# Patient Record
Sex: MALE | Race: WHITE | Employment: OTHER | URBAN - METROPOLITAN AREA
[De-identification: names, ages, dates, MRNs, and addresses within clinical notes are randomized per-mention and may not be internally consistent; named-entity substitution may affect disease eponyms.]

---
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
## 2019-01-02 ENCOUNTER — OFFICE VISIT (OUTPATIENT)
Dept: PEDIATRIC HEMATOLOGY/ONCOLOGY | Facility: CLINIC | Age: 19
End: 2019-01-02
Attending: NURSE PRACTITIONER
Payer: COMMERCIAL

## 2019-01-02 VITALS
HEART RATE: 88 BPM | WEIGHT: 198.19 LBS | SYSTOLIC BLOOD PRESSURE: 131 MMHG | DIASTOLIC BLOOD PRESSURE: 80 MMHG | OXYGEN SATURATION: 99 % | RESPIRATION RATE: 21 BRPM | BODY MASS INDEX: 28.03 KG/M2 | TEMPERATURE: 96.9 F

## 2019-01-02 DIAGNOSIS — E83.39 HYPOPHOSPHATEMIA: ICD-10-CM

## 2019-01-02 DIAGNOSIS — D49.6 NEOPLASM OF POSTERIOR CRANIAL FOSSA (H): Primary | ICD-10-CM

## 2019-01-02 DIAGNOSIS — K21.9 GASTROESOPHAGEAL REFLUX DISEASE, ESOPHAGITIS PRESENCE NOT SPECIFIED: ICD-10-CM

## 2019-01-02 DIAGNOSIS — C71.9 EPENDYMOMA (H): ICD-10-CM

## 2019-01-02 LAB
BASOPHILS # BLD AUTO: 0 10E9/L (ref 0–0.2)
BASOPHILS NFR BLD AUTO: 0.9 %
DIFFERENTIAL METHOD BLD: ABNORMAL
EOSINOPHIL # BLD AUTO: 0.3 10E9/L (ref 0–0.7)
EOSINOPHIL NFR BLD AUTO: 8.1 %
ERYTHROCYTE [DISTWIDTH] IN BLOOD BY AUTOMATED COUNT: 12.7 % (ref 10–15)
HCT VFR BLD AUTO: 39.5 % (ref 40–53)
HGB BLD-MCNC: 13 G/DL (ref 13.3–17.7)
LYMPHOCYTES # BLD AUTO: 0.8 10E9/L (ref 0.8–5.3)
LYMPHOCYTES NFR BLD AUTO: 22.5 %
MCH RBC QN AUTO: 30.7 PG (ref 26.5–33)
MCHC RBC AUTO-ENTMCNC: 32.9 G/DL (ref 31.5–36.5)
MCV RBC AUTO: 93 FL (ref 78–100)
MONOCYTES # BLD AUTO: 0.5 10E9/L (ref 0–1.3)
MONOCYTES NFR BLD AUTO: 14.4 %
NEUTROPHILS # BLD AUTO: 1.9 10E9/L (ref 1.6–8.3)
NEUTROPHILS NFR BLD AUTO: 54.1 %
PLATELET # BLD AUTO: 120 10E9/L (ref 150–450)
PLATELET # BLD EST: ABNORMAL 10*3/UL
RBC # BLD AUTO: 4.24 10E12/L (ref 4.4–5.9)
RBC MORPH BLD: NORMAL
WBC # BLD AUTO: 3.5 10E9/L (ref 4–11)

## 2019-01-02 PROCEDURE — 85025 COMPLETE CBC W/AUTO DIFF WBC: CPT | Performed by: PEDIATRICS

## 2019-01-02 PROCEDURE — 36415 COLL VENOUS BLD VENIPUNCTURE: CPT | Performed by: PEDIATRICS

## 2019-01-02 PROCEDURE — G0463 HOSPITAL OUTPT CLINIC VISIT: HCPCS | Mod: ZF

## 2019-01-02 RX ORDER — DEXAMETHASONE 0.5 MG/1
0.75 TABLET ORAL
Qty: 90 TABLET | Refills: 3 | Status: SHIPPED | OUTPATIENT
Start: 2019-01-02 | End: 2019-12-16

## 2019-01-02 ASSESSMENT — ENCOUNTER SYMPTOMS
FACIAL ASYMMETRY: 1
RESPIRATORY NEGATIVE: 1
SPEECH DIFFICULTY: 1
MYALGIAS: 0
CARDIOVASCULAR NEGATIVE: 1
NAUSEA: 0
COUGH: 0
FEVER: 0
DIZZINESS: 0
MUSCULOSKELETAL NEGATIVE: 1
POLYDIPSIA: 0
ARTHRALGIAS: 0
CHILLS: 0
EYE PAIN: 0
SHORTNESS OF BREATH: 0
VOMITING: 0
CONSTIPATION: 1

## 2019-01-02 NOTE — PROGRESS NOTES
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 He is on COG study [id number] with Entinostat and is presently Cycle 19 Day 15.      HPI:  Geo is doing okay.  No fevers. No known ill exposures. He remains on Prilosec and needs a refill. He is presently taking: miralax BID -TID, pericolace in the morning, and linzess 290 mcg daily.  12/26/18 at 2:30 he had a large [place] scale 4 stool, and at 10:30 a small [place] Scale 5 bowel movement.  On 12/27 at 8:45 he had a medium [place] Scale 5 bowel movement, at 2:45 a small type 5 bowel movement, at 8:00 a small/med 4-5 on the [place] scale, and at 10 he had a small [place] 5 stool.  On 12:28: 8:40 Med scale 5, 2:40 small type 5 8:50pm,  Large 5 on [place] scale, and at 11:30, a small-med Scale 4-5 bowel movement.  His abdominal pain is under control.  He has not been stooling as much as usual. Seems every few days he has a day where is output is down.  His speech may be slightly improved.  He is consistently taking his daily steroids - he needs a refill.   Geo denies dizziness, vision or hearing changes, chills, cough, shortness of breath, nausea, vomiting, and polyuria.  He has no muscular aches or complaints of bone pain.  Small headache yesterday.      Fam/Soc: Lives between his  parents (one week at each home). They have been awarded guardianship of Geo. The families work well together - both parents have remarried. His dad has a clotting disorder, requiring him to take Warfarin daily.       Allergies   Allergen Reactions     Blood Transfusion Related (Informational Only) Swelling     Periorbital swelling post platelet transfusion     No Known Drug Allergies        Current meds:  Current Outpatient Medications   Medication Sig Dispense Refill     dexamethasone (DECADRON) 0.5 MG tablet Take 0.75 mg by mouth daily (with breakfast).  90 tablet 3     omeprazole (PRILOSEC) 20 MG DR capsule Take 2 capsules (40 mg) by mouth 2 times daily 180 capsule 3     potassium phosphate, monobasic, (K-PHOS) 500 MG tablet Take 1 tablet (500 mg) by mouth 3 times daily 90 tablet 3     bisacodyl (BISACODYL LAXATIVE) 5 MG EC tablet Take 2 tablets (10 mg) by mouth At Bedtime 60 tablet 3     calcium carbonate-vitamin D 600-400 MG-UNIT CHEW Take 2 tablets in the morning and 1 tablet in the evening. 90 tablet 3     Cholecalciferol 400 UNITS CHEW Take 1 tablet (400 Units) by mouth every morning 60 tablet 2     fexofenadine (ALLEGRA) 180 MG tablet Take 180 mg by mouth daily       linaclotide (LINZESS) 290 MCG capsule Take 1 capsule (290 mcg) by mouth daily       melatonin 3 MG tablet Take 3 mg by mouth At Bedtime       mupirocin (BACTROBAN) 2 % ointment Use 2 times a day to the buttock with flare 22 g 3     pentoxifylline (TRENTAL) 400 MG CR tablet Take 1 tablet (400 mg) by mouth 3 times daily (with meals) 270 tablet 2     polyethylene glycol (MIRALAX/GLYCOLAX) Packet Take 34 g by mouth 2 times daily 100 packet 3     study - entinostat (IDS# 5050) 1 mg tablet Take 1 tablet (1 mg) by mouth every 7 days for 4 doses Take one 1mg tablet with one 5mg tablet for total dose of 6mg weekly. Take on an empty stomach, at least 1 hour before or 2 hours after a meal.  Swallow tablet whole. 4 tablet 0     study - entinostat (IDS# 5050) 5 mg tablet Take 1 tablet (5 mg) by mouth every 7 days for 4 doses Take one 5mg tablet with one 1mg tablet for total dose of 6mg weekly. Take on an empty stomach, at least 1 hour before or 2 hours after a meal.  Swallow tablet whole. 4 tablet 0     sulfamethoxazole-trimethoprim (BACTRIM/SEPTRA) 400-80 MG per tablet Take 1 tablet by mouth 2 times daily On Saturdays and Sundays 24 tablet 11     vitamin E (GNP VITAMIN E) 400 UNIT capsule Take 1 capsule (400 Units) by mouth daily 30 capsule 11   Above meds reviewed. Has missed some steroid doses over the  last two months. No missed chemo doses.   Has received flu shot for [id number]    Taking 1mg melatonin.  Miralax three times daily with 96 ounces of water.   Kphos twice daily.  No missed doses of Entinostat.    Past Medical History:   Diagnosis Date     Cranial nerve dysfunction      Dyspepsia      Ependymoma (H)      Gastro-oesophageal reflux disease      Hearing loss      Intracranial hemorrhage (H)      Migraine      Pilonidal cyst     7-2015     Reduced vision      Refractory obstruction of nasal airway     2nd to nasal valve prolapse     Sleep apnea      Strabismus     gaze palsy        Past Surgical History:   Procedure Laterality Date     GRAFT CARTILAGE FROM POSTERIOR AURICLE Left 10/6/2016    Procedure: GRAFT CARTILAGE FROM POSTERIOR AURICLE;  Surgeon: Tyler Richards MD;  [facility]     INCISION AND DRAINAGE PERINEAL, COMBINED Bilateral 7/18/2015    Procedure: COMBINED INCISION AND DRAINAGE PERINEAL;  Surgeon: Dequan Timmons MD;  [facility]     OPTICAL TRACKING SYSTEM CRANIOTOMY, EXCISE TUMOR, COMBINED N/A 4/13/2015    Procedure: COMBINED OPTICAL TRACKING SYSTEM CRANIOTOMY, EXCISE TUMOR;  Surgeon: Francis Velazquez MD;  [facility]     OPTICAL TRACKING SYSTEM CRANIOTOMY, EXCISE TUMOR, COMBINED N/A 4/16/2015    Procedure: COMBINED OPTICAL TRACKING SYSTEM CRANIOTOMY, EXCISE TUMOR;  Surgeon: Francis Velazquez MD;  [facility]     OPTICAL TRACKING SYSTEM CRANIOTOMY, EXCISE TUMOR, COMBINED Bilateral 5/28/2015    Procedure: COMBINED OPTICAL TRACKING SYSTEM CRANIOTOMY, EXCISE TUMOR;  Surgeon: Francis Velazquez MD;  [facility]     OPTICAL TRACKING SYSTEM CRANIOTOMY, EXCISE TUMOR, COMBINED Bilateral 1/14/2016    Procedure: COMBINED OPTICAL TRACKING SYSTEM CRANIOTOMY, EXCISE TUMOR;  Surgeon: Francis Velazquez MD;  [facility]     OPTICAL TRACKING SYSTEM VENTRICULOSTOMY  4/16/2015    Procedure: OPTICAL TRACKING SYSTEM VENTRICULOSTOMY;  Surgeon:  Francis Velazquez MD;  [facility]     REMOVE PORT VASCULAR ACCESS N/A 10/6/2016    Procedure: REMOVE PORT VASCULAR ACCESS;  Surgeon: Bruno Perea MD;  [facility]     RHINOPLASTY N/A 10/6/2016    Procedure: RHINOPLASTY;  Surgeon: Tyler Richards MD;  [facility]     VASCULAR SURGERY  5-2015    single lumen power port       Family History   Problem Relation Age of Onset     Circulatory Father         PE/DVT     Hypothyroidism Father 30     Diabetes Maternal Grandmother      Diabetes Paternal Grandmother      Diabetes Paternal Grandfather      C.A.D. Paternal Grandfather      Hypertension Maternal Grandfather      Thyroid Disease Paternal Aunt         unknown whether hypo or hyper       Review of Systems   Constitutional: Negative for chills and fever.        In a wheelchair   HENT: Positive for hearing loss (Pre-existing hearing loss from prior therapy). Negative for congestion, ear pain, mouth sores, nosebleeds and tinnitus.    Eyes: Positive for visual disturbance (Wears a patch over one eye to minimize diplopia). Negative for pain.        Right eye patched   Respiratory: Negative.  Negative for cough and shortness of breath.    Cardiovascular: Negative.    Gastrointestinal: Positive for constipation (Chronic, see HPI). Negative for nausea and vomiting.   Endocrine: Negative for polydipsia and polyuria.        Follows with Dr. Martin   Musculoskeletal: Negative.  Negative for arthralgias and myalgias.   Skin: Negative.    Neurological: Positive for facial asymmetry and speech difficulty. Negative for dizziness.   Psychiatric/Behavioral: Sleep disturbance: Not using his BiPAP.   All other systems reviewed and are negative.    Vitals:    weight is 89.9 kg (198 lb 3.1 oz). His axillary temperature is 96.9  F (36.1  C). His blood pressure is 131/80 and his pulse is 88. His respiration is 21 and oxygen saturation is 99%.        Wt Readings from Last 4 Encounters:   01/02/19 89.9 kg (198  lb 3.1 oz) (92 %)*   12/26/18 87.2 kg (192 lb 3.9 oz) (90 %)*   12/19/18 88.4 kg (194 lb 14.2 oz) (91 %)*   12/12/18 87.1 kg (192 lb 0.3 oz) (90 %)*     * Growth percentiles are based on [facility] (Boys, 2-20 Years) data.       Physical Exam   Constitutional: He is oriented to person, place, and time and well-developed, well-nourished, and in no distress.   Stands with assist   HENT:   Head: Normocephalic and atraumatic.   Mouth/Throat: Oropharynx is clear and moist.   Saliva accumulated in mouth with occasional drooling  Left TM retracted, no erythema   Eyes: Conjunctivae are normal. Pupils are equal, round, and reactive to light.   Can track to right, but not to left.   Nystagmus noted     Neck: Neck supple.   Cardiovascular: Normal rate, regular rhythm and normal heart sounds.   Pulmonary/Chest: Effort normal and breath sounds normal. He has no wheezes.   Abdominal: Soft. Bowel sounds are normal. He exhibits no distension and no mass. There is no tenderness.   Musculoskeletal:   Positive pulses bilaterally.   Lymphadenopathy:     He has no cervical adenopathy.   Neurological: He is alert and oriented to person, place, and time. A cranial nerve deficit is present. Coordination (Ataxic- dysmetria especially in upper extremities when accomplishing tasks.) abnormal. GCS score is 15.   Skin: Skin is warm and dry.   Striae scattered He has a few petechia mid abdomen on one stretch sofya.    Psychiatric: Mood and affect normal.       Labs:  Results for orders placed or performed in visit on 01/02/19   CBC with platelets differential   Result Value Ref Range    WBC 3.5 (L) 4.0 - 11.0 10e9/L    RBC Count 4.24 (L) 4.4 - 5.9 10e12/L    Hemoglobin 13.0 (L) 13.3 - 17.7 g/dL    Hematocrit 39.5 (L) 40.0 - 53.0 %    MCV 93 78 - 100 fl    MCH 30.7 26.5 - 33.0 pg    MCHC 32.9 31.5 - 36.5 g/dL    RDW 12.7 10.0 - 15.0 %    Platelet Count 120 (L) 150 - 450 10e9/L    Diff Method Manual Differential     % Neutrophils 54.1 %    % Lymphocytes  22.5 %    % Monocytes 14.4 %    % Eosinophils 8.1 %    % Basophils 0.9 %    Absolute Neutrophil 1.9 1.6 - 8.3 10e9/L    Absolute Lymphocytes 0.8 0.8 - 5.3 10e9/L    Absolute Monocytes 0.5 0.0 - 1.3 10e9/L    Absolute Eosinophils 0.3 0.0 - 0.7 10e9/L    Absolute Basophils 0.0 0.0 - 0.2 10e9/L    RBC Morphology Normal     Platelet Estimate Confirming automated cell count      *Note: Due to a large number of results and/or encounters for the requested time period, some results have not been displayed. A complete set of results can be found in Results Review.       Impression:  1. Ependymoma.  2. Entinostat, continues to tolerate fairly well.  3. Labs appropriate at this time - platelets 120,000 today  4. Chronic constipation well managed with current regime  5. He remains obsessed about constipation and feels it is related to his inability to walk.      Plan:  1.  He will continue Entinostat treatment - He will take 6mg weekly.   2.  Reviewed with Geo that we are doing a great number of things to manage his constipation and that I would not with hold any treatments.  I will work with nutrition to note the number of grams of fiber intake.  They will also look at his entire intake to better understand if dietary adjustments can help.  3.  RTC in next week for follow up and labs.  Imaging is scheduled Jan 16th.    4. Follow-up Neuropsychology testing is scheduled tomorrow    5. Consult PM & R.(Benny Coelho) to review his case and give recommendations for ongoing plan to increase quality of life. Referral completed.

## 2019-01-02 NOTE — NURSING NOTE
Chief Complaint   Patient presents with     RECHECK     Patient is here today for Ependymoma follow up     /80 (BP Location: Right arm, Patient Position: Fowlers, Cuff Size: Adult Regular)   Pulse 88   Temp 96.9  F (36.1  C) (Axillary)   Resp 21   Wt 89.9 kg (198 lb 3.1 oz)   SpO2 99%   BMI 28.03 kg/m      Malinda Russo LPN  January 2, 2019

## 2019-01-02 NOTE — LETTER
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 He is on COG study [id number] with Entinostat and is presently Cycle 19 Day 15.      HPI:  Geo is doing okay.  No fevers. No known ill exposures. He remains on Prilosec and needs a refill. He is presently taking: miralax BID -TID, pericolace in the morning, and linzess 290 mcg daily.  12/26/18 at 2:30 he had a large [place] scale 4 stool, and at 10:30 a small [place] Scale 5 bowel movement.  On 12/27 at 8:45 he had a medium [place] Scale 5 bowel movement, at 2:45 a small type 5 bowel movement, at 8:00 a small/med 4-5 on the [place] scale, and at 10 he had a small [place] 5 stool.  On 12:28: 8:40 Med scale 5, 2:40 small type 5 8:50pm,  Large 5 on [place] scale, and at 11:30, a small-med Scale 4-5 bowel movement.  His abdominal pain is under control.  He has not been stooling as much as usual. Seems every few days he has a day where is output is down.  His speech may be slightly improved.  He is consistently taking his daily steroids - he needs a refill.   Geo denies dizziness, vision or hearing changes, chills, cough, shortness of breath, nausea, vomiting, and polyuria.  He has no muscular aches or complaints of bone pain.  Small headache yesterday.      Fam/Soc: Lives between his  parents (one week at each home). They have been awarded guardianship of Geo. The families work well together - both parents have remarried. His dad has a clotting disorder, requiring him to take Warfarin daily.       Allergies   Allergen Reactions     Blood Transfusion Related (Informational Only) Swelling     Periorbital swelling post platelet transfusion     No Known Drug Allergies        Current meds:  Current Outpatient Medications   Medication Sig Dispense Refill      dexamethasone (DECADRON) 0.5 MG tablet Take 0.75 mg by mouth daily (with breakfast). 90 tablet 3     omeprazole (PRILOSEC) 20 MG DR capsule Take 2 capsules (40 mg) by mouth 2 times daily 180 capsule 3     potassium phosphate, monobasic, (K-PHOS) 500 MG tablet Take 1 tablet (500 mg) by mouth 3 times daily 90 tablet 3     bisacodyl (BISACODYL LAXATIVE) 5 MG EC tablet Take 2 tablets (10 mg) by mouth At Bedtime 60 tablet 3     calcium carbonate-vitamin D 600-400 MG-UNIT CHEW Take 2 tablets in the morning and 1 tablet in the evening. 90 tablet 3     Cholecalciferol 400 UNITS CHEW Take 1 tablet (400 Units) by mouth every morning 60 tablet 2     fexofenadine (ALLEGRA) 180 MG tablet Take 180 mg by mouth daily       linaclotide (LINZESS) 290 MCG capsule Take 1 capsule (290 mcg) by mouth daily       melatonin 3 MG tablet Take 3 mg by mouth At Bedtime       mupirocin (BACTROBAN) 2 % ointment Use 2 times a day to the buttock with flare 22 g 3     pentoxifylline (TRENTAL) 400 MG CR tablet Take 1 tablet (400 mg) by mouth 3 times daily (with meals) 270 tablet 2     polyethylene glycol (MIRALAX/GLYCOLAX) Packet Take 34 g by mouth 2 times daily 100 packet 3     study - entinostat (IDS# 5050) 1 mg tablet Take 1 tablet (1 mg) by mouth every 7 days for 4 doses Take one 1mg tablet with one 5mg tablet for total dose of 6mg weekly. Take on an empty stomach, at least 1 hour before or 2 hours after a meal.  Swallow tablet whole. 4 tablet 0     study - entinostat (IDS# 5050) 5 mg tablet Take 1 tablet (5 mg) by mouth every 7 days for 4 doses Take one 5mg tablet with one 1mg tablet for total dose of 6mg weekly. Take on an empty stomach, at least 1 hour before or 2 hours after a meal.  Swallow tablet whole. 4 tablet 0     sulfamethoxazole-trimethoprim (BACTRIM/SEPTRA) 400-80 MG per tablet Take 1 tablet by mouth 2 times daily On Saturdays and Sundays 24 tablet 11     vitamin E (GNP VITAMIN E) 400 UNIT capsule Take 1 capsule (400 Units) by mouth  daily 30 capsule 11   Above meds reviewed. Has missed some steroid doses over the last two months. No missed chemo doses.   Has received flu shot for 3729-7161    Taking 1mg melatonin.  Miralax three times daily with 96 ounces of water.   Kphos twice daily.  No missed doses of Entinostat.    Past Medical History:   Diagnosis Date     Cranial nerve dysfunction      Dyspepsia      Ependymoma (H)      Gastro-oesophageal reflux disease      Hearing loss      Intracranial hemorrhage (H)      Migraine      Pilonidal cyst     7-2015     Reduced vision      Refractory obstruction of nasal airway     2nd to nasal valve prolapse     Sleep apnea      Strabismus     gaze palsy        Past Surgical History:   Procedure Laterality Date     GRAFT CARTILAGE FROM POSTERIOR AURICLE Left 10/6/2016    Procedure: GRAFT CARTILAGE FROM POSTERIOR AURICLE;  Surgeon: Tyler Richards MD;  [facility]     INCISION AND DRAINAGE PERINEAL, COMBINED Bilateral 7/18/2015    Procedure: COMBINED INCISION AND DRAINAGE PERINEAL;  Surgeon: Dequan Timmons MD;  [facility]     OPTICAL TRACKING SYSTEM CRANIOTOMY, EXCISE TUMOR, COMBINED N/A 4/13/2015    Procedure: COMBINED OPTICAL TRACKING SYSTEM CRANIOTOMY, EXCISE TUMOR;  Surgeon: Francis Velazquez MD;  [facility]     OPTICAL TRACKING SYSTEM CRANIOTOMY, EXCISE TUMOR, COMBINED N/A 4/16/2015    Procedure: COMBINED OPTICAL TRACKING SYSTEM CRANIOTOMY, EXCISE TUMOR;  Surgeon: Francis Velazquez MD;  [facility]     OPTICAL TRACKING SYSTEM CRANIOTOMY, EXCISE TUMOR, COMBINED Bilateral 5/28/2015    Procedure: COMBINED OPTICAL TRACKING SYSTEM CRANIOTOMY, EXCISE TUMOR;  Surgeon: Francis Velazquez MD;  [facility]     OPTICAL TRACKING SYSTEM CRANIOTOMY, EXCISE TUMOR, COMBINED Bilateral 1/14/2016    Procedure: COMBINED OPTICAL TRACKING SYSTEM CRANIOTOMY, EXCISE TUMOR;  Surgeon: Francis Velazquez MD;  [facility]     OPTICAL TRACKING SYSTEM  VENTRICULOSTOMY  4/16/2015    Procedure: OPTICAL TRACKING SYSTEM VENTRICULOSTOMY;  Surgeon: Francis Velazquez MD;  [facility]     REMOVE PORT VASCULAR ACCESS N/A 10/6/2016    Procedure: REMOVE PORT VASCULAR ACCESS;  Surgeon: Bruno Perea MD;  [facility]     RHINOPLASTY N/A 10/6/2016    Procedure: RHINOPLASTY;  Surgeon: Tyler Richards MD;  [facility]     VASCULAR SURGERY  5-2015    single lumen power port       Family History   Problem Relation Age of Onset     Circulatory Father         PE/DVT     Hypothyroidism Father 30     Diabetes Maternal Grandmother      Diabetes Paternal Grandmother      Diabetes Paternal Grandfather      C.A.D. Paternal Grandfather      Hypertension Maternal Grandfather      Thyroid Disease Paternal Aunt         unknown whether hypo or hyper       Review of Systems   Constitutional: Negative for chills and fever.        In a wheelchair   HENT: Positive for hearing loss (Pre-existing hearing loss from prior therapy). Negative for congestion, ear pain, mouth sores, nosebleeds and tinnitus.    Eyes: Positive for visual disturbance (Wears a patch over one eye to minimize diplopia). Negative for pain.        Right eye patched   Respiratory: Negative.  Negative for cough and shortness of breath.    Cardiovascular: Negative.    Gastrointestinal: Positive for constipation (Chronic, see HPI). Negative for nausea and vomiting.   Endocrine: Negative for polydipsia and polyuria.        Follows with Dr. Martin   Musculoskeletal: Negative.  Negative for arthralgias and myalgias.   Skin: Negative.    Neurological: Positive for facial asymmetry and speech difficulty. Negative for dizziness.   Psychiatric/Behavioral: Sleep disturbance: Not using his BiPAP.   All other systems reviewed and are negative.    Vitals:    weight is 89.9 kg (198 lb 3.1 oz). His axillary temperature is 96.9  F (36.1  C). His blood pressure is 131/80 and his pulse is 88. His respiration is 21 and  oxygen saturation is 99%.        Wt Readings from Last 4 Encounters:   01/02/19 89.9 kg (198 lb 3.1 oz) (92 %)*   12/26/18 87.2 kg (192 lb 3.9 oz) (90 %)*   12/19/18 88.4 kg (194 lb 14.2 oz) (91 %)*   12/12/18 87.1 kg (192 lb 0.3 oz) (90 %)*     * Growth percentiles are based on [facility] (Boys, 2-20 Years) data.       Physical Exam   Constitutional: He is oriented to person, place, and time and well-developed, well-nourished, and in no distress.   Stands with assist   HENT:   Head: Normocephalic and atraumatic.   Mouth/Throat: Oropharynx is clear and moist.   Saliva accumulated in mouth with occasional drooling  Left TM retracted, no erythema   Eyes: Conjunctivae are normal. Pupils are equal, round, and reactive to light.   Can track to right, but not to left.   Nystagmus noted     Neck: Neck supple.   Cardiovascular: Normal rate, regular rhythm and normal heart sounds.   Pulmonary/Chest: Effort normal and breath sounds normal. He has no wheezes.   Abdominal: Soft. Bowel sounds are normal. He exhibits no distension and no mass. There is no tenderness.   Musculoskeletal:   Positive pulses bilaterally.   Lymphadenopathy:     He has no cervical adenopathy.   Neurological: He is alert and oriented to person, place, and time. A cranial nerve deficit is present. Coordination (Ataxic- dysmetria especially in upper extremities when accomplishing tasks.) abnormal. GCS score is 15.   Skin: Skin is warm and dry.   Striae scattered He has a few petechia mid abdomen on one stretch sofya.    Psychiatric: Mood and affect normal.       Labs:  Results for orders placed or performed in visit on 01/02/19   CBC with platelets differential   Result Value Ref Range    WBC 3.5 (L) 4.0 - 11.0 10e9/L    RBC Count 4.24 (L) 4.4 - 5.9 10e12/L    Hemoglobin 13.0 (L) 13.3 - 17.7 g/dL    Hematocrit 39.5 (L) 40.0 - 53.0 %    MCV 93 78 - 100 fl    MCH 30.7 26.5 - 33.0 pg    MCHC 32.9 31.5 - 36.5 g/dL    RDW 12.7 10.0 - 15.0 %    Platelet Count 120 (L)  150 - 450 10e9/L    Diff Method Manual Differential     % Neutrophils 54.1 %    % Lymphocytes 22.5 %    % Monocytes 14.4 %    % Eosinophils 8.1 %    % Basophils 0.9 %    Absolute Neutrophil 1.9 1.6 - 8.3 10e9/L    Absolute Lymphocytes 0.8 0.8 - 5.3 10e9/L    Absolute Monocytes 0.5 0.0 - 1.3 10e9/L    Absolute Eosinophils 0.3 0.0 - 0.7 10e9/L    Absolute Basophils 0.0 0.0 - 0.2 10e9/L    RBC Morphology Normal     Platelet Estimate Confirming automated cell count      *Note: Due to a large number of results and/or encounters for the requested time period, some results have not been displayed. A complete set of results can be found in Results Review.       Impression:  1. Ependymoma.  2. Entinostat, continues to tolerate fairly well.  3. Labs appropriate at this time - platelets 120,000 today  4. Chronic constipation well managed with current regime  5. He remains obsessed about constipation and feels it is related to his inability to walk.      Plan:  1.  He will continue Entinostat treatment - He will take 6mg weekly.   2.  Reviewed with Geo that we are doing a great number of things to manage his constipation and that I would not with hold any treatments.  I will work with nutrition to note the number of grams of fiber intake.  They will also look at his entire intake to better understand if dietary adjustments can help.  3.  RTC in next week for follow up and labs.  Imaging is scheduled Jan 16th.    4. Follow-up Neuropsychology testing is scheduled tomorrow    5. Consult PM & R.(Benny Coelho) to review his case and give recommendations for ongoing plan to increase quality of life. Referral completed.           Kristi Schuler, ALAN CNP

## 2019-01-03 ENCOUNTER — OFFICE VISIT (OUTPATIENT)
Dept: NEUROPSYCHOLOGY | Facility: CLINIC | Age: 19
End: 2019-01-03
Attending: PSYCHOLOGIST
Payer: COMMERCIAL

## 2019-01-03 DIAGNOSIS — R47.1 DYSARTHRIA: ICD-10-CM

## 2019-01-03 DIAGNOSIS — Z92.21 STATUS POST CHEMOTHERAPY: ICD-10-CM

## 2019-01-03 DIAGNOSIS — C71.9 EPENDYMOMA (H): Primary | ICD-10-CM

## 2019-01-03 PROCEDURE — 96139 PSYCL/NRPSYC TST TECH EA: CPT | Mod: ZF | Performed by: PSYCHOLOGIST

## 2019-01-03 PROCEDURE — 96138 PSYCL/NRPSYC TECH 1ST: CPT | Mod: ZF | Performed by: PSYCHOLOGIST

## 2019-01-03 NOTE — PROGRESS NOTES
SUMMARY OF NEUROPSYCHOLOGICAL EVALUATION  PEDIATRIC NEUROPSYCHOLOGY CLINIC  DIVISION OF CLINICAL BEHAVIORAL NEUROSCIENCE     Name: Geo Hicks   YOB: 2000   MRN:  [id number]   Date of Visit:   01/03/2019     RE-EVALUATION REPORT    Reason for Re-Evaluation   Geo Hicks is a 18-year, 2-month  male referred for a neuropsychological re-evaluation by ALAN Tate CNP, with the [facility]. 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 Since then, he has undergone multiple tumor resections, chemotherapy, and radiation treatment. Geo also experienced an intra-tumoral hemorrhage (brain bleed) in May 2015 that resulted in neurological changes including facial numbness, significant loss of mobility and dysarthria. In December 2016, a follow-up MRI revealed continued tumor enhancement. The most recent MRI from 10/16/18 revealed an unchanged fourth ventricle ependymoma in comparison to previous exams, a slight decrease in adjacent edema, as well as a stable size of the ventricular system.  He is currently on COG study [id number] with [employer]in[employer] and is presently Cycle 19. Geo was previously evaluated in this clinic in February 2016 and February 2017. The current evaluation was requested in order to provide updated information regarding Geo s neurocognitive/neurobehavioral functioning in the context of his brain tumor and subsequent treatments.    Previous Evaluations:    Results from Geo s evaluation in February 2016 revealed strong neurocognitive functioning in the domains of memory, working memory, and verbal comprehension. His adaptive functioning skills in daily life were indicated to be impaired, which was predominantly driven by Geo s declines in motor skills and visual perception due to his medical condition. Emotionally, Geo endorsed moderate emotional difficulties.     Results from Geo s  evaluation in February 2017 indicated a relative stability in his foundational thinking abilities in comparison to his previous evaluation, as well as intact non-verbal skills. He had significant impairment on a task of visual-motor processing speed due to his sensorimotor limitation. Geo s adaptive functioning skills remained unchanged in comparison to his previous evaluation. He continued to endorse mild to moderate emotional difficulties.      Relevant History: Updated background information was gathered via an interview with Geo and his parents. Geo nunez developmental, medical, educational, social, and family histories were thoroughly documented in previous evaluations and will be briefly reviewed here. For more detailed background information, the reader is referred to Geo s medical file and previous evaluation reports dated 02/29/2016 and 02/08/2017.    Medical History  To review, Geo was in good health until April 2015 when he presented at the Emergency Department with an abnormal headache and decreased coordination. A CT scan of Geo s head showed a 4cm mass in the posterior fossa, originating in the cerebellar vermis or fourth ventricle with mass effect on the brain stem. No metastases were found in the spine. Geo underwent a sub-occipital craniotomy for partial resection of the tumor, with a plan to follow with chemotherapy. In May 2015, Geo presented with acute neurological changes including facial numbness, dizziness, severe headaches, and dysarthria. A CT scan showed an intra-tumoral hemorrhage (brain bleed) with increased local mass effect and cerebellar tonsillar herniation. Geo underwent a second sub-occipital craniotomy for tumor debulking and evacuation of the intra-tumoral hematoma. Diagnostic tests completed during this surgery identified Geo s tumor as a grade II ependymoma, a different form of cancer than was originally suspected. Geo was subsequently [place]  [facility] [id number]. This treatment protocol included chemotherapy with vincristine, carboplatin, cyclophosphamide, etoposide, and cisplatin, as well as radiation.      In January 2016, Geo underwent a third sub-occipital craniotomy as MRI studies revealed that his tumor had increased in size extending into the david, midbrain, and bilateral cerebellar hemispheres. Following this surgery, Geo began treatment with Avastin every two weeks until April of 2016. A routine follow-up MRI on December 30, 2016 revealed continued tumor enhancement as well as a new nodule in his L4 vertebrae. As a result, he was started on an experimental phase I trial, ADVL 1513 with Entinostat on January 10, 2017. Geo is currently on COG study [id number] with [employer]in[employer] and is presently Cycle 19.     As a result of Geo s tumor, inter-tumoral hemorrhage, and subsequent treatment he has a number of functional limitations. He continues to experience significant gross- and fine-motor coordination difficulties. He requires a wheelchair for mobility and is unable to complete fine-motor tasks independently (e.g., writing, brushing teeth). Additionally, he continues to have significant vision challenges including diplopia for which he wears an eye-patch on alternating eyes and ocular-motor dysfunction. He has pronounced articulation challenges due to motor speech impairment (dysarthria) that impact the intelligibility of his speech.    In April 2017, Geo completed a sleep study and was moderate obstructive sleep apnea was found. In August 2017, Geo was reported to having bilateral mild sensorineural hearing loss. He had an EEG conducted in October 2017 with results revealing a mildly abnormal EEG due to the presence of intermittent left temporal and bilateral frontal theta and delta slowing. In June 2018, Geo was given an eye evaluation with results consistent for esotropia at both distance and near. Furthermore, he was unable to fuse  with any combination of prism for attending.     Geo has also had a history of chronic constipation and abdominal pain complaints. Despite numerous abdominal x-rays and clean-outs, he is convinced that he is always full of stool.   Geo is currently participating in psychotherapy at the [facility].     Family and Educational History:   Geo continues to share time between his mother s and father s homes. Both parents are remarried. Siblings include two full brothers, a step-brother, and a step-sister. Geo graduated from high school in Spring 2018. He initially considered attending [email or web address] in Fall 2019, but after visiting the college, he lost interest due to the amount of time it would take him to complete courses and receive a degree.      Social and Emotional Functioning:   Geo is described as intelligent but has a lot of time to think and obsess over certain ideas. He is generally happy and friendly to others, as well as being very strong-willed. Geo s parents reported continued concerns about his emotional functioning, specifically with his adamant belief of always being constipated. He is convinced that his doctors and parents are conspiring together to not  solve  this issue. He has verbally attacked his caregivers and medical personnel when they confront him with evidence that he is not constipated.  He has gone so far as to claim they have altered x-rays and other medical tests.  He believes that if his constipation problem was solved he would be able to walk.  His parents reported that no amount of evidence, medical tests, or information will change this idea and that he becomes extremely angry when confronted.  They believe that some of these beliefs stem from before his most recent stroke, he was told that he would be able to walk independently.  Furthermore, Geo tends to isolate himself in his room and generally plays video games.     Behavioral Observations    Geo was accompanied to the appointment by his mother. He presented as a casually dressed, well-groomed male who appeared his stated chronological age. Upon being greeted, Geo was seated in his wheelchair and looked up to make a greeting in return. While he initially appeared somewhat unhappy about taking the tests, Geo was quick to change to a more sociable demeanor after engaging in brief conversation. He was noted for wearing an eye patch over his right eye, which he wears to correct diplopia. Geo willingly allowed his mother to push him down the krishna to the testing room. Once in the room, the plan for the day was explained to Geo and his mother. When it came time for testing to begin, Geo began the testing without incident.    To help establish and maintain rapport, the examiner engaged in casual conversation with Geo throughout the evaluation. He shared he ate breakfast and had slept well the night before. Geo was presented with a variety of visual and verbal tasks, as well as rating scales to complete. He demonstrated adequate comprehension of what was asked of him for each task and offered compliance throughout the evaluation. He was offered a break midway through the evaluation but declined and preferred to work straight through.    During the evaluation, Geo s hands were notable for being tremulous, more so when engaged in an activity but also when at rest. In terms of facial control, Geo was not able to keep his mouth closed and experienced difficulties with oral secretions. He was mindful of this issue and continuously wiped his mouth with a cloth that he had on the table next to him. The rate and prosody of Geo s speech was unremarkable. Similar to previous evaluations, he demonstrated minor difficulty with modulating the tone of his voice, as well as significant articulation difficulties that impacted the intelligibility of his speech. When Geo exhibited short  responses, intelligibility was clearer. However, the longer of a response he provided, the level of intelligibility decreased, and repetition was asked by the examiner. Geo was patient whenever asked to repeat what he said. Mood was neither elevated nor depressed, and affect was appropriate in range and intensity. Geo offered both reciprocal and spontaneous conversation. He demonstrated good frustration tolerance and perseverance when presented with difficult tasks. Geo exhibited good attentional skills and did not require any repetition or simplification of instructions. Given his high level of cooperation and effort during the evaluation, the results presented below are believed to provide an accurate representation of Geo s current neuropsychological functioning while in an optimal (e.g., quiet, structured, one-on-one) environment.     Interview:  Geo described himself as generally being  easy-going  on a typical day. He did identify his  situation right now  as causing him some anxiety and worry, but he did not elaborate what he meant by this. Geo feels most happy when he is able to spend time with friends. He identified his best friend as Rima, whom he has known since 1st grade. Geo enjoys joking around and hanging out with friends from high school. Other activities of interest to Geo include playing videogames and exercising at night. He shared that he used to enjoy playing the piano and percussion while in school, but he no longer plays those. Geo was asked about future goals, to which he replied,  I haven t really thought of any.  Geo denied any thoughts of harm to himself and/or others.     Neuropsychological Evaluation Methods and Instruments  Review of Records  Clinical Interview  Wechsler Adult Intelligence Scale, 4th Edition  [place] Verbal Learning Test, 2nd Edition  Adaptive Behavior Assessment System, 3rd Edition  Behavior Rating Inventory of Executive Functioning,  Adult Version  Behavior Assessment System for Children, 3rd Edition  Hobson Depression Inventory, 2nd Edition  Hobson Anxiety Inventory    A full summary of test scores is provided in tables at the end of this report.    Results and Impressions  We were pleased to discover that the neurocognitive areas measured during this evaluation have remained intact and consistent with previous evaluations. Geo s ability to access and apply acquired word knowledge (verbal comprehension) and his working memory (ability to mentally hold and manipulate information) were in the average range. His ability to understand visual information to solve novel abstract visual problems (perceptual reasoning) was in the low average range. There was a slight decrease in performance on one task associated with perceptual reasoning due to time limits. Had no time limits been enforced, Geo s performance would have likely been higher as he was observed to respond with correct answers that were past the time limit allowed. Furthermore, on a timed visual discrimination and scanning task, Geo performed in the borderline range, which was a slight improvement from his previous evaluation in 2017 where he performed in the impaired range.     Geo s adaptive functioning was evaluated through a rating scale completed by his parents.  On this measure he has shown improvement in his overall functioning particularly in the area of social functioning which is in the low average range (last time he scored in the below average range).  He also shows below average skills conceptually and his practical skills, although below average, have improved over time.    Geo was asked to complete a memory and learning task which had been previously administered in past evaluations.  On this measure Geo is asked to learn a list of 15 words which are repeated 5 times.  Twenty minutes later he is asked to recall as many words as possible.  In addition, at that  time he is also presented with a list of words with the target words embedded and asked to identify which words were in the original list.  Geo scored in the low average range on this measure consistent with his past scores.  He showed improvement in his ability to recall words after a short delay when provided with cues as well as with a long delay and scored in the average range.  His ability to recall words without the cues was consistent with his previous performance and in the low average range.  Cueing helps Geo recall information.    Geo s attentional skills were assessed through observation as well as parent report, interviews, and behavior rating scales.  Geo shows no significant difficulties with attention.  Geo s executive functioning skills were assessed with a rating scale completed by his mother. Geo also completed a similar self-report rating scale. These skills include planning, concept formation, mental flexibility, and the ability to use feedback to modify behavior. These capacities are important in complex problem-solving, self-monitoring, impulse control, and the development of abstract thinking skills. Geo s mother reported a clinically significant concern in the area of shifting (ability to move freely from one situation, activity, or aspect of a problem to another as the circumstances demand). Geo did not report any concerns with his executive functioning skills.     Of most concern are Geo s emerging emotional difficulties.  To that end he completed two rating scales as well as an interview concerning his feelings.  On the Hobson Depression Inventory, Geo showed improvement in his self-reported feelings of sadness and scored in the average range indicating he is not reporting significant feelings of sadness.  On the Hobson Anxiety Inventory, Geo reported improvement in anxiety and now scored in the range indicating minimal anxiety.  In addition, parent ratings  also did not indicate significant problems.  In contrast parent interviews indicate the likely presence of a delusional disorder.  Geo shows intransigent beliefs that cannot be shaken despite medical evidence to the contrary.  He becomes verbally angry and very upset when these beliefs are shaken.  He also refuses to consider alternative ideas about his thoughts and will shut out people and feel they are conspiring against him.  We will assign a preliminary diagnosis of a delusional disorder due to these unshakeable beliefs.  We are also concerned because he seems to be sealing himself off from interactions with isolating himself in his room and playing video games.  He tried to consider taking courses at a community college but quickly became disillusioned due to the time that would be required for him to graduate.       Diagnoses  C71.9  Ependymoma  Z92.21  History of chemotherapy  R47.1                Dysarthria  R/O   Delusional disorder    Recommendations  We strongly recommend that Geo be evaluated by a psychiatrist to determine the presence of a delusional disorder.  We have referred him to Dr. Reza for a psychiatric consult.  Dr. Reza has referred him to adult psychiatry and they should be in touch with the family soon.    We strongly believe that Geo will continue to benefit from working with his therapist, particularly in helping him deal with his developing psychiatric disorder as well as with helping him begin to participate in higher education.  It is important for Geo to be stimulated with more than playing video games.     Additionally, Geo is encouraged to share how he is feeling with his caregivers and providers. Often times, individuals with chronic illnesses do not want to burden others with their worries or concerns. While this is a very thoughtful and kind gesture, it can be counterproductive. If distressing feelings are not properly expressed and dealt with, they can become  more severe and disruptive over time. Geo will benefit from having some to talk to with whom he can be open.     Geo s diagnosis of brain tumor, multiple surgeries, chemotherapy, and radiation treatment place him at greater risk for  neurocognitive late effects,  meaning declines in his cognitive functioning as a result of his treatments. He will need regular neuropsychological evaluations in order to monitor his cognitive functioning going forward. We would like to see Geo return for a follow up evaluation in one year.     Geo will benefit from increased social activities. He and his family are encouraged to actively schedule social events where he can get out of the house and engage with others, especially with his same-age peers.       The results of this evaluation should be shared with the Office of Students with Disabilities at the [employer] [employer] or [employer] that Geo chooses to attend. Below are some recommendations that the school may find helpful:  1. Goe s educators should be aware that both his verbal and nonverbal intellectual skills are intact. As such he will be able to learn through both modalities, while taking into account his motor and visual limitations.   2. We recommend that he begin his college experience with one course and move to courses once he finds the work is manageable.  3. Geo tends to become fatigued over time. He will benefit from short stints of work, followed by breaks so that he can recuperate cognitively. Additionally, he will continue to benefit from a reduced workload and his work should be judged based on quality and mastery of concepts, rather than quantity.  4. Continued use of assistive technology for communication and mobility purposes will be important in order to promote Geo s learning.   5. Geo will require extra time on tasks requiring rapid visual processing or fine motor skills. He should be given extended time on tests and  assignments, as well as accommodations to enable him to present his answers orally.  6. Audiobooks may be a good source of reading material for Geo. For individuals with visual impairments, a large library of audiobooks are available at: [email or web address].org/  7. Our evaluation indicates that Geo s visual reasoning abilities are intact though he continues to have visual perceptual difficulties including possible neglect of visual stimuli presented in his left visual field (items oriented to the far left of center). We recommend the following accommodations:   a. Pay careful attention to the manner in which visual material is presented.  In general, reduce the amount of visual material presented on any one sheet of paper by folding, covering all but the relevant work, etc.  b. Whenever possible, present material in multiple domains - i.e., visual and auditory simultaneously.       Geo will benefit from increased social activities. He and his family are encouraged to actively schedule social events where he can get out of the house and engage with others, especially with his same-age peers.        It has been a pleasure working with Geo and his family. If you have any questions or concerns regarding this evaluation, please call the Pediatric Neuropsychology Department at [phone].      Nina Lim, Tami.S.  Psychometrist  Pediatric Neuropsychology   [facility]    Veronica Padilla, Ph.D., L.P., [facility]  Professor of Pediatrics  , Division of Clinical Behavioral Neuroscience  [facility]             PEDIATRIC NEUROPSYCHOLOGY CLINIC TEST SCORES    Note: The test data listed below use one or more of the following formats:      Standard Scores have an average of 100 and a standard deviation of 15 (the average range is 85 to 115).    Scaled Scores have an average of 10 and a standard deviation of 3 (the average range is 7 to 13).    T-Scores have an  average of 50 and a standard deviation of 10 (the average range is 40 to 60).    Z-Scores have an average of 0 and a standard deviation of 1 (the average range is -1 to +1).      COGNITIVE Functioning  Wechsler Adult Intelligence Scale, Fourth Edition   Standard scores from 85 - 115 represent the average range of functioning.  Scaled scores from 7 - 13 represent the average range of functioning.    Index 2016   Standard Score 2017   Standard Score Current   Standard Score   Verbal Comprehension 110 116 108   Perceptual Reasoning -- 100* 88*   Working Memory 102 97 102     Subtest 2016 Scaled Score 2017 Scaled Score Current Scaled Score   Similarities 9 12 9   Vocabulary 13 14 12   Information 14 13 14   Matrix Reasoning -- 11 10   Visual Puzzles -- 9 6   Digit Span 9 8 9   Arithmetic 12 11 12   Cancellation -- 1 4     *Due to the potential for Geo s current motor challenges to affect results, one subtest from this index was not administered (Block Design). The standard score was derived by prorating scores from two subtests that did not rely on motor functions (Matrix Reasoning and Visual Puzzles).      ATTENTION AND EXECUTIVE FUNCTIONING  Behavior Rating Inventory of Executive Function, Adult Version  T-scores 65 and higher are considered to be in the  clinically significant  range.    Index/Scale Self T-Score Parent T-Score   Inhibit 43 54   Shift 47 69   Emotional Control 38 54   Self-Monitor 46 47   Behavioral Regulation Index 41 55   Initiate 40 57   Working Memory 49 45   Plan/Organize 46 47   Task-Monitor 40 45   Organization of Materials 47 39   Metacognition Index 44 46   Global Executive Composite 42 50     Behavior Rating Inventory of Executive Function, Second Edition (2016 Evaluation)  T-scores 65 and higher are considered to be in the  clinically significant  range.    Index/Scale Parent T-Score Teacher T-Score   Inhibit 43 44   Self-Monitor 44 43   Behavior Regulation Index 43 43   Shift 50 55    Emotional Control 41 46   Emotion Regulation Index 45 50   Initiate 51 48   Working Memory 48 65   Plan/Organize 45 63   Task Monitor 59 60   Organization of Materials 46 49   Cognitive Regulation Index 49 59   Global Executive Composite 47 54     Terri-Steward Executive Function System Proverbs Test (2017 Evaluation)  Scaled Scores from 7 - 13 represent the average range of functioning. Percentile scores 16-84 represent the average range.    Measure Scaled Score   Total Achievement Score: Free Inquiry 14   Total Achievement Score: Multiple Choice 100%ile       MEMORY FUNCTIONING  [place] Verbal Learning Test, Second Edition   T-scores from 40 - 60 represent the average range of functioning.  Z-scores from -1.0 to 1.0 represent the average range of functioning. Higher scores are better unless indicated (*)    Measure 2017 Raw Score Current Raw Score 2017 T-Score Current T-Score   List A Total Trials 1-5 42 44 39 41          Measure   2017 Z-Score Current Z-Score   List A Trial 1 Free Recall 5 5 -1.0 -1.0   List A Trial 5 Free Recall 9 10 -2.0 -1.5   List B Free Recall 4 5 -1.5 -1.0   List A Short-Delay Free Recall 6 6 -2.0 -2.0   List A Short-Delay Cued Recall 8 11 -2.0 -0.5   List A Long-Delay Free Recall 9 10 -1.0 -1.0   List A Long-Delay Cued Recall 9 12 -1.5 -0.5   Correct Recognition Hits 15 14 -0.5 -1.0   False Positives* 0 3 -0.5 1.0   Discriminability 3.7 2.5 0.5 -1.5   *A lower score is better    [place] Verbal Learning Test, Second Edition Short From (2016 Evaluation)  T-scores from 40 - 60 represent the average range of functioning.  Z-scores from -1.0 to 1.0 represent the average range of functioning. Higher scores are better unless indicated (*)    Measure Raw Score T-Score   List A Total Trials 1-5 29 48        Measure  Z-Score   Total Learning [place] Trials 1-5 1.3 3.0   List A Short-Delay Free Recall 6 -2.0   List A Long-Delay Free Recall 8 0.5   List A Long-Delay Cued Recall 8 0.0   Correct  Recognition Hits 9 -0.5   False Positives 0 0.0   Discriminability 3.5 0.0   Repetitions* 4 1.5   Intrusions* 1 0.5   *A lower score is better      ADAPTIVE FUNCTIONING  Adaptive Behavior Assessment System, Third Edition  Scaled Scores from 7- 13 represent the average range of functioning.  Composite Scores from 85 - 115 represent the average range of functioning.    Skill Area 2016 Scaled Score 2017 Scaled Score Current Scaled Score   Communication 10 9 9   Community Use 1 3 6   Functional Academics 5 5 7   Home Living 2 2 3   Health and Safety 2 1 4   Leisure 1 4 5   Self-Care 4 2 5   Self-Direction 6 4 3   Social 7 5 9     Composite 2016 Standard Score 2017 Standard Score Current Standard Score   Conceptual 83 78 80   Social 73 77 87   Practical 55 54 67   General Adaptive Composite 66 65 75       EMOTIONAL AND BEHAVIORAL FUNCTIONING  Behavior Assessment System for Children, Third Edition  For the Clinical Scales on the BASC-3, scores ranging from 60-69 are considered to be in the  at-risk  range and scores of 70 or higher are considered  clinically significant.   For the Adaptive Scales, scores between 30 and 39 are considered to be in the  at-risk  range and scores of 29 or lower are considered  clinically significant.      Clinical Scales 2016 Teacher T-Score* Current Parent T-Score   Hyperactivity 42 45   Aggression 43 42   Conduct Problems  43 43   Anxiety 55 47   Depression 42 46   Somatization 60 49   Atypicality 44 56   Withdrawal 55 53   Attention Problems 45 47   Learning Problems 71 ?        Adaptive Scales     Adaptability 62 53   Social Skills 63 47   Leadership 46 33   Activities of Daily Living ?? 46   Study Skills 48 ?   Functional Communication 47 47        Composite Indices     Externalizing Problems 42 43   Internalizing Problems 53 47   Behavioral Symptoms Index 59 48   Adaptive Skills 44 45   School Problems 54 ?   ? Not assessed on the Parent Form  ?? Not assessed on the Teacher Form  *  BASC-2 used    Hobson Depression Inventory, Second Edition  Total scores between 0-13 = Minimal Depression, 14-19 = Mild Depression, 20-28 = Moderate Depression, and 29-63 = Severely Depression    2016 Total Score 2017 Total Score Current Total Score   26 15 9     Hobson Anxiety Inventory  Total scores between 0-7 = Minimal Anxiety, 8-15 = Mild Anxiety, 16-25 = Moderate Anxiety, and 26-63 = Severe Anxiety    2016 Total Score 2017 Total Score Current Total Score   16 23 10       Time Spent: 1 unit professional time, including face-to-face interview and feedback (09074); 6 units record review, data integration, and report writing (72513); 1 unit psychometrist scoring under supervision of a neuropsychologist (11018); 5 units psychometrist testing and scoring under supervision of a neuropsychologist ([id number]).

## 2019-01-03 NOTE — LETTER
1/3/2019      RE: Geo Hicks  [id number] [facility] [address]         SUMMARY OF NEUROPSYCHOLOGICAL EVALUATION  PEDIATRIC NEUROPSYCHOLOGY CLINIC  DIVISION OF CLINICAL BEHAVIORAL NEUROSCIENCE     Name: Geo Hicks   YOB: 2000   MRN:  [id number]   Date of Visit:   01/03/2019     RE-EVALUATION REPORT    Reason for Re-Evaluation   Geo Hicks is a 18-year, 2-month  male referred for a neuropsychological re-evaluation by ALAN Tate, JOSE E, with the [facility]ematology[facility]. 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 Since then, he has undergone multiple tumor resections, chemotherapy, and radiation treatment. Geo also experienced an intra-tumoral hemorrhage (brain bleed) in May 2015 that resulted in neurological changes including facial numbness, significant loss of mobility and dysarthria. In December 2016, a follow-up MRI revealed continued tumor enhancement. The most recent MRI from 10/16/18 revealed an unchanged fourth ventricle ependymoma in comparison to previous exams, a slight decrease in adjacent edema, as well as a stable size of the ventricular system.  He is currently on COG study [id number] with Entinostat and is presently Cycle 19. Geo was previously evaluated in this clinic in February 2016 and February 2017. The current evaluation was requested in order to provide updated information regarding Geo s neurocognitive/neurobehavioral functioning in the context of his brain tumor and subsequent treatments.    Previous Evaluations:    Results from Geo s evaluation in February 2016 revealed strong neurocognitive functioning in the domains of memory, working memory, and verbal comprehension. His adaptive functioning skills in daily life were indicated to be impaired, which was predominantly driven by Geo s declines in motor skills and visual perception due to his medical condition.  Emotionally, Geo endorsed moderate emotional difficulties.     Results from Geo s evaluation in February 2017 indicated a relative stability in his foundational thinking abilities in comparison to his previous evaluation, as well as intact non-verbal skills. He had significant impairment on a task of visual-motor processing speed due to his sensorimotor limitation. Geo s adaptive functioning skills remained unchanged in comparison to his previous evaluation. He continued to endorse mild to moderate emotional difficulties.      Relevant History: Updated background information was gathered via an interview with Geo and his parents. Geo nunez developmental, medical, educational, social, and family histories were thoroughly documented in previous evaluations and will be briefly reviewed here. For more detailed background information, the reader is referred to Geo nunez medical file and previous evaluation reports dated 02/29/2016 and 02/08/2017.    Medical History  To review, Geo was in good health until April 2015 when he presented at the Emergency Department with an abnormal headache and decreased coordination. A CT scan of Geo nunez head showed a 4cm mass in the posterior fossa, originating in the cerebellar vermis or fourth ventricle with mass effect on the brain stem. No metastases were found in the spine. Geo underwent a sub-occipital craniotomy for partial resection of the tumor, with a plan to follow with chemotherapy. In May 2015, Geo presented with acute neurological changes including facial numbness, dizziness, severe headaches, and dysarthria. A CT scan showed an intra-tumoral hemorrhage (brain bleed) with increased local mass effect and cerebellar tonsillar herniation. Geo underwent a second sub-occipital craniotomy for tumor debulking and evacuation of the intra-tumoral hematoma. Diagnostic tests completed during this surgery identified Geo s tumor as a grade II ependymoma, a  different form of cancer than was originally suspected. Geo was subsequently started on [id number]. This treatment protocol included chemotherapy with vincristine, carboplatin, cyclophosphamide, etoposide, and cisplatin, as well as radiation.      In January 2016, Geo underwent a third sub-occipital craniotomy as MRI studies revealed that his tumor had increased in size extending into the david, midbrain, and bilateral cerebellar hemispheres. Following this surgery, Geo began treatment with Avastin every two weeks until April of 2016. A routine follow-up MRI on December 30, 2016 revealed continued tumor enhancement as well as a new nodule in his L4 vertebrae. As a result, he was started on an experimental phase I trial, ADVL 1513 with Entinostat on January 10, 2017. Geo is currently on COG study [id number] with Entinostat and is presently Cycle 19.     As a result of Geo s tumor, inter-tumoral hemorrhage, and subsequent treatment he has a number of functional limitations. He continues to experience significant gross- and fine-motor coordination difficulties. He requires a wheelchair for mobility and is unable to complete fine-motor tasks independently (e.g., writing, brushing teeth). Additionally, he continues to have significant vision challenges including diplopia for which he wears an eye-patch on alternating eyes and ocular-motor dysfunction. He has pronounced articulation challenges due to motor speech impairment (dysarthria) that impact the intelligibility of his speech.    In April 2017, Geo completed a sleep study and was moderate obstructive sleep apnea was found. In August 2017, Geo was reported to having bilateral mild sensorineural hearing loss. He had an EEG conducted in October 2017 with results revealing a mildly abnormal EEG due to the presence of intermittent left temporal and bilateral frontal theta and delta slowing. In June 2018, Geo was given an eye evaluation with results  consistent for esotropia at both distance and near. Furthermore, he was unable to fuse with any combination of prism for attending.     Geo has also had a history of chronic constipation and abdominal pain complaints. Despite numerous abdominal x-rays and clean-outs, he is convinced that he is always full of stool.   Geo is currently participating in psychotherapy at the [facility].     Family and Educational History:   Geo continues to share time between his mother s and father s homes. Both parents are remarried. Siblings include two full brothers, a step-brother, and a step-sister. Geo graduated from high school in Spring 2018. He initially considered attending [employer] in Fall 2019, but after visiting the college, he lost interest due to the amount of time it would take him to complete courses and receive a degree.      Social and Emotional Functioning:   Geo is described as intelligent but has a lot of time to think and obsess over certain ideas. He is generally happy and friendly to others, as well as being very strong-willed. Geo s parents reported continued concerns about his emotional functioning, specifically with his adamant belief of always being constipated. He is convinced that his doctors and parents are conspiring together to not  solve  this issue. He has verbally attacked his caregivers and medical personnel when they confront him with evidence that he is not constipated.  He has gone so far as to claim they have altered x-rays and other medical tests.  He believes that if his constipation problem was solved he would be able to walk.  His parents reported that no amount of evidence, medical tests, or information will change this idea and that he becomes extremely angry when confronted.  They believe that some of these beliefs stem from before his most recent stroke, he was told that he would be able to walk independently.  Furthermore, Geo tends to  isolate himself in his room and generally plays video games.     Behavioral Observations   Geo was accompanied to the appointment by his mother. He presented as a casually dressed, well-groomed male who appeared his stated chronological age. Upon being greeted, Geo was seated in his wheelchair and looked up to make a greeting in return. While he initially appeared somewhat unhappy about taking the tests, Geo was quick to change to a more sociable demeanor after engaging in brief conversation. He was noted for wearing an eye patch over his right eye, which he wears to correct diplopia. Geo willingly allowed his mother to push him down the krishna to the testing room. Once in the room, the plan for the day was explained to Geo and his mother. When it came time for testing to begin, Geo began the testing without incident.    To help establish and maintain rapport, the examiner engaged in casual conversation with Geo throughout the evaluation. He shared he ate breakfast and had slept well the night before. Geo was presented with a variety of visual and verbal tasks, as well as rating scales to complete. He demonstrated adequate comprehension of what was asked of him for each task and offered compliance throughout the evaluation. He was offered a break midway through the evaluation but declined and preferred to work straight through.    During the evaluation, Geo s hands were notable for being tremulous, more so when engaged in an activity but also when at rest. In terms of facial control, Geo was not able to keep his mouth closed and experienced difficulties with oral secretions. He was mindful of this issue and continuously wiped his mouth with a cloth that he had on the table next to him. The rate and prosody of Geo s speech was unremarkable. Similar to previous evaluations, he demonstrated minor difficulty with modulating the tone of his voice, as well as significant articulation  difficulties that impacted the intelligibility of his speech. When Geo exhibited short responses, intelligibility was clearer. However, the longer of a response he provided, the level of intelligibility decreased, and repetition was asked by the examiner. Geo was patient whenever asked to repeat what he said. Mood was neither elevated nor depressed, and affect was appropriate in range and intensity. Geo offered both reciprocal and spontaneous conversation. He demonstrated good frustration tolerance and perseverance when presented with difficult tasks. Geo exhibited good attentional skills and did not require any repetition or simplification of instructions. Given his high level of cooperation and effort during the evaluation, the results presented below are believed to provide an accurate representation of Geo s current neuropsychological functioning while in an optimal (e.g., quiet, structured, one-on-one) environment.     Interview:  Geo described himself as generally being  easy-going  on a typical day. He did identify his  situation right now  as causing him some anxiety and worry, but he did not elaborate what he meant by this. Geo feels most happy when he is able to spend time with friends. He identified his best friend as Rima, whom he has known since 1st grade. Geo enjoys joking around and hanging out with friends from high school. Other activities of interest to Geo include playing videogames and exercising at night. He shared that he used to enjoy playing the piano and percussion while in school, but he no longer plays those. Geo was asked about future goals, to which he replied,  I haven t really thought of any.  Geo denied any thoughts of harm to himself and/or others.     Neuropsychological Evaluation Methods and Instruments  Review of Records  Clinical Interview  Wechsler Adult Intelligence Scale, 4th Edition  [place] Verbal Learning Test, 2nd Edition  Adaptive  Behavior Assessment System, 3rd Edition  Behavior Rating Inventory of Executive Functioning, Adult Version  Behavior Assessment System for Children, 3rd Edition  Hobson Depression Inventory, 2nd Edition  Hobson Anxiety Inventory    A full summary of test scores is provided in tables at the end of this report.    Results and Impressions  We were pleased to discover that the neurocognitive areas measured during this evaluation have remained intact and consistent with previous evaluations. Geo s ability to access and apply acquired word knowledge (verbal comprehension) and his working memory (ability to mentally hold and manipulate information) were in the average range. His ability to understand visual information to solve novel abstract visual problems (perceptual reasoning) was in the low average range. There was a slight decrease in performance on one task associated with perceptual reasoning due to time limits. Had no time limits been enforced, Geo s performance would have likely been higher as he was observed to respond with correct answers that were past the time limit allowed. Furthermore, on a timed visual discrimination and scanning task, Geo performed in the borderline range, which was a slight improvement from his previous evaluation in 2017 where he performed in the impaired range.     Geo s adaptive functioning was evaluated through a rating scale completed by his parents.  On this measure he has shown improvement in his overall functioning particularly in the area of social functioning which is in the low average range (last time he scored in the below average range).  He also shows below average skills conceptually and his practical skills, although below average, have improved over time.    Geo was asked to complete a memory and learning task which had been previously administered in past evaluations.  On this measure Geo is asked to learn a list of 15 words which are repeated 5 times.   Twenty minutes later he is asked to recall as many words as possible.  In addition, at that time he is also presented with a list of words with the target words embedded and asked to identify which words were in the original list.  Geo scored in the low average range on this measure consistent with his past scores.  He showed improvement in his ability to recall words after a short delay when provided with cues as well as with a long delay and scored in the average range.  His ability to recall words without the cues was consistent with his previous performance and in the low average range.  Cueing helps Geo recall information.    Geo s attentional skills were assessed through observation as well as parent report, interviews, and behavior rating scales.  Geo shows no significant difficulties with attention.  Geo s executive functioning skills were assessed with a rating scale completed by his mother. Geo also completed a similar self-report rating scale. These skills include planning, concept formation, mental flexibility, and the ability to use feedback to modify behavior. These capacities are important in complex problem-solving, self-monitoring, impulse control, and the development of abstract thinking skills. Geo s mother reported a clinically significant concern in the area of shifting (ability to move freely from one situation, activity, or aspect of a problem to another as the circumstances demand). Geo did not report any concerns with his executive functioning skills.     Of most concern are Geo s emerging emotional difficulties.  To that end he completed two rating scales as well as an interview concerning his feelings.  On the Hobson Depression Inventory, Geo showed improvement in his self-reported feelings of sadness and scored in the average range indicating he is not reporting significant feelings of sadness.  On the Hobson Anxiety Inventory, Geo reported improvement in  anxiety and now scored in the range indicating minimal anxiety.  In addition, parent ratings also did not indicate significant problems.  In contrast parent interviews indicate the likely presence of a delusional disorder.  Geo shows intransigent beliefs that cannot be shaken despite medical evidence to the contrary.  He becomes verbally angry and very upset when these beliefs are shaken.  He also refuses to consider alternative ideas about his thoughts and will shut out people and feel they are conspiring against him.  We will assign a preliminary diagnosis of a delusional disorder due to these unshakeable beliefs.  We are also concerned because he seems to be sealing himself off from interactions with isolating himself in his room and playing video games.  He tried to consider taking courses at a community college but quickly became disillusioned due to the time that would be required for him to graduate.       Diagnoses  C71.9  Ependymoma  Z92.21  History of chemotherapy  R47.1                Dysarthria  R/O   Delusional disorder    Recommendations  We strongly recommend that Geo be evaluated by a psychiatrist to determine the presence of a delusional disorder.  We have referred him to Dr. Reza for a psychiatric consult.  Dr. Reza has referred him to adult psychiatry and they should be in touch with the family soon.    We strongly believe that Geo will continue to benefit from working with his therapist, particularly in helping him deal with his developing psychiatric disorder as well as with helping him begin to participate in higher education.  It is important for Geo to be stimulated with more than playing video games.     Additionally, Geo is encouraged to share how he is feeling with his caregivers and providers. Often times, individuals with chronic illnesses do not want to burden others with their worries or concerns. While this is a very thoughtful and kind gesture, it can be  counterproductive. If distressing feelings are not properly expressed and dealt with, they can become more severe and disruptive over time. Geo will benefit from having some to talk to with whom he can be open.     Geo s diagnosis of brain tumor, multiple surgeries, chemotherapy, and radiation treatment place him at greater risk for  neurocognitive late effects,  meaning declines in his cognitive functioning as a result of his treatments. He will need regular neuropsychological evaluations in order to monitor his cognitive functioning going forward. We would like to see Geo return for a follow up evaluation in one year.     Geo will benefit from increased social activities. He and his family are encouraged to actively schedule social events where he can get out of the house and engage with others, especially with his same-age peers.       The results of this evaluation should be shared with the Office of Students with Disabilities at the [employer] that Geo chooses to attend. Below are some recommendations that the school may find helpful:  1. Geo s educators should be aware that both his verbal and nonverbal intellectual skills are intact. As such he will be able to learn through both modalities, while taking into account his motor and visual limitations.   2. We recommend that he begin his college experience with one course and move to courses once he finds the work is manageable.  3. Geo tends to become fatigued over time. He will benefit from short stints of work, followed by breaks so that he can recuperate cognitively. Additionally, he will continue to benefit from a reduced workload and his work should be judged based on quality and mastery of concepts, rather than quantity.  4. Continued use of assistive technology for communication and mobility purposes will be important in order to promote Geo s learning.   5. Geo will require extra time on tasks  requiring rapid visual processing or fine motor skills. He should be given extended time on tests and assignments, as well as accommodations to enable him to present his answers orally.  6. Audiobooks may be a good source of reading material for Geo. For individuals with visual impairments, a large library of audiobooks are available at: [email or web address]  7. Our evaluation indicates that Geo s visual reasoning abilities are intact though he continues to have visual perceptual difficulties including possible neglect of visual stimuli presented in his left visual field (items oriented to the far left of center). We recommend the following accommodations:   a. Pay careful attention to the manner in which visual material is presented.  In general, reduce the amount of visual material presented on any one sheet of paper by folding, covering all but the relevant work, etc.  b. Whenever possible, present material in multiple domains - i.e., visual and auditory simultaneously.       Geo will benefit from increased social activities. He and his family are encouraged to actively schedule social events where he can get out of the house and engage with others, especially with his same-age peers.        It has been a pleasure working with Geo and his family. If you have any questions or concerns regarding this evaluation, please call the Pediatric Neuropsychology Department at [phone].      Nina Lim, Psy.S.  Psychometrist  Pediatric Neuropsychology   [facility]    Veronica Padilla, Ph.D., L.P., ABPdN  Professor of Pediatrics  , Division of Clinical Behavioral Neuroscience  [facility]             PEDIATRIC NEUROPSYCHOLOGY CLINIC TEST SCORES    Note: The test data listed below use one or more of the following formats:      Standard Scores have an average of 100 and a standard deviation of 15 (the average range is 85 to 115).    Scaled Scores  have an average of 10 and a standard deviation of 3 (the average range is 7 to 13).    T-Scores have an average of 50 and a standard deviation of 10 (the average range is 40 to 60).    Z-Scores have an average of 0 and a standard deviation of 1 (the average range is -1 to +1).      COGNITIVE Functioning  Wechsler Adult Intelligence Scale, Fourth Edition   Standard scores from 85 - 115 represent the average range of functioning.  Scaled scores from 7 - 13 represent the average range of functioning.    Index 2016   Standard Score 2017   Standard Score Current   Standard Score   Verbal Comprehension 110 116 108   Perceptual Reasoning -- 100* 88*   Working Memory 102 97 102     Subtest 2016 Scaled Score 2017 Scaled Score Current Scaled Score   Similarities 9 12 9   Vocabulary 13 14 12   Information 14 13 14   Matrix Reasoning -- 11 10   Visual Puzzles -- 9 6   Digit Span 9 8 9   Arithmetic 12 11 12   Cancellation -- 1 4     *Due to the potential for Geo nunez current motor challenges to affect results, one subtest from this index was not administered (Block Design). The standard score was derived by prorating scores from two subtests that did not rely on motor functions (Matrix Reasoning and Visual Puzzles).      ATTENTION AND EXECUTIVE FUNCTIONING  Behavior Rating Inventory of Executive Function, Adult Version  T-scores 65 and higher are considered to be in the  clinically significant  range.    Index/Scale Self T-Score Parent T-Score   Inhibit 43 54   Shift 47 69   Emotional Control 38 54   Self-Monitor 46 47   Behavioral Regulation Index 41 55   Initiate 40 57   Working Memory 49 45   Plan/Organize 46 47   Task-Monitor 40 45   Organization of Materials 47 39   Metacognition Index 44 46   Global Executive Composite 42 50     Behavior Rating Inventory of Executive Function, Second Edition (2016 Evaluation)  T-scores 65 and higher are considered to be in the  clinically significant  range.    Index/Scale Parent T-Score  Teacher T-Score   Inhibit 43 44   Self-Monitor 44 43   Behavior Regulation Index 43 43   Shift 50 55   Emotional Control 41 46   Emotion Regulation Index 45 50   Initiate 51 48   Working Memory 48 65   Plan/Organize 45 63   Task Monitor 59 60   Organization of Materials 46 49   Cognitive Regulation Index 49 59   Global Executive Composite 47 54     Terri-Steward Executive Function System Proverbs Test (2017 Evaluation)  Scaled Scores from 7 - 13 represent the average range of functioning. Percentile scores 16-84 represent the average range.    Measure Scaled Score   Total Achievement Score: Free Inquiry 14   Total Achievement Score: Multiple Choice 100%ile       MEMORY FUNCTIONING  California Verbal Learning Test, Second Edition   T-scores from 40 - 60 represent the average range of functioning.  Z-scores from -1.0 to 1.0 represent the average range of functioning. Higher scores are better unless indicated (*)    Measure 2017 Raw Score Current Raw Score 2017 T-Score Current T-Score   List A Total Trials 1-5 42 44 39 41          Measure   2017 Z-Score Current Z-Score   List A Trial 1 Free Recall 5 5 -1.0 -1.0   List A Trial 5 Free Recall 9 10 -2.0 -1.5   List B Free Recall 4 5 -1.5 -1.0   List A Short-Delay Free Recall 6 6 -2.0 -2.0   List A Short-Delay Cued Recall 8 11 -2.0 -0.5   List A Long-Delay Free Recall 9 10 -1.0 -1.0   List A Long-Delay Cued Recall 9 12 -1.5 -0.5   Correct Recognition Hits 15 14 -0.5 -1.0   False Positives* 0 3 -0.5 1.0   Discriminability 3.7 2.5 0.5 -1.5   *A lower score is better    [place] Verbal Learning Test, Second Edition Short From (2016 Evaluation)  T-scores from 40 - 60 represent the average range of functioning.  Z-scores from -1.0 to 1.0 represent the average range of functioning. Higher scores are better unless indicated (*)    Measure Raw Score T-Score   List A Total Trials 1-5 29 48        Measure  Z-Score   Total Learning [place] Trials 1-5 1.3 3.0   List A Short-Delay Free  Recall 6 -2.0   List A Long-Delay Free Recall 8 0.5   List A Long-Delay Cued Recall 8 0.0   Correct Recognition Hits 9 -0.5   False Positives 0 0.0   Discriminability 3.5 0.0   Repetitions* 4 1.5   Intrusions* 1 0.5   *A lower score is better      ADAPTIVE FUNCTIONING  Adaptive Behavior Assessment System, Third Edition  Scaled Scores from 7- 13 represent the average range of functioning.  Composite Scores from 85 - 115 represent the average range of functioning.    Skill Area 2016 Scaled Score 2017 Scaled Score Current Scaled Score   Communication 10 9 9   Community Use 1 3 6   Functional Academics 5 5 7   Home Living 2 2 3   Health and Safety 2 1 4   Leisure 1 4 5   Self-Care 4 2 5   Self-Direction 6 4 3   Social 7 5 9     Composite 2016 Standard Score 2017 Standard Score Current Standard Score   Conceptual 83 78 80   Social 73 77 87   Practical 55 54 67   General Adaptive Composite 66 65 75       EMOTIONAL AND BEHAVIORAL FUNCTIONING  Behavior Assessment System for Children, Third Edition  For the Clinical Scales on the BASC-3, scores ranging from 60-69 are considered to be in the  at-risk  range and scores of 70 or higher are considered  clinically significant.   For the Adaptive Scales, scores between 30 and 39 are considered to be in the  at-risk  range and scores of 29 or lower are considered  clinically significant.      Clinical Scales 2016 Teacher T-Score* Current Parent T-Score   Hyperactivity 42 45   Aggression 43 42   Conduct Problems  43 43   Anxiety 55 47   Depression 42 46   Somatization 60 49   Atypicality 44 56   Withdrawal 55 53   Attention Problems 45 47   Learning Problems 71 ?        Adaptive Scales     Adaptability 62 53   Social Skills 63 47   Leadership 46 33   Activities of Daily Living ?? 46   Study Skills 48 ?   Functional Communication 47 47        Composite Indices     Externalizing Problems 42 43   Internalizing Problems 53 47   Behavioral Symptoms Index 59 48   Adaptive Skills 44 45    School Problems 54 ?   ? Not assessed on the Parent Form  ?? Not assessed on the Teacher Form  * BASC-2 used    Hobson Depression Inventory, Second Edition  Total scores between 0-13 = Minimal Depression, 14-19 = Mild Depression, 20-28 = Moderate Depression, and 29-63 = Severely Depression    2016 Total Score 2017 Total Score Current Total Score   26 15 9     Hobson Anxiety Inventory  Total scores between 0-7 = Minimal Anxiety, 8-15 = Mild Anxiety, 16-25 = Moderate Anxiety, and 26-63 = Severe Anxiety    2016 Total Score 2017 Total Score Current Total Score   16 23 10     Time Spent: 1 unit professional time, including face-to-face interview and feedback (43181); 6 units record review, data integration, and report writing (95486); 1 unit psychometrist scoring under supervision of a neuropsychologist (14314); 5 units psychometrist testing and scoring under supervision of a neuropsychologist (17353).    Veronica Padilla, PhD LP    CC:  Parent(s) of Geo Hicks  [id number] [facility] [id number]

## 2019-01-04 DIAGNOSIS — R46.89 BEHAVIORAL CHANGE: ICD-10-CM

## 2019-01-04 DIAGNOSIS — G47.00 PERSISTENT INSOMNIA: Primary | ICD-10-CM

## 2019-01-04 RX ORDER — OLANZAPINE 2.5 MG/1
10 TABLET, FILM COATED ORAL AT BEDTIME
Qty: 120 TABLET | Refills: 0 | Status: SHIPPED | OUTPATIENT
Start: 2019-01-04 | End: 2019-02-05

## 2019-01-07 ENCOUNTER — HOSPITAL ENCOUNTER (OUTPATIENT)
Dept: OCCUPATIONAL THERAPY | Facility: CLINIC | Age: 19
Setting detail: THERAPIES SERIES
End: 2019-01-07
Attending: FAMILY MEDICINE
Payer: COMMERCIAL

## 2019-01-07 ENCOUNTER — TELEPHONE (OUTPATIENT)
Dept: PHYSICAL MEDICINE AND REHAB | Facility: CLINIC | Age: 19
End: 2019-01-07
Payer: COMMERCIAL

## 2019-01-07 PROCEDURE — 97535 SELF CARE MNGMENT TRAINING: CPT | Mod: GO | Performed by: OCCUPATIONAL THERAPIST

## 2019-01-07 NOTE — TELEPHONE ENCOUNTER
[facility] Call Center    Phone Message    May a detailed message be left on voicemail: yes    Reason for Call: Other: Radha with the [facility] called as  was referring patient to Dr. Benny Coelho for PMR DX Behavioral change please contact mom for schduling but if there are any questions about the dx or referral call Radha @ [phone]     Action Taken: Message routed to:  [facility] ([facility]): PMR

## 2019-01-09 ENCOUNTER — APPOINTMENT (OUTPATIENT)
Dept: GENERAL RADIOLOGY | Facility: CLINIC | Age: 19
DRG: 392 | End: 2019-01-09
Attending: PEDIATRICS
Payer: COMMERCIAL

## 2019-01-09 ENCOUNTER — HOSPITAL ENCOUNTER (INPATIENT)
Facility: CLINIC | Age: 19
LOS: 2 days | Discharge: HOME | DRG: 392 | End: 2019-01-11
Attending: PEDIATRICS | Admitting: PEDIATRICS
Payer: COMMERCIAL

## 2019-01-09 ENCOUNTER — OFFICE VISIT (OUTPATIENT)
Dept: PEDIATRIC HEMATOLOGY/ONCOLOGY | Facility: CLINIC | Age: 19
DRG: 392 | End: 2019-01-09
Attending: NURSE PRACTITIONER
Payer: COMMERCIAL

## 2019-01-09 VITALS
DIASTOLIC BLOOD PRESSURE: 80 MMHG | OXYGEN SATURATION: 100 % | TEMPERATURE: 98.9 F | WEIGHT: 197.97 LBS | HEART RATE: 66 BPM | BODY MASS INDEX: 28 KG/M2 | SYSTOLIC BLOOD PRESSURE: 126 MMHG | RESPIRATION RATE: 26 BRPM

## 2019-01-09 DIAGNOSIS — K59.01 SLOW TRANSIT CONSTIPATION: ICD-10-CM

## 2019-01-09 DIAGNOSIS — C71.9 EPENDYMOMA (H): ICD-10-CM

## 2019-01-09 DIAGNOSIS — D49.6 NEOPLASM OF POSTERIOR CRANIAL FOSSA (H): Primary | ICD-10-CM

## 2019-01-09 DIAGNOSIS — R10.84 ABDOMINAL PAIN, GENERALIZED: ICD-10-CM

## 2019-01-09 PROBLEM — K59.00 CONSTIPATION: Status: ACTIVE | Noted: 2018-08-22

## 2019-01-09 LAB
ALBUMIN SERPL-MCNC: 3.1 G/DL (ref 3.4–5)
ALP SERPL-CCNC: 139 U/L (ref 65–260)
ALT SERPL W P-5'-P-CCNC: 20 U/L (ref 0–50)
AMYLASE SERPL-CCNC: 115 U/L (ref 30–110)
ANION GAP SERPL CALCULATED.3IONS-SCNC: 6 MMOL/L (ref 3–14)
AST SERPL W P-5'-P-CCNC: 22 U/L (ref 0–35)
BASOPHILS # BLD AUTO: 0.1 10E9/L (ref 0–0.2)
BASOPHILS NFR BLD AUTO: 1 %
BILIRUB SERPL-MCNC: 0.2 MG/DL (ref 0.2–1.3)
BUN SERPL-MCNC: 16 MG/DL (ref 7–30)
CALCIUM SERPL-MCNC: 8.5 MG/DL (ref 8.5–10.1)
CHLORIDE SERPL-SCNC: 109 MMOL/L (ref 98–110)
CO2 SERPL-SCNC: 28 MMOL/L (ref 20–32)
CREAT SERPL-MCNC: 0.96 MG/DL (ref 0.5–1)
DIFFERENTIAL METHOD BLD: ABNORMAL
EOSINOPHIL # BLD AUTO: 0.7 10E9/L (ref 0–0.7)
EOSINOPHIL NFR BLD AUTO: 14.5 %
ERYTHROCYTE [DISTWIDTH] IN BLOOD BY AUTOMATED COUNT: 12.6 % (ref 10–15)
GFR SERPL CREATININE-BSD FRML MDRD: >90 ML/MIN/{1.73_M2}
GLUCOSE SERPL-MCNC: 57 MG/DL (ref 70–99)
HCT VFR BLD AUTO: 41.6 % (ref 40–53)
HGB BLD-MCNC: 13.3 G/DL (ref 13.3–17.7)
IMM GRANULOCYTES # BLD: 0 10E9/L (ref 0–0.4)
IMM GRANULOCYTES NFR BLD: 0.8 %
LIPASE SERPL-CCNC: 95 U/L (ref 73–393)
LYMPHOCYTES # BLD AUTO: 1.1 10E9/L (ref 0.8–5.3)
LYMPHOCYTES NFR BLD AUTO: 22.9 %
MCH RBC QN AUTO: 30.4 PG (ref 26.5–33)
MCHC RBC AUTO-ENTMCNC: 32 G/DL (ref 31.5–36.5)
MCV RBC AUTO: 95 FL (ref 78–100)
MONOCYTES # BLD AUTO: 0.9 10E9/L (ref 0–1.3)
MONOCYTES NFR BLD AUTO: 19.4 %
NEUTROPHILS # BLD AUTO: 2 10E9/L (ref 1.6–8.3)
NEUTROPHILS NFR BLD AUTO: 41.4 %
NRBC # BLD AUTO: 0 10*3/UL
NRBC BLD AUTO-RTO: 0 /100
PLATELET # BLD AUTO: 134 10E9/L (ref 150–450)
POTASSIUM SERPL-SCNC: 3.9 MMOL/L (ref 3.4–5.3)
PROT SERPL-MCNC: 6.8 G/DL (ref 6.8–8.8)
RBC # BLD AUTO: 4.37 10E12/L (ref 4.4–5.9)
SODIUM SERPL-SCNC: 143 MMOL/L (ref 133–144)
WBC # BLD AUTO: 4.8 10E9/L (ref 4–11)

## 2019-01-09 PROCEDURE — 25000132 ZZH RX MED GY IP 250 OP 250 PS 637: Performed by: STUDENT IN AN ORGANIZED HEALTH CARE EDUCATION/TRAINING PROGRAM

## 2019-01-09 PROCEDURE — 36415 COLL VENOUS BLD VENIPUNCTURE: CPT | Performed by: PEDIATRICS

## 2019-01-09 PROCEDURE — 25000125 ZZHC RX 250

## 2019-01-09 PROCEDURE — 74018 RADEX ABDOMEN 1 VIEW: CPT

## 2019-01-09 PROCEDURE — 83690 ASSAY OF LIPASE: CPT | Performed by: PEDIATRICS

## 2019-01-09 PROCEDURE — 82784 ASSAY IGA/IGD/IGG/IGM EACH: CPT | Performed by: PEDIATRICS

## 2019-01-09 PROCEDURE — 20600000 ZZH R&B BMT

## 2019-01-09 PROCEDURE — G0463 HOSPITAL OUTPT CLINIC VISIT: HCPCS | Mod: ZF

## 2019-01-09 PROCEDURE — 82150 ASSAY OF AMYLASE: CPT | Performed by: PEDIATRICS

## 2019-01-09 PROCEDURE — 85025 COMPLETE CBC W/AUTO DIFF WBC: CPT | Performed by: PEDIATRICS

## 2019-01-09 PROCEDURE — 80053 COMPREHEN METABOLIC PANEL: CPT | Performed by: PEDIATRICS

## 2019-01-09 PROCEDURE — 25000128 H RX IP 250 OP 636: Performed by: STUDENT IN AN ORGANIZED HEALTH CARE EDUCATION/TRAINING PROGRAM

## 2019-01-09 PROCEDURE — 83516 IMMUNOASSAY NONANTIBODY: CPT | Performed by: PEDIATRICS

## 2019-01-09 PROCEDURE — 40000141 ZZH STATISTIC PERIPHERAL IV START W/O US GUIDANCE

## 2019-01-09 RX ORDER — VITAMIN E 268 MG
400 CAPSULE ORAL DAILY
Status: DISCONTINUED | OUTPATIENT
Start: 2019-01-10 | End: 2019-01-09

## 2019-01-09 RX ORDER — LIDOCAINE 40 MG/G
CREAM TOPICAL
Status: DISCONTINUED | OUTPATIENT
Start: 2019-01-09 | End: 2019-01-11 | Stop reason: HOSPADM

## 2019-01-09 RX ORDER — FEXOFENADINE HCL 60 MG/1
180 TABLET, FILM COATED ORAL DAILY
Status: DISCONTINUED | OUTPATIENT
Start: 2019-01-09 | End: 2019-01-11 | Stop reason: HOSPADM

## 2019-01-09 RX ORDER — SULFAMETHOXAZOLE AND TRIMETHOPRIM 400; 80 MG/1; MG/1
1 TABLET ORAL 2 TIMES DAILY
Status: DISCONTINUED | OUTPATIENT
Start: 2019-01-12 | End: 2019-01-09

## 2019-01-09 RX ORDER — ACETAMINOPHEN 325 MG/1
650 TABLET ORAL EVERY 4 HOURS PRN
Status: DISCONTINUED | OUTPATIENT
Start: 2019-01-09 | End: 2019-01-11 | Stop reason: HOSPADM

## 2019-01-09 RX ORDER — DEXAMETHASONE 0.75 MG/1
0.75 TABLET ORAL
Status: DISCONTINUED | OUTPATIENT
Start: 2019-01-10 | End: 2019-01-11 | Stop reason: HOSPADM

## 2019-01-09 RX ORDER — PENTOXIFYLLINE 400 MG/1
400 TABLET, EXTENDED RELEASE ORAL
Status: DISCONTINUED | OUTPATIENT
Start: 2019-01-09 | End: 2019-01-11 | Stop reason: HOSPADM

## 2019-01-09 RX ORDER — SODIUM CHLORIDE AND POTASSIUM CHLORIDE 150; 900 MG/100ML; MG/100ML
INJECTION, SOLUTION INTRAVENOUS CONTINUOUS
Status: DISCONTINUED | OUTPATIENT
Start: 2019-01-09 | End: 2019-01-11 | Stop reason: HOSPADM

## 2019-01-09 RX ORDER — OLANZAPINE 10 MG/1
10 TABLET ORAL AT BEDTIME
Status: DISCONTINUED | OUTPATIENT
Start: 2019-01-09 | End: 2019-01-11 | Stop reason: HOSPADM

## 2019-01-09 RX ORDER — ONDANSETRON 4 MG/1
4 TABLET, ORALLY DISINTEGRATING ORAL EVERY 6 HOURS PRN
Status: DISCONTINUED | OUTPATIENT
Start: 2019-01-09 | End: 2019-01-11 | Stop reason: HOSPADM

## 2019-01-09 RX ADMIN — OMEPRAZOLE 40 MG: 20 CAPSULE, DELAYED RELEASE ORAL at 20:38

## 2019-01-09 RX ADMIN — POLYETHYLENE GLYCOL 3350, SODIUM SULFATE ANHYDROUS, SODIUM BICARBONATE, SODIUM CHLORIDE, POTASSIUM CHLORIDE 4000 ML: 236; 22.74; 6.74; 5.86; 2.97 POWDER, FOR SOLUTION ORAL at 16:39

## 2019-01-09 RX ADMIN — FEXOFENADINE HCL 180 MG: 60 TABLET, FILM COATED ORAL at 20:38

## 2019-01-09 RX ADMIN — PENTOXIFYLLINE 400 MG: 400 TABLET, FILM COATED, EXTENDED RELEASE ORAL at 20:38

## 2019-01-09 RX ADMIN — POLYETHYLENE GLYCOL 3350, SODIUM SULFATE ANHYDROUS, SODIUM BICARBONATE, SODIUM CHLORIDE, POTASSIUM CHLORIDE 4000 ML: 236; 22.74; 6.74; 5.86; 2.97 POWDER, FOR SOLUTION ORAL at 23:48

## 2019-01-09 RX ADMIN — POTASSIUM CHLORIDE AND SODIUM CHLORIDE: 900; 150 INJECTION, SOLUTION INTRAVENOUS at 16:39

## 2019-01-09 RX ADMIN — LIDOCAINE HYDROCHLORIDE 0.2 ML: 10 INJECTION, SOLUTION EPIDURAL; INFILTRATION; INTRACAUDAL; PERINEURAL at 15:59

## 2019-01-09 ASSESSMENT — ACTIVITIES OF DAILY LIVING (ADL)
RETIRED_COMMUNICATION: 2-->DIFFICULTY SPEAKING (NOT RELATED TO LANGUAGE BARRIER)
COGNITION: 0 - NO COGNITION ISSUES REPORTED
SWALLOWING: 0-->SWALLOWS FOODS/LIQUIDS WITHOUT DIFFICULTY
BATHING: 1-->ASSISTIVE EQUIPMENT
RETIRED_EATING: 0-->INDEPENDENT
TRANSFERRING: 3-->ASSISTIVE EQUIPMENT AND PERSON
DRESS: 0-->INDEPENDENT
TOILETING: 2-->ASSISTIVE PERSON
AMBULATION: 3-->ASSISTIVE EQUIPMENT AND PERSON
FALL_HISTORY_WITHIN_LAST_SIX_MONTHS: NO

## 2019-01-09 ASSESSMENT — ENCOUNTER SYMPTOMS
ARTHRALGIAS: 0
CARDIOVASCULAR NEGATIVE: 1
EYE PAIN: 0
CHILLS: 0
FACIAL ASYMMETRY: 1
SPEECH DIFFICULTY: 1
RESPIRATORY NEGATIVE: 1
DIZZINESS: 0
CONSTIPATION: 1
MUSCULOSKELETAL NEGATIVE: 1
POLYDIPSIA: 0
FEVER: 0
SHORTNESS OF BREATH: 0
COUGH: 0
VOMITING: 0
MYALGIAS: 0
NAUSEA: 0

## 2019-01-09 ASSESSMENT — COLUMBIA-SUICIDE SEVERITY RATING SCALE - C-SSRS
1. IN THE PAST MONTH, HAVE YOU WISHED YOU WERE DEAD OR WISHED YOU COULD GO TO SLEEP AND NOT WAKE UP?: NO
2. HAVE YOU ACTUALLY HAD ANY THOUGHTS OF KILLING YOURSELF IN THE PAST MONTH?: NO

## 2019-01-09 ASSESSMENT — MIFFLIN-ST. JEOR: SCORE: 1949.25

## 2019-01-09 NOTE — LETTER
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 He is on COG study [id number] with Entinostat and is presently Cycle 19 Day 22.      HPI:  Geo is doing okay.  No fevers. No known ill exposures. He remains on Prilosec and needs a refill. He is presently taking: miralax BID -TID, pericolace in the morning, and linzess 290 mcg daily.  No bowel movement today and none yesterday. Jan 7th - small amount in the AM. His abdominal pain is \"all over\".  He has foul smelling gas.  He is consistently taking his daily steroids.  Geo denies dizziness, vision or hearing changes, chills, cough, shortness of breath, nausea, vomiting, and polyuria.  He has no muscular aches or complaints of bone pain. No headaches.  Slept 3 hours 53 minutes last night (according to his fit bit).  Taking Olanzapine since last week.  Dad said he was sleeping this morning when he woke him up for the appointment today. He said the last good night sleep he had was on Saturday.       Fam/Soc: Lives between his  parents (one week at each home). They have been awarded guardianship of Geo. The families work well together - both parents have remarried. His dad has a clotting disorder, requiring him to take Warfarin daily.       Allergies   Allergen Reactions     Blood Transfusion Related (Informational Only) Swelling     Periorbital swelling post platelet transfusion     No Known Drug Allergies        Current meds:  Current Outpatient Medications   Medication Sig Dispense Refill     bisacodyl (BISACODYL LAXATIVE) 5 MG EC tablet Take 2 tablets (10 mg) by mouth At Bedtime 60 tablet 3     calcium carbonate-vitamin D 600-400 MG-UNIT CHEW Take 2 tablets in the morning and 1 tablet in the evening. 90 tablet 3     Cholecalciferol 400 UNITS CHEW Take 1 " tablet (400 Units) by mouth every morning 60 tablet 2     dexamethasone (DECADRON) 0.5 MG tablet Take 0.75 mg by mouth daily (with breakfast). 90 tablet 3     fexofenadine (ALLEGRA) 180 MG tablet Take 180 mg by mouth daily       linaclotide (LINZESS) 290 MCG capsule Take 1 capsule (290 mcg) by mouth daily       melatonin 3 MG tablet Take 3 mg by mouth At Bedtime       mupirocin (BACTROBAN) 2 % ointment Use 2 times a day to the buttock with flare 22 g 3     OLANZapine (ZYPREXA) 2.5 MG tablet Take 4 tablets (10 mg) by mouth At Bedtime 120 tablet 0     omeprazole (PRILOSEC) 20 MG DR capsule Take 2 capsules (40 mg) by mouth 2 times daily 180 capsule 3     pentoxifylline (TRENTAL) 400 MG CR tablet Take 1 tablet (400 mg) by mouth 3 times daily (with meals) 270 tablet 2     polyethylene glycol (MIRALAX/GLYCOLAX) Packet Take 34 g by mouth 2 times daily 100 packet 3     potassium phosphate, monobasic, (K-PHOS) 500 MG tablet Take 1 tablet (500 mg) by mouth 3 times daily 90 tablet 3     study - entinostat (IDS# 5050) 1 mg tablet Take 1 tablet (1 mg) by mouth every 7 days for 4 doses Take one 1mg tablet with one 5mg tablet for total dose of 6mg weekly. Take on an empty stomach, at least 1 hour before or 2 hours after a meal.  Swallow tablet whole. 4 tablet 0     study - entinostat (IDS# 5050) 5 mg tablet Take 1 tablet (5 mg) by mouth every 7 days for 4 doses Take one 5mg tablet with one 1mg tablet for total dose of 6mg weekly. Take on an empty stomach, at least 1 hour before or 2 hours after a meal.  Swallow tablet whole. 4 tablet 0     sulfamethoxazole-trimethoprim (BACTRIM/SEPTRA) 400-80 MG per tablet Take 1 tablet by mouth 2 times daily On Saturdays and Sundays 24 tablet 11     vitamin E (GNP VITAMIN E) 400 UNIT capsule Take 1 capsule (400 Units) by mouth daily 30 capsule 11   Above meds reviewed. Has missed some steroid doses over the last two months. No missed chemo doses.   Has received flu shot for [id number]    Taking  1mg melatonin.  Miralax three times daily with 96 ounces of water.   Kphos twice daily.  No missed doses of Entinostat.    Past Medical History:   Diagnosis Date     Cranial nerve dysfunction      Dyspepsia      Ependymoma (H)      Gastro-oesophageal reflux disease      Hearing loss      Intracranial hemorrhage (H)      Migraine      Pilonidal cyst     7-2015     Reduced vision      Refractory obstruction of nasal airway     2nd to nasal valve prolapse     Sleep apnea      Strabismus     gaze palsy        Past Surgical History:   Procedure Laterality Date     GRAFT CARTILAGE FROM POSTERIOR AURICLE Left 10/6/2016    Procedure: GRAFT CARTILAGE FROM POSTERIOR AURICLE;  Surgeon: Tyler Richards MD;  [facility]: [facility]     INCISION AND DRAINAGE PERINEAL, COMBINED Bilateral 7/18/2015    Procedure: COMBINED INCISION AND DRAINAGE PERINEAL;  Surgeon: Dequan Timmons MD;  Location: [facility]     OPTICAL TRACKING SYSTEM CRANIOTOMY, EXCISE TUMOR, COMBINED N/A 4/13/2015    Procedure: COMBINED OPTICAL TRACKING SYSTEM CRANIOTOMY, EXCISE TUMOR;  Surgeon: Francis Velazquez MD;  [facility]     OPTICAL TRACKING SYSTEM CRANIOTOMY, EXCISE TUMOR, COMBINED N/A 4/16/2015    Procedure: COMBINED OPTICAL TRACKING SYSTEM CRANIOTOMY, EXCISE TUMOR;  Surgeon: Francis Velazquez MD;  [facility]     OPTICAL TRACKING SYSTEM CRANIOTOMY, EXCISE TUMOR, COMBINED Bilateral 5/28/2015    Procedure: COMBINED OPTICAL TRACKING SYSTEM CRANIOTOMY, EXCISE TUMOR;  Surgeon: Francis Velazquez MD;  [facility]     OPTICAL TRACKING SYSTEM CRANIOTOMY, EXCISE TUMOR, COMBINED Bilateral 1/14/2016    Procedure: COMBINED OPTICAL TRACKING SYSTEM CRANIOTOMY, EXCISE TUMOR;  Surgeon: Francis Velazquez MD;  [facility]     OPTICAL TRACKING SYSTEM VENTRICULOSTOMY  4/16/2015    Procedure: OPTICAL TRACKING SYSTEM VENTRICULOSTOMY;  Surgeon: Francis Velazquez MD;  [facility]     REMOVE PORT VASCULAR ACCESS N/A 10/6/2016     Procedure: REMOVE PORT VASCULAR ACCESS;  Surgeon: Bruno Perea MD;  [facility]     RHINOPLASTY N/A 10/6/2016    Procedure: RHINOPLASTY;  Surgeon: Tyler Richards MD;  [facility]     VASCULAR SURGERY  5-2015    single lumen power port       Family History   Problem Relation Age of Onset     Circulatory Father         PE/DVT     Hypothyroidism Father 30     Diabetes Maternal Grandmother      Diabetes Paternal Grandmother      Diabetes Paternal Grandfather      C.A.D. Paternal Grandfather      Hypertension Maternal Grandfather      Thyroid Disease Paternal Aunt         unknown whether hypo or hyper       Review of Systems   Constitutional: Negative for chills and fever.        In a wheelchair   HENT: Positive for hearing loss (Pre-existing hearing loss from prior therapy). Negative for congestion, ear pain, mouth sores, nosebleeds and tinnitus.    Eyes: Positive for visual disturbance (Wears a patch over one eye to minimize diplopia). Negative for pain.        Right eye patched   Respiratory: Negative.  Negative for cough and shortness of breath.    Cardiovascular: Negative.    Gastrointestinal: Positive for constipation (Chronic, see HPI). Negative for nausea and vomiting.   Endocrine: Negative for polydipsia and polyuria.        Follows with Dr. Martin   Musculoskeletal: Negative.  Negative for arthralgias and myalgias.   Skin: Negative.    Neurological: Positive for facial asymmetry and speech difficulty. Negative for dizziness.   Psychiatric/Behavioral: Sleep disturbance: Not using his BiPAP.   All other systems reviewed and are negative.    Vitals:    weight is 89.8 kg (197 lb 15.6 oz). His axillary temperature is 98.9  F (37.2  C). His blood pressure is 126/80 and his pulse is 66. His respiration is 26 and oxygen saturation is 100%.        Wt Readings from Last 4 Encounters:   01/09/19 89.8 kg (197 lb 15.6 oz) (92 %)*   01/02/19 89.9 kg (198 lb 3.1 oz) (92 %)*   12/26/18 87.2 kg (192 lb  3.9 oz) (90 %)*   12/19/18 88.4 kg (194 lb 14.2 oz) (91 %)*     * Growth percentiles are based on CDC (Boys, 2-20 Years) data.       Physical Exam   Constitutional: He is oriented to person, place, and time and well-developed, well-nourished, and in no distress.   Stands with assist   HENT:   Head: Normocephalic and atraumatic.   Mouth/Throat: Oropharynx is clear and moist.   Occassionally saliva accumulates in mouth with occasional drooling.  Left TM retracted, no erythema   Eyes: Conjunctivae are normal. Pupils are equal, round, and reactive to light.   Can track to right, but not to left. Nystagmus noted  No conjunctival erythema   Neck: Neck supple.   Cardiovascular: Normal rate, regular rhythm and normal heart sounds.   Pulmonary/Chest: Effort normal and breath sounds normal. He has no wheezes.   Abdominal: Bowel sounds are normal.   Full, firm   Musculoskeletal:   Positive pulses bilaterally.   Lymphadenopathy:     He has no cervical adenopathy.   Neurological: He is alert and oriented to person, place, and time. A cranial nerve deficit is present. Coordination (Ataxic- dysmetria especially in upper extremities when accomplishing tasks.) abnormal. GCS score is 15.   Skin: Skin is warm and dry.   Striae scattered He has a few petechia mid abdomen on one stretch sofya.    Psychiatric: Mood and affect normal.       Labs:  Results for orders placed or performed in visit on 01/09/19   CBC with platelets differential   Result Value Ref Range    WBC 4.8 4.0 - 11.0 10e9/L    RBC Count 4.37 (L) 4.4 - 5.9 10e12/L    Hemoglobin 13.3 13.3 - 17.7 g/dL    Hematocrit 41.6 40.0 - 53.0 %    MCV 95 78 - 100 fl    MCH 30.4 26.5 - 33.0 pg    MCHC 32.0 31.5 - 36.5 g/dL    RDW 12.6 10.0 - 15.0 %    Platelet Count 134 (L) 150 - 450 10e9/L    Diff Method Automated Method     % Neutrophils 41.4 %    % Lymphocytes 22.9 %    % Monocytes 19.4 %    % Eosinophils 14.5 %    % Basophils 1.0 %    % Immature Granulocytes 0.8 %    Nucleated RBCs  0 0 /100    Absolute Neutrophil 2.0 1.6 - 8.3 10e9/L    Absolute Lymphocytes 1.1 0.8 - 5.3 10e9/L    Absolute Monocytes 0.9 0.0 - 1.3 10e9/L    Absolute Eosinophils 0.7 0.0 - 0.7 10e9/L    Absolute Basophils 0.1 0.0 - 0.2 10e9/L    Abs Immature Granulocytes 0.0 0 - 0.4 10e9/L    Absolute Nucleated RBC 0.0      *Note: Due to a large number of results and/or encounters for the requested time period, some results have not been displayed. A complete set of results can be found in Results Review.       Impression:  1. Ependymoma.  2. Entinostat, continues to tolerate fairly well.  3. Labs appropriate at this time - platelets 134,000 today  4. Chronic constipation with abdominal pain today  5. Poor sleep with some behavior concerns.    Plan:  1. He will continue Entinostat treatment - He will take 6mg weekly.   2. We will admit Geo for full bowel clean out today.  3. RTC in next week for follow up and labs.  He is due for the next course next week. Imaging is scheduled Jan 16th.    4. Continue Olanzapine at night. He should continue psych follow-up.  We will monitor sleep.  5. We will check on PM & R.(Benny Coelho) to review his case and give recommendations for ongoing plan to increase quality of life. Referral completed.             Kristi Schuler, ALAN CNP

## 2019-01-09 NOTE — PLAN OF CARE
D:  Admitted to unit at 0000, Accompanied by:    I: Teaching included: Orientation to room included use of the call light, bed controls, TV and DVD controls, phone, and bathrooms.  Orientation to unit included an explanation of the laminar flow/door alarm, unit routines, nursing routines, assessment needs, primary nursing, and careful handwashing procedure.  Orientation to the unit also included unit specifics of meal ordering, family lounge, kitchen.    A: Admitted without complication and pt stated understanding of education and asked appropriate questions.  Admission labs drawn.  P:  Continue to provide education.

## 2019-01-09 NOTE — NURSING NOTE
Chief Complaint   Patient presents with     RECHECK     Patient is here today for Ependymoma follow up     /80 (BP Location: Right arm, Patient Position: Fowlers, Cuff Size: Adult Large)   Pulse 66   Temp 98.9  F (37.2  C) (Axillary)   Resp 26   Wt 89.8 kg (197 lb 15.6 oz)   SpO2 100%   BMI 28.00 kg/m      Malinda Russo LPN  January 9, 2019

## 2019-01-09 NOTE — H&P
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 He presents today for management of worsening constipation and will be admitted for IVF and a bowel cleanout.    GI  Acute on chronic constipation  - NG tube placement  - AXR  - Bowel clean out with golytely via NGT at 500 ml/hr over 8 hrs. Continue until clear stool  - Outpatient followed by Dr. Wei, last seen 11/16    - Hold OP constipation regimen: Linzess, colace, dulcolax, miralax titrated for 2-3 soft [place] stool scale 4-6 per day.  - Omeprazole 40 mg BID    FEN  - Clear liquid diet  - NS + 20 KCl MIVF  - Holding PTA supplements for now: Ca, vitamin D, K-phos  - BMP, Mg, Phos in AM     Heme/Onc:  Ependymoma: on study  - Entinostat 6 mg weekly, last taken 1/9  - Pentoxifylline 500 mg TID  - Return to clinicw/ imaging 1/16    Endo  Hx of hypernatremia  Adrenal insufficiency and chronic steroid therapy, asymptomatic avascular necrosis  Hypocalcemia 2/2 hypoalbuminemia  - Followed by Dr. Martin, last seen 11/7  - PTA Decadron 0.75 mg QAM    ALLERGY:  Hx of allergies  - Fexofenadine 180 mg QDay    ID  PCP ppx  - Bactrim QSat,Sun BID (would need be ordered if pt still admitted)    NEURO  Speech delay  - PTA olanzapine 10 mg QHS  - Scheduled for neuropsych evaluation schueduled 1/10  - Holding Melatonin 1 mg at bedtime  - OP set to see PM&R (Benny Coelho)    RESP  Hx of CANDELARIO but has not been using Bipap  - Continue to monitor    Access: PIV, NG    Disposition: Pending completion of bowel clean out as evidenced by clear stools.  Anticipate 1-2 days.    Patient was discussed with hematology/oncology fellow Dr. Felipe Espinal.    Ember Chapin MD  Pediatric Resident, PGY-1    I saw and evaluated the patient and agree with the resident's assessment and plan.  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 He was admitted 10/17-19 for bowel clean out with golytely via NG tube.    He was seen in clinic by TABATHA Martinez CNP this morning, where he was noted to have decreased stool output. He normally stools 3-4 times per day. His last BM was 2 days ago on Monday, 1/7. Stools suddenly stopped, rather than slowing down over time. Pt has had associated abdominal pain that is crampy, described as both sharp and dull. The patient states this feels similar in most ways to his previous admissions for constipation. He is unable to vocalize any ways in which his abdominal pain or constipation is different.    No fevers, chills, change in vision or hearing, sore throat, nasal congestion, difficulty breathing, chest pain, nausea, vomiting, or rash.    Past Medical History    I have reviewed this patient's medical history and updated it with pertinent information if needed.   Past Medical History:   Diagnosis Date     Cranial nerve dysfunction      Dyspepsia      Ependymoma (H)      Gastro-oesophageal reflux disease      Hearing loss      Intracranial hemorrhage (H)      Migraine      Pilonidal cyst     7-2015      Reduced vision      Refractory obstruction of nasal airway     2nd to nasal valve prolapse     Sleep apnea      Strabismus     gaze palsy        Past Surgical History   I have reviewed this patient's surgical history and updated it with pertinent information if needed.  Past Surgical History:   Procedure Laterality Date     GRAFT CARTILAGE FROM POSTERIOR AURICLE Left 10/6/2016    Procedure: GRAFT CARTILAGE FROM POSTERIOR AURICLE;  Surgeon: Tyler Richards MD;  [facility]     INCISION AND DRAINAGE PERINEAL, COMBINED Bilateral 7/18/2015    Procedure: COMBINED INCISION AND DRAINAGE PERINEAL;  Surgeon: Dequan Timmons MD;  [facility]     OPTICAL TRACKING SYSTEM CRANIOTOMY, EXCISE TUMOR, COMBINED N/A 4/13/2015    Procedure: COMBINED OPTICAL TRACKING SYSTEM CRANIOTOMY, EXCISE TUMOR;  Surgeon: Francis Velazquez MD;  [facility]     OPTICAL TRACKING SYSTEM CRANIOTOMY, EXCISE TUMOR, COMBINED N/A 4/16/2015    Procedure: COMBINED OPTICAL TRACKING SYSTEM CRANIOTOMY, EXCISE TUMOR;  Surgeon: Francis Velazquez MD;  [facility]     OPTICAL TRACKING SYSTEM CRANIOTOMY, EXCISE TUMOR, COMBINED Bilateral 5/28/2015    Procedure: COMBINED OPTICAL TRACKING SYSTEM CRANIOTOMY, EXCISE TUMOR;  Surgeon: Francis Velazquez MD;  [facility]     OPTICAL TRACKING SYSTEM CRANIOTOMY, EXCISE TUMOR, COMBINED Bilateral 1/14/2016    Procedure: COMBINED OPTICAL TRACKING SYSTEM CRANIOTOMY, EXCISE TUMOR;  Surgeon: Francis Velazquez MD;  [facility]     OPTICAL TRACKING SYSTEM VENTRICULOSTOMY  4/16/2015    Procedure: OPTICAL TRACKING SYSTEM VENTRICULOSTOMY;  Surgeon: Francis Velazquez MD;  [facility]     REMOVE PORT VASCULAR ACCESS N/A 10/6/2016    Procedure: REMOVE PORT VASCULAR ACCESS;  Surgeon: Bruno Perea MD;  [facility]     RHINOPLASTY N/A 10/6/2016    Procedure: RHINOPLASTY;  Surgeon: Tyler Richards MD;  [facility]     VASCULAR SURGERY  5-2015     single lumen power port       Immunization History   Immunization Status: Underimmunized per [facility]    Prior to Admission Medications   Prior to Admission Medications   Prescriptions Last Dose Informant Patient Reported? Taking?   Cholecalciferol 400 UNITS CHEW 1/9/2019 at Unknown time Father Yes Yes   Sig: Take 1 tablet (400 Units) by mouth every morning   OLANZapine (ZYPREXA) 2.5 MG tablet 1/8/2019 at 2000  No Yes   Sig: Take 4 tablets (10 mg) by mouth At Bedtime   bisacodyl (BISACODYL LAXATIVE) 5 MG EC tablet   No No   Sig: Take 2 tablets (10 mg) by mouth At Bedtime   calcium carbonate-vitamin D 600-400 MG-UNIT CHEW 1/9/2019 at Unknown time Father No Yes   Sig: Take 2 tablets in the morning and 1 tablet in the evening.   dexamethasone (DECADRON) 0.5 MG tablet 1/9/2019 at Unknown time  No Yes   Sig: Take 0.75 mg by mouth daily (with breakfast).   fexofenadine (ALLEGRA) 180 MG tablet 1/8/2019 at 2000 Father Yes Yes   Sig: Take 180 mg by mouth daily   linaclotide (LINZESS) 290 MCG capsule 1/9/2019 at Unknown time  Yes Yes   Sig: Take 1 capsule (290 mcg) by mouth daily   melatonin 3 MG tablet Past Month at Unknown time Father Yes Yes   Sig: Take 3 mg by mouth At Bedtime   mupirocin (BACTROBAN) 2 % ointment Past Month at Unknown time  No Yes   Sig: Use 2 times a day to the buttock with flare   omeprazole (PRILOSEC) 20 MG DR capsule 1/9/2019 at Unknown time  No Yes   Sig: Take 2 capsules (40 mg) by mouth 2 times daily   pentoxifylline (TRENTAL) 400 MG CR tablet 1/9/2019 at 2000  No Yes   Sig: Take 1 tablet (400 mg) by mouth 3 times daily (with meals)   polyethylene glycol (MIRALAX/GLYCOLAX) Packet 1/9/2019 at 0800  No Yes   Sig: Take 34 g by mouth 2 times daily   potassium phosphate, monobasic, (K-PHOS) 500 MG tablet 1/9/2019 at Unknown time  No Yes   Sig: Take 1 tablet (500 mg) by mouth 3 times daily   study - entinostat (IDS# 5050) 1 mg tablet 1/9/2019 at 0800  No Yes   Sig: Take 1 tablet (1 mg) by mouth every 7 days  for 4 doses Take one 1mg tablet with one 5mg tablet for total dose of 6mg weekly. Take on an empty stomach, at least 1 hour before or 2 hours after a meal.  Swallow tablet whole.   study - entinostat (IDS# 5050) 5 mg tablet 1/9/2019 at Unknown time  No Yes   Sig: Take 1 tablet (5 mg) by mouth every 7 days for 4 doses Take one 5mg tablet with one 1mg tablet for total dose of 6mg weekly. Take on an empty stomach, at least 1 hour before or 2 hours after a meal.  Swallow tablet whole.   sulfamethoxazole-trimethoprim (BACTRIM/SEPTRA) 400-80 MG per tablet 1/6/2019  No No   Sig: Take 1 tablet by mouth 2 times daily On Saturdays and Sundays   vitamin E (GNP VITAMIN E) 400 UNIT capsule 1/9/2019 at Unknown time  No Yes   Sig: Take 1 capsule (400 Units) by mouth daily      Facility-Administered Medications: None     Allergies   Allergies   Allergen Reactions     Blood Transfusion Related (Informational Only) Swelling     Periorbital swelling post platelet transfusion     No Known Drug Allergies        Social History   Per clinic note:  Lives between his  parents (one week at each home). They have been awarded guardianship of Geo. The families work well together - both parents have remarried. His dad has a clotting disorder, requiring him to take Warfarin daily.    Family History   I have reviewed this patient's family history and updated it with pertinent information if needed.   Family History   Problem Relation Age of Onset     Circulatory Father         PE/DVT     Hypothyroidism Father 30     Diabetes Maternal Grandmother      Diabetes Paternal Grandmother      Diabetes Paternal Grandfather      C.A.D. Paternal Grandfather      Hypertension Maternal Grandfather      Thyroid Disease Paternal Aunt         unknown whether hypo or hyper       Review of Systems   The 10 point Review of Systems is negative other than noted in the HPI.    Physical Exam   Temp: 98.4  F (36.9  C) Temp src: Axillary BP: 128/76   Heart  Rate: 110 Resp: 24 SpO2: 97 % O2 Device: None (Room air)    Vital Signs with Ranges  Temp:  [98.4  F (36.9  C)-98.9  F (37.2  C)] 98.4  F (36.9  C)  Pulse:  [66] 66  Heart Rate:  [110] 110  Resp:  [24-26] 24  BP: (126-128)/(76-80) 128/76  Cuff Mean (mmHg):  [94] 94  SpO2:  [97 %-100 %] 97 %  205 lbs 11.03 oz     Constitutional: Awake, alert, oriented, NAD, talkative  Head: Normocephalic, scar left of midline on scalp c/d/i  Eyes: PERRL, 2 mm pupils, wears patch over left eye, unable to ABduct left eye, ptosis of left eye  Nose: No nasal congestion. NG in place.  Ears: Right TM gray with sharp land marks. Left TM retracted.  Mouth: NG in posterior oropharynx. No erythema or exudate. No oral lesions. Intermittent drooling.  Neck: No cervical lymphadenopathy  Resp: No increased WOB. No wheezes or crackles.  CV: RRR. No murmurs, gallops, or rubs. Strong and equal radial pulses.  Abdominal: Obese with stretch marks. Normal bowel sounds. Soft. No distension or mass. No tenderness to palpation.  Extremities: Stretch marks on forearms and AC fossa.  Neuro: Alert and oriented. Ataxia with finger-nose-finger bilaterally. No pronator drift. Speech is difficult to understand, but logical and organized. Good memory recall of names.  Skin: Skin is warm and dry.   Psychiatric: Mood and affect normal.     Data   Results for orders placed or performed during the hospital encounter of 01/09/19 (from the past 24 hour(s))   XR Abdomen Port 1 View    Narrative    Exam: XR ABDOMEN PORT 1 VW  1/9/2019 3:11 PM      History: 19 yo w/ hx of constipation. Please evaluate NG placement and  stool burden.    Comparison: 11/26/2018    Findings: Enteric tube is over the stomach. Entire abdomen is not  included in the field-of-view. Nonobstructive colonic distention with  moderate amount of well-formed stool in the descending colon and  rectal vault. No pneumatosis or portal venous gas. No gross  organomegaly or abnormal calcification. No acute  osseous abnormality.      Impression    Impression:   1. Enteric tube is over the stomach.  2. Nonobstructive colonic distention with moderate stool burden.    JE MCCOY MD     *Note: Due to a large number of results and/or encounters for the requested time period, some results have not been displayed. A complete set of results can be found in Results Review.

## 2019-01-09 NOTE — PROGRESS NOTES
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 He is on COG study [id number] with Entinostat and is presently Cycle 19 Day 22.      HPI:  Geo is doing okay.  No fevers. No known ill exposures. He remains on Prilosec and needs a refill. He is presently taking: miralax BID -TID, pericolace in the morning, and linzess 290 mcg daily.  No bowel movement today and none yesterday. Jan 7th - small amount in the AM. His abdominal pain is \"all over\".  He has foul smelling gas.  He is consistently taking his daily steroids.  Geo denies dizziness, vision or hearing changes, chills, cough, shortness of breath, nausea, vomiting, and polyuria.  He has no muscular aches or complaints of bone pain. No headaches.  Slept 3 hours 53 minutes last night (according to his fit bit).  Taking Olanzapine since last week.  Dad said he was sleeping this morning when he woke him up for the appointment today. He said the last good night sleep he had was on Saturday.       Fam/Soc: Lives between his  parents (one week at each home). They have been awarded guardianship of Geo. The families work well together - both parents have remarried. His dad has a clotting disorder, requiring him to take Warfarin daily.       Allergies   Allergen Reactions     Blood Transfusion Related (Informational Only) Swelling     Periorbital swelling post platelet transfusion     No Known Drug Allergies        Current meds:  Current Outpatient Medications   Medication Sig Dispense Refill     bisacodyl (BISACODYL LAXATIVE) 5 MG EC tablet Take 2 tablets (10 mg) by mouth At Bedtime 60 tablet 3     calcium carbonate-vitamin D 600-400 MG-UNIT CHEW Take 2 tablets in the morning and 1 tablet in the evening. 90 tablet 3     Cholecalciferol 400 UNITS CHEW Take 1 tablet (400 Units) by mouth every morning 60 tablet 2     dexamethasone " (DECADRON) 0.5 MG tablet Take 0.75 mg by mouth daily (with breakfast). 90 tablet 3     fexofenadine (ALLEGRA) 180 MG tablet Take 180 mg by mouth daily       linaclotide (LINZESS) 290 MCG capsule Take 1 capsule (290 mcg) by mouth daily       melatonin 3 MG tablet Take 3 mg by mouth At Bedtime       mupirocin (BACTROBAN) 2 % ointment Use 2 times a day to the buttock with flare 22 g 3     OLANZapine (ZYPREXA) 2.5 MG tablet Take 4 tablets (10 mg) by mouth At Bedtime 120 tablet 0     omeprazole (PRILOSEC) 20 MG DR capsule Take 2 capsules (40 mg) by mouth 2 times daily 180 capsule 3     pentoxifylline (TRENTAL) 400 MG CR tablet Take 1 tablet (400 mg) by mouth 3 times daily (with meals) 270 tablet 2     polyethylene glycol (MIRALAX/GLYCOLAX) Packet Take 34 g by mouth 2 times daily 100 packet 3     potassium phosphate, monobasic, (K-PHOS) 500 MG tablet Take 1 tablet (500 mg) by mouth 3 times daily 90 tablet 3     study - entinostat (IDS# 5050) 1 mg tablet Take 1 tablet (1 mg) by mouth every 7 days for 4 doses Take one 1mg tablet with one 5mg tablet for total dose of 6mg weekly. Take on an empty stomach, at least 1 hour before or 2 hours after a meal.  Swallow tablet whole. 4 tablet 0     study - entinostat (IDS# 5050) 5 mg tablet Take 1 tablet (5 mg) by mouth every 7 days for 4 doses Take one 5mg tablet with one 1mg tablet for total dose of 6mg weekly. Take on an empty stomach, at least 1 hour before or 2 hours after a meal.  Swallow tablet whole. 4 tablet 0     sulfamethoxazole-trimethoprim (BACTRIM/SEPTRA) 400-80 MG per tablet Take 1 tablet by mouth 2 times daily On Saturdays and Sundays 24 tablet 11     vitamin E (GNP VITAMIN E) 400 UNIT capsule Take 1 capsule (400 Units) by mouth daily 30 capsule 11   Above meds reviewed. Has missed some steroid doses over the last two months. No missed chemo doses.   Has received flu shot for 1699-0525    Taking 1mg melatonin.  Miralax three times daily with 96 ounces of water.   Kphos  twice daily.  No missed doses of Entinostat.    Past Medical History:   Diagnosis Date     Cranial nerve dysfunction      Dyspepsia      Ependymoma (H)      Gastro-oesophageal reflux disease      Hearing loss      Intracranial hemorrhage (H)      Migraine      Pilonidal cyst     7-2015     Reduced vision      Refractory obstruction of nasal airway     2nd to nasal valve prolapse     Sleep apnea      Strabismus     gaze palsy        Past Surgical History:   Procedure Laterality Date     GRAFT CARTILAGE FROM POSTERIOR AURICLE Left 10/6/2016    Procedure: GRAFT CARTILAGE FROM POSTERIOR AURICLE;  Surgeon: Tyler Richards MD;  [facility]     INCISION AND DRAINAGE PERINEAL, COMBINED Bilateral 7/18/2015    Procedure: COMBINED INCISION AND DRAINAGE PERINEAL;  Surgeon: Dequan Timmons MD;  [facility]     OPTICAL TRACKING SYSTEM CRANIOTOMY, EXCISE TUMOR, COMBINED N/A 4/13/2015    Procedure: COMBINED OPTICAL TRACKING SYSTEM CRANIOTOMY, EXCISE TUMOR;  Surgeon: Francis Velazquez MD;  [facility]     OPTICAL TRACKING SYSTEM CRANIOTOMY, EXCISE TUMOR, COMBINED N/A 4/16/2015    Procedure: COMBINED OPTICAL TRACKING SYSTEM CRANIOTOMY, EXCISE TUMOR;  Surgeon: Francis Velazquez MD;  [facility]     OPTICAL TRACKING SYSTEM CRANIOTOMY, EXCISE TUMOR, COMBINED Bilateral 5/28/2015    Procedure: COMBINED OPTICAL TRACKING SYSTEM CRANIOTOMY, EXCISE TUMOR;  Surgeon: Francis Velazquez MD;  [facility]     OPTICAL TRACKING SYSTEM CRANIOTOMY, EXCISE TUMOR, COMBINED Bilateral 1/14/2016    Procedure: COMBINED OPTICAL TRACKING SYSTEM CRANIOTOMY, EXCISE TUMOR;  Surgeon: Francis Velazquez MD;  [facility]     OPTICAL TRACKING SYSTEM VENTRICULOSTOMY  4/16/2015    Procedure: OPTICAL TRACKING SYSTEM VENTRICULOSTOMY;  Surgeon: Francis Velazquez MD;  [facility]     REMOVE PORT VASCULAR ACCESS N/A 10/6/2016    Procedure: REMOVE PORT VASCULAR ACCESS;  Surgeon: Bruno Perea MD;   [facility]     RHINOPLASTY N/A 10/6/2016    Procedure: RHINOPLASTY;  Surgeon: Tyler Richards MD;  [facility]     VASCULAR SURGERY  5-2015    single lumen power port       Family History   Problem Relation Age of Onset     Circulatory Father         PE/DVT     Hypothyroidism Father 30     Diabetes Maternal Grandmother      Diabetes Paternal Grandmother      Diabetes Paternal Grandfather      C.A.D. Paternal Grandfather      Hypertension Maternal Grandfather      Thyroid Disease Paternal Aunt         unknown whether hypo or hyper       Review of Systems   Constitutional: Negative for chills and fever.        In a wheelchair   HENT: Positive for hearing loss (Pre-existing hearing loss from prior therapy). Negative for congestion, ear pain, mouth sores, nosebleeds and tinnitus.    Eyes: Positive for visual disturbance (Wears a patch over one eye to minimize diplopia). Negative for pain.        Right eye patched   Respiratory: Negative.  Negative for cough and shortness of breath.    Cardiovascular: Negative.    Gastrointestinal: Positive for constipation (Chronic, see HPI). Negative for nausea and vomiting.   Endocrine: Negative for polydipsia and polyuria.        Follows with Dr. Martni   Musculoskeletal: Negative.  Negative for arthralgias and myalgias.   Skin: Negative.    Neurological: Positive for facial asymmetry and speech difficulty. Negative for dizziness.   Psychiatric/Behavioral: Sleep disturbance: Not using his BiPAP.   All other systems reviewed and are negative.    Vitals:    weight is 89.8 kg (197 lb 15.6 oz). His axillary temperature is 98.9  F (37.2  C). His blood pressure is 126/80 and his pulse is 66. His respiration is 26 and oxygen saturation is 100%.        Wt Readings from Last 4 Encounters:   01/09/19 89.8 kg (197 lb 15.6 oz) (92 %)*   01/02/19 89.9 kg (198 lb 3.1 oz) (92 %)*   12/26/18 87.2 kg (192 lb 3.9 oz) (90 %)*   12/19/18 88.4 kg (194 lb 14.2 oz) (91 %)*     * Growth  percentiles are based on CDC (Boys, 2-20 Years) data.       Physical Exam   Constitutional: He is oriented to person, place, and time and well-developed, well-nourished, and in no distress.   Stands with assist   HENT:   Head: Normocephalic and atraumatic.   Mouth/Throat: Oropharynx is clear and moist.   Occassionally saliva accumulates in mouth with occasional drooling.  Left TM retracted, no erythema   Eyes: Conjunctivae are normal. Pupils are equal, round, and reactive to light.   Can track to right, but not to left. Nystagmus noted  No conjunctival erythema   Neck: Neck supple.   Cardiovascular: Normal rate, regular rhythm and normal heart sounds.   Pulmonary/Chest: Effort normal and breath sounds normal. He has no wheezes.   Abdominal: Bowel sounds are normal.   Full, firm   Musculoskeletal:   Positive pulses bilaterally.   Lymphadenopathy:     He has no cervical adenopathy.   Neurological: He is alert and oriented to person, place, and time. A cranial nerve deficit is present. Coordination (Ataxic- dysmetria especially in upper extremities when accomplishing tasks.) abnormal. GCS score is 15.   Skin: Skin is warm and dry.   Striae scattered He has a few petechia mid abdomen on one stretch sofya.    Psychiatric: Mood and affect normal.       Labs:  Results for orders placed or performed in visit on 01/09/19   CBC with platelets differential   Result Value Ref Range    WBC 4.8 4.0 - 11.0 10e9/L    RBC Count 4.37 (L) 4.4 - 5.9 10e12/L    Hemoglobin 13.3 13.3 - 17.7 g/dL    Hematocrit 41.6 40.0 - 53.0 %    MCV 95 78 - 100 fl    MCH 30.4 26.5 - 33.0 pg    MCHC 32.0 31.5 - 36.5 g/dL    RDW 12.6 10.0 - 15.0 %    Platelet Count 134 (L) 150 - 450 10e9/L    Diff Method Automated Method     % Neutrophils 41.4 %    % Lymphocytes 22.9 %    % Monocytes 19.4 %    % Eosinophils 14.5 %    % Basophils 1.0 %    % Immature Granulocytes 0.8 %    Nucleated RBCs 0 0 /100    Absolute Neutrophil 2.0 1.6 - 8.3 10e9/L    Absolute  Lymphocytes 1.1 0.8 - 5.3 10e9/L    Absolute Monocytes 0.9 0.0 - 1.3 10e9/L    Absolute Eosinophils 0.7 0.0 - 0.7 10e9/L    Absolute Basophils 0.1 0.0 - 0.2 10e9/L    Abs Immature Granulocytes 0.0 0 - 0.4 10e9/L    Absolute Nucleated RBC 0.0      *Note: Due to a large number of results and/or encounters for the requested time period, some results have not been displayed. A complete set of results can be found in Results Review.       Impression:  1. Ependymoma.  2. Entinostat, continues to tolerate fairly well.  3. Labs appropriate at this time - platelets 134,000 today  4. Chronic constipation with abdominal pain today  5. Poor sleep with some behavior concerns.    Plan:  1. He will continue Entinostat treatment - He will take 6mg weekly.   2. We will admit Geo for full bowel clean out today.  3. RTC in next week for follow up and labs.  He is due for the next course next week. Imaging is scheduled Jan 16th.    4. Continue Olanzapine at night. He should continue psych follow-up.  We will monitor sleep.  5. We will check on PM & R.(Benny Coelho) to review his case and give recommendations for ongoing plan to increase quality of life. Referral completed.

## 2019-01-10 ENCOUNTER — APPOINTMENT (OUTPATIENT)
Dept: GENERAL RADIOLOGY | Facility: CLINIC | Age: 19
DRG: 392 | End: 2019-01-10
Attending: PEDIATRICS
Payer: COMMERCIAL

## 2019-01-10 VITALS
OXYGEN SATURATION: 97 % | TEMPERATURE: 97 F | DIASTOLIC BLOOD PRESSURE: 80 MMHG | WEIGHT: 205.69 LBS | SYSTOLIC BLOOD PRESSURE: 120 MMHG | BODY MASS INDEX: 29.45 KG/M2 | HEART RATE: 85 BPM | RESPIRATION RATE: 26 BRPM | HEIGHT: 70 IN

## 2019-01-10 LAB
ANION GAP SERPL CALCULATED.3IONS-SCNC: 7 MMOL/L (ref 3–14)
BUN SERPL-MCNC: 12 MG/DL (ref 7–30)
CALCIUM SERPL-MCNC: 8 MG/DL (ref 8.5–10.1)
CHLORIDE SERPL-SCNC: 108 MMOL/L (ref 98–110)
CO2 SERPL-SCNC: 29 MMOL/L (ref 20–32)
CREAT SERPL-MCNC: 0.89 MG/DL (ref 0.5–1)
GFR SERPL CREATININE-BSD FRML MDRD: >90 ML/MIN/{1.73_M2}
GLUCOSE SERPL-MCNC: 79 MG/DL (ref 70–99)
IGA SERPL-MCNC: 56 MG/DL (ref 70–380)
MAGNESIUM SERPL-MCNC: 1.7 MG/DL (ref 1.6–2.3)
PHOSPHATE SERPL-MCNC: 3.1 MG/DL (ref 2.5–4.5)
POTASSIUM SERPL-SCNC: 4 MMOL/L (ref 3.4–5.3)
SODIUM SERPL-SCNC: 144 MMOL/L (ref 133–144)
TTG IGA SER-ACNC: 1 U/ML

## 2019-01-10 PROCEDURE — 25000128 H RX IP 250 OP 636: Performed by: STUDENT IN AN ORGANIZED HEALTH CARE EDUCATION/TRAINING PROGRAM

## 2019-01-10 PROCEDURE — 20600000 ZZH R&B BMT

## 2019-01-10 PROCEDURE — 36415 COLL VENOUS BLD VENIPUNCTURE: CPT | Performed by: STUDENT IN AN ORGANIZED HEALTH CARE EDUCATION/TRAINING PROGRAM

## 2019-01-10 PROCEDURE — 25000132 ZZH RX MED GY IP 250 OP 250 PS 637: Performed by: STUDENT IN AN ORGANIZED HEALTH CARE EDUCATION/TRAINING PROGRAM

## 2019-01-10 PROCEDURE — 25000131 ZZH RX MED GY IP 250 OP 636 PS 637: Performed by: STUDENT IN AN ORGANIZED HEALTH CARE EDUCATION/TRAINING PROGRAM

## 2019-01-10 PROCEDURE — 74018 RADEX ABDOMEN 1 VIEW: CPT

## 2019-01-10 PROCEDURE — 83735 ASSAY OF MAGNESIUM: CPT | Performed by: STUDENT IN AN ORGANIZED HEALTH CARE EDUCATION/TRAINING PROGRAM

## 2019-01-10 PROCEDURE — G0378 HOSPITAL OBSERVATION PER HR: HCPCS

## 2019-01-10 PROCEDURE — 80048 BASIC METABOLIC PNL TOTAL CA: CPT | Performed by: STUDENT IN AN ORGANIZED HEALTH CARE EDUCATION/TRAINING PROGRAM

## 2019-01-10 PROCEDURE — 84100 ASSAY OF PHOSPHORUS: CPT | Performed by: STUDENT IN AN ORGANIZED HEALTH CARE EDUCATION/TRAINING PROGRAM

## 2019-01-10 RX ADMIN — POTASSIUM CHLORIDE AND SODIUM CHLORIDE: 900; 150 INJECTION, SOLUTION INTRAVENOUS at 01:28

## 2019-01-10 RX ADMIN — PENTOXIFYLLINE 400 MG: 400 TABLET, FILM COATED, EXTENDED RELEASE ORAL at 20:38

## 2019-01-10 RX ADMIN — FEXOFENADINE HCL 180 MG: 60 TABLET, FILM COATED ORAL at 20:37

## 2019-01-10 RX ADMIN — DEXAMETHASONE 0.75 MG: 0.75 TABLET ORAL at 09:00

## 2019-01-10 RX ADMIN — PENTOXIFYLLINE 400 MG: 400 TABLET, FILM COATED, EXTENDED RELEASE ORAL at 09:00

## 2019-01-10 RX ADMIN — POLYETHYLENE GLYCOL 3350, SODIUM SULFATE ANHYDROUS, SODIUM BICARBONATE, SODIUM CHLORIDE, POTASSIUM CHLORIDE 4000 ML: 236; 22.74; 6.74; 5.86; 2.97 POWDER, FOR SOLUTION ORAL at 08:06

## 2019-01-10 RX ADMIN — PENTOXIFYLLINE 400 MG: 400 TABLET, FILM COATED, EXTENDED RELEASE ORAL at 13:48

## 2019-01-10 RX ADMIN — POTASSIUM CHLORIDE AND SODIUM CHLORIDE: 900; 150 INJECTION, SOLUTION INTRAVENOUS at 11:33

## 2019-01-10 RX ADMIN — POTASSIUM CHLORIDE AND SODIUM CHLORIDE: 900; 150 INJECTION, SOLUTION INTRAVENOUS at 21:28

## 2019-01-10 RX ADMIN — OMEPRAZOLE 40 MG: 20 CAPSULE, DELAYED RELEASE ORAL at 09:00

## 2019-01-10 RX ADMIN — OMEPRAZOLE 40 MG: 20 CAPSULE, DELAYED RELEASE ORAL at 20:37

## 2019-01-10 NOTE — DISCHARGE SUMMARY
[facility], [place]  Discharge Summary  Pediatric Hematology/Oncology     Date of Admission:  1/9/2019  Date of Discharge:  1/10/2019  Discharging Provider: Ember Chapin    Discharge Diagnoses   1. Acute on chronic constipation  2. 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 He was recently admitted 10/17-19 for bowel clean out with golytely via NG tube.     He was seen in clinic by TABATHA Martinez CNP on day of admission, where he was noted to have decreased stool output. He normally stools 3-4 times per day. His last BM was 2 days prior to admission on Monday, 1/7. Stools suddenly stopped, rather than slowing down over time. Geo had associated abdominal pain that was crampy, described as both sharp and dull. He was subsequently admitted from clinic for bowel clean out.     Hospital Course   Geo Hicks was admitted on 1/9/2019. A Golytely bowel clean out was initiated with Golytely via NG run at 500 ml/hr until stools were clear. He tolerated this treatment well. AXR confirmed his clean out. He was discharged home with follow up in clinic on 1/16.     Pending Results   None    Primary Care Physician   Jeffrey Espinoza    Physical Exam   Vital Signs with Ranges  Temp:  [96.8  F (36  C)-98  F (36.7  C)] 96.8  F (36  C)  Pulse:  [84-90] 85  Heart Rate:  [84-87] 87  Resp:  [23-30] 30  BP: (111-142)/(67-88) 111/67  SpO2:  [95 %-98 %] 97 %  I/O last 3 completed shifts:  In: 7504 [P.O.:1750; I.V.:2254; NG/GT:3500]  Out: 78914 [Urine:2690; Other:17508; Stool:800]    Constitutional: Awake, alert, oriented, NAD, talkative  Nose: No nasal congestion. NG in place.  Mouth: MMM. NG  in posterior oropharynx.  Resp: No increased WOB. No wheezes or crackles.  CV: RRR. No murmurs, gallops, or rubs.  Abdominal: Obese with stretch marks. Normal bowel sounds. Abdomen soft (improved from admission). No distension or mass. No tenderness to palpation.  Extremities: Stretch marks on forearms and AC fossa.  Skin: Skin is warm and dry.   Psychiatric: Mood and affect normal.       Discharge Disposition   Discharged to home  Condition at discharge: Stable    Consultations This Hospital Stay   None    Discharge Orders      Reason for your hospital stay    Geo was admitted for constipation     Follow Up and recommended labs and tests    Wednesday, January 16  -  MRI @ 7:30 AM  -  [facility] @ 11 AM     Activity    Your activity upon discharge: activity as tolerated     Diet    Follow this diet upon discharge: regular diet     Discharge Medications   Current Discharge Medication List      CONTINUE these medications which have NOT CHANGED    Details   calcium carbonate-vitamin D 600-400 MG-UNIT CHEW Take 2 tablets in the morning and 1 tablet in the evening.  Qty: 90 tablet, Refills: 3    Associated Diagnoses: Ependymoma (H)      Cholecalciferol 400 UNITS CHEW Take 1 tablet (400 Units) by mouth every morning  Qty: 60 tablet, Refills: 2    Associated Diagnoses: Ependymoma (H)      dexamethasone (DECADRON) 0.5 MG tablet Take 0.75 mg by mouth daily (with breakfast).  Qty: 90 tablet, Refills: 3    Associated Diagnoses: Ependymoma (H)      fexofenadine (ALLEGRA) 180 MG tablet Take 180 mg by mouth daily      linaclotide (LINZESS) 290 MCG capsule Take 1 capsule (290 mcg) by mouth daily      melatonin 3 MG tablet Take 3 mg by mouth At Bedtime      mupirocin (BACTROBAN) 2 % ointment Use 2 times a day to the buttock with flare  Qty: 22 g, Refills: 3    Associated Diagnoses: Bacterial folliculitis; Ependymoma (H)      OLANZapine (ZYPREXA) 2.5 MG tablet Take 4 tablets (10 mg) by mouth At Bedtime  Qty: 120 tablet,  Refills: 0    Comments: Please dispense 2.5mg tablets so medication can be adjusted if needed.  Associated Diagnoses: Persistent insomnia; Behavioral change      omeprazole (PRILOSEC) 20 MG DR capsule Take 2 capsules (40 mg) by mouth 2 times daily  Qty: 180 capsule, Refills: 3    Associated Diagnoses: Gastroesophageal reflux disease, esophagitis presence not specified      pentoxifylline (TRENTAL) 400 MG CR tablet Take 1 tablet (400 mg) by mouth 3 times daily (with meals)  Qty: 270 tablet, Refills: 2    Associated Diagnoses: Ependymoma (H); 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      polyethylene glycol (MIRALAX/GLYCOLAX) Packet Take 34 g by mouth 2 times daily  Qty: 100 packet, Refills: 3    Associated Diagnoses: Slow transit constipation      potassium phosphate, monobasic, (K-PHOS) 500 MG tablet Take 1 tablet (500 mg) by mouth 3 times daily  Qty: 90 tablet, Refills: 3    Associated Diagnoses: Hypophosphatemia; Ependymoma (H)      study - entinostat (IDS# 5050) 1 mg tablet Take 1 tablet (1 mg) by mouth every 7 days for 4 doses Take one 1mg tablet with one 5mg tablet for total dose of 6mg weekly. Take on an empty stomach, at least 1 hour before or 2 hours after a meal.  Swallow tablet whole.  Qty: 4 tablet, Refills: 0    Comments: Deliver to [facility] for dispensing.  Associated Diagnoses: Neoplasm of posterior cranial fossa (H); Ependymoma (H)      study - entinostat (IDS# 5050) 5 mg tablet Take 1 tablet (5 mg) by mouth every 7 days for 4 doses Take one 5mg tablet with one 1mg tablet for total dose of 6mg weekly. Take on an empty stomach, at least 1 hour before or 2 hours after a meal.  Swallow tablet whole.  Qty: 4 tablet, Refills: 0    Comments: Deliver to [facility] for dispensing  Associated Diagnoses: Neoplasm of posterior cranial fossa (H); Ependymoma (H)      vitamin E (GNP VITAMIN E) 400 UNIT capsule Take 1 capsule (400 Units) by mouth daily  Qty: 30 capsule, Refills: 11    Associated  Diagnoses: Ependymoma (H)      bisacodyl (BISACODYL LAXATIVE) 5 MG EC tablet Take 2 tablets (10 mg) by mouth At Bedtime  Qty: 60 tablet, Refills: 3    Associated Diagnoses: Slow transit constipation; Ependymoma (H)      sulfamethoxazole-trimethoprim (BACTRIM/SEPTRA) 400-80 MG per tablet Take 1 tablet by mouth 2 times daily On Saturdays and Sundays  Qty: 24 tablet, Refills: 11    Associated Diagnoses: Ependymoma (H)           Allergies   Allergies   Allergen Reactions     Blood Transfusion Related (Informational Only) Swelling     Periorbital swelling post platelet transfusion     No Known Drug Allergies      Data   Results for orders placed or performed during the hospital encounter of 01/09/19 (from the past 48 hour(s))   XR Abdomen Port 1 View    Narrative    Exam: XR ABDOMEN PORT 1 VW  1/9/2019 3:11 PM      History: 17 yo w/ hx of constipation. Please evaluate NG placement and  stool burden.    Comparison: 11/26/2018    Findings: Enteric tube is over the stomach. Entire abdomen is not  included in the field-of-view. Nonobstructive colonic distention with  moderate amount of well-formed stool in the descending colon and  rectal vault. No pneumatosis or portal venous gas. No gross  organomegaly or abnormal calcification. No acute osseous abnormality.      Impression    Impression:   1. Enteric tube is over the stomach.  2. Nonobstructive colonic distention with moderate stool burden.    JE MCCOY MD   Basic metabolic panel   Result Value Ref Range    Sodium 144 133 - 144 mmol/L    Potassium 4.0 3.4 - 5.3 mmol/L    Chloride 108 98 - 110 mmol/L    Carbon Dioxide 29 20 - 32 mmol/L    Anion Gap 7 3 - 14 mmol/L    Glucose 79 70 - 99 mg/dL    Urea Nitrogen 12 7 - 30 mg/dL    Creatinine 0.89 0.50 - 1.00 mg/dL    GFR Estimate >90 >60 mL/min/[1.73_m2]    GFR Estimate If Black >90 >60 mL/min/[1.73_m2]    Calcium 8.0 (L) 8.5 - 10.1 mg/dL   Magnesium   Result Value Ref Range    Magnesium 1.7 1.6 - 2.3 mg/dL   Phosphorus    Result Value Ref Range    Phosphorus 3.1 2.5 - 4.5 mg/dL     *Note: Due to a large number of results and/or encounters for the requested time period, some results have not been displayed. A complete set of results can be found in Results Review.     Physician Attestation     I saw and examined the patient today.  I personally reviewed vital signs, medications, labs, imaging, and discharge plan with the resident, and agree with the final assessment and plan for discharge as noted in the resident s discharge summary. I personally spent < 30 minutes today on discharge activities for this patient.     Mandeep White MD  Pediatric Hematology/Oncology  [facility]  Date of Service (when I saw the patient): 1/01/2019

## 2019-01-10 NOTE — PLAN OF CARE
Pt afebrile, VSS.  Geo has had Go Lytely running at 500 ml/hr through the night and it continues at the same rate.  Has had several large watery stools.  None have been clear.  Geo reports he is feeling better after latest stool at 0600.  Through this he has remained continent and in good spirits.  Geo is alone in room.

## 2019-01-10 NOTE — PROGRESS NOTES
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 He presents today for management of worsening constipation and will be admitted for IVF and a bowel cleanout.     GI  Acute on chronic constipation  - NG tube placement  - Bowel clean out with golytely via NGT at 500 ml/hr until stool is clear (in progress)  - Repeat AXR to demonstrate relief of stool burden  - Omeprazole 40 mg BID  - Outpatient followed by Dr. Wei, last seen 11/16  - Hold OP constipation regimen: Linzess, colace, dulcolax, miralax titrated for 2-3 soft [place] stool scale 4-6 per day.     FEN  - Clear liquid diet  - NS + 20 KCl MIVF  - Holding PTA supplements for now: Ca, vitamin D, K-phos  - BMP in AM if still admitted.     Heme/Onc:  Ependymoma: on study  - Entinostat 6 mg weekly, last taken 1/9  - Pentoxifylline 500 mg TID  - Return to clinicw/ imaging 1/16     Endo  Hx of hypernatremia  Adrenal insufficiency and chronic steroid therapy, asymptomatic avascular necrosis  Hypocalcemia 2/2 hypoalbuminemia  - Followed by Dr. Martin, last seen 11/7  - PTA Decadron 0.75 mg QAM     ALLERGY:  Hx of allergies  - Fexofenadine 180 mg QDay     ID  PCP ppx  - Bactrim QSat,Sun BID (would need be ordered if pt still admitted)     NEURO  Speech delay  - PTA olanzapine 10 mg QHS  - Scheduled for neuropsych evaluation schueduled 1/10  - Holding Melatonin 1 mg at bedtime  - OP set to see PM&R (Benny Coelho)     RESP  Hx of CANDELARIO but has not been using Bipap  -  Continue to monitor     Access: PIV, NG     Disposition: Pending completion of bowel clean out as evidenced by clear stools. Anticipate later this afternoon or tomorrow.     Patient was seen discussed with hematology/oncology fellow Dr. Felipe Espinal and attending Dr. Mandeep White.     Ember Chapin MD  Pediatric Resident, PGY-1    Physician Attestation     I, Mandeep White MD, saw this patient with the resident and agree with the resident s findings and plan of care as documented in the resident s note.       I personally reviewed vital signs, medications, labs and imaging (as applicable).     Mandeep White MD  Pediatric Hematology/Oncology  [facility]  Date of Service (when I saw the patient): 1/10/2019       Interval History   Nursing notes and VS reviewed. Geo had many loose stools overnight, but none clear. After not clearing stool burden following first 8 hrs of GoLyteley, it was continued at 500 ml/hr throughout the day. Geo states he is feeling better this morning.    Per nursing, stool continues to be loose and brown with chunks of food (carrots).    Physical Exam   Temp: 97.9  F (36.6  C) Temp src: Axillary BP: 131/83 Pulse: 90 Heart Rate: 110 Resp: 23 SpO2: 96 % O2 Device: None (Room air)    Vitals:    01/09/19 1400   Weight: 93.3 kg (205 lb 11 oz)     Vital Signs with Ranges  Temp:  [97.6  F (36.4  C)-98.9  F (37.2  C)] 97.9  F (36.6  C)  Pulse:  [66-90] 90  Heart Rate:  [110] 110  Resp:  [23-26] 23  BP: (126-131)/(76-88) 131/83  Cuff Mean (mmHg):  [94] 94  SpO2:  [96 %-100 %] 96 %     I/O last 3 completed shifts:  In: 2404 [P.O.:1750; I.V.:654]  Out: 3400 [Urine:2600; Stool:800]   UOP: 4.05 ml/kg/hr    Constitutional: Awake, alert, oriented, NAD, talkative  Nose: No nasal congestion.  Mouth: MMM. NG in posterior oropharynx.  Resp: No increased WOB. No wheezes or crackles.  CV: RRR. No murmurs, gallops, or rubs.  Abdominal: Obese  with stretch marks. Normal bowel sounds. Abdomen soft (improved from yesterday). No distension or mass. No tenderness to palpation.  Extremities: Stretch marks on forearms and AC fossa.  Skin: Skin is warm and dry.   Psychiatric: Mood and affect normal.     Medications     0.9% sodium chloride + KCl 20 mEq/L 100 mL/hr at 01/10/19 0128     polyethylene glycol         dexamethasone  0.75 mg Oral Daily with breakfast     fexofenadine  180 mg Oral Daily     OLANZapine  10 mg Oral At Bedtime     omeprazole  40 mg Oral BID     pentoxifylline ER  400 mg Oral TID w/meals     sodium chloride (PF)  3 mL Intracatheter Q8H       Data   Results for orders placed or performed during the hospital encounter of 01/09/19 (from the past 24 hour(s))   XR Abdomen Port 1 View    Narrative    Exam: XR ABDOMEN PORT 1 VW  1/9/2019 3:11 PM      History: 19 yo w/ hx of constipation. Please evaluate NG placement and  stool burden.    Comparison: 11/26/2018    Findings: Enteric tube is over the stomach. Entire abdomen is not  included in the field-of-view. Nonobstructive colonic distention with  moderate amount of well-formed stool in the descending colon and  rectal vault. No pneumatosis or portal venous gas. No gross  organomegaly or abnormal calcification. No acute osseous abnormality.      Impression    Impression:   1. Enteric tube is over the stomach.  2. Nonobstructive colonic distention with moderate stool burden.    JE MCCOY MD   Basic metabolic panel   Result Value Ref Range    Sodium 144 133 - 144 mmol/L    Potassium 4.0 3.4 - 5.3 mmol/L    Chloride 108 98 - 110 mmol/L    Carbon Dioxide 29 20 - 32 mmol/L    Anion Gap 7 3 - 14 mmol/L    Glucose 79 70 - 99 mg/dL    Urea Nitrogen 12 7 - 30 mg/dL    Creatinine 0.89 0.50 - 1.00 mg/dL    GFR Estimate >90 >60 mL/min/[1.73_m2]    GFR Estimate If Black >90 >60 mL/min/[1.73_m2]    Calcium 8.0 (L) 8.5 - 10.1 mg/dL   Magnesium   Result Value Ref Range    Magnesium 1.7 1.6 - 2.3 mg/dL    Phosphorus   Result Value Ref Range    Phosphorus 3.1 2.5 - 4.5 mg/dL     *Note: Due to a large number of results and/or encounters for the requested time period, some results have not been displayed. A complete set of results can be found in Results Review.

## 2019-01-10 NOTE — DISCHARGE INSTRUCTIONS
For temperature >100.4, increased nausea, vomiting, pain or any other concerns, please call [phone] & ask to talk to the Pediatric Oncology Fellow On Call.    Wednesday, January 16  -  MRI @ 7:30 AM  -  [facility] @ 11 AM

## 2019-01-10 NOTE — UTILIZATION REVIEW
"[facility]    Admission Status; Secondary Review Determination     Admission Date: 1/9/2019  2:18 PM      Under the authority of the Utilization Management Committee, the utilization review process indicated a secondary review on the above patient.  The review outcome is based on review of the medical records, discussions with staff, and applying clinical experience noted on the date of the review.          (x) Observation Status Appropriate - This patient does not meet hospital inpatient criteria and is placed in observation status. If this patient's primary payer is Medicare and was admitted as an inpatient, Condition Code 44 should be used and patient status changed to \"observation\".     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  He was admitted yesterday for a \"bowel clean out\".  A Golytely bowel clean out was initiated with two rounds of 500ml/hr Golytely via NG which he tolerated well. The following day AXR confirmed his clean out. He will discharge today after a one night stay. At the time of this review, the patient does not meet medical necessity for inpatient hospitalization.     The severity of illness, intensity of service provided, expected LOS and risk for adverse outcome make the care appropriate for further observation; however, doesn't meet criteria for hospital inpatient admission. Dr Lowry was paged with this recommendation for observation status.        The information on this document is developed by the utilization review team in order for the business office to ensure compliance.  This only denotes the appropriateness of proper admission status and does not reflect the quality of care rendered.         The definitions of Inpatient " Status and Observation Status used in making the determination above are those provided in the CMS Coverage Manual, Chapter 1 and Chapter 6, section 70.4.      Sincerely,     Luther Thomas DO MPH   Physician Advisor  Utilization Review  [facility]

## 2019-01-11 PROCEDURE — G0378 HOSPITAL OBSERVATION PER HR: HCPCS

## 2019-01-11 NOTE — PLAN OF CARE
Pt seen by MD and discharged orders placed. No signs of pain and reporting decreased abdominal tension. Discharge teaching and follow up reviewed with mom and patient. Pt discharged to home care.

## 2019-01-14 ENCOUNTER — HOSPITAL ENCOUNTER (OUTPATIENT)
Dept: OCCUPATIONAL THERAPY | Facility: CLINIC | Age: 19
Setting detail: THERAPIES SERIES
End: 2019-01-14
Attending: FAMILY MEDICINE
Payer: COMMERCIAL

## 2019-01-14 PROCEDURE — 97535 SELF CARE MNGMENT TRAINING: CPT | Mod: GO | Performed by: OCCUPATIONAL THERAPIST

## 2019-01-15 RX ORDER — DIPHENHYDRAMINE HYDROCHLORIDE 50 MG/ML
50 INJECTION INTRAMUSCULAR; INTRAVENOUS
Status: CANCELLED
Start: 2019-01-16

## 2019-01-15 RX ORDER — EPINEPHRINE 1 MG/ML
0.3 INJECTION, SOLUTION, CONCENTRATE INTRAVENOUS EVERY 5 MIN PRN
Status: CANCELLED | OUTPATIENT
Start: 2019-01-16

## 2019-01-15 RX ORDER — SODIUM CHLORIDE 9 MG/ML
1000 INJECTION, SOLUTION INTRAVENOUS CONTINUOUS PRN
Status: CANCELLED
Start: 2019-01-16

## 2019-01-15 RX ORDER — ALBUTEROL SULFATE 90 UG/1
1-2 AEROSOL, METERED RESPIRATORY (INHALATION)
Status: CANCELLED
Start: 2019-01-16

## 2019-01-15 RX ORDER — ALBUTEROL SULFATE 0.83 MG/ML
2.5 SOLUTION RESPIRATORY (INHALATION)
Status: CANCELLED | OUTPATIENT
Start: 2019-01-16

## 2019-01-15 RX ORDER — MEPERIDINE HYDROCHLORIDE 25 MG/ML
25 INJECTION INTRAMUSCULAR; INTRAVENOUS; SUBCUTANEOUS EVERY 30 MIN PRN
Status: CANCELLED | OUTPATIENT
Start: 2019-01-16

## 2019-01-15 RX ORDER — METHYLPREDNISOLONE SODIUM SUCCINATE 125 MG/2ML
125 INJECTION, POWDER, LYOPHILIZED, FOR SOLUTION INTRAMUSCULAR; INTRAVENOUS
Status: CANCELLED
Start: 2019-01-16

## 2019-01-16 ENCOUNTER — HOSPITAL ENCOUNTER (OUTPATIENT)
Dept: MRI IMAGING | Facility: CLINIC | Age: 19
End: 2019-01-16
Attending: NURSE PRACTITIONER
Payer: COMMERCIAL

## 2019-01-16 ENCOUNTER — OFFICE VISIT (OUTPATIENT)
Dept: PEDIATRIC HEMATOLOGY/ONCOLOGY | Facility: CLINIC | Age: 19
End: 2019-01-16
Attending: NURSE PRACTITIONER
Payer: COMMERCIAL

## 2019-01-16 ENCOUNTER — HOSPITAL ENCOUNTER (OUTPATIENT)
Dept: MRI IMAGING | Facility: CLINIC | Age: 19
Discharge: HOME | End: 2019-01-16
Attending: NURSE PRACTITIONER | Admitting: NURSE PRACTITIONER
Payer: COMMERCIAL

## 2019-01-16 VITALS
SYSTOLIC BLOOD PRESSURE: 136 MMHG | HEART RATE: 88 BPM | DIASTOLIC BLOOD PRESSURE: 82 MMHG | BODY MASS INDEX: 28.92 KG/M2 | WEIGHT: 199.74 LBS | OXYGEN SATURATION: 99 % | RESPIRATION RATE: 21 BRPM | TEMPERATURE: 97.1 F

## 2019-01-16 DIAGNOSIS — C71.9 EPENDYMOMA (H): ICD-10-CM

## 2019-01-16 DIAGNOSIS — D49.6 NEOPLASM OF POSTERIOR CRANIAL FOSSA (H): ICD-10-CM

## 2019-01-16 DIAGNOSIS — C71.9 EPENDYMOMA (H): Primary | ICD-10-CM

## 2019-01-16 DIAGNOSIS — D49.6 NEOPLASM OF POSTERIOR CRANIAL FOSSA (H): Primary | ICD-10-CM

## 2019-01-16 LAB
ALBUMIN SERPL-MCNC: 2.9 G/DL (ref 3.4–5)
ALP SERPL-CCNC: 151 U/L (ref 65–260)
ALT SERPL W P-5'-P-CCNC: 21 U/L (ref 0–50)
ANION GAP SERPL CALCULATED.3IONS-SCNC: 4 MMOL/L (ref 3–14)
AST SERPL W P-5'-P-CCNC: 33 U/L (ref 0–35)
BASOPHILS # BLD AUTO: 0 10E9/L (ref 0–0.2)
BASOPHILS NFR BLD AUTO: 0.9 %
BILIRUB SERPL-MCNC: 0.2 MG/DL (ref 0.2–1.3)
BUN SERPL-MCNC: 11 MG/DL (ref 7–30)
CALCIUM SERPL-MCNC: 8.3 MG/DL (ref 8.5–10.1)
CHLORIDE SERPL-SCNC: 106 MMOL/L (ref 98–110)
CO2 SERPL-SCNC: 30 MMOL/L (ref 20–32)
CREAT SERPL-MCNC: 0.98 MG/DL (ref 0.5–1)
DIFFERENTIAL METHOD BLD: ABNORMAL
EOSINOPHIL # BLD AUTO: 0.5 10E9/L (ref 0–0.7)
EOSINOPHIL NFR BLD AUTO: 13.9 %
ERYTHROCYTE [DISTWIDTH] IN BLOOD BY AUTOMATED COUNT: 12.4 % (ref 10–15)
GFR SERPL CREATININE-BSD FRML MDRD: >90 ML/MIN/{1.73_M2}
GLUCOSE SERPL-MCNC: 84 MG/DL (ref 70–99)
HCT VFR BLD AUTO: 37.7 % (ref 40–53)
HGB BLD-MCNC: 12.6 G/DL (ref 13.3–17.7)
IMM GRANULOCYTES # BLD: 0 10E9/L (ref 0–0.4)
IMM GRANULOCYTES NFR BLD: 0.6 %
LYMPHOCYTES # BLD AUTO: 0.9 10E9/L (ref 0.8–5.3)
LYMPHOCYTES NFR BLD AUTO: 25.1 %
MAGNESIUM SERPL-MCNC: 2.1 MG/DL (ref 1.6–2.3)
MCH RBC QN AUTO: 30.9 PG (ref 26.5–33)
MCHC RBC AUTO-ENTMCNC: 33.4 G/DL (ref 31.5–36.5)
MCV RBC AUTO: 92 FL (ref 78–100)
MONOCYTES # BLD AUTO: 0.7 10E9/L (ref 0–1.3)
MONOCYTES NFR BLD AUTO: 20.6 %
NEUTROPHILS # BLD AUTO: 1.3 10E9/L (ref 1.6–8.3)
NEUTROPHILS NFR BLD AUTO: 38.9 %
NRBC # BLD AUTO: 0 10*3/UL
NRBC BLD AUTO-RTO: 0 /100
PHOSPHATE SERPL-MCNC: 4 MG/DL (ref 2.5–4.5)
PLATELET # BLD AUTO: 145 10E9/L (ref 150–450)
POTASSIUM SERPL-SCNC: 4 MMOL/L (ref 3.4–5.3)
PROT SERPL-MCNC: 6.3 G/DL (ref 6.8–8.8)
RBC # BLD AUTO: 4.08 10E12/L (ref 4.4–5.9)
SODIUM SERPL-SCNC: 140 MMOL/L (ref 133–144)
WBC # BLD AUTO: 3.4 10E9/L (ref 4–11)

## 2019-01-16 PROCEDURE — 72158 MRI LUMBAR SPINE W/O & W/DYE: CPT

## 2019-01-16 PROCEDURE — G0463 HOSPITAL OUTPT CLINIC VISIT: HCPCS | Mod: ZF

## 2019-01-16 PROCEDURE — 70553 MRI BRAIN STEM W/O & W/DYE: CPT

## 2019-01-16 PROCEDURE — 36415 COLL VENOUS BLD VENIPUNCTURE: CPT | Performed by: NURSE PRACTITIONER

## 2019-01-16 PROCEDURE — 85025 COMPLETE CBC W/AUTO DIFF WBC: CPT | Performed by: NURSE PRACTITIONER

## 2019-01-16 PROCEDURE — 72156 MRI NECK SPINE W/O & W/DYE: CPT

## 2019-01-16 PROCEDURE — 25500064 ZZH RX 255 OP 636: Performed by: NURSE PRACTITIONER

## 2019-01-16 PROCEDURE — 80053 COMPREHEN METABOLIC PANEL: CPT | Performed by: NURSE PRACTITIONER

## 2019-01-16 PROCEDURE — A9585 GADOBUTROL INJECTION: HCPCS | Performed by: NURSE PRACTITIONER

## 2019-01-16 PROCEDURE — 83735 ASSAY OF MAGNESIUM: CPT | Performed by: NURSE PRACTITIONER

## 2019-01-16 PROCEDURE — 72157 MRI CHEST SPINE W/O & W/DYE: CPT

## 2019-01-16 PROCEDURE — 84100 ASSAY OF PHOSPHORUS: CPT | Performed by: NURSE PRACTITIONER

## 2019-01-16 RX ORDER — GADOBUTROL 604.72 MG/ML
10 INJECTION INTRAVENOUS ONCE
Status: COMPLETED | OUTPATIENT
Start: 2019-01-16 | End: 2019-01-16

## 2019-01-16 RX ADMIN — GADOBUTROL 9.3 ML: 604.72 INJECTION INTRAVENOUS at 07:31

## 2019-01-16 ASSESSMENT — ENCOUNTER SYMPTOMS
CONSTIPATION: 1
SLEEP DISTURBANCE: 1
RESPIRATORY NEGATIVE: 1
CARDIOVASCULAR NEGATIVE: 1
CONSTITUTIONAL NEGATIVE: 1
SPEECH DIFFICULTY: 1
FACIAL ASYMMETRY: 1

## 2019-01-16 ASSESSMENT — PAIN SCALES - GENERAL: PAINLEVEL: NO PAIN (0)

## 2019-01-16 NOTE — PATIENT INSTRUCTIONS
Labs every Tuesday at [facility], appointment are needed.  Call [phone] for appointment.    Orders are in the system for :  CBC with platelets and diff  CMP  Magnesium  Phosphorus    Next appointments would be , 3/13 and 4/10 if he makes counts.    Thank you for choosing [facility].    It was a pleasure to see you today.      CNS Tumor Team  Leoncio Schuler NP, APRN  Imani Means RN BSN - Care Coordinator       [facility], 9th Floor - [address] [address]  Scheduling/Appointments: [phone]  Fax: [phone]     Numbers to call:   Monday - Friday, 8:00 am - 5:00 pm:    Office : [phone]    Scheduling/Appointments: [phone]  Nights and weekends:   Call [phone] and ask the  to page the 'Pediatric Heme/Onc fellow on call' if you have an urgent concern that can't wait until the clinic opens.     Scheduling:    [facility], 9th floor [phone]  Infusion Center/Lab: [phone]   Radiology/ [phone]      Services:   [phone]     Imani Means RN, BSN  CNS Tumor Care Coordinator  Office: [phone]  Pager: [phone]  E-mail: [email or web address]     We encourage you to sign up for Belly for easy communication with us.  Sign up at the clinic  or go to [email or web address].      Please try the Passport to [facility] ([facility]) phone application for Virtual Tours, Procedure Preparation, Resources, Preparation for Hospital Stay and the Coloring Board.

## 2019-01-16 NOTE — LETTER
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 He reports his constipation has not improved since his clean out. He has only had 2 BM in the past week. Other than his constipation, he is doing well. He is enrolled on COG [id number] - A Phase 1 Study of Entinostat, an Oral Histone Deacetylase Inhibitor, in Pediatric Patients With Recurrent or Refractory Solid Tumors, Including CNS Tumors and Lymphoma. He has missed no doses of medication.       Fam/Soc: Lives between his parents (one week at each home). They have been awarded guardianship of Geo. The families work well together - both parents have remarried. His dad has a clotting disorder, requiring him to take Warfarin daily.     History was obtained from Geo and his mother.       Allergies   Allergen Reactions     Blood Transfusion Related (Informational Only) Swelling     Periorbital swelling post platelet transfusion     No Known Drug Allergies        Current Outpatient Medications   Medication     bisacodyl (BISACODYL LAXATIVE) 5 MG EC tablet     calcium carbonate-vitamin D 600-400 MG-UNIT CHEW     Cholecalciferol 400 UNITS CHEW     dexamethasone (DECADRON) 0.5 MG tablet     fexofenadine (ALLEGRA) 180 MG tablet     linaclotide (LINZESS) 290 MCG capsule     melatonin 3 MG tablet     mupirocin (BACTROBAN) 2 % ointment     OLANZapine (ZYPREXA) 2.5 MG tablet     omeprazole (PRILOSEC) 20 MG DR capsule     pentoxifylline (TRENTAL) 400 MG CR tablet     polyethylene glycol (MIRALAX/GLYCOLAX) Packet     potassium phosphate, monobasic, (K-PHOS) 500 MG tablet     sulfamethoxazole-trimethoprim (BACTRIM/SEPTRA) 400-80 MG per tablet     vitamin E (GNP VITAMIN E) 400 UNIT capsule     No current facility-administered medications for this visit.        Past  Medical History:   Diagnosis Date     Cranial nerve dysfunction      Dyspepsia      Ependymoma (H)      Gastro-oesophageal reflux disease      Hearing loss      Intracranial hemorrhage (H)      Migraine      Pilonidal cyst     7-2015     Reduced vision      Refractory obstruction of nasal airway     2nd to nasal valve prolapse     Sleep apnea      Strabismus     gaze palsy        Past Surgical History:   Procedure Laterality Date     GRAFT CARTILAGE FROM POSTERIOR AURICLE Left 10/6/2016    Procedure: GRAFT CARTILAGE FROM POSTERIOR AURICLE;  Surgeon: Tyler Richards MD;  [facility]: [facility]     INCISION AND DRAINAGE PERINEAL, COMBINED Bilateral 7/18/2015    Procedure: COMBINED INCISION AND DRAINAGE PERINEAL;  Surgeon: Dequan Timmons MD;  [facility]     OPTICAL TRACKING SYSTEM CRANIOTOMY, EXCISE TUMOR, COMBINED N/A 4/13/2015    Procedure: COMBINED OPTICAL TRACKING SYSTEM CRANIOTOMY, EXCISE TUMOR;  Surgeon: Francis Velazquez MD;  [facility]     OPTICAL TRACKING SYSTEM CRANIOTOMY, EXCISE TUMOR, COMBINED N/A 4/16/2015    Procedure: COMBINED OPTICAL TRACKING SYSTEM CRANIOTOMY, EXCISE TUMOR;  Surgeon: Francis Velazquez MD;  [facility]     OPTICAL TRACKING SYSTEM CRANIOTOMY, EXCISE TUMOR, COMBINED Bilateral 5/28/2015    Procedure: COMBINED OPTICAL TRACKING SYSTEM CRANIOTOMY, EXCISE TUMOR;  Surgeon: Francis Velazquez MD;  [facility]     OPTICAL TRACKING SYSTEM CRANIOTOMY, EXCISE TUMOR, COMBINED Bilateral 1/14/2016    Procedure: COMBINED OPTICAL TRACKING SYSTEM CRANIOTOMY, EXCISE TUMOR;  Surgeon: Francis Velazquez MD;  [facility]     OPTICAL TRACKING SYSTEM VENTRICULOSTOMY  4/16/2015    Procedure: OPTICAL TRACKING SYSTEM VENTRICULOSTOMY;  Surgeon: Francis Velazquez MD;  [facility]     REMOVE PORT VASCULAR ACCESS N/A 10/6/2016    Procedure: REMOVE PORT VASCULAR ACCESS;  Surgeon: Bruno Perea MD;  [facility]     RHINOPLASTY N/A 10/6/2016     Procedure: RHINOPLASTY;  Surgeon: Tlyer Richards MD;  [facility]     VASCULAR SURGERY  5-2015    single lumen power port       Family History   Problem Relation Age of Onset     Circulatory Father         PE/DVT     Hypothyroidism Father 30     Diabetes Maternal Grandmother      Diabetes Paternal Grandmother      Diabetes Paternal Grandfather      C.A.D. Paternal Grandfather      Hypertension Maternal Grandfather      Thyroid Disease Paternal Aunt         unknown whether hypo or hyper       Review of Systems   Constitutional: Negative.         In a wheelchair   HENT: Positive for hearing loss (Pre-existing from prior therapy).    Eyes: Positive for visual disturbance (Wears a patch over right eye to minimize diplopia).   Respiratory: Negative.    Cardiovascular: Negative.    Gastrointestinal: Positive for constipation (Chronic).   Endocrine:        Follow with Dr. Martin   Neurological: Positive for facial asymmetry and speech difficulty.   Psychiatric/Behavioral: Positive for sleep disturbance (Not using his Bi-Pap).   All other systems reviewed and are negative.      /82 (BP Location: Right arm, Patient Position: Fowlers, Cuff Size: Adult Regular)   Pulse 88   Temp 97.1  F (36.2  C) (Axillary)   Resp 21   Wt 90.6 kg (199 lb 11.8 oz)   SpO2 99%   BMI 28.92 kg/m        Physical Exam   Constitutional: He is oriented to person, place, and time. He appears well-developed and well-nourished. No distress.   HENT:   Occassionally saliva accumulates in mouth with occasional drooling.   Eyes: Conjunctivae are normal. Pupils are equal, round, and reactive to light.   Can track to right, but not to left. Nystagmus noted    Cardiovascular: Normal rate, regular rhythm and normal heart sounds.   Pulmonary/Chest: Effort normal and breath sounds normal. He has no wheezes.   Neurological: He is alert and oriented to person, place, and time. He has normal strength. A cranial nerve deficit is present.  Coordination (Ataxic- dysmetria especially in upper extremities when accomplishing tasks.) abnormal. GCS eye subscore is 4. GCS verbal subscore is 5. GCS motor subscore is 6.   Negative Pronator drift   Skin: Skin is warm and dry.   Scattered striae   Psychiatric: He has a normal mood and affect.       Results for orders placed or performed in visit on 01/16/19   CBC with platelets differential   Result Value Ref Range    WBC 3.4 (L) 4.0 - 11.0 10e9/L    RBC Count 4.08 (L) 4.4 - 5.9 10e12/L    Hemoglobin 12.6 (L) 13.3 - 17.7 g/dL    Hematocrit 37.7 (L) 40.0 - 53.0 %    MCV 92 78 - 100 fl    MCH 30.9 26.5 - 33.0 pg    MCHC 33.4 31.5 - 36.5 g/dL    RDW 12.4 10.0 - 15.0 %    Platelet Count 145 (L) 150 - 450 10e9/L    Diff Method Automated Method     % Neutrophils 38.9 %    % Lymphocytes 25.1 %    % Monocytes 20.6 %    % Eosinophils 13.9 %    % Basophils 0.9 %    % Immature Granulocytes 0.6 %    Nucleated RBCs 0 0 /100    Absolute Neutrophil 1.3 (L) 1.6 - 8.3 10e9/L    Absolute Lymphocytes 0.9 0.8 - 5.3 10e9/L    Absolute Monocytes 0.7 0.0 - 1.3 10e9/L    Absolute Eosinophils 0.5 0.0 - 0.7 10e9/L    Absolute Basophils 0.0 0.0 - 0.2 10e9/L    Abs Immature Granulocytes 0.0 0 - 0.4 10e9/L    Absolute Nucleated RBC 0.0    Comprehensive metabolic panel   Result Value Ref Range    Sodium 140 133 - 144 mmol/L    Potassium 4.0 3.4 - 5.3 mmol/L    Chloride 106 98 - 110 mmol/L    Carbon Dioxide 30 20 - 32 mmol/L    Anion Gap 4 3 - 14 mmol/L    Glucose 84 70 - 99 mg/dL    Urea Nitrogen 11 7 - 30 mg/dL    Creatinine 0.98 0.50 - 1.00 mg/dL    GFR Estimate >90 >60 mL/min/[1.73_m2]    GFR Estimate If Black >90 >60 mL/min/[1.73_m2]    Calcium 8.3 (L) 8.5 - 10.1 mg/dL    Bilirubin Total 0.2 0.2 - 1.3 mg/dL    Albumin 2.9 (L) 3.4 - 5.0 g/dL    Protein Total 6.3 (L) 6.8 - 8.8 g/dL    Alkaline Phosphatase 151 65 - 260 U/L    ALT 21 0 - 50 U/L    AST 33 0 - 35 U/L   Magnesium   Result Value Ref Range    Magnesium 2.1 1.6 - 2.3 mg/dL    Phosphorus   Result Value Ref Range    Phosphorus 4.0 2.5 - 4.5 mg/dL     *Note: Due to a large number of results and/or encounters for the requested time period, some results have not been displayed. A complete set of results can be found in Results Review.      MR BRAIN W/O & W CONTRAST 1/16/2019 9:00 AM     Provided History: Patient with Ependymoma on Entinostat Evaluate for  metastatic disease; Ependymoma (H). Additional history per EHR:  Ependymoma diagnosed at age 14, status post multiple tumor resections,  chemoradiotherapy.  ICD-10: Ependymoma (H)     Comparison: MRI brain 10/16/2018, 6/12/2018 and 2/21/2018.     Technique: Multiplanar T1-weighted, axial FLAIR, and susceptibility  images were obtained without intravenous contrast. Following  intravenous gadolinium-based contrast administration, axial  T2-weighted, diffusion, and T1-weighted images (in multiple planes)  were obtained.     Contrast: 9.3ml gadavist      Findings:   Slight interval decrease in the dimensions of the enhancing component  of the posterior fossa mass. It currently measures 2.8 x 2.1 x 2.0  (craniocaudal by AP by transverse), previously 3.1 x 2.3 x 2.1 cm. It  is centered in the fourth ventricle. It demonstrates T1 isointense and  T2 heterogeneous but mostly hypointense signal changes. Posterior  laterally on the right there is a 2.0 x 1.9 cm fluid signal component  extending to the right cerebellar  hemisphere. This component does not  demonstrate enhancement and has been stable since the last study.  Within the enhancing nodular midline component there is extensive  susceptibility artifact also along the rim of this right cerebellar  cystic component suggestive of intralesional hemosiderin deposition.  On FLAIR weighted sequence there is moderate amount of hyperintense  signal in bilateral cerebellar hemispheres extending to the middle  cerebellar peduncles and to the posterior david representing edema.  There is prominence of the  cerebellar folia particularly along the  vermis suggestive of volume loss likely from prior treatment.  No other abnormally enhancing intracranial lesion noted.   The ventricles are proportionate to the cerebral sulci. There is no  evidence of acute infarction. No extra axial collection. No  obstructive hydrocephalus.                                                                      Impression:   Redemonstration of the ependymoma in the posterior fossa centered  within the fourth ventricle. Since 10/18/2018 slight interval decrease  in the dimensions of the mass, currently it measures 2.8 x 2.1 x 2.0  cm. The remainder findings are unchanged.  I have personally reviewed the examination and initial interpretation  and I agree with the findings.     CAROLINA ADAIR MD    MR CERVICAL SPINE W/O & W CONTRAST,      MR THORACIC SPINE W/O & W CONTRAST,      MR LUMBAR SPINE W/O & W CONTRAST 1/16/2019 8:52 AM     History: Patient with Ependymoma on Entinostat Evaluate for metastatic  disease; Ependymoma (H)  ICD-10: Ependymoma (H) (accession [id number]), Ependymoma (H); Neoplasm  of posterior cranial fossa (H) (accession [id number]), Neoplasm of  posterior cranial fossa (H); Ependymoma (H) (accession [id number])     Comparison: MRI complete spine 10/16/2018     Technique:     Cervical spine: Sagittal T1-weighted, sagittal T2-weighted, sagittal  STIR, sagittal diffusion-weighted, axial T1-weighted, and axial  T2-weighted images of the cervical spine were obtained without the  administration of intravenous contrast. After the administration of  intravenous contrast, fat saturated axial, coronal, and sagittal  T1-weighted images of the cervical spine were obtained.     Thoracic spine: Sagittal T1-weighted, sagittal T2-weighted, sagittal  STIR, sagittal diffusion weighted, axial T1-weighted, and axial  T2-weighted images of the thoracic spine were obtained without the  administration of intravenous contrast. After the administration  of  intravenous contrast, fat saturated axial, coronal, and sagittal  T1-weighted images of the thoracic spine were obtained.     Lumbar spine: Sagittal T1-weighted, sagittal T2-weighted, sagittal  STIR, axial T1-weighted, and axial T2-weighted images of the lumbar  spine were obtained without the administration of intravenous  contrast. After the administration of intravenous contrast, axial and  sagittal fat-saturated T1-weighted images of the lumbar spine were  obtained.     Findings:     There are 7 cervical, 12 thoracic and 5 lumbar-type vertebra.     Cervical:  The cervical vertebrae appear normally aligned.  Bone  marrow signal intensity appears unremarkable.  There is no abnormal  signal within the cervical spinal cord.  Visualized sections through the posterior fossa reveal fourth  ventricular residual/recurrent ependymoma as discussed in detail on  MRI brain from the same day.     There is no neural foraminal narrowing or spinal canal stenosis at any  level.  No abnormal enhancement of the paraspinous tissues, vertebral column,  or within the spinal canal.     Thoracic:  The external marker is at the level of T3-T4. The thoracic  vertebrae are in normal alignment.  Bone marrow signal intensity  appears unremarkable. Stable mild superior endplate compression  deformity of T4. Schmorl's node-like indentations along inferior T6,  inferior T7, superior and inferior T8, inferior T9 and inferior T10  vertebral endplates. No neural foraminal narrowing or spinal canal  stenosis at the level thoracic spine.  There is no abnormal signal within the thoracic spinal cord. No spinal  canal or neural foraminal stenosis. No abnormal enhancement of the  paraspinous tissues, vertebral column, or within the spinal canal.     Lumbar: There are 5 lumbar-type vertebrae assumed for the purposes of  this dictation. The tip of the conus medullaris is at T12-L1.  The  lumbar vertebral column appears normally aligned.  Bone marrow  signal  intensity appears unremarkable. There is no disc height narrowing .   Schmorl's node-like indentations along inferior L2, superior L3,  superior and inferior L4 end plates.  No abnormal enhancement of the paraspinous tissues, vertebral column  or within the spinal canal.  No neural foraminal narrowing or spinal canal stenosis at any level.                                                                      Impression: Contrast enhanced cervical, thoracic and lumbar spine MRI  demonstrates;     No evidence of leptomeningeal metastatic disease.     I have personally reviewed the examination and initial interpretation  and I agree with the findings.     CAROLINA ADAIR MD    Impression:  1. Ependymoma  2. Treated with Entinostat, continues to tolerate well.   3. MRI Brain/spine 1/16/19: tumor is stable to slightly decreased compared to 6/12/18.  4. Labs today are adequate for continuing Entinostat.   5. Chronic constipation.    Plan:  1. RTC on 1/23/19 as scheduled for follow up, or sooner PRN.   2. Continue with Entinostat therapy, new medications given today.   3. Continue with planned GI follow-up in February for constipation.     Time spent with patient 40 minutes. Over 50% of the visit was spent counseling the patient and patient's mother on MRI brain/spine results and treatment plan.    This document serves as a record of the services and decisions personally performed and made by Leoncio Rousseau MD. It was created on his behalf by Mark Montague a trained medical scribe. The creation of this document is based on the provider's statements to the medical scribe.    The documentation recorded by the scribe accurately reflects the services I personally performed and the decisions made by me.      Leoncio Rousseau      Patient Care Team:  Jeffrey Espinoza MD as PCP - General (Family Practice)  Dequan Timmons MD as MD (Surgery)  Leoncio Rousseau MD as MD (Pediatric  Hematology/Oncology)  Kristi Schuler APRN CNP as Nurse Practitioner (Nurse Practitioner - Pediatrics)  Higinio Walters MD (Ophthalmology)  Karina Hodgson MSW as   Eren Reeder MD as MD (Dermatology)  Schwab, Briana, RN as Nurse Coordinator  Perico Holley MD as MD (Pediatric Neurology)  Sarah Vines MD as MD (Pediatric Urology)  Sarah Vines MD as MD (Pediatric Urology)  KEISHA BALDWIN    Copy to patient  RAFAELA GUAN  [address] [facility] [address]    Labs every Tuesday at [facility], appointment are needed.  Call [phone] for appointment.    Orders are in the system for :  CBC with platelets and diff  CMP  Magnesium  Phosphorus    Next appointments would be , 3/13 and 4/10 if he makes counts.    Thank you for choosing [facility].    It was a pleasure to see you today.      CNS Tumor Team  Leoncio Schuler NP APRN  Imani Means RN BSN - Care Coordinator       [facility], 9th Floor - [address] [address]  Scheduling/Appointments: [phone]  Fax: [phone]     Numbers to call:   Monday - Friday, 8:00 am - 5:00 pm:    Office : [phone]    Scheduling/Appointments: [phone]  Nights and weekends:   Call [phone] and ask the  to page the 'Pediatric Heme/Onc fellow on call' if you have an urgent concern that can't wait until the clinic opens.     Scheduling:    [facility], 9th floor [phone]  Infusion Center/Lab: [phone]   Radiology/ [phone]      Services:   [phone]     Imani Means RN, BSN  CNS Tumor Care Coordinator  Office: [phone]  Pager: [phone]  E-mail: [email or web address]     We encourage you to sign up for [employer] for easy communication with us.  Sign up at the clinic  or go to [email or web address].      Please try  the [facility] to [facility] phone application for Virtual Tours, Procedure Preparation, Resources, Preparation for Hospital Stay and the Coloring Board.     Leoncio Rousseau MD

## 2019-01-16 NOTE — PROGRESS NOTES
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 He reports his constipation has not improved since his clean out. He has only had 2 BM in the past week. Other than his constipation, he is doing well. He is enrolled on COG [id number] - A Phase 1 Study of Entinostat, an Oral Histone Deacetylase Inhibitor, in Pediatric Patients With Recurrent or Refractory Solid Tumors, Including CNS Tumors and Lymphoma. He has missed no doses of medication.       Fam/Soc: Lives between his parents (one week at each home). They have been awarded guardianship of Geo. The families work well together - both parents have remarried. His dad has a clotting disorder, requiring him to take Warfarin daily.     History was obtained from Geo and his mother.       Allergies   Allergen Reactions     Blood Transfusion Related (Informational Only) Swelling     Periorbital swelling post platelet transfusion     No Known Drug Allergies        Current Outpatient Medications   Medication     bisacodyl (BISACODYL LAXATIVE) 5 MG EC tablet     calcium carbonate-vitamin D 600-400 MG-UNIT CHEW     Cholecalciferol 400 UNITS CHEW     dexamethasone (DECADRON) 0.5 MG tablet     fexofenadine (ALLEGRA) 180 MG tablet     linaclotide (LINZESS) 290 MCG capsule     melatonin 3 MG tablet     mupirocin (BACTROBAN) 2 % ointment     OLANZapine (ZYPREXA) 2.5 MG tablet     omeprazole (PRILOSEC) 20 MG DR capsule     pentoxifylline (TRENTAL) 400 MG CR tablet     polyethylene glycol (MIRALAX/GLYCOLAX) Packet     potassium phosphate, monobasic, (K-PHOS) 500 MG tablet     sulfamethoxazole-trimethoprim (BACTRIM/SEPTRA) 400-80 MG per tablet     vitamin E (GNP VITAMIN E) 400 UNIT capsule     No current facility-administered medications for this visit.        Past Medical History:   Diagnosis Date     Cranial nerve dysfunction      Dyspepsia       Ependymoma (H)      Gastro-oesophageal reflux disease      Hearing loss      Intracranial hemorrhage (H)      Migraine      Pilonidal cyst     7-2015     Reduced vision      Refractory obstruction of nasal airway     2nd to nasal valve prolapse     Sleep apnea      Strabismus     gaze palsy        Past Surgical History:   Procedure Laterality Date     GRAFT CARTILAGE FROM POSTERIOR AURICLE Left 10/6/2016    Procedure: GRAFT CARTILAGE FROM POSTERIOR AURICLE;  Surgeon: Tyler Richards MD;  [facility]     INCISION AND DRAINAGE PERINEAL, COMBINED Bilateral 7/18/2015    Procedure: COMBINED INCISION AND DRAINAGE PERINEAL;  Surgeon: Dequan Timmons MD;  [facility]     OPTICAL TRACKING SYSTEM CRANIOTOMY, EXCISE TUMOR, COMBINED N/A 4/13/2015    Procedure: COMBINED OPTICAL TRACKING SYSTEM CRANIOTOMY, EXCISE TUMOR;  Surgeon: Francis Velazquez MD;  [facility]     OPTICAL TRACKING SYSTEM CRANIOTOMY, EXCISE TUMOR, COMBINED N/A 4/16/2015    Procedure: COMBINED OPTICAL TRACKING SYSTEM CRANIOTOMY, EXCISE TUMOR;  Surgeon: Francis Velazquez MD;  [facility]     OPTICAL TRACKING SYSTEM CRANIOTOMY, EXCISE TUMOR, COMBINED Bilateral 5/28/2015    Procedure: COMBINED OPTICAL TRACKING SYSTEM CRANIOTOMY, EXCISE TUMOR;  Surgeon: Francis Velazquez MD;  [facility]     OPTICAL TRACKING SYSTEM CRANIOTOMY, EXCISE TUMOR, COMBINED Bilateral 1/14/2016    Procedure: COMBINED OPTICAL TRACKING SYSTEM CRANIOTOMY, EXCISE TUMOR;  Surgeon: Francis Velazquez MD;  [facility]     OPTICAL TRACKING SYSTEM VENTRICULOSTOMY  4/16/2015    Procedure: OPTICAL TRACKING SYSTEM VENTRICULOSTOMY;  Surgeon: Francis Velazquez MD;  [facility]     REMOVE PORT VASCULAR ACCESS N/A 10/6/2016    Procedure: REMOVE PORT VASCULAR ACCESS;  Surgeon: Bruno Perea MD;  [facility]     RHINOPLASTY N/A 10/6/2016    Procedure: RHINOPLASTY;  Surgeon: Tyler Richards MD;  [facility]      VASCULAR SURGERY  5-2015    single lumen power port       Family History   Problem Relation Age of Onset     Circulatory Father         PE/DVT     Hypothyroidism Father 30     Diabetes Maternal Grandmother      Diabetes Paternal Grandmother      Diabetes Paternal Grandfather      C.A.D. Paternal Grandfather      Hypertension Maternal Grandfather      Thyroid Disease Paternal Aunt         unknown whether hypo or hyper       Review of Systems   Constitutional: Negative.         In a wheelchair   HENT: Positive for hearing loss (Pre-existing from prior therapy).    Eyes: Positive for visual disturbance (Wears a patch over right eye to minimize diplopia).   Respiratory: Negative.    Cardiovascular: Negative.    Gastrointestinal: Positive for constipation (Chronic).   Endocrine:        Follow with Dr. Martin   Neurological: Positive for facial asymmetry and speech difficulty.   Psychiatric/Behavioral: Positive for sleep disturbance (Not using his Bi-Pap).   All other systems reviewed and are negative.      /82 (BP Location: Right arm, Patient Position: Fowlers, Cuff Size: Adult Regular)   Pulse 88   Temp 97.1  F (36.2  C) (Axillary)   Resp 21   Wt 90.6 kg (199 lb 11.8 oz)   SpO2 99%   BMI 28.92 kg/m       Physical Exam   Constitutional: He is oriented to person, place, and time. He appears well-developed and well-nourished. No distress.   HENT:   Occassionally saliva accumulates in mouth with occasional drooling.   Eyes: Conjunctivae are normal. Pupils are equal, round, and reactive to light.   Can track to right, but not to left. Nystagmus noted    Cardiovascular: Normal rate, regular rhythm and normal heart sounds.   Pulmonary/Chest: Effort normal and breath sounds normal. He has no wheezes.   Neurological: He is alert and oriented to person, place, and time. He has normal strength. A cranial nerve deficit is present. Coordination (Ataxic- dysmetria especially in upper extremities when accomplishing tasks.)  abnormal. GCS eye subscore is 4. GCS verbal subscore is 5. GCS motor subscore is 6.   Negative Pronator drift   Skin: Skin is warm and dry.   Scattered striae   Psychiatric: He has a normal mood and affect.       Results for orders placed or performed in visit on 01/16/19   CBC with platelets differential   Result Value Ref Range    WBC 3.4 (L) 4.0 - 11.0 10e9/L    RBC Count 4.08 (L) 4.4 - 5.9 10e12/L    Hemoglobin 12.6 (L) 13.3 - 17.7 g/dL    Hematocrit 37.7 (L) 40.0 - 53.0 %    MCV 92 78 - 100 fl    MCH 30.9 26.5 - 33.0 pg    MCHC 33.4 31.5 - 36.5 g/dL    RDW 12.4 10.0 - 15.0 %    Platelet Count 145 (L) 150 - 450 10e9/L    Diff Method Automated Method     % Neutrophils 38.9 %    % Lymphocytes 25.1 %    % Monocytes 20.6 %    % Eosinophils 13.9 %    % Basophils 0.9 %    % Immature Granulocytes 0.6 %    Nucleated RBCs 0 0 /100    Absolute Neutrophil 1.3 (L) 1.6 - 8.3 10e9/L    Absolute Lymphocytes 0.9 0.8 - 5.3 10e9/L    Absolute Monocytes 0.7 0.0 - 1.3 10e9/L    Absolute Eosinophils 0.5 0.0 - 0.7 10e9/L    Absolute Basophils 0.0 0.0 - 0.2 10e9/L    Abs Immature Granulocytes 0.0 0 - 0.4 10e9/L    Absolute Nucleated RBC 0.0    Comprehensive metabolic panel   Result Value Ref Range    Sodium 140 133 - 144 mmol/L    Potassium 4.0 3.4 - 5.3 mmol/L    Chloride 106 98 - 110 mmol/L    Carbon Dioxide 30 20 - 32 mmol/L    Anion Gap 4 3 - 14 mmol/L    Glucose 84 70 - 99 mg/dL    Urea Nitrogen 11 7 - 30 mg/dL    Creatinine 0.98 0.50 - 1.00 mg/dL    GFR Estimate >90 >60 mL/min/[1.73_m2]    GFR Estimate If Black >90 >60 mL/min/[1.73_m2]    Calcium 8.3 (L) 8.5 - 10.1 mg/dL    Bilirubin Total 0.2 0.2 - 1.3 mg/dL    Albumin 2.9 (L) 3.4 - 5.0 g/dL    Protein Total 6.3 (L) 6.8 - 8.8 g/dL    Alkaline Phosphatase 151 65 - 260 U/L    ALT 21 0 - 50 U/L    AST 33 0 - 35 U/L   Magnesium   Result Value Ref Range    Magnesium 2.1 1.6 - 2.3 mg/dL   Phosphorus   Result Value Ref Range    Phosphorus 4.0 2.5 - 4.5 mg/dL     *Note: Due to a large  number of results and/or encounters for the requested time period, some results have not been displayed. A complete set of results can be found in Results Review.      MR BRAIN W/O & W CONTRAST 1/16/2019 9:00 AM     Provided History: Patient with Ependymoma on Entinostat Evaluate for  metastatic disease; Ependymoma (H). Additional history per EHR:  Ependymoma diagnosed at age 14, status post multiple tumor resections,  chemoradiotherapy.  ICD-10: Ependymoma (H)     Comparison: MRI brain 10/16/2018, 6/12/2018 and 2/21/2018.     Technique: Multiplanar T1-weighted, axial FLAIR, and susceptibility  images were obtained without intravenous contrast. Following  intravenous gadolinium-based contrast administration, axial  T2-weighted, diffusion, and T1-weighted images (in multiple planes)  were obtained.     Contrast: 9.3ml gadavist      Findings:   Slight interval decrease in the dimensions of the enhancing component  of the posterior fossa mass. It currently measures 2.8 x 2.1 x 2.0  (craniocaudal by AP by transverse), previously 3.1 x 2.3 x 2.1 cm. It  is centered in the fourth ventricle. It demonstrates T1 isointense and  T2 heterogeneous but mostly hypointense signal changes. Posterior  laterally on the right there is a 2.0 x 1.9 cm fluid signal component  extending to the right cerebellar  hemisphere. This component does not  demonstrate enhancement and has been stable since the last study.  Within the enhancing nodular midline component there is extensive  susceptibility artifact also along the rim of this right cerebellar  cystic component suggestive of intralesional hemosiderin deposition.  On FLAIR weighted sequence there is moderate amount of hyperintense  signal in bilateral cerebellar hemispheres extending to the middle  cerebellar peduncles and to the posterior david representing edema.  There is prominence of the cerebellar folia particularly along the  vermis suggestive of volume loss likely from prior  treatment.  No other abnormally enhancing intracranial lesion noted.   The ventricles are proportionate to the cerebral sulci. There is no  evidence of acute infarction. No extra axial collection. No  obstructive hydrocephalus.                                                                      Impression:   Redemonstration of the ependymoma in the posterior fossa centered  within the fourth ventricle. Since 10/18/2018 slight interval decrease  in the dimensions of the mass, currently it measures 2.8 x 2.1 x 2.0  cm. The remainder findings are unchanged.  I have personally reviewed the examination and initial interpretation  and I agree with the findings.     CAROLINA ADAIR MD    MR CERVICAL SPINE W/O & W CONTRAST,      MR THORACIC SPINE W/O & W CONTRAST,      MR LUMBAR SPINE W/O & W CONTRAST 1/16/2019 8:52 AM     History: Patient with Ependymoma on Entinostat Evaluate for metastatic  disease; Ependymoma (H)  ICD-10: Ependymoma (H) (accession [id number]), Ependymoma (H); Neoplasm  of posterior cranial fossa (H) (accession [id number]), Neoplasm of  posterior cranial fossa (H); Ependymoma (H) (accession [id number])     Comparison: MRI complete spine 10/16/2018     Technique:     Cervical spine: Sagittal T1-weighted, sagittal T2-weighted, sagittal  STIR, sagittal diffusion-weighted, axial T1-weighted, and axial  T2-weighted images of the cervical spine were obtained without the  administration of intravenous contrast. After the administration of  intravenous contrast, fat saturated axial, coronal, and sagittal  T1-weighted images of the cervical spine were obtained.     Thoracic spine: Sagittal T1-weighted, sagittal T2-weighted, sagittal  STIR, sagittal diffusion weighted, axial T1-weighted, and axial  T2-weighted images of the thoracic spine were obtained without the  administration of intravenous contrast. After the administration of  intravenous contrast, fat saturated axial, coronal, and sagittal  T1-weighted images of  the thoracic spine were obtained.     Lumbar spine: Sagittal T1-weighted, sagittal T2-weighted, sagittal  STIR, axial T1-weighted, and axial T2-weighted images of the lumbar  spine were obtained without the administration of intravenous  contrast. After the administration of intravenous contrast, axial and  sagittal fat-saturated T1-weighted images of the lumbar spine were  obtained.     Findings:     There are 7 cervical, 12 thoracic and 5 lumbar-type vertebra.     Cervical:  The cervical vertebrae appear normally aligned.  Bone  marrow signal intensity appears unremarkable.  There is no abnormal  signal within the cervical spinal cord.  Visualized sections through the posterior fossa reveal fourth  ventricular residual/recurrent ependymoma as discussed in detail on  MRI brain from the same day.     There is no neural foraminal narrowing or spinal canal stenosis at any  level.  No abnormal enhancement of the paraspinous tissues, vertebral column,  or within the spinal canal.     Thoracic:  The external marker is at the level of T3-T4. The thoracic  vertebrae are in normal alignment.  Bone marrow signal intensity  appears unremarkable. Stable mild superior endplate compression  deformity of T4. Schmorl's node-like indentations along inferior T6,  inferior T7, superior and inferior T8, inferior T9 and inferior T10  vertebral endplates. No neural foraminal narrowing or spinal canal  stenosis at the level thoracic spine.  There is no abnormal signal within the thoracic spinal cord. No spinal  canal or neural foraminal stenosis. No abnormal enhancement of the  paraspinous tissues, vertebral column, or within the spinal canal.     Lumbar: There are 5 lumbar-type vertebrae assumed for the purposes of  this dictation. The tip of the conus medullaris is at T12-L1.  The  lumbar vertebral column appears normally aligned.  Bone marrow signal  intensity appears unremarkable. There is no disc height narrowing .   Schmorl's  node-like indentations along inferior L2, superior L3,  superior and inferior L4 end plates.  No abnormal enhancement of the paraspinous tissues, vertebral column  or within the spinal canal.  No neural foraminal narrowing or spinal canal stenosis at any level.                                                                      Impression: Contrast enhanced cervical, thoracic and lumbar spine MRI  demonstrates;     No evidence of leptomeningeal metastatic disease.     I have personally reviewed the examination and initial interpretation  and I agree with the findings.     CAROLINA ADAIR MD    Impression:  1. Ependymoma  2. Treated with Entinostat, continues to tolerate well.   3. MRI Brain/spine 1/16/19: tumor is stable to slightly decreased compared to 6/12/18.  4. Labs today are adequate for continuing Entinostat.   5. Chronic constipation.    Plan:  1. RTC on 1/23/19 as scheduled for follow up, or sooner PRN.   2. Continue with Entinostat therapy, new medications given today.   3. Continue with planned GI follow-up in February for constipation.     Time spent with patient 40 minutes. Over 50% of the visit was spent counseling the patient and patient's mother on MRI brain/spine results and treatment plan.    This document serves as a record of the services and decisions personally performed and made by Leoncio Rousseau MD. It was created on his behalf by Mark Montague a trained medical scribe. The creation of this document is based on the provider's statements to the medical scribe.    The documentation recorded by the scribe accurately reflects the services I personally performed and the decisions made by me.      Leoncio Rousseau      Patient Care Team:  Jeffrey Espinoza MD as PCP - General (Family Practice)  Dequan Timmons MD as MD (Surgery)  Leoncio Rousseau MD as MD (Pediatric Hematology/Oncology)  Kristi Schuler, APRN CNP as Nurse Practitioner (Nurse Practitioner -  Pediatrics)  Higinio Walters MD (Ophthalmology)  Karina Hodgson MSW as   Eren Reeder MD as MD (Dermatology)  Schwab, Briana, RN as Nurse Coordinator  Perico Holley MD as MD (Pediatric Neurology)  Sarah Vines MD as MD (Pediatric Urology)  Sarah Vines MD as MD (Pediatric Urology)  KEISHA BALDWIN    Copy to patient  RAFAELA GUAN  [address] [facility] [id number]    Labs every Tuesday at [facility], appointment are needed.  Call [phone] for appointment.    Orders are in the system for :  CBC with platelets and diff  CMP  Magnesium  Phosphorus    Next appointments would be , 3/13 and 4/10 if he makes counts.    Thank you for choosing [facility].    It was a pleasure to see you today.      CNS Tumor Team  Leoncio Schuler NP, APRN  Imani Means RN BSN - Care Coordinator       [facility], 9th Floor - [address] [address]  Scheduling/Appointments: [phone]  Fax: [phone]     Numbers to call:   Monday - Friday, 8:00 am - 5:00 pm:    Office : [phone]    Scheduling/Appointments: [phone]  Nights and weekends:   Call [phone] and ask the  to page the 'Pediatric Heme/Onc fellow on call' if you have an urgent concern that can't wait until the clinic opens.     Scheduling:    [facility], 9th floor [phone]  Infusion Center/Lab: [phone]   Radiology/ [phone]      Services:   [phone]     Imani Means RN, BSN  CNS Tumor Care Coordinator  Office: [phone]  Pager: [phone]  E-mail: [email or web address]     We encourage you to sign up for [employer] for easy communication with us.  Sign up at the clinic  or go to [email or web address].      Please try the Passport to [facility] ([facility]) phone application for  Virtual Tours, Procedure Preparation, Resources, Preparation for Hospital Stay and the Coloring Board.

## 2019-01-16 NOTE — NURSING NOTE
Chief Complaint   Patient presents with     RECHECK     Patient is here today for Ependymoma follow up     /82 (BP Location: Right arm, Patient Position: Fowlers, Cuff Size: Adult Regular)   Pulse 88   Temp 97.1  F (36.2  C) (Axillary)   Resp 21   Wt 90.6 kg (199 lb 11.8 oz)   SpO2 99%   BMI 28.92 kg/m      Malinda Russo LPN  January 16, 2019

## 2019-01-16 NOTE — LETTER
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 He reports his constipation has not improved since his clean out. He has only had 2 BM in the past week. Other than his constipation, he is doing well. He is enrolled on COG [id number] - A Phase 1 Study of Entinostat, an Oral Histone Deacetylase Inhibitor, in Pediatric Patients With Recurrent or Refractory Solid Tumors, Including CNS Tumors and Lymphoma. He has missed no doses of medication.       Fam/Soc: Lives between his parents (one week at each home). They have been awarded guardianship of Geo. The families work well together - both parents have remarried. His dad has a clotting disorder, requiring him to take Warfarin daily.     History was obtained from Geo and his mother.       Allergies   Allergen Reactions     Blood Transfusion Related (Informational Only) Swelling     Periorbital swelling post platelet transfusion     No Known Drug Allergies        Current Outpatient Medications   Medication     bisacodyl (BISACODYL LAXATIVE) 5 MG EC tablet     calcium carbonate-vitamin D 600-400 MG-UNIT CHEW     Cholecalciferol 400 UNITS CHEW     dexamethasone (DECADRON) 0.5 MG tablet     fexofenadine (ALLEGRA) 180 MG tablet     linaclotide (LINZESS) 290 MCG capsule     melatonin 3 MG tablet     mupirocin (BACTROBAN) 2 % ointment     OLANZapine (ZYPREXA) 2.5 MG tablet     omeprazole (PRILOSEC) 20 MG DR capsule     pentoxifylline (TRENTAL) 400 MG CR tablet     polyethylene glycol (MIRALAX/GLYCOLAX) Packet     potassium phosphate, monobasic, (K-PHOS) 500 MG tablet     sulfamethoxazole-trimethoprim (BACTRIM/SEPTRA) 400-80 MG per tablet     vitamin E (GNP VITAMIN E) 400 UNIT capsule     No current facility-administered medications for this visit.         Past Medical History:   Diagnosis Date     Cranial nerve dysfunction      Dyspepsia      Ependymoma (H)      Gastro-oesophageal reflux disease      Hearing loss      Intracranial hemorrhage (H)      Migraine      Pilonidal cyst     7-2015     Reduced vision      Refractory obstruction of nasal airway     2nd to nasal valve prolapse     Sleep apnea      Strabismus     gaze palsy        Past Surgical History:   Procedure Laterality Date     GRAFT CARTILAGE FROM POSTERIOR AURICLE Left 10/6/2016    Procedure: GRAFT CARTILAGE FROM POSTERIOR AURICLE;  Surgeon: Tyler Richards MD;  [facility]     INCISION AND DRAINAGE PERINEAL, COMBINED Bilateral 7/18/2015    Procedure: COMBINED INCISION AND DRAINAGE PERINEAL;  Surgeon: Dequan Timmons MD;  [facility]     OPTICAL TRACKING SYSTEM CRANIOTOMY, EXCISE TUMOR, COMBINED N/A 4/13/2015    Procedure: COMBINED OPTICAL TRACKING SYSTEM CRANIOTOMY, EXCISE TUMOR;  Surgeon: Francis Velazquez MD;  [facility]     OPTICAL TRACKING SYSTEM CRANIOTOMY, EXCISE TUMOR, COMBINED N/A 4/16/2015    Procedure: COMBINED OPTICAL TRACKING SYSTEM CRANIOTOMY, EXCISE TUMOR;  Surgeon: Francis Velazquez MD;  [facility]     OPTICAL TRACKING SYSTEM CRANIOTOMY, EXCISE TUMOR, COMBINED Bilateral 5/28/2015    Procedure: COMBINED OPTICAL TRACKING SYSTEM CRANIOTOMY, EXCISE TUMOR;  Surgeon: Francis Velazquez MD;  [facility]     OPTICAL TRACKING SYSTEM CRANIOTOMY, EXCISE TUMOR, COMBINED Bilateral 1/14/2016    Procedure: COMBINED OPTICAL TRACKING SYSTEM CRANIOTOMY, EXCISE TUMOR;  Surgeon: Francis Velazquez MD;  [facility]     OPTICAL TRACKING SYSTEM VENTRICULOSTOMY  4/16/2015    Procedure: OPTICAL TRACKING SYSTEM VENTRICULOSTOMY;  Surgeon: Francis Velazquez MD;  [facility]     REMOVE PORT VASCULAR ACCESS N/A 10/6/2016    Procedure: REMOVE PORT VASCULAR ACCESS;  Surgeon: Bruno Perea MD;  [facility]     RHINOPLASTY N/A  10/6/2016    Procedure: RHINOPLASTY;  Surgeon: Tyelr Richards MD;  [facility]     VASCULAR SURGERY  5-2015    single lumen power port       Family History   Problem Relation Age of Onset     Circulatory Father         PE/DVT     Hypothyroidism Father 30     Diabetes Maternal Grandmother      Diabetes Paternal Grandmother      Diabetes Paternal Grandfather      C.A.D. Paternal Grandfather      Hypertension Maternal Grandfather      Thyroid Disease Paternal Aunt         unknown whether hypo or hyper       Review of Systems   Constitutional: Negative.         In a wheelchair   HENT: Positive for hearing loss (Pre-existing from prior therapy).    Eyes: Positive for visual disturbance (Wears a patch over right eye to minimize diplopia).   Respiratory: Negative.    Cardiovascular: Negative.    Gastrointestinal: Positive for constipation (Chronic).   Endocrine:        Follow with Dr. Martin   Neurological: Positive for facial asymmetry and speech difficulty.   Psychiatric/Behavioral: Positive for sleep disturbance (Not using his Bi-Pap).   All other systems reviewed and are negative.      /82 (BP Location: Right arm, Patient Position: Fowlers, Cuff Size: Adult Regular)   Pulse 88   Temp 97.1  F (36.2  C) (Axillary)   Resp 21   Wt 90.6 kg (199 lb 11.8 oz)   SpO2 99%   BMI 28.92 kg/m        Physical Exam   Constitutional: He is oriented to person, place, and time. He appears well-developed and well-nourished. No distress.   HENT:   Occassionally saliva accumulates in mouth with occasional drooling.   Eyes: Conjunctivae are normal. Pupils are equal, round, and reactive to light.   Can track to right, but not to left. Nystagmus noted    Cardiovascular: Normal rate, regular rhythm and normal heart sounds.   Pulmonary/Chest: Effort normal and breath sounds normal. He has no wheezes.   Neurological: He is alert and oriented to person, place, and time. He has normal strength. A cranial nerve deficit is  present. Coordination (Ataxic- dysmetria especially in upper extremities when accomplishing tasks.) abnormal. GCS eye subscore is 4. GCS verbal subscore is 5. GCS motor subscore is 6.   Negative Pronator drift   Skin: Skin is warm and dry.   Scattered striae   Psychiatric: He has a normal mood and affect.       Results for orders placed or performed in visit on 01/16/19   CBC with platelets differential   Result Value Ref Range    WBC 3.4 (L) 4.0 - 11.0 10e9/L    RBC Count 4.08 (L) 4.4 - 5.9 10e12/L    Hemoglobin 12.6 (L) 13.3 - 17.7 g/dL    Hematocrit 37.7 (L) 40.0 - 53.0 %    MCV 92 78 - 100 fl    MCH 30.9 26.5 - 33.0 pg    MCHC 33.4 31.5 - 36.5 g/dL    RDW 12.4 10.0 - 15.0 %    Platelet Count 145 (L) 150 - 450 10e9/L    Diff Method Automated Method     % Neutrophils 38.9 %    % Lymphocytes 25.1 %    % Monocytes 20.6 %    % Eosinophils 13.9 %    % Basophils 0.9 %    % Immature Granulocytes 0.6 %    Nucleated RBCs 0 0 /100    Absolute Neutrophil 1.3 (L) 1.6 - 8.3 10e9/L    Absolute Lymphocytes 0.9 0.8 - 5.3 10e9/L    Absolute Monocytes 0.7 0.0 - 1.3 10e9/L    Absolute Eosinophils 0.5 0.0 - 0.7 10e9/L    Absolute Basophils 0.0 0.0 - 0.2 10e9/L    Abs Immature Granulocytes 0.0 0 - 0.4 10e9/L    Absolute Nucleated RBC 0.0    Comprehensive metabolic panel   Result Value Ref Range    Sodium 140 133 - 144 mmol/L    Potassium 4.0 3.4 - 5.3 mmol/L    Chloride 106 98 - 110 mmol/L    Carbon Dioxide 30 20 - 32 mmol/L    Anion Gap 4 3 - 14 mmol/L    Glucose 84 70 - 99 mg/dL    Urea Nitrogen 11 7 - 30 mg/dL    Creatinine 0.98 0.50 - 1.00 mg/dL    GFR Estimate >90 >60 mL/min/[1.73_m2]    GFR Estimate If Black >90 >60 mL/min/[1.73_m2]    Calcium 8.3 (L) 8.5 - 10.1 mg/dL    Bilirubin Total 0.2 0.2 - 1.3 mg/dL    Albumin 2.9 (L) 3.4 - 5.0 g/dL    Protein Total 6.3 (L) 6.8 - 8.8 g/dL    Alkaline Phosphatase 151 65 - 260 U/L    ALT 21 0 - 50 U/L    AST 33 0 - 35 U/L   Magnesium   Result Value Ref Range    Magnesium 2.1 1.6 - 2.3  mg/dL   Phosphorus   Result Value Ref Range    Phosphorus 4.0 2.5 - 4.5 mg/dL     *Note: Due to a large number of results and/or encounters for the requested time period, some results have not been displayed. A complete set of results can be found in Results Review.      MR BRAIN W/O & W CONTRAST 1/16/2019 9:00 AM     Provided History: Patient with Ependymoma on Entinostat Evaluate for  metastatic disease; Ependymoma (H). Additional history per EHR:  Ependymoma diagnosed at age 14, status post multiple tumor resections,  chemoradiotherapy.  ICD-10: Ependymoma (H)     Comparison: MRI brain 10/16/2018, 6/12/2018 and 2/21/2018.     Technique: Multiplanar T1-weighted, axial FLAIR, and susceptibility  images were obtained without intravenous contrast. Following  intravenous gadolinium-based contrast administration, axial  T2-weighted, diffusion, and T1-weighted images (in multiple planes)  were obtained.     Contrast: 9.3ml gadavist      Findings:   Slight interval decrease in the dimensions of the enhancing component  of the posterior fossa mass. It currently measures 2.8 x 2.1 x 2.0  (craniocaudal by AP by transverse), previously 3.1 x 2.3 x 2.1 cm. It  is centered in the fourth ventricle. It demonstrates T1 isointense and  T2 heterogeneous but mostly hypointense signal changes. Posterior  laterally on the right there is a 2.0 x 1.9 cm fluid signal component  extending to the right cerebellar  hemisphere. This component does not  demonstrate enhancement and has been stable since the last study.  Within the enhancing nodular midline component there is extensive  susceptibility artifact also along the rim of this right cerebellar  cystic component suggestive of intralesional hemosiderin deposition.  On FLAIR weighted sequence there is moderate amount of hyperintense  signal in bilateral cerebellar hemispheres extending to the middle  cerebellar peduncles and to the posterior david representing edema.  There is prominence  of the cerebellar folia particularly along the  vermis suggestive of volume loss likely from prior treatment.  No other abnormally enhancing intracranial lesion noted.   The ventricles are proportionate to the cerebral sulci. There is no  evidence of acute infarction. No extra axial collection. No  obstructive hydrocephalus.                                                                      Impression:   Redemonstration of the ependymoma in the posterior fossa centered  within the fourth ventricle. Since 10/18/2018 slight interval decrease  in the dimensions of the mass, currently it measures 2.8 x 2.1 x 2.0  cm. The remainder findings are unchanged.  I have personally reviewed the examination and initial interpretation  and I agree with the findings.     CAROLINA ADAIR MD    MR CERVICAL SPINE W/O & W CONTRAST,      MR THORACIC SPINE W/O & W CONTRAST,      MR LUMBAR SPINE W/O & W CONTRAST 1/16/2019 8:52 AM     History: Patient with Ependymoma on Entinostat Evaluate for metastatic  disease; Ependymoma (H)  ICD-10: Ependymoma (H) (accession [id number]), Ependymoma (H); Neoplasm  of posterior cranial fossa (H) (accession [id number]), Neoplasm of  posterior cranial fossa (H); Ependymoma (H) (accession [id number])     Comparison: MRI complete spine 10/16/2018     Technique:     Cervical spine: Sagittal T1-weighted, sagittal T2-weighted, sagittal  STIR, sagittal diffusion-weighted, axial T1-weighted, and axial  T2-weighted images of the cervical spine were obtained without the  administration of intravenous contrast. After the administration of  intravenous contrast, fat saturated axial, coronal, and sagittal  T1-weighted images of the cervical spine were obtained.     Thoracic spine: Sagittal T1-weighted, sagittal T2-weighted, sagittal  STIR, sagittal diffusion weighted, axial T1-weighted, and axial  T2-weighted images of the thoracic spine were obtained without the  administration of intravenous contrast. After the  administration of  intravenous contrast, fat saturated axial, coronal, and sagittal  T1-weighted images of the thoracic spine were obtained.     Lumbar spine: Sagittal T1-weighted, sagittal T2-weighted, sagittal  STIR, axial T1-weighted, and axial T2-weighted images of the lumbar  spine were obtained without the administration of intravenous  contrast. After the administration of intravenous contrast, axial and  sagittal fat-saturated T1-weighted images of the lumbar spine were  obtained.     Findings:     There are 7 cervical, 12 thoracic and 5 lumbar-type vertebra.     Cervical:  The cervical vertebrae appear normally aligned.  Bone  marrow signal intensity appears unremarkable.  There is no abnormal  signal within the cervical spinal cord.  Visualized sections through the posterior fossa reveal fourth  ventricular residual/recurrent ependymoma as discussed in detail on  MRI brain from the same day.     There is no neural foraminal narrowing or spinal canal stenosis at any  level.  No abnormal enhancement of the paraspinous tissues, vertebral column,  or within the spinal canal.     Thoracic:  The external marker is at the level of T3-T4. The thoracic  vertebrae are in normal alignment.  Bone marrow signal intensity  appears unremarkable. Stable mild superior endplate compression  deformity of T4. Schmorl's node-like indentations along inferior T6,  inferior T7, superior and inferior T8, inferior T9 and inferior T10  vertebral endplates. No neural foraminal narrowing or spinal canal  stenosis at the level thoracic spine.  There is no abnormal signal within the thoracic spinal cord. No spinal  canal or neural foraminal stenosis. No abnormal enhancement of the  paraspinous tissues, vertebral column, or within the spinal canal.     Lumbar: There are 5 lumbar-type vertebrae assumed for the purposes of  this dictation. The tip of the conus medullaris is at T12-L1.  The  lumbar vertebral column appears normally aligned.   Bone marrow signal  intensity appears unremarkable. There is no disc height narrowing .   Schmorl's node-like indentations along inferior L2, superior L3,  superior and inferior L4 end plates.  No abnormal enhancement of the paraspinous tissues, vertebral column  or within the spinal canal.  No neural foraminal narrowing or spinal canal stenosis at any level.                                                                      Impression: Contrast enhanced cervical, thoracic and lumbar spine MRI  demonstrates;     No evidence of leptomeningeal metastatic disease.     I have personally reviewed the examination and initial interpretation  and I agree with the findings.     CAROLINA ADAIR MD    Impression:  1. Ependymoma  2. Treated with Entinostat, continues to tolerate well.   3. MRI Brain/spine 1/16/19: tumor is stable to slightly decreased compared to 6/12/18.  4. Labs today are adequate for continuing Entinostat.   5. Chronic constipation.    Plan:  1. RTC on 1/23/19 as scheduled for follow up, or sooner PRN.   2. Continue with Entinostat therapy, new medications given today.   3. Continue with planned GI follow-up in February for constipation.     Time spent with patient 40 minutes. Over 50% of the visit was spent counseling the patient and patient's mother on MRI brain/spine results and treatment plan.    This document serves as a record of the services and decisions personally performed and made by Leoncio Rousseau MD. It was created on his behalf by Mark Montague a trained medical scribe. The creation of this document is based on the provider's statements to the medical scribe.    The documentation recorded by the scribe accurately reflects the services I personally performed and the decisions made by me.      Leoncio Rousseau      Patient Care Team:  Jeffrey Espinoza MD as PCP - General (Family Practice)  Dequan Timmons MD as MD (Surgery)  Kristi Schuler, APRN CNP as Nurse  Practitioner (Nurse Practitioner - Pediatrics)  Higinio Walters MD (Ophthalmology)  Karina Hodgson, MSW as   Eren Reeder MD as MD (Dermatology)  Schwab, Briana, RN as Nurse Coordinator  Perico Holley MD as MD (Pediatric Neurology)  Sarah Vines MD as MD (Pediatric Urology)    Copy to patient  RAFAELA GUAN  [address] [facility] [address]    Labs every Tuesday at [facility], appointment are needed.  Call [phone] for appointment.    Orders are in the system for :  CBC with platelets and diff  CMP  Magnesium  Phosphorus    Next appointments would be , 3/13 and 4/10 if he makes counts.    Thank you for choosing [facility].    It was a pleasure to see you today.      CNS Tumor Team  Leoncio Schuler NP, APRN  Imani Means RN BSN - Care Coordinator       [facility], 9th Floor - [address] [address]  Scheduling/Appointments: [phone]  Fax: [phone]     Numbers to call:   Monday - Friday, 8:00 am - 5:00 pm:    Office : [phone]    Scheduling/Appointments: [phone]  Nights and weekends:   Call [phone] and ask the  to page the 'Pediatric Heme/Onc fellow on call' if you have an urgent concern that can't wait until the clinic opens.     Scheduling:    [facility], 9th floor [phone]  Infusion Center/Lab: [phone]   Radiology/ [phone]      Services:   [phone]     Imani Means RN, BSN  CNS Tumor Care Coordinator  Office: [phone]  Pager: [phone]  E-mail: [email or web address]     We encourage you to sign up for [employer] for easy communication with us.  Sign up at the clinic  or go to [email or web address].      Please try the Passport to [facility] ([facility]) phone application for Virtual Tours, Procedure  Preparation, Resources, Preparation for Hospital Stay and the Coloring Board. Leoncio Rousseau MD

## 2019-01-16 NOTE — LETTER
P PEDS CNS TUMOR/NEUROFIBROMATOSIS        [facility]  9th Floor  [address] [place] [address]  Phone: [phone]     OUTPATIENT TEST REQUEST  Pediatric CNS Tumor/Neurofibromatosis Clinic  [facility]    Patient Name: Geo Hicks  YOB: 2000  MR#: [id number]  Issue Date & Time: January 16, 2019 10:47 AM  Expiration Date: 1/16/20  DX: Ependymoma       Please complete the following tests for the continued care of our patients.  If you have any questions, please call at [phone] or [phone].  Standing weekly labs every Tuesday       []     CBC with Platelets and Differential   []     CMP    []     Magnesium   []      Phosphorus      Please fax Lab/Radiology Report to: Imani Means RN, BSN  Fax: [phone]  Phone: [phone]         Leoncio Rousseau MD   Medical Director, Pediatric Neuro-oncology and Neurofibromatosis programs   Co-medical director, [facility] Pediatric Hematology Oncology

## 2019-01-21 ENCOUNTER — HOSPITAL ENCOUNTER (OUTPATIENT)
Dept: OCCUPATIONAL THERAPY | Facility: CLINIC | Age: 19
Setting detail: THERAPIES SERIES
End: 2019-01-21
Attending: FAMILY MEDICINE
Payer: COMMERCIAL

## 2019-01-21 ENCOUNTER — HOSPITAL ENCOUNTER (OUTPATIENT)
Dept: PHYSICAL THERAPY | Facility: CLINIC | Age: 19
Setting detail: THERAPIES SERIES
End: 2019-01-21
Attending: FAMILY MEDICINE
Payer: COMMERCIAL

## 2019-01-21 PROCEDURE — 97116 GAIT TRAINING THERAPY: CPT | Mod: GP | Performed by: PHYSICAL THERAPIST

## 2019-01-21 PROCEDURE — 97535 SELF CARE MNGMENT TRAINING: CPT | Mod: GO | Performed by: OCCUPATIONAL THERAPIST

## 2019-01-21 PROCEDURE — 97530 THERAPEUTIC ACTIVITIES: CPT | Mod: GP | Performed by: PHYSICAL THERAPIST

## 2019-01-21 PROCEDURE — 97112 NEUROMUSCULAR REEDUCATION: CPT | Mod: GP | Performed by: PHYSICAL THERAPIST

## 2019-01-22 ENCOUNTER — TELEPHONE (OUTPATIENT)
Dept: PSYCHIATRY | Facility: CLINIC | Age: 19
End: 2019-01-22

## 2019-01-22 DIAGNOSIS — C71.9 EPENDYMOMA (H): ICD-10-CM

## 2019-01-22 LAB
BASOPHILS # BLD AUTO: 0 10E9/L (ref 0–0.2)
BASOPHILS NFR BLD AUTO: 0.2 %
DIFFERENTIAL METHOD BLD: ABNORMAL
EOSINOPHIL # BLD AUTO: 0.4 10E9/L (ref 0–0.7)
EOSINOPHIL NFR BLD AUTO: 8.9 %
ERYTHROCYTE [DISTWIDTH] IN BLOOD BY AUTOMATED COUNT: 12.5 % (ref 10–15)
HCT VFR BLD AUTO: 39.5 % (ref 40–53)
HGB BLD-MCNC: 13 G/DL (ref 13.3–17.7)
LYMPHOCYTES # BLD AUTO: 0.8 10E9/L (ref 0.8–5.3)
LYMPHOCYTES NFR BLD AUTO: 15.5 %
MCH RBC QN AUTO: 30.2 PG (ref 26.5–33)
MCHC RBC AUTO-ENTMCNC: 32.9 G/DL (ref 31.5–36.5)
MCV RBC AUTO: 92 FL (ref 78–100)
MONOCYTES # BLD AUTO: 0.5 10E9/L (ref 0–1.3)
MONOCYTES NFR BLD AUTO: 9.9 %
NEUTROPHILS # BLD AUTO: 3.2 10E9/L (ref 1.6–8.3)
NEUTROPHILS NFR BLD AUTO: 65.5 %
PLATELET # BLD AUTO: 129 10E9/L (ref 150–450)
RBC # BLD AUTO: 4.3 10E12/L (ref 4.4–5.9)
WBC # BLD AUTO: 4.8 10E9/L (ref 4–11)

## 2019-01-22 PROCEDURE — 84100 ASSAY OF PHOSPHORUS: CPT | Performed by: PEDIATRICS

## 2019-01-22 PROCEDURE — 36415 COLL VENOUS BLD VENIPUNCTURE: CPT | Performed by: PEDIATRICS

## 2019-01-22 PROCEDURE — 83735 ASSAY OF MAGNESIUM: CPT | Performed by: PEDIATRICS

## 2019-01-22 PROCEDURE — 80053 COMPREHEN METABOLIC PANEL: CPT | Performed by: PEDIATRICS

## 2019-01-22 PROCEDURE — 85025 COMPLETE CBC W/AUTO DIFF WBC: CPT | Performed by: PEDIATRICS

## 2019-01-22 NOTE — TELEPHONE ENCOUNTER
"PSYCHIATRY CLINIC PHONE INTAKE     SERVICES REQUESTED / INTERESTED IN          Other:  Unknown    Presenting Problem and Brief History                              What would you like to be seen for? (brief description):  Patient has been experiencing paranoid thoughts that are worrying the family. He is undergoing cancer treatment at the [facility] and has been referred by the neuropsychologist. Patient's father reports he is not grasping reality and has irrational beliefs (dad replacing x-ray images with images of other people, believing oncologist and nurse practitioner are preventing him from getting better). Symptoms seemed to start last year but have been progressing. Patient also has physical disabilities as a result of cancer treatment (facial paralysis, double vision, inability to walk without a walker) and it can be difficult to understand his speech in the beginning. Patient's father believes these thoughts may be related to having so much time to think. He also has thoughts about things that are not in his control (people hunting on family land is ruining deer population, wanting energy for cars to come from the road). He becomes very adamant and convinced that this is how things work. Family and providers tend to agree with certain thoughts to appease him because the thoughts can't be changed. When patient first met with referring neuropsychologist the patient was asked, \"If we could solve your problem, what would that look like?\" and the patient responded that he would be able to walk across the room on his own.   Have you received a mental health diagnosis? No   Which one (s):   Is there any history of developmental delay?  No   Are you currently seeing a mental health provider?  Yes            Who / month last seen:  Therapist   Do you have mental health records elsewhere?  Yes  Will you sign a release so we can obtain them?  Yes       Substance Use History     Do you have any history " of alcohol / illicit drug use?  No  Describe:    Have you ever received treatment for this?  No    Describe:       Social History     Does the patient have a guardian?  Yes    Name / number: Parents, Eric Hicks ([phone]) and Christianne Sevilla ([phone])  Have you had an ACT team in last 12 months?  No  Describe:    Do you have any current or past legal issues?  No  Describe:    OK to leave a detailed voicemail?  Yes    Medical/ Surgical History                                   Patient Active Problem List   Diagnosis     GERD (gastroesophageal reflux disease)     Closed fracture at the growth plate of right distal fibula      Elevated serum creatinine     Dyspepsia     Intracranial hemorrhage (H)     Hemorrhagic stroke (H)     Ependymoma (H)     Admission for antineoplastic chemotherapy     Post-operative state     S/P craniotomy     S/P biopsy     Noncomitant strabismus     Abducens neuropathy of both eyes     CANDELARIO (obstructive sleep apnea)     Health Care Home     Hypernatremia     Body temperature low     Current chronic use of systemic steroids     Elevated TSH     Status post chemotherapy     Neoplasm of posterior cranial fossa (H)     Constipation     Chronic constipation     Serum albumin decreased     Closed compression fracture of thoracic vertebra with routine healing, subsequent encounter          Medications             Current Outpatient Medications   Medication Sig Dispense Refill     bisacodyl (BISACODYL LAXATIVE) 5 MG EC tablet Take 2 tablets (10 mg) by mouth At Bedtime 60 tablet 3     calcium carbonate-vitamin D 600-400 MG-UNIT CHEW Take 2 tablets in the morning and 1 tablet in the evening. 90 tablet 3     Cholecalciferol 400 UNITS CHEW Take 1 tablet (400 Units) by mouth every morning 60 tablet 2     dexamethasone (DECADRON) 0.5 MG tablet Take 0.75 mg by mouth daily (with breakfast). 90 tablet 3     fexofenadine (ALLEGRA) 180 MG tablet Take 180 mg by mouth daily       linaclotide (LINZESS) 290 MCG  capsule Take 1 capsule (290 mcg) by mouth daily       melatonin 3 MG tablet Take 3 mg by mouth At Bedtime       mupirocin (BACTROBAN) 2 % ointment Use 2 times a day to the buttock with flare 22 g 3     OLANZapine (ZYPREXA) 2.5 MG tablet Take 4 tablets (10 mg) by mouth At Bedtime 120 tablet 0     omeprazole (PRILOSEC) 20 MG DR capsule Take 2 capsules (40 mg) by mouth 2 times daily 180 capsule 3     pentoxifylline (TRENTAL) 400 MG CR tablet Take 1 tablet (400 mg) by mouth 3 times daily (with meals) 270 tablet 2     polyethylene glycol (MIRALAX/GLYCOLAX) Packet Take 34 g by mouth 2 times daily 100 packet 3     potassium phosphate, monobasic, (K-PHOS) 500 MG tablet Take 1 tablet (500 mg) by mouth 3 times daily 90 tablet 3     study - entinostat (IDS# 5050) 1 mg tablet Take 1 tablet (1 mg) by mouth every 7 days for 4 doses Take one 1mg tablet with one 5mg tablet for total dose of 6mg weekly. Take on an empty stomach, at least 1 hour before or 2 hours after a meal.  Swallow tablet whole. 4 tablet 0     study - entinostat (IDS# 5050) 5 mg tablet Take 1 tablet (5 mg) by mouth every 7 days for 4 doses Take one 5mg tablet with one 1mg tablet for total dose of 6mg weekly. Take on an empty stomach, at least 1 hour before or 2 hours after a meal.  Swallow tablet whole. 4 tablet 0     sulfamethoxazole-trimethoprim (BACTRIM/SEPTRA) 400-80 MG per tablet Take 1 tablet by mouth 2 times daily On Saturdays and Sundays 24 tablet 11     vitamin E (GNP VITAMIN E) 400 UNIT capsule Take 1 capsule (400 Units) by mouth daily 30 capsule 11         DISPOSITION      Completed phone screen with patient's father. Scheduled urgent AGE per Dr. Emery with Dr. Fay.    Chel Kuo,

## 2019-01-24 LAB
ALBUMIN SERPL-MCNC: 3.5 G/DL (ref 3.4–5)
ALP SERPL-CCNC: 145 U/L (ref 65–260)
ALT SERPL W P-5'-P-CCNC: 19 U/L (ref 0–50)
ANION GAP SERPL CALCULATED.3IONS-SCNC: 5 MMOL/L (ref 3–14)
AST SERPL W P-5'-P-CCNC: 29 U/L (ref 0–35)
BILIRUB SERPL-MCNC: 0.2 MG/DL (ref 0.2–1.3)
BUN SERPL-MCNC: 14 MG/DL (ref 7–30)
CALCIUM SERPL-MCNC: 8.9 MG/DL (ref 8.5–10.1)
CHLORIDE SERPL-SCNC: 106 MMOL/L (ref 98–110)
CO2 SERPL-SCNC: 29 MMOL/L (ref 20–32)
CREAT SERPL-MCNC: 0.97 MG/DL (ref 0.5–1)
GFR SERPL CREATININE-BSD FRML MDRD: >90 ML/MIN/{1.73_M2}
GLUCOSE SERPL-MCNC: 105 MG/DL (ref 70–99)
MAGNESIUM SERPL-MCNC: 2.2 MG/DL (ref 1.6–2.3)
PHOSPHATE SERPL-MCNC: 3.3 MG/DL (ref 2.5–4.5)
POTASSIUM SERPL-SCNC: 5 MMOL/L (ref 3.4–5.3)
PROT SERPL-MCNC: 6.8 G/DL (ref 6.8–8.8)
SODIUM SERPL-SCNC: 140 MMOL/L (ref 133–144)

## 2019-01-28 ENCOUNTER — HOSPITAL ENCOUNTER (OUTPATIENT)
Dept: PHYSICAL THERAPY | Facility: CLINIC | Age: 19
Setting detail: THERAPIES SERIES
End: 2019-01-28
Attending: FAMILY MEDICINE
Payer: COMMERCIAL

## 2019-01-28 PROCEDURE — 97530 THERAPEUTIC ACTIVITIES: CPT | Mod: GP | Performed by: PHYSICAL THERAPIST

## 2019-01-29 DIAGNOSIS — C71.9 EPENDYMOMA (H): ICD-10-CM

## 2019-01-29 LAB
BASOPHILS # BLD AUTO: 0 10E9/L (ref 0–0.2)
BASOPHILS NFR BLD AUTO: 0.4 %
DIFFERENTIAL METHOD BLD: ABNORMAL
EOSINOPHIL # BLD AUTO: 0.3 10E9/L (ref 0–0.7)
EOSINOPHIL NFR BLD AUTO: 5.6 %
ERYTHROCYTE [DISTWIDTH] IN BLOOD BY AUTOMATED COUNT: 12.6 % (ref 10–15)
HCT VFR BLD AUTO: 39.3 % (ref 40–53)
HGB BLD-MCNC: 12.5 G/DL (ref 13.3–17.7)
LYMPHOCYTES # BLD AUTO: 0.8 10E9/L (ref 0.8–5.3)
LYMPHOCYTES NFR BLD AUTO: 16.9 %
MCH RBC QN AUTO: 29.6 PG (ref 26.5–33)
MCHC RBC AUTO-ENTMCNC: 31.8 G/DL (ref 31.5–36.5)
MCV RBC AUTO: 93 FL (ref 78–100)
MONOCYTES # BLD AUTO: 0.8 10E9/L (ref 0–1.3)
MONOCYTES NFR BLD AUTO: 17.1 %
NEUTROPHILS # BLD AUTO: 2.7 10E9/L (ref 1.6–8.3)
NEUTROPHILS NFR BLD AUTO: 60 %
PLATELET # BLD AUTO: 138 10E9/L (ref 150–450)
RBC # BLD AUTO: 4.23 10E12/L (ref 4.4–5.9)
WBC # BLD AUTO: 4.5 10E9/L (ref 4–11)

## 2019-01-29 PROCEDURE — 84100 ASSAY OF PHOSPHORUS: CPT | Performed by: PEDIATRICS

## 2019-01-29 PROCEDURE — 80053 COMPREHEN METABOLIC PANEL: CPT | Performed by: PEDIATRICS

## 2019-01-29 PROCEDURE — 36415 COLL VENOUS BLD VENIPUNCTURE: CPT | Performed by: PEDIATRICS

## 2019-01-29 PROCEDURE — 83735 ASSAY OF MAGNESIUM: CPT | Performed by: PEDIATRICS

## 2019-01-29 PROCEDURE — 85025 COMPLETE CBC W/AUTO DIFF WBC: CPT | Performed by: PEDIATRICS

## 2019-01-30 LAB
ALBUMIN SERPL-MCNC: 3.3 G/DL (ref 3.4–5)
ALP SERPL-CCNC: 121 U/L (ref 65–260)
ALT SERPL W P-5'-P-CCNC: 18 U/L (ref 0–50)
ANION GAP SERPL CALCULATED.3IONS-SCNC: 1 MMOL/L (ref 3–14)
AST SERPL W P-5'-P-CCNC: 23 U/L (ref 0–35)
BILIRUB SERPL-MCNC: 0.3 MG/DL (ref 0.2–1.3)
BUN SERPL-MCNC: 12 MG/DL (ref 7–30)
CALCIUM SERPL-MCNC: 8.6 MG/DL (ref 8.5–10.1)
CHLORIDE SERPL-SCNC: 105 MMOL/L (ref 98–110)
CO2 SERPL-SCNC: 33 MMOL/L (ref 20–32)
CREAT SERPL-MCNC: 1.04 MG/DL (ref 0.5–1)
GFR SERPL CREATININE-BSD FRML MDRD: >90 ML/MIN/{1.73_M2}
GLUCOSE SERPL-MCNC: 92 MG/DL (ref 70–99)
MAGNESIUM SERPL-MCNC: 2 MG/DL (ref 1.6–2.3)
PHOSPHATE SERPL-MCNC: 4.4 MG/DL (ref 2.5–4.5)
POTASSIUM SERPL-SCNC: 4.2 MMOL/L (ref 3.4–5.3)
PROT SERPL-MCNC: 6.5 G/DL (ref 6.8–8.8)
SODIUM SERPL-SCNC: 139 MMOL/L (ref 133–144)

## 2019-01-31 NOTE — PROGRESS NOTES
"Outpatient Physical Therapy Progress Note     Patient: Geo Hicks  : 2000    Beginning/End Dates of Reporting Period:  18 to 2019    Referring Provider: 17: RACHEL Rousseau MD  Primary: Dr. Benny Coelho    Therapy Diagnosis: Ataxia, Gait instability, balance impairments, muscle weakness, gait and mobility impairment     Client Self Report: Geo reports that he is doing well.  He denies pain.  When asked if anything new he discussed his GI  issues/constipation concerns and that he \"feels the constipation is making it more difficult for him to walk and ... progress with walking... due to extra weight due to the constipation and that \"it is pushing on the muscle\".  His walking is reported to \"going good\" not generally increasing distance or how many walks he takes with help each time he is up.      Geo has attended 13 sessions between 2018 and 2018.  He has attended only Geo's attendance has decreased secondary to holidays, family vacation and reported illness or not feeling well as well as missing a few weeks due to scheduling availability between client's and clinic's schedule. He attended 7 session from October to 2019 but refused to participate on in session on 2019.  Goals retested on 2019.    Goals:  Goal Identifier step ups   Goal Description Geo will step up on bottom step or 6 inch bench  with single railing and Contact assist, when using Liko lift and harness to advance with safety and independence on curbs   Target Date 18   Date Met     Progress: not recently assessed.      Goal Identifier balance   Goal Description Geo will move sit to stand and maintain standing using anup walker x 45\", 3/4x CGA for 3 consecutive sessions to advance safety when standing for ADLs.   Target Date 18   Date Met     Progress: goal nearly met, significant improvement in ability to sustain standing balance and move to standing using anup walker.  CGA and " vcues to move to stand at anup walker 2/4x,standing: SBA 1 minute 3/4 times using hemiwalker L.  Geo has more inconsistent attendance last 2 months and not able to assess for 3 sessions in a row.  Last 3 sessions: 12/10 and 12/31/19 performed 2/6 attempts and 1/21/19 as noted above.  His schedule is consistent again starting mid February and will retest goal for 3 sessions looking for consistency of performance as measure of progress and increased safety.  Continue goal to 4/30/19.     Goal Identifier gait/amb   Goal Description Geo will ambulate on level surfaces using wheeled walker with close SBA, 30 foot intervals 2/4 attempts x 3 sessions in a row to assess consistency of skill level to advance to safe ambulation.    Target Date emerging   Date Met  Continue goal   Progress: Status varies dependent on level of fatigue.  Grossly improving stability noted when retested goal on 1/21/19. SBA to Contact assist 1 and close supervision/SBA aide 2/4 times using bariatric (wider) weighted walker. Still keeps head down to look at feet. Continue goal 4/30/2019     Goal Identifier HEP   Goal Description Geo will be independent with a HEP for improved ability to participate in leisure/cardiovascular activities (such as swimming or riding a stationary bike).     Target Date (ongoing through episode of care)   Date Met  (ongoing, works on strengthening at home)   Progress: Geo is encouraged to increase distances or frequency of walking with assist at home using his walker but he reports that he does not always try to do more walks but he is consistent with his strengthening program. Continue goal.     Goal Identifier LTG   Goal Description Geo will be able to maintain standing balance with 1UE support while engaged in UE activity with other hand for 3 minute intervals with close SBA to increase safety in bathroom during ADLs   Target Date 11/16/18   Date Met  12/31/18:    Progress:12/31/18: met x 1 rep; close  "SBA to CGA of 1 and SBA 1.     Goal Identifier Stairs   Goal Description Geo  ascend 4 six inch stairs, 2/4 attempts with min assist  and descend stairs marking time, 2/4 attempts with min assist at gait belt while using Liko lift and harness for safety demonstrating increased safety and motor control to manage stairs at home and in community  by 2/13/19   Target Date 02/13/19   Date Met     Progress:not assessed; not assessed at every visit stairs work often depends on level of fatigue noted and stability at trunk during session. Extend goal to 3/15/19 with goal of increased consistency of attendance to determine status on stairs.      Goal Identifier LTG   Goal Description Geo will ambulate using walker 50 foot intervals in home including movement to sitting when distance completed to increase safety to get to bathroom and bedroom and increase and independence with mobility in home.      Target Date 02/13/19   Date Met     Progress:50 feet x 1 contact to min A therapist, larger bariatric walker;and Stand by assist of rehab tech.  Modify goal to 50 feet x 3 with contact assist of therapist and close supervision of rehab tech using bariatric walker by 8/2019.     Geo demonstrates improvement in his stability in standing when using anup walker and when ambulating short, 25-30 foot distances, using a bariatric walker that is wider offering more stability due to wider base of support.  Therapy has begun to assess if the increased stability noted during gait training using the bariatric walker is consistent from session to session to determine if recommendation for purchase of bariatric walker indicated. Geo met his goal for standing at counter with 1 UE support showing ability on more than one occasion with close supervision.   He continues to work hard during sessions except on 1/28/19 when following  Patient education about safety and independence during ambulation using walker he seemed to \"shut down\" " "then refused to continue session.  On 1/28/19 he was quite persistent in telling therapist that he would be \"independent in walking if it weren't for his G-I issues\". This is the only session that Geo has refused to participate during therapy and seemed related to general frustration regarding level of independence with ambulation and therapist not in agreement as to his insight in to reasons behind his need for assistance with ambulation.  Discussed with Geo options including taking a therapeutic break from OP PT for 1 month or so; working with a combination of therapists or continue as planned.  Geo and his dad plan to discuss further over next week or so.     Geo wants to increase his independence with ambulation using his walker at home, and to safely transfer and use the restroom independently.  In addition he wants to   increase ability and safety on stairs and general independence and safety with mobility/transfers,balance and ambulation.      He remains appropriate for skilled physical therapy to address his impaired gait, progress with independence in transfers and gait. His ataxia, decreased postural control in upright positions and standing, decreased graded mid and end range muscle control in mid stance positions, decreased gross/fine motor control affect his safety and level of independence at home.  Geo does not want to use his wheelchair in his house nor have help doing his exercises after assisted with transfer on and off the floor. He continues to have difficulty judging doorways when self propelling his w/c and needs verbal reminders to scan both sides to avoid bumping into door frames.  Will continue to address transfer safety to and from w/c and when using walker; gait training and balance and neuromuscular re-education to increase safety and control to increase independence with transfers and mobility, balance provide ongoing education to client and family re: progress, his status " and safety with mobility.     Changes to goals: see above goal chart for specific details on progress toward goals.     Plan: Continue therapy per current plan of care at 1x/week for therapeutic exercise, therapeutic activity, neuromuscular re-education and gait training. Assess a variety of walker styles to determine if any offer greater control.  Aquatic therapy is recommended though declined by parents secondary to insurance concerns.  Given the nature of his motor control issues and his ongoing chemo treatment. Work with Geo on safety using w/c if patient permits.  Will modify frequency as clinically indicated based on client response and progress toward goals.        Geo will be discharged from therapy when his/her long term goals are met, displays a plateau in progress, or demonstrates resistance or low motivation for therapy after redirections have been made. The patient may be discharged from therapy when parents wish to discontinue therapy and/or fails to adhere to [facility]'s attendance policy.     Discharge:  No, showing progress with standing balance and control and short distance ambulation with decreasing levels of assistance  Discharge:  No    It is a pleasure working with Geo Hicks's family. Thank you for referring Geo Hicks to physical therapy at [facility].    Please don't hesitate to contact me with any questions at: [email or web address]    Imani Landers PT  [phone]

## 2019-02-01 NOTE — ADDENDUM NOTE
Encounter addended by: Imani Landers, PT on: 1/31/2019 6:32 PM   Actions taken: Pend clinical note, Sign clinical note, Flowsheet data copied forward, Flowsheet accepted

## 2019-02-04 ENCOUNTER — OFFICE VISIT (OUTPATIENT)
Dept: GASTROENTEROLOGY | Facility: CLINIC | Age: 19
End: 2019-02-04
Attending: PEDIATRICS
Payer: COMMERCIAL

## 2019-02-04 ENCOUNTER — HOSPITAL ENCOUNTER (OUTPATIENT)
Dept: GENERAL RADIOLOGY | Facility: CLINIC | Age: 19
Discharge: HOME | End: 2019-02-04
Attending: PEDIATRICS | Admitting: PEDIATRICS
Payer: COMMERCIAL

## 2019-02-04 VITALS
BODY MASS INDEX: 28.86 KG/M2 | HEART RATE: 90 BPM | SYSTOLIC BLOOD PRESSURE: 120 MMHG | DIASTOLIC BLOOD PRESSURE: 70 MMHG | WEIGHT: 199.3 LBS

## 2019-02-04 DIAGNOSIS — K59.01 SLOW TRANSIT CONSTIPATION: Primary | ICD-10-CM

## 2019-02-04 DIAGNOSIS — K59.01 SLOW TRANSIT CONSTIPATION: ICD-10-CM

## 2019-02-04 PROCEDURE — G0463 HOSPITAL OUTPT CLINIC VISIT: HCPCS | Mod: ZF

## 2019-02-04 PROCEDURE — 74018 RADEX ABDOMEN 1 VIEW: CPT

## 2019-02-04 ASSESSMENT — PAIN SCALES - GENERAL: PAINLEVEL: NO PAIN (0)

## 2019-02-04 NOTE — PATIENT INSTRUCTIONS
If you have any questions during regular office hours, please contact the nurse line at [phone] (Radha or Ember).     If acute concerns arise after hours, you can call [phone] and ask to speak to the pediatric gastroenterologist on call.     If you need to schedule a sedated procedure with radiology, call [phone]    If you have scheduling needs, please call the Call Center at [phone].     Outside lab and imaging results should be faxed to [phone].  If you go to a lab outside of [facility] we will not automatically get those results you will need to ask them to send them to us.      We will be in contact about getting an appointment with pelvic floor PT, also bring up the stooling issues with the PM&R physician on Wed

## 2019-02-04 NOTE — NURSING NOTE
"[facility] [[id number]]  Chief Complaint   Patient presents with     RECHECK     GI Follow up     Initial /70 (BP Location: Right arm, Patient Position: Sitting, Cuff Size: Adult Large)   Pulse 90   Wt 199 lb 4.7 oz (90.4 kg)   BMI 28.86 kg/m   Estimated body mass index is 28.86 kg/m  as calculated from the following:    Height as of 1/9/19: 5' 9.69\" (177 cm).    Weight as of this encounter: 199 lb 4.7 oz (90.4 kg).  Medication Reconciliation: complete  "

## 2019-02-04 NOTE — LETTER
2/4/2019      RE: Geo Hicks  [id number] [facility] [id number]            Pediatric Gastroenterology,   Hepatology, and Nutrition               Outpatient follow up consultation    Consultation requested by Jeffrey Espinoza     Diagnoses:  Patient Active Problem List   Diagnosis     GERD (gastroesophageal reflux disease)     Closed fracture at the growth plate of right distal fibula      Elevated serum creatinine     Dyspepsia     Intracranial hemorrhage (H)     Hemorrhagic stroke (H)     Ependymoma (H)     Admission for antineoplastic chemotherapy     Post-operative state     S/P craniotomy     S/P biopsy     Noncomitant strabismus     Abducens neuropathy of both eyes     CANDELARIO (obstructive sleep apnea)     Health Care Home     Hypernatremia     Body temperature low     Current chronic use of systemic steroids     Elevated TSH     Status post chemotherapy     Neoplasm of posterior cranial fossa (H)     Constipation     Chronic constipation     Serum albumin decreased     Closed compression fracture of thoracic vertebra with routine healing, subsequent encounter         HPI: 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    Stools: based on stool log 3-5 times a day M-L in size bristol stool scale 3-5.  Geo reports that he never feels like all of the stool gets out.  He states that his stools are not painful, and there is no blood.  He does not get fatigued with stooling.  He does not have stooling accidents.  He states that there is always a tightness in his upper abdomen.  This did not go away even after his cleanouts (he has had a total of 3).  The morst recent cleanout was about 2 weeks ago and I personally reviewed the x-rays which showed no stool after the Golytely.    Medications  Linzess 290 mcg  Colace 1 in the morning  Miralax 1 cap 3 times a day  1 Pear a day    Water: 2-3 L a day    Abdominal Pain: all the time, pain  "is \"kindof like a dull throbbing pain,\" diffuse in location, no radiation.  No regurgitation or sour taste in his mouth.    No nausea or vomiting, he has been on weekly enemas in the past, he does not want to try daily enemas.      When I ask him if I could do one thing to make him feel better he states that he would like to not feel like he needs to stool all of the time.      Review of Systems: A complete 10 point review of systems was negative except as note in this note and below.    Allergies: Blood transfusion related (informational only) and No known drug allergies  Prescription Medications as of 2/4/2019       Rx Number Disp Refills Start End Last Dispensed Date Next Fill Date Owning Pharmacy    bisacodyl (BISACODYL LAXATIVE) 5 MG EC tablet  60 tablet 3 8/23/2018    [place], [place] - [address]    Sig: Take 2 tablets (10 mg) by mouth At Bedtime    Class: E-Prescribe    Route: Oral    calcium carbonate-vitamin D 600-400 MG-UNIT CHEW  90 tablet 3 2/17/2017    [facility]/pharmacy #[address] [place] [address] [id number] [place] [address].    Sig: Take 2 tablets in the morning and 1 tablet in the evening.    Class: E-Prescribe    Cholecalciferol 400 UNITS CHEW  60 tablet 2 6/8/2016    [facility]/pharmacy #[address] [place] [address].    Sig: Take 1 tablet (400 Units) by mouth every morning    Class: Historical    Route: Oral    dexamethasone (DECADRON) 0.5 MG tablet  90 tablet 3 1/2/2019    [facility]/pharmacy [address] [place] [address].    Sig: Take 0.75 mg by mouth daily (with breakfast).    Class: E-Prescribe    Route: Oral    fexofenadine (ALLEGRA) 180 MG tablet            Sig: Take 180 mg by mouth daily    Class: Historical    Route: Oral    linaclotide (LINZESS) 290 MCG capsule    11/7/2018    [facility] - [place], [place] - [address]    Sig: Take 1 capsule (290 mcg) by mouth daily    Class: Historical    Route: Oral    melatonin 3 MG tablet            Sig: Take 3 mg by " mouth At Bedtime    Class: Historical    Route: Oral    methylphenidate (RITALIN) 10 MG tablet (Ended)  30 tablet 0 1/31/2018 3/2/2018   [facility] - [address]    Sig: Take 1 tablet (10 mg) by mouth daily    Class: Local Print    Earliest Fill Date: 1/31/2018    Route: Oral    mupirocin (BACTROBAN) 2 % ointment  22 g 3 6/6/2018    [facility] [address] [place] [address] BLVD.    Sig: Use 2 times a day to the buttock with flare    Class: E-Prescribe    OLANZapine (ZYPREXA) 2.5 MG tablet  120 tablet 0 1/4/2019 1/4/2020   [facility] [place] [address] BLVD.    Sig: Take 4 tablets (10 mg) by mouth At Bedtime    Class: E-Prescribe    Notes to Pharmacy: Please dispense 2.5mg tablets so medication can be adjusted if needed.    Route: Oral    omeprazole (PRILOSEC) 20 MG DR capsule  180 capsule 3 1/2/2019    [facility] [place] [address] BLVD.    Sig: Take 2 capsules (40 mg) by mouth 2 times daily    Class: E-Prescribe    Route: Oral    pentoxifylline (TRENTAL) 400 MG CR tablet  270 tablet 2 3/14/2018    [facility] [place] [address] BLVD.    Sig: Take 1 tablet (400 mg) by mouth 3 times daily (with meals)    Class: E-Prescribe    Route: Oral    polyethylene glycol (MIRALAX/GLYCOLAX) Packet  100 packet 3 10/18/2018    [facility] [place] [address] BLVD.    Sig: Take 34 g by mouth 2 times daily    Class: No Print Out    Route: Oral    potassium phosphate, monobasic, (K-PHOS) 500 MG tablet  90 tablet 3 1/2/2019    [facility] [place] [address] [address]VD.    Sig: Take 1 tablet (500 mg) by mouth 3 times daily    Class: E-Prescribe    Route: Oral    study - entinostat (IDS# 5050) 1 mg tablet  4 tablet 0 1/16/2019 2/7/2019   [facility] - [address]    Sig: Take 1 tablet (1 mg) by mouth every 7 days for 4 doses Take one 1mg tablet with one 5mg tablet for total dose of  6mg weekly. Take on an empty stomach, at least 1 hour before or 2 hours after a meal.  Swallow tablet whole.    Class: Local Print    Notes to Pharmacy: Deliver to [facility] for dispensing.    Route: Oral    study - entinostat (IDS# 5050) 1 mg tablet (Ended)  4 tablet 0 12/19/2018 1/10/2019   [facility] - [address]    Sig: Take 1 tablet (1 mg) by mouth every 7 days for 4 doses Take one 1mg tablet with one 5mg tablet for total dose of 6mg weekly. Take on an empty stomach, at least 1 hour before or 2 hours after a meal.  Swallow tablet whole.    Class: Local Print    Notes to Pharmacy: Deliver to [facility] for dispensing.    Route: Oral    study - entinostat (IDS# 5050) 1 mg tablet (Ended)  4 tablet 0 11/21/2018 12/13/2018   [facility] [address]    Sig: Take 1 tablet (1 mg) by mouth every 7 days for 4 doses Take one 1mg tablet with one 5mg tablet for total dose of 6mg weekly. Take on an empty stomach, at least 1 hour before or 2 hours after a meal.  Swallow tablet whole.    Class: Local Print    Notes to Pharmacy: Deliver to [facility] for dispensing.    Route: Oral    study - entinostat (IDS# 5050) 1 mg tablet (Ended)  4 tablet 0 10/17/2018 11/8/2018   [facility] - [address]    Sig: Take 1 tablet (1 mg) by mouth every 7 days for 4 doses Take one 1mg tablet with one 5mg tablet for total dose of 6mg weekly. Take on an empty stomach, at least 1 hour before or 2 hours after a meal.  Swallow tablet whole.    Class: Local Print    Notes to Pharmacy: Deliver to [facility] for dispensing.    Route: Oral    study - entinostat (IDS# 5050) 1 mg tablet (Ended)  4 tablet 0 9/12/2018 10/4/2018   [facility] - [address]    Sig: Take 1 tablet (1 mg) by mouth every 7 days for 4 doses Take one 1mg tablet with one 5mg tablet for total dose of 6mg weekly. Take on an  empty stomach, at least 1 hour before or 2 hours after a meal.  Swallow tablet whole.    Class: Local Print    Notes to Pharmacy: Deliver to [facility] for dispensing.    Route: Oral    study - entinostat (IDS# 5050) 1 mg tablet (Ended)  4 tablet 0 8/8/2018 8/30/2018   [facility] - [address]    Sig: Take 1 tablet (1 mg) by mouth every 7 days for 4 doses Take one 1mg tablet with one 5mg tablet for total dose of 6mg weekly. Take on an empty stomach, at least 1 hour before or 2 hours after a meal.  Swallow tablet whole.    Class: Local Print    Notes to Pharmacy: Deliver to [facility] for dispensing.    Route: Oral    study - entinostat (IDS# 5050) 1 mg tablet (Ended)  4 tablet 0 7/5/2018 7/27/2018   [facility] - [address]    Sig: Take 1 tablet (1 mg) by mouth every 7 days for 4 doses Take one 1mg tablet with one 5mg tablet for total dose of 6mg weekly. Take on an empty stomach, at least 1 hour before or 2 hours after a meal.  Swallow tablet whole.    Class: Local Print    Notes to Pharmacy: Deliver to [facility] for dispensing.    Route: Oral    study - entinostat (IDS# 5050) 1 mg tablet (Ended)  4 tablet 0 5/23/2018 6/14/2018   [facility] - [address]    Sig: Take 1 tablet (1 mg) by mouth every 7 days for 4 doses Take one 1mg tablet with one 5mg tablet for total dose of 6mg weekly. Take on an empty stomach, at least 1 hour before or 2 hours after a meal.  Swallow tablet whole.    Class: Local Print    Notes to Pharmacy: Deliver to [facility] for dispensing    Route: Oral    study - entinostat (IDS# 5050) 1 mg tablet (Ended)  4 tablet 0 4/11/2018 5/3/2018   [facility] - [address]    Sig: Take 1 tablet (1 mg) by mouth every 7 days for 4 doses Take one 1mg tablet with one 5mg tablet for total dose of 6mg weekly. Take on an empty stomach, at least 1 hour  before or 2 hours after a meal.  Swallow tablet whole.    Class: Local Print    Notes to Pharmacy: Deliver to [facility] for dispensing    Route: Oral    study - entinostat (IDS# 5050) 1 mg tablet (Ended)  4 tablet 0 3/7/2018 3/29/2018   [facility] - [address]    Sig: Take 1 tablet (1 mg) by mouth every 7 days for 4 doses Take one 1mg tablet with one 5mg tablet for total dose of 6mg weekly. Take on an empty stomach, at least 1 hour before or 2 hours after a meal.  Swallow tablet whole.    Class: Local Print    Notes to Pharmacy: Deliver to [facility] for dispensing    Route: Oral    study - entinostat (IDS# 5050) 1 mg tablet (Ended)  4 tablet 0 1/24/2018 2/15/2018   [facility] - [address]    Sig: Take 1 tablet (1 mg) by mouth every 7 days for 4 doses Take one 1mg tablet with one 5mg tablet for total dose of 6mg weekly. Take on an empty stomach, at least 1 hour before or 2 hours after a meal.  Swallow tablet whole.    Class: Local Print    Notes to Pharmacy: Deliver to [facility] for dispensing    Route: Oral    study - entinostat (IDS# 5050) 5 mg tablet  4 tablet 0 1/16/2019 2/7/2019   [facility] - [address]    Sig: Take 1 tablet (5 mg) by mouth every 7 days for 4 doses Take one 5mg tablet with one 1mg tablet for total dose of 6mg weekly. Take on an empty stomach, at least 1 hour before or 2 hours after a meal.  Swallow tablet whole.    Class: Local Print    Notes to Pharmacy: Deliver to [facility] for dispensing    Route: Oral    study - entinostat (IDS# 5050) 5 mg tablet (Ended)  4 tablet 0 12/19/2018 1/10/2019   [facility] - [address]    Sig: Take 1 tablet (5 mg) by mouth every 7 days for 4 doses Take one 5mg tablet with one 1mg tablet for total dose of 6mg weekly. Take on an empty stomach, at least 1 hour before or 2 hours after a meal.  Swallow  tablet whole.    Class: Local Print    Notes to Pharmacy: Deliver to [facility] for dispensing    Route: Oral    study - entinostat (IDS# 5050) 5 mg tablet (Ended)  4 tablet 0 11/21/2018 12/13/2018   [facility] - [address]    Sig: Take 1 tablet (5 mg) by mouth every 7 days for 4 doses Take one 5mg tablet with one 1mg tablet for total dose of 6mg weekly. Take on an empty stomach, at least 1 hour before or 2 hours after a meal.  Swallow tablet whole.    Class: Local Print    Notes to Pharmacy: Deliver to [facility] for dispensing    Route: Oral    study - entinostat (IDS# 5050) 5 mg tablet (Ended)  4 tablet 0 10/17/2018 11/8/2018   [facility] - [address]    Sig: Take 1 tablet (5 mg) by mouth every 7 days for 4 doses Take one 5mg tablet with one 1mg tablet for total dose of 6mg weekly. Take on an empty stomach, at least 1 hour before or 2 hours after a meal.  Swallow tablet whole.    Class: Local Print    Notes to Pharmacy: Deliver to [facility] for dispensing    Route: Oral    study - entinostat (IDS# 5050) 5 mg tablet (Ended)  4 tablet 0 9/12/2018 10/4/2018   [facility] - [address]    Sig: Take 1 tablet (5 mg) by mouth every 7 days for 4 doses Take one 5mg tablet with one 1mg tablet for total dose of 6mg weekly. Take on an empty stomach, at least 1 hour before or 2 hours after a meal.  Swallow tablet whole.    Class: Local Print    Notes to Pharmacy: Deliver to [facility] for dispensing    Route: Oral    study - entinostat (IDS# 5050) 5 mg tablet (Ended)  4 tablet 0 8/8/2018 8/30/2018   [facility] - [address]    Sig: Take 1 tablet (5 mg) by mouth every 7 days for 4 doses Take one 5mg tablet with one 1mg tablet for total dose of 6mg weekly. Take on an empty stomach, at least 1 hour before or 2 hours after a meal.  Swallow tablet whole.    Class: Local  Print    Notes to Pharmacy: Deliver to [facility] for dispensing    Route: Oral    study - entinostat (IDS# 5050) 5 mg tablet (Ended)  4 tablet 0 7/5/2018 7/27/2018   [facility] - [address]    Sig: Take 1 tablet (5 mg) by mouth every 7 days for 4 doses Take one 5mg tablet with one 1mg tablet for total dose of 6mg weekly. Take on an empty stomach, at least 1 hour before or 2 hours after a meal.  Swallow tablet whole.    Class: Local Print    Notes to Pharmacy: Deliver to [facility] for dispensing    Route: Oral    study - entinostat (IDS# 5050) 5 mg tablet (Ended)  4 tablet 0 5/23/2018 6/14/2018   [facility] - [address]    Sig: Take 1 tablet (5 mg) by mouth every 7 days for 4 doses Take one 5mg tablet with one 1mg tablet for total dose of 6mg weekly. Take on an empty stomach, at least 1 hour before or 2 hours after a meal.  Swallow tablet whole.    Class: Local Print    Notes to Pharmacy: Deliver to [facility] for dispensing    Route: Oral    study - entinostat (IDS# 5050) 5 mg tablet (Ended)  4 tablet 0 4/11/2018 5/3/2018   [facility] - [address]    Sig: Take 1 tablet (5 mg) by mouth every 7 days for 4 doses Take one 5mg tablet with one 1mg tablet for total dose of 6mg weekly. Take on an empty stomach, at least 1 hour before or 2 hours after a meal.  Swallow tablet whole.    Class: Local Print    Notes to Pharmacy: Deliver to [facility] for dispensing    Route: Oral    study - entinostat (IDS# 5050) 5 mg tablet (Ended)  4 tablet 0 3/7/2018 3/29/2018   [facility] - [address]    Sig: Take 1 tablet (5 mg) by mouth every 7 days for 4 doses Take one 5mg tablet with one 1mg tablet for total dose of 6mg weekly. Take on an empty stomach, at least 1 hour before or 2 hours after a meal.  Swallow tablet whole.    Class: Local Print    Notes to Pharmacy: Deliver  to [facility] for dispensing    Route: Oral    study - entinostat (IDS# 5050) 5 mg tablet (Ended)  4 tablet 0 1/24/2018 2/15/2018   [facility] - [place], [place] - [address] [place] [address]    Sig: Take 1 tablet (5 mg) by mouth every 7 days for 4 doses Take one 5mg tablet with one 1mg tablet for total dose of 6mg weekly. Take on an empty stomach, at least 1 hour before or 2 hours after a meal.  Swallow tablet whole.    Class: Local Print    Notes to Pharmacy: Deliver to [facility] for dispensing    Route: Oral    sulfamethoxazole-trimethoprim (BACTRIM/SEPTRA) 400-80 MG per tablet  24 tablet 11 9/12/2018    [id number] [place], [place] - [address] [facility].    Sig: Take 1 tablet by mouth 2 times daily On Saturdays and Sundays    Class: E-Prescribe    Route: Oral    vitamin E (GNP VITAMIN E) 400 UNIT capsule  30 capsule 11 11/29/2017    [id number] [place], [place] - [address] [facility].    Sig: Take 1 capsule (400 Units) by mouth daily    Class: E-Prescribe    Route: Oral          Past Medical History: I have reviewed this patient's past medical history and updated as appropriate.   Past Medical History:   Diagnosis Date     Cranial nerve dysfunction      Dyspepsia      Ependymoma (H)      Gastro-oesophageal reflux disease      Hearing loss      Intracranial hemorrhage (H)      Migraine      Pilonidal cyst     7-2015     Reduced vision      Refractory obstruction of nasal airway     2nd to nasal valve prolapse     Sleep apnea      Strabismus     gaze palsy         Past Surgical History: I have reviewed this patient's past medical history and updated as appropriate.   Past Surgical History:   Procedure Laterality Date     GRAFT CARTILAGE FROM POSTERIOR AURICLE Left 10/6/2016    Procedure: GRAFT CARTILAGE FROM POSTERIOR AURICLE;  Surgeon: Tyler Richards MD;  [facility]     INCISION AND DRAINAGE PERINEAL, COMBINED Bilateral 7/18/2015    Procedure: COMBINED INCISION AND DRAINAGE PERINEAL;   Surgeon: Dequan Timmons MD;  [facility]     OPTICAL TRACKING SYSTEM CRANIOTOMY, EXCISE TUMOR, COMBINED N/A 4/13/2015    Procedure: COMBINED OPTICAL TRACKING SYSTEM CRANIOTOMY, EXCISE TUMOR;  Surgeon: Francis Velazquez MD;  [facility]     OPTICAL TRACKING SYSTEM CRANIOTOMY, EXCISE TUMOR, COMBINED N/A 4/16/2015    Procedure: COMBINED OPTICAL TRACKING SYSTEM CRANIOTOMY, EXCISE TUMOR;  Surgeon: Francis Velazquez MD;  [facility]     OPTICAL TRACKING SYSTEM CRANIOTOMY, EXCISE TUMOR, COMBINED Bilateral 5/28/2015    Procedure: COMBINED OPTICAL TRACKING SYSTEM CRANIOTOMY, EXCISE TUMOR;  Surgeon: Francis Velazquez MD;  [facility]     OPTICAL TRACKING SYSTEM CRANIOTOMY, EXCISE TUMOR, COMBINED Bilateral 1/14/2016    Procedure: COMBINED OPTICAL TRACKING SYSTEM CRANIOTOMY, EXCISE TUMOR;  Surgeon: Francis Velazquez MD;  [facility]     OPTICAL TRACKING SYSTEM VENTRICULOSTOMY  4/16/2015    Procedure: OPTICAL TRACKING SYSTEM VENTRICULOSTOMY;  Surgeon: Francis Velazquez MD;  [facility]     REMOVE PORT VASCULAR ACCESS N/A 10/6/2016    Procedure: REMOVE PORT VASCULAR ACCESS;  Surgeon: Bruno Perea MD;  [facility]     RHINOPLASTY N/A 10/6/2016    Procedure: RHINOPLASTY;  Surgeon: Tyler Richards MD;  [facility]     VASCULAR SURGERY  5-2015    single lumen power port       Family History: I have reviewed this patient's past family history today and updated as appropriate.  Family History   Problem Relation Age of Onset     Circulatory Father         PE/DVT     Hypothyroidism Father 30     Diabetes Maternal Grandmother      Diabetes Paternal Grandmother      Diabetes Paternal Grandfather      C.A.D. Paternal Grandfather      Hypertension Maternal Grandfather      Thyroid Disease Paternal Aunt         unknown whether hypo or hyper        Social History: Splits time between mom and dad's house      Physical exam:  Vital Signs: /70 (BP Location:  Right arm, Patient Position: Sitting, Cuff Size: Adult Large)   Pulse 90   Wt 90.4 kg (199 lb 4.7 oz)   BMI 28.86 kg/m   . (No height on file for this encounter. No weight on file for this encounter. There is no height or weight on file to calculate BMI. No height and weight on file for this encounter.)   Constitutional: Healthy, alert and no distress  Head: Normocephalic. No masses, lesions, tenderness or abnormalities  Neck: Neck supple.  EYE: Patch over eye  ENT: Ears: Normal position, Nose: No discharge and Mouth: Normal, moist mucous membranes  Gastrointestinal: Abdomen:, Soft, Nontender, Nondistended, Normal bowel sounds, No hepatomegaly, No splenomegaly, Rectal: Deferred  Musculoskeletal: Extremities warm, well perfused.   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  Currently Geo has soft stools but he is having trouble with complete evacuation of the stool.  At this point he will possibly benefit from pelvic floor physical therapy and possibly PM&R (he is scheduled to see them on Wed) to help strengthen his core which may help with positioning with stooling and becoming more efficient with the stooling process.  Other possible considerations for the future would be a cecostomy tube for possible antegrade flushes or peristeen for daily enemas.  I do feel that there is still a good chance that visceral hypersensitivity may be contributing to his symptoms as well.      -Continue with current constipation management (Linzess, Colace, MiraLAX) titrating MiraLAX to 2-3 soft [place] stool scale 4-6 type stools a day.     -Continue time toileting and proper positioning with stooling  -X-ray today to evaluate amount of stool  -We will be in contact with family regarding pelvic floor PT  -Family encouraged to call us at [phone] with any questions or concerns      No orders of the defined types were placed in this encounter.      I spent a total of 40 minutes face-to-face with his Geo and his dad during today's office visit. Over 50% of this time was spent counseling the patient/parent and/or coordinating care regarding Geo's symptoms , differential diagnosis, diagnostic work up, treatment , potential side effects and complications and follow up plan.     Follow up: Data Unavailable or earlier should patient become symptomatic.      Aniya Wei MD  Pediatric Gastroenterology  [facility]  Patient Care Team:  Jeffrey Espinoza MD as PCP - General (Family Practice)  Dequan Timmons MD as MD (Surgery)  Leoncio Rousseau MD as MD (Pediatric Hematology/Oncology)  Kristi Schuler, APRN CNP as Nurse Practitioner (Nurse Practitioner - Pediatrics)  Higinio Walters MD (Ophthalmology)  Karina Hodgson MSW as   Eren Reeder MD as MD (Dermatology)  Schwab, Briana, RN as Nurse Coordinator  Perico Holley MD as MD (Pediatric Neurology)  Sarah Vines MD as MD (Pediatric Urology)  Imani Means, RN as Registered Nurse

## 2019-02-04 NOTE — PROGRESS NOTES
"     Pediatric Gastroenterology,   Hepatology, and Nutrition               Outpatient follow up consultation    Consultation requested by Jeffrey Espinoza     Diagnoses:  Patient Active Problem List   Diagnosis     GERD (gastroesophageal reflux disease)     Closed fracture at the growth plate of right distal fibula      Elevated serum creatinine     Dyspepsia     Intracranial hemorrhage (H)     Hemorrhagic stroke (H)     Ependymoma (H)     Admission for antineoplastic chemotherapy     Post-operative state     S/P craniotomy     S/P biopsy     Noncomitant strabismus     Abducens neuropathy of both eyes     CANDELARIO (obstructive sleep apnea)     Health Care Home     Hypernatremia     Body temperature low     Current chronic use of systemic steroids     Elevated TSH     Status post chemotherapy     Neoplasm of posterior cranial fossa (H)     Constipation     Chronic constipation     Serum albumin decreased     Closed compression fracture of thoracic vertebra with routine healing, subsequent encounter         HPI: 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    Stools: based on stool log 3-5 times a day M-L in size bristol stool scale 3-5.  Geo reports that he never feels like all of the stool gets out.  He states that his stools are not painful, and there is no blood.  He does not get fatigued with stooling.  He does not have stooling accidents.  He states that there is always a tightness in his upper abdomen.  This did not go away even after his cleanouts (he has had a total of 3).  [facility] recent cleanout was about 2 weeks ago and I personally reviewed the x-rays which showed no stool after the [facility].    Medications  Linzess 290 mcg  Colace 1 in the morning  Miralax 1 cap 3 times a day  1 Pear a day    Water: 2-3 L a day    Abdominal Pain: all the time, pain is \"kindof like a dull throbbing pain,\" diffuse in location, no radiation.  No " regurgitation or sour taste in his mouth.    No nausea or vomiting, he has been on weekly enemas in the past, he does not want to try daily enemas.      When I ask him if I could do one thing to make him feel better he states that he would like to not feel like he needs to stool all of the time.      Review of Systems: A complete 10 point review of systems was negative except as note in this note and below.    Allergies: Blood transfusion related (informational only) and No known drug allergies  Prescription Medications as of 2/4/2019       Rx Number Disp Refills Start End Last Dispensed Date Next Fill Date Owning Pharmacy    bisacodyl (BISACODYL LAXATIVE) 5 MG EC tablet  60 tablet 3 8/23/2018    [place], [place] - [address]    Sig: Take 2 tablets (10 mg) by mouth At Bedtime    Class: E-Prescribe    Route: Oral    calcium carbonate-vitamin D 600-400 MG-UNIT CHEW  90 tablet 3 2/17/2017    [facility]/pharmacy #[address] [place] [address] [facility]    Sig: Take 2 tablets in the morning and 1 tablet in the evening.    Class: E-Prescribe    Cholecalciferol 400 UNITS CHEW  60 tablet 2 6/8/2016    [facility]/pharmacy #[address] [place] [address] - [address]    Sig: Take 1 tablet (400 Units) by mouth every morning    Class: Historical    Route: Oral    dexamethasone (DECADRON) 0.5 MG tablet  90 tablet 3 1/2/2019    [facility]/pharmacy #[address] [place] [id number] [address]    Sig: Take 0.75 mg by mouth daily (with breakfast).    Class: E-Prescribe    Route: Oral    fexofenadine (ALLEGRA) 180 MG tablet            Sig: Take 180 mg by mouth daily    Class: Historical    Route: Oral    linaclotide (LINZESS) 290 MCG capsule    11/7/2018    [facility] - [place], [place] - [address] [facility]    Sig: Take 1 capsule (290 mcg) by mouth daily    Class: Historical    Route: Oral    melatonin 3 MG tablet            Sig: Take 3 mg by mouth At Bedtime    Class: Historical    Route: Oral    methylphenidate  (RITALIN) 10 MG tablet (Ended)  30 tablet 0 1/31/2018 3/2/2018   [facility] - [address]    Sig: Take 1 tablet (10 mg) by mouth daily    Class: Local Print    Earliest Fill Date: 1/31/2018    Route: Oral    mupirocin (BACTROBAN) 2 % ointment  22 g 3 6/6/2018    [facility]/pharmacy #[address] [place] [address] BLVD.    Sig: Use 2 times a day to the buttock with flare    Class: E-Prescribe    OLANZapine (ZYPREXA) 2.5 MG tablet  120 tablet 0 1/4/2019 1/4/2020   [facility]/pharmacy #[address] [place] [address] BLVD.    Sig: Take 4 tablets (10 mg) by mouth At Bedtime    Class: E-Prescribe    Notes to Pharmacy: Please dispense 2.5mg tablets so medication can be adjusted if needed.    Route: Oral    omeprazole (PRILOSEC) 20 MG DR capsule  180 capsule 3 1/2/2019    [facility]/pharmacy #[address] [place] [address] BLVD.    Sig: Take 2 capsules (40 mg) by mouth 2 times daily    Class: E-Prescribe    Route: Oral    pentoxifylline (TRENTAL) 400 MG CR tablet  270 tablet 2 3/14/2018    [facility]/pharmacy #[address] [place], [place] [address]VD.    Sig: Take 1 tablet (400 mg) by mouth 3 times daily (with meals)    Class: E-Prescribe    Route: Oral    polyethylene glycol (MIRALAX/GLYCOLAX) Packet  100 packet 3 10/18/2018    [facility]/pharmacy #[address] [place], [place] [address]VD.    Sig: Take 34 g by mouth 2 times daily    Class: No Print Out    Route: Oral    potassium phosphate, monobasic, (K-PHOS) 500 MG tablet  90 tablet 3 1/2/2019    [facility]/pharmacy #[address] [place], [place] [address]VD.    Sig: Take 1 tablet (500 mg) by mouth 3 times daily    Class: E-Prescribe    Route: Oral    study - entinostat (IDS# 5050) 1 mg tablet  4 tablet 0 1/16/2019 2/7/2019   [facility] - [address]    Sig: Take 1 tablet (1 mg) by mouth every 7 days for 4 doses Take one 1mg tablet with one 5mg tablet for total dose of 6mg weekly. Take on an empty stomach, at least 1 hour before or 2 hours  after a meal.  Swallow tablet whole.    Class: Local Print    Notes to Pharmacy: Deliver to [facility] for dispensing.    Route: Oral    study - entinostat (IDS# 5050) 1 mg tablet (Ended)  4 tablet 0 12/19/2018 1/10/2019   [facility] - [address]    Sig: Take 1 tablet (1 mg) by mouth every 7 days for 4 doses Take one 1mg tablet with one 5mg tablet for total dose of 6mg weekly. Take on an empty stomach, at least 1 hour before or 2 hours after a meal.  Swallow tablet whole.    Class: Local Print    Notes to Pharmacy: Deliver to [facility] for dispensing.    Route: Oral    study - entinostat (IDS# 5050) 1 mg tablet (Ended)  4 tablet 0 11/21/2018 12/13/2018   [facility] - [address]    Sig: Take 1 tablet (1 mg) by mouth every 7 days for 4 doses Take one 1mg tablet with one 5mg tablet for total dose of 6mg weekly. Take on an empty stomach, at least 1 hour before or 2 hours after a meal.  Swallow tablet whole.    Class: Local Print    Notes to Pharmacy: Deliver to [facility] for dispensing.    Route: Oral    study - entinostat (IDS# 5050) 1 mg tablet (Ended)  4 tablet 0 10/17/2018 11/8/2018   [facility] - [address]    Sig: Take 1 tablet (1 mg) by mouth every 7 days for 4 doses Take one 1mg tablet with one 5mg tablet for total dose of 6mg weekly. Take on an empty stomach, at least 1 hour before or 2 hours after a meal.  Swallow tablet whole.    Class: Local Print    Notes to Pharmacy: Deliver to [facility] for dispensing.    Route: Oral    study - entinostat (IDS# 5050) 1 mg tablet (Ended)  4 tablet 0 9/12/2018 10/4/2018   [facility] - [address]    Sig: Take 1 tablet (1 mg) by mouth every 7 days for 4 doses Take one 1mg tablet with one 5mg tablet for total dose of 6mg weekly. Take on an empty stomach, at least 1 hour before or 2 hours after a meal.  Swallow  tablet whole.    Class: Local Print    Notes to Pharmacy: Deliver to [facility] for dispensing.    Route: Oral    study - entinostat (IDS# 5050) 1 mg tablet (Ended)  4 tablet 0 8/8/2018 8/30/2018   [facility] - [address]    Sig: Take 1 tablet (1 mg) by mouth every 7 days for 4 doses Take one 1mg tablet with one 5mg tablet for total dose of 6mg weekly. Take on an empty stomach, at least 1 hour before or 2 hours after a meal.  Swallow tablet whole.    Class: Local Print    Notes to Pharmacy: Deliver to [facility] for dispensing.    Route: Oral    study - entinostat (IDS# 5050) 1 mg tablet (Ended)  4 tablet 0 7/5/2018 7/27/2018   [facility] - [address]    Sig: Take 1 tablet (1 mg) by mouth every 7 days for 4 doses Take one 1mg tablet with one 5mg tablet for total dose of 6mg weekly. Take on an empty stomach, at least 1 hour before or 2 hours after a meal.  Swallow tablet whole.    Class: Local Print    Notes to Pharmacy: Deliver to [facility] for dispensing.    Route: Oral    study - entinostat (IDS# 5050) 1 mg tablet (Ended)  4 tablet 0 5/23/2018 6/14/2018   [facility] - [address]    Sig: Take 1 tablet (1 mg) by mouth every 7 days for 4 doses Take one 1mg tablet with one 5mg tablet for total dose of 6mg weekly. Take on an empty stomach, at least 1 hour before or 2 hours after a meal.  Swallow tablet whole.    Class: Local Print    Notes to Pharmacy: Deliver to [facility] for dispensing    Route: Oral    study - entinostat (IDS# 5050) 1 mg tablet (Ended)  4 tablet 0 4/11/2018 5/3/2018   [facility] - [address]    Sig: Take 1 tablet (1 mg) by mouth every 7 days for 4 doses Take one 1mg tablet with one 5mg tablet for total dose of 6mg weekly. Take on an empty stomach, at least 1 hour before or 2 hours after a meal.  Swallow tablet whole.    Class: Local  Print    Notes to Pharmacy: Deliver to [facility] for dispensing    Route: Oral    study - entinostat (IDS# 5050) 1 mg tablet (Ended)  4 tablet 0 3/7/2018 3/29/2018   [facility] - [address]    Sig: Take 1 tablet (1 mg) by mouth every 7 days for 4 doses Take one 1mg tablet with one 5mg tablet for total dose of 6mg weekly. Take on an empty stomach, at least 1 hour before or 2 hours after a meal.  Swallow tablet whole.    Class: Local Print    Notes to Pharmacy: Deliver to [facility] for dispensing    Route: Oral    study - entinostat (IDS# 5050) 1 mg tablet (Ended)  4 tablet 0 1/24/2018 2/15/2018   [facility] - [address]    Sig: Take 1 tablet (1 mg) by mouth every 7 days for 4 doses Take one 1mg tablet with one 5mg tablet for total dose of 6mg weekly. Take on an empty stomach, at least 1 hour before or 2 hours after a meal.  Swallow tablet whole.    Class: Local Print    Notes to Pharmacy: Deliver to [facility] for dispensing    Route: Oral    study - entinostat (IDS# 5050) 5 mg tablet  4 tablet 0 1/16/2019 2/7/2019   [facility] - [address]    Sig: Take 1 tablet (5 mg) by mouth every 7 days for 4 doses Take one 5mg tablet with one 1mg tablet for total dose of 6mg weekly. Take on an empty stomach, at least 1 hour before or 2 hours after a meal.  Swallow tablet whole.    Class: Local Print    Notes to Pharmacy: Deliver to [facility] for dispensing    Route: Oral    study - entinostat (IDS# 5050) 5 mg tablet (Ended)  4 tablet 0 12/19/2018 1/10/2019   [facility] - [address]    Sig: Take 1 tablet (5 mg) by mouth every 7 days for 4 doses Take one 5mg tablet with one 1mg tablet for total dose of 6mg weekly. Take on an empty stomach, at least 1 hour before or 2 hours after a meal.  Swallow tablet whole.    Class: Local Print    Notes to Pharmacy: Deliver to  [facility] for dispensing    Route: Oral    study - entinostat (IDS# 5050) 5 mg tablet (Ended)  4 tablet 0 11/21/2018 12/13/2018   [facility] - [address]    Sig: Take 1 tablet (5 mg) by mouth every 7 days for 4 doses Take one 5mg tablet with one 1mg tablet for total dose of 6mg weekly. Take on an empty stomach, at least 1 hour before or 2 hours after a meal.  Swallow tablet whole.    Class: Local Print    Notes to Pharmacy: Deliver to [facility] for dispensing    Route: Oral    study - entinostat (IDS# 5050) 5 mg tablet (Ended)  4 tablet 0 10/17/2018 11/8/2018   [facility] - [address]    Sig: Take 1 tablet (5 mg) by mouth every 7 days for 4 doses Take one 5mg tablet with one 1mg tablet for total dose of 6mg weekly. Take on an empty stomach, at least 1 hour before or 2 hours after a meal.  Swallow tablet whole.    Class: Local Print    Notes to Pharmacy: Deliver to [facility] for dispensing    Route: Oral    study - entinostat (IDS# 5050) 5 mg tablet (Ended)  4 tablet 0 9/12/2018 10/4/2018   [facility] - [address]    Sig: Take 1 tablet (5 mg) by mouth every 7 days for 4 doses Take one 5mg tablet with one 1mg tablet for total dose of 6mg weekly. Take on an empty stomach, at least 1 hour before or 2 hours after a meal.  Swallow tablet whole.    Class: Local Print    Notes to Pharmacy: Deliver to [facility] for dispensing    Route: Oral    study - entinostat (IDS# 5050) 5 mg tablet (Ended)  4 tablet 0 8/8/2018 8/30/2018   [facility] - [address]    Sig: Take 1 tablet (5 mg) by mouth every 7 days for 4 doses Take one 5mg tablet with one 1mg tablet for total dose of 6mg weekly. Take on an empty stomach, at least 1 hour before or 2 hours after a meal.  Swallow tablet whole.    Class: Local Print    Notes to Pharmacy: Deliver to [facility] for dispensing     Route: Oral    study - entinostat (IDS# 5050) 5 mg tablet (Ended)  4 tablet 0 7/5/2018 7/27/2018   [facility] - [address]    Sig: Take 1 tablet (5 mg) by mouth every 7 days for 4 doses Take one 5mg tablet with one 1mg tablet for total dose of 6mg weekly. Take on an empty stomach, at least 1 hour before or 2 hours after a meal.  Swallow tablet whole.    Class: Local Print    Notes to Pharmacy: Deliver to [facility] for dispensing    Route: Oral    study - entinostat (IDS# 5050) 5 mg tablet (Ended)  4 tablet 0 5/23/2018 6/14/2018   [facility] - [address]    Sig: Take 1 tablet (5 mg) by mouth every 7 days for 4 doses Take one 5mg tablet with one 1mg tablet for total dose of 6mg weekly. Take on an empty stomach, at least 1 hour before or 2 hours after a meal.  Swallow tablet whole.    Class: Local Print    Notes to Pharmacy: Deliver to [facility] for dispensing    Route: Oral    study - entinostat (IDS# 5050) 5 mg tablet (Ended)  4 tablet 0 4/11/2018 5/3/2018   [facility] - [address]    Sig: Take 1 tablet (5 mg) by mouth every 7 days for 4 doses Take one 5mg tablet with one 1mg tablet for total dose of 6mg weekly. Take on an empty stomach, at least 1 hour before or 2 hours after a meal.  Swallow tablet whole.    Class: Local Print    Notes to Pharmacy: Deliver to [facility] for dispensing    Route: Oral    study - entinostat (IDS# 5050) 5 mg tablet (Ended)  4 tablet 0 3/7/2018 3/29/2018   [facility] - [address]    Sig: Take 1 tablet (5 mg) by mouth every 7 days for 4 doses Take one 5mg tablet with one 1mg tablet for total dose of 6mg weekly. Take on an empty stomach, at least 1 hour before or 2 hours after a meal.  Swallow tablet whole.    Class: Local Print    Notes to Pharmacy: Deliver to [facility] for dispensing    Route: Oral    study - entinostat  (IDS# 5050) 5 mg tablet (Ended)  4 tablet 0 1/24/2018 2/15/2018   [facility] - [place], [place] - [address] [facility]    Sig: Take 1 tablet (5 mg) by mouth every 7 days for 4 doses Take one 5mg tablet with one 1mg tablet for total dose of 6mg weekly. Take on an empty stomach, at least 1 hour before or 2 hours after a meal.  Swallow tablet whole.    Class: Local Print    Notes to Pharmacy: Deliver to [facility] for dispensing    Route: Oral    sulfamethoxazole-trimethoprim (BACTRIM/SEPTRA) 400-80 MG per tablet  24 tablet 11 9/12/2018    [id number] [place], [place] - [address]    Sig: Take 1 tablet by mouth 2 times daily On Saturdays and Sundays    Class: E-Prescribe    Route: Oral    vitamin E (GNP VITAMIN E) 400 UNIT capsule  30 capsule 11 11/29/2017    [id number] - [place], [place] - [address]    Sig: Take 1 capsule (400 Units) by mouth daily    Class: E-Prescribe    Route: Oral          Past Medical History: I have reviewed this patient's past medical history and updated as appropriate.   Past Medical History:   Diagnosis Date     Cranial nerve dysfunction      Dyspepsia      Ependymoma (H)      Gastro-oesophageal reflux disease      Hearing loss      Intracranial hemorrhage (H)      Migraine      Pilonidal cyst     7-2015     Reduced vision      Refractory obstruction of nasal airway     2nd to nasal valve prolapse     Sleep apnea      Strabismus     gaze palsy         Past Surgical History: I have reviewed this patient's past medical history and updated as appropriate.   Past Surgical History:   Procedure Laterality Date     GRAFT CARTILAGE FROM POSTERIOR AURICLE Left 10/6/2016    Procedure: GRAFT CARTILAGE FROM POSTERIOR AURICLE;  Surgeon: Tyler Richards MD;  [facility]     INCISION AND DRAINAGE PERINEAL, COMBINED Bilateral 7/18/2015    Procedure: COMBINED INCISION AND DRAINAGE PERINEAL;  Surgeon: Dequan Timmons MD;  [facility] OR     OPTICAL TRACKING  SYSTEM CRANIOTOMY, EXCISE TUMOR, COMBINED N/A 4/13/2015    Procedure: COMBINED OPTICAL TRACKING SYSTEM CRANIOTOMY, EXCISE TUMOR;  Surgeon: Francis Velazquez MD;  [facility]     OPTICAL TRACKING SYSTEM CRANIOTOMY, EXCISE TUMOR, COMBINED N/A 4/16/2015    Procedure: COMBINED OPTICAL TRACKING SYSTEM CRANIOTOMY, EXCISE TUMOR;  Surgeon: Francis Velazquez MD;  [facility]     OPTICAL TRACKING SYSTEM CRANIOTOMY, EXCISE TUMOR, COMBINED Bilateral 5/28/2015    Procedure: COMBINED OPTICAL TRACKING SYSTEM CRANIOTOMY, EXCISE TUMOR;  Surgeon: Francis Velazquez MD;  [facility]     OPTICAL TRACKING SYSTEM CRANIOTOMY, EXCISE TUMOR, COMBINED Bilateral 1/14/2016    Procedure: COMBINED OPTICAL TRACKING SYSTEM CRANIOTOMY, EXCISE TUMOR;  Surgeon: Francis Velazquez MD;  [facility]     OPTICAL TRACKING SYSTEM VENTRICULOSTOMY  4/16/2015    Procedure: OPTICAL TRACKING SYSTEM VENTRICULOSTOMY;  Surgeon: Francis Velazquez MD;  [facility]     REMOVE PORT VASCULAR ACCESS N/A 10/6/2016    Procedure: REMOVE PORT VASCULAR ACCESS;  Surgeon: Bruno Perea MD;  [facility]     RHINOPLASTY N/A 10/6/2016    Procedure: RHINOPLASTY;  Surgeon: Tyler Richards MD;  [facility]     VASCULAR SURGERY  5-2015    single lumen power port       Family History: I have reviewed this patient's past family history today and updated as appropriate.  Family History   Problem Relation Age of Onset     Circulatory Father         PE/DVT     Hypothyroidism Father 30     Diabetes Maternal Grandmother      Diabetes Paternal Grandmother      Diabetes Paternal Grandfather      C.A.D. Paternal Grandfather      Hypertension Maternal Grandfather      Thyroid Disease Paternal Aunt         unknown whether hypo or hyper        Social History: Splits time between mom and dad's house      Physical exam:  Vital Signs: /70 (BP Location: Right arm, Patient Position: Sitting, Cuff Size: Adult Large)   Pulse  90   Wt 90.4 kg (199 lb 4.7 oz)   BMI 28.86 kg/m  . (No height on file for this encounter. No weight on file for this encounter. There is no height or weight on file to calculate BMI. No height and weight on file for this encounter.)   Constitutional: Healthy, alert and no distress  Head: Normocephalic. No masses, lesions, tenderness or abnormalities  Neck: Neck supple.  EYE: Patch over eye  ENT: Ears: Normal position, Nose: No discharge and Mouth: Normal, moist mucous membranes  Gastrointestinal: Abdomen:, Soft, Nontender, Nondistended, Normal bowel sounds, No hepatomegaly, No splenomegaly, Rectal: Deferred  Musculoskeletal: Extremities warm, well perfused.   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  Currently Geo has soft stools but he is having trouble with complete evacuation of the stool.  At this point he will possibly benefit from pelvic floor physical therapy and possibly PM&R (he is scheduled to see them on Wed) to help strengthen his core which may help with positioning with stooling and becoming more efficient with the stooling process.  Other possible considerations for the future would be a cecostomy tube for possible antegrade flushes or peristeen for daily enemas.  I do feel that there is still a good chance that visceral hypersensitivity may be contributing to his symptoms as well.      -Continue with current constipation management (Linzess, Colace, MiraLAX) titrating MiraLAX to 2-3 soft Snyder stool scale 4-6 type stools a day.    -Continue time toileting and proper positioning with stooling  -X-ray today to  evaluate amount of stool  -We will be in contact with family regarding pelvic floor PT  -Family encouraged to call us at [phone] with any questions or concerns      No orders of the defined types were placed in this encounter.      I spent a total of 40 minutes face-to-face with his Geo and his dad during today's office visit. Over 50% of this time was spent counseling the patient/parent and/or coordinating care regarding Geo's symptoms , differential diagnosis, diagnostic work up, treatment , potential side effects and complications and follow up plan.     Follow up: Data Unavailable or earlier should patient become symptomatic.      Aniya Wei MD  Pediatric Gastroenterology  [facility]  Patient Care Team:  Jeffrey Espinoza MD as PCP - General (Family Practice)  Dequan Timmons MD as MD (Surgery)  Leoncio Rousseau MD as MD (Pediatric Hematology/Oncology)  Kristi Schuler, APRN CNP as Nurse Practitioner (Nurse Practitioner - Pediatrics)  Higinio Walters MD (Ophthalmology)  Karina Hodgson MSW as   Eren Reeder MD as MD (Dermatology)  Schwab, Briana, RN as Nurse Coordinator  Perico Holley MD as MD (Pediatric Neurology)  Sarah Vines MD as MD (Pediatric Urology)  Sarah Vines MD as MD (Pediatric Urology)  Imani Means, RN as Registered Nurse  Imani Means RN as Registered Nurse

## 2019-02-05 ENCOUNTER — TELEPHONE (OUTPATIENT)
Dept: ONCOLOGY | Facility: CLINIC | Age: 19
End: 2019-02-05

## 2019-02-05 DIAGNOSIS — G47.00 PERSISTENT INSOMNIA: ICD-10-CM

## 2019-02-05 DIAGNOSIS — R46.89 BEHAVIORAL CHANGE: ICD-10-CM

## 2019-02-05 DIAGNOSIS — C71.9 EPENDYMOMA (H): ICD-10-CM

## 2019-02-05 LAB
BASOPHILS # BLD AUTO: 0 10E9/L (ref 0–0.2)
BASOPHILS NFR BLD AUTO: 0.5 %
DIFFERENTIAL METHOD BLD: ABNORMAL
EOSINOPHIL # BLD AUTO: 0.5 10E9/L (ref 0–0.7)
EOSINOPHIL NFR BLD AUTO: 13.2 %
ERYTHROCYTE [DISTWIDTH] IN BLOOD BY AUTOMATED COUNT: 12.7 % (ref 10–15)
HCT VFR BLD AUTO: 39.7 % (ref 40–53)
HGB BLD-MCNC: 12.9 G/DL (ref 13.3–17.7)
LYMPHOCYTES # BLD AUTO: 0.9 10E9/L (ref 0.8–5.3)
LYMPHOCYTES NFR BLD AUTO: 23.9 %
MCH RBC QN AUTO: 29.7 PG (ref 26.5–33)
MCHC RBC AUTO-ENTMCNC: 32.5 G/DL (ref 31.5–36.5)
MCV RBC AUTO: 92 FL (ref 78–100)
MONOCYTES # BLD AUTO: 0.5 10E9/L (ref 0–1.3)
MONOCYTES NFR BLD AUTO: 13.4 %
NEUTROPHILS # BLD AUTO: 1.9 10E9/L (ref 1.6–8.3)
NEUTROPHILS NFR BLD AUTO: 49 %
PLATELET # BLD AUTO: 116 10E9/L (ref 150–450)
RBC # BLD AUTO: 4.34 10E12/L (ref 4.4–5.9)
WBC # BLD AUTO: 3.8 10E9/L (ref 4–11)

## 2019-02-05 PROCEDURE — 85025 COMPLETE CBC W/AUTO DIFF WBC: CPT | Performed by: PEDIATRICS

## 2019-02-05 PROCEDURE — 80053 COMPREHEN METABOLIC PANEL: CPT | Performed by: PEDIATRICS

## 2019-02-05 PROCEDURE — 36415 COLL VENOUS BLD VENIPUNCTURE: CPT | Performed by: PEDIATRICS

## 2019-02-05 PROCEDURE — 84100 ASSAY OF PHOSPHORUS: CPT | Performed by: PEDIATRICS

## 2019-02-05 PROCEDURE — 83735 ASSAY OF MAGNESIUM: CPT | Performed by: PEDIATRICS

## 2019-02-05 RX ORDER — OLANZAPINE 2.5 MG/1
10 TABLET, FILM COATED ORAL AT BEDTIME
Qty: 120 TABLET | Refills: 0 | Status: ON HOLD | OUTPATIENT
Start: 2019-02-05 | End: 2019-02-15

## 2019-02-06 ENCOUNTER — OFFICE VISIT (OUTPATIENT)
Dept: PHYSICAL MEDICINE AND REHAB | Facility: CLINIC | Age: 19
End: 2019-02-06
Payer: COMMERCIAL

## 2019-02-06 VITALS
DIASTOLIC BLOOD PRESSURE: 82 MMHG | HEART RATE: 87 BPM | SYSTOLIC BLOOD PRESSURE: 119 MMHG | WEIGHT: 199.29 LBS | OXYGEN SATURATION: 97 % | BODY MASS INDEX: 28.85 KG/M2

## 2019-02-06 DIAGNOSIS — Z74.09 IMPAIRED MOBILITY AND ACTIVITIES OF DAILY LIVING: ICD-10-CM

## 2019-02-06 DIAGNOSIS — R26.2 IMPAIRED AMBULATION: ICD-10-CM

## 2019-02-06 DIAGNOSIS — Z78.9 IMPAIRED MOBILITY AND ACTIVITIES OF DAILY LIVING: ICD-10-CM

## 2019-02-06 DIAGNOSIS — C71.9 EPENDYMOMA (H): ICD-10-CM

## 2019-02-06 DIAGNOSIS — I61.9 HEMORRHAGIC STROKE (H): ICD-10-CM

## 2019-02-06 DIAGNOSIS — K59.09 CHRONIC CONSTIPATION: Primary | ICD-10-CM

## 2019-02-06 DIAGNOSIS — D49.6 NEOPLASM OF POSTERIOR CRANIAL FOSSA (H): ICD-10-CM

## 2019-02-06 LAB
ALBUMIN SERPL-MCNC: 3.4 G/DL (ref 3.4–5)
ALP SERPL-CCNC: 125 U/L (ref 65–260)
ALT SERPL W P-5'-P-CCNC: 15 U/L (ref 0–50)
ANION GAP SERPL CALCULATED.3IONS-SCNC: <1 MMOL/L (ref 3–14)
AST SERPL W P-5'-P-CCNC: 21 U/L (ref 0–35)
BILIRUB SERPL-MCNC: 0.2 MG/DL (ref 0.2–1.3)
BUN SERPL-MCNC: 17 MG/DL (ref 7–30)
CALCIUM SERPL-MCNC: 8.6 MG/DL (ref 8.5–10.1)
CHLORIDE SERPL-SCNC: 107 MMOL/L (ref 98–110)
CO2 SERPL-SCNC: 33 MMOL/L (ref 20–32)
CREAT SERPL-MCNC: 1.11 MG/DL (ref 0.5–1)
GFR SERPL CREATININE-BSD FRML MDRD: >90 ML/MIN/{1.73_M2}
GLUCOSE SERPL-MCNC: 89 MG/DL (ref 70–99)
MAGNESIUM SERPL-MCNC: 2.1 MG/DL (ref 1.6–2.3)
PHOSPHATE SERPL-MCNC: 4 MG/DL (ref 2.5–4.5)
POTASSIUM SERPL-SCNC: 4.7 MMOL/L (ref 3.4–5.3)
PROT SERPL-MCNC: 6.7 G/DL (ref 6.8–8.8)
SODIUM SERPL-SCNC: 140 MMOL/L (ref 133–144)

## 2019-02-06 ASSESSMENT — PAIN SCALES - GENERAL: PAINLEVEL: NO PAIN (0)

## 2019-02-06 NOTE — NURSING NOTE
Chief Complaint   Patient presents with     Consult     P NEW - CONSTIPATION - REFERRAL DR. LAWRENCE Pak, EMT

## 2019-02-06 NOTE — PROGRESS NOTES
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  He is followed by gastroenterology for this issue, and received a cleanout with Golytely approximately 2 weeks ago.  Despite this he continues to feel that his stools are \"higher up\", and has difficulty moving down to evacuate.  He is on Linzess, stool softeners, Miralax TID, and oral Dulcolax.  He does not have incontinent episodes and describes his stool as a 3-5 on the [place] scale.  He has no issues with urination.  Functionally he is able to ambulate with a walker with assistance within the home and attends physical therapy on a weekly basis.  He uses a wheelchair for longer distances.  He also participates in occupational therapy and is working towards independent living.  Per father, he is mostly cognitively in tact and he is able to answer appropriately and converse, albeit slowly.    PMH:  Past Medical History:   Diagnosis Date     Cranial nerve dysfunction      Dyspepsia      Ependymoma (H)      Gastro-oesophageal reflux disease      Hearing loss      Intracranial hemorrhage (H)      Migraine      Pilonidal cyst     7-2015     Reduced vision      Refractory obstruction of nasal airway     2nd to nasal valve prolapse     Sleep apnea      Strabismus     gaze palsy        PSH:  Past Surgical History:   Procedure Laterality Date     GRAFT CARTILAGE FROM POSTERIOR AURICLE Left 10/6/2016    Procedure: GRAFT CARTILAGE FROM POSTERIOR AURICLE;  Surgeon: Tyler Richards MD;  [facility]     INCISION AND DRAINAGE PERINEAL, COMBINED Bilateral 7/18/2015    " Procedure: COMBINED INCISION AND DRAINAGE PERINEAL;  Surgeon: Dequan Timmons MD;  [facility]     OPTICAL TRACKING SYSTEM CRANIOTOMY, EXCISE TUMOR, COMBINED N/A 4/13/2015    Procedure: COMBINED OPTICAL TRACKING SYSTEM CRANIOTOMY, EXCISE TUMOR;  Surgeon: Francis Velazquez MD;  [facility]     OPTICAL TRACKING SYSTEM CRANIOTOMY, EXCISE TUMOR, COMBINED N/A 4/16/2015    Procedure: COMBINED OPTICAL TRACKING SYSTEM CRANIOTOMY, EXCISE TUMOR;  Surgeon: Francis Velazquez MD;  [facility]     OPTICAL TRACKING SYSTEM CRANIOTOMY, EXCISE TUMOR, COMBINED Bilateral 5/28/2015    Procedure: COMBINED OPTICAL TRACKING SYSTEM CRANIOTOMY, EXCISE TUMOR;  Surgeon: Francis Velazquez MD;  [facility]     OPTICAL TRACKING SYSTEM CRANIOTOMY, EXCISE TUMOR, COMBINED Bilateral 1/14/2016    Procedure: COMBINED OPTICAL TRACKING SYSTEM CRANIOTOMY, EXCISE TUMOR;  Surgeon: Francis Velazquez MD;  [facility]     OPTICAL TRACKING SYSTEM VENTRICULOSTOMY  4/16/2015    Procedure: OPTICAL TRACKING SYSTEM VENTRICULOSTOMY;  Surgeon: Francis Velazquez MD;  [facility]     REMOVE PORT VASCULAR ACCESS N/A 10/6/2016    Procedure: REMOVE PORT VASCULAR ACCESS;  Surgeon: Bruno Perea MD;  [facility]     RHINOPLASTY N/A 10/6/2016    Procedure: RHINOPLASTY;  Surgeon: Tyler Richards MD;  [facility]     VASCULAR SURGERY  5-2015    single lumen power port       Allergies:  Allergies   Allergen Reactions     Blood Transfusion Related (Informational Only) Swelling     Periorbital swelling post platelet transfusion     No Known Drug Allergies        Medications:  Current Outpatient Medications   Medication     calcium carbonate-vitamin D 600-400 MG-UNIT CHEW     Cholecalciferol 400 UNITS CHEW     dexamethasone (DECADRON) 0.5 MG tablet     fexofenadine (ALLEGRA) 180 MG tablet     linaclotide (LINZESS) 290 MCG capsule     melatonin 3 MG tablet     mupirocin (BACTROBAN) 2 % ointment      OLANZapine (ZYPREXA) 2.5 MG tablet     omeprazole (PRILOSEC) 20 MG DR capsule     pentoxifylline (TRENTAL) 400 MG CR tablet     polyethylene glycol (MIRALAX/GLYCOLAX) Packet     potassium phosphate, monobasic, (K-PHOS) 500 MG tablet     study - entinostat (IDS# 5050) 1 mg tablet     study - entinostat (IDS# 5050) 5 mg tablet     sulfamethoxazole-trimethoprim (BACTRIM/SEPTRA) 400-80 MG per tablet     vitamin E (GNP VITAMIN E) 400 UNIT capsule     No current facility-administered medications for this visit.        Social History:  Social History     Socioeconomic History     Marital status: Single     Spouse name: Not on file     Number of children: Not on file     Years of education: Not on file     Highest education level: Not on file   Social Needs     Financial resource strain: Not on file     Food insecurity - worry: Not on file     Food insecurity - inability: Not on file     Transportation needs - medical: Not on file     Transportation needs - non-medical: Not on file   Occupational History     Not on file   Tobacco Use     Smoking status: Never Smoker     Smokeless tobacco: Never Used   Substance and Sexual Activity     Alcohol use: No     Drug use: No     Sexual activity: No   Other Topics Concern     Not on file   Social History Narrative     Not on file     Review of Systems - History obtained from father and chart review  Psychological ROS: positive for - behavioral disorder  Cardiovascular ROS: no chest pain or dyspnea on exertion  Gastrointestinal ROS: positive for - constipation  Genito-Urinary ROS: no dysuria, trouble voiding, or hematuria  Musculoskeletal ROS: positive for - muscular weakness  Neurological ROS: positive for - behavioral changes, bowel and bladder control changes, impaired coordination/balance and speech problems  Dermatological ROS: negative for rash  Functional Hx:  As per HPI    Physical Exam:  /82 (BP Location: Left arm, Patient Position: Sitting, Cuff Size: Adult Regular)    Pulse 87   Wt 90.4 kg (199 lb 4.7 oz)   SpO2 97%   BMI 28.85 kg/m      Gen: NAD  Skin: No rashes noted  Pulm: Non-labored breathing  GI: Soft, NT/ND  Ext: No LE Edema, DPs 2+, no calf tenderness  Neuro: Slow responses but able to follows commands and answers appropriately  Spasticity MAS: 0 in all extremities  MMT 5/5 to b/l UE and LEs  +Dysmetria with F-N testing  Seated in wheelchair with Comfort curve cushion  Labs:  Lab Results   Component Value Date    WBC 3.8 (L) 02/05/2019    RBC 4.34 (L) 02/05/2019    MCV 92 02/05/2019    MCH 29.7 02/05/2019    MCHC 32.5 02/05/2019    RDW 12.7 02/05/2019       Lab Results   Component Value Date    BUN 17 02/05/2019     02/05/2019    POTASSIUM 4.7 02/05/2019    CO2 33 (H) 02/05/2019    ANIONGAP <1 (L) 02/05/2019       Lab Results   Component Value Date    ALBUMIN 3.4 02/05/2019    ALKPHOS 125 02/05/2019    BILITOTAL 0.2 02/05/2019    AST 21 02/05/2019    ALT 15 02/05/2019      Imaging:  Redemonstration of the ependymoma in the posterior fossa centered  within the fourth ventricle. Since 10/18/2018 slight interval decrease  in the dimensions of the mass, currently it measures 2.8 x 2.1 x 2.0  cm. The remainder findings are unchanged.  I have personally reviewed the examination and initial interpretation  and I agree with the findings.    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 Hicks's chief complaint is constipation for which he is already following GI.  I have not made any changes to his medications, but suggested he could try Senna 17.2 mg daily to try and activate Jana's plexus to try to stimulate peristalsis.  -He has good strength in his extremities, unfortunately he has significant cerebellar symptoms including decreased coordination and balance.  These are extremely  difficult to manage and he is already in physical and occupational therapies.  He should continue to do so, however at this point I would not expect ambulation to be his primary form of mobility.    -Did not appreciate any spasticity on exam to warrant any treatment  -Wheelchair and cushion seem to be adequate at this time, but he may follow up with the PM&R department if any equipment needs arise.  Otherwise, he may follow up with me on an as needed basis only.     Jose Rafael Stack MD  Department of Rehabilitation Medicine  Pager: [phone]    Time Spent on this Encounter   I, Jose Rafael Stack, spent a total of 30 minutes in the clinic today managing the care of Geo Hicks.  Over 50% of my time was spent counseling the patient and /or coordinating care.

## 2019-02-06 NOTE — LETTER
"2/6/2019       RE: Geo Hicks  [address] [place] [id number]     Dear Colleague,    Thank you for referring your patient, Geo Hicks, to the [facility] PHYSICAL MEDICINE AND REHABILITATION at [facility]. Please see a copy of my visit note below.    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  He is followed by gastroenterology for this issue, and received a cleanout with Golytely approximately 2 weeks ago.  Despite this he continues to feel that his stools are \"higher up\", and has difficulty moving down to evacuate.  He is on Linzess, stool softeners, Miralax TID, and oral Dulcolax.  He does not have incontinent episodes and describes his stool as a 3-5 on the [place] scale.  He has no issues with urination.  Functionally he is able to ambulate with a walker with assistance within the home and attends physical therapy on a weekly basis.  He uses a wheelchair for longer distances.  He also participates in occupational therapy and is working towards independent living.  Per father, he is mostly cognitively in tact and he is able to answer appropriately and converse, albeit slowly.    PMH:  Past Medical History:   Diagnosis Date     Cranial nerve dysfunction      Dyspepsia      Ependymoma (H)      Gastro-oesophageal reflux disease      Hearing loss      Intracranial hemorrhage (H)      Migraine      Pilonidal cyst     7-2015     Reduced vision      Refractory obstruction of nasal airway     2nd to nasal valve prolapse     Sleep apnea      Strabismus     gaze " palsy        PSH:  Past Surgical History:   Procedure Laterality Date     GRAFT CARTILAGE FROM POSTERIOR AURICLE Left 10/6/2016    Procedure: GRAFT CARTILAGE FROM POSTERIOR AURICLE;  Surgeon: Tyler Richards MD;  [facility]     INCISION AND DRAINAGE PERINEAL, COMBINED Bilateral 7/18/2015    Procedure: COMBINED INCISION AND DRAINAGE PERINEAL;  Surgeon: Dequan Timmons MD;  [facility]     OPTICAL TRACKING SYSTEM CRANIOTOMY, EXCISE TUMOR, COMBINED N/A 4/13/2015    Procedure: COMBINED OPTICAL TRACKING SYSTEM CRANIOTOMY, EXCISE TUMOR;  Surgeon: Francis Velazquez MD;  [facility]     OPTICAL TRACKING SYSTEM CRANIOTOMY, EXCISE TUMOR, COMBINED N/A 4/16/2015    Procedure: COMBINED OPTICAL TRACKING SYSTEM CRANIOTOMY, EXCISE TUMOR;  Surgeon: Francis Velazquez MD;  [facility]     OPTICAL TRACKING SYSTEM CRANIOTOMY, EXCISE TUMOR, COMBINED Bilateral 5/28/2015    Procedure: COMBINED OPTICAL TRACKING SYSTEM CRANIOTOMY, EXCISE TUMOR;  Surgeon: Francis Velazquez MD;  [facility]     OPTICAL TRACKING SYSTEM CRANIOTOMY, EXCISE TUMOR, COMBINED Bilateral 1/14/2016    Procedure: COMBINED OPTICAL TRACKING SYSTEM CRANIOTOMY, EXCISE TUMOR;  Surgeon: Francis Velazquez MD;  [facility]     OPTICAL TRACKING SYSTEM VENTRICULOSTOMY  4/16/2015    Procedure: OPTICAL TRACKING SYSTEM VENTRICULOSTOMY;  Surgeon: Francis Velazquez MD;  [facility]     REMOVE PORT VASCULAR ACCESS N/A 10/6/2016    Procedure: REMOVE PORT VASCULAR ACCESS;  Surgeon: Bruno Perea MD;  [facility]     RHINOPLASTY N/A 10/6/2016    Procedure: RHINOPLASTY;  Surgeon: Tyler Richards MD;  [facility]     VASCULAR SURGERY  5-2015    single lumen power port       Allergies:  Allergies   Allergen Reactions     Blood Transfusion Related (Informational Only) Swelling     Periorbital swelling post platelet transfusion     No Known Drug Allergies        Medications:  Current Outpatient  Medications   Medication     calcium carbonate-vitamin D 600-400 MG-UNIT CHEW     Cholecalciferol 400 UNITS CHEW     dexamethasone (DECADRON) 0.5 MG tablet     fexofenadine (ALLEGRA) 180 MG tablet     linaclotide (LINZESS) 290 MCG capsule     melatonin 3 MG tablet     mupirocin (BACTROBAN) 2 % ointment     OLANZapine (ZYPREXA) 2.5 MG tablet     omeprazole (PRILOSEC) 20 MG DR capsule     pentoxifylline (TRENTAL) 400 MG CR tablet     polyethylene glycol (MIRALAX/GLYCOLAX) Packet     potassium phosphate, monobasic, (K-PHOS) 500 MG tablet     study - entinostat (IDS# 5050) 1 mg tablet     study - entinostat (IDS# 5050) 5 mg tablet     sulfamethoxazole-trimethoprim (BACTRIM/SEPTRA) 400-80 MG per tablet     vitamin E (GNP VITAMIN E) 400 UNIT capsule     No current facility-administered medications for this visit.        Social History:  Social History     Socioeconomic History     Marital status: Single     Spouse name: Not on file     Number of children: Not on file     Years of education: Not on file     Highest education level: Not on file   Social Needs     Financial resource strain: Not on file     Food insecurity - worry: Not on file     Food insecurity - inability: Not on file     Transportation needs - medical: Not on file     Transportation needs - non-medical: Not on file   Occupational History     Not on file   Tobacco Use     Smoking status: Never Smoker     Smokeless tobacco: Never Used   Substance and Sexual Activity     Alcohol use: No     Drug use: No     Sexual activity: No   Other Topics Concern     Not on file   Social History Narrative     Not on file     Review of Systems - History obtained from father and chart review  Psychological ROS: positive for - behavioral disorder  Cardiovascular ROS: no chest pain or dyspnea on exertion  Gastrointestinal ROS: positive for - constipation  Genito-Urinary ROS: no dysuria, trouble voiding, or hematuria  Musculoskeletal ROS: positive for - muscular  weakness  Neurological ROS: positive for - behavioral changes, bowel and bladder control changes, impaired coordination/balance and speech problems  Dermatological ROS: negative for rash  Functional Hx:  As per HPI    Physical Exam:  /82 (BP Location: Left arm, Patient Position: Sitting, Cuff Size: Adult Regular)   Pulse 87   Wt 90.4 kg (199 lb 4.7 oz)   SpO2 97%   BMI 28.85 kg/m       Gen: NAD  Skin: No rashes noted  Pulm: Non-labored breathing  GI: Soft, NT/ND  Ext: No LE Edema, DPs 2+, no calf tenderness  Neuro: Slow responses but able to follows commands and answers appropriately  Spasticity MAS: 0 in all extremities  MMT 5/5 to b/l UE and LEs  +Dysmetria with F-N testing  Seated in wheelchair with Comfort curve cushion  Labs:  Lab Results   Component Value Date    WBC 3.8 (L) 02/05/2019    RBC 4.34 (L) 02/05/2019    MCV 92 02/05/2019    MCH 29.7 02/05/2019    MCHC 32.5 02/05/2019    RDW 12.7 02/05/2019       Lab Results   Component Value Date    BUN 17 02/05/2019     02/05/2019    POTASSIUM 4.7 02/05/2019    CO2 33 (H) 02/05/2019    ANIONGAP <1 (L) 02/05/2019       Lab Results   Component Value Date    ALBUMIN 3.4 02/05/2019    ALKPHOS 125 02/05/2019    BILITOTAL 0.2 02/05/2019    AST 21 02/05/2019    ALT 15 02/05/2019      Imaging:  Redemonstration of the ependymoma in the posterior fossa centered  within the fourth ventricle. Since 10/18/2018 slight interval decrease  in the dimensions of the mass, currently it measures 2.8 x 2.1 x 2.0  cm. The remainder findings are unchanged.  I have personally reviewed the examination and initial interpretation  and I agree with the findings.    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 Hicks's chief complaint is constipation for which he is already following GI.  I  have not made any changes to his medications, but suggested he could try Senna 17.2 mg daily to try and activate Jana's plexus to try to stimulate peristalsis.  -He has good strength in his extremities, unfortunately he has significant cerebellar symptoms including decreased coordination and balance.  These are extremely difficult to manage and he is already in physical and occupational therapies.  He should continue to do so, however at this point I would not expect ambulation to be his primary form of mobility.    -Did not appreciate any spasticity on exam to warrant any treatment  -Wheelchair and cushion seem to be adequate at this time, but he may follow up with the PM&R department if any equipment needs arise.  Otherwise, he may follow up with me on an as needed basis only.     Jose Rafael Stack MD  Department of Rehabilitation Medicine  Pager: [phone]    Time Spent on this Encounter   I, Jose Rafael Stack, spent a total of 30 minutes in the clinic today managing the care of Geo Hicks. Over 50% of my time was spent counseling the patient and /or coordinating care.

## 2019-02-06 NOTE — TELEPHONE ENCOUNTER
Re: Refill for olanzapine    Mother asked for a refill of olanzapine.     eRx sent to the local pharmacy.    Cathie Perales MD  Fellow, Peds Hem/Onc

## 2019-02-07 ENCOUNTER — OFFICE VISIT (OUTPATIENT)
Dept: PSYCHIATRY | Facility: CLINIC | Age: 19
End: 2019-02-07
Attending: PSYCHIATRY & NEUROLOGY
Payer: COMMERCIAL

## 2019-02-07 VITALS — HEART RATE: 93 BPM | SYSTOLIC BLOOD PRESSURE: 125 MMHG | DIASTOLIC BLOOD PRESSURE: 80 MMHG

## 2019-02-07 DIAGNOSIS — Z79.899 ENCOUNTER FOR LONG-TERM (CURRENT) USE OF MEDICATIONS: Primary | ICD-10-CM

## 2019-02-07 ASSESSMENT — PAIN SCALES - GENERAL: PAINLEVEL: NO PAIN (0)

## 2019-02-07 NOTE — PROGRESS NOTES
"[facility] PSYCHIATRY CLINIC MEDICAL DIAGNOSTIC ASSESSMENT           CARE TEAM:  PCP- Jeffrey Espinoza    Specialty Providers- Oncology, Neuropsychology, Physical Therapy, Occupational Therapy    Therapist- Manju Pink at [facility] Team- no.    Geo Hicks is a 18 year old male who prefers the name Geo & pronouns he, him, his, himself.    Referred by:  Veronica Padilla, PhD LP  Referred for evaluation of:  depression and psychosis (delusions)  History Provided by:  patient and family (dad) who were good historians     CHIEF COMPLAINT                                                                                                    \"I could never do that.\"  (When asked about suicidal plan/intent.)    HISTORY OF PRESENT ILLNESS                                                         4, 4      Pertinent Background:  This patient first experienced mental health issues in the past few months and has received treatment for paranoia/delusions.  Notably, this patient is undergoing cancer treatment at the [facility] at [facility] and has been referred for psychiatric assessment by his neuropsychologist who most recently met with him on 1/3/2019.  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 Since then, he has undergone multiple tumor resections, chemotherapy, and radiation treatment. Geo also experienced an intra-tumoral hemorrhage in May 2015 that resulted in neurological changes including facial numbness, significant loss of mobility and dysarthria. In December 2016, a follow-up MRI revealed continued tumor enhancement, however the most recent MRI from 10/16/18 revealed an unchanged fourth ventricle ependymoma in comparison to previous exams, a slight decrease in adjacent edema, as well as a stable size of the ventricular system.  He is currently on COG study [id number] with Entinostat.    Psych critical item history includes suicidal ideation and " psychosis [sxs include delusions].    .   MOST RECENT HISTORY began last year when the patient began asserting his assessment that his oncology team, in addition to his father, were working together to gaslight him as regards the condition of his health.  Patient has experienced severe constipation concomitant with his oncologic regimen and neurologic deficits, including loss of mobility.  Has had several hospital admissions for bowel cleanout and has remarked that the x-rays of his post intervention cleared bowels have been a fraud perpetrated to give him the impression that he has received care when in fact he believes he continues to have untreated constipation.  Has stated that this constipation is the reason he continues to require a wheelchair and that his care team/father could cure him but choose not too.  Also has described two other notable instances of paranoia, first stating that a wooden puzzle in his room had been taken apart while the family was out of town (father states that that puzzle had never been put together) and from a holiday meal, Geo has claimed he saw his mother cooking a full turkey when in fact she only cooked breasts.  In the past the patient has become agitated/irritable when these beliefs have been challenged.    Geo presents today with his father and is in his wheelchair, however they explain that he uses a walker at home and largely uses the wheelchair for outings.  In terms of ADL's, they put out his clothes for him but he dresses himself, and they put him into a shower chair and he completes the shower on his own.  Outside the context of discussing the aforementioned items of contention, wherein Geo's perspective differs from reality, he is noted to be a easy-going person.  Speaks with prominent dysarthria however is cognitively intact, and they relate that he has been an honor student.  Finished high school last year and had the opportunity to maintain his IEP by  "electing not to receive his diploma, which would have enabled him to continue qualifying for services.  Geo decided however that he wanted to go ahead and accept his diploma and finish up.  There has been some consideration of community college, however Geo has stated he doesn't feel ready.  In the past has also expressed frustration at how long it would take him to get his degree.  Today relates that he is particularly interested in math and science and might consider pursuing a degree in electrical engineering at some point.    Began seeing a therapist Manju Pink at the [facility] three months ago.  The family has been concerned regarding his increasing tendency to isolate himself in his room, and while he still plays video games has been less likely to pursue other activities, such as watching movies, listening to music or listening to books on tape, which he previously enjoyed.  Saw neuropsychologist Veronica Padilla at the beginning of January who commented on his preserved cognitive functioning (see the \"Neurologic History\" Section below for their assessment regarding same) however also remarked on increasing psychiatric concerns.  The following is from her 1/3/2019 note:    \"Of most concern are Geo s emerging emotional difficulties.  To that end he completed two rating scales as well as an interview concerning his feelings.  On the Hobson Depression Inventory, Geo showed improvement in his self-reported feelings of sadness and scored in the average range indicating he is not reporting significant feelings of sadness.  On the Hobson Anxiety Inventory, Geo reported improvement in anxiety and now scored in the range indicating minimal anxiety.  In addition, parent ratings also did not indicate significant problems.  In contrast parent interviews indicate the likely presence of a delusional disorder.  Geo shows intransigent beliefs that cannot be shaken despite medical evidence to " "the contrary.  He becomes verbally angry and very upset when these beliefs are shaken.  He also refuses to consider alternative ideas about his thoughts and will shut out people and feel they are conspiring against him.  We will assign a preliminary diagnosis of a delusional disorder due to these unshakeable beliefs.\"    Today we have talked about depressed mood with Geo and he allows feeling low, trapped, and that he sometimes thinks about dying.  When asked if he has ever had suicidal thoughts, he allows that they have crossed his mind but repeatedly asserts \"but I could never do that,\" and \"that's just something I could never do.\"  Denies that he has ever had suicidal intent or plan.  Also denies that he is having those thoughts today, but affirms that if his SI should return/worsen, or if he should develop suicidal plan/intent, that he would tell his family or providers.  We discussed the possibility of starting a medication for depression, in particular identifying a trial of an SSRI such as sertraline.  Agreed to pursue some further medical screening/pharmacologic consultation prior to embarking on this.  Specifically agreed to conduct an EKG and to see if there would be potential concerns regarding an interaction between his anti-neoplastic study drug, Entinostat.  Also touched based on his use of olanzapine, prescribed one month ago for his delusions.  They relate that there has been no significant change in terms of his paranoia/delusional thoughts since starting this medication.  Agreed to continue this medication for the time being.    RECENT SYMPTOMS:   DEPRESSION:  reports-suicidal ideation without plan, without intent, depressed mood, feeling trapped and overwhelmed;  DENIES- self-destructive thoughts, suicidal plan/intent, anhedonia, insomnia and poor concentration /memory  ELIZABETH/HYPOMANIA:  reports-none;  DENIES- increased energy, decreased sleep need, increased activity and " grandiosity  PSYCHOSIS:  reports-delusions- paranoid [details in Interim History];  DENIES- auditory hallucinations and visual hallucinations  DYSREGULATION:  reports-can become irritable/agitated if beliefs are challenged;  DENIES- mood dysregulation, impulsive and aggressive  PANIC ATTACK:  none   ANXIETY:  worry  TRAUMA RELATED:  none  COMPULSIVE:  none  SLEEP:  7 hrs, no problems reported  EATING DISORDER: no    RECENT SUBSTANCE USE:     ALCOHOL- no      TOBACCO- no     CAFFEINE- minimal  OPIOIDS- no         NARCAN KIT- N/A        CANNABIS- no            OTHER ILLICIT DRUGS- no      CURRENT SOCIAL HISTORY:  FINANCIAL SUPPORT- family   CHILDREN- no      LIVING SITUATION- mother and father (who are  and have both remarried) alternating weeks between respective homes  SOCIAL/ SPIRITUAL SUPPORT- mother, father, older brother, friend Rima  FEELS SAFE AT HOME- yes     MEDICAL ROS (2,10):  A comprehensive review of systems was performed and is negative other than noted in the HPI.    SUBSTANCE USE HISTORY                                                                 Past Use- none reported  Treatment- #- no   Most Recent- N/A  Medical Consequences- N/A  HIV/Hepatitis- no  Legal Consequences- N/A     PSYCHIATRIC HISTORY     SIB- no  Suicidal Ideation Hx- yes, see interim history  Suicide Attempt- #- no, most recent- N/A      Violence/Aggression Hx- no  Psychosis Hx- yes, paranoia and delusions/fixed beliefs  Psych Hosp- #- no, most recent- N/A   ECT- no    Eating Disorder: no  Outpatient Programs [DBT, Day Treatment, Eating Disorder etc]: no    SOCIAL and FAMILY HISTORY                        patient reported                                 1ea, 1ea     Financial Support- documented above    Living Situation/Family/Relationships- documented above;  Children- documented above    Trauma History (self-report)- medical/life threatening illness    Legal- none    Cultural/ Social/ Spiritual Support- mother and  "father, brothers, friend Rima, enjoys video games    Early History/Education- Grown up in [place], [place].  Parents are , and he shares time between his mother s and father s homes. Both parents are remarried.  Dad is a , and mom does  for a testing company.  Siblings include two full brothers (older brother Benitez who is a senior in college and younger brother Gamaliel), a step-brother, and a step-sister. Geo graduated from high school in Spring 2018.  Initially considered attending [employer] in Fall 2019, but reportedly lost interest due to the amount of time it would take him to complete courses and receive a degree.  Does today endorse continued interest in pursuing an advanced course of study at some point, specifically stating he is interested in Electrical Engineering.      Family Mental Health History- Denied    PAST PSYCH MED TRIALS     Methylphenidate (from fall of 2017 through summer 2018 to help with daytime sleepiness during Senior Year of High School)    Olanzapine (started early January 2019 due to paranoia/delusions)    MEDICAL / SURGICAL HISTORY                                   Neurologic Hx- See pertinent background, re: history of ependymoma and related chemo, radiation and tumor resection(s).  Notably has experienced neurological damage due to the above which has resulted in facial numbness, significant loss of mobility and dysarthria.  Has had neuropsychiatric evaluation(s) 2/2016, 2/2017 and 1/2019.  The following is from the \"Results and Impressions\" section of his 1/3/2019 eval with Veronica Padilla:      \"We were pleased to discover that the neurocognitive areas measured during this evaluation have remained intact and consistent with previous evaluations. Geo s ability to access and apply acquired word knowledge (verbal comprehension) and his working memory (ability to mentally hold and manipulate information) were in the " "average range. His ability to understand visual information to solve novel abstract visual problems (perceptual reasoning) was in the low average range. There was a slight decrease in performance on one task associated with perceptual reasoning due to time limits. Had no time limits been enforced, Geo s performance would have likely been higher as he was observed to respond with correct answers that were past the time limit allowed. Furthermore, on a timed visual discrimination and scanning task, Geo performed in the borderline range, which was a slight improvement from his previous evaluation in 2017 where he performed in the impaired range.\"    Patient Active Problem List   Diagnosis     GERD (gastroesophageal reflux disease)     Closed fracture at the growth plate of right distal fibula      Elevated serum creatinine     Dyspepsia     Intracranial hemorrhage (H)     Hemorrhagic stroke (H)     Ependymoma (H)     Admission for antineoplastic chemotherapy     Post-operative state     S/P craniotomy     S/P biopsy     Noncomitant strabismus     Abducens neuropathy of both eyes     CANDELARIO (obstructive sleep apnea)     Health Care Home     Hypernatremia     Body temperature low     Current chronic use of systemic steroids     Elevated TSH     Status post chemotherapy     Neoplasm of posterior cranial fossa (H)     Constipation     Chronic constipation     Serum albumin decreased     Closed compression fracture of thoracic vertebra with routine healing, subsequent encounter       Past Surgical History:   Procedure Laterality Date     GRAFT CARTILAGE FROM POSTERIOR AURICLE Left 10/6/2016    Procedure: GRAFT CARTILAGE FROM POSTERIOR AURICLE;  Surgeon: Tyler Ricahrds MD;  Location: [facility]     INCISION AND DRAINAGE PERINEAL, COMBINED Bilateral 7/18/2015    Procedure: COMBINED INCISION AND DRAINAGE PERINEAL;  Surgeon: Dequan Timmons MD;  Location: [facility] OR     OPTICAL TRACKING SYSTEM CRANIOTOMY, EXCISE TUMOR, " COMBINED N/A 4/13/2015    Procedure: COMBINED OPTICAL TRACKING SYSTEM CRANIOTOMY, EXCISE TUMOR;  Surgeon: Francis Velazquez MD;  [facility]     OPTICAL TRACKING SYSTEM CRANIOTOMY, EXCISE TUMOR, COMBINED N/A 4/16/2015    Procedure: COMBINED OPTICAL TRACKING SYSTEM CRANIOTOMY, EXCISE TUMOR;  Surgeon: Francis Velazquez MD;  [facility]     OPTICAL TRACKING SYSTEM CRANIOTOMY, EXCISE TUMOR, COMBINED Bilateral 5/28/2015    Procedure: COMBINED OPTICAL TRACKING SYSTEM CRANIOTOMY, EXCISE TUMOR;  Surgeon: Francis Velazquez MD;  [facility]     OPTICAL TRACKING SYSTEM CRANIOTOMY, EXCISE TUMOR, COMBINED Bilateral 1/14/2016    Procedure: COMBINED OPTICAL TRACKING SYSTEM CRANIOTOMY, EXCISE TUMOR;  Surgeon: Francis Velazquez MD;  [facility]     OPTICAL TRACKING SYSTEM VENTRICULOSTOMY  4/16/2015    Procedure: OPTICAL TRACKING SYSTEM VENTRICULOSTOMY;  Surgeon: Francis Velazquez MD;  [facility]     REMOVE PORT VASCULAR ACCESS N/A 10/6/2016    Procedure: REMOVE PORT VASCULAR ACCESS;  Surgeon: Bruno Perea MD;  [facility]     RHINOPLASTY N/A 10/6/2016    Procedure: RHINOPLASTY;  Surgeon: Tyler Richards MD;  [facility]     VASCULAR SURGERY  5-2015    single lumen power port        ALLERGY                                Blood transfusion related (informational only) and No known drug allergies     MEDICATIONS                               Current Outpatient Medications   Medication Sig Dispense Refill     calcium carbonate-vitamin D 600-400 MG-UNIT CHEW Take 2 tablets in the morning and 1 tablet in the evening. 90 tablet 3     Cholecalciferol 400 UNITS CHEW Take 1 tablet (400 Units) by mouth every morning 60 tablet 2     dexamethasone (DECADRON) 0.5 MG tablet Take 0.75 mg by mouth daily (with breakfast). 90 tablet 3     fexofenadine (ALLEGRA) 180 MG tablet Take 180 mg by mouth daily       linaclotide (LINZESS) 290 MCG capsule Take 1 capsule (290 mcg) by  mouth daily       melatonin 3 MG tablet Take 3 mg by mouth At Bedtime       mupirocin (BACTROBAN) 2 % ointment Use 2 times a day to the buttock with flare 22 g 3     OLANZapine (ZYPREXA) 2.5 MG tablet Take 4 tablets (10 mg) by mouth At Bedtime 120 tablet 0     omeprazole (PRILOSEC) 20 MG DR capsule Take 2 capsules (40 mg) by mouth 2 times daily 180 capsule 3     pentoxifylline (TRENTAL) 400 MG CR tablet Take 1 tablet (400 mg) by mouth 3 times daily (with meals) 270 tablet 2     polyethylene glycol (MIRALAX/GLYCOLAX) Packet Take 34 g by mouth 2 times daily 100 packet 3     potassium phosphate, monobasic, (K-PHOS) 500 MG tablet Take 1 tablet (500 mg) by mouth 3 times daily 90 tablet 3     study - entinostat (IDS# 5050) 1 mg tablet Take 1 tablet (1 mg) by mouth every 7 days for 4 doses Take one 1mg tablet with one 5mg tablet for total dose of 6mg weekly. Take on an empty stomach, at least 1 hour before or 2 hours after a meal.  Swallow tablet whole. 4 tablet 0     study - entinostat (IDS# 5050) 5 mg tablet Take 1 tablet (5 mg) by mouth every 7 days for 4 doses Take one 5mg tablet with one 1mg tablet for total dose of 6mg weekly. Take on an empty stomach, at least 1 hour before or 2 hours after a meal.  Swallow tablet whole. 4 tablet 0     sulfamethoxazole-trimethoprim (BACTRIM/SEPTRA) 400-80 MG per tablet Take 1 tablet by mouth 2 times daily On Saturdays and Sundays 24 tablet 11     vitamin E (GNP VITAMIN E) 400 UNIT capsule Take 1 capsule (400 Units) by mouth daily 30 capsule 11     VITALS                                                                                                                            3, 3   /80   Pulse 93      MENTAL STATUS EXAM                                                                                    9, 14 cog gs     Alertness: alert  and oriented  Appearance: casually groomed, seated in wheelchair, wearing R-sided eye-patch  Behavior/Demeanor: cooperative and pleasant, with  fair eye contact   Speech: prominent dysarthria  Language: good  Psychomotor: mild evident palsy of upper extremities and facial paralysis  Mood: some mild/moderate low mood  Affect: restricted; was congruent to mood; was congruent to content  Thought Process/Associations: unremarkable  Thought Content:  Reports suicidal ideation without plan; without intent [details in Interim History] and delusions [details in Interim History];  Denies violent ideation  Perception:  Reports none;  Denies auditory hallucinations and visual hallucinations  Insight: partial  Judgment: good  Cognition: (6) oriented: time, person, and place  attention span: intact  concentration: intact  recent memory: intact  remote memory: intact  fund of knowledge: appropriate  Gait and Station: remarkable for:  ataxia - can ambulate but was seen in wheelchair     LABS and DATA     AIMS:  complete next visit    PHQ9 TODAY = Patient did not complete  No flowsheet data found.    ANTIPSYCHOTIC LABS  [glu, A1C, lipids (rohit LDL), liver enzymes, WBC, ANEU, Hgb, plts]  q12 mo  Recent Labs   Lab Test 02/05/19  1412 01/29/19  1359 01/22/19  1411 01/16/19  0915   GLC 89 92 105* 84     No lab results found.  Recent Labs   Lab Test 02/05/19  1412 01/29/19  1359 01/22/19  1411 01/16/19  0915   AST 21 23 29 33   ALT 15 18 19 21   ALKPHOS 125 121 145 151     Recent Labs   Lab Test 02/05/19  1412 01/29/19  1359 01/22/19  1411 01/16/19  0915   WBC 3.8* 4.5 4.8 3.4*   ANEU 1.9 2.7 3.2 1.3*   HGB 12.9* 12.5* 13.0* 12.6*   * 138* 129* 145*       PSYCHIATRIC DIAGNOSES                                                                                               Delusional Disorder vs Psychotic Disorder Due to Another Medical Condition vs Psychotic features secondary to Mood Disorder    Major Depressive Disorder, single episode, moderate vs severe, with psychotic features     ASSESSMENT                                                                                                           m2, h3     TODAY:  Geo Hicks presents to the [facility] Psychiatry Outpatient Psychiatry clinic for diagnostic assessment of paranoia, delusional thoughts and depressed mood.  Notably this patient was diagnosed with an ependymoma 4 years ago with subsequent chemotherapy, radiation and multiple tumor resections.  Has sustained neurologic deficits resulting from an intracranial hemorrhage (occurring after an early resection) and then again later due to a further intracranial surgery.  Has mobility/balance difficulty, partial facial palsy, abducens neuropathy of both eyes and prominent dysarthria.  Has had repeat neuropsychological testing that's demonstrated overall preservation of cognitive functioning.  Graduated from high school last year and at present is living with family and has yet to embark on further education.  Works with occupational therapy and physical therapy every week.  Father and Geo acknowledge recent functional gains in this regard.  Has presented today to our clinic due to concerns regarding paranoid ideation and delusional beliefs, specifically that his oncology care team and his father are withholding more definitive care from him that would permit him to no longer require a wheelchair.  This appears to emphasize concerns regarding his severe constipation, that has required several hospitalizations for bowel cleanout.  Geo has remarked that he does not believe these treatments have actually been administered, and that x-rays demonstrating clear bowels are fraudulent.  While this is the most prominent delusional construct there have been two other instances in which he has vehemently maintained beliefs contrary to reality and intractable to reasoning/consultation.  Olanzapine was started a month ago without notable benefit in regard to his delusions.  Likely too early to assess the efficacy of this intervention, as it may be extremely difficult to alter  previously formulated delusions and instead antipsychotic treatment may be more beneficial in terms of preventing further paranoid elaboration.  At this time we would recommend maintaining the patient on this medication, pending further assessment and discussion of risks/benefits, especially long-term.  Might anticipate cross-titration to an agent less likely to promote weight gain.  Also have discussed the patient's mood, and he allows feelings of depression, feeling trapped and thoughts of death/SI without intent or plan.  He insists that this is not something that he could ever do and today avows his willingness to speak with his parents or a care team member if his SI should worsen or if he should develop intent.  We have discussed the initiation of an antidepressant for mood support, and have agreed that an SSRI, such as sertraline, would be a reasonable first option. Prior to initiating another medication however would like to get a baseline EKG and to receive comment from oncology team/pharmacy regarding any concerns associated with adding sertraline to his oncologic regimen, which features a study drug named Entinostat.  Finally, we are glad that he is already engaged in seeing a psychotherapist and have strongly encouraged the perpetuation of this relationship.    SUICIDE RISK ASSESSMENT:  Geo Hicks reports recent thoughts of death/SI without plan or intent.  This patient does have notable risk factors for self-harm including feels trapped, relationship conflict, new/ worsening medical issue, male and paranoia/delusions. However, risk is mitigated by no h/o suicide attempt, no plan or intent, no h/o risky impulsive behavior, describes a safety plan, h/o seeking help when needed, none to minimal alcohol use , commitment to family, good social support   and stable housing .  Based on all available evidence he does not appear to be at imminent risk for self-harm therefore does not meet criteria for a 72-hr  hold/  involuntary hospitalization.  However, based on degree of symptoms therapy, close psych FU and initiation of new medications was recommended which the pt did agree to.    [place] PRESCRIPTION MONITORING PROGRAM [] was not checked today:  not using controlled substances.    PSYCHOTROPIC DRUG INTERACTIONS:     Pentoxifylline may enhance the antiplatelet effect of Agents with Antiplatelet Properties.  [Pentoxifylline, Sertraline]    CNS Depressants may enhance the adverse/toxic effect of Selective Serotonin Reuptake Inhibitors. Specifically, the risk of psychomotor impairment may be enhanced.  [Olanzapine, Sertraline]    MANAGEMENT:  Monitoring for adverse effects, routine vitals, routine labs, periodic EKGs and patient/family to be made aware of risks     PLAN                                                                                                                        m2, h3     1) PSYCHOTROPIC MEDICATIONS:  - Continue olanzapine 10 mg at bedtime  - Will plan to start sertraline 50 mg daily pending results of EKG and consultation with Oncology/Pharmacy regarding possible medication interactions, especially with the patient's antineoplastic agent Entinostat    2) THERAPY:  continue with Manju    3) NEXT DUE:    Labs- fasting lipids and A1C  EKG- yes, to check QTc prior to adding further medications  Rating Scales- should perform AIMS at a later visit    4) REFERRALS:  Medical- for EKG    5) RTC: 3-4 weeks    6) CRISIS NUMBERS:   Provided routinely in [facility] [phone] (clinic)    [phone] (after hours)  CRISIS TEXT LINE: Text [id number] from anywhere in [place], anytime, any crisis 24/7;  OR SEE www.crisistextline.org    TREATMENT RISK STATEMENT:  The risks, benefits, alternatives and potential adverse effects have been discussed and are understood by the pt. The pt understands the risks of using street drugs or alcohol. There are no medical contraindications, the pt agrees to treatment with  the ability to do so. The pt knows to call the clinic for any problems or to access emergency care if needed.  Medical and substance use concerns are documented above.  Psychotropic drug interaction check was done, including changes made today.    PROVIDER: Justice Fay, DO    Patient staffed in clinic with Dr. Tripathi who will sign the note.  Supervisor is Dr. Tripathi.    Supervisor Attestation:  I met with Geo Hicks and his father, along with the resident, and participated in key portions of the service, including the mental status examination and developing the plan of care. I reviewed key portions of the history with the resident. I agree with the findings and plan as documented in this note.  Jorge Luis Tripathi MD

## 2019-02-07 NOTE — PATIENT INSTRUCTIONS
Continue olanzapine 10 mg at bedtime  Stop in to clinic and have EKG performed to check QTc prior to starting a new medication

## 2019-02-11 ENCOUNTER — HOSPITAL ENCOUNTER (OUTPATIENT)
Dept: OCCUPATIONAL THERAPY | Facility: CLINIC | Age: 19
Setting detail: THERAPIES SERIES
End: 2019-02-11
Attending: FAMILY MEDICINE
Payer: COMMERCIAL

## 2019-02-11 PROCEDURE — 97535 SELF CARE MNGMENT TRAINING: CPT | Mod: GO | Performed by: OCCUPATIONAL THERAPIST

## 2019-02-12 ENCOUNTER — ALLIED HEALTH/NURSE VISIT (OUTPATIENT)
Dept: NURSING | Facility: CLINIC | Age: 19
End: 2019-02-12
Payer: COMMERCIAL

## 2019-02-12 DIAGNOSIS — C71.9 EPENDYMOMA (H): ICD-10-CM

## 2019-02-12 DIAGNOSIS — Z79.899 ENCOUNTER FOR LONG-TERM (CURRENT) USE OF MEDICATIONS: ICD-10-CM

## 2019-02-12 LAB
BASOPHILS # BLD AUTO: 0 10E9/L (ref 0–0.2)
BASOPHILS NFR BLD AUTO: 0.5 %
DIFFERENTIAL METHOD BLD: ABNORMAL
EOSINOPHIL # BLD AUTO: 0.5 10E9/L (ref 0–0.7)
EOSINOPHIL NFR BLD AUTO: 12.9 %
ERYTHROCYTE [DISTWIDTH] IN BLOOD BY AUTOMATED COUNT: 12.9 % (ref 10–15)
HCT VFR BLD AUTO: 39.5 % (ref 40–53)
HGB BLD-MCNC: 13.3 G/DL (ref 13.3–17.7)
LYMPHOCYTES # BLD AUTO: 0.9 10E9/L (ref 0.8–5.3)
LYMPHOCYTES NFR BLD AUTO: 24.7 %
MCH RBC QN AUTO: 30.3 PG (ref 26.5–33)
MCHC RBC AUTO-ENTMCNC: 33.7 G/DL (ref 31.5–36.5)
MCV RBC AUTO: 90 FL (ref 78–100)
MONOCYTES # BLD AUTO: 0.7 10E9/L (ref 0–1.3)
MONOCYTES NFR BLD AUTO: 18.1 %
NEUTROPHILS # BLD AUTO: 1.6 10E9/L (ref 1.6–8.3)
NEUTROPHILS NFR BLD AUTO: 43.8 %
PLATELET # BLD AUTO: 123 10E9/L (ref 150–450)
RBC # BLD AUTO: 4.39 10E12/L (ref 4.4–5.9)
WBC # BLD AUTO: 3.7 10E9/L (ref 4–11)

## 2019-02-12 PROCEDURE — 85025 COMPLETE CBC W/AUTO DIFF WBC: CPT | Performed by: PEDIATRICS

## 2019-02-12 PROCEDURE — 84100 ASSAY OF PHOSPHORUS: CPT | Performed by: PEDIATRICS

## 2019-02-12 PROCEDURE — 36415 COLL VENOUS BLD VENIPUNCTURE: CPT | Performed by: PEDIATRICS

## 2019-02-12 PROCEDURE — 83735 ASSAY OF MAGNESIUM: CPT | Performed by: PEDIATRICS

## 2019-02-12 PROCEDURE — 80053 COMPREHEN METABOLIC PANEL: CPT | Performed by: PEDIATRICS

## 2019-02-12 PROCEDURE — 93000 ELECTROCARDIOGRAM COMPLETE: CPT

## 2019-02-12 RX ORDER — ALBUTEROL SULFATE 0.83 MG/ML
2.5 SOLUTION RESPIRATORY (INHALATION)
Status: CANCELLED | OUTPATIENT
Start: 2019-02-13

## 2019-02-12 RX ORDER — SODIUM CHLORIDE 9 MG/ML
1000 INJECTION, SOLUTION INTRAVENOUS CONTINUOUS PRN
Status: CANCELLED
Start: 2019-02-13

## 2019-02-12 RX ORDER — EPINEPHRINE 1 MG/ML
0.3 INJECTION, SOLUTION, CONCENTRATE INTRAVENOUS EVERY 5 MIN PRN
Status: CANCELLED | OUTPATIENT
Start: 2019-02-13

## 2019-02-12 RX ORDER — METHYLPREDNISOLONE SODIUM SUCCINATE 125 MG/2ML
125 INJECTION, POWDER, LYOPHILIZED, FOR SOLUTION INTRAMUSCULAR; INTRAVENOUS
Status: CANCELLED
Start: 2019-02-13

## 2019-02-12 RX ORDER — DIPHENHYDRAMINE HYDROCHLORIDE 50 MG/ML
50 INJECTION INTRAMUSCULAR; INTRAVENOUS
Status: CANCELLED
Start: 2019-02-13

## 2019-02-12 RX ORDER — ALBUTEROL SULFATE 90 UG/1
1-2 AEROSOL, METERED RESPIRATORY (INHALATION)
Status: CANCELLED
Start: 2019-02-13

## 2019-02-12 RX ORDER — MEPERIDINE HYDROCHLORIDE 25 MG/ML
25 INJECTION INTRAMUSCULAR; INTRAVENOUS; SUBCUTANEOUS EVERY 30 MIN PRN
Status: CANCELLED | OUTPATIENT
Start: 2019-02-13

## 2019-02-13 ENCOUNTER — HOSPITAL ENCOUNTER (INPATIENT)
Facility: CLINIC | Age: 19
LOS: 1 days | Discharge: HOME | DRG: 885 | End: 2019-02-15
Attending: EMERGENCY MEDICINE | Admitting: PSYCHIATRY & NEUROLOGY
Payer: COMMERCIAL

## 2019-02-13 ENCOUNTER — OFFICE VISIT (OUTPATIENT)
Dept: PEDIATRIC HEMATOLOGY/ONCOLOGY | Facility: CLINIC | Age: 19
End: 2019-02-13
Attending: NURSE PRACTITIONER
Payer: COMMERCIAL

## 2019-02-13 ENCOUNTER — DOCUMENTATION ONLY (OUTPATIENT)
Dept: PSYCHIATRY | Facility: CLINIC | Age: 19
End: 2019-02-13

## 2019-02-13 VITALS
HEART RATE: 93 BPM | HEIGHT: 71 IN | RESPIRATION RATE: 18 BRPM | WEIGHT: 201.5 LBS | BODY MASS INDEX: 28.21 KG/M2 | TEMPERATURE: 98.9 F | SYSTOLIC BLOOD PRESSURE: 122 MMHG | DIASTOLIC BLOOD PRESSURE: 82 MMHG | OXYGEN SATURATION: 100 %

## 2019-02-13 DIAGNOSIS — K59.00 CONSTIPATION, UNSPECIFIED CONSTIPATION TYPE: ICD-10-CM

## 2019-02-13 DIAGNOSIS — D49.6 NEOPLASM OF POSTERIOR CRANIAL FOSSA (H): Primary | ICD-10-CM

## 2019-02-13 DIAGNOSIS — C71.9 EPENDYMOMA (H): ICD-10-CM

## 2019-02-13 DIAGNOSIS — F32.A DEPRESSION, UNSPECIFIED DEPRESSION TYPE: ICD-10-CM

## 2019-02-13 DIAGNOSIS — F22 PARANOIA (H): ICD-10-CM

## 2019-02-13 DIAGNOSIS — F33.1 MODERATE EPISODE OF RECURRENT MAJOR DEPRESSIVE DISORDER (H): ICD-10-CM

## 2019-02-13 LAB
ALBUMIN SERPL-MCNC: 3 G/DL (ref 3.4–5)
ALBUMIN SERPL-MCNC: 3.4 G/DL (ref 3.4–5)
ALP SERPL-CCNC: 135 U/L (ref 65–260)
ALP SERPL-CCNC: 143 U/L (ref 65–260)
ALT SERPL W P-5'-P-CCNC: 17 U/L (ref 0–50)
ALT SERPL W P-5'-P-CCNC: 19 U/L (ref 0–50)
AMPHETAMINES UR QL SCN: NEGATIVE
ANION GAP SERPL CALCULATED.3IONS-SCNC: 4 MMOL/L (ref 3–14)
ANION GAP SERPL CALCULATED.3IONS-SCNC: <1 MMOL/L (ref 3–14)
AST SERPL W P-5'-P-CCNC: 26 U/L (ref 0–35)
AST SERPL W P-5'-P-CCNC: 32 U/L (ref 0–35)
BARBITURATES UR QL: NEGATIVE
BENZODIAZ UR QL: NEGATIVE
BILIRUB SERPL-MCNC: 0.2 MG/DL (ref 0.2–1.3)
BILIRUB SERPL-MCNC: 0.2 MG/DL (ref 0.2–1.3)
BUN SERPL-MCNC: 15 MG/DL (ref 7–30)
BUN SERPL-MCNC: 19 MG/DL (ref 7–30)
CALCIUM SERPL-MCNC: 8.5 MG/DL (ref 8.5–10.1)
CALCIUM SERPL-MCNC: 8.7 MG/DL (ref 8.5–10.1)
CANNABINOIDS UR QL SCN: NEGATIVE
CHLORIDE SERPL-SCNC: 104 MMOL/L (ref 98–110)
CHLORIDE SERPL-SCNC: 109 MMOL/L (ref 98–110)
CO2 SERPL-SCNC: 31 MMOL/L (ref 20–32)
CO2 SERPL-SCNC: 34 MMOL/L (ref 20–32)
COCAINE UR QL: NEGATIVE
CREAT SERPL-MCNC: 1.07 MG/DL (ref 0.5–1)
CREAT SERPL-MCNC: 1.11 MG/DL (ref 0.5–1)
ETHANOL UR QL SCN: NEGATIVE
GFR SERPL CREATININE-BSD FRML MDRD: >90 ML/MIN/{1.73_M2}
GFR SERPL CREATININE-BSD FRML MDRD: >90 ML/MIN/{1.73_M2}
GLUCOSE SERPL-MCNC: 59 MG/DL (ref 70–99)
GLUCOSE SERPL-MCNC: 80 MG/DL (ref 70–99)
MAGNESIUM SERPL-MCNC: 2.1 MG/DL (ref 1.6–2.3)
MAGNESIUM SERPL-MCNC: 2.2 MG/DL (ref 1.6–2.3)
OPIATES UR QL SCN: NEGATIVE
PHOSPHATE SERPL-MCNC: 4.7 MG/DL (ref 2.5–4.5)
PHOSPHATE SERPL-MCNC: 4.8 MG/DL (ref 2.5–4.5)
POTASSIUM SERPL-SCNC: 4.2 MMOL/L (ref 3.4–5.3)
POTASSIUM SERPL-SCNC: 4.4 MMOL/L (ref 3.4–5.3)
PROT SERPL-MCNC: 6.3 G/DL (ref 6.8–8.8)
PROT SERPL-MCNC: 6.6 G/DL (ref 6.8–8.8)
SODIUM SERPL-SCNC: 137 MMOL/L (ref 133–144)
SODIUM SERPL-SCNC: 144 MMOL/L (ref 133–144)

## 2019-02-13 PROCEDURE — 80320 DRUG SCREEN QUANTALCOHOLS: CPT | Performed by: EMERGENCY MEDICINE

## 2019-02-13 PROCEDURE — 84100 ASSAY OF PHOSPHORUS: CPT | Performed by: PEDIATRICS

## 2019-02-13 PROCEDURE — 36415 COLL VENOUS BLD VENIPUNCTURE: CPT | Performed by: PEDIATRICS

## 2019-02-13 PROCEDURE — 83735 ASSAY OF MAGNESIUM: CPT | Performed by: PEDIATRICS

## 2019-02-13 PROCEDURE — 80307 DRUG TEST PRSMV CHEM ANLYZR: CPT | Performed by: EMERGENCY MEDICINE

## 2019-02-13 PROCEDURE — 99285 EMERGENCY DEPT VISIT HI MDM: CPT | Mod: Z6 | Performed by: EMERGENCY MEDICINE

## 2019-02-13 PROCEDURE — 80053 COMPREHEN METABOLIC PANEL: CPT | Performed by: PEDIATRICS

## 2019-02-13 PROCEDURE — 99285 EMERGENCY DEPT VISIT HI MDM: CPT | Performed by: EMERGENCY MEDICINE

## 2019-02-13 RX ORDER — PENTOXIFYLLINE 400 MG/1
400 TABLET, EXTENDED RELEASE ORAL
Status: DISCONTINUED | OUTPATIENT
Start: 2019-02-14 | End: 2019-02-15 | Stop reason: HOSPADM

## 2019-02-13 RX ORDER — CHOLECALCIFEROL (VITAMIN D3) 50 MCG
1 TABLET ORAL DAILY
Status: ON HOLD | COMMUNITY
End: 2019-06-05

## 2019-02-13 RX ORDER — OLANZAPINE 10 MG/1
10 TABLET ORAL AT BEDTIME
Status: DISCONTINUED | OUTPATIENT
Start: 2019-02-13 | End: 2019-02-15

## 2019-02-13 RX ORDER — DEXAMETHASONE 0.75 MG/1
0.75 TABLET ORAL
Status: DISCONTINUED | OUTPATIENT
Start: 2019-02-14 | End: 2019-02-15 | Stop reason: HOSPADM

## 2019-02-13 RX ORDER — AMOXICILLIN 250 MG
2 CAPSULE ORAL AT BEDTIME
COMMUNITY
End: 2019-09-20

## 2019-02-13 RX ORDER — AMOXICILLIN 250 MG
2 CAPSULE ORAL AT BEDTIME
Status: DISCONTINUED | OUTPATIENT
Start: 2019-02-13 | End: 2019-02-15 | Stop reason: HOSPADM

## 2019-02-13 RX ORDER — POLYETHYLENE GLYCOL 3350 17 G/17G
17 POWDER, FOR SOLUTION ORAL 3 TIMES DAILY
Status: ON HOLD | COMMUNITY
End: 2019-06-04 | Stop reason: ALTCHOICE

## 2019-02-13 RX ORDER — SENNOSIDES A AND B 8.6 MG/1
2 TABLET, FILM COATED ORAL AT BEDTIME
COMMUNITY
End: 2019-02-13

## 2019-02-13 RX ORDER — FEXOFENADINE HCL 180 MG/1
180 TABLET ORAL EVERY EVENING
Status: DISCONTINUED | OUTPATIENT
Start: 2019-02-13 | End: 2019-02-15 | Stop reason: HOSPADM

## 2019-02-13 RX ORDER — POLYETHYLENE GLYCOL 3350 17 G/17G
17 POWDER, FOR SOLUTION ORAL 3 TIMES DAILY
Status: DISCONTINUED | OUTPATIENT
Start: 2019-02-13 | End: 2019-02-15 | Stop reason: HOSPADM

## 2019-02-13 ASSESSMENT — PAIN SCALES - GENERAL: PAINLEVEL: NO PAIN (0)

## 2019-02-13 ASSESSMENT — ENCOUNTER SYMPTOMS
CHILLS: 0
DYSURIA: 0
SHORTNESS OF BREATH: 0
NECK STIFFNESS: 0
FEVER: 0
VOMITING: 0
RESPIRATORY NEGATIVE: 1
NECK PAIN: 0
SPEECH DIFFICULTY: 1
CHEST TIGHTNESS: 0
RHINORRHEA: 0
FACIAL SWELLING: 0
CONSTITUTIONAL NEGATIVE: 1
ABDOMINAL DISTENTION: 0
CONSTIPATION: 1
HEADACHES: 0
SLEEP DISTURBANCE: 1
DIARRHEA: 0
NAUSEA: 0
FACIAL ASYMMETRY: 1
CONSTIPATION: 1
CARDIOVASCULAR NEGATIVE: 1
ABDOMINAL PAIN: 0

## 2019-02-13 ASSESSMENT — MIFFLIN-ST. JEOR: SCORE: 1945.25

## 2019-02-13 NOTE — PROGRESS NOTES
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 He is enrolled on [employer] [id number] - A Phase 1 Study of Entinostat, an Oral Histone Deacetylase Inhibitor, in Pediatric Patients With Recurrent or Refractory Solid Tumors, Including CNS Tumors and Lymphoma.      HPI:  Geo notes constipation is still a problem.  He is having three to five stools daily (mostly small to medium size although on the 25th he had an extra large stool).  They are bristol scale 4-5.  They saw GI follow-up in February for constipation and they suggested pelvic floor PT may help.  Geo is fixated again on the fact that I have a cure for his constipation that I am not sharing. Geo took one dose of his study drug late.  Dad did not realize until the next day that Geo had not taken the drug so he gave it to him when he remembered and sent a note letting me know. At the end of March - the 26th if he stays on cycle - he will be in [place] and they don't want to do labs while they are gone.  He has a left cheek nodule they want me to look at.  He has had it for about 1 week and it is getting better now.     Fam/Soc: Lives between his parents (one week at each home). They have been awarded guardianship of Geo. The families work well together - both parents have remarried. His dad has a clotting disorder, requiring him to take Warfarin daily.     History was obtained from Geo and his mother.       Allergies   Allergen Reactions     Blood Transfusion Related (Informational Only) Swelling     Periorbital swelling post platelet transfusion     No Known Drug Allergies        Current Outpatient Medications   Medication     calcium carbonate-vitamin D 600-400 MG-UNIT CHEW     Cholecalciferol 400 UNITS CHEW     dexamethasone (DECADRON) 0.5 MG tablet     fexofenadine (ALLEGRA) 180 MG tablet     linaclotide (LINZESS) 290 MCG capsule      melatonin 3 MG tablet     mupirocin (BACTROBAN) 2 % ointment     OLANZapine (ZYPREXA) 2.5 MG tablet     omeprazole (PRILOSEC) 20 MG DR capsule     pentoxifylline (TRENTAL) 400 MG CR tablet     polyethylene glycol (MIRALAX/GLYCOLAX) Packet     potassium phosphate, monobasic, (K-PHOS) 500 MG tablet     study - entinostat (IDS# 5050) 1 mg tablet     study - entinostat (IDS# 5050) 5 mg tablet     sulfamethoxazole-trimethoprim (BACTRIM/SEPTRA) 400-80 MG per tablet     vitamin E (GNP VITAMIN E) 400 UNIT capsule     No current facility-administered medications for this visit.        Past Medical History:   Diagnosis Date     Cranial nerve dysfunction      Dyspepsia      Ependymoma (H)      Gastro-oesophageal reflux disease      Hearing loss      Intracranial hemorrhage (H)      Migraine      Pilonidal cyst     7-2015     Reduced vision      Refractory obstruction of nasal airway     2nd to nasal valve prolapse     Sleep apnea      Strabismus     gaze palsy        Past Surgical History:   Procedure Laterality Date     GRAFT CARTILAGE FROM POSTERIOR AURICLE Left 10/6/2016    Procedure: GRAFT CARTILAGE FROM POSTERIOR AURICLE;  Surgeon: Tyler Richards MD;  Location: [facility]     INCISION AND DRAINAGE PERINEAL, COMBINED Bilateral 7/18/2015    Procedure: COMBINED INCISION AND DRAINAGE PERINEAL;  Surgeon: Dequan Timmons MD;  [facility]: [facility]     OPTICAL TRACKING SYSTEM CRANIOTOMY, EXCISE TUMOR, COMBINED N/A 4/13/2015    Procedure: COMBINED OPTICAL TRACKING SYSTEM CRANIOTOMY, EXCISE TUMOR;  Surgeon: Francis Velazquez MD;  [facility]     OPTICAL TRACKING SYSTEM CRANIOTOMY, EXCISE TUMOR, COMBINED N/A 4/16/2015    Procedure: COMBINED OPTICAL TRACKING SYSTEM CRANIOTOMY, EXCISE TUMOR;  Surgeon: Francis Velazquez MD;  [facility]     OPTICAL TRACKING SYSTEM CRANIOTOMY, EXCISE TUMOR, COMBINED Bilateral 5/28/2015    Procedure: COMBINED OPTICAL TRACKING SYSTEM CRANIOTOMY, EXCISE TUMOR;  Surgeon:  "Francis Velazquez MD;  [facility]     OPTICAL TRACKING SYSTEM CRANIOTOMY, EXCISE TUMOR, COMBINED Bilateral 1/14/2016    Procedure: COMBINED OPTICAL TRACKING SYSTEM CRANIOTOMY, EXCISE TUMOR;  Surgeon: Francis Velazquez MD;  [facility]     OPTICAL TRACKING SYSTEM VENTRICULOSTOMY  4/16/2015    Procedure: OPTICAL TRACKING SYSTEM VENTRICULOSTOMY;  Surgeon: Francis Velazquez MD;  [facility]     REMOVE PORT VASCULAR ACCESS N/A 10/6/2016    Procedure: REMOVE PORT VASCULAR ACCESS;  Surgeon: Bruno Perea MD;  [facility]     RHINOPLASTY N/A 10/6/2016    Procedure: RHINOPLASTY;  Surgeon: Tyler Richards MD;  [facility]     VASCULAR SURGERY  5-2015    single lumen power port       Family History   Problem Relation Age of Onset     Circulatory Father         PE/DVT     Hypothyroidism Father 30     Diabetes Maternal Grandmother      Diabetes Paternal Grandmother      Diabetes Paternal Grandfather      C.A.D. Paternal Grandfather      Hypertension Maternal Grandfather      Thyroid Disease Paternal Aunt         unknown whether hypo or hyper       Review of Systems   Constitutional: Negative.         In a wheelchair   HENT: Positive for hearing loss (Pre-existing from prior therapy).    Eyes: Positive for visual disturbance (Wears a patch over right eye to minimize diplopia).   Respiratory: Negative.    Cardiovascular: Negative.    Gastrointestinal: Positive for constipation (Chronic).   Endocrine:        Follow with Dr. Martin   Neurological: Positive for facial asymmetry and speech difficulty.   Psychiatric/Behavioral: Positive for sleep disturbance (Not using his Bi-Pap).   All other systems reviewed and are negative.      /82 (BP Location: Right arm, Patient Position: Fowlers, Cuff Size: Adult Large)   Pulse 93   Temp 98.9  F (37.2  C) (Axillary)   Resp 18   Ht 1.794 m (5' 10.63\")   Wt 91.4 kg (201 lb 8 oz)   SpO2 100%   BMI 28.40 kg/m       Physical Exam "   Constitutional: He is oriented to person, place, and time. He appears well-developed and well-nourished. No distress.   HENT:   Occassionally saliva accumulates in mouth with occasional drooling.   Eyes: Conjunctivae are normal. Pupils are equal, round, and reactive to light.   Can track to right, but not to left. Nystagmus noted    Cardiovascular: Normal rate, regular rhythm and normal heart sounds.   Pulmonary/Chest: Effort normal and breath sounds normal. He has no wheezes.   Neurological: He is alert and oriented to person, place, and time. He has normal strength. A cranial nerve deficit is present. Coordination (Ataxic- dysmetria especially in upper extremities when accomplishing tasks.) abnormal. GCS eye subscore is 4. GCS verbal subscore is 5. GCS motor subscore is 6.   Negative Pronator drift   Skin: Skin is warm and dry.   Scattered striae  1cm left cheek bruised around area fading.    Psychiatric: He has a normal mood and affect.     Lab:  Results for orders placed or performed in visit on 02/13/19   Comprehensive metabolic panel   Result Value Ref Range    Sodium 144 133 - 144 mmol/L    Potassium 4.2 3.4 - 5.3 mmol/L    Chloride 109 98 - 110 mmol/L    Carbon Dioxide 31 20 - 32 mmol/L    Anion Gap 4 3 - 14 mmol/L    Glucose 59 (L) 70 - 99 mg/dL    Urea Nitrogen 19 7 - 30 mg/dL    Creatinine 1.11 (H) 0.50 - 1.00 mg/dL    GFR Estimate >90 >60 mL/min/[1.73_m2]    GFR Estimate If Black >90 >60 mL/min/[1.73_m2]    Calcium 8.5 8.5 - 10.1 mg/dL    Bilirubin Total 0.2 0.2 - 1.3 mg/dL    Albumin 3.0 (L) 3.4 - 5.0 g/dL    Protein Total 6.3 (L) 6.8 - 8.8 g/dL    Alkaline Phosphatase 143 65 - 260 U/L    ALT 19 0 - 50 U/L    AST 32 0 - 35 U/L   Magnesium   Result Value Ref Range    Magnesium 2.1 1.6 - 2.3 mg/dL   Phosphorus   Result Value Ref Range    Phosphorus 4.8 (H) 2.5 - 4.5 mg/dL     *Note: Due to a large number of results and/or encounters for the requested time period, some results have not been displayed. A  complete set of results can be found in Results Review.         Impression:  1. Ependymoma  2. Treated with Entinostat, continues to tolerate well.   3. MRI Brain/spine 1/16/19: tumor is stable to slightly decreased compared to 6/12/18.  4. Labs today are adequate for continuing Entinostat.   5. Chronic constipation.  6. Folliculitis of superficial hair follicle.    Plan:  1. RTC in 4 weeks  as scheduled for follow up, or sooner PRN.   2. Continue with Entinostat therapy, new medications given today.  He will take 6mg every 7 days for 4 weeks.  3. Discuss with Imani in PT about how to go about adding pelvic floor PT.    4. He should use warm packs to his cheek.  He should also consider Shaving back lieberman until it has completely healed.  No antibiotics needed as it is healing well.   5. Continue weekly labs.

## 2019-02-13 NOTE — PROGRESS NOTES
I received notice from Dr. Rousseau that Geo was at [facility] due to safety concerns verbalized to his parents, and that Geo called 911.    I went to the ER, left my pager number with ER HUC, as Geo hadn't yet been assigned to MD or RN.      I greeted Geo and briefly encouraged him to feel comfortable speaking with ER staff - and to help them understand his concerns.    I met with his parents briefly, and reviewed with them their experiences today:  His mother accompanied Geo to Oncology follow-up appointment today. He was upset that he may require a dose increase of his study medication (Entinostat) based on weight gain. He told her that he considered stopping the medication and wouldn't care if he  [or wouldn't care if he didn't live? I'm not sure of the wording that she related to me]. He remained convinced that constipation was one of his main problems, and that it was the cause of his weight gain. The remainder of that session evidently went fairly well.  On the drive home, he asked her to take him to [facility] or [facility] to have evaluation and treatment of his constipation.  His mother told him that they needed to speak with his father about this and his Oncology treatment team first (to understand and consider whether receiving treatment outside of [facility] may make him ineligible for ongoing participation in the study).  He was frustrated and shortly after called 911 - she heard him say that he was having a conflict with his family and in particular his mother.      At some point today (after his call to 911?) he verbalized thoughts of dying or wanting to kill himself [I am not clear on what he actually said and when].  Thereafter, his mother drove to [facility] ER and he refused to exit her automobile. Father was called to come from work and he did. ER staff were consulted and they recommended that he either voluntarily come into ER or family call 911 for  assistance. ER security staff spoke with Geo - Geo agreed to enter ER voluntarily.    I spoke with Geo's father and mother about our 2/7/19 recommendation to add sertraline (treatment of his depression), though potential interactions with his Entinostat and potential cardiac function impact needed to be fully considered first.  EKG was ordered/completed, though I'm not sure of the results.  Sertraline has not been started thus far.    I advised Geo's parents that provided Geo is admitted to inpatient psychiatric service, his inpatient treatment team could partner with his Oncology team to determine whether to start Sertraline.

## 2019-02-13 NOTE — ED PROVIDER NOTES
History     Chief Complaint   Patient presents with     Constipation     obessive with bowel habits,  is on multiple bowel medications, is having daily BM's     Mental Health Problem     sent from [facility] due to deniesl of no bowel problems.      Suicidal     thoughts in the last month, no plan , no thoughts currently     HPI  Geo Hicks is a 18 year old male who presents with a history of brain tumor status post surgery, constipation, depression presenting with concerns for about harming himself.  I did discuss with his psychiatrist Dr. Jorge Luis Tripathi of psychiatry.  Patient was recently seen on the seventh.  There was some concern for increasing depression and thoughts that he may be better if she were not here.  He has no active thoughts about hurting himself without a plan.  Olanzapine started 1 month ago and they are discussing starting sertraline.  Patient apparently called 911 as well today as he had conflict with his family, he was then brought here.  Patient denies any other concerns.  He has been constipated but has been having daily bowel movements.  Denies abdominal pain, fevers or chills.  Denies vomiting.    I have reviewed the Medications, Allergies, Past Medical and Surgical History, and Social History in the Epic system.    Review of Systems   Constitutional: Negative for chills and fever.   HENT: Negative for facial swelling and rhinorrhea.    Respiratory: Negative for chest tightness and shortness of breath.    Gastrointestinal: Positive for constipation. Negative for abdominal distention, abdominal pain, diarrhea, nausea and vomiting.   Genitourinary: Negative for dysuria.   Musculoskeletal: Negative for neck pain and neck stiffness.   Skin: Negative for rash.   Neurological: Negative for headaches.   Psychiatric/Behavioral: Positive for suicidal ideas.       Physical Exam   BP: 116/62  Pulse: 92  Temp: 96.7  F (35.9  C)  Resp: 16  SpO2: 99 %      Physical Exam  Physical Exam    Constitutional: oriented to person, place, and time. appears well-developed and well-nourished.   HENT:   Head: Normocephalic and atraumatic.   Neck: Normal range of motion.   Pulmonary/Chest: Effort normal. No respiratory distress.   Cardiac: No murmurs, rubs, gallops. RRR.  Abdominal: Abdomen soft, nontender, nondistended. No rebound tenderness.  MSK: Long bones without deformity or evidence of trauma  Neurological: alert and oriented to person, place, and time.   Skin: Skin is warm and dry.   Psychiatric:  normal mood and affect.  behavior is normal. Thought content normal.     ED Course        Procedures    Labs Ordered and Resulted from Time of ED Arrival Up to the Time of Departure from the ED - No data to display         Assessments & Plan (with Medical Decision Making)   MDM  Patient presented with concerns for Suicidal ideation.  He the patient says he has had thoughts over the past month but no plan at this point.  I did discuss with his psychiatrist who saw him on the seventh, they do feel that he may need admission however the patient is currently denying active suicidal ideation.  Patient will be seen by her .  No need for hold at this point as this patient is voluntary to stay intact talk with our mental health .    I have reviewed the nursing notes.    I have reviewed the findings, diagnosis, plan and need for follow up with the patient.       Medication List      ASK your doctor about these medications    * study - entinostat 1 mg tablet  Commonly known as:  IDS# 5050  1 mg, Oral, EVERY 7 DAYS, Take one 1mg tablet with one 5mg tablet for total dose of 6mg weekly. Take on an empty stomach, at least 1 hour before or 2 hours after a meal.  Swallow tablet whole.  Ask about: Should I take this medication?     * study - entinostat 1 mg tablet  Commonly known as:  IDS# 5050  1 mg, Oral, EVERY 7 DAYS, Take one 1mg tablet with one 5mg tablet for total dose of 6mg weekly. Take on an empty  stomach, at least 1 hour before or 2 hours after a meal.  Swallow tablet whole.  Ask about: Should I take this medication?     * study - entinostat 1 mg tablet  Commonly known as:  IDS# 5050  1 mg, Oral, EVERY 7 DAYS, Take one 1mg tablet with one 5mg tablet for total dose of 6mg weekly. Take on an empty stomach, at least 1 hour before or 2 hours after a meal.  Swallow tablet whole.  Ask about: Should I take this medication?     * study - entinostat 1 mg tablet  Commonly known as:  IDS# 5050  1 mg, Oral, EVERY 7 DAYS, Take one 1mg tablet with one 5mg tablet for total dose of 6mg weekly. Take on an empty stomach, at least 1 hour before or 2 hours after a meal.  Swallow tablet whole.  Ask about: Should I take this medication?     * study - entinostat 1 mg tablet  Commonly known as:  IDS# 5050  1 mg, Oral, EVERY 7 DAYS, Take one 1mg tablet with one 5mg tablet for total dose of 6mg weekly. Take on an empty stomach, at least 1 hour before or 2 hours after a meal.  Swallow tablet whole.  Ask about: Should I take this medication?     * study - entinostat 1 mg tablet  Commonly known as:  IDS# 5050  1 mg, Oral, EVERY 7 DAYS, Take one 1mg tablet with one 5mg tablet for total dose of 6mg weekly. Take on an empty stomach, at least 1 hour before or 2 hours after a meal.  Swallow tablet whole.  Ask about: Should I take this medication?     * study - entinostat 1 mg tablet  Commonly known as:  IDS# 5050  1 mg, Oral, EVERY 7 DAYS, Take one 1mg tablet with one 5mg tablet for total dose of 6mg weekly. Take on an empty stomach, at least 1 hour before or 2 hours after a meal.  Swallow tablet whole.  Ask about: Should I take this medication?     * study - entinostat 1 mg tablet  Commonly known as:  IDS# 5050  1 mg, Oral, EVERY 7 DAYS, Take one 1mg tablet with one 5mg tablet for total dose of 6mg weekly. Take on an empty stomach, at least 1 hour before or 2 hours after a meal.  Swallow tablet whole.  Ask about: Should I take this  medication?     * study - entinostat 1 mg tablet  Commonly known as:  IDS# 5050  1 mg, Oral, EVERY 7 DAYS, Take one 1mg tablet with one 5mg tablet for total dose of 6mg weekly. Take on an empty stomach, at least 1 hour before or 2 hours after a meal.  Swallow tablet whole.  Ask about: Should I take this medication?     * study - entinostat 1 mg tablet  Commonly known as:  IDS# 5050  1 mg, Oral, EVERY 7 DAYS, Take one 1mg tablet with one 5mg tablet for total dose of 6mg weekly. Take on an empty stomach, at least 1 hour before or 2 hours after a meal.  Swallow tablet whole.  Ask about: Should I take this medication?     * study - entinostat 1 mg tablet  Commonly known as:  IDS# 5050  1 mg, Oral, EVERY 7 DAYS, Take one 1mg tablet with one 5mg tablet for total dose of 6mg weekly. Take on an empty stomach, at least 1 hour before or 2 hours after a meal.  Swallow tablet whole.  Ask about: Should I take this medication?     * study - entinostat 5 mg tablet  Commonly known as:  IDS# 5050  5 mg, Oral, EVERY 7 DAYS, Take one 5mg tablet with one 1mg tablet for total dose of 6mg weekly. Take on an empty stomach, at least 1 hour before or 2 hours after a meal.  Swallow tablet whole.  Ask about: Should I take this medication?     * study - entinostat 5 mg tablet  Commonly known as:  IDS# 5050  5 mg, Oral, EVERY 7 DAYS, Take one 5mg tablet with one 1mg tablet for total dose of 6mg weekly. Take on an empty stomach, at least 1 hour before or 2 hours after a meal.  Swallow tablet whole.  Ask about: Should I take this medication?     * study - entinostat 5 mg tablet  Commonly known as:  IDS# 5050  5 mg, Oral, EVERY 7 DAYS, Take one 5mg tablet with one 1mg tablet for total dose of 6mg weekly. Take on an empty stomach, at least 1 hour before or 2 hours after a meal.  Swallow tablet whole.  Ask about: Should I take this medication?     * study - entinostat 5 mg tablet  Commonly known as:  IDS# 5050  5 mg, Oral, EVERY 7 DAYS, Take one 5mg  tablet with one 1mg tablet for total dose of 6mg weekly. Take on an empty stomach, at least 1 hour before or 2 hours after a meal.  Swallow tablet whole.  Ask about: Should I take this medication?     * study - entinostat 5 mg tablet  Commonly known as:  IDS# 5050  5 mg, Oral, EVERY 7 DAYS, Take one 5mg tablet with one 1mg tablet for total dose of 6mg weekly. Take on an empty stomach, at least 1 hour before or 2 hours after a meal.  Swallow tablet whole.  Ask about: Should I take this medication?     * study - entinostat 5 mg tablet  Commonly known as:  IDS# 5050  5 mg, Oral, EVERY 7 DAYS, Take one 5mg tablet with one 1mg tablet for total dose of 6mg weekly. Take on an empty stomach, at least 1 hour before or 2 hours after a meal.  Swallow tablet whole.  Ask about: Should I take this medication?     * study - entinostat 5 mg tablet  Commonly known as:  IDS# 5050  5 mg, Oral, EVERY 7 DAYS, Take one 5mg tablet with one 1mg tablet for total dose of 6mg weekly. Take on an empty stomach, at least 1 hour before or 2 hours after a meal.  Swallow tablet whole.  Ask about: Should I take this medication?     * study - entinostat 5 mg tablet  Commonly known as:  IDS# 5050  5 mg, Oral, EVERY 7 DAYS, Take one 5mg tablet with one 1mg tablet for total dose of 6mg weekly. Take on an empty stomach, at least 1 hour before or 2 hours after a meal.  Swallow tablet whole.  Ask about: Should I take this medication?     * study - entinostat 5 mg tablet  Commonly known as:  IDS# 5050  5 mg, Oral, EVERY 7 DAYS, Take one 5mg tablet with one 1mg tablet for total dose of 6mg weekly. Take on an empty stomach, at least 1 hour before or 2 hours after a meal.  Swallow tablet whole.  Ask about: Should I take this medication?     * study - entinostat 5 mg tablet  Commonly known as:  IDS# 5050  5 mg, Oral, EVERY 7 DAYS, Take one 5mg tablet with one 1mg tablet for total dose of 6mg weekly. Take on an empty stomach, at least 1 hour before or 2 hours  after a meal.  Swallow tablet whole.  Ask about: Should I take this medication?     * study - entinostat 5 mg tablet  Commonly known as:  IDS# 5050  5 mg, Oral, EVERY 7 DAYS, Take one 5mg tablet with one 1mg tablet for total dose of 6mg weekly. Take on an empty stomach, at least 1 hour before or 2 hours after a meal.  Swallow tablet whole.  Ask about: Should I take this medication?         * This list has 22 medication(s) that are the same as other medications prescribed for you. Read the directions carefully, and ask your doctor or other care provider to review them with you.                Final diagnoses:   None       2/13/2019   [facility], [place], EMERGENCY DEPARTMENT     Ishmael Mane MD  02/13/19 [id number]

## 2019-02-13 NOTE — LETTER
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 He is enrolled on [employer]G [id number] - A Phase 1 Study of Entinostat, an Oral Histone Deacetylase Inhibitor, in Pediatric Patients With Recurrent or Refractory Solid Tumors, Including CNS Tumors and Lymphoma.      HPI:  Geo notes constipation is still a problem.  He is having three to five stools daily (mostly small to medium size although on the 25th he had an extra large stool).  They are bristol scale 4-5.  They saw GI follow-up in February for constipation and they suggested pelvic floor PT may help.  Geo is fixated again on the fact that I have a cure for his constipation that I am not sharing. Geo took one dose of his study drug late.  Dad did not realize until the next day that Geo had not taken the drug so he gave it to him when he remembered and sent a note letting me know. At the end of March - the 26th if he stays on cycle - he will be in [place] and they don't want to do labs while they are gone.  He has a left cheek nodule they want me to look at.  He has had it for about 1 week and it is getting better now.     Fam/Soc: Lives between his parents (one week at each home). They have been awarded guardianship of Geo. The families work well together - both parents have remarried. His dad has a clotting disorder, requiring him to take Warfarin daily.     History was obtained from Geo and his mother.       Allergies   Allergen Reactions     Blood Transfusion Related (Informational Only) Swelling     Periorbital swelling post platelet transfusion     No Known Drug Allergies        Current Outpatient Medications   Medication     calcium carbonate-vitamin D 600-400 MG-UNIT CHEW     Cholecalciferol 400 UNITS CHEW     dexamethasone (DECADRON) 0.5 MG tablet      fexofenadine (ALLEGRA) 180 MG tablet     linaclotide (LINZESS) 290 MCG capsule     melatonin 3 MG tablet     mupirocin (BACTROBAN) 2 % ointment     OLANZapine (ZYPREXA) 2.5 MG tablet     omeprazole (PRILOSEC) 20 MG DR capsule     pentoxifylline (TRENTAL) 400 MG CR tablet     polyethylene glycol (MIRALAX/GLYCOLAX) Packet     potassium phosphate, monobasic, (K-PHOS) 500 MG tablet     study - entinostat (IDS# 5050) 1 mg tablet     study - entinostat (IDS# 5050) 5 mg tablet     sulfamethoxazole-trimethoprim (BACTRIM/SEPTRA) 400-80 MG per tablet     vitamin E (GNP VITAMIN E) 400 UNIT capsule     No current facility-administered medications for this visit.        Past Medical History:   Diagnosis Date     Cranial nerve dysfunction      Dyspepsia      Ependymoma (H)      Gastro-oesophageal reflux disease      Hearing loss      Intracranial hemorrhage (H)      Migraine      Pilonidal cyst     7-2015     Reduced vision      Refractory obstruction of nasal airway     2nd to nasal valve prolapse     Sleep apnea      Strabismus     gaze palsy        Past Surgical History:   Procedure Laterality Date     GRAFT CARTILAGE FROM POSTERIOR AURICLE Left 10/6/2016    Procedure: GRAFT CARTILAGE FROM POSTERIOR AURICLE;  Surgeon: Tyler Richards MD;  Location: [facility]     INCISION AND DRAINAGE PERINEAL, COMBINED Bilateral 7/18/2015    Procedure: COMBINED INCISION AND DRAINAGE PERINEAL;  Surgeon: Dequan Timmons MD;  [facility]: [facility]     OPTICAL TRACKING SYSTEM CRANIOTOMY, EXCISE TUMOR, COMBINED N/A 4/13/2015    Procedure: COMBINED OPTICAL TRACKING SYSTEM CRANIOTOMY, EXCISE TUMOR;  Surgeon: Francis Velazquez MD;  [facility]     OPTICAL TRACKING SYSTEM CRANIOTOMY, EXCISE TUMOR, COMBINED N/A 4/16/2015    Procedure: COMBINED OPTICAL TRACKING SYSTEM CRANIOTOMY, EXCISE TUMOR;  Surgeon: Francis Velazquez MD;  [facility]     OPTICAL TRACKING SYSTEM CRANIOTOMY, EXCISE TUMOR, COMBINED Bilateral 5/28/2015     "Procedure: COMBINED OPTICAL TRACKING SYSTEM CRANIOTOMY, EXCISE TUMOR;  Surgeon: Francis Velazquez MD;  [facility]     OPTICAL TRACKING SYSTEM CRANIOTOMY, EXCISE TUMOR, COMBINED Bilateral 1/14/2016    Procedure: COMBINED OPTICAL TRACKING SYSTEM CRANIOTOMY, EXCISE TUMOR;  Surgeon: Francis Velazquez MD;  [facility]     OPTICAL TRACKING SYSTEM VENTRICULOSTOMY  4/16/2015    Procedure: OPTICAL TRACKING SYSTEM VENTRICULOSTOMY;  Surgeon: Francis Velazquez MD;  [facility]     REMOVE PORT VASCULAR ACCESS N/A 10/6/2016    Procedure: REMOVE PORT VASCULAR ACCESS;  Surgeon: Bruno Perea MD;  [facility]     RHINOPLASTY N/A 10/6/2016    Procedure: RHINOPLASTY;  Surgeon: Tyler Richards MD;  [facility]     VASCULAR SURGERY  5-2015    single lumen power port       Family History   Problem Relation Age of Onset     Circulatory Father         PE/DVT     Hypothyroidism Father 30     Diabetes Maternal Grandmother      Diabetes Paternal Grandmother      Diabetes Paternal Grandfather      C.A.D. Paternal Grandfather      Hypertension Maternal Grandfather      Thyroid Disease Paternal Aunt         unknown whether hypo or hyper       Review of Systems   Constitutional: Negative.         In a wheelchair   HENT: Positive for hearing loss (Pre-existing from prior therapy).    Eyes: Positive for visual disturbance (Wears a patch over right eye to minimize diplopia).   Respiratory: Negative.    Cardiovascular: Negative.    Gastrointestinal: Positive for constipation (Chronic).   Endocrine:        Follow with Dr. Martin   Neurological: Positive for facial asymmetry and speech difficulty.   Psychiatric/Behavioral: Positive for sleep disturbance (Not using his Bi-Pap).   All other systems reviewed and are negative.      /82 (BP Location: Right arm, Patient Position: Fowlers, Cuff Size: Adult Large)   Pulse 93   Temp 98.9  F (37.2  C) (Axillary)   Resp 18   Ht 1.794 m (5' 10.63\")   " Wt 91.4 kg (201 lb 8 oz)   SpO2 100%   BMI 28.40 kg/m        Physical Exam   Constitutional: He is oriented to person, place, and time. He appears well-developed and well-nourished. No distress.   HENT:   Occassionally saliva accumulates in mouth with occasional drooling.   Eyes: Conjunctivae are normal. Pupils are equal, round, and reactive to light.   Can track to right, but not to left. Nystagmus noted    Cardiovascular: Normal rate, regular rhythm and normal heart sounds.   Pulmonary/Chest: Effort normal and breath sounds normal. He has no wheezes.   Neurological: He is alert and oriented to person, place, and time. He has normal strength. A cranial nerve deficit is present. Coordination (Ataxic- dysmetria especially in upper extremities when accomplishing tasks.) abnormal. GCS eye subscore is 4. GCS verbal subscore is 5. GCS motor subscore is 6.   Negative Pronator drift   Skin: Skin is warm and dry.   Scattered striae  1cm left cheek bruised around area fading.    Psychiatric: He has a normal mood and affect.     Lab:  Results for orders placed or performed in visit on 02/13/19   Comprehensive metabolic panel   Result Value Ref Range    Sodium 144 133 - 144 mmol/L    Potassium 4.2 3.4 - 5.3 mmol/L    Chloride 109 98 - 110 mmol/L    Carbon Dioxide 31 20 - 32 mmol/L    Anion Gap 4 3 - 14 mmol/L    Glucose 59 (L) 70 - 99 mg/dL    Urea Nitrogen 19 7 - 30 mg/dL    Creatinine 1.11 (H) 0.50 - 1.00 mg/dL    GFR Estimate >90 >60 mL/min/[1.73_m2]    GFR Estimate If Black >90 >60 mL/min/[1.73_m2]    Calcium 8.5 8.5 - 10.1 mg/dL    Bilirubin Total 0.2 0.2 - 1.3 mg/dL    Albumin 3.0 (L) 3.4 - 5.0 g/dL    Protein Total 6.3 (L) 6.8 - 8.8 g/dL    Alkaline Phosphatase 143 65 - 260 U/L    ALT 19 0 - 50 U/L    AST 32 0 - 35 U/L   Magnesium   Result Value Ref Range    Magnesium 2.1 1.6 - 2.3 mg/dL   Phosphorus   Result Value Ref Range    Phosphorus 4.8 (H) 2.5 - 4.5 mg/dL     *Note: Due to a large number of results and/or  encounters for the requested time period, some results have not been displayed. A complete set of results can be found in Results Review.         Impression:  1. Ependymoma  2. Treated with Entinostat, continues to tolerate well.   3. MRI Brain/spine 1/16/19: tumor is stable to slightly decreased compared to 6/12/18.  4. Labs today are adequate for continuing Entinostat.   5. Chronic constipation.  6. Folliculitis of superficial hair follicle.    Plan:  1. RTC in 4 weeks  as scheduled for follow up, or sooner PRN.   2. Continue with Entinostat therapy, new medications given today.  He will take 6mg every 7 days for 4 weeks.  3. Discuss with Imani in PT about how to go about adding pelvic floor PT.    4. He should use warm packs to his cheek.  He should also consider Shaving back lieberman until it has completely healed.  No antibiotics needed as it is healing well.   5. Continue weekly labs.      ALAN Justin CNP

## 2019-02-13 NOTE — NURSING NOTE
"Chief Complaint   Patient presents with     RECHECK     Patient is here today for Ependymoma follow up     /82 (BP Location: Right arm, Patient Position: Fowlers, Cuff Size: Adult Large)   Pulse 93   Temp 98.9  F (37.2  C) (Axillary)   Resp 18   Ht 1.794 m (5' 10.63\")   Wt 90.8 kg (200 lb 2.8 oz)   SpO2 100%   BMI 28.21 kg/m      Malinda Russo LPN  February 13, 2019  "

## 2019-02-14 PROBLEM — F32.A DEPRESSION: Status: ACTIVE | Noted: 2019-02-14

## 2019-02-14 PROCEDURE — 25000131 ZZH RX MED GY IP 250 OP 636 PS 637: Performed by: EMERGENCY MEDICINE

## 2019-02-14 PROCEDURE — 12400001 ZZH R&B MH UMMC

## 2019-02-14 PROCEDURE — 25000132 ZZH RX MED GY IP 250 OP 250 PS 637: Performed by: EMERGENCY MEDICINE

## 2019-02-14 RX ORDER — ACETAMINOPHEN 325 MG/1
650 TABLET ORAL EVERY 4 HOURS PRN
Status: DISCONTINUED | OUTPATIENT
Start: 2019-02-14 | End: 2019-02-15 | Stop reason: HOSPADM

## 2019-02-14 RX ORDER — ALUMINA, MAGNESIA, AND SIMETHICONE 2400; 2400; 240 MG/30ML; MG/30ML; MG/30ML
30 SUSPENSION ORAL EVERY 4 HOURS PRN
Status: DISCONTINUED | OUTPATIENT
Start: 2019-02-14 | End: 2019-02-15 | Stop reason: HOSPADM

## 2019-02-14 RX ORDER — BISACODYL 10 MG
10 SUPPOSITORY, RECTAL RECTAL DAILY PRN
Status: DISCONTINUED | OUTPATIENT
Start: 2019-02-14 | End: 2019-02-15 | Stop reason: HOSPADM

## 2019-02-14 RX ORDER — VITAMIN E 268 MG
400 CAPSULE ORAL DAILY
Status: DISCONTINUED | OUTPATIENT
Start: 2019-02-15 | End: 2019-02-15 | Stop reason: HOSPADM

## 2019-02-14 RX ORDER — HYDROXYZINE HYDROCHLORIDE 25 MG/1
25 TABLET, FILM COATED ORAL EVERY 4 HOURS PRN
Status: DISCONTINUED | OUTPATIENT
Start: 2019-02-14 | End: 2019-02-15 | Stop reason: HOSPADM

## 2019-02-14 RX ORDER — MUPIROCIN 20 MG/G
OINTMENT TOPICAL DAILY
Status: DISCONTINUED | OUTPATIENT
Start: 2019-02-15 | End: 2019-02-15 | Stop reason: HOSPADM

## 2019-02-14 RX ORDER — TRAZODONE HYDROCHLORIDE 50 MG/1
50 TABLET, FILM COATED ORAL
Status: DISCONTINUED | OUTPATIENT
Start: 2019-02-14 | End: 2019-02-15 | Stop reason: HOSPADM

## 2019-02-14 RX ORDER — SULFAMETHOXAZOLE AND TRIMETHOPRIM 400; 80 MG/1; MG/1
1 TABLET ORAL 2 TIMES DAILY
Status: DISCONTINUED | OUTPATIENT
Start: 2019-02-14 | End: 2019-02-15 | Stop reason: HOSPADM

## 2019-02-14 RX ADMIN — FEXOFENADINE HYDROCHLORIDE 180 MG: 180 TABLET, FILM COATED ORAL at 00:18

## 2019-02-14 RX ADMIN — PENTOXIFYLLINE 400 MG: 400 TABLET, FILM COATED, EXTENDED RELEASE ORAL at 19:41

## 2019-02-14 RX ADMIN — POLYETHYLENE GLYCOL 3350 17 G: 17 POWDER, FOR SOLUTION ORAL at 00:18

## 2019-02-14 RX ADMIN — POTASSIUM PHOSPHATE, MONOBASIC 500 MG: 500 TABLET, SOLUBLE ORAL at 09:48

## 2019-02-14 RX ADMIN — POLYETHYLENE GLYCOL 3350 17 G: 17 POWDER, FOR SOLUTION ORAL at 09:50

## 2019-02-14 RX ADMIN — DEXAMETHASONE 0.75 MG: 0.75 TABLET ORAL at 07:34

## 2019-02-14 RX ADMIN — SENNOSIDES AND DOCUSATE SODIUM 2 TABLET: 8.6; 5 TABLET ORAL at 00:18

## 2019-02-14 RX ADMIN — PENTOXIFYLLINE 400 MG: 400 TABLET, FILM COATED, EXTENDED RELEASE ORAL at 13:02

## 2019-02-14 RX ADMIN — LINACLOTIDE 290 MCG: 145 CAPSULE, GELATIN COATED ORAL at 07:34

## 2019-02-14 RX ADMIN — POTASSIUM PHOSPHATE, MONOBASIC 500 MG: 500 TABLET, SOLUBLE ORAL at 00:18

## 2019-02-14 RX ADMIN — PENTOXIFYLLINE 400 MG: 400 TABLET, FILM COATED, EXTENDED RELEASE ORAL at 07:34

## 2019-02-14 RX ADMIN — OMEPRAZOLE 40 MG: 20 CAPSULE, DELAYED RELEASE ORAL at 07:34

## 2019-02-14 RX ADMIN — POLYETHYLENE GLYCOL 3350 17 G: 17 POWDER, FOR SOLUTION ORAL at 19:42

## 2019-02-14 RX ADMIN — POLYETHYLENE GLYCOL 3350 17 G: 17 POWDER, FOR SOLUTION ORAL at 13:40

## 2019-02-14 NOTE — PHARMACY-ADMISSION MEDICATION HISTORY
Admission Medication History status for the 2/13/2019 admission is complete.  See EPIC admission navigator for Prior to Admission medications.    Medication history sources:  Patient's parents, [facility], parent's chart that they used to remember which meds to give and when      Medication history source reliability: Good - Patient report and medication chart mostly matches [facility] records and [facility] fill history, the several variations noted below    Medication adherence:  Good - parents report excellent adherence, occasionally forget the mid-day dose of Trental     Changes made to PTA medication list (reason)  Added: Senokot   Deleted: Cholecalciferol 400 mg chewable (entered in 2016, parents report not giving)  Changed:   Omeprazole 20 mg DR capsule   PREVIOUSLY: 40 mg BID   CURRENTLY: 40 mg every morning  Miralax   PREVIOUSLY: 34 g BID   CURRENTLY: 17 g TID   K-Phos 500 mg tablet   PREVIOUSLY: 1 tablet TID   CURRENTLY 1 tab BID    Additional medication history information (including reliability of information, actions taken by pharmacist):   - Patient's parents were excellent historians, the knew their son's medications well and deny all other Rx/OTC medications  - Mother mentioned their [facility] doctor recommended he start Vitamin D3 2000 unit(s) daily earlier today  - Patient enrolled in a study through the [facility] involving Entinostat, parents state he has been in this study for the past ~2 years  -Time spent in this activity: 30 min  - Medication history completed by: Gena Joseph    Prior to Admission medications    Medication Sig Last Dose Taking? Auth Provider   calcium carbonate-vitamin D 600-400 MG-UNIT CHEW Take 2 tablets in the morning and 1 tablet in the evening. 2/13/2019 at AM Yes Kristi Schuler APRN CNP   dexamethasone (DECADRON) 0.5 MG tablet Take 0.75 mg by mouth daily (with breakfast). 2/13/2019 at AM Yes Kristi Schuler APRN CNP   fexofenadine (ALLEGRA)  180 MG tablet Take 180 mg by mouth every evening  2/12/2019 at PM Yes Unknown, Entered By History   linaclotide (LINZESS) 290 MCG capsule Take 290 mcg by mouth every morning (before breakfast)  2/13/2019 at AM Yes Melvina Sparks APRN CNP   mupirocin (BACTROBAN) 2 % ointment Use 2 times a day to the buttock with flare Past Week at Unknown time Yes Kristi Schuler APRN CNP   OLANZapine (ZYPREXA) 2.5 MG tablet Take 4 tablets (10 mg) by mouth At Bedtime 2/12/2019 at Unknown time Yes Zoraida Wlider MD   omeprazole (PRILOSEC) 20 MG DR capsule Take 40 mg by mouth every morning 2/13/2019 at AM Yes Unknown, Entered By History   pentoxifylline (TRENTAL) 400 MG CR tablet Take 1 tablet (400 mg) by mouth 3 times daily (with meals) 2/13/2019 at AM Yes Kristi Schuler APRN CNP   polyethylene glycol (MIRALAX/GLYCOLAX) packet Take 17 g by mouth 3 times daily 2/13/2019 at 1200 Yes Unknown, Entered By History   potassium phosphate, monobasic, (K-PHOS) 500 MG tablet Take 500 mg by mouth 2 times daily 2/13/2019 at AM Yes Unknown, Entered By History   senna-docusate (SENOKOT-S/PERICOLACE) 8.6-50 MG tablet Take 2 tablets by mouth At Bedtime 2/12/2019 at PM Yes Unknown, Entered By History   study - entinostat (IDS# 5050) 5 mg tablet Take 1 tablet (5 mg) by mouth every 7 days for 4 doses Take one 5mg tablet with one 1mg tablet for total dose of 6mg weekly. Take on an empty stomach, at least 1 hour before or 2 hours after a meal.  Swallow tablet whole. 2/13/2019 at Unknown time Yes Leoncio Rousseau MD   sulfamethoxazole-trimethoprim (BACTRIM/SEPTRA) 400-80 MG per tablet Take 1 tablet by mouth 2 times daily On Saturdays and Sundays 2/10/2019 at PM Yes Kristi Schuler APRN CNP   vitamin D3 (CHOLECALCIFEROL) 2000 units tablet Take 1 tablet by mouth daily  Yes Unknown, Entered By History   vitamin E (GNP VITAMIN E) 400 UNIT capsule Take 1 capsule (400 Units) by mouth daily 2/13/2019 at AM Yes  Kristi Schuler, ALAN CNP   study - entinostat (IDS# 5050) 1 mg tablet Take 1 tablet (1 mg) by mouth every 7 days for 4 doses Take one 1mg tablet with one 5mg tablet for total dose of 6mg weekly. Take on an empty stomach, at least 1 hour before or 2 hours after a meal.  Swallow tablet whole.   Leoncio Rousseau MD

## 2019-02-14 NOTE — ED PROVIDER NOTES
The pt was signed out at shift change pending inpt bed availability. He rested comfortably throughout the night without difficulty. The pt will be signed out again at shift change, still awaiting and inpt bed.       Melvina Paz MD  02/14/19 0793

## 2019-02-14 NOTE — ED NOTES
Sign out Provider: Brandi      Sign out Plan: Patient with a very complex past medical history was seen and evaluated yesterday deemed in need of psychiatric admission.  He is awaiting an available inpatient mental health bed.      Reassessment: No events on the shift.  He has remained stable.  I am told that we are waiting for an appropriate room on 1 of the mental health units.      Disposition: Continue with plan for mental health admission.  He will be signed out to the evening physician at shift change.       Bruno Hutchinson MD  02/14/19 1535

## 2019-02-14 NOTE — ED NOTES
The patient was brought to the ED by his parents' who are his legal guardian.  They say he has been expressing paranoid thoughts towards his oncology care providers and his father, feeling that they are withholding medical treatment from him that would help him.  Dad says he also states that his constipation is causing him to not be able to walk.  He has been isolating as well and seems depressed.  They are concerned about how he is doing.  Today he left his oncology appointment and wanted mom to drive him to to another facility ([facility]).  She asked him to go home and they can go another time, just not this afternoon. He ended calling 911 in the car ride.  Parents are worried about this impulsivity.  The [facility] psychiatry team called here today stating they felt the patient would benefit from admission and recommended this.  Family is in agreement and will sign him in.  The patient would like to go home.  He denies current si.  He denies hi.  Denies cd issues.     Please see Dr. Mane complete note for ROS and exam.     The patient is difficult to understand at times due to speech being affected by hx of brain tumor. He is receiving a study drug for his brain tumor.  Parents state he has been on the study drug for 2 years.  He had a scan in January that shows his tumor has shrunk/is stable.  The patient is pleasant/cooperative.  He appears anxious.      Plan: admit to inpatient mental health for depression and paranoid thinking.      Kristan Gonzalez MD  02/13/19 2144       Kristan Gonzalez MD  02/13/19 214

## 2019-02-15 ENCOUNTER — ALLIED HEALTH/NURSE VISIT (OUTPATIENT)
Dept: NEUROLOGY | Facility: CLINIC | Age: 19
End: 2019-02-15
Attending: PSYCHIATRY & NEUROLOGY
Payer: COMMERCIAL

## 2019-02-15 VITALS
OXYGEN SATURATION: 95 % | DIASTOLIC BLOOD PRESSURE: 82 MMHG | RESPIRATION RATE: 18 BRPM | HEART RATE: 105 BPM | TEMPERATURE: 97 F | SYSTOLIC BLOOD PRESSURE: 114 MMHG

## 2019-02-15 DIAGNOSIS — F29 PSYCHOSIS (H): Primary | ICD-10-CM

## 2019-02-15 DIAGNOSIS — F29 PSYCHOSIS, UNSPECIFIED PSYCHOSIS TYPE (H): ICD-10-CM

## 2019-02-15 PROCEDURE — 95816 EEG AWAKE AND DROWSY: CPT | Mod: ZF

## 2019-02-15 PROCEDURE — 99222 1ST HOSP IP/OBS MODERATE 55: CPT | Performed by: PHYSICIAN ASSISTANT

## 2019-02-15 PROCEDURE — 25000132 ZZH RX MED GY IP 250 OP 250 PS 637: Performed by: CLINICAL NURSE SPECIALIST

## 2019-02-15 PROCEDURE — 99356 ZZC PROLONGED SERV,INPATIENT,1ST HR: CPT | Performed by: PSYCHIATRY & NEUROLOGY

## 2019-02-15 PROCEDURE — 99207 ZZC CDG-CODE CATEGORY CHANGED: CPT | Performed by: PSYCHIATRY & NEUROLOGY

## 2019-02-15 PROCEDURE — G0177 OPPS/PHP; TRAIN & EDUC SERV: HCPCS

## 2019-02-15 PROCEDURE — 25000132 ZZH RX MED GY IP 250 OP 250 PS 637: Performed by: EMERGENCY MEDICINE

## 2019-02-15 PROCEDURE — 99236 HOSP IP/OBS SAME DATE HI 85: CPT | Performed by: PSYCHIATRY & NEUROLOGY

## 2019-02-15 PROCEDURE — 25000131 ZZH RX MED GY IP 250 OP 636 PS 637: Performed by: EMERGENCY MEDICINE

## 2019-02-15 PROCEDURE — 99207 ZZC CONSULT E&M CHANGED TO INITIAL LEVEL: CPT | Performed by: PHYSICIAN ASSISTANT

## 2019-02-15 PROCEDURE — 25000125 ZZHC RX 250: Performed by: CLINICAL NURSE SPECIALIST

## 2019-02-15 RX ORDER — OLANZAPINE 15 MG/1
15 TABLET ORAL AT BEDTIME
Status: DISCONTINUED | OUTPATIENT
Start: 2019-02-15 | End: 2019-02-15 | Stop reason: HOSPADM

## 2019-02-15 RX ORDER — OLANZAPINE 15 MG/1
15 TABLET ORAL AT BEDTIME
Qty: 30 TABLET | Refills: 0 | Status: SHIPPED | OUTPATIENT
Start: 2019-02-15 | End: 2019-03-08

## 2019-02-15 RX ADMIN — POLYETHYLENE GLYCOL 3350 17 G: 17 POWDER, FOR SOLUTION ORAL at 14:42

## 2019-02-15 RX ADMIN — OYSTER SHELL CALCIUM WITH VITAMIN D 1 TABLET: 500; 200 TABLET, FILM COATED ORAL at 08:28

## 2019-02-15 RX ADMIN — POTASSIUM PHOSPHATE, MONOBASIC 500 MG: 500 TABLET, SOLUBLE ORAL at 08:27

## 2019-02-15 RX ADMIN — VITAMIN E CAP 400 UNIT 400 UNITS: 400 CAP at 08:28

## 2019-02-15 RX ADMIN — POLYETHYLENE GLYCOL 3350 17 G: 17 POWDER, FOR SOLUTION ORAL at 08:31

## 2019-02-15 RX ADMIN — SULFAMETHOXAZOLE AND TRIMETHOPRIM 1 TABLET: 400; 80 TABLET ORAL at 00:02

## 2019-02-15 RX ADMIN — SENNOSIDES AND DOCUSATE SODIUM 2 TABLET: 8.6; 5 TABLET ORAL at 00:03

## 2019-02-15 RX ADMIN — LINACLOTIDE 290 MCG: 145 CAPSULE, GELATIN COATED ORAL at 08:27

## 2019-02-15 RX ADMIN — PENTOXIFYLLINE 400 MG: 400 TABLET, FILM COATED, EXTENDED RELEASE ORAL at 08:27

## 2019-02-15 RX ADMIN — PENTOXIFYLLINE 400 MG: 400 TABLET, FILM COATED, EXTENDED RELEASE ORAL at 14:42

## 2019-02-15 RX ADMIN — OMEPRAZOLE 40 MG: 20 CAPSULE, DELAYED RELEASE ORAL at 08:31

## 2019-02-15 RX ADMIN — SULFAMETHOXAZOLE AND TRIMETHOPRIM 1 TABLET: 400; 80 TABLET ORAL at 08:27

## 2019-02-15 RX ADMIN — VITAMIN D, TAB 1000IU (100/BT) 2000 UNITS: 25 TAB at 08:31

## 2019-02-15 RX ADMIN — OLANZAPINE 10 MG: 10 TABLET, FILM COATED ORAL at 00:04

## 2019-02-15 RX ADMIN — DEXAMETHASONE 0.75 MG: 0.75 TABLET ORAL at 08:28

## 2019-02-15 ASSESSMENT — ACTIVITIES OF DAILY LIVING (ADL)
DRESS: 3-->ASSISTIVE EQUIPMENT AND PERSON
LAUNDRY: UNABLE TO COMPLETE
RETIRED_COMMUNICATION: 2-->DIFFICULTY SPEAKING (NOT RELATED TO LANGUAGE BARRIER)
ORAL_HYGIENE: WITH ASSISTANCE
AMBULATION: 4-->COMPLETELY DEPENDENT
HYGIENE/GROOMING: INDEPENDENT
ORAL_HYGIENE: WITH ASSISTANCE
SWALLOWING: 0-->SWALLOWS FOODS/LIQUIDS WITHOUT DIFFICULTY
COGNITION: 0 - NO COGNITION ISSUES REPORTED
TRANSFERRING: 3-->ASSISTIVE EQUIPMENT AND PERSON
DRESS: WITH ASSISTANCE
LAUNDRY: UNABLE TO COMPLETE
RETIRED_EATING: 2-->ASSISTIVE PERSON
BATHING: 4-->COMPLETELY DEPENDENT
HYGIENE/GROOMING: HANDWASHING;WITH ASSISTANCE
TOILETING: 4-->COMPLETELY DEPENDENT
DRESS: SCRUBS (BEHAVIORAL HEALTH);WITH ASSISTANCE
FALL_HISTORY_WITHIN_LAST_SIX_MONTHS: NO

## 2019-02-15 NOTE — PROGRESS NOTES
PATIENT BELONGINGS:    Items in Patient Locker:  -Back pack: red hoody with strings, blue hoody with strings, shoes with laces, deodorant, black sweat pants with strings, grey sweat pants with strings, 2 pairs of black socks, black T-shirt, grey T-shirt, 2 pairs of boxer shorts    ---items documented as received from Alison Joyner on the evening shift.  ---No Cash, No Credit/Debit Cards, and No Medications present at time of admission.    A               Admission:  I am responsible for any personal items that are not sent to the safe or pharmacy.  [facility] is not responsible for loss, theft or damage of any property in my possession.    Signature:  _________________________________ Date: _______  Time: _____                                              Staff Signature:  ____________________________ Date: ________  Time: _____      2nd Staff person, if patient is unable/unwilling to sign:    Signature: ________________________________ Date: ________  Time: _____     Discharge:  [facility] has returned all of my personal belongings:    Signature: _________________________________ Date: ________  Time: _____                                          Staff Signature:  ____________________________ Date: ________  Time: _____

## 2019-02-15 NOTE — PROGRESS NOTES
"BEHAVIORAL TEAM DISCUSSION    Participants: Dr. Christopher; Summer BRANDT; Mayte ARCHER  Progress: new admission, \"18 year old male who presents with a history of brain tumor status post surgery, constipation, depression presenting with concerns for about harming himself.\"      Continued Stay Criteria/Rationale: Pt needs further assessment  Medical/Physical: see chart  Precautions:   Behavioral Orders   Procedures     1:1 Precautions     Code 1 - Restrict to Unit     Code 2     EEG     Routine Programming     As clinically indicated     Single Room     Status 15     Every 15 minutes.     Status Individual Observation     Order Specific Question:   CONTINUOUS 24 hours / day     Answer:   5 feet     Order Specific Question:   Indications for SIO     Answer:   Medical equipment / ligature risk     Plan: assess, monitor and stabilize  Rationale for change in precautions or plan: new admission    "

## 2019-02-15 NOTE — CONSULTS
[facility] [place]  Consult Note - Hospitalist Service     Date of Admission:  2/13/2019  Consult Requested by: Debra Naegele, APRN CNS  Reason for Consult: brain tumor, constipation    Assessment & Plan   Geo Hicks is a 18 year old male with a history of brain tumor status post surgery, radiation, chemotherapy; chronic constipation; and depression admitted on to station 32N for increasing delusions, paranoia, and suicidal thoughts. An Internal Medicine consultation was requested by Debra Naegele, APRN CNS for assessment of medical issues including brain tumor and chronic constipation.    #Worsening delusions, paranoia:  Apparently has worsened since last year within the context of chronic tx for below ependymoma.  Currently afebrile with no significant hematologic or metabolic changes on admission labs.  Most recent MRI showed tumor stable to slightly decreased compared to 6/12/18.  Underwent EEG this am with results pending.  - Definitive tx per Psychiatry  - F/u results of EEG  - Defer to primary team regarding need to formally consult Neurology for further workup    #Ependymoma.  Undergoing treatment at [facility], and was seen yesterday by heme/onc.  Diagnosed at age 14.  Status post multiple surgical resections, chemotherapy, and radiation.  With multiple neurologic deficits. Currently enrolled in chemotherapy study with Entinostat.  Most recent MRI showed tumor stable to slightly decreased compared to 6/12/18.  Patient denies any recent changes in neurological deficits related to his tumor and previous treatments.    -Continue Entinostat per oncology every 7 days; next dose due on Wed., 2/20. If still admitted primary team to contact pt's oncologist and/or [facility] clinic to see if this can be done as a transfer [facility] then back to psych.     #Chronic constipation.    -Follow-up with outpatient gastroenterology as scheduled in May 2019  -Continue regimen recommended  by outpatient gastroenterology, PM&R:   -Linzess 290 mcg qday  -Polyethylene glycol - 17g tid  -Senokot-S 2 tabs po at bedtime    #Elevated creatinine.  Appears his BL Cr for a few years ~1.1. Most recent Cr on 1/13/19, 1.11.  - Follow up after discharge with PCP or Oncology for ongoing monitoring    Ramesh Riley PA-C  Internal Medicine Hospitalist   [facility]  [phone]   ________________________________________    Chief Complaint   Constipation, brain tumor    History is obtained from the patient    History of Present Illness   Geo Hicks is a 18 year old male with a history of brain tumor status post surgery, radiation, chemotherapy; chronic constipation; and depression admitted on to station 32[facility] for increasing delusions, paranoia, and suicidal thoughts on 2/13/2019.    Ependymoma: According to chart review, patient was diagnosed at age 14.  Since diagnosis, he has undergone multiple surgical resections, radiation, and chemotherapy.  He had an intra-tumoral hemorrhage in May 2015 that resulted in neurological changes including facial numbness, mobility impairment, and dysarthria.   Patient is followed by oncology through the [facility], and has been part of a study with a chemotherapy drug Entinostat for the past 2 years.  Most recent MRI on 1/16/19 showed tumor stable to slightly decreased when compared to 6/12/18 MRI.  Patient denies any recent changes with neurological deficits.  Endorses ongoing diplopia, improved with wearing R eye patch. Hearing loss unchanged.  Ongoing dysarthria and lack of coordination.  Patient uses wheelchair.  Denies headache or dizziness.      Constipation: According to chart review, patient has experienced severe constipation concomitant with his oncologic regimen and neurological deficits.  He has had several hospital admissions for bowel cleanout, most recently 1/9-1/10/19.  Per most recent gastroenterology visit on 2/4/19, patient is diagnosed with  slow transit constipation and is currently on the following for management: linzess 290 mcg qday, colace, and miralax (34g po bid; titrated to 2-3 soft [place] 4-6 stools/day), as well as timed toileting.  Also recommended pelvic floor PT and PM&R to address this concern.  PM&R visit on 2/6/19 recommended adding senna to current regimen.  According to records, patient is fixated on his constipation, and has expressed thoughts that his treatment team and family are conspiring to withhold treatment, and that his constipation is delaying his cure to brain tumors and keeping him from walking.  At this time, patient reports that his last bowel movement was yesterday, and was normal for him.  He reports he typically has bowel movements more than once daily, but chronically experiences a sensation of incomplete voiding, even with bowel cleanouts completed during hospitalizations.  Patient denies any recent changes with bowel movements or symptoms.  Denies bloody/tarry stools, diarrhea, nausea, vomiting, abdominal pain, or appetite changes.  Reports bowel movements are not painful and do not require excessive effort.  Reports that he tries to drink 3L of water daily.  Denies any history of abdominal surgeries.      Review of Systems   The 10 point Review of Systems is negative other than noted in the HPI.    Past Medical History    I have reviewed this patient's medical history and updated it with pertinent information if needed.   Past Medical History:   Diagnosis Date     Cranial nerve dysfunction      Dyspepsia      Ependymoma (H)      Gastro-oesophageal reflux disease      Hearing loss      Intracranial hemorrhage (H)      Migraine      Pilonidal cyst     7-2015     Reduced vision      Refractory obstruction of nasal airway     2nd to nasal valve prolapse     Sleep apnea      Strabismus     gaze palsy        Past Surgical History   I have reviewed this patient's surgical history and updated it with pertinent information  if needed.  Past Surgical History:   Procedure Laterality Date     GRAFT CARTILAGE FROM POSTERIOR AURICLE Left 10/6/2016    Procedure: GRAFT CARTILAGE FROM POSTERIOR AURICLE;  Surgeon: Tyler Richards MD;  Location: [facility] OR     INCISION AND DRAINAGE PERINEAL, COMBINED Bilateral 7/18/2015    Procedure: COMBINED INCISION AND DRAINAGE PERINEAL;  Surgeon: Dequan Timmons MD;  Location: [facility] OR     OPTICAL TRACKING SYSTEM CRANIOTOMY, EXCISE TUMOR, COMBINED N/A 4/13/2015    Procedure: COMBINED OPTICAL TRACKING SYSTEM CRANIOTOMY, EXCISE TUMOR;  Surgeon: Francis Velazquez MD;  [facility]     OPTICAL TRACKING SYSTEM CRANIOTOMY, EXCISE TUMOR, COMBINED N/A 4/16/2015    Procedure: COMBINED OPTICAL TRACKING SYSTEM CRANIOTOMY, EXCISE TUMOR;  Surgeon: Francis Velazquez MD;  [facility]     OPTICAL TRACKING SYSTEM CRANIOTOMY, EXCISE TUMOR, COMBINED Bilateral 5/28/2015    Procedure: COMBINED OPTICAL TRACKING SYSTEM CRANIOTOMY, EXCISE TUMOR;  Surgeon: Francis Velazquez MD;  [facility]     OPTICAL TRACKING SYSTEM CRANIOTOMY, EXCISE TUMOR, COMBINED Bilateral 1/14/2016    Procedure: COMBINED OPTICAL TRACKING SYSTEM CRANIOTOMY, EXCISE TUMOR;  Surgeon: Francis Velazquez MD;  [facility]     OPTICAL TRACKING SYSTEM VENTRICULOSTOMY  4/16/2015    Procedure: OPTICAL TRACKING SYSTEM VENTRICULOSTOMY;  Surgeon: Francis Velazquez MD;  [facility]     REMOVE PORT VASCULAR ACCESS N/A 10/6/2016    Procedure: REMOVE PORT VASCULAR ACCESS;  Surgeon: Bruno Perea MD;  [facility]     RHINOPLASTY N/A 10/6/2016    Procedure: RHINOPLASTY;  Surgeon: Tyler Richards MD;  [facility]     VASCULAR SURGERY  5-2015    single lumen power port       Social History    According to chart, patient lives between his parents (one week at each home). They have been awarded guardianship of Geo. The families work well together - both parents have remarried.    I have reviewed this  patient's social history and updated it with pertinent information if needed.  Social History     Tobacco Use     Smoking status: Never Smoker     Smokeless tobacco: Never Used   Substance Use Topics     Alcohol use: No     Drug use: No       Family History   I have reviewed this patient's family history and updated it with pertinent information if needed.   Family History   Problem Relation Age of Onset     Circulatory Father         PE/DVT     Hypothyroidism Father 30     Diabetes Maternal Grandmother      Diabetes Paternal Grandmother      Diabetes Paternal Grandfather      C.A.D. Paternal Grandfather      Hypertension Maternal Grandfather      Thyroid Disease Paternal Aunt         unknown whether hypo or hyper       Medications   Current Facility-Administered Medications   Medication     acetaminophen (TYLENOL) tablet 650 mg     alum & mag hydroxide-simethicone (MYLANTA ES/MAALOX  ES) suspension 30 mL     bisacodyl (DULCOLAX) Suppository 10 mg     calcium carbonate-vitamin D (OSCAL w/D) per tablet 1 tablet     dexamethasone (DECADRON) tablet 0.75 mg     fexofenadine (ALLEGRA) tablet 180 mg     hydrOXYzine (ATARAX) tablet 25 mg     linaclotide (LINZESS) capsule 290 mcg     magnesium hydroxide (MILK OF MAGNESIA) suspension 30 mL     mupirocin (BACTROBAN) 2 % ointment     OLANZapine (zyPREXA) tablet 10 mg     omeprazole (priLOSEC) CR capsule 40 mg     pentoxifylline ER (TRENtal) CR tablet 400 mg     polyethylene glycol (MIRALAX/GLYCOLAX) Packet 17 g     potassium phosphate (monobasic) (K-PHOS) tablet 500 mg     senna-docusate (SENOKOT-S/PERICOLACE) 8.6-50 MG per tablet 2 tablet     sulfamethoxazole-trimethoprim (BACTRIM/SEPTRA) 400-80 MG per tablet 1 tablet     traZODone (DESYREL) tablet 50 mg     vitamin D3 (CHOLECALCIFEROL) 1000 units (25 mcg) tablet 2,000 Units     vitamin E (TOCOPHEROL) 400 units (180 mg) capsule 400 Units       Allergies   Allergies   Allergen Reactions     Blood Transfusion Related  (Informational Only) Swelling     Periorbital swelling post platelet transfusion     No Known Drug Allergies        Physical Exam   Vital Signs: Temp: 99.2  F (37.3  C) Temp src: Tympanic BP: 109/76 Pulse: 98   Resp: 18 SpO2: 95 % O2 Device: None (Room air)    Weight: 0 lbs 0 oz    Constitutional: awake, alert, cooperative, no apparent distress, and appears stated age  HEENT: wears eye patch on R eye; sclera anicteric, conjunctiva not injected; moist mucous membranes; sialorrhea  Respiratory: No increased work of breathing, clear to auscultation bilaterally, no crackles or wheezing  Cardiovascular: RRR, no murmurs/rubs/gallops  GI: No scars, normal bowel sounds, soft, non-distended, non-tender, no masses palpated, no hepatosplenomegally  Skin: normal skin color, texture, turgor  Musculoskeletal: No edema  Neurologic: Awake sitting in WC; Dysarthria noted. No acute focal deficits noted.     Data   CMP  Recent Labs   Lab 02/13/19  1118 02/12/19  1401    137   POTASSIUM 4.2 4.4   CHLORIDE 109 104   CO2 31 34*   ANIONGAP 4 <1*   GLC 59* 80   BUN 19 15   CR 1.11* 1.07*   GFRESTIMATED >90 >90   GFRESTBLACK >90 >90   KATIE 8.5 8.7   MAG 2.1 2.2   PHOS 4.8* 4.7*   PROTTOTAL 6.3* 6.6*   ALBUMIN 3.0* 3.4   BILITOTAL 0.2 0.2   ALKPHOS 143 135   AST 32 26   ALT 19 17     CBC  Recent Labs   Lab 02/12/19  1401   WBC 3.7*   RBC 4.39*   HGB 13.3   HCT 39.5*   MCV 90   MCH 30.3   MCHC 33.7   RDW 12.9   *       Results for orders placed or performed during the hospital encounter of 02/13/19   Drug abuse screen 6 urine (chem dep)   Result Value Ref Range    Amphetamine Qual Urine Negative NEG^Negative    Barbiturates Qual Urine Negative NEG^Negative    Benzodiazepine Qual Urine Negative NEG^Negative    Cannabinoids Qual Urine Negative NEG^Negative    Cocaine Qual Urine Negative NEG^Negative    Ethanol Qual Urine Negative NEG^Negative    Opiates Qualitative Urine Negative NEG^Negative     *Note: Due to a large number of  results and/or encounters for the requested time period, some results have not been displayed. A complete set of results can be found in Results Review.

## 2019-02-15 NOTE — PROGRESS NOTES
Initial Psychosocial Assessment    I have reviewed the chart, met with the patient, and developed Care Plan.  Information for assessment was obtained from patient and chart notes.    Presenting Problem:  Patient was admitted on a voluntary basis, signed in by his parents who are guardians.  He has been experiencing worsening depression, paranoia and delusions that his oncology treatment team are withholding care from him.  He has a brain tumor, currently in treatment.  He is wheelchair bound and has garbled speech as a result of the tumor.      History of Mental Health and Chemical Dependency:  None     Family Description (Constellation, Family Psychiatric History):  Parents are .  Patient has 2 bio siblings, 4 step sisters.  He is single, no children.    Significant Life Events (Illness, Abuse, Trauma, Death):  Brain tumor, surgery, chemotherapy.      Living Situation:  With mom or dad    Educational Background:  Patient completed high school.    Occupational History:  Not employed    Financial Status:  Parents support.    Legal Issues:  Parents are legal guardians.  Christianne [phone]; Eric [phone]    Ethnic/Cultural Issues:  None     Spiritual Orientation:  Not assessed     Service History:  None     Social Functioning (organization, interests):  Not assessed.    Current Treatment Providers are:  Psychiatrist Jorge Luis Tripathi [facility] [phone]  [facility] for medical care.    Social Service Assessment/Plan:  Patient will be seen by the attending psychiatrist.  Medications will be evaluated.  Care will be coordinated with guardians. Expect a short stay.  Patient is hoping for discharge soon.  CTC available to assist as needed to ensure appropriate aftercare is in place.

## 2019-02-15 NOTE — H&P
"[facility], [place]   Psychiatric History & Physical  Admission date: 2/13/2019  Geo Hicks  [id number]  [birth year]    Time: 156 minutes on encounter, >50% of which was spent in counseling and/or coordination of care consisting of: communication and education with the patient/family, lab/image/study evaluation, support staff communication, and other sources pertinent to excellent patient care.            Chief Complaint:   \"I do not want to be here\"        HPI:   Geo Hicks with a past medical history of gaze palsy, obstructive sleep apnea, nasal obstruction, vision loss, migraines, hearing loss, GERD, ependymoma, cranial nerve dysfunction was admitted 2/13/2019 for evaluation of potential delusions and suicidal comments.    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  He additionally has neurological deficits and loss of mobility all coinciding the same timeframe of other problems.  He is said to believe that no one is interested in helping him with his constipation and his constipation is leading to a loss of his ability to walk.  He was started on olanzapine 10 mg about 1 month ago by outpatient psychiatry service.  Family has not seen any improvement in his symptoms related to potential delusions.  EKG was negative for current cardiac problems or concerning prolongation.  He has been having daily bowel movements but continues to complain about constipation.  There was some conflict with family and he recently called 911 because he wanted to go to another facility to evaluate the constipation.  He has not been violent or aggressive though has mentioned some suicidal comments at times.  Current potential offending medications could be the dexamethasone or pentoxifylline.  These have potential side effects of psychotic symptoms.  Neurology has previously evaluated " him and found him to be at increased risk of possible seizure.  They did have one EEG that showed abnormalities another one that was completely normal.    Mother's phone number is [phone]  Had a conversation with his mother who is also his guardian says that he has been acting paranoid and delusional about his current treatment and constipation for about 1 year.  He does well using a wheelchair and walks around the house with the use of a walker.  He has been reluctant to exercise more often and usually blames the constipation as being the main issue he cannot walk or exercise further.  He has been not receptive to the idea that his constipation will improve with more exercise and she does know that he has been having regular bowel movements.  She does believe there is some truth to his uncomfortable abdominal feelings.  She has not noticed any overt depression symptoms and he primarily spends time playing video games.  He has been somewhat irritable and that he will will be short tempered with her when she is invading his space or not allowing him to be as independent as possible.  He has been misperceiving things which has been previously talked about including some kind of possible game that was never out and what kind of food item his mother was cooking.  He called the police when she would not take him to another hospital to cure his constipation and relieve his mobility issues.  She feels as though he is getting worse and they have not noticed any improvement with the 10 mg of olanzapine.  She was informed of potential possibilities that will be further described below in terms of medications, future options, future evaluation, likelihood of treating delusional disorder if present, the potential treatment for anxiety or obsessional thoughts with antidepressant medications and the potential interactions with his chemotherapy agent.  Further details about recommendations can be seen below.  She is willing  to bring him back home once he has been evaluated and could potentially discharge today after she speaks with her ex-.    Upon meeting with him he says that he does not want to be here.  States that he has had problems with constipation and that he believes this can be remedied.  He believes the doctors are giving him someone else's records and not being honest with him.  He says these believes has gone on for about 6 months.  He denies any thoughts of harming himself or others and said he would never do anything like that.  Denies any sadness or sleep problems or anxiety conditions generalized anxiety social anxiety.  Denies any hallucinations or any believe that he has special paz.  Denies any anhedonia sadness guilty feelings energy problems attention or concentration issues or any hopelessness or helplessness and is planning on going to college.  Does not have any grandiose ideas about himself or previous manic episodes.  No eating disorder symptoms or obsessive-compulsive disorder symptoms.  No gambling addiction pornography addiction sexual addiction or shopping addiction.  Does have some skin picking.    He apparently does find it strange that his family and his physicians would want him to not walk or have continued constipation though he believes it to be 100% factual.  He understands that this sounds strange.    Physically he only complains about the constipation.        Past Psychiatric History:     Is limited past psychiatric history no previous suicide attempts no inpatient hospitalizations brain injury related to brain cancer, no seizures no electroconvulsive therapy.  Goes to the [facility] resident clinic.  Previous medications that I was able to find include diazepam, olanzapine, lorazepam.  No previous commitment skilled nursing time or violence.  Parents are in agreement with his past history.          Substance Use and History:     He has limited use of substances he drank alcohol when  he was in [place] and otherwise does not use substances.  No chemical dependency treatment history.          Past Medical History:   PAST MEDICAL HISTORY:   Past Medical History:   Diagnosis Date     Cranial nerve dysfunction      Dyspepsia      Ependymoma (H)      Gastro-oesophageal reflux disease      Hearing loss      Intracranial hemorrhage (H)      Migraine      Pilonidal cyst     7-2015     Reduced vision      Refractory obstruction of nasal airway     2nd to nasal valve prolapse     Sleep apnea      Strabismus     gaze palsy        PAST SURGICAL HISTORY:   Past Surgical History:   Procedure Laterality Date     GRAFT CARTILAGE FROM POSTERIOR AURICLE Left 10/6/2016    Procedure: GRAFT CARTILAGE FROM POSTERIOR AURICLE;  Surgeon: Tyler Richards MD;  Location: [facility]     INCISION AND DRAINAGE PERINEAL, COMBINED Bilateral 7/18/2015    Procedure: COMBINED INCISION AND DRAINAGE PERINEAL;  Surgeon: Dequan Timmons MD;  [facility]: [facility]     OPTICAL TRACKING SYSTEM CRANIOTOMY, EXCISE TUMOR, COMBINED N/A 4/13/2015    Procedure: COMBINED OPTICAL TRACKING SYSTEM CRANIOTOMY, EXCISE TUMOR;  Surgeon: Francis Velazquez MD;  [facility]     OPTICAL TRACKING SYSTEM CRANIOTOMY, EXCISE TUMOR, COMBINED N/A 4/16/2015    Procedure: COMBINED OPTICAL TRACKING SYSTEM CRANIOTOMY, EXCISE TUMOR;  Surgeon: Francis Velazquez MD;  [facility]     OPTICAL TRACKING SYSTEM CRANIOTOMY, EXCISE TUMOR, COMBINED Bilateral 5/28/2015    Procedure: COMBINED OPTICAL TRACKING SYSTEM CRANIOTOMY, EXCISE TUMOR;  Surgeon: Francis Velazquez MD;  [facility]     OPTICAL TRACKING SYSTEM CRANIOTOMY, EXCISE TUMOR, COMBINED Bilateral 1/14/2016    Procedure: COMBINED OPTICAL TRACKING SYSTEM CRANIOTOMY, EXCISE TUMOR;  Surgeon: Francis Velazquez MD;  [facility]     OPTICAL TRACKING SYSTEM VENTRICULOSTOMY  4/16/2015    Procedure: OPTICAL TRACKING SYSTEM VENTRICULOSTOMY;  Surgeon: Francis Velazquez MD;   [facility]     REMOVE PORT VASCULAR ACCESS N/A 10/6/2016    Procedure: REMOVE PORT VASCULAR ACCESS;  Surgeon: Bruno Perea MD;  [facility]     RHINOPLASTY N/A 10/6/2016    Procedure: RHINOPLASTY;  Surgeon: Tyler Richards MD;  [facility]     VASCULAR SURGERY  5-2015    single lumen power port             Family History:   FAMILY HISTORY:   Family History   Problem Relation Age of Onset     Circulatory Father         PE/DVT     Hypothyroidism Father 30     Diabetes Maternal Grandmother      Diabetes Paternal Grandmother      Diabetes Paternal Grandfather      C.A.D. Paternal Grandfather      Hypertension Maternal Grandfather      Thyroid Disease Paternal Aunt         unknown whether hypo or hyper     Mental Illness No family hx of            Social History:   SOCIAL HISTORY:   Social History     Socioeconomic History     Marital status: Single     Spouse name: None     Number of children: None     Years of education: None     Highest education level: None   Social Needs     Financial resource strain: None     Food insecurity - worry: None     Food insecurity - inability: None     Transportation needs - medical: None     Transportation needs - non-medical: None   Occupational History     None   Tobacco Use     Smoking status: Never Smoker     Smokeless tobacco: Never Used   Substance and Sexual Activity     Alcohol use: No     Drug use: No     Sexual activity: No   Other Topics Concern     None   Social History Narrative    Grown up in [place], [place].  Parents are , and he shares time between his mother's and father's homes. Both parents are remarried.  Dad is a , and mom does  for a testing company.  Siblings include two full brothers (older brother Benitez who is a senior in college and younger brother Gamaliel), a step-brother, and a step-sister. Geo graduated from high school in Spring 2018.  Initially considered attending [employer] in Fall 2019, but reportedly lost interest due to the amount of time it would take him to complete courses and receive a degree.  Does today endorse continued interest in pursuing an advanced course of study at some point, specifically stating he is interested in Electrical Engineering.  No access to guns or weapons enjoys playing video games.  Guns are currently locked in the house.            Physical ROS:   The patient endorsed the above issues. The remainder of 10-point review of systems was negative except as noted in HPI.         PTA Medications:     Medications Prior to Admission   Medication Sig Dispense Refill Last Dose     calcium carbonate-vitamin D 600-400 MG-UNIT CHEW Take 2 tablets in the morning and 1 tablet in the evening. 90 tablet 3 2/13/2019 at AM     dexamethasone (DECADRON) 0.5 MG tablet Take 0.75 mg by mouth daily (with breakfast). 90 tablet 3 2/13/2019 at AM     fexofenadine (ALLEGRA) 180 MG tablet Take 180 mg by mouth every evening    2/13/2019 at PM     linaclotide (LINZESS) 290 MCG capsule Take 290 mcg by mouth every morning (before breakfast)    2/13/2019 at AM     mupirocin (BACTROBAN) 2 % ointment Use 2 times a day to the buttock with flare 22 g 3 Past Week at Unknown time     omeprazole (PRILOSEC) 20 MG DR capsule Take 40 mg by mouth every morning   2/13/2019 at AM     pentoxifylline (TRENTAL) 400 MG CR tablet Take 1 tablet (400 mg) by mouth 3 times daily (with meals) 270 tablet 2 2/14/2019 at AM     polyethylene glycol (MIRALAX/GLYCOLAX) packet Take 17 g by mouth 3 times daily   2/13/2019 at 1200     potassium phosphate, monobasic, (K-PHOS) 500 MG tablet Take 500 mg by mouth 2 times daily   2/13/2019 at AM     senna-docusate (SENOKOT-S/PERICOLACE) 8.6-50 MG tablet Take 2 tablets by mouth At Bedtime   2/13/2019 at PM     study - entinostat (IDS# 5050) 5 mg tablet Take 1 tablet (5 mg) by mouth every 7 days for 4 doses Take one 5mg tablet with one 1mg tablet for total dose of 6mg  weekly. Take on an empty stomach, at least 1 hour before or 2 hours after a meal.  Swallow tablet whole. 4 tablet 0 2019 at Unknown time     sulfamethoxazole-trimethoprim (BACTRIM/SEPTRA) 400-80 MG per tablet Take 1 tablet by mouth 2 times daily On  and Sundays 24 tablet 11 Past Week at PM     vitamin D3 (CHOLECALCIFEROL) 2000 units tablet Take 1 tablet by mouth daily   Past Week at Unknown time     vitamin E (GNP VITAMIN E) 400 UNIT capsule Take 1 capsule (400 Units) by mouth daily 30 capsule 11 2019 at AM     study - entinostat (IDS# 5050) 1 mg tablet Take 1 tablet (1 mg) by mouth every 7 days for 4 doses Take one 1mg tablet with one 5mg tablet for total dose of 6mg weekly. Take on an empty stomach, at least 1 hour before or 2 hours after a meal.  Swallow tablet whole. 4 tablet 0      [] study - entinostat (IDS# 5050) 1 mg tablet Take 1 tablet (1 mg) by mouth every 7 days for 4 doses Take one 1mg tablet with one 5mg tablet for total dose of 6mg weekly. Take on an empty stomach, at least 1 hour before or 2 hours after a meal.  Swallow tablet whole. 4 tablet 0 Taking     [] study - entinostat (IDS# 5050) 1 mg tablet Take 1 tablet (1 mg) by mouth every 7 days for 4 doses Take one 1mg tablet with one 5mg tablet for total dose of 6mg weekly. Take on an empty stomach, at least 1 hour before or 2 hours after a meal.  Swallow tablet whole. 4 tablet 0 2019 at 0800     [] study - entinostat (IDS# 5050) 1 mg tablet Take 1 tablet (1 mg) by mouth every 7 days for 4 doses Take one 1mg tablet with one 5mg tablet for total dose of 6mg weekly. Take on an empty stomach, at least 1 hour before or 2 hours after a meal.  Swallow tablet whole. 4 tablet 0 Taking     [] study - entinostat (IDS# 5050) 1 mg tablet Take 1 tablet (1 mg) by mouth every 7 days for 4 doses Take one 1mg tablet with one 5mg tablet for total dose of 6mg weekly. Take on an empty stomach, at least 1 hour before or 2  hours after a meal.  Swallow tablet whole. 4 tablet 0 Taking     [] study - entinostat (IDS# 5050) 1 mg tablet Take 1 tablet (1 mg) by mouth every 7 days for 4 doses Take one 1mg tablet with one 5mg tablet for total dose of 6mg weekly. Take on an empty stomach, at least 1 hour before or 2 hours after a meal.  Swallow tablet whole. 4 tablet 0 Taking     [] study - entinostat (IDS# 5050) 1 mg tablet Take 1 tablet (1 mg) by mouth every 7 days for 4 doses Take one 1mg tablet with one 5mg tablet for total dose of 6mg weekly. Take on an empty stomach, at least 1 hour before or 2 hours after a meal.  Swallow tablet whole. 4 tablet 0 2018 at Unknown time     [] study - entinostat (IDS# 5050) 1 mg tablet Take 1 tablet (1 mg) by mouth every 7 days for 4 doses Take one 1mg tablet with one 5mg tablet for total dose of 6mg weekly. Take on an empty stomach, at least 1 hour before or 2 hours after a meal.  Swallow tablet whole. 4 tablet 0 Taking     [] study - entinostat (IDS# 5050) 1 mg tablet Take 1 tablet (1 mg) by mouth every 7 days for 4 doses Take one 1mg tablet with one 5mg tablet for total dose of 6mg weekly. Take on an empty stomach, at least 1 hour before or 2 hours after a meal.  Swallow tablet whole. 4 tablet 0 Taking     [] study - entinostat (IDS# 5050) 1 mg tablet Take 1 tablet (1 mg) by mouth every 7 days for 4 doses Take one 1mg tablet with one 5mg tablet for total dose of 6mg weekly. Take on an empty stomach, at least 1 hour before or 2 hours after a meal.  Swallow tablet whole. 4 tablet 0 Taking     [] study - entinostat (IDS# 5050) 1 mg tablet Take 1 tablet (1 mg) by mouth every 7 days for 4 doses Take one 1mg tablet with one 5mg tablet for total dose of 6mg weekly. Take on an empty stomach, at least 1 hour before or 2 hours after a meal.  Swallow tablet whole. 4 tablet 0 Taking     [] study - entinostat (IDS# 5050) 1 mg tablet Take 1 tablet (1 mg) by mouth  every 7 days for 4 doses Take one 1mg tablet with one 5mg tablet for total dose of 6mg weekly. Take on an empty stomach, at least 1 hour before or 2 hours after a meal.  Swallow tablet whole. 4 tablet 0 Taking     [] study - entinostat (IDS# 5050) 5 mg tablet Take 1 tablet (5 mg) by mouth every 7 days for 4 doses Take one 5mg tablet with one 1mg tablet for total dose of 6mg weekly. Take on an empty stomach, at least 1 hour before or 2 hours after a meal.  Swallow tablet whole. 4 tablet 0 Taking     [] study - entinostat (IDS# 5050) 5 mg tablet Take 1 tablet (5 mg) by mouth every 7 days for 4 doses Take one 5mg tablet with one 1mg tablet for total dose of 6mg weekly. Take on an empty stomach, at least 1 hour before or 2 hours after a meal.  Swallow tablet whole. 4 tablet 0 2019 at Unknown time     [] study - entinostat (IDS# 5050) 5 mg tablet Take 1 tablet (5 mg) by mouth every 7 days for 4 doses Take one 5mg tablet with one 1mg tablet for total dose of 6mg weekly. Take on an empty stomach, at least 1 hour before or 2 hours after a meal.  Swallow tablet whole. 4 tablet 0 Taking     [] study - entinostat (IDS# 5050) 5 mg tablet Take 1 tablet (5 mg) by mouth every 7 days for 4 doses Take one 5mg tablet with one 1mg tablet for total dose of 6mg weekly. Take on an empty stomach, at least 1 hour before or 2 hours after a meal.  Swallow tablet whole. 4 tablet 0 Taking     [] study - entinostat (IDS# 5050) 5 mg tablet Take 1 tablet (5 mg) by mouth every 7 days for 4 doses Take one 5mg tablet with one 1mg tablet for total dose of 6mg weekly. Take on an empty stomach, at least 1 hour before or 2 hours after a meal.  Swallow tablet whole. 4 tablet 0 Taking     [] study - entinostat (IDS# 5050) 5 mg tablet Take 1 tablet (5 mg) by mouth every 7 days for 4 doses Take one 5mg tablet with one 1mg tablet for total dose of 6mg weekly. Take on an empty stomach, at least 1 hour before or 2  hours after a meal.  Swallow tablet whole. 4 tablet 0 2018 at Unknown time     [] study - entinostat (IDS# 5050) 5 mg tablet Take 1 tablet (5 mg) by mouth every 7 days for 4 doses Take one 5mg tablet with one 1mg tablet for total dose of 6mg weekly. Take on an empty stomach, at least 1 hour before or 2 hours after a meal.  Swallow tablet whole. 4 tablet 0 Taking     [] study - entinostat (IDS# 5050) 5 mg tablet Take 1 tablet (5 mg) by mouth every 7 days for 4 doses Take one 5mg tablet with one 1mg tablet for total dose of 6mg weekly. Take on an empty stomach, at least 1 hour before or 2 hours after a meal.  Swallow tablet whole. 4 tablet 0 Taking     [] study - entinostat (IDS# 5050) 5 mg tablet Take 1 tablet (5 mg) by mouth every 7 days for 4 doses Take one 5mg tablet with one 1mg tablet for total dose of 6mg weekly. Take on an empty stomach, at least 1 hour before or 2 hours after a meal.  Swallow tablet whole. 4 tablet 0 Taking     [] study - entinostat (IDS# 5050) 5 mg tablet Take 1 tablet (5 mg) by mouth every 7 days for 4 doses Take one 5mg tablet with one 1mg tablet for total dose of 6mg weekly. Take on an empty stomach, at least 1 hour before or 2 hours after a meal.  Swallow tablet whole. 4 tablet 0 Taking     [] study - entinostat (IDS# 5050) 5 mg tablet Take 1 tablet (5 mg) by mouth every 7 days for 4 doses Take one 5mg tablet with one 1mg tablet for total dose of 6mg weekly. Take on an empty stomach, at least 1 hour before or 2 hours after a meal.  Swallow tablet whole. 4 tablet 0 Taking     [DISCONTINUED] OLANZapine (ZYPREXA) 2.5 MG tablet Take 4 tablets (10 mg) by mouth At Bedtime 120 tablet 0 Past Week at Unknown time          Allergies:     Allergies   Allergen Reactions     Blood Transfusion Related (Informational Only) Swelling     Periorbital swelling post platelet transfusion     No Known Drug Allergies           Labs:     Recent Results (from the past 48  hour(s))   Drug abuse screen 6 urine (chem dep)    Collection Time: 02/13/19  8:21 PM   Result Value Ref Range    Amphetamine Qual Urine Negative NEG^Negative    Barbiturates Qual Urine Negative NEG^Negative    Benzodiazepine Qual Urine Negative NEG^Negative    Cannabinoids Qual Urine Negative NEG^Negative    Cocaine Qual Urine Negative NEG^Negative    Ethanol Qual Urine Negative NEG^Negative    Opiates Qualitative Urine Negative NEG^Negative          Physical and Psychiatric Examination:     /76   Pulse 98   Temp 99.2  F (37.3  C) (Tympanic)   Resp 18   SpO2 95%   Weight is 0 lbs 0 oz  There is no height or weight on file to calculate BMI.                                           Last 4 weights:  Wt Readings from Last 4 Encounters:   02/13/19 91.4 kg (201 lb 8 oz) (93 %)*   02/06/19 90.4 kg (199 lb 4.7 oz) (93 %)*   02/04/19 90.4 kg (199 lb 4.7 oz) (93 %)*   01/16/19 90.6 kg (199 lb 11.8 oz) (93 %)*     * Growth percentiles are based on CDC (Boys, 2-20 Years) data.       Cetabolic risk assessment. 02/15/19      Reviewed patient profile for cardiometabolic risk factors    Date taken /Value  REFERENCE RANGE   Abdominal Obesity  (Waist Circumference)   See nursing flowsheet Women ?35 in (88 cm)   Men ?40 in (102 cm)      Triglycerides  No results found for: TRIG    ?150 mg/dL (1.7 mmol/L) or current treatment for elevated triglycerides   HDL cholesterol  No results found for: HDL]   Women <50 mg/dL (1.3 mmol/L) in women or current treatment for low HDL cholesterol  Men <40 mg/dL (1 mmol/L) in men or current treatment for low HDL cholesterol     Fasting plasma glucose (FPG) Lab Results   Component Value Date    GLC 59 02/13/2019      FPG ?100 mg/dL (5.6 mmol/L) or treatment for elevated blood glucose   Blood pressure  BP Readings from Last 3 Encounters:   02/14/19 109/76 (10 %/ 69 %)*   02/13/19 122/82 (50 %/ 89 %)*   02/07/19 125/80 (61 %/ 84 %)*     *BP percentiles are based on the August 2017 AAP Clinical  Practice Guideline for boys        Blood pressure ?130/85 mmHg or treatment for elevated blood pressure   Family History  See family history           Physical Exam:  I have reviewed the physical exam as documented by Alhaji on 2/13 and don't agree with findings and assessment.  Documentation is missing current deficits neurologically.    Mental Status Exam:  Geo is a 18-year-old male with an eye patch and has trouble closing his mouth.  His speech is notable for some mumbling based on his previous brain trauma and nerve damage.  His use of language is adequate.  His behavior is sitting in a wheelchair talking with me normally with some picking of his fingers.  His affect is neutral.  His mood he describes as good.  His thought content consists of the above thoughts of harming himself or others and continued delusions.  His thought process is slightly concrete without looseness of association.  He does not appear to have abnormal perceptions.  He is alert and aware of his current location and circumstances.  His attention and concentration is normal.  His cognition and fund of knowledge is normal.  Long-term/short-term/remote memory appears intact.  His insight and judgment are both limited.         Admission Diagnoses:   Rule out delusional disorder versus delusional disorder secondary to a medical condition versus medication induced side effect versus obsessional thoughts  Possible history of major depressive disorder  Skin picking disorder         Assessment & Plan:     Assessment:  Geo has been through a great deal of medical intervention for his ependymoma.  He is currently on dexamethasone which is a steroid and can potentially lead to confusion disorientation and psychosis.  He has been on this for many years chronically though he could potentially be an offending agent.  The pentoxifylline also has a potential side effect of hallucinations though no mention of delusions has been found with this  medication.  Could be an additional offending agent.  He did have an EEG that was abnormal previously and I will be repeating that study to see if it has consistency though I expect it to be normal.  I do not believe he has any current delirium appears to be relatively functional outside of potential anxiety, irritability, and delusions as above.  I am not particularly impressed by a potential level of depression though could have some anxiety related to his medical condition and possible obsessional thoughts.  He additionally may have delusional disorder though it is difficult to ascertain if it is related to his medical condition.  Discussed future possibilities with his mother that include maximizing olanzapine though in general may not be beneficial as we have trouble treating delusional disorders.  This would be the easiest step in addition to obtaining the EEG.  Future potential options could be adding the sertraline to potentially help if this is an obsessional or anxiety related condition in addition to comanagement of possible depression symptoms if he has them.  I do not see any current contraindication with his chemotherapy agents and starting sertraline though I would not want to put him at risk of being kicked out of his experimental treatment.  Other potential avenues of treatment might include N-acetylcysteine for skin picking as well as potentially helping obsessional thoughts.  If not having regular bowel movements could also consider the use of lactulose was an additional thought that I communicated with her.  At this point time he likely does not meet criteria for inpatient hospitalization though him and his parents are aware of potential issues and safety plan was derived.  They elected to take him home and follow-up in the outpatient setting after increasing olanzapine to 15 mg at night.  The outpatient team will need to check in with the study team to see if he can start SSRIs.  The only  interaction I see is potential QT prolongation however I am not overly concerned with QT prolongation as it is not immediately equal an arrhythmia.  It is just a risk factor.    Plan:  Increase olanzapine to 15 mg  Discharging from the hospital with family  Discussed safety plan and future plans  Consider starting SSRI may be beneficial for anxiety and obsessional thoughts as well as irritability and possible depression symptoms  N-acetylcysteine might also be beneficial for skin picking and obsessional thoughts  Please check on EEG as it has not resulted yet  QTC prolongation  QT prolongation has become somewhat concerning in the literature. Multiple medications have association with elongation however QT prolongation does not equal torsades. Multiple medications added together that prolong the QT interval could potentially lead to torsades. Medications that are associated with torsades regardless of the QT interval include ziprasidone, IV haloperidol, and some cardiac medications. Some other medications that are not associated with torsades that prolong the QT notoriously include citalopram and tricyclic medications.                Ramana Sun  [facility]      The following document has been created with voice recognition software and may contain unintentional word substitutions.        Non clinically relevant CMS requirements:  Clinical Global Impressions  First:  Considering your total clinical experience with this particular patient population, how severe are the patient's symptoms at this time?: 5 (02/15/19 1518)  Compared to the patient's condition at the START of treatment, this patient's condition is:: 4 (02/15/19 1518)  Most recent:  Considering your total clinical experience with this particular patient population, how severe are the patient's symptoms at this time?: 5 (02/15/19 1518)  Compared to the patient's condition at the START of treatment, this patient's condition  is:: 4 (02/15/19 1518)

## 2019-02-15 NOTE — DISCHARGE INSTRUCTIONS
Behavioral Discharge Planning and Instructions      Summary:  You were admitted on 2/13/2019  due to Depression and Suicidal Ideation.  You were treated by Dr. Ramana Singh MD and discharged on 2/***/2019 [facility]    Parents are his legal guardians.    Principal Diagnosis:   Rule Out Delusional Disorder per notes.  No finalized dx at the time of discharge other than medical diagnoses.       Health Care Follow-up Appointments:     Please return to see your providers.  Your parents can assist with scheduling appointments given appointment times effect their scheduling as well.  Jorge Luis Tripathi MD  [facility]  [address] [facility] [address]  [phone]    [facility] for medical care.  Attend all scheduled appointments with your outpatient providers. Call at least 24 hours in advance if you need to reschedule an appointment to ensure continued access to your outpatient providers.   Major Treatments, Procedures and Findings:  You were provided with: a psychiatric assessment, assessed for medical stability, medication evaluation and/or management and group therapy    Symptoms to Report: feeling more aggressive, increased confusion, losing more sleep, mood getting worse or thoughts of suicide    Early warning signs can include: increased depression or anxiety sleep disturbances increased thoughts or behaviors of suicide or self-harm  increased unusual thinking, such as paranoia or hearing voices    Safety and Wellness:  Take all medicines as directed.  Make no changes unless your doctor suggests them. Follow treatment recommendations.  Refrain from alcohol and non-prescribed drugs.  If there is a concern for safety, call 911.    Resources:   Crisis Intervention: 374.356.7463 or 793-467-1815 (TTY: [phone]).  Call anytime for help.  National [place] on Mental Illness (www.[place]vin.org): 341.536.2320 or 116-998-6507.  [facility] Crisis Response  "956.553.1761  Text 4 Life: txt \"LIFE\" to 83804 for immediate support and crisis intervention  Crisis text line: Text \"[place]\" to 096268. Free, confidential, 24/7.      The treatment team has appreciated the opportunity to work with you.     If you have any questions or concerns our unit number is [phone].  You may be receiving a follow-up phone call within the next three days from a representative from behavioral health.    You have identified the best phone number to reach you as [phone] for nabeel Faust or [phone] for nabeel Reyes.        "

## 2019-02-15 NOTE — PROGRESS NOTES
Writer talked with pt's mother with patient. She is worried that staff will not be able to understand the patient's speech. She stated she would not mind if staff need to call her whenever to help translate for him. She stated that he has had extensive neurological testing and he is does not have any neurological deficiency. She states he knows all of his mediations and knows what it is he needs and when. It is hard for the patient to express himself verbally. He has slowed garbled speech and poor enunciation, but knows what he is needing.    Pt was not under the impression that he would be admitted onto a locked facility. He states that he feels stuck and asked how long he would have to be here. He was encouraged to ask for things he likes to do. He likes to play video games and possibly the wii could be borrowed from another unit. Pt would like to spend time in the recliner in the lounge as well, but would just need assistance with transferring.

## 2019-02-15 NOTE — PROGRESS NOTES
PT 18YRS old with hx of a brain tumor. Pt having increased paranoia and delusions that  Constipation is delaying his cure to brain tumors and stopping him from walking. Pt total jose and in a w/c and needs a transfer belt to transfer and needs two people to help. Cognitively pt harshal alert and intelligent as shared by his Mother. Parents are his legal guardians and signed as so. Pt pleasant and cooperative with staff. Pt on a 1-1 due more for safety concerns. Pt has contacts and needs them  Taken out at night and put in in the morning. Pt needs complet help with his ADL's. Mother upset with not being told in the ER that pt would be going to a locked unit.

## 2019-02-15 NOTE — PROGRESS NOTES
Patient was assisted as needed with ADL's.  Had medium- large, soft, unformed bowel movement at around 1400.  Patient has been drinking adequate amounts of water today (approx. 3 liters/day).  Patient has continuous, uncontrolled drooling, which makes eating solid food more difficult.  Denies any hallucinations or paranoia.  Denies any SI or thoughts of self-harm.  Patient remains a fall risk, but can offer minimal assistance and bear some weight in transferring.  Otherwise, patient is very polite, pleasant and cooperative with cares.

## 2019-02-16 NOTE — PROCEDURES
Procedure Date: 02/15/2019      EEG #       DATE OF RECORDING/SERVICE DATE:  02/15/2019       SOURCE FILE DURATION:  22 minutes.       PATIENT INFORMATION:  A 18-year-old male who presents with neurological deficits.  EEG is being done to evaluate for seizures.      MEDICATIONS:     1.  Decadron.     2.  Zyprexa.     TECHNICAL SUMMARY: This EEG procedure was performed with 23 scalp electrodes in 10-20 system placements, and additional scalp, precordial and other surface electrodes used for electrical referencing and artifact detection.      BACKGROUND:  The patient has a parietooccipital rhythm of 9-10 Hz, which is symmetric and reactive, well modulated.  Frontal derivations of symmetric beta activity.  The patient does become drowsy in the record as noted by slowing of the parietooccipital rhythm and bursts of diffuse generalized delta-theta slowing and reduction in EMG artifact.  No well-formed sleep architecture was seen in the latter half of the file      ACTIVATION PROCEDURES:  Photic stimulation and hyperventilation were completed with no significant abnormality.  The patient did state the photic stimulation made him feel dazed.  Hyperventilation and counting was omitted.  The patient was not able to complete it.      EPILEPTIFORM DISCHARGES:  None.      ICTAL:  None.      IMPRESSION: Awake and drowsy routine EEG (no video) was normal.  The patient stated he felt dazed during photic stimulation, however, no electrographic seizures or concerning changes on the EEG were seen during that time.  Clinical correlation is advised.  No electrographic seizures, epileptiform discharges were seen.         RIAN LUCAS MD             D: 02/15/2019   T: 2019   MT: KARISSA      Name:     RAFAELA GUAN   MRN:      [id number]        Account:        [id number]   :      2000           Procedure Date: 02/15/2019      Document: [id number]

## 2019-02-16 NOTE — PROGRESS NOTES
Pt.discharged home into the care of his mother. Writer provided discharge teaching that included medication management. Pt.denied SI/SIB/HI. He discharged with all his belongings.

## 2019-02-18 ENCOUNTER — HOSPITAL ENCOUNTER (OUTPATIENT)
Dept: PHYSICAL THERAPY | Facility: CLINIC | Age: 19
Setting detail: THERAPIES SERIES
End: 2019-02-18
Attending: FAMILY MEDICINE
Payer: COMMERCIAL

## 2019-02-18 ENCOUNTER — TELEPHONE (OUTPATIENT)
Dept: PSYCHIATRY | Facility: CLINIC | Age: 19
End: 2019-02-18

## 2019-02-18 ENCOUNTER — HOSPITAL ENCOUNTER (OUTPATIENT)
Dept: OCCUPATIONAL THERAPY | Facility: CLINIC | Age: 19
Setting detail: THERAPIES SERIES
End: 2019-02-18
Attending: FAMILY MEDICINE
Payer: COMMERCIAL

## 2019-02-18 PROCEDURE — 97116 GAIT TRAINING THERAPY: CPT | Mod: GP | Performed by: PHYSICAL THERAPIST

## 2019-02-18 PROCEDURE — 97535 SELF CARE MNGMENT TRAINING: CPT | Mod: GO | Performed by: OCCUPATIONAL THERAPIST

## 2019-02-18 NOTE — TELEPHONE ENCOUNTER
Writer placed a call to patient at [phone]. No answer at number provided. LVM, requesting a call back to discuss care post discharge. Clinic number provided.

## 2019-02-19 DIAGNOSIS — C71.9 EPENDYMOMA (H): ICD-10-CM

## 2019-02-19 LAB
BASOPHILS # BLD AUTO: 0 10E9/L (ref 0–0.2)
BASOPHILS NFR BLD AUTO: 0.5 %
DIFFERENTIAL METHOD BLD: ABNORMAL
EOSINOPHIL # BLD AUTO: 0.3 10E9/L (ref 0–0.7)
EOSINOPHIL NFR BLD AUTO: 11.7 %
ERYTHROCYTE [DISTWIDTH] IN BLOOD BY AUTOMATED COUNT: 12.6 % (ref 10–15)
HCT VFR BLD AUTO: 36.9 % (ref 40–53)
HGB BLD-MCNC: 12.4 G/DL (ref 13.3–17.7)
LYMPHOCYTES # BLD AUTO: 0.7 10E9/L (ref 0.8–5.3)
LYMPHOCYTES NFR BLD AUTO: 32.4 %
MCH RBC QN AUTO: 30.2 PG (ref 26.5–33)
MCHC RBC AUTO-ENTMCNC: 33.6 G/DL (ref 31.5–36.5)
MCV RBC AUTO: 90 FL (ref 78–100)
MONOCYTES # BLD AUTO: 0.4 10E9/L (ref 0–1.3)
MONOCYTES NFR BLD AUTO: 17.1 %
NEUTROPHILS # BLD AUTO: 0.9 10E9/L (ref 1.6–8.3)
NEUTROPHILS NFR BLD AUTO: 38.3 %
PLATELET # BLD AUTO: 107 10E9/L (ref 150–450)
RBC # BLD AUTO: 4.11 10E12/L (ref 4.4–5.9)
WBC # BLD AUTO: 2.2 10E9/L (ref 4–11)

## 2019-02-19 PROCEDURE — 84100 ASSAY OF PHOSPHORUS: CPT | Performed by: PEDIATRICS

## 2019-02-19 PROCEDURE — 85025 COMPLETE CBC W/AUTO DIFF WBC: CPT | Performed by: PEDIATRICS

## 2019-02-19 PROCEDURE — 80053 COMPREHEN METABOLIC PANEL: CPT | Performed by: PEDIATRICS

## 2019-02-19 PROCEDURE — 83735 ASSAY OF MAGNESIUM: CPT | Performed by: PEDIATRICS

## 2019-02-19 PROCEDURE — 36415 COLL VENOUS BLD VENIPUNCTURE: CPT | Performed by: PEDIATRICS

## 2019-02-20 ENCOUNTER — TELEPHONE (OUTPATIENT)
Dept: GASTROENTEROLOGY | Facility: CLINIC | Age: 19
End: 2019-02-20

## 2019-02-20 LAB
ALBUMIN SERPL-MCNC: 3.4 G/DL (ref 3.4–5)
ALP SERPL-CCNC: 117 U/L (ref 65–260)
ALT SERPL W P-5'-P-CCNC: 15 U/L (ref 0–50)
ANION GAP SERPL CALCULATED.3IONS-SCNC: <1 MMOL/L (ref 3–14)
AST SERPL W P-5'-P-CCNC: 26 U/L (ref 0–35)
BILIRUB SERPL-MCNC: 0.3 MG/DL (ref 0.2–1.3)
BUN SERPL-MCNC: 14 MG/DL (ref 7–30)
CALCIUM SERPL-MCNC: 8.7 MG/DL (ref 8.5–10.1)
CHLORIDE SERPL-SCNC: 106 MMOL/L (ref 98–110)
CO2 SERPL-SCNC: 32 MMOL/L (ref 20–32)
CREAT SERPL-MCNC: 1.14 MG/DL (ref 0.5–1)
GFR SERPL CREATININE-BSD FRML MDRD: >90 ML/MIN/{1.73_M2}
GLUCOSE SERPL-MCNC: 99 MG/DL (ref 70–99)
MAGNESIUM SERPL-MCNC: 2.2 MG/DL (ref 1.6–2.3)
PHOSPHATE SERPL-MCNC: 3.6 MG/DL (ref 2.5–4.5)
POTASSIUM SERPL-SCNC: 4.1 MMOL/L (ref 3.4–5.3)
PROT SERPL-MCNC: 6.3 G/DL (ref 6.8–8.8)
SODIUM SERPL-SCNC: 138 MMOL/L (ref 133–144)

## 2019-02-20 NOTE — TELEPHONE ENCOUNTER
----- Message from Margie Meza RN sent at 2/13/2019  3:16 PM CST -----  Called on 2/13 at which time Geo was being admitted.  Letter sent 2/22/19  ----- Message -----  From: Aniya Wei MD  Sent: 2/5/2019   8:49 AM  To: GIANNI Topete-    Can you let mom know that there is a small amount of stool on the left side of the colon only, I think that trying pelvic floor PT is a good idea to see if it will more effectively help him empty his colon.  Daily enemas would also be a consideration ass all the stool is on left side but I know that is not his preference especially since weekly enemas did not help.    Thanks  Katelyn

## 2019-02-20 NOTE — TELEPHONE ENCOUNTER
----- Message from Margie Meza RN sent at 2/13/2019  3:16 PM CST -----      ----- Message -----  From: Aniya Wei MD  Sent: 2/5/2019   8:49 AM  To: GIANNI Topete-    Can you let mom know that there is a small amount of stool on the left side of the colon only, I think that trying pelvic floor PT is a good idea to see if it will more effectively help him empty his colon.  Daily enemas would also be a consideration ass all the stool is on left side but I know that is not his preference especially since weekly enemas did not help.    Thanks  Katelyn

## 2019-02-25 ENCOUNTER — OFFICE VISIT (OUTPATIENT)
Dept: ENDOCRINOLOGY | Facility: CLINIC | Age: 19
End: 2019-02-25
Attending: PEDIATRICS
Payer: COMMERCIAL

## 2019-02-25 VITALS
SYSTOLIC BLOOD PRESSURE: 118 MMHG | HEART RATE: 100 BPM | HEIGHT: 70 IN | DIASTOLIC BLOOD PRESSURE: 76 MMHG | BODY MASS INDEX: 27.11 KG/M2 | WEIGHT: 189.38 LBS

## 2019-02-25 DIAGNOSIS — D49.6 NEOPLASM OF POSTERIOR CRANIAL FOSSA (H): ICD-10-CM

## 2019-02-25 DIAGNOSIS — S22.000D CLOSED COMPRESSION FRACTURE OF THORACIC VERTEBRA WITH ROUTINE HEALING, SUBSEQUENT ENCOUNTER: Primary | ICD-10-CM

## 2019-02-25 DIAGNOSIS — Z92.21 STATUS POST CHEMOTHERAPY: ICD-10-CM

## 2019-02-25 DIAGNOSIS — C71.9 EPENDYMOMA (H): ICD-10-CM

## 2019-02-25 DIAGNOSIS — Z79.52 CURRENT CHRONIC USE OF SYSTEMIC STEROIDS: ICD-10-CM

## 2019-02-25 PROCEDURE — G0463 HOSPITAL OUTPT CLINIC VISIT: HCPCS | Mod: ZF

## 2019-02-25 ASSESSMENT — MIFFLIN-ST. JEOR: SCORE: 1887.74

## 2019-02-25 ASSESSMENT — PAIN SCALES - GENERAL: PAINLEVEL: NO PAIN (0)

## 2019-02-25 NOTE — LETTER
2/25/2019      RE: Geo Hicks  [address] [facility] [id number]       Pediatric Endocrinology Follow-up Consultation    Patient: Geo Hicks MRN# [id number]   YOB: 2000 Age: 18 year 3 month old   Date of Visit: Feb 25, 2019    Dear Dr. Jeffrey Espinoza:     I had the pleasure of seeing your patient, Geo Hicks in the Pediatric Endocrinology Clinic, [facility], on Feb 25, 2019 for a follow-up consultation of hypernatremia, adrenal insufficiency, hypocalcemia, thoracic compression fracture and chronic steroid therapy in the setting of ependymoma s/p chemotherapy and radiation therapy.        Problem list:     Patient Active Problem List    Diagnosis Date Noted     Depression 02/14/2019     Priority: Medium     Serum albumin decreased 11/07/2018     Priority: Medium     Closed compression fracture of thoracic vertebra with routine healing, subsequent encounter 11/07/2018     Priority: Medium     Chronic constipation 10/17/2018     Priority: Medium     Constipation 08/22/2018     Priority: Medium     Neoplasm of posterior cranial fossa (H) 10/04/2017     Priority: Medium     Elevated TSH 09/30/2017     Priority: Medium     Status post chemotherapy 09/30/2017     Priority: Medium     Body temperature low 09/20/2017     Priority: Medium     Current chronic use of systemic steroids 09/20/2017     Priority: Medium     Hypernatremia 03/15/2017     Priority: Medium     Health Care Home 12/26/2016     Priority: Medium     CANDELARIO (obstructive sleep apnea) 10/06/2016     Priority: Medium     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     Priority: Medium     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     Priority: Medium     S/P craniotomy 01/15/2016     Priority: Medium     S/P biopsy 01/15/2016     Priority: Medium     Post-operative state 01/14/2016     Priority: Medium     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     Priority: Medium     Admission for antineoplastic  chemotherapy 06/26/2015     Priority: Medium     Hemorrhagic stroke (H) 06/04/2015     Priority: Medium     Intracranial hemorrhage (H) 05/26/2015     Priority: Medium     Dyspepsia 04/15/2014     Priority: Medium     Elevated serum creatinine 03/19/2014     Priority: Medium     Closed fracture at the growth plate of right distal fibula  02/27/2014     Priority: Medium     GERD (gastroesophageal reflux disease) 04/03/2009     Priority: Medium            HPI:   Geo Hicks is a 18 year 3 month old  male with 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 Geo presented in early April 2015 with 2 weeks of headaches, decreased coordination, and gait instability. Imaging showed a posterior fossa mass for which he underwent suboccipital craniotomy for partial resection of the tumor. The pathology was consistent with an ependymoma.       Geo has been taking steroids since being diagnosed in April 2015. They have been tapering down the dose over time. We had previously begun to taper the decadron and convert to hydrocortisone, but he had another surgery and went back on decadron.       Geo is participating in a Phase I drug trial: [id number] with Entinostat. He receives the study drug weekly.      In November 2018, I evaluated Geo for hypocalcemia. Upon review of his records, it was found that he has hypoalbuminemia which started shortly after he began Entinostat. Geo's corrected calcium was normal so no intervention was recommended.    INTERIM HISTORY: Since last visit on 11/7/18, Geo has been healthy with no new complaints. Geo continues to have stomach pain which is described as constant. Dad reports that there not have been any significant changes. He follows with a Gastroenterologist for this. Geo's biggest GI concern in constipation. This has been a focal point for Geo and has come close to an obsession.    Geo continues to take 0.75 mg Decadron 7 days  per week. If he misses a dose of Decadron, he gets a headache.    Geo currently takes 2000 units of vitamin D starting 2-3 weeks ago along with Viactiv daily. In the past, there was concern that the form of calcium was not absorbing appropriately into the body.     At the past visit, the Neurooncology team discussed the risks and benefits of bisphosphonate therapy because of Geo's history of vertebral compression fracture and need for ongoing steroid therapy. The team felt that bisphosphonate therapy was in Geo's best interest. Gabby was concerned about pursuing bisphosphonate therapy at today's visit because of the risk of jaw necrosis. In individuals with a history of cancer therapy, they are at increased risk of this very rare side effect of bisphosphonates. The recommendation is that any known jaw surgery be performed prior to starting such treatment. Gabby is concerned that such a severe side effect would not be worth the potential benefits. Geo was unsure if he wanted to start bisphosphonate therapy because he had not though about it much.    Geo drinks about 96 oz of water and 15-20 ounces of milk per day.    Geo does floor exercises daily for an hour at a time.     Geo requires shaving twice per week.    History was obtained from patient and patient's father.          Social History:     Geo continues to live at home with his family. Geo is not currently pursuing college at [employer] as previously discussed. They have talked about doing online courses but would like to work out some issues beforehand. He gets along well with his brothers.    Social history was reviewed and is unchanged. Refer to the initial note.         Family History:     Family history was reviewed and is unchanged. Refer to the initial note.         Allergies:     Allergies   Allergen Reactions     Blood Transfusion Related (Informational Only) Swelling     Periorbital swelling post platelet transfusion     No  Known Drug Allergies              Medications:     Current Outpatient Medications   Medication Sig Dispense Refill     calcium carbonate-vitamin D 600-400 MG-UNIT CHEW Take 2 tablets in the morning and 1 tablet in the evening. 90 tablet 3     dexamethasone (DECADRON) 0.5 MG tablet Take 0.75 mg by mouth daily (with breakfast). 90 tablet 3     fexofenadine (ALLEGRA) 180 MG tablet Take 180 mg by mouth every evening        linaclotide (LINZESS) 290 MCG capsule Take 290 mcg by mouth every morning (before breakfast)        mupirocin (BACTROBAN) 2 % ointment Use 2 times a day to the buttock with flare 22 g 3     OLANZapine (ZYPREXA) 15 MG tablet Take 1 tablet (15 mg) by mouth At Bedtime 30 tablet 0     omeprazole (PRILOSEC) 20 MG DR capsule Take 40 mg by mouth every morning       pentoxifylline (TRENTAL) 400 MG CR tablet Take 1 tablet (400 mg) by mouth 3 times daily (with meals) 270 tablet 2     polyethylene glycol (MIRALAX/GLYCOLAX) packet Take 17 g by mouth 3 times daily       potassium phosphate, monobasic, (K-PHOS) 500 MG tablet Take 500 mg by mouth 2 times daily       senna-docusate (SENOKOT-S/PERICOLACE) 8.6-50 MG tablet Take 2 tablets by mouth At Bedtime       study - entinostat (IDS# 5050) 1 mg tablet Take 1 tablet (1 mg) by mouth every 7 days for 4 doses Take one 1mg tablet with one 5mg tablet for total dose of 6mg weekly. Take on an empty stomach, at least 1 hour before or 2 hours after a meal.  Swallow tablet whole. 4 tablet 0     study - entinostat (IDS# 5050) 5 mg tablet Take 1 tablet (5 mg) by mouth every 7 days for 4 doses Take one 5mg tablet with one 1mg tablet for total dose of 6mg weekly. Take on an empty stomach, at least 1 hour before or 2 hours after a meal.  Swallow tablet whole. 4 tablet 0     sulfamethoxazole-trimethoprim (BACTRIM/SEPTRA) 400-80 MG per tablet Take 1 tablet by mouth 2 times daily On Saturdays and Sundays 24 tablet 11     vitamin D3 (CHOLECALCIFEROL) 2000 units tablet Take 1 tablet by  "mouth daily       vitamin E (GNP VITAMIN E) 400 UNIT capsule Take 1 capsule (400 Units) by mouth daily 30 capsule 11             Review of Systems:   Gen: Negative  Eye: Wears contacts, prescription recently changed. Yearly eye exam as needed. He continues to wear his eye patch for double vision.  ENT: Negative, no hearing concerns.  Pulmonary:  Geo will occasionally need to catch his breath and will start gasping. They are unsure why this happens.  Cardio: Negative, no dizziness or fainting.   Gastrointestinal: See HPI.  Hematologic: Bruises easily, unchanged.  Genitourinary: Negative, no bladder concerns.  Musculoskeletal: Negative, no muscle or joint pain.  Psychiatric: Negative  Neurologic: Mild headache in posterior earlier today. Typically headaches occur rarely. Geo no longer takes melatonin. Now takes olanzapine at bedtime which helps him sleep. Geo reports that he sleeps well and noticed an improvement in his sleep logs via his FitBit after starting the medication.  Skin: Negative, no skin changes.  Endocrine: see HPI. Geo requires shaving twice per week.            Physical Exam:   Blood pressure 118/76, pulse 100, height 1.79 m (5' 10.47\"), weight 85.9 kg (189 lb 6 oz).  Blood pressure percentiles are not available for patients who are 18 years or older.  Height: 179 cm  63 %ile based on CDC (Boys, 2-20 Years) Stature-for-age data based on Stature recorded on 2/25/2019.  Weight: 85.9 kg (actual weight), 88 %ile based on CDC (Boys, 2-20 Years) weight-for-age data based on Weight recorded on 2/25/2019.  BMI: Body mass index is 26.81 kg/m . 86 %ile based on CDC (Boys, 2-20 Years) BMI-for-age based on body measurements available as of 2/25/2019.      GENERAL:  He is alert and in no apparent distress. Geo is sitting in a wheelchair.  HEENT:  Head is  normocephalic and atraumatic. He is wearing an eye patch.  Pupils equal, round and reactive to light and accommodation.  Extraocular movements are " intact.  Positive red reflex.   Nares are clear.  Oropharynx shows normal dentition uvula and palate.  Tympanic membranes visualized and clear.   NECK:  Supple. Thyroid palpable, smooth with no nodules, it is not enlarged.   LUNGS:  Clear to auscultation bilaterally.   CARDIOVASCULAR:  Regular rate and rhythm without murmur, gallop or rub.   BREASTS:  Deferred.  ABDOMEN:  Nondistended.  Positive bowel sounds, soft and nontender.  No hepatosplenomegaly or masses palpable.   GENITOURINARY EXAM:  Deferred.  MUSCULOSKELETAL:  Upper body muscle tone is normal.  No tenderness to palpation or percussion of the sacral, lumbar or thoracic spine.  NEUROLOGIC:  Dysarthric speech. Abnormal eye movements. Deep tendon reflexes absent bilaterally.   SKIN: Extensive pale striae.         Laboratory results:     MR CERVICAL SPINE W/O & W CONTRAST, MR LUMBAR SPINE W/O &  W CONTRAST, MR THORACIC SPINE W/O & W CONTRAST 6/12/2018 3:07 PM     History: ; Ependymoma (H)  ICD-10: Ependymoma (H)     Comparison: Complete spine MRI 2/21/2018.     Technique:  Sagittal T1-weighted, sagittal T2-weighted, sagittal STIR, sagittal  diffusion-weighted, axial T1-weighted, and axial T2-weighted images of  the entire spine were obtained without the administration of  intravenous contrast. After the administration of intravenous  contrast, fat saturated axial, coronal, and sagittal T1-weighted  images of the entire spine were obtained.     Findings:   Partially visualized locally recurrent enhancing cystic/solid fourth  ventricular ependymoma with surrounding T2 hyperintense signal in the  cerebellum and brainstem, better demonstrated on brain MRI performed  same day.     No abnormal foci of intrathecal enhancement to suggest leptomeningeal  metastatic disease. Normal cervicothoracic spinal cord and cauda  equina nerve roots.     Diffuse postradiation marrow signal changes. New T5 superior endplate  compression fracture with approximately 20-30% loss of  anterior  vertebral body height and associated marrow edema. No retropulsed  fracture fragments. Slight exaggeration of the normal thoracic  kyphosis.      Normal vertebral alignment. No spinal canal or neural foraminal  stenosis. Few scattered midthoracic Schmorl's node deformities.  Paraspinous soft tissues are unremarkable.         Impression:   1. New acute/subacute T5 superior endplate compression fracture since  2/21/2018. No retropulsed fracture fragments.  2. No evidence of leptomeningeal metastatic disease.     [Access Center: Compression fracture]     This report will be copied to the [employer] to ensure a  provider acknowledges the finding. Access Center is available Monday  through Friday 8am-3:30 pm.      SHIRA PATTON MD        XR THORACIC SPINE 3 VW  7/5/2018 4:56 PM       HISTORY: ; Closed compression fracture of thoracic vertebra, initial  encounter (H)     COMPARISON: MRI thoracic spine 6/12/2018     FINDINGS:   Three views of the thoracic spine. There is mild vertebral body height  loss associated with the T5 vertebral body compression fracture.  Alignment is normal. No additional fracture visualized.          IMPRESSION:   Mild vertebral body height loss associated with the T5 vertebral body  compression fracture.      MARIO ALBERTO AYALA MD     Component      Latest Ref Rng & Units 7/11/2018   25 OH Vit D2      ug/L <5   25 OH Vit D3      ug/L 48   25 OH Vit D total      20 - 75 ug/L <53   Lutropin      1.5 - 9.3 IU/L 3.9   FSH      0.7 - 10.8 IU/L 6.3   Testosterone Total      300 - 1200 ng/dL 746   Osteocalcin      11 - 50 ng/mL 44   Parathyroid Hormone Intact      18 - 80 pg/mL 60   C-Telopept B-X-Linked      87 - 1200 pg/mL 521   Bone Spec Alk Phosphatase      10.0 - 28.8 ug/L 18.7   N-Telopeptide X-Link      5.4 - 24.2 nM BCE 30.5 (H)   Calcium      9.1 - 10.3 mg/dL 9.0 (L)   T4 Free      0.76 - 1.46 ng/dL 0.98   Magnesium      1.6 - 2.3 mg/dL 2.1   Phosphorus      2.8 - 4.6 mg/dL  3.9   TSH      0.40 - 4.00 mU/L 2.02      Results for orders placed or performed in visit on 11/07/18   CBC with platelets differential   Result Value Ref Range     WBC 7.1 4.0 - 11.0 10e9/L     RBC Count 4.12 (L) 4.4 - 5.9 10e12/L     Hemoglobin 13.0 (L) 13.3 - 17.7 g/dL     Hematocrit 39.1 (L) 40.0 - 53.0 %     MCV 95 78 - 100 fl     MCH 31.6 26.5 - 33.0 pg     MCHC 33.2 31.5 - 36.5 g/dL     RDW 12.2 10.0 - 15.0 %     Platelet Count 201 150 - 450 10e9/L     Diff Method Automated Method       % Neutrophils 66.3 %     % Lymphocytes 10.0 %     % Monocytes 15.4 %     % Eosinophils 7.3 %     % Basophils 0.4 %     % Immature Granulocytes 0.6 %     Nucleated RBCs 0 0 /100     Absolute Neutrophil 4.7 1.6 - 8.3 10e9/L     Absolute Lymphocytes 0.7 (L) 0.8 - 5.3 10e9/L     Absolute Monocytes 1.1 0.0 - 1.3 10e9/L     Absolute Eosinophils 0.5 0.0 - 0.7 10e9/L     Absolute Basophils 0.0 0.0 - 0.2 10e9/L     Abs Immature Granulocytes 0.0 0 - 0.4 10e9/L     Absolute Nucleated RBC 0.0     Renal panel   Result Value Ref Range     Sodium 144 133 - 144 mmol/L     Potassium 4.3 3.4 - 5.3 mmol/L     Chloride 108 98 - 110 mmol/L     Carbon Dioxide 34 (H) 20 - 32 mmol/L     Anion Gap 2 (L) 3 - 14 mmol/L     Glucose 79 70 - 99 mg/dL     Urea Nitrogen 16 7 - 30 mg/dL     Creatinine 1.00 0.50 - 1.00 mg/dL     GFR Estimate >90 >60 mL/min/1.7m2     GFR Estimate If Black >90 >60 mL/min/1.7m2     Calcium 8.4 (L) 8.5 - 10.1 mg/dL     Phosphorus 4.8 (H) 2.5 - 4.5 mg/dL     Albumin 3.3 (L) 3.4 - 5.0 g/dL   Vitamin D2 + D3, 25 Hydroxy   Result Value Ref Range     25 OH Vit D2 <5 ug/L     25 OH Vit D3 39 ug/L     25 OH Vit D total <44 20 - 75 ug/L   1,25 Dihydroxyvitamin D   Result Value Ref Range     1,25 Dihydroxyvitamin D 64.2 19.9 - 79.3 pg/mL   Parathyroid Hormone Intact   Result Value Ref Range     Parathyroid Hormone Intact 97 (H) 18 - 80 pg/mL   Calcium random urine with Creat Ratio   Result Value Ref Range     Calcium Urine mg/dL 25.2 mg/dL      Calcium Urine g/g Cr 0.08 g/g Cr   Calcium ionized   Result Value Ref Range     Calcium Ionized 4.8 4.4 - 5.2 mg/dL   Magnesium   Result Value Ref Range     Magnesium 2.0 1.6 - 2.3 mg/dL   Albumin Random Urine Quantitative   Result Value Ref Range     Creatinine Urine 310 mg/dL     Albumin Urine mg/L 10 mg/L     Albumin Urine mg/g Cr 3.39 0 - 17 mg/g Cr      Results for orders placed or performed in visit on 02/19/19   Phosphorus   Result Value Ref Range    Phosphorus 3.6 2.5 - 4.5 mg/dL   Magnesium   Result Value Ref Range    Magnesium 2.2 1.6 - 2.3 mg/dL   Comprehensive metabolic panel   Result Value Ref Range    Sodium 138 133 - 144 mmol/L    Potassium 4.1 3.4 - 5.3 mmol/L    Chloride 106 98 - 110 mmol/L    Carbon Dioxide 32 20 - 32 mmol/L    Anion Gap <1 (L) 3 - 14 mmol/L    Glucose 99 70 - 99 mg/dL    Urea Nitrogen 14 7 - 30 mg/dL    Creatinine 1.14 (H) 0.50 - 1.00 mg/dL    GFR Estimate >90 >60 mL/min/[1.73_m2]    GFR Estimate If Black >90 >60 mL/min/[1.73_m2]    Calcium 8.7 8.5 - 10.1 mg/dL    Bilirubin Total 0.3 0.2 - 1.3 mg/dL    Albumin 3.4 3.4 - 5.0 g/dL    Protein Total 6.3 (L) 6.8 - 8.8 g/dL    Alkaline Phosphatase 117 65 - 260 U/L    ALT 15 0 - 50 U/L    AST 26 0 - 35 U/L   CBC with platelets differential   Result Value Ref Range    WBC 2.2 (L) 4.0 - 11.0 10e9/L    RBC Count 4.11 (L) 4.4 - 5.9 10e12/L    Hemoglobin 12.4 (L) 13.3 - 17.7 g/dL    Hematocrit 36.9 (L) 40.0 - 53.0 %    MCV 90 78 - 100 fl    MCH 30.2 26.5 - 33.0 pg    MCHC 33.6 31.5 - 36.5 g/dL    RDW 12.6 10.0 - 15.0 %    Platelet Count 107 (L) 150 - 450 10e9/L    Diff Method Automated Method     % Neutrophils 38.3 %    % Lymphocytes 32.4 %    % Monocytes 17.1 %    % Eosinophils 11.7 %    % Basophils 0.5 %    Absolute Neutrophil 0.9 (L) 1.6 - 8.3 10e9/L    Absolute Lymphocytes 0.7 (L) 0.8 - 5.3 10e9/L    Absolute Monocytes 0.4 0.0 - 1.3 10e9/L    Absolute Eosinophils 0.3 0.0 - 0.7 10e9/L    Absolute Basophils 0.0 0.0 - 0.2 10e9/L      *Note: Due to a large number of results and/or encounters for the requested time period, some results have not been displayed. A complete set of results can be found in Results Review.      Component      Latest Ref Rng & Units 2/26/2019   25 OH Vit D2      ug/L <5   25 OH Vit D3      ug/L 40   25 OH Vit D total      20 - 75 ug/L <45            Assessment and Plan:   1. Thoracic compression fracture (T5)  2. Hypernatremia.   3. Ependymoma.    4. S/p chemotherapy.   5. S/p radiation therapy.   6. Chronic steroid therapy.  7. Asymptomatic avascular necrosis    Geo has a history of thoracic compression fracture in the setting of chronic steroid therapy. Due to his risk for ongoing fractures and low bone mineral density, I previously recommended bisphosphonate therapy but had not had the opportunity to discuss this with Geo and his family.    We discussed bisphosphonate infusion therapy for Geo at today's visit to increase bone strength. We had previously brought this up at previous visits because of Geo's compression fracture.  Geo's blood tests in July showed that his bone turnover was slightly high, but this was compared to reference ranges for older adults, so it is uncertain if his bone turnover was normal for his age. Bisphosphonates work by keeping calcium in the bones which allows the bones to remodel properly and not be reabsorbed into the body.  I believe the steroids that Geo takes are contributing to his low bone mineral density. He has to continue his steroids for treatment of his brain tumor, so bisphosphonate therapy may be useful in maintaining his bone strength while he is on steroids. If he has to continue steroid therapy for the long term, bisphosphonate therapy will likely be beneficial in preventing fractures and increasing bone strength. We discussed the risks and benefits of bisphosphonate therapy. We discussed irritation of the eyes that can occur with treatment and may  require the treatment be stopped if the inflammation involves the inner eye. Additionally, bisphosphonate therapy can damage the kidneys.  There is a rare side effect of bisphosphonate therapy that can cause jaw necrosis, which has only been seen in adults. The risk is increased in individuals who have had chemotherapy and radiation therapy. There is increased risk for jaw necrosis if dental surgery is done, so it's recommended that any dental surgeries are done before starting bisphosphonate therapy. We also discussed alternative treatment plans such as Prolia which is a newer medication that works similarly to bisphosphonate therapy. One possible advantage of Prolia is that jaw necrosis has not yet been identified as a side effect of the medication. Since Prolia has a similar mechanism, it is possible that this rare side effect will also be seen with this medication.    Geo's most recent DXA scan was in June 2018. Typically DXA scans are done once per year, so I recommend Geo gets a repeat DXA scan in June 2019. Since Geo is not having additional fractures, I think it is reasonable to hold off on bisphosphonate therapy at this time. We need to make sure that he is getting adequate calcium and vitamin D supplementation. Geo is currently on vitamin D supplementation. We will check vitamin D levels at Geo's next lab draw tomorrow to guide dose adjustment. I will investigate useful online materials describing risks and benefits of bisphosphonate and denosumab therapy.    MD Instructions:  I recommend continuing the current dose of calcium and vitamin D. Get DXA in about July. Contact me if Geo has a fracture or chooses to start with bisphosphonate therapy.    The following labs were ordered today to be obtained at Geo's next lab draw tomorrow. A DXA scan was ordered today to be performed in June 2019.  Orders Placed This Encounter   Procedures     Renal panel     Vitamin D2 + D3, 25 Hydroxy      RTC for follow up evaluation in 9-12 months. If Geo would like to proceed with treatment or if he has another fracture, then I would like to see him sooner.    RESULTS INTERPRETATION: The 25-hydroxy vitamin D, a marker of vitamin D stores and a screen for vitamin D deficiency, is normal. The calcium, magnesium and phosphorus are normal.     Based upon these test results, I recommend continuing the current dose of vitamin D.       This document serves as a record of the services and decisions personally performed and made by Dequan Martin MD, PhD. It was created on his behalf by Chidi Benitez, a trained medical scribe. The creation of this document is based on the provider's statements to the medical scribe.    Thank you for allowing me to participate in the care of your patient.  Please do not hesitate to call with questions or concerns.    Sincerely,    I personally performed the entire clinical encounter documented in this note.    Dequan Martin MD, PhD  Professor  Pediatric Endocrinology  [facility]  Phone: [phone]  Fax:   [phone]     Total face-to-face time 36 minutes, >50% of time spent counseling and coordination of care regarding assessment and plan described above.     CC  Patient Care Team:  Dequan Timmons MD as MD (Surgery)  Leoncio Rousseau MD as MD (Pediatric Hematology/Oncology)  Kristi Schuler APRN CNP as Nurse Practitioner (Nurse Practitioner - Pediatrics)  Higinio Walters MD (Ophthalmology)  Karina Hodgson MSW as   Eren Reeder MD as MD (Dermatology)  Schwab, Briana, RN as Nurse Coordinator  Perico Holley MD as MD (Pediatric Neurology)  Sarah Vines MD as MD (Pediatric Urology)  Imani Means, RN as Registered Nurse  Jorge Luis Tripathi MD as MD (Psychiatry)     Parents of Geo Hicks  [address] [facility] [id number]

## 2019-02-25 NOTE — ADDENDUM NOTE
Encounter addended by: Imani Landers, PT on: 2/25/2019 8:38 AM   Actions taken: Pend clinical note, Sign clinical note

## 2019-02-25 NOTE — PROGRESS NOTES
"                                                                                    [facility]      OUTPATIENT PEDIATRIC PHYSICAL THERAPY EVALUATION  PLAN OF TREATMENT FOR OUTPATIENT REHABILITATION  (COMPLETE FOR INITIAL CLAIMS ONLY)  Patient's Last Name, First Name, M.I.  YOB: 2000  Geo Hicks     Provider's Name   [facility]   Medical Record No.  [id number]     Start of Care Date:   9/10/2018   Onset Date:   4/12/15   Type:     _X__PT   ____OT  ____SLP Medical Diagnosis:   s/p Posterior fossa tumor, partially resected     PT Diagnosis:   Ataxia, Gait instability, balance impairments, muscle weakness, gait and mobility impairment      Visits from SOC:  1                              __________________________________________________________________________________  Plan of Treatment/Functional Goals:  1x/week for therapeutic exercise, therapeutic activity, neuromuscular re-education and gait training. Given the nature of his motor control issues and his ongoing chemo treatment; Work with Geo on safety using w/c if patient permits.  Will modify frequency a clinically indicated based on client response and progress toward goals.    The patient will be discharged from therapy when his/her long term goals are met, displays a plateau in progress, or demonstrates resistance or low motivation for therapy after redirections have been made. The patient may be discharged from therapy when parents wish to discontinue therapy and/or fails to adhere to [facility]'s attendance policy.           1. Goal Identifier: step ups  Goal Description: Geo will step up on bottom step or 6 inch bench  with single railing and Contact assist, when using Liko lift and harness to advance with safety and independence on curbs  Target Date: 11/16/18    2. Goal Identifier: balance  Goal Description: Geo will move sit to stand and maintain standing using anup walker x 45\", 3/4x " CGA for 3 consecutive sessions to advance safety when standing for ADLs.  Target Date: 11/16/18    3. Goal Identifier: gait/amb  Goal Description: Geo will ambulate on level surfaces using wheeled walker with close SBA, 30 foot intervals 2/4 attempts x 3 sessions in a row to assess consistency of skill level to advance to safe ambulation. Status: 5/14/18  Varies from session to session dependent on level of fatigue.  Target Date: 11/16/18    4. Goal Identifier: HEP  Goal Description: Geo will be independent with a HEP for improved ability to participate in leisure/cardiovascular activities (such as swimming or riding a stationary bike).    Target Date: (ongoing through episode of care)    5. Goal Identifier: LTG  Goal Description: Geo will be able to maintain standing balance with 1UE support while engaged in UE activity with other hand for 3 minute intervals with close SBA to increase safety in bathroom during ADLs  Target Date: 11/16/18    6.  Goal Identifier: Stairs  Goal Description: Geo  ascend 4 six inch stairs, 2/4 attempts with min assist  and descend stairs marking time, 2/4 attempts with min assist at gait belt while using Liko lift and harness for safety demonstrating increased safety and motor control to manage stairs at home and in community  by 2/13/19  Target Date: 02/13/19    7. Goal Identifier: LTG  Goal Description: Geo will ambulate using walker 50 foot intervals in home including movement to sitting when distance completed to increase safety to get to bathroom and bedroom and increase and independence with mobility in home.     Target Date: 02/13/19     8.            Therapy Frequency:    1x/week  Predicted Duration of Therapy Intervention:   12/31/2019    Imani Landers, PT                                    I CERTIFY THE NEED FOR THESE SERVICES FURNISHED UNDER        THIS PLAN OF TREATMENT AND WHILE UNDER MY CARE     (Physician co-signature of this document indicates review and  certification of the therapy plan).              Leoncio Rousseau MD    Certification Date From:    9/10/2018  Certification Date To:   1/31/2019  Note Initial certification period extended as learned on 2/21/2019 that patient had Medical Assistance Coverage  Referring Provider:   Leoncio Rousseau MD  Initial Assessment  See Epic for progress note dated filed 8/16/2018   See Epic for initial assessment progress note dated filed 8/16/2018

## 2019-02-25 NOTE — PATIENT INSTRUCTIONS
Thank you for choosing [facility].    It was a pleasure to see you today.      Dequan Martin MD PhD,  Nina Rios MD,  Mar Miller MD,   Cyn Pichardo, [facility],  Domenica Fair, RN CNP, Osiel Gorman MD  Katy: Otis Merrill MD, Arabella Fontanez DO, Osiel Mayorga MD    Test results will be available via [employer] and   usually mailed to your home address in a letter.  Abnormal results will be communicated to you via [employer] / telephone call / letter.  Please allow 2 weeks for processing/interpretation of most lab work.  For urgent issues that cannot wait until the next business day, call [phone] and ask for the Pediatric Endocrinologist on call.    Care Coordinators (non urgent) Mon- Fri:  Sarah Clark MS, RN  [phone]       OMAYRA DoddN, RN, PHN  [phone]    Growth Hormone Coordinator: Mon - Fri  [facility]   [phone]     Please leave a message on one line only. Calls will be returned as soon as possible once your physician has reviewed the results or questions.   Main Office: [phone]  Fax: [phone]  Medication renewal requests must be faxed to the main office by your pharmacy.  Allow 3-4 days for completion.     Scheduling:    Pediatric Call Center for [facility], [phone]  [facility], 9th floor [phone]  Infusion Center: [phone] (for stimulation tests)  Radiology/ [phone]     Services:   [phone]     We strongly encourage you to sign up for [employer] for easy and confidential communication.  Sign up at the clinic  or go to [email or web address].     Please try the Pass[facility] to [facility] ([facility]) phone application for Virtual Tours, Procedure Preparation, Resources, Preparation for Hospital Stay and the Coloring Board.     MD Instructions:  I recommend continuing the current dose of calcium and vitamin D. Get DXA in about July. Contact me if Geo  has a fracture or chooses to start with bisphosphonate therapy.

## 2019-02-25 NOTE — NURSING NOTE
"Chief Complaint   Patient presents with     Follow Up     Hypernatremia/ Hypocalcemia and adrenal insufficiency     /76 (BP Location: Left arm, Patient Position: Sitting, Cuff Size: Adult Regular)   Pulse 100   Ht 5' 10.47\" (179 cm)   Wt 189 lb 6 oz (85.9 kg)   BMI 26.81 kg/m      Unable to obtain 3 heights     Racheal Carpio CMA    "

## 2019-02-25 NOTE — PROGRESS NOTES
Pediatric Endocrinology Follow-up Consultation    Patient: Geo Hicks MRN# [id number]   YOB: 2000 Age: 18 year 3 month old   Date of Visit: Feb 25, 2019    Dear Dr. Jeffrey Espinoza:     I had the pleasure of seeing your patient, Geo Hicks in the Pediatric Endocrinology Clinic, [facility], on Feb 25, 2019 for a follow-up consultation of hypernatremia, adrenal insufficiency, hypocalcemia, thoracic compression fracture and chronic steroid therapy in the setting of ependymoma s/p chemotherapy and radiation therapy.        Problem list:     Patient Active Problem List    Diagnosis Date Noted     Depression 02/14/2019     Priority: Medium     Serum albumin decreased 11/07/2018     Priority: Medium     Closed compression fracture of thoracic vertebra with routine healing, subsequent encounter 11/07/2018     Priority: Medium     Chronic constipation 10/17/2018     Priority: Medium     Constipation 08/22/2018     Priority: Medium     Neoplasm of posterior cranial fossa (H) 10/04/2017     Priority: Medium     Elevated TSH 09/30/2017     Priority: Medium     Status post chemotherapy 09/30/2017     Priority: Medium     Body temperature low 09/20/2017     Priority: Medium     Current chronic use of systemic steroids 09/20/2017     Priority: Medium     Hypernatremia 03/15/2017     Priority: Medium     Health Care Home 12/26/2016     Priority: Medium     CANDELARIO (obstructive sleep apnea) 10/06/2016     Priority: Medium     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     Priority: Medium     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     Priority: Medium     S/P craniotomy 01/15/2016     Priority: Medium     S/P biopsy 01/15/2016     Priority: Medium     Post-operative state 01/14/2016     Priority: Medium     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     Priority: Medium     Admission for antineoplastic chemotherapy 06/26/2015     Priority: Medium     Hemorrhagic stroke (H) 06/04/2015     Priority:  Medium     Intracranial hemorrhage (H) 05/26/2015     Priority: Medium     Dyspepsia 04/15/2014     Priority: Medium     Elevated serum creatinine 03/19/2014     Priority: Medium     Closed fracture at the growth plate of right distal fibula  02/27/2014     Priority: Medium     GERD (gastroesophageal reflux disease) 04/03/2009     Priority: Medium            HPI:   Geo Hicks is a 18 year 3 month old  male with 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 Geo presented in early April 2015 with 2 weeks of headaches, decreased coordination, and gait instability. Imaging showed a posterior fossa mass for which he underwent suboccipital craniotomy for partial resection of the tumor. The pathology was consistent with an ependymoma.       Geo has been taking steroids since being diagnosed in April 2015. They have been tapering down the dose over time. We had previously begun to taper the decadron and convert to hydrocortisone, but he had another surgery and went back on decadron.       Geo is participating in a Phase I drug trial: [id number] with Entinostat. He receives the study drug weekly.      In November 2018, I evaluated Geo for hypocalcemia. Upon review of his records, it was found that he has hypoalbuminemia which started shortly after he began Entinostat. Geo's corrected calcium was normal so no intervention was recommended.    INTERIM HISTORY: Since last visit on 11/7/18, Geo has been healthy with no new complaints. Geo continues to have stomach pain which is described as constant. Dad reports that there not have been any significant changes. He follows with a Gastroenterologist for this. Geo's biggest GI concern in constipation. This has been a focal point for Geo and has come close to an obsession.    Geo continues to take 0.75 mg Decadron 7 days per week. If he misses a dose of Decadron, he gets a headache.    Geo currently takes 2000  units of vitamin D starting 2-3 weeks ago along with Viactiv daily. In the past, there was concern that the form of calcium was not absorbing appropriately into the body.     At the past visit, the Neurooncology team discussed the risks and benefits of bisphosphonate therapy because of Geo's history of vertebral compression fracture and need for ongoing steroid therapy. The team felt that bisphosphonate therapy was in Geo's best interest. Gabby was concerned about pursuing bisphosphonate therapy at today's visit because of the risk of jaw necrosis. In individuals with a history of cancer therapy, they are at increased risk of this very rare side effect of bisphosphonates. The recommendation is that any known jaw surgery be performed prior to starting such treatment. Gabby is concerned that such a severe side effect would not be worth the potential benefits. Geo was unsure if he wanted to start bisphosphonate therapy because he had not though about it much.    Geo drinks about 96 oz of water and 15-20 ounces of milk per day.    Geo does floor exercises daily for an hour at a time.     Geo requires shaving twice per week.    History was obtained from patient and patient's father.          Social History:     Geo continues to live at home with his family. Geo is not currently pursuing college at [employer] as previously discussed. They have talked about doing online courses but would like to work out some issues beforehand. He gets along well with his brothers.    Social history was reviewed and is unchanged. Refer to the initial note.         Family History:     Family history was reviewed and is unchanged. Refer to the initial note.         Allergies:     Allergies   Allergen Reactions     Blood Transfusion Related (Informational Only) Swelling     Periorbital swelling post platelet transfusion     No Known Drug Allergies              Medications:     Current Outpatient Medications   Medication  Sig Dispense Refill     calcium carbonate-vitamin D 600-400 MG-UNIT CHEW Take 2 tablets in the morning and 1 tablet in the evening. 90 tablet 3     dexamethasone (DECADRON) 0.5 MG tablet Take 0.75 mg by mouth daily (with breakfast). 90 tablet 3     fexofenadine (ALLEGRA) 180 MG tablet Take 180 mg by mouth every evening        linaclotide (LINZESS) 290 MCG capsule Take 290 mcg by mouth every morning (before breakfast)        mupirocin (BACTROBAN) 2 % ointment Use 2 times a day to the buttock with flare 22 g 3     OLANZapine (ZYPREXA) 15 MG tablet Take 1 tablet (15 mg) by mouth At Bedtime 30 tablet 0     omeprazole (PRILOSEC) 20 MG DR capsule Take 40 mg by mouth every morning       pentoxifylline (TRENTAL) 400 MG CR tablet Take 1 tablet (400 mg) by mouth 3 times daily (with meals) 270 tablet 2     polyethylene glycol (MIRALAX/GLYCOLAX) packet Take 17 g by mouth 3 times daily       potassium phosphate, monobasic, (K-PHOS) 500 MG tablet Take 500 mg by mouth 2 times daily       senna-docusate (SENOKOT-S/PERICOLACE) 8.6-50 MG tablet Take 2 tablets by mouth At Bedtime       study - entinostat (IDS# 5050) 1 mg tablet Take 1 tablet (1 mg) by mouth every 7 days for 4 doses Take one 1mg tablet with one 5mg tablet for total dose of 6mg weekly. Take on an empty stomach, at least 1 hour before or 2 hours after a meal.  Swallow tablet whole. 4 tablet 0     study - entinostat (IDS# 5050) 5 mg tablet Take 1 tablet (5 mg) by mouth every 7 days for 4 doses Take one 5mg tablet with one 1mg tablet for total dose of 6mg weekly. Take on an empty stomach, at least 1 hour before or 2 hours after a meal.  Swallow tablet whole. 4 tablet 0     sulfamethoxazole-trimethoprim (BACTRIM/SEPTRA) 400-80 MG per tablet Take 1 tablet by mouth 2 times daily On Saturdays and Sundays 24 tablet 11     vitamin D3 (CHOLECALCIFEROL) 2000 units tablet Take 1 tablet by mouth daily       vitamin E (GNP VITAMIN E) 400 UNIT capsule Take 1 capsule (400 Units) by  "mouth daily 30 capsule 11             Review of Systems:   Gen: Negative  Eye: Wears contacts, prescription recently changed. Yearly eye exam as needed. He continues to wear his eye patch for double vision.  ENT: Negative, no hearing concerns.  Pulmonary:  Geo will occasionally need to catch his breath and will start gasping. They are unsure why this happens.  Cardio: Negative, no dizziness or fainting.   Gastrointestinal: See HPI.  Hematologic: Bruises easily, unchanged.  Genitourinary: Negative, no bladder concerns.  Musculoskeletal: Negative, no muscle or joint pain.  Psychiatric: Negative  Neurologic: Mild headache in posterior earlier today. Typically headaches occur rarely. Geo no longer takes melatonin. Now takes olanzapine at bedtime which helps him sleep. Geo reports that he sleeps well and noticed an improvement in his sleep logs via his FitBit after starting the medication.  Skin: Negative, no skin changes.  Endocrine: see HPI. Geo requires shaving twice per week.            Physical Exam:   Blood pressure 118/76, pulse 100, height 1.79 m (5' 10.47\"), weight 85.9 kg (189 lb 6 oz).  Blood pressure percentiles are not available for patients who are 18 years or older.  Height: 179 cm  63 %ile based on CDC (Boys, 2-20 Years) Stature-for-age data based on Stature recorded on 2/25/2019.  Weight: 85.9 kg (actual weight), 88 %ile based on CDC (Boys, 2-20 Years) weight-for-age data based on Weight recorded on 2/25/2019.  BMI: Body mass index is 26.81 kg/m . 86 %ile based on CDC (Boys, 2-20 Years) BMI-for-age based on body measurements available as of 2/25/2019.      GENERAL:  He is alert and in no apparent distress. Geo is sitting in a wheelchair.  HEENT:  Head is  normocephalic and atraumatic. He is wearing an eye patch.  Pupils equal, round and reactive to light and accommodation.  Extraocular movements are intact.  Positive red reflex.   Nares are clear.  Oropharynx shows normal dentition uvula " and palate.  Tympanic membranes visualized and clear.   NECK:  Supple. Thyroid palpable, smooth with no nodules, it is not enlarged.   LUNGS:  Clear to auscultation bilaterally.   CARDIOVASCULAR:  Regular rate and rhythm without murmur, gallop or rub.   BREASTS:  Deferred.  ABDOMEN:  Nondistended.  Positive bowel sounds, soft and nontender.  No hepatosplenomegaly or masses palpable.   GENITOURINARY EXAM:  Deferred.  MUSCULOSKELETAL:  Upper body muscle tone is normal.  No tenderness to palpation or percussion of the sacral, lumbar or thoracic spine.  NEUROLOGIC:  Dysarthric speech. Abnormal eye movements. Deep tendon reflexes absent bilaterally.   SKIN: Extensive pale striae.         Laboratory results:     MR CERVICAL SPINE W/O & W CONTRAST, MR LUMBAR SPINE W/O &  W CONTRAST, MR THORACIC SPINE W/O & W CONTRAST 6/12/2018 3:07 PM     History: ; Ependymoma (H)  ICD-10: Ependymoma (H)     Comparison: Complete spine MRI 2/21/2018.     Technique:  Sagittal T1-weighted, sagittal T2-weighted, sagittal STIR, sagittal  diffusion-weighted, axial T1-weighted, and axial T2-weighted images of  the entire spine were obtained without the administration of  intravenous contrast. After the administration of intravenous  contrast, fat saturated axial, coronal, and sagittal T1-weighted  images of the entire spine were obtained.     Findings:   Partially visualized locally recurrent enhancing cystic/solid fourth  ventricular ependymoma with surrounding T2 hyperintense signal in the  cerebellum and brainstem, better demonstrated on brain MRI performed  same day.     No abnormal foci of intrathecal enhancement to suggest leptomeningeal  metastatic disease. Normal cervicothoracic spinal cord and cauda  equina nerve roots.     Diffuse postradiation marrow signal changes. New T5 superior endplate  compression fracture with approximately 20-30% loss of anterior  vertebral body height and associated marrow edema. No retropulsed  fracture  fragments. Slight exaggeration of the normal thoracic  kyphosis.      Normal vertebral alignment. No spinal canal or neural foraminal  stenosis. Few scattered midthoracic Schmorl's node deformities.  Paraspinous soft tissues are unremarkable.         Impression:   1. New acute/subacute T5 superior endplate compression fracture since  2/21/2018. No retropulsed fracture fragments.  2. No evidence of leptomeningeal metastatic disease.     [Access Center: Compression fracture]     This report will be copied to the [facility] to ensure a  provider acknowledges the finding. Access Center is available Monday  through Friday 8am-3:30 pm.      SHIRA PATTON MD        XR THORACIC SPINE 3 VW  7/5/2018 4:56 PM       HISTORY: ; Closed compression fracture of thoracic vertebra, initial  encounter (H)     COMPARISON: MRI thoracic spine 6/12/2018     FINDINGS:   Three views of the thoracic spine. There is mild vertebral body height  loss associated with the T5 vertebral body compression fracture.  Alignment is normal. No additional fracture visualized.          IMPRESSION:   Mild vertebral body height loss associated with the T5 vertebral body  compression fracture.      MARIO ALBERTO AYALA MD     Component      Latest Ref Rng & Units 7/11/2018   25 OH Vit D2      ug/L <5   25 OH Vit D3      ug/L 48   25 OH Vit D total      20 - 75 ug/L <53   Lutropin      1.5 - 9.3 IU/L 3.9   FSH      0.7 - 10.8 IU/L 6.3   Testosterone Total      300 - 1200 ng/dL 746   Osteocalcin      11 - 50 ng/mL 44   Parathyroid Hormone Intact      18 - 80 pg/mL 60   C-Telopept B-X-Linked      87 - 1200 pg/mL 521   Bone Spec Alk Phosphatase      10.0 - 28.8 ug/L 18.7   N-Telopeptide X-Link      5.4 - 24.2 nM BCE 30.5 (H)   Calcium      9.1 - 10.3 mg/dL 9.0 (L)   T4 Free      0.76 - 1.46 ng/dL 0.98   Magnesium      1.6 - 2.3 mg/dL 2.1   Phosphorus      2.8 - 4.6 mg/dL 3.9   TSH      0.40 - 4.00 mU/L 2.02      Results for orders placed or performed in  visit on 11/07/18   CBC with platelets differential   Result Value Ref Range     WBC 7.1 4.0 - 11.0 10e9/L     RBC Count 4.12 (L) 4.4 - 5.9 10e12/L     Hemoglobin 13.0 (L) 13.3 - 17.7 g/dL     Hematocrit 39.1 (L) 40.0 - 53.0 %     MCV 95 78 - 100 fl     MCH 31.6 26.5 - 33.0 pg     MCHC 33.2 31.5 - 36.5 g/dL     RDW 12.2 10.0 - 15.0 %     Platelet Count 201 150 - 450 10e9/L     Diff Method Automated Method       % Neutrophils 66.3 %     % Lymphocytes 10.0 %     % Monocytes 15.4 %     % Eosinophils 7.3 %     % Basophils 0.4 %     % Immature Granulocytes 0.6 %     Nucleated RBCs 0 0 /100     Absolute Neutrophil 4.7 1.6 - 8.3 10e9/L     Absolute Lymphocytes 0.7 (L) 0.8 - 5.3 10e9/L     Absolute Monocytes 1.1 0.0 - 1.3 10e9/L     Absolute Eosinophils 0.5 0.0 - 0.7 10e9/L     Absolute Basophils 0.0 0.0 - 0.2 10e9/L     Abs Immature Granulocytes 0.0 0 - 0.4 10e9/L     Absolute Nucleated RBC 0.0     Renal panel   Result Value Ref Range     Sodium 144 133 - 144 mmol/L     Potassium 4.3 3.4 - 5.3 mmol/L     Chloride 108 98 - 110 mmol/L     Carbon Dioxide 34 (H) 20 - 32 mmol/L     Anion Gap 2 (L) 3 - 14 mmol/L     Glucose 79 70 - 99 mg/dL     Urea Nitrogen 16 7 - 30 mg/dL     Creatinine 1.00 0.50 - 1.00 mg/dL     GFR Estimate >90 >60 mL/min/1.7m2     GFR Estimate If Black >90 >60 mL/min/1.7m2     Calcium 8.4 (L) 8.5 - 10.1 mg/dL     Phosphorus 4.8 (H) 2.5 - 4.5 mg/dL     Albumin 3.3 (L) 3.4 - 5.0 g/dL   Vitamin D2 + D3, 25 Hydroxy   Result Value Ref Range     25 OH Vit D2 <5 ug/L     25 OH Vit D3 39 ug/L     25 OH Vit D total <44 20 - 75 ug/L   1,25 Dihydroxyvitamin D   Result Value Ref Range     1,25 Dihydroxyvitamin D 64.2 19.9 - 79.3 pg/mL   Parathyroid Hormone Intact   Result Value Ref Range     Parathyroid Hormone Intact 97 (H) 18 - 80 pg/mL   Calcium random urine with Creat Ratio   Result Value Ref Range     Calcium Urine mg/dL 25.2 mg/dL     Calcium Urine g/g Cr 0.08 g/g Cr   Calcium ionized   Result Value Ref Range      Calcium Ionized 4.8 4.4 - 5.2 mg/dL   Magnesium   Result Value Ref Range     Magnesium 2.0 1.6 - 2.3 mg/dL   Albumin Random Urine Quantitative   Result Value Ref Range     Creatinine Urine 310 mg/dL     Albumin Urine mg/L 10 mg/L     Albumin Urine mg/g Cr 3.39 0 - 17 mg/g Cr      Results for orders placed or performed in visit on 02/19/19   Phosphorus   Result Value Ref Range    Phosphorus 3.6 2.5 - 4.5 mg/dL   Magnesium   Result Value Ref Range    Magnesium 2.2 1.6 - 2.3 mg/dL   Comprehensive metabolic panel   Result Value Ref Range    Sodium 138 133 - 144 mmol/L    Potassium 4.1 3.4 - 5.3 mmol/L    Chloride 106 98 - 110 mmol/L    Carbon Dioxide 32 20 - 32 mmol/L    Anion Gap <1 (L) 3 - 14 mmol/L    Glucose 99 70 - 99 mg/dL    Urea Nitrogen 14 7 - 30 mg/dL    Creatinine 1.14 (H) 0.50 - 1.00 mg/dL    GFR Estimate >90 >60 mL/min/[1.73_m2]    GFR Estimate If Black >90 >60 mL/min/[1.73_m2]    Calcium 8.7 8.5 - 10.1 mg/dL    Bilirubin Total 0.3 0.2 - 1.3 mg/dL    Albumin 3.4 3.4 - 5.0 g/dL    Protein Total 6.3 (L) 6.8 - 8.8 g/dL    Alkaline Phosphatase 117 65 - 260 U/L    ALT 15 0 - 50 U/L    AST 26 0 - 35 U/L   CBC with platelets differential   Result Value Ref Range    WBC 2.2 (L) 4.0 - 11.0 10e9/L    RBC Count 4.11 (L) 4.4 - 5.9 10e12/L    Hemoglobin 12.4 (L) 13.3 - 17.7 g/dL    Hematocrit 36.9 (L) 40.0 - 53.0 %    MCV 90 78 - 100 fl    MCH 30.2 26.5 - 33.0 pg    MCHC 33.6 31.5 - 36.5 g/dL    RDW 12.6 10.0 - 15.0 %    Platelet Count 107 (L) 150 - 450 10e9/L    Diff Method Automated Method     % Neutrophils 38.3 %    % Lymphocytes 32.4 %    % Monocytes 17.1 %    % Eosinophils 11.7 %    % Basophils 0.5 %    Absolute Neutrophil 0.9 (L) 1.6 - 8.3 10e9/L    Absolute Lymphocytes 0.7 (L) 0.8 - 5.3 10e9/L    Absolute Monocytes 0.4 0.0 - 1.3 10e9/L    Absolute Eosinophils 0.3 0.0 - 0.7 10e9/L    Absolute Basophils 0.0 0.0 - 0.2 10e9/L     *Note: Due to a large number of results and/or encounters for the requested time period,  some results have not been displayed. A complete set of results can be found in Results Review.      Component      Latest Ref Rng & Units 2/26/2019   25 OH Vit D2      ug/L <5   25 OH Vit D3      ug/L 40   25 OH Vit D total      20 - 75 ug/L <45            Assessment and Plan:   1. Thoracic compression fracture (T5)  2. Hypernatremia.   3. Ependymoma.    4. S/p chemotherapy.   5. S/p radiation therapy.   6. Chronic steroid therapy.  7. Asymptomatic avascular necrosis    Geo has a history of thoracic compression fracture in the setting of chronic steroid therapy. Due to his risk for ongoing fractures and low bone mineral density, I previously recommended bisphosphonate therapy but had not had the opportunity to discuss this with Geo and his family.    We discussed bisphosphonate infusion therapy for Geo at today's visit to increase bone strength. We had previously brought this up at previous visits because of Geo's compression fracture.  Geo's blood tests in July showed that his bone turnover was slightly high, but this was compared to reference ranges for older adults, so it is uncertain if his bone turnover was normal for his age. Bisphosphonates work by keeping calcium in the bones which allows the bones to remodel properly and not be reabsorbed into the body.  I believe the steroids that Geo takes are contributing to his low bone mineral density. He has to continue his steroids for treatment of his brain tumor, so bisphosphonate therapy may be useful in maintaining his bone strength while he is on steroids. If he has to continue steroid therapy for the long term, bisphosphonate therapy will likely be beneficial in preventing fractures and increasing bone strength. We discussed the risks and benefits of bisphosphonate therapy. We discussed irritation of the eyes that can occur with treatment and may require the treatment be stopped if the inflammation involves the inner eye. Additionally,  bisphosphonate therapy can damage the kidneys.  There is a rare side effect of bisphosphonate therapy that can cause jaw necrosis, which has only been seen in adults. The risk is increased in individuals who have had chemotherapy and radiation therapy. There is increased risk for jaw necrosis if dental surgery is done, so it's recommended that any dental surgeries are done before starting bisphosphonate therapy. We also discussed alternative treatment plans such as Prolia which is a newer medication that works similarly to bisphosphonate therapy. One possible advantage of Prolia is that jaw necrosis has not yet been identified as a side effect of the medication. Since Prolia has a similar mechanism, it is possible that this rare side effect will also be seen with this medication.    Geo's most recent DXA scan was in June 2018. Typically DXA scans are done once per year, so I recommend Geo gets a repeat DXA scan in June 2019. Since Geo is not having additional fractures, I think it is reasonable to hold off on bisphosphonate therapy at this time. We need to make sure that he is getting adequate calcium and vitamin D supplementation. Geo is currently on vitamin D supplementation. We will check vitamin D levels at Geo's next lab draw tomorrow to guide dose adjustment. I will investigate useful online materials describing risks and benefits of bisphosphonate and denosumab therapy.    MD Instructions:  I recommend continuing the current dose of calcium and vitamin D. Get DXA in about July. Contact me if Geo has a fracture or chooses to start with bisphosphonate therapy.    The following labs were ordered today to be obtained at Geo's next lab draw tomorrow. A DXA scan was ordered today to be performed in June 2019.  Orders Placed This Encounter   Procedures     Renal panel     Vitamin D2 + D3, 25 Hydroxy     RTC for follow up evaluation in 9-12 months. If Geo would like to proceed with treatment  or if he has another fracture, then I would like to see him sooner.    RESULTS INTERPRETATION: The 25-hydroxy vitamin D, a marker of vitamin D stores and a screen for vitamin D deficiency, is normal. The calcium, magnesium and phosphorus are normal.     Based upon these test results, I recommend continuing the current dose of vitamin D.       This document serves as a record of the services and decisions personally performed and made by Dequan Martin MD, PhD. It was created on his behalf by Chidi Benitez, a trained medical scribe. The creation of this document is based on the provider's statements to the medical scribe.    Thank you for allowing me to participate in the care of your patient.  Please do not hesitate to call with questions or concerns.    Sincerely,    I personally performed the entire clinical encounter documented in this note.    Dequan Martin MD, PhD  Professor  Pediatric Endocrinology  [facility]  Phone: [phone]  Fax:   [phone]     Total face-to-face time 36 minutes, >50% of time spent counseling and coordination of care regarding assessment and plan described above.     CC  Patient Care Team:  Dequan Timmons MD as MD (Surgery)  Leoncio Rousseau MD as MD (Pediatric Hematology/Oncology)  Kristi Schuler APRN CNP as Nurse Practitioner (Nurse Practitioner - Pediatrics)  Higinio Walters MD (Ophthalmology)  Karina Hodgson MSW as   Eren Reeder MD as MD (Dermatology)  Schwab, Briana, RN as Nurse Coordinator  Perico Holley MD as MD (Pediatric Neurology)  Sarah Vines MD as MD (Pediatric Urology)  Sarah Vines MD as MD (Pediatric Urology)  Imani Means, RN as Registered Nurse  Imani Means, RN as Registered Nurse  Jorge Luis Tripathi MD as MD (Psychiatry)     Parents of Geo Hicks  [address] [facility] [id number]

## 2019-02-25 NOTE — PROGRESS NOTES
[facility]      OUTPATIENT PHYSICAL THERAPY  PLAN OF TREATMENT FOR OUTPATIENT REHABILITATION    Patient's Last Name, First Name, M.I.                YOB: 2000  Geo Hicks                        Provider's Name  [facility] Medical Record No.  [id number]                               Onset Date: 4/12/2015   Start of Care Date: 9/10/2018   Type:     _X_PT   ___OT   ___SLP Medical Diagnosis: s/p Posterior fossa tumor, partially resected                       PT Diagnosis:  Gait instability, balance impairments, ataxic,  muscle weakness, gait and mobility impairment      _________________________________________________________________________________  Plan of Treatment: 1x/week for therapeutic exercise, therapeutic activity, neuromuscular re-education and gait training. Given the nature of his motor control issues and his ongoing chemo treatment; Work with Geo on safety using w/c if patient permits. Assess a variety of walker styles to determine if any offer greater control.  Aquatic therapy is recommended though declined by parents secondary to insurance concerns. Will further discuss trial of aquatic therapy with parent and patient given updated insurance information and consider adding aquatic therapy to treatment plan as Geo would benefit from the principles of bouyancy and the added resistance of the water to further address his postural control during upright standing and ambulation activities in a safe environment to decrease fall risk. Will modify frequency as clinically indicated based on client response and progress toward goals.          Frequency/Duration: 1x per week/90 days      Geo has attended 13 sessions between 8/20/2018 and 1/31/2018.  He has attended only Geo's attendance has decreased secondary to holidays, family vacation and reported illness  "or not feeling well as well as missing a few weeks due to scheduling availability between client's and clinic's schedule. He attended 7 session from October to 1/28/2019 but refused to participate on in session on 1/28/2019.  Goals retested on 1/21/2019.     Goals:  Goal Identifier step ups   Goal Description Geo will step up on bottom step or 6 inch bench  with single railing and Contact assist, when using Liko lift and harness to advance with safety and independence on curbs   Target Date 11/16/18   Date Met      Progress: not recently assessed.       Goal Identifier balance   Goal Description Geo will move sit to stand and maintain standing using anup walker x 45\", 3/4x CGA for 3 consecutive sessions to advance safety when standing for ADLs.   Target Date 11/16/18   Date Met   emerging   Progress: goal nearly met, significant improvement in ability to sustain standing balance and move to standing using anup walker.  CGA and vcues to move to stand at anup walker 2/4x,standing: SBA 1 minute 3/4 times using hemiwalker L.  Geo has more inconsistent attendance last 2 months and not able to assess for 3 sessions in a row.  Last 3 sessions: 12/10 and 12/31/19 performed 2/6 attempts and 1/21/19 as noted above.  His schedule is consistent again starting mid February and will retest goal for 3 sessions looking for consistency of performance as measure of progress and increased safety.  Continue goal to 4/30/19.      Goal Identifier gait/amb   Goal Description Geo will ambulate on level surfaces using wheeled walker with close SBA, 30 foot intervals 2/4 attempts x 3 sessions in a row to assess consistency of skill level to advance to safe ambulation.    Target Date emerging   Date Met  Continue goal   Progress: Status varies dependent on level of fatigue.  Grossly improving stability noted when retested goal on 1/21/19. SBA to Contact assist 1 and close supervision/SBA aide 2/4 times using bariatric (wider) " weighted walker. Still keeps head down to look at feet. Continue goal 4/30/2019      Goal Identifier HEP   Goal Description Geo will be independent with a HEP for improved ability to participate in leisure/cardiovascular activities (such as swimming or riding a stationary bike).     Target Date (ongoing through episode of care)   Date Met  (ongoing, works on strengthening at home)   Progress: Geo is encouraged to increase distances or frequency of walking with assist at home using his walker but he reports that he does not always try to do more walks but he is consistent with his strengthening program. Continue goal.      Goal Identifier LTG   Goal Description Geo will be able to maintain standing balance with 1UE support while engaged in UE activity with other hand for 3 minute intervals with close SBA to increase safety in bathroom during ADLs   Target Date 11/16/18   Date Met  12/31/18:    Progress:12/31/18: met x 1 rep; close SBA to CGA of 1 and SBA 1.      Goal Identifier Stairs   Goal Description Geo  ascend 4 six inch stairs, 2/4 attempts with min assist  and descend stairs marking time, 2/4 attempts with min assist at gait belt while using Liko lift and harness for safety demonstrating increased safety and motor control to manage stairs at home and in community  by 2/13/19   Target Date 02/13/19   Date Met   not met   Progress:not assessed; not assessed at every visit stairs work often depends on level of fatigue noted and stability at trunk during session. Extend goal to 3/15/19 with goal of increased consistency of attendance to determine status on stairs.       Goal Identifier LTG   Goal Description Geo will ambulate using walker 50 foot intervals in home including movement to sitting when distance completed to increase safety to get to bathroom and bedroom and increase and independence with mobility in home.      Target Date 02/13/19   Date Met   gradual progression/emerging   Progress:50  "feet x 1 contact to min A therapist, larger bariatric walker;and Stand by assist of rehab tech.  Modify goal to 50 feet x 3 with contact assist of therapist and close supervision of rehab tech using bariatric walker by 8/2019.      Geo demonstrates improvement in his stability in standing when using anup walker and when ambulating short, 25-30 foot distances, using a bariatric walker that is wider offering more stability due to wider base of support.  Therapy has begun to assess if the increased stability noted during gait training using the bariatric walker is consistent from session to session to determine if recommendation for purchase of bariatric walker indicated. Geo met his goal for standing at counter with 1 UE support showing ability on more than one occasion with close supervision.   He continues to work hard during sessions except on 1/28/19 when following  Patient education about safety and independence during ambulation using walker he seemed to \"shut down\" then refused to continue session.  On 1/28/19 he was quite persistent in telling therapist that he would be \"independent in walking if it weren't for his G-I issues\". This is the only session that Geo has refused to participate during therapy and seemed related to general frustration regarding level of independence with ambulation and therapist not in agreement as to his insight in to reasons behind his need for assistance with ambulation.  Discussed with Geo options including taking a therapeutic break from OP PT for 1 month or so; working with a combination of therapists or continue as planned.  Geo and his dad plan to discuss further over next week or so.      Geo wants to increase his independence with ambulation using his walker at home, and to safely transfer and use the restroom independently.  In addition he wants to   increase ability and safety on stairs and general independence and safety with mobility/transfers,balance " and ambulation.       He remains appropriate for skilled physical therapy to address his impaired gait, progress with independence in transfers and gait. His ataxia, decreased postural control in upright positions and standing, decreased graded mid and end range muscle control in mid stance positions, decreased gross/fine motor control affect his safety and level of independence at home.  Geo does not want to use his wheelchair in his house nor have help doing his exercises after assisted with transfer on and off the floor. He continues to have difficulty judging doorways when self propelling his w/c and needs verbal reminders to scan both sides to avoid bumping into door frames.  Will continue to address transfer safety to and from w/c and when using walker; gait training and balance and neuromuscular re-education to increase safety and control to increase independence with transfers and mobility, balance provide ongoing education to client and family re: progress, his status and safety with mobility.     Changes to goals: see above goal chart for specific details on progress toward goals.        Discharge:  No, showing progress with standing balance and control and short distance ambulation with decreasing levels of assistance    Geo will be discharged from therapy when his/her long term goals are met, displays a plateau in progress, or demonstrates resistance or low motivation for therapy after redirections have been made. The patient may be discharged from therapy when parents wish to discontinue therapy and/or fails to adhere to [facility] attendance policy.        It is a pleasure working with Geo Hicks's family. Thank you for referring Geo Hicks to physical therapy at [facility].    Please don't hesitate to contact me with any questions at: [email or web address]         Certification date from 2/1/2019 to 5/1/2019    Imani Landers PT          I CERTIFY THE NEED FOR  THESE SERVICES FURNISHED UNDER        THIS PLAN OF TREATMENT AND WHILE UNDER MY CARE     (Physician co-signature of this document indicates review and certification of the therapy plan).              Leoncio Rousseau  Referring Provider: Dr. Leoncio Rousseau

## 2019-02-25 NOTE — ADDENDUM NOTE
Encounter addended by: Imani Landers, PT on: 2/25/2019 3:53 PM   Actions taken: Document created, Document edited

## 2019-02-26 DIAGNOSIS — Z79.899 ENCOUNTER FOR LONG-TERM (CURRENT) USE OF MEDICATIONS: ICD-10-CM

## 2019-02-26 DIAGNOSIS — S22.000D CLOSED COMPRESSION FRACTURE OF THORACIC VERTEBRA WITH ROUTINE HEALING, SUBSEQUENT ENCOUNTER: ICD-10-CM

## 2019-02-26 DIAGNOSIS — C71.9 EPENDYMOMA (H): ICD-10-CM

## 2019-02-26 LAB
BASOPHILS # BLD AUTO: 0 10E9/L (ref 0–0.2)
BASOPHILS NFR BLD AUTO: 0.5 %
DIFFERENTIAL METHOD BLD: ABNORMAL
EOSINOPHIL # BLD AUTO: 0.5 10E9/L (ref 0–0.7)
EOSINOPHIL NFR BLD AUTO: 12.8 %
ERYTHROCYTE [DISTWIDTH] IN BLOOD BY AUTOMATED COUNT: 13.2 % (ref 10–15)
HBA1C MFR BLD: 5.1 % (ref 0–5.6)
HCT VFR BLD AUTO: 40.4 % (ref 40–53)
HGB BLD-MCNC: 13.4 G/DL (ref 13.3–17.7)
LYMPHOCYTES # BLD AUTO: 0.9 10E9/L (ref 0.8–5.3)
LYMPHOCYTES NFR BLD AUTO: 22.1 %
MCH RBC QN AUTO: 30 PG (ref 26.5–33)
MCHC RBC AUTO-ENTMCNC: 33.2 G/DL (ref 31.5–36.5)
MCV RBC AUTO: 90 FL (ref 78–100)
MONOCYTES # BLD AUTO: 0.6 10E9/L (ref 0–1.3)
MONOCYTES NFR BLD AUTO: 15.7 %
NEUTROPHILS # BLD AUTO: 2 10E9/L (ref 1.6–8.3)
NEUTROPHILS NFR BLD AUTO: 48.9 %
PLATELET # BLD AUTO: 100 10E9/L (ref 150–450)
RBC # BLD AUTO: 4.47 10E12/L (ref 4.4–5.9)
WBC # BLD AUTO: 4.1 10E9/L (ref 4–11)

## 2019-02-26 PROCEDURE — 80053 COMPREHEN METABOLIC PANEL: CPT | Performed by: PEDIATRICS

## 2019-02-26 PROCEDURE — 83036 HEMOGLOBIN GLYCOSYLATED A1C: CPT | Performed by: PEDIATRICS

## 2019-02-26 PROCEDURE — 85025 COMPLETE CBC W/AUTO DIFF WBC: CPT | Performed by: PEDIATRICS

## 2019-02-26 PROCEDURE — 80061 LIPID PANEL: CPT | Performed by: PEDIATRICS

## 2019-02-26 PROCEDURE — 84100 ASSAY OF PHOSPHORUS: CPT | Performed by: PEDIATRICS

## 2019-02-26 PROCEDURE — 83735 ASSAY OF MAGNESIUM: CPT | Performed by: PEDIATRICS

## 2019-02-26 PROCEDURE — 36415 COLL VENOUS BLD VENIPUNCTURE: CPT | Performed by: PEDIATRICS

## 2019-02-26 PROCEDURE — 82306 VITAMIN D 25 HYDROXY: CPT | Performed by: PEDIATRICS

## 2019-02-27 LAB
ALBUMIN SERPL-MCNC: 3.4 G/DL (ref 3.4–5)
ALP SERPL-CCNC: 119 U/L (ref 65–260)
ALT SERPL W P-5'-P-CCNC: 15 U/L (ref 0–50)
ANION GAP SERPL CALCULATED.3IONS-SCNC: 10 MMOL/L (ref 3–14)
AST SERPL W P-5'-P-CCNC: 23 U/L (ref 0–35)
BILIRUB SERPL-MCNC: 0.3 MG/DL (ref 0.2–1.3)
BUN SERPL-MCNC: 14 MG/DL (ref 7–30)
CALCIUM SERPL-MCNC: 9 MG/DL (ref 8.5–10.1)
CHLORIDE SERPL-SCNC: 106 MMOL/L (ref 98–110)
CHOLEST SERPL-MCNC: 158 MG/DL
CO2 SERPL-SCNC: 26 MMOL/L (ref 20–32)
CREAT SERPL-MCNC: 1.23 MG/DL (ref 0.5–1)
GFR SERPL CREATININE-BSD FRML MDRD: 84 ML/MIN/{1.73_M2}
GLUCOSE SERPL-MCNC: 124 MG/DL (ref 70–99)
HDLC SERPL-MCNC: 27 MG/DL
LDLC SERPL CALC-MCNC: 84 MG/DL
MAGNESIUM SERPL-MCNC: 2 MG/DL (ref 1.6–2.3)
NONHDLC SERPL-MCNC: 131 MG/DL
PHOSPHATE SERPL-MCNC: 3.5 MG/DL (ref 2.5–4.5)
POTASSIUM SERPL-SCNC: 3.8 MMOL/L (ref 3.4–5.3)
PROT SERPL-MCNC: 6.3 G/DL (ref 6.8–8.8)
SODIUM SERPL-SCNC: 142 MMOL/L (ref 133–144)
TRIGL SERPL-MCNC: 236 MG/DL

## 2019-02-28 ENCOUNTER — HOSPITAL ENCOUNTER (OUTPATIENT)
Dept: PHYSICAL THERAPY | Facility: CLINIC | Age: 19
Setting detail: THERAPIES SERIES
End: 2019-02-28
Attending: FAMILY MEDICINE
Payer: COMMERCIAL

## 2019-02-28 LAB
DEPRECATED CALCIDIOL+CALCIFEROL SERPL-MC: <45 UG/L (ref 20–75)
VITAMIN D2 SERPL-MCNC: <5 UG/L
VITAMIN D3 SERPL-MCNC: 40 UG/L

## 2019-02-28 PROCEDURE — 97112 NEUROMUSCULAR REEDUCATION: CPT | Mod: GP | Performed by: PHYSICAL THERAPIST

## 2019-02-28 PROCEDURE — 97110 THERAPEUTIC EXERCISES: CPT | Mod: GP | Performed by: PHYSICAL THERAPIST

## 2019-02-28 PROCEDURE — 97530 THERAPEUTIC ACTIVITIES: CPT | Mod: GP | Performed by: PHYSICAL THERAPIST

## 2019-03-04 NOTE — ADDENDUM NOTE
Encounter addended by: Elyse Costello, OTR on: 3/4/2019 2:46 PM   Actions taken: Flowsheet accepted, Pend clinical note

## 2019-03-04 NOTE — ADDENDUM NOTE
Encounter addended by: Elyse Costello, OTR on: 3/4/2019 4:51 PM   Actions taken: Pend clinical note, Flowsheet accepted, Sign clinical note

## 2019-03-04 NOTE — PROGRESS NOTES
[facility]      OUTPATIENT OCCUPATIONAL THERAPY  PLAN OF TREATMENT FOR OUTPATIENT REHABILITATION    Patient's Last Name, First Name, M.I.                YOB: 2000  Geo Hicks                        Provider's Name  [facility] Medical Record No.  [id number]                               Onset Date: 5/1/15   Start of Care Date: 6/16/15   Type:     ___PT   _X_OT   ___SLP Medical Diagnosis: ependymoma, suboccipital craniotomy                           OT Diagnosis: Decreased ADL/IADL      _________________________________________________________________________________  Plan of Treatment:    Frequency/Duration: 1x/week for 52 weeks     Goals:    Goal [place] [facility]   Goal Description Geo will demonstrate improved fine motor control (as measured by box and blocks test, improved from moving 20 blocks/min to 30 blocks /min) and nine hole peg test  in 1 minute as needed for self feeding, writing, managing food packaging and clothing fasteners.   Target Date 4/18/2019   Date Met      Progress:     Goal Identifier GOAL UPDATE: Meal Preparation   Goal Description Pt demonstrate improved task prepration/sequencing and safe use of tremor managemnt/external stabilization/ motor planning techniques while using functional cooking tools and various hot cooking surfaces (toaster, griddle, sandwich press, oven, stove, toaster oven)  safely,  to cook 2-3 step novel recipes from a seated w/c level.     Target Date 4/18/2019   Date Met      Progress:     Goal Identifier Written communication   Goal Description Geo will demonstrate improved legibility and smaller writing size to support signing his full name on birthday cards and checks, inside of a  1/2 x 4 inch area,  and writing 5-7 word sentences, 4 of 5 given opportunities.    Target Date 4/18/2019   Date Met      Progress:     Goal  Identifier GMC/Reaction Time   Goal Description Patient will demonstrate improved UE motor and visual reaction time for improved visual skills, improved ability to prevent daily mishaps and safer independent home based mobility by demonstrating sustained reaction time of 2.0 seconds (from 3.20 sec Feb 2017) on Mode A of [employer].   Target Date 4/18/2019   Date Met      Progress:     Goal Identifier Trunk Control    Goal Description Geo will complete 10 minutes of dynamic upper body activities while maintaining an 90% upright head/seated posture as needed to support feeding, writing and eye contact during meal time interactions.   Target Date 4/18/2019   Date Met      Progress:     Progress Toward Goals:   Progress this reporting period: See attached progress note      Certification date from 1/21/19  to 4/18/2019.    Elyse Costello, JOSÉ LUISR          I CERTIFY THE NEED FOR THESE SERVICES FURNISHED UNDER        THIS PLAN OF TREATMENT AND WHILE UNDER MY CARE     (Physician co-signature of this document indicates review and certification of the therapy plan).                Referring Provider: Jeffrey Espinoza MD

## 2019-03-04 NOTE — ADDENDUM NOTE
Encounter addended by: Elyse Costello OTR on: 3/4/2019 4:53 PM   Actions taken: Document created, Document edited, Flowsheet accepted

## 2019-03-04 NOTE — PROGRESS NOTES
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 Geo is actively receiving occupational (ongoing, since 2015), physical, speech and has had vision therapies to maximize his abilities.  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 He also has global ataxic movements that affect his walking, balance (transfers, walks with min A at gait belt), and upper body  motor planning for upper body gross motor accuracy/speed/timing/coordination, limiting his ability to write and use skilled tools of daily living.      Geo is able to dress and feed himself after set up at the seated level.  He uses the wheelchair when at in the community, but not when at home. No longer attending high school or academic programming after  graduation this spring, but has discussed considering taking some Distance Learning classes via [employer].  At home, ambulates between rooms, completes bathing and toileting with min-mod A of 1.  OT has offered that he may have more ability to become more able to do clothing retrieval, light house keeping, more (I) toileting and light meal prep in the kitchen if he was at the wheel chair level at home, but he prefers the practice of walking between all daily tasks instead. He is however using the w/c while in the OT kitchen setting and applying safety with low/deep reaches, locking brakes to stay safe.  He is  better with feeding himself, reducing spillage, spearing, cutting and spreading while using non adapted silverware with better motor control. We continue to work on in hand manipluation skills to address short entry writing, opening food packages, manage zipper/snap/button closures and use self care tools (toothpaste, toothbrush, electric shaver use). He is very motivated to work with OTR in the cooking/kitchen setting; practicing forearm stabilization, safe dynamic reaching, task sequencing and fine motor control while using tools/ making recipes in the kitchen.    Client Self Report: Pt uses his walker in the house 100% of the time with MOd A for balance during transitions so far, but his mother likes himi working on more task indepence, is open to having him do some more (I) tasks in the home (moving between kitchen and living room, getting snacks.) from the w/c level.     Objective Measurements:     Objective Measure: Dyanvision, timed reaction for visual/GMC        From 12/10/19-- Improved motor planning speed noted-- With L gets 26 hits/min (stabilizing with R hand on chair, close SBA for balance) and improved to  27 hits /min for R UE (L hand stabilizing on chair, close SBA,  moderate LOB to R, L sides, self corrected. Improved from 22 hits o 5/14/18).  With B hands alternating, pt improved to 25 hits from 21 hits. (first improvements with this in the past 9 months)     Objective Measure: /Pinch  From 12/10/19--  is 90# B, key pinch 18# B.  3 pt is 15# R (uses less mature grasp /elongated pad to pad rather than tip to tip  isolation) and 18# L.        Objective Measure: FMC/Written Communication   Details: Writes 10 characters in 1: 17 with right hand mix of upper and lower case letters, harder to control.  When encouraged to print and LEGIBILITY increases to 90 percent.  Patient does equally well with left and right hand.  Does best if he rests his head on the non-writing arm, moves the eye patch to  that same side.  Writes his name in 5/8 UC caps in 1:20 (faster and more legible 1: 33, legibility was 50%)      Goals:     Goal Identifier [facility]   Goal Description Geo will demonstrate improved fine motor control (as measured by box and blocks test, improved from moving 20 blocks/min to 30 blocks /min) and nine hole peg test  in 1 minute as needed for self feeding, writing, managing food packaging and clothing fasteners.   Target Date 02/01/20   Date Met      Progress: not tested recently     Goal Identifier GOAL UPDATED: Meal Preparation   Goal Description Pt demonstrate improved task prepration/sequencing and safe use of tremor managemnt/external stabilization/ motor planning techniques while using functional cooking tools and various hot cooking surfaces (toaster, griddle, sandwich press, oven, stove, toaster oven)  safely,  to cook 2-3 step novel recipes from a seated w/c level.     Target Date 02/01/20   Date Met      Progress: Menu planning--Recent recipes made include breakfast sandwich, pizza quesadillas, grilled cheese, pancakes, waffles and breakfast burrito. Pt able to reflect on which items felt safer (waffles and sandwich press) and less safe (oven and stove top). Challenging motor patterns: shakig a bottle of batter, metering pouring via prorioceptive inputm lifting arm against gravity/open chain to plug items in outlet (does better if forearm recents  Getting more efficient with being able to crack egg with better control.  Wants to explore use of the microwave versus toaster oven to increase safety.  His microwaves at home are both overhead, however.  Toaster oven may be safer option, patient's idea.  Next with the planned: Breakfast burritos with eggs/stove top, cheese tortilla and hot sauce.  Patient able to problem solve loud the sequencing for gathering items tools needed, general steps of making dish. Upcoming recipes/tool use tasks to include: Strawberry salsa with chips/oven, mug cupcake/  microwave, protein ball/ no bake cookies measuring gooey ingredients/mixing, making bread/needing/baking, toaster oven nachos, ham and cheese. Also could use toaster oven for chicken nuggets, corndogs, toast, engish muffin pizzas, bagel bites.         Goal Identifier Written communication   Goal Description Geo will demonstrate improved legibility and smaller writing size to support signing his full name on birthday cards and checks, inside of a  1/2 x 4 inch area,  and writing 5-7 word sentences, 4 of 5 given opportunities.    Target Date 02/01/20   Date Met      Progress: per above     Goal Identifier GMC/Reaction Time   Goal Description Patient will demonstrate improved UE motor and visual reaction time for improved visual skills, improved ability to prevent daily mishaps and safer independent home based mobility by demonstrating sustained reaction time of 2.0 seconds (from 3.20 sec Feb 2017) on Mode A of [employer].   Target Date 02/01/20   Date Met      Progress: Improved per above     Goal Identifier Trunk Control    Goal Description Geo will complete 10 minutes of dynamic upper body activities while maintaining an 90% upright head/seated posture as needed to support feeding, writing and eye contact during meal time interactions.   Target Date 02/01/20   Date Met      Progress:  Geo's seated trunk posture varies per daily fatigue level, from 3 minutes at a time with moderate verbal cues to continue, to 14 minutes at a time while baking.     Progress Toward Goals:   Progress this reporting period: Pt has attended 5 visits of skilled OT to address above goals since his last progress note on 12/10/18 (163 total visits since start of OP OT).  Pt continues to demonstrate improved fine/gross motor coordination and has been really enjoying applying his FMC/GMC skills to cooking tasks in the OP OT kitchen setting.      Plan:  Continue therapy per current plan of care. Goals updated per  above.    Discharge:  No.  Ongoing functional improvements continue to be made, cont OT 1x/week.       Thank you for this thoughtful referral! If you have any questions, please call me [phone].  Nice to share in this patient's care with you.         By e-signing this report, I, Jeffrey Espinoza MD agree that skilled occupational therapy is medically necessary and the plan of therapy outlined above is appropriate.

## 2019-03-05 DIAGNOSIS — C71.9 EPENDYMOMA (H): ICD-10-CM

## 2019-03-05 LAB
BASOPHILS # BLD AUTO: 0 10E9/L (ref 0–0.2)
BASOPHILS NFR BLD AUTO: 0.3 %
DIFFERENTIAL METHOD BLD: ABNORMAL
EOSINOPHIL # BLD AUTO: 0.4 10E9/L (ref 0–0.7)
EOSINOPHIL NFR BLD AUTO: 9.2 %
ERYTHROCYTE [DISTWIDTH] IN BLOOD BY AUTOMATED COUNT: 12.9 % (ref 10–15)
HCT VFR BLD AUTO: 39.2 % (ref 40–53)
HGB BLD-MCNC: 13.1 G/DL (ref 13.3–17.7)
LYMPHOCYTES # BLD AUTO: 0.6 10E9/L (ref 0.8–5.3)
LYMPHOCYTES NFR BLD AUTO: 16.8 %
MCH RBC QN AUTO: 30 PG (ref 26.5–33)
MCHC RBC AUTO-ENTMCNC: 33.4 G/DL (ref 31.5–36.5)
MCV RBC AUTO: 90 FL (ref 78–100)
MONOCYTES # BLD AUTO: 0.4 10E9/L (ref 0–1.3)
MONOCYTES NFR BLD AUTO: 10.2 %
NEUTROPHILS # BLD AUTO: 2.4 10E9/L (ref 1.6–8.3)
NEUTROPHILS NFR BLD AUTO: 63.5 %
PLATELET # BLD AUTO: 122 10E9/L (ref 150–450)
RBC # BLD AUTO: 4.37 10E12/L (ref 4.4–5.9)
WBC # BLD AUTO: 3.8 10E9/L (ref 4–11)

## 2019-03-05 PROCEDURE — 36415 COLL VENOUS BLD VENIPUNCTURE: CPT | Performed by: PEDIATRICS

## 2019-03-05 PROCEDURE — 83735 ASSAY OF MAGNESIUM: CPT | Performed by: PEDIATRICS

## 2019-03-05 PROCEDURE — 85025 COMPLETE CBC W/AUTO DIFF WBC: CPT | Performed by: PEDIATRICS

## 2019-03-05 PROCEDURE — 80053 COMPREHEN METABOLIC PANEL: CPT | Performed by: PEDIATRICS

## 2019-03-05 PROCEDURE — 84100 ASSAY OF PHOSPHORUS: CPT | Performed by: PEDIATRICS

## 2019-03-06 LAB
ALBUMIN SERPL-MCNC: 3.5 G/DL (ref 3.4–5)
ALP SERPL-CCNC: 116 U/L (ref 65–260)
ALT SERPL W P-5'-P-CCNC: 15 U/L (ref 0–50)
ANION GAP SERPL CALCULATED.3IONS-SCNC: 4 MMOL/L (ref 3–14)
AST SERPL W P-5'-P-CCNC: 21 U/L (ref 0–35)
BILIRUB SERPL-MCNC: 0.3 MG/DL (ref 0.2–1.3)
BUN SERPL-MCNC: 12 MG/DL (ref 7–30)
CALCIUM SERPL-MCNC: 8.5 MG/DL (ref 8.5–10.1)
CHLORIDE SERPL-SCNC: 109 MMOL/L (ref 98–110)
CO2 SERPL-SCNC: 29 MMOL/L (ref 20–32)
CREAT SERPL-MCNC: 1.14 MG/DL (ref 0.5–1)
GFR SERPL CREATININE-BSD FRML MDRD: >90 ML/MIN/{1.73_M2}
GLUCOSE SERPL-MCNC: 95 MG/DL (ref 70–99)
MAGNESIUM SERPL-MCNC: 2.2 MG/DL (ref 1.6–2.3)
PHOSPHATE SERPL-MCNC: 3.1 MG/DL (ref 2.5–4.5)
POTASSIUM SERPL-SCNC: 4.2 MMOL/L (ref 3.4–5.3)
PROT SERPL-MCNC: 6.3 G/DL (ref 6.8–8.8)
SODIUM SERPL-SCNC: 142 MMOL/L (ref 133–144)

## 2019-03-08 ENCOUNTER — OFFICE VISIT (OUTPATIENT)
Dept: PSYCHIATRY | Facility: CLINIC | Age: 19
End: 2019-03-08
Attending: PSYCHIATRY & NEUROLOGY
Payer: COMMERCIAL

## 2019-03-08 VITALS — SYSTOLIC BLOOD PRESSURE: 119 MMHG | HEART RATE: 87 BPM | DIASTOLIC BLOOD PRESSURE: 82 MMHG

## 2019-03-08 DIAGNOSIS — F32.A DEPRESSION, UNSPECIFIED DEPRESSION TYPE: Primary | ICD-10-CM

## 2019-03-08 DIAGNOSIS — F22 PARANOIA (H): ICD-10-CM

## 2019-03-08 PROCEDURE — G0463 HOSPITAL OUTPT CLINIC VISIT: HCPCS | Mod: ZF

## 2019-03-08 RX ORDER — OLANZAPINE 15 MG/1
15 TABLET ORAL AT BEDTIME
Qty: 30 TABLET | Refills: 1 | Status: SHIPPED | OUTPATIENT
Start: 2019-03-08 | End: 2019-04-05

## 2019-03-08 ASSESSMENT — PAIN SCALES - GENERAL: PAINLEVEL: NO PAIN (0)

## 2019-03-08 NOTE — NURSING NOTE
Chief Complaint   Patient presents with     Recheck Medication     Encounter for long-term (current) use of medications

## 2019-03-08 NOTE — PROGRESS NOTES
"  [facility] PSYCHIATRY CLINIC PROGRESS NOTE     CARE TEAM:  PCP- Jeffrey Espinoza    Specialty Providers- Oncology, Neuropsychology, Physical Therapy, Occupational Therapy    Therapist- Manju Pink at [facility] in [facility] Team- no.    Geo Hicks is a 18 year old male who prefers the name Geo & pronouns he, him, his, himself.    Date of initial diagnostic assessment is 2/7/2019.    Pertinent Background:  This patient first experienced mental health issues in the past few months and has received treatment for paranoia/delusions.  Notably, this patient [facility] and has been referred for psychiatric assessment by his neuropsychologist who most recently met with him on 1/3/2019.  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 Since then, he has undergone multiple tumor resections, chemotherapy, and radiation treatment. Geo also experienced an intra-tumoral hemorrhage in May 2015 that resulted in neurological changes including facial numbness, significant loss of mobility and dysarthria. In December 2016, a follow-up MRI revealed continued tumor enhancement, however the most recent MRI from 10/16/18 revealed an unchanged fourth ventricle ependymoma in comparison to previous exams, a slight decrease in adjacent edema, as well as a stable size of the ventricular system.  He is currently on COG study [id number] with [employer].  Of note, patient had a brief admission to inpatient psychiatry on [facility] from 2/14-2/15/2019 \"for evaluation of delusions and suicidal comments.\"    Psych critical item history includes suicidal ideation, psychosis [sxs include delusions] and psychiatric hospitalization x1.    INTERIM HISTORY                                                                                                                 4, 4   The patient reports good treatment adherence.  History was provided by the patient " and his dad who were good historians.  The last visit ended with no change to the med regimen.  Since that appointment however patient was admitted to [facility] for a brief inpatient psych admission (3/14-3/15, most of which was spent in the [facility] ER) due to suicidal comments that concerned family.  Was ultimately deemed safe for outpatient follow-up and was discharged with an increase in olanzapine to 15 mg qHS.  Since the last visit:   Geo presents with his father.  As during our first appointment, dad occasionally steps in to help convey some of Geo's remarks as he is better able to understand his speech, given Geo's notable dysarthria.  Geo states that he is doing better than he had prior to his brief hospitalization.  Allows occasional low mood but denies SI/SIB thoughts, violent/aggressive ideation, markedly depressed mood, panic/anxious acuity, elevated mood hallmarks or other signs of psychiatric acuity/dangerousness.  Since the increase in his olanzapine he has been a bit more calm, and has been sleeping a little bit longer.  Consistently getting 8 hours a night.  Still is upset about some of the delusional concerns he has previously endorsed, such as that family/doctors are conspiring against him to maintain his ill health by way of not treating his constipation, however has been bringing this up less and when it has come up has been less angry/argumentative about it.  Dad reports they are going to be going to [place] for a second opinion on some of Geo's GI concerns.  Talked about our previous recommendation for adding sertraline and today have agreed to do so.  His oncology providers have shared their blessing to pursue this addition and Geo endorses enthusiasm to embrace new options for mood support.  Also agreed to maintain the higher dose of olanzapine for thought support.    RECENT SYMPTOMS:   DEPRESSION:  reports-depressed mood, feeling trapped and overwhelmed;  DENIES- suicidal  ideation, self-destructive thoughts, anhedonia, insomnia and poor concentration /memory  ELIZABETH/HYPOMANIA:  reports-none;  DENIES- increased energy, decreased sleep need, increased activity and grandiosity  PSYCHOSIS:  reports-delusions- paranoid [details in Interim History];  DENIES- auditory hallucinations and visual hallucinations  DYSREGULATION:  reports-can become irritable/agitated if beliefs are challenged;  DENIES- suicidal ideation, violent ideation, SIB, mood dysregulation, impulsive and aggressive  PANIC ATTACK:  none   ANXIETY:  worry  TRAUMA RELATED:  none  COMPULSIVE:  none  SLEEP:  7-8 hours, no problems reported  EATING DISORDER: no    RECENT SUBSTANCE USE:     ALCOHOL- no  TOBACCO- no  CAFFEINE- minimal  OPIOIDS- no         NARCAN KIT- N/A  CANNABIS- no  OTHER ILLICIT DRUGS- no      CURRENT SOCIAL HISTORY:  FINANCIAL SUPPORT- family   CHILDREN- no      LIVING SITUATION- mother and father (who are  and have both remarried) alternating weeks between respective homes  SOCIAL/ SPIRITUAL SUPPORT- mother, father, older brother, friend Rima  FEELS SAFE AT HOME- yes    MEDICAL ROS (2,10):  A comprehensive review of systems was performed and is negative other than noted in the HPI.    PSYCH and CD Critical Summary Points since July 2018 February 2019:  Clinic intake on 2/7.  Later was admitted to [facility] for a brief inpatient psych admission from 3/14-3/15, most of which was spent in the ER, due to suicidal comments that concerned family.  Was ultimately deemed safe for outpatient follow-up and was discharged with an increase in olanzapine to 15 mg.    PAST PSYCH MED TRIALS   see EMR Problem List: Hx of psychiatric care    MEDICAL / SURGICAL HISTORY                                   Neurologic Hx- See pertinent background, re: history of ependymoma and related chemo, radiation and tumor resection(s).  Notably has experienced neurological damage due to the above which has resulted  "in facial numbness, significant loss of mobility and dysarthria.  Has had neuropsychiatric evaluation(s) 2/2016, 2/2017 and 1/2019.  The following is from the \"Results and Impressions\" section of his 1/3/2019 eval with Veronica Padilla:       \"We were pleased to discover that the neurocognitive areas measured during this evaluation have remained intact and consistent with previous evaluations. Geo s ability to access and apply acquired word knowledge (verbal comprehension) and his working memory (ability to mentally hold and manipulate information) were in the average range. His ability to understand visual information to solve novel abstract visual problems (perceptual reasoning) was in the low average range. There was a slight decrease in performance on one task associated with perceptual reasoning due to time limits. Had no time limits been enforced, Geo s performance would have likely been higher as he was observed to respond with correct answers that were past the time limit allowed. Furthermore, on a timed visual discrimination and scanning task, Geo performed in the borderline range, which was a slight improvement from his previous evaluation in 2017 where he performed in the impaired range.\"    Patient Active Problem List   Diagnosis     GERD (gastroesophageal reflux disease)     Closed fracture at the growth plate of right distal fibula      Elevated serum creatinine     Dyspepsia     Intracranial hemorrhage (H)     Hemorrhagic stroke (H)     Ependymoma (H)     Admission for antineoplastic chemotherapy     Post-operative state     S/P craniotomy     S/P biopsy     Noncomitant strabismus     Abducens neuropathy of both eyes     CANDELARIO (obstructive sleep apnea)     Health Care Home     Hypernatremia     Body temperature low     Current chronic use of systemic steroids     Elevated TSH     Status post chemotherapy     Neoplasm of posterior cranial fossa (H)     Constipation     Chronic " constipation     Serum albumin decreased     Closed compression fracture of thoracic vertebra with routine healing, subsequent encounter     Depression     Past Surgical History:   Procedure Laterality Date     GRAFT CARTILAGE FROM POSTERIOR AURICLE Left 10/6/2016    Procedure: GRAFT CARTILAGE FROM POSTERIOR AURICLE;  Surgeon: Tyler Richards MD;  Location: [facility]     INCISION AND DRAINAGE PERINEAL, COMBINED Bilateral 7/18/2015    Procedure: COMBINED INCISION AND DRAINAGE PERINEAL;  Surgeon: Dequan Tmimons MD;  Location: [facility]     OPTICAL TRACKING SYSTEM CRANIOTOMY, EXCISE TUMOR, COMBINED N/A 4/13/2015    Procedure: COMBINED OPTICAL TRACKING SYSTEM CRANIOTOMY, EXCISE TUMOR;  Surgeon: Francis Velazquez MD;  [facility]     OPTICAL TRACKING SYSTEM CRANIOTOMY, EXCISE TUMOR, COMBINED N/A 4/16/2015    Procedure: COMBINED OPTICAL TRACKING SYSTEM CRANIOTOMY, EXCISE TUMOR;  Surgeon: Francis Velazquez MD;  [facility]     OPTICAL TRACKING SYSTEM CRANIOTOMY, EXCISE TUMOR, COMBINED Bilateral 5/28/2015    Procedure: COMBINED OPTICAL TRACKING SYSTEM CRANIOTOMY, EXCISE TUMOR;  Surgeon: Francis Velazquez MD;  [facility]     OPTICAL TRACKING SYSTEM CRANIOTOMY, EXCISE TUMOR, COMBINED Bilateral 1/14/2016    Procedure: COMBINED OPTICAL TRACKING SYSTEM CRANIOTOMY, EXCISE TUMOR;  Surgeon: Francis Velazquez MD;  [facility]     OPTICAL TRACKING SYSTEM VENTRICULOSTOMY  4/16/2015    Procedure: OPTICAL TRACKING SYSTEM VENTRICULOSTOMY;  Surgeon: Francis Velazquez MD;  [facility]     REMOVE PORT VASCULAR ACCESS N/A 10/6/2016    Procedure: REMOVE PORT VASCULAR ACCESS;  Surgeon: Bruno Perea MD;  [facility]     RHINOPLASTY N/A 10/6/2016    Procedure: RHINOPLASTY;  Surgeon: Tyler Richards MD;  [facility]     VASCULAR SURGERY  5-2015    single lumen power port       ALLERGY                                Blood transfusion related (informational only) and  No known drug allergies     MEDICATIONS                               Current Outpatient Medications   Medication Sig Dispense Refill     calcium carbonate-vitamin D 600-400 MG-UNIT CHEW Take 2 tablets in the morning and 1 tablet in the evening. 90 tablet 3     dexamethasone (DECADRON) 0.5 MG tablet Take 0.75 mg by mouth daily (with breakfast). 90 tablet 3     fexofenadine (ALLEGRA) 180 MG tablet Take 180 mg by mouth every evening        linaclotide (LINZESS) 290 MCG capsule Take 290 mcg by mouth every morning (before breakfast)        mupirocin (BACTROBAN) 2 % ointment Use 2 times a day to the buttock with flare 22 g 3     OLANZapine (ZYPREXA) 15 MG tablet Take 1 tablet (15 mg) by mouth At Bedtime 30 tablet 0     omeprazole (PRILOSEC) 20 MG DR capsule Take 40 mg by mouth every morning       pentoxifylline (TRENTAL) 400 MG CR tablet Take 1 tablet (400 mg) by mouth 3 times daily (with meals) 270 tablet 2     polyethylene glycol (MIRALAX/GLYCOLAX) packet Take 17 g by mouth 3 times daily       potassium phosphate, monobasic, (K-PHOS) 500 MG tablet Take 500 mg by mouth 2 times daily       senna-docusate (SENOKOT-S/PERICOLACE) 8.6-50 MG tablet Take 2 tablets by mouth At Bedtime       sulfamethoxazole-trimethoprim (BACTRIM/SEPTRA) 400-80 MG per tablet Take 1 tablet by mouth 2 times daily On Saturdays and Sundays 24 tablet 11     vitamin D3 (CHOLECALCIFEROL) 2000 units tablet Take 1 tablet by mouth daily       vitamin E (GNP VITAMIN E) 400 UNIT capsule Take 1 capsule (400 Units) by mouth daily 30 capsule 11     VITALS                                                                                                                          3, 3   /82   Pulse 87      MENTAL STATUS EXAM                                                                                           9, 14 cog gs     Alertness: alert  and oriented  Appearance: casually groomed, seated in wheelchair, wearing R-sided eye-patch  Behavior/Demeanor:  "cooperative and pleasant, with fair eye contact   Speech: prominent dysarthria  Language: good  Psychomotor: mild evident palsy of upper extremities and facial paralysis  Mood: mild/moderate low mood  Affect: restricted; was congruent to mood; was congruent to content  Thought Process/Associations: describes frequent rumination [details in Interim History]  Thought Content:  Reports delusions [details in Interim History];  Denies suicidal ideation and violent ideation  Perception:  Reports none;  Denies auditory hallucinations and visual hallucinations  Insight: partial  Judgment: good  Cognition: (6) oriented: time, person, and place  attention span: intact  concentration: intact  recent memory: intact  remote memory: intact  fund of knowledge: appropriate  Gait and Station: remarkable for:  ataxia - can ambulate but was seen in wheelchair     LABS and DATA     AIMS:  complete next visit    PHQ9 TODAY = Patient did not complete  No flowsheet data found.    ANTIPSYCHOTIC LABS  [glu, A1C, lipids (rohit LDL), liver enzymes, WBC, ANEU, Hgb, plts]  q12 mo  Recent Labs   Lab Test 19  1406 19  1100   GLC 95 124*   A1C  --  5.1     Recent Labs   Lab Test 19  1100   CHOL 158   TRIG 236*   LDL 84   HDL 27*     Recent Labs   Lab Test 19  1406 19  1100   AST 21 23   ALT 15 15   ALKPHOS 116 119     Recent Labs   Lab Test 19  1406 19  1100   WBC 3.8* 4.1   ANEU 2.4 2.0   HGB 13.1* 13.4   * 100*     EK2019: 85 BPM, QT/QTcH was 346/389 msec.  Also included comment: \"Abnormal precordial QRS contours - nondiagnostic for this age.\"    EE/15/2019: \"IMPRESSION: Awake and drowsy routine EEG (no video) was normal.  The patient stated he felt dazed during photic stimulation, however, no electrographic seizures or concerning changes on the EEG were seen during that time.  Clinical correlation is advised.  No electrographic seizures, epileptiform discharges were " "seen.\"    DIAGNOSIS     Delusional Disorder vs Psychotic Disorder Due to Another Medical Condition vs Psychotic features secondary to Mood Disorder     Major Depressive Disorder, single episode, moderate vs severe, with psychotic features    ASSESSMENT                                                                                                                   m2, h3     TODAY:  Geo Hicks presents to the [facility] Psych[facility] Outpatient Psychiatry clinic for continued medication management of paranoia, delusional thoughts and depressed mood.  Notably this patient was diagnosed with an ependymoma 4 years ago with subsequent chemotherapy, radiation and multiple tumor resections.  Has sustained neurologic deficits resulting from an intracranial hemorrhage (occurring after an early resection) and then again later due to a further intracranial surgery.  Has mobility/balance difficulty, partial facial palsy, abducens neuropathy of both eyes and prominent dysarthria.  Has had repeat neuropsychological testing that's demonstrated overall preservation of cognitive functioning.  Since our last visit, Geo had a very brief psychiatric admission due to family concerns related to some suicidal remarks.  At the time he clarified a lack of intent to harm himself and was discharged with an increase in his dose of olanzapine to 15 mg.  Geo and his father relate that this has been somewhat beneficial with a lessening of perseveration on his delusional concerns and a reduction in agitation/anger when these topics are broached.  Still maintains that family/providers are conspiring to withhold more definitive medical care, but remains calm as that was discussed today, whereas when it came up during our first appointment he became visibly agitated.  In terms of mood, he allows occasional low moods and feelings of frustration/overwhelm given his circumstances.  Denies present SI, SIB thoughts, or markedly depressed mood, and " denies panic/anxious acuity, elevated mood hallmarks or violent/aggressive ideation.  We discussed starting sertraline for mood support, and having been given the blessing of his oncology team for this addition, have agreed to do so today.  We will plan to start at sertraline 50 mg, and have counseled the patient and his father on associated risks and possible side effects to look out for.  Geo endorses enthusiasm for pursuing this new avenue for mood/emotional support, and we will plan to see him back in about a month to monitor his response, with consideration for further dose increase.  We will plan to maintain olanzapine 15 mg for thought support, and have encouraged the patient to continue meeting regularly with his psychotherapist Manju.    SUICIDE RISK ASSESSMENT:  Geo Hicks denies present suicidal ideation.  This patient does have notable risk factors for self-harm including feels trapped, relationship conflicts, new/ worsening medical issue, male and paranoia/delusions. However, risk is mitigated by no h/o suicide attempt, no plan or intent, no h/o risky impulsive behavior, describes a safety plan, h/o seeking help when needed, none to minimal alcohol use , commitment to family, good social support and stable housing.  Based on all available evidence he does not appear to be at imminent risk for self-harm therefore does not meet criteria for a 72-hr hold/  involuntary hospitalization.  However, based on degree of symptoms therapy, close psych FU and initiation of new medications was recommended which the pt did agree to.    [place] PRESCRIPTION MONITORING PROGRAM [] was not checked today:  not using controlled substances.    PSYCHOTROPIC DRUG INTERACTIONS:      Pentoxifylline may enhance the antiplatelet effect of Agents with Antiplatelet Properties.  [Pentoxifylline, Sertraline]     CNS Depressants may enhance the adverse/toxic effect of Selective Serotonin Reuptake Inhibitors. Specifically, the risk  of psychomotor impairment may be enhanced.  [Olanzapine, Sertraline]    MANAGEMENT:  Monitoring for adverse effects, routine vitals, routine labs, periodic EKGs and patient/family aware of risks     PLAN                                                                                                                                m2, h3     1) PSYCHOTROPIC MEDICATIONS:  -Begin sertraline 50 mg daily  -Continue olanzapine 15 mg at bedtime    2) THERAPY:  continue with Manju    3) NEXT DUE:    Labs- AP labs due Feb 2020  EKG- PRN  Rating Scales- AIMS due    4) REFERRALS:  none today     5) RTC: 4-6 weeks    6) CRISIS NUMBERS:   Provided routinely in Located within [facility].  [facility] [phone] (clinic)    [phone] (after hours)  CRISIS TEXT LINE: Text [id number] from anywhere in [place], anytime, any crisis 24/7;  OR SEE www.crisistextline.org    TREATMENT RISK STATEMENT:  The risks, benefits, alternatives and potential adverse effects have been discussed and are understood by the pt. The pt understands the risks of using street drugs or alcohol. There are no medical contraindications, the pt agrees to treatment with the ability to do so. The pt knows to call the clinic for any problems or to access emergency care if needed.  Medical and substance use concerns are documented above.  Psychotropic drug interaction check was done, including changes made today.     PSYCHIATRY CLINIC INDIVIDUAL PSYCHOTHERAPY NOTE                                                [16]   Start time- N/A        End time- N/A  Date last reviewed - 03/08/19       Date next due - 6/6/19 (or 12 months if Medicare)    Subjective: This supportive psychotherapy session addressed issues related to goals of therapy, patient's history, current stressors, life stressors, relationship, family of origin, school and health.  Patient's reaction: Contemplation in the context of mental status appropriate for ambulatory setting.  Progress: fair  Plan: RTC 4-6  weeks  Psychotherapy services during this visit included  myself and the patient.   TREATMENT  PLAN          SYMPTOMS;PROBLEMS   MEASURABLE GOALS;    FUNCTIONAL IMPROVEMENT INTERVENTIONS;    GAINS MADE DISCHARGE CRITERIA   Depression: depressed mood, anhedonia and feeling hopelesss   reduce depressive symptoms, find enjoyment at least once a day and report feeling more positive about self  Supportive and cognitive psychotherapeutic interventions marked symptom improvement   Psychosis: delusions   reduce frequency/ intensity of false beliefs and take medications as prescribed on a daily basis Supportive and cognitive psychotherapeutic interventions marked symptom improvement     PROVIDER:  Justice Fay,     Patient staffed in clinic with Dr. Emery who will sign the note.  Supervisor is Dr. Tripathi.  I saw the patient with the resident, and participated in key portions of the service, including the mental status examination and developing the plan of care. I reviewed key portions of the history with the resident. I agree with the findings and plan as documented in this note.    Marilia Emery

## 2019-03-11 ENCOUNTER — HOSPITAL ENCOUNTER (OUTPATIENT)
Dept: PHYSICAL THERAPY | Facility: CLINIC | Age: 19
Setting detail: THERAPIES SERIES
End: 2019-03-11
Attending: FAMILY MEDICINE
Payer: COMMERCIAL

## 2019-03-11 ENCOUNTER — HOSPITAL ENCOUNTER (OUTPATIENT)
Dept: OCCUPATIONAL THERAPY | Facility: CLINIC | Age: 19
Setting detail: THERAPIES SERIES
End: 2019-03-11
Attending: FAMILY MEDICINE
Payer: COMMERCIAL

## 2019-03-11 PROCEDURE — 97116 GAIT TRAINING THERAPY: CPT | Mod: GP | Performed by: PHYSICAL THERAPIST

## 2019-03-11 PROCEDURE — 97112 NEUROMUSCULAR REEDUCATION: CPT | Mod: GP | Performed by: PHYSICAL THERAPIST

## 2019-03-11 PROCEDURE — 97535 SELF CARE MNGMENT TRAINING: CPT | Mod: GO | Performed by: OCCUPATIONAL THERAPIST

## 2019-03-11 NOTE — ADDENDUM NOTE
Encounter addended by: Elyse Costello, OTR on: 3/11/2019 5:03 PM   Actions taken: Flowsheet accepted, Sign clinical note

## 2019-03-12 NOTE — ADDENDUM NOTE
Encounter addended by: Elyse Costello OTR on: 3/12/2019 12:32 PM   Actions taken: Sign clinical note

## 2019-03-13 ENCOUNTER — OFFICE VISIT (OUTPATIENT)
Dept: PEDIATRIC HEMATOLOGY/ONCOLOGY | Facility: CLINIC | Age: 19
End: 2019-03-13
Attending: NURSE PRACTITIONER
Payer: COMMERCIAL

## 2019-03-13 VITALS
HEIGHT: 70 IN | SYSTOLIC BLOOD PRESSURE: 128 MMHG | BODY MASS INDEX: 29.19 KG/M2 | RESPIRATION RATE: 24 BRPM | DIASTOLIC BLOOD PRESSURE: 82 MMHG | WEIGHT: 203.93 LBS | OXYGEN SATURATION: 98 % | HEART RATE: 102 BPM | TEMPERATURE: 96.4 F

## 2019-03-13 DIAGNOSIS — D49.6 NEOPLASM OF POSTERIOR CRANIAL FOSSA (H): ICD-10-CM

## 2019-03-13 DIAGNOSIS — C71.9 EPENDYMOMA (H): Primary | ICD-10-CM

## 2019-03-13 LAB
ALBUMIN SERPL-MCNC: 3 G/DL (ref 3.4–5)
ALP SERPL-CCNC: 145 U/L (ref 65–260)
ALT SERPL W P-5'-P-CCNC: 22 U/L (ref 0–50)
ANION GAP SERPL CALCULATED.3IONS-SCNC: 3 MMOL/L (ref 3–14)
AST SERPL W P-5'-P-CCNC: ABNORMAL U/L (ref 0–35)
BASOPHILS # BLD AUTO: 0 10E9/L (ref 0–0.2)
BASOPHILS NFR BLD AUTO: 0.4 %
BILIRUB SERPL-MCNC: 0.3 MG/DL (ref 0.2–1.3)
BUN SERPL-MCNC: 13 MG/DL (ref 7–30)
CALCIUM SERPL-MCNC: 8.7 MG/DL (ref 8.5–10.1)
CHLORIDE SERPL-SCNC: 108 MMOL/L (ref 98–110)
CO2 SERPL-SCNC: 29 MMOL/L (ref 20–32)
CREAT SERPL-MCNC: 0.92 MG/DL (ref 0.5–1)
DIFFERENTIAL METHOD BLD: ABNORMAL
EOSINOPHIL # BLD AUTO: 0.7 10E9/L (ref 0–0.7)
EOSINOPHIL NFR BLD AUTO: 14.6 %
ERYTHROCYTE [DISTWIDTH] IN BLOOD BY AUTOMATED COUNT: 12.8 % (ref 10–15)
GFR SERPL CREATININE-BSD FRML MDRD: >90 ML/MIN/{1.73_M2}
GLUCOSE SERPL-MCNC: 131 MG/DL (ref 70–99)
HCT VFR BLD AUTO: 38.1 % (ref 40–53)
HGB BLD-MCNC: 12.7 G/DL (ref 13.3–17.7)
IMM GRANULOCYTES # BLD: 0 10E9/L (ref 0–0.4)
IMM GRANULOCYTES NFR BLD: 0.2 %
LYMPHOCYTES # BLD AUTO: 0.9 10E9/L (ref 0.8–5.3)
LYMPHOCYTES NFR BLD AUTO: 19.8 %
MAGNESIUM SERPL-MCNC: 2.1 MG/DL (ref 1.6–2.3)
MCH RBC QN AUTO: 30.3 PG (ref 26.5–33)
MCHC RBC AUTO-ENTMCNC: 33.3 G/DL (ref 31.5–36.5)
MCV RBC AUTO: 91 FL (ref 78–100)
MONOCYTES # BLD AUTO: 0.6 10E9/L (ref 0–1.3)
MONOCYTES NFR BLD AUTO: 13.7 %
NEUTROPHILS # BLD AUTO: 2.4 10E9/L (ref 1.6–8.3)
NEUTROPHILS NFR BLD AUTO: 51.3 %
NRBC # BLD AUTO: 0 10*3/UL
NRBC BLD AUTO-RTO: 0 /100
PHOSPHATE SERPL-MCNC: 3.2 MG/DL (ref 2.5–4.5)
PLATELET # BLD AUTO: 111 10E9/L (ref 150–450)
POTASSIUM SERPL-SCNC: 3.9 MMOL/L (ref 3.4–5.3)
PROT SERPL-MCNC: 6.5 G/DL (ref 6.8–8.8)
RBC # BLD AUTO: 4.19 10E12/L (ref 4.4–5.9)
SODIUM SERPL-SCNC: 140 MMOL/L (ref 133–144)
WBC # BLD AUTO: 4.6 10E9/L (ref 4–11)

## 2019-03-13 PROCEDURE — 36415 COLL VENOUS BLD VENIPUNCTURE: CPT | Performed by: PEDIATRICS

## 2019-03-13 PROCEDURE — 80053 COMPREHEN METABOLIC PANEL: CPT | Performed by: PEDIATRICS

## 2019-03-13 PROCEDURE — 83735 ASSAY OF MAGNESIUM: CPT | Performed by: PEDIATRICS

## 2019-03-13 PROCEDURE — 85025 COMPLETE CBC W/AUTO DIFF WBC: CPT | Performed by: PEDIATRICS

## 2019-03-13 PROCEDURE — G0463 HOSPITAL OUTPT CLINIC VISIT: HCPCS | Mod: ZF

## 2019-03-13 PROCEDURE — 84100 ASSAY OF PHOSPHORUS: CPT | Performed by: PEDIATRICS

## 2019-03-13 RX ORDER — METHYLPREDNISOLONE SODIUM SUCCINATE 125 MG/2ML
125 INJECTION, POWDER, LYOPHILIZED, FOR SOLUTION INTRAMUSCULAR; INTRAVENOUS
Status: CANCELLED
Start: 2019-03-13

## 2019-03-13 RX ORDER — DIPHENHYDRAMINE HYDROCHLORIDE 50 MG/ML
50 INJECTION INTRAMUSCULAR; INTRAVENOUS
Status: CANCELLED
Start: 2019-03-13

## 2019-03-13 RX ORDER — ALBUTEROL SULFATE 90 UG/1
1-2 AEROSOL, METERED RESPIRATORY (INHALATION)
Status: CANCELLED
Start: 2019-03-13

## 2019-03-13 RX ORDER — SODIUM CHLORIDE 9 MG/ML
1000 INJECTION, SOLUTION INTRAVENOUS CONTINUOUS PRN
Status: CANCELLED
Start: 2019-03-13

## 2019-03-13 RX ORDER — EPINEPHRINE 1 MG/ML
0.3 INJECTION, SOLUTION, CONCENTRATE INTRAVENOUS EVERY 5 MIN PRN
Status: CANCELLED | OUTPATIENT
Start: 2019-03-13

## 2019-03-13 RX ORDER — ALBUTEROL SULFATE 0.83 MG/ML
2.5 SOLUTION RESPIRATORY (INHALATION)
Status: CANCELLED | OUTPATIENT
Start: 2019-03-13

## 2019-03-13 RX ORDER — MEPERIDINE HYDROCHLORIDE 25 MG/ML
25 INJECTION INTRAMUSCULAR; INTRAVENOUS; SUBCUTANEOUS EVERY 30 MIN PRN
Status: CANCELLED | OUTPATIENT
Start: 2019-03-13

## 2019-03-13 ASSESSMENT — ENCOUNTER SYMPTOMS
CARDIOVASCULAR NEGATIVE: 1
WEAKNESS: 0
FACIAL ASYMMETRY: 1
FATIGUE: 0
TROUBLE SWALLOWING: 0
SPEECH DIFFICULTY: 1
CONSTIPATION: 1
RESPIRATORY NEGATIVE: 1
SHORTNESS OF BREATH: 0
HEADACHES: 0
NUMBNESS: 0
SLEEP DISTURBANCE: 1

## 2019-03-13 ASSESSMENT — MIFFLIN-ST. JEOR: SCORE: 1953.74

## 2019-03-13 NOTE — NURSING NOTE
"Chief Complaint   Patient presents with     RECHECK     Patient is here today for Ependymoma follow up     /82 (BP Location: Right arm, Patient Position: Fowlers, Cuff Size: Adult Small)   Pulse 102   Temp 96.4  F (35.8  C) (Axillary)   Resp 24   Ht 1.79 m (5' 10.47\")   Wt 92.5 kg (203 lb 14.8 oz)   SpO2 98%   BMI 28.87 kg/m      Malinda Russo LPN  March 13, 2019  "

## 2019-03-13 NOTE — LETTER
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 Geo has been well since his last visit, and reports no missed doses or notable side effects. He continues to complain of constipation.     Fam/Soc: Lives between his parents (one week at each home). They have been awarded guardianship of Geo. The families work well together - both parents have remarried. His dad has a clotting disorder, requiring him to take Warfarin daily.    History was obtained from Geo and his mother.     Allergies   Allergen Reactions     Blood Transfusion Related (Informational Only) Swelling     Periorbital swelling post platelet transfusion     No Known Drug Allergies      Current Outpatient Medications   Medication     calcium carbonate-vitamin D 600-400 MG-UNIT CHEW     dexamethasone (DECADRON) 0.5 MG tablet     fexofenadine (ALLEGRA) 180 MG tablet     linaclotide (LINZESS) 290 MCG capsule     mupirocin (BACTROBAN) 2 % ointment     OLANZapine (ZYPREXA) 15 MG tablet     omeprazole (PRILOSEC) 20 MG DR capsule     polyethylene glycol (MIRALAX/GLYCOLAX) packet     potassium phosphate, monobasic, (K-PHOS) 500 MG tablet     senna-docusate (SENOKOT-S/PERICOLACE) 8.6-50 MG tablet     sertraline (ZOLOFT) 50 MG tablet     sulfamethoxazole-trimethoprim (BACTRIM/SEPTRA) 400-80 MG per tablet     vitamin D3 (CHOLECALCIFEROL) 2000 units tablet     vitamin E (GNP VITAMIN E) 400 UNIT capsule     study - entinostat (IDS# 5050) 1 mg tablet     study - entinostat (IDS# 5050) 5 mg tablet     No current facility-administered medications for this visit.      Past Medical  History:   Diagnosis Date     Cranial nerve dysfunction      Dyspepsia      Ependymoma (H)      Gastro-oesophageal reflux disease      Hearing loss      Intracranial hemorrhage (H)      Migraine      Pilonidal cyst     7-2015     Reduced vision      Refractory obstruction of nasal airway     2nd to nasal valve prolapse     Sleep apnea      Strabismus     gaze palsy      Past Surgical History:   Procedure Laterality Date     GRAFT CARTILAGE FROM POSTERIOR AURICLE Left 10/6/2016    Procedure: GRAFT CARTILAGE FROM POSTERIOR AURICLE;  Surgeon: Tyler Richards MD;  [facility]     INCISION AND DRAINAGE PERINEAL, COMBINED Bilateral 7/18/2015    Procedure: COMBINED INCISION AND DRAINAGE PERINEAL;  Surgeon: Dequan Timmons MD;  [facility]     OPTICAL TRACKING SYSTEM CRANIOTOMY, EXCISE TUMOR, COMBINED N/A 4/13/2015    Procedure: COMBINED OPTICAL TRACKING SYSTEM CRANIOTOMY, EXCISE TUMOR;  Surgeon: Francis Velazquez MD;  [facility]     OPTICAL TRACKING SYSTEM CRANIOTOMY, EXCISE TUMOR, COMBINED N/A 4/16/2015    Procedure: COMBINED OPTICAL TRACKING SYSTEM CRANIOTOMY, EXCISE TUMOR;  Surgeon: Francis Velazquez MD;  [facility]     OPTICAL TRACKING SYSTEM CRANIOTOMY, EXCISE TUMOR, COMBINED Bilateral 5/28/2015    Procedure: COMBINED OPTICAL TRACKING SYSTEM CRANIOTOMY, EXCISE TUMOR;  Surgeon: Francis Velazquez MD;  [facility]     OPTICAL TRACKING SYSTEM CRANIOTOMY, EXCISE TUMOR, COMBINED Bilateral 1/14/2016    Procedure: COMBINED OPTICAL TRACKING SYSTEM CRANIOTOMY, EXCISE TUMOR;  Surgeon: Francis Velazquez MD;  [facility]     OPTICAL TRACKING SYSTEM VENTRICULOSTOMY  4/16/2015    Procedure: OPTICAL TRACKING SYSTEM VENTRICULOSTOMY;  Surgeon: Francis Velazquez MD;  [facility]     REMOVE PORT VASCULAR ACCESS N/A 10/6/2016    Procedure: REMOVE PORT VASCULAR ACCESS;  Surgeon: Bruno Perea MD;  [facility]     RHINOPLASTY N/A 10/6/2016    Procedure:  "RHINOPLASTY;  Surgeon: Tyler Richards MD;  [facility]     VASCULAR SURGERY  5-2015    single lumen power port       Family History   Problem Relation Age of Onset     Circulatory Father         PE/DVT     Hypothyroidism Father 30     Diabetes Maternal Grandmother      Diabetes Paternal Grandmother      Diabetes Paternal Grandfather      C.A.D. Paternal Grandfather      Hypertension Maternal Grandfather      Thyroid Disease Paternal Aunt         unknown whether hypo or hyper     Mental Illness No family hx of      Review of Systems   Constitutional: Negative for fatigue.        In a wheelchair   HENT: Positive for hearing loss (Pre-existing from prior therapy). Negative for trouble swallowing.    Eyes: Positive for visual disturbance (Wears a patch over right eye to minimize diplopia).   Respiratory: Negative.  Negative for shortness of breath.    Cardiovascular: Negative.    Gastrointestinal: Positive for constipation (Chronic).   Endocrine:        Follows with Dr. Martin   Skin: Negative.    Neurological: Positive for facial asymmetry and speech difficulty. Negative for weakness, numbness and headaches.   Psychiatric/Behavioral: Positive for sleep disturbance (CANDELARIO, not using Bi-Pap).   All other systems reviewed and are negative.    /82 (BP Location: Right arm, Patient Position: Fowlers, Cuff Size: Adult Small)   Pulse 102   Temp 96.4  F (35.8  C) (Axillary)   Resp 24   Ht 1.79 m (5' 10.47\")   Wt 92.5 kg (203 lb 14.8 oz)   SpO2 98%   BMI 28.87 kg/m        Physical Exam   Constitutional: He is oriented to person, place, and time. He appears well-developed and well-nourished. No distress.   HENT:   Head: Normocephalic and atraumatic.   Occassionally saliva accumulates in mouth with occasional drooling.   Eyes: Conjunctivae are normal.   Can track to right, but not to left. Nystagmus noted     Cardiovascular: Normal rate, regular rhythm and normal heart sounds.   Pulmonary/Chest: Effort " normal and breath sounds normal. He has no wheezes.   Neurological: He is alert and oriented to person, place, and time. A cranial nerve deficit is present. Coordination (Ataxic- dysmetria especially in upper extremities when accomplishing tasks.) abnormal.   Skin: Skin is warm and dry.   Scattered striae   Psychiatric: He has a normal mood and affect.      Results for orders placed or performed in visit on 03/13/19   Phosphorus   Result Value Ref Range    Phosphorus 3.2 2.5 - 4.5 mg/dL   Magnesium   Result Value Ref Range    Magnesium 2.1 1.6 - 2.3 mg/dL   Comprehensive metabolic panel   Result Value Ref Range    Sodium 140 133 - 144 mmol/L    Potassium 3.9 3.4 - 5.3 mmol/L    Chloride 108 98 - 110 mmol/L    Carbon Dioxide 29 20 - 32 mmol/L    Anion Gap 3 3 - 14 mmol/L    Glucose 131 (H) 70 - 99 mg/dL    Urea Nitrogen 13 7 - 30 mg/dL    Creatinine 0.92 0.50 - 1.00 mg/dL    GFR Estimate >90 >60 mL/min/[1.73_m2]    GFR Estimate If Black >90 >60 mL/min/[1.73_m2]    Calcium 8.7 8.5 - 10.1 mg/dL    Bilirubin Total 0.3 0.2 - 1.3 mg/dL    Albumin 3.0 (L) 3.4 - 5.0 g/dL    Protein Total 6.5 (L) 6.8 - 8.8 g/dL    Alkaline Phosphatase 145 65 - 260 U/L    ALT 22 0 - 50 U/L    AST Unsatisfactory specimen - hemolyzed 0 - 35 U/L   CBC with platelets differential   Result Value Ref Range    WBC 4.6 4.0 - 11.0 10e9/L    RBC Count 4.19 (L) 4.4 - 5.9 10e12/L    Hemoglobin 12.7 (L) 13.3 - 17.7 g/dL    Hematocrit 38.1 (L) 40.0 - 53.0 %    MCV 91 78 - 100 fl    MCH 30.3 26.5 - 33.0 pg    MCHC 33.3 31.5 - 36.5 g/dL    RDW 12.8 10.0 - 15.0 %    Platelet Count 111 (L) 150 - 450 10e9/L    Diff Method Automated Method     % Neutrophils 51.3 %    % Lymphocytes 19.8 %    % Monocytes 13.7 %    % Eosinophils 14.6 %    % Basophils 0.4 %    % Immature Granulocytes 0.2 %    Nucleated RBCs 0 0 /100    Absolute Neutrophil 2.4 1.6 - 8.3 10e9/L    Absolute Lymphocytes 0.9 0.8 - 5.3 10e9/L    Absolute Monocytes 0.6 0.0 - 1.3 10e9/L    Absolute  Eosinophils 0.7 0.0 - 0.7 10e9/L    Absolute Basophils 0.0 0.0 - 0.2 10e9/L    Abs Immature Granulocytes 0.0 0 - 0.4 10e9/L    Absolute Nucleated RBC 0.0      *Note: Due to a large number of results and/or encounters for the requested time period, some results have not been displayed. A complete set of results can be found in Results Review.     Impression:  1. Ependymoma  2. Treated with Entinostat, continues to tolerate well.  3. Labs today are adequate for continuing Entinostat   4. Chronic constipation    Plan:  1. RTC in 1 month for follow up and repeat MRI, or sooner PRN.   2. Continue with Entinostat therap, new medications given today  3. Continue GI health regimen.   4. Continue weekly labs.       This document serves as a record of the services and decisions personally performed and made by Leoncio Rousseau MD. It was created on his behalf by Mark Montague a trained medical scribe. The creation of this document is based on the provider's statements to the medical scribe.    The documentation recorded by the scribe accurately reflects the services I personally performed and the decisions made by me.      Leoncio Rousseau    CC  Patient Care Team:  Jeffrey Espinoza MD as PCP - General (Family Practice)  Dequan Timmons MD as MD (Surgery)  Kristi Schuler, ALAN CNP as Nurse Practitioner (Nurse Practitioner - Pediatrics)  Higinio Walters MD (Ophthalmology)  Karina Hodgson MSW as   Eren Reeder MD as MD (Dermatology)  Schwab, Briana, RN as Nurse Coordinator  Perico Holley MD as MD (Pediatric Neurology)  Sarah Vines MD as MD (Pediatric Urology)  Imani Means, RN as Registered Nurse  Jorge Luis Tripathi MD as MD (Psychiatry)    Copy to patient  RAFAELA GUAN  [address] [facility] [id number]

## 2019-03-13 NOTE — LETTER
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 Geo has been well since his last visit, and reports no missed doses or notable side effects. He continues to complain of constipation.     Fam/Soc: Lives between his parents (one week at each home). They have been awarded guardianship of Geo. The families work well together - both parents have remarried. His dad has a clotting disorder, requiring him to take Warfarin daily.    History was obtained from Geo and his mother.     Allergies   Allergen Reactions     Blood Transfusion Related (Informational Only) Swelling     Periorbital swelling post platelet transfusion     No Known Drug Allergies      Current Outpatient Medications   Medication     calcium carbonate-vitamin D 600-400 MG-UNIT CHEW     dexamethasone (DECADRON) 0.5 MG tablet     fexofenadine (ALLEGRA) 180 MG tablet     linaclotide (LINZESS) 290 MCG capsule     mupirocin (BACTROBAN) 2 % ointment     OLANZapine (ZYPREXA) 15 MG tablet     omeprazole (PRILOSEC) 20 MG DR capsule     polyethylene glycol (MIRALAX/GLYCOLAX) packet     potassium phosphate, monobasic, (K-PHOS) 500 MG tablet     senna-docusate (SENOKOT-S/PERICOLACE) 8.6-50 MG tablet     sertraline (ZOLOFT) 50 MG tablet     sulfamethoxazole-trimethoprim (BACTRIM/SEPTRA) 400-80 MG per tablet     vitamin D3 (CHOLECALCIFEROL) 2000 units tablet     vitamin E (GNP VITAMIN E) 400 UNIT capsule     study - entinostat (IDS# 5050) 1 mg tablet     study - entinostat (IDS# 5050) 5 mg tablet     No current facility-administered medications for this visit.      Past Medical  History:   Diagnosis Date     Cranial nerve dysfunction      Dyspepsia      Ependymoma (H)      Gastro-oesophageal reflux disease      Hearing loss      Intracranial hemorrhage (H)      Migraine      Pilonidal cyst     7-2015     Reduced vision      Refractory obstruction of nasal airway     2nd to nasal valve prolapse     Sleep apnea      Strabismus     gaze palsy      Past Surgical History:   Procedure Laterality Date     GRAFT CARTILAGE FROM POSTERIOR AURICLE Left 10/6/2016    Procedure: GRAFT CARTILAGE FROM POSTERIOR AURICLE;  Surgeon: Tyler Richards MD;  [facility]     INCISION AND DRAINAGE PERINEAL, COMBINED Bilateral 7/18/2015    Procedure: COMBINED INCISION AND DRAINAGE PERINEAL;  Surgeon: Dequan Timmons MD;  [facility]     OPTICAL TRACKING SYSTEM CRANIOTOMY, EXCISE TUMOR, COMBINED N/A 4/13/2015    Procedure: COMBINED OPTICAL TRACKING SYSTEM CRANIOTOMY, EXCISE TUMOR;  Surgeon: Francis Velazquez MD;  [facility]     OPTICAL TRACKING SYSTEM CRANIOTOMY, EXCISE TUMOR, COMBINED N/A 4/16/2015    Procedure: COMBINED OPTICAL TRACKING SYSTEM CRANIOTOMY, EXCISE TUMOR;  Surgeon: Francis Velazquez MD;  [facility]     OPTICAL TRACKING SYSTEM CRANIOTOMY, EXCISE TUMOR, COMBINED Bilateral 5/28/2015    Procedure: COMBINED OPTICAL TRACKING SYSTEM CRANIOTOMY, EXCISE TUMOR;  Surgeon: Francis Velazquez MD;  [facility]     OPTICAL TRACKING SYSTEM CRANIOTOMY, EXCISE TUMOR, COMBINED Bilateral 1/14/2016    Procedure: COMBINED OPTICAL TRACKING SYSTEM CRANIOTOMY, EXCISE TUMOR;  Surgeon: Francis Velazquez MD;  [facility]     OPTICAL TRACKING SYSTEM VENTRICULOSTOMY  4/16/2015    Procedure: OPTICAL TRACKING SYSTEM VENTRICULOSTOMY;  Surgeon: Francis Velazquez MD;  [facility]     REMOVE PORT VASCULAR ACCESS N/A 10/6/2016    Procedure: REMOVE PORT VASCULAR ACCESS;  Surgeon: Bruno Perea MD;  [facility]     RHINOPLASTY N/A 10/6/2016    Procedure:  "RHINOPLASTY;  Surgeon: Tyler Richards MD;  [facility]     VASCULAR SURGERY  5-2015    single lumen power port       Family History   Problem Relation Age of Onset     Circulatory Father         PE/DVT     Hypothyroidism Father 30     Diabetes Maternal Grandmother      Diabetes Paternal Grandmother      Diabetes Paternal Grandfather      C.A.D. Paternal Grandfather      Hypertension Maternal Grandfather      Thyroid Disease Paternal Aunt         unknown whether hypo or hyper     Mental Illness No family hx of      Review of Systems   Constitutional: Negative for fatigue.        In a wheelchair   HENT: Positive for hearing loss (Pre-existing from prior therapy). Negative for trouble swallowing.    Eyes: Positive for visual disturbance (Wears a patch over right eye to minimize diplopia).   Respiratory: Negative.  Negative for shortness of breath.    Cardiovascular: Negative.    Gastrointestinal: Positive for constipation (Chronic).   Endocrine:        Follows with Dr. Martin   Skin: Negative.    Neurological: Positive for facial asymmetry and speech difficulty. Negative for weakness, numbness and headaches.   Psychiatric/Behavioral: Positive for sleep disturbance (CANDELARIO, not using Bi-Pap).   All other systems reviewed and are negative.    /82 (BP Location: Right arm, Patient Position: Fowlers, Cuff Size: Adult Small)   Pulse 102   Temp 96.4  F (35.8  C) (Axillary)   Resp 24   Ht 1.79 m (5' 10.47\")   Wt 92.5 kg (203 lb 14.8 oz)   SpO2 98%   BMI 28.87 kg/m        Physical Exam   Constitutional: He is oriented to person, place, and time. He appears well-developed and well-nourished. No distress.   HENT:   Head: Normocephalic and atraumatic.   Occassionally saliva accumulates in mouth with occasional drooling.   Eyes: Conjunctivae are normal.   Can track to right, but not to left. Nystagmus noted     Cardiovascular: Normal rate, regular rhythm and normal heart sounds.   Pulmonary/Chest: Effort " normal and breath sounds normal. He has no wheezes.   Neurological: He is alert and oriented to person, place, and time. A cranial nerve deficit is present. Coordination (Ataxic- dysmetria especially in upper extremities when accomplishing tasks.) abnormal.   Skin: Skin is warm and dry.   Scattered striae   Psychiatric: He has a normal mood and affect.      Results for orders placed or performed in visit on 03/13/19   Phosphorus   Result Value Ref Range    Phosphorus 3.2 2.5 - 4.5 mg/dL   Magnesium   Result Value Ref Range    Magnesium 2.1 1.6 - 2.3 mg/dL   Comprehensive metabolic panel   Result Value Ref Range    Sodium 140 133 - 144 mmol/L    Potassium 3.9 3.4 - 5.3 mmol/L    Chloride 108 98 - 110 mmol/L    Carbon Dioxide 29 20 - 32 mmol/L    Anion Gap 3 3 - 14 mmol/L    Glucose 131 (H) 70 - 99 mg/dL    Urea Nitrogen 13 7 - 30 mg/dL    Creatinine 0.92 0.50 - 1.00 mg/dL    GFR Estimate >90 >60 mL/min/[1.73_m2]    GFR Estimate If Black >90 >60 mL/min/[1.73_m2]    Calcium 8.7 8.5 - 10.1 mg/dL    Bilirubin Total 0.3 0.2 - 1.3 mg/dL    Albumin 3.0 (L) 3.4 - 5.0 g/dL    Protein Total 6.5 (L) 6.8 - 8.8 g/dL    Alkaline Phosphatase 145 65 - 260 U/L    ALT 22 0 - 50 U/L    AST Unsatisfactory specimen - hemolyzed 0 - 35 U/L   CBC with platelets differential   Result Value Ref Range    WBC 4.6 4.0 - 11.0 10e9/L    RBC Count 4.19 (L) 4.4 - 5.9 10e12/L    Hemoglobin 12.7 (L) 13.3 - 17.7 g/dL    Hematocrit 38.1 (L) 40.0 - 53.0 %    MCV 91 78 - 100 fl    MCH 30.3 26.5 - 33.0 pg    MCHC 33.3 31.5 - 36.5 g/dL    RDW 12.8 10.0 - 15.0 %    Platelet Count 111 (L) 150 - 450 10e9/L    Diff Method Automated Method     % Neutrophils 51.3 %    % Lymphocytes 19.8 %    % Monocytes 13.7 %    % Eosinophils 14.6 %    % Basophils 0.4 %    % Immature Granulocytes 0.2 %    Nucleated RBCs 0 0 /100    Absolute Neutrophil 2.4 1.6 - 8.3 10e9/L    Absolute Lymphocytes 0.9 0.8 - 5.3 10e9/L    Absolute Monocytes 0.6 0.0 - 1.3 10e9/L    Absolute  Eosinophils 0.7 0.0 - 0.7 10e9/L    Absolute Basophils 0.0 0.0 - 0.2 10e9/L    Abs Immature Granulocytes 0.0 0 - 0.4 10e9/L    Absolute Nucleated RBC 0.0      *Note: Due to a large number of results and/or encounters for the requested time period, some results have not been displayed. A complete set of results can be found in Results Review.     Impression:  1. Ependymoma  2. Treated with Entinostat, continues to tolerate well.  3. Labs today are adequate for continuing Entinostat   4. Chronic constipation    Plan:  1. RTC in 1 month for follow up and repeat MRI, or sooner PRN.   2. Continue with Entinostat therap, new medications given today  3. Continue GI health regimen.   4. Continue weekly labs.       This document serves as a record of the services and decisions personally performed and made by Leoncio Rousseau MD. It was created on his behalf by Mark Montague a trained medical scribe. The creation of this document is based on the provider's statements to the medical scribe.    The documentation recorded by the scribe accurately reflects the services I personally performed and the decisions made by me.      Leoncio Rousseau    CC  Patient Care Team:  Keisha Baldwin MD as PCP - General (Family Practice)  Dequan Timmons MD as MD (Surgery)  Leoncio Rousseau MD as MD (Pediatric Hematology/Oncology)  Kristi Schuler, APRN CNP as Nurse Practitioner (Nurse Practitioner - Pediatrics)  Higinio Walters MD (Ophthalmology)  Karina Hodgson MSW as   Eren Reeder MD as MD (Dermatology)  Schwab, Briana, RN as Nurse Coordinator  Perico Holley MD as MD (Pediatric Neurology)  Sarah Vines MD as MD (Pediatric Urology)  Sarah Vines MD as MD (Pediatric Urology)  Imani Means, RN as Registered Nurse  Imani Means, RN as Registered Nurse  Jorge Luis Tripathi MD as MD (Psychiatry)  KEISHA BALDWIN    Copy to patient  RAFAELA GUAN  [id number]  [facility] [id number]    Leoncio Rousseau MD

## 2019-03-13 NOTE — PROGRESS NOTES
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 Geo has been well since his last visit, and reports no missed doses or notable side effects. He continues to complain of constipation.     Fam/Soc: Lives between his parents (one week at each home). They have been awarded guardianship of Geo. The families work well together - both parents have remarried. His dad has a clotting disorder, requiring him to take Warfarin daily.    History was obtained from Geo and his mother.     Allergies   Allergen Reactions     Blood Transfusion Related (Informational Only) Swelling     Periorbital swelling post platelet transfusion     No Known Drug Allergies      Current Outpatient Medications   Medication     calcium carbonate-vitamin D 600-400 MG-UNIT CHEW     dexamethasone (DECADRON) 0.5 MG tablet     fexofenadine (ALLEGRA) 180 MG tablet     linaclotide (LINZESS) 290 MCG capsule     mupirocin (BACTROBAN) 2 % ointment     OLANZapine (ZYPREXA) 15 MG tablet     omeprazole (PRILOSEC) 20 MG DR capsule     polyethylene glycol (MIRALAX/GLYCOLAX) packet     potassium phosphate, monobasic, (K-PHOS) 500 MG tablet     senna-docusate (SENOKOT-S/PERICOLACE) 8.6-50 MG tablet     sertraline (ZOLOFT) 50 MG tablet     sulfamethoxazole-trimethoprim (BACTRIM/SEPTRA) 400-80 MG per tablet     vitamin D3 (CHOLECALCIFEROL) 2000 units tablet     vitamin E (GNP VITAMIN E) 400 UNIT capsule     study - entinostat (IDS# 5050) 1 mg tablet     study - entinostat (IDS# 5050) 5 mg tablet     No current facility-administered medications for this visit.      Past Medical History:   Diagnosis Date     Cranial nerve dysfunction      Dyspepsia      Ependymoma  (H)      Gastro-oesophageal reflux disease      Hearing loss      Intracranial hemorrhage (H)      Migraine      Pilonidal cyst     7-2015     Reduced vision      Refractory obstruction of nasal airway     2nd to nasal valve prolapse     Sleep apnea      Strabismus     gaze palsy      Past Surgical History:   Procedure Laterality Date     GRAFT CARTILAGE FROM POSTERIOR AURICLE Left 10/6/2016    Procedure: GRAFT CARTILAGE FROM POSTERIOR AURICLE;  Surgeon: Tyler Richards MD;  Location: [facility]     INCISION AND DRAINAGE PERINEAL, COMBINED Bilateral 7/18/2015    Procedure: COMBINED INCISION AND DRAINAGE PERINEAL;  Surgeon: Dequan Timmons MD;  Location: [facility]     OPTICAL TRACKING SYSTEM CRANIOTOMY, EXCISE TUMOR, COMBINED N/A 4/13/2015    Procedure: COMBINED OPTICAL TRACKING SYSTEM CRANIOTOMY, EXCISE TUMOR;  Surgeon: Francis Velazquez MD;  [facility]: [facility]     OPTICAL TRACKING SYSTEM CRANIOTOMY, EXCISE TUMOR, COMBINED N/A 4/16/2015    Procedure: COMBINED OPTICAL TRACKING SYSTEM CRANIOTOMY, EXCISE TUMOR;  Surgeon: Francis Velazquez MD;  [facility]     OPTICAL TRACKING SYSTEM CRANIOTOMY, EXCISE TUMOR, COMBINED Bilateral 5/28/2015    Procedure: COMBINED OPTICAL TRACKING SYSTEM CRANIOTOMY, EXCISE TUMOR;  Surgeon: Francis Velazquez MD;  [facility]     OPTICAL TRACKING SYSTEM CRANIOTOMY, EXCISE TUMOR, COMBINED Bilateral 1/14/2016    Procedure: COMBINED OPTICAL TRACKING SYSTEM CRANIOTOMY, EXCISE TUMOR;  Surgeon: Francis Velazquez MD;  [facility]     OPTICAL TRACKING SYSTEM VENTRICULOSTOMY  4/16/2015    Procedure: OPTICAL TRACKING SYSTEM VENTRICULOSTOMY;  Surgeon: Francis Velazquez MD;  [facility]     REMOVE PORT VASCULAR ACCESS N/A 10/6/2016    Procedure: REMOVE PORT VASCULAR ACCESS;  Surgeon: Bruno Perea MD;  [facility]     RHINOPLASTY N/A 10/6/2016    Procedure: RHINOPLASTY;  Surgeon: Tyler Richards MD;  [facility]: [facility]     VASCULAR SURGERY   "5-2015    single [facility]       Family History   Problem Relation Age of Onset     Circulatory Father         PE/DVT     Hypothyroidism Father 30     Diabetes Maternal Grandmother      Diabetes Paternal Grandmother      Diabetes Paternal Grandfather      C.A.D. Paternal Grandfather      Hypertension Maternal Grandfather      Thyroid Disease Paternal Aunt         unknown whether hypo or hyper     Mental Illness No family hx of      Review of Systems   Constitutional: Negative for fatigue.        In a wheelchair   HENT: Positive for hearing loss (Pre-existing from prior therapy). Negative for trouble swallowing.    Eyes: Positive for visual disturbance (Wears a patch over right eye to minimize diplopia).   Respiratory: Negative.  Negative for shortness of breath.    Cardiovascular: Negative.    Gastrointestinal: Positive for constipation (Chronic).   Endocrine:        Follows with Dr. Martin   Skin: Negative.    Neurological: Positive for facial asymmetry and speech difficulty. Negative for weakness, numbness and headaches.   Psychiatric/Behavioral: Positive for sleep disturbance (CANDELARIO, not using Bi-Pap).   All other systems reviewed and are negative.    /82 (BP Location: Right arm, Patient Position: Fowlers, Cuff Size: Adult Small)   Pulse 102   Temp 96.4  F (35.8  C) (Axillary)   Resp 24   Ht 1.79 m (5' 10.47\")   Wt 92.5 kg (203 lb 14.8 oz)   SpO2 98%   BMI 28.87 kg/m       Physical Exam   Constitutional: He is oriented to person, place, and time. He appears well-developed and well-nourished. No distress.   HENT:   Head: Normocephalic and atraumatic.   Occassionally saliva accumulates in mouth with occasional drooling.   Eyes: Conjunctivae are normal.   Can track to right, but not to left. Nystagmus noted     Cardiovascular: Normal rate, regular rhythm and normal heart sounds.   Pulmonary/Chest: Effort normal and breath sounds normal. He has no wheezes.   Neurological: He is alert and oriented to " person, place, and time. A cranial nerve deficit is present. Coordination (Ataxic- dysmetria especially in upper extremities when accomplishing tasks.) abnormal.   Skin: Skin is warm and dry.   Scattered striae   Psychiatric: He has a normal mood and affect.      Results for orders placed or performed in visit on 03/13/19   Phosphorus   Result Value Ref Range    Phosphorus 3.2 2.5 - 4.5 mg/dL   Magnesium   Result Value Ref Range    Magnesium 2.1 1.6 - 2.3 mg/dL   Comprehensive metabolic panel   Result Value Ref Range    Sodium 140 133 - 144 mmol/L    Potassium 3.9 3.4 - 5.3 mmol/L    Chloride 108 98 - 110 mmol/L    Carbon Dioxide 29 20 - 32 mmol/L    Anion Gap 3 3 - 14 mmol/L    Glucose 131 (H) 70 - 99 mg/dL    Urea Nitrogen 13 7 - 30 mg/dL    Creatinine 0.92 0.50 - 1.00 mg/dL    GFR Estimate >90 >60 mL/min/[1.73_m2]    GFR Estimate If Black >90 >60 mL/min/[1.73_m2]    Calcium 8.7 8.5 - 10.1 mg/dL    Bilirubin Total 0.3 0.2 - 1.3 mg/dL    Albumin 3.0 (L) 3.4 - 5.0 g/dL    Protein Total 6.5 (L) 6.8 - 8.8 g/dL    Alkaline Phosphatase 145 65 - 260 U/L    ALT 22 0 - 50 U/L    AST Unsatisfactory specimen - hemolyzed 0 - 35 U/L   CBC with platelets differential   Result Value Ref Range    WBC 4.6 4.0 - 11.0 10e9/L    RBC Count 4.19 (L) 4.4 - 5.9 10e12/L    Hemoglobin 12.7 (L) 13.3 - 17.7 g/dL    Hematocrit 38.1 (L) 40.0 - 53.0 %    MCV 91 78 - 100 fl    MCH 30.3 26.5 - 33.0 pg    MCHC 33.3 31.5 - 36.5 g/dL    RDW 12.8 10.0 - 15.0 %    Platelet Count 111 (L) 150 - 450 10e9/L    Diff Method Automated Method     % Neutrophils 51.3 %    % Lymphocytes 19.8 %    % Monocytes 13.7 %    % Eosinophils 14.6 %    % Basophils 0.4 %    % Immature Granulocytes 0.2 %    Nucleated RBCs 0 0 /100    Absolute Neutrophil 2.4 1.6 - 8.3 10e9/L    Absolute Lymphocytes 0.9 0.8 - 5.3 10e9/L    Absolute Monocytes 0.6 0.0 - 1.3 10e9/L    Absolute Eosinophils 0.7 0.0 - 0.7 10e9/L    Absolute Basophils 0.0 0.0 - 0.2 10e9/L    Abs Immature Granulocytes  0.0 0 - 0.4 10e9/L    Absolute Nucleated RBC 0.0      *Note: Due to a large number of results and/or encounters for the requested time period, some results have not been displayed. A complete set of results can be found in Results Review.     Impression:  1. Ependymoma  2. Treated with Entinostat, continues to tolerate well.  3. Labs today are adequate for continuing Entinostat   4. Chronic constipation    Plan:  1. RTC in 1 month for follow up and repeat MRI, or sooner PRN.   2. Continue with Entinostat therap, new medications given today  3. Continue GI health regimen.   4. Continue weekly labs.       This document serves as a record of the services and decisions personally performed and made by Leoncio Rousseau MD. It was created on his behalf by Mark Montague a trained medical scribe. The creation of this document is based on the provider's statements to the medical scribe.    The documentation recorded by the scribe accurately reflects the services I personally performed and the decisions made by me.      Leoncio Rousseau    CC  Patient Care Team:  Keisha Baldwin MD as PCP - General (Family Practice)  Dequan Timmons MD as MD (Surgery)  Leoncio Rousseau MD as MD (Pediatric Hematology/Oncology)  Kristi Schuler APRN CNP as Nurse Practitioner (Nurse Practitioner - Pediatrics)  Higinio Walters MD (Ophthalmology)  Karina Hodgson MSW as   Eren Reeder MD as MD (Dermatology)  Schwab, Briana, RN as Nurse Coordinator  Perico Holley MD as MD (Pediatric Neurology)  Sarah Vines MD as MD (Pediatric Urology)  Sarah Vines MD as MD (Pediatric Urology)  Imani Means, RN as Registered Nurse  Imani Means, RN as Registered Nurse  Jorge Luis Tripathi MD as MD (Psychiatry)  KEISHA BALDWIN    Copy to patient  RAFAELA GUAN  [id number] [facility] [id number]

## 2019-03-14 DIAGNOSIS — C71.9 EPENDYMOMA (H): Primary | ICD-10-CM

## 2019-03-14 DIAGNOSIS — K59.04 CHRONIC IDIOPATHIC CONSTIPATION: ICD-10-CM

## 2019-03-18 ENCOUNTER — HOSPITAL ENCOUNTER (OUTPATIENT)
Dept: PHYSICAL THERAPY | Facility: CLINIC | Age: 19
Setting detail: THERAPIES SERIES
End: 2019-03-18
Attending: FAMILY MEDICINE
Payer: COMMERCIAL

## 2019-03-18 PROCEDURE — 97116 GAIT TRAINING THERAPY: CPT | Mod: GP | Performed by: PHYSICAL THERAPIST

## 2019-03-18 PROCEDURE — 97110 THERAPEUTIC EXERCISES: CPT | Mod: GP | Performed by: PHYSICAL THERAPIST

## 2019-03-18 PROCEDURE — 97112 NEUROMUSCULAR REEDUCATION: CPT | Mod: GP | Performed by: PHYSICAL THERAPIST

## 2019-03-19 DIAGNOSIS — C71.9 EPENDYMOMA (H): ICD-10-CM

## 2019-03-19 LAB
BASOPHILS # BLD AUTO: 0 10E9/L (ref 0–0.2)
BASOPHILS NFR BLD AUTO: 0.5 %
DIFFERENTIAL METHOD BLD: ABNORMAL
EOSINOPHIL # BLD AUTO: 0.4 10E9/L (ref 0–0.7)
EOSINOPHIL NFR BLD AUTO: 11.7 %
ERYTHROCYTE [DISTWIDTH] IN BLOOD BY AUTOMATED COUNT: 13.2 % (ref 10–15)
HCT VFR BLD AUTO: 41.5 % (ref 40–53)
HGB BLD-MCNC: 13.8 G/DL (ref 13.3–17.7)
LYMPHOCYTES # BLD AUTO: 0.9 10E9/L (ref 0.8–5.3)
LYMPHOCYTES NFR BLD AUTO: 23.4 %
MCH RBC QN AUTO: 30 PG (ref 26.5–33)
MCHC RBC AUTO-ENTMCNC: 33.3 G/DL (ref 31.5–36.5)
MCV RBC AUTO: 90 FL (ref 78–100)
MONOCYTES # BLD AUTO: 0.5 10E9/L (ref 0–1.3)
MONOCYTES NFR BLD AUTO: 14.2 %
NEUTROPHILS # BLD AUTO: 1.8 10E9/L (ref 1.6–8.3)
NEUTROPHILS NFR BLD AUTO: 50.2 %
PLATELET # BLD AUTO: 123 10E9/L (ref 150–450)
RBC # BLD AUTO: 4.6 10E12/L (ref 4.4–5.9)
WBC # BLD AUTO: 3.7 10E9/L (ref 4–11)

## 2019-03-19 PROCEDURE — 85025 COMPLETE CBC W/AUTO DIFF WBC: CPT | Performed by: PEDIATRICS

## 2019-03-19 PROCEDURE — 80053 COMPREHEN METABOLIC PANEL: CPT | Performed by: PEDIATRICS

## 2019-03-19 PROCEDURE — 84100 ASSAY OF PHOSPHORUS: CPT | Performed by: PEDIATRICS

## 2019-03-19 PROCEDURE — 83735 ASSAY OF MAGNESIUM: CPT | Performed by: PEDIATRICS

## 2019-03-19 PROCEDURE — 36415 COLL VENOUS BLD VENIPUNCTURE: CPT | Performed by: PEDIATRICS

## 2019-03-20 ENCOUNTER — TELEPHONE (OUTPATIENT)
Dept: PEDIATRIC HEMATOLOGY/ONCOLOGY | Facility: CLINIC | Age: 19
End: 2019-03-20

## 2019-03-20 LAB
ALBUMIN SERPL-MCNC: 3.5 G/DL (ref 3.4–5)
ALP SERPL-CCNC: 135 U/L (ref 65–260)
ALT SERPL W P-5'-P-CCNC: 17 U/L (ref 0–50)
ANION GAP SERPL CALCULATED.3IONS-SCNC: 5 MMOL/L (ref 3–14)
AST SERPL W P-5'-P-CCNC: 25 U/L (ref 0–35)
BILIRUB SERPL-MCNC: 0.3 MG/DL (ref 0.2–1.3)
BUN SERPL-MCNC: 12 MG/DL (ref 7–30)
CALCIUM SERPL-MCNC: 9 MG/DL (ref 8.5–10.1)
CHLORIDE SERPL-SCNC: 106 MMOL/L (ref 98–110)
CO2 SERPL-SCNC: 29 MMOL/L (ref 20–32)
CREAT SERPL-MCNC: 1.26 MG/DL (ref 0.5–1)
GFR SERPL CREATININE-BSD FRML MDRD: 82 ML/MIN/{1.73_M2}
GLUCOSE SERPL-MCNC: 100 MG/DL (ref 70–99)
MAGNESIUM SERPL-MCNC: 2.4 MG/DL (ref 1.6–2.3)
PHOSPHATE SERPL-MCNC: 3 MG/DL (ref 2.5–4.5)
POTASSIUM SERPL-SCNC: 4.3 MMOL/L (ref 3.4–5.3)
PROT SERPL-MCNC: 6.7 G/DL (ref 6.8–8.8)
SODIUM SERPL-SCNC: 140 MMOL/L (ref 133–144)

## 2019-03-20 NOTE — TELEPHONE ENCOUNTER
"Call from Geo and his father wondering about bowel clean out.  Geo notes that he thought I said we can't do that because it would cause \"other issues\" and he is thinking he doesn't care about the \"other issues\" but wonders what those are.  Reviewed with Geo and his dad that what I said it we couldn't continually under go bowel clean outs but if he is severely constipated we can do a clean out when it is needed.  I would recommend the next time we do we treat it just like a colonoscopy clean out and do the diet changes along with clear liquids so that Geo can see a true clean out because if he is eating he will make additional stool.  Geo's dad notes he had a large stool this morning.  We had discussion about continuing added fruits and vegetables and monitoring constipation for now since he expelled a large amount.  Geo agrees with this plan.  They will call back with questions.   "

## 2019-03-24 ENCOUNTER — CARE COORDINATION (OUTPATIENT)
Dept: FAMILY MEDICINE | Facility: CLINIC | Age: 19
End: 2019-03-24

## 2019-03-24 DIAGNOSIS — H49.23: ICD-10-CM

## 2019-03-24 DIAGNOSIS — D49.6 NEOPLASM OF POSTERIOR CRANIAL FOSSA (H): Primary | ICD-10-CM

## 2019-03-24 DIAGNOSIS — H53.2 DIPLOPIA: ICD-10-CM

## 2019-03-28 ENCOUNTER — TELEPHONE (OUTPATIENT)
Dept: FAMILY MEDICINE | Facility: CLINIC | Age: 19
End: 2019-03-28

## 2019-03-30 NOTE — NURSING NOTE
Chief Complaint   Patient presents with     RECHECK     Patient here today for follow up with ependymoma     /67 (BP Location: Left arm, Patient Position: Fowlers, Cuff Size: Adult Regular)  Pulse 92  Temp 96.8  F (36  C) (Axillary)  Resp 20  Wt 74.2 kg (163 lb 9.3 oz)  SpO2 100%  BMI 23.29 kg/m2  Ember Aguilar M.A  August 16, 2017    
Home

## 2019-04-02 DIAGNOSIS — C71.9 EPENDYMOMA (H): ICD-10-CM

## 2019-04-02 LAB
ALBUMIN SERPL-MCNC: 3.4 G/DL (ref 3.4–5)
ALP SERPL-CCNC: 131 U/L (ref 65–260)
ALT SERPL W P-5'-P-CCNC: 14 U/L (ref 0–50)
ANION GAP SERPL CALCULATED.3IONS-SCNC: 3 MMOL/L (ref 3–14)
AST SERPL W P-5'-P-CCNC: 23 U/L (ref 0–35)
BASOPHILS # BLD AUTO: 0 10E9/L (ref 0–0.2)
BASOPHILS NFR BLD AUTO: 0.3 %
BILIRUB SERPL-MCNC: 0.4 MG/DL (ref 0.2–1.3)
BUN SERPL-MCNC: 15 MG/DL (ref 7–30)
CALCIUM SERPL-MCNC: 8.8 MG/DL (ref 8.5–10.1)
CHLORIDE SERPL-SCNC: 107 MMOL/L (ref 98–110)
CO2 SERPL-SCNC: 31 MMOL/L (ref 20–32)
CREAT SERPL-MCNC: 1.11 MG/DL (ref 0.5–1)
DIFFERENTIAL METHOD BLD: ABNORMAL
EOSINOPHIL # BLD AUTO: 0.3 10E9/L (ref 0–0.7)
EOSINOPHIL NFR BLD AUTO: 9.1 %
ERYTHROCYTE [DISTWIDTH] IN BLOOD BY AUTOMATED COUNT: 13.1 % (ref 10–15)
GFR SERPL CREATININE-BSD FRML MDRD: >90 ML/MIN/{1.73_M2}
GLUCOSE SERPL-MCNC: 91 MG/DL (ref 70–99)
HCT VFR BLD AUTO: 39.4 % (ref 40–53)
HGB BLD-MCNC: 13.1 G/DL (ref 13.3–17.7)
LYMPHOCYTES # BLD AUTO: 0.7 10E9/L (ref 0.8–5.3)
LYMPHOCYTES NFR BLD AUTO: 23.5 %
MAGNESIUM SERPL-MCNC: 2.2 MG/DL (ref 1.6–2.3)
MCH RBC QN AUTO: 29.9 PG (ref 26.5–33)
MCHC RBC AUTO-ENTMCNC: 33.2 G/DL (ref 31.5–36.5)
MCV RBC AUTO: 90 FL (ref 78–100)
MONOCYTES # BLD AUTO: 0.6 10E9/L (ref 0–1.3)
MONOCYTES NFR BLD AUTO: 18.2 %
NEUTROPHILS # BLD AUTO: 1.5 10E9/L (ref 1.6–8.3)
NEUTROPHILS NFR BLD AUTO: 48.9 %
PHOSPHATE SERPL-MCNC: 3.1 MG/DL (ref 2.5–4.5)
PLATELET # BLD AUTO: 106 10E9/L (ref 150–450)
POTASSIUM SERPL-SCNC: 3.8 MMOL/L (ref 3.4–5.3)
PROT SERPL-MCNC: 6.6 G/DL (ref 6.8–8.8)
RBC # BLD AUTO: 4.38 10E12/L (ref 4.4–5.9)
SODIUM SERPL-SCNC: 141 MMOL/L (ref 133–144)
WBC # BLD AUTO: 3.1 10E9/L (ref 4–11)

## 2019-04-02 PROCEDURE — 85025 COMPLETE CBC W/AUTO DIFF WBC: CPT | Performed by: PEDIATRICS

## 2019-04-02 PROCEDURE — 83735 ASSAY OF MAGNESIUM: CPT | Performed by: PEDIATRICS

## 2019-04-02 PROCEDURE — 84100 ASSAY OF PHOSPHORUS: CPT | Performed by: PEDIATRICS

## 2019-04-02 PROCEDURE — 36415 COLL VENOUS BLD VENIPUNCTURE: CPT | Performed by: PEDIATRICS

## 2019-04-02 PROCEDURE — 80053 COMPREHEN METABOLIC PANEL: CPT | Performed by: PEDIATRICS

## 2019-04-03 ENCOUNTER — DOCUMENTATION ONLY (OUTPATIENT)
Dept: PEDIATRIC HEMATOLOGY/ONCOLOGY | Facility: CLINIC | Age: 19
End: 2019-04-03

## 2019-04-03 DIAGNOSIS — K59.01 SLOW TRANSIT CONSTIPATION: Primary | ICD-10-CM

## 2019-04-03 DIAGNOSIS — C71.9 EPENDYMOMA (H): ICD-10-CM

## 2019-04-03 RX ORDER — POLYETHYLENE GLYCOL 3350 17 G/17G
14 POWDER, FOR SOLUTION ORAL ONCE
Qty: 238 G | Refills: 3 | Status: ON HOLD | OUTPATIENT
Start: 2019-04-03 | End: 2019-06-05

## 2019-04-04 NOTE — PROGRESS NOTES
"Geo's father called as Geo is concerned about his constipation after returning from his [place] vacation.  Geo states he will \"do anything to feel relief\"  Unfortunately there are not inpatient beds available on the 5th floor so Geo and his dad a willing to attempt outpatient clean-out.  I have recommended that he start with 10 ounces of Magnesium citrate now.  I also called Miralax to the pharmacy and he should add 14 capfuls of Miralax to 64 ounces of Gatorade.  He should shake well, keep it refrigerated and take a glass full every 20 minutes as tolerated.  I have encouraged that Geo only take in liquids, broths, jello etc as it will maximize his clean out.  He should take 2 Ex-lax chews this evening.  Tomorrow he should complete the clean out with 10 ounces of magnesium citrate. I have offered for them to come for x-ray at the completion of the clean out.  It is not clear if Geo wants to do that.  We will follow-up tomorrow.   "

## 2019-04-05 ENCOUNTER — OFFICE VISIT (OUTPATIENT)
Dept: PSYCHIATRY | Facility: CLINIC | Age: 19
End: 2019-04-05
Attending: PSYCHIATRY & NEUROLOGY
Payer: COMMERCIAL

## 2019-04-05 VITALS — DIASTOLIC BLOOD PRESSURE: 77 MMHG | SYSTOLIC BLOOD PRESSURE: 115 MMHG | HEART RATE: 84 BPM

## 2019-04-05 DIAGNOSIS — F22 PARANOIA (H): ICD-10-CM

## 2019-04-05 DIAGNOSIS — F32.A DEPRESSION, UNSPECIFIED DEPRESSION TYPE: ICD-10-CM

## 2019-04-05 PROCEDURE — G0463 HOSPITAL OUTPT CLINIC VISIT: HCPCS | Mod: ZF

## 2019-04-05 RX ORDER — OLANZAPINE 15 MG/1
15 TABLET ORAL AT BEDTIME
Qty: 30 TABLET | Refills: 1 | Status: ON HOLD | OUTPATIENT
Start: 2019-05-02 | End: 2019-06-04 | Stop reason: ALTCHOICE

## 2019-04-05 ASSESSMENT — PAIN SCALES - GENERAL: PAINLEVEL: NO PAIN (0)

## 2019-04-05 NOTE — PROGRESS NOTES
"  [facility] PSYCHIATRY CLINIC PROGRESS NOTE     CARE TEAM:  PCP- Jeffrey Espinoza    Specialty Providers- Oncology, Neuropsychology, Physical Therapy, Occupational Therapy    Therapist- Manju Pink at [facility] in [facility] Team- no.    Geo Hicks is a 18 year old male who prefers the name Geo & pronouns he, him, his, himself.    Date of initial diagnostic assessment is 2/7/2019.    Pertinent Background:  This patient first experienced mental health issues in the past few months and has received treatment for paranoia/delusions.  Notably, this patient is undergoing cancer treatment at the [facility] and has been referred for psychiatric assessment by his neuropsychologist who most recently met with him on 1/3/2019.  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 Since then, he has undergone multiple tumor resections, chemotherapy, and radiation treatment. Geo also experienced an intra-tumoral hemorrhage in May 2015 that resulted in neurological changes including facial numbness, significant loss of mobility and dysarthria. In December 2016, a follow-up MRI revealed continued tumor enhancement, however the most recent MRI from 10/16/18 revealed an unchanged fourth ventricle ependymoma in comparison to previous exams, a slight decrease in adjacent edema, as well as a stable size of the ventricular system.  He is currently on COG study [id number] with Entin[employer].  Of note, patient had a brief admission to inpatient psychiatry on [facility] from 2/14-2/15/2019 \"for evaluation of delusions and suicidal comments.\"    Psych critical item history includes suicidal ideation, psychosis [sxs include delusions] and psychiatric hospitalization x1.    INTERIM HISTORY                                                                                                                 4, 4   The patient reports good treatment adherence.  History was provided by the patient " "and his dad who were good historians.  The last visit ended with the following med change: initiation of sertraline 50 mg daily.  Since the last visit:   Geo and his father state that since our last appointment he has been doing \"pretty good.\"  They affirmed that since initiating sertraline, he has been doing \"better,\" and that in terms of his delusional concerns and emotional reactions to same, his dad states things are \"good right now.\"  Elaborates that \"the meds have helped, the ideas are still there but we can have civil conversations.\"  Dad also denies that any new delusional content has been elaborated (apart from the previous belief on Geo's part that providers are withholding more definitive care from him).  Geo denies SI/SIB thoughts, violent/aggressive ideation, markedly depressed mood, elevated mood hallmarks, panic/anxious acuity, AVH or other hallmarks of psychiatric decompensation or dangerousness.  They report no side effects or untoward effects from his olanzapine or the sertraline initiated at our last appointment.  Enclosing, dad states \"honestly I think he is doing pretty good.\"  Agreed to maintain present regimen without adjustment.    RECENT SYMPTOMS:   DEPRESSION:  reports-some low mood (which has improved), feeling trapped and overwhelmed;  DENIES- suicidal ideation, self-destructive thoughts, anhedonia, insomnia and poor concentration /memory  ELIZABETH/HYPOMANIA:  reports-none;  DENIES- increased energy, decreased sleep need, increased activity and grandiosity  PSYCHOSIS:  reports-delusions- paranoid [details in Interim History];  DENIES- auditory hallucinations and visual hallucinations  DYSREGULATION:  reports-can become irritable/agitated if beliefs are challenged;  DENIES- suicidal ideation, violent ideation, SIB, mood dysregulation, impulsive and aggressive  PANIC ATTACK:  none   ANXIETY:  worry  TRAUMA RELATED:  none  COMPULSIVE:  none  SLEEP:  7-8 hours, no problems " "reported  EATING DISORDER: no    RECENT SUBSTANCE USE:     ALCOHOL- no  TOBACCO- no  CAFFEINE- minimal  OPIOIDS- no         NARCAN KIT- N/A  CANNABIS- no  OTHER ILLICIT DRUGS- no      CURRENT SOCIAL HISTORY:  FINANCIAL SUPPORT- family   CHILDREN- no      LIVING SITUATION- mother and father (who are  and have both remarried) alternating weeks between respective homes  SOCIAL/ SPIRITUAL SUPPORT- mother, father, older brother, friend Rima  FEELS SAFE AT HOME- yes    MEDICAL ROS (2,10):  A comprehensive review of systems was performed and is negative other than noted in the HPI.    PSYCH and CD Critical Summary Points since July 2018 February 2019:  Clinic intake on 2/7.  Later was admitted to [facility] for a brief inpatient psych admission from 3/14-3/15, most of which was spent in the ER, due to suicidal comments that concerned family.  Was ultimately deemed safe for outpatient follow-up and was discharged with an increase in olanzapine to 15 mg.  March 2019:  Initiated sertraline 50 mg daily.    PAST PSYCH MED TRIALS   see EMR Problem List: Hx of psychiatric care    MEDICAL / SURGICAL HISTORY                                   Neurologic Hx- See pertinent background, re: history of ependymoma and related chemo, radiation and tumor resection(s).  Notably has experienced neurological damage due to the above which has resulted in facial numbness, significant loss of mobility and dysarthria.  Has had neuropsychiatric evaluation(s) 2/2016, 2/2017 and 1/2019.  The following is from the \"Results and Impressions\" section of his 1/3/2019 eval with Veronica Padilla:       \"We were pleased to discover that the neurocognitive areas measured during this evaluation have remained intact and consistent with previous evaluations. Geo s ability to access and apply acquired word knowledge (verbal comprehension) and his working memory (ability to mentally hold and manipulate information) were in the " "average range. His ability to understand visual information to solve novel abstract visual problems (perceptual reasoning) was in the low average range. There was a slight decrease in performance on one task associated with perceptual reasoning due to time limits. Had no time limits been enforced, Geo s performance would have likely been higher as he was observed to respond with correct answers that were past the time limit allowed. Furthermore, on a timed visual discrimination and scanning task, Geo performed in the borderline range, which was a slight improvement from his previous evaluation in 2017 where he performed in the impaired range.\"    Patient Active Problem List   Diagnosis     GERD (gastroesophageal reflux disease)     Closed fracture at the growth plate of right distal fibula      Elevated serum creatinine     Dyspepsia     Intracranial hemorrhage (H)     Hemorrhagic stroke (H)     Ependymoma (H)     Admission for antineoplastic chemotherapy     Post-operative state     S/P craniotomy     S/P biopsy     Noncomitant strabismus     Abducens neuropathy of both eyes     CANDELARIO (obstructive sleep apnea)     Health Care Home     Hypernatremia     Body temperature low     Current chronic use of systemic steroids     Elevated TSH     Status post chemotherapy     Neoplasm of posterior cranial fossa (H)     Constipation     Chronic constipation     Serum albumin decreased     Closed compression fracture of thoracic vertebra with routine healing, subsequent encounter     Depression     Past Surgical History:   Procedure Laterality Date     GRAFT CARTILAGE FROM POSTERIOR AURICLE Left 10/6/2016    Procedure: GRAFT CARTILAGE FROM POSTERIOR AURICLE;  Surgeon: Tyler Richards MD;  Location: [facility]     INCISION AND DRAINAGE PERINEAL, COMBINED Bilateral 7/18/2015    Procedure: COMBINED INCISION AND DRAINAGE PERINEAL;  Surgeon: Dequan Timmons MD;  Location: [facility] OR     OPTICAL TRACKING SYSTEM CRANIOTOMY, " EXCISE TUMOR, COMBINED N/A 4/13/2015    Procedure: COMBINED OPTICAL TRACKING SYSTEM CRANIOTOMY, EXCISE TUMOR;  Surgeon: Francis Velazquez MD;  [facility]: [facility]     OPTICAL TRACKING SYSTEM CRANIOTOMY, EXCISE TUMOR, COMBINED N/A 4/16/2015    Procedure: COMBINED OPTICAL TRACKING SYSTEM CRANIOTOMY, EXCISE TUMOR;  Surgeon: Francis Velazquez MD;  [facility]     OPTICAL TRACKING SYSTEM CRANIOTOMY, EXCISE TUMOR, COMBINED Bilateral 5/28/2015    Procedure: COMBINED OPTICAL TRACKING SYSTEM CRANIOTOMY, EXCISE TUMOR;  Surgeon: Francis Velazquez MD;  [facility]     OPTICAL TRACKING SYSTEM CRANIOTOMY, EXCISE TUMOR, COMBINED Bilateral 1/14/2016    Procedure: COMBINED OPTICAL TRACKING SYSTEM CRANIOTOMY, EXCISE TUMOR;  Surgeon: Francis Velazquez MD;  [facility]     OPTICAL TRACKING SYSTEM VENTRICULOSTOMY  4/16/2015    Procedure: OPTICAL TRACKING SYSTEM VENTRICULOSTOMY;  Surgeon: Francis Velazquez MD;  [facility]     REMOVE PORT VASCULAR ACCESS N/A 10/6/2016    Procedure: REMOVE PORT VASCULAR ACCESS;  Surgeon: Bruno Perea MD;  [facility]     RHINOPLASTY N/A 10/6/2016    Procedure: RHINOPLASTY;  Surgeon: Tyler Richards MD;  [facility]: [facility]     VASCULAR SURGERY  5-2015    single lumen power port       ALLERGY                                Blood transfusion related (informational only) and No known drug allergies     MEDICATIONS                               Current Outpatient Medications   Medication Sig Dispense Refill     calcium carbonate-vitamin D 600-400 MG-UNIT CHEW Take 2 tablets in the morning and 1 tablet in the evening. 90 tablet 3     dexamethasone (DECADRON) 0.5 MG tablet Take 0.75 mg by mouth daily (with breakfast). 90 tablet 3     fexofenadine (ALLEGRA) 180 MG tablet Take 180 mg by mouth every evening        linaclotide (LINZESS) 290 MCG capsule Take 290 mcg by mouth every morning (before breakfast)        mupirocin (BACTROBAN) 2 % ointment Use 2  times a day to the buttock with flare 22 g 3     OLANZapine (ZYPREXA) 15 MG tablet Take 1 tablet (15 mg) by mouth At Bedtime 30 tablet 1     omeprazole (PRILOSEC) 20 MG DR capsule Take 40 mg by mouth every morning       polyethylene glycol (MIRALAX/GLYCOLAX) packet Take 17 g by mouth 3 times daily       potassium phosphate, monobasic, (K-PHOS) 500 MG tablet Take 500 mg by mouth 2 times daily       senna-docusate (SENOKOT-S/PERICOLACE) 8.6-50 MG tablet Take 2 tablets by mouth At Bedtime       Sennosides (EX-LAX) 15 MG CHEW Take 2 chews after completion of Gatorade/Miralax 2 tablet 3     sertraline (ZOLOFT) 50 MG tablet Take 1 tablet (50 mg) by mouth daily 30 tablet 0     sulfamethoxazole-trimethoprim (BACTRIM/SEPTRA) 400-80 MG per tablet Take 1 tablet by mouth 2 times daily On Saturdays and Sundays 24 tablet 11     vitamin D3 (CHOLECALCIFEROL) 2000 units tablet Take 1 tablet by mouth daily       vitamin E (GNP VITAMIN E) 400 UNIT capsule Take 1 capsule (400 Units) by mouth daily 30 capsule 11     VITALS                                                                                                                          3, 3   /77   Pulse 84      MENTAL STATUS EXAM                                                                                           9, 14 cog gs     Alertness: alert  and oriented  Appearance: casually groomed, seated in wheelchair, wearing R-sided eye-patch  Behavior/Demeanor: cooperative and pleasant, with fair eye contact   Speech: prominent dysarthria  Language: good  Psychomotor: mild evident palsy of upper extremities and facial paralysis  Mood: mild/moderate low mood (improved)  Affect: restricted; was congruent to mood; was congruent to content  Thought Process/Associations: frequent rumination [details in Interim History]  Thought Content:  Reports delusions [details in Interim History];  Denies suicidal ideation and violent ideation  Perception:  Reports none;  Denies auditory  "hallucinations and visual hallucinations  Insight: partial  Judgment: good  Cognition: (6) oriented: time, person, and place  attention span: intact  concentration: intact  recent memory: intact  remote memory: intact  fund of knowledge: appropriate  Gait and Station: remarkable for:  ataxia - can ambulate but was seen in wheelchair     LABS and DATA     AIMS:  complete next visit    PHQ9 TODAY = Patient did not complete  No flowsheet data found.    ANTIPSYCHOTIC LABS  [glu, A1C, lipids (rohit LDL), liver enzymes, WBC, ANEU, Hgb, plts]  q12 mo  Recent Labs   Lab Test 19  1347 19  1425 19  1031 19  1406 19  1100   GLC 91 100* 131* 95 124*   A1C  --   --   --   --  5.1     Recent Labs   Lab Test 19  1100   CHOL 158   TRIG 236*   LDL 84   HDL 27*     Recent Labs   Lab Test 19  1347 19  1425 19  1031 19  1406   AST 23 25 Unsatisfactory specimen - hemolyzed 21   ALT 14 17 22 15   ALKPHOS 131 135 145 116     Recent Labs   Lab Test 19  1347 19  1425 19  1031 19  1406   WBC 3.1* 3.7* 4.6 3.8*   ANEU 1.5* 1.8 2.4 2.4   HGB 13.1* 13.8 12.7* 13.1*   * 123* 111* 122*       EK2019: 85 BPM, QT/QTcH was 346/389 msec.  Also included comment: \"Abnormal precordial QRS contours - nondiagnostic for this age.\"    EE/15/2019: \"IMPRESSION: Awake and drowsy routine EEG (no video) was normal.  The patient stated he felt dazed during photic stimulation, however, no electrographic seizures or concerning changes on the EEG were seen during that time.  Clinical correlation is advised.  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  At our last appointment had initiated sertraline for mood support.  Other recent changes had been an increase in his olanzapine to 15 mg during a brief February hospital admission.  eGo and his dad both relate today that he has been doing better since we last met.  Has had improvements in mood as well as his ability to calmly tolerate discussions related to his health care.  His father states that Geo's delusional belief that more definitive care for his condition is being withheld still appears to be intact, however with improvements in his mood they are now able to have more civil conversations about this.  He also confirms that no new delusional thought content has been elaborated.  Today Geo denies SI/SIB thoughts, violent/aggressive ideation, markedly depressed mood, elevated mood hallmarks, panic/anxious acuity, AVH or other hallmarks of psychiatric decompensation or dangerousness.  Both the patient and his father affirmed that sertraline has been well-tolerated with no side effects.  Overall, they agree that he has been doing better and we have agreed to maintain his present psychiatric regimen without adjustment today.    SUICIDE RISK ASSESSMENT:  Geo Hicks denies present suicidal ideation.  This patient does have notable risk factors for self-harm including feels trapped, relationship conflicts, new/ worsening medical issue, male and paranoia/delusions. However, risk is mitigated by no h/o suicide attempt, no plan or intent, no h/o risky impulsive behavior, describes a safety plan, h/o seeking help when needed, none to minimal alcohol use , commitment to family, good social support and stable housing.  Based on all available evidence he does not  appear to be at imminent risk for self-harm therefore does not meet criteria for a 72-hr hold/  involuntary hospitalization.  However, based on degree of symptoms therapy, close psych FU and initiation of new medications was recommended which the pt did agree to.    [place] PRESCRIPTION MONITORING PROGRAM [] was not checked today:  not using controlled substances.    PSYCHOTROPIC DRUG INTERACTIONS:      Pentoxifylline may enhance the antiplatelet effect of Agents with Antiplatelet Properties.  [Pentoxifylline, Sertraline]     CNS Depressants may enhance the adverse/toxic effect of Selective Serotonin Reuptake Inhibitors. Specifically, the risk of psychomotor impairment may be enhanced.  [Olanzapine, Sertraline]    MANAGEMENT:  Monitoring for adverse effects, routine vitals, routine labs, periodic EKGs and patient/family aware of risks     PLAN                                                                                                                                m2, h3     1) PSYCHOTROPIC MEDICATIONS:  -Continue sertraline 50 mg daily  -Continue olanzapine 15 mg at bedtime    2) THERAPY:  continue with Manju    3) NEXT DUE:    Labs- AP labs due Feb 2020  EKG- PRN  Rating Scales- AIMS due    4) REFERRALS:  none today     5) RTC: 4-6 weeks    6) CRISIS NUMBERS:   Provided routinely in [facility] [phone] (clinic)    [phone] (after hours)  CRISIS TEXT LINE: Text [phone] from anywhere in [place], anytime, any crisis 24/7;  OR SEE www.crisistextline.org    TREATMENT RISK STATEMENT:  The risks, benefits, alternatives and potential adverse effects have been discussed and are understood by the pt. The pt understands the risks of using street drugs or alcohol. There are no medical contraindications, the pt agrees to treatment with the ability to do so. The pt knows to call the clinic for any problems or to access emergency care if needed.  Medical and substance use concerns are documented above.   Psychotropic drug interaction check was done, including changes made today.     PSYCHIATRY CLINIC INDIVIDUAL PSYCHOTHERAPY NOTE                                                [16]   Start time- N/A        End time- N/A  Date last reviewed - 03/08/19       Date next due - 6/6/19 (or 12 months if Medicare)    Subjective: This supportive psychotherapy session addressed issues related to goals of therapy, patient's history, current stressors, life stressors, relationship, family of origin, school and health.  Patient's reaction: Contemplation in the context of mental status appropriate for ambulatory setting.  Progress: fair  Plan: RTC 4-6 weeks  Psychotherapy services during this visit included  myself and the patient.   TREATMENT  PLAN          SYMPTOMS;PROBLEMS   MEASURABLE GOALS;    FUNCTIONAL IMPROVEMENT INTERVENTIONS;    GAINS MADE DISCHARGE CRITERIA   Depression: depressed mood, anhedonia and feeling hopelesss   reduce depressive symptoms, find enjoyment at least once a day and report feeling more positive about self  Supportive and cognitive psychotherapeutic interventions marked symptom improvement   Psychosis: delusions   reduce frequency/ intensity of false beliefs and take medications as prescribed on a daily basis Supportive and cognitive psychotherapeutic interventions marked symptom improvement     PROVIDER:  Justice Fay,     Patient staffed in clinic with Dr. Emery who will sign the note.  Supervisor is Dr. Tripathi.  I saw the patient with the resident, and participated in key portions of the service, including the mental status examination and developing the plan of care. I reviewed key portions of the history with the resident. I agree with the findings and plan as documented in this note.    Marilia Emery

## 2019-04-08 ENCOUNTER — HOSPITAL ENCOUNTER (OUTPATIENT)
Dept: OCCUPATIONAL THERAPY | Facility: CLINIC | Age: 19
Setting detail: THERAPIES SERIES
End: 2019-04-08
Attending: FAMILY MEDICINE
Payer: COMMERCIAL

## 2019-04-08 PROCEDURE — 97535 SELF CARE MNGMENT TRAINING: CPT | Mod: GO | Performed by: OCCUPATIONAL THERAPIST

## 2019-04-09 ENCOUNTER — HOSPITAL ENCOUNTER (OUTPATIENT)
Dept: MRI IMAGING | Facility: CLINIC | Age: 19
End: 2019-04-09
Attending: NURSE PRACTITIONER
Payer: COMMERCIAL

## 2019-04-09 ENCOUNTER — HOSPITAL ENCOUNTER (OUTPATIENT)
Dept: MRI IMAGING | Facility: CLINIC | Age: 19
Discharge: HOME | End: 2019-04-09
Attending: NURSE PRACTITIONER | Admitting: NURSE PRACTITIONER
Payer: COMMERCIAL

## 2019-04-09 ENCOUNTER — APPOINTMENT (OUTPATIENT)
Dept: LAB | Facility: CLINIC | Age: 19
End: 2019-04-09
Attending: PEDIATRICS
Payer: COMMERCIAL

## 2019-04-09 DIAGNOSIS — C71.9 EPENDYMOMA (H): ICD-10-CM

## 2019-04-09 DIAGNOSIS — C71.9 EPENDYMOMA (H): Primary | ICD-10-CM

## 2019-04-09 LAB
ALBUMIN SERPL-MCNC: 3.1 G/DL (ref 3.4–5)
ALP SERPL-CCNC: 134 U/L (ref 65–260)
ALT SERPL W P-5'-P-CCNC: 16 U/L (ref 0–50)
ANION GAP SERPL CALCULATED.3IONS-SCNC: 2 MMOL/L (ref 3–14)
AST SERPL W P-5'-P-CCNC: 17 U/L (ref 0–35)
BASOPHILS # BLD AUTO: 0 10E9/L (ref 0–0.2)
BASOPHILS NFR BLD AUTO: 0.6 %
BILIRUB SERPL-MCNC: 0.2 MG/DL (ref 0.2–1.3)
BUN SERPL-MCNC: 10 MG/DL (ref 7–30)
CALCIUM SERPL-MCNC: 8.6 MG/DL (ref 8.5–10.1)
CHLORIDE SERPL-SCNC: 107 MMOL/L (ref 98–110)
CO2 SERPL-SCNC: 31 MMOL/L (ref 20–32)
CREAT SERPL-MCNC: 1.07 MG/DL (ref 0.5–1)
DIFFERENTIAL METHOD BLD: ABNORMAL
EOSINOPHIL # BLD AUTO: 0.3 10E9/L (ref 0–0.7)
EOSINOPHIL NFR BLD AUTO: 9.4 %
ERYTHROCYTE [DISTWIDTH] IN BLOOD BY AUTOMATED COUNT: 13.2 % (ref 10–15)
GFR SERPL CREATININE-BSD FRML MDRD: >90 ML/MIN/{1.73_M2}
GLUCOSE SERPL-MCNC: 105 MG/DL (ref 70–99)
HCT VFR BLD AUTO: 39.2 % (ref 40–53)
HGB BLD-MCNC: 12.9 G/DL (ref 13.3–17.7)
IMM GRANULOCYTES # BLD: 0 10E9/L (ref 0–0.4)
IMM GRANULOCYTES NFR BLD: 0.6 %
LYMPHOCYTES # BLD AUTO: 0.9 10E9/L (ref 0.8–5.3)
LYMPHOCYTES NFR BLD AUTO: 24.6 %
MAGNESIUM SERPL-MCNC: 2 MG/DL (ref 1.6–2.3)
MCH RBC QN AUTO: 30.2 PG (ref 26.5–33)
MCHC RBC AUTO-ENTMCNC: 32.9 G/DL (ref 31.5–36.5)
MCV RBC AUTO: 92 FL (ref 78–100)
MONOCYTES # BLD AUTO: 0.5 10E9/L (ref 0–1.3)
MONOCYTES NFR BLD AUTO: 14.4 %
NEUTROPHILS # BLD AUTO: 1.8 10E9/L (ref 1.6–8.3)
NEUTROPHILS NFR BLD AUTO: 50.4 %
NRBC # BLD AUTO: 0 10*3/UL
NRBC BLD AUTO-RTO: 0 /100
PHOSPHATE SERPL-MCNC: 2.6 MG/DL (ref 2.5–4.5)
PLATELET # BLD AUTO: 88 10E9/L (ref 150–450)
POTASSIUM SERPL-SCNC: 3.7 MMOL/L (ref 3.4–5.3)
PROT SERPL-MCNC: 6.2 G/DL (ref 6.8–8.8)
RBC # BLD AUTO: 4.27 10E12/L (ref 4.4–5.9)
SODIUM SERPL-SCNC: 140 MMOL/L (ref 133–144)
WBC # BLD AUTO: 3.6 10E9/L (ref 4–11)

## 2019-04-09 PROCEDURE — 70553 MRI BRAIN STEM W/O & W/DYE: CPT

## 2019-04-09 PROCEDURE — 72158 MRI LUMBAR SPINE W/O & W/DYE: CPT

## 2019-04-09 PROCEDURE — 36415 COLL VENOUS BLD VENIPUNCTURE: CPT | Performed by: PEDIATRICS

## 2019-04-09 PROCEDURE — 72157 MRI CHEST SPINE W/O & W/DYE: CPT

## 2019-04-09 PROCEDURE — A9585 GADOBUTROL INJECTION: HCPCS | Performed by: NURSE PRACTITIONER

## 2019-04-09 PROCEDURE — 25500064 ZZH RX 255 OP 636: Performed by: NURSE PRACTITIONER

## 2019-04-09 PROCEDURE — 84100 ASSAY OF PHOSPHORUS: CPT | Performed by: PEDIATRICS

## 2019-04-09 PROCEDURE — 72156 MRI NECK SPINE W/O & W/DYE: CPT

## 2019-04-09 PROCEDURE — 80053 COMPREHEN METABOLIC PANEL: CPT | Performed by: PEDIATRICS

## 2019-04-09 PROCEDURE — 85025 COMPLETE CBC W/AUTO DIFF WBC: CPT | Performed by: PEDIATRICS

## 2019-04-09 PROCEDURE — 83735 ASSAY OF MAGNESIUM: CPT | Performed by: PEDIATRICS

## 2019-04-09 RX ORDER — GADOBUTROL 604.72 MG/ML
10 INJECTION INTRAVENOUS ONCE
Status: COMPLETED | OUTPATIENT
Start: 2019-04-09 | End: 2019-04-09

## 2019-04-09 RX ADMIN — GADOBUTROL 9 ML: 604.72 INJECTION INTRAVENOUS at 13:53

## 2019-04-10 ENCOUNTER — OFFICE VISIT (OUTPATIENT)
Dept: PEDIATRIC HEMATOLOGY/ONCOLOGY | Facility: CLINIC | Age: 19
End: 2019-04-10
Attending: NURSE PRACTITIONER
Payer: COMMERCIAL

## 2019-04-10 VITALS
HEART RATE: 78 BPM | WEIGHT: 194.45 LBS | BODY MASS INDEX: 27.22 KG/M2 | HEIGHT: 71 IN | TEMPERATURE: 96.4 F | RESPIRATION RATE: 26 BRPM | OXYGEN SATURATION: 97 % | SYSTOLIC BLOOD PRESSURE: 114 MMHG | DIASTOLIC BLOOD PRESSURE: 89 MMHG

## 2019-04-10 DIAGNOSIS — C71.9 EPENDYMOMA (H): ICD-10-CM

## 2019-04-10 DIAGNOSIS — K59.00 CONSTIPATION, UNSPECIFIED CONSTIPATION TYPE: Primary | ICD-10-CM

## 2019-04-10 PROCEDURE — G0463 HOSPITAL OUTPT CLINIC VISIT: HCPCS | Mod: ZF

## 2019-04-10 ASSESSMENT — ENCOUNTER SYMPTOMS
CONSTITUTIONAL NEGATIVE: 1
CONSTIPATION: 1
CARDIOVASCULAR NEGATIVE: 1
SPEECH DIFFICULTY: 1
FACIAL ASYMMETRY: 1
RESPIRATORY NEGATIVE: 1
SLEEP DISTURBANCE: 1

## 2019-04-10 ASSESSMENT — MIFFLIN-ST. JEOR: SCORE: 1913.25

## 2019-04-10 NOTE — LETTER
4/10/2019      RE: Geo Hicks  [id number] 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 He is enrolled on [employer] [id number] - A Phase 1 Study of Entinostat, an Oral Histone Deacetylase Inhibitor, in Pediatric Patients With Recurrent or Refractory Solid Tumors, Including CNS Tumors and Lymphoma.      HPI:  Geo notes constipation is still a problem.   He did an at home clean out.  Dad reports he still had pieces of stool at the completion.  Geo didn't want to come in for xray.  He had no stool for two days after clean out.  His Pelvic floor PT starts tomorrow. No headaches.  No fevers.  He has had no missed doses of medicines this month.      Fam/Soc: Lives between his parents (one week at each home). They have been awarded guardianship of Geo. The families work well together - both parents have remarried. His dad has a clotting disorder, requiring him to take Warfarin daily.     History was obtained from Geo and his mother.       Allergies   Allergen Reactions     Blood Transfusion Related (Informational Only) Swelling     Periorbital swelling post platelet transfusion     No Known Drug Allergies        Current Outpatient Medications   Medication     calcium carbonate-vitamin D 600-400 MG-UNIT CHEW     dexamethasone (DECADRON) 0.5 MG tablet     fexofenadine (ALLEGRA) 180 MG tablet     linaclotide (LINZESS) 290 MCG capsule     mupirocin (BACTROBAN) 2 % ointment     [START ON 5/2/2019] OLANZapine (ZYPREXA) 15 MG tablet     omeprazole (PRILOSEC) 20 MG DR capsule     polyethylene glycol (MIRALAX/GLYCOLAX) packet     potassium phosphate, monobasic, (K-PHOS) 500 MG tablet     senna-docusate (SENOKOT-S/PERICOLACE) 8.6-50 MG tablet     Sennosides (EX-LAX) 15 MG CHEW     sertraline (ZOLOFT) 50 MG tablet     sulfamethoxazole-trimethoprim (BACTRIM/SEPTRA) 400-80  MG per tablet     vitamin D3 (CHOLECALCIFEROL) 2000 units tablet     vitamin E (GNP VITAMIN E) 400 UNIT capsule     No current facility-administered medications for this visit.        Past Medical History:   Diagnosis Date     Cranial nerve dysfunction      Dyspepsia      Ependymoma (H)      Gastro-oesophageal reflux disease      Hearing loss      Intracranial hemorrhage (H)      Migraine      Pilonidal cyst     7-2015     Reduced vision      Refractory obstruction of nasal airway     2nd to nasal valve prolapse     Sleep apnea      Strabismus     gaze palsy        Past Surgical History:   Procedure Laterality Date     GRAFT CARTILAGE FROM POSTERIOR AURICLE Left 10/6/2016    Procedure: GRAFT CARTILAGE FROM POSTERIOR AURICLE;  Surgeon: Tyler Richards MD;  Location: [facility]     INCISION AND DRAINAGE PERINEAL, COMBINED Bilateral 7/18/2015    Procedure: COMBINED INCISION AND DRAINAGE PERINEAL;  Surgeon: Dequan Timmons MD;  Location: [facility] OR     OPTICAL TRACKING SYSTEM CRANIOTOMY, EXCISE TUMOR, COMBINED N/A 4/13/2015    Procedure: COMBINED OPTICAL TRACKING SYSTEM CRANIOTOMY, EXCISE TUMOR;  Surgeon: Francis Velazquez MD;  Location: [facility] OR     OPTICAL TRACKING SYSTEM CRANIOTOMY, EXCISE TUMOR, COMBINED N/A 4/16/2015    Procedure: COMBINED OPTICAL TRACKING SYSTEM CRANIOTOMY, EXCISE TUMOR;  Surgeon: Francis Velazquez MD;  [facility]: [facility]     OPTICAL TRACKING SYSTEM CRANIOTOMY, EXCISE TUMOR, COMBINED Bilateral 5/28/2015    Procedure: COMBINED OPTICAL TRACKING SYSTEM CRANIOTOMY, EXCISE TUMOR;  Surgeon: Francis Velazquez MD;  [facility]     OPTICAL TRACKING SYSTEM CRANIOTOMY, EXCISE TUMOR, COMBINED Bilateral 1/14/2016    Procedure: COMBINED OPTICAL TRACKING SYSTEM CRANIOTOMY, EXCISE TUMOR;  Surgeon: Francis Velazquez MD;  [facility]     OPTICAL TRACKING SYSTEM VENTRICULOSTOMY  4/16/2015    Procedure: OPTICAL TRACKING SYSTEM VENTRICULOSTOMY;  Surgeon: Francis Velazquez MD;   "Location: [facility]     REMOVE PORT VASCULAR ACCESS N/A 10/6/2016    Procedure: REMOVE PORT VASCULAR ACCESS;  Surgeon: Bruno Perea MD;  Location: [facility]     RHINOPLASTY N/A 10/6/2016    Procedure: RHINOPLASTY;  Surgeon: Tyler Richards MD;  Location: [facility]     VASCULAR SURGERY  5-2015    single lumen power port       Family History   Problem Relation Age of Onset     Circulatory Father         PE/DVT     Hypothyroidism Father 30     Diabetes Maternal Grandmother      Diabetes Paternal Grandmother      Diabetes Paternal Grandfather      C.A.D. Paternal Grandfather      Hypertension Maternal Grandfather      Thyroid Disease Paternal Aunt         unknown whether hypo or hyper     Mental Illness No family hx of        Review of Systems   Constitutional: Negative.         In a wheelchair   HENT: Positive for hearing loss (Pre-existing from prior therapy).    Eyes: Positive for visual disturbance (Wears a patch over right eye to minimize diplopia).   Respiratory: Negative.    Cardiovascular: Negative.    Gastrointestinal: Positive for constipation (Chronic).   Endocrine:        Follow with Dr. Martin   Neurological: Positive for facial asymmetry and speech difficulty.   Psychiatric/Behavioral: Positive for sleep disturbance (Not using his Bi-Pap).   All other systems reviewed and are negative.      /89 (BP Location: Right arm, Patient Position: Fowlers, Cuff Size: Adult Large)   Pulse 78   Temp 96.4  F (35.8  C) (Axillary)   Resp 26   Ht 1.794 m (5' 10.63\")   Wt 88.2 kg (194 lb 7.1 oz)   SpO2 97%   BMI 27.40 kg/m        Physical Exam   Constitutional: He is oriented to person, place, and time. He appears well-developed and well-nourished. No distress.   HENT:   Occassionally saliva accumulates in mouth with occasional drooling.   Eyes: Pupils are equal, round, and reactive to light. Conjunctivae are normal.   Can track to right, but not to left. Nystagmus noted    Cardiovascular: Normal rate, regular " rhythm and normal heart sounds.   Pulmonary/Chest: Effort normal and breath sounds normal. He has no wheezes.   Neurological: He is alert and oriented to person, place, and time. He has normal strength. A cranial nerve deficit is present. Coordination (Ataxic- dysmetria especially in upper extremities when accomplishing tasks.) abnormal. GCS eye subscore is 4. GCS verbal subscore is 5. GCS motor subscore is 6.   Negative Pronator drift   Skin: Skin is warm and dry.   Scattered striae  1cm left cheek bruised around area fading.    Psychiatric: He has a normal mood and affect.     Imaging:  Brain MRI findings:  Compromised evaluation due to motion related artifact most prominent  on the post contrast sequences. No significant change in posterior fossa ependymoma centered within the fourth ventricle. It currently measures approximately 2.8 x 2.2 x 2.0 cm in maximum craniocaudal, anteroposterior and transverse dimensions. Posterolateral to the enhancing tumor,  there is again noted a 2.0 x 1.6 cm fluid filled cavity in the right cerebellar hemisphere, minimally decreased. There is posttreatment hemosiderin deposition around the fluid-filled cavity as well as within the enhancing tumor. Faint hyperintensities on the noncontrast T1 likely represent focal hemorrhage or calcification within the tumor. Perilesional T2 hyperintensity is again noted extending into both middle cerebellar peduncles and posterior david, unchanged. Prominence of cerebellar folia likely due to posttreatment volume loss, Unchanged. No other abnormally enhancing intracranial lesion noted. No evidence of acute infarction. No extra-axial  fluid or midline shift. No obstructive hydrocephalus.      No abnormality of the skull marrow signal. The visualized portions of  paranasal sinuses, and mastoid air cells are relatively clear. The  orbits are grossly unremarkable.                                                                   Impression:  No  significant change in posterior fossa ependymoma, stable since  1/16/2019. No evidence of obstructive hydrocephalus    Full spine MRI findings:     Cervical:  The cervical vertebrae appear normally aligned.  Bone  marrow signal intensity appears preserved.  There is no abnormal  signal within the cervical spinal cord.  On a level by level basis:     C2-3: No spinal canal or neural foraminal stenosis.  C3-4: No spinal canal or neural foraminal stenosis.  C4-5: No spinal canal or neural foraminal stenosis.  C5-6: No spinal canal or neural foraminal stenosis.  C6-7: No spinal canal or neural foraminal stenosis.  C7-T1:No spinal canal or neural foraminal stenosis.     Partially visualized fourth ventricular residual/recurrent ependymoma,  best visualized on the same day MRI. No abnormal enhancement of the paraspinous tissues.     Thoracic:  The external marker is at the level of T6. The thoracic  vertebrae are in normal alignment.  Bone marrow signal intensity  appears normal.  There is no abnormal signal within the thoracic  spinal cord. No spinal canal or neural foraminal stenosis.  Stable  endplate degenerative disease at T6-T10. Stable mild superior endplate compression deformity of T4. No abnormal enhancement of the paraspinous tissues, vertebral column, or within the spinal canal.     Lumbar: There are 5 lumbar-type vertebrae assumed for the purposes of this dictation. The tip of the conus medullaris is at T12-L1.  The  lumbar vertebral column appears normally aligned.  Stable endplate  degenerative changes at the level of L2-L4. Bone marrow signal  intensity appears normal. No significant spinal canal or neural  foraminal stenosis      Normal paraspinous soft tissues.. No abnormal enhancement of the  paraspinous tissues, vertebral column, or within the spinal canal.                                                                      Impression: No evidence of metastatic disease in the cervical,  thoracic or  lumbar spine.      Lab:  Results for orders placed or performed in visit on 04/10/19   Phosphorus   Result Value Ref Range    Phosphorus 2.6 2.5 - 4.5 mg/dL   Magnesium   Result Value Ref Range    Magnesium 2.0 1.6 - 2.3 mg/dL   Comprehensive metabolic panel   Result Value Ref Range    Sodium 140 133 - 144 mmol/L    Potassium 3.7 3.4 - 5.3 mmol/L    Chloride 107 98 - 110 mmol/L    Carbon Dioxide 31 20 - 32 mmol/L    Anion Gap 2 (L) 3 - 14 mmol/L    Glucose 105 (H) 70 - 99 mg/dL    Urea Nitrogen 10 7 - 30 mg/dL    Creatinine 1.07 (H) 0.50 - 1.00 mg/dL    GFR Estimate >90 >60 mL/min/[1.73_m2]    GFR Estimate If Black >90 >60 mL/min/[1.73_m2]    Calcium 8.6 8.5 - 10.1 mg/dL    Bilirubin Total 0.2 0.2 - 1.3 mg/dL    Albumin 3.1 (L) 3.4 - 5.0 g/dL    Protein Total 6.2 (L) 6.8 - 8.8 g/dL    Alkaline Phosphatase 134 65 - 260 U/L    ALT 16 0 - 50 U/L    AST 17 0 - 35 U/L   CBC with platelets differential   Result Value Ref Range    WBC 3.6 (L) 4.0 - 11.0 10e9/L    RBC Count 4.27 (L) 4.4 - 5.9 10e12/L    Hemoglobin 12.9 (L) 13.3 - 17.7 g/dL    Hematocrit 39.2 (L) 40.0 - 53.0 %    MCV 92 78 - 100 fl    MCH 30.2 26.5 - 33.0 pg    MCHC 32.9 31.5 - 36.5 g/dL    RDW 13.2 10.0 - 15.0 %    Platelet Count 88 (L) 150 - 450 10e9/L    Diff Method Automated Method     % Neutrophils 50.4 %    % Lymphocytes 24.6 %    % Monocytes 14.4 %    % Eosinophils 9.4 %    % Basophils 0.6 %    % Immature Granulocytes 0.6 %    Nucleated RBCs 0 0 /100    Absolute Neutrophil 1.8 1.6 - 8.3 10e9/L    Absolute Lymphocytes 0.9 0.8 - 5.3 10e9/L    Absolute Monocytes 0.5 0.0 - 1.3 10e9/L    Absolute Eosinophils 0.3 0.0 - 0.7 10e9/L    Absolute Basophils 0.0 0.0 - 0.2 10e9/L    Abs Immature Granulocytes 0.0 0 - 0.4 10e9/L    Absolute Nucleated RBC 0.0      *Note: Due to a large number of results and/or encounters for the requested time period, some results have not been displayed. A complete set of results can be found in Results Review.          Impression:  1. Ependymoma  2. Treated with Entinostat, continues to tolerate well.   3. MRI Brain/spine No significant change in posterior fossa ependymoma, stable since 1/16/2019. No evidence of obstructive hydrocephalus.  No evidence of metastatic disease in the cervical,  thoracic or lumbar spine.  4. Labs today are not adequate for starting next cycle of Entinostat - thrombocytopenia.  5. Chronic constipation.    Plan:  1. RTC in 1 weeks  as scheduled for follow up, or sooner PRN.   2. Continue weekly labs.  He will check at home town before coming in next week  3. We will obtain stool sample for H. Pylori despite Prilosec. Equipment and directions sent with mom and Geo. Again discussed the chronicity of constipation.   4. Reviewed MRI with the family.  Discussed the MRI in tumor conference and fluid filled area not likely responsible for any of his symptoms of tipping to the side and behavior issues.  Surgical intervention would not be warranted unless it increased significantly is size which was communicated to the family. That area is actually smaller on today's exam.     ALAN Justin CNP

## 2019-04-10 NOTE — Clinical Note
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      HPI:  Geo is doing well today but reports that his hip pain is better. Started 600mg ibuprofen on Friday night and gave it per my instruction three times on Saturday.  He only remembered twice on Sunday.  He also took it this morning.  He no longer hurts when he walk.  He was in the stander for 45 minutes at school.  Yesterday his PT was cancelled. He didn't have PT this last week.   He has not done abduction exercises since he was first evaluated for the pain.  Geo has been eating well without nausea or vomiting.  He does complain of rhinorrhea after eating. Geo has been sleeping well at night with melatonin and has good energy throughout the day. Dad is wondering about returning the Bi-PAP machine because he isn't wearing it. Dad notes when they spoke with them on the phone - his problems were borderline so they could decide whether to use it or not.  Geo wore often early on but as he lost weight and after ENT surgery doesn't feel he needs it anymore so hasn't been wearing it.  No complaints of pain. No new skin concerns. He was seen by dermatology and they recommended a new cream for his buttocks which seems to be working well.  He has not had any known exposure to any recent illness. Geo has not been dizzy, had shortness of breath, fever, cough, nor any other concern. Voiding and passing stool per baseline. No changes in speech nor ataxia.         Allergies   Allergen Reactions     Blood Transfusion Related (Informational Only) Swelling     Periorbital swelling post platelet transfusion     No Known Drug Allergies        Current Outpatient Prescriptions   Medication     study - entinostat (IDS# 5050) 1 mg tablet      study - entinostat (IDS# 5050) 5 mg tablet     ketoconazole (NIZORAL) 2 % shampoo     mupirocin (BACTROBAN) 2 % ointment     omeprazole (PRILOSEC) 20 MG CR capsule     dexamethasone (DECADRON) 0.5 MG tablet     potassium phosphate, monobasic, (K-PHOS) 500 MG tablet     sulfamethoxazole-trimethoprim (BACTRIM/SEPTRA) 400-80 MG per tablet     calcium carbonate-vitamin D 600-400 MG-UNIT CHEW     Clindamycin Phos-Benzoyl Perox 1.2-3.75 % GEL     clindamycin (CLINDAMAX) 1 % topical gel     vitamin E (GNP VITAMIN E) 400 UNIT capsule     acetaminophen (TYLENOL) 325 MG tablet     bacitracin 500 UNIT/GM OINT     sodium chloride (OCEAN NASAL SPRAY) 0.65 % nasal spray     dexamethasone (DECADRON) 1 MG tablet     docusate sodium (COLACE) 100 MG tablet     Cholecalciferol 400 UNITS CHEW     Glycerin, Laxative, (GLYCERIN, ADULT,) 2.1 G SUPP     acetaminophen 650 MG TABS     polyethylene glycol (MIRALAX/GLYCOLAX) packet     pentoxifylline (TRENTAL) 400 MG CR tablet     No current facility-administered medications for this visit.        Past Medical History:   Diagnosis Date     Cranial nerve dysfunction      Dyspepsia      Ependymoma (H)      Gastro-oesophageal reflux disease      Hearing loss      Intracranial hemorrhage (H)      Migraine      Pilonidal cyst     7-2015     Reduced vision      Refractory obstruction of nasal airway     2nd to nasal valve prolapse     Sleep apnea      Strabismus     gaze palsy      Geo Hicks presented in 04/2015 to the [facility] at the [facility] with concerns of dizziness.  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  He subsequently presented again in 06/2015 with hemorrhage in the surgical bed and underwent redo suboccipital craniotomy for resection of tumor and evacuation of hematoma.  He was previously treated on COG study ACNS 0831 (radiation,  vincristine, carboplatin, etoposide and cyclophosphamide).  A follow-up scan showed that his lesion had increased in size along with some T2 hyperintensity in the left-sided cerebellar lesion so he underwent midline suboccipital craniotomy, C1 laminectomy for infiltrating cerebellar tumor resection in January on 2016. Unfortunately, an MRI on 12/30/16 showed progression of his known 4th ventricle tumor, in addition to the appearance of a new enhancing nodule at L4. Geo has received no chemotherapy, immunotherapy or antibody based therapy since 4/6/2016 (bevacizumab). He began entinostat on study on January 10th. He started course 2 on 2/17/17.    History was obtained from the medical record, Geo and his dad.    Past Surgical History:   Procedure Laterality Date     GRAFT CARTILAGE FROM POSTERIOR AURICLE Left 10/6/2016    Procedure: GRAFT CARTILAGE FROM POSTERIOR AURICLE;  Surgeon: Tyler Richards MD;  Location: [facility] OR     INCISION AND DRAINAGE PERINEAL, COMBINED Bilateral 7/18/2015    Procedure: COMBINED INCISION AND DRAINAGE PERINEAL;  Surgeon: Dequan Timmons MD;  Location: [facility] OR     OPTICAL TRACKING SYSTEM CRANIOTOMY, EXCISE TUMOR, COMBINED N/A 4/13/2015    Procedure: COMBINED OPTICAL TRACKING SYSTEM CRANIOTOMY, EXCISE TUMOR;  Surgeon: Francis Velazquez MD;  [facility] OR     OPTICAL TRACKING SYSTEM CRANIOTOMY, EXCISE TUMOR, COMBINED N/A 4/16/2015    Procedure: COMBINED OPTICAL TRACKING SYSTEM CRANIOTOMY, EXCISE TUMOR;  Surgeon: Francis Velazquez MD;  [facility]     OPTICAL TRACKING SYSTEM CRANIOTOMY, EXCISE TUMOR, COMBINED Bilateral 5/28/2015    Procedure: COMBINED OPTICAL TRACKING SYSTEM CRANIOTOMY, EXCISE TUMOR;  Surgeon: Francis Velazquez MD;  [facility]     OPTICAL TRACKING SYSTEM CRANIOTOMY, EXCISE TUMOR, COMBINED Bilateral 1/14/2016    Procedure: COMBINED OPTICAL TRACKING SYSTEM CRANIOTOMY, EXCISE TUMOR;  Surgeon: Francis Velazquez MD;  [facility]  OR     OPTICAL TRACKING SYSTEM VENTRICULOSTOMY  4/16/2015    Procedure: OPTICAL TRACKING SYSTEM VENTRICULOSTOMY;  Surgeon: Francis Velazquez MD;  [facility]     REMOVE PORT VASCULAR ACCESS N/A 10/6/2016    Procedure: REMOVE PORT VASCULAR ACCESS;  Surgeon: Bruno Perea MD;  [facility]     RHINOPLASTY N/A 10/6/2016    Procedure: RHINOPLASTY;  Surgeon: Tyler Richards MD;  [facility]: [facility]     VASCULAR SURGERY  5-2015    single lumen power port       Family History   Problem Relation Age of Onset     Circulatory Father      PE/DVT     Hypothyroidism Father 30     DIABETES Maternal Grandmother      DIABETES Paternal Grandmother      DIABETES Paternal Grandfather      C.A.D. Paternal Grandfather      Hypertension Maternal Grandfather      Thyroid Disease Paternal Aunt      unknown whether hypo or hyper       Review of Systems   Constitutional: Geo is sitting in a wheel chair here due to severe ataxia (he still uses a walker at home). His speech is compromised due to cranial nerve palsies but he is talkative and smart with a positive attitude.  HENT: No nasal drainage  Cardiovascular: Negative.    Gastrointestinal: Negative.    Endocrine: Cushingoid.       Genitourinary: Negative.    Musculoskeletal: Negative.    Skin: Stretch marks, some bruising. Dry skin.   Allergic/Immunologic: Negative.    Neurological: Positive for speech difficulty, Ataxia.   Hematological: Negative.    Psychiatric/Behavioral: Negative.    All other systems reviewed and are negative.    He had an MRI of his hip last Thursday which revealed the following findings:    Bones: Serpiginous T1 hypointensity with paralleling T2 hyperintensity in the anterosuperior aspects of both femoral epiphyses, left greater than right. No associated cortical offset to suggest subchondral collapse. Fatty marrow replacement throughout the proximal femurs and  pelvis with mildly heterogeneous marrow signal in the pelvis, sacrum, and lower  "lumbar spine. No abnormal enhancement or additional suspicious osseous lesion.      Soft tissues: Minimal joint fluid without synovial thickening or enhancement. Increased T2 signal and enhancement within the myotendinous portions of the right gluteus minimus/medius, near the greater trochanter insertion. There is also mild increased T2 signal within the musculature of the gluteus jaci, near the iliotibial band insertion. No evidence of disruption, and there is no well-defined fluid collection to suggest bursitis.      Pelvic organs are normal. No free fluid. No significant adenopathy. Left testicle lies within the inguinal canal. Right testicle is within the scrotum. Muscle bulk is grossly symmetric. No mass lesion demonstrated.      IMPRESSION:  1. Osteonecrosis of the femoral head epiphyses. No fragmentation or evidence of subchondral collapse.  2. Strain versus tendinopathy/tendinitis involving the right gluteal musculature.  3. No mass lesion or evidence of infection.  4. Left testicle lies within the inguinal canal, likely retracted.    Sleep study done in December of 2016 was reviewed. It was done at [facility] and the summary noted 1. Obstructive sleep apnea, effectively treated with bilevel 14/10 with a back up rate of 16 breaths per minute. 2.  Sleep-related hypoventilation. During the diagnostic portion it was noted there were five obstructive and 32 obstructive hyopneas.  His obstructive apnea-hypopnea index was 17 events per hour, warranting positive pressure titration.  Snoring was appreciated.  His Carbon dioxide was elevated (greater than 50 torr the entire evaluation).  Baseline oxygen saturations asleep are 95%, awake 94%, the lowest oxygen saturation was 89%.  One minute was spent with oxygen saturations less than 80%.  There was little REM sleep and sleep efficiency was poor - less than 80%.      Physical Exam: Vitals:  height is 1.8 m (5' 10.87\") and weight is 82.7 kg (182 lb 5.1 oz). His " "axillary temperature is 97.4  F (36.3  C). His blood pressure is 104/69 and his pulse is 85. His respiration is 16 and oxygen saturation is 97%.     Wt Readings from Last 4 Encounters:   04/18/17 82.7 kg (182 lb 5.1 oz) (89 %)*   04/11/17 84.5 kg (186 lb 4.6 oz) (91 %)*   04/04/17 85.5 kg (188 lb 7.9 oz) (92 %)*   03/31/17 85.6 kg (188 lb 11.4 oz) (92 %)*     * Growth percentiles are based on [facility] 2-20 Years data.     Ht Readings from Last 2 Encounters:   04/18/17 1.8 m (5' 10.87\") (72 %)*   04/11/17 1.78 m (5' 10.08\") (62 %)*     * Growth percentiles are based on [facility] 2-20 Years data.     Karnofsky: 60  Constitutional: He is oriented to person, place, and time. In wheelchair, Cushingoid, alert. Actively participates in discussion, but speech is sometimes difficult to understand.    HENT: Head: Normocephalic.   Right Ear: External ear normal. TM intact with visible cone of light   Left Ear: External ear normal. TM intact with visible cone of light  Nose: Nose without drainage  Mouth/Throat: Oropharynx is clear and moist. No mouth sores. Lips dry.  Eyes: Conjunctivae are normal. Bilateral horizontal gaze palsies OU. Superior oblique palsies OU. Nystagmus OU. Diplopia. Eye patch in place.   Neck: Normal range of motion. Neck supple. No thyromegaly present.   Cardiovascular: Normal rate, regular rhythm and normal heart sounds.    Pulmonary/Chest: Effort normal and breath sounds normal. No respiratory distress. He has no wheezes.   Abdominal: Soft. Bowel sounds are normal. There is no tenderness. There is no guarding.   Musculoskeletal: Normal range of motion. Minimal dependent swelling noted at the ankle unchanged. He does have some pain with pressure into the right hip area.  Lymphadenopathy: He has no cervical adenopathy.   Neurological: He is alert and oriented to person, place, and time. A cranial nerve deficit (See eye exam). He has palatal rise. He exhibits normal muscle tone. Coordination (Severe ataxia and " bilateral dysmetria. Stands and pivots with assistance and transfer belt) is abnormal.  Strength is adequate. Sensation slightly decreased on the right side.  Skin: Skin is warm and dry. Buttocks with several spots where pressure sore appeared to have healed and scarred over, thick tissue buildup.  Paronychia on left great toe well healed.  Slight over growth of the tissue around the nail.  No swelling or erythema.  Scattered small bruises in varying degrees of healing especially along shins and arms. Severe striae throughout.    Psychiatric: Mood, memory and affect normal.     Labs:   Results for orders placed or performed in visit on 04/18/17 (from the past 24 hour(s))   CBC with platelets differential   Result Value Ref Range    WBC 3.3 (L) 4.0 - 11.0 10e9/L    RBC Count 4.06 3.7 - 5.3 10e12/L    Hemoglobin 13.2 11.7 - 15.7 g/dL    Hematocrit 38.1 35.0 - 47.0 %    MCV 94 77 - 100 fl    MCH 32.5 26.5 - 33.0 pg    MCHC 34.6 31.5 - 36.5 g/dL    RDW 13.9 10.0 - 15.0 %    Platelet Count 64 (L) 150 - 450 10e9/L    Diff Method Automated Method     % Neutrophils 63.8 %    % Lymphocytes 16.3 %    % Monocytes 13.6 %    % Eosinophils 5.7 %    % Basophils 0.3 %    % Immature Granulocytes 0.3 %    Nucleated RBCs 0 0 /100    Absolute Neutrophil 2.1 1.3 - 7.0 10e9/L    Absolute Lymphocytes 0.5 (L) 1.0 - 5.8 10e9/L    Absolute Monocytes 0.5 0.0 - 1.3 10e9/L    Absolute Eosinophils 0.2 0.0 - 0.7 10e9/L    Absolute Basophils 0.0 0.0 - 0.2 10e9/L    Abs Immature Granulocytes 0.0 0 - 0.4 10e9/L    Absolute Nucleated RBC 0.0      *Note: Due to a large number of results and/or encounters for the requested time period, some results have not been displayed. A complete set of results can be found in Results Review.       Impression:  1. Ependymoma with progressive disease    2. Thrombocytopenia mild  3. Right hip pain resolved presently - Osteonecrosis of the femoral head epiphyses. No fragmentation or evidence of subchondral collapse.  Strain versus tendinopathy/tendinitis involving the right gluteal musculature.  4. Blood pressure stable off anti-hypertensive medications. Mild edema.       Plan:  1. Labs reviewed. He will continue weekly Entinostat  2. Ibuprofen 600mg 30 minutes after PT.  3. MRI was reviewed with the family.  We draw Vitamin D level at his next visit.  We will maximize calcium supplementation.  4. We will repeat the sleep study prior to discontinuing the Bi-PAP - he has come down on steroids, lost weight and had nasal surgery since the [place] study but the findings were concerning.   5. RTC next Tuesday.                             ALAN Justin CNP fair balance

## 2019-04-10 NOTE — PROGRESS NOTES
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 He is enrolled on [facility] [id number] - A Phase 1 Study of Entinostat, an Oral Histone Deacetylase Inhibitor, in Pediatric Patients With Recurrent or Refractory Solid Tumors, Including CNS Tumors and Lymphoma.      HPI:  Geo notes constipation is still a problem.   He did an at home clean out.  Dad reports he still had pieces of stool at the completion.  Geo didn't want to come in for xray.  He had no stool for two days after clean out.  His Pelvic floor PT starts tomorrow. No headaches.  No fevers.  He has had no missed doses of medicines this month.      Fam/Soc: Lives between his parents (one week at each home). They have been awarded guardianship of Geo. The families work well together - both parents have remarried. His dad has a clotting disorder, requiring him to take Warfarin daily.     History was obtained from Geo and his mother.       Allergies   Allergen Reactions     Blood Transfusion Related (Informational Only) Swelling     Periorbital swelling post platelet transfusion     No Known Drug Allergies        Current Outpatient Medications   Medication     calcium carbonate-vitamin D 600-400 MG-UNIT CHEW     dexamethasone (DECADRON) 0.5 MG tablet     fexofenadine (ALLEGRA) 180 MG tablet     linaclotide (LINZESS) 290 MCG capsule     mupirocin (BACTROBAN) 2 % ointment     [START ON 5/2/2019] OLANZapine (ZYPREXA) 15 MG tablet     omeprazole (PRILOSEC) 20 MG DR capsule     polyethylene glycol (MIRALAX/GLYCOLAX) packet     potassium phosphate, monobasic, (K-PHOS) 500 MG tablet     senna-docusate (SENOKOT-S/PERICOLACE) 8.6-50 MG tablet     Sennosides (EX-LAX) 15 MG CHEW     sertraline (ZOLOFT) 50 MG tablet     sulfamethoxazole-trimethoprim (BACTRIM/SEPTRA) 400-80 MG per tablet     vitamin D3 (CHOLECALCIFEROL) 2000 units tablet     vitamin E (GNP  VITAMIN E) 400 UNIT capsule     No current facility-administered medications for this visit.        Past Medical History:   Diagnosis Date     Cranial nerve dysfunction      Dyspepsia      Ependymoma (H)      Gastro-oesophageal reflux disease      Hearing loss      Intracranial hemorrhage (H)      Migraine      Pilonidal cyst     7-2015     Reduced vision      Refractory obstruction of nasal airway     2nd to nasal valve prolapse     Sleep apnea      Strabismus     gaze palsy        Past Surgical History:   Procedure Laterality Date     GRAFT CARTILAGE FROM POSTERIOR AURICLE Left 10/6/2016    Procedure: GRAFT CARTILAGE FROM POSTERIOR AURICLE;  Surgeon: Tyler Richards MD;  [facility]     INCISION AND DRAINAGE PERINEAL, COMBINED Bilateral 7/18/2015    Procedure: COMBINED INCISION AND DRAINAGE PERINEAL;  Surgeon: Dequan Timmons MD;  [facility]     OPTICAL TRACKING SYSTEM CRANIOTOMY, EXCISE TUMOR, COMBINED N/A 4/13/2015    Procedure: COMBINED OPTICAL TRACKING SYSTEM CRANIOTOMY, EXCISE TUMOR;  Surgeon: Francis Velazquez MD;  [facility]     OPTICAL TRACKING SYSTEM CRANIOTOMY, EXCISE TUMOR, COMBINED N/A 4/16/2015    Procedure: COMBINED OPTICAL TRACKING SYSTEM CRANIOTOMY, EXCISE TUMOR;  Surgeon: Francis Velazquez MD;  [facility]     OPTICAL TRACKING SYSTEM CRANIOTOMY, EXCISE TUMOR, COMBINED Bilateral 5/28/2015    Procedure: COMBINED OPTICAL TRACKING SYSTEM CRANIOTOMY, EXCISE TUMOR;  Surgeon: Francis Velazquez MD;  [facility]     OPTICAL TRACKING SYSTEM CRANIOTOMY, EXCISE TUMOR, COMBINED Bilateral 1/14/2016    Procedure: COMBINED OPTICAL TRACKING SYSTEM CRANIOTOMY, EXCISE TUMOR;  Surgeon: Francis Velazquez MD;  [facility]     OPTICAL TRACKING SYSTEM VENTRICULOSTOMY  4/16/2015    Procedure: OPTICAL TRACKING SYSTEM VENTRICULOSTOMY;  Surgeon: Francis Velazquez MD;  [facility]     REMOVE PORT VASCULAR ACCESS N/A 10/6/2016    Procedure: REMOVE  "PORT VASCULAR ACCESS;  Surgeon: Bruno Perea MD;  [facility]     RHINOPLASTY N/A 10/6/2016    Procedure: RHINOPLASTY;  Surgeon: Tyler Richards MD;  [facility]     VASCULAR SURGERY  5-2015    single lumen power port       Family History   Problem Relation Age of Onset     Circulatory Father         PE/DVT     Hypothyroidism Father 30     Diabetes Maternal Grandmother      Diabetes Paternal Grandmother      Diabetes Paternal Grandfather      C.A.D. Paternal Grandfather      Hypertension Maternal Grandfather      Thyroid Disease Paternal Aunt         unknown whether hypo or hyper     Mental Illness No family hx of        Review of Systems   Constitutional: Negative.         In a wheelchair   HENT: Positive for hearing loss (Pre-existing from prior therapy).    Eyes: Positive for visual disturbance (Wears a patch over right eye to minimize diplopia).   Respiratory: Negative.    Cardiovascular: Negative.    Gastrointestinal: Positive for constipation (Chronic).   Endocrine:        Follow with Dr. Martin   Neurological: Positive for facial asymmetry and speech difficulty.   Psychiatric/Behavioral: Positive for sleep disturbance (Not using his Bi-Pap).   All other systems reviewed and are negative.      /89 (BP Location: Right arm, Patient Position: Fowlers, Cuff Size: Adult Large)   Pulse 78   Temp 96.4  F (35.8  C) (Axillary)   Resp 26   Ht 1.794 m (5' 10.63\")   Wt 88.2 kg (194 lb 7.1 oz)   SpO2 97%   BMI 27.40 kg/m       Physical Exam   Constitutional: He is oriented to person, place, and time. He appears well-developed and well-nourished. No distress.   HENT:   Occassionally saliva accumulates in mouth with occasional drooling.   Eyes: Pupils are equal, round, and reactive to light. Conjunctivae are normal.   Can track to right, but not to left. Nystagmus noted    Cardiovascular: Normal rate, regular rhythm and normal heart sounds.   Pulmonary/Chest: Effort normal and breath sounds " normal. He has no wheezes.   Neurological: He is alert and oriented to person, place, and time. He has normal strength. A cranial nerve deficit is present. Coordination (Ataxic- dysmetria especially in upper extremities when accomplishing tasks.) abnormal. GCS eye subscore is 4. GCS verbal subscore is 5. GCS motor subscore is 6.   Negative Pronator drift   Skin: Skin is warm and dry.   Scattered striae  1cm left cheek bruised around area fading.    Psychiatric: He has a normal mood and affect.     Imaging:  Brain MRI findings:  Compromised evaluation due to motion related artifact most prominent  on the post contrast sequences. No significant change in posterior fossa ependymoma centered within the fourth ventricle. It currently measures approximately 2.8 x 2.2 x 2.0 cm in maximum craniocaudal, anteroposterior and transverse dimensions. Posterolateral to the enhancing tumor,  there is again noted a 2.0 x 1.6 cm fluid filled cavity in the right cerebellar hemisphere, minimally decreased. There is posttreatment hemosiderin deposition around the fluid-filled cavity as well as within the enhancing tumor. Faint hyperintensities on the noncontrast T1 likely represent focal hemorrhage or calcification within the tumor. Perilesional T2 hyperintensity is again noted extending into both middle cerebellar peduncles and posterior david, unchanged. Prominence of cerebellar folia likely due to posttreatment volume loss, Unchanged. No other abnormally enhancing intracranial lesion noted. No evidence of acute infarction. No extra-axial  fluid or midline shift. No obstructive hydrocephalus.      No abnormality of the skull marrow signal. The visualized portions of  paranasal sinuses, and mastoid air cells are relatively clear. The  orbits are grossly unremarkable.                                                                   Impression:  No significant change in posterior fossa ependymoma, stable since  1/16/2019. No evidence of  obstructive hydrocephalus    Full spine MRI findings:     Cervical:  The cervical vertebrae appear normally aligned.  Bone  marrow signal intensity appears preserved.  There is no abnormal  signal within the cervical spinal cord.  On a level by level basis:     C2-3: No spinal canal or neural foraminal stenosis.  C3-4: No spinal canal or neural foraminal stenosis.  C4-5: No spinal canal or neural foraminal stenosis.  C5-6: No spinal canal or neural foraminal stenosis.  C6-7: No spinal canal or neural foraminal stenosis.  C7-T1:No spinal canal or neural foraminal stenosis.     Partially visualized fourth ventricular residual/recurrent ependymoma,  best visualized on the same day MRI. No abnormal enhancement of the paraspinous tissues.     Thoracic:  The external marker is at the level of T6. The thoracic  vertebrae are in normal alignment.  Bone marrow signal intensity  appears normal.  There is no abnormal signal within the thoracic  spinal cord. No spinal canal or neural foraminal stenosis.  Stable  endplate degenerative disease at T6-T10. Stable mild superior endplate compression deformity of T4. No abnormal enhancement of the paraspinous tissues, vertebral column, or within the spinal canal.     Lumbar: There are 5 lumbar-type vertebrae assumed for the purposes of this dictation. The tip of the conus medullaris is at T12-L1.  The  lumbar vertebral column appears normally aligned.  Stable endplate  degenerative changes at the level of L2-L4. Bone marrow signal  intensity appears normal. No significant spinal canal or neural  foraminal stenosis      Normal paraspinous soft tissues.. No abnormal enhancement of the  paraspinous tissues, vertebral column, or within the spinal canal.                                                                      Impression: No evidence of metastatic disease in the cervical,  thoracic or lumbar spine.      Lab:  Results for orders placed or performed in visit on 04/10/19    Phosphorus   Result Value Ref Range    Phosphorus 2.6 2.5 - 4.5 mg/dL   Magnesium   Result Value Ref Range    Magnesium 2.0 1.6 - 2.3 mg/dL   Comprehensive metabolic panel   Result Value Ref Range    Sodium 140 133 - 144 mmol/L    Potassium 3.7 3.4 - 5.3 mmol/L    Chloride 107 98 - 110 mmol/L    Carbon Dioxide 31 20 - 32 mmol/L    Anion Gap 2 (L) 3 - 14 mmol/L    Glucose 105 (H) 70 - 99 mg/dL    Urea Nitrogen 10 7 - 30 mg/dL    Creatinine 1.07 (H) 0.50 - 1.00 mg/dL    GFR Estimate >90 >60 mL/min/[1.73_m2]    GFR Estimate If Black >90 >60 mL/min/[1.73_m2]    Calcium 8.6 8.5 - 10.1 mg/dL    Bilirubin Total 0.2 0.2 - 1.3 mg/dL    Albumin 3.1 (L) 3.4 - 5.0 g/dL    Protein Total 6.2 (L) 6.8 - 8.8 g/dL    Alkaline Phosphatase 134 65 - 260 U/L    ALT 16 0 - 50 U/L    AST 17 0 - 35 U/L   CBC with platelets differential   Result Value Ref Range    WBC 3.6 (L) 4.0 - 11.0 10e9/L    RBC Count 4.27 (L) 4.4 - 5.9 10e12/L    Hemoglobin 12.9 (L) 13.3 - 17.7 g/dL    Hematocrit 39.2 (L) 40.0 - 53.0 %    MCV 92 78 - 100 fl    MCH 30.2 26.5 - 33.0 pg    MCHC 32.9 31.5 - 36.5 g/dL    RDW 13.2 10.0 - 15.0 %    Platelet Count 88 (L) 150 - 450 10e9/L    Diff Method Automated Method     % Neutrophils 50.4 %    % Lymphocytes 24.6 %    % Monocytes 14.4 %    % Eosinophils 9.4 %    % Basophils 0.6 %    % Immature Granulocytes 0.6 %    Nucleated RBCs 0 0 /100    Absolute Neutrophil 1.8 1.6 - 8.3 10e9/L    Absolute Lymphocytes 0.9 0.8 - 5.3 10e9/L    Absolute Monocytes 0.5 0.0 - 1.3 10e9/L    Absolute Eosinophils 0.3 0.0 - 0.7 10e9/L    Absolute Basophils 0.0 0.0 - 0.2 10e9/L    Abs Immature Granulocytes 0.0 0 - 0.4 10e9/L    Absolute Nucleated RBC 0.0      *Note: Due to a large number of results and/or encounters for the requested time period, some results have not been displayed. A complete set of results can be found in Results Review.         Impression:  1. Ependymoma  2. Treated with Entinostat, continues to tolerate well.   3. MRI  Brain/spine No significant change in posterior fossa ependymoma, stable since 1/16/2019. No evidence of obstructive hydrocephalus.  No evidence of metastatic disease in the cervical,  thoracic or lumbar spine.  4. Labs today are not adequate for starting next cycle of Entinostat - thrombocytopenia.  5. Chronic constipation.    Plan:  1. RTC in 1 weeks  as scheduled for follow up, or sooner PRN.   2. Continue weekly labs.  He will check at home town before coming in next week  3. We will obtain stool sample for H. Pylori despite Prilosec. Equipment and directions sent with mom and Geo. Again discussed the chronicity of constipation.   4. Reviewed MRI with the family.  Discussed the MRI in tumor conference and fluid filled area not likely responsible for any of his symptoms of tipping to the side and behavior issues.  Surgical intervention would not be warranted unless it increased significantly is size which was communicated to the family. That area is actually smaller on today's exam.

## 2019-04-16 DIAGNOSIS — C71.9 EPENDYMOMA (H): ICD-10-CM

## 2019-04-16 LAB
BASOPHILS # BLD AUTO: 0 10E9/L (ref 0–0.2)
BASOPHILS NFR BLD AUTO: 0.5 %
DIFFERENTIAL METHOD BLD: ABNORMAL
EOSINOPHIL # BLD AUTO: 0.4 10E9/L (ref 0–0.7)
EOSINOPHIL NFR BLD AUTO: 8.5 %
ERYTHROCYTE [DISTWIDTH] IN BLOOD BY AUTOMATED COUNT: 13.6 % (ref 10–15)
HCT VFR BLD AUTO: 40.9 % (ref 40–53)
HGB BLD-MCNC: 13.3 G/DL (ref 13.3–17.7)
LYMPHOCYTES # BLD AUTO: 0.8 10E9/L (ref 0.8–5.3)
LYMPHOCYTES NFR BLD AUTO: 19.8 %
MCH RBC QN AUTO: 29.7 PG (ref 26.5–33)
MCHC RBC AUTO-ENTMCNC: 32.5 G/DL (ref 31.5–36.5)
MCV RBC AUTO: 91 FL (ref 78–100)
MONOCYTES # BLD AUTO: 0.6 10E9/L (ref 0–1.3)
MONOCYTES NFR BLD AUTO: 14.4 %
NEUTROPHILS # BLD AUTO: 2.3 10E9/L (ref 1.6–8.3)
NEUTROPHILS NFR BLD AUTO: 56.8 %
PLATELET # BLD AUTO: 117 10E9/L (ref 150–450)
RBC # BLD AUTO: 4.48 10E12/L (ref 4.4–5.9)
WBC # BLD AUTO: 4.1 10E9/L (ref 4–11)

## 2019-04-16 PROCEDURE — 36415 COLL VENOUS BLD VENIPUNCTURE: CPT | Performed by: PEDIATRICS

## 2019-04-16 PROCEDURE — 83735 ASSAY OF MAGNESIUM: CPT | Performed by: PEDIATRICS

## 2019-04-16 PROCEDURE — 84100 ASSAY OF PHOSPHORUS: CPT | Performed by: PEDIATRICS

## 2019-04-16 PROCEDURE — 85025 COMPLETE CBC W/AUTO DIFF WBC: CPT | Performed by: PEDIATRICS

## 2019-04-16 PROCEDURE — 80053 COMPREHEN METABOLIC PANEL: CPT | Performed by: PEDIATRICS

## 2019-04-16 NOTE — LETTER
10/25/2017    RE: Geo Hicks  [address] [facility]] [id number]        Pediatric Hematology/Oncology Clinic Note     CC:  Geo Hicks is a 16 year old male with an ependymoma who presents to the clinic with his mom for a follow up on study ADVL 1513 Entinostat.  He is Cycle 7, Day 22 today.    HPI: Geo was hospitalized on Monday for video monitoring by Neurology to help better understand these episodes. They got there at 10 am Monday and stayed through the morning on Tuesday afternoon.  He had no recorded events.  He took a break from his methylphenidate after Fridays dose.  His Entinostat is going well.  Geo has been eating fairly well. He's sleeping well at night with support of his BiPAP. They continue with his usual skin regimen which includes bleach baths, Bactroban to sore on his bottom as needed.  His bottom is good right now. He also uses clindamycin gel to his abdomen/chest as needed.  Geo has a daily bowel movement which is formed but soft consistency. No associated symptoms of dizziness, shortness of breath, epistaxis, nor any other concerns.     Fam/Soc: His dad and their family have booked their travel to [place] the week prior to Thanksgiving. Dad has a clotting disorder which requires him to take Warfarin daily.  Geo reports school is going well. Mom is doing a bathroom remodel so that Geo has a shower on the main floor.     History was obtained from Geo and his mom.      Allergies   Allergen Reactions     Blood Transfusion Related (Informational Only) Swelling     Periorbital swelling post platelet transfusion     No Known Drug Allergies        Current Outpatient Prescriptions   Medication     mupirocin (BACTROBAN) 2 % ointment     fluticasone (FLONASE) 50 MCG/ACT spray     Clindamycin Phos-Benzoyl Perox 1.2-3.75 % GEL     dexamethasone (DECADRON) 0.5 MG tablet     pentoxifylline (TRENTAL) 400 MG CR tablet     sulfamethoxazole-trimethoprim (BACTRIM/SEPTRA) 400-80 MG per  Pt called asking for blood test results. tablet     ketoconazole (NIZORAL) 2 % shampoo     omeprazole (PRILOSEC) 20 MG CR capsule     potassium phosphate, monobasic, (K-PHOS) 500 MG tablet     calcium carbonate-vitamin D 600-400 MG-UNIT CHEW     vitamin E (GNP VITAMIN E) 400 UNIT capsule     sodium chloride (OCEAN NASAL SPRAY) 0.65 % nasal spray     dexamethasone (DECADRON) 1 MG tablet     docusate sodium (COLACE) 100 MG tablet     Cholecalciferol 400 UNITS CHEW     Glycerin, Laxative, (GLYCERIN, ADULT,) 2.1 G SUPP     polyethylene glycol (MIRALAX/GLYCOLAX) packet     No current facility-administered medications for this visit.        Past Medical History:   Diagnosis Date     Cranial nerve dysfunction      Dyspepsia      Ependymoma (H)      Gastro-oesophageal reflux disease      Hearing loss      Intracranial hemorrhage (H)      Migraine      Pilonidal cyst     7-2015     Reduced vision      Refractory obstruction of nasal airway     2nd to nasal valve prolapse     Sleep apnea      Strabismus     gaze palsy        Past Surgical History:   Procedure Laterality Date     GRAFT CARTILAGE FROM POSTERIOR AURICLE Left 10/6/2016    Procedure: GRAFT CARTILAGE FROM POSTERIOR AURICLE;  Surgeon: Tyler Richards MD;  [facility]     INCISION AND DRAINAGE PERINEAL, COMBINED Bilateral 7/18/2015    Procedure: COMBINED INCISION AND DRAINAGE PERINEAL;  Surgeon: Dequan Timmons MD;  [facility]     OPTICAL TRACKING SYSTEM CRANIOTOMY, EXCISE TUMOR, COMBINED N/A 4/13/2015    Procedure: COMBINED OPTICAL TRACKING SYSTEM CRANIOTOMY, EXCISE TUMOR;  Surgeon: Francis Velazquez MD;  [facility]     OPTICAL TRACKING SYSTEM CRANIOTOMY, EXCISE TUMOR, COMBINED N/A 4/16/2015    Procedure: COMBINED OPTICAL TRACKING SYSTEM CRANIOTOMY, EXCISE TUMOR;  Surgeon: Francis Velazquez MD;  [facility]     OPTICAL TRACKING SYSTEM CRANIOTOMY, EXCISE TUMOR, COMBINED Bilateral 5/28/2015    Procedure: COMBINED OPTICAL TRACKING SYSTEM CRANIOTOMY, EXCISE TUMOR;   Surgeon: Francis Velazquez MD;  [facility]     OPTICAL TRACKING SYSTEM CRANIOTOMY, EXCISE TUMOR, COMBINED Bilateral 1/14/2016    Procedure: COMBINED OPTICAL TRACKING SYSTEM CRANIOTOMY, EXCISE TUMOR;  Surgeon: Francis Velazquez MD;  [facility]     OPTICAL TRACKING SYSTEM VENTRICULOSTOMY  4/16/2015    Procedure: OPTICAL TRACKING SYSTEM VENTRICULOSTOMY;  Surgeon: Francis Velazquez MD;  [facility]     REMOVE PORT VASCULAR ACCESS N/A 10/6/2016    Procedure: REMOVE PORT VASCULAR ACCESS;  Surgeon: Bruno Perea MD;  [facility]     RHINOPLASTY N/A 10/6/2016    Procedure: RHINOPLASTY;  Surgeon: Tyler Richards MD;  [facility]     VASCULAR SURGERY  5-2015    single lumen power port       Family History   Problem Relation Age of Onset     Circulatory Father      PE/DVT     Hypothyroidism Father 30     DIABETES Maternal Grandmother      DIABETES Paternal Grandmother      DIABETES Paternal Grandfather      C.A.D. Paternal Grandfather      Hypertension Maternal Grandfather      Thyroid Disease Paternal Aunt      unknown whether hypo or hyper       Review of Systems   Constitutional: Negative.    HENT: Negative.    Eyes: Negative.    Respiratory: Negative.    Cardiovascular: Negative.    Gastrointestinal: Negative.    Endocrine:        Follows with Dr. Martin. They are watching his thyroid function.   Genitourinary: Negative.    Musculoskeletal: Negative.    Neurological: Negative.    All other systems reviewed and are negative.    Labs:  Results for orders placed or performed in visit on 10/25/17 (from the past 48 hour(s))   CBC with platelets differential   Result Value Ref Range    WBC 4.1 4.0 - 11.0 10e9/L    RBC Count 4.00 3.7 - 5.3 10e12/L    Hemoglobin 13.4 11.7 - 15.7 g/dL    Hematocrit 39.4 35.0 - 47.0 %    MCV 99 77 - 100 fl    MCH 33.5 (H) 26.5 - 33.0 pg    MCHC 34.0 31.5 - 36.5 g/dL    RDW 11.9 10.0 - 15.0 %    Platelet Count 75 (L) 150 - 450 10e9/L    Diff  Method Automated Method     % Neutrophils 41.7 %    % Lymphocytes 19.1 %    % Monocytes 17.4 %    % Eosinophils 20.8 %    % Basophils 0.5 %    % Immature Granulocytes 0.5 %    Nucleated RBCs 0 0 /100    Absolute Neutrophil 1.7 1.3 - 7.0 10e9/L    Absolute Lymphocytes 0.8 (L) 1.0 - 5.8 10e9/L    Absolute Monocytes 0.7 0.0 - 1.3 10e9/L    Absolute Eosinophils 0.9 (H) 0.0 - 0.7 10e9/L    Absolute Basophils 0.0 0.0 - 0.2 10e9/L    Abs Immature Granulocytes 0.0 0 - 0.4 10e9/L    Absolute Nucleated RBC 0.0      *Note: Due to a large number of results and/or encounters for the requested time period, some results have not been displayed. A complete set of results can be found in Results Review.     Vital signs:  weight is 75.5 kg (166 lb 7.2 oz). His axillary temperature is 97  F (36.1  C). His blood pressure is 105/73 and his pulse is 92. His respiration is 20 and oxygen saturation is 97%.     Physical Exam   Constitutional: He is oriented to person, place, and time.   In wheel chair - alert, NAD.   HENT:   Head: Atraumatic.   Right Ear: External ear normal.   Left Ear: External ear normal.   Mouth/Throat: Oropharynx is clear and moist.   Eyes: Conjunctivae are normal.   Neck: Normal range of motion. Neck supple. No tracheal deviation present. No thyromegaly present.   Cardiovascular: Normal rate and regular rhythm.    Pulmonary/Chest: Effort normal. No respiratory distress.   Abdominal: Soft. He exhibits no distension. There is no tenderness.   Musculoskeletal: Normal range of motion.   Lymphadenopathy:     He has no cervical adenopathy.   Neurological: He is alert and oriented to person, place, and time. A cranial nerve deficit is present. Coordination abnormal.   Skin: Skin is warm and dry.   Striae throughout.  Bruising is various stages of healing.  Buttocks well healed.   Psychiatric: Mood and affect normal.     Impression:  1. Labs look good to proceed as planned.  .   2. Skin in good condition  3. 16 year old  "patient with ependymoma.     Plan:  1. Continue with Entinostat   2. Continue skin cares as needed: bleach baths, bactroban to pustule(s) on bottom, clindamycin gel, vitamin E oil  3. Resume methylphenidate. After two week trial we will consider dose changes or adding afternoon dosing. This has seemed to help these \"episodes\".  4. Next imaging is scheduled for 10/31/17, he will follow-up with Dr. Rousseau the following day for results.   5. RTC in 1 week.         ALAN Justin CNP    "

## 2019-04-17 ENCOUNTER — OFFICE VISIT (OUTPATIENT)
Dept: PEDIATRIC HEMATOLOGY/ONCOLOGY | Facility: CLINIC | Age: 19
End: 2019-04-17
Attending: NURSE PRACTITIONER
Payer: COMMERCIAL

## 2019-04-17 VITALS
WEIGHT: 196.21 LBS | SYSTOLIC BLOOD PRESSURE: 110 MMHG | OXYGEN SATURATION: 96 % | TEMPERATURE: 96.6 F | RESPIRATION RATE: 18 BRPM | BODY MASS INDEX: 27.65 KG/M2 | HEART RATE: 88 BPM | DIASTOLIC BLOOD PRESSURE: 66 MMHG

## 2019-04-17 DIAGNOSIS — C71.9 EPENDYMOMA (H): ICD-10-CM

## 2019-04-17 DIAGNOSIS — K59.09 OTHER CONSTIPATION: Primary | ICD-10-CM

## 2019-04-17 DIAGNOSIS — D49.6 NEOPLASM OF POSTERIOR CRANIAL FOSSA (H): Primary | ICD-10-CM

## 2019-04-17 LAB
ALBUMIN SERPL-MCNC: 3.3 G/DL (ref 3.4–5)
ALP SERPL-CCNC: 125 U/L (ref 65–260)
ALT SERPL W P-5'-P-CCNC: 16 U/L (ref 0–50)
ANION GAP SERPL CALCULATED.3IONS-SCNC: 3 MMOL/L (ref 3–14)
AST SERPL W P-5'-P-CCNC: 20 U/L (ref 0–35)
BILIRUB SERPL-MCNC: 0.4 MG/DL (ref 0.2–1.3)
BUN SERPL-MCNC: 14 MG/DL (ref 7–30)
CALCIUM SERPL-MCNC: 8.5 MG/DL (ref 8.5–10.1)
CHLORIDE SERPL-SCNC: 106 MMOL/L (ref 98–110)
CO2 SERPL-SCNC: 31 MMOL/L (ref 20–32)
CREAT SERPL-MCNC: 1.08 MG/DL (ref 0.5–1)
GFR SERPL CREATININE-BSD FRML MDRD: >90 ML/MIN/{1.73_M2}
GLUCOSE SERPL-MCNC: 97 MG/DL (ref 70–99)
MAGNESIUM SERPL-MCNC: 2.3 MG/DL (ref 1.6–2.3)
PHOSPHATE SERPL-MCNC: 3.5 MG/DL (ref 2.5–4.5)
POTASSIUM SERPL-SCNC: 4.5 MMOL/L (ref 3.4–5.3)
PROT SERPL-MCNC: 6.4 G/DL (ref 6.8–8.8)
SODIUM SERPL-SCNC: 140 MMOL/L (ref 133–144)

## 2019-04-17 PROCEDURE — 87338 HPYLORI STOOL AG IA: CPT | Performed by: NURSE PRACTITIONER

## 2019-04-17 PROCEDURE — G0463 HOSPITAL OUTPT CLINIC VISIT: HCPCS | Mod: ZF

## 2019-04-17 RX ORDER — DIPHENHYDRAMINE HYDROCHLORIDE 50 MG/ML
50 INJECTION INTRAMUSCULAR; INTRAVENOUS
Status: CANCELLED
Start: 2019-04-17

## 2019-04-17 RX ORDER — MEPERIDINE HYDROCHLORIDE 25 MG/ML
25 INJECTION INTRAMUSCULAR; INTRAVENOUS; SUBCUTANEOUS EVERY 30 MIN PRN
Status: CANCELLED | OUTPATIENT
Start: 2019-04-17

## 2019-04-17 RX ORDER — SODIUM CHLORIDE 9 MG/ML
1000 INJECTION, SOLUTION INTRAVENOUS CONTINUOUS PRN
Status: CANCELLED
Start: 2019-04-17

## 2019-04-17 RX ORDER — EPINEPHRINE 1 MG/ML
0.3 INJECTION, SOLUTION, CONCENTRATE INTRAVENOUS EVERY 5 MIN PRN
Status: CANCELLED | OUTPATIENT
Start: 2019-04-17

## 2019-04-17 RX ORDER — ALBUTEROL SULFATE 90 UG/1
1-2 AEROSOL, METERED RESPIRATORY (INHALATION)
Status: CANCELLED
Start: 2019-04-17

## 2019-04-17 RX ORDER — ALBUTEROL SULFATE 0.83 MG/ML
2.5 SOLUTION RESPIRATORY (INHALATION)
Status: CANCELLED | OUTPATIENT
Start: 2019-04-17

## 2019-04-17 RX ORDER — METHYLPREDNISOLONE SODIUM SUCCINATE 125 MG/2ML
125 INJECTION, POWDER, LYOPHILIZED, FOR SOLUTION INTRAMUSCULAR; INTRAVENOUS
Status: CANCELLED
Start: 2019-04-17

## 2019-04-17 ASSESSMENT — ENCOUNTER SYMPTOMS
CONSTITUTIONAL NEGATIVE: 1
FACIAL ASYMMETRY: 1
SLEEP DISTURBANCE: 1
CONSTIPATION: 1
SPEECH DIFFICULTY: 1
RESPIRATORY NEGATIVE: 1
CARDIOVASCULAR NEGATIVE: 1

## 2019-04-17 ASSESSMENT — PAIN SCALES - GENERAL: PAINLEVEL: NO PAIN (0)

## 2019-04-17 NOTE — LETTER
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 He is enrolled on COG [id number] - A Phase 1 Study of Entinostat, an Oral Histone Deacetylase Inhibitor, in Pediatric Patients With Recurrent or Refractory Solid Tumors, Including CNS Tumors and Lymphoma.      HPI:  Geo notes constipation is still a problem.   He does not complain of abdominal pain just that he feels full.  Dad reports he had two very large bowel movements in the last 24 hours.  Pelvic floor PT was delayed due to the snow and it will start Tuesday. No fevers. No headache.  No nausea or vomiting and no rash.      Fam/Soc: Lives between his parents (one week at each home). They have been awarded guardianship of Geo. The families work well together - both parents have remarried. His dad has a clotting disorder, requiring him to take Warfarin daily.     History was obtained from Geo and his mother.       Allergies   Allergen Reactions     Blood Transfusion Related (Informational Only) Swelling     Periorbital swelling post platelet transfusion     No Known Drug Allergies        Current Outpatient Medications   Medication     calcium carbonate-vitamin D 600-400 MG-UNIT CHEW     dexamethasone (DECADRON) 0.5 MG tablet     fexofenadine (ALLEGRA) 180 MG tablet     linaclotide (LINZESS) 290 MCG capsule     mupirocin (BACTROBAN) 2 % ointment     [START ON 5/2/2019] OLANZapine (ZYPREXA) 15 MG tablet     omeprazole (PRILOSEC) 20 MG DR capsule     polyethylene glycol (MIRALAX/GLYCOLAX) packet     potassium phosphate, monobasic, (K-PHOS) 500 MG tablet     senna-docusate (SENOKOT-S/PERICOLACE) 8.6-50 MG tablet     Sennosides (EX-LAX) 15 MG CHEW     sertraline (ZOLOFT) 50 MG tablet     study - entinostat (IDS# 5050) 1 mg tablet     study - entinostat (IDS# 5050) 5  mg tablet     sulfamethoxazole-trimethoprim (BACTRIM/SEPTRA) 400-80 MG per tablet     vitamin D3 (CHOLECALCIFEROL) 2000 units tablet     vitamin E (GNP VITAMIN E) 400 UNIT capsule     No current facility-administered medications for this visit.        Past Medical History:   Diagnosis Date     Cranial nerve dysfunction      Dyspepsia      Ependymoma (H)      Gastro-oesophageal reflux disease      Hearing loss      Intracranial hemorrhage (H)      Migraine      Pilonidal cyst     7-2015     Reduced vision      Refractory obstruction of nasal airway     2nd to nasal valve prolapse     Sleep apnea      Strabismus     gaze palsy        Past Surgical History:   Procedure Laterality Date     GRAFT CARTILAGE FROM POSTERIOR AURICLE Left 10/6/2016    Procedure: GRAFT CARTILAGE FROM POSTERIOR AURICLE;  Surgeon: Tyler Richards MD;  [facility]     INCISION AND DRAINAGE PERINEAL, COMBINED Bilateral 7/18/2015    Procedure: COMBINED INCISION AND DRAINAGE PERINEAL;  Surgeon: Dequan Timmons MD;  [facility]     OPTICAL TRACKING SYSTEM CRANIOTOMY, EXCISE TUMOR, COMBINED N/A 4/13/2015    Procedure: COMBINED OPTICAL TRACKING SYSTEM CRANIOTOMY, EXCISE TUMOR;  Surgeon: Francis Velazquez MD;  [facility]     OPTICAL TRACKING SYSTEM CRANIOTOMY, EXCISE TUMOR, COMBINED N/A 4/16/2015    Procedure: COMBINED OPTICAL TRACKING SYSTEM CRANIOTOMY, EXCISE TUMOR;  Surgeon: Francis Velazquez MD;  [facility]     OPTICAL TRACKING SYSTEM CRANIOTOMY, EXCISE TUMOR, COMBINED Bilateral 5/28/2015    Procedure: COMBINED OPTICAL TRACKING SYSTEM CRANIOTOMY, EXCISE TUMOR;  Surgeon: Francis Velazquez MD;  [facility]     OPTICAL TRACKING SYSTEM CRANIOTOMY, EXCISE TUMOR, COMBINED Bilateral 1/14/2016    Procedure: COMBINED OPTICAL TRACKING SYSTEM CRANIOTOMY, EXCISE TUMOR;  Surgeon: Francis Velazquez MD;  [facility]     OPTICAL TRACKING SYSTEM VENTRICULOSTOMY  4/16/2015    Procedure: OPTICAL  TRACKING SYSTEM VENTRICULOSTOMY;  Surgeon: Francis Velazquez MD;  [facility]     REMOVE PORT VASCULAR ACCESS N/A 10/6/2016    Procedure: REMOVE PORT VASCULAR ACCESS;  Surgeon: Bruno Perea MD;  [facility]     RHINOPLASTY N/A 10/6/2016    Procedure: RHINOPLASTY;  Surgeon: Tyler Richards MD;  [facility]     VASCULAR SURGERY  5-2015    single lumen power port       Family History   Problem Relation Age of Onset     Circulatory Father         PE/DVT     Hypothyroidism Father 30     Diabetes Maternal Grandmother      Diabetes Paternal Grandmother      Diabetes Paternal Grandfather      C.A.D. Paternal Grandfather      Hypertension Maternal Grandfather      Thyroid Disease Paternal Aunt         unknown whether hypo or hyper     Mental Illness No family hx of        Review of Systems   Constitutional: Negative.         In a wheelchair   HENT: Positive for hearing loss (Pre-existing from prior therapy).    Eyes: Positive for visual disturbance (Wears a patch over right eye to minimize diplopia).   Respiratory: Negative.    Cardiovascular: Negative.    Gastrointestinal: Positive for constipation (Chronic).   Endocrine:        Follow with Dr. Maritn   Neurological: Positive for facial asymmetry and speech difficulty.   Psychiatric/Behavioral: Positive for sleep disturbance (Not using his Bi-Pap).   All other systems reviewed and are negative.      /66 (BP Location: Right arm, Patient Position: Fowlers, Cuff Size: Adult Regular)   Pulse 88   Temp 96.6  F (35.9  C) (Axillary)   Resp 18   Wt 89 kg (196 lb 3.4 oz)   SpO2 96%   BMI 27.65 kg/m        Physical Exam   Constitutional: He is oriented to person, place, and time. He appears well-developed and well-nourished. No distress.   HENT:   Occassionally saliva accumulates in mouth with occasional drooling.   Eyes: Pupils are equal, round, and reactive to light. Conjunctivae are normal.   Can track to right, but not to left. Nystagmus  noted    Cardiovascular: Normal rate, regular rhythm and normal heart sounds.   Pulmonary/Chest: Effort normal and breath sounds normal. He has no wheezes.   Neurological: He is alert and oriented to person, place, and time. He has normal strength. A cranial nerve deficit is present. Coordination (Ataxic- dysmetria especially in upper extremities when accomplishing tasks.) abnormal. GCS eye subscore is 4. GCS verbal subscore is 5. GCS motor subscore is 6.   Negative Pronator drift   Skin: Skin is warm and dry.   Scattered striae  1cm left cheek bruised around area fading.    Psychiatric: He has a normal mood and affect.     Lab:  Results for orders placed or performed in visit on 04/16/19   Phosphorus   Result Value Ref Range    Phosphorus 3.5 2.5 - 4.5 mg/dL   Magnesium   Result Value Ref Range    Magnesium 2.3 1.6 - 2.3 mg/dL   Comprehensive metabolic panel   Result Value Ref Range    Sodium 140 133 - 144 mmol/L    Potassium 4.5 3.4 - 5.3 mmol/L    Chloride 106 98 - 110 mmol/L    Carbon Dioxide 31 20 - 32 mmol/L    Anion Gap 3 3 - 14 mmol/L    Glucose 97 70 - 99 mg/dL    Urea Nitrogen 14 7 - 30 mg/dL    Creatinine 1.08 (H) 0.50 - 1.00 mg/dL    GFR Estimate >90 >60 mL/min/[1.73_m2]    GFR Estimate If Black >90 >60 mL/min/[1.73_m2]    Calcium 8.5 8.5 - 10.1 mg/dL    Bilirubin Total 0.4 0.2 - 1.3 mg/dL    Albumin 3.3 (L) 3.4 - 5.0 g/dL    Protein Total 6.4 (L) 6.8 - 8.8 g/dL    Alkaline Phosphatase 125 65 - 260 U/L    ALT 16 0 - 50 U/L    AST 20 0 - 35 U/L   CBC with platelets differential   Result Value Ref Range    WBC 4.1 4.0 - 11.0 10e9/L    RBC Count 4.48 4.4 - 5.9 10e12/L    Hemoglobin 13.3 13.3 - 17.7 g/dL    Hematocrit 40.9 40.0 - 53.0 %    MCV 91 78 - 100 fl    MCH 29.7 26.5 - 33.0 pg    MCHC 32.5 31.5 - 36.5 g/dL    RDW 13.6 10.0 - 15.0 %    Platelet Count 117 (L) 150 - 450 10e9/L    % Neutrophils 56.8 %    % Lymphocytes 19.8 %    % Monocytes 14.4 %    % Eosinophils 8.5 %    % Basophils 0.5 %    Absolute  Neutrophil 2.3 1.6 - 8.3 10e9/L    Absolute Lymphocytes 0.8 0.8 - 5.3 10e9/L    Absolute Monocytes 0.6 0.0 - 1.3 10e9/L    Absolute Eosinophils 0.4 0.0 - 0.7 10e9/L    Absolute Basophils 0.0 0.0 - 0.2 10e9/L    Diff Method Automated Method      *Note: Due to a large number of results and/or encounters for the requested time period, some results have not been displayed. A complete set of results can be found in Results Review.         Impression:  1. Ependymoma  2. Treated with Entinostat, continues to tolerate well.   3. MRI Brain/spine 1/16/19: tumor is stable to slightly decreased compared to 6/12/18.  4. Labs today are adequate for continuing Entinostat.   5. Chronic constipation.    Plan:  1. RTC in 4 weeks  as scheduled for follow up, or sooner PRN.   2. Continue weekly labs.  3. Pelvic floor PT to start soon.  4. We will set up Gastric emptying study on 4/22/19 at 9 AM.  NPO after 5 AM.  They should check into radiology.  This was discussed with carly and Geo.  5. Continue weekly labs.  6. Start Entinostat 6mg weekly starting today.        ALAN Justin CNP

## 2019-04-17 NOTE — LETTER
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 He is enrolled on COG [id number] - A Phase 1 Study of Entinostat, an Oral Histone Deacetylase Inhibitor, in Pediatric Patients With Recurrent or Refractory Solid Tumors, Including CNS Tumors and Lymphoma.      HPI:  Geo notes constipation is still a problem.   He does not complain of abdominal pain just that he feels full.  Dad reports he had two very large bowel movements in the last 24 hours.  Pelvic floor PT was delayed due to the snow and it will start Tuesday. No fevers. No headache.  No nausea or vomiting and no rash.      Fam/Soc: Lives between his parents (one week at each home). They have been awarded guardianship of Geo. The families work well together - both parents have remarried. His dad has a clotting disorder, requiring him to take Warfarin daily.     History was obtained from Geo and his mother.       Allergies   Allergen Reactions     Blood Transfusion Related (Informational Only) Swelling     Periorbital swelling post platelet transfusion     No Known Drug Allergies        Current Outpatient Medications   Medication     calcium carbonate-vitamin D 600-400 MG-UNIT CHEW     dexamethasone (DECADRON) 0.5 MG tablet     fexofenadine (ALLEGRA) 180 MG tablet     linaclotide (LINZESS) 290 MCG capsule     mupirocin (BACTROBAN) 2 % ointment     [START ON 5/2/2019] OLANZapine (ZYPREXA) 15 MG tablet     omeprazole (PRILOSEC) 20 MG DR capsule     polyethylene glycol (MIRALAX/GLYCOLAX) packet     potassium phosphate, monobasic, (K-PHOS) 500 MG tablet     senna-docusate (SENOKOT-S/PERICOLACE) 8.6-50 MG tablet     Sennosides (EX-LAX) 15 MG CHEW     sertraline (ZOLOFT) 50 MG tablet     study - entinostat (IDS# 5050) 1 mg tablet     study - entinostat (IDS# 5050)  5 mg tablet     sulfamethoxazole-trimethoprim (BACTRIM/SEPTRA) 400-80 MG per tablet     vitamin D3 (CHOLECALCIFEROL) 2000 units tablet     vitamin E (GNP VITAMIN E) 400 UNIT capsule     No current facility-administered medications for this visit.        Past Medical History:   Diagnosis Date     Cranial nerve dysfunction      Dyspepsia      Ependymoma (H)      Gastro-oesophageal reflux disease      Hearing loss      Intracranial hemorrhage (H)      Migraine      Pilonidal cyst     7-2015     Reduced vision      Refractory obstruction of nasal airway     2nd to nasal valve prolapse     Sleep apnea      Strabismus     gaze palsy        Past Surgical History:   Procedure Laterality Date     GRAFT CARTILAGE FROM POSTERIOR AURICLE Left 10/6/2016    Procedure: GRAFT CARTILAGE FROM POSTERIOR AURICLE;  Surgeon: Tyler Richards MD;  [facility]     INCISION AND DRAINAGE PERINEAL, COMBINED Bilateral 7/18/2015    Procedure: COMBINED INCISION AND DRAINAGE PERINEAL;  Surgeon: Dequan Timmons MD;  [facility]     OPTICAL TRACKING SYSTEM CRANIOTOMY, EXCISE TUMOR, COMBINED N/A 4/13/2015    Procedure: COMBINED OPTICAL TRACKING SYSTEM CRANIOTOMY, EXCISE TUMOR;  Surgeon: Francis Velazquez MD;  [facility]     OPTICAL TRACKING SYSTEM CRANIOTOMY, EXCISE TUMOR, COMBINED N/A 4/16/2015    Procedure: COMBINED OPTICAL TRACKING SYSTEM CRANIOTOMY, EXCISE TUMOR;  Surgeon: Francis Velazquez MD;  [facility]     OPTICAL TRACKING SYSTEM CRANIOTOMY, EXCISE TUMOR, COMBINED Bilateral 5/28/2015    Procedure: COMBINED OPTICAL TRACKING SYSTEM CRANIOTOMY, EXCISE TUMOR;  Surgeon: Francis Vealzquez MD;  [facility]     OPTICAL TRACKING SYSTEM CRANIOTOMY, EXCISE TUMOR, COMBINED Bilateral 1/14/2016    Procedure: COMBINED OPTICAL TRACKING SYSTEM CRANIOTOMY, EXCISE TUMOR;  Surgeon: Francis Velazquez MD;  [facility]     OPTICAL TRACKING SYSTEM VENTRICULOSTOMY  4/16/2015    Procedure: OPTICAL  TRACKING SYSTEM VENTRICULOSTOMY;  Surgeon: Francis Velazquez MD;  [facility]     REMOVE PORT VASCULAR ACCESS N/A 10/6/2016    Procedure: REMOVE PORT VASCULAR ACCESS;  Surgeon: Bruno Perea MD;  [facility]     RHINOPLASTY N/A 10/6/2016    Procedure: RHINOPLASTY;  Surgeon: Tyler Richards MD;  [facility]     VASCULAR SURGERY  5-2015    single lumen power port       Family History   Problem Relation Age of Onset     Circulatory Father         PE/DVT     Hypothyroidism Father 30     Diabetes Maternal Grandmother      Diabetes Paternal Grandmother      Diabetes Paternal Grandfather      C.A.D. Paternal Grandfather      Hypertension Maternal Grandfather      Thyroid Disease Paternal Aunt         unknown whether hypo or hyper     Mental Illness No family hx of        Review of Systems   Constitutional: Negative.         In a wheelchair   HENT: Positive for hearing loss (Pre-existing from prior therapy).    Eyes: Positive for visual disturbance (Wears a patch over right eye to minimize diplopia).   Respiratory: Negative.    Cardiovascular: Negative.    Gastrointestinal: Positive for constipation (Chronic).   Endocrine:        Follow with Dr. Martin   Neurological: Positive for facial asymmetry and speech difficulty.   Psychiatric/Behavioral: Positive for sleep disturbance (Not using his Bi-Pap).   All other systems reviewed and are negative.      /66 (BP Location: Right arm, Patient Position: Fowlers, Cuff Size: Adult Regular)   Pulse 88   Temp 96.6  F (35.9  C) (Axillary)   Resp 18   Wt 89 kg (196 lb 3.4 oz)   SpO2 96%   BMI 27.65 kg/m        Physical Exam   Constitutional: He is oriented to person, place, and time. He appears well-developed and well-nourished. No distress.   HENT:   Occassionally saliva accumulates in mouth with occasional drooling.   Eyes: Pupils are equal, round, and reactive to light. Conjunctivae are normal.   Can track to right, but not to left. Nystagmus  noted    Cardiovascular: Normal rate, regular rhythm and normal heart sounds.   Pulmonary/Chest: Effort normal and breath sounds normal. He has no wheezes.   Neurological: He is alert and oriented to person, place, and time. He has normal strength. A cranial nerve deficit is present. Coordination (Ataxic- dysmetria especially in upper extremities when accomplishing tasks.) abnormal. GCS eye subscore is 4. GCS verbal subscore is 5. GCS motor subscore is 6.   Negative Pronator drift   Skin: Skin is warm and dry.   Scattered striae  1cm left cheek bruised around area fading.    Psychiatric: He has a normal mood and affect.     Lab:  Results for orders placed or performed in visit on 04/16/19   Phosphorus   Result Value Ref Range    Phosphorus 3.5 2.5 - 4.5 mg/dL   Magnesium   Result Value Ref Range    Magnesium 2.3 1.6 - 2.3 mg/dL   Comprehensive metabolic panel   Result Value Ref Range    Sodium 140 133 - 144 mmol/L    Potassium 4.5 3.4 - 5.3 mmol/L    Chloride 106 98 - 110 mmol/L    Carbon Dioxide 31 20 - 32 mmol/L    Anion Gap 3 3 - 14 mmol/L    Glucose 97 70 - 99 mg/dL    Urea Nitrogen 14 7 - 30 mg/dL    Creatinine 1.08 (H) 0.50 - 1.00 mg/dL    GFR Estimate >90 >60 mL/min/[1.73_m2]    GFR Estimate If Black >90 >60 mL/min/[1.73_m2]    Calcium 8.5 8.5 - 10.1 mg/dL    Bilirubin Total 0.4 0.2 - 1.3 mg/dL    Albumin 3.3 (L) 3.4 - 5.0 g/dL    Protein Total 6.4 (L) 6.8 - 8.8 g/dL    Alkaline Phosphatase 125 65 - 260 U/L    ALT 16 0 - 50 U/L    AST 20 0 - 35 U/L   CBC with platelets differential   Result Value Ref Range    WBC 4.1 4.0 - 11.0 10e9/L    RBC Count 4.48 4.4 - 5.9 10e12/L    Hemoglobin 13.3 13.3 - 17.7 g/dL    Hematocrit 40.9 40.0 - 53.0 %    MCV 91 78 - 100 fl    MCH 29.7 26.5 - 33.0 pg    MCHC 32.5 31.5 - 36.5 g/dL    RDW 13.6 10.0 - 15.0 %    Platelet Count 117 (L) 150 - 450 10e9/L    % Neutrophils 56.8 %    % Lymphocytes 19.8 %    % Monocytes 14.4 %    % Eosinophils 8.5 %    % Basophils 0.5 %    Absolute  Neutrophil 2.3 1.6 - 8.3 10e9/L    Absolute Lymphocytes 0.8 0.8 - 5.3 10e9/L    Absolute Monocytes 0.6 0.0 - 1.3 10e9/L    Absolute Eosinophils 0.4 0.0 - 0.7 10e9/L    Absolute Basophils 0.0 0.0 - 0.2 10e9/L    Diff Method Automated Method      *Note: Due to a large number of results and/or encounters for the requested time period, some results have not been displayed. A complete set of results can be found in Results Review.         Impression:  1. Ependymoma  2. Treated with Entinostat, continues to tolerate well.   3. MRI Brain/spine 1/16/19: tumor is stable to slightly decreased compared to 6/12/18.  4. Labs today are adequate for continuing Entinostat.   5. Chronic constipation.    Plan:  1. RTC in 4 weeks  as scheduled for follow up, or sooner PRN.   2. Continue weekly labs.  3. Pelvic floor PT to start soon.  4. We will set up Gastric emptying study on 4/22/19 at 9 AM.  NPO after 5 AM.  They should check into radiology.  This was discussed with carly and Geo.  5. Continue weekly labs.  6. Start Entinostat 6mg weekly starting today.        ALAN Justin CNP

## 2019-04-17 NOTE — NURSING NOTE
Chief Complaint   Patient presents with     RECHECK     Patient here today for follow up with Ependymoma (H)     /66 (BP Location: Right arm, Patient Position: Fowlers, Cuff Size: Adult Regular)   Pulse 88   Temp 96.6  F (35.9  C) (Axillary)   Resp 18   Wt 89 kg (196 lb 3.4 oz)   SpO2 96%   BMI 27.65 kg/m    Ember Aguilar CMA  April 17, 2019

## 2019-04-17 NOTE — PROGRESS NOTES
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 He is enrolled on [employer] [id number] - A Phase 1 Study of Entinostat, an Oral Histone Deacetylase Inhibitor, in Pediatric Patients With Recurrent or Refractory Solid Tumors, Including CNS Tumors and Lymphoma.      HPI:  Geo notes constipation is still a problem.   He does not complain of abdominal pain just that he feels full.  Dad reports he had two very large bowel movements in the last 24 hours.  Pelvic floor PT was delayed due to the snow and it will start Tuesday. No fevers. No headache.  No nausea or vomiting and no rash.      Fam/Soc: Lives between his parents (one week at each home). They have been awarded guardianship of Geo. The families work well together - both parents have remarried. His dad has a clotting disorder, requiring him to take Warfarin daily.     History was obtained from Geo and his mother.       Allergies   Allergen Reactions     Blood Transfusion Related (Informational Only) Swelling     Periorbital swelling post platelet transfusion     No Known Drug Allergies        Current Outpatient Medications   Medication     calcium carbonate-vitamin D 600-400 MG-UNIT CHEW     dexamethasone (DECADRON) 0.5 MG tablet     fexofenadine (ALLEGRA) 180 MG tablet     linaclotide (LINZESS) 290 MCG capsule     mupirocin (BACTROBAN) 2 % ointment     [START ON 5/2/2019] OLANZapine (ZYPREXA) 15 MG tablet     omeprazole (PRILOSEC) 20 MG DR capsule     polyethylene glycol (MIRALAX/GLYCOLAX) packet     potassium phosphate, monobasic, (K-PHOS) 500 MG tablet     senna-docusate (SENOKOT-S/PERICOLACE) 8.6-50 MG tablet     Sennosides (EX-LAX) 15 MG CHEW     sertraline (ZOLOFT) 50 MG tablet     study - entinostat (IDS# 5050) 1 mg tablet     study - entinostat (IDS# 5050) 5 mg tablet     sulfamethoxazole-trimethoprim (BACTRIM/SEPTRA) 400-80 MG per tablet      vitamin D3 (CHOLECALCIFEROL) 2000 units tablet     vitamin E (GNP VITAMIN E) 400 UNIT capsule     No current facility-administered medications for this visit.        Past Medical History:   Diagnosis Date     Cranial nerve dysfunction      Dyspepsia      Ependymoma (H)      Gastro-oesophageal reflux disease      Hearing loss      Intracranial hemorrhage (H)      Migraine      Pilonidal cyst     7-2015     Reduced vision      Refractory obstruction of nasal airway     2nd to nasal valve prolapse     Sleep apnea      Strabismus     gaze palsy        Past Surgical History:   Procedure Laterality Date     GRAFT CARTILAGE FROM POSTERIOR AURICLE Left 10/6/2016    Procedure: GRAFT CARTILAGE FROM POSTERIOR AURICLE;  Surgeon: Tyler Richards MD;  Location: [facility]     INCISION AND DRAINAGE PERINEAL, COMBINED Bilateral 7/18/2015    Procedure: COMBINED INCISION AND DRAINAGE PERINEAL;  Surgeon: Dequan Timmons MD;  Location: [facility]     OPTICAL TRACKING SYSTEM CRANIOTOMY, EXCISE TUMOR, COMBINED N/A 4/13/2015    Procedure: COMBINED OPTICAL TRACKING SYSTEM CRANIOTOMY, EXCISE TUMOR;  Surgeon: Francis Velazquez MD;  Location: [facility]     OPTICAL TRACKING SYSTEM CRANIOTOMY, EXCISE TUMOR, COMBINED N/A 4/16/2015    Procedure: COMBINED OPTICAL TRACKING SYSTEM CRANIOTOMY, EXCISE TUMOR;  Surgeon: Francis Velazquez MD;  [facility]     OPTICAL TRACKING SYSTEM CRANIOTOMY, EXCISE TUMOR, COMBINED Bilateral 5/28/2015    Procedure: COMBINED OPTICAL TRACKING SYSTEM CRANIOTOMY, EXCISE TUMOR;  Surgeon: Francis Velazquez MD;  [facility]     OPTICAL TRACKING SYSTEM CRANIOTOMY, EXCISE TUMOR, COMBINED Bilateral 1/14/2016    Procedure: COMBINED OPTICAL TRACKING SYSTEM CRANIOTOMY, EXCISE TUMOR;  Surgeon: Francis Velazquez MD;  [facility]     OPTICAL TRACKING SYSTEM VENTRICULOSTOMY  4/16/2015    Procedure: OPTICAL TRACKING SYSTEM VENTRICULOSTOMY;  Surgeon: Francis Velazquez MD;  [facility]      REMOVE PORT VASCULAR ACCESS N/A 10/6/2016    Procedure: REMOVE PORT VASCULAR ACCESS;  Surgeon: Bruno Perea MD;  [facility]: [facility]     RHINOPLASTY N/A 10/6/2016    Procedure: RHINOPLASTY;  Surgeon: Tyler Richards MD;  Location: [facility]     VASCULAR SURGERY  5-2015    single lumen power port       Family History   Problem Relation Age of Onset     Circulatory Father         PE/DVT     Hypothyroidism Father 30     Diabetes Maternal Grandmother      Diabetes Paternal Grandmother      Diabetes Paternal Grandfather      C.A.D. Paternal Grandfather      Hypertension Maternal Grandfather      Thyroid Disease Paternal Aunt         unknown whether hypo or hyper     Mental Illness No family hx of        Review of Systems   Constitutional: Negative.         In a wheelchair   HENT: Positive for hearing loss (Pre-existing from prior therapy).    Eyes: Positive for visual disturbance (Wears a patch over right eye to minimize diplopia).   Respiratory: Negative.    Cardiovascular: Negative.    Gastrointestinal: Positive for constipation (Chronic).   Endocrine:        Follow with Dr. Martin   Neurological: Positive for facial asymmetry and speech difficulty.   Psychiatric/Behavioral: Positive for sleep disturbance (Not using his Bi-Pap).   All other systems reviewed and are negative.      /66 (BP Location: Right arm, Patient Position: Fowlers, Cuff Size: Adult Regular)   Pulse 88   Temp 96.6  F (35.9  C) (Axillary)   Resp 18   Wt 89 kg (196 lb 3.4 oz)   SpO2 96%   BMI 27.65 kg/m       Physical Exam   Constitutional: He is oriented to person, place, and time. He appears well-developed and well-nourished. No distress.   HENT:   Occassionally saliva accumulates in mouth with occasional drooling.   Eyes: Pupils are equal, round, and reactive to light. Conjunctivae are normal.   Can track to right, but not to left. Nystagmus noted    Cardiovascular: Normal rate, regular rhythm and normal heart sounds.    Pulmonary/Chest: Effort normal and breath sounds normal. He has no wheezes.   Neurological: He is alert and oriented to person, place, and time. He has normal strength. A cranial nerve deficit is present. Coordination (Ataxic- dysmetria especially in upper extremities when accomplishing tasks.) abnormal. GCS eye subscore is 4. GCS verbal subscore is 5. GCS motor subscore is 6.   Negative Pronator drift   Skin: Skin is warm and dry.   Scattered striae  1cm left cheek bruised around area fading.    Psychiatric: He has a normal mood and affect.     Lab:  Results for orders placed or performed in visit on 04/16/19   Phosphorus   Result Value Ref Range    Phosphorus 3.5 2.5 - 4.5 mg/dL   Magnesium   Result Value Ref Range    Magnesium 2.3 1.6 - 2.3 mg/dL   Comprehensive metabolic panel   Result Value Ref Range    Sodium 140 133 - 144 mmol/L    Potassium 4.5 3.4 - 5.3 mmol/L    Chloride 106 98 - 110 mmol/L    Carbon Dioxide 31 20 - 32 mmol/L    Anion Gap 3 3 - 14 mmol/L    Glucose 97 70 - 99 mg/dL    Urea Nitrogen 14 7 - 30 mg/dL    Creatinine 1.08 (H) 0.50 - 1.00 mg/dL    GFR Estimate >90 >60 mL/min/[1.73_m2]    GFR Estimate If Black >90 >60 mL/min/[1.73_m2]    Calcium 8.5 8.5 - 10.1 mg/dL    Bilirubin Total 0.4 0.2 - 1.3 mg/dL    Albumin 3.3 (L) 3.4 - 5.0 g/dL    Protein Total 6.4 (L) 6.8 - 8.8 g/dL    Alkaline Phosphatase 125 65 - 260 U/L    ALT 16 0 - 50 U/L    AST 20 0 - 35 U/L   CBC with platelets differential   Result Value Ref Range    WBC 4.1 4.0 - 11.0 10e9/L    RBC Count 4.48 4.4 - 5.9 10e12/L    Hemoglobin 13.3 13.3 - 17.7 g/dL    Hematocrit 40.9 40.0 - 53.0 %    MCV 91 78 - 100 fl    MCH 29.7 26.5 - 33.0 pg    MCHC 32.5 31.5 - 36.5 g/dL    RDW 13.6 10.0 - 15.0 %    Platelet Count 117 (L) 150 - 450 10e9/L    % Neutrophils 56.8 %    % Lymphocytes 19.8 %    % Monocytes 14.4 %    % Eosinophils 8.5 %    % Basophils 0.5 %    Absolute Neutrophil 2.3 1.6 - 8.3 10e9/L    Absolute Lymphocytes 0.8 0.8 - 5.3 10e9/L     Absolute Monocytes 0.6 0.0 - 1.3 10e9/L    Absolute Eosinophils 0.4 0.0 - 0.7 10e9/L    Absolute Basophils 0.0 0.0 - 0.2 10e9/L    Diff Method Automated Method      *Note: Due to a large number of results and/or encounters for the requested time period, some results have not been displayed. A complete set of results can be found in Results Review.         Impression:  1. Ependymoma  2. Treated with Entinostat, continues to tolerate well.   3. MRI Brain/spine 1/16/19: tumor is stable to slightly decreased compared to 6/12/18.  4. Labs today are adequate for continuing Entinostat.   5. Chronic constipation.    Plan:  1. RTC in 4 weeks  as scheduled for follow up, or sooner PRN.   2. Continue weekly labs.  3. Pelvic floor PT to start soon.  4. We will set up Gastric emptying study on 4/22/19 at 9 AM.  NPO after 5 AM.  They should check into radiology.  This was discussed with carly and Geo.  5. Continue weekly labs.  6. Start Entinostat 6mg weekly starting today.

## 2019-04-18 LAB
H PYLORI AG STL QL IA: NORMAL
SPECIMEN SOURCE: NORMAL

## 2019-04-22 ENCOUNTER — HOSPITAL ENCOUNTER (OUTPATIENT)
Dept: NUCLEAR MEDICINE | Facility: CLINIC | Age: 19
Setting detail: NUCLEAR MEDICINE
Discharge: HOME | End: 2019-04-22
Attending: NURSE PRACTITIONER | Admitting: NURSE PRACTITIONER
Payer: COMMERCIAL

## 2019-04-22 PROCEDURE — 78264 GASTRIC EMPTYING IMG STUDY: CPT

## 2019-04-22 PROCEDURE — 34300033 ZZH RX 343: Performed by: NURSE PRACTITIONER

## 2019-04-22 PROCEDURE — A9541 TC99M SULFUR COLLOID: HCPCS | Performed by: NURSE PRACTITIONER

## 2019-04-22 RX ADMIN — Medication 1 MCI.: at 11:30

## 2019-04-23 ENCOUNTER — THERAPY VISIT (OUTPATIENT)
Dept: PHYSICAL THERAPY | Facility: CLINIC | Age: 19
End: 2019-04-23
Payer: COMMERCIAL

## 2019-04-23 DIAGNOSIS — C71.9 EPENDYMOMA (H): ICD-10-CM

## 2019-04-23 DIAGNOSIS — K59.04 CHRONIC IDIOPATHIC CONSTIPATION: ICD-10-CM

## 2019-04-23 LAB
BASOPHILS # BLD AUTO: 0 10E9/L (ref 0–0.2)
BASOPHILS NFR BLD AUTO: 0.6 %
DIFFERENTIAL METHOD BLD: ABNORMAL
EOSINOPHIL # BLD AUTO: 0.3 10E9/L (ref 0–0.7)
EOSINOPHIL NFR BLD AUTO: 6 %
ERYTHROCYTE [DISTWIDTH] IN BLOOD BY AUTOMATED COUNT: 12.9 % (ref 10–15)
HCT VFR BLD AUTO: 39.9 % (ref 40–53)
HGB BLD-MCNC: 13.5 G/DL (ref 13.3–17.7)
LYMPHOCYTES # BLD AUTO: 0.8 10E9/L (ref 0.8–5.3)
LYMPHOCYTES NFR BLD AUTO: 15.2 %
MCH RBC QN AUTO: 30.2 PG (ref 26.5–33)
MCHC RBC AUTO-ENTMCNC: 33.8 G/DL (ref 31.5–36.5)
MCV RBC AUTO: 89 FL (ref 78–100)
MONOCYTES # BLD AUTO: 0.7 10E9/L (ref 0–1.3)
MONOCYTES NFR BLD AUTO: 13.3 %
NEUTROPHILS # BLD AUTO: 3.5 10E9/L (ref 1.6–8.3)
NEUTROPHILS NFR BLD AUTO: 64.9 %
PLATELET # BLD AUTO: 116 10E9/L (ref 150–450)
RBC # BLD AUTO: 4.47 10E12/L (ref 4.4–5.9)
WBC # BLD AUTO: 5.3 10E9/L (ref 4–11)

## 2019-04-23 PROCEDURE — 84100 ASSAY OF PHOSPHORUS: CPT | Performed by: PEDIATRICS

## 2019-04-23 PROCEDURE — 85025 COMPLETE CBC W/AUTO DIFF WBC: CPT | Performed by: PEDIATRICS

## 2019-04-23 PROCEDURE — 80053 COMPREHEN METABOLIC PANEL: CPT | Performed by: PEDIATRICS

## 2019-04-23 PROCEDURE — 97112 NEUROMUSCULAR REEDUCATION: CPT | Mod: GP | Performed by: PHYSICAL THERAPIST

## 2019-04-23 PROCEDURE — 97110 THERAPEUTIC EXERCISES: CPT | Mod: GP | Performed by: PHYSICAL THERAPIST

## 2019-04-23 PROCEDURE — 36415 COLL VENOUS BLD VENIPUNCTURE: CPT | Performed by: PEDIATRICS

## 2019-04-23 PROCEDURE — 83735 ASSAY OF MAGNESIUM: CPT | Performed by: PEDIATRICS

## 2019-04-23 PROCEDURE — 97163 PT EVAL HIGH COMPLEX 45 MIN: CPT | Mod: GP | Performed by: PHYSICAL THERAPIST

## 2019-04-23 NOTE — PROGRESS NOTES
"[facility] Initial Evaluation  Subjective:  Patient presents to PT today with dadJj. 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  Current neurological issues ataxia/facial paralysis/double vision. Onset of constipation for ~ 1-1.5 years ago. Used Senna/Miralax/Coloase but not on regular basis. Inpatient \"clean out\" a couple times to evacuate but never quite feels empty. This is what bothers him the most. Linzess, miralax 3x day, stool softener in morning.  1-2 x every day BM, 4-5 on Pollard scale/soft pile, medium/large amount. Denies any bladder issues. Goal is to get better emptying, decrease meds to assist. Ambulates with walker to/from around house about every 3-4 hours. Currently getting PT/OT at [facility].     The history is provided by a relative, a parent and the patient. No  was used.   Geo Hicks is a 18 year old male with a pelvic dysfunction condition.  Condition occurred with:  Other reason.    This is a chronic condition     Site of Pain: abdomen.    Pain is described as aching and cramping and is constant and reported as 3/10.   Pain is the same all the time.     Since onset symptoms are unchanged.        General health as reported by patient is fair.  Pertinent medical history includes:  Cancer, depression and sleep disorder/apnea.  Medical allergies: yes.  Other surgeries include:  Cancer surgery (brain-ependymoma).  Medication history: see EPIC.    Employment status: none.                                  Objective:    Gait:  Wheelchair, transfers with mod/max A+1                                           Pelvic Dysfunction Evaluation:    Bladder/Pelvic Problems:      Emptying Problem:  Incomplete emptying                Reflex Testing:  NA    External Assessment:  External assessment pelvic: visible PF muscle contraction/anal wink               Internal Assessment:  NA              SEMG " Biofeedback:    Equipment:  Synergy  Surface electrode placement--Abdominals: no  Suraface electrode placement--Perianal:  Yes  Baseline EMG PM:  1.0mv    Peak pelvic muscle contraction:  5.0mv, needed verbal cues to avoid glute max substitution      Position:  Supine                     General     ROS    Assessment/Plan:    Patient is a 18 year old male with pelvic complaints.    Patient has the following significant findings with corresponding treatment plan.                Diagnosis 1:  constipation  Pain -  hot/cold therapy, splint/taping/bracing/orthotics, self management, education and home program  Decreased ROM/flexibility - manual therapy and therapeutic exercise  Decreased strength - therapeutic exercise and therapeutic activities  Decreased proprioception - neuro re-education and therapeutic activities  Impaired gait - gait training  Impaired muscle performance - biofeedback and neuro re-education  Decreased function - therapeutic activities  Impaired posture - neuro re-education    Therapy Evaluation Codes:   1) History comprised of:   Personal factors that impact the plan of care:      Time since onset of symptoms and neurological deficits.    Comorbidity factors that impact the plan of care are:      Cancer and Stroke.     Medications impacting care: Anti-depressant, Sleep and see list.  2) Examination of Body Systems comprised of:   Body structures and functions that impact the plan of care:      Pelvis.   Activity limitations that impact the plan of care are:      constipation.  3) Clinical presentation characteristics are:   Evolving/Changing.  4) Decision-Making    Moderate complexity using standardized patient assessment instrument and/or measureable assessment of functional outcome.  Cumulative Therapy Evaluation is: Moderate complexity.    Previous and current functional limitations:  (See Goal Flow Sheet for this information)    Short term and Long term goals: (See Goal Flow Sheet for this  information)     Communication ability:  Patient appears to be able to clearly communicate and understand verbal and written communication and follow directions correctly.  Treatment Explanation - The following has been discussed with the patient:   RX ordered/plan of care  Anticipated outcomes  Possible risks and side effects  This patient would benefit from PT intervention to resume normal activities.   Rehab potential is good.    Frequency:  1 X week, once daily  Duration:  for 8 weeks  Discharge Plan:  Achieve all LTG.  Independent in home treatment program.  Reach maximal therapeutic benefit.    Please refer to the daily flowsheet for treatment today, total treatment time and time spent performing 1:1 timed codes.

## 2019-04-24 LAB
ALBUMIN SERPL-MCNC: 3.4 G/DL (ref 3.4–5)
ALP SERPL-CCNC: 127 U/L (ref 65–260)
ALT SERPL W P-5'-P-CCNC: 18 U/L (ref 0–50)
ANION GAP SERPL CALCULATED.3IONS-SCNC: 3 MMOL/L (ref 3–14)
AST SERPL W P-5'-P-CCNC: 19 U/L (ref 0–35)
BILIRUB SERPL-MCNC: 0.2 MG/DL (ref 0.2–1.3)
BUN SERPL-MCNC: 18 MG/DL (ref 7–30)
CALCIUM SERPL-MCNC: 8.8 MG/DL (ref 8.5–10.1)
CHLORIDE SERPL-SCNC: 106 MMOL/L (ref 98–110)
CO2 SERPL-SCNC: 30 MMOL/L (ref 20–32)
CREAT SERPL-MCNC: 1.23 MG/DL (ref 0.5–1)
GFR SERPL CREATININE-BSD FRML MDRD: 84 ML/MIN/{1.73_M2}
GLUCOSE SERPL-MCNC: 61 MG/DL (ref 70–99)
MAGNESIUM SERPL-MCNC: 2.1 MG/DL (ref 1.6–2.3)
PHOSPHATE SERPL-MCNC: 3.8 MG/DL (ref 2.5–4.5)
POTASSIUM SERPL-SCNC: 4.6 MMOL/L (ref 3.4–5.3)
PROT SERPL-MCNC: 6.5 G/DL (ref 6.8–8.8)
SODIUM SERPL-SCNC: 139 MMOL/L (ref 133–144)

## 2019-04-25 ENCOUNTER — HOSPITAL ENCOUNTER (OUTPATIENT)
Dept: PHYSICAL THERAPY | Facility: CLINIC | Age: 19
Setting detail: THERAPIES SERIES
End: 2019-04-25
Attending: FAMILY MEDICINE
Payer: COMMERCIAL

## 2019-04-25 PROCEDURE — 97116 GAIT TRAINING THERAPY: CPT | Mod: GP | Performed by: PHYSICAL THERAPIST

## 2019-04-25 PROCEDURE — 97112 NEUROMUSCULAR REEDUCATION: CPT | Mod: GP | Performed by: PHYSICAL THERAPIST

## 2019-04-29 ENCOUNTER — TELEPHONE (OUTPATIENT)
Dept: PSYCHIATRY | Facility: CLINIC | Age: 19
End: 2019-04-29

## 2019-04-29 ENCOUNTER — OFFICE VISIT (OUTPATIENT)
Dept: PSYCHIATRY | Facility: CLINIC | Age: 19
End: 2019-04-29
Attending: PSYCHIATRY & NEUROLOGY
Payer: COMMERCIAL

## 2019-04-29 VITALS — HEART RATE: 78 BPM | DIASTOLIC BLOOD PRESSURE: 61 MMHG | SYSTOLIC BLOOD PRESSURE: 104 MMHG

## 2019-04-29 DIAGNOSIS — R25.9 ABNORMAL INVOLUNTARY MOVEMENT: Primary | ICD-10-CM

## 2019-04-29 PROCEDURE — G0463 HOSPITAL OUTPT CLINIC VISIT: HCPCS | Mod: ZF

## 2019-04-29 ASSESSMENT — PAIN SCALES - GENERAL: PAINLEVEL: NO PAIN (0)

## 2019-04-29 NOTE — PROGRESS NOTES
"  [facility] PSYCHIATRY CLINIC PROGRESS NOTE     CARE TEAM:  PCP- Jeffrey Espinoza    Specialty Providers- Oncology, Neuropsychology, Physical Therapy, Occupational Therapy    Therapist- Manju Pink at [facility] in [facility] Team- no.    Geo Hicks is a 18 year old male who prefers the name Geo & pronouns he, him, his, himself.    Date of initial diagnostic assessment is 2/7/2019.    Pertinent Background:  This patient first experienced mental health issues in the past few months and has received treatment for paranoia/delusions.  Notably, this patient is undergoing cancer treatment at the [facility] and has been referred for psychiatric assessment by his neuropsychologist who most recently met with him on 1/3/2019.  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 Since then, he has undergone multiple tumor resections, chemotherapy, and radiation treatment. Geo also experienced an intra-tumoral hemorrhage in May 2015 that resulted in neurological changes including facial numbness, significant loss of mobility and dysarthria. In December 2016, a follow-up MRI revealed continued tumor enhancement, however the most recent MRI from 10/16/18 revealed an unchanged fourth ventricle ependymoma in comparison to previous exams, a slight decrease in adjacent edema, as well as a stable size of the ventricular system.  He is currently on COG study [id number] with Entinostat.  Of note, patient had a brief admission to inpatient psychiatry on [facility] from 2/14-2/15/2019 \"for evaluation of delusions and suicidal comments.\"    Psych critical item history includes suicidal ideation, psychosis [sxs include delusions] and psychiatric hospitalization x1.    INTERIM HISTORY                                                                                                                 4, 4   The patient reports good treatment adherence.  History was provided by the patient " and his mom who were good historians.  The last visit ended with no change to the med regimen.  Since the last visit:   Geo presents with his mother today.  He reiterates positive changes to his mood since the initiation of sertraline.  Denies interim SI/SIB thoughts, violent/aggressive ideation, markedly depressed mood, inappropriately elevated mood, panic/anxious acuity, marked paranoia/other psychotic features, or other hallmarks of psychiatric decompensation with dangerousness.  Endorses his perception of the efficacy/tolerability of his current regimen.  One item that he does describe is a recent manifestation of unexpected jerking movements of his legs, occurring approximately twice a day in the morning.  Occasionally will involve his arms, but typically lower extremities.  States this has developed over the past month.  Agreed to pursue neurology referral for assessment of this phenomenon.  Explained that it was possible this could be caused/exacerbated by his present medications, however agreed to maintain current regimen without adjustment for the time being, pending further assessment, per patient preference.    RECENT SYMPTOMS:   DEPRESSION:  reports-some low mood (which has improved), feeling trapped and overwhelmed;  DENIES- suicidal ideation, self-destructive thoughts, anhedonia, insomnia and poor concentration /memory  ELIZABETH/HYPOMANIA:  reports-none;  DENIES- increased energy, decreased sleep need, increased activity and grandiosity  PSYCHOSIS:  reports-delusions- paranoid [details in Interim History];  DENIES- auditory hallucinations and visual hallucinations  DYSREGULATION:  reports-can become irritable/agitated if beliefs are challenged;  DENIES- suicidal ideation, violent ideation, SIB, mood dysregulation, impulsive and aggressive  PANIC ATTACK:  none   ANXIETY:  worry  TRAUMA RELATED:  none  COMPULSIVE:  none  SLEEP:  7-8 hours, no problems reported  EATING DISORDER: no    RECENT SUBSTANCE  "USE:     ALCOHOL- no  TOBACCO- no  CAFFEINE- minimal  OPIOIDS- no         NARCAN KIT- N/A  CANNABIS- no  OTHER ILLICIT DRUGS- no      CURRENT SOCIAL HISTORY:  FINANCIAL SUPPORT- family   CHILDREN- no      LIVING SITUATION- mother and father (who are  and have both remarried) alternating weeks between respective homes  SOCIAL/ SPIRITUAL SUPPORT- mother, father, older brother, friend Rima  FEELS SAFE AT HOME- yes    MEDICAL ROS (2,10):  A comprehensive review of systems was performed and is negative other than noted in the HPI.    PSYCH and CD Critical Summary Points since July 2018 February 2019:  Clinic intake on 2/7.  Later was admitted to [facility] for a brief inpatient psych admission from 3/14-3/15, most of which was spent in the ER, due to suicidal comments that concerned family.  Was ultimately deemed safe for outpatient follow-up and was discharged with an increase in olanzapine to 15 mg.  March 2019:  Initiated sertraline 50 mg daily.    PAST PSYCH MED TRIALS   see EMR Problem List: Hx of psychiatric care    MEDICAL / SURGICAL HISTORY                                   Neurologic Hx- See pertinent background, re: history of ependymoma and related chemo, radiation and tumor resection(s).  Notably has experienced neurological damage due to the above which has resulted in facial numbness, significant loss of mobility and dysarthria.  Has had neuropsychiatric evaluation(s) 2/2016, 2/2017 and 1/2019.  The following is from the \"Results and Impressions\" section of his 1/3/2019 eval with Veronica Padilla:       \"We were pleased to discover that the neurocognitive areas measured during this evaluation have remained intact and consistent with previous evaluations. Geo s ability to access and apply acquired word knowledge (verbal comprehension) and his working memory (ability to mentally hold and manipulate information) were in the average range. His ability to understand visual " "information to solve novel abstract visual problems (perceptual reasoning) was in the low average range. There was a slight decrease in performance on one task associated with perceptual reasoning due to time limits. Had no time limits been enforced, Geo s performance would have likely been higher as he was observed to respond with correct answers that were past the time limit allowed. Furthermore, on a timed visual discrimination and scanning task, Geo performed in the borderline range, which was a slight improvement from his previous evaluation in 2017 where he performed in the impaired range.\"    Patient Active Problem List   Diagnosis     GERD (gastroesophageal reflux disease)     Closed fracture at the growth plate of right distal fibula      Elevated serum creatinine     Dyspepsia     Intracranial hemorrhage (H)     Hemorrhagic stroke (H)     Ependymoma (H)     Admission for antineoplastic chemotherapy     Post-operative state     S/P craniotomy     S/P biopsy     Noncomitant strabismus     Abducens neuropathy of both eyes     CANDELARIO (obstructive sleep apnea)     Health Care Home     Hypernatremia     Body temperature low     Current chronic use of systemic steroids     Elevated TSH     Status post chemotherapy     Neoplasm of posterior cranial fossa (H)     Constipation     Chronic constipation     Serum albumin decreased     Closed compression fracture of thoracic vertebra with routine healing, subsequent encounter     Depression     Past Surgical History:   Procedure Laterality Date     GRAFT CARTILAGE FROM POSTERIOR AURICLE Left 10/6/2016    Procedure: GRAFT CARTILAGE FROM POSTERIOR AURICLE;  Surgeon: Tyler Richards MD;  Location: [facility]     INCISION AND DRAINAGE PERINEAL, COMBINED Bilateral 7/18/2015    Procedure: COMBINED INCISION AND DRAINAGE PERINEAL;  Surgeon: Dequan Timmons MD;  Location: [facility] OR     OPTICAL TRACKING SYSTEM CRANIOTOMY, EXCISE TUMOR, COMBINED N/A 4/13/2015    Procedure: " COMBINED OPTICAL TRACKING SYSTEM CRANIOTOMY, EXCISE TUMOR;  Surgeon: Francis Velazquez MD;  [facility]: [facility]     OPTICAL TRACKING SYSTEM CRANIOTOMY, EXCISE TUMOR, COMBINED N/A 4/16/2015    Procedure: COMBINED OPTICAL TRACKING SYSTEM CRANIOTOMY, EXCISE TUMOR;  Surgeon: Francis Velazquez MD;  [facility]     OPTICAL TRACKING SYSTEM CRANIOTOMY, EXCISE TUMOR, COMBINED Bilateral 5/28/2015    Procedure: COMBINED OPTICAL TRACKING SYSTEM CRANIOTOMY, EXCISE TUMOR;  Surgeon: Francis Velazquez MD;  [facility]     OPTICAL TRACKING SYSTEM CRANIOTOMY, EXCISE TUMOR, COMBINED Bilateral 1/14/2016    Procedure: COMBINED OPTICAL TRACKING SYSTEM CRANIOTOMY, EXCISE TUMOR;  Surgeon: Francis Velazquez MD;  [facility]     OPTICAL TRACKING SYSTEM VENTRICULOSTOMY  4/16/2015    Procedure: OPTICAL TRACKING SYSTEM VENTRICULOSTOMY;  Surgeon: Francis Velazquez MD;  [facility]     REMOVE PORT VASCULAR ACCESS N/A 10/6/2016    Procedure: REMOVE PORT VASCULAR ACCESS;  Surgeon: Bruno Perea MD;  [facility]     RHINOPLASTY N/A 10/6/2016    Procedure: RHINOPLASTY;  Surgeon: Tyler Richards MD;  [facility]: [facility]     VASCULAR SURGERY  5-2015    single lumen power port       ALLERGY                                Blood transfusion related (informational only) and No known drug allergies     MEDICATIONS                               Current Outpatient Medications   Medication Sig Dispense Refill     calcium carbonate-vitamin D 600-400 MG-UNIT CHEW Take 2 tablets in the morning and 1 tablet in the evening. 90 tablet 3     dexamethasone (DECADRON) 0.5 MG tablet Take 0.75 mg by mouth daily (with breakfast). 90 tablet 3     fexofenadine (ALLEGRA) 180 MG tablet Take 180 mg by mouth every evening        linaclotide (LINZESS) 290 MCG capsule Take 290 mcg by mouth every morning (before breakfast)        mupirocin (BACTROBAN) 2 % ointment Use 2 times a day to the buttock with flare 22 g 3      [START ON 5/2/2019] OLANZapine (ZYPREXA) 15 MG tablet Take 1 tablet (15 mg) by mouth At Bedtime 30 tablet 1     omeprazole (PRILOSEC) 20 MG DR capsule Take 2 capsules (40 mg) by mouth every morning 60 capsule 1     polyethylene glycol (MIRALAX/GLYCOLAX) packet Take 17 g by mouth 3 times daily       potassium phosphate, monobasic, (K-PHOS) 500 MG tablet Take 500 mg by mouth 2 times daily       senna-docusate (SENOKOT-S/PERICOLACE) 8.6-50 MG tablet Take 2 tablets by mouth At Bedtime       Sennosides (EX-LAX) 15 MG CHEW Take 2 chews after completion of Gatorade/Miralax 2 tablet 3     sertraline (ZOLOFT) 50 MG tablet Take 1 tablet (50 mg) by mouth daily 30 tablet 2     study - entinostat (IDS# 5050) 1 mg tablet Take 1 tablet (1 mg) by mouth every 7 days for 4 doses Take one 1mg tablet with one 5mg tablet for total dose of 6mg weekly. Take on an empty stomach, at least 1 hour before or 2 hours after a meal.  Swallow tablet whole. 4 tablet 0     study - entinostat (IDS# 5050) 5 mg tablet Take 1 tablet (5 mg) by mouth every 7 days for 4 doses Take one 5mg tablet with one 1mg tablet for total dose of 6mg weekly. Take on an empty stomach, at least 1 hour before or 2 hours after a meal.  Swallow tablet whole. 4 tablet 0     sulfamethoxazole-trimethoprim (BACTRIM/SEPTRA) 400-80 MG per tablet Take 1 tablet by mouth 2 times daily On Saturdays and Sundays 24 tablet 11     vitamin D3 (CHOLECALCIFEROL) 2000 units tablet Take 1 tablet by mouth daily       vitamin E (GNP VITAMIN E) 400 UNIT capsule Take 1 capsule (400 Units) by mouth daily 30 capsule 11     VITALS                                                                                                                          3, 3   /61   Pulse 78      MENTAL STATUS EXAM                                                                                           9, 14 cog gs     Alertness: alert  and oriented  Appearance: casually groomed, seated in wheelchair, wearing  "R-sided eye-patch  Behavior/Demeanor: cooperative and pleasant, with fair eye contact   Speech: prominent dysarthria  Language: good  Psychomotor: mild evident palsy of upper extremities and facial paralysis  Mood: some mild/moderate low moods at times which have notably improved  Affect: restricted; was congruent to mood; was congruent to content  Thought Process/Associations: some continued rumination [details in Interim History]  Thought Content:  Reports delusions [details in Interim History];  Denies suicidal ideation and violent ideation  Perception:  Reports none;  Denies auditory hallucinations and visual hallucinations  Insight: partial  Judgment: good  Cognition: (6) oriented: time, person, and place  attention span: intact  concentration: intact  recent memory: intact  remote memory: intact  fund of knowledge: appropriate  Gait and Station: remarkable for:  ataxia - can ambulate but was seen in wheelchair     LABS and DATA     AIMS:  complete next visit    PHQ9 TODAY = 6  No flowsheet data found.    ANTIPSYCHOTIC LABS  [glu, A1C, lipids (rohit LDL), liver enzymes, WBC, ANEU, Hgb, plts]  q12 mo  Recent Labs   Lab Test 19  1346 19  1410 19  1328 19  1347  19  1100   GLC 61* 97 105* 91   < > 124*   A1C  --   --   --   --   --  5.1    < > = values in this interval not displayed.     Recent Labs   Lab Test 19  1100   CHOL 158   TRIG 236*   LDL 84   HDL 27*     Recent Labs   Lab Test 19  1346 19  1410 19  1328 19  1347   AST 19 20 17 23   ALT 18 16 16 14   ALKPHOS 127 125 134 131     Recent Labs   Lab Test 19  1346 19  1410 19  1328 19  1347   WBC 5.3 4.1 3.6* 3.1*   ANEU 3.5 2.3 1.8 1.5*   HGB 13.5 13.3 12.9* 13.1*   * 117* 88* 106*     EK2019: 85 BPM, QT/QTcH was 346/389 msec.  Also included comment: \"Abnormal precordial QRS contours - nondiagnostic for this age.\"    EE/15/2019: \"IMPRESSION: Awake and drowsy " "routine EEG (no video) was normal.  The patient stated he felt dazed during photic stimulation, however, no electrographic seizures or concerning changes on the EEG were seen during that time.  Clinical correlation is advised.  No electrographic seizures, epileptiform discharges were seen.\"    DIAGNOSIS     Delusional Disorder vs Psychotic Disorder Due to Another Medical Condition vs Psychotic features secondary to Mood Disorder     Major Depressive Disorder, single episode, moderate vs severe, with psychotic features    ASSESSMENT                                                                                                                   m2, h3     TODAY:  Geo Hicks presents to the [facility] Psych[facility] Outpatient Psychiatry clinic for continued medication management of paranoia, delusional thoughts and depressed mood.  He relates interval stability of mood, reiterating improvements since the initiation of sertraline.  Denies interim SI/SIB thoughts, violent/aggressive ideation, markedly depressed mood, inappropriately elevated mood, panic/anxious acuity, marked paranoia/psychotic features, or other hallmarks of psychiatric decompensation with dangerousness.  At no point during our visit did the patient directly reiterate previous delusional suspicion of his health care team withholding definitive medical care, however at one point his mother spoke to this writer and the attending separately and stated that this concern is still present.  Agreed that with medications Geo appears more able to discuss this matter without becoming markedly elevated/agitated.  One item which was related is Geo has been noticing some sudden extremity jerking, usually in his legs, for about a month.  This will occur several times in the morning, typically.  Explained this could be caused or exacerbated by his present medications.  Agreed to pursue neurology referral in examination of this phenomenon.  Ultimately agreed to " maintain his present psychiatric regimen without adjustment, given its demonstrated efficacy and overall tolerability, pending further assessment.    SUICIDE RISK ASSESSMENT:  Geo Hicks denies present suicidal ideation.  This patient does have notable risk factors for self-harm including feels trapped, relationship conflicts, new/ worsening medical issue, male and paranoia/delusions. However, risk is mitigated by no h/o suicide attempt, no plan or intent, no h/o risky impulsive behavior, describes a safety plan, h/o seeking help when needed, none to minimal alcohol use , commitment to family, good social support and stable housing.  Based on all available evidence he does not appear to be at imminent risk for self-harm therefore does not meet criteria for a 72-hr hold/  involuntary hospitalization.  However, based on degree of symptoms therapy, close psych FU and initiation of new medications was recommended which the pt did agree to.    [place] PRESCRIPTION MONITORING PROGRAM [] was not checked today:  not using controlled substances.    PSYCHOTROPIC DRUG INTERACTIONS:    Pentoxifylline may enhance the antiplatelet effect of Agents with Antiplatelet Properties.  [Pentoxifylline, Sertraline]     CNS Depressants may enhance the adverse/toxic effect of Selective Serotonin Reuptake Inhibitors. Specifically, the risk of psychomotor impairment may be enhanced.  [Olanzapine, Sertraline]    MANAGEMENT:  Monitoring for adverse effects, routine vitals, routine labs, periodic EKGs and patient/family aware of risks     PLAN                                                                                                                                m2, h3     1) PSYCHOTROPIC MEDICATIONS:  Continue sertraline 50 mg daily  Continue olanzapine 15 mg at bedtime    2) THERAPY:  continue with Manju    3) NEXT DUE:    Labs- AP labs due Feb 2020  EKG- PRN  Rating Scales- AIMS due    4) REFERRALS:  Neurology for leg movements     5)  RTC: 4-6 weeks    6) CRISIS NUMBERS:   Provided routinely in [facility].  [facility] [phone] (clinic)    [phone] (after hours)  CRISIS TEXT LINE: Text [id number] from anywhere in [place], anytime, any crisis 24/7;  OR SEE www.crisistextline.org    TREATMENT RISK STATEMENT:  The risks, benefits, alternatives and potential adverse effects have been discussed and are understood by the pt. The pt understands the risks of using street drugs or alcohol. There are no medical contraindications, the pt agrees to treatment with the ability to do so. The pt knows to call the clinic for any problems or to access emergency care if needed.  Medical and substance use concerns are documented above.  Psychotropic drug interaction check was done, including changes made today.     PSYCHIATRY CLINIC INDIVIDUAL PSYCHOTHERAPY NOTE                                                [16]   Start time- N/A        End time- N/A  Date last reviewed - 03/08/19       Date next due - 6/6/19 (or 12 months if Medicare)    Subjective: This supportive psychotherapy session addressed issues related to goals of therapy, patient's history, current stressors, life stressors, relationship, family of origin, school and health.  Patient's reaction: Contemplation in the context of mental status appropriate for ambulatory setting.  Progress: fair  Plan: RTC 4-6 weeks  Psychotherapy services during this visit included  myself and the patient.   TREATMENT  PLAN          SYMPTOMS;PROBLEMS   MEASURABLE GOALS;    FUNCTIONAL IMPROVEMENT INTERVENTIONS;    GAINS MADE DISCHARGE CRITERIA   Depression: depressed mood, anhedonia and feeling hopelesss   reduce depressive symptoms, find enjoyment at least once a day and report feeling more positive about self  Supportive and cognitive psychotherapeutic interventions marked symptom improvement   Psychosis: delusions   reduce frequency/ intensity of false beliefs and take medications as prescribed on a daily basis Supportive  and cognitive psychotherapeutic interventions marked symptom improvement     PROVIDER:  Justice Fay, DO    Patient staffed in clinic with Dr. Tripathi who will sign the note.  Supervisor is Dr. Tripathi.    Supervisor Attestation:  I met with Geo Hicks, his mother and resident physician, Justice Fay MD. I participated in key portions of the service, including the mental status examination and developing the plan of care. I reviewed key portions of the history with the resident. I agree with the findings and plan as documented in this note.  Jorge Luis Tripathi MD

## 2019-04-29 NOTE — NURSING NOTE
Chief Complaint   Patient presents with     Recheck Medication     Depression, unspecified depression type

## 2019-04-29 NOTE — TELEPHONE ENCOUNTER
On 4/29/2019 the patient signed a MERRITT authorizing  person to person communication with Manju [email or web address].  I sent this document to scanning on 4/29/2019 and kept a copy in Psychiatry until scanning is confirmed. .bcred

## 2019-04-30 DIAGNOSIS — C71.9 EPENDYMOMA (H): ICD-10-CM

## 2019-04-30 LAB
ALBUMIN SERPL-MCNC: 3.3 G/DL (ref 3.4–5)
ALP SERPL-CCNC: 119 U/L (ref 65–260)
ALT SERPL W P-5'-P-CCNC: 15 U/L (ref 0–50)
ANION GAP SERPL CALCULATED.3IONS-SCNC: 3 MMOL/L (ref 3–14)
AST SERPL W P-5'-P-CCNC: 17 U/L (ref 0–35)
BASOPHILS # BLD AUTO: 0 10E9/L (ref 0–0.2)
BASOPHILS NFR BLD AUTO: 0.2 %
BILIRUB SERPL-MCNC: 0.2 MG/DL (ref 0.2–1.3)
BUN SERPL-MCNC: 8 MG/DL (ref 7–30)
CALCIUM SERPL-MCNC: 8.9 MG/DL (ref 8.5–10.1)
CHLORIDE SERPL-SCNC: 109 MMOL/L (ref 98–110)
CO2 SERPL-SCNC: 32 MMOL/L (ref 20–32)
CREAT SERPL-MCNC: 1.01 MG/DL (ref 0.5–1)
DIFFERENTIAL METHOD BLD: ABNORMAL
EOSINOPHIL # BLD AUTO: 0.3 10E9/L (ref 0–0.7)
EOSINOPHIL NFR BLD AUTO: 5.4 %
ERYTHROCYTE [DISTWIDTH] IN BLOOD BY AUTOMATED COUNT: 13.2 % (ref 10–15)
GFR SERPL CREATININE-BSD FRML MDRD: >90 ML/MIN/{1.73_M2}
GLUCOSE SERPL-MCNC: 112 MG/DL (ref 70–99)
HCT VFR BLD AUTO: 40 % (ref 40–53)
HGB BLD-MCNC: 13.2 G/DL (ref 13.3–17.7)
LYMPHOCYTES # BLD AUTO: 0.8 10E9/L (ref 0.8–5.3)
LYMPHOCYTES NFR BLD AUTO: 17.3 %
MAGNESIUM SERPL-MCNC: 2.2 MG/DL (ref 1.6–2.3)
MCH RBC QN AUTO: 29.9 PG (ref 26.5–33)
MCHC RBC AUTO-ENTMCNC: 33 G/DL (ref 31.5–36.5)
MCV RBC AUTO: 91 FL (ref 78–100)
MONOCYTES # BLD AUTO: 0.4 10E9/L (ref 0–1.3)
MONOCYTES NFR BLD AUTO: 9.2 %
NEUTROPHILS # BLD AUTO: 3.3 10E9/L (ref 1.6–8.3)
NEUTROPHILS NFR BLD AUTO: 67.9 %
PHOSPHATE SERPL-MCNC: 2.8 MG/DL (ref 2.5–4.5)
PLATELET # BLD AUTO: 111 10E9/L (ref 150–450)
POTASSIUM SERPL-SCNC: 3.8 MMOL/L (ref 3.4–5.3)
PROT SERPL-MCNC: 6.5 G/DL (ref 6.8–8.8)
RBC # BLD AUTO: 4.41 10E12/L (ref 4.4–5.9)
SODIUM SERPL-SCNC: 144 MMOL/L (ref 133–144)
WBC # BLD AUTO: 4.8 10E9/L (ref 4–11)

## 2019-04-30 PROCEDURE — 83735 ASSAY OF MAGNESIUM: CPT | Performed by: FAMILY MEDICINE

## 2019-04-30 PROCEDURE — 80053 COMPREHEN METABOLIC PANEL: CPT | Performed by: FAMILY MEDICINE

## 2019-04-30 PROCEDURE — 36415 COLL VENOUS BLD VENIPUNCTURE: CPT | Performed by: FAMILY MEDICINE

## 2019-04-30 PROCEDURE — 84100 ASSAY OF PHOSPHORUS: CPT | Performed by: FAMILY MEDICINE

## 2019-04-30 PROCEDURE — 85025 COMPLETE CBC W/AUTO DIFF WBC: CPT | Performed by: FAMILY MEDICINE

## 2019-05-01 ENCOUNTER — THERAPY VISIT (OUTPATIENT)
Dept: PHYSICAL THERAPY | Facility: CLINIC | Age: 19
End: 2019-05-01
Payer: COMMERCIAL

## 2019-05-01 DIAGNOSIS — K31.84 GASTROPARESIS: Primary | ICD-10-CM

## 2019-05-01 DIAGNOSIS — K59.04 CHRONIC IDIOPATHIC CONSTIPATION: ICD-10-CM

## 2019-05-01 PROCEDURE — 97110 THERAPEUTIC EXERCISES: CPT | Mod: GP | Performed by: PHYSICAL THERAPIST

## 2019-05-01 PROCEDURE — 97112 NEUROMUSCULAR REEDUCATION: CPT | Mod: GP | Performed by: PHYSICAL THERAPIST

## 2019-05-01 RX ORDER — ERYTHROMYCIN ETHYLSUCCINATE 200 MG/5ML
250 SUSPENSION ORAL
Qty: 800 ML | Refills: 3 | Status: SHIPPED | OUTPATIENT
Start: 2019-05-01 | End: 2019-05-15

## 2019-05-03 DIAGNOSIS — L73.8 BACTERIAL FOLLICULITIS: ICD-10-CM

## 2019-05-03 DIAGNOSIS — C71.9 EPENDYMOMA (H): ICD-10-CM

## 2019-05-03 RX ORDER — MUPIROCIN 20 MG/G
OINTMENT TOPICAL
Qty: 22 G | Refills: 3 | Status: ON HOLD | OUTPATIENT
Start: 2019-05-03 | End: 2019-06-04 | Stop reason: ALTCHOICE

## 2019-05-06 ASSESSMENT — PATIENT HEALTH QUESTIONNAIRE - PHQ9: SUM OF ALL RESPONSES TO PHQ QUESTIONS 1-9: 6

## 2019-05-07 DIAGNOSIS — D49.6 NEOPLASM OF POSTERIOR CRANIAL FOSSA (H): ICD-10-CM

## 2019-05-07 DIAGNOSIS — C71.9 EPENDYMOMA (H): ICD-10-CM

## 2019-05-07 DIAGNOSIS — H49.23: ICD-10-CM

## 2019-05-07 DIAGNOSIS — H53.2 DIPLOPIA: ICD-10-CM

## 2019-05-07 LAB
ALBUMIN SERPL-MCNC: 3.3 G/DL (ref 3.4–5)
ALP SERPL-CCNC: 126 U/L (ref 65–260)
ALT SERPL W P-5'-P-CCNC: 18 U/L (ref 0–50)
ANION GAP SERPL CALCULATED.3IONS-SCNC: 3 MMOL/L (ref 3–14)
AST SERPL W P-5'-P-CCNC: 19 U/L (ref 0–35)
BASOPHILS # BLD AUTO: 0 10E9/L (ref 0–0.2)
BASOPHILS NFR BLD AUTO: 0.3 %
BILIRUB SERPL-MCNC: 0.2 MG/DL (ref 0.2–1.3)
BUN SERPL-MCNC: 14 MG/DL (ref 7–30)
CALCIUM SERPL-MCNC: 9 MG/DL (ref 8.5–10.1)
CHLORIDE SERPL-SCNC: 109 MMOL/L (ref 98–110)
CO2 SERPL-SCNC: 31 MMOL/L (ref 20–32)
CREAT SERPL-MCNC: 1.13 MG/DL (ref 0.5–1)
DIFFERENTIAL METHOD BLD: ABNORMAL
EOSINOPHIL # BLD AUTO: 0.4 10E9/L (ref 0–0.7)
EOSINOPHIL NFR BLD AUTO: 11 %
ERYTHROCYTE [DISTWIDTH] IN BLOOD BY AUTOMATED COUNT: 13 % (ref 10–15)
GFR SERPL CREATININE-BSD FRML MDRD: >90 ML/MIN/{1.73_M2}
GLUCOSE SERPL-MCNC: 72 MG/DL (ref 70–99)
HCT VFR BLD AUTO: 39.9 % (ref 40–53)
HGB BLD-MCNC: 13.3 G/DL (ref 13.3–17.7)
LYMPHOCYTES # BLD AUTO: 1.3 10E9/L (ref 0.8–5.3)
LYMPHOCYTES NFR BLD AUTO: 32.1 %
MAGNESIUM SERPL-MCNC: 2.2 MG/DL (ref 1.6–2.3)
MCH RBC QN AUTO: 30.2 PG (ref 26.5–33)
MCHC RBC AUTO-ENTMCNC: 33.3 G/DL (ref 31.5–36.5)
MCV RBC AUTO: 91 FL (ref 78–100)
MONOCYTES # BLD AUTO: 0.8 10E9/L (ref 0–1.3)
MONOCYTES NFR BLD AUTO: 19.7 %
NEUTROPHILS # BLD AUTO: 1.4 10E9/L (ref 1.6–8.3)
NEUTROPHILS NFR BLD AUTO: 36.9 %
PHOSPHATE SERPL-MCNC: 3.8 MG/DL (ref 2.5–4.5)
PLATELET # BLD AUTO: 106 10E9/L (ref 150–450)
POTASSIUM SERPL-SCNC: 4.3 MMOL/L (ref 3.4–5.3)
PROT SERPL-MCNC: 6.5 G/DL (ref 6.8–8.8)
RBC # BLD AUTO: 4.4 10E12/L (ref 4.4–5.9)
SODIUM SERPL-SCNC: 143 MMOL/L (ref 133–144)
WBC # BLD AUTO: 3.9 10E9/L (ref 4–11)

## 2019-05-07 PROCEDURE — 36415 COLL VENOUS BLD VENIPUNCTURE: CPT | Performed by: PEDIATRICS

## 2019-05-07 PROCEDURE — 80053 COMPREHEN METABOLIC PANEL: CPT | Performed by: PEDIATRICS

## 2019-05-07 PROCEDURE — 84100 ASSAY OF PHOSPHORUS: CPT | Performed by: PEDIATRICS

## 2019-05-07 PROCEDURE — 85025 COMPLETE CBC W/AUTO DIFF WBC: CPT | Performed by: PEDIATRICS

## 2019-05-07 PROCEDURE — 83735 ASSAY OF MAGNESIUM: CPT | Performed by: PEDIATRICS

## 2019-05-14 ENCOUNTER — THERAPY VISIT (OUTPATIENT)
Dept: PHYSICAL THERAPY | Facility: CLINIC | Age: 19
End: 2019-05-14
Payer: COMMERCIAL

## 2019-05-14 DIAGNOSIS — K59.04 CHRONIC IDIOPATHIC CONSTIPATION: ICD-10-CM

## 2019-05-14 DIAGNOSIS — C71.9 EPENDYMOMA (H): ICD-10-CM

## 2019-05-14 LAB
BASOPHILS # BLD AUTO: 0 10E9/L (ref 0–0.2)
BASOPHILS NFR BLD AUTO: 0.3 %
DIFFERENTIAL METHOD BLD: ABNORMAL
EOSINOPHIL # BLD AUTO: 0.6 10E9/L (ref 0–0.7)
EOSINOPHIL NFR BLD AUTO: 10.5 %
ERYTHROCYTE [DISTWIDTH] IN BLOOD BY AUTOMATED COUNT: 13.2 % (ref 10–15)
HCT VFR BLD AUTO: 40 % (ref 40–53)
HGB BLD-MCNC: 13.4 G/DL (ref 13.3–17.7)
LYMPHOCYTES # BLD AUTO: 0.9 10E9/L (ref 0.8–5.3)
LYMPHOCYTES NFR BLD AUTO: 14.5 %
MCH RBC QN AUTO: 30.2 PG (ref 26.5–33)
MCHC RBC AUTO-ENTMCNC: 33.5 G/DL (ref 31.5–36.5)
MCV RBC AUTO: 90 FL (ref 78–100)
MONOCYTES # BLD AUTO: 0.5 10E9/L (ref 0–1.3)
MONOCYTES NFR BLD AUTO: 8.8 %
NEUTROPHILS # BLD AUTO: 4 10E9/L (ref 1.6–8.3)
NEUTROPHILS NFR BLD AUTO: 65.9 %
PLATELET # BLD AUTO: 108 10E9/L (ref 150–450)
RBC # BLD AUTO: 4.43 10E12/L (ref 4.4–5.9)
WBC # BLD AUTO: 6 10E9/L (ref 4–11)

## 2019-05-14 PROCEDURE — 97110 THERAPEUTIC EXERCISES: CPT | Mod: GP | Performed by: PHYSICAL THERAPIST

## 2019-05-14 PROCEDURE — 36415 COLL VENOUS BLD VENIPUNCTURE: CPT | Performed by: PEDIATRICS

## 2019-05-14 PROCEDURE — 85025 COMPLETE CBC W/AUTO DIFF WBC: CPT | Performed by: PEDIATRICS

## 2019-05-14 PROCEDURE — 83735 ASSAY OF MAGNESIUM: CPT | Performed by: PEDIATRICS

## 2019-05-14 PROCEDURE — 84100 ASSAY OF PHOSPHORUS: CPT | Performed by: PEDIATRICS

## 2019-05-14 PROCEDURE — 80053 COMPREHEN METABOLIC PANEL: CPT | Performed by: PEDIATRICS

## 2019-05-14 PROCEDURE — 97112 NEUROMUSCULAR REEDUCATION: CPT | Mod: GP | Performed by: PHYSICAL THERAPIST

## 2019-05-15 ENCOUNTER — OFFICE VISIT (OUTPATIENT)
Dept: PEDIATRIC HEMATOLOGY/ONCOLOGY | Facility: CLINIC | Age: 19
End: 2019-05-15
Attending: NURSE PRACTITIONER
Payer: COMMERCIAL

## 2019-05-15 VITALS
DIASTOLIC BLOOD PRESSURE: 69 MMHG | BODY MASS INDEX: 27.5 KG/M2 | OXYGEN SATURATION: 97 % | HEART RATE: 83 BPM | WEIGHT: 195.11 LBS | SYSTOLIC BLOOD PRESSURE: 119 MMHG | TEMPERATURE: 97.1 F | RESPIRATION RATE: 16 BRPM

## 2019-05-15 DIAGNOSIS — C71.9 EPENDYMOMA (H): ICD-10-CM

## 2019-05-15 DIAGNOSIS — D49.6 NEOPLASM OF POSTERIOR CRANIAL FOSSA (H): Primary | ICD-10-CM

## 2019-05-15 DIAGNOSIS — C71.9 EPENDYMOMA (H): Primary | ICD-10-CM

## 2019-05-15 LAB
ALBUMIN SERPL-MCNC: 3.3 G/DL (ref 3.4–5)
ALP SERPL-CCNC: 143 U/L (ref 65–260)
ALT SERPL W P-5'-P-CCNC: 19 U/L (ref 0–50)
ANION GAP SERPL CALCULATED.3IONS-SCNC: 4 MMOL/L (ref 3–14)
AST SERPL W P-5'-P-CCNC: 19 U/L (ref 0–35)
BILIRUB SERPL-MCNC: 0.3 MG/DL (ref 0.2–1.3)
BUN SERPL-MCNC: 17 MG/DL (ref 7–30)
CALCIUM SERPL-MCNC: 8.7 MG/DL (ref 8.5–10.1)
CHLORIDE SERPL-SCNC: 106 MMOL/L (ref 98–110)
CO2 SERPL-SCNC: 30 MMOL/L (ref 20–32)
CREAT SERPL-MCNC: 1.1 MG/DL (ref 0.5–1)
GFR SERPL CREATININE-BSD FRML MDRD: >90 ML/MIN/{1.73_M2}
GLUCOSE SERPL-MCNC: 96 MG/DL (ref 70–99)
MAGNESIUM SERPL-MCNC: 2.2 MG/DL (ref 1.6–2.3)
PHOSPHATE SERPL-MCNC: 3.5 MG/DL (ref 2.5–4.5)
POTASSIUM SERPL-SCNC: 4.3 MMOL/L (ref 3.4–5.3)
PROT SERPL-MCNC: 6.6 G/DL (ref 6.8–8.8)
SODIUM SERPL-SCNC: 140 MMOL/L (ref 133–144)

## 2019-05-15 PROCEDURE — G0463 HOSPITAL OUTPT CLINIC VISIT: HCPCS | Mod: ZF

## 2019-05-15 RX ORDER — SODIUM CHLORIDE 9 MG/ML
1000 INJECTION, SOLUTION INTRAVENOUS CONTINUOUS PRN
Status: CANCELLED
Start: 2019-05-15

## 2019-05-15 RX ORDER — ALBUTEROL SULFATE 0.83 MG/ML
2.5 SOLUTION RESPIRATORY (INHALATION)
Status: CANCELLED | OUTPATIENT
Start: 2019-05-15

## 2019-05-15 RX ORDER — EPINEPHRINE 1 MG/ML
0.3 INJECTION, SOLUTION, CONCENTRATE INTRAVENOUS EVERY 5 MIN PRN
Status: CANCELLED | OUTPATIENT
Start: 2019-05-15

## 2019-05-15 RX ORDER — ALBUTEROL SULFATE 90 UG/1
1-2 AEROSOL, METERED RESPIRATORY (INHALATION)
Status: CANCELLED
Start: 2019-05-15

## 2019-05-15 RX ORDER — DIPHENHYDRAMINE HYDROCHLORIDE 50 MG/ML
50 INJECTION INTRAMUSCULAR; INTRAVENOUS
Status: CANCELLED
Start: 2019-05-15

## 2019-05-15 RX ORDER — MEPERIDINE HYDROCHLORIDE 25 MG/ML
25 INJECTION INTRAMUSCULAR; INTRAVENOUS; SUBCUTANEOUS EVERY 30 MIN PRN
Status: CANCELLED | OUTPATIENT
Start: 2019-05-15

## 2019-05-15 RX ORDER — METHYLPREDNISOLONE SODIUM SUCCINATE 125 MG/2ML
125 INJECTION, POWDER, LYOPHILIZED, FOR SOLUTION INTRAMUSCULAR; INTRAVENOUS
Status: CANCELLED
Start: 2019-05-15

## 2019-05-15 ASSESSMENT — ENCOUNTER SYMPTOMS
CARDIOVASCULAR NEGATIVE: 1
CONSTIPATION: 1
SPEECH DIFFICULTY: 1
RESPIRATORY NEGATIVE: 1
FACIAL ASYMMETRY: 1
SLEEP DISTURBANCE: 1
CONSTITUTIONAL NEGATIVE: 1

## 2019-05-15 ASSESSMENT — PAIN SCALES - GENERAL: PAINLEVEL: NO PAIN (0)

## 2019-05-15 NOTE — PROGRESS NOTES
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 He is enrolled on COG [id number] - A Phase 1 Study of Entinostat, an Oral Histone Deacetylase Inhibitor, in Pediatric Patients With Recurrent or Refractory Solid Tumors, Including CNS Tumors and Lymphoma.      HPI:  Geo notes constipation is still a problem.  He was seen last week for third opinion by [facility] regarding this per Geo's request. [place] made a suggestion to try Dulcolax suppository since they stopped oral some time ago.   He is doing pelvic floor PT through our therapy department and the family thinks his pattern of constipation is improving so they are holding off for now on that.  [place] did say they could stop the erythromycin.  He does not complain of abdominal pain just that he feels full.  Dad reports he didn't poop today but pooped twice yesterday (large and medium stool). Geo remains upset about the lack of this provider solving the issue.  He wants a clean out. No fevers. No headache.  No nausea or vomiting and no rash. He has missed no doses of his Entinostat     Fam/Soc: Lives between his parents (one week at each home). They have been awarded guardianship of Geo. The families work well together - both parents have remarried. His dad has a clotting disorder, requiring him to take Warfarin daily.     History was obtained from Goe and his dad       Allergies   Allergen Reactions     Blood Transfusion Related (Informational Only) Swelling     Periorbital swelling post platelet transfusion     No Known Drug Allergies        Current Outpatient Medications   Medication     calcium carbonate-vitamin D 600-400 MG-UNIT CHEW     dexamethasone (DECADRON) 0.5 MG tablet     erythromycin ethylsuccinate (ERYPED) 200 MG/5ML suspension     fexofenadine (ALLEGRA) 180 MG tablet     linaclotide (LINZESS) 290 MCG capsule     mupirocin  (BACTROBAN) 2 % external ointment     OLANZapine (ZYPREXA) 15 MG tablet     omeprazole (PRILOSEC) 20 MG DR capsule     polyethylene glycol (MIRALAX/GLYCOLAX) packet     potassium phosphate, monobasic, (K-PHOS) 500 MG tablet     senna-docusate (SENOKOT-S/PERICOLACE) 8.6-50 MG tablet     Sennosides (EX-LAX) 15 MG CHEW     sertraline (ZOLOFT) 50 MG tablet     study - entinostat (IDS# 5050) 1 mg tablet     study - entinostat (IDS# 5050) 5 mg tablet     sulfamethoxazole-trimethoprim (BACTRIM/SEPTRA) 400-80 MG per tablet     vitamin D3 (CHOLECALCIFEROL) 2000 units tablet     vitamin E (GNP VITAMIN E) 400 UNIT capsule     No current facility-administered medications for this visit.        Past Medical History:   Diagnosis Date     Cranial nerve dysfunction      Dyspepsia      Ependymoma (H)      Gastro-oesophageal reflux disease      Hearing loss      Intracranial hemorrhage (H)      Migraine      Pilonidal cyst     7-2015     Reduced vision      Refractory obstruction of nasal airway     2nd to nasal valve prolapse     Sleep apnea      Strabismus     gaze palsy        Past Surgical History:   Procedure Laterality Date     GRAFT CARTILAGE FROM POSTERIOR AURICLE Left 10/6/2016    Procedure: GRAFT CARTILAGE FROM POSTERIOR AURICLE;  Surgeon: Tyler Richards MD;  [facility]     INCISION AND DRAINAGE PERINEAL, COMBINED Bilateral 7/18/2015    Procedure: COMBINED INCISION AND DRAINAGE PERINEAL;  Surgeon: Dequan Timmons MD;  [facility]     OPTICAL TRACKING SYSTEM CRANIOTOMY, EXCISE TUMOR, COMBINED N/A 4/13/2015    Procedure: COMBINED OPTICAL TRACKING SYSTEM CRANIOTOMY, EXCISE TUMOR;  Surgeon: Francis Velazquez MD;  [facility]     OPTICAL TRACKING SYSTEM CRANIOTOMY, EXCISE TUMOR, COMBINED N/A 4/16/2015    Procedure: COMBINED OPTICAL TRACKING SYSTEM CRANIOTOMY, EXCISE TUMOR;  Surgeon: Francis Velazquez MD;  [facility]     OPTICAL TRACKING SYSTEM CRANIOTOMY, EXCISE TUMOR, COMBINED  Bilateral 5/28/2015    Procedure: COMBINED OPTICAL TRACKING SYSTEM CRANIOTOMY, EXCISE TUMOR;  Surgeon: Francis Velazquez MD;  [facility]     OPTICAL TRACKING SYSTEM CRANIOTOMY, EXCISE TUMOR, COMBINED Bilateral 1/14/2016    Procedure: COMBINED OPTICAL TRACKING SYSTEM CRANIOTOMY, EXCISE TUMOR;  Surgeon: Francis Velazquez MD;  [facility]     OPTICAL TRACKING SYSTEM VENTRICULOSTOMY  4/16/2015    Procedure: OPTICAL TRACKING SYSTEM VENTRICULOSTOMY;  Surgeon: Francis Velazquez MD;  [facility]     REMOVE PORT VASCULAR ACCESS N/A 10/6/2016    Procedure: REMOVE PORT VASCULAR ACCESS;  Surgeon: Bruno Perea MD;  [facility]     RHINOPLASTY N/A 10/6/2016    Procedure: RHINOPLASTY;  Surgeon: Tyler Richards MD;  [facility]     VASCULAR SURGERY  5-2015    single lumen power port       Family History   Problem Relation Age of Onset     Circulatory Father         PE/DVT     Hypothyroidism Father 30     Diabetes Maternal Grandmother      Diabetes Paternal Grandmother      Diabetes Paternal Grandfather      C.A.D. Paternal Grandfather      Hypertension Maternal Grandfather      Thyroid Disease Paternal Aunt         unknown whether hypo or hyper     Mental Illness No family hx of        Review of Systems   Constitutional: Negative.         In a wheelchair   HENT: Positive for hearing loss (Pre-existing from prior therapy).    Eyes: Positive for visual disturbance (Wears a patch over right eye to minimize diplopia).   Respiratory: Negative.    Cardiovascular: Negative.    Gastrointestinal: Positive for constipation (Chronic).   Endocrine:        Follow with Dr. Martin   Neurological: Positive for facial asymmetry and speech difficulty.   Psychiatric/Behavioral: Positive for sleep disturbance (Not using his Bi-Pap).   All other systems reviewed and are negative.      /69 (BP Location: Right arm, Patient Position: Fowlers, Cuff Size: Adult Regular)   Pulse 83   Temp 97.1  F  (36.2  C) (Axillary)   Resp 16   Wt 88.5 kg (195 lb 1.7 oz)   SpO2 97%   BMI 27.50 kg/m       Physical Exam   Constitutional: He is oriented to person, place, and time. He appears well-developed and well-nourished. No distress.   HENT:   Occassionally saliva accumulates in mouth with occasional drooling.   Eyes: Pupils are equal, round, and reactive to light. Conjunctivae are normal.   Can track to right, but not to left. Nystagmus noted    Cardiovascular: Normal rate, regular rhythm and normal heart sounds.   Pulmonary/Chest: Effort normal and breath sounds normal. He has no wheezes.   Neurological: He is alert and oriented to person, place, and time. He has normal strength. A cranial nerve deficit is present. Coordination (Ataxic- dysmetria especially in upper extremities when accomplishing tasks.) abnormal. GCS eye subscore is 4. GCS verbal subscore is 5. GCS motor subscore is 6.   Negative Pronator drift   Skin: Skin is warm and dry.   Scattered striae   Psychiatric: He has a normal mood and affect.     Lab:  Results for orders placed or performed in visit on 05/14/19   Phosphorus   Result Value Ref Range    Phosphorus 3.5 2.5 - 4.5 mg/dL   Magnesium   Result Value Ref Range    Magnesium 2.2 1.6 - 2.3 mg/dL   Comprehensive metabolic panel   Result Value Ref Range    Sodium 140 133 - 144 mmol/L    Potassium 4.3 3.4 - 5.3 mmol/L    Chloride 106 98 - 110 mmol/L    Carbon Dioxide 30 20 - 32 mmol/L    Anion Gap 4 3 - 14 mmol/L    Glucose 96 70 - 99 mg/dL    Urea Nitrogen 17 7 - 30 mg/dL    Creatinine 1.10 (H) 0.50 - 1.00 mg/dL    GFR Estimate >90 >60 mL/min/[1.73_m2]    GFR Estimate If Black >90 >60 mL/min/[1.73_m2]    Calcium 8.7 8.5 - 10.1 mg/dL    Bilirubin Total 0.3 0.2 - 1.3 mg/dL    Albumin 3.3 (L) 3.4 - 5.0 g/dL    Protein Total 6.6 (L) 6.8 - 8.8 g/dL    Alkaline Phosphatase 143 65 - 260 U/L    ALT 19 0 - 50 U/L    AST 19 0 - 35 U/L   CBC with platelets differential   Result Value Ref Range    WBC 6.0 4.0  - 11.0 10e9/L    RBC Count 4.43 4.4 - 5.9 10e12/L    Hemoglobin 13.4 13.3 - 17.7 g/dL    Hematocrit 40.0 40.0 - 53.0 %    MCV 90 78 - 100 fl    MCH 30.2 26.5 - 33.0 pg    MCHC 33.5 31.5 - 36.5 g/dL    RDW 13.2 10.0 - 15.0 %    Platelet Count 108 (L) 150 - 450 10e9/L    Diff Method Automated Method     % Neutrophils 65.9 %    % Lymphocytes 14.5 %    % Monocytes 8.8 %    % Eosinophils 10.5 %    % Basophils 0.3 %    Absolute Neutrophil 4.0 1.6 - 8.3 10e9/L    Absolute Lymphocytes 0.9 0.8 - 5.3 10e9/L    Absolute Monocytes 0.5 0.0 - 1.3 10e9/L    Absolute Eosinophils 0.6 0.0 - 0.7 10e9/L    Absolute Basophils 0.0 0.0 - 0.2 10e9/L     *Note: Due to a large number of results and/or encounters for the requested time period, some results have not been displayed. A complete set of results can be found in Results Review.         Impression:  1. 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 Treated with Entinostat, continues to tolerate well.   3. Labs today are adequate for continuing Entinostat.   4. Chronic constipation which is frustrating for him    Plan:  1. RTC in 4 weeks  as scheduled for follow up, or sooner PRN.   2. Continue weekly labs.  3. Continue Pelvic floor PT  4. Continue weekly labs.  5. Start Entinostat 6mg weekly beginning today.    6. Continue constipation management by [facility] - discussed if he had 3 days of no stool and is full of stool and cannot makes timely changes to his oral regime, he could have a clean out at our facility but [place] should manage his concerns as they are GI specialists just as I specialize in caring for his brain tumor. No clean out needed today as he had stool yesterday and doesn't not have abdominal discomfort. Reviewed that all chemotherapy can have side effects that require management. Discussed that his lack of exercise is likely contributing to his constipation as well.

## 2019-05-15 NOTE — Clinical Note
5/15/2019      RE: Geo Hicks  [id number] [address] [id number]       No notes on file    Kristi Schuler, ALAN CNP

## 2019-05-15 NOTE — LETTER
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 He is enrolled on COG [id number] - A Phase 1 Study of Entinostat, an Oral Histone Deacetylase Inhibitor, in Pediatric Patients With Recurrent or Refractory Solid Tumors, Including CNS Tumors and Lymphoma.      HPI:  Geo notes constipation is still a problem.  He was seen last week for third opinion by [facility] regarding this per Geo's request. [place] made a suggestion to try Dulcolax suppository since they stopped oral some time ago.   He is doing pelvic floor PT through our therapy department and the family thinks his pattern of constipation is improving so they are holding off for now on that.  [place] did say they could stop the erythromycin.  He does not complain of abdominal pain just that he feels full.  Dad reports he didn't poop today but pooped twice yesterday (large and medium stool). Geo remains upset about the lack of this provider solving the issue.  He wants a clean out. No fevers. No headache.  No nausea or vomiting and no rash. He has missed no doses of his Entinostat     Fam/Soc: Lives between his parents (one week at each home). They have been awarded guardianship of Geo. The families work well together - both parents have remarried. His dad has a clotting disorder, requiring him to take Warfarin daily.     History was obtained from Geo and his dad       Allergies   Allergen Reactions     Blood Transfusion Related (Informational Only) Swelling     Periorbital swelling post platelet transfusion     No Known Drug Allergies        Current Outpatient Medications   Medication     calcium carbonate-vitamin D 600-400 MG-UNIT CHEW     dexamethasone (DECADRON) 0.5 MG tablet     erythromycin ethylsuccinate (ERYPED) 200 MG/5ML suspension     fexofenadine  (ALLEGRA) 180 MG tablet     linaclotide (LINZESS) 290 MCG capsule     mupirocin (BACTROBAN) 2 % external ointment     OLANZapine (ZYPREXA) 15 MG tablet     omeprazole (PRILOSEC) 20 MG DR capsule     polyethylene glycol (MIRALAX/GLYCOLAX) packet     potassium phosphate, monobasic, (K-PHOS) 500 MG tablet     senna-docusate (SENOKOT-S/PERICOLACE) 8.6-50 MG tablet     Sennosides (EX-LAX) 15 MG CHEW     sertraline (ZOLOFT) 50 MG tablet     study - entinostat (IDS# 5050) 1 mg tablet     study - entinostat (IDS# 5050) 5 mg tablet     sulfamethoxazole-trimethoprim (BACTRIM/SEPTRA) 400-80 MG per tablet     vitamin D3 (CHOLECALCIFEROL) 2000 units tablet     vitamin E (GNP VITAMIN E) 400 UNIT capsule     No current facility-administered medications for this visit.        Past Medical History:   Diagnosis Date     Cranial nerve dysfunction      Dyspepsia      Ependymoma (H)      Gastro-oesophageal reflux disease      Hearing loss      Intracranial hemorrhage (H)      Migraine      Pilonidal cyst     7-2015     Reduced vision      Refractory obstruction of nasal airway     2nd to nasal valve prolapse     Sleep apnea      Strabismus     gaze palsy        Past Surgical History:   Procedure Laterality Date     GRAFT CARTILAGE FROM POSTERIOR AURICLE Left 10/6/2016    Procedure: GRAFT CARTILAGE FROM POSTERIOR AURICLE;  Surgeon: Tyler Richards MD;  [facility]     INCISION AND DRAINAGE PERINEAL, COMBINED Bilateral 7/18/2015    Procedure: COMBINED INCISION AND DRAINAGE PERINEAL;  Surgeon: Dequan Timmons MD;  [facility]     OPTICAL TRACKING SYSTEM CRANIOTOMY, EXCISE TUMOR, COMBINED N/A 4/13/2015    Procedure: COMBINED OPTICAL TRACKING SYSTEM CRANIOTOMY, EXCISE TUMOR;  Surgeon: Francis Velazquez MD;  [facility]     OPTICAL TRACKING SYSTEM CRANIOTOMY, EXCISE TUMOR, COMBINED N/A 4/16/2015    Procedure: COMBINED OPTICAL TRACKING SYSTEM CRANIOTOMY, EXCISE TUMOR;  Surgeon: Francis Velazquez MD;   [facility]     OPTICAL TRACKING SYSTEM CRANIOTOMY, EXCISE TUMOR, COMBINED Bilateral 5/28/2015    Procedure: COMBINED OPTICAL TRACKING SYSTEM CRANIOTOMY, EXCISE TUMOR;  Surgeon: Francis Velazquez MD;  [facility]     OPTICAL TRACKING SYSTEM CRANIOTOMY, EXCISE TUMOR, COMBINED Bilateral 1/14/2016    Procedure: COMBINED OPTICAL TRACKING SYSTEM CRANIOTOMY, EXCISE TUMOR;  Surgeon: Francis Velazquez MD;  [facility]     OPTICAL TRACKING SYSTEM VENTRICULOSTOMY  4/16/2015    Procedure: OPTICAL TRACKING SYSTEM VENTRICULOSTOMY;  Surgeon: Francis Velazquez MD;  [facility]     REMOVE PORT VASCULAR ACCESS N/A 10/6/2016    Procedure: REMOVE PORT VASCULAR ACCESS;  Surgeon: Bruno Perea MD;  [facility]     RHINOPLASTY N/A 10/6/2016    Procedure: RHINOPLASTY;  Surgeon: Tyler Richards MD;  [facility]     VASCULAR SURGERY  5-2015    single lumen power port       Family History   Problem Relation Age of Onset     Circulatory Father         PE/DVT     Hypothyroidism Father 30     Diabetes Maternal Grandmother      Diabetes Paternal Grandmother      Diabetes Paternal Grandfather      C.A.D. Paternal Grandfather      Hypertension Maternal Grandfather      Thyroid Disease Paternal Aunt         unknown whether hypo or hyper     Mental Illness No family hx of        Review of Systems   Constitutional: Negative.         In a wheelchair   HENT: Positive for hearing loss (Pre-existing from prior therapy).    Eyes: Positive for visual disturbance (Wears a patch over right eye to minimize diplopia).   Respiratory: Negative.    Cardiovascular: Negative.    Gastrointestinal: Positive for constipation (Chronic).   Endocrine:        Follow with Dr. Maritn   Neurological: Positive for facial asymmetry and speech difficulty.   Psychiatric/Behavioral: Positive for sleep disturbance (Not using his Bi-Pap).   All other systems reviewed and are negative.      /69 (BP Location: Right arm,  Patient Position: Fowlers, Cuff Size: Adult Regular)   Pulse 83   Temp 97.1  F (36.2  C) (Axillary)   Resp 16   Wt 88.5 kg (195 lb 1.7 oz)   SpO2 97%   BMI 27.50 kg/m        Physical Exam   Constitutional: He is oriented to person, place, and time. He appears well-developed and well-nourished. No distress.   HENT:   Occassionally saliva accumulates in mouth with occasional drooling.   Eyes: Pupils are equal, round, and reactive to light. Conjunctivae are normal.   Can track to right, but not to left. Nystagmus noted    Cardiovascular: Normal rate, regular rhythm and normal heart sounds.   Pulmonary/Chest: Effort normal and breath sounds normal. He has no wheezes.   Neurological: He is alert and oriented to person, place, and time. He has normal strength. A cranial nerve deficit is present. Coordination (Ataxic- dysmetria especially in upper extremities when accomplishing tasks.) abnormal. GCS eye subscore is 4. GCS verbal subscore is 5. GCS motor subscore is 6.   Negative Pronator drift   Skin: Skin is warm and dry.   Scattered striae   Psychiatric: He has a normal mood and affect.     Lab:  Results for orders placed or performed in visit on 05/14/19   Phosphorus   Result Value Ref Range    Phosphorus 3.5 2.5 - 4.5 mg/dL   Magnesium   Result Value Ref Range    Magnesium 2.2 1.6 - 2.3 mg/dL   Comprehensive metabolic panel   Result Value Ref Range    Sodium 140 133 - 144 mmol/L    Potassium 4.3 3.4 - 5.3 mmol/L    Chloride 106 98 - 110 mmol/L    Carbon Dioxide 30 20 - 32 mmol/L    Anion Gap 4 3 - 14 mmol/L    Glucose 96 70 - 99 mg/dL    Urea Nitrogen 17 7 - 30 mg/dL    Creatinine 1.10 (H) 0.50 - 1.00 mg/dL    GFR Estimate >90 >60 mL/min/[1.73_m2]    GFR Estimate If Black >90 >60 mL/min/[1.73_m2]    Calcium 8.7 8.5 - 10.1 mg/dL    Bilirubin Total 0.3 0.2 - 1.3 mg/dL    Albumin 3.3 (L) 3.4 - 5.0 g/dL    Protein Total 6.6 (L) 6.8 - 8.8 g/dL    Alkaline Phosphatase 143 65 - 260 U/L    ALT 19 0 - 50 U/L    AST 19 0 -  35 U/L   CBC with platelets differential   Result Value Ref Range    WBC 6.0 4.0 - 11.0 10e9/L    RBC Count 4.43 4.4 - 5.9 10e12/L    Hemoglobin 13.4 13.3 - 17.7 g/dL    Hematocrit 40.0 40.0 - 53.0 %    MCV 90 78 - 100 fl    MCH 30.2 26.5 - 33.0 pg    MCHC 33.5 31.5 - 36.5 g/dL    RDW 13.2 10.0 - 15.0 %    Platelet Count 108 (L) 150 - 450 10e9/L    Diff Method Automated Method     % Neutrophils 65.9 %    % Lymphocytes 14.5 %    % Monocytes 8.8 %    % Eosinophils 10.5 %    % Basophils 0.3 %    Absolute Neutrophil 4.0 1.6 - 8.3 10e9/L    Absolute Lymphocytes 0.9 0.8 - 5.3 10e9/L    Absolute Monocytes 0.5 0.0 - 1.3 10e9/L    Absolute Eosinophils 0.6 0.0 - 0.7 10e9/L    Absolute Basophils 0.0 0.0 - 0.2 10e9/L     *Note: Due to a large number of results and/or encounters for the requested time period, some results have not been displayed. A complete set of results can be found in Results Review.         Impression:  1. 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 Treated with Entinostat, continues to tolerate well.   3. Labs today are adequate for continuing Entinostat.   4. Chronic constipation which is frustrating for him    Plan:  1. RTC in 4 weeks  as scheduled for follow up, or sooner PRN.   2. Continue weekly labs.  3. Continue Pelvic floor PT  4. Continue weekly labs.  5. Start Entinostat 6mg weekly beginning today.    6. Continue constipation management by [facility] - discussed if he had 3 days of no stool and is full of stool and cannot makes timely changes to his oral regime, he could have a clean out at our facility but [place] should manage his concerns as they are GI specialists just as I specialize in caring for his brain tumor. No clean out needed today as he had stool yesterday and doesn't not have abdominal discomfort. Reviewed that all chemotherapy can have side effects that require management. Discussed that his lack of exercise is likely contributing to his constipation as well.       Kristi Schuler,  APRN CNP

## 2019-05-15 NOTE — NURSING NOTE
Chief Complaint   Patient presents with     RECHECK     [place] here today for follow up with ependymoma     /69 (BP Location: Right arm, Patient Position: Fowlers, Cuff Size: Adult Regular)   Pulse 83   Temp 97.1  F (36.2  C) (Axillary)   Resp 16   Wt 88.5 kg (195 lb 1.7 oz)   SpO2 97%   BMI 27.50 kg/m    Ember Aguilar CMA  May 15, 2019

## 2019-05-16 ENCOUNTER — OFFICE VISIT (OUTPATIENT)
Dept: NEUROLOGY | Facility: CLINIC | Age: 19
End: 2019-05-16
Payer: COMMERCIAL

## 2019-05-16 VITALS
SYSTOLIC BLOOD PRESSURE: 103 MMHG | DIASTOLIC BLOOD PRESSURE: 68 MMHG | WEIGHT: 195.11 LBS | OXYGEN SATURATION: 92 % | HEART RATE: 87 BPM | BODY MASS INDEX: 27.5 KG/M2

## 2019-05-16 DIAGNOSIS — C71.9 EPENDYMOMA (H): Primary | ICD-10-CM

## 2019-05-16 ASSESSMENT — ENCOUNTER SYMPTOMS
WEAKNESS: 0
MEMORY LOSS: 0
DYSURIA: 0
CONSTIPATION: 1
DECREASED CONCENTRATION: 1
VOMITING: 0
SEIZURES: 0
BLOATING: 0
NERVOUS/ANXIOUS: 0
DIZZINESS: 1
HEMATURIA: 0
JAUNDICE: 0
PARALYSIS: 0
TREMORS: 1
PANIC: 0
DISTURBANCES IN COORDINATION: 1
BLOOD IN STOOL: 0
HEADACHES: 0
TINGLING: 0
NUMBNESS: 0
HEARTBURN: 0
ABDOMINAL PAIN: 0
BOWEL INCONTINENCE: 0
FLANK PAIN: 0
DEPRESSION: 1
DIFFICULTY URINATING: 1
SPEECH CHANGE: 1
RECTAL PAIN: 0
NAUSEA: 0
INSOMNIA: 1
LOSS OF CONSCIOUSNESS: 0
DIARRHEA: 0

## 2019-05-16 ASSESSMENT — PAIN SCALES - GENERAL: PAINLEVEL: NO PAIN (0)

## 2019-05-16 NOTE — PATIENT INSTRUCTIONS
We will send instructions to your Psychiatrist about possible medication changes to help with these abnormal movements.    Follow up with me or Dr. Holley in Pediatric Neurology as needed.

## 2019-05-16 NOTE — NURSING NOTE
Chief Complaint   Patient presents with     Consult     [facility] NEW - ABNORMAL INVOLUNTARY MOVEMENT       Leoncio Pak, EMT

## 2019-05-16 NOTE — LETTER
2019       RE: Geo Hicks  [id number] [facility] [id number]     Dear Colleague,    Thank you for referring your patient, Geo Hicks, to the [facility] NEUROLOGY at [facility]. Please see a copy of my visit note below.      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 Holley patient in pediatric neurology who presents to general adult neurology clinic for evaluation of abnormal movements in referral from his adult psychiatrist. Dr. Salinas is his neurosurgeon.     He and his father and his psychiatrist are concerned about involuntary movements of the arms and legs. They have been happening for the past 1-2 months, and occur ever 30 minutes or so, in just one arm or leg. They are painless and do not cause the limb to do more than twitch for less than a second. He cannot predict them or suppress them for this reason. They are more frequent in the morning and only occur when seated or lying in bed. They have never cause him to have his legs collapse underneath him when he walks with his 2WW or is transferring. He maintains awareness and interactivity throughout.     He was recently started on olanzapine for sleep, anxiety, and behavioral outburst about 2 months prior, first 10 mg then 15 at bedtime. Then, zoloft was started 1 month ago, also for mood. These both seem to be helping and he would like to  continue taking them. The limb movements are annoying to him, but not to the point that he would want to take an additional medicine to suppress them or stop/change his psych meds, yet.    He has had EEG twice, once for staring spells in school and once for an episode of wandering at night. Neither had epileptiform discharges and the spells have not recurred.    Last traditional chemo was 2015.     Numb to light and pin on R, hearing less on R    ASSESSMENT AND PLAN:  Geo Hicks is a 18 year old male with a history of 4th ventricular ependymoma s/p incomplete resection, RT, and chemotherapy who presents with subacute involuntary limb movements consistent with myoclonus. This may be secondary to initiation of neuroleptics/antidepressants. MRI brain and C/T/L spine reviewed and there are no findings concerning for progression. It is reasonable to reassure and observe for now with plan to decrease dose of olanzapine first and change to an alternative antipsychotic if this does not help. If psych meds cannot be changed or this does not help, would initiate low dose keppra or carbamazepine to treat myoclonus. I am happy to continue seeing Mr. Hicks in clinic, or if he prefers, he can return to Dr. Holley's clinic if she is willing to see patients older than age 18.    Patient was seen and discussed with Dr. Mccray.    Savita Gaffney MD  PGY-3 Resident  [facility] Department of Neurology  Pager: [phone]    I saw and evaluated Mr. Hicks and discussed the plan of care with Dr. Gaffney. I reviewed relevant labs and imaging. I have reviewed Dr. Gaffney's note and agree with the findings, impression and plan.   Kimberly Downey MD    PHYSICAL EXAMINATION:  Vitals: /68 (BP Location: Left arm, Patient Position: Sitting, Cuff Size: Adult Regular)   Pulse 87   Wt 88.5 kg (195 lb 1.7 oz)   SpO2 92%   BMI 27.50 kg/m     General: adult male patient, seated on wheelchair, NAD, accompanied by father  HEENT: midline  posterior surgical scar on neck, pooled oral secretions, white accumulations on tongue, mucosa moist, eye patch over left eye  Cardiac: RRR, no m/r/g  Pulmonary: CTAB, nonlabored on RA  Abdomen: soft, nontender, nondistended, BS+  Extremities: warm, dry, w/o edema  Skin: diffuse striae on arms and legs  Psych: pleasant affect and congruent mood, thought content w/o abnormality, process linear and logical  Neuro:   Mental status: alert and oriented to person, place, and time; language is fluent with intact comprehension and naming   Cranial nerves: VFF, PERRL, L eye prominent 6th nerve palsy and an SANGEETHA, R eye mild 6th nerve palsy and an SANGEETHA. L eye rests medially and rotated. No facial asymmetry, orbicularis oris is weak, unable to close lips, facial sensation intact on L, markedly diminished to pin and light touch on R V1-3. Tongue and uvula are midline, speech is markedly dysarthric. Shoulder shrug intact.   Motor: Forward leaning posture in chair while seated, normal tone, no atrophy, intermittent hamstrings, quadriceps, finger, and forearm myoclonus which is not noticed by the patient. On two occasions, L quads is noticed. No fasciculations, percussive myoclonus    Biceps Triceps Deltoid Hip flexor Knee extension Knee flexion Ankle extension Ankle flexion   Right 5 5 5 5 5 5 5 5 5   Left 5 5 5 5 5 5 5 5 5    Reflexes: Absent Babinski. Absent Lopez.   Brachioradialis Biceps Triceps Patellar Achilles   Right 2+ 2+ 2+ 2+ 2+   Left 2+ 2+ 2+ 2+ 2+    Sensory: Intact to light touch, pin prick, and vibration on L, impaired on R.   Coordination: Ataxic FNF and heel-shin bilaterally, truncal titubation while seated. Dysdiadochokinesia bilaterally.   Gait: Not tested.    INVESTIGATIONS:   MRI brain, C, T, and L spine w/ and w/o contrast 4/9/19 reviewed and in epic    REVIEW OF SYSTEMS: 12-point RoS negative except as per HPI.    ALLERGIES:  Allergies   Allergen Reactions     Blood Transfusion Related (Informational  Only) Swelling     Periorbital swelling post platelet transfusion     No Known Drug Allergies      MEDICATIONS:  Current Outpatient Medications   Medication Sig Dispense Refill     calcium carbonate-vitamin D 600-400 MG-UNIT CHEW Take 2 tablets in the morning and 1 tablet in the evening. 90 tablet 3     dexamethasone (DECADRON) 0.5 MG tablet Take 0.75 mg by mouth daily (with breakfast). 90 tablet 3     fexofenadine (ALLEGRA) 180 MG tablet Take 180 mg by mouth every evening        linaclotide (LINZESS) 290 MCG capsule Take 290 mcg by mouth every morning (before breakfast)        mupirocin (BACTROBAN) 2 % external ointment Use 2 times a day to the buttock with flare 22 g 3     OLANZapine (ZYPREXA) 15 MG tablet Take 1 tablet (15 mg) by mouth At Bedtime 30 tablet 1     omeprazole (PRILOSEC) 20 MG DR capsule Take 2 capsules (40 mg) by mouth every morning 60 capsule 1     polyethylene glycol (MIRALAX/GLYCOLAX) packet Take 17 g by mouth 3 times daily       potassium phosphate, monobasic, (K-PHOS) 500 MG tablet Take 500 mg by mouth 2 times daily       senna-docusate (SENOKOT-S/PERICOLACE) 8.6-50 MG tablet Take 2 tablets by mouth At Bedtime       Sennosides (EX-LAX) 15 MG CHEW Take 2 chews after completion of Gatorade/Miralax 2 tablet 3     sertraline (ZOLOFT) 50 MG tablet Take 1 tablet (50 mg) by mouth daily 30 tablet 2     study - entinostat (IDS# 5050) 1 mg tablet Take 1 tablet (1 mg) by mouth every 7 days for 4 doses Take one 1mg tablet with one 5mg tablet for total dose of 6mg weekly. Take on an empty stomach, at least 1 hour before or 2 hours after a meal.  Swallow tablet whole. 4 tablet 0     study - entinostat (IDS# 5050) 5 mg tablet Take 1 tablet (5 mg) by mouth every 7 days for 4 doses Take one 5mg tablet with one 1mg tablet for total dose of 6mg weekly. Take on an empty stomach, at least 1 hour before or 2 hours after a meal.  Swallow tablet whole. 4 tablet 0     sulfamethoxazole-trimethoprim (BACTRIM/SEPTRA)  400-80 MG per tablet Take 1 tablet by mouth 2 times daily On Saturdays and Sundays 24 tablet 11     vitamin D3 (CHOLECALCIFEROL) 2000 units tablet Take 1 tablet by mouth daily       vitamin E (GNP VITAMIN E) 400 UNIT capsule Take 1 capsule (400 Units) by mouth daily 30 capsule 11     PAST MEDICAL HISTORY:  Past Medical History:   Diagnosis Date     Cranial nerve dysfunction      Dyspepsia      Ependymoma (H)      Gastro-oesophageal reflux disease      Hearing loss      Intracranial hemorrhage (H)      Migraine      Pilonidal cyst     7-2015     Reduced vision      Refractory obstruction of nasal airway     2nd to nasal valve prolapse     Sleep apnea      Strabismus     gaze palsy      PAST SURGICAL HISTORY:  Past Surgical History:   Procedure Laterality Date     GRAFT CARTILAGE FROM POSTERIOR AURICLE Left 10/6/2016    Procedure: GRAFT CARTILAGE FROM POSTERIOR AURICLE;  Surgeon: Tyler Richards MD;  Location: [facility] OR     INCISION AND DRAINAGE PERINEAL, COMBINED Bilateral 7/18/2015    Procedure: COMBINED INCISION AND DRAINAGE PERINEAL;  Surgeon: Dequan Timmons MD;  Location: [facility] OR     OPTICAL TRACKING SYSTEM CRANIOTOMY, EXCISE TUMOR, COMBINED N/A 4/13/2015    Procedure: COMBINED OPTICAL TRACKING SYSTEM CRANIOTOMY, EXCISE TUMOR;  Surgeon: Francis Velazquez MD;  [facility]: [facility]     OPTICAL TRACKING SYSTEM CRANIOTOMY, EXCISE TUMOR, COMBINED N/A 4/16/2015    Procedure: COMBINED OPTICAL TRACKING SYSTEM CRANIOTOMY, EXCISE TUMOR;  Surgeon: Francis Velazquez MD;  [facility]     OPTICAL TRACKING SYSTEM CRANIOTOMY, EXCISE TUMOR, COMBINED Bilateral 5/28/2015    Procedure: COMBINED OPTICAL TRACKING SYSTEM CRANIOTOMY, EXCISE TUMOR;  Surgeon: Francis Velazquez MD;  [facility]     OPTICAL TRACKING SYSTEM CRANIOTOMY, EXCISE TUMOR, COMBINED Bilateral 1/14/2016    Procedure: COMBINED OPTICAL TRACKING SYSTEM CRANIOTOMY, EXCISE TUMOR;  Surgeon: Francis Velazquez MD;  [facility]      OPTICAL TRACKING SYSTEM VENTRICULOSTOMY  4/16/2015    Procedure: OPTICAL TRACKING SYSTEM VENTRICULOSTOMY;  Surgeon: Francis Velazquez MD;  [facility]     REMOVE PORT VASCULAR ACCESS N/A 10/6/2016    Procedure: REMOVE PORT VASCULAR ACCESS;  Surgeon: Bruno Perea MD;  [facility]     RHINOPLASTY N/A 10/6/2016    Procedure: RHINOPLASTY;  Surgeon: Tyler Richards MD;  [facility]: [facility]     VASCULAR SURGERY  5-2015    single lumen power port     SOCIAL HISTORY:  Social History     Socioeconomic History     Marital status: Single     Spouse name: Not on file     Number of children: Not on file     Years of education: Not on file     Highest education level: Not on file   Occupational History     Not on file   Social Needs     Financial resource strain: Not on file     Food insecurity:     Worry: Not on file     Inability: Not on file     Transportation needs:     Medical: Not on file     Non-medical: Not on file   Tobacco Use     Smoking status: Never Smoker     Smokeless tobacco: Never Used   Substance and Sexual Activity     Alcohol use: No     Drug use: No     Sexual activity: Never   Lifestyle     Physical activity:     Days per week: Not on file     Minutes per session: Not on file     Stress: Not on file   Relationships     Social connections:     Talks on phone: Not on file     Gets together: Not on file     Attends Zoroastrianism service: Not on file     Active member of club or organization: Not on file     Attends meetings of clubs or organizations: Not on file     Relationship status: Not on file     Intimate partner violence:     Fear of current or ex partner: Not on file     Emotionally abused: Not on file     Physically abused: Not on file     Forced sexual activity: Not on file   Other Topics Concern     Not on file   Social History Narrative    Grown up in [place], [place].  Parents are , and he shares time between his mother's and father's homes. Both parents are remarried.  Dad  is a , and mom does  for a testing company.  Siblings include two full brothers (older brother Benitez who is a senior in college and younger brother Gamaliel), a step-brother, and a step-sister. Geo graduated from high school in Spring 2018.  Initially considered attending [employer] in Fall 2019, but reportedly lost interest due to the amount of time it would take him to complete courses and receive a degree.  Does today endorse continued interest in pursuing an advanced course of study at some point, specifically stating he is interested in Electrical Engineering.  No access to guns or weapons enjoys playing video games.  Guns are currently locked in the house.     FAMILY HISTORY:  Family History   Problem Relation Age of Onset     Circulatory Father         PE/DVT     Hypothyroidism Father 30     Diabetes Maternal Grandmother      Diabetes Paternal Grandmother      Diabetes Paternal Grandfather      C.A.D. Paternal Grandfather      Hypertension Maternal Grandfather      Thyroid Disease Paternal Aunt         unknown whether hypo or hyper     Mental Illness No family hx of        Savita Gaffney MD

## 2019-05-16 NOTE — PROGRESS NOTES
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 Holley patient in pediatric neurology who presents to general adult neurology clinic for evaluation of abnormal movements in referral from his adult psychiatrist. Dr. Salinas is his neurosurgeon.     He and his father and his psychiatrist are concerned about involuntary movements of the arms and legs. They have been happening for the past 1-2 months, and occur ever 30 minutes or so, in just one arm or leg. They are painless and do not cause the limb to do more than twitch for less than a second. He cannot predict them or suppress them for this reason. They are more frequent in the morning and only occur when seated or lying in bed. They have never cause him to have his legs collapse underneath him when he walks with his 2WW or is transferring. He maintains awareness and interactivity throughout.     He was recently started on olanzapine for sleep, anxiety, and behavioral outburst about 2 months prior, first 10 mg then 15 at bedtime. Then, zoloft was started 1 month ago, also for mood. These both seem to be helping and he would like to continue taking them. The limb movements are annoying to him, but not to the point that he would want to take an additional medicine to suppress them or stop/change his psych meds, yet.    He has had EEG twice, once for staring spells in school and once for an episode of  wandering at night. Neither had epileptiform discharges and the spells have not recurred.    Last traditional chemo was 2015.     Numb to light and pin on R, hearing less on R    ASSESSMENT AND PLAN:  Geo Hicks is a 18 year old male with a history of 4th ventricular ependymoma s/p incomplete resection, RT, and chemotherapy who presents with subacute involuntary limb movements consistent with myoclonus. This may be secondary to initiation of neuroleptics/antidepressants. MRI brain and C/T/L spine reviewed and there are no findings concerning for progression. It is reasonable to reassure and observe for now with plan to decrease dose of olanzapine first and change to an alternative antipsychotic if this does not help. If psych meds cannot be changed or this does not help, would initiate low dose keppra or carbamazepine to treat myoclonus. I am happy to continue seeing Mr. Hicks in clinic, or if he prefers, he can return to Dr. Holley's clinic if she is willing to see patients older than age 18.    Patient was seen and discussed with Dr. Mccray.    Savita Gaffney MD  PGY-3 Resident  [facility] Department of Neurology  Pager: [phone]    I saw and evaluated Mr. Hicks and discussed the plan of care with Dr. Gaffney. I reviewed relevant labs and imaging. I have reviewed Dr. Gaffney's note and agree with the findings, impression and plan.   Kimberly Downey MD    PHYSICAL EXAMINATION:  Vitals: /68 (BP Location: Left arm, Patient Position: Sitting, Cuff Size: Adult Regular)   Pulse 87   Wt 88.5 kg (195 lb 1.7 oz)   SpO2 92%   BMI 27.50 kg/m    General: adult male patient, seated on wheelchair, NAD, accompanied by father  HEENT: midline posterior surgical scar on neck, pooled oral secretions, white accumulations on tongue, mucosa moist, eye patch over left eye  Cardiac: RRR, no m/r/g  Pulmonary: CTAB, nonlabored on RA  Abdomen: soft, nontender, nondistended, BS+  Extremities: warm, dry, w/o edema  Skin:  diffuse striae on arms and legs  Psych: pleasant affect and congruent mood, thought content w/o abnormality, process linear and logical  Neuro:   Mental status: alert and oriented to person, place, and time; language is fluent with intact comprehension and naming   Cranial nerves: VFF, PERRL, L eye prominent 6th nerve palsy and an SANGEETHA, R eye mild 6th nerve palsy and an SANGEETHA. L eye rests medially and rotated. No facial asymmetry, orbicularis oris is weak, unable to close lips, facial sensation intact on L, markedly diminished to pin and light touch on R V1-3. Tongue and uvula are midline, speech is markedly dysarthric. Shoulder shrug intact.   Motor: Forward leaning posture in chair while seated, normal tone, no atrophy, intermittent hamstrings, quadriceps, finger, and forearm myoclonus which is not noticed by the patient. On two occasions, L quads is noticed. No fasciculations, percussive myoclonus    Biceps Triceps Deltoid Hip flexor Knee extension Knee flexion Ankle extension Ankle flexion   Right 5 5 5 5 5 5 5 5 5   Left 5 5 5 5 5 5 5 5 5    Reflexes: Absent Babinski. Absent Lopez.   Brachioradialis Biceps Triceps Patellar Achilles   Right 2+ 2+ 2+ 2+ 2+   Left 2+ 2+ 2+ 2+ 2+    Sensory: Intact to light touch, pin prick, and vibration on L, impaired on R.   Coordination: Ataxic FNF and heel-shin bilaterally, truncal titubation while seated. Dysdiadochokinesia bilaterally.   Gait: Not tested.    INVESTIGATIONS:   MRI brain, C, T, and L spine w/ and w/o contrast 4/9/19 reviewed and in epic    REVIEW OF SYSTEMS: 12-point RoS negative except as per HPI.    ALLERGIES:  Allergies   Allergen Reactions     Blood Transfusion Related (Informational Only) Swelling     Periorbital swelling post platelet transfusion     No Known Drug Allergies      MEDICATIONS:  Current Outpatient Medications   Medication Sig Dispense Refill     calcium carbonate-vitamin D 600-400 MG-UNIT CHEW Take 2 tablets in the morning and 1 tablet  in the evening. 90 tablet 3     dexamethasone (DECADRON) 0.5 MG tablet Take 0.75 mg by mouth daily (with breakfast). 90 tablet 3     fexofenadine (ALLEGRA) 180 MG tablet Take 180 mg by mouth every evening        linaclotide (LINZESS) 290 MCG capsule Take 290 mcg by mouth every morning (before breakfast)        mupirocin (BACTROBAN) 2 % external ointment Use 2 times a day to the buttock with flare 22 g 3     OLANZapine (ZYPREXA) 15 MG tablet Take 1 tablet (15 mg) by mouth At Bedtime 30 tablet 1     omeprazole (PRILOSEC) 20 MG DR capsule Take 2 capsules (40 mg) by mouth every morning 60 capsule 1     polyethylene glycol (MIRALAX/GLYCOLAX) packet Take 17 g by mouth 3 times daily       potassium phosphate, monobasic, (K-PHOS) 500 MG tablet Take 500 mg by mouth 2 times daily       senna-docusate (SENOKOT-S/PERICOLACE) 8.6-50 MG tablet Take 2 tablets by mouth At Bedtime       Sennosides (EX-LAX) 15 MG CHEW Take 2 chews after completion of Gatorade/Miralax 2 tablet 3     sertraline (ZOLOFT) 50 MG tablet Take 1 tablet (50 mg) by mouth daily 30 tablet 2     study - entinostat (IDS# 5050) 1 mg tablet Take 1 tablet (1 mg) by mouth every 7 days for 4 doses Take one 1mg tablet with one 5mg tablet for total dose of 6mg weekly. Take on an empty stomach, at least 1 hour before or 2 hours after a meal.  Swallow tablet whole. 4 tablet 0     study - entinostat (IDS# 5050) 5 mg tablet Take 1 tablet (5 mg) by mouth every 7 days for 4 doses Take one 5mg tablet with one 1mg tablet for total dose of 6mg weekly. Take on an empty stomach, at least 1 hour before or 2 hours after a meal.  Swallow tablet whole. 4 tablet 0     sulfamethoxazole-trimethoprim (BACTRIM/SEPTRA) 400-80 MG per tablet Take 1 tablet by mouth 2 times daily On Saturdays and Sundays 24 tablet 11     vitamin D3 (CHOLECALCIFEROL) 2000 units tablet Take 1 tablet by mouth daily       vitamin E (GNP VITAMIN E) 400 UNIT capsule Take 1 capsule (400 Units) by mouth daily 30 capsule  11     PAST MEDICAL HISTORY:  Past Medical History:   Diagnosis Date     Cranial nerve dysfunction      Dyspepsia      Ependymoma (H)      Gastro-oesophageal reflux disease      Hearing loss      Intracranial hemorrhage (H)      Migraine      Pilonidal cyst     7-2015     Reduced vision      Refractory obstruction of nasal airway     2nd to nasal valve prolapse     Sleep apnea      Strabismus     gaze palsy      PAST SURGICAL HISTORY:  Past Surgical History:   Procedure Laterality Date     GRAFT CARTILAGE FROM POSTERIOR AURICLE Left 10/6/2016    Procedure: GRAFT CARTILAGE FROM POSTERIOR AURICLE;  Surgeon: Tyler Richards MD;  Location: [facility]     INCISION AND DRAINAGE PERINEAL, COMBINED Bilateral 7/18/2015    Procedure: COMBINED INCISION AND DRAINAGE PERINEAL;  Surgeon: Dequan Timmons MD;  [facility]: [facility]     OPTICAL TRACKING SYSTEM CRANIOTOMY, EXCISE TUMOR, COMBINED N/A 4/13/2015    Procedure: COMBINED OPTICAL TRACKING SYSTEM CRANIOTOMY, EXCISE TUMOR;  Surgeon: Francis Velazquez MD;  [facility]     OPTICAL TRACKING SYSTEM CRANIOTOMY, EXCISE TUMOR, COMBINED N/A 4/16/2015    Procedure: COMBINED OPTICAL TRACKING SYSTEM CRANIOTOMY, EXCISE TUMOR;  Surgeon: Francis Velazquez MD;  [facility]     OPTICAL TRACKING SYSTEM CRANIOTOMY, EXCISE TUMOR, COMBINED Bilateral 5/28/2015    Procedure: COMBINED OPTICAL TRACKING SYSTEM CRANIOTOMY, EXCISE TUMOR;  Surgeon: Francis Velazquez MD;  [facility]     OPTICAL TRACKING SYSTEM CRANIOTOMY, EXCISE TUMOR, COMBINED Bilateral 1/14/2016    Procedure: COMBINED OPTICAL TRACKING SYSTEM CRANIOTOMY, EXCISE TUMOR;  Surgeon: Francis Velazquez MD;  [facility]     OPTICAL TRACKING SYSTEM VENTRICULOSTOMY  4/16/2015    Procedure: OPTICAL TRACKING SYSTEM VENTRICULOSTOMY;  Surgeon: Francis Velazquez MD;  [facility]     REMOVE PORT VASCULAR ACCESS N/A 10/6/2016    Procedure: REMOVE PORT VASCULAR ACCESS;  Surgeon: Bruno Perea  MD;  [facility]     RHINOPLASTY N/A 10/6/2016    Procedure: RHINOPLASTY;  Surgeon: Tyler Richards MD;  [facility]     VASCULAR SURGERY  5-2015    single lumen power port     SOCIAL HISTORY:  Social History     Socioeconomic History     Marital status: Single     Spouse name: Not on file     Number of children: Not on file     Years of education: Not on file     Highest education level: Not on file   Occupational History     Not on file   Social Needs     Financial resource strain: Not on file     Food insecurity:     Worry: Not on file     Inability: Not on file     Transportation needs:     Medical: Not on file     Non-medical: Not on file   Tobacco Use     Smoking status: Never Smoker     Smokeless tobacco: Never Used   Substance and Sexual Activity     Alcohol use: No     Drug use: No     Sexual activity: Never   Lifestyle     Physical activity:     Days per week: Not on file     Minutes per session: Not on file     Stress: Not on file   Relationships     Social connections:     Talks on phone: Not on file     Gets together: Not on file     Attends [employer] service: Not on file     Active member of club or organization: Not on file     Attends meetings of clubs or organizations: Not on file     Relationship status: Not on file     Intimate partner violence:     Fear of current or ex partner: Not on file     Emotionally abused: Not on file     Physically abused: Not on file     Forced sexual activity: Not on file   Other Topics Concern     Not on file   Social History Narrative    Grown up in [place], [place].  Parents are , and he shares time between his mother's and father's homes. Both parents are remarried.  Dad is a , and mom does  for a testing company.  Siblings include two full brothers (older brother Benitez who is a senior in college and younger brother Gamaliel), a step-brother, and a step-sister. Geo graduated from high school in Spring 2018.   Initially considered attending [employer] in Fall 2019, but reportedly lost interest due to the amount of time it would take him to complete courses and receive a degree.  Does today endorse continued interest in pursuing an advanced course of study at some point, specifically stating he is interested in Electrical Engineering.  No access to guns or weapons enjoys playing video games.  Guns are currently locked in the house.     FAMILY HISTORY:  Family History   Problem Relation Age of Onset     Circulatory Father         PE/DVT     Hypothyroidism Father 30     Diabetes Maternal Grandmother      Diabetes Paternal Grandmother      Diabetes Paternal Grandfather      C.A.D. Paternal Grandfather      Hypertension Maternal Grandfather      Thyroid Disease Paternal Aunt         unknown whether hypo or hyper     Mental Illness No family hx of

## 2019-05-20 ENCOUNTER — HOSPITAL ENCOUNTER (OUTPATIENT)
Dept: OCCUPATIONAL THERAPY | Facility: CLINIC | Age: 19
Setting detail: THERAPIES SERIES
End: 2019-05-20
Attending: FAMILY MEDICINE
Payer: COMMERCIAL

## 2019-05-20 PROCEDURE — 97535 SELF CARE MNGMENT TRAINING: CPT | Mod: GO | Performed by: OCCUPATIONAL THERAPIST

## 2019-05-20 NOTE — PROGRESS NOTES
[facility]      OUTPATIENT OCCUPATIONAL THERAPY  PLAN OF TREATMENT FOR OUTPATIENT REHABILITATION    Patient's Last Name, First Name, M.I.                YOB: 2000  Geo Hicks                        Provider's Name  [facility] Medical Record No.  [id number]                                Onset Date: 5/1/15    Start of Care Date: 6/16/15   Type:     ___PT   _X_OT   ___SLP Medical Diagnosis: ependymoma, suboccipital craniotomy                             OT Diagnosis: Decreased ADL/IADL   # of Visits:  164           _______________________________________________________________________  Plan of Treatment:    Frequency/Duration: 1x/week x 12 weeks       Goals:    Goal Identifier Financial Management   Goal Description Patient will demonstrate the ability to complete simple to moderately complex money management tasks with 90% accuracy for increased attention to detail and problem solving skills to resume finances at home and manage money in the community.   Target Date 07/06/19   Date Met      Progress:     Goal Identifier Errands   Goal Description Pt to independently complete executive functioning task sequence of  8-12 step Errands checklist ( clinic based series of daily functioning tasks) with >90% accuracy for preparation, temporal awareness, organization/consideration of location, duration, phone/web/mailing/copying and sequence of tasks.   Target Date 07/06/19   Date Met      Progress:       Progress Toward Goals:   Progress this reporting period: See attached note.      Goal Identifier FMC   Goal Description Geo will demonstrate improved fine motor control (as measured by box and blocks test, improved from moving 20 blocks/min to 30 blocks /min) and nine hole peg test  in 1 minute as needed for self feeding, writing, managing food packaging and clothing fasteners.    Target Date 07/06/19   Date Met      Progress:     Goal Identifier Meal Preparation   Goal Description Geo will demonstrate ability to gather needed ingredients/tools from hi/low/deep reaches from wheelchair level, organize steps of task and use various kitchen tools safely to make 2-3 step dishes.   Target Date 07/06/19   Date Met      Progress:     Goal Identifier Written communication   Goal Description Geo will demonstrate improved legibility and smaller writing size to support signing his full name on birthday cards and checks, inside of a  1/2 x 4 inch area,  and writing 5-7 word sentences, 4 of 5 given opportunities.    Target Date 07/06/19   Date Met      Progress:     Goal Identifier GMC/Reaction Time   Goal Description Patient will demonstrate improved UE motor and visual reaction time for improved visual skills, improved ability to prevent daily mishaps and safer independent home based mobility by demonstrating sustained reaction time of 2.0 seconds (from 3.20 sec Feb 2017) on Mode A of [employer].   Target Date 07/06/19   Date Met      Progress:     Goal Identifier Trunk Control    Goal Description Geo will complete 10 minutes of dynamic upper body activities while maintaining an 90% upright head/seated posture as needed to support feeding, writing and eye contact during meal time interactions.   Target Date 07/06/19   Date Met      Progress:     Progress Toward Goals:   Progress this reporting period: Pt has attended 4 visits of skilled OT to address above goals since his last progress note on 1/21/19 (164 total visits since start of OP OT).  Pt continues to demonstrate improved fine/gross motor coordination and has been really enjoying applying his FMC/GMC skills to cooking tasks in the OP OT kitchen setting.        Plan:  Continue therapy per current plan of care. Goals updated per above.     Discharge:  No.  Ongoing functional improvements continue to be made, cont OT 1x/week.       Thank  you for this thoughtful referral! If you have any questions, please call me [phone].  Nice to share in this patient's care with you.      Certification date from 4/8/19 to 7/6/19.    Elyse Costello OTR          I CERTIFY THE NEED FOR THESE SERVICES FURNISHED UNDER        THIS PLAN OF TREATMENT AND WHILE UNDER MY CARE     (Physician co-signature of this document indicates review and certification of the therapy plan).                Referring Provider: Luis Jackson MD

## 2019-05-20 NOTE — PROGRESS NOTES
OUTPATIENT OT PROGRESS NOTE / 90 DAY CERT     04/08/19 1400   Signing Clinician's Name / Credentials   Signing clinician's name / credentials Elyes Costello OTR/[facility]   Session Number   Session Number 164 (6/90 combined OT/PT/ST for 2018, AETNA)   Additional Session Number     Progress/Recertification   Recertification Due 04/18/19   Adult OT Eval Goals   OT Eval Goals (Adult) 1;2    OT Goal 1   Goal Identifier Financial Management   Goal Description Patient will demonstrate the ability to complete simple to moderately complex money management tasks with 90% accuracy for increased attention to detail and problem solving skills to resume finances at home and manage money in the community.   Target Date 04/18/19    OT Goal 2   Goal Identifier Errands   Goal Description Pt to independently complete executive functioning task sequence of  8-12 step Errands checklist ( clinic based series of daily functioning tasks) with >90% accuracy for preparation, temporal awareness, organization/consideration of location, duration, phone/web/mailing/copying and sequence of tasks.   Target Date 04/18/19   Additional Goals    OT Additional Goals 1;2;3;4;5;6   Goal 1   Goal Identifier FMC   Goal Description Geo will demonstrate improved fine motor control (as measured by box and blocks test, improved from moving 20 blocks/min to 30 blocks /min) and nine hole peg test  in 1 minute as needed for self feeding, writing, managing food packaging and clothing fasteners.   Target Date 04/18/19   Goal 2   Goal Identifier Meal Preparation   Goal Description Geo will demonstrate ability to gather needed ingredients/tools from hi/low/deep reaches from wheelchair level, organize steps of task and use various kitchen tools safely to make 2-3 step dishes.   Target Date 04/18/19   Goal 4   Goal Identifier Written communication   Goal Description Geo will demonstrate improved legibility and smaller writing size to support signing his full name on  birthday cards and checks, inside of a  1/2 x 4 inch area,  and writing 5-7 word sentences, 4 of 5 given opportunities.    Target Date 04/18/19   Goal 5   Goal Identifier GMC/Reaction Time   Goal Description Patient will demonstrate improved UE motor and visual reaction time for improved visual skills, improved ability to prevent daily mishaps and safer independent home based mobility by demonstrating sustained reaction time of 2.0 seconds (from 3.20 sec Feb 2017) on Mode A of Dynavision.   Target Date 04/18/19   Goal 6   Goal Identifier Trunk Control    Goal Description Geo will complete 10 minutes of dynamic upper body activities while maintaining an 90% upright head/seated posture as needed to support feeding, writing and eye contact during meal time interactions.   Target Date 04/18/19   Subjective Report   Subjective Report Pt returns after trp w family to [place].  Excited to work on making banana bread.     Objective Measures   Objective Measures Objective Measure 5;Objective Measure 6   Objective Measure 1   Objective Measure Dyanvision, timed reaction for visual/GMC   Objective Measure 3   Objective Measure /Pinch   Objective Measure 4   Objective Measure FMC/Written Communication   Details Writes 10 characters in :52 with right hand mix of upper and lower case letters, better legibility ( improved from 1:12).  Patient does better w right hand than L today.  Does best if he rests his head on the non-writing arm, moves the eye patch to that same side.  Writes his name w R  in 5/8 UC caps in 1:25, 80% legibility if printed .(faster and more legible was 1: 33, legibility was 50%). 20% accuracy if cursive 1:08. (faster and more legible was 1: 33, legibility was 50%).  9 hole peg test:  3:29 L and 3:20 R.  ( was faster 1/21/19 --3:17  L and 3:07)     Objective Measure 5   Objective Measure GMC / CAM   Details Box and blocks:  17 blocks moved in 1 min W R hand and L 19  (was 22 L and 15,19 with R 11/12/18.   )   Therapeutic Interventions   Therapeutic Interventions Self Care/Home Management   Self Care/Home Management   Self-Care/Home Mgmt/ADL, Compensatory, Meal Prep Minutes (87978) 40 Minutes   Skilled Intervention prep for tool use, w/c safety w  kitchen mobility/cooking tasks; progress reivew.     Patient Response pt does well with task breakdown   Treatment Detail Completed preparation for cooking tasks visually organizing recipes into smaller parts that are more manageable.  Specific task breakdown completed  banana bread.  Pt identifies 7/7 ingredients.  Requires assist preparaing tools needed list for 4 of 9 items needed ( stirring, melting, measuring). Patient is able to sequence steps inside of simplified recipe to include his physical transntions and keep motor accuracy. Rather than using cup cake mold, agrees that using a cast would shorten baking time.  Adds 3 items to shopping list for next cooking event with SBA, observing which items we have available in her kitchen and anticipating/prioritizing needs for next session.  Review of progress completed with colection of objective data, to help with identifying next steps of therapy.   Agreeasble to adding focus of FMC tasks to maximze prgress with writing and 9 hole peg test.   Plan   Homework ?Try [employer]?, using steak knife at home? paracord single abram chain, Resisted UB HEP using green therapy band w flexion planes in sitting, extension in supine for improved scap control. Line patterns =, oxo, M, m. on paper with adapted red foam pen; writing  line patterns MMM,OXO,EE, on large white board at 45 degree slant. Ball/bean bag tossing with angle changes, folding towels.   Home program 13 Card trick   Updates to plan of care Dycem clipboard, med weighted pen vs reg pen. Forehead on L foraearm. eye patch to L eye while writing   Plan for next session Add FMC HEP focus x 3 weeks and retest to check effecttiveness. Complete next meal tasks (  banana bread, crusty  bread making).  Need cinnamon sugar, egg slicer to cut berries.  consider next recipes as high fiber cookies. Work towards I toileting, standing at sink with grooming/pre-cooking tasks. Check CAM/work STM,  Check  DynaVision when platelets >100, nine-hole peg. Low pivot transfers between surfaces/ (I) set up of angles, static to dynamic standing with  table top/grooming, dressing, cooking prep tasks, Light meal prep to Stove from w/c level? Talk about shaving w BUE, Check dynavision, sequential memory, multiple digit math/CAM, SDMT. In hand manip,Try ramandeep turns, stick flips, putty scraping with tongue blade, rpg 3-4 putty HEP, level II putty-rolling  balls, poke/drag, 3/8 in peg placement, getting beads out.   inhand manip with R hand.  do R vs L with writing #'s; how to recall new names, new task sequences/storage/retrieval, how to pre sofya which step his is on while being interrupted (physics and calculus); improving written comprehension.  Cont in-hand manipulation tasks, Write out a [place] card,   Work on spoon feeding with soft textures.  Write +,o,v,a, n letters and progress to curves; numbers 1-8 smaller; improved legibility per strategies.  Work towards writing numbers with consistent technique/small curves, O strokes; short card greetings, name on checks/forms using medium weighted pen/blue barrel and dycem placed under the page with clip board in place; try triangle with 1 # weight and dycem/clip board vs slantboard,  Pt wants to work more on yogurts, puddings, cereal s and possibly soup. Add percentages of confidence for: task initiation/prep, task follow through/sustained attention, divided attention, alternating attention, managing interruptions, and task completion/reflection.  Check texting, writing, KB speed; check proximal SH stability and address with upgraded HEP (add prone scap stability series). Add [email or web address] as HEP.  Can he use an alarm to get up (phone vs unit)?   Check on hands//pinch/box and blocks in Early Sept; **Continue wrist/hand isolation w rubber band exercises, add as HEP (does best with rubber band resistance to guide movement/lessen dyarthria).     Comments   Comments 4/18/19 cert   Total Session Time   Timed Code Treatment Minutes 40   Total Treatment Time (sum of timed and untimed services) 40

## 2019-05-20 NOTE — ADDENDUM NOTE
Encounter addended by: Elyse Costello OTR on: 5/20/2019 5:09 PM   Actions taken: Pend clinical note, Flowsheet data copied forward, Flowsheet accepted, Sign clinical note

## 2019-05-21 DIAGNOSIS — C71.9 EPENDYMOMA (H): ICD-10-CM

## 2019-05-21 LAB
BASOPHILS # BLD AUTO: 0 10E9/L (ref 0–0.2)
BASOPHILS NFR BLD AUTO: 0.3 %
DIFFERENTIAL METHOD BLD: ABNORMAL
EOSINOPHIL # BLD AUTO: 0.3 10E9/L (ref 0–0.7)
EOSINOPHIL NFR BLD AUTO: 5.1 %
ERYTHROCYTE [DISTWIDTH] IN BLOOD BY AUTOMATED COUNT: 13.4 % (ref 10–15)
HCT VFR BLD AUTO: 38.3 % (ref 40–53)
HGB BLD-MCNC: 12.7 G/DL (ref 13.3–17.7)
LYMPHOCYTES # BLD AUTO: 1 10E9/L (ref 0.8–5.3)
LYMPHOCYTES NFR BLD AUTO: 16.5 %
MCH RBC QN AUTO: 30.2 PG (ref 26.5–33)
MCHC RBC AUTO-ENTMCNC: 33.2 G/DL (ref 31.5–36.5)
MCV RBC AUTO: 91 FL (ref 78–100)
MONOCYTES # BLD AUTO: 0.8 10E9/L (ref 0–1.3)
MONOCYTES NFR BLD AUTO: 13.8 %
NEUTROPHILS # BLD AUTO: 3.8 10E9/L (ref 1.6–8.3)
NEUTROPHILS NFR BLD AUTO: 64.3 %
PLATELET # BLD AUTO: 107 10E9/L (ref 150–450)
RBC # BLD AUTO: 4.2 10E12/L (ref 4.4–5.9)
WBC # BLD AUTO: 5.9 10E9/L (ref 4–11)

## 2019-05-21 PROCEDURE — 80053 COMPREHEN METABOLIC PANEL: CPT | Performed by: PEDIATRICS

## 2019-05-21 PROCEDURE — 85025 COMPLETE CBC W/AUTO DIFF WBC: CPT | Performed by: PEDIATRICS

## 2019-05-21 PROCEDURE — 84100 ASSAY OF PHOSPHORUS: CPT | Performed by: PEDIATRICS

## 2019-05-21 PROCEDURE — 36415 COLL VENOUS BLD VENIPUNCTURE: CPT | Performed by: PEDIATRICS

## 2019-05-21 PROCEDURE — 83735 ASSAY OF MAGNESIUM: CPT | Performed by: PEDIATRICS

## 2019-05-22 LAB
ALBUMIN SERPL-MCNC: 3.4 G/DL (ref 3.4–5)
ALP SERPL-CCNC: 129 U/L (ref 65–260)
ALT SERPL W P-5'-P-CCNC: 20 U/L (ref 0–50)
ANION GAP SERPL CALCULATED.3IONS-SCNC: 3 MMOL/L (ref 3–14)
AST SERPL W P-5'-P-CCNC: 20 U/L (ref 0–35)
BILIRUB SERPL-MCNC: 0.2 MG/DL (ref 0.2–1.3)
BUN SERPL-MCNC: 19 MG/DL (ref 7–30)
CALCIUM SERPL-MCNC: 8.7 MG/DL (ref 8.5–10.1)
CHLORIDE SERPL-SCNC: 106 MMOL/L (ref 98–110)
CO2 SERPL-SCNC: 30 MMOL/L (ref 20–32)
CREAT SERPL-MCNC: 1.05 MG/DL (ref 0.5–1)
GFR SERPL CREATININE-BSD FRML MDRD: >90 ML/MIN/{1.73_M2}
GLUCOSE SERPL-MCNC: 73 MG/DL (ref 70–99)
MAGNESIUM SERPL-MCNC: 2 MG/DL (ref 1.6–2.3)
PHOSPHATE SERPL-MCNC: 4.3 MG/DL (ref 2.5–4.5)
POTASSIUM SERPL-SCNC: 4.4 MMOL/L (ref 3.4–5.3)
PROT SERPL-MCNC: 6.7 G/DL (ref 6.8–8.8)
SODIUM SERPL-SCNC: 139 MMOL/L (ref 133–144)

## 2019-05-23 ENCOUNTER — OFFICE VISIT (OUTPATIENT)
Dept: PSYCHIATRY | Facility: CLINIC | Age: 19
End: 2019-05-23
Attending: PSYCHIATRY & NEUROLOGY
Payer: COMMERCIAL

## 2019-05-23 DIAGNOSIS — F32.A DEPRESSION, UNSPECIFIED DEPRESSION TYPE: Primary | ICD-10-CM

## 2019-05-23 PROCEDURE — G0463 HOSPITAL OUTPT CLINIC VISIT: HCPCS | Mod: ZF

## 2019-05-23 RX ORDER — TRAZODONE HYDROCHLORIDE 50 MG/1
25-50 TABLET, FILM COATED ORAL AT BEDTIME
Qty: 30 TABLET | Refills: 1 | Status: ON HOLD | OUTPATIENT
Start: 2019-05-23 | End: 2019-06-04

## 2019-05-23 ASSESSMENT — PAIN SCALES - GENERAL: PAINLEVEL: NO PAIN (0)

## 2019-05-23 NOTE — NURSING NOTE
Chief Complaint   Patient presents with     Recheck Medication     Abnormal involuntary movement

## 2019-05-23 NOTE — PATIENT INSTRUCTIONS
Start trazodone 25-50 mg at bedtime as needed for sleep.  Continue sertraline 50 mg daily.  Continue olanzapine 15 mg at bedtime.    Thank you for coming to the [facility].    Lab Testing:  If you had lab testing today and your results are reassuring or normal they will be mailed to you or sent through [employer] within 7 days.   If the lab tests need quick action we will call you with the results.  The phone number we will call with results is # [phone] (home) [phone] (work). If this is not the best number please call our clinic and change the number.    Medication Refills:  If you need any refills please call your pharmacy and they will contact us. Our fax number for refills is [phone]. Please allow three business for refill processing.   If you need to  your refill at a new pharmacy, please contact the new pharmacy directly. The new pharmacy will help you get your medications transferred.     Scheduling:  If you have any concerns about today's visit or wish to schedule another appointment please call our office during normal business hours [phone] (8-5:00 M-F)    Contact Us:  Please call [phone] during business hours (8-5:00 M-F).  If after clinic hours, or on the weekend, please call  [phone].    Financial Assistance [phone]  [facility] [phone]  [facility] [phone]  Medical Records [phone]      MENTAL HEALTH CRISIS NUMBERS:  [facility]:   [facility] - [phone]   Crisis Residence [facility] - [phone]   Walk-In Counseling [facility] [place] [phone]   COPE 24/7 [facility] Mobile Team for Adults - [[phone]]; Child - [[phone]]        [facility]:   [facility] - [phone]   Walk-in counseling [facility] - [phone]   Walk-in counseling [facility] - [phone]   Crisis Residence [facility] - [phone]   [facility]:   --Drop-in, 24/7 crisis line, and [facility] Mobile Team [[phone]]    CRISIS TEXT LINE: Text [phone] from anywhere, anytime, any crisis 24/7;    OR SEE [email or web address]     Poison Control Center - [phone]    CHILD: [facility] needs assessment team - [phone]     [facility] Lifeline - [phone]; or [facility] Lifeline - [phone]    If you have a medical emergency please call 911or go to the nearest ER.                    _____________________________________________    Again thank you for choosing [facility] and please let us know how we can best partner with you to improve you and your family's health.  You may be receiving a survey in the mail regarding this appointment. We would love to have your feedback, both positive and negative, so please fill out the survey and return it using the provided envelope. The survey is done by an external company, so your answers are anonymous.

## 2019-05-27 NOTE — PROGRESS NOTES
"  [facility] PSYCHIATRY CLINIC PROGRESS NOTE     CARE TEAM:  PCP- Jeffrey Espinoza    Specialty Providers- Oncology, Neuropsychology, Physical Therapy, Occupational Therapy    Therapist- Manju Pink at [facility] in [facility] Team- no.    Geo Hicks is a 18 year old male who prefers the name Geo & pronouns he, him, his, himself.    Date of initial diagnostic assessment is 2/7/2019.    Pertinent Background:  This patient first experienced mental health issues in the past few months and has received treatment for paranoia/delusions.  Notably, this patient is undergoing cancer treatment at the [facility] and has been referred for psychiatric assessment by his neuropsychologist who most recently met with him on 1/3/2019.  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 Since then, he has undergone multiple tumor resections, chemotherapy, and radiation treatment. Geo also experienced an intra-tumoral hemorrhage in May 2015 that resulted in neurological changes including facial numbness, significant loss of mobility and dysarthria. In December 2016, a follow-up MRI revealed continued tumor enhancement, however the most recent MRI from 10/16/18 revealed an unchanged fourth ventricle ependymoma in comparison to previous exams, a slight decrease in adjacent edema, as well as a stable size of the ventricular system.  He is currently on COG study [id number] with Entinostat.  Of note, patient had a brief admission to inpatient psychiatry on [facility] from 2/14-2/15/2019 \"for evaluation of delusions and suicidal comments.\"    Psych critical item history includes suicidal ideation, psychosis [sxs include delusions] and psychiatric hospitalization x1.    INTERIM HISTORY                                                                                                                 4, 4   The patient reports good treatment adherence.  History was provided by the patient " "and his mom who were good historians.  The last visit ended with no change to the med regimen.  Since the last visit:   -Presents with mom.  Is friendly and relaxed.  Mom also appears relaxed.  -Denies notably depressed mood overall.  Also denies SI/SIB, violent/aggressive ideation, panic/anxious acuity, inappropriately elevated moods, nayely psychotic features (like hearing voices or having ideas of reference), or other hallmarks of psychiatric acuity with dangerousness.  -Reports that his mood is \"pretty good\" - and after reminding him that he has said this in the past, even was he was in actuality problematically sad, he avows that this time it's true - and that he is doing \"pretty good.\"  -Reviewed his visit with neurology for leg myoclonus.  Also reviewed their acknowledgement that it's possible this might be due to olanzapine, and that options would include lowering or discontinuing that med.  Geo states he is still experiencing this jerking, particularly in the mornings.  Talked about how ultimately the decision as to whether this is a side effect he could tolerate is up to him.  He stated that he values the olanzapine, and would prefer to keep things as they are for now.  Agreed to revisit this consideration in future appointments.  -Geo does not relate the content of his paranoid/non-reality based fears today, nor does his mother allude to these or pull the team aside to report specific concerns.  When the matter is broached, they essentially indicate that that phenomenon is currently at a baseline.  -Spent much of the appointment discussing Geo's sleep.  Reviewed data from his Fitbit, which would indicate that he is generally getting 5-6 hours a night, and that this had been the case prior to olanzapine as well.  When olanzapine was initially started, his sleep improved for a period of weeks/months.  Both has trouble with sleep initiation as well as maintenance.  After discussion of options, agreed " to trial trazodone.  -Apart from the above addition, Geo endorses his assessment that his current regimen is efficacious, and we have agreed to maintain olanzapine and sertraline at current doses.    RECENT SYMPTOMS:   DEPRESSION:  reports-some occasional low moods, feelings of overwhelm, and feelings of being trapped - however this has improved with medication;  DENIES- suicidal ideation, self-destructive thoughts, anhedonia, insomnia and poor concentration /memory  ELIZABETH/HYPOMANIA:  reports-none;  DENIES- increased energy, decreased sleep need, increased activity and grandiosity  PSYCHOSIS:  reports-delusions- paranoid [details in Interim History];  DENIES- auditory hallucinations and visual hallucinations  DYSREGULATION:  reports-can become irritable/agitated if beliefs are challenged;  DENIES- suicidal ideation, violent ideation, SIB, mood dysregulation, impulsive and aggressive  PANIC ATTACK:  none   ANXIETY:  worry  TRAUMA RELATED:  none  COMPULSIVE:  none  SLEEP:  5-6 hours, difficulty with both initiation and maintenance  EATING DISORDER: no    RECENT SUBSTANCE USE:  ALCOHOL- no  TOBACCO- no  CAFFEINE- minimal  OPIOIDS- no         NARCAN KIT- N/A  CANNABIS- no  OTHER ILLICIT DRUGS- no      CURRENT SOCIAL HISTORY:  FINANCIAL SUPPORT- family   CHILDREN- no      LIVING SITUATION- mother and father (who are  and have both remarried) alternating weeks between respective homes  SOCIAL/ SPIRITUAL SUPPORT- mother, father, older brother, friend Rima  FEELS SAFE AT HOME- yes    MEDICAL ROS (2,10):  A comprehensive review of systems was performed and is negative other than noted in the HPI.    PSYCH and CD Critical Summary Points since July 2018 February 2019:  Clinic intake on 2/7.  Later was admitted [facility] for a brief inpatient psych admission from 3/14-3/15, most of which was spent in the ER, due to suicidal comments that concerned family.  Was ultimately deemed safe for outpatient  "follow-up and was discharged with an increase in olanzapine to 15 mg.  March 2019:  Initiated sertraline 50 mg daily.    PAST PSYCH MED TRIALS   see EMR Problem List: Hx of psychiatric care    MEDICAL / SURGICAL HISTORY                                   Neurologic Hx- See pertinent background, re: history of ependymoma and related chemo, radiation and tumor resection(s).  Notably has experienced neurological damage due to the above which has resulted in facial numbness, significant loss of mobility and dysarthria.  Has had neuropsychiatric evaluation(s) 2/2016, 2/2017 and 1/2019.  The following is from the \"Results and Impressions\" section of his 1/3/2019 eval with Veronica Padilla:       \"We were pleased to discover that the neurocognitive areas measured during this evaluation have remained intact and consistent with previous evaluations. Geo s ability to access and apply acquired word knowledge (verbal comprehension) and his working memory (ability to mentally hold and manipulate information) were in the average range. His ability to understand visual information to solve novel abstract visual problems (perceptual reasoning) was in the low average range. There was a slight decrease in performance on one task associated with perceptual reasoning due to time limits. Had no time limits been enforced, Geo s performance would have likely been higher as he was observed to respond with correct answers that were past the time limit allowed. Furthermore, on a timed visual discrimination and scanning task, Geo performed in the borderline range, which was a slight improvement from his previous evaluation in 2017 where he performed in the impaired range.\"    Patient Active Problem List   Diagnosis     GERD (gastroesophageal reflux disease)     Closed fracture at the growth plate of right distal fibula      Elevated serum creatinine     Dyspepsia     Intracranial hemorrhage (H)     Hemorrhagic stroke (H)     " Ependymoma (H)     Admission for antineoplastic chemotherapy     Post-operative state     S/P craniotomy     S/P biopsy     Noncomitant strabismus     Abducens neuropathy of both eyes     CANDELARIO (obstructive sleep apnea)     Health Care Home     Hypernatremia     Body temperature low     Current chronic use of systemic steroids     Elevated TSH     Status post chemotherapy     Neoplasm of posterior cranial fossa (H)     Constipation     Chronic constipation     Serum albumin decreased     Closed compression fracture of thoracic vertebra with routine healing, subsequent encounter     Depression     Past Surgical History:   Procedure Laterality Date     GRAFT CARTILAGE FROM POSTERIOR AURICLE Left 10/6/2016    Procedure: GRAFT CARTILAGE FROM POSTERIOR AURICLE;  Surgeon: Tyler Richards MD;  Location: [facility]     INCISION AND DRAINAGE PERINEAL, COMBINED Bilateral 7/18/2015    Procedure: COMBINED INCISION AND DRAINAGE PERINEAL;  Surgeon: Dequan Timmons MD;  Location: [facility]     OPTICAL TRACKING SYSTEM CRANIOTOMY, EXCISE TUMOR, COMBINED N/A 4/13/2015    Procedure: COMBINED OPTICAL TRACKING SYSTEM CRANIOTOMY, EXCISE TUMOR;  Surgeon: Francis Velazquez MD;  [facility]: [facility]     OPTICAL TRACKING SYSTEM CRANIOTOMY, EXCISE TUMOR, COMBINED N/A 4/16/2015    Procedure: COMBINED OPTICAL TRACKING SYSTEM CRANIOTOMY, EXCISE TUMOR;  Surgeon: Francis Velazquez MD;  [facility]     OPTICAL TRACKING SYSTEM CRANIOTOMY, EXCISE TUMOR, COMBINED Bilateral 5/28/2015    Procedure: COMBINED OPTICAL TRACKING SYSTEM CRANIOTOMY, EXCISE TUMOR;  Surgeon: Francis Velazquez MD;  [facility]     OPTICAL TRACKING SYSTEM CRANIOTOMY, EXCISE TUMOR, COMBINED Bilateral 1/14/2016    Procedure: COMBINED OPTICAL TRACKING SYSTEM CRANIOTOMY, EXCISE TUMOR;  Surgeon: Francis Velazquez MD;  [facility]     OPTICAL TRACKING SYSTEM VENTRICULOSTOMY  4/16/2015    Procedure: OPTICAL TRACKING SYSTEM VENTRICULOSTOMY;  Surgeon:  Francis Velazquez MD;  [facility]     REMOVE PORT VASCULAR ACCESS N/A 10/6/2016    Procedure: REMOVE PORT VASCULAR ACCESS;  Surgeon: Bruno Perea MD;  [facility]     RHINOPLASTY N/A 10/6/2016    Procedure: RHINOPLASTY;  Surgeon: Tyler Richards MD;  [facility]: [facility]     VASCULAR SURGERY  5-2015    single lumen power port       ALLERGY                                Blood transfusion related (informational only) and No known drug allergies     MEDICATIONS                               Current Outpatient Medications   Medication Sig Dispense Refill     calcium carbonate-vitamin D 600-400 MG-UNIT CHEW Take 2 tablets in the morning and 1 tablet in the evening. 90 tablet 3     dexamethasone (DECADRON) 0.5 MG tablet Take 0.75 mg by mouth daily (with breakfast). 90 tablet 3     fexofenadine (ALLEGRA) 180 MG tablet Take 180 mg by mouth every evening        linaclotide (LINZESS) 290 MCG capsule Take 290 mcg by mouth every morning (before breakfast)        mupirocin (BACTROBAN) 2 % external ointment Use 2 times a day to the buttock with flare 22 g 3     OLANZapine (ZYPREXA) 15 MG tablet Take 1 tablet (15 mg) by mouth At Bedtime 30 tablet 1     omeprazole (PRILOSEC) 20 MG DR capsule Take 2 capsules (40 mg) by mouth every morning 60 capsule 1     polyethylene glycol (MIRALAX/GLYCOLAX) packet Take 17 g by mouth 3 times daily       potassium phosphate, monobasic, (K-PHOS) 500 MG tablet Take 500 mg by mouth 2 times daily       senna-docusate (SENOKOT-S/PERICOLACE) 8.6-50 MG tablet Take 2 tablets by mouth At Bedtime       Sennosides (EX-LAX) 15 MG CHEW Take 2 chews after completion of Gatorade/Miralax 2 tablet 3     sertraline (ZOLOFT) 50 MG tablet Take 1 tablet (50 mg) by mouth daily 30 tablet 2     study - entinostat (IDS# 5050) 1 mg tablet Take 1 tablet (1 mg) by mouth every 7 days for 4 doses Take one 1mg tablet with one 5mg tablet for total dose of 6mg weekly. Take on an empty stomach, at least 1  hour before or 2 hours after a meal.  Swallow tablet whole. 4 tablet 0     study - entinostat (IDS# 5050) 5 mg tablet Take 1 tablet (5 mg) by mouth every 7 days for 4 doses Take one 5mg tablet with one 1mg tablet for total dose of 6mg weekly. Take on an empty stomach, at least 1 hour before or 2 hours after a meal.  Swallow tablet whole. 4 tablet 0     sulfamethoxazole-trimethoprim (BACTRIM/SEPTRA) 400-80 MG per tablet Take 1 tablet by mouth 2 times daily On Saturdays and Sundays 24 tablet 11     traZODone (DESYREL) 50 MG tablet Take 0.5-1 tablets (25-50 mg) by mouth At Bedtime 30 tablet 1     vitamin D3 (CHOLECALCIFEROL) 2000 units tablet Take 1 tablet by mouth daily       vitamin E (GNP VITAMIN E) 400 UNIT capsule Take 1 capsule (400 Units) by mouth daily 30 capsule 11     VITALS                                                                                                                          3, 3   There were no vitals taken for this visit.     MENTAL STATUS EXAM                                                                                           9, 14 cog gs     Alertness: alert  and oriented  Appearance: casually groomed, seated in wheelchair, wearing R-sided eye-patch  Behavior/Demeanor: cooperative and pleasant, with fair eye contact   Speech: prominent dysarthria  Language: good  Psychomotor: mild evident palsy of upper extremities and facial paralysis  Mood: some mild/moderate low moods at times which have notably improved  Affect: restricted; was congruent to mood; was congruent to content  Thought Process/Associations: some continued rumination [details in Interim History]  Thought Content:  Reports delusions [details in Interim History];  Denies suicidal ideation and violent ideation  Perception:  Reports none;  Denies auditory hallucinations and visual hallucinations  Insight: partial  Judgment: good  Cognition: (6) oriented: time, person, and place  attention span: intact  concentration:  "intact  recent memory: intact  remote memory: intact  fund of knowledge: appropriate  Gait and Station: remarkable for:  ataxia - can ambulate but was seen in wheelchair     LABS and DATA     AIMS:  complete next visit    PHQ9 TODAY = 6  PHQ-9 SCORE 2019   PHQ-9 Total Score 6       ANTIPSYCHOTIC LABS  [glu, A1C, lipids (rohit LDL), liver enzymes, WBC, ANEU, Hgb, plts]  q12 mo  Recent Labs   Lab Test 19  1310 19  1310 19  0953 19  1305  19  1100   GLC 73 96 72 112*   < > 124*   A1C  --   --   --   --   --  5.1    < > = values in this interval not displayed.     Recent Labs   Lab Test 19  1100   CHOL 158   TRIG 236*   LDL 84   HDL 27*     Recent Labs   Lab Test 19  1310 19  1310 19  0953 19  1305   AST 20 19 19 17   ALT 20 19 18 15   ALKPHOS 129 143 126 119     Recent Labs   Lab Test 19  1310 19  1310 19  0953 19  1305   WBC 5.9 6.0 3.9* 4.8   ANEU 3.8 4.0 1.4* 3.3   HGB 12.7* 13.4 13.3 13.2*   * 108* 106* 111*     EK2019: 85 BPM, QT/QTcH was 346/389 msec.  Also included comment: \"Abnormal precordial QRS contours - nondiagnostic for this age.\"    EE/15/2019: \"IMPRESSION: Awake and drowsy routine EEG (no video) was normal.  The patient stated he felt dazed during photic stimulation, however, no electrographic seizures or concerning changes on the EEG were seen during that time.  Clinical correlation is advised.  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  He relates interval stability of mood, reiterating improvements since the initiation of sertraline.  Denies interim SI/SIB thoughts, violent/aggressive ideation, markedly depressed mood, inappropriately elevated mood, panic/anxious acuity, marked paranoia/psychotic features, or other hallmarks of psychiatric decompensation with dangerousness.  He and his mother confirm/acknowledge that his paranoid/non-reality based fears are currently at a baseline.  One issue we discussed at length was his sleep, which improved for a while after olanzapine was initiated, but has declined again, both in terms of initiation and maintenance.  After discussion of risks/benefits, and careful description of side effect concerns to look out for (including priapism) agreed to trial trazodone for sleep support.  Also of note, patient has seen neurology for his myoclonic jerking of lower extremities, occurring occasionally in the morning.  They have agreed that it could be possible that olanzapine is either responsible for or worsening this, and consideration should be given for a dose reduction or elimination of that medication.  For the time being, Geo states that he feels this med is very efficacious and otherwise tolerated, and would prefer to maintain its current dose.  Have agreed to continue discussing this matter in future appointments.    SUICIDE RISK ASSESSMENT:  Geo Hicks denies present suicidal ideation.  This patient does have notable risk factors for self-harm including feels trapped, relationship conflicts, new/ worsening medical issue, male and paranoia/delusions. However, risk is mitigated by no h/o suicide attempt, no plan or intent, no h/o risky impulsive behavior, describes a safety plan, h/o seeking help when needed, none to minimal alcohol use , commitment to family, good social support and stable housing.  Based on all available evidence he does not appear to be at imminent risk  for self-harm therefore does not meet criteria for a 72-hr hold/  involuntary hospitalization.  However, based on degree of symptoms therapy, close psych FU and initiation of new medications was recommended which the pt did agree to.    [place] PRESCRIPTION MONITORING PROGRAM [] was not checked today:  not using controlled substances    PSYCHOTROPIC DRUG INTERACTIONS:    Pentoxifylline may enhance the antiplatelet effect of Agents with Antiplatelet Properties.  [Pentoxifylline, Sertraline]     CNS Depressants may enhance the adverse/toxic effect of Selective Serotonin Reuptake Inhibitors. Specifically, the risk of psychomotor impairment may be enhanced.  [Olanzapine, Sertraline]    MANAGEMENT:  Monitoring for adverse effects, routine vitals, routine labs, periodic EKGs and patient/family aware of risks     PLAN                                                                                                                                m2, h3     1) PSYCHOTROPIC MEDICATIONS:  Begin trazodone 25-50 mg at bedtime.  Continue sertraline 50 mg daily.  Continue olanzapine 15 mg at bedtime.    2) THERAPY:  continue with Manju    3) NEXT DUE:    Labs- AP labs due Feb 2020  EKG- PRN  Rating Scales- AIMS due    4) REFERRALS:  No Referrals needed     5) RTC: 4-6 weeks    6) CRISIS NUMBERS:   Prov[place] [facility] [phone] (clinic)    [phone] (after hours)  CRISIS TEXT LINE: Text [id number] from anywhere in [place], anytime, any crisis 24/7;  OR SEE www.crisistextline.org    TREATMENT RISK STATEMENT:  The risks, benefits, alternatives and potential adverse effects have been discussed and are understood by the pt. The pt understands the risks of using street drugs or alcohol. There are no medical contraindications, the pt agrees to treatment with the ability to do so. The pt knows to call the clinic for any problems or to access emergency care if needed.  Medical and substance use concerns are documented  above.  Psychotropic drug interaction check was done, including changes made today.     PSYCHIATRY CLINIC INDIVIDUAL PSYCHOTHERAPY NOTE                                                [16]   Start time- N/A        End time- N/A  Date last reviewed - 03/08/19       Date next due - 6/6/19 (or 12 months if Medicare)    Subjective: This supportive psychotherapy session addressed issues related to goals of therapy, patient's history, current stressors, life stressors, relationship, family of origin, school and health.  Patient's reaction: Contemplation in the context of mental status appropriate for ambulatory setting.  Progress: fair  Plan: RTC 4-6 weeks  Psychotherapy services during this visit included  myself and the patient.   TREATMENT  PLAN          SYMPTOMS;PROBLEMS   MEASURABLE GOALS;    FUNCTIONAL IMPROVEMENT INTERVENTIONS;    GAINS MADE DISCHARGE CRITERIA   Depression: depressed mood, anhedonia and feeling hopelesss   reduce depressive symptoms, find enjoyment at least once a day and report feeling more positive about self  Supportive and cognitive psychotherapeutic interventions marked symptom improvement   Psychosis: delusions   reduce frequency/ intensity of false beliefs and take medications as prescribed on a daily basis Supportive and cognitive psychotherapeutic interventions marked symptom improvement     PROVIDER:  Justice Fay DO    Patient staffed in clinic with Dr. Tripathi who will sign the note.  Supervisor is Dr. Tripathi.    Supervisor Attestation:  I met with Geo Hicks, his mother and the resident physician, Justice Fay MD. I participated in key portions of the service, including the mental status examination and developing the plan of care. I reviewed key portions of the history with the resident. I agree with the findings and plan as documented in this note.  Jorge Luis Tripathi MD

## 2019-05-28 DIAGNOSIS — C71.9 EPENDYMOMA (H): ICD-10-CM

## 2019-05-28 LAB
BASOPHILS # BLD AUTO: 0 10E9/L (ref 0–0.2)
BASOPHILS NFR BLD AUTO: 0.4 %
DIFFERENTIAL METHOD BLD: ABNORMAL
EOSINOPHIL # BLD AUTO: 0.4 10E9/L (ref 0–0.7)
EOSINOPHIL NFR BLD AUTO: 8.8 %
ERYTHROCYTE [DISTWIDTH] IN BLOOD BY AUTOMATED COUNT: 13.2 % (ref 10–15)
HCT VFR BLD AUTO: 39.5 % (ref 40–53)
HGB BLD-MCNC: 13 G/DL (ref 13.3–17.7)
LYMPHOCYTES # BLD AUTO: 0.8 10E9/L (ref 0.8–5.3)
LYMPHOCYTES NFR BLD AUTO: 15.9 %
MCH RBC QN AUTO: 30 PG (ref 26.5–33)
MCHC RBC AUTO-ENTMCNC: 32.9 G/DL (ref 31.5–36.5)
MCV RBC AUTO: 91 FL (ref 78–100)
MONOCYTES # BLD AUTO: 0.6 10E9/L (ref 0–1.3)
MONOCYTES NFR BLD AUTO: 11.8 %
NEUTROPHILS # BLD AUTO: 3.1 10E9/L (ref 1.6–8.3)
NEUTROPHILS NFR BLD AUTO: 63.1 %
PLATELET # BLD AUTO: 105 10E9/L (ref 150–450)
RBC # BLD AUTO: 4.33 10E12/L (ref 4.4–5.9)
WBC # BLD AUTO: 4.9 10E9/L (ref 4–11)

## 2019-05-28 PROCEDURE — 80053 COMPREHEN METABOLIC PANEL: CPT | Performed by: PEDIATRICS

## 2019-05-28 PROCEDURE — 36415 COLL VENOUS BLD VENIPUNCTURE: CPT | Performed by: PEDIATRICS

## 2019-05-28 PROCEDURE — 84100 ASSAY OF PHOSPHORUS: CPT | Performed by: PEDIATRICS

## 2019-05-28 PROCEDURE — 85025 COMPLETE CBC W/AUTO DIFF WBC: CPT | Performed by: PEDIATRICS

## 2019-05-28 PROCEDURE — 83735 ASSAY OF MAGNESIUM: CPT | Performed by: PEDIATRICS

## 2019-05-29 ENCOUNTER — THERAPY VISIT (OUTPATIENT)
Dept: PHYSICAL THERAPY | Facility: CLINIC | Age: 19
End: 2019-05-29
Payer: COMMERCIAL

## 2019-05-29 DIAGNOSIS — K59.04 CHRONIC IDIOPATHIC CONSTIPATION: ICD-10-CM

## 2019-05-29 LAB
ALBUMIN SERPL-MCNC: 3.2 G/DL (ref 3.4–5)
ALP SERPL-CCNC: 155 U/L (ref 65–260)
ALT SERPL W P-5'-P-CCNC: 20 U/L (ref 0–50)
ANION GAP SERPL CALCULATED.3IONS-SCNC: 4 MMOL/L (ref 3–14)
AST SERPL W P-5'-P-CCNC: 24 U/L (ref 0–35)
BILIRUB SERPL-MCNC: 0.2 MG/DL (ref 0.2–1.3)
BUN SERPL-MCNC: 18 MG/DL (ref 7–30)
CALCIUM SERPL-MCNC: 8.7 MG/DL (ref 8.5–10.1)
CHLORIDE SERPL-SCNC: 108 MMOL/L (ref 98–110)
CO2 SERPL-SCNC: 31 MMOL/L (ref 20–32)
CREAT SERPL-MCNC: 1.15 MG/DL (ref 0.5–1)
GFR SERPL CREATININE-BSD FRML MDRD: >90 ML/MIN/{1.73_M2}
GLUCOSE SERPL-MCNC: 107 MG/DL (ref 70–99)
MAGNESIUM SERPL-MCNC: 2.1 MG/DL (ref 1.6–2.3)
PHOSPHATE SERPL-MCNC: 3.1 MG/DL (ref 2.5–4.5)
POTASSIUM SERPL-SCNC: 4.3 MMOL/L (ref 3.4–5.3)
PROT SERPL-MCNC: 6.4 G/DL (ref 6.8–8.8)
SODIUM SERPL-SCNC: 143 MMOL/L (ref 133–144)

## 2019-05-29 PROCEDURE — 97110 THERAPEUTIC EXERCISES: CPT | Mod: GP | Performed by: PHYSICAL THERAPIST

## 2019-05-29 PROCEDURE — 97112 NEUROMUSCULAR REEDUCATION: CPT | Mod: GP | Performed by: PHYSICAL THERAPIST

## 2019-05-31 ENCOUNTER — TELEPHONE (OUTPATIENT)
Dept: INFUSION THERAPY | Facility: CLINIC | Age: 19
End: 2019-05-31

## 2019-05-31 ENCOUNTER — HOSPITAL ENCOUNTER (EMERGENCY)
Facility: CLINIC | Age: 19
Discharge: HOME | End: 2019-05-31
Payer: COMMERCIAL

## 2019-05-31 VITALS
HEART RATE: 86 BPM | BODY MASS INDEX: 27.87 KG/M2 | RESPIRATION RATE: 20 BRPM | TEMPERATURE: 97.3 F | DIASTOLIC BLOOD PRESSURE: 82 MMHG | WEIGHT: 197.75 LBS | SYSTOLIC BLOOD PRESSURE: 110 MMHG | OXYGEN SATURATION: 95 %

## 2019-05-31 DIAGNOSIS — K59.00 CONSTIPATION, UNSPECIFIED CONSTIPATION TYPE: ICD-10-CM

## 2019-05-31 PROCEDURE — 25000132 ZZH RX MED GY IP 250 OP 250 PS 637

## 2019-05-31 PROCEDURE — 99282 EMERGENCY DEPT VISIT SF MDM: CPT | Mod: GC

## 2019-05-31 PROCEDURE — 99283 EMERGENCY DEPT VISIT LOW MDM: CPT

## 2019-05-31 RX ADMIN — DOCUSATE SODIUM 286 ML: 50 LIQUID ORAL at 22:29

## 2019-05-31 NOTE — TELEPHONE ENCOUNTER
Geo Hicks's father Jj called triage reporting that Geo was feel constipated and is quite uncomfortable. Geo is requesting to be admitted for a bowel clean-out. Geo was not interested in doing an at home clean-out. Dr. Tono Rousseau paged. Spoke with Dad and recommended that he take Geo to the ER if he was concerned about his constipation and if Geo was really uncomfortable. Dad agreed with that plan. Told Dad that we would let him know if Dr. Rousseau returned our page and recommended something different.

## 2019-05-31 NOTE — ED AVS SNAPSHOT
[facility] Emergency Department  [address] [address]  Phone:  [phone]                                    Geo Hicks   MRN: [id number]    Department:  [facility] Emergency Department   Date of Visit:  5/31/2019           After Visit Summary Signature Page    I have received my discharge instructions, and my questions have been answered. I have discussed any challenges I see with this plan with the nurse or doctor.    ..........................................................................................................................................  Patient/Patient Representative Signature      ..........................................................................................................................................  Patient Representative Print Name and Relationship to Patient    ..................................................               ................................................  Date                                   Time    ..........................................................................................................................................  Reviewed by Signature/Title    ...................................................              ..............................................  Date                                               Time          22EPIC Rev 08/18

## 2019-06-01 NOTE — ED PROVIDER NOTES
History     Chief Complaint   Patient presents with     Abdominal Pain     Constipation     HPI    History obtained from patient and father    Geo is a 18 year old male with history of ependymoma and chronic constipation who presents at  7:42 PM with increased abdominal distention and no stool since yesterday morning.  His dad reports he normally has a moderate to large stool every day.  Yesterday he had a very watery stool around 9:50AM which is unusual for him and then has not had any stool output since.  Today he has had increased abdominal distention and discomfort as well.  At home he is one a regular bowel regimen of linzess, a stool softener and 3 caps of miralax daily mixed with 32oz of fluids.  He has not had any recent changes to this regimen.    He has otherwise been doing well.  No fevers, respiratory symptoms or vomiting.  Eating and drinking normally.    Has previously been admitted for bowel cleanouts, but has also had some success with bowel clean out at home as well.    PMHx:  Past Medical History:   Diagnosis Date     Cranial nerve dysfunction      Dyspepsia      Ependymoma (H)      Gastro-oesophageal reflux disease      Hearing loss      Intracranial hemorrhage (H)      Migraine      Pilonidal cyst     7-2015     Reduced vision      Refractory obstruction of nasal airway     2nd to nasal valve prolapse     Sleep apnea      Strabismus     gaze palsy      Past Surgical History:   Procedure Laterality Date     GRAFT CARTILAGE FROM POSTERIOR AURICLE Left 10/6/2016    Procedure: GRAFT CARTILAGE FROM POSTERIOR AURICLE;  Surgeon: Tyler Richards MD;  [facility]     INCISION AND DRAINAGE PERINEAL, COMBINED Bilateral 7/18/2015    Procedure: COMBINED INCISION AND DRAINAGE PERINEAL;  Surgeon: Dequan Timmons MD;  [facility]     OPTICAL TRACKING SYSTEM CRANIOTOMY, EXCISE TUMOR, COMBINED N/A 4/13/2015    Procedure: COMBINED OPTICAL TRACKING SYSTEM CRANIOTOMY, EXCISE TUMOR;  Surgeon:  Francis Velazquez MD;  [facility]     OPTICAL TRACKING SYSTEM CRANIOTOMY, EXCISE TUMOR, COMBINED N/A 4/16/2015    Procedure: COMBINED OPTICAL TRACKING SYSTEM CRANIOTOMY, EXCISE TUMOR;  Surgeon: Francis Velazquez MD;  [facility]     OPTICAL TRACKING SYSTEM CRANIOTOMY, EXCISE TUMOR, COMBINED Bilateral 5/28/2015    Procedure: COMBINED OPTICAL TRACKING SYSTEM CRANIOTOMY, EXCISE TUMOR;  Surgeon: Francis Velazquez MD;  [facility]     OPTICAL TRACKING SYSTEM CRANIOTOMY, EXCISE TUMOR, COMBINED Bilateral 1/14/2016    Procedure: COMBINED OPTICAL TRACKING SYSTEM CRANIOTOMY, EXCISE TUMOR;  Surgeon: Francis Velazquez MD;  [facility]     OPTICAL TRACKING SYSTEM VENTRICULOSTOMY  4/16/2015    Procedure: OPTICAL TRACKING SYSTEM VENTRICULOSTOMY;  Surgeon: Francis Velazquez MD;  [facility]     REMOVE PORT VASCULAR ACCESS N/A 10/6/2016    Procedure: REMOVE PORT VASCULAR ACCESS;  Surgeon: Bruno Perea MD;  [facility]     RHINOPLASTY N/A 10/6/2016    Procedure: RHINOPLASTY;  Surgeon: Tyler Richards MD;  [facility]     VASCULAR SURGERY  5-2015    single lumen power port     These were reviewed with the patient/family.    MEDICATIONS were reviewed and are as follows:   No current facility-administered medications for this encounter.      Current Outpatient Medications   Medication     calcium carbonate-vitamin D 600-400 MG-UNIT CHEW     dexamethasone (DECADRON) 0.5 MG tablet     fexofenadine (ALLEGRA) 180 MG tablet     linaclotide (LINZESS) 290 MCG capsule     mupirocin (BACTROBAN) 2 % external ointment     OLANZapine (ZYPREXA) 15 MG tablet     omeprazole (PRILOSEC) 20 MG DR capsule     polyethylene glycol (MIRALAX/GLYCOLAX) packet     potassium phosphate, monobasic, (K-PHOS) 500 MG tablet     senna-docusate (SENOKOT-S/PERICOLACE) 8.6-50 MG tablet     Sennosides (EX-LAX) 15 MG CHEW     sertraline (ZOLOFT) 50 MG tablet     study - entinostat (IDS# 5050) 1 mg  tablet     study - entinostat (IDS# 5050) 5 mg tablet     sulfamethoxazole-trimethoprim (BACTRIM/SEPTRA) 400-80 MG per tablet     traZODone (DESYREL) 50 MG tablet     vitamin D3 (CHOLECALCIFEROL) 2000 units tablet     vitamin E (GNP VITAMIN E) 400 UNIT capsule       ALLERGIES:  Blood transfusion related (informational only) and No known drug allergies    IMMUNIZATIONS:  UTD by report.    SOCIAL HISTORY: Geo lives with his parents.      I have reviewed the Medications, Allergies, Past Medical and Surgical History, and Social History in the Epic system.    Review of Systems  Please see HPI for pertinent positives and negatives.  All other systems reviewed and found to be negative.        Physical Exam   BP: 110/82  Pulse: 86  Temp: 97.3  F (36.3  C)  Resp: 16  Weight: 89.7 kg (197 lb 12 oz)  SpO2: 95 %      Physical Exam   Appearance: Alert, well developed, nontoxic, with moist mucous membranes.  HEENT: Head: Normocephalic and atraumatic. Eyes: PERRL, conjunctivae and sclerae clear. Ears: Tympanic membranes clear bilaterally, without inflammation or effusion. Nose: Nares clear with no active discharge.  Mouth/Throat: No oral lesions, pharynx clear with no erythema or exudate.  Mild drooling.  Neck: Supple, no masses, no meningismus. No significant cervical lymphadenopathy.  Pulmonary: No grunting, flaring, retractions or stridor. Good air entry, clear to auscultation bilaterally, with no rales, rhonchi, or wheezing.  Cardiovascular: Regular rate and rhythm, normal S1 and S2, with no murmurs.  Normal symmetric peripheral pulses and brisk cap refill.  Abdominal: Bowel sounds present, soft, nontender, mild distention, with no masses and no hepatosplenomegaly.  Neurologic: Alert and oriented, moving all extremities equally.  Grossly normal strength and sensation.  Extremities/Back: No deformity or edema.  Skin: Diffusely scattered striae. No other significant rashes, ecchymoses, or lacerations.      ED Course       Procedures    No results found. However, due to the size of the patient record, not all encounters were searched. Please check Results Review for a complete set of results.    Medications - No data to display    History obtained from family.    Critical care time:  none       Assessments & Plan (with Medical Decision Making)   ASSESSMENT:  17 yo male with history of ependymoma who presents with acute on chronic constipation.  Normal vital signs.  Abdomen soft with only mild distention and tenderness to palpation on exam.  No history of vomiting.  History and exam not concerning for obstruction.  Offered enema, which family declined as he has not had good success with this in the past.  Has had success with home bowel clean out previously, so this seems like it would be an appropriate next step.    PLAN:  - Discharge home  - Home bowel clean out of 17 caps miralax mixed with 64 oz of gatorade  - Follow up with on call line for [facility] or return to ED if home clean out unsuccessful    I have reviewed the nursing notes.    I have reviewed the findings, diagnosis, plan and need for follow up with the patient.     Medication List      There are no discharge medications for this visit.         Final diagnoses:   Constipation, unspecified constipation type     Patient was seen and discussed with Dr. Yan.    Flower White MD  Pediatrics resident PGY2    5/31/2019   [facility] EMERGENCY DEPARTMENT  This data was collected with the resident physician working in the Emergency Department.  I saw and evaluated the patient and repeated the key portions of the history and physical exam.  The plan of care has been discussed with the patient and family by me or by the resident under my supervision.  I have read and edited the entire note.  MD Yuriy Eid Pablo Ureta, MD  05/31/19 [id number]

## 2019-06-01 NOTE — DISCHARGE INSTRUCTIONS
Emergency Department Discharge Information for Geo Guardado was seen in the [facility] Emergency Department today for constipation by Dr. Yan and Dr. White.    We recommend that you:  - Home bowel clean out:  Mix 17 caps of miralax with 64 oz of gatorade or other preferred fluid.  Try to drink within 4-6 hour time period.  - If home bowel clean out unsuccessful, contact on call physician or return to ED for further evaluation and management.      Please return to the ED or contact his primary physician if he becomes much more ill, if he won't drink, he can't keep down liquids, he has severe pain, starts vomiting, or if you have any other concerns.      Follow up via phone with on call clinic physician (Heme/Onc clinic line) or return to ED if home bowel clean out unsuccessful in resolving symptoms.        Medication side effect information:  All medicines may cause side effects. However, most people have no side effects or only have minor side effects.     People can be allergic to any medicine. Signs of an allergic reaction include rash, difficulty breathing or swallowing, wheezing, or unexplained swelling. If he has difficulty breathing or swallowing, call 911 or go right to the Emergency Department. For rash or other concerns, call his doctor.     If you have questions about side effects, please ask our staff. If you have questions about side effects or allergic reactions after you go home, ask your doctor or a pharmacist.     Some possible side effects of the medicines we are recommending for Geo are:     Polyethylene glycol  (Miralax, for constipation)  - Diarrhea - this may happen if you take too much Miralax. If you get diarrhea, try using a smaller amount or using it less often  - Flatulence (gas)  - Stomach cramps  - Talk to your doctor before using Miralax if you have kidney disease

## 2019-06-03 ENCOUNTER — HOSPITAL ENCOUNTER (OUTPATIENT)
Dept: OCCUPATIONAL THERAPY | Facility: CLINIC | Age: 19
Setting detail: THERAPIES SERIES
End: 2019-06-03
Attending: FAMILY MEDICINE
Payer: COMMERCIAL

## 2019-06-03 PROCEDURE — 97535 SELF CARE MNGMENT TRAINING: CPT | Mod: GO | Performed by: OCCUPATIONAL THERAPIST

## 2019-06-04 ENCOUNTER — TELEPHONE (OUTPATIENT)
Dept: PEDIATRIC HEMATOLOGY/ONCOLOGY | Facility: CLINIC | Age: 19
End: 2019-06-04

## 2019-06-04 ENCOUNTER — HOSPITAL ENCOUNTER (OUTPATIENT)
Facility: CLINIC | Age: 19
Setting detail: OBSERVATION
Discharge: HOME | End: 2019-06-05
Attending: PEDIATRICS | Admitting: INTERNAL MEDICINE
Payer: COMMERCIAL

## 2019-06-04 ENCOUNTER — APPOINTMENT (OUTPATIENT)
Dept: GENERAL RADIOLOGY | Facility: CLINIC | Age: 19
End: 2019-06-04
Attending: PEDIATRICS
Payer: COMMERCIAL

## 2019-06-04 DIAGNOSIS — C71.9 EPENDYMOMA (H): ICD-10-CM

## 2019-06-04 LAB
BASOPHILS # BLD AUTO: 0 10E9/L (ref 0–0.2)
BASOPHILS NFR BLD AUTO: 0.3 %
DIFFERENTIAL METHOD BLD: ABNORMAL
EOSINOPHIL # BLD AUTO: 0.3 10E9/L (ref 0–0.7)
EOSINOPHIL NFR BLD AUTO: 7.4 %
ERYTHROCYTE [DISTWIDTH] IN BLOOD BY AUTOMATED COUNT: 13.3 % (ref 10–15)
HCT VFR BLD AUTO: 38.7 % (ref 40–53)
HGB BLD-MCNC: 12.9 G/DL (ref 13.3–17.7)
LYMPHOCYTES # BLD AUTO: 0.9 10E9/L (ref 0.8–5.3)
LYMPHOCYTES NFR BLD AUTO: 21.8 %
MCH RBC QN AUTO: 30 PG (ref 26.5–33)
MCHC RBC AUTO-ENTMCNC: 33.3 G/DL (ref 31.5–36.5)
MCV RBC AUTO: 90 FL (ref 78–100)
MONOCYTES # BLD AUTO: 0.7 10E9/L (ref 0–1.3)
MONOCYTES NFR BLD AUTO: 17.5 %
NEUTROPHILS # BLD AUTO: 2.1 10E9/L (ref 1.6–8.3)
NEUTROPHILS NFR BLD AUTO: 53 %
PLATELET # BLD AUTO: 129 10E9/L (ref 150–450)
RBC # BLD AUTO: 4.3 10E12/L (ref 4.4–5.9)
WBC # BLD AUTO: 3.9 10E9/L (ref 4–11)

## 2019-06-04 PROCEDURE — 85025 COMPLETE CBC W/AUTO DIFF WBC: CPT | Performed by: PEDIATRICS

## 2019-06-04 PROCEDURE — 84100 ASSAY OF PHOSPHORUS: CPT | Performed by: PEDIATRICS

## 2019-06-04 PROCEDURE — 83735 ASSAY OF MAGNESIUM: CPT | Performed by: PEDIATRICS

## 2019-06-04 PROCEDURE — G0378 HOSPITAL OBSERVATION PER HR: HCPCS

## 2019-06-04 PROCEDURE — 80053 COMPREHEN METABOLIC PANEL: CPT | Performed by: PEDIATRICS

## 2019-06-04 PROCEDURE — 40000986 XR ABDOMEN PORT 1 VW

## 2019-06-04 PROCEDURE — 25000132 ZZH RX MED GY IP 250 OP 250 PS 637: Performed by: STUDENT IN AN ORGANIZED HEALTH CARE EDUCATION/TRAINING PROGRAM

## 2019-06-04 PROCEDURE — G0379 DIRECT REFER HOSPITAL OBSERV: HCPCS

## 2019-06-04 PROCEDURE — 36415 COLL VENOUS BLD VENIPUNCTURE: CPT | Performed by: PEDIATRICS

## 2019-06-04 RX ORDER — VITAMIN E 268 MG
400 CAPSULE ORAL
Status: ON HOLD | COMMUNITY
Start: 2017-11-29 | End: 2019-06-04

## 2019-06-04 RX ORDER — DEXAMETHASONE 0.75 MG/1
0.75 TABLET ORAL
Status: DISCONTINUED | OUTPATIENT
Start: 2019-06-05 | End: 2019-06-05 | Stop reason: HOSPADM

## 2019-06-04 RX ORDER — CHOLECALCIFEROL (VITAMIN D3) 50 MCG
1 TABLET ORAL
COMMUNITY
Start: 2017-06-04 | End: 2019-12-16

## 2019-06-04 RX ADMIN — POLYETHYLENE GLYCOL 3350, SODIUM SULFATE ANHYDROUS, SODIUM BICARBONATE, SODIUM CHLORIDE, POTASSIUM CHLORIDE 10 ML/KG/HR: 236; 22.74; 6.74; 5.86; 2.97 POWDER, FOR SOLUTION ORAL at 23:05

## 2019-06-04 NOTE — H&P
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 He presents today for management of worsening constipation and will be admitted for a bowel cleanout.    GI  Acute on chronic constipation  - NG tube placement  - AXR  - Bowel clean out with golytely via NGT at 500 ml/hr over 8 hrs. Continue until clear stool  - Outpatient followed by Dr. Denis-Mary          - Hold OP constipation regimen: Linzess, colace, dulcolax, miralax  - Omeprazole 40 mg BID     FEN  - Clear liquid diet  - Holding PTA supplements for now: Ca, vitamin D, K-phos  - BMP, Mg, Phos in AM     Heme/Onc:  Ependymoma: on study  - Entinostat 6 mg weekly, last taken 6/4/2019     Endo  - Followed by Dr. Martin  - PTA Decadron 0.75 mg QAM     ALLERGY:  Hx of allergies  - Fexofenadine 180 mg QDay     ID  PCP ppx  - Bactrim QSat,Sun BID (would need be ordered if pt still admitted)     NEURO  Speech delay  - PTA olanzapine 10 mg at bedtime  - Held per patient request this evening.      RESP  Hx of CANDELARIO but has not been using Bipap  - Continue to monitor     Access: PIV, NG     Disposition: Pending completion of bowel clean out as evidenced by clear stools. Anticipate 1-2 days.    Patient and plan of care were discussed with Heme/Onc Fellow Dr. Diaz Covarrubias MD MPH  Pediatrics, PGY-2  Pager: [phone]  I saw and evaluated the patient and agree with the resident's assessment and plan.  Eddie Lowry MD,  MPH, [facility]  Division of Pediatric Hematology/Oncology      Primary Care Physician   Jeffrey Espinoza    Chief Complaint   Constipation      History of Present Illness   Geo is a 18 year old male who presents with constipation. He reports that he usually stools daily but has not had a bowel movement since Saturday 6/1. He reports that he intermittently gets constipated beyond what his daily home regimen can manage. He says he does well with the placement of the NG tube. He has had no abdominal pain. He denies stools being hard most of the time. He has had no blood in his stool. He otherwise feels well.     He has had no fevers, cough, vomiting or diarrhea. He does report some congestion that he thinks is likely a cold that is resolving vs allergies.     Past Medical History    Past Medical History:   Diagnosis Date     Cranial nerve dysfunction      Dyspepsia      Ependymoma (H)      Gastro-oesophageal reflux disease      Hearing loss      Intracranial hemorrhage (H)      Migraine      Pilonidal cyst     7-2015     Reduced vision      Refractory obstruction of nasal airway     2nd to nasal valve prolapse     Sleep apnea      Strabismus     gaze palsy        Past Surgical History   Past Surgical History:   Procedure Laterality Date     GRAFT CARTILAGE FROM POSTERIOR AURICLE Left 10/6/2016    Procedure: GRAFT CARTILAGE FROM POSTERIOR AURICLE;  Surgeon: Tyler Richards MD;  [facility]     INCISION AND DRAINAGE PERINEAL, COMBINED Bilateral 7/18/2015    Procedure: COMBINED INCISION AND DRAINAGE PERINEAL;  Surgeon: Dequan Timmons MD;  [facility]     OPTICAL TRACKING SYSTEM CRANIOTOMY, EXCISE TUMOR, COMBINED N/A 4/13/2015    Procedure: COMBINED OPTICAL TRACKING SYSTEM CRANIOTOMY, EXCISE TUMOR;  Surgeon: Francis Velazquez MD;  [facility]     OPTICAL TRACKING SYSTEM CRANIOTOMY, EXCISE TUMOR, COMBINED N/A 4/16/2015    Procedure: COMBINED  OPTICAL TRACKING SYSTEM CRANIOTOMY, EXCISE TUMOR;  Surgeon: Francis Velazquez MD;  [facility]     OPTICAL TRACKING SYSTEM CRANIOTOMY, EXCISE TUMOR, COMBINED Bilateral 5/28/2015    Procedure: COMBINED OPTICAL TRACKING SYSTEM CRANIOTOMY, EXCISE TUMOR;  Surgeon: Francis Velazquez MD;  [facility]     OPTICAL TRACKING SYSTEM CRANIOTOMY, EXCISE TUMOR, COMBINED Bilateral 1/14/2016    Procedure: COMBINED OPTICAL TRACKING SYSTEM CRANIOTOMY, EXCISE TUMOR;  Surgeon: Francis Velazquez MD;  [facility]     OPTICAL TRACKING SYSTEM VENTRICULOSTOMY  4/16/2015    Procedure: OPTICAL TRACKING SYSTEM VENTRICULOSTOMY;  Surgeon: Francis Velazquez MD;  [facility]     REMOVE PORT VASCULAR ACCESS N/A 10/6/2016    Procedure: REMOVE PORT VASCULAR ACCESS;  Surgeon: Bruno Perea MD;  [facility]     RHINOPLASTY N/A 10/6/2016    Procedure: RHINOPLASTY;  Surgeon: Tyler Richards MD;  [facility]     VASCULAR SURGERY  5-2015    single lumen power port       Prior to Admission Medications   Prior to Admission Medications   Prescriptions Last Dose Informant Patient Reported? Taking?   calcium carbonate-vitamin D 600-400 MG-UNIT CHEW 6/4/2019 at Unknown time Father No Yes   Sig: Take 2 tablets in the morning and 1 tablet in the evening.   dexamethasone (DECADRON) 0.5 MG tablet   No No   Sig: Take 0.75 mg by mouth daily (with breakfast).   fexofenadine (ALLEGRA) 180 MG tablet  Father Yes No   Sig: Take 180 mg by mouth every evening    linaclotide (LINZESS) 290 MCG capsule   Yes No   Sig: Take 290 mcg by mouth every morning (before breakfast)    omeprazole (PRILOSEC) 20 MG DR capsule   No No   Sig: Take 2 capsules (40 mg) by mouth every morning   polyethylene glycol (MIRALAX/GLYCOLAX) powder   No No   Sig: Take 238 g (14 capfuls) by mouth once for 1 dose In 64 ounces of Gatorade.   potassium phosphate, monobasic, (K-PHOS) 500 MG tablet   Yes No   Sig: Take 500 mg by mouth 2 times daily    senna-docusate (SENOKOT-S/PERICOLACE) 8.6-50 MG tablet   Yes No   Sig: Take 2 tablets by mouth At Bedtime   sertraline (ZOLOFT) 50 MG tablet   No No   Sig: Take 1 tablet (50 mg) by mouth daily   study - entinostat (IDS# 5050) 1 mg tablet   No No   Sig: Take 1 tablet (1 mg) by mouth every 7 days for 4 doses Take one 1mg tablet with one 5mg tablet for total dose of 6mg weekly. Take on an empty stomach, at least 1 hour before or 2 hours after a meal.  Swallow tablet whole.   study - entinostat (IDS# 5050) 1 mg tablet   No No   Sig: Take 1 tablet (1 mg) by mouth every 7 days for 4 doses Take one 1mg tablet with one 5mg tablet for total dose of 6mg weekly. Take on an empty stomach, at least 1 hour before or 2 hours after a meal.  Swallow tablet whole.   study - entinostat (IDS# 5050) 1 mg tablet   No No   Sig: Take 1 tablet (1 mg) by mouth every 7 days for 4 doses Take one 1mg tablet with one 5mg tablet for total dose of 6mg weekly. Take on an empty stomach, at least 1 hour before or 2 hours after a meal.  Swallow tablet whole.   study - entinostat (IDS# 5050) 1 mg tablet   No No   Sig: Take 1 tablet (1 mg) by mouth every 7 days for 4 doses Take one 1mg tablet with one 5mg tablet for total dose of 6mg weekly. Take on an empty stomach, at least 1 hour before or 2 hours after a meal.  Swallow tablet whole.   study - entinostat (IDS# 5050) 1 mg tablet   No No   Sig: Take 1 tablet (1 mg) by mouth every 7 days for 4 doses Take one 1mg tablet with one 5mg tablet for total dose of 6mg weekly. Take on an empty stomach, at least 1 hour before or 2 hours after a meal.  Swallow tablet whole.   study - entinostat (IDS# 5050) 1 mg tablet   No No   Sig: Take 1 tablet (1 mg) by mouth every 7 days for 4 doses Take one 1mg tablet with one 5mg tablet for total dose of 6mg weekly. Take on an empty stomach, at least 1 hour before or 2 hours after a meal.  Swallow tablet whole.   study - entinostat (IDS# 5050) 5 mg tablet   No No   Sig:  Take 1 tablet (5 mg) by mouth every 7 days for 4 doses Take one 5mg tablet with one 1mg tablet for total dose of 6mg weekly. Take on an empty stomach, at least 1 hour before or 2 hours after a meal.  Swallow tablet whole.   study - entinostat (IDS# 5050) 5 mg tablet   No No   Sig: Take 1 tablet (5 mg) by mouth every 7 days for 4 doses Take one 5mg tablet with one 1mg tablet for total dose of 6mg weekly. Take on an empty stomach, at least 1 hour before or 2 hours after a meal.  Swallow tablet whole.   study - entinostat (IDS# 5050) 5 mg tablet   No No   Sig: Take 1 tablet (5 mg) by mouth every 7 days for 4 doses Take one 5mg tablet with one 1mg tablet for total dose of 6mg weekly. Take on an empty stomach, at least 1 hour before or 2 hours after a meal.  Swallow tablet whole.   study - entinostat (IDS# 5050) 5 mg tablet   No No   Sig: Take 1 tablet (5 mg) by mouth every 7 days for 4 doses Take one 5mg tablet with one 1mg tablet for total dose of 6mg weekly. Take on an empty stomach, at least 1 hour before or 2 hours after a meal.  Swallow tablet whole.   study - entinostat (IDS# 5050) 5 mg tablet   No No   Sig: Take 1 tablet (5 mg) by mouth every 7 days for 4 doses Take one 5mg tablet with one 1mg tablet for total dose of 6mg weekly. Take on an empty stomach, at least 1 hour before or 2 hours after a meal.  Swallow tablet whole.   study - entinostat (IDS# 5050) 5 mg tablet   No No   Sig: Take 1 tablet (5 mg) by mouth every 7 days for 4 doses Take one 5mg tablet with one 1mg tablet for total dose of 6mg weekly. Take on an empty stomach, at least 1 hour before or 2 hours after a meal.  Swallow tablet whole.   study - entinostat (IDS# 5050) 5 mg tablet   No No   Sig: Take 1 tablet (5 mg) by mouth every 7 days for 4 doses Take one 5mg tablet with one 1mg tablet for total dose of 6mg weekly. Take on an empty stomach, at least 1 hour before or 2 hours after a meal.  Swallow tablet whole.   sulfamethoxazole-trimethoprim  (BACTRIM/SEPTRA) 400-80 MG per tablet   No No   Sig: Take 1 tablet by mouth 2 times daily On Saturdays and Sundays   vitamin D3 (CHOLECALCIFEROL) 2000 units (50 mcg) tablet 6/4/2019  Yes Yes   Sig: Take 1 tablet by mouth daily (with breakfast)   vitamin D3 (CHOLECALCIFEROL) 2000 units tablet   Yes No   Sig: Take 1 tablet by mouth daily      Facility-Administered Medications: None     Allergies   Allergies   Allergen Reactions     Blood Transfusion Related (Informational Only) Swelling     Periorbital swelling post platelet transfusion     No Known Drug Allergies        Social History   Social History     Social History Narrative    Grown up in [place], [place].  Parents are , and he shares time between his mother's and father's homes. Both parents are remarried.  Dad is a , and mom does  for a testing company.  Siblings include two full brothers (older brother Benitez who is a senior in college and younger brother Gamaliel), a step-brother, and a step-sister. Geo graduated from high school in Spring 2018.  Initially considered attending [employer] in Fall 2019, but reportedly lost interest due to the amount of time it would take him to complete courses and receive a degree.  Does today endorse continued interest in pursuing an advanced course of study at some point, specifically stating he is interested in Electrical Engineering.  No access to guns or weapons enjoys playing video games.  Guns are currently locked in the house.       Family History   Family History   Problem Relation Age of Onset     Circulatory Father         PE/DVT     Hypothyroidism Father 30     Diabetes Maternal Grandmother      Diabetes Paternal Grandmother      Diabetes Paternal Grandfather      C.A.D. Paternal Grandfather      Hypertension Maternal Grandfather      Thyroid Disease Paternal Aunt         unknown whether hypo or hyper     Mental Illness No family hx of        Review of  Systems   ROS: 10 point ROS neg other than the symptoms noted above in the HPI.    Physical Exam   Temp: 96.7  F (35.9  C) Temp src: Axillary BP: 116/82 Pulse: 74   Resp: 14 SpO2: 98 % O2 Device: None (Room air)    Vital Signs with Ranges  Temp:  [96.7  F (35.9  C)] 96.7  F (35.9  C)  Pulse:  [74] 74  Resp:  [14] 14  BP: (116)/(82) 116/82  SpO2:  [98 %] 98 %  193 lbs 1.97 oz    Constitutional: Awake, alert, oriented, NAD, talkative. Difficult to understand sometimes but can clarify his questions.   Head: Normocephalic, scar left of midline on scalp c/d/i  Eyes: PERRL, 2 mm pupils, wears patch over left eye, unable to ABduct left eye, ptosis of left eye  Nose: No nasal congestion. NG in place.  Ears: did not assess TM. Exterior ears within normal limits  Mouth: NG in posterior oropharynx. No erythema or exudate. No oral lesions.   Neck: No cervical lymphadenopathy  Resp: No increased WOB. No wheezes or crackles.   CV: RRR. No murmurs, gallops, or rubs. Strong and equal radial pulses.  Abdominal: Obese with stretch marks. hypoactive bowel sounds. Soft. No distension or mass. No tenderness to palpation.  Extremities: Stretch marks on forearms and AC fossa, and bilateral lower extremities. Deep purple.   Neuro: Alert and oriented. Ataxic gait with assisted movement from chair to bed. Speech is often difficult to understand. When this happens he slows his speech and sometimes asks for help with mother. Asks good questions about the evening. Feels comfortable with mother not being at bedside.   Skin: Skin is warm and dry.   Psychiatric: Mood and affect normal.     Data   Results for orders placed or performed during the hospital encounter of 06/04/19 (from the past 24 hour(s))   XR Abdomen Port 1 View    Narrative    XR ABDOMEN PORT 1 VW  6/4/2019 10:36 PM      HISTORY: NG tube placement    COMPARISON: 2/4/2019    FINDINGS:   Portable supine view of the abdomen. Enteric tube tip and sidehole  projecting over the stomach.  There is diffuse nonobstructive bowel gas  distention. There are mottled lucencies in the left abdomen favored to  represent stool.      Impression    IMPRESSION:   Enteric tube tip projects over the stomach. Diffuse nonobstructive  bowel gas distention. Mottled lucencies in the left abdomen favored to  represent stool rather than pneumatosis.    MARIO ALBERTO AYALA MD     *Note: Due to a large number of results and/or encounters for the requested time period, some results have not been displayed. A complete set of results can be found in Results Review.

## 2019-06-05 VITALS
OXYGEN SATURATION: 96 % | BODY MASS INDEX: 27.22 KG/M2 | TEMPERATURE: 96.3 F | HEART RATE: 86 BPM | SYSTOLIC BLOOD PRESSURE: 115 MMHG | RESPIRATION RATE: 24 BRPM | WEIGHT: 193.12 LBS | DIASTOLIC BLOOD PRESSURE: 76 MMHG

## 2019-06-05 LAB
ALBUMIN SERPL-MCNC: 3.1 G/DL (ref 3.4–5)
ALP SERPL-CCNC: 135 U/L (ref 65–260)
ALT SERPL W P-5'-P-CCNC: 15 U/L (ref 0–50)
ANION GAP SERPL CALCULATED.3IONS-SCNC: 10 MMOL/L (ref 3–14)
AST SERPL W P-5'-P-CCNC: 23 U/L (ref 0–35)
BILIRUB SERPL-MCNC: 0.2 MG/DL (ref 0.2–1.3)
BUN SERPL-MCNC: 12 MG/DL (ref 7–30)
CALCIUM SERPL-MCNC: 8.2 MG/DL (ref 8.5–10.1)
CHLORIDE SERPL-SCNC: 107 MMOL/L (ref 98–110)
CO2 SERPL-SCNC: 25 MMOL/L (ref 20–32)
CREAT SERPL-MCNC: 1.11 MG/DL (ref 0.5–1)
GFR SERPL CREATININE-BSD FRML MDRD: >90 ML/MIN/{1.73_M2}
GLUCOSE SERPL-MCNC: 95 MG/DL (ref 70–99)
MAGNESIUM SERPL-MCNC: 2.1 MG/DL (ref 1.6–2.3)
PHOSPHATE SERPL-MCNC: 2.9 MG/DL (ref 2.5–4.5)
POTASSIUM SERPL-SCNC: 3.7 MMOL/L (ref 3.4–5.3)
PROT SERPL-MCNC: 6.4 G/DL (ref 6.8–8.8)
SODIUM SERPL-SCNC: 142 MMOL/L (ref 133–144)

## 2019-06-05 PROCEDURE — 25000132 ZZH RX MED GY IP 250 OP 250 PS 637: Performed by: STUDENT IN AN ORGANIZED HEALTH CARE EDUCATION/TRAINING PROGRAM

## 2019-06-05 PROCEDURE — 25000131 ZZH RX MED GY IP 250 OP 636 PS 637: Performed by: STUDENT IN AN ORGANIZED HEALTH CARE EDUCATION/TRAINING PROGRAM

## 2019-06-05 PROCEDURE — G0378 HOSPITAL OBSERVATION PER HR: HCPCS

## 2019-06-05 RX ORDER — ONDANSETRON 4 MG/1
4 TABLET, ORALLY DISINTEGRATING ORAL EVERY 6 HOURS PRN
Status: DISCONTINUED | OUTPATIENT
Start: 2019-06-05 | End: 2019-06-05 | Stop reason: HOSPADM

## 2019-06-05 RX ADMIN — POLYETHYLENE GLYCOL 3350, SODIUM SULFATE ANHYDROUS, SODIUM BICARBONATE, SODIUM CHLORIDE, POTASSIUM CHLORIDE 18.27 ML/KG/HR: 236; 22.74; 6.74; 5.86; 2.97 POWDER, FOR SOLUTION ORAL at 04:39

## 2019-06-05 RX ADMIN — OMEPRAZOLE 40 MG: 20 CAPSULE, DELAYED RELEASE ORAL at 10:25

## 2019-06-05 RX ADMIN — ONDANSETRON 4 MG: 4 TABLET, ORALLY DISINTEGRATING ORAL at 09:00

## 2019-06-05 RX ADMIN — DEXAMETHASONE 0.75 MG: 0.75 TABLET ORAL at 10:26

## 2019-06-05 NOTE — PLAN OF CARE
VSS. No c/o pain or nausea. Golytely infusing at 1600 ml/hr, pt tolerating well. Pt has had two large loose/watery stools so far. No contact from family this shift, hourly rounding complete. Will continue to monitor.

## 2019-06-05 NOTE — PLAN OF CARE
A/vss, stool watery turning from brown this am to yellow/green this afternoon with no solids.  C/o nausea this am with some gagging, golytely held for a short time and zofran given with relief.  Golytely restarted at 1200ml/hr per HO.  Continue to monitor.

## 2019-06-05 NOTE — PROGRESS NOTES
SPIRITUAL HEALTH SERVICES  SPIRITUAL ASSESSMENT Progress Note  [facility] ([facility]) Peds Unit 5     REFERRAL SOURCE:   Support initiated    Supportive check in with Geo. Geo engaged well with me. We talked about his time at the hospital and how he likes to pass the time (he especially likes to listen to the radio). We also discussed coping and illness. He looks forward to being home and in his own bed. I offered a blessing.       PLAN:  Will plan to visit at least weekly when Geo is hospitalized.       Ita Boswell M.Div.  Peds Heme/Onc   Pager [phone]  [email or web address]

## 2019-06-05 NOTE — PLAN OF CARE
Pt arrived on unit with family at 1930. Pt afebrile, VSS, lung sounds clear. Baseline language delay which includes some drooling, but A/O x 4. Admission documentation complete, NG placed and verified, Golytely started at 2300. Goal to discharge after 3 clear stools. Family not at bedside, hourly rounds complete, will continue to monitor with POC.

## 2019-06-05 NOTE — DISCHARGE SUMMARY
[facility], [place]    Discharge Summary  Pediatric Hematology/Oncology    Date of Admission:  6/4/2019  Date of Discharge:  6/5/10  Discharging Provider: Nicole Silva    Discharge Diagnoses   Acute on chronic constipation  ependymoma    History of Present Illness   Geo Hicks is an 18 year old male who presented with constipation whose last bowel movement was 4 days prior to admission.     Hospital Course   Geo had an NG tube placed and was started on a golytely bowel cleanout. He was discharged on his home bowel regimen after stools cleared which included:   -linaclotide 290mg in the AM  -2 tabs senna at bedtime  -daily miralax    Nicole Silva MD  PGY1 Pediatrics    Significant Results and Procedures   AXR: Enteric tube tip projects over the stomach. Diffuse nonobstructive  bowel gas distention. Mottled lucencies in the left abdomen favored to  represent stool rather than pneumatosis.    Pending Results   None    Primary Care Physician   Jeffrey Espinoza    Physical Exam   Vital Signs with Ranges  Temp:  [96.5  F (35.8  C)-97.7  F (36.5  C)] 97  F (36.1  C)  Pulse:  [74-76] 76  Heart Rate:  [64-69] 68  Resp:  [14-22] 22  BP: ()/(72-82) 113/80  SpO2:  [94 %-98 %] 95 %  I/O last 3 completed shifts:  In: 90418 [P.O.:1115; NG/GT:21468]  Out: 5150 [Other:5150]  NAD  HENT:  saliva accumulation in OP  Eyes: Pupils are equal, round, and reactive to light. Conjunctivae are normal.  Tracks to right, not to left. +Nystagmus  Cardiovascular: RRR no M/ no G/R  Pulmonary/Chest: CTA Bilat  Neurological: AxO x 3.  Normal strength. A cranial nerve deficit is present. Coordination decreased in upper exts bilat.   Skin: Warm / Dry with scattered striae   Psychiatric: normal mood and affect.       Time Spent on this Encounter   I, Nicole Silva, personally saw the patient today and spent greater than 30 minutes discharging this patient.    Discharge Disposition   Discharged to  home  Condition at discharge: Stable    Consultations This Hospital Stay   None    Discharge Orders      Reason for your hospital stay    Geo was admitted for a bowel clean out which he tolerated well. He is safe for discharge home.     Follow Up and recommended labs and tests    Follow up as previously scheduled with Kristi Schuler on 6/12/19.     Activity    Your activity upon discharge: activity as tolerated     When to contact your care team    Call your primary doctor if you have any of the following: temperature greater than 100.4 dF, difficulty breathing, worsening nausea, vomiting, abdominal pain, or any other concerns.     Full Code     Diet    Follow this diet upon discharge: Regular diet     Discharge Medications   Current Discharge Medication List      CONTINUE these medications which have NOT CHANGED    Details   calcium carbonate-vitamin D 600-400 MG-UNIT CHEW Take 2 tablets in the morning and 1 tablet in the evening.  Qty: 90 tablet, Refills: 3    Associated Diagnoses: Ependymoma (H)      vitamin D3 (CHOLECALCIFEROL) 2000 units (50 mcg) tablet Take 1 tablet by mouth daily (with breakfast)      dexamethasone (DECADRON) 0.5 MG tablet Take 0.75 mg by mouth daily (with breakfast).  Qty: 90 tablet, Refills: 3    Associated Diagnoses: Ependymoma (H)      fexofenadine (ALLEGRA) 180 MG tablet Take 180 mg by mouth every evening       linaclotide (LINZESS) 290 MCG capsule Take 290 mcg by mouth every morning (before breakfast)       omeprazole (PRILOSEC) 20 MG DR capsule Take 2 capsules (40 mg) by mouth every morning  Qty: 60 capsule, Refills: 1    Associated Diagnoses: Ependymoma (H)      potassium phosphate, monobasic, (K-PHOS) 500 MG tablet Take 500 mg by mouth 2 times daily      senna-docusate (SENOKOT-S/PERICOLACE) 8.6-50 MG tablet Take 2 tablets by mouth At Bedtime      sertraline (ZOLOFT) 50 MG tablet Take 1 tablet (50 mg) by mouth daily  Qty: 30 tablet, Refills: 2    Associated Diagnoses:  Depression, unspecified depression type      study - entinostat (IDS# 5050) 5 mg tablet Take 1 tablet (5 mg) by mouth every 7 days for 4 doses Take one 5mg tablet with one 1mg tablet for total dose of 6mg weekly. Take on an empty stomach, at least 1 hour before or 2 hours after a meal.  Swallow tablet whole.  Qty: 4 tablet, Refills: 0    Comments: Deliver to [facility] for dispensing  Associated Diagnoses: Neoplasm of posterior cranial fossa (H); Ependymoma (H)      sulfamethoxazole-trimethoprim (BACTRIM/SEPTRA) 400-80 MG per tablet Take 1 tablet by mouth 2 times daily On Saturdays and Sundays  Qty: 24 tablet, Refills: 11    Associated Diagnoses: Ependymoma (H)         STOP taking these medications       polyethylene glycol (MIRALAX/GLYCOLAX) powder Comments:   Reason for Stopping:         study - entinostat (IDS# 5050) 1 mg tablet Comments:   Reason for Stopping:         study - entinostat (IDS# 5050) 1 mg tablet Comments:   Reason for Stopping:         study - entinostat (IDS# 5050) 1 mg tablet Comments:   Reason for Stopping:         study - entinostat (IDS# 5050) 1 mg tablet Comments:   Reason for Stopping:         study - entinostat (IDS# 5050) 1 mg tablet Comments:   Reason for Stopping:         study - entinostat (IDS# 5050) 1 mg tablet Comments:   Reason for Stopping:             Allergies   Allergies   Allergen Reactions     Blood Transfusion Related (Informational Only) Swelling     Periorbital swelling post platelet transfusion     No Known Drug Allergies      Data   None    I saw and evaluated this patient and agree with the resident's assessment and plan. Greater than 30 minutes were spent arranging the discharge and discussing the discharge plan and follow-up with the patient/family.  Eddie Lowry MD, MPH, [facility]  Division of Pediatric Hematology/Oncology

## 2019-06-05 NOTE — PROGRESS NOTES
"SPIRITUAL HEALTH SERVICES  SPIRITUAL ASSESSMENT Progress Note  [facility] ([facility]) 5 Peds     REFERRAL SOURCE: Nurse page    Geo was struggling with concerns about whether there is more that can be done for him regarding his constipation. He wonders \"if the doctors are telling me the full truth.\" Explored feelings related to being in the hospital with 4 years of complicated medical issues related to his brain tumor and implications for him and for his family. He was trying to decide whether to stay in the hospital for another night. He decided he would be more comfortable at home. He welcomed a prayer, which I offered at the end of the visit.    I also spent time with Geo's mother, Christianne. She expressed her own fears and frustrations related to a long and difficult medical history. She framed the experience as one where they have had an opportunity to \"help others through our own medical journey. Geo is helping countless others because of his being part of a trial.\" I affirmed the breadth of her emotions and offered encouragement for expanding her support. Her sisters are important emotional supports for her. She also has strong connection with [employer] in [place], [place]    PLAN: Nothing further at this time due to discharge.    Margie Donovan  Chaplain Resident  Pager  [phone]      "

## 2019-06-06 NOTE — PLAN OF CARE
AVSS. No c/o pain or nausea. Discharged home this evening. Discharge education completed. Hourly rounding completed.

## 2019-06-10 ENCOUNTER — HOSPITAL ENCOUNTER (OUTPATIENT)
Dept: OCCUPATIONAL THERAPY | Facility: CLINIC | Age: 19
Setting detail: THERAPIES SERIES
End: 2019-06-10
Attending: FAMILY MEDICINE
Payer: COMMERCIAL

## 2019-06-10 PROCEDURE — 97535 SELF CARE MNGMENT TRAINING: CPT | Mod: GO | Performed by: OCCUPATIONAL THERAPIST

## 2019-06-10 NOTE — ADDENDUM NOTE
Encounter addended by: Elyse Costello OTR on: 6/10/2019 5:29 PM   Actions taken: Pend clinical note, Delete clinical note, Flowsheet accepted

## 2019-06-11 ENCOUNTER — THERAPY VISIT (OUTPATIENT)
Dept: PHYSICAL THERAPY | Facility: CLINIC | Age: 19
End: 2019-06-11
Payer: COMMERCIAL

## 2019-06-11 DIAGNOSIS — K59.04 CHRONIC IDIOPATHIC CONSTIPATION: ICD-10-CM

## 2019-06-11 DIAGNOSIS — D49.6 NEOPLASM OF POSTERIOR CRANIAL FOSSA (H): Primary | ICD-10-CM

## 2019-06-11 LAB
BASOPHILS # BLD AUTO: 0 10E9/L (ref 0–0.2)
BASOPHILS NFR BLD AUTO: 0.6 %
DIFFERENTIAL METHOD BLD: ABNORMAL
EOSINOPHIL # BLD AUTO: 0.2 10E9/L (ref 0–0.7)
EOSINOPHIL NFR BLD AUTO: 6.3 %
ERYTHROCYTE [DISTWIDTH] IN BLOOD BY AUTOMATED COUNT: 13.5 % (ref 10–15)
HCT VFR BLD AUTO: 41.1 % (ref 40–53)
HGB BLD-MCNC: 13.6 G/DL (ref 13.3–17.7)
LYMPHOCYTES # BLD AUTO: 1.1 10E9/L (ref 0.8–5.3)
LYMPHOCYTES NFR BLD AUTO: 36.2 %
MCH RBC QN AUTO: 29.8 PG (ref 26.5–33)
MCHC RBC AUTO-ENTMCNC: 33.1 G/DL (ref 31.5–36.5)
MCV RBC AUTO: 90 FL (ref 78–100)
MONOCYTES # BLD AUTO: 0.6 10E9/L (ref 0–1.3)
MONOCYTES NFR BLD AUTO: 18.4 %
NEUTROPHILS # BLD AUTO: 1.2 10E9/L (ref 1.6–8.3)
NEUTROPHILS NFR BLD AUTO: 38.5 %
PLATELET # BLD AUTO: 106 10E9/L (ref 150–450)
RBC # BLD AUTO: 4.57 10E12/L (ref 4.4–5.9)
WBC # BLD AUTO: 3.2 10E9/L (ref 4–11)

## 2019-06-11 PROCEDURE — 97112 NEUROMUSCULAR REEDUCATION: CPT | Mod: GP | Performed by: PHYSICAL THERAPIST

## 2019-06-11 PROCEDURE — 36415 COLL VENOUS BLD VENIPUNCTURE: CPT | Performed by: NURSE PRACTITIONER

## 2019-06-11 PROCEDURE — 85025 COMPLETE CBC W/AUTO DIFF WBC: CPT | Performed by: NURSE PRACTITIONER

## 2019-06-11 RX ORDER — MEPERIDINE HYDROCHLORIDE 25 MG/ML
25 INJECTION INTRAMUSCULAR; INTRAVENOUS; SUBCUTANEOUS EVERY 30 MIN PRN
Status: CANCELLED | OUTPATIENT
Start: 2019-06-12

## 2019-06-11 RX ORDER — ALBUTEROL SULFATE 90 UG/1
1-2 AEROSOL, METERED RESPIRATORY (INHALATION)
Status: CANCELLED
Start: 2019-06-12

## 2019-06-11 RX ORDER — SODIUM CHLORIDE 9 MG/ML
1000 INJECTION, SOLUTION INTRAVENOUS CONTINUOUS PRN
Status: CANCELLED
Start: 2019-06-12

## 2019-06-11 RX ORDER — EPINEPHRINE 1 MG/ML
0.3 INJECTION, SOLUTION, CONCENTRATE INTRAVENOUS EVERY 5 MIN PRN
Status: CANCELLED | OUTPATIENT
Start: 2019-06-12

## 2019-06-11 RX ORDER — ALBUTEROL SULFATE 0.83 MG/ML
2.5 SOLUTION RESPIRATORY (INHALATION)
Status: CANCELLED | OUTPATIENT
Start: 2019-06-12

## 2019-06-11 RX ORDER — METHYLPREDNISOLONE SODIUM SUCCINATE 125 MG/2ML
125 INJECTION, POWDER, LYOPHILIZED, FOR SOLUTION INTRAMUSCULAR; INTRAVENOUS
Status: CANCELLED
Start: 2019-06-12

## 2019-06-11 RX ORDER — DIPHENHYDRAMINE HYDROCHLORIDE 50 MG/ML
50 INJECTION INTRAMUSCULAR; INTRAVENOUS
Status: CANCELLED
Start: 2019-06-12

## 2019-06-12 ENCOUNTER — OFFICE VISIT (OUTPATIENT)
Dept: PEDIATRIC HEMATOLOGY/ONCOLOGY | Facility: CLINIC | Age: 19
End: 2019-06-12
Attending: NURSE PRACTITIONER
Payer: COMMERCIAL

## 2019-06-12 VITALS
OXYGEN SATURATION: 97 % | HEIGHT: 70 IN | DIASTOLIC BLOOD PRESSURE: 68 MMHG | TEMPERATURE: 97.1 F | RESPIRATION RATE: 24 BRPM | BODY MASS INDEX: 27.9 KG/M2 | HEART RATE: 108 BPM | WEIGHT: 194.89 LBS | SYSTOLIC BLOOD PRESSURE: 105 MMHG

## 2019-06-12 DIAGNOSIS — C71.9 EPENDYMOMA (H): ICD-10-CM

## 2019-06-12 DIAGNOSIS — D49.6 NEOPLASM OF POSTERIOR CRANIAL FOSSA (H): Primary | ICD-10-CM

## 2019-06-12 LAB
ALBUMIN SERPL-MCNC: 3.2 G/DL (ref 3.4–5)
ALBUMIN SERPL-MCNC: NORMAL G/DL (ref 3.4–5)
ALP SERPL-CCNC: 139 U/L (ref 65–260)
ALP SERPL-CCNC: NORMAL U/L (ref 65–260)
ALT SERPL W P-5'-P-CCNC: 17 U/L (ref 0–50)
ALT SERPL W P-5'-P-CCNC: NORMAL U/L (ref 0–50)
ANION GAP SERPL CALCULATED.3IONS-SCNC: 5 MMOL/L (ref 3–14)
ANION GAP SERPL CALCULATED.3IONS-SCNC: NORMAL MMOL/L (ref 6–17)
AST SERPL W P-5'-P-CCNC: 23 U/L (ref 0–35)
AST SERPL W P-5'-P-CCNC: NORMAL U/L (ref 0–35)
BILIRUB SERPL-MCNC: 0.3 MG/DL (ref 0.2–1.3)
BILIRUB SERPL-MCNC: NORMAL MG/DL (ref 0.2–1.3)
BUN SERPL-MCNC: 14 MG/DL (ref 7–30)
BUN SERPL-MCNC: NORMAL MG/DL (ref 7–30)
CALCIUM SERPL-MCNC: 8.3 MG/DL (ref 8.5–10.1)
CALCIUM SERPL-MCNC: NORMAL MG/DL (ref 8.5–10.1)
CHLORIDE SERPL-SCNC: 108 MMOL/L (ref 98–110)
CHLORIDE SERPL-SCNC: NORMAL MMOL/L (ref 98–110)
CO2 SERPL-SCNC: 28 MMOL/L (ref 20–32)
CO2 SERPL-SCNC: NORMAL MMOL/L (ref 20–32)
CREAT SERPL-MCNC: 1.04 MG/DL (ref 0.5–1)
CREAT SERPL-MCNC: NORMAL MG/DL (ref 0.5–1)
GFR SERPL CREATININE-BSD FRML MDRD: >90 ML/MIN/{1.73_M2}
GFR SERPL CREATININE-BSD FRML MDRD: NORMAL ML/MIN/{1.73_M2}
GLUCOSE SERPL-MCNC: 76 MG/DL (ref 70–99)
GLUCOSE SERPL-MCNC: NORMAL MG/DL (ref 70–99)
MAGNESIUM SERPL-MCNC: 1.9 MG/DL (ref 1.6–2.3)
MAGNESIUM SERPL-MCNC: NORMAL MG/DL (ref 1.6–2.3)
PHOSPHATE SERPL-MCNC: 3.2 MG/DL (ref 2.5–4.5)
POTASSIUM SERPL-SCNC: 3.8 MMOL/L (ref 3.4–5.3)
POTASSIUM SERPL-SCNC: NORMAL MMOL/L (ref 3.4–5.3)
PROT SERPL-MCNC: 6.3 G/DL (ref 6.8–8.8)
PROT SERPL-MCNC: NORMAL G/DL (ref 6.8–8.8)
SODIUM SERPL-SCNC: 141 MMOL/L (ref 133–144)
SODIUM SERPL-SCNC: NORMAL MMOL/L (ref 133–144)

## 2019-06-12 PROCEDURE — 80053 COMPREHEN METABOLIC PANEL: CPT | Performed by: NURSE PRACTITIONER

## 2019-06-12 PROCEDURE — 36415 COLL VENOUS BLD VENIPUNCTURE: CPT | Performed by: NURSE PRACTITIONER

## 2019-06-12 PROCEDURE — 83735 ASSAY OF MAGNESIUM: CPT | Performed by: NURSE PRACTITIONER

## 2019-06-12 PROCEDURE — G0463 HOSPITAL OUTPT CLINIC VISIT: HCPCS | Mod: ZF

## 2019-06-12 PROCEDURE — 84100 ASSAY OF PHOSPHORUS: CPT | Performed by: NURSE PRACTITIONER

## 2019-06-12 ASSESSMENT — ENCOUNTER SYMPTOMS
RESPIRATORY NEGATIVE: 1
CONSTITUTIONAL NEGATIVE: 1
SPEECH DIFFICULTY: 1
SLEEP DISTURBANCE: 1
CARDIOVASCULAR NEGATIVE: 1
FACIAL ASYMMETRY: 1
CONSTIPATION: 1

## 2019-06-12 ASSESSMENT — MIFFLIN-ST. JEOR: SCORE: 1912.74

## 2019-06-12 NOTE — NURSING NOTE
"Chief Complaint   Patient presents with     RECHECK     Patient is here today for Ependymoma follow up     /68 (BP Location: Right arm, Patient Position: Fowlers, Cuff Size: Adult Large)   Pulse 108   Temp 97.1  F (36.2  C) (Axillary)   Resp 24   Ht 1.79 m (5' 10.47\")   Wt 88.4 kg (194 lb 14.2 oz)   SpO2 97%   BMI 27.59 kg/m      Malinda Russo LPN  June 12, 2019  "

## 2019-06-12 NOTE — LETTER
6/12/2019      RE: Geo Hicks  [id number] [address] [id number]          Pediatric Hematology/Oncology Clinic Note     CC:  Geo Hicks is a 18 year old male with ependymoma who presents to the clinic with his dad for a follow up. He is enrolled on [employer]G [id number] - A Phase 1 Study of Entinostat, an Oral Histone Deacetylase Inhibitor, in Pediatric Patients With Recurrent or Refractory Solid Tumors, Including CNS Tumors and Lymphoma.      HPI:  Geo notes constipation is still a problem.  He is doing pelvic floor PT through our therapy department  He say abdominal pain from feeling full.  Dad reports he didn't poop today but pooped last night at 6 PM somewhat loose mod-large. Geo remains upset about the lack of this provider solving the issue.  He is not having a lot of gas problems.  No fevers. No headache.  No nausea or vomiting and no rash. He has missed no doses of his Entinostat.  He took last week one day early due to his clean out.       Fam/Soc: Lives between his parents (one week at each home). They have been awarded guardianship of Geo. The families work well together - both parents have remarried. His dad has a clotting disorder, requiring him to take Warfarin daily.     History was obtained from Geo and his dad       Allergies   Allergen Reactions     Blood Transfusion Related (Informational Only) Swelling     Periorbital swelling post platelet transfusion     No Known Drug Allergies        Current Outpatient Medications   Medication     calcium carbonate-vitamin D 600-400 MG-UNIT CHEW     dexamethasone (DECADRON) 0.5 MG tablet     fexofenadine (ALLEGRA) 180 MG tablet     linaclotide (LINZESS) 290 MCG capsule     omeprazole (PRILOSEC) 20 MG DR capsule     potassium phosphate, monobasic, (K-PHOS) 500 MG tablet     senna-docusate (SENOKOT-S/PERICOLACE) 8.6-50 MG tablet     sertraline (ZOLOFT) 50 MG tablet     study - entinostat (IDS# 5050) 1 mg tablet     study - entinostat (IDS#  5050) 5 mg tablet     sulfamethoxazole-trimethoprim (BACTRIM/SEPTRA) 400-80 MG per tablet     vitamin D3 (CHOLECALCIFEROL) 2000 units (50 mcg) tablet     No current facility-administered medications for this visit.        Past Medical History:   Diagnosis Date     Cranial nerve dysfunction      Dyspepsia      Ependymoma (H)      Gastro-oesophageal reflux disease      Hearing loss      Intracranial hemorrhage (H)      Migraine      Pilonidal cyst     7-2015     Reduced vision      Refractory obstruction of nasal airway     2nd to nasal valve prolapse     Sleep apnea      Strabismus     gaze palsy        Past Surgical History:   Procedure Laterality Date     GRAFT CARTILAGE FROM POSTERIOR AURICLE Left 10/6/2016    Procedure: GRAFT CARTILAGE FROM POSTERIOR AURICLE;  Surgeon: Tyler Richards MD;  Location: [facility] OR     INCISION AND DRAINAGE PERINEAL, COMBINED Bilateral 7/18/2015    Procedure: COMBINED INCISION AND DRAINAGE PERINEAL;  Surgeon: Dequan Timmons MD;  Location: [facility] OR     OPTICAL TRACKING SYSTEM CRANIOTOMY, EXCISE TUMOR, COMBINED N/A 4/13/2015    Procedure: COMBINED OPTICAL TRACKING SYSTEM CRANIOTOMY, EXCISE TUMOR;  Surgeon: Francis Velazquez MD;  [facility]: [facility] OR     OPTICAL TRACKING SYSTEM CRANIOTOMY, EXCISE TUMOR, COMBINED N/A 4/16/2015    Procedure: COMBINED OPTICAL TRACKING SYSTEM CRANIOTOMY, EXCISE TUMOR;  Surgeon: Francis Velazquez MD;  [facility]     OPTICAL TRACKING SYSTEM CRANIOTOMY, EXCISE TUMOR, COMBINED Bilateral 5/28/2015    Procedure: COMBINED OPTICAL TRACKING SYSTEM CRANIOTOMY, EXCISE TUMOR;  Surgeon: Francis Velazquez MD;  [facility]     OPTICAL TRACKING SYSTEM CRANIOTOMY, EXCISE TUMOR, COMBINED Bilateral 1/14/2016    Procedure: COMBINED OPTICAL TRACKING SYSTEM CRANIOTOMY, EXCISE TUMOR;  Surgeon: Francis Velazquez MD;  [facility] OR     OPTICAL TRACKING SYSTEM VENTRICULOSTOMY  4/16/2015    Procedure: OPTICAL TRACKING SYSTEM VENTRICULOSTOMY;   "Surgeon: Francis Velazquez MD;  [facility]: [facility] OR     REMOVE PORT VASCULAR ACCESS N/A 10/6/2016    Procedure: REMOVE PORT VASCULAR ACCESS;  Surgeon: Bruno Perea MD;  [facility]: [facility] OR     RHINOPLASTY N/A 10/6/2016    Procedure: RHINOPLASTY;  Surgeon: Tyler Richards MD;  [facility]: [facility]     VASCULAR SURGERY  5-2015    single lumen power port       Family History   Problem Relation Age of Onset     Circulatory Father         PE/DVT     Hypothyroidism Father 30     Diabetes Maternal Grandmother      Diabetes Paternal Grandmother      Diabetes Paternal Grandfather      C.A.D. Paternal Grandfather      Hypertension Maternal Grandfather      Thyroid Disease Paternal Aunt         unknown whether hypo or hyper     Mental Illness No family hx of        Review of Systems   Constitutional: Negative.         In a wheelchair   HENT: Positive for hearing loss (Pre-existing from prior therapy).    Eyes: Positive for visual disturbance (Wears a patch over right eye to minimize diplopia).   Respiratory: Negative.    Cardiovascular: Negative.    Gastrointestinal: Positive for constipation (Chronic).   Endocrine:        Follow with Dr. Martin   Neurological: Positive for facial asymmetry and speech difficulty.   Psychiatric/Behavioral: Positive for sleep disturbance (Not using his Bi-Pap).   All other systems reviewed and are negative.      /68 (BP Location: Right arm, Patient Position: Fowlers, Cuff Size: Adult Large)   Pulse 108   Temp 97.1  F (36.2  C) (Axillary)   Resp 24   Ht 1.79 m (5' 10.47\")   Wt 88.4 kg (194 lb 14.2 oz)   SpO2 97%   BMI 27.59 kg/m        Physical Exam   Constitutional: He is oriented to person, place, and time. He appears well-developed and well-nourished. No distress.   HENT:   Occassionally saliva accumulates in mouth with occasional drooling.   Eyes: Pupils are equal, round, and reactive to light. Conjunctivae are normal.   Can track to right, but not to left. Nystagmus noted  "   Cardiovascular: Normal rate, regular rhythm and normal heart sounds.   Pulmonary/Chest: Effort normal and breath sounds normal. He has no wheezes.   Neurological: He is alert and oriented to person, place, and time. He has normal strength. A cranial nerve deficit is present. Coordination (Ataxic- dysmetria especially in upper extremities when accomplishing tasks.) abnormal. GCS eye subscore is 4. GCS verbal subscore is 5. GCS motor subscore is 6.   Negative Pronator drift   Skin: Skin is warm and dry.   Scattered striae   Psychiatric: He has a normal mood and affect.     Lab:  Results for orders placed or performed in visit on 06/11/19   Comprehensive metabolic panel   Result Value Ref Range    Sodium Canceled, Test credited 133 - 144 mmol/L    Potassium Canceled, Test credited 3.4 - 5.3 mmol/L    Chloride Canceled, Test credited 98 - 110 mmol/L    Carbon Dioxide Canceled, Test credited 20 - 32 mmol/L    Anion Gap Canceled, Test credited 6 - 17 mmol/L    Glucose Canceled, Test credited 70 - 99 mg/dL    Urea Nitrogen Canceled, Test credited 7 - 30 mg/dL    Creatinine Canceled, Test credited 0.50 - 1.00 mg/dL    GFR Estimate Canceled, Test credited >60 mL/min/[1.73_m2]    GFR Estimate If Black Canceled, Test credited >60 mL/min/[1.73_m2]    Calcium Canceled, Test credited 8.5 - 10.1 mg/dL    Bilirubin Total Canceled, Test credited 0.2 - 1.3 mg/dL    Albumin Canceled, Test credited 3.4 - 5.0 g/dL    Protein Total Canceled, Test credited 6.8 - 8.8 g/dL    Alkaline Phosphatase Canceled, Test credited 65 - 260 U/L    ALT Canceled, Test credited 0 - 50 U/L    AST Canceled, Test credited 0 - 35 U/L   Magnesium   Result Value Ref Range    Magnesium Canceled, Test credited 1.6 - 2.3 mg/dL   CBC with platelets and differential   Result Value Ref Range    WBC 3.2 (L) 4.0 - 11.0 10e9/L    RBC Count 4.57 4.4 - 5.9 10e12/L    Hemoglobin 13.6 13.3 - 17.7 g/dL    Hematocrit 41.1 40.0 - 53.0 %    MCV 90 78 - 100 fl    MCH 29.8 26.5  - 33.0 pg    MCHC 33.1 31.5 - 36.5 g/dL    RDW 13.5 10.0 - 15.0 %    Platelet Count 106 (L) 150 - 450 10e9/L    % Neutrophils 38.5 %    % Lymphocytes 36.2 %    % Monocytes 18.4 %    % Eosinophils 6.3 %    % Basophils 0.6 %    Absolute Neutrophil 1.2 (L) 1.6 - 8.3 10e9/L    Absolute Lymphocytes 1.1 0.8 - 5.3 10e9/L    Absolute Monocytes 0.6 0.0 - 1.3 10e9/L    Absolute Eosinophils 0.2 0.0 - 0.7 10e9/L    Absolute Basophils 0.0 0.0 - 0.2 10e9/L    Diff Method Automated Method      *Note: Due to a large number of results and/or encounters for the requested time period, some results have not been displayed. A complete set of results can be found in Results Review.     Results for orders placed or performed in visit on 06/12/19   Comprehensive metabolic panel   Result Value Ref Range    Sodium 141 133 - 144 mmol/L    Potassium 3.8 3.4 - 5.3 mmol/L    Chloride 108 98 - 110 mmol/L    Carbon Dioxide 28 20 - 32 mmol/L    Anion Gap 5 3 - 14 mmol/L    Glucose 76 70 - 99 mg/dL    Urea Nitrogen 14 7 - 30 mg/dL    Creatinine 1.04 (H) 0.50 - 1.00 mg/dL    GFR Estimate >90 >60 mL/min/[1.73_m2]    GFR Estimate If Black >90 >60 mL/min/[1.73_m2]    Calcium 8.3 (L) 8.5 - 10.1 mg/dL    Bilirubin Total 0.3 0.2 - 1.3 mg/dL    Albumin 3.2 (L) 3.4 - 5.0 g/dL    Protein Total 6.3 (L) 6.8 - 8.8 g/dL    Alkaline Phosphatase 139 65 - 260 U/L    ALT 17 0 - 50 U/L    AST 23 0 - 35 U/L   Magnesium   Result Value Ref Range    Magnesium 1.9 1.6 - 2.3 mg/dL   Phosphorus   Result Value Ref Range    Phosphorus 3.2 2.5 - 4.5 mg/dL     *Note: Due to a large number of results and/or encounters for the requested time period, some results have not been displayed. A complete set of results can be found in Results Review.           Impression:  1. 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 Treated with Entinostat, continues to tolerate well.   3. Labs today are adequate for beginning next course of Entinostat.   4. Chronic constipation which is frustrating for  him    Plan:  1. RTC in 4 weeks  as scheduled for follow up, or sooner PRN.   2. Continue weekly labs.  3. Continue Pelvic floor PT  4. Continue weekly labs.  5. Start Entinostat 6mg weekly beginning today.    6. Continue constipation management by [facility] - Discussed that his lack of exercise is likely contributing to his constipation as well so improvements in activity will help.  He should return to [place] if concerns.       ALAN Justin CNP

## 2019-06-12 NOTE — PROGRESS NOTES
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 He is enrolled on COG [id number] - A Phase 1 Study of Entinostat, an Oral Histone Deacetylase Inhibitor, in Pediatric Patients With Recurrent or Refractory Solid Tumors, Including CNS Tumors and Lymphoma.      HPI:  Geo notes constipation is still a problem.  He is doing pelvic floor PT through our therapy department  He say abdominal pain from feeling full.  Dad reports he didn't poop today but pooped last night at 6 PM somewhat loose mod-large. Geo remains upset about the lack of this provider solving the issue.  He is not having a lot of gas problems.  No fevers. No headache.  No nausea or vomiting and no rash. He has missed no doses of his Entinostat.  He took last week one day early due to his clean out.       Fam/Soc: Lives between his parents (one week at each home). They have been awarded guardianship of Geo. The families work well together - both parents have remarried. His dad has a clotting disorder, requiring him to take Warfarin daily.     History was obtained from Geo and his dad       Allergies   Allergen Reactions     Blood Transfusion Related (Informational Only) Swelling     Periorbital swelling post platelet transfusion     No Known Drug Allergies        Current Outpatient Medications   Medication     calcium carbonate-vitamin D 600-400 MG-UNIT CHEW     dexamethasone (DECADRON) 0.5 MG tablet     fexofenadine (ALLEGRA) 180 MG tablet     linaclotide (LINZESS) 290 MCG capsule     omeprazole (PRILOSEC) 20 MG DR capsule     potassium phosphate, monobasic, (K-PHOS) 500 MG tablet     senna-docusate (SENOKOT-S/PERICOLACE) 8.6-50 MG tablet     sertraline (ZOLOFT) 50 MG tablet     study - entinostat (IDS# 5050) 1 mg tablet     study - entinostat (IDS# 5050) 5 mg tablet     sulfamethoxazole-trimethoprim (BACTRIM/SEPTRA) 400-80 MG per  tablet     vitamin D3 (CHOLECALCIFEROL) 2000 units (50 mcg) tablet     No current facility-administered medications for this visit.        Past Medical History:   Diagnosis Date     Cranial nerve dysfunction      Dyspepsia      Ependymoma (H)      Gastro-oesophageal reflux disease      Hearing loss      Intracranial hemorrhage (H)      Migraine      Pilonidal cyst     7-2015     Reduced vision      Refractory obstruction of nasal airway     2nd to nasal valve prolapse     Sleep apnea      Strabismus     gaze palsy        Past Surgical History:   Procedure Laterality Date     GRAFT CARTILAGE FROM POSTERIOR AURICLE Left 10/6/2016    Procedure: GRAFT CARTILAGE FROM POSTERIOR AURICLE;  Surgeon: Tyler Richards MD;  [facility]: [facility]     INCISION AND DRAINAGE PERINEAL, COMBINED Bilateral 7/18/2015    Procedure: COMBINED INCISION AND DRAINAGE PERINEAL;  Surgeon: Dequan Timmons MD;  Location: [facility]     OPTICAL TRACKING SYSTEM CRANIOTOMY, EXCISE TUMOR, COMBINED N/A 4/13/2015    Procedure: COMBINED OPTICAL TRACKING SYSTEM CRANIOTOMY, EXCISE TUMOR;  Surgeon: Francis Velazquez MD;  [facility]     OPTICAL TRACKING SYSTEM CRANIOTOMY, EXCISE TUMOR, COMBINED N/A 4/16/2015    Procedure: COMBINED OPTICAL TRACKING SYSTEM CRANIOTOMY, EXCISE TUMOR;  Surgeon: Francis Velazquez MD;  [facility]     OPTICAL TRACKING SYSTEM CRANIOTOMY, EXCISE TUMOR, COMBINED Bilateral 5/28/2015    Procedure: COMBINED OPTICAL TRACKING SYSTEM CRANIOTOMY, EXCISE TUMOR;  Surgeon: Francis Velazquez MD;  [facility]     OPTICAL TRACKING SYSTEM CRANIOTOMY, EXCISE TUMOR, COMBINED Bilateral 1/14/2016    Procedure: COMBINED OPTICAL TRACKING SYSTEM CRANIOTOMY, EXCISE TUMOR;  Surgeon: Francis Velazquez MD;  [facility]     OPTICAL TRACKING SYSTEM VENTRICULOSTOMY  4/16/2015    Procedure: OPTICAL TRACKING SYSTEM VENTRICULOSTOMY;  Surgeon: Francis Velazquez MD;  [facility]     REMOVE PORT VASCULAR  "ACCESS N/A 10/6/2016    Procedure: REMOVE PORT VASCULAR ACCESS;  Surgeon: Bruno Perea MD;  [facility]     RHINOPLASTY N/A 10/6/2016    Procedure: RHINOPLASTY;  Surgeon: Tyler Richards MD;  [facility]     VASCULAR SURGERY  5-2015    single lumen power port       Family History   Problem Relation Age of Onset     Circulatory Father         PE/DVT     Hypothyroidism Father 30     Diabetes Maternal Grandmother      Diabetes Paternal Grandmother      Diabetes Paternal Grandfather      C.A.D. Paternal Grandfather      Hypertension Maternal Grandfather      Thyroid Disease Paternal Aunt         unknown whether hypo or hyper     Mental Illness No family hx of        Review of Systems   Constitutional: Negative.         In a wheelchair   HENT: Positive for hearing loss (Pre-existing from prior therapy).    Eyes: Positive for visual disturbance (Wears a patch over right eye to minimize diplopia).   Respiratory: Negative.    Cardiovascular: Negative.    Gastrointestinal: Positive for constipation (Chronic).   Endocrine:        Follow with Dr. Martin   Neurological: Positive for facial asymmetry and speech difficulty.   Psychiatric/Behavioral: Positive for sleep disturbance (Not using his Bi-Pap).   All other systems reviewed and are negative.      /68 (BP Location: Right arm, Patient Position: Fowlers, Cuff Size: Adult Large)   Pulse 108   Temp 97.1  F (36.2  C) (Axillary)   Resp 24   Ht 1.79 m (5' 10.47\")   Wt 88.4 kg (194 lb 14.2 oz)   SpO2 97%   BMI 27.59 kg/m       Physical Exam   Constitutional: He is oriented to person, place, and time. He appears well-developed and well-nourished. No distress.   HENT:   Occassionally saliva accumulates in mouth with occasional drooling.   Eyes: Pupils are equal, round, and reactive to light. Conjunctivae are normal.   Can track to right, but not to left. Nystagmus noted    Cardiovascular: Normal rate, regular rhythm and normal heart sounds. "   Pulmonary/Chest: Effort normal and breath sounds normal. He has no wheezes.   Neurological: He is alert and oriented to person, place, and time. He has normal strength. A cranial nerve deficit is present. Coordination (Ataxic- dysmetria especially in upper extremities when accomplishing tasks.) abnormal. GCS eye subscore is 4. GCS verbal subscore is 5. GCS motor subscore is 6.   Negative Pronator drift   Skin: Skin is warm and dry.   Scattered striae   Psychiatric: He has a normal mood and affect.     Lab:  Results for orders placed or performed in visit on 06/11/19   Comprehensive metabolic panel   Result Value Ref Range    Sodium Canceled, Test credited 133 - 144 mmol/L    Potassium Canceled, Test credited 3.4 - 5.3 mmol/L    Chloride Canceled, Test credited 98 - 110 mmol/L    Carbon Dioxide Canceled, Test credited 20 - 32 mmol/L    Anion Gap Canceled, Test credited 6 - 17 mmol/L    Glucose Canceled, Test credited 70 - 99 mg/dL    Urea Nitrogen Canceled, Test credited 7 - 30 mg/dL    Creatinine Canceled, Test credited 0.50 - 1.00 mg/dL    GFR Estimate Canceled, Test credited >60 mL/min/[1.73_m2]    GFR Estimate If Black Canceled, Test credited >60 mL/min/[1.73_m2]    Calcium Canceled, Test credited 8.5 - 10.1 mg/dL    Bilirubin Total Canceled, Test credited 0.2 - 1.3 mg/dL    Albumin Canceled, Test credited 3.4 - 5.0 g/dL    Protein Total Canceled, Test credited 6.8 - 8.8 g/dL    Alkaline Phosphatase Canceled, Test credited 65 - 260 U/L    ALT Canceled, Test credited 0 - 50 U/L    AST Canceled, Test credited 0 - 35 U/L   Magnesium   Result Value Ref Range    Magnesium Canceled, Test credited 1.6 - 2.3 mg/dL   CBC with platelets and differential   Result Value Ref Range    WBC 3.2 (L) 4.0 - 11.0 10e9/L    RBC Count 4.57 4.4 - 5.9 10e12/L    Hemoglobin 13.6 13.3 - 17.7 g/dL    Hematocrit 41.1 40.0 - 53.0 %    MCV 90 78 - 100 fl    MCH 29.8 26.5 - 33.0 pg    MCHC 33.1 31.5 - 36.5 g/dL    RDW 13.5 10.0 - 15.0 %     Platelet Count 106 (L) 150 - 450 10e9/L    % Neutrophils 38.5 %    % Lymphocytes 36.2 %    % Monocytes 18.4 %    % Eosinophils 6.3 %    % Basophils 0.6 %    Absolute Neutrophil 1.2 (L) 1.6 - 8.3 10e9/L    Absolute Lymphocytes 1.1 0.8 - 5.3 10e9/L    Absolute Monocytes 0.6 0.0 - 1.3 10e9/L    Absolute Eosinophils 0.2 0.0 - 0.7 10e9/L    Absolute Basophils 0.0 0.0 - 0.2 10e9/L    Diff Method Automated Method      *Note: Due to a large number of results and/or encounters for the requested time period, some results have not been displayed. A complete set of results can be found in Results Review.     Results for orders placed or performed in visit on 06/12/19   Comprehensive metabolic panel   Result Value Ref Range    Sodium 141 133 - 144 mmol/L    Potassium 3.8 3.4 - 5.3 mmol/L    Chloride 108 98 - 110 mmol/L    Carbon Dioxide 28 20 - 32 mmol/L    Anion Gap 5 3 - 14 mmol/L    Glucose 76 70 - 99 mg/dL    Urea Nitrogen 14 7 - 30 mg/dL    Creatinine 1.04 (H) 0.50 - 1.00 mg/dL    GFR Estimate >90 >60 mL/min/[1.73_m2]    GFR Estimate If Black >90 >60 mL/min/[1.73_m2]    Calcium 8.3 (L) 8.5 - 10.1 mg/dL    Bilirubin Total 0.3 0.2 - 1.3 mg/dL    Albumin 3.2 (L) 3.4 - 5.0 g/dL    Protein Total 6.3 (L) 6.8 - 8.8 g/dL    Alkaline Phosphatase 139 65 - 260 U/L    ALT 17 0 - 50 U/L    AST 23 0 - 35 U/L   Magnesium   Result Value Ref Range    Magnesium 1.9 1.6 - 2.3 mg/dL   Phosphorus   Result Value Ref Range    Phosphorus 3.2 2.5 - 4.5 mg/dL     *Note: Due to a large number of results and/or encounters for the requested time period, some results have not been displayed. A complete set of results can be found in Results Review.           Impression:  1. 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 Treated with Entinostat, continues to tolerate well.   3. Labs today are adequate for beginning next course of Entinostat.   4. Chronic constipation which is frustrating for him    Plan:  1. RTC in 4 weeks  as scheduled for follow up, or sooner  PRN.   2. Continue weekly labs.  3. Continue Pelvic floor PT  4. Continue weekly labs.  5. Start Entinostat 6mg weekly beginning today.    6. Continue constipation management by [facility] - Discussed that his lack of exercise is likely contributing to his constipation as well so improvements in activity will help.  He should return to [place] if concerns.

## 2019-06-12 NOTE — PROGRESS NOTES
"Outpatient Physical Therapy Progress Note     Patient: Geo Hicks  : 2000    Beginning/End Dates of Reporting Period:  2019 to 2019  Note completed 19  Referring Provider: 17: RACHEL Rousseau MD  Primary: Dr. Benny Coelho    Therapy Diagnosis: Ataxia, Gait instability, balance impairments, muscle weakness, gait and mobility impairment     Client Self Report: As noted on 2019:   [facility]om[facility]  Geo denies pain and headache.  Denies fatigue today.  He reports that he feels his walking is a little better.  He still relates that constipation as having an effect on his walking.  He started pelvic floor PT and he plans to return every 10 days for 6-8 sessions.   Patient and step dad report no new concerns. He reports that he walks with help and his walker about 4-6 times per day.  Does not increase his distance when he is up walking with assist.      NOTE: Geo's attendance decreased over this past progress note period.  He was last seen on 2019 and cancelled he late cancelled each week all his sessions in May.  Progress and status reported in this note is as of 2019 the date last seen.        Goals:  Goal Identifier step ups   Goal Description Geo will step up on bottom step or 6 inch bench  with single railing and Contact assist, when using Liko lift and harness to advance with safety and independence on curbs   Target Date 19   Date Met   not met, goal discontinued.    Progress: not assessed, overall Geo displayed decreased cooperation and attendance. This is an areas that Geo wants to improve but  discontinue goal  Secondary to degree of difficulty, decreased attendance and change of treatment plan to aquatic PT. See goal below for status regarding 4 inch steps.       Goal Identifier balance   Goal Description Geo will move sit to stand and maintain standing using anup walker x 45\", 3/4x CGA for 3 consecutive sessions to " advance safety when standing for ADLs.   Target Date 04/30/19   Date Met   inconsistent   Progress: Noted progress but varies from session to session:  Standing balance addressed including movement from sit to stand using anup cane on left from  high sit and from 90 degree hip flexed position advancing to repetition # 4 and 5 to 45 seconds and 1'20 seconds with close SBA of 1 and intermittent contact assist x 3 prompts of therapist and supervision of rehab tech on Geo's other side.  Repetitions number 1: 30 needing mod assist of 1 and min A of 1 to maintain balance  seconds, #2: 1 minute with SBA and increase sway resulting in 5 intermittent physical prompts to contact assist.  #3: 45 seconds with SBA to CGA.  Needs focused attention for all attempts with minimal distractions in area or talking between therapist and patient.  Revise goal to Geo will move sit to stand and maintain standing in chest deep water with CGA from therapist x 30 second intervals, 2/4 attempts to increase safety when standing for ADLs at home by 7/29/2019     Goal Identifier gait/amb   Goal Description Geo will ambulate on level surfaces using wheeled walker with close SBA, 30 foot intervals 2/4 attempts x 3 sessions in a row to assess consistency of skill level to advance to safe ambulation.    Target Date 04/30/19   Date Met  Not met, discontinue see LTG below for details.    Progress:contact to min assist x 1, vcues needed for step length, freya; contact assist x 1 with 3 intermittent tactile cues-balance; varies with level of fatigue, using his wheeled walker or wider bariatric front wheel walker.  Inconsistent attendance appears to have impacted progress in therapy.  Goal not met.  Discontinue goal, see LTG below for revision.        Goal Identifier HEP   Goal Description Geo will be independent with a HEP for improved ability to participate in leisure/cardiovascular activities (such as swimming or riding a stationary bike).      Target Date 7/29/2019   Date Met  Ongoing through episode of care.    Progress: Geo works on strengthening exercises at home that are safe for him to perform by himself.  He transfers to the floor with help then works on his exercises when lying prone or supine.  He is unstable in 4 point position unless assisted. Dad and Geo have expressed interest in the past about exercises in the aquatic setting and are  interested in exercises and home program development when working in aquatic setting. Revise goal: Upon discharge from aquatic physical therapy,  Geo with assist of adult care provider for safety, will demonstrate understanding aquatic exercise program addressing balance and gait safety by 7/29/2019     Goal Identifier Stairs   Goal Description Geo  ascend 4 six inch stairs, 2/4 attempts with min assist  and descend stairs marking time, 2/4 attempts with min assist at gait belt while using Liko lift and harness for safety demonstrating increased safety and motor control to manage stairs at home and in community  by 2/13/19   Target Date 03/15/19   Date Met   not met; discontinue   Progress:  4 inch steps: Step ups forward and down backward on to 4 inch step using single railing and contact assist of 2, intermittent min assist.  -) Step down forward with single railing and min assist 1-2 people focusing on graded control and sustaining balance once foot placed down on floor.  Not met, decreased attendance. discontinue goal.       Goal Identifier LTG   Goal Description Geo will ambulate using wider bariatric rolling walker 50 feet x 3 with contact assist of therapist and close supervision of rehab tech to increase safety and independence when ambulating to bedroom and bathroom at home.    Target Date 08/31/19   Date Met  Not met   Progress:bariatric walker increased width, no weights, see goal 1 for details.  Modify goal to Per parent report Geo will ambulate using his walker demonstrating  "improved sequencing of walker movement (feet not stepping beyond walker frame),  with upright posture at trunk with min assist of parent down his hallway at least 30 feet to get to and from  the bathroom 3/4x in a day by 9/30/2019.         Progress Toward Goals:   Progress this reporting period: Geo's progress has been limited from 2/1/2019 to 4/30/2019 secondary to attendance.  He has had refusal behaviors secondary to indicating he does not feel like therapist is listening to him regarding reasons he \"can't walk\" and relates these reasons to his concerns with his constipation.  He has attended 5 sessions this past progress reporting period. Limited attendance has negatively impacted his progress toward all goals.    Note this progress note completed late.  Geo cancelled all of his scheduled  PT sessions in May on the day of appointment.  Spoke to his dad on 5/30/19 regarding plan of care and determined to trial aquatic therapy as Geo is reported to be motivated when he is in a pool.      Geo has been increasing frustrated with his current level of independence for ambulation.    Geo wants to increase his independence with ambulation using his walker at home, and to safely transfer and use the restroom independently.  In addition he wants to   increase ability and safety on stairs and general independence and safety with mobility/transfers,balance and ambulation.   Based on phone contact with Geo's dad (guardian) on 5/30/19:  Geo has been refusing to continue clinic based PT and agrees to trial of aquatic based PT.  Dad reports that he did follow up with his insurance company regarding aquatic PT and is comfortable proceeding with this plan at this time.       He remains appropriate for skilled physical therapy to address his impaired balance, postural control and gait, decreased postural control in upright positions and standing, decreased graded mid and end range muscle control in mid stance " positions, decreased gross/fine motor control affect his safety and level of independence at home.  Geo would benefit from aquatic physical therapy based on  the principles of buoyancy and the added resistance of the water to further address his postural control during upright standing and ambulation activities in an aquatic, safe environment to decrease fall risk.         Changes to goals: see above goal chart for specific details on progress toward goals.   Communicate, with Kristi PHILLIPS CNP on 5/30/2019, Geo's refusal to continue outpatient PT at clinic. She is in agreement with plan for trial of aquatic physical therapy.       Plan:  Changes to therapy plan of care: discontinue clinic based therapy sessions per parent and Geo's request secondary to frequent cancellations.  Begin trial of aquatic therapy up to 12 sessions with reassessment of plan of care and frequency based on progress toward goals.  Once Geo has been seen for treatment in the aquatic setting: Develop specific goals related to postural control, balance and gait in aquatic environment as clinically appropriate   Changes to goals:   1. Geo will move sit to stand and maintain standing in chest deep water with CGA from therapist x 30 second intervals, 2/4 attempts to increase safety when standing for ADLs at home by 7/29/2019.  2. Per parent report Geo will ambulate using his walker demonstrating improved sequencing of walker movement (feet not stepping beyond walker frame),  with upright posture at trunk with min assist of parent down his hallway at least 30 feet to get to and from  the bathroom 3/4x in a day.    3. Upon discharge from aquatic physical therapy,  Geo with assist of adult care provider for safety, will demonstrate understanding aquatic exercise program addressing balance and gait safety by 7/29/2019      Discharge:  No: Patient appropriate for trial of aquatic physical therapy.     Imani Landers PT

## 2019-06-13 ENCOUNTER — HOSPITAL ENCOUNTER (OUTPATIENT)
Dept: PHYSICAL THERAPY | Facility: CLINIC | Age: 19
Setting detail: THERAPIES SERIES
End: 2019-06-13
Attending: FAMILY MEDICINE
Payer: COMMERCIAL

## 2019-06-13 PROCEDURE — 97113 AQUATIC THERAPY/EXERCISES: CPT | Mod: GP | Performed by: PHYSICAL THERAPIST

## 2019-06-13 NOTE — ADDENDUM NOTE
Encounter addended by: Imani Landers, PT on: 6/12/2019 7:23 PM   Actions taken: Sign clinical note, Flowsheet accepted

## 2019-06-13 NOTE — PROGRESS NOTES
AQUATIC PHYSICAL THERAPY EXERCISE LOG (TRUNK)    *Jogger belt and gait belt donned throughout session    Date  6/13/19  Mary Beth Malin DPT   Warm Up Ambulation (Forward/Side/Back/March/All) Wheelchair used to enter/exit pool due to instability and ataxic gait. Fwd ambulation in the water with 1 UE support on wall and 1 UE support on therapist's arm, min-mod assist for forward gait, 20'x3, attempted fwd gait with no right ear and B UE support on therapist arms but unable to due ataxia and instability. Sidestepping with  BUE support on wall and therapist min assist for balance 20' ea direction. Marches in place with B UE support on wall and therapist min-mod assist for balance, 15x ea side. Maximal cues for upright posture and naming things he sees throughout - improved duration of upright posture with environmental obersvation             Stretching/ROM Chin Tucks     CROM (Flex/Ext/SB/Rot/All)     Shoulder (Shrugs/Rolls)     Scapular (Retraction/Depression)     Upper Trunk (Levator/Scalene/Upper Trapezius)     Pec Stretch (Unilateral/Bilateral)     Posterior Shoulder     Gluteal     Hamstring (On Step/Cuff)     Calf (In Hole/At Wall)     Quad     Hip Flexor (Kneel)     Piriformis (Seated/Stand)     Trunk ROM (Flex/Ext/SB/Rot/All)     BAD RAGAZ              Strengthening Abdominal Sets/ Pelvic Tilt     Shoulder (Flex/Ext, Abd/Add, IR/ER, Circles, Alternation flex/ext for core)     Horizon (Abd/Add, Diagonals)     Rowing Arms     UE PNF (D1/D2)     Abdominal Sets (Push/Pull, side to side, push down with board)     Squat With back to the wall and B UE support on therapist arms in 4' water, mini squats with cues for upright posture and keeping his chin out of the water, 15x    Lunge Squat     Hip (Flex/Ext, Abd/Add, single leg bike, circles, figure 8, All)     Heel/Toe Raises In open water with B UE support on therapist arms, 15x heel raises with cues for posture and posterior weight shift to prevent anterior LOB    Step  Ups (Forward, Lateral) Fwd step ups 10x ea side on medium step with B UE support on therapist arms mod-max assist for balance and postural control    Noodle Push Downs with UE(B UE/1 UE) for core strengthening     Noodle Push Downs with LE     Stir the Pot/Punches with Dumbells     Sit to Stand at Bench From wheelchair in 3' water, CGA-min assist from therapist with B UE support on wall, 15x    Prone Plank on step     Side Plank on step              Aerobic Fast Walking     Bike/Ski/Hussein/All              Balance Narrow Base of Support In open water with B UE support on therapist arms, attempted to limit assist unless LOB upon which he required mod-max assist to correct, 2 min    Tandem Stand     Single Leg Stance     Heel/Toe Walking     3 Step and Stop     Braiding     Transfers Patient with good control of initial transfer with UE support but unable to maintain balance in standing independently without UE support. Patient impulsive with sitting in wheelchair too early at end of session, therapist assist to prevent missing wheelchair and falling. Reviewed transfer technique and repeated transfer with max cues for turning fully away from chair and backing all the wall up before sitting, significantly improved safety on second repetition             Cool Down Ambulation     [place] Dangle     Whirlpool

## 2019-06-13 NOTE — PROGRESS NOTES
[facility]      OUTPATIENT PHYSICAL THERAPY  PLAN OF TREATMENT FOR OUTPATIENT REHABILITATION    Patient's Last Name, First Name, M.I.                YOB: 2000  Geo Hicks                        Provider's Name  [facility] Medical Record No.  [id number]                               Onset Date: 4/12/2015   Start of Care Date: 9/10/2018   Type:     _X_PT   ___OT   ___SLP Medical Diagnosis: s/p Posterior fossa tumor, partially resected                       PT Diagnosis: Ataxia, Gait instability, balance impairments, core muscle weakness, gait and mobility impairment      _________________________________________________________________________________  Plan of Treatment:trial of aquatic therapy up to 12 sessions with reassessment of plan of care and frequency based on progress toward goals.  Once Geo has been seen for treatment in the aquatic setting: Develop specific goals related to postural control, balance and gait in aquatic environment as clinically appropriate.    He remains appropriate for skilled physical therapy to address his impaired balance, postural control and gait, decreased postural control in upright positions and standing, decreased graded mid and end range muscle control in mid stance positions, decreased gross/fine motor control affect his safety and level of independence at home.  Geo would benefit from aquatic physical therapy based on  the principles of buoyancy and the added resistance of the water to further address his postural control during upright standing and ambulation activities in an aquatic, safe environment to decrease fall risk.     Frequency/Duration: 1x/week/90 days   The patient will be discharged from therapy when his/her long term goals are met, displays a plateau in progress, or demonstrates resistance or low motivation for  "therapy after redirections have been made. The patient may be discharged from therapy when parents wish to discontinue therapy and/or fails to adhere to [facility] attendance policy.   Goals:  Goals:  Goal Identifier step ups   Goal Description Geo will step up on bottom step or 6 inch bench  with single railing and Contact assist, when using Liko lift and harness to advance with safety and independence on curbs   Target Date 04/30/19   Date Met   not met, goal discontinued.    Progress: not assessed, overall Geo displayed decreased cooperation and attendance. This is an areas that Geo wants to improve but  discontinue goal  Secondary to degree of difficulty, decreased attendance and change of treatment plan to aquatic PT. See goal below for status regarding 4 inch steps.        Goal Identifier balance   Goal Description Geo will move sit to stand and maintain standing using anup walker x 45\", 3/4x CGA for 3 consecutive sessions to advance safety when standing for ADLs.   Target Date 04/30/19   Date Met   inconsistent   Progress: Noted progress but varies from session to session:  Standing balance addressed including movement from sit to stand using anup cane on left from  high sit and from 90 degree hip flexed position advancing to repetition # 4 and 5 to 45 seconds and 1'20 seconds with close SBA of 1 and intermittent contact assist x 3 prompts of therapist and supervision of rehab tech on Geo's other side.  Repetitions number 1: 30 needing mod assist of 1 and min A of 1 to maintain balance  seconds, #2: 1 minute with SBA and increase sway resulting in 5 intermittent physical prompts to contact assist.  #3: 45 seconds with SBA to CGA.  Needs focused attention for all attempts with minimal distractions in area or talking between therapist and patient.  Revise goal: Geo will move sit to stand and maintain standing in chest deep water with CGA from therapist x 30 second intervals, 2/4 attempts to " increase safety when standing for ADLs at home by 7/29/2019      Goal Identifier gait/amb   Goal Description Geo will ambulate on level surfaces using wheeled walker with close SBA, 30 foot intervals 2/4 attempts x 3 sessions in a row to assess consistency of skill level to advance to safe ambulation.    Target Date 04/30/19   Date Met  Not met, discontinue see LTG below for details.    Progress:contact to min assist x 1, vcues needed for step length, freya; contact assist x 1 with 3 intermittent tactile cues-balance; varies with level of fatigue, using his wheeled walker or wider bariatric front wheel walker.  Inconsistent attendance appears to have impacted progress in therapy.  Goal not met.  Discontinue goal, see LTG below for revision.         Goal Identifier HEP   Goal Description Geo will be independent with a HEP for improved ability to participate in leisure/cardiovascular activities (such as swimming or riding a stationary bike).     Target Date 7/29/2019   Date Met  Ongoing through episode of care.    Progress: Geo works on strengthening exercises at home that are safe for him to perform by himself.  He transfers to the floor with help then works on his exercises when lying prone or supine.  He is unstable in 4 point position unless assisted. Dad and Geo have expressed interest in the past about exercises in the aquatic setting and are  interested in exercises and home program development when working in aquatic setting. Revise goal: Upon discharge from aquatic physical therapy,  Geo with assist of adult care provider for safety, will demonstrate understanding aquatic exercise program addressing balance and gait safety by 7/29/2019      Goal Identifier Stairs   Goal Description Geo  ascend 4 six inch stairs, 2/4 attempts with min assist  and descend stairs marking time, 2/4 attempts with min assist at gait belt while using Liko lift and harness for safety demonstrating increased safety and  "motor control to manage stairs at home and in community  by 2/13/19   Target Date 03/15/19   Date Met   not met; discontinue   Progress:  4 inch steps: Step ups forward and down backward on to 4 inch step using single railing and contact assist of 2, intermittent min assist.  -) Step down forward with single railing and min assist 1-2 people focusing on graded control and sustaining balance once foot placed down on floor.  Not met, decreased attendance. discontinue goal.        Goal Identifier LTG   Goal Description Geo will ambulate using wider bariatric rolling walker 50 feet x 3 with contact assist of therapist and close supervision of rehab tech to increase safety and independence when ambulating to bedroom and bathroom at home.    Target Date 08/31/19   Date Met  Not met   Progress:bariatric walker increased width, no weights, see goal 1 for details.  Revise goal: Per parent report Geo will ambulate using his walker demonstrating improved sequencing of walker movement (feet not stepping beyond walker frame),  with upright posture at trunk with min assist of parent down his hallway at least 30 feet to get to and from  the bathroom 3/4x in a day by 9/30/2019.         Progress Toward Goals:   Progress this reporting period: Progress this reporting period: Geo's progress has been limited from 2/1/2019 to 4/30/2019 secondary to attendance.  He has had refusal behaviors secondary to indicating he does not feel like therapist is listening to him regarding reasons he \"can't walk\" and relates these reasons to his concerns with his constipation.  He has attended 5 sessions this past progress reporting period. Limited attendance has negatively impacted his progress toward all goals.    Note this progress note completed late.  Geo cancelled all of his scheduled  PT sessions in May on the day of appointment.  Spoke to his dad on 5/30/19 regarding plan of care and determined to trial aquatic therapy as Geo is " reported to be motivated when he is in a pool.       Geo has been increasing frustrated with his current level of independence for ambulation.    Geo wants to increase his independence with ambulation using his walker at home, and to safely transfer and use the restroom independently.  In addition he wants to   increase ability and safety on stairs and general independence and safety with mobility/transfers,balance and ambulation.   Based on phone contact with Geo's dad (guardian) on 5/30/19:  Geo has been refusing to continue clinic based PT and agrees to trial of aquatic based PT.  Dad reports that he did follow up with his insurance company regarding aquatic PT and is comfortable proceeding with this plan at this time.          Certification date from 5/2/2019 to 7/31/2019.    Imani Landers, PT          I CERTIFY THE NEED FOR THESE SERVICES FURNISHED UNDER        THIS PLAN OF TREATMENT AND WHILE UNDER MY CARE     (Physician co-signature of this document indicates review and certification of the therapy plan).                Referring Provider: Leoncio Rousseau MD

## 2019-06-17 ENCOUNTER — HOSPITAL ENCOUNTER (OUTPATIENT)
Dept: OCCUPATIONAL THERAPY | Facility: CLINIC | Age: 19
Setting detail: THERAPIES SERIES
End: 2019-06-17
Attending: FAMILY MEDICINE
Payer: COMMERCIAL

## 2019-06-17 PROCEDURE — 97535 SELF CARE MNGMENT TRAINING: CPT | Mod: GO | Performed by: OCCUPATIONAL THERAPIST

## 2019-06-18 DIAGNOSIS — C71.9 EPENDYMOMA (H): ICD-10-CM

## 2019-06-18 DIAGNOSIS — C71.9 EPENDYMOMA (H): Primary | ICD-10-CM

## 2019-06-18 LAB
BASOPHILS # BLD AUTO: 0 10E9/L (ref 0–0.2)
BASOPHILS NFR BLD AUTO: 0.5 %
DIFFERENTIAL METHOD BLD: ABNORMAL
EOSINOPHIL # BLD AUTO: 0.4 10E9/L (ref 0–0.7)
EOSINOPHIL NFR BLD AUTO: 9.1 %
ERYTHROCYTE [DISTWIDTH] IN BLOOD BY AUTOMATED COUNT: 13.2 % (ref 10–15)
HCT VFR BLD AUTO: 38.2 % (ref 40–53)
HGB BLD-MCNC: 12.7 G/DL (ref 13.3–17.7)
LYMPHOCYTES # BLD AUTO: 1 10E9/L (ref 0.8–5.3)
LYMPHOCYTES NFR BLD AUTO: 22.3 %
MCH RBC QN AUTO: 30.1 PG (ref 26.5–33)
MCHC RBC AUTO-ENTMCNC: 33.2 G/DL (ref 31.5–36.5)
MCV RBC AUTO: 91 FL (ref 78–100)
MONOCYTES # BLD AUTO: 0.8 10E9/L (ref 0–1.3)
MONOCYTES NFR BLD AUTO: 18.6 %
NEUTROPHILS # BLD AUTO: 2.1 10E9/L (ref 1.6–8.3)
NEUTROPHILS NFR BLD AUTO: 49.5 %
PLATELET # BLD AUTO: 106 10E9/L (ref 150–450)
RBC # BLD AUTO: 4.22 10E12/L (ref 4.4–5.9)
WBC # BLD AUTO: 4.3 10E9/L (ref 4–11)

## 2019-06-18 PROCEDURE — 80053 COMPREHEN METABOLIC PANEL: CPT | Performed by: NURSE PRACTITIONER

## 2019-06-18 PROCEDURE — 84100 ASSAY OF PHOSPHORUS: CPT | Performed by: NURSE PRACTITIONER

## 2019-06-18 PROCEDURE — 83735 ASSAY OF MAGNESIUM: CPT | Performed by: NURSE PRACTITIONER

## 2019-06-18 PROCEDURE — 36415 COLL VENOUS BLD VENIPUNCTURE: CPT | Performed by: NURSE PRACTITIONER

## 2019-06-18 PROCEDURE — 85025 COMPLETE CBC W/AUTO DIFF WBC: CPT | Performed by: NURSE PRACTITIONER

## 2019-06-19 LAB
ALBUMIN SERPL-MCNC: 3.2 G/DL (ref 3.4–5)
ALP SERPL-CCNC: 130 U/L (ref 65–260)
ALT SERPL W P-5'-P-CCNC: 18 U/L (ref 0–50)
ANION GAP SERPL CALCULATED.3IONS-SCNC: 7 MMOL/L (ref 3–14)
AST SERPL W P-5'-P-CCNC: 18 U/L (ref 0–35)
BILIRUB SERPL-MCNC: 0.2 MG/DL (ref 0.2–1.3)
BUN SERPL-MCNC: 12 MG/DL (ref 7–30)
CALCIUM SERPL-MCNC: 8.2 MG/DL (ref 8.5–10.1)
CHLORIDE SERPL-SCNC: 107 MMOL/L (ref 98–110)
CO2 SERPL-SCNC: 27 MMOL/L (ref 20–32)
CREAT SERPL-MCNC: 1.01 MG/DL (ref 0.5–1)
GFR SERPL CREATININE-BSD FRML MDRD: >90 ML/MIN/{1.73_M2}
GLUCOSE SERPL-MCNC: 105 MG/DL (ref 70–99)
MAGNESIUM SERPL-MCNC: 2.1 MG/DL (ref 1.6–2.3)
PHOSPHATE SERPL-MCNC: 2.5 MG/DL (ref 2.5–4.5)
POTASSIUM SERPL-SCNC: 4 MMOL/L (ref 3.4–5.3)
PROT SERPL-MCNC: 6.1 G/DL (ref 6.8–8.8)
SODIUM SERPL-SCNC: 141 MMOL/L (ref 133–144)

## 2019-06-20 ENCOUNTER — HOSPITAL ENCOUNTER (OUTPATIENT)
Dept: PHYSICAL THERAPY | Facility: CLINIC | Age: 19
Setting detail: THERAPIES SERIES
End: 2019-06-20
Attending: FAMILY MEDICINE
Payer: COMMERCIAL

## 2019-06-20 PROCEDURE — 97113 AQUATIC THERAPY/EXERCISES: CPT | Mod: GP | Performed by: PHYSICAL THERAPIST

## 2019-06-20 NOTE — PROGRESS NOTES
AQUATIC PHYSICAL THERAPY EXERCISE LOG (TRUNK)    *Jogger belt and gait belt donned throughout session    Date  6/13/19  Mary Beth Malin, DPT 6/20/19  Ita Araiza, DPT   Warm Up Ambulation (Forward/Side/Back/March/All) Wheelchair used to enter/exit pool due to instability and ataxic gait. Fwd ambulation in the water with 1 UE support on wall and 1 UE support on therapist's arm, min-mod assist for forward gait, 20'x3, attempted fwd gait with no right ear and B UE support on therapist arms but unable to due ataxia and instability. Sidestepping with  BUE support on wall and therapist min assist for balance 20' ea direction. Marches in place with B UE support on wall and therapist min-mod assist for balance, 15x ea side. Maximal cues for upright posture and naming things he sees throughout - improved duration of upright posture with environmental obersvation W/c for exit/entry into pool due to  instability and ataxic gait. pT cued for improved posture with forward ambulation with manual cues, 1 UE on wall and 1 UE on therapist. X 4 laps, side stepping x 20 feet as well, cues for postural control.                Stretching/ROM Chin Tucks      CROM (Flex/Ext/SB/Rot/All)      Shoulder (Shrugs/Rolls)      Scapular (Retraction/Depression)      Upper Trunk (Levator/Scalene/Upper Trapezius)      Pec Stretch (Unilateral/Bilateral)      Posterior Shoulder      Gluteal      Hamstring (On Step/Cuff)      Calf (In Hole/At Wall)      Quad      Hip Flexor (Kneel)      Piriformis (Seated/Stand)      Trunk ROM (Flex/Ext/SB/Rot/All)      BAD RAGAZ                 Strengthening Abdominal Sets/ Pelvic Tilt      Shoulder (Flex/Ext, Abd/Add, IR/ER, Circles, Alternation flex/ext for core)  Without equipment with back against the wall to work on wt shifting and postural control, educated to shift slow x 10 flex/ext, abd/add, attempted with back against wall and paddles but too difficult for pt to retain his balance    Horizon (Abd/Add, Diagonals)       Rowing Arms      UE PNF (D1/D2)      Abdominal Sets (Push/Pull, side to side, push down with board)      Squat With back to the wall and B UE support on therapist arms in 4' water, mini squats with cues for upright posture and keeping his chin out of the water, 15x B UE support on wall, cues for upright posture, in 3 feet of water. X 20    Lunge Squat      Hip (Flex/Ext, Abd/Add, single leg bike, circles, figure 8, All)  In corner flex/ext, abd/add, circles x 10 cues for upright posture,     Heel/Toe Raises In open water with B UE support on therapist arms, 15x heel raises with cues for posture and posterior weight shift to prevent anterior LOB     Step Ups (Forward, Lateral) Fwd step ups 10x ea side on medium step with B UE support on therapist arms mod-max assist for balance and postural control Forward step ups with small step with wall support x 20, taps with 1 UE support     Noodle Push Downs with UE(B UE/1 UE) for core strengthening      Noodle Push Downs with LE      Stir the Pot/Punches with Dumbells      Sit to Stand at Bench From wheelchair in 3' water, CGA-min assist from therapist with B UE support on wall, 15x     Prone Plank on step      Side Plank on step                 Aerobic Fast Walking      Bike/Ski/Hussein/All                 Balance Narrow Base of Support In open water with B UE support on therapist arms, attempted to limit assist unless LOB upon which he required mod-max assist to correct, 2 min Standing with therapist support attempting to limit support, needing mod A at times to decrease LOB. 2 mins    Tandem Stand      Single Leg Stance      Heel/Toe Walking      3 Step and Stop      Braiding      Transfers Patient with good control of initial transfer with UE support but unable to maintain balance in standing independently without UE support. Patient impulsive with sitting in wheelchair too early at end of session, therapist assist to prevent missing wheelchair and falling. Reviewed  transfer technique and repeated transfer with max cues for turning fully away from chair and backing all the wall up before sitting, significantly improved safety on second repetition                Cool Down Ambulation      [place] Dangle      [place]

## 2019-06-24 ENCOUNTER — HOSPITAL ENCOUNTER (OUTPATIENT)
Dept: OCCUPATIONAL THERAPY | Facility: CLINIC | Age: 19
Setting detail: THERAPIES SERIES
End: 2019-06-24
Attending: FAMILY MEDICINE
Payer: COMMERCIAL

## 2019-06-24 PROCEDURE — 97535 SELF CARE MNGMENT TRAINING: CPT | Mod: GO | Performed by: OCCUPATIONAL THERAPIST

## 2019-06-25 DIAGNOSIS — C71.9 EPENDYMOMA (H): ICD-10-CM

## 2019-06-25 LAB
BASOPHILS # BLD AUTO: 0 10E9/L (ref 0–0.2)
BASOPHILS NFR BLD AUTO: 0.2 %
DIFFERENTIAL METHOD BLD: ABNORMAL
EOSINOPHIL # BLD AUTO: 0.3 10E9/L (ref 0–0.7)
EOSINOPHIL NFR BLD AUTO: 5.3 %
ERYTHROCYTE [DISTWIDTH] IN BLOOD BY AUTOMATED COUNT: 13.2 % (ref 10–15)
HCT VFR BLD AUTO: 38.9 % (ref 40–53)
HGB BLD-MCNC: 12.9 G/DL (ref 13.3–17.7)
LYMPHOCYTES # BLD AUTO: 1.1 10E9/L (ref 0.8–5.3)
LYMPHOCYTES NFR BLD AUTO: 21.5 %
MCH RBC QN AUTO: 30.1 PG (ref 26.5–33)
MCHC RBC AUTO-ENTMCNC: 33.2 G/DL (ref 31.5–36.5)
MCV RBC AUTO: 91 FL (ref 78–100)
MONOCYTES # BLD AUTO: 0.9 10E9/L (ref 0–1.3)
MONOCYTES NFR BLD AUTO: 18.4 %
NEUTROPHILS # BLD AUTO: 2.7 10E9/L (ref 1.6–8.3)
NEUTROPHILS NFR BLD AUTO: 54.6 %
PLATELET # BLD AUTO: 110 10E9/L (ref 150–450)
RBC # BLD AUTO: 4.29 10E12/L (ref 4.4–5.9)
WBC # BLD AUTO: 4.9 10E9/L (ref 4–11)

## 2019-06-25 PROCEDURE — 36415 COLL VENOUS BLD VENIPUNCTURE: CPT | Performed by: NURSE PRACTITIONER

## 2019-06-25 PROCEDURE — 83735 ASSAY OF MAGNESIUM: CPT | Performed by: NURSE PRACTITIONER

## 2019-06-25 PROCEDURE — 84100 ASSAY OF PHOSPHORUS: CPT | Performed by: NURSE PRACTITIONER

## 2019-06-25 PROCEDURE — 80053 COMPREHEN METABOLIC PANEL: CPT | Performed by: NURSE PRACTITIONER

## 2019-06-25 PROCEDURE — 85025 COMPLETE CBC W/AUTO DIFF WBC: CPT | Performed by: NURSE PRACTITIONER

## 2019-06-26 ENCOUNTER — THERAPY VISIT (OUTPATIENT)
Dept: PHYSICAL THERAPY | Facility: CLINIC | Age: 19
End: 2019-06-26
Payer: COMMERCIAL

## 2019-06-26 DIAGNOSIS — K59.04 CHRONIC IDIOPATHIC CONSTIPATION: ICD-10-CM

## 2019-06-26 PROBLEM — K59.00 CONSTIPATION: Status: RESOLVED | Noted: 2018-08-22 | Resolved: 2019-06-26

## 2019-06-26 LAB
ALBUMIN SERPL-MCNC: 3.2 G/DL (ref 3.4–5)
ALP SERPL-CCNC: 157 U/L (ref 65–260)
ALT SERPL W P-5'-P-CCNC: 29 U/L (ref 0–50)
ANION GAP SERPL CALCULATED.3IONS-SCNC: 7 MMOL/L (ref 3–14)
AST SERPL W P-5'-P-CCNC: 86 U/L (ref 0–35)
BILIRUB SERPL-MCNC: 0.3 MG/DL (ref 0.2–1.3)
BUN SERPL-MCNC: 11 MG/DL (ref 7–30)
CALCIUM SERPL-MCNC: 8.7 MG/DL (ref 8.5–10.1)
CHLORIDE SERPL-SCNC: 107 MMOL/L (ref 98–110)
CO2 SERPL-SCNC: 29 MMOL/L (ref 20–32)
CREAT SERPL-MCNC: 1.1 MG/DL (ref 0.5–1)
GFR SERPL CREATININE-BSD FRML MDRD: >90 ML/MIN/{1.73_M2}
GLUCOSE SERPL-MCNC: 85 MG/DL (ref 70–99)
MAGNESIUM SERPL-MCNC: 2.2 MG/DL (ref 1.6–2.3)
PHOSPHATE SERPL-MCNC: 3 MG/DL (ref 2.5–4.5)
POTASSIUM SERPL-SCNC: 3.6 MMOL/L (ref 3.4–5.3)
PROT SERPL-MCNC: 6.4 G/DL (ref 6.8–8.8)
SODIUM SERPL-SCNC: 143 MMOL/L (ref 133–144)

## 2019-06-26 PROCEDURE — 97112 NEUROMUSCULAR REEDUCATION: CPT | Mod: GP | Performed by: PHYSICAL THERAPIST

## 2019-06-26 NOTE — PROGRESS NOTES
Subjective:  HPI                    Objective:  System    Physical Exam    General     ROS    Assessment/Plan:    DISCHARGE REPORT    Progress reporting period is from 06/26/19.       SUBJECTIVE  Subjective changes noted by patient:    Subjective: Having daily BM's for the most part. Still taking same amount of meds/concoction. Reports very minimal abdominal pain and generally feels like he can empty pretty well. Also started pool PT. Feels ready to continue at home.     Current pain level is NA  .     Previous pain level was  : 3/10.   Changes in function:  Yes (See Goal flowsheet attached for changes in current functional level)  Adverse reaction to treatment or activity: None    OBJECTIVE  Changes noted in objective findings:    Objective: BFB peak contraction 26mv in supine and sitting. Gets some accessory motion but difficult to tell if from neurological deficits or compensation. Rev'd HEP and encourage to continue 3-4 x week.      ASSESSMENT/PLAN  Updated problem list and treatment plan:   STG/LTGs have been met or progress has been made towards goals:  Yes (See Goal flow sheet completed today.)  Assessment of Progress: The patient's condition is improving.  Self Management Plans:  Patient is independent in a home treatment program.  Patient is independent in self management of symptoms.    Geo continues to require the following intervention to meet STG and LTG's:  PT intervention is no longer required to meet STG/LTG.    Recommendations:  This patient is ready to be discharged from therapy and continue their home treatment program.    Please refer to the daily flowsheet for treatment today, total treatment time and time spent performing 1:1 timed codes.

## 2019-06-27 ENCOUNTER — HOSPITAL ENCOUNTER (OUTPATIENT)
Dept: PHYSICAL THERAPY | Facility: CLINIC | Age: 19
Setting detail: THERAPIES SERIES
End: 2019-06-27
Attending: FAMILY MEDICINE
Payer: COMMERCIAL

## 2019-06-27 PROCEDURE — 97113 AQUATIC THERAPY/EXERCISES: CPT | Mod: GP | Performed by: PHYSICAL THERAPIST

## 2019-06-27 NOTE — PROGRESS NOTES
AQUATIC PHYSICAL THERAPY EXERCISE LOG (TRUNK)    *Jogger belt and gait belt donned throughout session    Date  6/13/19  Mary Beth Malin, DPT 6/20/19  Ita Araiza, DPT 6/27/19  Ita Araiza, DPT   Warm Up Ambulation (Forward/Side/Back/March/All) Wheelchair used to enter/exit pool due to instability and ataxic gait. Fwd ambulation in the water with 1 UE support on wall and 1 UE support on therapist's arm, min-mod assist for forward gait, 20'x3, attempted fwd gait with no right ear and B UE support on therapist arms but unable to due ataxia and instability. Sidestepping with  BUE support on wall and therapist min assist for balance 20' ea direction. Marches in place with B UE support on wall and therapist min-mod assist for balance, 15x ea side. Maximal cues for upright posture and naming things he sees throughout - improved duration of upright posture with environmental obersvation W/c for exit/entry into pool due to  instability and ataxic gait. pT cued for improved posture with forward ambulation with manual cues, 1 UE on wall and 1 UE on therapist. X 4 laps, side stepping x 20 feet as well, cues for postural control.  W/C exit and entry into pool. Pt cued for side steps to enter chest deep water once in therapy area. Pt cued for forward ambulation with 1 UE support from therapist, wall support and min/mod A at gait belt. X 8 laps, cues for posture, cervical extension, close support of wall, slow speed for improved control. Improved with cues for looking at pictures on walls.                  Stretching/ROM Chin Tucks       CROM (Flex/Ext/SB/Rot/All)       Shoulder (Shrugs/Rolls)       Scapular (Retraction/Depression)       Upper Trunk (Levator/Scalene/Upper Trapezius)       Pec Stretch (Unilateral/Bilateral)       Posterior Shoulder       Gluteal       Hamstring (On Step/Cuff)       Calf (In Hole/At Wall)       Quad       Hip Flexor (Kneel)       Piriformis (Seated/Stand)       Trunk ROM (Flex/Ext/SB/Rot/All)       BAD  DAPHNEAZ                    Strengthening Abdominal Sets/ Pelvic Tilt       Shoulder (Flex/Ext, Abd/Add, IR/ER, Circles, Alternation flex/ext for core)  Without equipment with back against the wall to work on wt shifting and postural control, educated to shift slow x 10 flex/ext, abd/add, attempted with back against wall and paddles but too difficult for pt to retain his balance No equipment, back against wall for wt shift and postural control, flex/ext, ad/abd x 10    Horizon (Abd/Add, Diagonals)   As above    Rowing Arms       UE PNF (D1/D2)       Abdominal Sets (Push/Pull, side to side, push down with board)       Squat With back to the wall and B UE support on therapist arms in 4' water, mini squats with cues for upright posture and keeping his chin out of the water, 15x B UE support on wall, cues for upright posture, in 3 feet of water. X 20 With UE support on wall manual cue for glute taps x 20 cues for cervical positioning    Lunge Squat       Hip (Flex/Ext, Abd/Add, single leg bike, circles, figure 8, All)  In corner flex/ext, abd/add, circles x 10 cues for upright posture,  In corner with B support, flex/ext and abd/add x 10     Heel/Toe Raises In open water with B UE support on therapist arms, 15x heel raises with cues for posture and posterior weight shift to prevent anterior LOB  X 10 with wall support    Step Ups (Forward, Lateral) Fwd step ups 10x ea side on medium step with B UE support on therapist arms mod-max assist for balance and postural control Forward step ups with small step with wall support x 20, taps with 1 UE support  Tap ups with 1 UE support and therapist HHA x 10 , step ups with B UE support on wall, cues for posture throughtout    Noodle Push Downs with UE(B UE/1 UE) for core strengthening       Noodle Push Downs with LE   X 10 with assistance from therapist on noodle, colored noodle    Stir the Pot/Punches with Dumbells       Sit to Stand at Bench From wheelchair in 3' water, CGA-min  assist from therapist with B UE support on wall, 15x      Prone Plank on step       Side Plank on step                    Aerobic Fast Walking       Bike/Ski/Hussein/All                    Balance Narrow Base of Support In open water with B UE support on therapist arms, attempted to limit assist unless LOB upon which he required mod-max assist to correct, 2 min Standing with therapist support attempting to limit support, needing mod A at times to decrease LOB. 2 mins 1 min with noodle support with therapist assisting at noodle     Tandem Stand       Single Leg Stance       Heel/Toe Walking       3 Step and Stop       Braiding       Transfers Patient with good control of initial transfer with UE support but unable to maintain balance in standing independently without UE support. Patient impulsive with sitting in wheelchair too early at end of session, therapist assist to prevent missing wheelchair and falling. Reviewed transfer technique and repeated transfer with max cues for turning fully away from chair and backing all the wall up before sitting, significantly improved safety on second repetition                   Cool Down Ambulation       [place] Dangle       [place]

## 2019-06-28 ENCOUNTER — OFFICE VISIT (OUTPATIENT)
Dept: PSYCHIATRY | Facility: CLINIC | Age: 19
End: 2019-06-28
Attending: PSYCHIATRY & NEUROLOGY
Payer: COMMERCIAL

## 2019-06-28 VITALS — SYSTOLIC BLOOD PRESSURE: 106 MMHG | HEART RATE: 106 BPM | DIASTOLIC BLOOD PRESSURE: 67 MMHG

## 2019-06-28 DIAGNOSIS — F32.A DEPRESSION, UNSPECIFIED DEPRESSION TYPE: ICD-10-CM

## 2019-06-28 DIAGNOSIS — F22 PARANOIA (H): ICD-10-CM

## 2019-06-28 PROCEDURE — G0463 HOSPITAL OUTPT CLINIC VISIT: HCPCS | Mod: ZF

## 2019-06-28 RX ORDER — OLANZAPINE 15 MG/1
15 TABLET ORAL AT BEDTIME
Qty: 30 TABLET | Refills: 1 | Status: SHIPPED | OUTPATIENT
Start: 2019-06-28 | End: 2019-08-19

## 2019-06-28 RX ORDER — TRAZODONE HYDROCHLORIDE 100 MG/1
100 TABLET ORAL AT BEDTIME
Qty: 30 TABLET | Refills: 1 | Status: SHIPPED | OUTPATIENT
Start: 2019-06-28 | End: 2019-08-19

## 2019-06-28 ASSESSMENT — PAIN SCALES - GENERAL: PAINLEVEL: NO PAIN (0)

## 2019-06-28 NOTE — PROGRESS NOTES
"  [facility] PSYCHIATRY CLINIC PROGRESS NOTE     CARE TEAM:  PCP- Jeffrey Espinoza    Specialty Providers- Oncology, Neuropsychology, Physical Therapy, Occupational Therapy    Therapist- Manju Pink at [facility] in [facility] Team- no.    Geo Hicks is a 18 year old male who prefers the name Geo & pronouns he, him, his, himself.    Date of initial diagnostic assessment is 2/7/2019.    Pertinent Background:  This patient first experienced mental health issues in the past few months and has received treatment for paranoia/delusions.  Notably, this patient is undergoing cancer treatment at the [facility] and has been referred for psychiatric assessment by his neuropsychologist who most recently met with him on 1/3/2019.  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 Since then, he has undergone multiple tumor resections, chemotherapy, and radiation treatment. Geo also experienced an intra-tumoral hemorrhage in May 2015 that resulted in neurological changes including facial numbness, significant loss of mobility and dysarthria. In December 2016, a follow-up MRI revealed continued tumor enhancement, however the most recent MRI from 10/16/18 revealed an unchanged fourth ventricle ependymoma in comparison to previous exams, a slight decrease in adjacent edema, as well as a stable size of the ventricular system.  He is currently on COG study [id number] with Entinostat.  Of note, patient had a brief admission to inpatient psychiatry on [facility] from 2/14-2/15/2019 \"for evaluation of delusions and suicidal comments.\"    Psych critical item history includes suicidal ideation, psychosis [sxs include delusions] and psychiatric hospitalization x1.    INTERIM HISTORY                                                                                                                 4, 4   The patient reports good treatment adherence.  History was provided by the patient " "and his dad who were good historians.  The last visit ended with the following med change: Began trazodone 25-50 mg at bedtime.  Since the last visit:   -Geo states that mood-wise he's been \"decent.\"  Denies SI thoughts, and denies SIB, aggressive or violent ideation, markedly elevated moods, AH or disorganized thinking, depressive/anxious severity or other hallmarks of psychiatric decompensation.  -Shares that they've had family events for both Memorial Day as well as Father's Day.  Spent time at a [employer] [facility] and anticipate several trips such as this this summer.  -In terms of sleep, still having trouble achieving more that five or six hours, and states that trazodone has helped somewhat with initiation but it's still taking a while.  Denies feeling groggy the next day or any other side effect concerns related to trazodone.  Due to this partial response at 50 mg agreed to trial trazodone 100 mg at bedtime.  -As the visit proceeded, the subject of Geo's fixed delusion arose, that being that definitive care for his constipation is being withheld in order to keep him from being able to walk.  As he and his father discussed this, Geo became gradually more angry.  Regarding the providers who present/relate the status of his bowel health to him, in opposition to his beliefs, he states: \"They're liars.\"  -Asked Geo and his dad if these arguments are continuing to be a constant feature and his dad states that \"actually we're doing a lot better than we were a year ago.\"  -Visit ended on a cordial but frustrated note due to Geo having briefly grown somewhat agitated while his beliefs were being discussed.  Encouraged Geo to keep talking about his concerns with his psychotherapist, which he agreed to do.    RECENT SYMPTOMS:   DEPRESSION:  reports-some low moods, insomnia and feeling trapped;  DENIES- suicidal ideation, self-destructive thoughts, anhedonia and poor concentration /memory  ELIZABETH/HYPOMANIA:  " reports-none;  DENIES- increased energy, decreased sleep need, increased activity and grandiosity  PSYCHOSIS:  reports-delusions- paranoid [details in Interim History];  DENIES- auditory hallucinations and visual hallucinations  DYSREGULATION:  reports-can become irritable/agitated if beliefs are challenged;  DENIES- suicidal ideation, violent ideation, SIB, mood dysregulation, impulsive and aggressive  PANIC ATTACK:  none   ANXIETY:  worry  TRAUMA RELATED:  none  COMPULSIVE:  none  SLEEP:  5-6 hours, difficulty with both initiation and maintenance  EATING DISORDER: no    RECENT SUBSTANCE USE:  ALCOHOL- no  TOBACCO- no  CAFFEINE- minimal  OPIOIDS- no         NARCAN KIT- N/A  CANNABIS- no  OTHER ILLICIT DRUGS- no      CURRENT SOCIAL HISTORY:  FINANCIAL SUPPORT- family  CHILDREN- no  LIVING SITUATION- mother and father (who are  and have both remarried) alternating weeks between respective homes  SOCIAL/ SPIRITUAL SUPPORT- mother, father, older brother, friend Rima  FEELS SAFE AT HOME- yes    MEDICAL ROS (2,10):  A comprehensive review of systems was performed and is negative other than noted in the HPI.    PSYCH and CD Critical Summary Points since July 2018 February 2019:  Clinic intake on 2/7.  Later was admitted to [facility] for a brief inpatient psych admission from 3/14-3/15, most of which was spent in the ER, due to suicidal comments that concerned family.  Was ultimately deemed safe for outpatient follow-up and was discharged with an increase in olanzapine to 15 mg.  March 2019:  Began sertraline 50 mg daily.  May 2019:  Began trazodone 25-50 mg at bedtime.    PAST PSYCH MED TRIALS   see EMR Problem List: Hx of psychiatric care    MEDICAL / SURGICAL HISTORY                                   Neurologic Hx- See pertinent background, re: history of ependymoma and related chemo, radiation and tumor resection(s).  Notably has experienced neurological damage due to the above which has resulted  "in facial numbness, significant loss of mobility and dysarthria.  Has had neuropsychiatric evaluation(s) 2/2016, 2/2017 and 1/2019.  The following is from the \"Results and Impressions\" section of his 1/3/2019 eval with Veronica Padilla:       \"We were pleased to discover that the neurocognitive areas measured during this evaluation have remained intact and consistent with previous evaluations. Geo s ability to access and apply acquired word knowledge (verbal comprehension) and his working memory (ability to mentally hold and manipulate information) were in the average range. His ability to understand visual information to solve novel abstract visual problems (perceptual reasoning) was in the low average range. There was a slight decrease in performance on one task associated with perceptual reasoning due to time limits. Had no time limits been enforced, Geo s performance would have likely been higher as he was observed to respond with correct answers that were past the time limit allowed. Furthermore, on a timed visual discrimination and scanning task, Geo performed in the borderline range, which was a slight improvement from his previous evaluation in 2017 where he performed in the impaired range.\"    Patient Active Problem List   Diagnosis     GERD (gastroesophageal reflux disease)     Closed fracture at the growth plate of right distal fibula      Elevated serum creatinine     Dyspepsia     Intracranial hemorrhage (H)     Hemorrhagic stroke (H)     Ependymoma (H)     Admission for antineoplastic chemotherapy     Post-operative state     S/P craniotomy     S/P biopsy     Noncomitant strabismus     Abducens neuropathy of both eyes     CANDELARIO (obstructive sleep apnea)     Health Care Home     Hypernatremia     Body temperature low     Current chronic use of systemic steroids     Elevated TSH     Status post chemotherapy     Neoplasm of posterior cranial fossa (H)     Chronic constipation     Serum " albumin decreased     Closed compression fracture of thoracic vertebra with routine healing, subsequent encounter     Depression     Past Surgical History:   Procedure Laterality Date     GRAFT CARTILAGE FROM POSTERIOR AURICLE Left 10/6/2016    Procedure: GRAFT CARTILAGE FROM POSTERIOR AURICLE;  Surgeon: Tyler Richards MD;  Location: [facility]     INCISION AND DRAINAGE PERINEAL, COMBINED Bilateral 7/18/2015    Procedure: COMBINED INCISION AND DRAINAGE PERINEAL;  Surgeon: Dequan Timmons MD;  Location: [facility]     OPTICAL TRACKING SYSTEM CRANIOTOMY, EXCISE TUMOR, COMBINED N/A 4/13/2015    Procedure: COMBINED OPTICAL TRACKING SYSTEM CRANIOTOMY, EXCISE TUMOR;  Surgeon: Francis Velazquez MD;  [facility]     OPTICAL TRACKING SYSTEM CRANIOTOMY, EXCISE TUMOR, COMBINED N/A 4/16/2015    Procedure: COMBINED OPTICAL TRACKING SYSTEM CRANIOTOMY, EXCISE TUMOR;  Surgeon: Francis Velazquez MD;  [facility]     OPTICAL TRACKING SYSTEM CRANIOTOMY, EXCISE TUMOR, COMBINED Bilateral 5/28/2015    Procedure: COMBINED OPTICAL TRACKING SYSTEM CRANIOTOMY, EXCISE TUMOR;  Surgeon: Francis Velazquez MD;  [facility]     OPTICAL TRACKING SYSTEM CRANIOTOMY, EXCISE TUMOR, COMBINED Bilateral 1/14/2016    Procedure: COMBINED OPTICAL TRACKING SYSTEM CRANIOTOMY, EXCISE TUMOR;  Surgeon: Francis Velazquez MD;  [facility]     OPTICAL TRACKING SYSTEM VENTRICULOSTOMY  4/16/2015    Procedure: OPTICAL TRACKING SYSTEM VENTRICULOSTOMY;  Surgeon: Francis Velazquez MD;  [facility]     REMOVE PORT VASCULAR ACCESS N/A 10/6/2016    Procedure: REMOVE PORT VASCULAR ACCESS;  Surgeon: Bruno Perea MD;  [facility]     RHINOPLASTY N/A 10/6/2016    Procedure: RHINOPLASTY;  Surgeon: Tyler Richards MD;  [facility]     VASCULAR SURGERY  5-2015    single lumen power port       ALLERGY                                Blood transfusion related (informational only) and No known drug allergies      MEDICATIONS                               Current Outpatient Medications   Medication Sig Dispense Refill     calcium carbonate-vitamin D 600-400 MG-UNIT CHEW Take 2 tablets in the morning and 1 tablet in the evening. 90 tablet 3     dexamethasone (DECADRON) 0.5 MG tablet Take 0.75 mg by mouth daily (with breakfast). 90 tablet 3     fexofenadine (ALLEGRA) 180 MG tablet Take 180 mg by mouth every evening        linaclotide (LINZESS) 290 MCG capsule Take 290 mcg by mouth every morning (before breakfast)        omeprazole (PRILOSEC) 20 MG DR capsule Take 2 capsules (40 mg) by mouth every morning 60 capsule 1     potassium phosphate, monobasic, (K-PHOS) 500 MG tablet Take 500 mg by mouth 2 times daily       senna-docusate (SENOKOT-S/PERICOLACE) 8.6-50 MG tablet Take 2 tablets by mouth At Bedtime       sertraline (ZOLOFT) 50 MG tablet Take 1 tablet (50 mg) by mouth daily 30 tablet 2     study - entinostat (IDS# 5050) 1 mg tablet Take 1 tablet (1 mg) by mouth every 7 days for 4 doses Take one 1mg tablet with one 5mg tablet for total dose of 6mg weekly. Take on an empty stomach, at least 1 hour before or 2 hours after a meal.  Swallow tablet whole. 4 tablet 0     study - entinostat (IDS# 5050) 5 mg tablet Take 1 tablet (5 mg) by mouth every 7 days for 4 doses Take one 5mg tablet with one 1mg tablet for total dose of 6mg weekly. Take on an empty stomach, at least 1 hour before or 2 hours after a meal.  Swallow tablet whole. 4 tablet 0     sulfamethoxazole-trimethoprim (BACTRIM/SEPTRA) 400-80 MG per tablet Take 1 tablet by mouth 2 times daily On Saturdays and Sundays 24 tablet 11     vitamin D3 (CHOLECALCIFEROL) 2000 units (50 mcg) tablet Take 1 tablet by mouth daily (with breakfast)       VITALS                                                                                                                          3, 3   /67   Pulse 106      MENTAL STATUS EXAM                                                                                            9, 14 cog gs     Alertness: alert  and oriented  Appearance: casually groomed, seated in wheelchair, wearing R-sided eye-patch  Behavior/Demeanor: cooperative, with fair eye contact   Speech: prominent dysarthria  Language: good  Psychomotor: mild evident palsy of upper extremities and facial paralysis  Mood: some low moods, and became irritable when discussing his fixed beliefs  Affect: restricted; was congruent to mood; was congruent to content  Thought Process/Associations: continued rumination [details in Interim History]  Thought Content:  Reports delusions [details in Interim History];  Denies suicidal ideation and violent ideation  Perception:  Reports none;  Denies auditory hallucinations and visual hallucinations  Insight: partial  Judgment: good  Cognition: (6) oriented: time, person, and place  attention span: intact  concentration: intact  recent memory: intact  remote memory: intact  fund of knowledge: appropriate  Gait and Station: remarkable for:  ataxia - can ambulate but was seen in wheelchair     LABS and DATA     AIMS:  complete next visit    PHQ9 TODAY = 6  PHQ-9 SCORE 4/29/2019   PHQ-9 Total Score 6       ANTIPSYCHOTIC LABS  [glu, A1C, lipids (rohit LDL), liver enzymes, WBC, ANEU, Hgb, plts]  q12 mo  Recent Labs   Lab Test 06/25/19  1315 06/18/19  1326 06/12/19  1106 06/11/19  1340  02/26/19  1100   GLC 85 105* 76 Canceled, Test credited   < > 124*   A1C  --   --   --   --   --  5.1    < > = values in this interval not displayed.     Recent Labs   Lab Test 02/26/19  1100   CHOL 158   TRIG 236*   LDL 84   HDL 27*     Recent Labs   Lab Test 06/25/19  1315 06/18/19  1326 06/12/19  1106 06/11/19  1340   AST 86* 18 23 Canceled, Test credited   ALT 29 18 17 Canceled, Test credited   ALKPHOS 157 130 139 Canceled, Test credited     Recent Labs   Lab Test 06/25/19  1315 06/18/19  1326 06/11/19  1340 06/04/19  1316   WBC 4.9 4.3 3.2* 3.9*   ANEU 2.7 2.1 1.2* 2.1   HGB  "12.9* 12.7* 13.6 12.9*   * 106* 106* 129*     EK2019: 85 BPM, QT/QTcH was 346/389 msec.  Also included comment: \"Abnormal precordial QRS contours - nondiagnostic for this age.\"    EE/15/2019: \"IMPRESSION: Awake and drowsy routine EEG (no video) was normal.  The patient stated he felt dazed during photic stimulation, however, no electrographic seizures or concerning changes on the EEG were seen during that time.  Clinical correlation is advised.  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  He relates interval stability of \"decent\" mood, and denies SI/SIB thoughts, violent/aggressive ideation, markedly depressed mood, inappropriately elevated mood, panic/anxious acuity, marked paranoia/psychotic features, or other hallmarks of psychiatric decompensation with dangerousness.  He presented with his father who helped supplement Geo's reportage, and to assist in clarifying some of his statements, given his marked dysarthria.  Reported that [place], and have had events associated with Memorial Day and Father's day.  For part of the visit, fielded a discussion, largely between Geo and his father, related to Geo's fixed delusional belief that definitive treatment for his constipation is being withheld in order to prevent him from being able to walk.  For part of this discussion, Geo became mildly agitated, and stated that the providers in question are \"liars.\"  Overall " Geo's father states the frequency and character of the discussion/agruments surrounding this subject have improved considerably over the past year.  Encouraged Geo to keep working through this subject (and others, related to his current situation) with his psychotherapist.  In terms of medications, at out last appointment had started trazodone for sleep at a lower dose of 25-50 mg to ensure tolerance.  Has helped a bit but still struggling to get more than 5-6 hours sleep at night.  Denies side effects and so have agreed to increase the dose to 100 mg.  Overall, feels like his current meds have been helpful and are well tolerated - no other changes today.    SUICIDE RISK ASSESSMENT:  Geo Hicks denies present suicidal ideation.  This patient does have notable risk factors for self-harm including feels trapped, relationship conflicts, new/ worsening medical issue, male and paranoia/delusions. However, risk is mitigated by no h/o suicide attempt, no plan or intent, no h/o risky impulsive behavior, describes a safety plan, h/o seeking help when needed, none to minimal alcohol use , commitment to family, good social support and stable housing.  Based on all available evidence he does not appear to be at imminent risk for self-harm therefore does not meet criteria for a 72-hr hold/  involuntary hospitalization.  However, based on degree of symptoms therapy, close psych FU and initiation of new medications was recommended which the pt did agree to.    [place] PRESCRIPTION MONITORING PROGRAM [] was not checked today:  not using controlled substances    PSYCHOTROPIC DRUG INTERACTIONS:    Pentoxifylline may enhance the antiplatelet effect of Agents with Antiplatelet Properties.  [Pentoxifylline, Sertraline]     CNS Depressants may enhance the adverse/toxic effect of Selective Serotonin Reuptake Inhibitors. Specifically, the risk of psychomotor impairment may be enhanced.  [Olanzapine, Sertraline]    MANAGEMENT:   Monitoring for adverse effects, routine vitals, routine labs, periodic EKGs and patient/family aware of risks     PLAN                                                                                                                                m2, h3     1) PSYCHOTROPIC MEDICATIONS:  Increase trazodone to 100 mg at bedtime.  Continue sertraline 50 mg daily.  Continue olanzapine 15 mg at bedtime.    2) THERAPY:  continue with Manju    3) NEXT DUE:    Labs- AP labs due Feb 2020  EKG- PRN  Rating Scales- AIMS due    4) REFERRALS:  No Referrals needed     5) RTC: 4 weeks    6) CRISIS NUMBERS:   Provided [place] [facility] [phone] (clinic)    [phone] (after hours)  CRISIS TEXT LINE: Text [id number] from anywhere in [place], anytime, any crisis 24/7;  OR SEE www.crisistextline.org    TREATMENT RISK STATEMENT:  The risks, benefits, alternatives and potential adverse effects have been discussed and are understood by the pt. The pt understands the risks of using street drugs or alcohol. There are no medical contraindications, the pt agrees to treatment with the ability to do so. The pt knows to call the clinic for any problems or to access emergency care if needed.  Medical and substance use concerns are documented above.  Psychotropic drug interaction check was done, including changes made today.     PSYCHIATRY CLINIC INDIVIDUAL PSYCHOTHERAPY NOTE                                                [16]   Start time- N/A        End time- N/A  Date last reviewed - 03/08/19       Date next due - 6/6/19 (or 12 months if Medicare)    Subjective: This supportive psychotherapy session addressed issues related to goals of therapy, patient's history, current stressors, life stressors, relationship, family of origin, school and health.  Patient's reaction: Contemplation in the context of mental status appropriate for ambulatory setting.  Progress: fair  Plan: RTC 4-6 weeks  Psychotherapy services during this visit  included  myself and the patient.   TREATMENT  PLAN          SYMPTOMS;PROBLEMS   MEASURABLE GOALS;    FUNCTIONAL IMPROVEMENT INTERVENTIONS;    GAINS MADE DISCHARGE CRITERIA   Depression: depressed mood, anhedonia and feeling hopelesss   reduce depressive symptoms, find enjoyment at least once a day and report feeling more positive about self  Supportive and cognitive psychotherapeutic interventions marked symptom improvement   Psychosis: delusions   reduce frequency/ intensity of false beliefs and take medications as prescribed on a daily basis Supportive and cognitive psychotherapeutic interventions marked symptom improvement     PROVIDER:  Justice Fay, DO    Patient staffed in clinic with Dr. Emery who will sign the note.  Supervisor is Dr. Tripathi.  I saw the patient with the resident, and participated in key portions of the service, including the mental status examination and developing the plan of care. I reviewed key portions of the history with the resident. I agree with the findings and plan as documented in this note.    Marilia Emery

## 2019-07-02 DIAGNOSIS — C71.9 EPENDYMOMA (H): ICD-10-CM

## 2019-07-02 LAB
BASOPHILS # BLD AUTO: 0 10E9/L (ref 0–0.2)
BASOPHILS NFR BLD AUTO: 0.2 %
DIFFERENTIAL METHOD BLD: ABNORMAL
EOSINOPHIL # BLD AUTO: 0.3 10E9/L (ref 0–0.7)
EOSINOPHIL NFR BLD AUTO: 5.4 %
ERYTHROCYTE [DISTWIDTH] IN BLOOD BY AUTOMATED COUNT: 13.1 % (ref 10–15)
HCT VFR BLD AUTO: 37.6 % (ref 40–53)
HGB BLD-MCNC: 12.4 G/DL (ref 13.3–17.7)
LYMPHOCYTES # BLD AUTO: 1.2 10E9/L (ref 0.8–5.3)
LYMPHOCYTES NFR BLD AUTO: 18.9 %
MCH RBC QN AUTO: 30.2 PG (ref 26.5–33)
MCHC RBC AUTO-ENTMCNC: 33 G/DL (ref 31.5–36.5)
MCV RBC AUTO: 92 FL (ref 78–100)
MONOCYTES # BLD AUTO: 1 10E9/L (ref 0–1.3)
MONOCYTES NFR BLD AUTO: 16.7 %
NEUTROPHILS # BLD AUTO: 3.6 10E9/L (ref 1.6–8.3)
NEUTROPHILS NFR BLD AUTO: 58.8 %
PLATELET # BLD AUTO: 105 10E9/L (ref 150–450)
RBC # BLD AUTO: 4.11 10E12/L (ref 4.4–5.9)
WBC # BLD AUTO: 6.2 10E9/L (ref 4–11)

## 2019-07-02 PROCEDURE — 36415 COLL VENOUS BLD VENIPUNCTURE: CPT | Performed by: NURSE PRACTITIONER

## 2019-07-02 PROCEDURE — 80053 COMPREHEN METABOLIC PANEL: CPT | Performed by: NURSE PRACTITIONER

## 2019-07-02 PROCEDURE — 83735 ASSAY OF MAGNESIUM: CPT | Performed by: NURSE PRACTITIONER

## 2019-07-02 PROCEDURE — 84100 ASSAY OF PHOSPHORUS: CPT | Performed by: NURSE PRACTITIONER

## 2019-07-02 PROCEDURE — 85025 COMPLETE CBC W/AUTO DIFF WBC: CPT | Performed by: NURSE PRACTITIONER

## 2019-07-03 DIAGNOSIS — C71.9 EPENDYMOMA (H): Primary | ICD-10-CM

## 2019-07-03 LAB
ALBUMIN SERPL-MCNC: 3.2 G/DL (ref 3.4–5)
ALP SERPL-CCNC: 122 U/L (ref 65–260)
ALT SERPL W P-5'-P-CCNC: 20 U/L (ref 0–50)
ANION GAP SERPL CALCULATED.3IONS-SCNC: 8 MMOL/L (ref 3–14)
AST SERPL W P-5'-P-CCNC: 22 U/L (ref 0–35)
BILIRUB SERPL-MCNC: 0.3 MG/DL (ref 0.2–1.3)
BUN SERPL-MCNC: 18 MG/DL (ref 7–30)
CALCIUM SERPL-MCNC: 8.5 MG/DL (ref 8.5–10.1)
CHLORIDE SERPL-SCNC: 106 MMOL/L (ref 98–110)
CO2 SERPL-SCNC: 27 MMOL/L (ref 20–32)
CREAT SERPL-MCNC: 1.08 MG/DL (ref 0.5–1)
GFR SERPL CREATININE-BSD FRML MDRD: >90 ML/MIN/{1.73_M2}
GLUCOSE SERPL-MCNC: 88 MG/DL (ref 70–99)
MAGNESIUM SERPL-MCNC: 2.1 MG/DL (ref 1.6–2.3)
PHOSPHATE SERPL-MCNC: 3.6 MG/DL (ref 2.5–4.5)
POTASSIUM SERPL-SCNC: 4 MMOL/L (ref 3.4–5.3)
PROT SERPL-MCNC: 6 G/DL (ref 6.8–8.8)
SODIUM SERPL-SCNC: 141 MMOL/L (ref 133–144)

## 2019-07-09 ENCOUNTER — DOCUMENTATION ONLY (OUTPATIENT)
Dept: LAB | Facility: CLINIC | Age: 19
End: 2019-07-09

## 2019-07-09 DIAGNOSIS — C71.9 EPENDYMOMA (H): Primary | ICD-10-CM

## 2019-07-09 LAB
BASOPHILS # BLD AUTO: 0 10E9/L (ref 0–0.2)
BASOPHILS NFR BLD AUTO: 0.4 %
DIFFERENTIAL METHOD BLD: ABNORMAL
EOSINOPHIL # BLD AUTO: 0.3 10E9/L (ref 0–0.7)
EOSINOPHIL NFR BLD AUTO: 9.9 %
ERYTHROCYTE [DISTWIDTH] IN BLOOD BY AUTOMATED COUNT: 13.1 % (ref 10–15)
HCT VFR BLD AUTO: 38.5 % (ref 40–53)
HGB BLD-MCNC: 12.8 G/DL (ref 13.3–17.7)
LYMPHOCYTES # BLD AUTO: 1.1 10E9/L (ref 0.8–5.3)
LYMPHOCYTES NFR BLD AUTO: 39.8 %
MCH RBC QN AUTO: 30.3 PG (ref 26.5–33)
MCHC RBC AUTO-ENTMCNC: 33.2 G/DL (ref 31.5–36.5)
MCV RBC AUTO: 91 FL (ref 78–100)
MONOCYTES # BLD AUTO: 0.4 10E9/L (ref 0–1.3)
MONOCYTES NFR BLD AUTO: 15 %
NEUTROPHILS # BLD AUTO: 1 10E9/L (ref 1.6–8.3)
NEUTROPHILS NFR BLD AUTO: 34.9 %
PLATELET # BLD AUTO: 100 10E9/L (ref 150–450)
RBC # BLD AUTO: 4.23 10E12/L (ref 4.4–5.9)
WBC # BLD AUTO: 2.7 10E9/L (ref 4–11)

## 2019-07-09 PROCEDURE — 83735 ASSAY OF MAGNESIUM: CPT | Performed by: NURSE PRACTITIONER

## 2019-07-09 PROCEDURE — 84100 ASSAY OF PHOSPHORUS: CPT | Performed by: NURSE PRACTITIONER

## 2019-07-09 PROCEDURE — 80053 COMPREHEN METABOLIC PANEL: CPT | Performed by: NURSE PRACTITIONER

## 2019-07-09 PROCEDURE — 36415 COLL VENOUS BLD VENIPUNCTURE: CPT | Performed by: NURSE PRACTITIONER

## 2019-07-09 PROCEDURE — 85025 COMPLETE CBC W/AUTO DIFF WBC: CPT | Performed by: NURSE PRACTITIONER

## 2019-07-09 NOTE — PROGRESS NOTES
Patient coming in for Lab Only appointment early this afternoon.  Orders were placed but not in Future Status.  Labs now ordered correctly, but need to be signed, please.  Also, do you want to place any standing orders for patient?

## 2019-07-10 ENCOUNTER — HOSPITAL ENCOUNTER (OUTPATIENT)
Dept: MRI IMAGING | Facility: CLINIC | Age: 19
End: 2019-07-10
Attending: NURSE PRACTITIONER
Payer: COMMERCIAL

## 2019-07-10 ENCOUNTER — OFFICE VISIT (OUTPATIENT)
Dept: PEDIATRIC HEMATOLOGY/ONCOLOGY | Facility: CLINIC | Age: 19
End: 2019-07-10
Attending: NURSE PRACTITIONER
Payer: COMMERCIAL

## 2019-07-10 ENCOUNTER — HOSPITAL ENCOUNTER (OUTPATIENT)
Dept: MRI IMAGING | Facility: CLINIC | Age: 19
Discharge: HOME | End: 2019-07-10
Attending: NURSE PRACTITIONER | Admitting: NURSE PRACTITIONER
Payer: COMMERCIAL

## 2019-07-10 VITALS
HEART RATE: 88 BPM | HEIGHT: 70 IN | DIASTOLIC BLOOD PRESSURE: 68 MMHG | OXYGEN SATURATION: 100 % | RESPIRATION RATE: 24 BRPM | WEIGHT: 195.77 LBS | SYSTOLIC BLOOD PRESSURE: 113 MMHG | TEMPERATURE: 97.3 F | BODY MASS INDEX: 28.03 KG/M2

## 2019-07-10 DIAGNOSIS — C71.9 EPENDYMOMA (H): ICD-10-CM

## 2019-07-10 DIAGNOSIS — D49.6 NEOPLASM OF POSTERIOR CRANIAL FOSSA (H): ICD-10-CM

## 2019-07-10 DIAGNOSIS — C71.9 EPENDYMOMA (H): Primary | ICD-10-CM

## 2019-07-10 LAB
ALBUMIN SERPL-MCNC: 3.2 G/DL (ref 3.4–5)
ALP SERPL-CCNC: 122 U/L (ref 65–260)
ALT SERPL W P-5'-P-CCNC: 15 U/L (ref 0–50)
ANION GAP SERPL CALCULATED.3IONS-SCNC: 9 MMOL/L (ref 3–14)
AST SERPL W P-5'-P-CCNC: 19 U/L (ref 0–35)
BASOPHILS # BLD AUTO: 0 10E9/L (ref 0–0.2)
BASOPHILS NFR BLD AUTO: 0.5 %
BILIRUB SERPL-MCNC: 0.3 MG/DL (ref 0.2–1.3)
BUN SERPL-MCNC: 15 MG/DL (ref 7–30)
CALCIUM SERPL-MCNC: 8.5 MG/DL (ref 8.5–10.1)
CHLORIDE SERPL-SCNC: 105 MMOL/L (ref 98–110)
CO2 SERPL-SCNC: 29 MMOL/L (ref 20–32)
CREAT SERPL-MCNC: 1.14 MG/DL (ref 0.5–1)
DIFFERENTIAL METHOD BLD: ABNORMAL
EOSINOPHIL # BLD AUTO: 0.3 10E9/L (ref 0–0.7)
EOSINOPHIL NFR BLD AUTO: 4.5 %
ERYTHROCYTE [DISTWIDTH] IN BLOOD BY AUTOMATED COUNT: 12.9 % (ref 10–15)
GFR SERPL CREATININE-BSD FRML MDRD: >90 ML/MIN/{1.73_M2}
GLUCOSE SERPL-MCNC: 171 MG/DL (ref 70–99)
HCT VFR BLD AUTO: 39.7 % (ref 40–53)
HGB BLD-MCNC: 12.9 G/DL (ref 13.3–17.7)
IMM GRANULOCYTES # BLD: 0 10E9/L (ref 0–0.4)
IMM GRANULOCYTES NFR BLD: 0.3 %
LYMPHOCYTES # BLD AUTO: 0.9 10E9/L (ref 0.8–5.3)
LYMPHOCYTES NFR BLD AUTO: 13.8 %
MAGNESIUM SERPL-MCNC: 2.1 MG/DL (ref 1.6–2.3)
MCH RBC QN AUTO: 29.9 PG (ref 26.5–33)
MCHC RBC AUTO-ENTMCNC: 32.5 G/DL (ref 31.5–36.5)
MCV RBC AUTO: 92 FL (ref 78–100)
MONOCYTES # BLD AUTO: 0.7 10E9/L (ref 0–1.3)
MONOCYTES NFR BLD AUTO: 11.8 %
NEUTROPHILS # BLD AUTO: 4.4 10E9/L (ref 1.6–8.3)
NEUTROPHILS NFR BLD AUTO: 69.1 %
NRBC # BLD AUTO: 0 10*3/UL
NRBC BLD AUTO-RTO: 0 /100
PHOSPHATE SERPL-MCNC: 3.2 MG/DL (ref 2.5–4.5)
PLATELET # BLD AUTO: 107 10E9/L (ref 150–450)
POTASSIUM SERPL-SCNC: 3.9 MMOL/L (ref 3.4–5.3)
PROT SERPL-MCNC: 6.1 G/DL (ref 6.8–8.8)
RBC # BLD AUTO: 4.32 10E12/L (ref 4.4–5.9)
SODIUM SERPL-SCNC: 143 MMOL/L (ref 133–144)
WBC # BLD AUTO: 6.3 10E9/L (ref 4–11)

## 2019-07-10 PROCEDURE — 36415 COLL VENOUS BLD VENIPUNCTURE: CPT | Performed by: NURSE PRACTITIONER

## 2019-07-10 PROCEDURE — A9585 GADOBUTROL INJECTION: HCPCS | Performed by: NURSE PRACTITIONER

## 2019-07-10 PROCEDURE — 72156 MRI NECK SPINE W/O & W/DYE: CPT

## 2019-07-10 PROCEDURE — 25500064 ZZH RX 255 OP 636: Performed by: NURSE PRACTITIONER

## 2019-07-10 PROCEDURE — 70553 MRI BRAIN STEM W/O & W/DYE: CPT

## 2019-07-10 PROCEDURE — G0463 HOSPITAL OUTPT CLINIC VISIT: HCPCS | Mod: 25

## 2019-07-10 PROCEDURE — 85025 COMPLETE CBC W/AUTO DIFF WBC: CPT | Performed by: NURSE PRACTITIONER

## 2019-07-10 PROCEDURE — 72157 MRI CHEST SPINE W/O & W/DYE: CPT

## 2019-07-10 PROCEDURE — 72158 MRI LUMBAR SPINE W/O & W/DYE: CPT

## 2019-07-10 RX ORDER — EPINEPHRINE 1 MG/ML
0.3 INJECTION, SOLUTION, CONCENTRATE INTRAVENOUS EVERY 5 MIN PRN
Status: CANCELLED | OUTPATIENT
Start: 2019-07-10

## 2019-07-10 RX ORDER — ALBUTEROL SULFATE 90 UG/1
1-2 AEROSOL, METERED RESPIRATORY (INHALATION)
Status: CANCELLED
Start: 2019-07-10

## 2019-07-10 RX ORDER — SODIUM CHLORIDE 9 MG/ML
1000 INJECTION, SOLUTION INTRAVENOUS CONTINUOUS PRN
Status: CANCELLED
Start: 2019-07-10

## 2019-07-10 RX ORDER — METHYLPREDNISOLONE SODIUM SUCCINATE 125 MG/2ML
125 INJECTION, POWDER, LYOPHILIZED, FOR SOLUTION INTRAMUSCULAR; INTRAVENOUS
Status: CANCELLED
Start: 2019-07-10

## 2019-07-10 RX ORDER — ALBUTEROL SULFATE 0.83 MG/ML
2.5 SOLUTION RESPIRATORY (INHALATION)
Status: CANCELLED | OUTPATIENT
Start: 2019-07-10

## 2019-07-10 RX ORDER — GADOBUTROL 604.72 MG/ML
10 INJECTION INTRAVENOUS ONCE
Status: COMPLETED | OUTPATIENT
Start: 2019-07-10 | End: 2019-07-10

## 2019-07-10 RX ORDER — MEPERIDINE HYDROCHLORIDE 25 MG/ML
25 INJECTION INTRAMUSCULAR; INTRAVENOUS; SUBCUTANEOUS EVERY 30 MIN PRN
Status: CANCELLED | OUTPATIENT
Start: 2019-07-10

## 2019-07-10 RX ORDER — DIPHENHYDRAMINE HYDROCHLORIDE 50 MG/ML
50 INJECTION INTRAMUSCULAR; INTRAVENOUS
Status: CANCELLED
Start: 2019-07-10

## 2019-07-10 RX ADMIN — GADOBUTROL 8.8 ML: 604.72 INJECTION INTRAVENOUS at 11:41

## 2019-07-10 ASSESSMENT — MIFFLIN-ST. JEOR: SCORE: 1912.38

## 2019-07-10 ASSESSMENT — ENCOUNTER SYMPTOMS
SPEECH DIFFICULTY: 1
FACIAL ASYMMETRY: 1
CARDIOVASCULAR NEGATIVE: 1
CONSTIPATION: 1
RESPIRATORY NEGATIVE: 1
SLEEP DISTURBANCE: 1
CONSTITUTIONAL NEGATIVE: 1

## 2019-07-10 NOTE — PROGRESS NOTES
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 He is enrolled on [employer] [id number] - A Phase 1 Study of Entinostat, an Oral Histone Deacetylase Inhibitor, in Pediatric Patients With Recurrent or Refractory Solid Tumors, Including CNS Tumors and Lymphoma.      HPI:  Geo notes constipation is still a problem.  He is doing pool therapy PT - he has appointments through the end of the month. He say abdominal discomfort from full feeling.  Dad reports he last had a BM this morning and it mod-large ([place] scale mix of 4/5/6). Last week he was having large stools   Geo remains upset about the lack of solving the issue.  He is not having a lot of gas problems.  His stool has odor but not sure if it is worse that others. Mom is wondering why a colonoscopy was not done and if having one would help relieve concerns for Geo,   No fevers. No headache.  No nausea or vomiting and no rash. He has missed no doses of his Entinostat.  He was moving a deck chair and it tipped over gashing his right leg.  Dad didn't bring him for stitches - It happened 5 days ago. They have been cleaning and dressing it.       Fam/Soc: Lives between his parents (one week at each home). They have been awarded guardianship of Geo. The families work well together - both parents have remarried. His dad has a clotting disorder, requiring him to take Warfarin daily.     History was obtained from Geo and his parents.       Allergies   Allergen Reactions     Blood Transfusion Related (Informational Only) Swelling     Periorbital swelling post platelet transfusion     No Known Drug Allergies        Current Outpatient Medications   Medication     calcium carbonate-vitamin D 600-400 MG-UNIT CHEW     dexamethasone (DECADRON) 0.5 MG tablet     fexofenadine (ALLEGRA) 180 MG tablet     linaclotide (LINZESS) 290 MCG capsule     OLANZapine  (ZYPREXA) 15 MG tablet     omeprazole (PRILOSEC) 20 MG DR capsule     potassium phosphate, monobasic, (K-PHOS) 500 MG tablet     senna-docusate (SENOKOT-S/PERICOLACE) 8.6-50 MG tablet     sertraline (ZOLOFT) 50 MG tablet     study - entinostat (IDS# 5050) 1 mg tablet     study - entinostat (IDS# 5050) 5 mg tablet     sulfamethoxazole-trimethoprim (BACTRIM/SEPTRA) 400-80 MG per tablet     traZODone (DESYREL) 100 MG tablet     vitamin D3 (CHOLECALCIFEROL) 2000 units (50 mcg) tablet     No current facility-administered medications for this visit.        Past Medical History:   Diagnosis Date     Cranial nerve dysfunction      Dyspepsia      Ependymoma (H)      Gastro-oesophageal reflux disease      Hearing loss      Intracranial hemorrhage (H)      Migraine      Pilonidal cyst     7-2015     Reduced vision      Refractory obstruction of nasal airway     2nd to nasal valve prolapse     Sleep apnea      Strabismus     gaze palsy        Past Surgical History:   Procedure Laterality Date     GRAFT CARTILAGE FROM POSTERIOR AURICLE Left 10/6/2016    Procedure: GRAFT CARTILAGE FROM POSTERIOR AURICLE;  Surgeon: Tyler Richards MD;  Location: [facility]     INCISION AND DRAINAGE PERINEAL, COMBINED Bilateral 7/18/2015    Procedure: COMBINED INCISION AND DRAINAGE PERINEAL;  Surgeon: Dequan Timmons MD;  Location: [facility]     OPTICAL TRACKING SYSTEM CRANIOTOMY, EXCISE TUMOR, COMBINED N/A 4/13/2015    Procedure: COMBINED OPTICAL TRACKING SYSTEM CRANIOTOMY, EXCISE TUMOR;  Surgeon: Francis Velazquez MD;  Location: [facility]     OPTICAL TRACKING SYSTEM CRANIOTOMY, EXCISE TUMOR, COMBINED N/A 4/16/2015    Procedure: COMBINED OPTICAL TRACKING SYSTEM CRANIOTOMY, EXCISE TUMOR;  Surgeon: Francis Velazquez MD;  [facility]     OPTICAL TRACKING SYSTEM CRANIOTOMY, EXCISE TUMOR, COMBINED Bilateral 5/28/2015    Procedure: COMBINED OPTICAL TRACKING SYSTEM CRANIOTOMY, EXCISE TUMOR;  Surgeon: Francis Velazquez MD;   "Location: [facility]     OPTICAL TRACKING SYSTEM CRANIOTOMY, EXCISE TUMOR, COMBINED Bilateral 1/14/2016    Procedure: COMBINED OPTICAL TRACKING SYSTEM CRANIOTOMY, EXCISE TUMOR;  Surgeon: Francis Velazquez MD;  [facility]     OPTICAL TRACKING SYSTEM VENTRICULOSTOMY  4/16/2015    Procedure: OPTICAL TRACKING SYSTEM VENTRICULOSTOMY;  Surgeon: Francis Velazquez MD;  [facility]     REMOVE PORT VASCULAR ACCESS N/A 10/6/2016    Procedure: REMOVE PORT VASCULAR ACCESS;  Surgeon: Bruno Perea MD;  [facility]: [facility]     RHINOPLASTY N/A 10/6/2016    Procedure: RHINOPLASTY;  Surgeon: Tyler Richards MD;  [facility]: [facility]     VASCULAR SURGERY  5-2015    single lumen power port       Family History   Problem Relation Age of Onset     Circulatory Father         PE/DVT     Hypothyroidism Father 30     Diabetes Maternal Grandmother      Diabetes Paternal Grandmother      Diabetes Paternal Grandfather      C.A.D. Paternal Grandfather      Hypertension Maternal Grandfather      Thyroid Disease Paternal Aunt         unknown whether hypo or hyper     Mental Illness No family hx of        Review of Systems   Constitutional: Negative.         In a wheelchair   HENT: Positive for hearing loss (Pre-existing from prior therapy).    Eyes: Positive for visual disturbance (Wears a patch over right eye to minimize diplopia).   Respiratory: Negative.    Cardiovascular: Negative.    Gastrointestinal: Positive for constipation (Chronic).   Endocrine:        Follow with Dr. Martin   Neurological: Positive for facial asymmetry and speech difficulty.   Psychiatric/Behavioral: Positive for sleep disturbance (Not using his Bi-Pap).   All other systems reviewed and are negative.    Vital signs:  /68 (BP Location: Right arm, Patient Position: Fowlers, Cuff Size: Adult Regular)   Pulse 88   Temp 97.3  F (36.3  C) (Axillary)   Resp 24   Ht 1.783 m (5' 10.2\")   Wt 88.8 kg (195 lb 12.3 oz)   SpO2 100%   BMI 27.93 kg/m   "     Physical Exam   Constitutional: He is oriented to person, place, and time. He appears well-developed and well-nourished. No distress.   HENT:   Occassionally saliva accumulates in mouth with occasional drooling.   Eyes: Pupils are equal, round, and reactive to light. Conjunctivae are normal.   Can track to right, but not to left. Nystagmus noted    Cardiovascular: Normal rate, regular rhythm and normal heart sounds.   Pulmonary/Chest: Effort normal and breath sounds normal. He has no wheezes.   Neurological: He is alert and oriented to person, place, and time. He has normal strength. A cranial nerve deficit is present. Coordination (Ataxic- dysmetria especially in upper extremities when accomplishing tasks.) abnormal. GCS eye subscore is 4. GCS verbal subscore is 5. GCS motor subscore is 6.   Negative Pronator drift   Skin: Skin is warm and dry.   Scattered striae.  Two inch laceration on right lower leg (see pic).   Psychiatric: He has a normal mood and affect.         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 There is again seen hemosiderin deposition within the ependymoma and its adjacent cystic structure. The faint T1 hyperintensity is coming slightly less conspicuous. Perilesional T2 hyperintensity with extension into the bilateral middle cerebellar peduncles and posterior david, unchanged. No new suspicious enhancing mass.     No new mass effect, midline shift, or intracranial hemorrhage. The ventricles are proportionate to the cerebral sulci. Normal major vascular intracranial flow-voids.     No abnormality of the skull marrow signal. The visualized portions of paranasal sinuses, and mastoid air cells are relatively clear. The  orbits are grossly unremarkable.                                                                    Impression:  Ependymoma arising from the fourth ventricle is not convincingly changed compared to the 2 most recent examinations 4/9/2019 and  1/16/2019. No new suspicious intracranial lesion or evidence of obstructive hydrocephalus.       Results for orders placed or performed during the hospital encounter of 07/10/19   MRI Thoracic spine w & w/o contrast    Narrative    Complete spine MR without and with contrast    History: Ependymoma, on study drug Entinostat; Ependymoma (H).   ICD-10: Ependymoma (H)  Comparison:  MR full spine 4/9/2019     Contrast Dose:8.8ml iv gadavist    Technique: Sagittal T1 and T2-weighted images of the entire spine, and  axial T1 and T2-weighted images of the lumbar spine were obtained  without intravenous contrast. Post intravenous contrast using  gadolinium sagittal T1-weighted images were obtained of the whole  spine with fat-saturation , with axial postcontrast T1-weighted images  at selected levels.    Findings:   Cervical spine:  The cervical vertebrae mildly exacerbated cervical  lordosis, grossly unchanged.  There is no significant disc space  narrowing at any level.  There is no definite abnormal signal within  the cervical spinal cord at any level. No significant neuroforaminal  or spinal canal narrowing at the cervical spine. Contrast-enhanced  images demonstrate no definite abnormal enhancement in the visualized  paraspinous tissues or in the spinal canal or vertebra. Partial  visualization of fourth ventricular residual ependymoma, please refer  to same day MRI for further detail.    Thoracic spine:  The tip of the conus medullaris is at approximately  L1. There is no definite abnormal signal in the thoracic spinal cord  at any level. Alignment of the thoracic vertebra appears within normal  limits.  Mild endplate degenerative changes from T6 to T10. Grossly  unchanged superior endplate compression deformity of T4.  Contrast-enhanced images demonstrate no definite  abnormal enhancement  in the visualized paraspinous tissues or in the spinal canal or  vertebra.    Lumbar spine:  There are 5 lumbar-type vertebrae assumed for the  purposes of this dictation.  The tip of the conus medullaris is at  approximately superior L1.  The lumbar vertebral column appears  normally aligned.  There is no significant disc height narrowing at  any level. Mild endplate degenerative changes of L2-L4. No significant  neural foramen or spinal canal narrowing. Contrast-enhanced images  demonstrate no definite abnormal enhancement in the visualized lumbar  paraspinous tissues or in the spinal canal or vertebra.      Impression    Impression:    1. No evidence of metastatic disease in the cervical, thoracic, or  lumbar spine. Mild degenerative changes described as above.  2. Please refer to the same day brain MRI for brain findings.    I have personally reviewed the examination and initial interpretation  and I agree with the findings.    STEPHY SCHMIDT MD     *Note: Due to a large number of results and/or encounters for the requested time period, some results have not been displayed. A complete set of results can be found in Results Review.     Results for orders placed or performed in visit on 07/10/19   CBC with platelets differential   Result Value Ref Range    WBC 6.3 4.0 - 11.0 10e9/L    RBC Count 4.32 (L) 4.4 - 5.9 10e12/L    Hemoglobin 12.9 (L) 13.3 - 17.7 g/dL    Hematocrit 39.7 (L) 40.0 - 53.0 %    MCV 92 78 - 100 fl    MCH 29.9 26.5 - 33.0 pg    MCHC 32.5 31.5 - 36.5 g/dL    RDW 12.9 10.0 - 15.0 %    Platelet Count 107 (L) 150 - 450 10e9/L    Diff Method Automated Method     % Neutrophils 69.1 %    % Lymphocytes 13.8 %    % Monocytes 11.8 %    % Eosinophils 4.5 %    % Basophils 0.5 %    % Immature Granulocytes 0.3 %    Nucleated RBCs 0 0 /100    Absolute Neutrophil 4.4 1.6 - 8.3 10e9/L    Absolute Lymphocytes 0.9 0.8 - 5.3 10e9/L    Absolute Monocytes 0.7 0.0 - 1.3 10e9/L    Absolute  Eosinophils 0.3 0.0 - 0.7 10e9/L    Absolute Basophils 0.0 0.0 - 0.2 10e9/L    Abs Immature Granulocytes 0.0 0 - 0.4 10e9/L    Absolute Nucleated RBC 0.0      *Note: Due to a large number of results and/or encounters for the requested time period, some results have not been displayed. A complete set of results can be found in Results Review.     Component      Latest Ref Rng & Units 7/9/2019   Sodium      133 - 144 mmol/L 143   Potassium      3.4 - 5.3 mmol/L 3.9   Chloride      98 - 110 mmol/L 105   Carbon Dioxide      20 - 32 mmol/L 29   Anion Gap      3 - 14 mmol/L 9   Glucose      70 - 99 mg/dL 171 (H)   Urea Nitrogen      7 - 30 mg/dL 15   Creatinine      0.50 - 1.00 mg/dL 1.14 (H)   GFR Estimate      >60 mL/min/1.73:m2 >90   GFR Estimate If Black      >60 mL/min/1.73:m2 >90   Calcium      8.5 - 10.1 mg/dL 8.5   Bilirubin Total      0.2 - 1.3 mg/dL 0.3   Albumin      3.4 - 5.0 g/dL 3.2 (L)   Protein Total      6.8 - 8.8 g/dL 6.1 (L)   Alkaline Phosphatase      65 - 260 U/L 122   ALT      0 - 50 U/L 15   AST      0 - 35 U/L 19         Impression:  1. 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 Treated with Entinostat, continues to tolerate well.   3. Labs today are adequate for beginning next course of Entinostat.   4. Chronic constipation which is frustrating for him.  5. Leg laceration   6. 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  No evidence of metastatic spine diease.     Plan:  1. RTC in 4 weeks  as scheduled for follow up, or sooner PRN.   2. Continue weekly labs.  3. Continue Pelvic floor PT and pool therapy.    4. Continue weekly labs.  5. Start Entinostat 6mg weekly beginning today.    6. Will look into integrative therapies that may help constipation.  7.  Reviewed with family that laceration that is difficult to stop bleeding or is as deep as this one would have benefited form stitches.  It is healing now so  recommended continued good cleaning following by antibiotic ointment and covering the wound and keeping ointment on it as it heals to minimize scar.    8.  Discussed possible colonoscopy with Dr. Wei and she suggest EGD and colonoscopy which will be scheduled prior to her follow up with them.      Addendum:  Information provided to the family about acupoint therapy and its use in constipation.  If Geo wishes to pursue that - we can arrange a Tuesday appointment.  They are unable to get more pool therapy appointments at current facility until December so [facility] to [facility].

## 2019-07-10 NOTE — LETTER
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 He is enrolled on COG [id number] - A Phase 1 Study of Entinostat, an Oral Histone Deacetylase Inhibitor, in Pediatric Patients With Recurrent or Refractory Solid Tumors, Including CNS Tumors and Lymphoma.      HPI:  Geo notes constipation is still a problem.  He is doing pool therapy PT - he has appointments through the end of the month. He say abdominal discomfort from full feeling.  Dad reports he last had a BM this morning and it mod-large ([place] scale mix of 4/5/6). Last week he was having large stools   Geo remains upset about the lack of solving the issue.  He is not having a lot of gas problems.  His stool has odor but not sure if it is worse that others. Mom is wondering why a colonoscopy was not done and if having one would help relieve concerns for Geo,   No fevers. No headache.  No nausea or vomiting and no rash. He has missed no doses of his Entinostat.  He was moving a deck chair and it tipped over gashing his right leg.  Dad didn't bring him for stitches - It happened 5 days ago. They have been cleaning and dressing it.       Fam/Soc: Lives between his parents (one week at each home). They have been awarded guardianship of Geo. The families work well together - both parents have remarried. His dad has a clotting disorder, requiring him to take Warfarin daily.     History was obtained from Geo and his parents.       Allergies   Allergen Reactions     Blood Transfusion Related (Informational Only) Swelling     Periorbital swelling post platelet transfusion     No Known Drug Allergies        Current Outpatient Medications   Medication     calcium carbonate-vitamin D 600-400 MG-UNIT CHEW     dexamethasone (DECADRON) 0.5 MG tablet     fexofenadine  (ALLEGRA) 180 MG tablet     linaclotide (LINZESS) 290 MCG capsule     OLANZapine (ZYPREXA) 15 MG tablet     omeprazole (PRILOSEC) 20 MG DR capsule     potassium phosphate, monobasic, (K-PHOS) 500 MG tablet     senna-docusate (SENOKOT-S/PERICOLACE) 8.6-50 MG tablet     sertraline (ZOLOFT) 50 MG tablet     study - entinostat (IDS# 5050) 1 mg tablet     study - entinostat (IDS# 5050) 5 mg tablet     sulfamethoxazole-trimethoprim (BACTRIM/SEPTRA) 400-80 MG per tablet     traZODone (DESYREL) 100 MG tablet     vitamin D3 (CHOLECALCIFEROL) 2000 units (50 mcg) tablet     No current facility-administered medications for this visit.        Past Medical History:   Diagnosis Date     Cranial nerve dysfunction      Dyspepsia      Ependymoma (H)      Gastro-oesophageal reflux disease      Hearing loss      Intracranial hemorrhage (H)      Migraine      Pilonidal cyst     7-2015     Reduced vision      Refractory obstruction of nasal airway     2nd to nasal valve prolapse     Sleep apnea      Strabismus     gaze palsy        Past Surgical History:   Procedure Laterality Date     GRAFT CARTILAGE FROM POSTERIOR AURICLE Left 10/6/2016    Procedure: GRAFT CARTILAGE FROM POSTERIOR AURICLE;  Surgeon: Tyler Richards MD;  Location: [facility]     INCISION AND DRAINAGE PERINEAL, COMBINED Bilateral 7/18/2015    Procedure: COMBINED INCISION AND DRAINAGE PERINEAL;  Surgeon: Dequan Timmons MD;  Location: [facility] OR     OPTICAL TRACKING SYSTEM CRANIOTOMY, EXCISE TUMOR, COMBINED N/A 4/13/2015    Procedure: COMBINED OPTICAL TRACKING SYSTEM CRANIOTOMY, EXCISE TUMOR;  Surgeon: Francis Velazquez MD;  Location: [facility]     OPTICAL TRACKING SYSTEM CRANIOTOMY, EXCISE TUMOR, COMBINED N/A 4/16/2015    Procedure: COMBINED OPTICAL TRACKING SYSTEM CRANIOTOMY, EXCISE TUMOR;  Surgeon: Francis Velazquez MD;  [facility]     OPTICAL TRACKING SYSTEM CRANIOTOMY, EXCISE TUMOR, COMBINED Bilateral 5/28/2015    Procedure: COMBINED OPTICAL  TRACKING SYSTEM CRANIOTOMY, EXCISE TUMOR;  Surgeon: Francis Velazquez MD;  [facility]     OPTICAL TRACKING SYSTEM CRANIOTOMY, EXCISE TUMOR, COMBINED Bilateral 1/14/2016    Procedure: COMBINED OPTICAL TRACKING SYSTEM CRANIOTOMY, EXCISE TUMOR;  Surgeon: Francis Velazquez MD;  [facility]     OPTICAL TRACKING SYSTEM VENTRICULOSTOMY  4/16/2015    Procedure: OPTICAL TRACKING SYSTEM VENTRICULOSTOMY;  Surgeon: Francis Velazquez MD;  [facility]     REMOVE PORT VASCULAR ACCESS N/A 10/6/2016    Procedure: REMOVE PORT VASCULAR ACCESS;  Surgeon: Bruno Perea MD;  [facility]     RHINOPLASTY N/A 10/6/2016    Procedure: RHINOPLASTY;  Surgeon: Tyler Richards MD;  [facility]     VASCULAR SURGERY  5-2015    single lumen power port       Family History   Problem Relation Age of Onset     Circulatory Father         PE/DVT     Hypothyroidism Father 30     Diabetes Maternal Grandmother      Diabetes Paternal Grandmother      Diabetes Paternal Grandfather      C.A.D. Paternal Grandfather      Hypertension Maternal Grandfather      Thyroid Disease Paternal Aunt         unknown whether hypo or hyper     Mental Illness No family hx of        Review of Systems   Constitutional: Negative.         In a wheelchair   HENT: Positive for hearing loss (Pre-existing from prior therapy).    Eyes: Positive for visual disturbance (Wears a patch over right eye to minimize diplopia).   Respiratory: Negative.    Cardiovascular: Negative.    Gastrointestinal: Positive for constipation (Chronic).   Endocrine:        Follow with Dr. Martin   Neurological: Positive for facial asymmetry and speech difficulty.   Psychiatric/Behavioral: Positive for sleep disturbance (Not using his Bi-Pap).   All other systems reviewed and are negative.    Vital signs:  /68 (BP Location: Right arm, Patient Position: Fowlers, Cuff Size: Adult Regular)   Pulse 88   Temp 97.3  F (36.3  C) (Axillary)   Resp 24   Ht  "1.783 m (5' 10.2\")   Wt 88.8 kg (195 lb 12.3 oz)   SpO2 100%   BMI 27.93 kg/m        Physical Exam   Constitutional: He is oriented to person, place, and time. He appears well-developed and well-nourished. No distress.   HENT:   Occassionally saliva accumulates in mouth with occasional drooling.   Eyes: Pupils are equal, round, and reactive to light. Conjunctivae are normal.   Can track to right, but not to left. Nystagmus noted    Cardiovascular: Normal rate, regular rhythm and normal heart sounds.   Pulmonary/Chest: Effort normal and breath sounds normal. He has no wheezes.   Neurological: He is alert and oriented to person, place, and time. He has normal strength. A cranial nerve deficit is present. Coordination (Ataxic- dysmetria especially in upper extremities when accomplishing tasks.) abnormal. GCS eye subscore is 4. GCS verbal subscore is 5. GCS motor subscore is 6.   Negative Pronator drift   Skin: Skin is warm and dry.   Scattered striae.  Two inch laceration on right lower leg (see pic).   Psychiatric: He has a normal mood and affect.         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 There is again seen hemosiderin deposition within the ependymoma and its adjacent cystic structure. The faint T1 hyperintensity is coming slightly less conspicuous. Perilesional T2 hyperintensity with extension into the bilateral middle cerebellar peduncles and posterior david, unchanged. No new suspicious enhancing mass.     No new mass effect, midline shift, or intracranial hemorrhage. The ventricles are proportionate to the cerebral sulci. Normal major vascular intracranial flow-voids.     No abnormality of the skull marrow signal. The visualized portions of paranasal sinuses, and mastoid air cells are relatively clear. " The  orbits are grossly unremarkable.                                                                   Impression:  Ependymoma arising from the fourth ventricle is not convincingly changed compared to the 2 most recent examinations 4/9/2019 and  1/16/2019. No new suspicious intracranial lesion or evidence of obstructive hydrocephalus.       Results for orders placed or performed during the hospital encounter of 07/10/19   MRI Thoracic spine w & w/o contrast    Narrative    Complete spine MR without and with contrast    History: Ependymoma, on study drug Entinostat; Ependymoma (H).   ICD-10: Ependymoma (H)  Comparison:  MR full spine 4/9/2019     Contrast Dose:8.8ml iv gadavist    Technique: Sagittal T1 and T2-weighted images of the entire spine, and  axial T1 and T2-weighted images of the lumbar spine were obtained  without intravenous contrast. Post intravenous contrast using  gadolinium sagittal T1-weighted images were obtained of the whole  spine with fat-saturation , with axial postcontrast T1-weighted images  at selected levels.    Findings:   Cervical spine:  The cervical vertebrae mildly exacerbated cervical  lordosis, grossly unchanged.  There is no significant disc space  narrowing at any level.  There is no definite abnormal signal within  the cervical spinal cord at any level. No significant neuroforaminal  or spinal canal narrowing at the cervical spine. Contrast-enhanced  images demonstrate no definite abnormal enhancement in the visualized  paraspinous tissues or in the spinal canal or vertebra. Partial  visualization of fourth ventricular residual ependymoma, please refer  to same day MRI for further detail.    Thoracic spine:  The tip of the conus medullaris is at approximately  L1. There is no definite abnormal signal in the thoracic spinal cord  at any level. Alignment of the thoracic vertebra appears within normal  limits.  Mild endplate degenerative changes from T6 to T10. Grossly  unchanged  superior endplate compression deformity of T4.  Contrast-enhanced images demonstrate no definite abnormal enhancement  in the visualized paraspinous tissues or in the spinal canal or  vertebra.    Lumbar spine:  There are 5 lumbar-type vertebrae assumed for the  purposes of this dictation.  The tip of the conus medullaris is at  approximately superior L1.  The lumbar vertebral column appears  normally aligned.  There is no significant disc height narrowing at  any level. Mild endplate degenerative changes of L2-L4. No significant  neural foramen or spinal canal narrowing. Contrast-enhanced images  demonstrate no definite abnormal enhancement in the visualized lumbar  paraspinous tissues or in the spinal canal or vertebra.      Impression    Impression:    1. No evidence of metastatic disease in the cervical, thoracic, or  lumbar spine. Mild degenerative changes described as above.  2. Please refer to the same day brain MRI for brain findings.    I have personally reviewed the examination and initial interpretation  and I agree with the findings.    STEPHY SCHMIDT MD     *Note: Due to a large number of results and/or encounters for the requested time period, some results have not been displayed. A complete set of results can be found in Results Review.     Results for orders placed or performed in visit on 07/10/19   CBC with platelets differential   Result Value Ref Range    WBC 6.3 4.0 - 11.0 10e9/L    RBC Count 4.32 (L) 4.4 - 5.9 10e12/L    Hemoglobin 12.9 (L) 13.3 - 17.7 g/dL    Hematocrit 39.7 (L) 40.0 - 53.0 %    MCV 92 78 - 100 fl    MCH 29.9 26.5 - 33.0 pg    MCHC 32.5 31.5 - 36.5 g/dL    RDW 12.9 10.0 - 15.0 %    Platelet Count 107 (L) 150 - 450 10e9/L    Diff Method Automated Method     % Neutrophils 69.1 %    % Lymphocytes 13.8 %    % Monocytes 11.8 %    % Eosinophils 4.5 %    % Basophils 0.5 %    % Immature Granulocytes 0.3 %    Nucleated RBCs 0 0 /100    Absolute Neutrophil 4.4 1.6 - 8.3 10e9/L     Absolute Lymphocytes 0.9 0.8 - 5.3 10e9/L    Absolute Monocytes 0.7 0.0 - 1.3 10e9/L    Absolute Eosinophils 0.3 0.0 - 0.7 10e9/L    Absolute Basophils 0.0 0.0 - 0.2 10e9/L    Abs Immature Granulocytes 0.0 0 - 0.4 10e9/L    Absolute Nucleated RBC 0.0      *Note: Due to a large number of results and/or encounters for the requested time period, some results have not been displayed. A complete set of results can be found in Results Review.     Component      Latest Ref Rng & Units 7/9/2019   Sodium      133 - 144 mmol/L 143   Potassium      3.4 - 5.3 mmol/L 3.9   Chloride      98 - 110 mmol/L 105   Carbon Dioxide      20 - 32 mmol/L 29   Anion Gap      3 - 14 mmol/L 9   Glucose      70 - 99 mg/dL 171 (H)   Urea Nitrogen      7 - 30 mg/dL 15   Creatinine      0.50 - 1.00 mg/dL 1.14 (H)   GFR Estimate      >60 mL/min/1.73:m2 >90   GFR Estimate If Black      >60 mL/min/1.73:m2 >90   Calcium      8.5 - 10.1 mg/dL 8.5   Bilirubin Total      0.2 - 1.3 mg/dL 0.3   Albumin      3.4 - 5.0 g/dL 3.2 (L)   Protein Total      6.8 - 8.8 g/dL 6.1 (L)   Alkaline Phosphatase      65 - 260 U/L 122   ALT      0 - 50 U/L 15   AST      0 - 35 U/L 19         Impression:  1. 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 Treated with Entinostat, continues to tolerate well.   3. Labs today are adequate for beginning next course of Entinostat.   4. Chronic constipation which is frustrating for him.  5. Leg laceration   6. 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  No evidence of metastatic spine diease.     Plan:  1. RTC in 4 weeks  as scheduled for follow up, or sooner PRN.   2. Continue weekly labs.  3. Continue Pelvic floor PT and pool therapy.    4. Continue weekly labs.  5. Start Entinostat 6mg weekly beginning today.    6. Will look into integrative therapies that may help constipation.  7.  Reviewed with family that laceration that is difficult to  stop bleeding or is as deep as this one would have benefited form stitches.  It is healing now so recommended continued good cleaning following by antibiotic ointment and covering the wound and keeping ointment on it as it heals to minimize scar.    8.  Discussed possible colonoscopy with Dr. Wei and she suggest EGD and colonoscopy which will be scheduled prior to her follow up with them.      Addendum:  Information provided to the family about acupoint therapy and its use in constipation.  If Geo wishes to pursue that - we can arrange a Tuesday appointment.  They are unable to get more pool therapy appointments at current facility until December so [facility] to [facility].           ALAN Justin CNP

## 2019-07-10 NOTE — NURSING NOTE
"Chief Complaint   Patient presents with     RECHECK     Patient is here today for Ependymoma follow up     /68 (BP Location: Right arm, Patient Position: Fowlers, Cuff Size: Adult Regular)   Pulse 88   Temp 97.3  F (36.3  C) (Axillary)   Resp 24   Ht 1.783 m (5' 10.2\")   Wt 88.8 kg (195 lb 12.3 oz)   SpO2 100%   BMI 27.93 kg/m      Malinda Russo LPN  July 10, 2019  "

## 2019-07-11 ENCOUNTER — HOSPITAL ENCOUNTER (OUTPATIENT)
Dept: PHYSICAL THERAPY | Facility: CLINIC | Age: 19
Setting detail: THERAPIES SERIES
End: 2019-07-11
Attending: FAMILY MEDICINE
Payer: COMMERCIAL

## 2019-07-11 PROCEDURE — 97113 AQUATIC THERAPY/EXERCISES: CPT | Mod: GP | Performed by: PHYSICAL THERAPIST

## 2019-07-11 NOTE — PROGRESS NOTES
AQUATIC PHYSICAL THERAPY EXERCISE LOG (TRUNK)    *Jogger belt and gait belt donned throughout session    Date  6/13/19  Mary Beth Malin, DPT 6/20/19  Ita Araiza, DPT 6/27/19  Ita Aritaander, DPT 7/11/19  Ita Aritaander, DPT   Warm Up Ambulation (Forward/Side/Back/March/All) Wheelchair used to enter/exit pool due to instability and ataxic gait. Fwd ambulation in the water with 1 UE support on wall and 1 UE support on therapist's arm, min-mod assist for forward gait, 20'x3, attempted fwd gait with no right ear and B UE support on therapist arms but unable to due ataxia and instability. Sidestepping with  BUE support on wall and therapist min assist for balance 20' ea direction. Marches in place with B UE support on wall and therapist min-mod assist for balance, 15x ea side. Maximal cues for upright posture and naming things he sees throughout - improved duration of upright posture with environmental obersvation W/c for exit/entry into pool due to  instability and ataxic gait. pT cued for improved posture with forward ambulation with manual cues, 1 UE on wall and 1 UE on therapist. X 4 laps, side stepping x 20 feet as well, cues for postural control.  W/C exit and entry into pool. Pt cued for side steps to enter chest deep water once in therapy area. Pt cued for forward ambulation with 1 UE support from therapist, wall support and min/mod A at gait belt. X 8 laps, cues for posture, cervical extension, close support of wall, slow speed for improved control. Improved with cues for looking at pictures on walls.  W/C exit and entry into pool. Pt cued for side steps to enter chest deep water once in therapy area. Pt cued for forward ambulation with 1 UE support from therapist, wall support and min/mod A at gait belt. X 8 laps, cues for posture, cervical extension, close support of wall, slow speed for improved control. Improved with cues for looking at pictures on walls.                    Stretching/ROM Chin Tucks        CROM  (Flex/Ext/SB/Rot/All)        Shoulder (Shrugs/Rolls)        Scapular (Retraction/Depression)        Upper Trunk (Levator/Scalene/Upper Trapezius)        Pec Stretch (Unilateral/Bilateral)        Posterior Shoulder        Gluteal        Hamstring (On Step/Cuff)        Calf (In Hole/At Wall)        Quad        Hip Flexor (Kneel)        Piriformis (Seated/Stand)        Trunk ROM (Flex/Ext/SB/Rot/All)        BAD RAGAZ                       Strengthening Abdominal Sets/ Pelvic Tilt        Shoulder (Flex/Ext, Abd/Add, IR/ER, Circles, Alternation flex/ext for core)  Without equipment with back against the wall to work on wt shifting and postural control, educated to shift slow x 10 flex/ext, abd/add, attempted with back against wall and paddles but too difficult for pt to retain his balance No equipment, back against wall for wt shift and postural control, flex/ext, ad/abd x 10 With back against wall and paddles closed cues for trunk extension with manual cues, verbal cues for cervical extension. X 10 each, intermittent balance support min A    Horizon (Abd/Add, Diagonals)   As above As above    Rowing Arms        UE PNF (D1/D2)        Abdominal Sets (Push/Pull, side to side, push down with board)        Squat With back to the wall and B UE support on therapist arms in 4' water, mini squats with cues for upright posture and keeping his chin out of the water, 15x B UE support on wall, cues for upright posture, in 3 feet of water. X 20 With UE support on wall manual cue for glute taps x 20 cues for cervical positioning With UE support, manual cues for full trunk extension and proper posture, needing A for balance at times x 20     Lunge Squat        Hip (Flex/Ext, Abd/Add, single leg bike, circles, figure 8, All)  In corner flex/ext, abd/add, circles x 10 cues for upright posture,  In corner with B support, flex/ext and abd/add x 10  In corner with B support x 10 each, manual assist for proper trunk control and extension     Heel/Toe Raises In open water with B UE support on therapist arms, 15x heel raises with cues for posture and posterior weight shift to prevent anterior LOB  X 10 with wall support X 10 with wall support    Step Ups (Forward, Lateral) Fwd step ups 10x ea side on medium step with B UE support on therapist arms mod-max assist for balance and postural control Forward step ups with small step with wall support x 20, taps with 1 UE support  Tap ups with 1 UE support and therapist HHA x 10 , step ups with B UE support on wall, cues for posture throughtout Step up with B UE support on wall, therapist assisting with control and manual cues for extension x 10    Noodle Push Downs with UE(B UE/1 UE) for core strengthening        Noodle Push Downs with LE   X 10 with assistance from therapist on noodle, colored noodle X 10 With therapist assist with noodle    Stir the Pot/Punches with Dumbells        Sit to Stand at Bench From wheelchair in 3' water, CGA-min assist from therapist with B UE support on wall, 15x       Prone Plank on step        Side Plank on step                       Aerobic Fast Walking        Bike/Ski/Hussein/All                       Balance Narrow Base of Support In open water with B UE support on therapist arms, attempted to limit assist unless LOB upon which he required mod-max assist to correct, 2 min Standing with therapist support attempting to limit support, needing mod A at times to decrease LOB. 2 mins 1 min with noodle support with therapist assisting at noodle  1 min with noodle support    Tandem Stand        Single Leg Stance        Heel/Toe Walking        3 Step and Stop        Braiding        Transfers Patient with good control of initial transfer with UE support but unable to maintain balance in standing independently without UE support. Patient impulsive with sitting in wheelchair too early at end of session, therapist assist to prevent missing wheelchair and falling. Reviewed transfer technique  and repeated transfer with max cues for turning fully away from chair and backing all the wall up before sitting, significantly improved safety on second repetition                      Cool Down Ambulation        [place] Dangle        [place]

## 2019-07-15 ENCOUNTER — OFFICE VISIT (OUTPATIENT)
Dept: PSYCHIATRY | Facility: CLINIC | Age: 19
End: 2019-07-15
Attending: PSYCHIATRY & NEUROLOGY
Payer: COMMERCIAL

## 2019-07-15 ENCOUNTER — MYC MEDICAL ADVICE (OUTPATIENT)
Dept: PSYCHIATRY | Facility: CLINIC | Age: 19
End: 2019-07-15

## 2019-07-15 VITALS — HEART RATE: 91 BPM | DIASTOLIC BLOOD PRESSURE: 66 MMHG | SYSTOLIC BLOOD PRESSURE: 110 MMHG

## 2019-07-15 DIAGNOSIS — F32.A DEPRESSION, UNSPECIFIED DEPRESSION TYPE: ICD-10-CM

## 2019-07-15 PROCEDURE — G0463 HOSPITAL OUTPT CLINIC VISIT: HCPCS | Mod: ZF

## 2019-07-15 ASSESSMENT — PAIN SCALES - GENERAL: PAINLEVEL: NO PAIN (0)

## 2019-07-16 DIAGNOSIS — C71.9 EPENDYMOMA (H): ICD-10-CM

## 2019-07-16 DIAGNOSIS — C71.9 EPENDYMOMA (H): Primary | ICD-10-CM

## 2019-07-16 LAB
BASOPHILS # BLD AUTO: 0.1 10E9/L (ref 0–0.2)
BASOPHILS NFR BLD AUTO: 2 %
DIFFERENTIAL METHOD BLD: ABNORMAL
EOSINOPHIL # BLD AUTO: 0.2 10E9/L (ref 0–0.7)
EOSINOPHIL NFR BLD AUTO: 5 %
ERYTHROCYTE [DISTWIDTH] IN BLOOD BY AUTOMATED COUNT: 13 % (ref 10–15)
HCT VFR BLD AUTO: 36.7 % (ref 40–53)
HGB BLD-MCNC: 12.2 G/DL (ref 13.3–17.7)
LYMPHOCYTES # BLD AUTO: 1.1 10E9/L (ref 0.8–5.3)
LYMPHOCYTES NFR BLD AUTO: 31 %
MCH RBC QN AUTO: 30.3 PG (ref 26.5–33)
MCHC RBC AUTO-ENTMCNC: 33.2 G/DL (ref 31.5–36.5)
MCV RBC AUTO: 91 FL (ref 78–100)
MONOCYTES # BLD AUTO: 0.6 10E9/L (ref 0–1.3)
MONOCYTES NFR BLD AUTO: 18 %
NEUTROPHILS # BLD AUTO: 1.5 10E9/L (ref 1.6–8.3)
NEUTROPHILS NFR BLD AUTO: 44 %
PLATELET # BLD AUTO: 103 10E9/L (ref 150–450)
RBC # BLD AUTO: 4.03 10E12/L (ref 4.4–5.9)
WBC # BLD AUTO: 3.5 10E9/L (ref 4–11)

## 2019-07-16 PROCEDURE — 85025 COMPLETE CBC W/AUTO DIFF WBC: CPT | Performed by: NURSE PRACTITIONER

## 2019-07-16 PROCEDURE — 36415 COLL VENOUS BLD VENIPUNCTURE: CPT | Performed by: NURSE PRACTITIONER

## 2019-07-16 PROCEDURE — 84100 ASSAY OF PHOSPHORUS: CPT | Performed by: NURSE PRACTITIONER

## 2019-07-16 PROCEDURE — 83735 ASSAY OF MAGNESIUM: CPT | Performed by: NURSE PRACTITIONER

## 2019-07-16 PROCEDURE — 80053 COMPREHEN METABOLIC PANEL: CPT | Performed by: NURSE PRACTITIONER

## 2019-07-16 RX ORDER — SERTRALINE HYDROCHLORIDE 100 MG/1
100 TABLET, FILM COATED ORAL DAILY
Qty: 30 TABLET | Refills: 1 | Status: SHIPPED | OUTPATIENT
Start: 2019-07-16 | End: 2019-09-09

## 2019-07-16 NOTE — TELEPHONE ENCOUNTER
Justice Fay MD  You 2 hours ago (10:03 AM)      Donnell Macdonald,     Yeah, we were going to increase it - now that I've heard from his dad I'll send a script.     Thanks for your help,   Justice kumar      Patient notified

## 2019-07-16 NOTE — PROGRESS NOTES
"  [facility] PSYCHIATRY CLINIC PROGRESS NOTE     CARE TEAM:  PCP- Jeffrey Espinoza    Specialty Providers- Oncology, Neuropsychology, Physical Therapy, Occupational Therapy    Therapist- Manju Pink at [facility] in [facility] Team- no.    Geo Hicks is a 18 year old male who prefers the name Geo & pronouns he, him, his, himself.    Date of initial diagnostic assessment is 2/7/2019.    Pertinent Background:  This patient first experienced mental health issues in the past few months and has received treatment for paranoia/delusions.  Notably, this patient is undergoing cancer treatment at the [facility] and has been referred for psychiatric assessment by his neuropsychologist who most recently met with him on 1/3/2019.  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 Since then, he has undergone multiple tumor resections, chemotherapy, and radiation treatment. Geo also experienced an intra-tumoral hemorrhage in May 2015 that resulted in neurological changes including facial numbness, significant loss of mobility and dysarthria. In December 2016, a follow-up MRI revealed continued tumor enhancement, however the most recent MRI from 10/16/18 revealed an unchanged fourth ventricle ependymoma in comparison to previous exams, a slight decrease in adjacent edema, as well as a stable size of the ventricular system.  He is currently on COG study [id number] with Entinostat.  Of note, patient had a brief admission to inpatient psychiatry on [facility] from 2/14-2/15/2019 \"for evaluation of delusions and suicidal comments.\"    Psych critical item history includes suicidal ideation, psychosis [sxs include delusions] and psychiatric hospitalization x1.    INTERIM HISTORY                                                                                                                 4, 4   The patient reports good treatment adherence.  History was provided by the patient " "and his dad who were good historians.  The last visit ended with the following med change: Increased trazodone to 100 mg at bedtime.  Since the last visit:   -Reports the increase in trazodone has notably helped with sleep.  Cannot state the exact increase in hours slept, but reports easier initiation.  -States that interim mood has been \"good.\"  Denies SI/SIB thoughts, violent/aggressive ideation, markedly depressed or anxious mood, inappropriately elevated moods, AVH or disorganization, or other hallmarks of psychiatric acuity with dangerousness.  -Talked at length today about some of his delusional beliefs regarding medical providers withholding definitive care for his constipation.  Wanted to give him the opportunity to provide reportage of the observations he's made in the past, specifically related to x-ray imaging, which in his mind has supported his belief.  Geo relates that he feels angry about this, and anxious about it as well, but that this is the only thing that inspires these emotions.  Denies feeling angry about other topics.  -Talked about [place] and he stated that he feels excited and happy about this.  Also talked about video games, and he discussed some of his favorite games a bit, relating that playing them does make him happy.  -Endorsed his perception of the efficacy and tolerability of his current regimen.  Agreed to trial a higher dose of Zoloft for mood support.    RECENT SYMPTOMS:   DEPRESSION:  reports-some low moods and feeling trapped;  DENIES- suicidal ideation, self-destructive thoughts, anhedonia and poor concentration /memory  ELIZABETH/HYPOMANIA:  reports-none;  DENIES- increased energy, decreased sleep need, increased activity and grandiosity  PSYCHOSIS:  reports-delusions- paranoid [details in Interim History];  DENIES- auditory hallucinations and visual hallucinations  DYSREGULATION:  reports-can become irritable/agitated if beliefs are challenged; " " DENIES- suicidal ideation, violent ideation, SIB, mood dysregulation, impulsive and aggressive  PANIC ATTACK:  none   ANXIETY:  worry  TRAUMA RELATED:  none  COMPULSIVE:  none  SLEEP:  initiation and maintenance both improved with recent trazodone dose increase  EATING DISORDER: no    RECENT SUBSTANCE USE:  ALCOHOL- no  TOBACCO- no  CAFFEINE- minimal  OPIOIDS- no         NARCAN KIT- N/A  CANNABIS- no  OTHER ILLICIT DRUGS- no      CURRENT SOCIAL HISTORY:  FINANCIAL SUPPORT- family  CHILDREN- no  LIVING SITUATION- mother and father (who are  and have both remarried) alternating weeks between respective homes  SOCIAL/ SPIRITUAL SUPPORT- mother, father, older brother, friend Rima  FEELS SAFE AT HOME- yes    MEDICAL ROS (2,10):  A comprehensive review of systems was performed and is negative other than noted in the HPI.    PSYCH and CD Critical Summary Points since July 2018 February 2019:  Clinic intake on 2/7.  Later was admitted [facility] for a brief inpatient psych admission from 3/14-3/15, most of which was spent in the ER, due to suicidal comments that concerned family.  Was ultimately deemed safe for outpatient follow-up and was discharged with an increase in olanzapine to 15 mg.  March 2019:  Began sertraline 50 mg daily.  May 2019:  Began trazodone 25-50 mg at bedtime.  June 2019:  Increased trazodone to 100 mg at bedtime.    PAST PSYCH MED TRIALS   see EMR Problem List: Hx of psychiatric care    MEDICAL / SURGICAL HISTORY                                   Neurologic Hx- See pertinent background, re: history of ependymoma and related chemo, radiation and tumor resection(s).  Notably has experienced neurological damage due to the above which has resulted in facial numbness, significant loss of mobility and dysarthria.  Has had neuropsychiatric evaluation(s) 2/2016, 2/2017 and 1/2019.  The following is from the \"Results and Impressions\" section of his 1/3/2019 eval with Veronica " "Randy:       \"We were pleased to discover that the neurocognitive areas measured during this evaluation have remained intact and consistent with previous evaluations. Geo s ability to access and apply acquired word knowledge (verbal comprehension) and his working memory (ability to mentally hold and manipulate information) were in the average range. His ability to understand visual information to solve novel abstract visual problems (perceptual reasoning) was in the low average range. There was a slight decrease in performance on one task associated with perceptual reasoning due to time limits. Had no time limits been enforced, Geo s performance would have likely been higher as he was observed to respond with correct answers that were past the time limit allowed. Furthermore, on a timed visual discrimination and scanning task, Geo performed in the borderline range, which was a slight improvement from his previous evaluation in 2017 where he performed in the impaired range.\"    Patient Active Problem List   Diagnosis     GERD (gastroesophageal reflux disease)     Closed fracture at the growth plate of right distal fibula      Elevated serum creatinine     Dyspepsia     Intracranial hemorrhage (H)     Hemorrhagic stroke (H)     Ependymoma (H)     Admission for antineoplastic chemotherapy     Post-operative state     S/P craniotomy     S/P biopsy     Noncomitant strabismus     Abducens neuropathy of both eyes     CANDELARIO (obstructive sleep apnea)     Health Care Home     Hypernatremia     Body temperature low     Current chronic use of systemic steroids     Elevated TSH     Status post chemotherapy     Neoplasm of posterior cranial fossa (H)     Chronic constipation     Serum albumin decreased     Closed compression fracture of thoracic vertebra with routine healing, subsequent encounter     Depression     Past Surgical History:   Procedure Laterality Date     GRAFT CARTILAGE FROM POSTERIOR AURICLE Left " 10/6/2016    Procedure: GRAFT CARTILAGE FROM POSTERIOR AURICLE;  Surgeon: Tyler Richards MD;  Location: [facility]     INCISION AND DRAINAGE PERINEAL, COMBINED Bilateral 7/18/2015    Procedure: COMBINED INCISION AND DRAINAGE PERINEAL;  Surgeon: Dequan Timmons MD;  [facility]: [facility]     OPTICAL TRACKING SYSTEM CRANIOTOMY, EXCISE TUMOR, COMBINED N/A 4/13/2015    Procedure: COMBINED OPTICAL TRACKING SYSTEM CRANIOTOMY, EXCISE TUMOR;  Surgeon: Francis Velazquez MD;  [facility]     OPTICAL TRACKING SYSTEM CRANIOTOMY, EXCISE TUMOR, COMBINED N/A 4/16/2015    Procedure: COMBINED OPTICAL TRACKING SYSTEM CRANIOTOMY, EXCISE TUMOR;  Surgeon: Francis Velazquez MD;  [facility]     OPTICAL TRACKING SYSTEM CRANIOTOMY, EXCISE TUMOR, COMBINED Bilateral 5/28/2015    Procedure: COMBINED OPTICAL TRACKING SYSTEM CRANIOTOMY, EXCISE TUMOR;  Surgeon: Francis Velazquez MD;  [facility]     OPTICAL TRACKING SYSTEM CRANIOTOMY, EXCISE TUMOR, COMBINED Bilateral 1/14/2016    Procedure: COMBINED OPTICAL TRACKING SYSTEM CRANIOTOMY, EXCISE TUMOR;  Surgeon: Francis Velazquez MD;  [facility]     OPTICAL TRACKING SYSTEM VENTRICULOSTOMY  4/16/2015    Procedure: OPTICAL TRACKING SYSTEM VENTRICULOSTOMY;  Surgeon: Francis Velazquez MD;  [facility]     REMOVE PORT VASCULAR ACCESS N/A 10/6/2016    Procedure: REMOVE PORT VASCULAR ACCESS;  Surgeon: Bruno Perea MD;  [facility]     RHINOPLASTY N/A 10/6/2016    Procedure: RHINOPLASTY;  Surgeon: Tyler Richards MD;  [facility]     VASCULAR SURGERY  5-2015    single lumen power port       ALLERGY                                Blood transfusion related (informational only) and No known drug allergies     MEDICATIONS                               Current Outpatient Medications   Medication Sig Dispense Refill     calcium carbonate-vitamin D 600-400 MG-UNIT CHEW Take 2 tablets in the morning and 1 tablet in the evening. 90 tablet  3     dexamethasone (DECADRON) 0.5 MG tablet Take 0.75 mg by mouth daily (with breakfast). 90 tablet 3     fexofenadine (ALLEGRA) 180 MG tablet Take 180 mg by mouth every evening        linaclotide (LINZESS) 290 MCG capsule Take 290 mcg by mouth every morning (before breakfast)        OLANZapine (ZYPREXA) 15 MG tablet Take 1 tablet (15 mg) by mouth At Bedtime 30 tablet 1     omeprazole (PRILOSEC) 20 MG DR capsule Take 2 capsules (40 mg) by mouth every morning 60 capsule 1     potassium phosphate, monobasic, (K-PHOS) 500 MG tablet Take 1 tablet (500 mg) by mouth 2 times daily 90 tablet 3     senna-docusate (SENOKOT-S/PERICOLACE) 8.6-50 MG tablet Take 2 tablets by mouth At Bedtime       sertraline (ZOLOFT) 50 MG tablet Take 1 tablet (50 mg) by mouth daily 30 tablet 1     study - entinostat (IDS# 5050) 1 mg tablet Take 1 tablet (1 mg) by mouth every 7 days for 4 doses Take one 1mg tablet with one 5mg tablet for total dose of 6mg weekly. Take on an empty stomach, at least 1 hour before or 2 hours after a meal.  Swallow tablet whole. 4 tablet 0     study - entinostat (IDS# 5050) 5 mg tablet Take 1 tablet (5 mg) by mouth every 7 days for 4 doses Take one 5mg tablet with one 1mg tablet for total dose of 6mg weekly. Take on an empty stomach, at least 1 hour before or 2 hours after a meal.  Swallow tablet whole. 4 tablet 0     sulfamethoxazole-trimethoprim (BACTRIM/SEPTRA) 400-80 MG per tablet Take 1 tablet by mouth 2 times daily On Saturdays and Sundays 24 tablet 11     traZODone (DESYREL) 100 MG tablet Take 1 tablet (100 mg) by mouth At Bedtime 30 tablet 1     vitamin D3 (CHOLECALCIFEROL) 2000 units (50 mcg) tablet Take 1 tablet by mouth daily (with breakfast)       VITALS                                                                                                                          3, 3   /66   Pulse 91      MENTAL STATUS EXAM                                                                                   "         9, 14 cog gs     Alertness: alert  and oriented  Appearance: casually groomed, seated in wheelchair, wearing R-sided eye-patch  Behavior/Demeanor: cooperative, with fair eye contact   Speech: prominent dysarthria  Language: good  Psychomotor: mild evident palsy of upper extremities and facial paralysis  Mood: overall \"good,\" but does allow feeling angry at times about his personal beliefs regarding his care [see Interim History]  Affect: restricted; was congruent to mood; was congruent to content  Thought Process/Associations: continued rumination [details in Interim History]  Thought Content:  Reports delusions [details in Interim History];  Denies suicidal ideation and violent ideation  Perception:  Reports none;  Denies auditory hallucinations and visual hallucinations  Insight: partial  Judgment: good  Cognition: (6) oriented: time, person, and place  attention span: intact  concentration: intact  recent memory: intact  remote memory: intact  fund of knowledge: appropriate  Gait and Station: remarkable for:  ataxia - can ambulate but was seen in wheelchair     LABS and DATA     AIMS:  complete next visit    PHQ9 TODAY = 6  PHQ-9 SCORE 4/29/2019   PHQ-9 Total Score 6       ANTIPSYCHOTIC LABS  [glu, A1C, lipids (rohit LDL), liver enzymes, WBC, ANEU, Hgb, plts]  q12 mo  Recent Labs   Lab Test 07/09/19  1317 07/02/19  1202 06/25/19  1315 06/18/19  1326  02/26/19  1100   * 88 85 105*   < > 124*   A1C  --   --   --   --   --  5.1    < > = values in this interval not displayed.     Recent Labs   Lab Test 02/26/19  1100   CHOL 158   TRIG 236*   LDL 84   HDL 27*     Recent Labs   Lab Test 07/09/19  1317 07/02/19  1202 06/25/19  1315 06/18/19  1326   AST 19 22 86* 18   ALT 15 20 29 18   ALKPHOS 122 122 157 130     Recent Labs   Lab Test 07/10/19  1422 07/09/19  1317 07/02/19  1202 06/25/19  1315   WBC 6.3 2.7* 6.2 4.9   ANEU 4.4 1.0* 3.6 2.7   HGB 12.9* 12.8* 12.4* 12.9*   * 100* 105* 110* " "    EK2019: 85 BPM, QT/QTcH was 346/389 msec.  Also included comment: \"Abnormal precordial QRS contours - nondiagnostic for this age.\"    EE/15/2019: \"IMPRESSION: Awake and drowsy routine EEG (no video) was normal.  The patient stated he felt dazed during photic stimulation, however, no electrographic seizures or concerning changes on the EEG were seen during that time.  Clinical correlation is advised.  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  He relates interval \"good\" mood.  Continues to feel angry about his delusional beliefs, but seemed to appreciate that this writer invited him to discuss the exact nature of his suspicions at length today.  Appeared to tolerate a lack of endorsement or validation of his beliefs, in favor of choosing to carefully only validate that these thoughts are hard for him.  Denies interim SI/SIB thoughts, violent/aggressive ideation, markedly depressed mood, inappropriately elevated mood, panic/anxious acuity, marked paranoia/psychotic features, or other hallmarks of psychiatric decompensation with dangerousness.  States that his recent trazodone increase has helped for sleep initiation, and endorses continued lack of morning grogginess or other side effects.  Touched based on the previously described myoclonic type jerking of his lower extremities, and he feels like this either has slightly improved, or he may just simply have become used to it.  We will plan to continue checking in " on this phenomenon in future visits.  Would particularly wish to be aware of any increase that might occur with future dose increases of medications.  On that note, have agreed to increase sertraline to 100 mg for further mood support.  No other changes today.    SUICIDE RISK ASSESSMENT:  Geo Hicks denies present suicidal ideation.  This patient does have notable risk factors for self-harm including feels trapped, relationship conflicts, new/ worsening medical issue, male and paranoia/delusions. However, risk is mitigated by no h/o suicide attempt, no plan or intent, no h/o risky impulsive behavior, describes a safety plan, h/o seeking help when needed, none to minimal alcohol use , commitment to family, good social support and stable housing.  Based on all available evidence he does not appear to be at imminent risk for self-harm therefore does not meet criteria for a 72-hr hold/  involuntary hospitalization.  However, based on degree of symptoms therapy, close psych FU and initiation of new medications was recommended which the pt did agree to.    [place] PRESCRIPTION MONITORING PROGRAM [] was not checked today:  not using controlled substances    PSYCHOTROPIC DRUG INTERACTIONS:    Pentoxifylline may enhance the antiplatelet effect of Agents with Antiplatelet Properties.  [Pentoxifylline, Sertraline]     CNS Depressants may enhance the adverse/toxic effect of Selective Serotonin Reuptake Inhibitors. Specifically, the risk of psychomotor impairment may be enhanced.  [Olanzapine, Sertraline]    MANAGEMENT:  Monitoring for adverse effects, routine vitals, routine labs, periodic EKGs and patient/family aware of risks     PLAN                                                                                                                                m2, h3     1) PSYCHOTROPIC MEDICATIONS:  Increase sertraline to 100 mg daily.  Continue trazodone 100 mg at bedtime.  Continue olanzapine 15 mg at bedtime.    2)  THERAPY:  Continue with Manju.    3) NEXT DUE:    Labs- AP labs due Feb 2020  EKG- PRN  Rating Scales- AIMS due    4) REFERRALS:  No Referrals needed    5) RTC: 4 weeks    6) CRISIS NUMBERS:   Provided routinely in [facility].  [facility] [phone] (clinic)    [phone] (after hours)  CRISIS TEXT LINE: Text [id number] from anywhere in [place], anytime, any crisis 24/7;  OR SEE www.crisistextline.org    TREATMENT RISK STATEMENT:  The risks, benefits, alternatives and potential adverse effects have been discussed and are understood by the pt. The pt understands the risks of using street drugs or alcohol. There are no medical contraindications, the pt agrees to treatment with the ability to do so. The pt knows to call the clinic for any problems or to access emergency care if needed.  Medical and substance use concerns are documented above.  Psychotropic drug interaction check was done, including changes made today.     PSYCHIATRY CLINIC INDIVIDUAL PSYCHOTHERAPY NOTE                                                [16]   Start time- N/A        End time- N/A  Date last reviewed - 03/08/19       Date next due - 6/6/19 (or 12 months if Medicare)    Subjective: This supportive psychotherapy session addressed issues related to goals of therapy, patient's history, current stressors, life stressors, relationship, family of origin, school and health.  Patient's reaction: Contemplation in the context of mental status appropriate for ambulatory setting.  Progress: fair  Plan: RTC 4-6 weeks  Psychotherapy services during this visit included  myself and the patient.   TREATMENT  PLAN          SYMPTOMS;PROBLEMS   MEASURABLE GOALS;    FUNCTIONAL IMPROVEMENT INTERVENTIONS;    GAINS MADE DISCHARGE CRITERIA   Depression: depressed mood, anhedonia and feeling hopelesss   reduce depressive symptoms, find enjoyment at least once a day and report feeling more positive about self  Supportive and cognitive psychotherapeutic interventions marked  symptom improvement   Psychosis: delusions   reduce frequency/ intensity of false beliefs and take medications as prescribed on a daily basis Supportive and cognitive psychotherapeutic interventions marked symptom improvement     PROVIDER:  Justice Fay, DO    Patient staffed in clinic with Dr. Tripathi who will sign the note.  Supervisor is Dr. Emery.    Supervisor Attestation:  I met with Geo Hicks, his father and the resident physician, Justice Fay MD. I participated in key portions of the service, including the mental status examination and developing the plan of care. I reviewed key portions of the history with the resident. I agree with the findings and plan as documented in this note.  Jorge Luis Tripathi MD

## 2019-07-17 LAB
ALBUMIN SERPL-MCNC: 3 G/DL (ref 3.4–5)
ALP SERPL-CCNC: 134 U/L (ref 65–260)
ALT SERPL W P-5'-P-CCNC: 14 U/L (ref 0–50)
ANION GAP SERPL CALCULATED.3IONS-SCNC: 3 MMOL/L (ref 3–14)
AST SERPL W P-5'-P-CCNC: 15 U/L (ref 0–35)
BILIRUB SERPL-MCNC: 0.2 MG/DL (ref 0.2–1.3)
BUN SERPL-MCNC: 20 MG/DL (ref 7–30)
CALCIUM SERPL-MCNC: 8.2 MG/DL (ref 8.5–10.1)
CHLORIDE SERPL-SCNC: 106 MMOL/L (ref 98–110)
CO2 SERPL-SCNC: 30 MMOL/L (ref 20–32)
CREAT SERPL-MCNC: 1.07 MG/DL (ref 0.5–1)
GFR SERPL CREATININE-BSD FRML MDRD: >90 ML/MIN/{1.73_M2}
GLUCOSE SERPL-MCNC: 90 MG/DL (ref 70–99)
MAGNESIUM SERPL-MCNC: 2.1 MG/DL (ref 1.6–2.3)
PHOSPHATE SERPL-MCNC: 3.7 MG/DL (ref 2.5–4.5)
POTASSIUM SERPL-SCNC: 4.4 MMOL/L (ref 3.4–5.3)
PROT SERPL-MCNC: 6.2 G/DL (ref 6.8–8.8)
SODIUM SERPL-SCNC: 139 MMOL/L (ref 133–144)

## 2019-07-18 ENCOUNTER — HOSPITAL ENCOUNTER (OUTPATIENT)
Dept: PHYSICAL THERAPY | Facility: CLINIC | Age: 19
Setting detail: THERAPIES SERIES
End: 2019-07-18
Attending: FAMILY MEDICINE
Payer: COMMERCIAL

## 2019-07-18 PROCEDURE — 97113 AQUATIC THERAPY/EXERCISES: CPT | Mod: GP | Performed by: PHYSICAL THERAPIST

## 2019-07-19 DIAGNOSIS — K59.01 SLOW TRANSIT CONSTIPATION: Primary | ICD-10-CM

## 2019-07-19 DIAGNOSIS — R10.84 ABDOMINAL PAIN, GENERALIZED: ICD-10-CM

## 2019-07-23 DIAGNOSIS — C71.9 EPENDYMOMA (H): ICD-10-CM

## 2019-07-23 LAB
BASOPHILS # BLD AUTO: 0 10E9/L (ref 0–0.2)
BASOPHILS NFR BLD AUTO: 0.2 %
DIFFERENTIAL METHOD BLD: ABNORMAL
EOSINOPHIL # BLD AUTO: 0.2 10E9/L (ref 0–0.7)
EOSINOPHIL NFR BLD AUTO: 5.3 %
ERYTHROCYTE [DISTWIDTH] IN BLOOD BY AUTOMATED COUNT: 13.2 % (ref 10–15)
HCT VFR BLD AUTO: 38.1 % (ref 40–53)
HGB BLD-MCNC: 12.5 G/DL (ref 13.3–17.7)
LYMPHOCYTES # BLD AUTO: 1 10E9/L (ref 0.8–5.3)
LYMPHOCYTES NFR BLD AUTO: 21.7 %
MCH RBC QN AUTO: 29.9 PG (ref 26.5–33)
MCHC RBC AUTO-ENTMCNC: 32.8 G/DL (ref 31.5–36.5)
MCV RBC AUTO: 91 FL (ref 78–100)
MONOCYTES # BLD AUTO: 0.8 10E9/L (ref 0–1.3)
MONOCYTES NFR BLD AUTO: 16.8 %
NEUTROPHILS # BLD AUTO: 2.5 10E9/L (ref 1.6–8.3)
NEUTROPHILS NFR BLD AUTO: 56 %
PLATELET # BLD AUTO: 101 10E9/L (ref 150–450)
RBC # BLD AUTO: 4.18 10E12/L (ref 4.4–5.9)
WBC # BLD AUTO: 4.5 10E9/L (ref 4–11)

## 2019-07-23 PROCEDURE — 80053 COMPREHEN METABOLIC PANEL: CPT | Performed by: NURSE PRACTITIONER

## 2019-07-23 PROCEDURE — 84100 ASSAY OF PHOSPHORUS: CPT | Performed by: NURSE PRACTITIONER

## 2019-07-23 PROCEDURE — 83735 ASSAY OF MAGNESIUM: CPT | Performed by: NURSE PRACTITIONER

## 2019-07-23 PROCEDURE — 36415 COLL VENOUS BLD VENIPUNCTURE: CPT | Performed by: NURSE PRACTITIONER

## 2019-07-23 PROCEDURE — 85025 COMPLETE CBC W/AUTO DIFF WBC: CPT | Performed by: NURSE PRACTITIONER

## 2019-07-24 ENCOUNTER — HOSPITAL ENCOUNTER (OUTPATIENT)
Dept: OCCUPATIONAL THERAPY | Facility: CLINIC | Age: 19
Setting detail: THERAPIES SERIES
End: 2019-07-24
Attending: FAMILY MEDICINE
Payer: COMMERCIAL

## 2019-07-24 LAB
ALBUMIN SERPL-MCNC: 3.1 G/DL (ref 3.4–5)
ALP SERPL-CCNC: 133 U/L (ref 65–260)
ALT SERPL W P-5'-P-CCNC: 18 U/L (ref 0–50)
ANION GAP SERPL CALCULATED.3IONS-SCNC: 6 MMOL/L (ref 3–14)
AST SERPL W P-5'-P-CCNC: 19 U/L (ref 0–35)
BILIRUB SERPL-MCNC: 0.2 MG/DL (ref 0.2–1.3)
BUN SERPL-MCNC: 10 MG/DL (ref 7–30)
CALCIUM SERPL-MCNC: 8.3 MG/DL (ref 8.5–10.1)
CHLORIDE SERPL-SCNC: 107 MMOL/L (ref 98–110)
CO2 SERPL-SCNC: 29 MMOL/L (ref 20–32)
CREAT SERPL-MCNC: 1.1 MG/DL (ref 0.5–1)
GFR SERPL CREATININE-BSD FRML MDRD: >90 ML/MIN/{1.73_M2}
GLUCOSE SERPL-MCNC: 106 MG/DL (ref 70–99)
MAGNESIUM SERPL-MCNC: 2.1 MG/DL (ref 1.6–2.3)
PHOSPHATE SERPL-MCNC: 2.5 MG/DL (ref 2.5–4.5)
POTASSIUM SERPL-SCNC: 3.8 MMOL/L (ref 3.4–5.3)
PROT SERPL-MCNC: 6.3 G/DL (ref 6.8–8.8)
SODIUM SERPL-SCNC: 142 MMOL/L (ref 133–144)

## 2019-07-24 PROCEDURE — 97535 SELF CARE MNGMENT TRAINING: CPT | Mod: GO | Performed by: OCCUPATIONAL THERAPIST

## 2019-07-24 NOTE — PROGRESS NOTES
[facility]      OUTPATIENT OCCUPATIONAL THERAPY  EVALUATION  PLAN OF TREATMENT FOR OUTPATIENT REHABILITATION  (COMPLETE FOR INITIAL CLAIMS ONLY)  Patient's Last Name, First Name, M.I.  YOB: 2000  Geo Hicks                        [facility] Medical Record No.  [id number]                                Onset Date: 5/1/15    Start of Care Date: 6/16/15   Type:     ___PT   _X_OT   ___SLP Medical Diagnosis: ependymoma, suboccipital craniotomy            OT Diagnosis: Decreased ADL/IADL   # of Visits:  169         ___________________________________________________  Plan of Treatment/Functional Goals:      Frequency/Duration: 1x/week x 12 weeks        Goals:    Goal Identifier FMC   Goal Description Geo will demonstrate improved fine motor control (as measured by box and blocks test, improved from moving 20 blocks/min to 30 blocks /min) and nine hole peg test  in 1 minute as needed for self feeding, writing, managing food packaging and clothing fasteners.   Target Date 10/22/19   Date Met      Progress:     Goal Identifier Meal Preparation   Goal Description Geo will demonstrate ability to gather needed ingredients/tools from hi/low/deep reaches from wheelchair level, organize steps of task and use various kitchen tools safely to make 2-3 step dishes.   Target Date 10/22/19   Date Met      Progress:     Goal Identifier Written communication   Goal Description Geo will demonstrate improved legibility and smaller writing size to support signing his full name on birthday cards and checks, inside of a  1/2 x 4 inch area,  and writing 5-7 word sentences, 4 of 5 given opportunities.    Target Date 10/22/19   Date Met      Progress:     Goal Identifier GMC/Reaction Time   Goal Description Patient will demonstrate improved UE motor and visual reaction time for improved visual  skills, improved ability to prevent daily mishaps and safer independent home based mobility by demonstrating sustained reaction time of 2.0 seconds (from 3.20 sec Feb 2017) on Mode A of [employer].   Target Date 10/22/19   Date Met      Progress:     Goal Identifier Trunk Control    Goal Description Geo will complete 10 minutes of dynamic upper body activities while maintaining an 90% upright head/seated posture as needed to support feeding, writing and eye contact during meal time interactions.   Target Date 10/22/19   Date Met      Progress:     Progress Toward Goals:   Progress this reporting period: see attached progress note dated 7/24/19      Certification date from 7/24/19  To 10/22/19 .  Elyse Costello, OTR          I CERTIFY THE NEED FOR THESE SERVICES FURNISHED UNDER        THIS PLAN OF TREATMENT AND WHILE UNDER MY CARE     (Physician co-signature of this document indicates review and certification of the therapy plan).                Referring Provider: Dr. Jeffrey Espinoza

## 2019-07-24 NOTE — PROGRESS NOTES
Outpatient Occupational Therapy Progress Note     Patient: Geo Hicks  : 2000    Beginning/End Dates of Reporting Period:    to 2019 (was not on schedule during his certification due date of 19)    Referring Provider: Jeffrey Anderson Diagnosis: Decreased ADL/IADL    Client Self Report: Interested in the process of having a CADI waiver and is getting case management services via [employer].  Patient's father introduces OTR to his PCA, Ta.  So far he is helping with a.m. ADLs/grooming, meal st up, toileting and transitions between activities in the home.  Geo's morning routine consist of being set up with dressing which he performs independently, along with his PT exercises on his padded floor mats.  Except that he got injured could be doing more of his bathing, currently meeting greater than 75% assist; chiefly due to shortening up the time of routine.  With ILS casework (~4hrs /week), he will be working more on independent living skills in the home including cooking, and building toileting, grooming and bathing routines with more independence in mind, and also getting out into the community to do errands, outdoor activities and getting into the gym/pool if possible.      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 Geo is actively receiving occupational (ongoing, since 2015), physical, speech and has had vision therapies to maximize his abilities.  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 He also has global ataxic movements that affect his walking, balance (transfers, walks with min A at gait belt),  and upper body  motor planning for upper body gross motor accuracy/speed/timing/coordination, limiting his ability to write and use skilled tools of daily living.      Geo is able to dress and feed himself after set up at the seated level.  He uses the wheelchair when at in the community, but not when at home. No longer attending high school or academic programming after  graduation this spring, but has discussed considering taking some Distance Learning classes via [employer].  At home, ambulates between rooms, completes bathing and toileting with min-mod A of 1.  OT has offered that he may have more ability to become more able to do clothing retrieval, light house keeping, more (I) toileting and light meal prep in the kitchen if he was at the wheel chair level at home, but he prefers the practice of walking between all daily tasks instead. He is however using the w/c while in the OT kitchen setting and applying safety with low/deep reaches, locking brakes to stay safe.  He is better with feeding himself, reducing spillage, spearing, cutting and spreading while using non adapted silverware with better motor control. We continue to work on in hand manipluation skills to address short entry writing, opening food packages, manage zipper/snap/button closures and use self care tools (toothpaste, toothbrush, electric shaver use). He is very motivated to work with OTR in the cooking/kitchen setting; practicing forearm stabilization, safe dynamic reaching, task sequencing and fine motor control while using tools/ making recipes in the kitchen.    Objective Measurements:     Objective Measure: Dyanvision, timed reaction for visual/GMC    (stable, not tested recently)           Objective Measure: /Pinch   Details:  is improved to 100# B (in April, was 90# B), key pinch 21# B (up by 3 # B since April).  3 pt is 15# R and up to 18# w L. ( from 15 #) .     Objective Measure: Written Communication     Details:  Writes 10 characters in 1:17 with right hand using more refined technqiue w all lower case letters, better legibility Patient does better w right hand than L today (L 1:25).  Does best if he rests his head on the non-writing arm, moves the eye patch to that same side.  Signature w R  in 5/8 with Cpaital+lower case letters in 1:04, 80% legibility if printed L hand takes 2:01, much harder.       Objective Measure: GMC / CAM   Details: Box and blocks:  15 blocks moved in 1 min W R hand and L 18  (was 22 L and 15,19 with R 11/12/18.  )        Goals:     Goal Identifier [facility]   Goal Description Geo will demonstrate improved fine motor control (as measured by box and blocks test, improved from moving 20 blocks/min to 30 blocks /min) and nine hole peg test  in 1 minute as needed for self feeding, writing, managing food packaging and clothing fasteners.   Target Date 10/22/19   Date Met      Progress:  and pinch, writing speed and legibility are improving per above.      Goal Identifier Meal Preparation   Goal Description Geo will demonstrate ability to gather needed ingredients/tools from hi/low/deep reaches from wheelchair level, organize steps of task and use various kitchen tools safely to make 2-3 step dishes.   Target Date 10/22/19   Date Met      Progress: Pt is working on using adapted techniques for his tremor and forearm stabilization needs, adaptations to measuring liquids and mixing items ( using batter mixes in ziploc bags and  squirting into molds/pans.       Goal Identifier Written communication   Goal Description Geo will demonstrate improved legibility and smaller writing size to support signing his full name on birthday cards and checks, inside of a  1/2 x 4 inch area,  and writing 5-7 word sentences, 4 of 5 given opportunities.    Target Date 10/22/19   Date Met      Progress: Improved per above     Goal Identifier GMC/Reaction Time   Goal Description Patient will demonstrate improved UE motor  and visual reaction time for improved visual skills, improved ability to prevent daily mishaps and safer independent home based mobility by demonstrating sustained reaction time of 2.0 seconds (from 3.20 sec Feb 2017) on Mode A of [employer].   Target Date 10/22/19   Date Met      Progress: Box and blocks GMC are both regreessed in progress after 4 week long break in therapy      Goal Identifier Trunk Control    Goal Description Geo will complete 10 minutes of dynamic upper body activities while maintaining an 90% upright head/seated posture as needed to support feeding, writing and eye contact during meal time interactions.   Target Date 10/22/19   Date Met      Progress: Pt able to sustain upright posture for 3-4 minutes at time     Progress Toward Goals:   Progress this reporting period: Pt has been seen for 6 visits of skilled OT sine last certification note on 4/8/19.  Progress per above.      Plan:  Continue therapy per current plan of care.    Discharge:  No    Thank you for this thoughtful referral! If you have any questions, please call me [phone].  Nice to share in this patient's care with you.    Sincerely,   Elyse Costello, OTR/l

## 2019-07-25 ENCOUNTER — HOSPITAL ENCOUNTER (OUTPATIENT)
Dept: PHYSICAL THERAPY | Facility: CLINIC | Age: 19
Setting detail: THERAPIES SERIES
End: 2019-07-25
Attending: FAMILY MEDICINE
Payer: COMMERCIAL

## 2019-07-25 PROCEDURE — 97113 AQUATIC THERAPY/EXERCISES: CPT | Mod: GP | Performed by: PHYSICAL THERAPIST

## 2019-07-25 NOTE — PROGRESS NOTES
AQUATIC PHYSICAL THERAPY EXERCISE LOG (TRUNK)    *Jogger belt and gait belt donned throughout session    Date  6/13/19  Mary Beth Kimo, DPT 6/20/19  Ita Araiza, DPT 6/27/19  Ita Araiza, DPT 7/11/19  Ita Araiza, DPT Ita Araiza, DPT  7/18/19 Ita Araiza, DPT  7/25/19   Warm Up Ambulation (Forward/Side/Back/March/All) Wheelchair used to enter/exit pool due to instability and ataxic gait. Fwd ambulation in the water with 1 UE support on wall and 1 UE support on therapist's arm, min-mod assist for forward gait, 20'x3, attempted fwd gait with no right ear and B UE support on therapist arms but unable to due ataxia and instability. Sidestepping with  BUE support on wall and therapist min assist for balance 20' ea direction. Marches in place with B UE support on wall and therapist min-mod assist for balance, 15x ea side. Maximal cues for upright posture and naming things he sees throughout - improved duration of upright posture with environmental obersvation W/c for exit/entry into pool due to  instability and ataxic gait. pT cued for improved posture with forward ambulation with manual cues, 1 UE on wall and 1 UE on therapist. X 4 laps, side stepping x 20 feet as well, cues for postural control.  W/C exit and entry into pool. Pt cued for side steps to enter chest deep water once in therapy area. Pt cued for forward ambulation with 1 UE support from therapist, wall support and min/mod A at gait belt. X 8 laps, cues for posture, cervical extension, close support of wall, slow speed for improved control. Improved with cues for looking at pictures on walls.  W/C exit and entry into pool. Pt cued for side steps to enter chest deep water once in therapy area. Pt cued for forward ambulation with 1 UE support from therapist, wall support and min/mod A at gait belt. X 8 laps, cues for posture, cervical extension, close support of wall, slow speed for improved control. Improved with cues for looking at pictures on walls.  W/C exit  and entry into pool. Pt cued for side steps to enter chest deep water once in therapy area. Pt cued for forward ambulation with 1 UE support from therapist, wall support and min/mod A at gait belt. X 8 laps, cues for posture, cervical extension, close support of wall, slow speed for improved control. Improved with cues for looking at pictures on walls. Improving tolerance with decreased LOB today with ambulation laps W/C exit and entry into pool. Pt cued for side steps to enter chest deep water once in therapy area. Pt cued for forward ambulation with 1 UE support from therapist, wall support and min A at gait belt. X 8 laps, cues for posture, cervical extension, close support of wall, slow speed for improved control. Improved with cues for looking at pictures on walls. Improving tolerance with decreased ONLY 1 LOB with 8 laps today. Mod A to recover. LOB today with ambulation lapS                       Stretching/ROM Chin Tucks          CROM (Flex/Ext/SB/Rot/All)          Shoulder (Shrugs/Rolls)          Scapular (Retraction/Depression)          Upper Trunk (Levator/Scalene/Upper Trapezius)          Pec Stretch (Unilateral/Bilateral)          Posterior Shoulder          Gluteal          Hamstring (On Step/Cuff)          Calf (In Hole/At Wall)          Quad          Hip Flexor (Kneel)          Piriformis (Seated/Stand)          Trunk ROM (Flex/Ext/SB/Rot/All)          BAD RAGAZ                             Strengthening Abdominal Sets/ Pelvic Tilt          Shoulder (Flex/Ext, Abd/Add, IR/ER, Circles, Alternation flex/ext for core)  Without equipment with back against the wall to work on wt shifting and postural control, educated to shift slow x 10 flex/ext, abd/add, attempted with back against wall and paddles but too difficult for pt to retain his balance No equipment, back against wall for wt shift and postural control, flex/ext, ad/abd x 10 With back against wall and paddles closed cues for trunk extension with  manual cues, verbal cues for cervical extension. X 10 each, intermittent balance support min A With back against wall no paddles today cues for cervical extension, no assist needed today with balance but manual cues for posture x 10 With back against wall today with hydrotones for increased resistance, tolerating well with manual cues for trunk positioning x 10     Horizon (Abd/Add, Diagonals)   As above As above As above As above    Rowing Arms          UE PNF (D1/D2)          Abdominal Sets (Push/Pull, side to side, push down with board)     With ball instead x 10, therapist assisting with knees blocking and ball in wall sit positioning     Squat With back to the wall and B UE support on therapist arms in 4' water, mini squats with cues for upright posture and keeping his chin out of the water, 15x B UE support on wall, cues for upright posture, in 3 feet of water. X 20 With UE support on wall manual cue for glute taps x 20 cues for cervical positioning With UE support, manual cues for full trunk extension and proper posture, needing A for balance at times x 20  With B UE support, cues for increased ROM into full squat, manual cues for smoothness/control of squat with therapist hand on chest and low back x 20, did have to change to 3 feet of water as pt tolerating well. With B UE support, cues for full extension and full deep squat, cues for use of LEs not UEs x 20     Lunge Squat          Hip (Flex/Ext, Abd/Add, single leg bike, circles, figure 8, All)  In corner flex/ext, abd/add, circles x 10 cues for upright posture,  In corner with B support, flex/ext and abd/add x 10  In corner with B support x 10 each, manual assist for proper trunk control and extension In corner B UE support- improving tolerance today with manual assist but improving cervical extension today x 10  In corner with B UE support, no therapist support for standing but manual cues for lumbar extension x 10 each,therapist on large stetp today to  help facilitate posture with improved tolerance    Heel/Toe Raises In open water with B UE support on therapist arms, 15x heel raises with cues for posture and posterior weight shift to prevent anterior LOB  X 10 with wall support X 10 with wall support X 10 with wall support X 10 with wall support    Step Ups (Forward, Lateral) Fwd step ups 10x ea side on medium step with B UE support on therapist arms mod-max assist for balance and postural control Forward step ups with small step with wall support x 20, taps with 1 UE support  Tap ups with 1 UE support and therapist HHA x 10 , step ups with B UE support on wall, cues for posture throughtout Step up with B UE support on wall, therapist assisting with control and manual cues for extension x 10 Step up with B UE support, cues for slow speed, therapist assisting manually for proper trunk alignment with med step x 10  Step up with B UE support, manual assist for improved posture x 10     Noodle Push Downs with UE(B UE/1 UE) for core strengthening      With back against the wall cues for proper posture    Noodle Push Downs with LE   X 10 with assistance from therapist on noodle, colored noodle X 10 With therapist assist with noodle With therapist assist with noodle X 10 with white/colored noodle with therapist assist with control of noodle, but pt performing push down    Stir the Pot/Punches with Dumbells          Sit to Stand at Bench From wheelchair in 3' water, CGA-min assist from therapist with B UE support on wall, 15x     From water W/C mod A from therapist x 1, LOB , B UE support on wall x 10 CGA      Prone Plank on step          Side Plank on step                             Aerobic Fast Walking          Bike/Ski/Hussein/All                             Balance Narrow Base of Support In open water with B UE support on therapist arms, attempted to limit assist unless LOB upon which he required mod-max assist to correct, 2 min Standing with therapist support  attempting to limit support, needing mod A at times to decrease LOB. 2 mins 1 min with noodle support with therapist assisting at noodle  1 min with noodle support 1 min x 2 with B therapist support improving control today with decreased LOB  1 min 30 sec with B therapist support on white noodle, min/mod A throughout    Tandem Stand          Single Leg Stance          Heel/Toe Walking          3 Step and Stop          Braiding          Transfers Patient with good control of initial transfer with UE support but unable to maintain balance in standing independently without UE support. Patient impulsive with sitting in wheelchair too early at end of session, therapist assist to prevent missing wheelchair and falling. Reviewed transfer technique and repeated transfer with max cues for turning fully away from chair and backing all the wall up before sitting, significantly improved safety on second repetition                            Cool Down Ambulation          Deep Water Dangle          Whirlpool

## 2019-07-25 NOTE — PROGRESS NOTES
[facility]      OUTPATIENT PHYSICAL THERAPY  PLAN OF TREATMENT FOR OUTPATIENT REHABILITATION    Patient's Last Name, First Name, M.I.                YOB: 2000  MackGeo CHATMAN                        Provider's Name  [facility] Medical Record No.  [id number]                               Onset Date: 4/12/15   Start of Care Date: 9/10/2018   Type:     _X_PT   ___OT   ___SLP Medical Diagnosis: s/p posterior fossa tumor partially resected                       PT Diagnosis: Ataxia, Gait instability, balance impairments, core muscle weakness, gait and mobility impairment      _________________________________________________________________________________  Plan of Treatment: Pt would benefit from an addition of 8 more sessions with reassessment as needed based on progress to goals. Pt would benefit from continued treatment in pool setting rohit due safety of this environement and pt's high falls risk. Pt has improved his tolerance to ambulation with this therapist, with decreased LOB with ambulation in pool with B UE support. Pt would benefit from continued strengthening, postural control rohit with manual and verbal cues, balance in safe setting and improved gait pattern    Frequency/Duration: 1/week for 8 weeks     Goals:  Goal Identifier standing balance, movement transitions   Goal Description Geo will move sit to stand and maintain standing in chest deep water with CGA from therapist x 30 second intervals, 2/4 attempts to increase safety when standing for ADLs at home by 7/29/2019(needing mod A or rail for this transition)   Target Date 12/31/19   Date Met      Progress:Not met needing mod A for transition into standing     Goal Identifier HEP   Goal Description Upon discharge from aquatic physical therapy,  Geo with assist of adult care provider for safety, will demonstrate  understanding aquatic exercise program addressing balance and gait safety by 10/23/19   Target Date 12/31/19   Date Met      Progress:Pt and family have been educated that the best way to perform HEP would be to have a PCA or family member learn exercises and enter pool for teaching. This has not been performed as of yet, family is hesitant as not sure if PCA would be willing.      Goal Identifier LTG   Goal Description Per parent report Geo will ambulate using his walker demonstrating improved sequencing of walker movement (feet not stepping beyond walker frame),  with upright posture at trunk with min assist of parent down his hallway at least 30 feet to get to and from  the bathroom 3/4x in a day.     Target Date 12/31/19   Date Met   Pt and father report continued difficulty with this at home as at times needing more assist than others and posture continues to be inconsistent. In the pool pt is needing wall assist and CGA for ambulation.      Progress Toward Goals:   Progress this reporting period: Pt is improving his stability with ambulation in pool. Pt is able to perform 20 feet x 8 of ambulation with min A from therapist and 1 HHA, and only 1 large LOB that needed therapist intervention in pool. Pt improving his posture in water and able to hold posture with verbal, visual and manual cues for 1 min 30 sec today with noodle support and therapist mod A.       Certification date from 7/25/19 to 10/23/19, goals extended due to scheduling.    Ita Araiza, PT          I CERTIFY THE NEED FOR THESE SERVICES FURNISHED UNDER        THIS PLAN OF TREATMENT AND WHILE UNDER MY CARE     (Physician co-signature of this document indicates review and certification of the therapy plan).                Referring Provider: Leoncio Rousseau MD

## 2019-07-29 DIAGNOSIS — C71.9 EPENDYMOMA (H): Primary | ICD-10-CM

## 2019-07-30 DIAGNOSIS — C71.9 EPENDYMOMA (H): ICD-10-CM

## 2019-07-30 LAB
BASOPHILS # BLD AUTO: 0 10E9/L (ref 0–0.2)
BASOPHILS NFR BLD AUTO: 0.3 %
DIFFERENTIAL METHOD BLD: ABNORMAL
EOSINOPHIL # BLD AUTO: 0.4 10E9/L (ref 0–0.7)
EOSINOPHIL NFR BLD AUTO: 9.6 %
ERYTHROCYTE [DISTWIDTH] IN BLOOD BY AUTOMATED COUNT: 12.9 % (ref 10–15)
HCT VFR BLD AUTO: 38.8 % (ref 40–53)
HGB BLD-MCNC: 12.8 G/DL (ref 13.3–17.7)
LYMPHOCYTES # BLD AUTO: 1.1 10E9/L (ref 0.8–5.3)
LYMPHOCYTES NFR BLD AUTO: 27.9 %
MCH RBC QN AUTO: 30.3 PG (ref 26.5–33)
MCHC RBC AUTO-ENTMCNC: 33 G/DL (ref 31.5–36.5)
MCV RBC AUTO: 92 FL (ref 78–100)
MONOCYTES # BLD AUTO: 0.7 10E9/L (ref 0–1.3)
MONOCYTES NFR BLD AUTO: 18.5 %
NEUTROPHILS # BLD AUTO: 1.7 10E9/L (ref 1.6–8.3)
NEUTROPHILS NFR BLD AUTO: 43.7 %
PLATELET # BLD AUTO: 108 10E9/L (ref 150–450)
RBC # BLD AUTO: 4.23 10E12/L (ref 4.4–5.9)
WBC # BLD AUTO: 3.9 10E9/L (ref 4–11)

## 2019-07-30 PROCEDURE — 36415 COLL VENOUS BLD VENIPUNCTURE: CPT | Performed by: NURSE PRACTITIONER

## 2019-07-30 PROCEDURE — 83735 ASSAY OF MAGNESIUM: CPT | Performed by: NURSE PRACTITIONER

## 2019-07-30 PROCEDURE — 80053 COMPREHEN METABOLIC PANEL: CPT | Performed by: NURSE PRACTITIONER

## 2019-07-30 PROCEDURE — 85025 COMPLETE CBC W/AUTO DIFF WBC: CPT | Performed by: NURSE PRACTITIONER

## 2019-07-30 PROCEDURE — 84100 ASSAY OF PHOSPHORUS: CPT | Performed by: NURSE PRACTITIONER

## 2019-07-31 LAB
ALBUMIN SERPL-MCNC: 3.3 G/DL (ref 3.4–5)
ALP SERPL-CCNC: 133 U/L (ref 65–260)
ALT SERPL W P-5'-P-CCNC: 21 U/L (ref 0–50)
ANION GAP SERPL CALCULATED.3IONS-SCNC: 5 MMOL/L (ref 3–14)
AST SERPL W P-5'-P-CCNC: 21 U/L (ref 0–35)
BILIRUB SERPL-MCNC: 0.3 MG/DL (ref 0.2–1.3)
BUN SERPL-MCNC: 17 MG/DL (ref 7–30)
CALCIUM SERPL-MCNC: 8.4 MG/DL (ref 8.5–10.1)
CHLORIDE SERPL-SCNC: 109 MMOL/L (ref 98–110)
CO2 SERPL-SCNC: 29 MMOL/L (ref 20–32)
CREAT SERPL-MCNC: 1.02 MG/DL (ref 0.5–1)
GFR SERPL CREATININE-BSD FRML MDRD: >90 ML/MIN/{1.73_M2}
GLUCOSE SERPL-MCNC: 91 MG/DL (ref 70–99)
MAGNESIUM SERPL-MCNC: 2.3 MG/DL (ref 1.6–2.3)
PHOSPHATE SERPL-MCNC: 3 MG/DL (ref 2.5–4.5)
POTASSIUM SERPL-SCNC: 4.6 MMOL/L (ref 3.4–5.3)
PROT SERPL-MCNC: 6.4 G/DL (ref 6.8–8.8)
SODIUM SERPL-SCNC: 143 MMOL/L (ref 133–144)

## 2019-08-02 ENCOUNTER — TELEPHONE (OUTPATIENT)
Dept: GASTROENTEROLOGY | Facility: CLINIC | Age: 19
End: 2019-08-02

## 2019-08-03 ENCOUNTER — OFFICE VISIT (OUTPATIENT)
Dept: URGENT CARE | Facility: URGENT CARE | Age: 19
End: 2019-08-03
Payer: COMMERCIAL

## 2019-08-03 VITALS
DIASTOLIC BLOOD PRESSURE: 50 MMHG | RESPIRATION RATE: 18 BRPM | WEIGHT: 195 LBS | OXYGEN SATURATION: 96 % | HEART RATE: 89 BPM | SYSTOLIC BLOOD PRESSURE: 100 MMHG | HEIGHT: 70 IN | TEMPERATURE: 97.5 F | BODY MASS INDEX: 27.92 KG/M2

## 2019-08-03 DIAGNOSIS — H65.91 OME (OTITIS MEDIA WITH EFFUSION), RIGHT: Primary | ICD-10-CM

## 2019-08-03 PROCEDURE — 99214 OFFICE O/P EST MOD 30 MIN: CPT | Performed by: FAMILY MEDICINE

## 2019-08-03 RX ORDER — OFLOXACIN 3 MG/ML
5 SOLUTION AURICULAR (OTIC) DAILY
Qty: 1 BOTTLE | Refills: 0 | Status: ON HOLD | OUTPATIENT
Start: 2019-08-03 | End: 2019-09-26

## 2019-08-03 RX ORDER — AMOXICILLIN 875 MG
875 TABLET ORAL 2 TIMES DAILY
Qty: 20 TABLET | Refills: 0 | Status: ON HOLD | OUTPATIENT
Start: 2019-08-03 | End: 2019-09-26

## 2019-08-03 ASSESSMENT — PAIN SCALES - GENERAL: PAINLEVEL: MODERATE PAIN (4)

## 2019-08-03 ASSESSMENT — MIFFLIN-ST. JEOR: SCORE: 1905.76

## 2019-08-03 NOTE — PROGRESS NOTES
"SUBJECTIVE:  Geo Hicks is a 18 year old male brought by mother.  There is been complaining of ear pain on the right side and there is some drainage that has had from the right ear.    Past Medical History:   Diagnosis Date     Cranial nerve dysfunction      Dyspepsia      Ependymoma (H)      Gastro-oesophageal reflux disease      Hearing loss      Intracranial hemorrhage (H)      Migraine      Pilonidal cyst     7-2015     Reduced vision      Refractory obstruction of nasal airway     2nd to nasal valve prolapse     Sleep apnea      Strabismus     gaze palsy          OBJECTIVE:  /50 (BP Location: Right arm, Patient Position: Sitting, Cuff Size: Adult Regular)   Pulse 89   Temp 97.5  F (36.4  C) (Axillary)   Resp 18   Ht 1.778 m (5' 10\")   Wt 88.5 kg (195 lb)   SpO2 96%   BMI 27.98 kg/m     General appearance: alert.    Ears: abnormal: R TM erythematous Canal is also somewhat macerated.  And wet; L TM normal  Nose: normal  Oropharynx: normal  Neck: supple and moderate nontender anterior cervical nodes  Lungs: chest clear to IPPA and clear to IPPA    ASSESSMENT:  Otitis Media;     PLAN:  1) Antibiotics per [facility] orders.  2) Symptomatic therapy suggested: use acetaminophen, ibuprofen prn.   3) Call or return to clinic prn if these symptoms worsen or fail to improve as anticipated.      18-year-old male with otitis media and otitis externa.  He will be placed on amoxicillin as well as Floxin otic drops.    He is on a study drug because of his tumor and their inability to completely remove it.    We did speak with the oncologist and their recommendations were for amoxicillin we asked because of the maceration of his canal if Floxin otic would be appropriate in the setting would.    Mother was in agreement medicines were sent to the pharmacy.  After visit summary was given.    Level 4 visit 25 minutes and examined counseling greater than 50% of time in counseling regards to the medications potential " problems associated with his experimental medication and the study that is currently in

## 2019-08-07 ENCOUNTER — HOSPITAL ENCOUNTER (OUTPATIENT)
Dept: MRI IMAGING | Facility: CLINIC | Age: 19
Discharge: HOME | End: 2019-08-07
Attending: NURSE PRACTITIONER | Admitting: NURSE PRACTITIONER
Payer: COMMERCIAL

## 2019-08-07 ENCOUNTER — OFFICE VISIT (OUTPATIENT)
Dept: PEDIATRIC HEMATOLOGY/ONCOLOGY | Facility: CLINIC | Age: 19
End: 2019-08-07
Attending: NURSE PRACTITIONER
Payer: COMMERCIAL

## 2019-08-07 VITALS
SYSTOLIC BLOOD PRESSURE: 106 MMHG | RESPIRATION RATE: 20 BRPM | DIASTOLIC BLOOD PRESSURE: 57 MMHG | WEIGHT: 200 LBS | HEIGHT: 71 IN | HEART RATE: 90 BPM | OXYGEN SATURATION: 100 % | BODY MASS INDEX: 28 KG/M2

## 2019-08-07 DIAGNOSIS — C71.9 EPENDYMOMA (H): ICD-10-CM

## 2019-08-07 DIAGNOSIS — D49.6 NEOPLASM OF POSTERIOR CRANIAL FOSSA (H): Primary | ICD-10-CM

## 2019-08-07 LAB
ALBUMIN SERPL-MCNC: 3.2 G/DL (ref 3.4–5)
ALP SERPL-CCNC: 130 U/L (ref 65–260)
ALT SERPL W P-5'-P-CCNC: 17 U/L (ref 0–50)
ANION GAP SERPL CALCULATED.3IONS-SCNC: 3 MMOL/L (ref 3–14)
AST SERPL W P-5'-P-CCNC: 20 U/L (ref 0–35)
BASOPHILS # BLD AUTO: 0 10E9/L (ref 0–0.2)
BASOPHILS NFR BLD AUTO: 1 %
BILIRUB SERPL-MCNC: 0.3 MG/DL (ref 0.2–1.3)
BUN SERPL-MCNC: 16 MG/DL (ref 7–30)
CALCIUM SERPL-MCNC: 8.9 MG/DL (ref 8.5–10.1)
CHLORIDE SERPL-SCNC: 107 MMOL/L (ref 98–110)
CO2 SERPL-SCNC: 31 MMOL/L (ref 20–32)
CREAT SERPL-MCNC: 1.01 MG/DL (ref 0.5–1)
DIFFERENTIAL METHOD BLD: ABNORMAL
EOSINOPHIL # BLD AUTO: 0.3 10E9/L (ref 0–0.7)
EOSINOPHIL NFR BLD AUTO: 9.4 %
ERYTHROCYTE [DISTWIDTH] IN BLOOD BY AUTOMATED COUNT: 12.9 % (ref 10–15)
GFR SERPL CREATININE-BSD FRML MDRD: >90 ML/MIN/{1.73_M2}
GLUCOSE SERPL-MCNC: 79 MG/DL (ref 70–99)
HCT VFR BLD AUTO: 40.8 % (ref 40–53)
HGB BLD-MCNC: 13 G/DL (ref 13.3–17.7)
IMM GRANULOCYTES # BLD: 0 10E9/L (ref 0–0.4)
IMM GRANULOCYTES NFR BLD: 1 %
LYMPHOCYTES # BLD AUTO: 0.9 10E9/L (ref 0.8–5.3)
LYMPHOCYTES NFR BLD AUTO: 30.2 %
MAGNESIUM SERPL-MCNC: 2.2 MG/DL (ref 1.6–2.3)
MCH RBC QN AUTO: 29.5 PG (ref 26.5–33)
MCHC RBC AUTO-ENTMCNC: 31.9 G/DL (ref 31.5–36.5)
MCV RBC AUTO: 93 FL (ref 78–100)
MONOCYTES # BLD AUTO: 0.5 10E9/L (ref 0–1.3)
MONOCYTES NFR BLD AUTO: 18.1 %
NEUTROPHILS # BLD AUTO: 1.2 10E9/L (ref 1.6–8.3)
NEUTROPHILS NFR BLD AUTO: 40.3 %
NRBC # BLD AUTO: 0 10*3/UL
NRBC BLD AUTO-RTO: 0 /100
PHOSPHATE SERPL-MCNC: 3.5 MG/DL (ref 2.5–4.5)
PLATELET # BLD AUTO: 109 10E9/L (ref 150–450)
POTASSIUM SERPL-SCNC: 4.3 MMOL/L (ref 3.4–5.3)
PROT SERPL-MCNC: 6.7 G/DL (ref 6.8–8.8)
RBC # BLD AUTO: 4.41 10E12/L (ref 4.4–5.9)
SODIUM SERPL-SCNC: 141 MMOL/L (ref 133–144)
WBC # BLD AUTO: 2.9 10E9/L (ref 4–11)

## 2019-08-07 PROCEDURE — 85025 COMPLETE CBC W/AUTO DIFF WBC: CPT | Performed by: NURSE PRACTITIONER

## 2019-08-07 PROCEDURE — A9585 GADOBUTROL INJECTION: HCPCS | Performed by: NURSE PRACTITIONER

## 2019-08-07 PROCEDURE — 80053 COMPREHEN METABOLIC PANEL: CPT | Performed by: NURSE PRACTITIONER

## 2019-08-07 PROCEDURE — 83735 ASSAY OF MAGNESIUM: CPT | Performed by: NURSE PRACTITIONER

## 2019-08-07 PROCEDURE — 84100 ASSAY OF PHOSPHORUS: CPT | Performed by: NURSE PRACTITIONER

## 2019-08-07 PROCEDURE — 36415 COLL VENOUS BLD VENIPUNCTURE: CPT | Performed by: NURSE PRACTITIONER

## 2019-08-07 PROCEDURE — G0463 HOSPITAL OUTPT CLINIC VISIT: HCPCS | Mod: ZF

## 2019-08-07 PROCEDURE — 25500064 ZZH RX 255 OP 636: Performed by: NURSE PRACTITIONER

## 2019-08-07 PROCEDURE — 70553 MRI BRAIN STEM W/O & W/DYE: CPT

## 2019-08-07 RX ORDER — ALBUTEROL SULFATE 0.83 MG/ML
2.5 SOLUTION RESPIRATORY (INHALATION)
Status: CANCELLED | OUTPATIENT
Start: 2019-08-07

## 2019-08-07 RX ORDER — DIPHENHYDRAMINE HYDROCHLORIDE 50 MG/ML
50 INJECTION INTRAMUSCULAR; INTRAVENOUS
Status: CANCELLED
Start: 2019-08-07

## 2019-08-07 RX ORDER — METHYLPREDNISOLONE SODIUM SUCCINATE 125 MG/2ML
125 INJECTION, POWDER, LYOPHILIZED, FOR SOLUTION INTRAMUSCULAR; INTRAVENOUS
Status: CANCELLED
Start: 2019-08-07

## 2019-08-07 RX ORDER — ALBUTEROL SULFATE 90 UG/1
1-2 AEROSOL, METERED RESPIRATORY (INHALATION)
Status: CANCELLED
Start: 2019-08-07

## 2019-08-07 RX ORDER — EPINEPHRINE 1 MG/ML
0.3 INJECTION, SOLUTION, CONCENTRATE INTRAVENOUS EVERY 5 MIN PRN
Status: CANCELLED | OUTPATIENT
Start: 2019-08-07

## 2019-08-07 RX ORDER — MEPERIDINE HYDROCHLORIDE 25 MG/ML
25 INJECTION INTRAMUSCULAR; INTRAVENOUS; SUBCUTANEOUS EVERY 30 MIN PRN
Status: CANCELLED | OUTPATIENT
Start: 2019-08-07

## 2019-08-07 RX ORDER — SODIUM CHLORIDE 9 MG/ML
1000 INJECTION, SOLUTION INTRAVENOUS CONTINUOUS PRN
Status: CANCELLED
Start: 2019-08-07

## 2019-08-07 RX ORDER — GADOBUTROL 604.72 MG/ML
10 INJECTION INTRAVENOUS ONCE
Status: COMPLETED | OUTPATIENT
Start: 2019-08-07 | End: 2019-08-07

## 2019-08-07 RX ADMIN — GADOBUTROL 8.8 ML: 604.72 INJECTION INTRAVENOUS at 13:31

## 2019-08-07 ASSESSMENT — ENCOUNTER SYMPTOMS
FACIAL ASYMMETRY: 1
CONSTITUTIONAL NEGATIVE: 1
CONSTIPATION: 1
SLEEP DISTURBANCE: 1
SPEECH DIFFICULTY: 1
CARDIOVASCULAR NEGATIVE: 1
RESPIRATORY NEGATIVE: 1

## 2019-08-07 ASSESSMENT — MIFFLIN-ST. JEOR: SCORE: 1939.06

## 2019-08-07 NOTE — PROGRESS NOTES
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 He is enrolled on COG [id number] - A Phase 1 Study of Entinostat, an Oral Histone Deacetylase Inhibitor, in Pediatric Patients With Recurrent or Refractory Solid Tumors, Including CNS Tumors and Lymphoma.  He is here to begin cycle 27.     HPI:  Saturday (8/3) Geo was diagnosed with an inner ear infection and outer ear infection.  He had pain and drainage from his right ear, he did not have a fever.  He is being treated with antibiotics and feels well today.  Geo notes constipation is still a problem. [place] [facility] [place]. He says abdominal discomfort from full feeling.  No fevers. No headache.  No nausea or vomiting and no rash. He has missed no doses of his Entinostat.  His \"gash\" in his right leg is healing but is raised (see pics). They have been dressing it.       Fam/Soc: Lives between his parents (one week at each home). They have been awarded guardianship of Geo. The families work well together - both parents have remarried. His dad has a clotting disorder, requiring him to take Warfarin daily.     History was obtained from Geo and his parents.       Allergies   Allergen Reactions     Blood Transfusion Related (Informational Only) Swelling     Periorbital swelling post platelet transfusion     No Known Drug Allergies        Current Outpatient Medications   Medication     amoxicillin (AMOXIL) 875 MG tablet     calcium carbonate-vitamin D 600-400 MG-UNIT CHEW     dexamethasone (DECADRON) 0.5 MG tablet     fexofenadine (ALLEGRA) 180 MG tablet     linaclotide (LINZESS) 290 MCG capsule     ofloxacin (FLOXIN) 0.3 % otic solution     OLANZapine (ZYPREXA) 15 MG tablet     omeprazole (PRILOSEC) 20 MG DR capsule     potassium phosphate, monobasic, (K-PHOS) 500 MG tablet     " senna-docusate (SENOKOT-S/PERICOLACE) 8.6-50 MG tablet     sertraline (ZOLOFT) 100 MG tablet     study - entinostat (IDS# 5050) 1 mg tablet     study - entinostat (IDS# 5050) 5 mg tablet     sulfamethoxazole-trimethoprim (BACTRIM/SEPTRA) 400-80 MG per tablet     traZODone (DESYREL) 100 MG tablet     vitamin D3 (CHOLECALCIFEROL) 2000 units (50 mcg) tablet     No current facility-administered medications for this visit.        Past Medical History:   Diagnosis Date     Cranial nerve dysfunction      Dyspepsia      Ependymoma (H)      Gastro-oesophageal reflux disease      Hearing loss      Intracranial hemorrhage (H)      Migraine      Pilonidal cyst     7-2015     Reduced vision      Refractory obstruction of nasal airway     2nd to nasal valve prolapse     Sleep apnea      Strabismus     gaze palsy        Past Surgical History:   Procedure Laterality Date     GRAFT CARTILAGE FROM POSTERIOR AURICLE Left 10/6/2016    Procedure: GRAFT CARTILAGE FROM POSTERIOR AURICLE;  Surgeon: Tyler Richards MD;  [facility]     INCISION AND DRAINAGE PERINEAL, COMBINED Bilateral 7/18/2015    Procedure: COMBINED INCISION AND DRAINAGE PERINEAL;  Surgeon: Dequan Timmons MD;  [facility]     OPTICAL TRACKING SYSTEM CRANIOTOMY, EXCISE TUMOR, COMBINED N/A 4/13/2015    Procedure: COMBINED OPTICAL TRACKING SYSTEM CRANIOTOMY, EXCISE TUMOR;  Surgeon: Francis Velazquez MD;  [facility]     OPTICAL TRACKING SYSTEM CRANIOTOMY, EXCISE TUMOR, COMBINED N/A 4/16/2015    Procedure: COMBINED OPTICAL TRACKING SYSTEM CRANIOTOMY, EXCISE TUMOR;  Surgeon: Francis Velazquez MD;  [facility]     OPTICAL TRACKING SYSTEM CRANIOTOMY, EXCISE TUMOR, COMBINED Bilateral 5/28/2015    Procedure: COMBINED OPTICAL TRACKING SYSTEM CRANIOTOMY, EXCISE TUMOR;  Surgeon: Francis Velazquez MD;  [facility]     OPTICAL TRACKING SYSTEM CRANIOTOMY, EXCISE TUMOR, COMBINED Bilateral 1/14/2016    Procedure: COMBINED OPTICAL  "TRACKING SYSTEM CRANIOTOMY, EXCISE TUMOR;  Surgeon: Francis Velazquez MD;  [facility]     OPTICAL TRACKING SYSTEM VENTRICULOSTOMY  4/16/2015    Procedure: OPTICAL TRACKING SYSTEM VENTRICULOSTOMY;  Surgeon: Francis Velazquez MD;  [facility]     REMOVE PORT VASCULAR ACCESS N/A 10/6/2016    Procedure: REMOVE PORT VASCULAR ACCESS;  Surgeon: Bruno Perea MD;  [facility]     RHINOPLASTY N/A 10/6/2016    Procedure: RHINOPLASTY;  Surgeon: Tyler Richards MD;  [facility]     VASCULAR SURGERY  5-2015    single lumen power port       Family History   Problem Relation Age of Onset     Circulatory Father         PE/DVT     Hypothyroidism Father 30     Diabetes Maternal Grandmother      Diabetes Paternal Grandmother      Diabetes Paternal Grandfather      C.A.D. Paternal Grandfather      Hypertension Maternal Grandfather      Thyroid Disease Paternal Aunt         unknown whether hypo or hyper     Mental Illness No family hx of        Review of Systems   Constitutional: Negative.         In a wheelchair   HENT: Positive for hearing loss (Pre-existing from prior therapy).    Eyes: Positive for visual disturbance (Wears a patch over right eye to minimize diplopia).   Respiratory: Negative.    Cardiovascular: Negative.    Gastrointestinal: Positive for constipation (Chronic).   Endocrine:        Follow with Dr. Martin   Neurological: Positive for facial asymmetry and speech difficulty.   Psychiatric/Behavioral: Positive for sleep disturbance (Not using his Bi-Pap).   All other systems reviewed and are negative.    Vital signs:  /57 (BP Location: Right arm, Patient Position: Fowlers, Cuff Size: Adult Large)   Pulse 90   Resp 20   Ht 1.795 m (5' 10.67\")   Wt 90.7 kg (200 lb)   SpO2 100%   BMI 28.16 kg/m       Physical Exam   Constitutional: He is oriented to person, place, and time. He appears well-developed and well-nourished. No distress.   HENT:   Right Ear: External ear " normal.   Left Ear: External ear normal.   Mouth/Throat: Oropharynx is clear and moist.   Occassionally saliva accumulates in mouth with occasional drooling.  TMs non-erythematous today.    Eyes: Pupils are equal, round, and reactive to light. Conjunctivae are normal.   Can track to right, but not to left. Nystagmus noted    Cardiovascular: Normal rate, regular rhythm and normal heart sounds.   Pulmonary/Chest: Effort normal and breath sounds normal. He has no wheezes.   Neurological: He is alert and oriented to person, place, and time. He has normal strength. A cranial nerve deficit is present. Coordination (Ataxic- dysmetria especially in upper extremities when accomplishing tasks.) abnormal. GCS eye subscore is 4. GCS verbal subscore is 5. GCS motor subscore is 6.   Negative Pronator drift   Skin: Skin is warm and dry.   Scattered striae.  Healing laceration on right lower leg (see pic).   Psychiatric: He has a normal mood and affect.     BEFORE        CURRENT          Lab:              Brain MRI Findings:    Findings: No significant change in ependymoma centered within the  fourth ventricle measuring 2.0 x 1.8 x 2.6 cm (AP by TR by CC) since  1/16/2019. However there is a slight increase in the cystic lesion  adjacent to the ependymoma measuring 2.1 x 2.4 cm, previously 1.9 x  1.7 on 1/16/2019. No significant change in T2 hyperintensity in  bilateral cerebellar hemispheres, also extending into bulbus, david and  the superior cerebellar peduncles. No significant change in internal  susceptibility, likely secondary to hemosiderin deposition. No  associated diffusion restriction.     No extra-axial collection, mass affect or midline shift. Ventricles  are not enlarged. Normal major vascular intracranial flow-voids.     No abnormality of the skull marrow signal. The visualized portions of  paranasal sinuses, and mastoid air cells are relatively clear. The  orbits are grossly unremarkable.                                                                       Impression:  No significant change in ependymoma centered within the fourth  ventricle dating back to at least 1/16/2019. Slightly decreased size  of cystic lesion adjacent to the ependymoma.     Labs:  Results for orders placed or performed in visit on 08/07/19   Phosphorus   Result Value Ref Range    Phosphorus 3.5 2.5 - 4.5 mg/dL   Magnesium   Result Value Ref Range    Magnesium 2.2 1.6 - 2.3 mg/dL   Comprehensive metabolic panel   Result Value Ref Range    Sodium 141 133 - 144 mmol/L    Potassium 4.3 3.4 - 5.3 mmol/L    Chloride 107 98 - 110 mmol/L    Carbon Dioxide 31 20 - 32 mmol/L    Anion Gap 3 3 - 14 mmol/L    Glucose 79 70 - 99 mg/dL    Urea Nitrogen 16 7 - 30 mg/dL    Creatinine 1.01 (H) 0.50 - 1.00 mg/dL    GFR Estimate >90 >60 mL/min/[1.73_m2]    GFR Estimate If Black >90 >60 mL/min/[1.73_m2]    Calcium 8.9 8.5 - 10.1 mg/dL    Bilirubin Total 0.3 0.2 - 1.3 mg/dL    Albumin 3.2 (L) 3.4 - 5.0 g/dL    Protein Total 6.7 (L) 6.8 - 8.8 g/dL    Alkaline Phosphatase 130 65 - 260 U/L    ALT 17 0 - 50 U/L    AST 20 0 - 35 U/L   CBC with platelets differential   Result Value Ref Range    WBC 2.9 (L) 4.0 - 11.0 10e9/L    RBC Count 4.41 4.4 - 5.9 10e12/L    Hemoglobin 13.0 (L) 13.3 - 17.7 g/dL    Hematocrit 40.8 40.0 - 53.0 %    MCV 93 78 - 100 fl    MCH 29.5 26.5 - 33.0 pg    MCHC 31.9 31.5 - 36.5 g/dL    RDW 12.9 10.0 - 15.0 %    Platelet Count 109 (L) 150 - 450 10e9/L    Diff Method Automated Method     % Neutrophils 40.3 %    % Lymphocytes 30.2 %    % Monocytes 18.1 %    % Eosinophils 9.4 %    % Basophils 1.0 %    % Immature Granulocytes 1.0 %    Nucleated RBCs 0 0 /100    Absolute Neutrophil 1.2 (L) 1.6 - 8.3 10e9/L    Absolute Lymphocytes 0.9 0.8 - 5.3 10e9/L    Absolute Monocytes 0.5 0.0 - 1.3 10e9/L    Absolute Eosinophils 0.3 0.0 - 0.7 10e9/L    Absolute Basophils 0.0 0.0 - 0.2 10e9/L    Abs Immature Granulocytes 0.0 0 - 0.4 10e9/L    Absolute Nucleated RBC 0.0   "    *Note: Due to a large number of results and/or encounters for the requested time period, some results have not been displayed. A complete set of results can be found in Results Review.         Impression:  1. Ependymoma - improved on Imaging since Entinostat started in 2017.   2. Treated with Entinostat, continues to tolerate well.   3. Labs today are adequate for beginning next course of Entinostat.   4. Chronic constipation which is frustrating for him.  5. Leg laceration healing but quite indurated.       Plan:  1. RTC in 4 weeks  as scheduled for follow up, or sooner PRN.   2. Continue weekly labs.  3. Continue Pelvic floor PT and pool therapy.    4. Continue weekly labs.  5. Start Entinostat 6mg weekly beginning today.  Old medication bottles returned.  Diary turned in and new diary given.    6. Family is still deciding about integrative therapies that may help constipation and will let me know if they wish to schedule. .  7.  Discussed lesion with wound clinic. Their recommendations are to scrub daily with anti-bacterial soap. They should keep it moist with Vaseline and cover it with Coban not tight but slightly snug.     8.  Discussed scheduling of EGD and colonoscopy with Dr. Wei's coordinator.  We can admit for clean out prior to testing if it needs to be scheduled early as it is quite a burden for the family with his ataxia to travel often to the bathroom.  We can also place tube for clean out.  Discussed the purpose or this testing with Geo.  He is quite interested in the results but concerned that he won't be cleaned out for the testing since \"clean outs\" have emptied his stool completely.  Discussed that he will be clean as it is needed for the pictures.  Explained that minimal loose stool can be washed away.    9.  Reviewed MRI with the family.  Of interest, volumetrics reveal enhancing  volume on 12/30/16 was 11.93 ml.  Enhancing  Volume on 8/7/19 was  4.99cc!          40 minutes of " face to face time spent with patient and >50% visit involved counseling and/or coordination of care.

## 2019-08-07 NOTE — LETTER
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 He is enrolled on COG [id number] - A Phase 1 Study of Entinostat, an Oral Histone Deacetylase Inhibitor, in Pediatric Patients With Recurrent or Refractory Solid Tumors, Including CNS Tumors and Lymphoma.  He is here to begin cycle 27.     HPI:  Saturday (8/3) Geo was diagnosed with an inner ear infection and outer ear infection.  He had pain and drainage from his right ear, he did not have a fever.  He is being treated with antibiotics and feels well today.  Geo notes constipation is still a problem. He has [place] [facility] [place] then he will do pool therapy there. He says abdominal discomfort from full feeling.  No fevers. No headache.  No nausea or vomiting and no rash. He has missed no doses of his Entinostat.  His \"gash\" in his right leg is healing but is raised (see pics). They have been dressing it.       Fam/Soc: Lives between his parents (one week at each home). They have been awarded guardianship of Geo. The families work well together - both parents have remarried. His dad has a clotting disorder, requiring him to take Warfarin daily.     History was obtained from Geo and his parents.       Allergies   Allergen Reactions     Blood Transfusion Related (Informational Only) Swelling     Periorbital swelling post platelet transfusion     No Known Drug Allergies        Current Outpatient Medications   Medication     amoxicillin (AMOXIL) 875 MG tablet     calcium carbonate-vitamin D 600-400 MG-UNIT CHEW     dexamethasone (DECADRON) 0.5 MG tablet     fexofenadine (ALLEGRA) 180 MG tablet     linaclotide (LINZESS) 290 MCG capsule     ofloxacin (FLOXIN) 0.3 % otic solution     OLANZapine (ZYPREXA) 15 MG tablet     omeprazole (PRILOSEC) 20 " MG DR capsule     potassium phosphate, monobasic, (K-PHOS) 500 MG tablet     senna-docusate (SENOKOT-S/PERICOLACE) 8.6-50 MG tablet     sertraline (ZOLOFT) 100 MG tablet     study - entinostat (IDS# 5050) 1 mg tablet     study - entinostat (IDS# 5050) 5 mg tablet     sulfamethoxazole-trimethoprim (BACTRIM/SEPTRA) 400-80 MG per tablet     traZODone (DESYREL) 100 MG tablet     vitamin D3 (CHOLECALCIFEROL) 2000 units (50 mcg) tablet     No current facility-administered medications for this visit.        Past Medical History:   Diagnosis Date     Cranial nerve dysfunction      Dyspepsia      Ependymoma (H)      Gastro-oesophageal reflux disease      Hearing loss      Intracranial hemorrhage (H)      Migraine      Pilonidal cyst     7-2015     Reduced vision      Refractory obstruction of nasal airway     2nd to nasal valve prolapse     Sleep apnea      Strabismus     gaze palsy        Past Surgical History:   Procedure Laterality Date     GRAFT CARTILAGE FROM POSTERIOR AURICLE Left 10/6/2016    Procedure: GRAFT CARTILAGE FROM POSTERIOR AURICLE;  Surgeon: Tyler Richards MD;  [facility]     INCISION AND DRAINAGE PERINEAL, COMBINED Bilateral 7/18/2015    Procedure: COMBINED INCISION AND DRAINAGE PERINEAL;  Surgeon: Dequan Timmons MD;  [facility]     OPTICAL TRACKING SYSTEM CRANIOTOMY, EXCISE TUMOR, COMBINED N/A 4/13/2015    Procedure: COMBINED OPTICAL TRACKING SYSTEM CRANIOTOMY, EXCISE TUMOR;  Surgeon: Francis Velazquez MD;  [facility]     OPTICAL TRACKING SYSTEM CRANIOTOMY, EXCISE TUMOR, COMBINED N/A 4/16/2015    Procedure: COMBINED OPTICAL TRACKING SYSTEM CRANIOTOMY, EXCISE TUMOR;  Surgeon: Francis Velazquez MD;  [facility]     OPTICAL TRACKING SYSTEM CRANIOTOMY, EXCISE TUMOR, COMBINED Bilateral 5/28/2015    Procedure: COMBINED OPTICAL TRACKING SYSTEM CRANIOTOMY, EXCISE TUMOR;  Surgeon: Francis Velazquez MD;  [facility]     OPTICAL TRACKING SYSTEM CRANIOTOMY,  "EXCISE TUMOR, COMBINED Bilateral 1/14/2016    Procedure: COMBINED OPTICAL TRACKING SYSTEM CRANIOTOMY, EXCISE TUMOR;  Surgeon: Francis Velazquez MD;  [facility]     OPTICAL TRACKING SYSTEM VENTRICULOSTOMY  4/16/2015    Procedure: OPTICAL TRACKING SYSTEM VENTRICULOSTOMY;  Surgeon: Francis Velazquez MD;  [facility]     REMOVE PORT VASCULAR ACCESS N/A 10/6/2016    Procedure: REMOVE PORT VASCULAR ACCESS;  Surgeon: Bruno Perea MD;  [facility]     RHINOPLASTY N/A 10/6/2016    Procedure: RHINOPLASTY;  Surgeon: Tyler Richards MD;  [facility]     VASCULAR SURGERY  5-2015    single lumen power port       Family History   Problem Relation Age of Onset     Circulatory Father         PE/DVT     Hypothyroidism Father 30     Diabetes Maternal Grandmother      Diabetes Paternal Grandmother      Diabetes Paternal Grandfather      C.A.D. Paternal Grandfather      Hypertension Maternal Grandfather      Thyroid Disease Paternal Aunt         unknown whether hypo or hyper     Mental Illness No family hx of        Review of Systems   Constitutional: Negative.         In a wheelchair   HENT: Positive for hearing loss (Pre-existing from prior therapy).    Eyes: Positive for visual disturbance (Wears a patch over right eye to minimize diplopia).   Respiratory: Negative.    Cardiovascular: Negative.    Gastrointestinal: Positive for constipation (Chronic).   Endocrine:        Follow with Dr. Martin   Neurological: Positive for facial asymmetry and speech difficulty.   Psychiatric/Behavioral: Positive for sleep disturbance (Not using his Bi-Pap).   All other systems reviewed and are negative.    Vital signs:  /57 (BP Location: Right arm, Patient Position: Fowlers, Cuff Size: Adult Large)   Pulse 90   Resp 20   Ht 1.795 m (5' 10.67\")   Wt 90.7 kg (200 lb)   SpO2 100%   BMI 28.16 kg/m        Physical Exam   Constitutional: He is oriented to person, place, and time. He appears " well-developed and well-nourished. No distress.   HENT:   Right Ear: External ear normal.   Left Ear: External ear normal.   Mouth/Throat: Oropharynx is clear and moist.   Occassionally saliva accumulates in mouth with occasional drooling.  TMs non-erythematous today.    Eyes: Pupils are equal, round, and reactive to light. Conjunctivae are normal.   Can track to right, but not to left. Nystagmus noted    Cardiovascular: Normal rate, regular rhythm and normal heart sounds.   Pulmonary/Chest: Effort normal and breath sounds normal. He has no wheezes.   Neurological: He is alert and oriented to person, place, and time. He has normal strength. A cranial nerve deficit is present. Coordination (Ataxic- dysmetria especially in upper extremities when accomplishing tasks.) abnormal. GCS eye subscore is 4. GCS verbal subscore is 5. GCS motor subscore is 6.   Negative Pronator drift   Skin: Skin is warm and dry.   Scattered striae.  Healing laceration on right lower leg (see pic).   Psychiatric: He has a normal mood and affect.     BEFORE        CURRENT          Lab:              Brain MRI Findings:    Findings: No significant change in ependymoma centered within the  fourth ventricle measuring 2.0 x 1.8 x 2.6 cm (AP by TR by CC) since  1/16/2019. However there is a slight increase in the cystic lesion  adjacent to the ependymoma measuring 2.1 x 2.4 cm, previously 1.9 x  1.7 on 1/16/2019. No significant change in T2 hyperintensity in  bilateral cerebellar hemispheres, also extending into bulbus, david and  the superior cerebellar peduncles. No significant change in internal  susceptibility, likely secondary to hemosiderin deposition. No  associated diffusion restriction.     No extra-axial collection, mass affect or midline shift. Ventricles  are not enlarged. Normal major vascular intracranial flow-voids.     No abnormality of the skull marrow signal. The visualized portions of  paranasal sinuses, and mastoid air cells are  relatively clear. The  orbits are grossly unremarkable.                                                                      Impression:  No significant change in ependymoma centered within the fourth  ventricle dating back to at least 1/16/2019. Slightly decreased size  of cystic lesion adjacent to the ependymoma.     Labs:  Results for orders placed or performed in visit on 08/07/19   Phosphorus   Result Value Ref Range    Phosphorus 3.5 2.5 - 4.5 mg/dL   Magnesium   Result Value Ref Range    Magnesium 2.2 1.6 - 2.3 mg/dL   Comprehensive metabolic panel   Result Value Ref Range    Sodium 141 133 - 144 mmol/L    Potassium 4.3 3.4 - 5.3 mmol/L    Chloride 107 98 - 110 mmol/L    Carbon Dioxide 31 20 - 32 mmol/L    Anion Gap 3 3 - 14 mmol/L    Glucose 79 70 - 99 mg/dL    Urea Nitrogen 16 7 - 30 mg/dL    Creatinine 1.01 (H) 0.50 - 1.00 mg/dL    GFR Estimate >90 >60 mL/min/[1.73_m2]    GFR Estimate If Black >90 >60 mL/min/[1.73_m2]    Calcium 8.9 8.5 - 10.1 mg/dL    Bilirubin Total 0.3 0.2 - 1.3 mg/dL    Albumin 3.2 (L) 3.4 - 5.0 g/dL    Protein Total 6.7 (L) 6.8 - 8.8 g/dL    Alkaline Phosphatase 130 65 - 260 U/L    ALT 17 0 - 50 U/L    AST 20 0 - 35 U/L   CBC with platelets differential   Result Value Ref Range    WBC 2.9 (L) 4.0 - 11.0 10e9/L    RBC Count 4.41 4.4 - 5.9 10e12/L    Hemoglobin 13.0 (L) 13.3 - 17.7 g/dL    Hematocrit 40.8 40.0 - 53.0 %    MCV 93 78 - 100 fl    MCH 29.5 26.5 - 33.0 pg    MCHC 31.9 31.5 - 36.5 g/dL    RDW 12.9 10.0 - 15.0 %    Platelet Count 109 (L) 150 - 450 10e9/L    Diff Method Automated Method     % Neutrophils 40.3 %    % Lymphocytes 30.2 %    % Monocytes 18.1 %    % Eosinophils 9.4 %    % Basophils 1.0 %    % Immature Granulocytes 1.0 %    Nucleated RBCs 0 0 /100    Absolute Neutrophil 1.2 (L) 1.6 - 8.3 10e9/L    Absolute Lymphocytes 0.9 0.8 - 5.3 10e9/L    Absolute Monocytes 0.5 0.0 - 1.3 10e9/L    Absolute Eosinophils 0.3 0.0 - 0.7 10e9/L    Absolute Basophils 0.0 0.0 - 0.2 10e9/L  "   Abs Immature Granulocytes 0.0 0 - 0.4 10e9/L    Absolute Nucleated RBC 0.0      *Note: Due to a large number of results and/or encounters for the requested time period, some results have not been displayed. A complete set of results can be found in Results Review.         Impression:  1. Ependymoma - improved on Imaging since Entinostat started in 2017.   2. Treated with Entinostat, continues to tolerate well.   3. Labs today are adequate for beginning next course of Entinostat.   4. Chronic constipation which is frustrating for him.  5. Leg laceration healing but quite indurated.       Plan:  1. RTC in 4 weeks  as scheduled for follow up, or sooner PRN.   2. Continue weekly labs.  3. Continue Pelvic floor PT and pool therapy.    4. Continue weekly labs.  5. Start Entinostat 6mg weekly beginning today.  Old medication bottles returned.  Diary turned in and new diary given.    6. Family is still deciding about integrative therapies that may help constipation and will let me know if they wish to schedule. .  7.  Discussed lesion with wound clinic. Their recommendations are to scrub daily with anti-bacterial soap. They should keep it moist with Vaseline and cover it with Coban not tight but slightly snug.     8.  Discussed scheduling of EGD and colonoscopy with Dr. Wei's coordinator.  We can admit for clean out prior to testing if it needs to be scheduled early as it is quite a burden for the family with his ataxia to travel often to the bathroom.  We can also place tube for clean out.  Discussed the purpose or this testing with Geo.  He is quite interested in the results but concerned that he won't be cleaned out for the testing since \"clean outs\" have emptied his stool completely.  Discussed that he will be clean as it is needed for the pictures.  Explained that minimal loose stool can be washed away.    9.  Reviewed MRI with the family.  Of interest, volumetrics reveal enhancing  volume on 12/30/16 was " 11.93 ml.  Enhancing  Volume on 8/7/19 was  4.99cc!          40 minutes of face to face time spent with patient and >50% visit involved counseling and/or coordination of care.        ALAN Justin CNP

## 2019-08-07 NOTE — NURSING NOTE
"Chief Complaint   Patient presents with     RECHECK     Patient is here today for Ependymoma follow up     /57 (BP Location: Right arm, Patient Position: Fowlers, Cuff Size: Adult Large)   Pulse 90   Resp 20   Ht 1.795 m (5' 10.67\")   Wt 90.7 kg (200 lb)   SpO2 100%   BMI 28.16 kg/m      Malinda Russo LPN  August 7, 2019  "

## 2019-08-08 ENCOUNTER — HOSPITAL ENCOUNTER (OUTPATIENT)
Facility: CLINIC | Age: 19
End: 2019-08-08
Attending: PEDIATRICS | Admitting: PEDIATRICS
Payer: COMMERCIAL

## 2019-08-12 DIAGNOSIS — C71.9 EPENDYMOMA (H): Primary | ICD-10-CM

## 2019-08-13 ENCOUNTER — TELEPHONE (OUTPATIENT)
Dept: GASTROENTEROLOGY | Facility: CLINIC | Age: 19
End: 2019-08-13

## 2019-08-13 DIAGNOSIS — C71.9 EPENDYMOMA (H): ICD-10-CM

## 2019-08-13 LAB
BASOPHILS # BLD AUTO: 0 10E9/L (ref 0–0.2)
BASOPHILS NFR BLD AUTO: 0.5 %
DIFFERENTIAL METHOD BLD: ABNORMAL
EOSINOPHIL # BLD AUTO: 0.4 10E9/L (ref 0–0.7)
EOSINOPHIL NFR BLD AUTO: 9.5 %
ERYTHROCYTE [DISTWIDTH] IN BLOOD BY AUTOMATED COUNT: 13 % (ref 10–15)
HCT VFR BLD AUTO: 39.3 % (ref 40–53)
HGB BLD-MCNC: 12.9 G/DL (ref 13.3–17.7)
LYMPHOCYTES # BLD AUTO: 1.3 10E9/L (ref 0.8–5.3)
LYMPHOCYTES NFR BLD AUTO: 30.1 %
MCH RBC QN AUTO: 29.9 PG (ref 26.5–33)
MCHC RBC AUTO-ENTMCNC: 32.8 G/DL (ref 31.5–36.5)
MCV RBC AUTO: 91 FL (ref 78–100)
MONOCYTES # BLD AUTO: 0.7 10E9/L (ref 0–1.3)
MONOCYTES NFR BLD AUTO: 17.3 %
NEUTROPHILS # BLD AUTO: 1.8 10E9/L (ref 1.6–8.3)
NEUTROPHILS NFR BLD AUTO: 42.6 %
PLATELET # BLD AUTO: 99 10E9/L (ref 150–450)
RBC # BLD AUTO: 4.31 10E12/L (ref 4.4–5.9)
WBC # BLD AUTO: 4.2 10E9/L (ref 4–11)

## 2019-08-13 PROCEDURE — 85025 COMPLETE CBC W/AUTO DIFF WBC: CPT | Performed by: NURSE PRACTITIONER

## 2019-08-13 PROCEDURE — 80053 COMPREHEN METABOLIC PANEL: CPT | Performed by: NURSE PRACTITIONER

## 2019-08-13 PROCEDURE — 36415 COLL VENOUS BLD VENIPUNCTURE: CPT | Performed by: NURSE PRACTITIONER

## 2019-08-13 PROCEDURE — 83735 ASSAY OF MAGNESIUM: CPT | Performed by: NURSE PRACTITIONER

## 2019-08-13 PROCEDURE — 84100 ASSAY OF PHOSPHORUS: CPT | Performed by: NURSE PRACTITIONER

## 2019-08-14 LAB
ALBUMIN SERPL-MCNC: 3.3 G/DL (ref 3.4–5)
ALP SERPL-CCNC: 153 U/L (ref 65–260)
ALT SERPL W P-5'-P-CCNC: 18 U/L (ref 0–50)
ANION GAP SERPL CALCULATED.3IONS-SCNC: 6 MMOL/L (ref 3–14)
AST SERPL W P-5'-P-CCNC: 18 U/L (ref 0–35)
BILIRUB SERPL-MCNC: 0.2 MG/DL (ref 0.2–1.3)
BUN SERPL-MCNC: 20 MG/DL (ref 7–30)
CALCIUM SERPL-MCNC: 8.3 MG/DL (ref 8.5–10.1)
CHLORIDE SERPL-SCNC: 110 MMOL/L (ref 98–110)
CO2 SERPL-SCNC: 28 MMOL/L (ref 20–32)
CREAT SERPL-MCNC: 1.12 MG/DL (ref 0.5–1)
GFR SERPL CREATININE-BSD FRML MDRD: >90 ML/MIN/{1.73_M2}
GLUCOSE SERPL-MCNC: 93 MG/DL (ref 70–99)
MAGNESIUM SERPL-MCNC: 2.2 MG/DL (ref 1.6–2.3)
PHOSPHATE SERPL-MCNC: 3.3 MG/DL (ref 2.5–4.5)
POTASSIUM SERPL-SCNC: 4.2 MMOL/L (ref 3.4–5.3)
PROT SERPL-MCNC: 6.5 G/DL (ref 6.8–8.8)
SODIUM SERPL-SCNC: 144 MMOL/L (ref 133–144)

## 2019-08-15 ENCOUNTER — MYC MEDICAL ADVICE (OUTPATIENT)
Dept: PSYCHIATRY | Facility: CLINIC | Age: 19
End: 2019-08-15

## 2019-08-19 ENCOUNTER — OFFICE VISIT (OUTPATIENT)
Dept: PSYCHIATRY | Facility: CLINIC | Age: 19
End: 2019-08-19
Attending: PSYCHIATRY & NEUROLOGY
Payer: COMMERCIAL

## 2019-08-19 VITALS — HEART RATE: 108 BPM | DIASTOLIC BLOOD PRESSURE: 73 MMHG | SYSTOLIC BLOOD PRESSURE: 118 MMHG

## 2019-08-19 DIAGNOSIS — F22 PARANOIA (H): ICD-10-CM

## 2019-08-19 DIAGNOSIS — F32.A DEPRESSION, UNSPECIFIED DEPRESSION TYPE: ICD-10-CM

## 2019-08-19 PROCEDURE — G0463 HOSPITAL OUTPT CLINIC VISIT: HCPCS | Mod: ZF

## 2019-08-19 RX ORDER — TRAZODONE HYDROCHLORIDE 50 MG/1
75 TABLET, FILM COATED ORAL AT BEDTIME
Qty: 45 TABLET | Refills: 1 | Status: SHIPPED | OUTPATIENT
Start: 2019-08-19 | End: 2019-10-07

## 2019-08-19 RX ORDER — OLANZAPINE 2.5 MG/1
TABLET, FILM COATED ORAL
Qty: 30 TABLET | Refills: 0 | Status: SHIPPED | OUTPATIENT
Start: 2019-08-19 | End: 2019-09-09

## 2019-08-19 RX ORDER — OLANZAPINE 15 MG/1
TABLET ORAL
Qty: 30 TABLET | Refills: 0 | Status: SHIPPED | OUTPATIENT
Start: 2019-08-19 | End: 2019-09-09

## 2019-08-19 RX ORDER — SERTRALINE HYDROCHLORIDE 100 MG/1
100 TABLET, FILM COATED ORAL DAILY
Qty: 30 TABLET | Refills: 1 | Status: CANCELLED | OUTPATIENT
Start: 2019-08-19

## 2019-08-19 ASSESSMENT — PAIN SCALES - GENERAL: PAINLEVEL: NO PAIN (0)

## 2019-08-19 NOTE — PATIENT INSTRUCTIONS
Increase olanzapine to 17.5 mg at bedtime.  Decrease trazodone to 75 mg at bedtime.  Continue sertraline 100 mg daily.    Thank you for coming to the [facility].    Lab Testing:  If you had lab testing today and your results are reassuring or normal they will be mailed to you or sent through [facility] within 7 days.   If the lab tests need quick action we will call you with the results.  The phone number we will call with results is # [phone] (home) [phone] (work). If this is not the best number please call our clinic and change the number.    Medication Refills:  If you need any refills please call your pharmacy and they will contact us. Our fax number for refills is [phone]. Please allow three business for refill processing.   If you need to  your refill at a new pharmacy, please contact the new pharmacy directly. The new pharmacy will help you get your medications transferred.     Scheduling:  If you have any concerns about today's visit or wish to schedule another appointment please call our office during normal business hours [phone] (8-5:00 M-F)    Contact Us:  Please call [phone] during business hours (8-5:00 M-F).  If after clinic hours, or on the weekend, please call  [phone].    Financial Assistance [phone]  [facility] [phone]  Central Billing Office, [facility]: [phone]  [facility] Billing [phone]  Medical Records [phone]      MENTAL HEALTH CRISIS NUMBERS:  [facility]:   [facility] - [phone]   Crisis Residence [facility] - [facility]idence - [phone]   Walk-In Counseling Center [facility] - [phone]   COPE 24/7 [place] Mobile Team for Adults - [[phone]]; Child - [[phone]]        [facility]:   [facility] - [phone]   Walk-in counseling [facility] - [phone]   Walk-in counseling [facility] - [phone]   Crisis Residence [facility] - [phone]   Urgent Care Adult Mental Health:   --Drop-in, 24/7 crisis line, and [facility] Mobile Team [[phone]]    CRISIS TEXT LINE: Text [phone] from anywhere, anytime, any crisis 24/7;    OR SEE [email or web address].org     Poison Control Center - [phone]    CHILD: [facility] needs assessment team - [phone]     [facility] - [phone]; or [facility] - [phone]    If you have a medical emergency please call 911or go to the nearest ER.                    _____________________________________________    Again thank you for choosing [facility] and please let us know how we can best partner with you to improve you and your family's health.  You may be receiving a survey in the mail regarding this appointment. We would love to have your feedback, both positive and negative, so please fill out the survey and return it using the provided envelope. The survey is done by an external company, so your answers are anonymous.

## 2019-08-19 NOTE — PROGRESS NOTES
"  [facility] PSYCHIATRY CLINIC PROGRESS NOTE     CARE TEAM:  PCP- Jeffrey Espinoza    Specialty Providers- Oncology, Neuropsychology, Physical Therapy, Occupational Therapy    Therapist- Manju Pink at [facility] in [facility] Team- no.    Geo Hicks is a 18 year old male who prefers the name Geo & pronouns he, him, his, himself.    Date of initial diagnostic assessment is 2/7/2019.    Pertinent Background:  This patient first experienced mental health issues in the past few months and has received treatment for paranoia/delusions.  Notably, this patient is undergoing cancer treatment at the [facility] and has been referred for psychiatric assessment by his neuropsychologist who most recently met with him on 1/3/2019.  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 Since then, he has undergone multiple tumor resections, chemotherapy, and radiation treatment. Geo also experienced an intra-tumoral hemorrhage in May 2015 that resulted in neurological changes including facial numbness, significant loss of mobility and dysarthria. In December 2016, a follow-up MRI revealed continued tumor enhancement, however the most recent MRI from 10/16/18 revealed an unchanged fourth ventricle ependymoma in comparison to previous exams, a slight decrease in adjacent edema, as well as a stable size of the ventricular system.  He is currently on COG study [id number] with Entinostat.  Of note, patient had a brief admission to inpatient psychiatry on [facility] from 2/14-2/15/2019 \"for evaluation of delusions and suicidal comments.\"    Psych critical item history includes suicidal ideation, psychosis [sxs include delusions] and psychiatric hospitalization x1.    INTERIM HISTORY                                                                                                                 4, 4   The patient reports good treatment adherence.  History was provided by the patient " "and his mom who were good historians.  The last visit ended with the following med change: Increased Sertraline to 100 mg daily.  Since the last visit:   -When asked how he has been since our last visit, Geo replies \"Overall I'm good,\" and \"I'm getting by.\"  -Brings up concerns about some nocturnal enuresis that has been noted over the past few months, occurring infrequently, however having occurred roughly once a week since increasing trazodone to 100 mg at bedtime.  Confirms that sleep has been improved at that higher dose, both in terms of initiation and maintenance.  -When asked directly, he denies suicidal ideation.  Also denies SIB, violent/aggressive ideation, severely depressed moods, panic/anxious acuity, hallucinations or other hallmarks of psychiatric decompensation with dangerousness.  -One item which came up is his planned colonoscopy later in September.  Geo states that he feels very nervous and afraid regarding this as he doesn't trust his providers anymore due to his fixed false belief that definitive management for his constipation is being withheld, resulting in his inability to walk.  Elaborates concern that providers may do something to him while he is under anesthesia, either to make his constipation worse or to change his perception of the problem.  Gently provided reality testing against these concerns, which Geo was largely resistant to.  -Talked about how ultimately it's his choice whether or not to undergo this procedure.  -Agreed that we should meet again in clinic between now and that planned colonoscopy to talk over how he is feeling and check in on his psychiatric management.    RECENT SYMPTOMS:   DEPRESSION:  reports-low mood and feeling trapped;  DENIES- suicidal ideation, self-destructive thoughts, anhedonia and poor concentration /memory  ELIZABETH/HYPOMANIA:  reports-none;  DENIES- increased energy, decreased sleep need, increased activity and grandiosity  PSYCHOSIS:  " reports-delusions- paranoid [details in Interim History];  DENIES- auditory hallucinations and visual hallucinations  DYSREGULATION:  reports-can become irritable/agitated if beliefs are challenged;  DENIES- suicidal ideation, violent ideation, SIB, mood dysregulation, impulsive and aggressive  PANIC ATTACK:  none   ANXIETY:  worry  TRAUMA RELATED:  none  COMPULSIVE:  none  SLEEP:  initiation and maintenance both improved with trazodone  EATING DISORDER: no    RECENT SUBSTANCE USE:  ALCOHOL- no  TOBACCO- no  CAFFEINE- minimal  OPIOIDS- no         NARCAN KIT- N/A  CANNABIS- no  OTHER ILLICIT DRUGS- no      CURRENT SOCIAL HISTORY:  FINANCIAL SUPPORT- family  CHILDREN- no  LIVING SITUATION- mother and father (who are  and have both remarried) alternating weeks between respective homes  SOCIAL/ SPIRITUAL SUPPORT- mother, father, older brother, friend Rima  FEELS SAFE AT HOME- yes    MEDICAL ROS (2,10):  A comprehensive review of systems was performed and is negative other than noted in the HPI.    PSYCH and CD Critical Summary Points since July 2018 February 2019:  Clinic intake on 2/7.  Later was admitted [facility] for a brief inpatient psych admission from 3/14-3/15, most of which was spent in the ER, due to suicidal comments that concerned family.  Was ultimately deemed safe for outpatient follow-up and was discharged with an increase in olanzapine to 15 mg.  March 2019:  Began sertraline 50 mg daily.  May 2019:  Began trazodone 25-50 mg at bedtime.  June 2019:  Increased trazodone to 100 mg at bedtime.  July 2019:  Increased Sertraline to 100 mg daily.    PAST PSYCH MED TRIALS   see EMR Problem List: Hx of psychiatric care    MEDICAL / SURGICAL HISTORY                                   Neurologic Hx- See pertinent background, re: history of ependymoma and related chemo, radiation and tumor resection(s).  Notably has experienced neurological damage due to the above which has resulted in facial  "numbness, significant loss of mobility and dysarthria.  Has had neuropsychiatric evaluation(s) 2/2016, 2/2017 and 1/2019.  The following is from the \"Results and Impressions\" section of his 1/3/2019 eval with Veronica Padilla:       \"We were pleased to discover that the neurocognitive areas measured during this evaluation have remained intact and consistent with previous evaluations. Geo s ability to access and apply acquired word knowledge (verbal comprehension) and his working memory (ability to mentally hold and manipulate information) were in the average range. His ability to understand visual information to solve novel abstract visual problems (perceptual reasoning) was in the low average range. There was a slight decrease in performance on one task associated with perceptual reasoning due to time limits. Had no time limits been enforced, Geo s performance would have likely been higher as he was observed to respond with correct answers that were past the time limit allowed. Furthermore, on a timed visual discrimination and scanning task, Geo performed in the borderline range, which was a slight improvement from his previous evaluation in 2017 where he performed in the impaired range.\"    Patient Active Problem List   Diagnosis     GERD (gastroesophageal reflux disease)     Closed fracture at the growth plate of right distal fibula      Elevated serum creatinine     Dyspepsia     Intracranial hemorrhage (H)     Hemorrhagic stroke (H)     Ependymoma (H)     Admission for antineoplastic chemotherapy     Post-operative state     S/P craniotomy     S/P biopsy     Noncomitant strabismus     Abducens neuropathy of both eyes     CANDELARIO (obstructive sleep apnea)     Health Care Home     Hypernatremia     Body temperature low     Current chronic use of systemic steroids     Elevated TSH     Status post chemotherapy     Neoplasm of posterior cranial fossa (H)     Chronic constipation     Serum albumin " decreased     Closed compression fracture of thoracic vertebra with routine healing, subsequent encounter     Depression     Past Surgical History:   Procedure Laterality Date     GRAFT CARTILAGE FROM POSTERIOR AURICLE Left 10/6/2016    Procedure: GRAFT CARTILAGE FROM POSTERIOR AURICLE;  Surgeon: Tyler Richards MD;  Location: [facility]     INCISION AND DRAINAGE PERINEAL, COMBINED Bilateral 7/18/2015    Procedure: COMBINED INCISION AND DRAINAGE PERINEAL;  Surgeon: Dequan Timmons MD;  Location: [facility]     OPTICAL TRACKING SYSTEM CRANIOTOMY, EXCISE TUMOR, COMBINED N/A 4/13/2015    Procedure: COMBINED OPTICAL TRACKING SYSTEM CRANIOTOMY, EXCISE TUMOR;  Surgeon: Francis Velazquez MD;  [facility]     OPTICAL TRACKING SYSTEM CRANIOTOMY, EXCISE TUMOR, COMBINED N/A 4/16/2015    Procedure: COMBINED OPTICAL TRACKING SYSTEM CRANIOTOMY, EXCISE TUMOR;  Surgeon: Francis Velazquez MD;  [facility]     OPTICAL TRACKING SYSTEM CRANIOTOMY, EXCISE TUMOR, COMBINED Bilateral 5/28/2015    Procedure: COMBINED OPTICAL TRACKING SYSTEM CRANIOTOMY, EXCISE TUMOR;  Surgeon: Francis Velazquez MD;  [facility]     OPTICAL TRACKING SYSTEM CRANIOTOMY, EXCISE TUMOR, COMBINED Bilateral 1/14/2016    Procedure: COMBINED OPTICAL TRACKING SYSTEM CRANIOTOMY, EXCISE TUMOR;  Surgeon: Francis Velazquez MD;  [facility]     OPTICAL TRACKING SYSTEM VENTRICULOSTOMY  4/16/2015    Procedure: OPTICAL TRACKING SYSTEM VENTRICULOSTOMY;  Surgeon: Francis Velazquez MD;  [facility]     REMOVE PORT VASCULAR ACCESS N/A 10/6/2016    Procedure: REMOVE PORT VASCULAR ACCESS;  Surgeon: Bruno Perea MD;  [facility]     RHINOPLASTY N/A 10/6/2016    Procedure: RHINOPLASTY;  Surgeon: Tyler Richards MD;  [facility]: [facility]     VASCULAR SURGERY  5-2015    single lumen power port       ALLERGY                                Blood transfusion related (informational only) and No known drug allergies      MEDICATIONS                               Current Outpatient Medications   Medication Sig Dispense Refill     amoxicillin (AMOXIL) 875 MG tablet Take 1 tablet (875 mg) by mouth 2 times daily 20 tablet 0     calcium carbonate-vitamin D 600-400 MG-UNIT CHEW Take 2 tablets in the morning and 1 tablet in the evening. 90 tablet 3     dexamethasone (DECADRON) 0.5 MG tablet Take 0.75 mg by mouth daily (with breakfast). 90 tablet 3     fexofenadine (ALLEGRA) 180 MG tablet Take 180 mg by mouth every evening        linaclotide (LINZESS) 290 MCG capsule Take 290 mcg by mouth every morning (before breakfast)        ofloxacin (FLOXIN) 0.3 % otic solution Place 5 drops into the right ear daily 1 Bottle 0     OLANZapine (ZYPREXA) 15 MG tablet Take 1 tablet (15 mg) by mouth At Bedtime 30 tablet 1     omeprazole (PRILOSEC) 20 MG DR capsule Take 2 capsules (40 mg) by mouth every morning 60 capsule 1     potassium phosphate, monobasic, (K-PHOS) 500 MG tablet Take 1 tablet (500 mg) by mouth 2 times daily 90 tablet 3     senna-docusate (SENOKOT-S/PERICOLACE) 8.6-50 MG tablet Take 2 tablets by mouth At Bedtime       sertraline (ZOLOFT) 100 MG tablet Take 1 tablet (100 mg) by mouth daily 30 tablet 1     study - entinostat (IDS# 5050) 1 mg tablet Take 1 tablet (1 mg) by mouth every 7 days for 4 doses Take one 1mg tablet with one 5mg tablet for total dose of 6mg weekly. Take on an empty stomach, at least 1 hour before or 2 hours after a meal.  Swallow tablet whole. 4 tablet 0     study - entinostat (IDS# 5050) 5 mg tablet Take 1 tablet (5 mg) by mouth every 7 days for 4 doses Take one 5mg tablet with one 1mg tablet for total dose of 6mg weekly. Take on an empty stomach, at least 1 hour before or 2 hours after a meal.  Swallow tablet whole. 4 tablet 0     sulfamethoxazole-trimethoprim (BACTRIM/SEPTRA) 400-80 MG per tablet Take 1 tablet by mouth 2 times daily On Saturdays and Sundays 24 tablet 11     traZODone (DESYREL) 100 MG tablet Take  "1 tablet (100 mg) by mouth At Bedtime 30 tablet 1     vitamin D3 (CHOLECALCIFEROL) 2000 units (50 mcg) tablet Take 1 tablet by mouth daily (with breakfast)       VITALS                                                                                                                          3, 3   /73   Pulse 108      MENTAL STATUS EXAM                                                                                           9, 14 cog gs     Alertness: alert  and oriented  Appearance: casually groomed, seated in wheelchair, wearing R-sided eye-patch  Behavior/Demeanor: cooperative, with fair eye contact   Speech: prominent dysarthria  Language: good  Psychomotor: mild evident palsy of upper extremities and facial paralysis  Mood: \"Overall I'm good,\" but does allow feeling angry at times about his personal beliefs regarding his care [see Interim History]  Affect: restricted; was congruent to mood; was congruent to content  Thought Process/Associations: continued rumination [details in Interim History]  Thought Content:  Reports delusions [details in Interim History];  Denies suicidal ideation and violent ideation  Perception:  Reports none;  Denies auditory hallucinations and visual hallucinations  Insight: partial  Judgment: good  Cognition: (6) oriented: time, person, and place  attention span: intact  concentration: intact  recent memory: intact  remote memory: intact  fund of knowledge: appropriate  Gait and Station: remarkable for:  ataxia - can ambulate but was seen in wheelchair     LABS and DATA     AIMS:  complete next visit    PHQ9 TODAY = 8  PHQ-9 SCORE 4/29/2019   PHQ-9 Total Score 6       ANTIPSYCHOTIC LABS  [glu, A1C, lipids (rohit LDL), liver enzymes, WBC, ANEU, Hgb, plts]  q12 mo  Recent Labs   Lab Test 08/13/19  1315 08/07/19  1239 07/30/19  1311 07/23/19  1323  02/26/19  1100   GLC 93 79 91 106*   < > 124*   A1C  --   --   --   --   --  5.1    < > = values in this interval not displayed. " "    Recent Labs   Lab Test 19  1100   CHOL 158   TRIG 236*   LDL 84   HDL 27*     Recent Labs   Lab Test 19  1315 19  1239 19  1311 19  1323   AST 18 20 21 19   ALT 18 17 21 18   ALKPHOS 153 130 133 133     Recent Labs   Lab Test 19  1315 19  1239 19  1311 19  1323   WBC 4.2 2.9* 3.9* 4.5   ANEU 1.8 1.2* 1.7 2.5   HGB 12.9* 13.0* 12.8* 12.5*   PLT 99* 109* 108* 101*     EK2019: 85 BPM, QT/QTcH was 346/389 msec.  Also included comment: \"Abnormal precordial QRS contours - nondiagnostic for this age.\"    EE/15/2019: \"IMPRESSION: Awake and drowsy routine EEG (no video) was normal.  The patient stated he felt dazed during photic stimulation, however, no electrographic seizures or concerning changes on the EEG were seen during that time.  Clinical correlation is advised.  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  He relates that \"overall I'm good.\"  Despite this, conversations with family between visits confirms that he continues to be perseverative on his paranoid delusion that definitive medical care is being withheld.  Has called patient relations on his oncologic providers on several occasions, accusing them of not doing everything they can for his constipation.  We discussed this matter with him again today and he avows \"knowing\" that this is the case.  Is fully insistent on this reality.  Often very perceptively anticipates when one attempts to introduce a line " "of reasoning which might extrapolate towards fleshing out alternative perspectives/explanations for the aspects of his condition which are of concern to him, i.e explanations other than his delusional belief, and preemptively/decisively shuts down these inquiries.  Of note, at times there will be a \"1\" or a \"2\" marked on his PHQ9 for the last question related to SI/thoughts of being better off not alive.  Today there is a \"2\" marked, however he repeatedly denies interim SI, and states today, as he has in the past, \"I could never do that.\"  Uncertain as to whether he is actually filling out the PHQ9 or if a family member (mother or father) generally does this for him.  In addition to denying interim SI, denies SIB thoughts, violent/aggressive ideation, markedly depressed mood, inappropriately elevated mood, panic/anxious acuity, hallucinations, or other hallmarks of psychiatric decompensation with dangerousness.  One concern he brings up is that of some recent increase in nocturnal urinary incontinence, which he has questioned whether may be due to the increase in his trazodone.  We have agreed to reduce this dose to 75 mg at bedtime to see if this might maximize sleep benefits without promoting excessive sedation which may set the stage for enuresis.  Have also recommended a dose increase in his olanzapine to 17.5 mg at bedtime, for further thought support, which he is amenable to.  Importantly, Geo has a colonoscopy coming up in September, something which he is very nervous about and fearful regarding - agreed to visit again in clinic before that procedure date.  Have reviewed the importance of communicating SI when it does occur, and he's agreed that he would say something to a family member if this should occur.  Between now and our next appointment, this provider will plan to reach out to his psychotherapist Manju to solicit her observations and ideas related to managing his delusion and its " impacts.    SUICIDE RISK ASSESSMENT:  Geo Hicks denies present suicidal ideation.  This patient does have notable risk factors for self-harm including feels trapped, relationship conflicts, new/ worsening medical issue, male and paranoia/ delusions. However, risk is mitigated by no h/o suicide attempt, no plan or intent, no h/o risky impulsive behavior, describes a safety plan, h/o seeking help when needed, none to minimal alcohol use , commitment to family, good social support and stable housing.  Based on all available evidence he does not appear to be at imminent risk for self-harm therefore does not meet criteria for a 72-hr hold/  involuntary hospitalization.  However, based on degree of symptoms therapy, close psych FU and initiation of new medications was recommended which the pt did agree to.    [place] PRESCRIPTION MONITORING PROGRAM [] was not checked today:  not using controlled substances    PSYCHOTROPIC DRUG INTERACTIONS:    Pentoxifylline may enhance the antiplatelet effect of Agents with Antiplatelet Properties.  [Pentoxifylline, Sertraline]     CNS Depressants may enhance the adverse/toxic effect of Selective Serotonin Reuptake Inhibitors. Specifically, the risk of psychomotor impairment may be enhanced.  [Olanzapine, Sertraline]    MANAGEMENT:  Monitoring for adverse effects, routine vitals, routine labs, periodic EKGs and patient/family aware of risks     PLAN                                                                                                                                m2, h3     1) PSYCHOTROPIC MEDICATIONS:  Increase olanzapine to 17.5 mg at bedtime.  Decrease trazodone to 75 mg at bedtime.  Continue sertraline 100 mg daily.    2) THERAPY:  Continue with Manju.    3) NEXT DUE:    Labs- AP labs due Feb 2020  EKG- PRN  Rating Scales- AIMS due    4) REFERRALS:  No Referrals needed    5) RTC: 3-4 weeks    6) CRISIS NUMBERS:   Provided routinely in [facility] [phone]  (clinic)    [phone] (after hours)  CRISIS TEXT LINE: Text [id number] from anywhere in [place], anytime, any crisis 24/7;  OR SEE www.crisistextline.org    TREATMENT RISK STATEMENT:  The risks, benefits, alternatives and potential adverse effects have been discussed and are understood by the pt. The pt understands the risks of using street drugs or alcohol. There are no medical contraindications, the pt agrees to treatment with the ability to do so. The pt knows to call the clinic for any problems or to access emergency care if needed.  Medical and substance use concerns are documented above.  Psychotropic drug interaction check was done, including changes made today.     PSYCHIATRY CLINIC INDIVIDUAL PSYCHOTHERAPY NOTE                                                [16]   Start time- N/A        End time- N/A  Date last reviewed - 03/08/19       Date next due - 6/6/19 (or 12 months if Medicare)    Subjective: This supportive psychotherapy session addressed issues related to goals of therapy, patient's history, current stressors, life stressors, relationship, family of origin, school and health.  Patient's reaction: Contemplation in the context of mental status appropriate for ambulatory setting.  Progress: fair  Plan: RTC 4-6 weeks  Psychotherapy services during this visit included  myself and the patient.   TREATMENT  PLAN          SYMPTOMS;PROBLEMS   MEASURABLE GOALS;    FUNCTIONAL IMPROVEMENT INTERVENTIONS;    GAINS MADE DISCHARGE CRITERIA   Depression: depressed mood, anhedonia and feeling hopelesss   reduce depressive symptoms, find enjoyment at least once a day and report feeling more positive about self  Supportive and cognitive psychotherapeutic interventions marked symptom improvement   Psychosis: delusions   reduce frequency/ intensity of false beliefs and take medications as prescribed on a daily basis Supportive and cognitive psychotherapeutic interventions marked symptom improvement     PROVIDER:  Justice HOLDEN  DO Nya    Patient staffed in clinic with Dr. Emery who will sign the note.  Supervisor is Dr. Emery.  I saw the patient with the resident, and participated in key portions of the service, including the mental status examination and developing the plan of care. I reviewed key portions of the history with the resident. I agree with the findings and plan as documented in this note.    Marilia Emery MD

## 2019-08-20 DIAGNOSIS — C71.9 EPENDYMOMA (H): ICD-10-CM

## 2019-08-20 LAB
BASOPHILS # BLD AUTO: 0 10E9/L (ref 0–0.2)
BASOPHILS NFR BLD AUTO: 0.3 %
DIFFERENTIAL METHOD BLD: ABNORMAL
EOSINOPHIL # BLD AUTO: 0.2 10E9/L (ref 0–0.7)
EOSINOPHIL NFR BLD AUTO: 7.2 %
ERYTHROCYTE [DISTWIDTH] IN BLOOD BY AUTOMATED COUNT: 13.1 % (ref 10–15)
HCT VFR BLD AUTO: 37.8 % (ref 40–53)
HGB BLD-MCNC: 12.4 G/DL (ref 13.3–17.7)
LYMPHOCYTES # BLD AUTO: 0.8 10E9/L (ref 0.8–5.3)
LYMPHOCYTES NFR BLD AUTO: 25 %
MCH RBC QN AUTO: 29.8 PG (ref 26.5–33)
MCHC RBC AUTO-ENTMCNC: 32.8 G/DL (ref 31.5–36.5)
MCV RBC AUTO: 91 FL (ref 78–100)
MONOCYTES # BLD AUTO: 0.5 10E9/L (ref 0–1.3)
MONOCYTES NFR BLD AUTO: 14.1 %
NEUTROPHILS # BLD AUTO: 1.7 10E9/L (ref 1.6–8.3)
NEUTROPHILS NFR BLD AUTO: 53.4 %
PLATELET # BLD AUTO: 101 10E9/L (ref 150–450)
RBC # BLD AUTO: 4.16 10E12/L (ref 4.4–5.9)
WBC # BLD AUTO: 3.2 10E9/L (ref 4–11)

## 2019-08-20 PROCEDURE — 36415 COLL VENOUS BLD VENIPUNCTURE: CPT | Performed by: NURSE PRACTITIONER

## 2019-08-20 PROCEDURE — 83735 ASSAY OF MAGNESIUM: CPT | Performed by: NURSE PRACTITIONER

## 2019-08-20 PROCEDURE — 84100 ASSAY OF PHOSPHORUS: CPT | Performed by: NURSE PRACTITIONER

## 2019-08-20 PROCEDURE — 80053 COMPREHEN METABOLIC PANEL: CPT | Performed by: NURSE PRACTITIONER

## 2019-08-20 PROCEDURE — 85025 COMPLETE CBC W/AUTO DIFF WBC: CPT | Performed by: NURSE PRACTITIONER

## 2019-08-21 LAB
ALBUMIN SERPL-MCNC: 3.2 G/DL (ref 3.4–5)
ALP SERPL-CCNC: 139 U/L (ref 65–260)
ALT SERPL W P-5'-P-CCNC: 19 U/L (ref 0–50)
ANION GAP SERPL CALCULATED.3IONS-SCNC: 3 MMOL/L (ref 3–14)
AST SERPL W P-5'-P-CCNC: 19 U/L (ref 0–35)
BILIRUB SERPL-MCNC: 0.2 MG/DL (ref 0.2–1.3)
BUN SERPL-MCNC: 14 MG/DL (ref 7–30)
CALCIUM SERPL-MCNC: 8.4 MG/DL (ref 8.5–10.1)
CHLORIDE SERPL-SCNC: 109 MMOL/L (ref 98–110)
CO2 SERPL-SCNC: 29 MMOL/L (ref 20–32)
CREAT SERPL-MCNC: 1.1 MG/DL (ref 0.5–1)
GFR SERPL CREATININE-BSD FRML MDRD: >90 ML/MIN/{1.73_M2}
GLUCOSE SERPL-MCNC: 139 MG/DL (ref 70–99)
MAGNESIUM SERPL-MCNC: 2 MG/DL (ref 1.6–2.3)
PHOSPHATE SERPL-MCNC: 3.5 MG/DL (ref 2.5–4.5)
POTASSIUM SERPL-SCNC: 4 MMOL/L (ref 3.4–5.3)
PROT SERPL-MCNC: 6.3 G/DL (ref 6.8–8.8)
SODIUM SERPL-SCNC: 141 MMOL/L (ref 133–144)

## 2019-08-21 NOTE — TELEPHONE ENCOUNTER
Message   Received: Today   Message Contents   Justice Fay MD Patel, Radhika, GIANNI Macdonald,     Just wanted to let you know I've been in contact with Geo Hicks's family related to the MyChart messages you've been sharing with me and we are up to date as of the second message from Eric on Friday.     Thanks for your help,   Justice

## 2019-08-23 ENCOUNTER — HOSPITAL ENCOUNTER (OUTPATIENT)
Dept: OCCUPATIONAL THERAPY | Facility: CLINIC | Age: 19
Setting detail: THERAPIES SERIES
End: 2019-08-23
Attending: FAMILY MEDICINE
Payer: COMMERCIAL

## 2019-08-23 PROCEDURE — 97535 SELF CARE MNGMENT TRAINING: CPT | Mod: GO | Performed by: OCCUPATIONAL THERAPIST

## 2019-08-23 PROCEDURE — 97110 THERAPEUTIC EXERCISES: CPT | Mod: GO | Performed by: OCCUPATIONAL THERAPIST

## 2019-08-23 ASSESSMENT — PATIENT HEALTH QUESTIONNAIRE - PHQ9: SUM OF ALL RESPONSES TO PHQ QUESTIONS 1-9: 8

## 2019-08-26 DIAGNOSIS — C71.9 EPENDYMOMA (H): Primary | ICD-10-CM

## 2019-08-27 DIAGNOSIS — C71.9 EPENDYMOMA (H): ICD-10-CM

## 2019-08-27 LAB
BASOPHILS # BLD AUTO: 0 10E9/L (ref 0–0.2)
BASOPHILS NFR BLD AUTO: 0.4 %
DIFFERENTIAL METHOD BLD: ABNORMAL
EOSINOPHIL # BLD AUTO: 0.5 10E9/L (ref 0–0.7)
EOSINOPHIL NFR BLD AUTO: 8.8 %
ERYTHROCYTE [DISTWIDTH] IN BLOOD BY AUTOMATED COUNT: 13 % (ref 10–15)
HCT VFR BLD AUTO: 39.1 % (ref 40–53)
HGB BLD-MCNC: 12.6 G/DL (ref 13.3–17.7)
LYMPHOCYTES # BLD AUTO: 0.9 10E9/L (ref 0.8–5.3)
LYMPHOCYTES NFR BLD AUTO: 15.9 %
MCH RBC QN AUTO: 29.4 PG (ref 26.5–33)
MCHC RBC AUTO-ENTMCNC: 32.2 G/DL (ref 31.5–36.5)
MCV RBC AUTO: 91 FL (ref 78–100)
MONOCYTES # BLD AUTO: 0.6 10E9/L (ref 0–1.3)
MONOCYTES NFR BLD AUTO: 10.6 %
NEUTROPHILS # BLD AUTO: 3.6 10E9/L (ref 1.6–8.3)
NEUTROPHILS NFR BLD AUTO: 64.3 %
PLATELET # BLD AUTO: 92 10E9/L (ref 150–450)
RBC # BLD AUTO: 4.29 10E12/L (ref 4.4–5.9)
WBC # BLD AUTO: 5.6 10E9/L (ref 4–11)

## 2019-08-27 PROCEDURE — 36415 COLL VENOUS BLD VENIPUNCTURE: CPT | Performed by: NURSE PRACTITIONER

## 2019-08-27 PROCEDURE — 84100 ASSAY OF PHOSPHORUS: CPT | Performed by: NURSE PRACTITIONER

## 2019-08-27 PROCEDURE — 85025 COMPLETE CBC W/AUTO DIFF WBC: CPT | Performed by: NURSE PRACTITIONER

## 2019-08-27 PROCEDURE — 80053 COMPREHEN METABOLIC PANEL: CPT | Performed by: NURSE PRACTITIONER

## 2019-08-27 PROCEDURE — 83735 ASSAY OF MAGNESIUM: CPT | Performed by: NURSE PRACTITIONER

## 2019-08-28 LAB
ALBUMIN SERPL-MCNC: 3.2 G/DL (ref 3.4–5)
ALP SERPL-CCNC: 128 U/L (ref 65–260)
ALT SERPL W P-5'-P-CCNC: 18 U/L (ref 0–50)
ANION GAP SERPL CALCULATED.3IONS-SCNC: 8 MMOL/L (ref 3–14)
AST SERPL W P-5'-P-CCNC: 20 U/L (ref 0–35)
BILIRUB SERPL-MCNC: 0.3 MG/DL (ref 0.2–1.3)
BUN SERPL-MCNC: 12 MG/DL (ref 7–30)
CALCIUM SERPL-MCNC: 8.5 MG/DL (ref 8.5–10.1)
CHLORIDE SERPL-SCNC: 107 MMOL/L (ref 98–110)
CO2 SERPL-SCNC: 27 MMOL/L (ref 20–32)
CREAT SERPL-MCNC: 1.18 MG/DL (ref 0.5–1)
GFR SERPL CREATININE-BSD FRML MDRD: 88 ML/MIN/{1.73_M2}
GLUCOSE SERPL-MCNC: 173 MG/DL (ref 70–99)
MAGNESIUM SERPL-MCNC: 2.2 MG/DL (ref 1.6–2.3)
PHOSPHATE SERPL-MCNC: 2.6 MG/DL (ref 2.5–4.5)
POTASSIUM SERPL-SCNC: 3.9 MMOL/L (ref 3.4–5.3)
PROT SERPL-MCNC: 6.3 G/DL (ref 6.8–8.8)
SODIUM SERPL-SCNC: 142 MMOL/L (ref 133–144)

## 2019-09-03 DIAGNOSIS — C71.9 EPENDYMOMA (H): ICD-10-CM

## 2019-09-03 LAB
BASOPHILS # BLD AUTO: 0 10E9/L (ref 0–0.2)
BASOPHILS NFR BLD AUTO: 0.2 %
DIFFERENTIAL METHOD BLD: ABNORMAL
EOSINOPHIL # BLD AUTO: 0.3 10E9/L (ref 0–0.7)
EOSINOPHIL NFR BLD AUTO: 5.4 %
ERYTHROCYTE [DISTWIDTH] IN BLOOD BY AUTOMATED COUNT: 13.1 % (ref 10–15)
HCT VFR BLD AUTO: 38.6 % (ref 40–53)
HGB BLD-MCNC: 12.7 G/DL (ref 13.3–17.7)
LYMPHOCYTES # BLD AUTO: 0.9 10E9/L (ref 0.8–5.3)
LYMPHOCYTES NFR BLD AUTO: 14.7 %
MCH RBC QN AUTO: 29.5 PG (ref 26.5–33)
MCHC RBC AUTO-ENTMCNC: 32.9 G/DL (ref 31.5–36.5)
MCV RBC AUTO: 90 FL (ref 78–100)
MONOCYTES # BLD AUTO: 0.9 10E9/L (ref 0–1.3)
MONOCYTES NFR BLD AUTO: 14 %
NEUTROPHILS # BLD AUTO: 4 10E9/L (ref 1.6–8.3)
NEUTROPHILS NFR BLD AUTO: 65.7 %
PLATELET # BLD AUTO: 107 10E9/L (ref 150–450)
RBC # BLD AUTO: 4.31 10E12/L (ref 4.4–5.9)
WBC # BLD AUTO: 6.1 10E9/L (ref 4–11)

## 2019-09-03 PROCEDURE — 83735 ASSAY OF MAGNESIUM: CPT | Performed by: NURSE PRACTITIONER

## 2019-09-03 PROCEDURE — 36415 COLL VENOUS BLD VENIPUNCTURE: CPT | Performed by: NURSE PRACTITIONER

## 2019-09-03 PROCEDURE — 85025 COMPLETE CBC W/AUTO DIFF WBC: CPT | Performed by: NURSE PRACTITIONER

## 2019-09-03 PROCEDURE — 84100 ASSAY OF PHOSPHORUS: CPT | Performed by: NURSE PRACTITIONER

## 2019-09-03 PROCEDURE — 80053 COMPREHEN METABOLIC PANEL: CPT | Performed by: NURSE PRACTITIONER

## 2019-09-04 ENCOUNTER — OFFICE VISIT (OUTPATIENT)
Dept: PEDIATRIC HEMATOLOGY/ONCOLOGY | Facility: CLINIC | Age: 19
End: 2019-09-04
Attending: NURSE PRACTITIONER
Payer: COMMERCIAL

## 2019-09-04 VITALS
RESPIRATION RATE: 24 BRPM | OXYGEN SATURATION: 97 % | SYSTOLIC BLOOD PRESSURE: 110 MMHG | WEIGHT: 209.44 LBS | TEMPERATURE: 97.6 F | BODY MASS INDEX: 29.32 KG/M2 | DIASTOLIC BLOOD PRESSURE: 71 MMHG | HEART RATE: 93 BPM | HEIGHT: 71 IN

## 2019-09-04 DIAGNOSIS — D49.6 NEOPLASM OF POSTERIOR CRANIAL FOSSA (H): Primary | ICD-10-CM

## 2019-09-04 DIAGNOSIS — C71.9 EPENDYMOMA (H): ICD-10-CM

## 2019-09-04 LAB
ALBUMIN SERPL-MCNC: 3.5 G/DL (ref 3.4–5)
ALP SERPL-CCNC: 135 U/L (ref 65–260)
ALT SERPL W P-5'-P-CCNC: 17 U/L (ref 0–50)
ANION GAP SERPL CALCULATED.3IONS-SCNC: 5 MMOL/L (ref 3–14)
AST SERPL W P-5'-P-CCNC: 19 U/L (ref 0–35)
BILIRUB SERPL-MCNC: 0.3 MG/DL (ref 0.2–1.3)
BUN SERPL-MCNC: 18 MG/DL (ref 7–30)
CALCIUM SERPL-MCNC: 8.6 MG/DL (ref 8.5–10.1)
CHLORIDE SERPL-SCNC: 105 MMOL/L (ref 98–110)
CO2 SERPL-SCNC: 28 MMOL/L (ref 20–32)
CREAT SERPL-MCNC: 1.05 MG/DL (ref 0.5–1)
GFR SERPL CREATININE-BSD FRML MDRD: >90 ML/MIN/{1.73_M2}
GLUCOSE SERPL-MCNC: 112 MG/DL (ref 70–99)
MAGNESIUM SERPL-MCNC: 2 MG/DL (ref 1.6–2.3)
PHOSPHATE SERPL-MCNC: 2.8 MG/DL (ref 2.5–4.5)
POTASSIUM SERPL-SCNC: 4.1 MMOL/L (ref 3.4–5.3)
PROT SERPL-MCNC: 7.1 G/DL (ref 6.8–8.8)
SODIUM SERPL-SCNC: 138 MMOL/L (ref 133–144)

## 2019-09-04 PROCEDURE — G0463 HOSPITAL OUTPT CLINIC VISIT: HCPCS | Mod: ZF

## 2019-09-04 RX ORDER — ALBUTEROL SULFATE 0.83 MG/ML
2.5 SOLUTION RESPIRATORY (INHALATION)
Status: CANCELLED | OUTPATIENT
Start: 2019-09-04

## 2019-09-04 RX ORDER — ALBUTEROL SULFATE 90 UG/1
1-2 AEROSOL, METERED RESPIRATORY (INHALATION)
Status: CANCELLED
Start: 2019-09-04

## 2019-09-04 RX ORDER — EPINEPHRINE 1 MG/ML
0.3 INJECTION, SOLUTION, CONCENTRATE INTRAVENOUS EVERY 5 MIN PRN
Status: CANCELLED | OUTPATIENT
Start: 2019-09-04

## 2019-09-04 RX ORDER — SODIUM CHLORIDE 9 MG/ML
1000 INJECTION, SOLUTION INTRAVENOUS CONTINUOUS PRN
Status: CANCELLED
Start: 2019-09-04

## 2019-09-04 RX ORDER — MEPERIDINE HYDROCHLORIDE 25 MG/ML
25 INJECTION INTRAMUSCULAR; INTRAVENOUS; SUBCUTANEOUS EVERY 30 MIN PRN
Status: CANCELLED | OUTPATIENT
Start: 2019-09-04

## 2019-09-04 RX ORDER — METHYLPREDNISOLONE SODIUM SUCCINATE 125 MG/2ML
125 INJECTION, POWDER, LYOPHILIZED, FOR SOLUTION INTRAMUSCULAR; INTRAVENOUS
Status: CANCELLED
Start: 2019-09-04

## 2019-09-04 RX ORDER — DIPHENHYDRAMINE HYDROCHLORIDE 50 MG/ML
50 INJECTION INTRAMUSCULAR; INTRAVENOUS
Status: CANCELLED
Start: 2019-09-04

## 2019-09-04 ASSESSMENT — MIFFLIN-ST. JEOR: SCORE: 1985

## 2019-09-04 ASSESSMENT — ENCOUNTER SYMPTOMS
CONSTITUTIONAL NEGATIVE: 1
CONSTIPATION: 1
SLEEP DISTURBANCE: 1
SPEECH DIFFICULTY: 1
RESPIRATORY NEGATIVE: 1
CARDIOVASCULAR NEGATIVE: 1
FACIAL ASYMMETRY: 1

## 2019-09-04 ASSESSMENT — PAIN SCALES - GENERAL: PAINLEVEL: NO PAIN (0)

## 2019-09-04 NOTE — LETTER
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 He is enrolled on COG [id number] - A Phase 1 Study of Entinostat, an Oral Histone Deacetylase Inhibitor, in Pediatric Patients With Recurrent or Refractory Solid Tumors, Including CNS Tumors and Lymphoma.  He is here to begin cycle 28.     HPI: Geo scratch in his right ear canal and it is bleeding today. He says it hurts some.   Geo notes constipation is still a problem. He remains upset that I am not fixing his constipation.  He had his on land evaluation on Monday at [facility] and then he will do pool therapy there.  No fevers. No headache.  No nausea or vomiting and no rash.  He was seen at [facility] for stitches because he ran his leg into a door.  He had six stitches.  He has missed no doses of his Entinostat.        Fam/Soc: Lives between his parents (one week at each home). They have been awarded guardianship of Geo. The families work well together - both parents have remarried. His dad has a clotting disorder, requiring him to take Warfarin daily.     History was obtained from Geo and his parents.       Allergies   Allergen Reactions     Blood Transfusion Related (Informational Only) Swelling     Periorbital swelling post platelet transfusion     No Known Drug Allergies        Current Outpatient Medications   Medication     amoxicillin (AMOXIL) 875 MG tablet     calcium carbonate-vitamin D 600-400 MG-UNIT CHEW     dexamethasone (DECADRON) 0.5 MG tablet     fexofenadine (ALLEGRA) 180 MG tablet     linaclotide (LINZESS) 290 MCG capsule     ofloxacin (FLOXIN) 0.3 % otic solution     OLANZapine (ZYPREXA) 15 MG tablet     OLANZapine (ZYPREXA) 2.5 MG tablet     omeprazole (PRILOSEC) 20 MG DR capsule     potassium phosphate, monobasic, (K-PHOS) 500 MG tablet      senna-docusate (SENOKOT-S/PERICOLACE) 8.6-50 MG tablet     sertraline (ZOLOFT) 100 MG tablet     sulfamethoxazole-trimethoprim (BACTRIM/SEPTRA) 400-80 MG per tablet     traZODone (DESYREL) 50 MG tablet     vitamin D3 (CHOLECALCIFEROL) 2000 units (50 mcg) tablet     No current facility-administered medications for this visit.        Past Medical History:   Diagnosis Date     Cranial nerve dysfunction      Dyspepsia      Ependymoma (H)      Gastro-oesophageal reflux disease      Hearing loss      Intracranial hemorrhage (H)      Migraine      Pilonidal cyst     7-2015     Reduced vision      Refractory obstruction of nasal airway     2nd to nasal valve prolapse     Sleep apnea      Strabismus     gaze palsy        Past Surgical History:   Procedure Laterality Date     GRAFT CARTILAGE FROM POSTERIOR AURICLE Left 10/6/2016    Procedure: GRAFT CARTILAGE FROM POSTERIOR AURICLE;  Surgeon: Tyler Richards MD;  [facility]     INCISION AND DRAINAGE PERINEAL, COMBINED Bilateral 7/18/2015    Procedure: COMBINED INCISION AND DRAINAGE PERINEAL;  Surgeon: Dequan Timmons MD;  [facility]     OPTICAL TRACKING SYSTEM CRANIOTOMY, EXCISE TUMOR, COMBINED N/A 4/13/2015    Procedure: COMBINED OPTICAL TRACKING SYSTEM CRANIOTOMY, EXCISE TUMOR;  Surgeon: Francis Velazquez MD;  [facility]     OPTICAL TRACKING SYSTEM CRANIOTOMY, EXCISE TUMOR, COMBINED N/A 4/16/2015    Procedure: COMBINED OPTICAL TRACKING SYSTEM CRANIOTOMY, EXCISE TUMOR;  Surgeon: Francis Velazquez MD;  [facility]     OPTICAL TRACKING SYSTEM CRANIOTOMY, EXCISE TUMOR, COMBINED Bilateral 5/28/2015    Procedure: COMBINED OPTICAL TRACKING SYSTEM CRANIOTOMY, EXCISE TUMOR;  Surgeon: Francis Velazquez MD;  [facility]     OPTICAL TRACKING SYSTEM CRANIOTOMY, EXCISE TUMOR, COMBINED Bilateral 1/14/2016    Procedure: COMBINED OPTICAL TRACKING SYSTEM CRANIOTOMY, EXCISE TUMOR;  Surgeon: Francis Velazquez MD;  [facility]  "OR     OPTICAL TRACKING SYSTEM VENTRICULOSTOMY  4/16/2015    Procedure: OPTICAL TRACKING SYSTEM VENTRICULOSTOMY;  Surgeon: Francis Velazquez MD;  [facility]     REMOVE PORT VASCULAR ACCESS N/A 10/6/2016    Procedure: REMOVE PORT VASCULAR ACCESS;  Surgeon: Bruno Perea MD;  [facility]     RHINOPLASTY N/A 10/6/2016    Procedure: RHINOPLASTY;  Surgeon: Tyler Richards MD;  [facility]     VASCULAR SURGERY  5-2015    single lumen power port       Family History   Problem Relation Age of Onset     Circulatory Father         PE/DVT     Hypothyroidism Father 30     Diabetes Maternal Grandmother      Diabetes Paternal Grandmother      Diabetes Paternal Grandfather      C.A.D. Paternal Grandfather      Hypertension Maternal Grandfather      Thyroid Disease Paternal Aunt         unknown whether hypo or hyper     Mental Illness No family hx of        Review of Systems   Constitutional: Negative.         In a wheelchair   HENT: Positive for hearing loss (Pre-existing from prior therapy).    Eyes: Positive for visual disturbance (Wears a patch over right eye to minimize diplopia).   Respiratory: Negative.    Cardiovascular: Negative.    Gastrointestinal: Positive for constipation (Chronic).   Endocrine:        Follow with Dr. Martin   Neurological: Positive for facial asymmetry and speech difficulty.   Psychiatric/Behavioral: Positive for sleep disturbance (Not using his Bi-Pap).   All other systems reviewed and are negative.    Vital signs:  /71 (BP Location: Left arm, Patient Position: Chair, Cuff Size: Adult Large)   Pulse 93   Temp 97.6  F (36.4  C) (Oral)   Resp 24   Ht 1.8 m (5' 10.87\")   Wt 95 kg (209 lb 7 oz)   SpO2 97%   BMI 29.32 kg/m          Wt Readings from Last 4 Encounters:   09/04/19 95 kg (209 lb 7 oz) (95 %)*   08/07/19 90.7 kg (200 lb) (92 %)*   08/03/19 88.5 kg (195 lb) (90 %)*   07/10/19 88.8 kg (195 lb 12.3 oz) (91 %)*     * Growth percentiles are based on CDC " (Boys, 2-20 Years) data.     Physical Exam   Constitutional: He is oriented to person, place, and time. He appears well-developed and well-nourished. No distress.   HENT:   Left Ear: External ear normal.   Mouth/Throat: Oropharynx is clear and moist.   Occassionally saliva accumulates in mouth with occasional drooling.  TMs non-erythematous today but right canal is slightly erythematous with some blood in the canal.    Eyes: Pupils are equal, round, and reactive to light. Conjunctivae are normal.   Can track to right, but not to left. Nystagmus noted    Cardiovascular: Normal rate, regular rhythm and normal heart sounds.   Pulmonary/Chest: Effort normal and breath sounds normal. He has no wheezes.   Neurological: He is alert and oriented to person, place, and time. He has normal strength. A cranial nerve deficit is present. Coordination (Ataxic- dysmetria especially in upper extremities when accomplishing tasks.) abnormal. GCS eye subscore is 4. GCS verbal subscore is 5. GCS motor subscore is 6.   Negative Pronator drift   Skin: Skin is warm and dry.    New right leg laceration -is the lower area with six stitches which is dressed.  Previous laceration up area he unroofed scab before clinic and it is oozing a bit serosanguinous drainage - see pic.    Psychiatric: He has a normal mood and affect.           Labs:  Results for orders placed or performed in visit on 09/03/19   Phosphorus   Result Value Ref Range    Phosphorus 2.8 2.5 - 4.5 mg/dL   Magnesium   Result Value Ref Range    Magnesium 2.0 1.6 - 2.3 mg/dL   Comprehensive metabolic panel   Result Value Ref Range    Sodium 138 133 - 144 mmol/L    Potassium 4.1 3.4 - 5.3 mmol/L    Chloride 105 98 - 110 mmol/L    Carbon Dioxide 28 20 - 32 mmol/L    Anion Gap 5 3 - 14 mmol/L    Glucose 112 (H) 70 - 99 mg/dL    Urea Nitrogen 18 7 - 30 mg/dL    Creatinine 1.05 (H) 0.50 - 1.00 mg/dL    GFR Estimate >90 >60 mL/min/[1.73_m2]    GFR Estimate If Black >90 >60  mL/min/[1.73_m2]    Calcium 8.6 8.5 - 10.1 mg/dL    Bilirubin Total 0.3 0.2 - 1.3 mg/dL    Albumin 3.5 3.4 - 5.0 g/dL    Protein Total 7.1 6.8 - 8.8 g/dL    Alkaline Phosphatase 135 65 - 260 U/L    ALT 17 0 - 50 U/L    AST 19 0 - 35 U/L   CBC with platelets differential   Result Value Ref Range    WBC 6.1 4.0 - 11.0 10e9/L    RBC Count 4.31 (L) 4.4 - 5.9 10e12/L    Hemoglobin 12.7 (L) 13.3 - 17.7 g/dL    Hematocrit 38.6 (L) 40.0 - 53.0 %    MCV 90 78 - 100 fl    MCH 29.5 26.5 - 33.0 pg    MCHC 32.9 31.5 - 36.5 g/dL    RDW 13.1 10.0 - 15.0 %    Platelet Count 107 (L) 150 - 450 10e9/L    % Neutrophils 65.7 %    % Lymphocytes 14.7 %    % Monocytes 14.0 %    % Eosinophils 5.4 %    % Basophils 0.2 %    Absolute Neutrophil 4.0 1.6 - 8.3 10e9/L    Absolute Lymphocytes 0.9 0.8 - 5.3 10e9/L    Absolute Monocytes 0.9 0.0 - 1.3 10e9/L    Absolute Eosinophils 0.3 0.0 - 0.7 10e9/L    Absolute Basophils 0.0 0.0 - 0.2 10e9/L    Diff Method Automated Method      *Note: Due to a large number of results and/or encounters for the requested time period, some results have not been displayed. A complete set of results can be found in Results Review.         Impression:  1. Ependymoma - improved on Imaging since Entinostat started in 2017.   2. Treated with Entinostat, continues to tolerate well.   3. Labs today are adequate for beginning next course of Entinostat.   4. Chronic constipation which is frustrating for him.  5. Leg laceration healing but quite indurated.       Plan:  1. RTC in 4 weeks  as scheduled for follow up, or sooner PRN.   2. Continue weekly labs.  3. Continue Pelvic floor PT and begin pool therapy at [facility].    4. Continue weekly labs.  5. Start Entinostat 7mg weekly beginning today.  Old medication bottles returned.  Diary turned in and new diary given.  Geo is upset that his BMI has changed requiring dose increase and feels it is from his constipation.   6. New laceration - report discomfort or sign of  infection.  Follow recommendations of team placing stitches.    7. Upper laceration - continue with recommendations are to scrub daily with anti-bacterial soap. They should keep it moist with Vaseline and cover it with Coban not tight but slightly snug.     8.  Resume ofloxacin 5 drops into right ear daily.    9. Discussed Geo's frustrations at length.  Discussed that constipation can be a life long management problem and moving forward with EGD and colonoscopy is a thorough work up for a 18 year old with constipation.  He should raise constipation and GI concerns with his GI providers.         40 minutes of face to face time spent with patient and >50% visit involved counseling and/or coordination of care.        ALAN Justin CNP

## 2019-09-04 NOTE — PROGRESS NOTES
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 He is enrolled on COG [id number] - A Phase 1 Study of Entinostat, an Oral Histone Deacetylase Inhibitor, in Pediatric Patients With Recurrent or Refractory Solid Tumors, Including CNS Tumors and Lymphoma.  He is here to begin cycle 28.     HPI: Geo scratch in his right ear canal and it is bleeding today. He says it hurts some.   Geo notes constipation is still a problem. He remains upset that I am not fixing his constipation.  He had his on land evaluation on Monday at [facility] and then he will do pool therapy there.  No fevers. No headache.  No nausea or vomiting and no rash.  He was seen at [facility] for stitches because he ran his leg into a door.  He had six stitches.  He has missed no doses of his Entinostat.        Fam/Soc: Lives between his parents (one week at each home). They have been awarded guardianship of Geo. The families work well together - both parents have remarried. His dad has a clotting disorder, requiring him to take Warfarin daily.     History was obtained from Geo and his parents.       Allergies   Allergen Reactions     Blood Transfusion Related (Informational Only) Swelling     Periorbital swelling post platelet transfusion     No Known Drug Allergies        Current Outpatient Medications   Medication     amoxicillin (AMOXIL) 875 MG tablet     calcium carbonate-vitamin D 600-400 MG-UNIT CHEW     dexamethasone (DECADRON) 0.5 MG tablet     fexofenadine (ALLEGRA) 180 MG tablet     linaclotide (LINZESS) 290 MCG capsule     ofloxacin (FLOXIN) 0.3 % otic solution     OLANZapine (ZYPREXA) 15 MG tablet     OLANZapine (ZYPREXA) 2.5 MG tablet     omeprazole (PRILOSEC) 20 MG DR capsule     potassium phosphate, monobasic, (K-PHOS) 500 MG tablet     senna-docusate (SENOKOT-S/PERICOLACE) 8.6-50 MG tablet     sertraline (ZOLOFT)  100 MG tablet     sulfamethoxazole-trimethoprim (BACTRIM/SEPTRA) 400-80 MG per tablet     traZODone (DESYREL) 50 MG tablet     vitamin D3 (CHOLECALCIFEROL) 2000 units (50 mcg) tablet     No current facility-administered medications for this visit.        Past Medical History:   Diagnosis Date     Cranial nerve dysfunction      Dyspepsia      Ependymoma (H)      Gastro-oesophageal reflux disease      Hearing loss      Intracranial hemorrhage (H)      Migraine      Pilonidal cyst     7-2015     Reduced vision      Refractory obstruction of nasal airway     2nd to nasal valve prolapse     Sleep apnea      Strabismus     gaze palsy        Past Surgical History:   Procedure Laterality Date     GRAFT CARTILAGE FROM POSTERIOR AURICLE Left 10/6/2016    Procedure: GRAFT CARTILAGE FROM POSTERIOR AURICLE;  Surgeon: Tyler Richards MD;  [facility]     INCISION AND DRAINAGE PERINEAL, COMBINED Bilateral 7/18/2015    Procedure: COMBINED INCISION AND DRAINAGE PERINEAL;  Surgeon: Dequan Timmons MD;  [facility]     OPTICAL TRACKING SYSTEM CRANIOTOMY, EXCISE TUMOR, COMBINED N/A 4/13/2015    Procedure: COMBINED OPTICAL TRACKING SYSTEM CRANIOTOMY, EXCISE TUMOR;  Surgeon: Francis Velazquez MD;  [facility]     OPTICAL TRACKING SYSTEM CRANIOTOMY, EXCISE TUMOR, COMBINED N/A 4/16/2015    Procedure: COMBINED OPTICAL TRACKING SYSTEM CRANIOTOMY, EXCISE TUMOR;  Surgeon: Francis Velazquez MD;  [facility]     OPTICAL TRACKING SYSTEM CRANIOTOMY, EXCISE TUMOR, COMBINED Bilateral 5/28/2015    Procedure: COMBINED OPTICAL TRACKING SYSTEM CRANIOTOMY, EXCISE TUMOR;  Surgeon: Francis Velazquez MD;  [facility]     OPTICAL TRACKING SYSTEM CRANIOTOMY, EXCISE TUMOR, COMBINED Bilateral 1/14/2016    Procedure: COMBINED OPTICAL TRACKING SYSTEM CRANIOTOMY, EXCISE TUMOR;  Surgeon: Francis Velazquez MD;  [facility]     OPTICAL TRACKING SYSTEM VENTRICULOSTOMY  4/16/2015    Procedure: OPTICAL  "TRACKING SYSTEM VENTRICULOSTOMY;  Surgeon: Francis Velazquez MD;  [facility]     REMOVE PORT VASCULAR ACCESS N/A 10/6/2016    Procedure: REMOVE PORT VASCULAR ACCESS;  Surgeon: Bruno Perea MD;  [facility]     RHINOPLASTY N/A 10/6/2016    Procedure: RHINOPLASTY;  Surgeon: Tyler Richards MD;  [facility]     VASCULAR SURGERY  5-2015    single lumen power port       Family History   Problem Relation Age of Onset     Circulatory Father         PE/DVT     Hypothyroidism Father 30     Diabetes Maternal Grandmother      Diabetes Paternal Grandmother      Diabetes Paternal Grandfather      C.A.D. Paternal Grandfather      Hypertension Maternal Grandfather      Thyroid Disease Paternal Aunt         unknown whether hypo or hyper     Mental Illness No family hx of        Review of Systems   Constitutional: Negative.         In a wheelchair   HENT: Positive for hearing loss (Pre-existing from prior therapy).    Eyes: Positive for visual disturbance (Wears a patch over right eye to minimize diplopia).   Respiratory: Negative.    Cardiovascular: Negative.    Gastrointestinal: Positive for constipation (Chronic).   Endocrine:        Follow with Dr. Martin   Neurological: Positive for facial asymmetry and speech difficulty.   Psychiatric/Behavioral: Positive for sleep disturbance (Not using his Bi-Pap).   All other systems reviewed and are negative.    Vital signs:  /71 (BP Location: Left arm, Patient Position: Chair, Cuff Size: Adult Large)   Pulse 93   Temp 97.6  F (36.4  C) (Oral)   Resp 24   Ht 1.8 m (5' 10.87\")   Wt 95 kg (209 lb 7 oz)   SpO2 97%   BMI 29.32 kg/m         Wt Readings from Last 4 Encounters:   09/04/19 95 kg (209 lb 7 oz) (95 %)*   08/07/19 90.7 kg (200 lb) (92 %)*   08/03/19 88.5 kg (195 lb) (90 %)*   07/10/19 88.8 kg (195 lb 12.3 oz) (91 %)*     * Growth percentiles are based on CDC (Boys, 2-20 Years) data.     Physical Exam   Constitutional: He is oriented to " person, place, and time. He appears well-developed and well-nourished. No distress.   HENT:   Left Ear: External ear normal.   Mouth/Throat: Oropharynx is clear and moist.   Occassionally saliva accumulates in mouth with occasional drooling.  TMs non-erythematous today but right canal is slightly erythematous with some blood in the canal.    Eyes: Pupils are equal, round, and reactive to light. Conjunctivae are normal.   Can track to right, but not to left. Nystagmus noted    Cardiovascular: Normal rate, regular rhythm and normal heart sounds.   Pulmonary/Chest: Effort normal and breath sounds normal. He has no wheezes.   Neurological: He is alert and oriented to person, place, and time. He has normal strength. A cranial nerve deficit is present. Coordination (Ataxic- dysmetria especially in upper extremities when accomplishing tasks.) abnormal. GCS eye subscore is 4. GCS verbal subscore is 5. GCS motor subscore is 6.   Negative Pronator drift   Skin: Skin is warm and dry.    New right leg laceration -is the lower area with six stitches which is dressed.  Previous laceration up area he unroofed scab before clinic and it is oozing a bit serosanguinous drainage - see pic.    Psychiatric: He has a normal mood and affect.           Labs:  Results for orders placed or performed in visit on 09/03/19   Phosphorus   Result Value Ref Range    Phosphorus 2.8 2.5 - 4.5 mg/dL   Magnesium   Result Value Ref Range    Magnesium 2.0 1.6 - 2.3 mg/dL   Comprehensive metabolic panel   Result Value Ref Range    Sodium 138 133 - 144 mmol/L    Potassium 4.1 3.4 - 5.3 mmol/L    Chloride 105 98 - 110 mmol/L    Carbon Dioxide 28 20 - 32 mmol/L    Anion Gap 5 3 - 14 mmol/L    Glucose 112 (H) 70 - 99 mg/dL    Urea Nitrogen 18 7 - 30 mg/dL    Creatinine 1.05 (H) 0.50 - 1.00 mg/dL    GFR Estimate >90 >60 mL/min/[1.73_m2]    GFR Estimate If Black >90 >60 mL/min/[1.73_m2]    Calcium 8.6 8.5 - 10.1 mg/dL    Bilirubin Total 0.3 0.2 - 1.3 mg/dL     Albumin 3.5 3.4 - 5.0 g/dL    Protein Total 7.1 6.8 - 8.8 g/dL    Alkaline Phosphatase 135 65 - 260 U/L    ALT 17 0 - 50 U/L    AST 19 0 - 35 U/L   CBC with platelets differential   Result Value Ref Range    WBC 6.1 4.0 - 11.0 10e9/L    RBC Count 4.31 (L) 4.4 - 5.9 10e12/L    Hemoglobin 12.7 (L) 13.3 - 17.7 g/dL    Hematocrit 38.6 (L) 40.0 - 53.0 %    MCV 90 78 - 100 fl    MCH 29.5 26.5 - 33.0 pg    MCHC 32.9 31.5 - 36.5 g/dL    RDW 13.1 10.0 - 15.0 %    Platelet Count 107 (L) 150 - 450 10e9/L    % Neutrophils 65.7 %    % Lymphocytes 14.7 %    % Monocytes 14.0 %    % Eosinophils 5.4 %    % Basophils 0.2 %    Absolute Neutrophil 4.0 1.6 - 8.3 10e9/L    Absolute Lymphocytes 0.9 0.8 - 5.3 10e9/L    Absolute Monocytes 0.9 0.0 - 1.3 10e9/L    Absolute Eosinophils 0.3 0.0 - 0.7 10e9/L    Absolute Basophils 0.0 0.0 - 0.2 10e9/L    Diff Method Automated Method      *Note: Due to a large number of results and/or encounters for the requested time period, some results have not been displayed. A complete set of results can be found in Results Review.         Impression:  1. Ependymoma - improved on Imaging since Entinostat started in 2017.   2. Treated with Entinostat, continues to tolerate well.   3. Labs today are adequate for beginning next course of Entinostat.   4. Chronic constipation which is frustrating for him.  5. Leg laceration healing but quite indurated.       Plan:  1. RTC in 4 weeks  as scheduled for follow up, or sooner PRN.   2. Continue weekly labs.  3. Continue Pelvic floor PT and begin pool therapy at [facility].    4. Continue weekly labs.  5. Start Entinostat 7mg weekly beginning today.  Old medication bottles returned.  Diary turned in and new diary given.  Geo is upset that his BMI has changed requiring dose increase and feels it is from his constipation.   6. New laceration - report discomfort or sign of infection.  Follow recommendations of team placing stitches.    7. Upper laceration - continue  with recommendations are to scrub daily with anti-bacterial soap. They should keep it moist with Vaseline and cover it with Coban not tight but slightly snug.     8.  Resume ofloxacin 5 drops into right ear daily.    9. Discussed Geo's frustrations at length.  Discussed that constipation can be a life long management problem and moving forward with EGD and colonoscopy is a thorough work up for a 18 year old with constipation.  He should raise constipation and GI concerns with his GI providers.         40 minutes of face to face time spent with patient and >50% visit involved counseling and/or coordination of care.

## 2019-09-04 NOTE — NURSING NOTE
"Chief Complaint   Patient presents with     RECHECK     Patient is here today for a follow up regarding an Ependymoma     /71 (BP Location: Left arm, Patient Position: Chair, Cuff Size: Adult Large)   Pulse 93   Temp 97.6  F (36.4  C) (Oral)   Resp 24   Ht 1.8 m (5' 10.87\")   Wt 95 kg (209 lb 7 oz)   SpO2 97%   BMI 29.32 kg/m      Jacqueline Couch, [facility]   September 4, 2019    "

## 2019-09-09 ENCOUNTER — OFFICE VISIT (OUTPATIENT)
Dept: PSYCHIATRY | Facility: CLINIC | Age: 19
End: 2019-09-09
Attending: PSYCHIATRY & NEUROLOGY
Payer: COMMERCIAL

## 2019-09-09 ENCOUNTER — HOSPITAL ENCOUNTER (OUTPATIENT)
Dept: OCCUPATIONAL THERAPY | Facility: CLINIC | Age: 19
Setting detail: THERAPIES SERIES
End: 2019-09-09
Attending: FAMILY MEDICINE
Payer: COMMERCIAL

## 2019-09-09 VITALS — SYSTOLIC BLOOD PRESSURE: 117 MMHG | HEART RATE: 91 BPM | DIASTOLIC BLOOD PRESSURE: 73 MMHG

## 2019-09-09 DIAGNOSIS — F22 PARANOIA (H): ICD-10-CM

## 2019-09-09 DIAGNOSIS — F32.A DEPRESSION, UNSPECIFIED DEPRESSION TYPE: ICD-10-CM

## 2019-09-09 PROCEDURE — 97535 SELF CARE MNGMENT TRAINING: CPT | Mod: GO | Performed by: OCCUPATIONAL THERAPIST

## 2019-09-09 PROCEDURE — G0463 HOSPITAL OUTPT CLINIC VISIT: HCPCS | Mod: ZF

## 2019-09-09 RX ORDER — OLANZAPINE 20 MG/1
20 TABLET ORAL AT BEDTIME
Qty: 30 TABLET | Refills: 1 | Status: SHIPPED | OUTPATIENT
Start: 2019-09-09 | End: 2019-10-07

## 2019-09-09 RX ORDER — SERTRALINE HYDROCHLORIDE 100 MG/1
100 TABLET, FILM COATED ORAL DAILY
Qty: 30 TABLET | Refills: 1 | Status: SHIPPED | OUTPATIENT
Start: 2019-09-09 | End: 2019-11-10

## 2019-09-09 ASSESSMENT — PAIN SCALES - GENERAL: PAINLEVEL: NO PAIN (0)

## 2019-09-09 NOTE — NURSING NOTE
Chief Complaint   Patient presents with     Recheck Medication     Paranoia, depression     Patient unable to stand for weight

## 2019-09-09 NOTE — PATIENT INSTRUCTIONS
Increase olanzapine to 20 mg at bedtime.  Continue sertraline 100 mg daily.  Continue trazodone 75 mg at bedtime.

## 2019-09-10 ENCOUNTER — ALLIED HEALTH/NURSE VISIT (OUTPATIENT)
Dept: NURSING | Facility: CLINIC | Age: 19
End: 2019-09-10
Payer: COMMERCIAL

## 2019-09-10 DIAGNOSIS — C71.9 EPENDYMOMA (H): ICD-10-CM

## 2019-09-10 DIAGNOSIS — Z48.02 VISIT FOR SUTURE REMOVAL: Primary | ICD-10-CM

## 2019-09-10 LAB
BASOPHILS # BLD AUTO: 0 10E9/L (ref 0–0.2)
BASOPHILS NFR BLD AUTO: 0.2 %
DIFFERENTIAL METHOD BLD: ABNORMAL
EOSINOPHIL # BLD AUTO: 0.3 10E9/L (ref 0–0.7)
EOSINOPHIL NFR BLD AUTO: 6.9 %
ERYTHROCYTE [DISTWIDTH] IN BLOOD BY AUTOMATED COUNT: 12.9 % (ref 10–15)
HCT VFR BLD AUTO: 37.1 % (ref 40–53)
HGB BLD-MCNC: 12 G/DL (ref 13.3–17.7)
LYMPHOCYTES # BLD AUTO: 1.1 10E9/L (ref 0.8–5.3)
LYMPHOCYTES NFR BLD AUTO: 27.7 %
MCH RBC QN AUTO: 29.1 PG (ref 26.5–33)
MCHC RBC AUTO-ENTMCNC: 32.3 G/DL (ref 31.5–36.5)
MCV RBC AUTO: 90 FL (ref 78–100)
MONOCYTES # BLD AUTO: 0.8 10E9/L (ref 0–1.3)
MONOCYTES NFR BLD AUTO: 18.6 %
NEUTROPHILS # BLD AUTO: 1.9 10E9/L (ref 1.6–8.3)
NEUTROPHILS NFR BLD AUTO: 46.6 %
PLATELET # BLD AUTO: 96 10E9/L (ref 150–450)
RBC # BLD AUTO: 4.13 10E12/L (ref 4.4–5.9)
WBC # BLD AUTO: 4.1 10E9/L (ref 4–11)

## 2019-09-10 PROCEDURE — 36415 COLL VENOUS BLD VENIPUNCTURE: CPT | Performed by: NURSE PRACTITIONER

## 2019-09-10 PROCEDURE — 99207 ZZC NO CHARGE NURSE ONLY: CPT

## 2019-09-10 PROCEDURE — 83735 ASSAY OF MAGNESIUM: CPT | Performed by: NURSE PRACTITIONER

## 2019-09-10 PROCEDURE — 80053 COMPREHEN METABOLIC PANEL: CPT | Performed by: NURSE PRACTITIONER

## 2019-09-10 PROCEDURE — 85025 COMPLETE CBC W/AUTO DIFF WBC: CPT | Performed by: NURSE PRACTITIONER

## 2019-09-10 PROCEDURE — 84100 ASSAY OF PHOSPHORUS: CPT | Performed by: NURSE PRACTITIONER

## 2019-09-10 NOTE — PROGRESS NOTES
Geo Hicks presents to the clinic today for removal of sutures.  The patient has had the sutures in place for 10 days.  There has been no history of infection or drainage.  6 sutures are seen located on the right shin.  The wound is healing well with no signs of infection.  Tetanus status is up to date.   All sutures were easily removed today.  Routine wound care discussed.  The patient will follow up as needed.    Advised to let wound be open to air if at home and not moving around to let the wound dry out.  Wound was dressed with a non-adhesive guaze and wrapped with coban.    [place] this was visible in Epic as well.    Renan Richardson RN, BSN

## 2019-09-11 LAB
ALBUMIN SERPL-MCNC: 3.3 G/DL (ref 3.4–5)
ALP SERPL-CCNC: 119 U/L (ref 65–260)
ALT SERPL W P-5'-P-CCNC: 17 U/L (ref 0–50)
ANION GAP SERPL CALCULATED.3IONS-SCNC: 5 MMOL/L (ref 3–14)
AST SERPL W P-5'-P-CCNC: 18 U/L (ref 0–35)
BILIRUB SERPL-MCNC: 0.2 MG/DL (ref 0.2–1.3)
BUN SERPL-MCNC: 15 MG/DL (ref 7–30)
CALCIUM SERPL-MCNC: 8.5 MG/DL (ref 8.5–10.1)
CHLORIDE SERPL-SCNC: 107 MMOL/L (ref 98–110)
CO2 SERPL-SCNC: 29 MMOL/L (ref 20–32)
CREAT SERPL-MCNC: 1.01 MG/DL (ref 0.5–1)
GFR SERPL CREATININE-BSD FRML MDRD: >90 ML/MIN/{1.73_M2}
GLUCOSE SERPL-MCNC: 89 MG/DL (ref 70–99)
MAGNESIUM SERPL-MCNC: 2.3 MG/DL (ref 1.6–2.3)
PHOSPHATE SERPL-MCNC: 2.2 MG/DL (ref 2.5–4.5)
POTASSIUM SERPL-SCNC: 4.1 MMOL/L (ref 3.4–5.3)
PROT SERPL-MCNC: 6.5 G/DL (ref 6.8–8.8)
SODIUM SERPL-SCNC: 141 MMOL/L (ref 133–144)

## 2019-09-12 ASSESSMENT — PATIENT HEALTH QUESTIONNAIRE - PHQ9: SUM OF ALL RESPONSES TO PHQ QUESTIONS 1-9: 9

## 2019-09-12 NOTE — PROGRESS NOTES
"  [facility] PSYCHIATRY CLINIC PROGRESS NOTE     CARE TEAM:  PCP- Jeffrey Espinoza    Specialty Providers- Oncology, Neuropsychology, Physical Therapy, Occupational Therapy    Therapist- Manju Pink at [facility] in [facility] Team- no.    Geo Hicks is a 18 year old male who prefers the name Geo & pronouns he, him, his, himself.    Date of initial diagnostic assessment is 2/7/2019.    Pertinent Background:  This patient first experienced mental health issues in the past few months and has received treatment for paranoia/delusions.  Notably, this patient is undergoing cancer treatment at the [facility] and has been referred for psychiatric assessment by his neuropsychologist who most recently met with him on 1/3/2019.  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 Since then, he has undergone multiple tumor resections, chemotherapy, and radiation treatment. Geo also experienced an intra-tumoral hemorrhage in May 2015 that resulted in neurological changes including facial numbness, significant loss of mobility and dysarthria. In December 2016, a follow-up MRI revealed continued tumor enhancement, however the most recent MRI from 10/16/18 revealed an unchanged fourth ventricle ependymoma in comparison to previous exams, a slight decrease in adjacent edema, as well as a stable size of the ventricular system.  He is currently on COG study [id number] with Entinostat.  Of note, patient had a brief admission to inpatient psychiatry on [facility] from 2/14-2/15/2019 \"for evaluation of delusions and suicidal comments.\"    Psych critical item history includes suicidal ideation, psychosis [sxs include delusions] and psychiatric hospitalization x1.    INTERIM HISTORY                                                                                                                 4, 4   The patient reports good treatment adherence.  History was provided by the patient " "and his dad who were good historians.  The last visit ended with the following med change: Increased olanzapine to 17.5 mg and decreased trazodone to 75 mg, both at bedtime.  Since the last visit:   -When asked about interim mood and functioning, he relates that he's \"terrified\" about his upcoming colonoscopy, but otherwise \"things are going good.\"  -Elaborates his fear regarding the colonoscopy as \"they're going to create more issues,\" explaining it's the situation of being vulnerable to his providers, due to anesthesia, that has him concerned.  He's resistant to the suggestion that this procedure actually works towards his goals, given that it's being done to potentially help treat his constipation, because he says that \"they already know how to fix it,\" insisting that the colonoscopy is at best, unnecessary, and at worst, is just an excuse to have access to him while he's unconscious.  When asked what he thinks may be done to him, he shrugs his shoulders and says \"I don't know - something.\"  -In talking about this concern along with his dad, it came out that at some point in the past Geo and his father attended a hockey game that was gifted to them as free tickets from his oncologic team.  Apparently Geo had thought that something was going to happen to him at the game, since his providers had given them the tickets, believing that maybe there was a conspiracy.  His dad made the point that this was an example of him having been concerned about something that turned out to be okay, but Geo stated that he's actually unsure as to whether something bad might have happened, and maybe he just isn't aware or can't remember it.  -Talked about how much of his time is taken up by ruminations on this subject.  He states that he used to think about it for 3 hours a day, but now thinks about it for 6 hours a day.  Feels like this is a pressing matter and there is no time to \"smell the roses\" - explains its essential " "that he continue focusing this amount of energy on the problem.  -When questioned directly, denies suicidal ideation, and reiterates past statement \"I could never do that.\"  When asked whether he has thoughts that he'd be better off not alive, says that the only thing that makes him feel that way is his fear when he thinks about this upcoming colonoscopy.  Denies planning/intent to harm himself, and also denies violent/aggressive ideation, inappropriately elevated mood, severely depressed moods, panic/anxious acuity, hallucinations or other hallmarks of psychiatric decompensation with dangerousness.  -Touched based on his previous reportage of an increase in urinary incontinence with a higher dose of trazodone (100 mg) and he states that this hasn't been an issue since we reduced the dose to 75 mg at bedtime.  Endorses feeling like he continues to have better sleep from this medication, even at the lower dose of 75 mg.  -Along with attending, Dr. Tripathi, finished up the visit with a lengthy conversation about practicing the intentional replacement of negative thoughts with positive thoughts.  Also facilitated a consideration of how sometimes it's impossible for people to agree, but that despite that, we believe that his parents truly love him and only want the best for him, and would not do something to harm him or allow harm to come to him - and, in the midst of this conversation, Geo said \"I know.\"  -Wrapping up the visit, made the recommendation of an increase in olanzapine to 20 mg at bedtime, and Geo was amenable to this change.    RECENT SYMPTOMS:   DEPRESSION:  reports-low mood and feeling trapped;  DENIES- suicidal ideation, self-destructive thoughts, anhedonia and poor concentration /memory  ELIZABETH/HYPOMANIA:  reports-none;  DENIES- increased energy, decreased sleep need, increased activity and grandiosity  PSYCHOSIS:  reports-delusions- paranoid [details in Interim History];  DENIES- auditory " hallucinations and visual hallucinations  DYSREGULATION:  reports-can become irritable/agitated if beliefs are challenged;  DENIES- suicidal ideation, violent ideation, SIB, mood dysregulation, impulsive and aggressive  PANIC ATTACK:  none   ANXIETY:  worry  TRAUMA RELATED:  none  COMPULSIVE:  none  SLEEP:  initiation and maintenance both improved with trazodone  EATING DISORDER: no    RECENT SUBSTANCE USE:  ALCOHOL- no  TOBACCO- no  CAFFEINE- minimal  OPIOIDS- no         NARCAN KIT- N/A  CANNABIS- no  OTHER ILLICIT DRUGS- no      CURRENT SOCIAL HISTORY:  FINANCIAL SUPPORT- family  CHILDREN- no  LIVING SITUATION- mother and father (who are  and have both remarried) alternating weeks between respective homes  SOCIAL/ SPIRITUAL SUPPORT- mother, father, older brother  FEELS SAFE AT HOME- yes    MEDICAL ROS (2,10):  A comprehensive review of systems was performed and is negative other than noted in the HPI.    PSYCH and CD Critical Summary Points since July 2018 February 2019:  Clinic intake on 2/7.  Later was admitted [facility] for a brief inpatient psych admission from 3/14-3/15, most of which was spent in the ER, due to suicidal comments that concerned family.  Was ultimately deemed safe for outpatient follow-up and was discharged with an increase in olanzapine to 15 mg.  March 2019:  Began sertraline 50 mg daily.  May 2019:  Began trazodone 25-50 mg at bedtime.  June 2019:  Increased trazodone to 100 mg at bedtime.  July 2019:  Increased Sertraline to 100 mg daily.  August 2019:  Increased olanzapine to 17.5 mg and decreased trazodone to 75 mg, both at bedtime.    PAST PSYCH MED TRIALS   see EMR Problem List: Hx of psychiatric care    MEDICAL / SURGICAL HISTORY                                   Neurologic Hx- See pertinent background, re: history of ependymoma and related chemo, radiation and tumor resection(s).  Notably has experienced neurological damage due to the above which has resulted  "in facial numbness, significant loss of mobility and dysarthria.  Has had neuropsychiatric evaluation(s) 2/2016, 2/2017 and 1/2019.  The following is from the \"Results and Impressions\" section of his 1/3/2019 eval with Veronica Padilla:       \"We were pleased to discover that the neurocognitive areas measured during this evaluation have remained intact and consistent with previous evaluations. Geo s ability to access and apply acquired word knowledge (verbal comprehension) and his working memory (ability to mentally hold and manipulate information) were in the average range. His ability to understand visual information to solve novel abstract visual problems (perceptual reasoning) was in the low average range. There was a slight decrease in performance on one task associated with perceptual reasoning due to time limits. Had no time limits been enforced, Geo s performance would have likely been higher as he was observed to respond with correct answers that were past the time limit allowed. Furthermore, on a timed visual discrimination and scanning task, Geo performed in the borderline range, which was a slight improvement from his previous evaluation in 2017 where he performed in the impaired range.\"    Patient Active Problem List   Diagnosis     GERD (gastroesophageal reflux disease)     Closed fracture at the growth plate of right distal fibula      Elevated serum creatinine     Dyspepsia     Intracranial hemorrhage (H)     Hemorrhagic stroke (H)     Ependymoma (H)     Admission for antineoplastic chemotherapy     Post-operative state     S/P craniotomy     S/P biopsy     Noncomitant strabismus     Abducens neuropathy of both eyes     CANDELARIO (obstructive sleep apnea)     Health Care Home     Hypernatremia     Body temperature low     Current chronic use of systemic steroids     Elevated TSH     Status post chemotherapy     Neoplasm of posterior cranial fossa (H)     Chronic constipation     Serum " albumin decreased     Closed compression fracture of thoracic vertebra with routine healing, subsequent encounter     Depression     Past Surgical History:   Procedure Laterality Date     GRAFT CARTILAGE FROM POSTERIOR AURICLE Left 10/6/2016    Procedure: GRAFT CARTILAGE FROM POSTERIOR AURICLE;  Surgeon: Tyler Richards MD;  Location: [facility]     INCISION AND DRAINAGE PERINEAL, COMBINED Bilateral 7/18/2015    Procedure: COMBINED INCISION AND DRAINAGE PERINEAL;  Surgeon: Dequan Timmons MD;  Location: [facility]     OPTICAL TRACKING SYSTEM CRANIOTOMY, EXCISE TUMOR, COMBINED N/A 4/13/2015    Procedure: COMBINED OPTICAL TRACKING SYSTEM CRANIOTOMY, EXCISE TUMOR;  Surgeon: Francis Velazquez MD;  [facility]: [facility]     OPTICAL TRACKING SYSTEM CRANIOTOMY, EXCISE TUMOR, COMBINED N/A 4/16/2015    Procedure: COMBINED OPTICAL TRACKING SYSTEM CRANIOTOMY, EXCISE TUMOR;  Surgeon: Francis Velazquez MD;  [facility]     OPTICAL TRACKING SYSTEM CRANIOTOMY, EXCISE TUMOR, COMBINED Bilateral 5/28/2015    Procedure: COMBINED OPTICAL TRACKING SYSTEM CRANIOTOMY, EXCISE TUMOR;  Surgeon: Francis Velazquez MD;  [facility]     OPTICAL TRACKING SYSTEM CRANIOTOMY, EXCISE TUMOR, COMBINED Bilateral 1/14/2016    Procedure: COMBINED OPTICAL TRACKING SYSTEM CRANIOTOMY, EXCISE TUMOR;  Surgeon: Francis Velazquez MD;  [facility]     OPTICAL TRACKING SYSTEM VENTRICULOSTOMY  4/16/2015    Procedure: OPTICAL TRACKING SYSTEM VENTRICULOSTOMY;  Surgeon: Francis Velazquez MD;  [facility]     REMOVE PORT VASCULAR ACCESS N/A 10/6/2016    Procedure: REMOVE PORT VASCULAR ACCESS;  Surgeon: Bruno Perea MD;  [facility]     RHINOPLASTY N/A 10/6/2016    Procedure: RHINOPLASTY;  Surgeon: Tyler Richards MD;  [facility]: [facility]     VASCULAR SURGERY  5-2015    single lumen power port       ALLERGY                                Blood transfusion related (informational only) and No known drug allergies      MEDICATIONS                               Current Outpatient Medications   Medication Sig Dispense Refill     amoxicillin (AMOXIL) 875 MG tablet Take 1 tablet (875 mg) by mouth 2 times daily 20 tablet 0     calcium carbonate-vitamin D 600-400 MG-UNIT CHEW Take 2 tablets in the morning and 1 tablet in the evening. 90 tablet 3     dexamethasone (DECADRON) 0.5 MG tablet Take 0.75 mg by mouth daily (with breakfast). 90 tablet 3     fexofenadine (ALLEGRA) 180 MG tablet Take 180 mg by mouth every evening        linaclotide (LINZESS) 290 MCG capsule Take 290 mcg by mouth every morning (before breakfast)        ofloxacin (FLOXIN) 0.3 % otic solution Place 5 drops into the right ear daily 1 Bottle 0     OLANZapine (ZYPREXA) 17.5 MG tablet        omeprazole (PRILOSEC) 20 MG DR capsule Take 2 capsules (40 mg) by mouth every morning 60 capsule 1     potassium phosphate, monobasic, (K-PHOS) 500 MG tablet Take 1 tablet (500 mg) by mouth 2 times daily 90 tablet 3     senna-docusate (SENOKOT-S/PERICOLACE) 8.6-50 MG tablet Take 2 tablets by mouth At Bedtime       sertraline (ZOLOFT) 100 MG tablet Take 1 tablet (100 mg) by mouth daily 30 tablet 1     study - entinostat (IDS# 5050) 1 mg tablet Take 2 tablets (2 mg) by mouth every 7 days Take with 5mg tablet for total dose of 7mg weekly . Take on an empty stomach, at least 1 hour before or 2 hours after a meal.  Swallow tablet whole. 8 tablet 0     study - entinostat (IDS# 5050) 5 mg tablet Take 1 tablet (5 mg) by mouth every 7 days for 4 doses . Take on an empty stomach, at least 1 hour before or 2 hours after a meal.  Swallow tablet whole. Take with two 1mg tablets for 7mg weekly dose. 4 tablet 0     sulfamethoxazole-trimethoprim (BACTRIM/SEPTRA) 400-80 MG per tablet Take 1 tablet by mouth 2 times daily On Saturdays and Sundays 24 tablet 11     traZODone (DESYREL) 50 MG tablet Take 1.5 tablets (75 mg) by mouth At Bedtime 45 tablet 1     vitamin D3 (CHOLECALCIFEROL) 2000 units (50  mcg) tablet Take 1 tablet by mouth daily (with breakfast)       VITALS                                                                                                                          3, 3   /73   Pulse 91      MENTAL STATUS EXAM                                                                                           9, 14 cog gs     Alertness: alert  and oriented  Appearance: casually groomed, seated in wheelchair, wearing R-sided eye-patch  Behavior/Demeanor: cooperative, with fair eye contact   Speech: prominent dysarthria  Language: good  Psychomotor: mild evident palsy of upper extremities and facial paralysis  Mood: anxious and fearful regarding upcoming colonoscopy  Affect: restricted; was congruent to mood; was congruent to content  Thought Process/Associations: continued rumination [details in Interim History]  Thought Content:  Reports delusions [details in Interim History];  Denies suicidal ideation and violent ideation  Perception:  Reports none;  Denies auditory hallucinations and visual hallucinations  Insight: partial  Judgment: good  Cognition: (6) oriented: time, person, and place  attention span: intact  concentration: intact  recent memory: intact  remote memory: intact  fund of knowledge: appropriate  Gait and Station: remarkable for:  ataxia - can ambulate but was seen in wheelchair     LABS and DATA     AIMS:  Due.    PHQ-9 SCORE 4/29/2019 8/19/2019 9/9/2019   PHQ-9 Total Score 6 8 9       ANTIPSYCHOTIC LABS  [glu, A1C, lipids (rohit LDL), liver enzymes, WBC, ANEU, Hgb, plts]  q12 mo  Recent Labs   Lab Test 09/10/19  1317 09/03/19  1323 08/27/19  1316 08/20/19  1311  02/26/19  1100   GLC 89 112* 173* 139*   < > 124*   A1C  --   --   --   --   --  5.1    < > = values in this interval not displayed.     Recent Labs   Lab Test 02/26/19  1100   CHOL 158   TRIG 236*   LDL 84   HDL 27*     Recent Labs   Lab Test 09/10/19  1317 09/03/19  1323 08/27/19  1316 08/20/19  1311   AST 18 19 20  "19   ALT 17 17 18 19   ALKPHOS 119 135 128 139     Recent Labs   Lab Test 09/10/19  1317 19  1323 19  1316 19  1311   WBC 4.1 6.1 5.6 3.2*   ANEU 1.9 4.0 3.6 1.7   HGB 12.0* 12.7* 12.6* 12.4*   PLT 96* 107* 92* 101*     EK2019: 85 BPM, QT/QTcH was 346/389 msec.  Also included comment: \"Abnormal precordial QRS contours - nondiagnostic for this age.\"    EE/15/2019: \"IMPRESSION: Awake and drowsy routine EEG (no video) was normal.  The patient stated he felt dazed during photic stimulation, however, no electrographic seizures or concerning changes on the EEG were seen during that time.  Clinical correlation is advised.  No electrographic seizures, epileptiform discharges were seen.\"    DIAGNOSIS     Delusional Disorder, persecutory type, first episode, currently in acute episode  Major Depressive Disorder, single episode, moderate    ASSESSMENT                                                                                                                   m2, h3     TODAY:  Geo Hicks presents to the [facility] clinic for continued medication management of paranoia, delusional thoughts and depressed mood.  He relates marked interval anxiety related to his upcoming colonoscopy.  States that otherwise \"things are going good.\"  Despite this reportage, continues to have fractious interactions with other care providers and struggles with family related to his delusional belief that definitive care is being withheld for his constipation and mobility issues.  Denies interim suicidal intent or planning, SIB, violent/aggressive ideation, markedly depressed mood, inappropriately elevated mood, panic/anxious acuity, hallucinations, or other hallmarks of psychiatric decompensation with dangerousness.  Today I have revised my diagnostic formulation to a formal, unequivocal diagnosis of Delusional Disorder.  Geo is emphatic in his unshakable certainty related to this false " "belief, and today has stated explicitly \"I know, I know, I know,\" when asked questions attempting to probe the underpinnings/premises and supposed evidence for his delusion.  Apart from circumstances related to this specific false belief, Geo relates no clear psychotic perceptions or thinking.  Neuropsych testing has demonstrated remarkable preservation of his cognitive functioning, despite his brain malignancy and stroke related neuromuscular deficits.  Regarding his medications, would note that there has been some interim weight gain, however looking back to when his olanzapine was first initiated, his weight has fluctuated significantly without notable net gain until this past dose adjustment from 15 mg to 17.5 mg.  Before concluding that olanzapine carries too great a risk for obesity/metabolic side effects, we will reserve our conclusions regarding this recent weight gain for the time being, and will recommend the pursuit of a full trial of this medication at a maximized dose while continuing to closely monitor his weight.  Today have recommended a dose increase to 20 mg, which Geo has been amenable to.  Have also had a seemingly fruitful conversation between myself, Geo, his father Eric, and attending Dr. Tripathi regarding the true loving intentions that his parents have towards him, in addition to the recommendation that he practice exchanging negative thoughts for positive ones.  Geo seemed relatively receptive to this discussion.  No other changes today.    SUICIDE RISK ASSESSMENT:  Geo Hicks denies present suicidal ideation.  This patient does have notable risk factors for self-harm including feels trapped, relationship conflicts, new/ worsening medical issue, male and paranoia/ delusions. However, risk is mitigated by no h/o suicide attempt, no plan or intent, describes a safety plan, h/o seeking help when needed, none to minimal alcohol use , commitment to family and stable housing.  Based " on all available evidence he does not appear to be at imminent risk for self-harm therefore does not meet criteria for a 72-hr hold/  involuntary hospitalization.  However, based on degree of symptoms therapy, close psych FU and medication adjustments were recommended which the pt did agree to.    [place] PRESCRIPTION MONITORING PROGRAM [] was not checked today:  not using controlled substances    PSYCHOTROPIC DRUG INTERACTIONS:    Pentoxifylline may enhance the antiplatelet effect of Agents with Antiplatelet Properties.  [Pentoxifylline, Sertraline]     CNS Depressants may enhance the adverse/toxic effect of Selective Serotonin Reuptake Inhibitors. Specifically, the risk of psychomotor impairment may be enhanced.  [Olanzapine, Sertraline]    MANAGEMENT:  Monitoring for adverse effects, routine vitals, routine labs, periodic EKGs and patient/family aware of risks     PLAN                                                                                                                                m2, h3     1) PSYCHOTROPIC MEDICATIONS:  Increase olanzapine to 20 mg at bedtime.  Continue trazodone 75 mg at bedtime.  Continue sertraline 100 mg daily.    2) THERAPY:  Continue with Manju.    3) NEXT DUE:    Labs- AP labs due Feb 2020  EKG- PRN  Rating Scales- AIMS due    4) REFERRALS:  No Referrals needed    5) RTC: 3-4 weeks    6) CRISIS NUMBERS:   Provided routinely in [facility].  [facility] [phone] (clinic)    [phone] (after hours)  CRISIS TEXT LINE: Text [phone] from anywhere in [place], anytime, any crisis 24/7;  OR SEE www.crisistextline.org    TREATMENT RISK STATEMENT:  The risks, benefits, alternatives and potential adverse effects have been discussed and are understood by the pt. The pt understands the risks of using street drugs or alcohol. There are no medical contraindications, the pt agrees to treatment with the ability to do so. The pt knows to call the clinic for any problems or to access emergency care  if needed.  Medical and substance use concerns are documented above.  Psychotropic drug interaction check was done, including changes made today.     PSYCHIATRY CLINIC INDIVIDUAL PSYCHOTHERAPY NOTE                                                [16]   Start time- N/A        End time- N/A  Date last reviewed - 03/08/19       Date next due - 6/6/19 (or 12 months if Medicare)    Subjective: This supportive psychotherapy session addressed issues related to goals of therapy, patient's history, current stressors, life stressors, relationship, family of origin, school and health.  Patient's reaction: Contemplation in the context of mental status appropriate for ambulatory setting.  Progress: fair  Plan: RTC 4-6 weeks  Psychotherapy services during this visit included  myself and the patient.   TREATMENT  PLAN          SYMPTOMS;PROBLEMS   MEASURABLE GOALS;    FUNCTIONAL IMPROVEMENT INTERVENTIONS;    GAINS MADE DISCHARGE CRITERIA   Depression: depressed mood, anhedonia and feeling hopelesss   reduce depressive symptoms, find enjoyment at least once a day and report feeling more positive about self  Supportive and cognitive psychotherapeutic interventions marked symptom improvement   Psychosis: delusions   reduce frequency/ intensity of false beliefs and take medications as prescribed on a daily basis Supportive and cognitive psychotherapeutic interventions marked symptom improvement     PROVIDER:  Justice Fay, DO    Patient staffed in clinic with Dr. Tripathi who will sign the note.  Supervisor is Dr. Emery.    Supervisor Attestation:  I met with Geo Hicks along with the resident physician, Justice Fay MD. I participated in key portions of the service, including the mental status examination and developing the plan of care. I reviewed key portions of the history with the resident. I agree with the findings and plan as documented in this note.  Jorge Luis Tripathi MD, MD

## 2019-09-13 ENCOUNTER — TELEPHONE (OUTPATIENT)
Dept: PEDIATRIC HEMATOLOGY/ONCOLOGY | Facility: CLINIC | Age: 19
End: 2019-09-13

## 2019-09-13 ENCOUNTER — TELEPHONE (OUTPATIENT)
Dept: PSYCHIATRY | Facility: CLINIC | Age: 19
End: 2019-09-13

## 2019-09-13 NOTE — TELEPHONE ENCOUNTER
Caller Report:  Geo s mom Christianne called back after I emailed you to report that Geo is safe. She said Geo called 911 again this week, and the police came to the house again. She said Geo told parents yesterday that he doesn't have pain, but he knows he's constipated because he can't walk. This statement was surprising to Geo's parents because they have thought all along that Geo believes he can't walk because he's constipated.    Mom is wondering if Geo somehow made this connection in his head after hearing a physical therapist in the past talk about people who don't walk having problems with constipation. This was back when Geo was still able to walk, and the PT was just trying to encourage him to work on his walking.    Mom said she doesn't know if Geo will ever think about this situation differently - she is not optimistic that anything will change.  They just keep doing the best they can.     Action/Plan:  Thanked Geo's mom for calling back and let her know that an update would be sent to Geo's team. Offered encouragement for her to maintain a small slice of hope that maybe someday Geo will be able to move beyond his current state of mind.    Princess Lott RN, MS

## 2019-09-13 NOTE — TELEPHONE ENCOUNTER
Caller Report:  This RN was notified by Ana Laura Anderson, [facility] scheduling supervisor, that patient called [facility]  and told Ana Laura that Rhina Schuler and Dr. Rousseau are lying to him, and when Ana Laura asked if he wanted to talk to his , or to one of the nurses, he said  No, they can t help me.  Then when Ana Laura asked what she could do to help him, he said and he wanted her to escalate his concern to the highest level possible.  He did not make any statements to Ana Laura about wanting to hurt himself or anyone else.  Ana Laura said she thought Geo mentioned someone named Laura, but due to his speech impairment, she had a difficult time understanding him.  Geo called from a phone number that was not listed in his chart - [phone].    Action/Plan:    Called Geo s mom Christianne, but had to leave a message.    Called Geo's dad, who said he was at the airport and heading out of town - he said Geo is at his mom s house, and that yes, Geo does have his own phone. He must have been getting ready to board his plane because he just said  You ll have to call Christianne .    Added Geo s phone number to the chart, so if he calls again, staff know that he is calling from his own number. I added the following note in his demographics: [Other number: [phone] is Geo's own phone number (do not call this number for appts - call mom)].    Notified Rhina Schuler NP, who advised that Geo has a [facility] patient relations representative following his case; Poornima Dunaway.    Sent e-mail to IMAN Coffey NP and Nina Salguero, clinic manager.      Princess Lott RN, MS

## 2019-09-14 NOTE — TELEPHONE ENCOUNTER
"Called patient's father Eric Hicks to discuss the [employer] message he sent this provider and attending Dr. Tripathi.  This message had related to Geo having called 911 and having also pursued further patient relations complaint against his oncology team associated with his delusional beliefs regarding his medical care.  As has been elaborated numerous times previously, Geo maintains the fixed false belief that definitive care for his constipation is being purposely withheld and that if only this were not the case, he would be able to walk.  Since our last visit Geo made remarks to his family which articulated the root of his belief that he does not feel pain per se, from his constipation, but rather knows that he HAS constipation BECAUSE he can't walk.  Eric stated his suspicion that remarks made to him previously during physical therapy might have laid the groundwork for this relief, specifically having been told that sometimes people who have trouble walking will then become constipated.    Eric elaborated that the  who came to the home after Geo called 911 is concerned about Geo, hence his request to speak with his care providers.  This writer agreed to confer with attending Dr. Tripathi next week related to this request.  Also suggested that it may be worth considering for Geo to visit this clinic for another follow-up prior to the currently scheduled follow-up on October 7th.  Eric stated \"well it may come to that,\" and also related that Geo is beginning to assert that he no longer wishes his parents to be guardians.  Had to abrupt our conversation as Mr. Hicks began boarding a plane.    Justice Fay, DO  Psychiatry PGY4  "

## 2019-09-16 ENCOUNTER — HOSPITAL ENCOUNTER (OUTPATIENT)
Dept: OCCUPATIONAL THERAPY | Facility: CLINIC | Age: 19
Setting detail: THERAPIES SERIES
End: 2019-09-16
Attending: FAMILY MEDICINE
Payer: COMMERCIAL

## 2019-09-16 PROCEDURE — 97535 SELF CARE MNGMENT TRAINING: CPT | Mod: GO | Performed by: OCCUPATIONAL THERAPIST

## 2019-09-17 DIAGNOSIS — C71.9 EPENDYMOMA (H): ICD-10-CM

## 2019-09-17 LAB
BASOPHILS # BLD AUTO: 0 10E9/L (ref 0–0.2)
BASOPHILS NFR BLD AUTO: 0.4 %
DIFFERENTIAL METHOD BLD: ABNORMAL
EOSINOPHIL # BLD AUTO: 0.3 10E9/L (ref 0–0.7)
EOSINOPHIL NFR BLD AUTO: 7 %
ERYTHROCYTE [DISTWIDTH] IN BLOOD BY AUTOMATED COUNT: 12.9 % (ref 10–15)
HCT VFR BLD AUTO: 38.1 % (ref 40–53)
HGB BLD-MCNC: 12.6 G/DL (ref 13.3–17.7)
LYMPHOCYTES # BLD AUTO: 0.8 10E9/L (ref 0.8–5.3)
LYMPHOCYTES NFR BLD AUTO: 16.6 %
MCH RBC QN AUTO: 29.7 PG (ref 26.5–33)
MCHC RBC AUTO-ENTMCNC: 33.1 G/DL (ref 31.5–36.5)
MCV RBC AUTO: 90 FL (ref 78–100)
MONOCYTES # BLD AUTO: 0.6 10E9/L (ref 0–1.3)
MONOCYTES NFR BLD AUTO: 12.7 %
NEUTROPHILS # BLD AUTO: 3.1 10E9/L (ref 1.6–8.3)
NEUTROPHILS NFR BLD AUTO: 63.3 %
PLATELET # BLD AUTO: 102 10E9/L (ref 150–450)
RBC # BLD AUTO: 4.24 10E12/L (ref 4.4–5.9)
WBC # BLD AUTO: 4.9 10E9/L (ref 4–11)

## 2019-09-17 PROCEDURE — 83735 ASSAY OF MAGNESIUM: CPT | Performed by: NURSE PRACTITIONER

## 2019-09-17 PROCEDURE — 80053 COMPREHEN METABOLIC PANEL: CPT | Performed by: NURSE PRACTITIONER

## 2019-09-17 PROCEDURE — 36415 COLL VENOUS BLD VENIPUNCTURE: CPT | Performed by: NURSE PRACTITIONER

## 2019-09-17 PROCEDURE — 84100 ASSAY OF PHOSPHORUS: CPT | Performed by: NURSE PRACTITIONER

## 2019-09-17 PROCEDURE — 85025 COMPLETE CBC W/AUTO DIFF WBC: CPT | Performed by: NURSE PRACTITIONER

## 2019-09-18 LAB
ALBUMIN SERPL-MCNC: 3.4 G/DL (ref 3.4–5)
ALP SERPL-CCNC: 156 U/L (ref 65–260)
ALT SERPL W P-5'-P-CCNC: 15 U/L (ref 0–50)
ANION GAP SERPL CALCULATED.3IONS-SCNC: 4 MMOL/L (ref 3–14)
AST SERPL W P-5'-P-CCNC: 15 U/L (ref 0–35)
BILIRUB SERPL-MCNC: 0.3 MG/DL (ref 0.2–1.3)
BUN SERPL-MCNC: 11 MG/DL (ref 7–30)
CALCIUM SERPL-MCNC: 8.8 MG/DL (ref 8.5–10.1)
CHLORIDE SERPL-SCNC: 103 MMOL/L (ref 98–110)
CO2 SERPL-SCNC: 31 MMOL/L (ref 20–32)
CREAT SERPL-MCNC: 1.09 MG/DL (ref 0.5–1)
GFR SERPL CREATININE-BSD FRML MDRD: >90 ML/MIN/{1.73_M2}
GLUCOSE SERPL-MCNC: 106 MG/DL (ref 70–99)
MAGNESIUM SERPL-MCNC: 2 MG/DL (ref 1.6–2.3)
PHOSPHATE SERPL-MCNC: 3.4 MG/DL (ref 2.5–4.5)
POTASSIUM SERPL-SCNC: 3.9 MMOL/L (ref 3.4–5.3)
PROT SERPL-MCNC: 6.7 G/DL (ref 6.8–8.8)
SODIUM SERPL-SCNC: 138 MMOL/L (ref 133–144)

## 2019-09-20 ENCOUNTER — OFFICE VISIT (OUTPATIENT)
Dept: GASTROENTEROLOGY | Facility: CLINIC | Age: 19
End: 2019-09-20
Attending: PEDIATRICS
Payer: COMMERCIAL

## 2019-09-20 ENCOUNTER — TELEPHONE (OUTPATIENT)
Dept: PSYCHIATRY | Facility: CLINIC | Age: 19
End: 2019-09-20

## 2019-09-20 VITALS
HEART RATE: 99 BPM | HEIGHT: 72 IN | SYSTOLIC BLOOD PRESSURE: 131 MMHG | WEIGHT: 205.91 LBS | BODY MASS INDEX: 27.89 KG/M2 | DIASTOLIC BLOOD PRESSURE: 77 MMHG

## 2019-09-20 DIAGNOSIS — C71.9 EPENDYMOMA (H): ICD-10-CM

## 2019-09-20 DIAGNOSIS — K59.09 CHRONIC CONSTIPATION: Primary | ICD-10-CM

## 2019-09-20 DIAGNOSIS — K59.00 DYSCHEZIA: ICD-10-CM

## 2019-09-20 PROCEDURE — G0463 HOSPITAL OUTPT CLINIC VISIT: HCPCS | Mod: ZF

## 2019-09-20 RX ORDER — SULFAMETHOXAZOLE AND TRIMETHOPRIM 400; 80 MG/1; MG/1
1 TABLET ORAL 2 TIMES DAILY
Qty: 24 TABLET | Refills: 11 | Status: ON HOLD | OUTPATIENT
Start: 2019-09-20 | End: 2020-02-18

## 2019-09-20 ASSESSMENT — MIFFLIN-ST. JEOR: SCORE: 1981.51

## 2019-09-20 ASSESSMENT — PAIN SCALES - GENERAL: PAINLEVEL: NO PAIN (0)

## 2019-09-20 NOTE — LETTER
9/20/2019      RE: Geo Hicks  [address] [address] [id number]            Pediatric Gastroenterology,   Hepatology, and Nutrition               Outpatient follow up consultation    Consultation requested by Jeffrey Espinoza     Diagnoses:  Patient Active Problem List   Diagnosis     GERD (gastroesophageal reflux disease)     Closed fracture at the growth plate of right distal fibula      Elevated serum creatinine     Dyspepsia     Intracranial hemorrhage (H)     Hemorrhagic stroke (H)     Ependymoma (H)     Admission for antineoplastic chemotherapy     Post-operative state     S/P craniotomy     S/P biopsy     Noncomitant strabismus     Abducens neuropathy of both eyes     CANDELARIO (obstructive sleep apnea)     Health Care Home     Hypernatremia     Body temperature low     Current chronic use of systemic steroids     Elevated TSH     Status post chemotherapy     Neoplasm of posterior cranial fossa (H)     Chronic constipation     Serum albumin decreased     Closed compression fracture of thoracic vertebra with routine healing, subsequent encounter     Depression         HPI: 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    He continues to have trouble with the sensation that he needs to stool but then he cannot get any stool out.  He is now having at least 1 large stools a day.  He did work on Pelvic Floor physical therapy and that maybe helped some.      Medications  Linzess 290 mcg  Colace 1 in the morning  Miralax 1 cap 2-3 times a day  1 Pear a day    Water: 2-3 L a day    No nausea or vomiting, he has been on weekly enemas in the past, he does not want to try daily enemas.      Review of Systems: A complete 10 point review of systems was negative except as note in this note and below.    Allergies: Blood transfusion related (informational only) and No known drug allergies    Medications:   Current Outpatient Medications    Medication Sig Dispense Refill     amoxicillin (AMOXIL) 875 MG tablet Take 1 tablet (875 mg) by mouth 2 times daily 20 tablet 0     calcium carbonate-vitamin D 600-400 MG-UNIT CHEW Take 2 tablets in the morning and 1 tablet in the evening. 90 tablet 3     dexamethasone (DECADRON) 0.5 MG tablet Take 0.75 mg by mouth daily (with breakfast). 90 tablet 3     fexofenadine (ALLEGRA) 180 MG tablet Take 180 mg by mouth every evening        linaclotide (LINZESS) 290 MCG capsule Take 290 mcg by mouth every morning (before breakfast)        ofloxacin (FLOXIN) 0.3 % otic solution Place 5 drops into the right ear daily 1 Bottle 0     OLANZapine (ZYPREXA) 20 MG tablet Take 1 tablet (20 mg) by mouth At Bedtime 30 tablet 1     omeprazole (PRILOSEC) 20 MG DR capsule Take 2 capsules (40 mg) by mouth every morning 60 capsule 1     potassium phosphate, monobasic, (K-PHOS) 500 MG tablet Take 1 tablet (500 mg) by mouth 2 times daily 90 tablet 3     senna-docusate (SENOKOT-S/PERICOLACE) 8.6-50 MG tablet Take 2 tablets by mouth At Bedtime       sertraline (ZOLOFT) 100 MG tablet Take 1 tablet (100 mg) by mouth daily 30 tablet 1     study - entinostat (IDS# 5050) 1 mg tablet Take 2 tablets (2 mg) by mouth every 7 days Take with 5mg tablet for total dose of 7mg weekly . Take on an empty stomach, at least 1 hour before or 2 hours after a meal.  Swallow tablet whole. 8 tablet 0     study - entinostat (IDS# 5050) 5 mg tablet Take 1 tablet (5 mg) by mouth every 7 days for 4 doses . Take on an empty stomach, at least 1 hour before or 2 hours after a meal.  Swallow tablet whole. Take with two 1mg tablets for 7mg weekly dose. 4 tablet 0     sulfamethoxazole-trimethoprim (BACTRIM/SEPTRA) 400-80 MG tablet Take 1 tablet by mouth 2 times daily On Saturdays and Sundays 24 tablet 11     traZODone (DESYREL) 50 MG tablet Take 1.5 tablets (75 mg) by mouth At Bedtime 45 tablet 1     vitamin D3 (CHOLECALCIFEROL) 2000 units (50 mcg) tablet Take 1 tablet by  mouth daily (with breakfast)         Past Medical History: I have reviewed this patient's past medical history and updated as appropriate.   Past Medical History:   Diagnosis Date     Cranial nerve dysfunction      Dyspepsia      Ependymoma (H)      Gastro-oesophageal reflux disease      Hearing loss      Intracranial hemorrhage (H)      Migraine      Pilonidal cyst     7-2015     Reduced vision      Refractory obstruction of nasal airway     2nd to nasal valve prolapse     Sleep apnea      Strabismus     gaze palsy         Past Surgical History: I have reviewed this patient's past medical history and updated as appropriate.   Past Surgical History:   Procedure Laterality Date     GRAFT CARTILAGE FROM POSTERIOR AURICLE Left 10/6/2016    Procedure: GRAFT CARTILAGE FROM POSTERIOR AURICLE;  Surgeon: Tyler Richards MD;  Location: [facility]     INCISION AND DRAINAGE PERINEAL, COMBINED Bilateral 7/18/2015    Procedure: COMBINED INCISION AND DRAINAGE PERINEAL;  Surgeon: Dequan Timmons MD;  Location: [facility]     OPTICAL TRACKING SYSTEM CRANIOTOMY, EXCISE TUMOR, COMBINED N/A 4/13/2015    Procedure: COMBINED OPTICAL TRACKING SYSTEM CRANIOTOMY, EXCISE TUMOR;  Surgeon: Francis Velazquez MD;  [facility]     OPTICAL TRACKING SYSTEM CRANIOTOMY, EXCISE TUMOR, COMBINED N/A 4/16/2015    Procedure: COMBINED OPTICAL TRACKING SYSTEM CRANIOTOMY, EXCISE TUMOR;  Surgeon: Francis Velazquez MD;  [facility]     OPTICAL TRACKING SYSTEM CRANIOTOMY, EXCISE TUMOR, COMBINED Bilateral 5/28/2015    Procedure: COMBINED OPTICAL TRACKING SYSTEM CRANIOTOMY, EXCISE TUMOR;  Surgeon: Francis Velazquez MD;  [facility]     OPTICAL TRACKING SYSTEM CRANIOTOMY, EXCISE TUMOR, COMBINED Bilateral 1/14/2016    Procedure: COMBINED OPTICAL TRACKING SYSTEM CRANIOTOMY, EXCISE TUMOR;  Surgeon: Francis Velazquez MD;  [facility]     OPTICAL TRACKING SYSTEM VENTRICULOSTOMY  4/16/2015    Procedure: OPTICAL TRACKING  "SYSTEM VENTRICULOSTOMY;  Surgeon: Francis Velazquez MD;  [facility]     REMOVE PORT VASCULAR ACCESS N/A 10/6/2016    Procedure: REMOVE PORT VASCULAR ACCESS;  Surgeon: Bruno Perea MD;  [facility]     RHINOPLASTY N/A 10/6/2016    Procedure: RHINOPLASTY;  Surgeon: Tyler Richards MD;  [facility]     VASCULAR SURGERY  5-2015    single lumen power port       Family History: I have reviewed this patient's past family history today and updated as appropriate.  Family History   Problem Relation Age of Onset     Circulatory Father         PE/DVT     Hypothyroidism Father 30     Diabetes Maternal Grandmother      Diabetes Paternal Grandmother      Diabetes Paternal Grandfather      C.A.D. Paternal Grandfather      Hypertension Maternal Grandfather      Thyroid Disease Paternal Aunt         unknown whether hypo or hyper     Mental Illness No family hx of         Social History: Splits time between mom and dad's house    Physical exam:  Vital Signs: /77   Pulse 99   Ht 1.82 m (5' 11.65\")   Wt 93.4 kg (205 lb 14.6 oz)   BMI 28.20 kg/m   . (77 %ile based on CDC (Boys, 2-20 Years) Stature-for-age data based on Stature recorded on 9/20/2019. 94 %ile based on CDC (Boys, 2-20 Years) weight-for-age data based on Weight recorded on 9/20/2019. Body mass index is 28.2 kg/m . 90 %ile based on CDC (Boys, 2-20 Years) BMI-for-age based on body measurements available as of 9/20/2019.)   Constitutional: Healthy, alert and no distress  Head: Normocephalic. No masses, lesions, tenderness or abnormalities  Neck: Neck supple.  EYE: Patch over eye  ENT: Ears: Normal position, Nose: No discharge and Mouth: Normal, moist mucous membranes  Gastrointestinal: Abdomen:, Soft, Nontender, Nondistended, Normal bowel sounds, No hepatomegaly, No splenomegalyRectal: Deferred  Musculoskeletal: Extremities warm, well perfused.   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  Currently he is having adequate evacuation of stools and I think his symptoms are most consistent with dyschezia or more of a visceral hypersensitivity.      -Continue with current constipation management (Linzess, Colace, MiraLAX) titrating MiraLAX to 2-3 soft [place] stool scale 4-6 type stools a day.   -Plan for admission for cleanout next Thursday with EGD and Colonoscopy, discussed the procedure and risks including but not limited to bleeding perforation, and infection.  At this time there are no contraindications ot the procedure or anesthesia  -Encouraged continued use of Pelvic Floor physical therapy techniques  -Continue time toileting and proper positioning with stooling  -Discussed the possible use of a medication like gabapentin or amitriptyline if Colonoscopy is normal to help with visceral hypersensitivity/dyschezia  -Family encouraged to call us at [phone] with any questions or concerns    No orders of the defined types were placed in this encounter.    I discussed the plan of care with Geo and his dad including  symptoms, differential diagnosis, diagnostic work up, treatment, potential side effects, and complications and follow up plan.  Questions were answered.    Follow up: Return in about 6 months (around 3/20/2020). or earlier should patient become symptomatic.      Aniya Wei MD  Pediatric Gastroenterology  [facility]  Patient Care Team:  Jeffrey Espinoza MD as PCP - General (Family Practice)  Dequan Timmons MD as MD (Surgery)  Leoncio Rousseau MD as MD (Pediatric Hematology/Oncology)  Harini  Kristi CLINE APRN CNP as Nurse Practitioner (Nurse Practitioner - Pediatrics)  Higinio Walters MD (Ophthalmology)  Karina Hodgson MSW as   Eren Reeder MD as MD (Dermatology)  Schwab, Briana, RN as Nurse Coordinator  Perico Holley MD as MD (Pediatric Neurology)  Sarah Vines MD as MD (Pediatric Urology)  Imani Means, GIANNI as Registered Nurse  Jorge Luis Tripathi MD as MD (Psychiatry)  Justice Fay MD as Resident (Student in organized health care education/training program)

## 2019-09-20 NOTE — TELEPHONE ENCOUNTER
RYLAND calling to follow up on recent My Chart message. RYLAND briefly talked with Jj Hicks, who was on his way to appointment with Geo. He requested that writer call Geo's mother.    RYLAND left message with Christianne requesting call back to discuss options.    Patient's mother, Christianne Sevilla, returned call and left voicemail: She did receive message and saw My Chart messages. She had talked with patient's father with understanding he would get back to writer and Christianne didn't want to complicate communication. She saw responses on My Chart and it looks like everything is good, both parents feel comfortable with writer having conversation with officer Michelle. They want the best for Geo and the situation. It's fine to have conversations vs just listening.     Mayte Tafoya, [facility]  [phone]

## 2019-09-20 NOTE — PATIENT INSTRUCTIONS
If you have any questions during regular office hours, please contact the nurse line at [phone] (Yvonne Garcia or Ember).  If acute urgent concerns arise after hours, you can call [phone] and ask to speak to the pediatric gastroenterologist on call.  If you have clinic scheduling needs, please call the Call Center at [phone].  If you need to schedule Radiology tests, call [phone].  Outside lab and imaging results should be faxed to [phone]. If you go to a lab outside of [place] we will not automatically get those results. You will need to ask them to send them to us.  My Chart messages are for routine communication and questions and are usually answered within 48-72 hours. If you have an urgent concern or require sooner response, please call us.

## 2019-09-20 NOTE — NURSING NOTE
"[facility] [[id number]]  Chief Complaint   Patient presents with     RECHECK     constipation     Initial /77   Pulse 99   Ht 1.82 m (5' 11.65\")   Wt 93.4 kg (205 lb 14.6 oz)   BMI 28.20 kg/m   Estimated body mass index is 28.2 kg/m  as calculated from the following:    Height as of this encounter: 1.82 m (5' 11.65\").    Weight as of this encounter: 93.4 kg (205 lb 14.6 oz).  Medication Reconciliation: complete   Diana Landeros LPN      "

## 2019-09-20 NOTE — PROGRESS NOTES
Pediatric Gastroenterology,   Hepatology, and Nutrition               Outpatient follow up consultation    Consultation requested by Jeffrey Espinoza     Diagnoses:  Patient Active Problem List   Diagnosis     GERD (gastroesophageal reflux disease)     Closed fracture at the growth plate of right distal fibula      Elevated serum creatinine     Dyspepsia     Intracranial hemorrhage (H)     Hemorrhagic stroke (H)     Ependymoma (H)     Admission for antineoplastic chemotherapy     Post-operative state     S/P craniotomy     S/P biopsy     Noncomitant strabismus     Abducens neuropathy of both eyes     CANDELARIO (obstructive sleep apnea)     Health Care Home     Hypernatremia     Body temperature low     Current chronic use of systemic steroids     Elevated TSH     Status post chemotherapy     Neoplasm of posterior cranial fossa (H)     Chronic constipation     Serum albumin decreased     Closed compression fracture of thoracic vertebra with routine healing, subsequent encounter     Depression         HPI: 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    He continues to have trouble with the sensation that he needs to stool but then he cannot get any stool out.  He is now having at least 1 large stools a day.  He did work on Pelvic Floor physical therapy and that maybe helped some.      Medications  Linzess 290 mcg  Colace 1 in the morning  Miralax 1 cap 2-3 times a day  1 Pear a day    Water: 2-3 L a day    No nausea or vomiting, he has been on weekly enemas in the past, he does not want to try daily enemas.      Review of Systems: A complete 10 point review of systems was negative except as note in this note and below.    Allergies: Blood transfusion related (informational only) and No known drug allergies    Medications:   Current Outpatient Medications   Medication Sig Dispense Refill     amoxicillin (AMOXIL) 875 MG tablet Take 1 tablet (875  mg) by mouth 2 times daily 20 tablet 0     calcium carbonate-vitamin D 600-400 MG-UNIT CHEW Take 2 tablets in the morning and 1 tablet in the evening. 90 tablet 3     dexamethasone (DECADRON) 0.5 MG tablet Take 0.75 mg by mouth daily (with breakfast). 90 tablet 3     fexofenadine (ALLEGRA) 180 MG tablet Take 180 mg by mouth every evening        linaclotide (LINZESS) 290 MCG capsule Take 290 mcg by mouth every morning (before breakfast)        ofloxacin (FLOXIN) 0.3 % otic solution Place 5 drops into the right ear daily 1 Bottle 0     OLANZapine (ZYPREXA) 20 MG tablet Take 1 tablet (20 mg) by mouth At Bedtime 30 tablet 1     omeprazole (PRILOSEC) 20 MG DR capsule Take 2 capsules (40 mg) by mouth every morning 60 capsule 1     potassium phosphate, monobasic, (K-PHOS) 500 MG tablet Take 1 tablet (500 mg) by mouth 2 times daily 90 tablet 3     senna-docusate (SENOKOT-S/PERICOLACE) 8.6-50 MG tablet Take 2 tablets by mouth At Bedtime       sertraline (ZOLOFT) 100 MG tablet Take 1 tablet (100 mg) by mouth daily 30 tablet 1     study - entinostat (IDS# 5050) 1 mg tablet Take 2 tablets (2 mg) by mouth every 7 days Take with 5mg tablet for total dose of 7mg weekly . Take on an empty stomach, at least 1 hour before or 2 hours after a meal.  Swallow tablet whole. 8 tablet 0     study - entinostat (IDS# 5050) 5 mg tablet Take 1 tablet (5 mg) by mouth every 7 days for 4 doses . Take on an empty stomach, at least 1 hour before or 2 hours after a meal.  Swallow tablet whole. Take with two 1mg tablets for 7mg weekly dose. 4 tablet 0     sulfamethoxazole-trimethoprim (BACTRIM/SEPTRA) 400-80 MG tablet Take 1 tablet by mouth 2 times daily On Saturdays and Sundays 24 tablet 11     traZODone (DESYREL) 50 MG tablet Take 1.5 tablets (75 mg) by mouth At Bedtime 45 tablet 1     vitamin D3 (CHOLECALCIFEROL) 2000 units (50 mcg) tablet Take 1 tablet by mouth daily (with breakfast)         Past Medical History: I have reviewed this patient's  past medical history and updated as appropriate.   Past Medical History:   Diagnosis Date     Cranial nerve dysfunction      Dyspepsia      Ependymoma (H)      Gastro-oesophageal reflux disease      Hearing loss      Intracranial hemorrhage (H)      Migraine      Pilonidal cyst     7-2015     Reduced vision      Refractory obstruction of nasal airway     2nd to nasal valve prolapse     Sleep apnea      Strabismus     gaze palsy         Past Surgical History: I have reviewed this patient's past medical history and updated as appropriate.   Past Surgical History:   Procedure Laterality Date     GRAFT CARTILAGE FROM POSTERIOR AURICLE Left 10/6/2016    Procedure: GRAFT CARTILAGE FROM POSTERIOR AURICLE;  Surgeon: Tyler Richards MD;  [facility]     INCISION AND DRAINAGE PERINEAL, COMBINED Bilateral 7/18/2015    Procedure: COMBINED INCISION AND DRAINAGE PERINEAL;  Surgeon: Dequan Timmons MD;  [facility]     OPTICAL TRACKING SYSTEM CRANIOTOMY, EXCISE TUMOR, COMBINED N/A 4/13/2015    Procedure: COMBINED OPTICAL TRACKING SYSTEM CRANIOTOMY, EXCISE TUMOR;  Surgeon: Francis Velazquez MD;  [facility]     OPTICAL TRACKING SYSTEM CRANIOTOMY, EXCISE TUMOR, COMBINED N/A 4/16/2015    Procedure: COMBINED OPTICAL TRACKING SYSTEM CRANIOTOMY, EXCISE TUMOR;  Surgeon: Francis Velazquez MD;  [facility]     OPTICAL TRACKING SYSTEM CRANIOTOMY, EXCISE TUMOR, COMBINED Bilateral 5/28/2015    Procedure: COMBINED OPTICAL TRACKING SYSTEM CRANIOTOMY, EXCISE TUMOR;  Surgeon: Francis Velazquez MD;  [facility]     OPTICAL TRACKING SYSTEM CRANIOTOMY, EXCISE TUMOR, COMBINED Bilateral 1/14/2016    Procedure: COMBINED OPTICAL TRACKING SYSTEM CRANIOTOMY, EXCISE TUMOR;  Surgeon: Francis Velazquez MD;  [facility]     OPTICAL TRACKING SYSTEM VENTRICULOSTOMY  4/16/2015    Procedure: OPTICAL TRACKING SYSTEM VENTRICULOSTOMY;  Surgeon: Francis Velazquez MD;  [facility]     REMOVE  "PORT VASCULAR ACCESS N/A 10/6/2016    Procedure: REMOVE PORT VASCULAR ACCESS;  Surgeon: Bruno Perea MD;  [facility]     RHINOPLASTY N/A 10/6/2016    Procedure: RHINOPLASTY;  Surgeon: Tyler Richards MD;  [facility]     VASCULAR SURGERY  5-2015    single lumen power port       Family History: I have reviewed this patient's past family history today and updated as appropriate.  Family History   Problem Relation Age of Onset     Circulatory Father         PE/DVT     Hypothyroidism Father 30     Diabetes Maternal Grandmother      Diabetes Paternal Grandmother      Diabetes Paternal Grandfather      C.A.D. Paternal Grandfather      Hypertension Maternal Grandfather      Thyroid Disease Paternal Aunt         unknown whether hypo or hyper     Mental Illness No family hx of         Social History: Splits time between mom and dad's house    Physical exam:  Vital Signs: /77   Pulse 99   Ht 1.82 m (5' 11.65\")   Wt 93.4 kg (205 lb 14.6 oz)   BMI 28.20 kg/m  . (77 %ile based on CDC (Boys, 2-20 Years) Stature-for-age data based on Stature recorded on 9/20/2019. 94 %ile based on CDC (Boys, 2-20 Years) weight-for-age data based on Weight recorded on 9/20/2019. Body mass index is 28.2 kg/m . 90 %ile based on CDC (Boys, 2-20 Years) BMI-for-age based on body measurements available as of 9/20/2019.)   Constitutional: Healthy, alert and no distress  Head: Normocephalic. No masses, lesions, tenderness or abnormalities  Neck: Neck supple.  EYE: Patch over eye  ENT: Ears: Normal position, Nose: No discharge and Mouth: Normal, moist mucous membranes  Gastrointestinal: Abdomen:, Soft, Nontender, Nondistended, Normal bowel sounds, No hepatomegaly, No splenomegalyRectal: Deferred  Musculoskeletal: Extremities warm, well perfused.   Skin: No suspicious lesions or rashes  Neurologic: Decreased tone in extremities, has some involuntary movements  Hematologic/Lymphatic/Immunologic: Normal cervical lymph " nodes      I personally reviewed results of laboratory evaluation, imaging studies and past medical records that were available during this outpatient visit:          Assessment and Plan:  18-year-old male with ependymoma, constipation, and abdominal fullnes.  Currently he is having adequate evacuation of stools and I think his symptoms are most consistent with dyschezia or more of a visceral hypersensitivity.      -Continue with current constipation management (Linzess, Colace, MiraLAX) titrating MiraLAX to 2-3 soft Snyder stool scale 4-6 type stools a day.   -Plan for admission for cleanout next Thursday with EGD and Colonoscopy, discussed the procedure and risks including but not limited to bleeding perforation, and infection.  At this time there are no contraindications ot the procedure or anesthesia  -Encouraged continued use of Pelvic Floor physical therapy techniques  -Continue time toileting and proper positioning with stooling  -Discussed the possible use of a medication like gabapentin or amitriptyline if Colonoscopy is normal to help with visceral hypersensitivity/dyschezia  -Family encouraged to call us at [phone] with any questions or concerns    No orders of the defined types were placed in this encounter.    I discussed the plan of care with Geo and his dad including  symptoms, differential diagnosis, diagnostic work up, treatment, potential side effects, and complications and follow up plan.  Questions were answered.    Follow up: Return in about 6 months (around 3/20/2020). or earlier should patient become symptomatic.      Aniya Wei MD  Pediatric Gastroenterology  [facility]    CC  Patient Care Team:  Jeffrey Espinoza MD as PCP - General (Family Practice)  Dequan Timmons MD as MD (Surgery)  Leoncio Rousseau MD as MD (Pediatric Hematology/Oncology)  Kristi Schuler APRN CNP as Nurse Practitioner (Nurse Practitioner -  Pediatrics)  Higinio Walters MD (Ophthalmology)  Karina Hodgson MSW as   Eren Reeder MD as MD (Dermatology)  Schwab, Briana, RN as Nurse Coordinator  Perico Holley MD as MD (Pediatric Neurology)  Sarah Vines MD as MD (Pediatric Urology)  Sarah Vines MD as MD (Pediatric Urology)  Imani Means, GIANNI as Registered Nurse  Imani Means RN as Registered Nurse  Jorge Luis Tripathi MD as MD (Psychiatry)  Justice Fay MD as Resident (Student in organized health care education/training program)  Jorge Luis Tripathi MD as MD (Psychiatry)

## 2019-09-23 ENCOUNTER — HOSPITAL ENCOUNTER (OUTPATIENT)
Dept: OCCUPATIONAL THERAPY | Facility: CLINIC | Age: 19
Setting detail: THERAPIES SERIES
End: 2019-09-23
Attending: FAMILY MEDICINE
Payer: COMMERCIAL

## 2019-09-23 PROCEDURE — 97535 SELF CARE MNGMENT TRAINING: CPT | Mod: GO | Performed by: OCCUPATIONAL THERAPIST

## 2019-09-24 DIAGNOSIS — C71.9 EPENDYMOMA (H): ICD-10-CM

## 2019-09-24 LAB
BASOPHILS # BLD AUTO: 0 10E9/L (ref 0–0.2)
BASOPHILS NFR BLD AUTO: 0.3 %
DIFFERENTIAL METHOD BLD: ABNORMAL
EOSINOPHIL # BLD AUTO: 0.4 10E9/L (ref 0–0.7)
EOSINOPHIL NFR BLD AUTO: 10.9 %
ERYTHROCYTE [DISTWIDTH] IN BLOOD BY AUTOMATED COUNT: 13.1 % (ref 10–15)
HCT VFR BLD AUTO: 36.4 % (ref 40–53)
HGB BLD-MCNC: 12.1 G/DL (ref 13.3–17.7)
LYMPHOCYTES # BLD AUTO: 1.1 10E9/L (ref 0.8–5.3)
LYMPHOCYTES NFR BLD AUTO: 29 %
MCH RBC QN AUTO: 29.7 PG (ref 26.5–33)
MCHC RBC AUTO-ENTMCNC: 33.2 G/DL (ref 31.5–36.5)
MCV RBC AUTO: 89 FL (ref 78–100)
MONOCYTES # BLD AUTO: 0.9 10E9/L (ref 0–1.3)
MONOCYTES NFR BLD AUTO: 24.4 %
NEUTROPHILS # BLD AUTO: 1.4 10E9/L (ref 1.6–8.3)
NEUTROPHILS NFR BLD AUTO: 35.4 %
PLATELET # BLD AUTO: 83 10E9/L (ref 150–450)
RBC # BLD AUTO: 4.08 10E12/L (ref 4.4–5.9)
WBC # BLD AUTO: 3.9 10E9/L (ref 4–11)

## 2019-09-24 PROCEDURE — 85025 COMPLETE CBC W/AUTO DIFF WBC: CPT | Performed by: NURSE PRACTITIONER

## 2019-09-24 PROCEDURE — 84100 ASSAY OF PHOSPHORUS: CPT | Performed by: NURSE PRACTITIONER

## 2019-09-24 PROCEDURE — 83735 ASSAY OF MAGNESIUM: CPT | Performed by: NURSE PRACTITIONER

## 2019-09-24 PROCEDURE — 80053 COMPREHEN METABOLIC PANEL: CPT | Performed by: NURSE PRACTITIONER

## 2019-09-24 PROCEDURE — 36415 COLL VENOUS BLD VENIPUNCTURE: CPT | Performed by: NURSE PRACTITIONER

## 2019-09-25 ENCOUNTER — TELEPHONE (OUTPATIENT)
Dept: PSYCHIATRY | Facility: CLINIC | Age: 19
End: 2019-09-25

## 2019-09-25 LAB
ALBUMIN SERPL-MCNC: 3.5 G/DL (ref 3.4–5)
ALP SERPL-CCNC: 140 U/L (ref 65–260)
ALT SERPL W P-5'-P-CCNC: 17 U/L (ref 0–50)
ANION GAP SERPL CALCULATED.3IONS-SCNC: 4 MMOL/L (ref 3–14)
AST SERPL W P-5'-P-CCNC: 16 U/L (ref 0–35)
BILIRUB SERPL-MCNC: 0.3 MG/DL (ref 0.2–1.3)
BUN SERPL-MCNC: 15 MG/DL (ref 7–30)
CALCIUM SERPL-MCNC: 8.5 MG/DL (ref 8.5–10.1)
CHLORIDE SERPL-SCNC: 106 MMOL/L (ref 98–110)
CO2 SERPL-SCNC: 30 MMOL/L (ref 20–32)
CREAT SERPL-MCNC: 1.04 MG/DL (ref 0.5–1)
GFR SERPL CREATININE-BSD FRML MDRD: >90 ML/MIN/{1.73_M2}
GLUCOSE SERPL-MCNC: 81 MG/DL (ref 70–99)
MAGNESIUM SERPL-MCNC: 2.1 MG/DL (ref 1.6–2.3)
PHOSPHATE SERPL-MCNC: 2.1 MG/DL (ref 2.5–4.5)
POTASSIUM SERPL-SCNC: 3.9 MMOL/L (ref 3.4–5.3)
PROT SERPL-MCNC: 6.1 G/DL (ref 6.8–8.8)
SODIUM SERPL-SCNC: 140 MMOL/L (ref 133–144)

## 2019-09-25 NOTE — TELEPHONE ENCOUNTER
Social Work   Incoming/Outgoing Email  [facility]    Correspondence with: Vern Martinez, [place]     Reason for contact:  Requested by Chief, verbal approval by parents    Content:        Hi Chief Martinez,    I received your phone contact information from JEFFREY Hicks's parents (and email from dispatcher when I called).  My name is Elenita Lottie.  I am a  at the [facility]. It sounded like you wanted to coordinate care/share your experiences in meeting with this individual.    Please feel free to call my direct number: [phone].  I am typically available Mondays, Wednesdays, Thursdays, and Fridays between 7:30 am and 5:00 pm. His parents have granted verbal permission for coordination of care.     Thank you,      Will route to patient's current psychiatric provider(s) as an FYI.   Please call or EPIC message with any questions or concerns.    Mayte Tafoya, LICSW, MSW, LICSW

## 2019-09-26 ENCOUNTER — APPOINTMENT (OUTPATIENT)
Dept: GENERAL RADIOLOGY | Facility: CLINIC | Age: 19
End: 2019-09-26
Attending: PEDIATRICS
Payer: COMMERCIAL

## 2019-09-26 ENCOUNTER — TELEPHONE (OUTPATIENT)
Dept: PSYCHIATRY | Facility: CLINIC | Age: 19
End: 2019-09-26

## 2019-09-26 ENCOUNTER — ANESTHESIA EVENT (OUTPATIENT)
Dept: SURGERY | Facility: CLINIC | Age: 19
End: 2019-09-26
Payer: COMMERCIAL

## 2019-09-26 ENCOUNTER — HOSPITAL ENCOUNTER (OUTPATIENT)
Facility: CLINIC | Age: 19
Setting detail: OBSERVATION
Discharge: HOME | End: 2019-09-28
Attending: PEDIATRICS | Admitting: PEDIATRICS
Payer: COMMERCIAL

## 2019-09-26 DIAGNOSIS — G47.33 OSA (OBSTRUCTIVE SLEEP APNEA): ICD-10-CM

## 2019-09-26 DIAGNOSIS — K59.00 CONSTIPATION, UNSPECIFIED CONSTIPATION TYPE: Primary | ICD-10-CM

## 2019-09-26 PROCEDURE — 40000986 XR ABDOMEN PORT 1 VW

## 2019-09-26 PROCEDURE — 25000132 ZZH RX MED GY IP 250 OP 250 PS 637: Performed by: STUDENT IN AN ORGANIZED HEALTH CARE EDUCATION/TRAINING PROGRAM

## 2019-09-26 PROCEDURE — 40000141 ZZH STATISTIC PERIPHERAL IV START W/O US GUIDANCE

## 2019-09-26 PROCEDURE — G0378 HOSPITAL OBSERVATION PER HR: HCPCS

## 2019-09-26 PROCEDURE — 74018 RADEX ABDOMEN 1 VIEW: CPT

## 2019-09-26 RX ORDER — OFLOXACIN 3 MG/ML
5 SOLUTION AURICULAR (OTIC) DAILY
Status: DISCONTINUED | OUTPATIENT
Start: 2019-09-26 | End: 2019-09-26

## 2019-09-26 RX ORDER — FEXOFENADINE HCL 180 MG/1
180 TABLET ORAL EVERY EVENING
Status: DISCONTINUED | OUTPATIENT
Start: 2019-09-26 | End: 2019-09-28 | Stop reason: HOSPADM

## 2019-09-26 RX ORDER — DEXAMETHASONE 0.75 MG/1
0.75 TABLET ORAL
Status: DISCONTINUED | OUTPATIENT
Start: 2019-09-27 | End: 2019-09-28 | Stop reason: HOSPADM

## 2019-09-26 RX ORDER — LIDOCAINE 40 MG/G
CREAM TOPICAL
Status: DISCONTINUED | OUTPATIENT
Start: 2019-09-26 | End: 2019-09-28 | Stop reason: HOSPADM

## 2019-09-26 RX ORDER — OLANZAPINE 20 MG/1
20 TABLET ORAL AT BEDTIME
Status: DISCONTINUED | OUTPATIENT
Start: 2019-09-26 | End: 2019-09-28 | Stop reason: HOSPADM

## 2019-09-26 RX ORDER — SERTRALINE HYDROCHLORIDE 100 MG/1
100 TABLET, FILM COATED ORAL DAILY
Status: DISCONTINUED | OUTPATIENT
Start: 2019-09-27 | End: 2019-09-28 | Stop reason: HOSPADM

## 2019-09-26 RX ORDER — ONDANSETRON 2 MG/ML
4 INJECTION INTRAMUSCULAR; INTRAVENOUS EVERY 6 HOURS PRN
Status: DISCONTINUED | OUTPATIENT
Start: 2019-09-26 | End: 2019-09-28 | Stop reason: HOSPADM

## 2019-09-26 RX ORDER — VITAMIN E 268 MG
400 CAPSULE ORAL DAILY
COMMUNITY
End: 2019-12-16

## 2019-09-26 RX ORDER — AMOXICILLIN 875 MG
875 TABLET ORAL 2 TIMES DAILY
Status: DISCONTINUED | OUTPATIENT
Start: 2019-09-26 | End: 2019-09-26

## 2019-09-26 RX ADMIN — POLYETHYLENE GLYCOL 3350, SODIUM SULFATE ANHYDROUS, SODIUM BICARBONATE, SODIUM CHLORIDE, POTASSIUM CHLORIDE 500 ML/HR: 236; 22.74; 6.74; 5.86; 2.97 POWDER, FOR SOLUTION ORAL at 15:30

## 2019-09-26 RX ADMIN — FEXOFENADINE HYDROCHLORIDE 180 MG: 180 TABLET, FILM COATED ORAL at 19:37

## 2019-09-26 RX ADMIN — POLYETHYLENE GLYCOL 3350, SODIUM SULFATE ANHYDROUS, SODIUM BICARBONATE, SODIUM CHLORIDE, POTASSIUM CHLORIDE 500 ML/HR: 236; 22.74; 6.74; 5.86; 2.97 POWDER, FOR SOLUTION ORAL at 23:37

## 2019-09-26 ASSESSMENT — ACTIVITIES OF DAILY LIVING (ADL)
TOILETING: 4-->COMPLETELY DEPENDENT
RETIRED_COMMUNICATION: 2-->DIFFICULTY SPEAKING (NOT RELATED TO LANGUAGE BARRIER)
SWALLOWING: 0-->SWALLOWS FOODS/LIQUIDS WITHOUT DIFFICULTY
COGNITION: 0 - NO COGNITION ISSUES REPORTED
RETIRED_EATING: 2-->ASSISTIVE PERSON
TRANSFERRING: 3-->ASSISTIVE EQUIPMENT AND PERSON
AMBULATION: 3-->ASSISTIVE EQUIPMENT AND PERSON
BATHING: 4-->COMPLETELY DEPENDENT
DRESS: 3-->ASSISTIVE EQUIPMENT AND PERSON
FALL_HISTORY_WITHIN_LAST_SIX_MONTHS: NO

## 2019-09-26 ASSESSMENT — COLUMBIA-SUICIDE SEVERITY RATING SCALE - C-SSRS
2. HAVE YOU ACTUALLY HAD ANY THOUGHTS OF KILLING YOURSELF IN THE PAST MONTH?: NO
6. HAVE YOU EVER DONE ANYTHING, STARTED TO DO ANYTHING, OR PREPARED TO DO ANYTHING TO END YOUR LIFE?: NO
1. IN THE PAST MONTH, HAVE YOU WISHED YOU WERE DEAD OR WISHED YOU COULD GO TO SLEEP AND NOT WAKE UP?: NO

## 2019-09-26 ASSESSMENT — ENCOUNTER SYMPTOMS: SEIZURES: 0

## 2019-09-26 NOTE — H&P
[facility], [place]    History and Physical - Gastroenterology Service     Date of Admission:  9/26/2019    Assessment & Plan   Geo Hicks is a 18 year old male with a complex PMHx including grade II ependymoma s/p resection/radiation/chemo (Enrolled in the [facility] ADVL 1513 - A Phase 1 Study of Entinostat), chronic constipation, and tenesmus, who presents for bowel prep today for scheduled EGD/colonoscopy on Friday 9/27.     FEN/GI  #Constipation   - NG placement w/ x-ray confirmation   - Golytely clean out: start at 500mL/hr and advance by 100ml/hr every 30 min to a goal of 1L/hour.   - Clear liquid diet then NPO at 3am  - Omeprazole 40mg BID  - Hold PTA supplements: Ca, Vit D, K-phos  - AM labs: platelets  - Daily weights  - q4h vitals per unit routine   - Zofran 4mg IV PRN for nausea/vomiting     HEME/ONC  #Ependymoma: on study   - Entinostat 6mg weekly    NEURO/PSYCH  #Delays 2/2 to tumors/treatment  - Olanzapine 20mg at bedtime  - Trazadone 75mg at bedtime  - Zoloft 100mg daily      ENDO  - PTA Decadron 0.75mg qAM, primary Dr. Martin     ALLERGY  #Allergies  - Allegra 180mg qPM     PULM  #Hx of CANDELARIO: previously used CPAP  - Routine vitals w/ spot pulse ox    SKIN  #Lacerations on right leg  - monitor for signs of infection.       Patient seen and staffed with Attending physician Dr. Julian Dunn.    Annalee Grace DO  [facility] Pediatric Resident PL[facility]      _________________________________________________________    Primary Care Physician   Jeffrey Espinoza    Chief Complaint   Chronic constipation and tenesmus    History is obtained from the patient    History of Present Illness   Geo Hicks is a 18 year old male with a complex PMHx including grade II ependymoma s/p resection/radiation/chemo (Enrolled in the [facility] ADVL 1513 - A Phase 1 Study of Entinostat), chronic constipation, and tenesmus, who presents for bowel prep today for scheduled EGD/colonoscopy on Friday  9/27. At home, his constipation management regimen includes Linzess, Colace, and Miralax for 2-3 soft stools per day. Continues to have constipation and tenesmus despite this. He has not had any recent illness, and reports no nausea/vomiting, URI symptoms other than some increased phlegm. No bleeding concerns. No respiratory issues, or chest discomfort. Last seen by Dr. Aniya Wei in the office on 9/20/19.     Past Medical History    I have reviewed this patient's medical history and updated it with pertinent information if needed.   Past Medical History:   Diagnosis Date     Cranial nerve dysfunction      Dyspepsia      Ependymoma (H)      Gastro-oesophageal reflux disease      Hearing loss      Intracranial hemorrhage (H)      Migraine      Pilonidal cyst     7-2015     Reduced vision      Refractory obstruction of nasal airway     2nd to nasal valve prolapse     Sleep apnea      Strabismus     gaze palsy        Past Surgical History   I have reviewed this patient's surgical history and updated it with pertinent information if needed.  Past Surgical History:   Procedure Laterality Date     GRAFT CARTILAGE FROM POSTERIOR AURICLE Left 10/6/2016    Procedure: GRAFT CARTILAGE FROM POSTERIOR AURICLE;  Surgeon: Tyler Richards MD;  [facility]     INCISION AND DRAINAGE PERINEAL, COMBINED Bilateral 7/18/2015    Procedure: COMBINED INCISION AND DRAINAGE PERINEAL;  Surgeon: Dequan Timmons MD;  [facility]     OPTICAL TRACKING SYSTEM CRANIOTOMY, EXCISE TUMOR, COMBINED N/A 4/13/2015    Procedure: COMBINED OPTICAL TRACKING SYSTEM CRANIOTOMY, EXCISE TUMOR;  Surgeon: Francis Velazquez MD;  [facility]     OPTICAL TRACKING SYSTEM CRANIOTOMY, EXCISE TUMOR, COMBINED N/A 4/16/2015    Procedure: COMBINED OPTICAL TRACKING SYSTEM CRANIOTOMY, EXCISE TUMOR;  Surgeon: Francis Velazquez MD;  [facility]     OPTICAL TRACKING SYSTEM CRANIOTOMY, EXCISE TUMOR, COMBINED Bilateral 5/28/2015     Procedure: COMBINED OPTICAL TRACKING SYSTEM CRANIOTOMY, EXCISE TUMOR;  Surgeon: Francis Velazquez MD;  [facility]     OPTICAL TRACKING SYSTEM CRANIOTOMY, EXCISE TUMOR, COMBINED Bilateral 1/14/2016    Procedure: COMBINED OPTICAL TRACKING SYSTEM CRANIOTOMY, EXCISE TUMOR;  Surgeon: Francis Velazquez MD;  [facility]     OPTICAL TRACKING SYSTEM VENTRICULOSTOMY  4/16/2015    Procedure: OPTICAL TRACKING SYSTEM VENTRICULOSTOMY;  Surgeon: Francis Velazquez MD;  [facility]     REMOVE PORT VASCULAR ACCESS N/A 10/6/2016    Procedure: REMOVE PORT VASCULAR ACCESS;  Surgeon: Bruno Perea MD;  [facility]     RHINOPLASTY N/A 10/6/2016    Procedure: RHINOPLASTY;  Surgeon: Tyler Richards MD;  [facility]     VASCULAR SURGERY  5-2015    single lumen power port       Immunization History   Immunization Status:  up to date and documented    Prior to Admission Medications   Prior to Admission Medications   Prescriptions Last Dose Informant Patient Reported? Taking?   OLANZapine (ZYPREXA) 20 MG tablet   No No   Sig: Take 1 tablet (20 mg) by mouth At Bedtime   amoxicillin (AMOXIL) 875 MG tablet   No No   Sig: Take 1 tablet (875 mg) by mouth 2 times daily   calcium carbonate-vitamin D 600-400 MG-UNIT CHEW  Father No No   Sig: Take 2 tablets in the morning and 1 tablet in the evening.   dexamethasone (DECADRON) 0.5 MG tablet   No No   Sig: Take 0.75 mg by mouth daily (with breakfast).   fexofenadine (ALLEGRA) 180 MG tablet  Father Yes No   Sig: Take 180 mg by mouth every evening    linaclotide (LINZESS) 290 MCG capsule   Yes No   Sig: Take 290 mcg by mouth every morning (before breakfast)    ofloxacin (FLOXIN) 0.3 % otic solution   No No   Sig: Place 5 drops into the right ear daily   omeprazole (PRILOSEC) 20 MG DR capsule   No No   Sig: Take 2 capsules (40 mg) by mouth every morning   potassium phosphate, monobasic, (K-PHOS) 500 MG tablet   No No   Sig: Take 1 tablet (500 mg) by  mouth 2 times daily   sertraline (ZOLOFT) 100 MG tablet   No No   Sig: Take 1 tablet (100 mg) by mouth daily   study - entinostat (IDS# 5050) 1 mg tablet   No No   Sig: Take 1 tablet (1 mg) by mouth every 7 days for 4 doses Take one 1mg tablet with one 5mg tablet for total dose of 6mg weekly. Take on an empty stomach, at least 1 hour before or 2 hours after a meal.  Swallow tablet whole.   study - entinostat (IDS# 5050) 1 mg tablet   No No   Sig: Take 1 tablet (1 mg) by mouth every 7 days for 4 doses Take one 1mg tablet with one 5mg tablet for total dose of 6mg weekly. Take on an empty stomach, at least 1 hour before or 2 hours after a meal.  Swallow tablet whole.   study - entinostat (IDS# 5050) 1 mg tablet   No No   Sig: Take 1 tablet (1 mg) by mouth every 7 days for 4 doses Take one 1mg tablet with one 5mg tablet for total dose of 6mg weekly. Take on an empty stomach, at least 1 hour before or 2 hours after a meal.  Swallow tablet whole.   study - entinostat (IDS# 5050) 1 mg tablet   No No   Sig: Take 2 tablets (2 mg) by mouth every 7 days Take with 5mg tablet for total dose of 7mg weekly . Take on an empty stomach, at least 1 hour before or 2 hours after a meal.  Swallow tablet whole.   study - entinostat (IDS# 5050) 5 mg tablet   No No   Sig: Take 1 tablet (5 mg) by mouth every 7 days for 4 doses Take one 5mg tablet with one 1mg tablet for total dose of 6mg weekly. Take on an empty stomach, at least 1 hour before or 2 hours after a meal.  Swallow tablet whole.   study - entinostat (IDS# 5050) 5 mg tablet   No No   Sig: Take 1 tablet (5 mg) by mouth every 7 days for 4 doses Take one 5mg tablet with one 1mg tablet for total dose of 6mg weekly. Take on an empty stomach, at least 1 hour before or 2 hours after a meal.  Swallow tablet whole.   study - entinostat (IDS# 5050) 5 mg tablet   No No   Sig: Take 1 tablet (5 mg) by mouth every 7 days for 4 doses Take one 5mg tablet with one 1mg tablet for total dose of 6mg  weekly. Take on an empty stomach, at least 1 hour before or 2 hours after a meal.  Swallow tablet whole.   study - entinostat (IDS# 5050) 5 mg tablet   No No   Sig: Take 1 tablet (5 mg) by mouth every 7 days for 4 doses Take one 5mg tablet with one 1mg tablet for total dose of 6mg weekly. Take on an empty stomach, at least 1 hour before or 2 hours after a meal.  Swallow tablet whole.   study - entinostat (IDS# 5050) 5 mg tablet   No No   Sig: Take 1 tablet (5 mg) by mouth every 7 days for 4 doses . Take on an empty stomach, at least 1 hour before or 2 hours after a meal.  Swallow tablet whole. Take with two 1mg tablets for 7mg weekly dose.   sulfamethoxazole-trimethoprim (BACTRIM/SEPTRA) 400-80 MG tablet   No No   Sig: Take 1 tablet by mouth 2 times daily On Saturdays and Sundays   traZODone (DESYREL) 50 MG tablet   No No   Sig: Take 1.5 tablets (75 mg) by mouth At Bedtime   vitamin D3 (CHOLECALCIFEROL) 2000 units (50 mcg) tablet   Yes No   Sig: Take 1 tablet by mouth daily (with breakfast)      Facility-Administered Medications: None     Allergies   Allergies   Allergen Reactions     Blood Transfusion Related (Informational Only) Swelling     Periorbital swelling post platelet transfusion     No Known Drug Allergies        Social History   I have updated and reviewed the following Social History Narrative:   Pediatric History   Patient Guardians     Not on file     Patient does not qualify to have social determinant information on file (likely too young).   Other Topics Concern     Not on file   Social History Narrative    Grown up in [place], [place].  Parents are , and he shares time between his mother's and father's homes. Both parents are remarried.  Dad is a , and mom does  for a testing company.  Siblings include two full brothers (older brother Benitez who is a senior in college and younger brother Gamaliel), a step-brother, and a step-sister. Geo graduated from high  school in Spring 2018.  Initially considered attending [employer] in Fall 2019, but reportedly lost interest due to the amount of time it would take him to complete courses and receive a degree.  Does today endorse continued interest in pursuing an advanced course of study at some point, specifically stating he is interested in Electrical Engineering.  No access to guns or weapons enjoys playing video games.  Guns are currently locked in the house.        Family History   I have reviewed this patient's family history and updated it with pertinent information if needed.   Family History   Problem Relation Age of Onset     Circulatory Father         PE/DVT     Hypothyroidism Father 30     Diabetes Maternal Grandmother      Diabetes Paternal Grandmother      Diabetes Paternal Grandfather      C.A.D. Paternal Grandfather      Hypertension Maternal Grandfather      Thyroid Disease Paternal Aunt         unknown whether hypo or hyper     Mental Illness No family hx of        Review of Systems   The 10 point Review of Systems is negative other than noted in the HPI or here.     Physical Exam                      Vital Signs with Ranges     208 lbs 1.83 oz    GENERAL: Active, alert, in no acute distress, well developed, well nourished.   HEAD: Normocephalic  EYES: Patch over right eye for diplopia.  Normal conjunctivae. + Nystagmus.   EARS: Normal canals. Tympanic membranes are normal; gray and translucent.  NOSE: Normal without discharge.  MOUTH/THROAT: Clear. No oral lesions. Teeth without obvious abnormalities.  NECK: Supple, no masses.  No thyromegaly.  LYMPH NODES: No adenopathy  LUNGS: Clear. No rales, rhonchi, wheezing or retractions  HEART: Regular rhythm. Normal S1/S2. No murmurs. Normal pulses.  ABDOMEN: Soft, non-tender, not distended, no masses or hepatosplenomegaly. Bowel sounds normal.   NEUROLOGIC: Alert and oriented to person, place, and time. Normal strength. +CN deficit and poor  coordination.   EXTREMITIES: two patches on right leg covering two stitched lacerations  SKIN: warm and dry

## 2019-09-26 NOTE — PLAN OF CARE
Pt up to the floor around 1100. AVSS. LS clear. NG tube placed in the L nare following 3 recoils in the R. Placement confirmed via xray. Will be NPO at 0000 tonight, plan to start IV fluids once NPO, will have PIV placed this evening. Mom at the bedside, patient resting comfortably in room. Will continue to monitor.

## 2019-09-26 NOTE — PLAN OF CARE
VSS. Afebrile. NGT placed into left nare and bowel clean out initiated at 1600.  Rate increased hourly and now increasing every 1/2 hour.  No BM yet.  IV being placed at present.  Mom at bedside involved in cares.  Falls precautions maintained.  Gait belt in use. Pt stands and pivots when using the commode at bedside.  One person assist at present.  Continue cares as ordered and otify MD of changes or concerns.

## 2019-09-26 NOTE — PROGRESS NOTES
"   09/26/19 1844   Child Life   Location Med/Surg  (Constipation)   Intervention Procedure Support;Preparation;Family Support   Preparation Comment Provided preparation for upcoming endoscopy and colonoscopy via verbal explanations and photos. Per patient's RN, patient learns best from visual aids. Patient easily engaged in preparation and shared that he has had many procedures done in the past. Mother and patient asked appropriate questions, which this writer answered.    Procedure Support Comment Present and supportive for PIV placement at bedside. Patient shared that he doesn't use Jtip or numbing cream as he prefers to \"just go for it\". Patient able to meet the demands of the procedure and coped well with PIV placement.    Family Support Comment Mother present and supportive at bedside.    Anxiety Low Anxiety   Major Change/Loss/Stressor/Fears   (Hosptialization)   Anxieties, Fears or Concerns Patient did not express any fears or concerns Per patient, he is very easy going.    Techniques to [place] with Loss/Stress/Change family presence  (Conversation, age appropriate explanations, enjoys telling jokes)   Outcomes/Follow Up Continue to Follow/Support     "

## 2019-09-27 ENCOUNTER — APPOINTMENT (OUTPATIENT)
Dept: GENERAL RADIOLOGY | Facility: CLINIC | Age: 19
End: 2019-09-27
Attending: PEDIATRICS
Payer: COMMERCIAL

## 2019-09-27 ENCOUNTER — ANESTHESIA (OUTPATIENT)
Dept: SURGERY | Facility: CLINIC | Age: 19
End: 2019-09-27
Payer: COMMERCIAL

## 2019-09-27 PROBLEM — K59.01 SLOW TRANSIT CONSTIPATION: Status: ACTIVE | Noted: 2018-07-12

## 2019-09-27 PROBLEM — K59.01 CONSTIPATION BY DELAYED COLONIC TRANSIT: Status: ACTIVE | Noted: 2018-07-12

## 2019-09-27 LAB
ABO + RH BLD: NORMAL
ABO + RH BLD: NORMAL
BLD GP AB SCN SERPL QL: NORMAL
BLD PROD TYP BPU: NORMAL
BLD UNIT ID BPU: 0
BLOOD BANK CMNT PATIENT-IMP: NORMAL
BLOOD PRODUCT CODE: NORMAL
BPU ID: NORMAL
COLONOSCOPY: NORMAL
PLATELET # BLD AUTO: 61 10E9/L (ref 150–450)
SPECIMEN EXP DATE BLD: NORMAL
TRANSFUSION STATUS PATIENT QL: NORMAL
TRANSFUSION STATUS PATIENT QL: NORMAL
UPPER GI ENDOSCOPY: NORMAL

## 2019-09-27 PROCEDURE — 25000132 ZZH RX MED GY IP 250 OP 250 PS 637: Performed by: STUDENT IN AN ORGANIZED HEALTH CARE EDUCATION/TRAINING PROGRAM

## 2019-09-27 PROCEDURE — 71046 X-RAY EXAM CHEST 2 VIEWS: CPT

## 2019-09-27 PROCEDURE — G0378 HOSPITAL OBSERVATION PER HR: HCPCS

## 2019-09-27 PROCEDURE — 36415 COLL VENOUS BLD VENIPUNCTURE: CPT | Performed by: STUDENT IN AN ORGANIZED HEALTH CARE EDUCATION/TRAINING PROGRAM

## 2019-09-27 PROCEDURE — 27210794 ZZH OR GENERAL SUPPLY STERILE: Performed by: PEDIATRICS

## 2019-09-27 PROCEDURE — 37000009 ZZH ANESTHESIA TECHNICAL FEE, EACH ADDTL 15 MIN: Performed by: PEDIATRICS

## 2019-09-27 PROCEDURE — 88305 TISSUE EXAM BY PATHOLOGIST: CPT | Performed by: PEDIATRICS

## 2019-09-27 PROCEDURE — 25000128 H RX IP 250 OP 636: Performed by: STUDENT IN AN ORGANIZED HEALTH CARE EDUCATION/TRAINING PROGRAM

## 2019-09-27 PROCEDURE — P9037 PLATE PHERES LEUKOREDU IRRAD: HCPCS | Performed by: STUDENT IN AN ORGANIZED HEALTH CARE EDUCATION/TRAINING PROGRAM

## 2019-09-27 PROCEDURE — 25000128 H RX IP 250 OP 636: Performed by: NURSE ANESTHETIST, CERTIFIED REGISTERED

## 2019-09-27 PROCEDURE — 86900 BLOOD TYPING SEROLOGIC ABO: CPT | Performed by: STUDENT IN AN ORGANIZED HEALTH CARE EDUCATION/TRAINING PROGRAM

## 2019-09-27 PROCEDURE — 71000014 ZZH RECOVERY PHASE 1 LEVEL 2 FIRST HR: Performed by: PEDIATRICS

## 2019-09-27 PROCEDURE — 25800030 ZZH RX IP 258 OP 636: Performed by: NURSE ANESTHETIST, CERTIFIED REGISTERED

## 2019-09-27 PROCEDURE — 40000170 ZZH STATISTIC PRE-PROCEDURE ASSESSMENT II: Performed by: PEDIATRICS

## 2019-09-27 PROCEDURE — 36000051 ZZH SURGERY LEVEL 2 1ST 30 MIN - UMMC: Performed by: PEDIATRICS

## 2019-09-27 PROCEDURE — 36000053 ZZH SURGERY LEVEL 2 EA 15 ADDTL MIN - UMMC: Performed by: PEDIATRICS

## 2019-09-27 PROCEDURE — 25000125 ZZHC RX 250: Performed by: NURSE ANESTHETIST, CERTIFIED REGISTERED

## 2019-09-27 PROCEDURE — 94762 N-INVAS EAR/PLS OXIMTRY CONT: CPT

## 2019-09-27 PROCEDURE — 25000131 ZZH RX MED GY IP 250 OP 636 PS 637: Performed by: STUDENT IN AN ORGANIZED HEALTH CARE EDUCATION/TRAINING PROGRAM

## 2019-09-27 PROCEDURE — 86850 RBC ANTIBODY SCREEN: CPT | Performed by: STUDENT IN AN ORGANIZED HEALTH CARE EDUCATION/TRAINING PROGRAM

## 2019-09-27 PROCEDURE — 88312 SPECIAL STAINS GROUP 1: CPT | Performed by: PEDIATRICS

## 2019-09-27 PROCEDURE — 88305 TISSUE EXAM BY PATHOLOGIST: CPT | Mod: 26 | Performed by: PEDIATRICS

## 2019-09-27 PROCEDURE — 88312 SPECIAL STAINS GROUP 1: CPT | Mod: 26 | Performed by: PEDIATRICS

## 2019-09-27 PROCEDURE — 25800030 ZZH RX IP 258 OP 636: Performed by: STUDENT IN AN ORGANIZED HEALTH CARE EDUCATION/TRAINING PROGRAM

## 2019-09-27 PROCEDURE — 85049 AUTOMATED PLATELET COUNT: CPT | Performed by: STUDENT IN AN ORGANIZED HEALTH CARE EDUCATION/TRAINING PROGRAM

## 2019-09-27 PROCEDURE — 37000008 ZZH ANESTHESIA TECHNICAL FEE, 1ST 30 MIN: Performed by: PEDIATRICS

## 2019-09-27 PROCEDURE — 86901 BLOOD TYPING SEROLOGIC RH(D): CPT | Performed by: STUDENT IN AN ORGANIZED HEALTH CARE EDUCATION/TRAINING PROGRAM

## 2019-09-27 PROCEDURE — 40000275 ZZH STATISTIC RCP TIME EA 10 MIN

## 2019-09-27 RX ORDER — SODIUM CHLORIDE, SODIUM LACTATE, POTASSIUM CHLORIDE, CALCIUM CHLORIDE 600; 310; 30; 20 MG/100ML; MG/100ML; MG/100ML; MG/100ML
INJECTION, SOLUTION INTRAVENOUS CONTINUOUS
Status: DISCONTINUED | OUTPATIENT
Start: 2019-09-27 | End: 2019-09-27 | Stop reason: HOSPADM

## 2019-09-27 RX ORDER — ACETAMINOPHEN 325 MG/1
650 TABLET ORAL EVERY 6 HOURS PRN
Status: DISCONTINUED | OUTPATIENT
Start: 2019-09-27 | End: 2019-09-28 | Stop reason: HOSPADM

## 2019-09-27 RX ORDER — SODIUM CHLORIDE 9 MG/ML
INJECTION, SOLUTION INTRAVENOUS CONTINUOUS PRN
Status: DISCONTINUED | OUTPATIENT
Start: 2019-09-27 | End: 2019-09-27

## 2019-09-27 RX ORDER — POLYETHYLENE GLYCOL 3350 17 G/17G
17 POWDER, FOR SOLUTION ORAL 3 TIMES DAILY
Status: DISCONTINUED | OUTPATIENT
Start: 2019-09-27 | End: 2019-09-28 | Stop reason: HOSPADM

## 2019-09-27 RX ORDER — PROPOFOL 10 MG/ML
INJECTION, EMULSION INTRAVENOUS PRN
Status: DISCONTINUED | OUTPATIENT
Start: 2019-09-27 | End: 2019-09-27

## 2019-09-27 RX ORDER — DIPHENHYDRAMINE HYDROCHLORIDE 50 MG/ML
INJECTION INTRAMUSCULAR; INTRAVENOUS PRN
Status: DISCONTINUED | OUTPATIENT
Start: 2019-09-27 | End: 2019-09-27

## 2019-09-27 RX ORDER — PROPOFOL 10 MG/ML
INJECTION, EMULSION INTRAVENOUS CONTINUOUS PRN
Status: DISCONTINUED | OUTPATIENT
Start: 2019-09-27 | End: 2019-09-27

## 2019-09-27 RX ORDER — ALBUTEROL SULFATE 0.83 MG/ML
2.5 SOLUTION RESPIRATORY (INHALATION)
Status: DISCONTINUED | OUTPATIENT
Start: 2019-09-27 | End: 2019-09-27 | Stop reason: HOSPADM

## 2019-09-27 RX ORDER — SODIUM CHLORIDE, SODIUM LACTATE, POTASSIUM CHLORIDE, CALCIUM CHLORIDE 600; 310; 30; 20 MG/100ML; MG/100ML; MG/100ML; MG/100ML
INJECTION, SOLUTION INTRAVENOUS CONTINUOUS PRN
Status: DISCONTINUED | OUTPATIENT
Start: 2019-09-27 | End: 2019-09-27

## 2019-09-27 RX ADMIN — SERTRALINE HYDROCHLORIDE 100 MG: 100 TABLET ORAL at 14:02

## 2019-09-27 RX ADMIN — SODIUM CHLORIDE, POTASSIUM CHLORIDE, SODIUM LACTATE AND CALCIUM CHLORIDE: 600; 310; 30; 20 INJECTION, SOLUTION INTRAVENOUS at 11:31

## 2019-09-27 RX ADMIN — PROPOFOL 70 MG: 10 INJECTION, EMULSION INTRAVENOUS at 09:46

## 2019-09-27 RX ADMIN — MIDAZOLAM 1 MG: 1 INJECTION INTRAMUSCULAR; INTRAVENOUS at 09:47

## 2019-09-27 RX ADMIN — DIPHENHYDRAMINE HYDROCHLORIDE 25 MG: 50 INJECTION, SOLUTION INTRAMUSCULAR; INTRAVENOUS at 10:28

## 2019-09-27 RX ADMIN — SODIUM CHLORIDE, POTASSIUM CHLORIDE, SODIUM LACTATE AND CALCIUM CHLORIDE: 600; 310; 30; 20 INJECTION, SOLUTION INTRAVENOUS at 09:56

## 2019-09-27 RX ADMIN — MIDAZOLAM 1 MG: 1 INJECTION INTRAMUSCULAR; INTRAVENOUS at 09:43

## 2019-09-27 RX ADMIN — OLANZAPINE 20 MG: 20 TABLET, FILM COATED ORAL at 22:44

## 2019-09-27 RX ADMIN — DEXAMETHASONE 0.75 MG: 0.75 TABLET ORAL at 14:02

## 2019-09-27 RX ADMIN — ONDANSETRON 4 MG: 2 INJECTION INTRAMUSCULAR; INTRAVENOUS at 09:47

## 2019-09-27 RX ADMIN — DEXTROSE AND SODIUM CHLORIDE: 5; 900 INJECTION, SOLUTION INTRAVENOUS at 00:02

## 2019-09-27 RX ADMIN — LINACLOTIDE 290 MCG: 145 CAPSULE, GELATIN COATED ORAL at 14:01

## 2019-09-27 RX ADMIN — SODIUM CHLORIDE: 9 INJECTION, SOLUTION INTRAVENOUS at 11:12

## 2019-09-27 RX ADMIN — TRAZODONE HYDROCHLORIDE 75 MG: 150 TABLET ORAL at 22:44

## 2019-09-27 RX ADMIN — PROPOFOL 30 MG: 10 INJECTION, EMULSION INTRAVENOUS at 09:47

## 2019-09-27 RX ADMIN — MIDAZOLAM 2 MG: 1 INJECTION INTRAMUSCULAR; INTRAVENOUS at 09:40

## 2019-09-27 RX ADMIN — PHENYLEPHRINE HYDROCHLORIDE 100 MCG: 10 INJECTION INTRAVENOUS at 11:15

## 2019-09-27 RX ADMIN — FEXOFENADINE HYDROCHLORIDE 180 MG: 180 TABLET, FILM COATED ORAL at 20:22

## 2019-09-27 RX ADMIN — POLYETHYLENE GLYCOL 3350, SODIUM SULFATE ANHYDROUS, SODIUM BICARBONATE, SODIUM CHLORIDE, POTASSIUM CHLORIDE 500 ML/HR: 236; 22.74; 6.74; 5.86; 2.97 POWDER, FOR SOLUTION ORAL at 03:10

## 2019-09-27 RX ADMIN — PROPOFOL 80 MCG/KG/MIN: 10 INJECTION, EMULSION INTRAVENOUS at 09:51

## 2019-09-27 RX ADMIN — PHENYLEPHRINE HYDROCHLORIDE 100 MCG: 10 INJECTION INTRAVENOUS at 11:10

## 2019-09-27 RX ADMIN — OMEPRAZOLE 40 MG: 20 CAPSULE, DELAYED RELEASE ORAL at 14:02

## 2019-09-27 RX ADMIN — ACETAMINOPHEN 650 MG: 325 TABLET, FILM COATED ORAL at 19:39

## 2019-09-27 NOTE — PLAN OF CARE
Afebrile. VSS. Lung sounds clear.  Denies pain and nausea.   D5NS started at 0000 at 100mL/hr in PIV.   NPO at 0300, stopped golytely at 0330. Stools appear light in color with little brown flecks.   Second scrub at 0600.   Hourly rounds completed. Will continue to monitor.

## 2019-09-27 NOTE — PLAN OF CARE
"VSS. Afebrile prior to AM procedure. Post procedure 02 saturations between % on RA. Patient intermittently desaturation to the low to mid 80s for 5 seconds up to 1 minute. Self recovers with no oxygen applied. Using IS every 15 minutes to encourage deep breathing. MD aware, will continue to monitor. Other VSS post procedure.     No signs or symptoms of pain or nausea. Eating well post procedure.     Patient reporting that he feels \"frustrated\" with not feeling fully cleaned out. Discussed that his procedure today was successful and no further clean-out needed at this time. Mom reports that this is a repeat concern for patient when he is close to discharge post a bowel clean-out. Patient had been told at an outside therapy appointment that when he was no longer constipated it would help him walk. Geo feels that his clean-out is not completed because he is not able to walk independently.     Per mom, it is helpful at this time to focus on getting Geo working on improving his O2 saturations and focusing less on any constipation or walking issues.     Will continue to monitor. Possible discharge tonight or tomorrow.   "

## 2019-09-27 NOTE — PROVIDER NOTIFICATION
MD notified for clarification on continuous Golytely bowel clean out and NPO order. Patient NPO at 0300 for procedure at 0900, but no indication of stopping Golytely once NPO. MD notified and Golytely stopped at 0330. Stools light in clear and light in color, with very small flecks in it. Patient will continue to be monitored and assessed.

## 2019-09-27 NOTE — OR NURSING
PACU to Inpatient Nursing Handoff    Patient Geo Hicks is a 18 year old male who speaks English.   Procedure Procedure(s):  Upper Endoscopy With Biopsy. Polypectomy  Colonoscopy With Biopsy   Surgeon(s) Primary: Aniya Wei MD     Allergies   Allergen Reactions     Blood Transfusion Related (Informational Only) Swelling     Periorbital swelling post platelet transfusion     No Known Drug Allergies        Isolation  none    Past Medical History   has a past medical history of Cranial nerve dysfunction, Dyspepsia, Ependymoma (H), Gastro-oesophageal reflux disease, Hearing loss, Intracranial hemorrhage (H), Migraine, Pilonidal cyst, Reduced vision, Refractory obstruction of nasal airway, Sleep apnea, and Strabismus. He also has no past medical history of PONV (postoperative nausea and vomiting).    Anesthesia Other   Dermatome Level     Preop Meds Not applicable   Nerve block Not applicable   Intraop Meds ondansetron (Zofran): last given at 0947  versed 4mg (total) last given at 0947; benadryl 25mg (total) last given at 1028   Local Meds No   Antibiotics Not applicable     Pain Patient Currently in Pain: denies   PACU meds  Not applicable   PCA / epidural No   Capnography     Telemetry     Inpatient Telemetry Monitor Ordered? No        Labs Glucose Lab Results   Component Value Date    GLC 81 09/24/2019       Hgb Lab Results   Component Value Date    HGB 12.1 09/24/2019       INR Lab Results   Component Value Date    INR 1.03 04/25/2017      PACU Imaging Not applicable     Wound/Incision     CMS        Equipment Not applicable   Other LDA       IV Access Peripheral IV 09/27/19 Left Lower forearm (Active)   Site Assessment WDL 9/27/2019 11:35 AM   Line Status Infusing 9/27/2019 11:35 AM   Phlebitis Scale 0-->no symptoms 9/27/2019 11:35 AM   Infiltration Scale 0 9/27/2019 11:35 AM   Number of days: 0      Blood Products Platelets:  1 unit ordered, 1 unit given EBL 0 mL   Intake/Output Date  09/27/19 0700 - 09/28/19 0659   Shift 5675-4076 4136-6631 5843-3541 24 Hour Total   INTAKE   I.V. 1250   1250   Blood Components 205   205   Shift Total(mL/kg) 1455(15.41)   1455(15.41)   OUTPUT   Stool 600   600   Shift Total(mL/kg) 600(6.36)   600(6.36)   Weight (kg) 94.4 94.4 94.4 94.4      Drains / [place]     Time of void PreOp Void Prior to Procedure: 0845 (09/27/19 0907)    PostOp Voided (mL): 0 mL (09/26/19 1730)  Urine Occurrence: 1 (09/26/19 1830)    Diapered? No   Bladder Scan      mL(water) (09/26/19 1948)          Vitals    B/P: 104/57  T: 97.3  F (36.3  C)    Temp src: Axillary  P:  Pulse: 87 (09/27/19 1200)    Heart Rate: 90 (09/27/19 1205)     R: 19  O2:  SpO2: 95 %    O2 Device: None (Room air) (09/27/19 1205)    Oxygen Delivery: 3 LPM (09/27/19 1135)         Family/support present mother   Patient belongings     Patient transported on cart and air mat   DC meds/scripts (obs/outpt) Not applicable   Inpatient Pain Meds Released? No       Special needs/considerations None   Tasks needing completion None       Jeff Ureña RN  [facility] [id number]

## 2019-09-27 NOTE — TELEPHONE ENCOUNTER
Social Work   Incoming Voicemail  [facility]    Incoming Voicemail From:  [employer] , Vern Martinez    Content of Voicemail:    Chief Martinez responding to writer's email requesting coordination.    Response/Plan:    SW returned call.     Chief Martinez reports that he has been called to Geo's house several times and feels Geo needs some time of psychiatric help. Since Geo typically calls during the day, Chief Martinez is usually the responding officer. He has also updated his officers on how to handle calls in case he is not present. He notes that Geo is often difficult to understand, especially when upset. Chief Martinez has an understanding that Geo is getting appropriate medical care and that his assertions that doctors are not listening to him or withholding treatment are not accurate.  At times, Chief Martinez has been able to use de-escalation techniques. Last police call, Geo was quite upset and it was hard to assist him in calming down. He is concerned that Geo is declining in his overall mental state. Near end of call, it was identified that Geo typically calls when his mother is out of the home. His step-father is home during these times and Geo may be spending a lot of time in his room as the Chief notes that the step-father is often not aware of Geo's escalating symptoms until police respond to the call.     SW reviewed that patient is getting medication management through our clinic and does have a therapist in the community. SW also assessed if police are using other supports. Chief Martinez is aware of [facility] support in [facility], however it appears this service tends to focus on individuals expressing suicidal thoughts. Chief Martinez feels he has an understanding of the situation and is able to assess if or when Geo may need more intensive supports.     SW consulted about next steps. Chief Martinez is not necessarily hoping for a change in  services/supports. He wanted to share his experiences in working with Geo and ensure Geo had services in place.     SW will check in with provider on coordinating with Geo's therapist. It may also be helpful to start to develop a proactive plan on next steps if Geo's symptoms or actions escalate to needing inpatient care. Due to Geo's medical complexities, inpatient psychiatry may or may not be appropriate placement.          Will route to patient's current psychiatric provider(s) as an FYI.   Please call or EPIC message with any questions or concerns.    Mayte Tafoya, [facility], MSW  [phone]

## 2019-09-27 NOTE — ANESTHESIA POSTPROCEDURE EVALUATION
Anesthesia POST Procedure Evaluation    Patient: Geo Hicks   MRN:     [id number] Gender:   male   Age:    18 year old :      2000        Preoperative Diagnosis: Abdominal Pain   Procedure(s):  Upper Endoscopy With Biopsy. Polypectomy  Colonoscopy With Biopsy   Postop Comments: No value filed.       Anesthesia Type:  Not documented  General    Reportable Event: NO     PAIN: Uncomplicated   Sign Out status: Comfortable, Well controlled pain     PONV: No PONV   Sign Out status:  No Nausea or Vomiting     Neuro/Psych: Uneventful perioperative course   Sign Out Status: Preoperative baseline; Age appropriate mentation     Airway/Resp.: Uneventful perioperative course   Sign Out Status: Non labored breathing, age appropriate RR; Resp. Status within EXPECTED Parameters     CV: Uneventful perioperative course   Sign Out status: Appropriate BP and perfusion indices; Appropriate HR/Rhythm     Disposition:   Sign Out in:  PACU  Disposition:  Floor  Recovery Course: Uneventful  Follow-Up: Not required     Comments/Narrative:  - Uneventful, ready to transfer to floor           Last Anesthesia Record Vitals:  CRNA VITALS  2019 1101 - 2019 1201      2019             NIBP:  106/50    Pulse:  87    NIBP Mean:  67    Temp:  36.3  C (97.3  F)    SpO2:  94 %    Resp Rate (observed):  24    EKG:  NSR          Last PACU Vitals:  Vitals Value Taken Time   /57 2019 12:00 PM   Temp 36.3  C (97.3  F) 2019 11:35 AM   Pulse 87 2019 12:00 PM   Resp 19 2019 12:05 PM   SpO2 95 % 2019 12:05 PM   Temp src     NIBP 106/50 2019 11:36 AM   Pulse 87 2019 11:36 AM   SpO2 94 % 2019 11:36 AM   Resp     Temp 36.3  C (97.3  F) 2019 11:36 AM   Ht Rate     Temp 2     Vitals shown include unvalidated device data.      Electronically Signed By: Edilebrto Cr MD, 2019, 12:06 PM

## 2019-09-27 NOTE — ANESTHESIA PREPROCEDURE EVALUATION
Anesthesia Pre-Procedure Evaluation    Patient: Geo Hicks   MRN:     [id number] Gender:   male   Age:    18 year old :      2000        Preoperative Diagnosis: Abdominal Pain   Procedure(s):  Upper Endoscopy With Biopsy  Colonoscopy With Biopsy     Past Medical History:   Diagnosis Date     Cranial nerve dysfunction      Dyspepsia      Ependymoma (H)      Gastro-oesophageal reflux disease      Hearing loss      Intracranial hemorrhage (H)      Migraine      Pilonidal cyst          Reduced vision      Refractory obstruction of nasal airway     2nd to nasal valve prolapse     Sleep apnea      Strabismus     gaze palsy       Past Surgical History:   Procedure Laterality Date     GRAFT CARTILAGE FROM POSTERIOR AURICLE Left 10/6/2016    Procedure: GRAFT CARTILAGE FROM POSTERIOR AURICLE;  Surgeon: Tyler Richards MD;  Location: [facility]     INCISION AND DRAINAGE PERINEAL, COMBINED Bilateral 2015    Procedure: COMBINED INCISION AND DRAINAGE PERINEAL;  Surgeon: Dequan Timmons MD;  [facility]     OPTICAL TRACKING SYSTEM CRANIOTOMY, EXCISE TUMOR, COMBINED N/A 2015    Procedure: COMBINED OPTICAL TRACKING SYSTEM CRANIOTOMY, EXCISE TUMOR;  Surgeon: Francis Velazquez MD;  [facility]     OPTICAL TRACKING SYSTEM CRANIOTOMY, EXCISE TUMOR, COMBINED N/A 2015    Procedure: COMBINED OPTICAL TRACKING SYSTEM CRANIOTOMY, EXCISE TUMOR;  Surgeon: Francis Velazquez MD;  [facility]     OPTICAL TRACKING SYSTEM CRANIOTOMY, EXCISE TUMOR, COMBINED Bilateral 2015    Procedure: COMBINED OPTICAL TRACKING SYSTEM CRANIOTOMY, EXCISE TUMOR;  Surgeon: Francis Velazquez MD;  [facility]     OPTICAL TRACKING SYSTEM CRANIOTOMY, EXCISE TUMOR, COMBINED Bilateral 2016    Procedure: COMBINED OPTICAL TRACKING SYSTEM CRANIOTOMY, EXCISE TUMOR;  Surgeon: Francis Velazquez MD;  [facility]     OPTICAL TRACKING SYSTEM VENTRICULOSTOMY  2015    Procedure:  OPTICAL TRACKING SYSTEM VENTRICULOSTOMY;  Surgeon: Francis Velazquez MD;  [facility]     REMOVE PORT VASCULAR ACCESS N/A 10/6/2016    Procedure: REMOVE PORT VASCULAR ACCESS;  Surgeon: Bruno Perea MD;  [facility]     RHINOPLASTY N/A 10/6/2016    Procedure: RHINOPLASTY;  Surgeon: Tyler Richards MD;  [facility]     VASCULAR SURGERY  -    single lumen power port          Anesthesia Evaluation    ROS/Med Hx    No history of anesthetic complications    Cardiovascular Findings - negative ROS  Comments:   TTE 2017: Normal RV and LV size and systolic function. LVEF 64 %. No pericardial effusion. Normal RVSP.Trivial tricuspid valve insufficiency.The pulmonary artery bifurcation is normal.    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)  (-) seizures    Comments:   - S/p hemorrhagic stroke with significantly decreased ability of expressive language, but understands  - Left eye patch    Pulmonary Findings - negative ROS  (-) asthma and recent URI    HENT Findings   Comments:   - Chronic nasal obstruction s/p rhinoplasty  - CANDELARIO, does not use CPAP due to discomfort    Skin Findings - negative skin ROS     Findings   (-) prematurity      GI/Hepatic/Renal Findings   (+) GERD  Comments:   - Obesity  - Constipation      Genetic/Syndrome Findings - negative genetics/syndromes ROS    Hematology/Oncology Findings   (+) cancer (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 Rate: Normal     Resp. Effort: Normal      CV: Rhythm: Regular  Rate: Age appropriate  Heart: Normal Sounds  Edema: None   Comments: L eye patch  "    Dental: Normal Dentition                  LABS:  CBC:   Lab Results   Component Value Date    WBC 3.9 (L) 09/24/2019    WBC 4.9 09/17/2019    HGB 12.1 (L) 09/24/2019    HGB 12.6 (L) 09/17/2019    HCT 36.4 (L) 09/24/2019    HCT 38.1 (L) 09/17/2019    PLT 83 (L) 09/24/2019     (L) 09/17/2019     BMP:   Lab Results   Component Value Date     09/24/2019     09/17/2019    POTASSIUM 3.9 09/24/2019    POTASSIUM 3.9 09/17/2019    CHLORIDE 106 09/24/2019    CHLORIDE 103 09/17/2019    CO2 30 09/24/2019    CO2 31 09/17/2019    BUN 15 09/24/2019    BUN 11 09/17/2019    CR 1.04 (H) 09/24/2019    CR 1.09 (H) 09/17/2019    GLC 81 09/24/2019     (H) 09/17/2019     COAGS:   Lab Results   Component Value Date    PTT 24 04/25/2017    INR 1.03 04/25/2017    FIBR 196 (L) 01/14/2016     POC:   Lab Results   Component Value Date     (H) 04/16/2015     OTHER:   Lab Results   Component Value Date    PH 7.39 01/14/2016    LACT 1.5 01/30/2016    A1C 5.1 02/26/2019    KATIE 8.5 09/24/2019    PHOS 2.1 (L) 09/24/2019    MAG 2.1 09/24/2019    ALBUMIN 3.5 09/24/2019    PROTTOTAL 6.1 (L) 09/24/2019    ALT 17 09/24/2019    AST 16 09/24/2019    ALKPHOS 140 09/24/2019    BILITOTAL 0.3 09/24/2019    LIPASE 95 01/09/2019    AMYLASE 115 (H) 01/09/2019    TSH 2.02 07/11/2018    T4 0.98 07/11/2018    T3 90 10/06/2017    CRP <2.9 06/08/2016        Preop Vitals    BP Readings from Last 3 Encounters:   09/26/19 (!) 136/94   09/20/19 131/77   09/04/19 110/71    Pulse Readings from Last 3 Encounters:   09/26/19 89   09/20/19 99   09/04/19 93      Resp Readings from Last 3 Encounters:   09/26/19 18   09/04/19 24   08/07/19 20    SpO2 Readings from Last 3 Encounters:   09/26/19 93%   09/04/19 97%   08/07/19 100%      Temp Readings from Last 1 Encounters:   09/26/19 36.4  C (97.5  F) (Axillary)    Ht Readings from Last 1 Encounters:   09/20/19 1.82 m (5' 11.65\") (77 %)*     * Growth percentiles are based on CDC (Boys, 2-20 Years) " "data.      Wt Readings from Last 1 Encounters:   09/26/19 94.4 kg (208 lb 1.8 oz) (94 %)*     * Growth percentiles are based on CDC (Boys, 2-20 Years) data.    Estimated body mass index is 28.5 kg/m  as calculated from the following:    Height as of 9/20/19: 1.82 m (5' 11.65\").    Weight as of this encounter: 94.4 kg (208 lb 1.8 oz).     LDA:  Peripheral IV 09/26/19 Right Lower forearm (Active)   Site Assessment [facility] 9/26/2019  7:30 PM   Line Status Saline locked 9/26/2019  7:30 PM   Phlebitis Scale 0-->no symptoms 9/26/2019  7:30 PM   Infiltration Scale 0 9/26/2019  7:30 PM   Extravasation? No 9/26/2019  7:30 PM   Number of days: 0       NG/OG Tube Left nostril (Active)   Site Description [facility] 9/26/2019  7:30 PM   Status Other (Comment) 9/26/2019  7:30 PM   Drainage Appearance [facility] 9/26/2019  4:00 PM   Placement Check [place] unchanged 9/26/2019  7:30 PM   Intake (ml) 1000 ml 9/26/2019  9:00 PM   Number of days: 0        Assessment:   ASA SCORE: 3    H&P: History and physical reviewed and following examination; no interval change.   Smoking Status:  Non-Smoker/Unknown   NPO Status: NPO Appropriate     Plan:   Anes. Type:  General   Pre-Medication: None   Induction:  IV (Standard)   Airway: Native Airway   Access/Monitoring: PIV   Maintenance: Propofol Sedation     Postop Plan:   Postop Pain: None  Postop Sedation/Airway: Not planned  Disposition: Inpatient/Admit     PONV Management:   Adult Risk Factors:, Non-Smoker   Prevention: Ondansetron, Propofol     CONSENT: Direct conversation   Plan and risks discussed with: Patient; Mother   Blood Products: Consent Deferred (Minimal Blood Loss)       Comments for Plan/Consent:  Discussed common and potentially harmful risks for General Anesthesia, Native Airway.   These risks include, but were not limited to: Conversion to secured airway, Sore throat, Airway injury, Dental injury, Aspiration, Respiratory issues (Bronchospasm, Laryngospasm, Desaturation), Hemodynamic issues " (Arrhythmia, Hypotension, Ischemia), Potential long term consequences of respiratory and hemodynamic issues, PONV, Emergence delirium, Planned admission  Risks of invasive procedures were not discussed: N/A    All questions were answered.           Edilberto Cr MD

## 2019-09-27 NOTE — ANESTHESIA CARE TRANSFER NOTE
Patient: Geo Hicks    Procedure(s):  Upper Endoscopy With Biopsy. Polypectomy  Colonoscopy With Biopsy    Diagnosis: Abdominal Pain  Diagnosis Additional Information: No value filed.    Anesthesia Type:   General     Note:  Airway :Nasal Cannula  Patient transferred to:PACU  Comments: Patient stable with transport and arrival to PACU. Report given to PACU RN. Handoff Report: Identifed the Patient, Identified the Reponsible Provider, Reviewed the pertinent medical history, Discussed the surgical course, Reviewed Intra-OP anesthesia mangement and issues during anesthesia, Set expectations for post-procedure period and Allowed opportunity for questions and acknowledgement of understanding      Vitals: (Last set prior to Anesthesia Care Transfer)    CRNA VITALS  9/27/2019 1101 - 9/27/2019 1141      9/27/2019             Pulse:  90    Ht Rate:  93    SpO2:  99 %                Electronically Signed By: Ashley M. Mulvihill  September 27, 2019  11:41 AM

## 2019-09-27 NOTE — PLAN OF CARE
[id number]: AVSS. Golytely at 1000 ml/hr. Tolerating with no nausea. BM X2, getting thinner in consistency, still brown in color. Up with 2 assist to commode due to unsteadiness. PIV saline locked. Pre-procedure scrub done X1. Patient declining bedtime medications, MD notified. Patient pleasant and cooperative.

## 2019-09-28 VITALS
BODY MASS INDEX: 28.5 KG/M2 | HEART RATE: 76 BPM | TEMPERATURE: 97.2 F | DIASTOLIC BLOOD PRESSURE: 83 MMHG | RESPIRATION RATE: 22 BRPM | OXYGEN SATURATION: 91 % | WEIGHT: 208.11 LBS | SYSTOLIC BLOOD PRESSURE: 109 MMHG

## 2019-09-28 LAB
APTT PPP: 29 SEC (ref 22–37)
BASE EXCESS BLDV CALC-SCNC: 3.9 MMOL/L
ERYTHROCYTE [DISTWIDTH] IN BLOOD BY AUTOMATED COUNT: 13.3 % (ref 10–15)
HCO3 BLDV-SCNC: 32 MMOL/L (ref 21–28)
HCT VFR BLD AUTO: 34.5 % (ref 40–53)
HGB BLD-MCNC: 10.9 G/DL (ref 13.3–17.7)
INR PPP: 1.16 (ref 0.86–1.14)
MCH RBC QN AUTO: 29.1 PG (ref 26.5–33)
MCHC RBC AUTO-ENTMCNC: 31.6 G/DL (ref 31.5–36.5)
MCV RBC AUTO: 92 FL (ref 78–100)
O2/TOTAL GAS SETTING VFR VENT: 21 %
PCO2 BLDV: 61 MM HG (ref 40–50)
PH BLDV: 7.32 PH (ref 7.32–7.43)
PLATELET # BLD AUTO: 71 10E9/L (ref 150–450)
PO2 BLDV: 31 MM HG (ref 25–47)
RBC # BLD AUTO: 3.74 10E12/L (ref 4.4–5.9)
WBC # BLD AUTO: 5 10E9/L (ref 4–11)

## 2019-09-28 PROCEDURE — 85610 PROTHROMBIN TIME: CPT | Performed by: PEDIATRICS

## 2019-09-28 PROCEDURE — 25000132 ZZH RX MED GY IP 250 OP 250 PS 637: Performed by: STUDENT IN AN ORGANIZED HEALTH CARE EDUCATION/TRAINING PROGRAM

## 2019-09-28 PROCEDURE — 36415 COLL VENOUS BLD VENIPUNCTURE: CPT | Performed by: STUDENT IN AN ORGANIZED HEALTH CARE EDUCATION/TRAINING PROGRAM

## 2019-09-28 PROCEDURE — G0378 HOSPITAL OBSERVATION PER HR: HCPCS

## 2019-09-28 PROCEDURE — 85730 THROMBOPLASTIN TIME PARTIAL: CPT | Performed by: PEDIATRICS

## 2019-09-28 PROCEDURE — 82803 BLOOD GASES ANY COMBINATION: CPT | Performed by: STUDENT IN AN ORGANIZED HEALTH CARE EDUCATION/TRAINING PROGRAM

## 2019-09-28 PROCEDURE — 25000131 ZZH RX MED GY IP 250 OP 636 PS 637: Performed by: STUDENT IN AN ORGANIZED HEALTH CARE EDUCATION/TRAINING PROGRAM

## 2019-09-28 PROCEDURE — 85027 COMPLETE CBC AUTOMATED: CPT | Performed by: PEDIATRICS

## 2019-09-28 RX ADMIN — SERTRALINE HYDROCHLORIDE 100 MG: 100 TABLET ORAL at 09:06

## 2019-09-28 RX ADMIN — DEXAMETHASONE 0.75 MG: 0.75 TABLET ORAL at 09:06

## 2019-09-28 RX ADMIN — LINACLOTIDE 290 MCG: 145 CAPSULE, GELATIN COATED ORAL at 09:06

## 2019-09-28 RX ADMIN — OMEPRAZOLE 40 MG: 20 CAPSULE, DELAYED RELEASE ORAL at 09:05

## 2019-09-28 NOTE — PHARMACY - DISCHARGE MEDICATION RECONCILIATION AND EDUCATION
Pharmacy discharge medication teaching not conducted, no medications ordered for discharge.    The following medications were reviewed for discharge    No current outpatient medications on file.

## 2019-09-28 NOTE — CONSULTS
Pediatric Pulmonology Consultation    Geo Hicks MRN# [id number]   YOB: 2000 Age: 18 year old   Date of Admission: 9/26/2019     Reason for consult: I was asked by Dr. Dunn to evaluate this patient for hypoxemia.           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  He has history of severe obstructive sleep apnea and was noticed to have daytime and nighttime hypoxemia after procedure    Initial evaluations including: Oximetry with 11 minutes of O2 under 90% and a eyad of 83%.  VBG with mild respiratory acidosis pH 7.32, PCO2 61  Chest x-ray with no acute abnormalities    Dx. Mild hypoxemic and hypercarbic respiratory failure likely worsened by anesthesia        Sleep apnea        Insomnia    1. I would consider mild hypoxemia after surgery to be the result of anesthesia, especially considering improvement of oxygenation as of this morning  2. he likely continues to have obstructive sleep apnea possibly with some associated hypoventilation however this can be evaluated as an outpatient, it was recommended for him to transition to adult providers, where all other therapy options may be considered for sleep disordered breathing.  He needs a repeat PSG as BMI has significantly changed as well as medical history  3.  He should resume bilevel Pap for now  4. Insomnia is likely however patient was unable to pinpoint the amount of time he spends awake at night, he was recommended to keep a sleep log for 2 weeks prior to evaluation with sleep doctor.  Family lives near [place] might be interested in following up at the [facility] sleep center, with Dr. Jessee Dudley    Thank you for allowing me to participate in his care please feel free to contact me for any further questions  sincerely     Brenda Marr MD    Pediatric Department  Division of Pediatric Pulmonology and Sleep Medicine  Pager # [phone]  Email: [email or web address]           Chief Complaint:   tito Guardado is a pleasant almost 20 yo male with history of ependymoma status post surgery in 2015 in 2016, he had brain hemorrhage as a complication of first surgery and continued to have tumor growth requiring chemotherapy and radiation in 2015, he was then enrolled in phase 1 trial with entinostat in 2017 with excellent result in reduction of the tumor size by 50%, he continues in this therapy after trial on compassionate use basis.  As a consequence of the treatments he has cranial nerve dysfunction and paresis resulting in difficulties with speech and ambulation.    He also has history of obstructive sleep apnea and  has been seen in the past by Dr. Moncada, with the last sleep study done in 2017 at that time there was a report of an AHI of 25, he was recommended to use BiPAP and this was used for some time but discontinued about a year and a half ago as he did not experience changes in his daytime symptoms, he denies having significant intolerance to PAP or the mask, previous bilevel Pap settings are not available today.    He was admitted yesterday for colonoscopy and EGD as management of chronic constipation and tenesmus. After anesthesia was noticed to have intermittent desaturations to mid 80s during wakefulness and sleep, he reports having mild URI symptoms since the day prior to admission but no fevers.  He denies having previous pneumonias or recurrent infections, reports having normal swallowing with no recent episodes of choking.  Mother denies previous diagnosis of asthma and other respiratory concerns.    In terms of his sleep he reports going to bed at 10 PM and falling asleep at 11 PM, he has multiple awakenings throughout the night, he was having a hard time estimating how much time he spent  awake but shared his Fitbit data, showing total sleep time to be estimated about 6 hours per night.  He rises for the day at 10 AM, and takes one short nap of 10 minutes in the afternoons.  Mother reports hearing snoring at night as well as apneas, there has been a significant weight change for the past 2 years with changes from obesity to now normal BMI          Past Medical History:     Past Medical History:   Diagnosis Date     Cranial nerve dysfunction      Dyspepsia      Ependymoma (H)      Gastro-oesophageal reflux disease      Hearing loss      Intracranial hemorrhage (H)      Migraine      Pilonidal cyst     7-2015     Reduced vision      Refractory obstruction of nasal airway     2nd to nasal valve prolapse     Sleep apnea      Strabismus     gaze palsy              Past Surgical History:     Past Surgical History:   Procedure Laterality Date     GRAFT CARTILAGE FROM POSTERIOR AURICLE Left 10/6/2016    Procedure: GRAFT CARTILAGE FROM POSTERIOR AURICLE;  Surgeon: Tyler Richards MD;  [facility]     INCISION AND DRAINAGE PERINEAL, COMBINED Bilateral 7/18/2015    Procedure: COMBINED INCISION AND DRAINAGE PERINEAL;  Surgeon: Dequan Timmons MD;  [facility]     OPTICAL TRACKING SYSTEM CRANIOTOMY, EXCISE TUMOR, COMBINED N/A 4/13/2015    Procedure: COMBINED OPTICAL TRACKING SYSTEM CRANIOTOMY, EXCISE TUMOR;  Surgeon: Francis Velazquez MD;  [facility]     OPTICAL TRACKING SYSTEM CRANIOTOMY, EXCISE TUMOR, COMBINED N/A 4/16/2015    Procedure: COMBINED OPTICAL TRACKING SYSTEM CRANIOTOMY, EXCISE TUMOR;  Surgeon: Francis Velazquez MD;  [facility]     OPTICAL TRACKING SYSTEM CRANIOTOMY, EXCISE TUMOR, COMBINED Bilateral 5/28/2015    Procedure: COMBINED OPTICAL TRACKING SYSTEM CRANIOTOMY, EXCISE TUMOR;  Surgeon: Francis Velazquez MD;  [facility]     OPTICAL TRACKING SYSTEM CRANIOTOMY, EXCISE TUMOR, COMBINED Bilateral 1/14/2016    Procedure: COMBINED OPTICAL TRACKING  SYSTEM CRANIOTOMY, EXCISE TUMOR;  Surgeon: Francis Velazqeuz MD;  [facility]     OPTICAL TRACKING SYSTEM VENTRICULOSTOMY  4/16/2015    Procedure: OPTICAL TRACKING SYSTEM VENTRICULOSTOMY;  Surgeon: Francis Velazquez MD;  [facility]     REMOVE PORT VASCULAR ACCESS N/A 10/6/2016    Procedure: REMOVE PORT VASCULAR ACCESS;  Surgeon: Bruno Perea MD;  [facility]     RHINOPLASTY N/A 10/6/2016    Procedure: RHINOPLASTY;  Surgeon: Tyler Richards MD;  [facility]     VASCULAR SURGERY  5-2015    single lumen power port               Social History:     Social History     Tobacco Use     Smoking status: Never Smoker     Smokeless tobacco: Never Used   Substance Use Topics     Alcohol use: No             Family History:     Family History   Problem Relation Age of Onset     Circulatory Father         PE/DVT     Hypothyroidism Father 30     Diabetes Maternal Grandmother      Diabetes Paternal Grandmother      Diabetes Paternal Grandfather      C.A.D. Paternal Grandfather      Hypertension Maternal Grandfather      Thyroid Disease Paternal Aunt         unknown whether hypo or hyper     Mental Illness No family hx of              Immunizations:     Immunization History   Administered Date(s) Administered     DTAP (<7y) 1999, 02/29/2000, 05/11/2000, 05/31/2001, 04/07/2005     HEPA 06/15/2011, 06/21/2012     HPV 06/06/2014, 12/20/2014     HepB 05/01/2000, 08/09/2000, 02/12/2001     Hib (PRP-T) 1999, 02/29/2000, 05/01/2000, 02/12/2001     Influenza (H1N1) 12/28/2009     Influenza (IIV3) PF 02/09/2007, 11/15/2007, 12/27/2007, 11/17/2008, 12/28/2009, 11/26/2010, 10/29/2011, 10/13/2012     Influenza Vaccine IM > 6 months Valent IIV4 10/30/2013, 11/17/2014, 09/30/2015, 09/21/2016, 09/20/2017, 10/03/2018     MMR 02/12/2001, 04/07/2005     Meningococcal (Menactra ) 06/21/2012     Poliovirus, inactivated (IPV) 1999, 02/29/2000, 05/01/2000, 04/07/2005     TDAP Vaccine (Adacel)  06/15/2011     Varicella 04/07/2005, 06/15/2011             Allergies:     Allergies   Allergen Reactions     Blood Transfusion Related (Informational Only) Swelling     Periorbital swelling post platelet transfusion     No Known Drug Allergies              Medications:     Current Facility-Administered Medications   Medication     acetaminophen (TYLENOL) tablet 650 mg     dexamethasone (DECADRON) tablet 0.75 mg     dextrose 5% and 0.9% NaCl infusion     fexofenadine (ALLEGRA) tablet 180 mg     lidocaine (LMX4) cream     lidocaine 1 % 0.1-1 mL     linaclotide (LINZESS) capsule 290 mcg     OLANZapine (zyPREXA) tablet 20 mg     omeprazole (priLOSEC) CR capsule 40 mg     ondansetron (ZOFRAN) injection 4 mg     polyethylene glycol (MIRALAX/GLYCOLAX) Packet 17 g     sertraline (ZOLOFT) tablet 100 mg     sodium chloride (PF) 0.9% PF flush 0.2-5 mL     sodium chloride (PF) 0.9% PF flush 3 mL     traZODone (DESYREL) half-tab 75 mg             Review of Systems:   The 10 point Review of Systems is negative other than noted in the HPI         Physical Exam:     Vitals were reviewed  Patient Vitals for the past 24 hrs:   BP Temp Temp src Pulse Heart Rate Resp SpO2   09/28/19 0800 97/65 97.2  F (36.2  C) Axillary -- 81 24 95 %   09/28/19 0345 92/67 97.2  F (36.2  C) Axillary 65 -- 20 92 %   09/28/19 0000 110/57 97.7  F (36.5  C) Axillary 87 87 24 91 %   09/27/19 2200 -- -- -- -- 93 18 --   09/27/19 2100 95/62 98.8  F (37.1  C) -- 107 -- 18 --   09/27/19 1650 112/73 97.1  F (36.2  C) -- 100 -- -- 90 %   09/27/19 1612 -- -- -- -- -- -- 93 %   09/27/19 1610 -- -- -- -- -- -- (!) 89 %   09/27/19 1300 -- -- -- -- -- -- (!) 85 %   09/27/19 1240 112/70 97.1  F (36.2  C) Axillary 90 -- 22 92 %   09/27/19 1220 -- 97  F (36.1  C) Axillary -- 89 19 94 %   09/27/19 1215 106/59 -- -- 89 89 14 93 %   09/27/19 1205 104/57 97.3  F (36.3  C) Axillary -- 90 19 95 %   09/27/19 1200 104/57 -- -- 87 89 14 95 %   09/27/19 1145 109/58 -- -- 89 90 20  94 %   09/27/19 1140 99/57 -- -- 87 89 23 95 %   09/27/19 1135 106/50 97.3  F (36.3  C) Axillary 87 90 27 93 %     General:  alert and normally responsive  Skin: Striae on chest and abdomen  Head/Neck: Neck without masses  Eyes: clear conjunctiva  Ears/Nose/Mouth:  patent nares, narrow oropharynx, no visible tonsils, Mallampati 1  Thorax:  normal contour, clavicles intact  Lungs:  clear, no retractions, no increased work of breathing  Heart:  normal rate, rhythm.  No murmurs.  Normal femoral pulses.  Abdomen:  soft without mass, tenderness, organomegaly, hernia.  Umbilicus normal.  Trunk/spine:  straight, intact  Muskuloskeletal: intact without deformity.  Normal digits.  Neurologic: Dysarthria, appropriate interaction and appropriate answers to questions          Data:     Results for orders placed or performed during the hospital encounter of 09/26/19   UPPER GI ENDOSCOPY   Result Value Ref Range    Upper GI Endoscopy       [facility]  Pediatric Endoscopy [facility]  _______________________________________________________________________________  Patient Name: Geo Hicks             Procedure Date: 9/27/2019 9:34 AM  MRN: [id number]                       Account Number: [id number]  YOB: 2000             Admit Type: Ambulatory  Age: 18                               Room: OR 11  Gender: Male                          Note Status: Finalized  Attending MD: Aniya Wei MD  Total Sedation Time:   Instrument Name: [facility] [id number]- [id number] Adult EGD   _______________________________________________________________________________     Procedure:            Upper GI endoscopy  Indications:          Generalized abdominal pain  Providers:            Aniya Wei MD, Christianne Dorman RN, Mis George RN  Referring MD:         Jeffrey Espinoza MD  Medicines:            See the Anesthesia note for documentation of the                           administered medications  Complications:        No immediate complications. Estimated blood loss:                         Minimal.  _______________________________________________________________________________  Procedure:            Pre-Anesthesia Assessment:                        - Prior to the procedure, a History and Physical was                         performed, and patient medications, allergies and                         sensitivities were reviewed. The patient's tolerance of                         previous anesthesia was reviewed.                        - The risks and benefits of the procedure and the                         sedation options and risks were discussed with the                         patient. All questions were answered and informed                         consent was obtained.                        - Patient identification and proposed procedure were                         verified prior to the procedure by the physician, the                          nurse and the anesthetist. The procedure was verified                         in the procedure room.                        - Pre-procedure physical examination revealed no                         contraindications to sedation.                        - ASA Grade Assessment: II - A patient with mild                         systemic disease.                        - After reviewing the risks and benefits, the patient                         was deemed in satisfactory condition to undergo the                         procedure.                        After obtaining informed consent, the endoscope was                         passed under direct vision. Throughout the procedure,                         the patient's blood pressure, pulse, and oxygen                         saturations were monitored continuously. The Endoscope                         was introduced through the mouth, and advanced to the                          third part of duodenum. The upper GI endosco py was                         accomplished without difficulty. The patient tolerated                         the procedure well.                                                                                   Findings:       Mucosal changes including longitudinal furrows were found in the lower        third of the esophagus. Biopsies were taken with a cold forceps for        histology.       The entire examined stomach was normal. Biopsies were taken with a cold        forceps for histology.       The examined duodenum was normal. Biopsies were taken with a cold        forceps for histology.                                                                                   Impression:           - Esophageal mucosal changes. Biopsied.                        - Normal stomach. Biopsied.                        - Normal examined duodenum. Biopsied.  Recommendation:       - Await pathology results.                                                                                     ________ ___________________  Aniya Wei MD  9/27/2019 11:26:07 AM  Number of Addenda: 0    Note Initiated On: 9/27/2019 9:34 AM  Scope In:  Scope Out:     COLONOSCOPY   Result Value Ref Range    COLONOSCOPY       [facility]  Pediatric Endoscopy [facility]  _______________________________________________________________________________  Patient Name: Geo Hicks             Procedure Date: 9/27/2019 9:34 AM  MRN: [id number]                       Account Number: [id number]  YOB: 2000             Admit Type: Ambulatory  Age: 18                               Room: OR 11  Gender: Male                          Note Status: Finalized  Attending MD: Aniya Wei MD  Total Sedation Time:   Instrument Name: [facility] [id number] Peds   _______________________________________________________________________________      Procedure:            Colonoscopy  Indications:          Generalized abdominal pain  Providers:            Aniya Wei MD, Mis George RN  Referring MD:         Jeffrey Espinoza MD  Medicines:            See the Anesthesia note for documentation of the                         administered medications  Com plications:        No immediate complications. Estimated blood loss:                         Minimal.  _______________________________________________________________________________  Procedure:            Pre-Anesthesia Assessment:                        - Prior to the procedure, a History and Physical was                         performed, and patient medications, allergies and                         sensitivities were reviewed. The patient's tolerance of                         previous anesthesia was reviewed.                        - The risks and benefits of the procedure and the                         sedation options and risks were discussed with the                         patient. All questions were answered and informed                         consent was obtained.                        - Patient identification and proposed procedure were                         verified prior to the procedure by the physician, the                         nurse and the anesthetist. The pr ocedure was verified                         in the procedure room.                        - Pre-procedure physical examination revealed no                         contraindications to sedation.                        - ASA Grade Assessment: II - A patient with mild                         systemic disease.                        - After reviewing the risks and benefits, the patient                         was deemed in satisfactory condition to undergo the                         procedure.                        After obtaining informed consent, the colonoscope was                         passed under direct  vision. Throughout the procedure,                         the patient's blood pressure, pulse, and oxygen                         saturations were monitored continuously. The                         Colonoscope was introduced through the anus and                         advanced to the cecum, identified by the appendiceal                         orifice. The colonoscopy was perfo rmed without                         difficulty. The patient tolerated the procedure well.                         The quality of the bowel preparation was good.                                                                                   Findings:       A polyp was found at 25 cm proximal to the anus. The polyp was        pedunculated. The polyp was removed with a hot snare. Resection and        retrieval were complete. Verification of patient identification for the        specimen was done by the physician and nurse. Estimated blood loss was        minimal.       The colon (entire reamining examined portion) appeared normal. Biopsies        were taken with a cold forceps for histology.                                                                                   Impression:           - One polyp at 25 cm proximal to the anus, removed with                         a hot snare. Resected and retrieved.                        - The remainder of the entire examined colon is normal .                         Biopsied.  Recommendation:       - Await pathology results.                                                                                     ___________________________  Aniya Wei MD  9/27/2019 11:29:08 AM  Number of Addenda: 0    Note Initiated On: 9/27/2019 9:34 AM  Scope In:  Scope Out:     XR Abdomen Port 1 View    Narrative    EXAMINATION: XR ABDOMEN PORT 1 VW  9/26/2019 3:03 PM      CLINICAL HISTORY: eval NG tube placement    COMPARISON: Abdominal radiograph from 6/4/2019    FINDINGS:  AP view of the abdomen.  No NG tube is visualized. Bowel gas is present  in a non-obstructive pattern. There are no abnormal calcifications or  evidence of organomegaly. There is a small amount of stool in the  colon. The visualized lung bases are clear.        Impression    IMPRESSION:  Nonvisualized enteric tube. Subsequent images show repositioning of  this tube.    I have personally reviewed the examination and initial interpretation  and I agree with the findings.    LAY KINGSLEY MD   XR Abdomen Port 1 View    Narrative    EXAMINATION: XR ABDOMEN PORT 1 VW  9/26/2019 3:06 PM      CLINICAL HISTORY: eval NG tube placement    COMPARISON: Abdominal radiograph from earlier today    FINDINGS:  AP view of the chest and abdomen. The enteric tube is seen in the  thoracic esophagus and coils back up towards the oral cavity. Bowel  gas is present in a non-obstructive pattern. There are no abnormal  calcifications or evidence of organomegaly.  The visualized lung  fields are clear. The cardiac silhouette is within normal limits.      Impression    IMPRESSION:  Enteric tube is visualized in the esophagus and coils back upwards.    I have personally reviewed the examination and initial interpretation  and I agree with the findings.    LAY KINGSLEY MD   XR Abdomen Port 1 View    Narrative    EXAMINATION: XR ABDOMEN PORT 1 VW  9/26/2019 3:06 PM      CLINICAL HISTORY: eval NG tube placement    COMPARISON: Abdominal radiographs from earlier today    FINDINGS:  AP view of the chest and abdomen. No enteric tube is visualized. Bowel  gas is present in a non-obstructive pattern. The visualized lung  fields are clear.        Impression    IMPRESSION:  Nonvisualization of an enteric tube. Subsequent images show  repositioning of this tube.    I have personally reviewed the examination and initial interpretation  and I agree with the findings.    LAY KINGSLEY MD   XR Abdomen Port 1 View    Narrative    EXAMINATION: XR ABDOMEN PORT 1 VW  9/26/2019 3:07 PM       CLINICAL HISTORY: eval ng tube placement    COMPARISON: Abdominal radiographs from earlier today    FINDINGS:  AP view of the abdomen. Enteric tube is seen coiled in the stomach.  Bowel gas is present in a non-obstructive pattern. The visualized lung  bases are clear.        Impression    IMPRESSION:  Enteric tube visualized coiled in the stomach.    I have personally reviewed the examination and initial interpretation  and I agree with the findings.    LAY KINGSLEY MD   XR Chest 2 Views    Narrative    XR CHEST 2 VW  9/27/2019 5:56 PM      HISTORY: post-anesthesia, desaturation, increased work of breathing    COMPARISON: 3/3/2017    FINDINGS: Frontal and lateral views of the chest. The cardiac  silhouette size and pulmonary vasculature are within normal limits.  There is no significant pleural effusion or pneumothorax. There are no  focal pulmonary opacities. The visualized upper abdomen and bones  appear normal.      Impression    IMPRESSION: No focal pneumonia.     MARIO ALBERTO AYALA MD   Platelet count   Result Value Ref Range    Platelet Count 61 (L) 150 - 450 10e9/L   CBC with platelets   Result Value Ref Range    WBC 5.0 4.0 - 11.0 10e9/L    RBC Count 3.74 (L) 4.4 - 5.9 10e12/L    Hemoglobin 10.9 (L) 13.3 - 17.7 g/dL    Hematocrit 34.5 (L) 40.0 - 53.0 %    MCV 92 78 - 100 fl    MCH 29.1 26.5 - 33.0 pg    MCHC 31.6 31.5 - 36.5 g/dL    RDW 13.3 10.0 - 15.0 %    Platelet Count 71 (L) 150 - 450 10e9/L   INR   Result Value Ref Range    INR 1.16 (H) 0.86 - 1.14   Partial thromboplastin time   Result Value Ref Range    PTT 29 22 - 37 sec   Blood gas venous   Result Value Ref Range    Ph Venous 7.32 7.32 - 7.43 pH    PCO2 Venous 61 (H) 40 - 50 mm Hg    PO2 Venous 31 25 - 47 mm Hg    Bicarbonate Venous 32 (H) 21 - 28 mmol/L    Base Excess Venous 3.9 mmol/L    FIO2 21    ABO/Rh type and screen   Result Value Ref Range    ABO O     RH(D) Pos     Antibody Screen Neg     Test Valid Only At          [facility]  [facility]    Specimen Expires 09/30/2019    Blood component   Result Value Ref Range    Unit Number [id number]     Blood Component Type PlateletPheresis,LeukoRed Irrad (Part 3)     Division Number 00     Status of Unit Released to care unit     Blood Product Code [id number]     Unit Status ISS      *Note: Due to a large number of results and/or encounters for the requested time period, some results have not been displayed. A complete set of results can be found in Results Review.

## 2019-09-28 NOTE — PLAN OF CARE
Afebrile. O2 sats in the 90's with sustained drops to the 80's. Patient able to come back up on his own, coughing done with encouragement. PIV saline locked. Patient calm and cooperative. Hourly rounding complete. Will continue to monitor and notify team with any changes.

## 2019-09-28 NOTE — PLAN OF CARE
VSS. Afebrile. Denies pain and nausea. Good oral intake. GI team cleared patient for discharge to home with plan to resume home bipap. Patient and mother verbalized understanding with no further questions. Discharged to home at noon.

## 2019-09-28 NOTE — DISCHARGE INSTRUCTIONS
It was a pleasure to be involved in Geo's care during this admission. The pulmonologist met with you today and made the following recommendations.     1. Resume using BiPAP machine when you go to sleep at night.   2. Fill out a sleep log for 2 weeks, prior to following up with Pulmonology  3. Schedule a follow up appointment with either your regular pulmonologist, or per Dr. Marr's recommendation, with Pulmonologist/Sleep specialist Dr. Jessee Dudley at the [facility] [place]. The clinic appointment number is [phone].

## 2019-09-28 NOTE — PLAN OF CARE
Pt staying overnight due to desats throughout day. Was set up on cont. pulse ox by RT. U/O is good, Had BM. Has been sitting in 90s, but intermittently desats to 80s. Otherwise all other VSS and Systems are WDL except nasal drainage and congestion. Has been coughing up some mucus, yellow-tinged/clear. Is using IS as ordered and is encouraged to cough and deep breathe. Will continue to monitor pt status and update provider with any changes.

## 2019-09-28 NOTE — PROGRESS NOTES
[facility], [place]    Pediatric Gastroenterology Progress Note    Date of Service (when I saw the patient): 09/27/2019     Interval History   No acute events overnight. Slept well and had a good clean out. No nausea, no IV zofran needed. Remained afebrile, with stable vitals except for a one time BP spike to 136/94, self resolved. NPO at 3:30am, on mIVF D5NS.     Update: Returned from PACU after the EGD/Colonscopy in stable condition. He was transfused with 1 unit of platelets during the procedure (level this morning was 61). He was still waking up from the sedation, and breathing rapidly and shallowly, with SaO2 88-94% on room air. Did not report any SOB. Given incentive spirometer. Continued to express the sensation of incomplete clean out and abdominal bloating.        Assessment & Plan   Geo Hicks is a 18 year old male who was admitted on 9/26/2019 for bowel prep for [facility]. Complex PMHx including grade II ependymoma s/p resection/radiation/chemo ([place] in [place] [facility] [place] - A Phase 1 Study of Entinostat), CANDELARIO, chronic constipation, and tenesmus.    FEN/GI  #Nutrition  - Regular diet  - mIVF D5NS 100ml/hr  - Omeprazole 40mg BID  - Hold PTA supplements: Ca, Vit D, K-phos  - Daily weights    #Constipation   S/p EGD/Colonoscopy 9/27 am.   - Miralax 17g PO TID starting at 8pm 9/27      RESP  #Hx of CANDELARIO: previously used CPAP   #Desaturations post Anesthesia  - Routine q4hr vitals   - Incentive spirometry    - 9/27 CXR 2 view: no focal findings, normal lung fields.   - 9/27 overnight continuous pulse ox   - 9/28 CBG  - 9/28 Pulm consult     HEME/ONC  #Ependymoma: on study   #Peripheral bruising   - Entinostat 6mg weekly  - 9/28 CBC, INR, PTT     NEURO/PSYCH  #Delays 2/2 to tumors/treatment  - Olanzapine 20mg at bedtime  - Trazadone 75mg at bedtime  - Zoloft 100mg daily      ENDO  - PTA Decadron 0.75mg qAM, primary Dr. Martin       ALLERGY  #Allergies  - Allegra 180mg qPM      SKIN  #Lacerations on right leg  - monitor for signs of infection.         Patient seen and staffed with Attending physician Dr. Julian Dunn.     Annalee Grace DO  [facility] Pediatric Resident PL1      ______________________________________________________________         Physical Exam   Temp: 97.1  F (36.2  C) Temp src: Axillary BP: 112/73 Pulse: 100 Heart Rate: 89 Resp: 22 SpO2: 90 % O2 Device: None (Room air) Oxygen Delivery: 3 LPM  Vitals:    09/26/19 1100   Weight: 94.4 kg (208 lb 1.8 oz)     Vital Signs with Ranges  Temp:  [96.5  F (35.8  C)-99  F (37.2  C)] 97.1  F (36.2  C)  Pulse:  [] 100  Heart Rate:  [89-90] 89  Resp:  [14-27] 22  BP: ()/(50-85) 112/73  SpO2:  [85 %-95 %] 90 %  I/O last 3 completed shifts:  In: 68951.75 [P.O.:600; I.V.:2009.75; NG/GT:10941]  Out: 38595 [Urine:575; Stool:32982]      GENERAL: Lying in bed, alert, very pleasant, in no acute distress, well developed, well nourished.   EYES: Patch over right eye or left eye for diplopia.  Normal conjunctivae. + Nystagmus.   NOSE: Normal without discharge. Congestion  NECK: Supple, no masses.  No thyromegaly.  LYMPH NODES: No adenopathy  LUNGS: Shallow tachypnea after return from PACU. Mild rhonchi on right, possibly referred upper airway sounds, otherwise clear lung sounds. No rales, rhonchi, wheezing or retractions  HEART: Regular rhythm. Normal S1/S2. No murmurs. Normal pulses.  ABDOMEN: Soft, non-tender, mildly distended, no masses or hepatosplenomegaly. Bowel sounds normal.   NEUROLOGIC: Alert and oriented to person, place, and time. Normal strength. +CN deficit and poor coordination.   EXTREMITIES: two patches on right leg covering two stitched lacerations, old bruising over left shins and newer on feet.   SKIN: warm and dry        Medications     dextrose 5% and 0.9% NaCl 100 mL/hr at 09/27/19 0002       dexamethasone  0.75 mg Oral Daily with breakfast      fexofenadine  180 mg Oral QPM     linaclotide  290 mcg Oral QAM AC     OLANZapine  20 mg Oral At Bedtime     omeprazole  40 mg Oral QAM     polyethylene glycol  17 g Oral TID     sertraline  100 mg Oral Daily     sodium chloride (PF)  3 mL Intracatheter Q8H     traZODone  75 mg Oral At Bedtime       Data      Results for orders placed or performed during the hospital encounter of 09/26/19 (from the past 24 hour(s))   Platelet count   Result Value Ref Range    Platelet Count 61 (L) 150 - 450 10e9/L   ABO/Rh type and screen   Result Value Ref Range    ABO O     RH(D) Pos     Antibody Screen Neg     Test Valid Only At          [facility]    Specimen Expires 09/30/2019    Blood component   Result Value Ref Range    Unit Number [id number]     Blood Component Type PlateletPheresis,LeukoRed Irrad (Part 3)     Division Number 00     Status of Unit Released to care unit 09/27/2019 1014     Blood Product Code [id number]     Unit Status ISS    UPPER GI ENDOSCOPY   Result Value Ref Range    Upper GI Endoscopy       [facility]  Pediatric Endoscopy [facility]  _______________________________________________________________________________  Patient Name: Geo Hicks             Procedure Date: 9/27/2019 9:34 AM  MRN: [id number]                       Account Number: [id number]  YOB: 2000             Admit Type: Ambulatory  Age: 18                               Room: OR 11  Gender: Male                          Note Status: Finalized  Attending MD: Aniya Wei MD  Total Sedation Time:   Instrument Name: UR GIF- [id number] Adult EGD   _______________________________________________________________________________     Procedure:            Upper GI endoscopy  Indications:          Generalized abdominal pain  Providers:            Aniya Wei MD, Christianne Dorman RN, Mis MALONE                          GIANNI George  Referring MD:         Jeffrey Espinoza MD  Medicines:            See the Anesthesia note for documentation of the                          administered medications  Complications:        No immediate complications. Estimated blood loss:                         Minimal.  _______________________________________________________________________________  Procedure:            Pre-Anesthesia Assessment:                        - Prior to the procedure, a History and Physical was                         performed, and patient medications, allergies and                         sensitivities were reviewed. The patient's tolerance of                         previous anesthesia was reviewed.                        - The risks and benefits of the procedure and the                         sedation options and risks were discussed with the                         patient. All questions were answered and informed                         consent was obtained.                        - Patient identification and proposed procedure were                         verified prior to the procedure by the physician, the                          nurse and the anesthetist. The procedure was verified                         in the procedure room.                        - Pre-procedure physical examination revealed no                         contraindications to sedation.                        - ASA Grade Assessment: II - A patient with mild                         systemic disease.                        - After reviewing the risks and benefits, the patient                         was deemed in satisfactory condition to undergo the                         procedure.                        After obtaining informed consent, the endoscope was                         passed under direct vision. Throughout the procedure,                         the patient's blood pressure, pulse, and oxygen                         saturations were monitored  continuously. The Endoscope                         was introduced through the mouth, and advanced to the                         third part of duodenum. The upper GI endosco py was                         accomplished without difficulty. The patient tolerated                         the procedure well.                                                                                   Findings:       Mucosal changes including longitudinal furrows were found in the lower        third of the esophagus. Biopsies were taken with a cold forceps for        histology.       The entire examined stomach was normal. Biopsies were taken with a cold        forceps for histology.       The examined duodenum was normal. Biopsies were taken with a cold        forceps for histology.                                                                                   Impression:           - Esophageal mucosal changes. Biopsied.                        - Normal stomach. Biopsied.                        - Normal examined duodenum. Biopsied.  Recommendation:       - Await pathology results.                                                                                     ________ ___________________  Aniya Wei MD  9/27/2019 11:26:07 AM  Number of Addenda: 0    Note Initiated On: 9/27/2019 9:34 AM  Scope In:  Scope Out:     COLONOSCOPY   Result Value Ref Range    COLONOSCOPY       [facility]  Pediatric Endoscopy [facility]  _______________________________________________________________________________  Patient Name: Geo Hicks             Procedure Date: 9/27/2019 9:34 AM  MRN: [id number]                       Account Number: [id number]  YOB: 2000             Admit Type: Ambulatory  Age: 18                               Room: OR 11  Gender: Male                          Note Status: Finalized  Attending MD: Aniya Wei MD  Total Sedation Time:    Instrument Name: SOUMYA [id number] Peds   _______________________________________________________________________________     Procedure:            Colonoscopy  Indications:          Generalized abdominal pain  Providers:            Aniya Wei MD, Mis George RN  Referring MD:         Jeffrey Espinoza MD  Medicines:            See the Anesthesia note for documentation of the                         administered medications  Com plications:        No immediate complications. Estimated blood loss:                         Minimal.  _______________________________________________________________________________  Procedure:            Pre-Anesthesia Assessment:                        - Prior to the procedure, a History and Physical was                         performed, and patient medications, allergies and                         sensitivities were reviewed. The patient's tolerance of                         previous anesthesia was reviewed.                        - The risks and benefits of the procedure and the                         sedation options and risks were discussed with the                         patient. All questions were answered and informed                         consent was obtained.                        - Patient identification and proposed procedure were                         verified prior to the procedure by the physician, the                         nurse and the anesthetist. The pr ocedure was verified                         in the procedure room.                        - Pre-procedure physical examination revealed no                         contraindications to sedation.                        - ASA Grade Assessment: II - A patient with mild                         systemic disease.                        - After reviewing the risks and benefits, the patient                         was deemed in satisfactory condition to undergo the                          procedure.                        After obtaining informed consent, the colonoscope was                         passed under direct vision. Throughout the procedure,                         the patient's blood pressure, pulse, and oxygen                         saturations were monitored continuously. The                         Colonoscope was introduced through the anus and                         advanced to the cecum, identified by the appendiceal                         orifice. The colonoscopy was perfo rmed without                         difficulty. The patient tolerated the procedure well.                         The quality of the bowel preparation was good.                                                                                   Findings:       A polyp was found at 25 cm proximal to the anus. The polyp was        pedunculated. The polyp was removed with a hot snare. Resection and        retrieval were complete. Verification of patient identification for the        specimen was done by the physician and nurse. Estimated blood loss was        minimal.       The colon (entire reamining examined portion) appeared normal. Biopsies        were taken with a cold forceps for histology.                                                                                   Impression:           - One polyp at 25 cm proximal to the anus, removed with                         a hot snare. Resected and retrieved.                        - The remainder of the entire examined colon is normal .                         Biopsied.  Recommendation:       - Await pathology results.                                                                                     ___________________________  Aniya Wei MD  9/27/2019 11:29:08 AM  Number of Addenda: 0    Note Initiated On: 9/27/2019 9:34 AM  Scope In:  Scope Out:     XR Chest 2 Views    Narrative    XR CHEST 2 VW  9/27/2019 5:56 PM      HISTORY:  post-anesthesia, desaturation, increased work of breathing    COMPARISON: 3/3/2017    FINDINGS: Frontal and lateral views of the chest. The cardiac  silhouette size and pulmonary vasculature are within normal limits.  There is no significant pleural effusion or pneumothorax. There are no  focal pulmonary opacities. The visualized upper abdomen and bones  appear normal.      Impression    IMPRESSION: No focal pneumonia.     MARIO ALBERTO AYALA MD     *Note: Due to a large number of results and/or encounters for the requested time period, some results have not been displayed. A complete set of results can be found in Results Review.

## 2019-09-29 NOTE — DISCHARGE SUMMARY
[facility], [place]    Discharge Summary  Gastroenterology    Date of Admission:  9/26/2019  Date of Discharge:  9/28/2019  1:00 PM  Discharging Provider: Julian Dunn      Discharge Diagnoses   Constipation  Tenesmus  Sleep apnea  Insomnia  Mild hypoxemia and hypercarbia likely worsened by anesthesia.     History of Present Illness   Geo Hicks is a 18 year old male who was admitted on 9/26/2019 for bowel prep for EGD/Colonoscopy on 9/27/19 with Dr. Wei. Complex PMHx including grade II ependymoma s/p resection/radiation/chemo (Enrolled in the [facility] [place] AD[place] 1513 - A Phase 1 Study of Entinostat), CANDELARIO, chronic constipation, and tenesmus.    Hospital Course   Geo Hicks was admitted on 9/26/2019.  The following problems were addressed during his hospitalization: bowel prep for EGD/Colonoscopy, tenesmus, post-sedation intermittent low oxygen saturations.     Geo completed the bowel prep without any complications, nausea, or abdominal pain. Per patient request, his bedtime olanzapine and trazodone were held on 9/26. EGD/Colonoscopy performed on Friday 9/27/19 by Dr. Wei. One polyp was found, removed, and sent for pathology. Platelet level on 9/27/19 was 67, received one unit of platelets. Returned from the PACU in stable condition, though he appeared to be breathing very shallowly and intermittently saturating in the mid 80's on RA. CXR negative. Pulmonology (Dr. Brenda Zamora) consulted due to his history of CANDELARIO without recent use of home BiPAP. Placed on continuous pulse ox overnight. Morning VBG showed mild respiratory acidosis (pH 7.32, pCO2 61, HCO3 32). Cleared by pulmonology to discharge home, with follow up outpatient with his pulmonologist, or alternatively Dr. Jessee Dudley if he wants to transition to an adult pulmonologist, for CANDELARIO evaluation.     Annalee Grace DO  [facility] Pediatric Resident PL-1    Significant Results and  Procedures   9/27/19 Colonoscopy  Impression: One polyp at 25 cm proximal to the anus, removed with  a hot snare. Resected and retrieved. Sent for biopsy. The remainder of the entire examined colon is normal.                        9/27/19 Upper GI Endoscopy  Impression: Esophageal mucosal changes. Biopsied. Normal stomach. Biopsied. Normal examined duodenum. Biopsied.       Immunization History   Immunization Status:  up to date and documented     Pending Results   These results will be followed up by Dr. Aniya Wei  Unresulted Labs Ordered in the Past 30 Days of this Admission     Date and Time Order Name Status Description    9/27/2019 1010 Surgical pathology exam In process     9/27/2019 0724 Platelets prepare order unit In process           Primary Care Physician   Jeffrey Espinoza      Physical Exam   Vital Signs with Ranges  Pulse:  [76] 76  Resp:  [22] 22  BP: (109)/(83) 109/83  SpO2:  [91 %] 91 %  I/O last 3 completed shifts:  In: 74410.75 [P.O.:2280; I.V.:2009.75; NG/GT:33667]  Out: 95773 [Urine:2325; Stool:56912]    GENERAL: Lying in bed, alert, frustrated, in no acute distress, well developed, well nourished.   EYES: Patch over right eye or left eye for diplopia.  Normal conjunctivae.   NOSE: Normal without discharge. Congestion.  NECK: Supple, no masses.  No thyromegaly.  LYMPH NODES: No adenopathy  LUNGS: Shallow breathing. Lung sounds CTA. No rales, rhonchi, wheezing or retractions  HEART: Regular rhythm. Normal S1/S2. No murmurs. Normal pulses.  ABDOMEN: Soft, non-tender, mildly distended, no masses or hepatosplenomegaly. Bowel sounds normal.   NEUROLOGIC: Alert and oriented to person, place, and time. Normal strength. +CN deficit and poor coordination.   EXTREMITIES: two patches on right leg covering two stitched lacerations, old bruising over left shins and newer on feet.   SKIN: warm and dry, diffuse striae    Time Spent on this Encounter   Annalee JAIME, DO, personally saw the  patient today and spent greater than 30 minutes discharging this patient.    Discharge Disposition   Discharged to home  Condition at discharge: Stable    Consultations This Hospital Stay   CHILD FAMILY LIFE IP CONSULT  PEDS PULMONOLOGY IP CONSULT    Discharge Orders      Pulmonary Medicine Referral      Follow Up and recommended labs and tests    Follow up with Dr. Wei as previously scheduled. The Pediatric GI clinic will contact you with the results of your procedure today.     Activity    Your activity upon discharge: activity as tolerated     When to contact your care team    Call Pediatric GI if you have any of the following: temperature greater than 101.5,  increased shortness of breath, increased pain or any other post-operative concerns.     Reason for your hospital stay    Bowel prep for EGD and colonoscopy.     Diet    Follow this diet upon discharge: Orders Placed This Encounter      Regular Diet Adult  No restrictions.     Discharge Medications   Allergies   Allergies   Allergen Reactions     Blood Transfusion Related (Informational Only) Swelling     Periorbital swelling post platelet transfusion     No Known Drug Allergies      Data   Most Recent 3 CBC's:  Recent Labs   Lab Test 09/28/19  0543 09/27/19  0621 09/24/19  1320 09/17/19  1316   WBC 5.0  --  3.9* 4.9   HGB 10.9*  --  12.1* 12.6*   MCV 92  --  89 90   PLT 71* 61* 83* 102*      Most Recent 3 BMP's:  Recent Labs   Lab Test 09/24/19  1320 09/17/19  1316 09/10/19  1317    138 141   POTASSIUM 3.9 3.9 4.1   CHLORIDE 106 103 107   CO2 30 31 29   BUN 15 11 15   CR 1.04* 1.09* 1.01*   ANIONGAP 4 4 5   KATIE 8.5 8.8 8.5   GLC 81 106* 89     Most Recent 2 LFT's:  Recent Labs   Lab Test 09/24/19  1320 09/17/19  1316   AST 16 15   ALT 17 15   ALKPHOS 140 156   BILITOTAL 0.3 0.3     Most Recent INR's and Anticoagulation Dosing History:  Anticoagulation Dose History     Recent Dosing and Labs Latest Ref Rng & Units 7/2/2015 1/14/2016 1/14/2016  6/8/2016 8/3/2016 4/25/2017 9/28/2019    INR 0.86 - 1.14 0.91 1.08 1.13 0.90 1.00 1.03 1.16(H)    INR Point of Care 0.86 - 1.14 - - - - - - -          Physician Attestation   I, Julian Dunn, saw and evaluated this patient prior to discharge.  I discussed the patient with the resident/fellow and agree with plan of care as documented in the note.      I personally reviewed vital signs, medications, labs and imaging.    I personally spent >30 minutes on discharge activities.    Julian Dunn MD  Date of Service (when I saw the patient): 9/28/19

## 2019-09-30 ENCOUNTER — HOSPITAL ENCOUNTER (OUTPATIENT)
Dept: OCCUPATIONAL THERAPY | Facility: CLINIC | Age: 19
Setting detail: THERAPIES SERIES
End: 2019-09-30
Attending: FAMILY MEDICINE
Payer: COMMERCIAL

## 2019-09-30 DIAGNOSIS — C71.9 EPENDYMOMA (H): Primary | ICD-10-CM

## 2019-09-30 PROCEDURE — 97535 SELF CARE MNGMENT TRAINING: CPT | Mod: GO | Performed by: OCCUPATIONAL THERAPIST

## 2019-10-01 LAB — COPATH REPORT: NORMAL

## 2019-10-02 ENCOUNTER — TELEPHONE (OUTPATIENT)
Dept: GASTROENTEROLOGY | Facility: CLINIC | Age: 19
End: 2019-10-02

## 2019-10-02 NOTE — TELEPHONE ENCOUNTER
Talked with dad about results  Biopsies were normal  Colon polyp was a tubular adenoma 1.6 cm in size.  Based on this it is a higher risk polyp and would require repeat colonoscopy in 3 years.  I did discuss with Radha Guardado's Oncology NP that it would be helpful to know if because of his history of chemotherapy and current medications id there is an even higher risk and that Geo may need repeat colonoscopy prior to 3 years.  Also recommended starting a medication to help with visceral hypersensitivity/functional abdominal pain (either amitriptyline or gabapentin)    Radha will look into which one of these is best and will take the lead on starting this.    Also recommended transition to an adult gastroenterologist who has more experience with colon polyps of this type and then needed screening.  May consider Dr. Yuen.    Risks and benefits of plan were discussed with caller and caller's questions were answered.  Caller encouraged to call back with any new, worsening, or concerning symptoms.

## 2019-10-07 ENCOUNTER — HOSPITAL ENCOUNTER (OUTPATIENT)
Dept: OCCUPATIONAL THERAPY | Facility: CLINIC | Age: 19
Setting detail: THERAPIES SERIES
End: 2019-10-07
Attending: FAMILY MEDICINE
Payer: COMMERCIAL

## 2019-10-07 ENCOUNTER — OFFICE VISIT (OUTPATIENT)
Dept: PSYCHIATRY | Facility: CLINIC | Age: 19
End: 2019-10-07
Attending: PSYCHIATRY & NEUROLOGY
Payer: COMMERCIAL

## 2019-10-07 VITALS — SYSTOLIC BLOOD PRESSURE: 103 MMHG | HEART RATE: 105 BPM | DIASTOLIC BLOOD PRESSURE: 71 MMHG

## 2019-10-07 DIAGNOSIS — F22 PARANOIA (H): ICD-10-CM

## 2019-10-07 DIAGNOSIS — F32.A DEPRESSION, UNSPECIFIED DEPRESSION TYPE: ICD-10-CM

## 2019-10-07 PROCEDURE — G0463 HOSPITAL OUTPT CLINIC VISIT: HCPCS | Mod: ZF

## 2019-10-07 PROCEDURE — 97535 SELF CARE MNGMENT TRAINING: CPT | Mod: GO | Performed by: OCCUPATIONAL THERAPIST

## 2019-10-07 RX ORDER — OLANZAPINE 20 MG/1
TABLET ORAL
Qty: 30 TABLET | Refills: 0 | Status: SHIPPED | OUTPATIENT
Start: 2019-10-07 | End: 2019-11-07

## 2019-10-07 RX ORDER — TRAZODONE HYDROCHLORIDE 50 MG/1
75 TABLET, FILM COATED ORAL AT BEDTIME
Qty: 45 TABLET | Refills: 1 | Status: SHIPPED | OUTPATIENT
Start: 2019-10-07 | End: 2019-12-02

## 2019-10-07 RX ORDER — OLANZAPINE 5 MG/1
TABLET ORAL
Qty: 30 TABLET | Refills: 0 | Status: SHIPPED | OUTPATIENT
Start: 2019-10-07 | End: 2019-11-07

## 2019-10-07 ASSESSMENT — PAIN SCALES - GENERAL: PAINLEVEL: NO PAIN (0)

## 2019-10-07 NOTE — PATIENT INSTRUCTIONS
Increase olanzapine to 22.5 mg at bedtime for one week, then increase to 25 mg at bedtime.    Continue sertraline 100 mg daily.    Continue trazodone 75 mg at bedtime.    Thank you for coming to the PSYCHIATRY CLINIC.    Lab Testing:  If you had lab testing today and your results are reassuring or normal they will be mailed to you or sent through [employer] within 7 days.   If the lab tests need quick action we will call you with the results.  The phone number we will call with results is # [phone] (home) [phone] (work). If this is not the best number please call our clinic and change the number.    Medication Refills:  If you need any refills please call your pharmacy and they will contact us. Our fax number for refills is [phone]. Please allow three business for refill processing.   If you need to  your refill at a new pharmacy, please contact the new pharmacy directly. The new pharmacy will help you get your medications transferred.     Scheduling:  If you have any concerns about today's visit or wish to schedule another appointment please call our office during normal business hours [phone] (8-5:00 M-F)    Contact Us:  Please call [phone] during business hours (8-5:00 M-F).  If after clinic hours, or on the weekend, please call  [phone].    Financial Assistance [phone]  [facility] Billing [phone]  [facility] Billing [facility], [facility]: [phone]  [facility] Billing [phone]  Medical Records [phone]      MENTAL HEALTH CRISIS NUMBERS:  [facility]:   [facility] - [phone]   Crisis Residence [facility] - [facility] - [phone]   Walk-In Counseling Center [facility] - [phone]   COPE 24/7 [place] Mobile Team for Adults - [[phone]]; Child - [[phone]]        [facility]:   [facility] - [phone]   Walk-in counseling [facility] - [phone]   Walk-in counseling [facility] -  [phone]   Crisis Residence [facility] - [phone]   Urgent Care Adult Mental Health:   --Drop-in, 24/7 crisis line, and [place] Mobile Team [[phone]]    CRISIS TEXT LINE: Text [phone] from anywhere, anytime, any crisis 24/7;    OR SEE www.crisistextline.org     Poison Control Center - [phone]    CHILD: [facility] needs assessment team - [phone]     [facility] Lifeline - [phone]; or [employer] Lifeline - 1-259.295.8327    If you have a medical emergency please call 911or go to the nearest ER.                    _____________________________________________    Again thank you for choosing PSYCHIATRY CLINIC and please let us know how we can best partner with you to improve you and your family's health.  You may be receiving a survey in the mail regarding this appointment. We would love to have your feedback, both positive and negative, so please fill out the survey and return it using the provided envelope. The survey is done by an external company, so your answers are anonymous.

## 2019-10-07 NOTE — PROGRESS NOTES
"  [facility] PSYCHIATRY CLINIC PROGRESS NOTE     CARE TEAM:  PCP- Jeffrey Espinoza    Specialty Providers- Oncology, Neuropsychology, Physical Therapy, Occupational Therapy    Therapist- Manju Pink at [facility] in [facility] Team- no.    Geo Hicks is a 18 year old male who prefers the name Geo & pronouns he, him, his, himself.    Date of initial diagnostic assessment is 2/7/2019.    Pertinent Background:  This patient first experienced mental health issues in the past few months and has received treatment for paranoia/delusions.  Notably, this patient is undergoing cancer treatment at the [facility] and has been referred for psychiatric assessment by his neuropsychologist who most recently met with him on 1/3/2019.  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 Since then, he has undergone multiple tumor resections, chemotherapy, and radiation treatment. Geo also experienced an intra-tumoral hemorrhage in May 2015 that resulted in neurological changes including facial numbness, significant loss of mobility and dysarthria. In December 2016, a follow-up MRI revealed continued tumor enhancement, however the most recent MRI from 10/16/18 revealed an unchanged fourth ventricle ependymoma in comparison to previous exams, a slight decrease in adjacent edema, as well as a stable size of the ventricular system.  He is currently on COG study [id number] with Entinostat.  Of note, patient had a brief admission to inpatient psychiatry on [facility] from 2/14-2/15/2019 \"for evaluation of delusions and suicidal comments.\"    Psych critical item history includes suicidal ideation, psychosis [sxs include delusions] and psychiatric hospitalization x1.    INTERIM HISTORY                                                                                                                 4, 4   The patient reports good treatment adherence.  History was provided by the patient " "and his dad who were good historians.  The last visit ended with the following med change: Increased olanzapine to 20 mg at bedtime.  Since the last visit:  -Presents with dad.  Reports his interim mood as being \"not bad.\"  -Had his colonoscopy.  Relates his belief that it was hoaxed and \"they didn't actually do the procedure.\"  -Talked a bit about why he is certain that he has constipation.  Relates it's because he feels bloated, heavy, and because he can't walk.  -Denies interim SI or SIB thoughts, depressive severity, hypo-/nikolai, AVH, nayely panic attacks or other hallmarks of psychiatric decompensation with dangerousness.  -Spent a while talking about Geo's endorsement of wanting to move out of his parents home(s).  Dad explains that Geo has a CADI  from [place] named Lamin who would be someone for them to touch base with regarding finding independent living training/support for Geo.  Dad voiced his intention to follow up with Lamin.  -Talked about medications and agreed to advance Geo's dose of olanzapine for thought and mood support.    RECENT SYMPTOMS:   DEPRESSION:  reports-low mood and feeling trapped;  DENIES- suicidal ideation, self-destructive thoughts, anhedonia and poor concentration /memory  NIKOLAI/HYPOMANIA:  reports-none;  DENIES- increased energy, decreased sleep need, increased activity and grandiosity  PSYCHOSIS:  reports-delusions- paranoid [details in Interim History];  DENIES- auditory hallucinations and visual hallucinations  DYSREGULATION:  reports-can become irritable/agitated if beliefs are challenged;  DENIES- suicidal ideation, violent ideation, SIB, mood dysregulation, impulsive and aggressive  PANIC ATTACK:  none   ANXIETY:  worry  TRAUMA RELATED:  none  COMPULSIVE:  none  SLEEP:  initiation and maintenance both improved with trazodone  EATING DISORDER: no    RECENT SUBSTANCE USE:  ALCOHOL- no  TOBACCO- no  CAFFEINE- minimal  OPIOIDS- no         NARCAN KIT- " "N/A  CANNABIS- no  OTHER ILLICIT DRUGS- no      CURRENT SOCIAL HISTORY:  FINANCIAL SUPPORT- family  CHILDREN- no  LIVING SITUATION- mother and father (who are  and have both remarried) alternating weeks between respective homes  SOCIAL/ SPIRITUAL SUPPORT- mother, father, older brother  FEELS SAFE AT HOME- yes    MEDICAL ROS (2,10):  A comprehensive review of systems was performed and is negative other than noted in the HPI.    PSYCH and CD Critical Summary Points since July 2018 February 2019:  Clinic intake on 2/7.  Later was admitted to [facility] for a brief inpatient psych admission from 3/14-3/15, most of which was spent in the ER, due to suicidal comments that concerned family.  Was ultimately deemed safe for outpatient follow-up and was discharged with an increase in olanzapine to 15 mg.  March 2019:  Began sertraline 50 mg daily.  May 2019:  Began trazodone 25-50 mg at bedtime.  June 2019:  Increased trazodone to 100 mg at bedtime.  July 2019:  Increased Sertraline to 100 mg daily.  August 2019:  Increased olanzapine to 17.5 mg and decreased trazodone to 75 mg, both at bedtime.  September 2019:  Increased olanzapine to 20 mg at bedtime.    PAST PSYCH MED TRIALS   see EMR Problem List: Hx of psychiatric care    MEDICAL / SURGICAL HISTORY                                   Neurologic Hx- See pertinent background, re: history of ependymoma and related chemo, radiation and tumor resection(s).  Notably has experienced neurological damage due to the above which has resulted in facial numbness, significant loss of mobility and dysarthria.  Has had neuropsychiatric evaluation(s) 2/2016, 2/2017 and 1/2019.  The following is from the \"Results and Impressions\" section of his 1/3/2019 eval with Veronica Padilla:       \"We were pleased to discover that the neurocognitive areas measured during this evaluation have remained intact and consistent with previous evaluations. Geo s ability to access " "and apply acquired word knowledge (verbal comprehension) and his working memory (ability to mentally hold and manipulate information) were in the average range. His ability to understand visual information to solve novel abstract visual problems (perceptual reasoning) was in the low average range. There was a slight decrease in performance on one task associated with perceptual reasoning due to time limits. Had no time limits been enforced, Geo s performance would have likely been higher as he was observed to respond with correct answers that were past the time limit allowed. Furthermore, on a timed visual discrimination and scanning task, Geo performed in the borderline range, which was a slight improvement from his previous evaluation in 2017 where he performed in the impaired range.\"    Patient Active Problem List   Diagnosis     GERD (gastroesophageal reflux disease)     Closed fracture at the growth plate of right distal fibula      Elevated serum creatinine     Dyspepsia     Intracranial hemorrhage (H)     Hemorrhagic stroke (H)     Ependymoma (H)     Admission for antineoplastic chemotherapy     Post-operative state     S/P craniotomy     S/P biopsy     Noncomitant strabismus     Abducens neuropathy of both eyes     CANDELARIO (obstructive sleep apnea)     Health Care Home     Hypernatremia     Body temperature low     Current chronic use of systemic steroids     Elevated TSH     Status post chemotherapy     Neoplasm of posterior cranial fossa (H)     Chronic constipation     Serum albumin decreased     Closed compression fracture of thoracic vertebra with routine healing, subsequent encounter     Depression     Constipation     Constipation by delayed colonic transit     Slow transit constipation     Past Surgical History:   Procedure Laterality Date     COLONOSCOPY N/A 9/27/2019    Procedure: Colonoscopy With Biopsies and Polypectomy;  Surgeon: Aniya Wei MD;  Location:  OR     " ESOPHAGOSCOPY, GASTROSCOPY, DUODENOSCOPY (EGD), COMBINED N/A 9/27/2019    Procedure: Upper Endoscopy (EGD) With Biopsies;  Surgeon: Aniya Wei MD;  [facility]: [facility]     GRAFT CARTILAGE FROM POSTERIOR AURICLE Left 10/6/2016    Procedure: GRAFT CARTILAGE FROM POSTERIOR AURICLE;  Surgeon: Tyler Richards MD;  [facility]     INCISION AND DRAINAGE PERINEAL, COMBINED Bilateral 7/18/2015    Procedure: COMBINED INCISION AND DRAINAGE PERINEAL;  Surgeon: Dequan Timmons MD;  [facility]     OPTICAL TRACKING SYSTEM CRANIOTOMY, EXCISE TUMOR, COMBINED N/A 4/13/2015    Procedure: COMBINED OPTICAL TRACKING SYSTEM CRANIOTOMY, EXCISE TUMOR;  Surgeon: Francis Velazquez MD;  [facility]     OPTICAL TRACKING SYSTEM CRANIOTOMY, EXCISE TUMOR, COMBINED N/A 4/16/2015    Procedure: COMBINED OPTICAL TRACKING SYSTEM CRANIOTOMY, EXCISE TUMOR;  Surgeon: Francis Velazquez MD;  [facility]     OPTICAL TRACKING SYSTEM CRANIOTOMY, EXCISE TUMOR, COMBINED Bilateral 5/28/2015    Procedure: COMBINED OPTICAL TRACKING SYSTEM CRANIOTOMY, EXCISE TUMOR;  Surgeon: Francis Velazquez MD;  [facility]     OPTICAL TRACKING SYSTEM CRANIOTOMY, EXCISE TUMOR, COMBINED Bilateral 1/14/2016    Procedure: COMBINED OPTICAL TRACKING SYSTEM CRANIOTOMY, EXCISE TUMOR;  Surgeon: Francis Velazquez MD;  [facility]     OPTICAL TRACKING SYSTEM VENTRICULOSTOMY  4/16/2015    Procedure: OPTICAL TRACKING SYSTEM VENTRICULOSTOMY;  Surgeon: Francis Velazquez MD;  [facility]     REMOVE PORT VASCULAR ACCESS N/A 10/6/2016    Procedure: REMOVE PORT VASCULAR ACCESS;  Surgeon: Bruno Perea MD;  [facility]     RHINOPLASTY N/A 10/6/2016    Procedure: RHINOPLASTY;  Surgeon: Tyler Richards MD;  [facility]     VASCULAR SURGERY  5-2015    single lumen power port       ALLERGY                                Blood transfusion related (informational only) and No known drug  allergies     MEDICATIONS                               Current Outpatient Medications   Medication     calcium carbonate-vitamin D 600-400 MG-UNIT CHEW     dexamethasone (DECADRON) 0.5 MG tablet     fexofenadine (ALLEGRA) 180 MG tablet     OLANZapine (ZYPREXA) 20 MG tablet     potassium phosphate, monobasic, (K-PHOS) 500 MG tablet     sertraline (ZOLOFT) 100 MG tablet     study - entinostat (IDS# 5050) 1 mg tablet     sulfamethoxazole-trimethoprim (BACTRIM/SEPTRA) 400-80 MG tablet     traZODone (DESYREL) 50 MG tablet     vitamin D3 (CHOLECALCIFEROL) 2000 units (50 mcg) tablet     vitamin E (TOCOPHEROL) 400 units (360 mg) capsule     linaclotide (LINZESS) 290 MCG capsule     omeprazole (PRILOSEC) 20 MG DR capsule     order for DME     study - entinostat (IDS# 5050) 1 mg tablet     study - entinostat (IDS# 5050) 5 mg tablet       VITALS                                                                                                                          3, 3   /71   Pulse 105      MENTAL STATUS EXAM                                                                                           9, 14 cog gs     Alertness: alert  and oriented  Appearance: casually groomed, seated in wheelchair, wearing R-sided eye-patch  Behavior/Demeanor: cooperative, with fair eye contact   Speech: prominent dysarthria  Language: good  Psychomotor: mild evident palsy of upper extremities and facial paralysis  Mood: worried and low moods due to his beliefs  Affect: restricted; was congruent to mood; was congruent to content  Thought Process/Associations: continued rumination [details in Interim History]  Thought Content:  Reports delusions [details in Interim History];  Denies suicidal ideation and violent ideation  Perception:  Reports none;  Denies auditory hallucinations and visual hallucinations  Insight: partial  Judgment: good  Cognition: (6) oriented: time, person, and place  attention span: intact  concentration:  "intact  recent memory: intact  remote memory: intact  fund of knowledge: appropriate  Gait and Station: remarkable for:  ataxia - can ambulate but was seen in wheelchair     LABS and DATA     AIMS:  Due.    PHQ-9 SCORE 2019   PHQ-9 Total Score 6 8 9       ANTIPSYCHOTIC LABS  [glu, A1C, lipids (rohit LDL), liver enzymes, WBC, ANEU, Hgb, plts]  q12 mo  Recent Labs   Lab Test 19  1320 19  1316 09/10/19  1317 19  1323  19  1100   GLC 81 106* 89 112*   < > 124*   A1C  --   --   --   --   --  5.1    < > = values in this interval not displayed.     Recent Labs   Lab Test 19  1100   CHOL 158   TRIG 236*   LDL 84   HDL 27*     Recent Labs   Lab Test 19  1320 19  1316 09/10/19  1317 19  1323   AST 16 15 18 19   ALT 17 15 17 17   ALKPHOS 140 156 119 135     Recent Labs   Lab Test 19  0543 19  0621 19  1320 19  1316 09/10/19  1317 19  1323   WBC 5.0  --  3.9* 4.9 4.1 6.1   ANEU  --   --  1.4* 3.1 1.9 4.0   HGB 10.9*  --  12.1* 12.6* 12.0* 12.7*   PLT 71* 61* 83* 102* 96* 107*     EK2019: 85 BPM, QT/QTcH was 346/389 msec.  Also included comment: \"Abnormal precordial QRS contours - nondiagnostic for this age.\"    EE/15/2019: \"IMPRESSION: Awake and drowsy routine EEG (no video) was normal.  The patient stated he felt dazed during photic stimulation, however, no electrographic seizures or concerning changes on the EEG were seen during that time.  Clinical correlation is advised.  No electrographic seizures, epileptiform discharges were seen.\"    DIAGNOSIS     Delusional Disorder, persecutory type, first episode, currently in acute episode  Major Depressive Disorder, single episode, moderate    ASSESSMENT                                                                                                                   m2, h3     TODAY:  Geo Hicks [facility] for continued " medication management of paranoia, delusional thoughts and depressed mood.  He denies substantive change to interim mood, which has been low at times, with worry associated with his belief that definitive medical care is being withheld for his motor deficits, which he attributes to untreated constipation.  In reality, all evidence points to his bowel function as being appropriately managed at this time.  Talked about his interim colonoscopy, and Geo denied that it had actually taken place.  Was able to discuss this in a calm and collected manner.  Denies interim SI/SIB thoughts, violent/aggressive ideation, markedly depressed moods, inappropriately elevated mood, nayely panic attacks, hallucinations, or other hallmarks of psychiatric decompensation with dangerousness.  Spent a while talking about Geo's recently articulated desire to move out of his family's home(s).  Dad explains that Geo has a CADI  with [employer] who they can follow up with in trying to find training for independent living, as well as possible options for facilities that might be able to accommodate Geo.  In terms of medications, agreed to pursue a dose maximization of Zyprexa.  While it is unlikely that Geo's unshakable beliefs would be fully ameliorated by increasing Zyprexa, this writer feels that a trial of maximized neuroleptic is warranted for his psychotic illness.  As we raise this dose, in the event he should have marked weight gain or other side effect concerns, we can consider changing to another neuroleptic.  May also consider further dose titration of sertraline at some point in the future.  No other changes today.    SUICIDE RISK ASSESSMENT:  Geo Hicks denies present suicidal ideation.  This patient does have notable risk factors for self-harm including feels trapped, relationship conflicts, new/ worsening medical issue, male and paranoia/ delusions. However, risk is mitigated by no h/o suicide attempt, no  plan or intent, describes a safety plan, h/o seeking help when needed, none to minimal alcohol use , commitment to family and stable housing.  Based on all available evidence he does not appear to be at imminent risk for self-harm therefore does not meet criteria for a 72-hr hold/  involuntary hospitalization.  However, based on degree of symptoms therapy, close psych FU and medication adjustments were recommended which the pt did agree to.    [place] PRESCRIPTION MONITORING PROGRAM [] was not checked today:  not using controlled substances    PSYCHOTROPIC DRUG INTERACTIONS:    Pentoxifylline may enhance the antiplatelet effect of Agents with Antiplatelet Properties.  [Pentoxifylline, Sertraline]     CNS Depressants may enhance the adverse/toxic effect of Selective Serotonin Reuptake Inhibitors. Specifically, the risk of psychomotor impairment may be enhanced.  [Olanzapine, Sertraline]    MANAGEMENT:  Monitoring for adverse effects, routine vitals, routine labs, periodic EKGs and patient/family aware of risks     PLAN                                                                                                                                m2, h3     1) PSYCHOTROPIC MEDICATIONS:  Increase olanzapine to 22.5 mg at bedtime for one week, then increase to 25 mg at bedtime.  Continue sertraline 100 mg daily.  Continue trazodone 75 mg at bedtime.    2) THERAPY:  Continue with Manju.    3) NEXT DUE:    Labs- AP labs due Feb 2020  EKG- PRN  Rating Scales- AIMS due    4) REFERRALS:  No Referrals needed    5) RTC: 3-4 weeks    6) CRISIS NUMBERS:   Provided routinely [place] [facility] [phone] (clinic)    [phone] (after hours)  CRISIS TEXT LINE: Text [id number] from anywhere in [place], anytime, any crisis 24/7;  OR SEE www.crisistextline.org    TREATMENT RISK STATEMENT:  The risks, benefits, alternatives and potential adverse effects have been discussed and are understood by the pt. The pt understands the risks of  using street drugs or alcohol. There are no medical contraindications, the pt agrees to treatment with the ability to do so. The pt knows to call the clinic for any problems or to access emergency care if needed.  Medical and substance use concerns are documented above.  Psychotropic drug interaction check was done, including changes made today.     PSYCHIATRY CLINIC INDIVIDUAL PSYCHOTHERAPY NOTE                                                [16]   Start time- N/A        End time- N/A  Date last reviewed - 03/08/19       Date next due - 6/6/19 (or 12 months if Medicare)    Subjective: This supportive psychotherapy session addressed issues related to goals of therapy, patient's history, current stressors, life stressors, relationship, family of origin, school and health.  Patient's reaction: Contemplation in the context of mental status appropriate for ambulatory setting.  Progress: fair  Plan: RTC 4-6 weeks  Psychotherapy services during this visit included  myself and the patient.   TREATMENT  PLAN          SYMPTOMS;PROBLEMS   MEASURABLE GOALS;    FUNCTIONAL IMPROVEMENT INTERVENTIONS;    GAINS MADE DISCHARGE CRITERIA   Depression: depressed mood, anhedonia and feeling hopelesss   reduce depressive symptoms, find enjoyment at least once a day and report feeling more positive about self  Supportive and cognitive psychotherapeutic interventions marked symptom improvement   Psychosis: delusions   reduce frequency/ intensity of false beliefs and take medications as prescribed on a daily basis Supportive and cognitive psychotherapeutic interventions marked symptom improvement     PROVIDER:  Justice Fay DO    Patient staffed in clinic with Dr. Tripathi who will sign the note.  Supervisor is Dr. Emery.    Supervisor Attestation:  I met with Geo Hicks along with the resident physician, Justice Fay MD. I participated in key portions of the service, including the mental status examination and developing the plan of  care. I reviewed key portions of the history with the resident. I agree with the findings and plan as documented in this note.  Jorge Luis Tripathi MD, MD

## 2019-10-08 ENCOUNTER — OFFICE VISIT (OUTPATIENT)
Dept: PEDIATRIC HEMATOLOGY/ONCOLOGY | Facility: CLINIC | Age: 19
End: 2019-10-08
Attending: PEDIATRICS
Payer: COMMERCIAL

## 2019-10-08 VITALS
TEMPERATURE: 97.1 F | BODY MASS INDEX: 28.83 KG/M2 | HEART RATE: 97 BPM | RESPIRATION RATE: 22 BRPM | DIASTOLIC BLOOD PRESSURE: 66 MMHG | SYSTOLIC BLOOD PRESSURE: 113 MMHG | WEIGHT: 210.54 LBS | OXYGEN SATURATION: 97 %

## 2019-10-08 DIAGNOSIS — C71.9 EPENDYMOMA (H): Primary | ICD-10-CM

## 2019-10-08 DIAGNOSIS — D49.6 NEOPLASM OF POSTERIOR CRANIAL FOSSA (H): ICD-10-CM

## 2019-10-08 PROCEDURE — 85025 COMPLETE CBC W/AUTO DIFF WBC: CPT | Performed by: PEDIATRICS

## 2019-10-08 PROCEDURE — G0463 HOSPITAL OUTPT CLINIC VISIT: HCPCS | Mod: ZF

## 2019-10-08 PROCEDURE — 25000128 H RX IP 250 OP 636: Mod: ZF | Performed by: PEDIATRICS

## 2019-10-08 PROCEDURE — 36415 COLL VENOUS BLD VENIPUNCTURE: CPT | Performed by: PEDIATRICS

## 2019-10-08 PROCEDURE — 80053 COMPREHEN METABOLIC PANEL: CPT | Performed by: PEDIATRICS

## 2019-10-08 PROCEDURE — 83735 ASSAY OF MAGNESIUM: CPT | Performed by: PEDIATRICS

## 2019-10-08 PROCEDURE — 90686 IIV4 VACC NO PRSV 0.5 ML IM: CPT | Mod: ZF | Performed by: PEDIATRICS

## 2019-10-08 PROCEDURE — 84100 ASSAY OF PHOSPHORUS: CPT | Performed by: PEDIATRICS

## 2019-10-08 PROCEDURE — G0008 ADMIN INFLUENZA VIRUS VAC: HCPCS

## 2019-10-08 RX ORDER — ALBUTEROL SULFATE 0.83 MG/ML
2.5 SOLUTION RESPIRATORY (INHALATION)
Status: DISCONTINUED | OUTPATIENT
Start: 2019-10-08 | End: 2019-10-16 | Stop reason: HOSPADM

## 2019-10-08 RX ORDER — METHYLPREDNISOLONE SODIUM SUCCINATE 125 MG/2ML
125 INJECTION, POWDER, LYOPHILIZED, FOR SOLUTION INTRAMUSCULAR; INTRAVENOUS
Status: DISCONTINUED | OUTPATIENT
Start: 2019-10-08 | End: 2019-10-16 | Stop reason: HOSPADM

## 2019-10-08 RX ORDER — SODIUM CHLORIDE 9 MG/ML
1000 INJECTION, SOLUTION INTRAVENOUS CONTINUOUS PRN
Status: DISCONTINUED | OUTPATIENT
Start: 2019-10-08 | End: 2019-10-16 | Stop reason: HOSPADM

## 2019-10-08 RX ORDER — ALBUTEROL SULFATE 90 UG/1
1-2 AEROSOL, METERED RESPIRATORY (INHALATION)
Status: DISCONTINUED | OUTPATIENT
Start: 2019-10-08 | End: 2019-10-16 | Stop reason: HOSPADM

## 2019-10-08 RX ORDER — EPINEPHRINE 1 MG/ML
0.3 INJECTION, SOLUTION INTRAMUSCULAR; SUBCUTANEOUS EVERY 5 MIN PRN
Status: DISCONTINUED | OUTPATIENT
Start: 2019-10-08 | End: 2019-10-16 | Stop reason: HOSPADM

## 2019-10-08 RX ORDER — MEPERIDINE HYDROCHLORIDE 25 MG/ML
25 INJECTION INTRAMUSCULAR; INTRAVENOUS; SUBCUTANEOUS EVERY 30 MIN PRN
Status: DISCONTINUED | OUTPATIENT
Start: 2019-10-08 | End: 2019-10-16 | Stop reason: HOSPADM

## 2019-10-08 RX ORDER — DIPHENHYDRAMINE HYDROCHLORIDE 50 MG/ML
50 INJECTION INTRAMUSCULAR; INTRAVENOUS
Status: DISCONTINUED | OUTPATIENT
Start: 2019-10-08 | End: 2019-10-16 | Stop reason: HOSPADM

## 2019-10-08 RX ADMIN — INFLUENZA A VIRUS A/BRISBANE/02/2018 IVR-190 (H1N1) ANTIGEN (FORMALDEHYDE INACTIVATED), INFLUENZA A VIRUS A/KANSAS/14/2017 X-327 (H3N2) ANTIGEN (FORMALDEHYDE INACTIVATED), INFLUENZA B VIRUS B/PHUKET/3073/2013 ANTIGEN (FORMALDEHYDE INACTIVATED), AND INFLUENZA B VIRUS B/MARYLAND/15/2016 BX-69A ANTIGEN (FORMALDEHYDE INACTIVATED) 0.5 ML: 15; 15; 15; 15 INJECTION, SUSPENSION INTRAMUSCULAR at 12:00

## 2019-10-08 ASSESSMENT — PAIN SCALES - GENERAL: PAINLEVEL: NO PAIN (0)

## 2019-10-08 ASSESSMENT — ENCOUNTER SYMPTOMS
UNEXPECTED WEIGHT CHANGE: 0
CONSTIPATION: 1
JOINT SWELLING: 0
SLEEP DISTURBANCE: 1
FATIGUE: 0
FREQUENCY: 1
DIFFICULTY URINATING: 0
COUGH: 0
FEVER: 0
SPEECH DIFFICULTY: 1
FLANK PAIN: 0

## 2019-10-08 NOTE — LETTER
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 He is enrolled on COG [id number] - A Phase 1 Study of Entinostat, an Oral Histone Deacetylase Inhibitor, in Pediatric Patients With Recurrent or Refractory Solid Tumors, Including CNS Tumors and Lymphoma.    HPI: Geo is doing well overall at this time and continues to be involved with pool therapy. He denies missing any doses of his Entinostat, but continues to report that he doesn't like that the pills are so small. He recently had an EGD and colonoscopy performed on 9/26 which demonstrated a tubular adenoma. He did have some lower O2 sats during his hospital stay and was recommended to have follow up with pulmonology given his history of CANDELARIO. Geo also reports having to void frequently 30 minutes after his last void and is concerned that his bladder may not be emptying fully.    Fam/Soc: Lives between his parents (one week at each home). They have been awarded guardianship of Geo. The families work well together - both parents have remarried. His dad has a clotting disorder, requiring him to take Warfarin daily.     History was obtained from Geo and his father.    Allergies   Allergen Reactions     Blood Transfusion Related (Informational Only) Swelling     Periorbital swelling post platelet transfusion     No Known Drug Allergies      Current Outpatient Medications   Medication     calcium carbonate-vitamin D 600-400 MG-UNIT CHEW     dexamethasone (DECADRON) 0.5 MG tablet     fexofenadine (ALLEGRA) 180 MG tablet     linaclotide (LINZESS) 290 MCG capsule     OLANZapine (ZYPREXA) 20 MG tablet     OLANZapine (ZYPREXA) 5 MG tablet     omeprazole (PRILOSEC) 20 MG DR capsule     potassium phosphate, monobasic, (K-PHOS) 500 MG tablet     sertraline (ZOLOFT) 100 MG  tablet     study - entinostat (IDS# 5050) 1 mg tablet     sulfamethoxazole-trimethoprim (BACTRIM/SEPTRA) 400-80 MG tablet     traZODone (DESYREL) 50 MG tablet     vitamin D3 (CHOLECALCIFEROL) 2000 units (50 mcg) tablet     vitamin E (TOCOPHEROL) 400 units (360 mg) capsule     study - entinostat (IDS# 5050) 1 mg tablet     study - entinostat (IDS# 5050) 5 mg tablet     Current Facility-Administered Medications   Medication     albuterol (PROAIR HFA/PROVENTIL HFA/VENTOLIN HFA) 108 (90 Base) MCG/ACT inhaler 1-2 puff     albuterol (PROVENTIL) neb solution 2.5 mg     diphenhydrAMINE (BENADRYL) injection 50 mg     EPINEPHrine (Anaphylaxis) (ADRENALIN) injection (vial) 0.3 mg     influenza quadrivalent (PF) vacc (FLUZONE) injection 0.5 mL     MEDICATION INSTRUCTION     meperidine (DEMEROL) injection 25 mg     methylPREDNISolone sodium succinate (solu-MEDROL) injection 125 mg     sodium chloride 0.9% infusion     Past Medical History:   Diagnosis Date     Cranial nerve dysfunction      Dyspepsia      Ependymoma (H)      Gastro-oesophageal reflux disease      Hearing loss      Intracranial hemorrhage (H)      Migraine      Pilonidal cyst     7-2015     Reduced vision      Refractory obstruction of nasal airway     2nd to nasal valve prolapse     Sleep apnea      Strabismus     gaze palsy      Past Surgical History:   Procedure Laterality Date     COLONOSCOPY N/A 9/27/2019    Procedure: Colonoscopy With Biopsies and Polypectomy;  Surgeon: Aniya Wei MD;  [facility]     ESOPHAGOSCOPY, GASTROSCOPY, DUODENOSCOPY (EGD), COMBINED N/A 9/27/2019    Procedure: Upper Endoscopy (EGD) With Biopsies;  Surgeon: Aniya Wei MD;  [facility]     GRAFT CARTILAGE FROM POSTERIOR AURICLE Left 10/6/2016    Procedure: GRAFT CARTILAGE FROM POSTERIOR AURICLE;  Surgeon: Tyler Richards MD;  [facility]     INCISION AND DRAINAGE PERINEAL, COMBINED Bilateral 7/18/2015    Procedure:  COMBINED INCISION AND DRAINAGE PERINEAL;  Surgeon: Dequan Timmons MD;  [facility]     OPTICAL TRACKING SYSTEM CRANIOTOMY, EXCISE TUMOR, COMBINED N/A 4/13/2015    Procedure: COMBINED OPTICAL TRACKING SYSTEM CRANIOTOMY, EXCISE TUMOR;  Surgeon: Francis Velazquez MD;  [facility]     OPTICAL TRACKING SYSTEM CRANIOTOMY, EXCISE TUMOR, COMBINED N/A 4/16/2015    Procedure: COMBINED OPTICAL TRACKING SYSTEM CRANIOTOMY, EXCISE TUMOR;  Surgeon: Francis Velazquez MD;  [facility]     OPTICAL TRACKING SYSTEM CRANIOTOMY, EXCISE TUMOR, COMBINED Bilateral 5/28/2015    Procedure: COMBINED OPTICAL TRACKING SYSTEM CRANIOTOMY, EXCISE TUMOR;  Surgeon: Francis Velazquez MD;  [facility]     OPTICAL TRACKING SYSTEM CRANIOTOMY, EXCISE TUMOR, COMBINED Bilateral 1/14/2016    Procedure: COMBINED OPTICAL TRACKING SYSTEM CRANIOTOMY, EXCISE TUMOR;  Surgeon: Francis Velazquez MD;  [facility]     OPTICAL TRACKING SYSTEM VENTRICULOSTOMY  4/16/2015    Procedure: OPTICAL TRACKING SYSTEM VENTRICULOSTOMY;  Surgeon: Francis Velazquez MD;  [facility]     REMOVE PORT VASCULAR ACCESS N/A 10/6/2016    Procedure: REMOVE PORT VASCULAR ACCESS;  Surgeon: Bruno Perea MD;  [facility]     RHINOPLASTY N/A 10/6/2016    Procedure: RHINOPLASTY;  Surgeon: Tyler Richards MD;  [facility]     VASCULAR SURGERY  5-2015    single lumen power port     Family History   Problem Relation Age of Onset     Circulatory Father         PE/DVT     Hypothyroidism Father 30     Diabetes Maternal Grandmother      Diabetes Paternal Grandmother      Diabetes Paternal Grandfather      C.A.D. Paternal Grandfather      Hypertension Maternal Grandfather      Thyroid Disease Paternal Aunt         unknown whether hypo or hyper     Mental Illness No family hx of      Review of Systems   Constitutional: Negative for fatigue, fever and unexpected weight change.   HENT: Positive for hearing loss. Negative for  congestion.    Eyes: Positive for visual disturbance (wears eye patch to decrease diplopia ).   Respiratory: Negative for cough.    Cardiovascular: Negative for leg swelling.   Gastrointestinal: Positive for constipation.   Genitourinary: Positive for frequency. Negative for decreased urine volume, difficulty urinating, flank pain and urgency.   Musculoskeletal: Negative for joint swelling.   Allergic/Immunologic: Negative for food allergies.   Neurological: Positive for speech difficulty.   Psychiatric/Behavioral: Positive for sleep disturbance (not wearing his BiPAP).       /66 (BP Location: Left arm, Patient Position: Sitting, Cuff Size: Adult Regular)   Pulse 97   Temp 97.1  F (36.2  C) (Oral)   Resp 22   Wt 95.5 kg (210 lb 8.6 oz)   SpO2 97%   BMI 28.83 kg/m       Physical Exam  Constitutional:       General: He is not in acute distress.  HENT:      Right Ear: Tympanic membrane normal.      Left Ear: Tympanic membrane normal.      Nose: No congestion.      Mouth/Throat:      Mouth: Mucous membranes are moist.   Eyes:      Conjunctiva/sclera: Conjunctivae normal.      Pupils: Pupils are equal, round, and reactive to light.   Neck:      Musculoskeletal: No muscular tenderness.   Cardiovascular:      Rate and Rhythm: Normal rate and regular rhythm.      Pulses: Normal pulses.      Heart sounds: No murmur.   Pulmonary:      Effort: Pulmonary effort is normal. No respiratory distress.      Breath sounds: Normal breath sounds.   Abdominal:      General: There is no distension.      Palpations: Abdomen is soft.      Tenderness: There is no tenderness.   Musculoskeletal:         General: No swelling.   Lymphadenopathy:      Cervical: No cervical adenopathy.   Skin:     General: Skin is warm.      Comments: Healing wound over right lower extremity and left knee   Neurological:      Mental Status: He is alert. Mental status is at baseline.      Cranial Nerves: Cranial nerve deficit, dysarthria and facial  asymmetry present.      Coordination: Coordination abnormal (  Ataxic- dysmetria especially in upper extremities when accomplishing tasks).           Impression:  1. Ependymoma  2. Treated with Entinostat,   3. Urinary frequency and possible incomplete emptying  4. 10/8/19 labs within acceptable range    Plan:  1. RTC in 2 weeks for follow up, or sooner PRN.  2. If continuing to have urinary frequency and/or is feeling like he is having incomplete bladder emptying then would perform bladder scan.    Nati Webb MD MPH  Pediatric Hematology Oncology Fellow      This document serves as a record of the services and decisions personally performed and made by Leoncio Rousseau MD. It was created on his behalf by Sofie rick trained medical scribe. The creation of this document is based on the provider's statements to the medical scribe.    The documentation recorded by the scribe accurately reflects the services I personally performed and the decisions made by me.    Leoncio Rousseau    CC  Patient Care Team:  Jeffrey Espinoza MD as PCP - General (Family Practice)  Dequan Timmons MD as MD (Surgery)  Leoncio Rousseau MD as MD (Pediatric Hematology/Oncology)  Kristi Schuler, APRN CNP as Nurse Practitioner (Nurse Practitioner - Pediatrics)  Higinio Walters MD (Ophthalmology)  Karina Hodgson MSW as   Eren Reeder MD as MD (Dermatology)  Schwab, Briana, RN as Nurse Coordinator  Perico Holley MD as MD (Pediatric Neurology)  Sarah Vines MD as MD (Pediatric Urology)  Imani Means, RN as Registered Nurse  Jorge Luis Tripathi MD as MD (Psychiatry)  Justice Fay MD as Resident (Student in organized health care education/training program)  Jorge Luis Tripathi MD as MD (Psychiatry)  Marilia Emery MD as MD (Psychiatry)    Copy to patient  Parent(s) of Geo Hicks  [address] [facility] [address]

## 2019-10-08 NOTE — PROGRESS NOTES
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 He is enrolled on COG [id number] - A Phase 1 Study of Entinostat, an Oral Histone Deacetylase Inhibitor, in Pediatric Patients With Recurrent or Refractory Solid Tumors, Including CNS Tumors and Lymphoma.    HPI: Geo is doing well overall at this time and continues to be involved with pool therapy. He denies missing any doses of his Entinostat, but continues to report that he doesn't like that the pills are so small. He recently had an EGD and colonoscopy performed on 9/26 which demonstrated a tubular adenoma. He did have some lower O2 sats during his hospital stay and was recommended to have follow up with pulmonology given his history of CANDELARIO. Geo also reports having to void frequently 30 minutes after his last void and is concerned that his bladder may not be emptying fully.    Fam/Soc: Lives between his parents (one week at each home). They have been awarded guardianship of Geo. The families work well together - both parents have remarried. His dad has a clotting disorder, requiring him to take Warfarin daily.     History was obtained from Geo and his father.    Allergies   Allergen Reactions     Blood Transfusion Related (Informational Only) Swelling     Periorbital swelling post platelet transfusion     No Known Drug Allergies      Current Outpatient Medications   Medication     calcium carbonate-vitamin D 600-400 MG-UNIT CHEW     dexamethasone (DECADRON) 0.5 MG tablet     fexofenadine (ALLEGRA) 180 MG tablet     linaclotide (LINZESS) 290 MCG capsule     OLANZapine (ZYPREXA) 20 MG tablet     OLANZapine (ZYPREXA) 5 MG tablet     omeprazole (PRILOSEC) 20 MG DR capsule     potassium phosphate, monobasic, (K-PHOS) 500 MG tablet     sertraline (ZOLOFT) 100 MG tablet     study - entinostat (IDS# 5050) 1 mg tablet      sulfamethoxazole-trimethoprim (BACTRIM/SEPTRA) 400-80 MG tablet     traZODone (DESYREL) 50 MG tablet     vitamin D3 (CHOLECALCIFEROL) 2000 units (50 mcg) tablet     vitamin E (TOCOPHEROL) 400 units (360 mg) capsule     study - entinostat (IDS# 5050) 1 mg tablet     study - entinostat (IDS# 5050) 5 mg tablet     Current Facility-Administered Medications   Medication     albuterol (PROAIR HFA/PROVENTIL HFA/VENTOLIN HFA) 108 (90 Base) MCG/ACT inhaler 1-2 puff     albuterol (PROVENTIL) neb solution 2.5 mg     diphenhydrAMINE (BENADRYL) injection 50 mg     EPINEPHrine (Anaphylaxis) (ADRENALIN) injection (vial) 0.3 mg     influenza quadrivalent (PF) vacc (FLUZONE) injection 0.5 mL     MEDICATION INSTRUCTION     meperidine (DEMEROL) injection 25 mg     methylPREDNISolone sodium succinate (solu-MEDROL) injection 125 mg     sodium chloride 0.9% infusion     Past Medical History:   Diagnosis Date     Cranial nerve dysfunction      Dyspepsia      Ependymoma (H)      Gastro-oesophageal reflux disease      Hearing loss      Intracranial hemorrhage (H)      Migraine      Pilonidal cyst     7-2015     Reduced vision      Refractory obstruction of nasal airway     2nd to nasal valve prolapse     Sleep apnea      Strabismus     gaze palsy      Past Surgical History:   Procedure Laterality Date     COLONOSCOPY N/A 9/27/2019    Procedure: Colonoscopy With Biopsies and Polypectomy;  Surgeon: Aniya eWi MD;  [facility]     ESOPHAGOSCOPY, GASTROSCOPY, DUODENOSCOPY (EGD), COMBINED N/A 9/27/2019    Procedure: Upper Endoscopy (EGD) With Biopsies;  Surgeon: Aniya Wei MD;  [facility]     GRAFT CARTILAGE FROM POSTERIOR AURICLE Left 10/6/2016    Procedure: GRAFT CARTILAGE FROM POSTERIOR AURICLE;  Surgeon: Tyler Richards MD;  [facility]     INCISION AND DRAINAGE PERINEAL, COMBINED Bilateral 7/18/2015    Procedure: COMBINED INCISION AND DRAINAGE PERINEAL;  Surgeon: Iain  Dequan Concepcion MD;  [facility]     OPTICAL TRACKING SYSTEM CRANIOTOMY, EXCISE TUMOR, COMBINED N/A 4/13/2015    Procedure: COMBINED OPTICAL TRACKING SYSTEM CRANIOTOMY, EXCISE TUMOR;  Surgeon: Francis Velazquez MD;  [facility]     OPTICAL TRACKING SYSTEM CRANIOTOMY, EXCISE TUMOR, COMBINED N/A 4/16/2015    Procedure: COMBINED OPTICAL TRACKING SYSTEM CRANIOTOMY, EXCISE TUMOR;  Surgeon: Francis Velazquez MD;  [facility]     OPTICAL TRACKING SYSTEM CRANIOTOMY, EXCISE TUMOR, COMBINED Bilateral 5/28/2015    Procedure: COMBINED OPTICAL TRACKING SYSTEM CRANIOTOMY, EXCISE TUMOR;  Surgeon: Francis Velazquez MD;  [facility]     OPTICAL TRACKING SYSTEM CRANIOTOMY, EXCISE TUMOR, COMBINED Bilateral 1/14/2016    Procedure: COMBINED OPTICAL TRACKING SYSTEM CRANIOTOMY, EXCISE TUMOR;  Surgeon: Francis Velazquez MD;  [facility]     OPTICAL TRACKING SYSTEM VENTRICULOSTOMY  4/16/2015    Procedure: OPTICAL TRACKING SYSTEM VENTRICULOSTOMY;  Surgeon: Francis Velazquez MD;  [facility]     REMOVE PORT VASCULAR ACCESS N/A 10/6/2016    Procedure: REMOVE PORT VASCULAR ACCESS;  Surgeon: Bruno Perea MD;  [facility]     RHINOPLASTY N/A 10/6/2016    Procedure: RHINOPLASTY;  Surgeon: Tyler Richards MD;  [facility]     VASCULAR SURGERY  5-2015    single lumen power port     Family History   Problem Relation Age of Onset     Circulatory Father         PE/DVT     Hypothyroidism Father 30     Diabetes Maternal Grandmother      Diabetes Paternal Grandmother      Diabetes Paternal Grandfather      C.A.D. Paternal Grandfather      Hypertension Maternal Grandfather      Thyroid Disease Paternal Aunt         unknown whether hypo or hyper     Mental Illness No family hx of      Review of Systems   Constitutional: Negative for fatigue, fever and unexpected weight change.   HENT: Positive for hearing loss. Negative for congestion.    Eyes: Positive for visual disturbance (wears  eye patch to decrease diplopia ).   Respiratory: Negative for cough.    Cardiovascular: Negative for leg swelling.   Gastrointestinal: Positive for constipation.   Genitourinary: Positive for frequency. Negative for decreased urine volume, difficulty urinating, flank pain and urgency.   Musculoskeletal: Negative for joint swelling.   Allergic/Immunologic: Negative for food allergies.   Neurological: Positive for speech difficulty.   Psychiatric/Behavioral: Positive for sleep disturbance (not wearing his BiPAP).       /66 (BP Location: Left arm, Patient Position: Sitting, Cuff Size: Adult Regular)   Pulse 97   Temp 97.1  F (36.2  C) (Oral)   Resp 22   Wt 95.5 kg (210 lb 8.6 oz)   SpO2 97%   BMI 28.83 kg/m      Physical Exam  Constitutional:       General: He is not in acute distress.  HENT:      Right Ear: Tympanic membrane normal.      Left Ear: Tympanic membrane normal.      Nose: No congestion.      Mouth/Throat:      Mouth: Mucous membranes are moist.   Eyes:      Conjunctiva/sclera: Conjunctivae normal.      Pupils: Pupils are equal, round, and reactive to light.   Neck:      Musculoskeletal: No muscular tenderness.   Cardiovascular:      Rate and Rhythm: Normal rate and regular rhythm.      Pulses: Normal pulses.      Heart sounds: No murmur.   Pulmonary:      Effort: Pulmonary effort is normal. No respiratory distress.      Breath sounds: Normal breath sounds.   Abdominal:      General: There is no distension.      Palpations: Abdomen is soft.      Tenderness: There is no tenderness.   Musculoskeletal:         General: No swelling.   Lymphadenopathy:      Cervical: No cervical adenopathy.   Skin:     General: Skin is warm.      Comments: Healing wound over right lower extremity and left knee   Neurological:      Mental Status: He is alert. Mental status is at baseline.      Cranial Nerves: Cranial nerve deficit, dysarthria and facial asymmetry present.      Coordination: Coordination abnormal  (  Ataxic- dysmetria especially in upper extremities when accomplishing tasks).           Impression:  1. Ependymoma  2. Treated with Entinostat,   3. Urinary frequency and possible incomplete emptying  4. 10/8/19 labs within acceptable range    Plan:  1. RTC in 2 weeks for follow up, or sooner PRN.  2. If continuing to have urinary frequency and/or is feeling like he is having incomplete bladder emptying then would perform bladder scan.    Nati Webb MD MPH  Pediatric Hematology Oncology Fellow      This document serves as a record of the services and decisions personally performed and made by Leoncio Rousseau MD. It was created on his behalf by Sofie rick trained medical scribe. The creation of this document is based on the provider's statements to the medical scribe.    The documentation recorded by the scribe accurately reflects the services I personally performed and the decisions made by me.    Leoncio Rousseau    CC  Patient Care Team:  Keisha Baldwin MD as PCP - General (Family Practice)  Dequan Timmons MD as MD (Surgery)  Leoncio Rousseau MD as MD (Pediatric Hematology/Oncology)  Kristi Schuler, APRN CNP as Nurse Practitioner (Nurse Practitioner - Pediatrics)  Higinio Walters MD (Ophthalmology)  Karina Hodgson MSW as   Eren Reeder MD as MD (Dermatology)  Schwab, Briana, RN as Nurse Coordinator  Perico Holley MD as MD (Pediatric Neurology)  Sarah Vines MD as MD (Pediatric Urology)  Sarah Vines MD as MD (Pediatric Urology)  Imani Means, RN as Registered Nurse  Imani Means, RN as Registered Nurse  Jorge Luis Tripathi MD as MD (Psychiatry)  Justice Fay MD as Resident (Student in organized health care education/training program)  Jorge Luis Tripathi MD as MD (Psychiatry)  Marilia Emery MD as MD (Psychiatry)  KEISHA BALDWIN    Copy to patient  RAFAELA CHATMAN ROGE  [address]  [facility] [address]

## 2019-10-08 NOTE — NURSING NOTE
Chief Complaint   Patient presents with     RECHECK     Patient here today for Ependymoma     /66 (BP Location: Left arm, Patient Position: Sitting, Cuff Size: Adult Regular)   Pulse 97   Temp 97.1  F (36.2  C) (Oral)   Resp 22   Wt 95.5 kg (210 lb 8.6 oz)   SpO2 97%   BMI 28.83 kg/m      Ana Cristina Ravi CMA  October 8, 2019

## 2019-10-14 ENCOUNTER — HOSPITAL ENCOUNTER (OUTPATIENT)
Dept: OCCUPATIONAL THERAPY | Facility: CLINIC | Age: 19
Setting detail: THERAPIES SERIES
End: 2019-10-14
Attending: FAMILY MEDICINE
Payer: COMMERCIAL

## 2019-10-14 PROCEDURE — 97535 SELF CARE MNGMENT TRAINING: CPT | Mod: GO | Performed by: OCCUPATIONAL THERAPIST

## 2019-10-15 DIAGNOSIS — C71.9 EPENDYMOMA (H): ICD-10-CM

## 2019-10-15 LAB
BASOPHILS # BLD AUTO: 0 10E9/L (ref 0–0.2)
BASOPHILS NFR BLD AUTO: 0.4 %
DIFFERENTIAL METHOD BLD: ABNORMAL
EOSINOPHIL # BLD AUTO: 0.3 10E9/L (ref 0–0.7)
EOSINOPHIL NFR BLD AUTO: 5.4 %
ERYTHROCYTE [DISTWIDTH] IN BLOOD BY AUTOMATED COUNT: 13.7 % (ref 10–15)
HCT VFR BLD AUTO: 38.1 % (ref 40–53)
HGB BLD-MCNC: 12.3 G/DL (ref 13.3–17.7)
LYMPHOCYTES # BLD AUTO: 1 10E9/L (ref 0.8–5.3)
LYMPHOCYTES NFR BLD AUTO: 18.4 %
MCH RBC QN AUTO: 28.9 PG (ref 26.5–33)
MCHC RBC AUTO-ENTMCNC: 32.3 G/DL (ref 31.5–36.5)
MCV RBC AUTO: 89 FL (ref 78–100)
MONOCYTES # BLD AUTO: 0.7 10E9/L (ref 0–1.3)
MONOCYTES NFR BLD AUTO: 13.6 %
NEUTROPHILS # BLD AUTO: 3.2 10E9/L (ref 1.6–8.3)
NEUTROPHILS NFR BLD AUTO: 62.2 %
PLATELET # BLD AUTO: 134 10E9/L (ref 150–450)
RBC # BLD AUTO: 4.26 10E12/L (ref 4.4–5.9)
WBC # BLD AUTO: 5.2 10E9/L (ref 4–11)

## 2019-10-15 PROCEDURE — 80053 COMPREHEN METABOLIC PANEL: CPT | Performed by: NURSE PRACTITIONER

## 2019-10-15 PROCEDURE — 36415 COLL VENOUS BLD VENIPUNCTURE: CPT | Performed by: NURSE PRACTITIONER

## 2019-10-15 PROCEDURE — 85025 COMPLETE CBC W/AUTO DIFF WBC: CPT | Performed by: NURSE PRACTITIONER

## 2019-10-15 PROCEDURE — 83735 ASSAY OF MAGNESIUM: CPT | Performed by: NURSE PRACTITIONER

## 2019-10-15 PROCEDURE — 84100 ASSAY OF PHOSPHORUS: CPT | Performed by: NURSE PRACTITIONER

## 2019-10-16 ENCOUNTER — ANCILLARY PROCEDURE (OUTPATIENT)
Dept: GENERAL RADIOLOGY | Facility: CLINIC | Age: 19
End: 2019-10-16
Attending: PHYSICIAN ASSISTANT
Payer: COMMERCIAL

## 2019-10-16 ENCOUNTER — OFFICE VISIT (OUTPATIENT)
Dept: URGENT CARE | Facility: URGENT CARE | Age: 19
End: 2019-10-16
Payer: COMMERCIAL

## 2019-10-16 VITALS
HEART RATE: 80 BPM | TEMPERATURE: 97.8 F | DIASTOLIC BLOOD PRESSURE: 64 MMHG | OXYGEN SATURATION: 98 % | RESPIRATION RATE: 20 BRPM | SYSTOLIC BLOOD PRESSURE: 110 MMHG

## 2019-10-16 DIAGNOSIS — M79.672 LEFT FOOT PAIN: ICD-10-CM

## 2019-10-16 DIAGNOSIS — S92.352A CLOSED FRACTURE OF BASE OF FIFTH METATARSAL BONE OF LEFT FOOT, INITIAL ENCOUNTER: Primary | ICD-10-CM

## 2019-10-16 LAB
ALBUMIN SERPL-MCNC: 3.5 G/DL (ref 3.4–5)
ALP SERPL-CCNC: 118 U/L (ref 65–260)
ALT SERPL W P-5'-P-CCNC: 29 U/L (ref 0–50)
ANION GAP SERPL CALCULATED.3IONS-SCNC: 6 MMOL/L (ref 3–14)
AST SERPL W P-5'-P-CCNC: 23 U/L (ref 0–35)
BILIRUB SERPL-MCNC: 0.3 MG/DL (ref 0.2–1.3)
BUN SERPL-MCNC: 12 MG/DL (ref 7–30)
CALCIUM SERPL-MCNC: 8.4 MG/DL (ref 8.5–10.1)
CHLORIDE SERPL-SCNC: 105 MMOL/L (ref 98–110)
CO2 SERPL-SCNC: 28 MMOL/L (ref 20–32)
CREAT SERPL-MCNC: 1.2 MG/DL (ref 0.5–1)
GFR SERPL CREATININE-BSD FRML MDRD: 87 ML/MIN/{1.73_M2}
GLUCOSE SERPL-MCNC: 155 MG/DL (ref 70–99)
MAGNESIUM SERPL-MCNC: 1.9 MG/DL (ref 1.6–2.3)
PHOSPHATE SERPL-MCNC: 2.8 MG/DL (ref 2.5–4.5)
POTASSIUM SERPL-SCNC: 3.7 MMOL/L (ref 3.4–5.3)
PROT SERPL-MCNC: 6.3 G/DL (ref 6.8–8.8)
SODIUM SERPL-SCNC: 139 MMOL/L (ref 133–144)

## 2019-10-16 PROCEDURE — 73630 X-RAY EXAM OF FOOT: CPT | Mod: LT

## 2019-10-16 PROCEDURE — 99214 OFFICE O/P EST MOD 30 MIN: CPT | Performed by: PHYSICIAN ASSISTANT

## 2019-10-16 NOTE — PATIENT INSTRUCTIONS
Patient Education     Foot Fracture  You have a broken bone (fracture) in your foot. This will cause pain, swelling, and often bruising. It will usually take about 4 to 8 weeks to heal. A foot fracture may be treated with a special shoe, splint, cast, or boot.  Home care  Follow these guidelines when caring for yourself at home:    You may be given a splint, cast, shoe, or boot to keep the injured area from moving. Unless you were told otherwise, use crutches or a walker. Don t put weight on the injured foot until your health care provider says you can do so. (You can rent crutches and a walker at many pharmacies and surgical or orthopedic supply stores.) Don t put weight on a splint, or it will break.    Keep your leg elevated to reduce pain and swelling. When sleeping, put a pillow under the injured leg. When sitting, support the injured leg so it is above your waist. This is very important during the first 2 days (48 hours).    Put an ice pack on the injured area. Do this for 20 minutes every 1 to 2 hours the first day for pain relief. You can make an ice pack by wrapping a plastic bag of ice cubes in a thin towel. As the ice melts, be careful that the splint, cast, boot, or shoe doesn t get wet. You can place the ice pack directly over the splint or cast. Unless told otherwise, you can open the boot or shoe to apply the ice pack. Continue using the ice pack 3 to 4 times a day for the next 2 days. Then use the ice pack as needed to ease pain and swelling.    Keep the splint, cast, boot, or shoe dry. When bathing, protect it with a large plastic bag, rubber-banded at the top end. If a fiberglass splint or cast or boot gets wet, you can dry it with a hair dryer. Unless told otherwise, you can take off the boot or shoe to bathe.    You may use acetaminophen or ibuprofen to control pain, unless another pain medicine was prescribed. If you have chronic liver or kidney disease, talk with your healthcare provider  before using these medicines. Also talk with your provider if you ve had a stomach ulcer or gastrointestinal bleeding.    Don t put creams or objects under the cast if you have itching.  Follow-up care  Follow up with your healthcare provider, or as advised. This is to make sure the bone is healing the way it should. If you were given a splint, it may be changed to a cast or boot at your follow-up visit.  X-rays may be taken. You will be told of any new findings that may affect your care.  When to seek medical advice  Call your healthcare provider right away if any of these occur:    The cast or splint cracks    The plaster cast or splint becomes wet or soft    The fiberglass cast or splint stays wet for more than 24 hours    Bad odor from the cast or wound fluid stains the cast    Tightness or pain under the cast or splint gets worse    Toes become swollen, cold, blue, numb, or tingly    You can t move your toes    Skin around cast or splint becomes red    Fever of 100.4 F (38 C) or higher, or as directed by your healthcare provider  Date Last Reviewed: 2/1/2017 2000-2018 [employer]. [address] [place] [address], [place] [address]. All rights reserved. This information is not intended as a substitute for professional medical care. Always follow your healthcare professional's instructions.

## 2019-10-16 NOTE — PROGRESS NOTES
2 days rolled foot    Pain at lateral aspect    Avulsion clipped cleaned bandaged      SUBJECTIVE:  Chief Complaint   Patient presents with     Urgent Care     Musculoskeletal Problem     L foot pain pt twisted or rolled X2 days      Geo Hicks is a 18 year old male presents with a chief complaint of left foot pain.  The injury occurred 2 day(s) ago.   The injury happened while at home. How: rolled.  The patient complained of moderate pain  and has had decreased ROM.  Pain exacerbated by twisting.  Relieved by rest, ice and elevation.  He treated it initially with ice. This is the first time this type of injury has occurred to this patient.     Past Medical History:   Diagnosis Date     Cranial nerve dysfunction      Dyspepsia      Ependymoma (H)      Gastro-oesophageal reflux disease      Hearing loss      Intracranial hemorrhage (H)      Migraine      Pilonidal cyst     7-2015     Reduced vision      Refractory obstruction of nasal airway     2nd to nasal valve prolapse     Sleep apnea      Strabismus     gaze palsy      Current Outpatient Medications   Medication Sig Dispense Refill     calcium carbonate-vitamin D 600-400 MG-UNIT CHEW Take 2 tablets in the morning and 1 tablet in the evening. 90 tablet 3     dexamethasone (DECADRON) 0.5 MG tablet Take 0.75 mg by mouth daily (with breakfast). 90 tablet 3     fexofenadine (ALLEGRA) 180 MG tablet Take 180 mg by mouth every evening        linaclotide (LINZESS) 290 MCG capsule Take 290 mcg by mouth every morning (before breakfast)        OLANZapine (ZYPREXA) 20 MG tablet Take 1 tab (20 mg) with 1 tab (2.5 mg) at bed for a week (total 22.5 mg) then go to 1 tab (20 mg) with 1 tab (5 mg) at bed (total 25 mg). 30 tablet 0     OLANZapine (ZYPREXA) 5 MG tablet Take 1/2 tab (2.5 mg) with 1 tab (20 mg) at bed for a week (total 22.5 mg) then go to 1 tab (5 mg) with 1 tab (20 mg) at bed (total 25 mg). 30 tablet 0     omeprazole (PRILOSEC) 20 MG DR capsule Take 2 capsules (40  mg) by mouth every morning 60 capsule 1     order for DME Equipment being ordered: short boot 1 each 0     potassium phosphate, monobasic, (K-PHOS) 500 MG tablet Take 1 tablet (500 mg) by mouth 2 times daily 90 tablet 3     sertraline (ZOLOFT) 100 MG tablet Take 1 tablet (100 mg) by mouth daily 30 tablet 1     study - entinostat (IDS# 5050) 1 mg tablet Take 2 tablets (2 mg) by mouth every 7 days for 4 doses Take two 1mg tablet with one 5mg tablet for total dose of 7mg weekly. Take on an empty stomach, at least 1 hour before or 2 hours after a meal.  Swallow tablet whole. 8 tablet 0     study - entinostat (IDS# 5050) 1 mg tablet Take 2 tablets (2 mg) by mouth every 7 days Take with 5mg tablet for total dose of 7mg weekly . Take on an empty stomach, at least 1 hour before or 2 hours after a meal.  Swallow tablet whole. 8 tablet 0     study - entinostat (IDS# 5050) 5 mg tablet Take 1 tablet (5 mg) by mouth every 7 days for 4 doses Take one 5mg tablet with two 1mg tablet for total dose of 7mg weekly. Take on an empty stomach, at least 1 hour before or 2 hours after a meal.  Swallow tablet whole. 4 tablet 0     sulfamethoxazole-trimethoprim (BACTRIM/SEPTRA) 400-80 MG tablet Take 1 tablet by mouth 2 times daily On Saturdays and Sundays 24 tablet 11     traZODone (DESYREL) 50 MG tablet Take 1.5 tablets (75 mg) by mouth At Bedtime 45 tablet 1     vitamin D3 (CHOLECALCIFEROL) 2000 units (50 mcg) tablet Take 1 tablet by mouth daily (with breakfast)       vitamin E (TOCOPHEROL) 400 units (360 mg) capsule Take 400 Units by mouth daily Takes in the AM daily.       Social History     Tobacco Use     Smoking status: Never Smoker     Smokeless tobacco: Never Used   Substance Use Topics     Alcohol use: No       ROS:  10 point ROS negative except as listed above      EXAM:   /64   Pulse 80   Temp 97.8  F (36.6  C) (Tympanic)   Resp 20   SpO2 98%   Gen: alert, motor dysfunction, no distress  Extremity: foot has point  tenderness at fifth metatarsal.   There is not compromise to the distal circulation.  Pulses are +2 and CRT is brisk  CHEST: clear to auscultation  CV: regular rate and rhythm  EXTREMITIES: peripheral pulses normal  MS: no gross deformities noted, no evidence of inflammation in joints, FROM in all extremities.  SKIN: no suspicious lesions or rashes      X-ray image initially interpreted in clinic by me indicating fifth metatarsal avulsion fracture    ASSESSMENT:   (S92.460A) Closed fracture of base of fifth metatarsal bone of left foot, initial encounter  (primary encounter diagnosis)  Plan: order for DME, ORTHO  REFERRAL    Follow up in 4-7 days  Tylenol and ibuprofen for pain      (M79.672) Left foot pain  Plan: XR Foot Left G/E 3 Views        Patient Instructions     Patient Education     Foot Fracture  You have a broken bone (fracture) in your foot. This will cause pain, swelling, and often bruising. It will usually take about 4 to 8 weeks to heal. A foot fracture may be treated with a special shoe, splint, cast, or boot.  Home care  Follow these guidelines when caring for yourself at home:    You may be given a splint, cast, shoe, or boot to keep the injured area from moving. Unless you were told otherwise, use crutches or a walker. Don t put weight on the injured foot until your health care provider says you can do so. (You can rent crutches and a walker at many pharmacies and surgical or orthopedic supply stores.) Don t put weight on a splint, or it will break.    Keep your leg elevated to reduce pain and swelling. When sleeping, put a pillow under the injured leg. When sitting, support the injured leg so it is above your waist. This is very important during the first 2 days (48 hours).    Put an ice pack on the injured area. Do this for 20 minutes every 1 to 2 hours the first day for pain relief. You can make an ice pack by wrapping a plastic bag of ice cubes in a thin towel. As the ice melts, be  careful that the splint, cast, boot, or shoe doesn t get wet. You can place the ice pack directly over the splint or cast. Unless told otherwise, you can open the boot or shoe to apply the ice pack. Continue using the ice pack 3 to 4 times a day for the next 2 days. Then use the ice pack as needed to ease pain and swelling.    Keep the splint, cast, boot, or shoe dry. When bathing, protect it with a large plastic bag, rubber-banded at the top end. If a fiberglass splint or cast or boot gets wet, you can dry it with a hair dryer. Unless told otherwise, you can take off the boot or shoe to bathe.    You may use acetaminophen or ibuprofen to control pain, unless another pain medicine was prescribed. If you have chronic liver or kidney disease, talk with your healthcare provider before using these medicines. Also talk with your provider if you ve had a stomach ulcer or gastrointestinal bleeding.    Don t put creams or objects under the cast if you have itching.  Follow-up care  Follow up with your healthcare provider, or as advised. This is to make sure the bone is healing the way it should. If you were given a splint, it may be changed to a cast or boot at your follow-up visit.  X-rays may be taken. You will be told of any new findings that may affect your care.  When to seek medical advice  Call your healthcare provider right away if any of these occur:    The cast or splint cracks    The plaster cast or splint becomes wet or soft    The fiberglass cast or splint stays wet for more than 24 hours    Bad odor from the cast or wound fluid stains the cast    Tightness or pain under the cast or splint gets worse    Toes become swollen, cold, blue, numb, or tingly    You can t move your toes    Skin around cast or splint becomes red    Fever of 100.4 F (38 C) or higher, or as directed by your healthcare provider  Date Last Reviewed: 2/1/2017 2000-2018 [employer]. [address] [place] [id number].  All rights reserved. This information is not intended as a substitute for professional medical care. Always follow your healthcare professional's instructions.

## 2019-10-21 ENCOUNTER — HOSPITAL ENCOUNTER (OUTPATIENT)
Dept: OCCUPATIONAL THERAPY | Facility: CLINIC | Age: 19
Setting detail: THERAPIES SERIES
End: 2019-10-21
Attending: FAMILY MEDICINE
Payer: COMMERCIAL

## 2019-10-21 PROCEDURE — 97535 SELF CARE MNGMENT TRAINING: CPT | Mod: GO | Performed by: OCCUPATIONAL THERAPIST

## 2019-10-22 DIAGNOSIS — C71.9 EPENDYMOMA (H): ICD-10-CM

## 2019-10-22 LAB
BASOPHILS # BLD AUTO: 0 10E9/L (ref 0–0.2)
BASOPHILS NFR BLD AUTO: 0.4 %
DIFFERENTIAL METHOD BLD: ABNORMAL
EOSINOPHIL # BLD AUTO: 0.3 10E9/L (ref 0–0.7)
EOSINOPHIL NFR BLD AUTO: 5.5 %
ERYTHROCYTE [DISTWIDTH] IN BLOOD BY AUTOMATED COUNT: 13.6 % (ref 10–15)
HCT VFR BLD AUTO: 38.2 % (ref 40–53)
HGB BLD-MCNC: 12.5 G/DL (ref 13.3–17.7)
LYMPHOCYTES # BLD AUTO: 1.2 10E9/L (ref 0.8–5.3)
LYMPHOCYTES NFR BLD AUTO: 22.8 %
MCH RBC QN AUTO: 28.9 PG (ref 26.5–33)
MCHC RBC AUTO-ENTMCNC: 32.7 G/DL (ref 31.5–36.5)
MCV RBC AUTO: 88 FL (ref 78–100)
MONOCYTES # BLD AUTO: 1 10E9/L (ref 0–1.3)
MONOCYTES NFR BLD AUTO: 18.4 %
NEUTROPHILS # BLD AUTO: 2.8 10E9/L (ref 1.6–8.3)
NEUTROPHILS NFR BLD AUTO: 52.9 %
PLATELET # BLD AUTO: 139 10E9/L (ref 150–450)
RBC # BLD AUTO: 4.33 10E12/L (ref 4.4–5.9)
WBC # BLD AUTO: 5.3 10E9/L (ref 4–11)

## 2019-10-22 PROCEDURE — 80053 COMPREHEN METABOLIC PANEL: CPT | Performed by: NURSE PRACTITIONER

## 2019-10-22 PROCEDURE — 83735 ASSAY OF MAGNESIUM: CPT | Performed by: NURSE PRACTITIONER

## 2019-10-22 PROCEDURE — 85025 COMPLETE CBC W/AUTO DIFF WBC: CPT | Performed by: NURSE PRACTITIONER

## 2019-10-22 PROCEDURE — 84100 ASSAY OF PHOSPHORUS: CPT | Performed by: NURSE PRACTITIONER

## 2019-10-22 PROCEDURE — 36415 COLL VENOUS BLD VENIPUNCTURE: CPT | Performed by: NURSE PRACTITIONER

## 2019-10-23 ENCOUNTER — OFFICE VISIT (OUTPATIENT)
Dept: PODIATRY | Facility: CLINIC | Age: 19
End: 2019-10-23
Payer: COMMERCIAL

## 2019-10-23 VITALS — SYSTOLIC BLOOD PRESSURE: 108 MMHG | DIASTOLIC BLOOD PRESSURE: 60 MMHG

## 2019-10-23 DIAGNOSIS — S92.355A NONDISPLACED FRACTURE OF FIFTH METATARSAL BONE, LEFT FOOT, INITIAL ENCOUNTER FOR CLOSED FRACTURE: Primary | ICD-10-CM

## 2019-10-23 LAB
ALBUMIN SERPL-MCNC: 3.6 G/DL (ref 3.4–5)
ALP SERPL-CCNC: 129 U/L (ref 65–260)
ALT SERPL W P-5'-P-CCNC: 26 U/L (ref 0–50)
ANION GAP SERPL CALCULATED.3IONS-SCNC: 6 MMOL/L (ref 3–14)
AST SERPL W P-5'-P-CCNC: 21 U/L (ref 0–35)
BILIRUB SERPL-MCNC: 0.2 MG/DL (ref 0.2–1.3)
BUN SERPL-MCNC: 20 MG/DL (ref 7–30)
CALCIUM SERPL-MCNC: 8.7 MG/DL (ref 8.5–10.1)
CHLORIDE SERPL-SCNC: 107 MMOL/L (ref 98–110)
CO2 SERPL-SCNC: 26 MMOL/L (ref 20–32)
CREAT SERPL-MCNC: 1.17 MG/DL (ref 0.5–1)
GFR SERPL CREATININE-BSD FRML MDRD: 89 ML/MIN/{1.73_M2}
GLUCOSE SERPL-MCNC: 79 MG/DL (ref 70–99)
MAGNESIUM SERPL-MCNC: 2.2 MG/DL (ref 1.6–2.3)
PHOSPHATE SERPL-MCNC: 3.1 MG/DL (ref 2.5–4.5)
POTASSIUM SERPL-SCNC: 4.3 MMOL/L (ref 3.4–5.3)
PROT SERPL-MCNC: 6.6 G/DL (ref 6.8–8.8)
SODIUM SERPL-SCNC: 139 MMOL/L (ref 133–144)

## 2019-10-23 PROCEDURE — 28470 CLTX METATARSAL FX WO MNP EA: CPT | Mod: LT | Performed by: PODIATRIST

## 2019-10-23 PROCEDURE — 99203 OFFICE O/P NEW LOW 30 MIN: CPT | Mod: 57 | Performed by: PODIATRIST

## 2019-10-23 NOTE — PROGRESS NOTES
PATIENT HISTORY:  Ramana Mahoney PA-C requested I see this patient for their foot issue.  Geo Hicks is a 18 year old male who presents to clinic for left 5th metatarsal fracture. Here with mom. Notes currently not much pain. Wondering how long he needs to be in the boot. Thinks he might have rolled foot wrong when transferring. Beck can be 4/10. Did have xrays.     Review of Systems:  Patient denies fever, chills, rash, wound, stiffness, limping, numbness, weakness, heart burn, blood in stool, chest pain with activity, calf pain when walking, shortness of breath with activity, chronic cough, easy bleeding/bruising, swelling of ankles, excessive thirst, fatigue, depression, anxiety.  .     PAST MEDICAL HISTORY:   Past Medical History:   Diagnosis Date     Cranial nerve dysfunction      Dyspepsia      Ependymoma (H)      Gastro-oesophageal reflux disease      Hearing loss      Intracranial hemorrhage (H)      Migraine      Pilonidal cyst     7-2015     Reduced vision      Refractory obstruction of nasal airway     2nd to nasal valve prolapse     Sleep apnea      Strabismus     gaze palsy         PAST SURGICAL HISTORY:   Past Surgical History:   Procedure Laterality Date     COLONOSCOPY N/A 9/27/2019    Procedure: Colonoscopy With Biopsies and Polypectomy;  Surgeon: Aniya Wei MD;  [facility]: [facility]     ESOPHAGOSCOPY, GASTROSCOPY, DUODENOSCOPY (EGD), COMBINED N/A 9/27/2019    Procedure: Upper Endoscopy (EGD) With Biopsies;  Surgeon: Aniya Wei MD;  [facility]: [facility]     GRAFT CARTILAGE FROM POSTERIOR AURICLE Left 10/6/2016    Procedure: GRAFT CARTILAGE FROM POSTERIOR AURICLE;  Surgeon: Tyler Richards MD;  [facility]: [facility]     INCISION AND DRAINAGE PERINEAL, COMBINED Bilateral 7/18/2015    Procedure: COMBINED INCISION AND DRAINAGE PERINEAL;  Surgeon: Dequan Tmimons MD;  [facility]     OPTICAL TRACKING SYSTEM CRANIOTOMY, EXCISE TUMOR, COMBINED N/A 4/13/2015     Procedure: COMBINED OPTICAL TRACKING SYSTEM CRANIOTOMY, EXCISE TUMOR;  Surgeon: Francis Velazquez MD;  [facility]     OPTICAL TRACKING SYSTEM CRANIOTOMY, EXCISE TUMOR, COMBINED N/A 4/16/2015    Procedure: COMBINED OPTICAL TRACKING SYSTEM CRANIOTOMY, EXCISE TUMOR;  Surgeon: Francis Velazquez MD;  [facility]     OPTICAL TRACKING SYSTEM CRANIOTOMY, EXCISE TUMOR, COMBINED Bilateral 5/28/2015    Procedure: COMBINED OPTICAL TRACKING SYSTEM CRANIOTOMY, EXCISE TUMOR;  Surgeon: Francis Velazquez MD;  [facility]     OPTICAL TRACKING SYSTEM CRANIOTOMY, EXCISE TUMOR, COMBINED Bilateral 1/14/2016    Procedure: COMBINED OPTICAL TRACKING SYSTEM CRANIOTOMY, EXCISE TUMOR;  Surgeon: Francis Velazquez MD;  [facility]     OPTICAL TRACKING SYSTEM VENTRICULOSTOMY  4/16/2015    Procedure: OPTICAL TRACKING SYSTEM VENTRICULOSTOMY;  Surgeon: Francis Velazquez MD;  [facility]     REMOVE PORT VASCULAR ACCESS N/A 10/6/2016    Procedure: REMOVE PORT VASCULAR ACCESS;  Surgeon: Bruno Perea MD;  [facility]     RHINOPLASTY N/A 10/6/2016    Procedure: RHINOPLASTY;  Surgeon: Tyler Richards MD;  [facility]     VASCULAR SURGERY  5-2015    single lumen power port        MEDICATIONS:   Current Outpatient Medications:      calcium carbonate-vitamin D 600-400 MG-UNIT CHEW, Take 2 tablets in the morning and 1 tablet in the evening., Disp: 90 tablet, Rfl: 3     dexamethasone (DECADRON) 0.5 MG tablet, Take 0.75 mg by mouth daily (with breakfast)., Disp: 90 tablet, Rfl: 3     fexofenadine (ALLEGRA) 180 MG tablet, Take 180 mg by mouth every evening , Disp: , Rfl:      linaclotide (LINZESS) 290 MCG capsule, Take 290 mcg by mouth every morning (before breakfast) , Disp: , Rfl:      OLANZapine (ZYPREXA) 20 MG tablet, Take 1 tab (20 mg) with 1 tab (2.5 mg) at bed for a week (total 22.5 mg) then go to 1 tab (20 mg) with 1 tab (5 mg) at bed (total 25 mg)., Disp: 30 tablet, Rfl: 0      OLANZapine (ZYPREXA) 5 MG tablet, Take 1/2 tab (2.5 mg) with 1 tab (20 mg) at bed for a week (total 22.5 mg) then go to 1 tab (5 mg) with 1 tab (20 mg) at bed (total 25 mg)., Disp: 30 tablet, Rfl: 0     omeprazole (PRILOSEC) 20 MG DR capsule, Take 2 capsules (40 mg) by mouth every morning, Disp: 60 capsule, Rfl: 1     order for DME, Equipment being ordered: short boot, Disp: 1 each, Rfl: 0     potassium phosphate, monobasic, (K-PHOS) 500 MG tablet, Take 1 tablet (500 mg) by mouth 2 times daily, Disp: 90 tablet, Rfl: 3     sertraline (ZOLOFT) 100 MG tablet, Take 1 tablet (100 mg) by mouth daily, Disp: 30 tablet, Rfl: 1     study - entinostat (IDS# 5050) 1 mg tablet, Take 2 tablets (2 mg) by mouth every 7 days for 4 doses Take two 1mg tablet with one 5mg tablet for total dose of 7mg weekly. Take on an empty stomach, at least 1 hour before or 2 hours after a meal.  Swallow tablet whole., Disp: 8 tablet, Rfl: 0     study - entinostat (IDS# 5050) 1 mg tablet, Take 2 tablets (2 mg) by mouth every 7 days Take with 5mg tablet for total dose of 7mg weekly . Take on an empty stomach, at least 1 hour before or 2 hours after a meal.  Swallow tablet whole., Disp: 8 tablet, Rfl: 0     study - entinostat (IDS# 5050) 5 mg tablet, Take 1 tablet (5 mg) by mouth every 7 days for 4 doses Take one 5mg tablet with two 1mg tablet for total dose of 7mg weekly. Take on an empty stomach, at least 1 hour before or 2 hours after a meal.  Swallow tablet whole., Disp: 4 tablet, Rfl: 0     sulfamethoxazole-trimethoprim (BACTRIM/SEPTRA) 400-80 MG tablet, Take 1 tablet by mouth 2 times daily On Saturdays and Sundays, Disp: 24 tablet, Rfl: 11     traZODone (DESYREL) 50 MG tablet, Take 1.5 tablets (75 mg) by mouth At Bedtime, Disp: 45 tablet, Rfl: 1     vitamin D3 (CHOLECALCIFEROL) 2000 units (50 mcg) tablet, Take 1 tablet by mouth daily (with breakfast), Disp: , Rfl:      vitamin E (TOCOPHEROL) 400 units (360 mg) capsule, Take 400 Units by mouth  daily Takes in the AM daily., Disp: , Rfl:      ALLERGIES:    Allergies   Allergen Reactions     Blood Transfusion Related (Informational Only) Swelling     Periorbital swelling post platelet transfusion     No Known Drug Allergies         SOCIAL HISTORY:   Social History     Socioeconomic History     Marital status: Single     Spouse name: Not on file     Number of children: Not on file     Years of education: Not on file     Highest education level: Not on file   Occupational History     Not on file   Social Needs     Financial resource strain: Not on file     Food insecurity:     Worry: Not on file     Inability: Not on file     Transportation needs:     Medical: Not on file     Non-medical: Not on file   Tobacco Use     Smoking status: Never Smoker     Smokeless tobacco: Never Used   Substance and Sexual Activity     Alcohol use: No     Drug use: No     Sexual activity: Never   Lifestyle     Physical activity:     Days per week: Not on file     Minutes per session: Not on file     Stress: Not on file   Relationships     Social connections:     Talks on phone: Not on file     Gets together: Not on file     Attends Voodoo service: Not on file     Active member of club or organization: Not on file     Attends meetings of clubs or organizations: Not on file     Relationship status: Not on file     Intimate partner violence:     Fear of current or ex partner: Not on file     Emotionally abused: Not on file     Physically abused: Not on file     Forced sexual activity: Not on file   Other Topics Concern     Not on file   Social History Narrative    Grown up in [place], [place].  Parents are , and he shares time between his mother's and father's homes. Both parents are remarried.  Dad is a , and mom does  for a testing company.  Siblings include two full brothers (older brother Benitez who is a senior in college and younger brother Gamaliel), a step-brother, and a step-sister. Geo  graduated from high school in Spring 2018.  Initially considered attending [employer] in Fall 2019, but reportedly lost interest due to the amount of time it would take him to complete courses and receive a degree.  Does today endorse continued interest in pursuing an advanced course of study at some point, specifically stating he is interested in Electrical Engineering.  No access to guns or weapons enjoys playing video games.  Guns are currently locked in the house.        FAMILY HISTORY:   Family History   Problem Relation Age of Onset     Circulatory Father         PE/DVT     Hypothyroidism Father 30     Diabetes Maternal Grandmother      Diabetes Paternal Grandmother      Diabetes Paternal Grandfather      C.A.D. Paternal Grandfather      Hypertension Maternal Grandfather      Thyroid Disease Paternal Aunt         unknown whether hypo or hyper     Mental Illness No family hx of         EXAM:Vitals: patient declined.    General appearance: Patient is alert and fully cooperative with history & exam.  No sign of distress is noted during the visit.     Psychiatric: Affect is pleasant & appropriate.  Patient appears motivated to improve health.     Respiratory: Breathing is regular & unlabored while sitting.     HEENT: Hearing is intact to spoken word.  Speech is clear.  No gross evidence of visual impairment that would impact ambulation.     Dermatologic: Skin is intact to both lower extremities without significant lesions, rash or abrasion.  No paronychia or evidence of soft tissue infection is noted.     Vascular: DP & PT pulses are intact & regular bilaterally.  No significant edema or varicosities noted.  CFT and skin temperature is normal to both lower extremities.     Neurologic: Lower extremity sensation is intact to light touch.  No evidence of weakness or contracture in the lower extremities.  No evidence of neuropathy.     Musculoskeletal: Patient is ambulatory without assistive device  or brace. No pain to palpation of left 5th metatarsal.     Radiographs:   Left foot xrays - Nondisplaced transverse avulsion-type fracture proximal fifth metatarsal.     ASSESSMENT: Nondisplaced fracture of fifth metatarsal bone, left foot, initial encounter for closed fracture    PLAN:  [facility] in [facility]. Talked about fractures. Discussed that healing can take 6-10 weeks. Risk that the fracture will not heal and we may need to do surgery. Risk is increased 10-15% if you smoke.     He can do pool therapy as long as it is not painful for him. He does get chair lifted into the pool.     Follow up in 1 month for repeat xrays.        Rufina Dasilva DPM, Podiatry/Foot and Ankle Surgery

## 2019-10-23 NOTE — PATIENT INSTRUCTIONS
Thank you for choosing [facility]!    DR. ALARCON'S CLINIC SCHEDULE  MONDAY AM - [place] TUESDAY - [place] [place]   [address] [address] [address] [address] [place] [address]   [phone] / [phone] / [phone]       WEDNESDAY - [place] FRIDAY AM - [facility]   [address]   [place] [address] [place] [address]   [phone] / [phone]       FRIDAY PM - [facility] SCHEDULE SURGERY: [phone]   [address] BILLING QUESTIONS: [phone]   [place] [address] AFTER HOURS: [phone]   [phone] / [phone] APPOINTMENTS: [phone]     [employer] Price Line (CPL) [phone]       One month follow up      TOE & METATARSAL FRACTURES  The structure of the foot is complex, consisting of bones, muscles, tendons, and other soft tissues. Of the 26 bones in the foot, 19 are toe bones (phalanges) and metatarsal bones (the long bones in the midfoot). Fractures of the toe and metatarsal bones are common and require evaluation by a specialist. A foot and ankle surgeon should be seen for proper diagnosis and treatment, even if initial treatment has been received in an emergency room.  A fracture is a break in the bone. Fractures can be divided into two categories: traumatic fractures and stress fractures.  TRAUMATIC FRACTURES (also called acute fractures) are caused by a direct blow or impact, such as seriously stubbing your toe. Traumatic fractures can be displaced or non-displaced. If the fracture is displaced, the bone is broken in such a way that it has changed in position (dislocated).  Signs and symptoms of a traumatic fracture include:  You may hear a sound at the time of the break.    Pinpoint pain  (pain at the place of impact) at the time the fracture occurs and perhaps for a few hours later, but often the pain goes away after several hours.   Crooked or abnormal  appearance of the toe.   Bruising and swelling the next day.   It is not true that  if you can walk on it, it s not broken.  Evaluation by a foot and ankle surgeon is always recommended.   STRESS FRACTURES are tiny, hairline breaks that are usually caused by repetitive stress. Stress fractures often afflict athletes who, for example, too rapidly increase their running mileage. They can also be caused by an abnormal foot structure, deformities, or osteoporosis. Improper footwear may also lead to stress fractures. Stress fractures should not be ignored. They require proper medical attention to heal correctly.  Symptoms of stress fractures include:  Pain with or after normal activity   Pain that goes away when resting and then returns when standing or during activity    Pinpoint pain  (pain at the site of the fracture) when touched   Swelling, but no bruising   IMPROPER TREATMENT  Some people say that  the doctor can t do anything for a broken bone in the foot.  This is usually not true. In fact, if a fractured toe or metatarsal bone is not treated correctly, serious complications may develop. For example:  A deformity in the bony architecture which may limit the ability to move the foot or cause difficulty in fitting shoes   Arthritis, which may be caused by a fracture in a joint (the juncture where two bones meet), or may be a result of angular deformities that develop when a displaced fracture is severe or hasn t been properly corrected   Chronic pain and deformity   Non-union, or failure to heal, can lead to subsequent surgery or chronic pain.   PROPER TREATMENT FOR TOES  Fractures of the toe bones are almost always traumatic fractures. Treatment for traumatic fractures depends on the break itself and may include these options:  Rest. Sometimes rest is all that is needed to treat a traumatic fracture of the toe.   Splinting. The toe may be fitted with a splint to keep it in a fixed position.   Rigid or stiff-soled  shoe. Wearing a stiff-soled shoe protects the toe and helps keep it properly positioned.    Cla taping  the fractured toe to another toe is sometimes appropriate, but in other cases it may be harmful.   Surgery. If the break is badly displaced or if the joint is affected, surgery may be necessary. Surgery often involves the use of fixation devices, such as pins.   PROPER TREATMENT OF METATARSALS  Breaks in the metatarsal bones may be either stress or traumatic fractures. Certain kinds of fractures of the metatarsal bones present unique challenges.  For example, sometimes a fracture of the first metatarsal bone (behind the big toe) can lead to arthritis. Since the big toe is used so frequently and bears more weight than other toes, arthritis in that area can make it painful to walk, bend, or even stand.  Another type of break, called a Mane fracture, occurs at the base of the fifth metatarsal bone (behind the little toe). It is often misdiagnosed as an ankle sprain, and misdiagnosis can have serious consequences since sprains and fractures require different treatments. Your foot and ankle surgeon is an expert in correctly identifying these conditions as well as other problems of the foot.  Treatment of metatarsal fractures depends on the type and extent of the fracture, and may include:  Rest. Sometimes rest is the only treatment needed to promote healing of a stress or traumatic fracture of a metatarsal bone.   Avoid the offending activity. Because stress fractures result from repetitive stress, it is important to avoid the activity that led to the fracture. Crutches or a wheelchair are sometimes required to offload weight from the foot to give it time to heal.   Immobilization, casting, or rigid shoe. A stiff-soled shoe or other form of immobilization may be used to protect the fractured bone while it is healing.   Surgery. Some traumatic fractures of the metatarsal bones require surgery, especially if the break  is badly displaced.   Follow-up care. Your foot and ankle surgeon will provide instructions for care following surgical or non-surgical treatment. Physical therapy, exercises and rehabilitation may be included in a schedule for return to normal activities.         BODY WEIGHT AND YOUR FEET  The following information is included in the after visit summary for all patients. Body weight can be a sensitive issue to discuss in clinic, but we think the following information is very important. Although we focus on the feet and ankles, we do support the overall health of our patients.     Many things can cause foot and ankle problems. Foot structure, activity level, foot mechanics and injuries are common causes of pain. One very important issue that often goes unmentioned, is body weight. Extra weight can cause increased stress on muscles, ligaments, bones and tendons. Sometimes just a few extra pounds is all it takes to put one over her/his threshold. Without reducing that stress, it can be difficult to alleviate pain. As Foot & Ankle specialists, our job is addressing the lower extremity problem and possible causes. Regarding extra body weight, we encourage patients to discuss diet and weight management plans with their primary care doctors. It is this team approach that gives you the best opportunity for pain relief and getting you back on your feet.      [facility] has a Comprehensive Weight Management Program. This program includes counseling, education, non-surgical and surgical approaches to weight loss. If you are interested in learning more either talk to you primary care provider or call [phone].

## 2019-10-23 NOTE — LETTER
10/23/2019         RE: Geo Hicks  [address] [facility] [address]        Dear Colleague,    Thank you for referring your patient, Geo Hicks, to the [facility]. Please see a copy of my visit note below.    PATIENT HISTORY:  Ramana Mahoney PA-C requested I see this patient for their foot issue.  Geo Hicks is a 18 year old male who presents to clinic for left 5th metatarsal fracture. Here with mom. Notes currently not much pain. Wondering how long he needs to be in the boot. Thinks he might have rolled foot wrong when transferring. Beck can be 4/10. Did have xrays.     Review of Systems:  Patient denies fever, chills, rash, wound, stiffness, limping, numbness, weakness, heart burn, blood in stool, chest pain with activity, calf pain when walking, shortness of breath with activity, chronic cough, easy bleeding/bruising, swelling of ankles, excessive thirst, fatigue, depression, anxiety.  .     PAST MEDICAL HISTORY:   Past Medical History:   Diagnosis Date     Cranial nerve dysfunction      Dyspepsia      Ependymoma (H)      Gastro-oesophageal reflux disease      Hearing loss      Intracranial hemorrhage (H)      Migraine      Pilonidal cyst     7-2015     Reduced vision      Refractory obstruction of nasal airway     2nd to nasal valve prolapse     Sleep apnea      Strabismus     gaze palsy         PAST SURGICAL HISTORY:   Past Surgical History:   Procedure Laterality Date     COLONOSCOPY N/A 9/27/2019    Procedure: Colonoscopy With Biopsies and Polypectomy;  Surgeon: Aniya Wei MD;  Location: [facility]     ESOPHAGOSCOPY, GASTROSCOPY, DUODENOSCOPY (EGD), COMBINED N/A 9/27/2019    Procedure: Upper Endoscopy (EGD) With Biopsies;  Surgeon: Aniya Wei MD;  [facility]: [facility]     GRAFT CARTILAGE FROM POSTERIOR AURICLE Left 10/6/2016    Procedure: GRAFT CARTILAGE FROM POSTERIOR AURICLE;  Surgeon: Tyler Richards MD;  [facility]      INCISION AND DRAINAGE PERINEAL, COMBINED Bilateral 7/18/2015    Procedure: COMBINED INCISION AND DRAINAGE PERINEAL;  Surgeon: Dequan Timmons MD;  [facility]     OPTICAL TRACKING SYSTEM CRANIOTOMY, EXCISE TUMOR, COMBINED N/A 4/13/2015    Procedure: COMBINED OPTICAL TRACKING SYSTEM CRANIOTOMY, EXCISE TUMOR;  Surgeon: Francis Velazquez MD;  [facility]     OPTICAL TRACKING SYSTEM CRANIOTOMY, EXCISE TUMOR, COMBINED N/A 4/16/2015    Procedure: COMBINED OPTICAL TRACKING SYSTEM CRANIOTOMY, EXCISE TUMOR;  Surgeon: Francis Velazquez MD;  [facility]     OPTICAL TRACKING SYSTEM CRANIOTOMY, EXCISE TUMOR, COMBINED Bilateral 5/28/2015    Procedure: COMBINED OPTICAL TRACKING SYSTEM CRANIOTOMY, EXCISE TUMOR;  Surgeon: Francis Velazquez MD;  [facility]     OPTICAL TRACKING SYSTEM CRANIOTOMY, EXCISE TUMOR, COMBINED Bilateral 1/14/2016    Procedure: COMBINED OPTICAL TRACKING SYSTEM CRANIOTOMY, EXCISE TUMOR;  Surgeon: Francis Velazquez MD;  [facility]     OPTICAL TRACKING SYSTEM VENTRICULOSTOMY  4/16/2015    Procedure: OPTICAL TRACKING SYSTEM VENTRICULOSTOMY;  Surgeon: Francis Velazquez MD;  [facility]     REMOVE PORT VASCULAR ACCESS N/A 10/6/2016    Procedure: REMOVE PORT VASCULAR ACCESS;  Surgeon: Bruno Perea MD;  [facility]     RHINOPLASTY N/A 10/6/2016    Procedure: RHINOPLASTY;  Surgeon: Tyler Richards MD;  [facility]     VASCULAR SURGERY  5-2015    single lumen power port        MEDICATIONS:   Current Outpatient Medications:      calcium carbonate-vitamin D 600-400 MG-UNIT CHEW, Take 2 tablets in the morning and 1 tablet in the evening., Disp: 90 tablet, Rfl: 3     dexamethasone (DECADRON) 0.5 MG tablet, Take 0.75 mg by mouth daily (with breakfast)., Disp: 90 tablet, Rfl: 3     fexofenadine (ALLEGRA) 180 MG tablet, Take 180 mg by mouth every evening , Disp: , Rfl:      linaclotide (LINZESS) 290 MCG capsule, Take 290 mcg by mouth every  morning (before breakfast) , Disp: , Rfl:      OLANZapine (ZYPREXA) 20 MG tablet, Take 1 tab (20 mg) with 1 tab (2.5 mg) at bed for a week (total 22.5 mg) then go to 1 tab (20 mg) with 1 tab (5 mg) at bed (total 25 mg)., Disp: 30 tablet, Rfl: 0     OLANZapine (ZYPREXA) 5 MG tablet, Take 1/2 tab (2.5 mg) with 1 tab (20 mg) at bed for a week (total 22.5 mg) then go to 1 tab (5 mg) with 1 tab (20 mg) at bed (total 25 mg)., Disp: 30 tablet, Rfl: 0     omeprazole (PRILOSEC) 20 MG DR capsule, Take 2 capsules (40 mg) by mouth every morning, Disp: 60 capsule, Rfl: 1     order for DME, Equipment being ordered: short boot, Disp: 1 each, Rfl: 0     potassium phosphate, monobasic, (K-PHOS) 500 MG tablet, Take 1 tablet (500 mg) by mouth 2 times daily, Disp: 90 tablet, Rfl: 3     sertraline (ZOLOFT) 100 MG tablet, Take 1 tablet (100 mg) by mouth daily, Disp: 30 tablet, Rfl: 1     study - entinostat (IDS# 5050) 1 mg tablet, Take 2 tablets (2 mg) by mouth every 7 days for 4 doses Take two 1mg tablet with one 5mg tablet for total dose of 7mg weekly. Take on an empty stomach, at least 1 hour before or 2 hours after a meal.  Swallow tablet whole., Disp: 8 tablet, Rfl: 0     study - entinostat (IDS# 5050) 1 mg tablet, Take 2 tablets (2 mg) by mouth every 7 days Take with 5mg tablet for total dose of 7mg weekly . Take on an empty stomach, at least 1 hour before or 2 hours after a meal.  Swallow tablet whole., Disp: 8 tablet, Rfl: 0     study - entinostat (IDS# 5050) 5 mg tablet, Take 1 tablet (5 mg) by mouth every 7 days for 4 doses Take one 5mg tablet with two 1mg tablet for total dose of 7mg weekly. Take on an empty stomach, at least 1 hour before or 2 hours after a meal.  Swallow tablet whole., Disp: 4 tablet, Rfl: 0     sulfamethoxazole-trimethoprim (BACTRIM/SEPTRA) 400-80 MG tablet, Take 1 tablet by mouth 2 times daily On Saturdays and Sundays, Disp: 24 tablet, Rfl: 11     traZODone (DESYREL) 50 MG tablet, Take 1.5 tablets (75 mg)  by mouth At Bedtime, Disp: 45 tablet, Rfl: 1     vitamin D3 (CHOLECALCIFEROL) 2000 units (50 mcg) tablet, Take 1 tablet by mouth daily (with breakfast), Disp: , Rfl:      vitamin E (TOCOPHEROL) 400 units (360 mg) capsule, Take 400 Units by mouth daily Takes in the AM daily., Disp: , Rfl:      ALLERGIES:    Allergies   Allergen Reactions     Blood Transfusion Related (Informational Only) Swelling     Periorbital swelling post platelet transfusion     No Known Drug Allergies         SOCIAL HISTORY:   Social History     Socioeconomic History     Marital status: Single     Spouse name: Not on file     Number of children: Not on file     Years of education: Not on file     Highest education level: Not on file   Occupational History     Not on file   Social Needs     Financial resource strain: Not on file     Food insecurity:     Worry: Not on file     Inability: Not on file     Transportation needs:     Medical: Not on file     Non-medical: Not on file   Tobacco Use     Smoking status: Never Smoker     Smokeless tobacco: Never Used   Substance and Sexual Activity     Alcohol use: No     Drug use: No     Sexual activity: Never   Lifestyle     Physical activity:     Days per week: Not on file     Minutes per session: Not on file     Stress: Not on file   Relationships     Social connections:     Talks on phone: Not on file     Gets together: Not on file     Attends Synagogue service: Not on file     Active member of club or organization: Not on file     Attends meetings of clubs or organizations: Not on file     Relationship status: Not on file     Intimate partner violence:     Fear of current or ex partner: Not on file     Emotionally abused: Not on file     Physically abused: Not on file     Forced sexual activity: Not on file   Other Topics Concern     Not on file   Social History Narrative    Grown up in [place], [place].  Parents are , and he shares time between his mother's and father's homes. Both parents are  remarried.  Dad is a , and mom does  for a testing company.  Siblings include two full brothers (older brother Benitez who is a senior in college and younger brother Gamaliel), a step-brother, and a step-sister. Geo graduated from high school in Spring 2018.  Initially considered attending [employer] in Fall 2019, but reportedly lost interest due to the amount of time it would take him to complete courses and receive a degree.  Does today endorse continued interest in pursuing an advanced course of study at some point, specifically stating he is interested in Electrical Engineering.  No access to guns or weapons enjoys playing video games.  Guns are currently locked in the house.        FAMILY HISTORY:   Family History   Problem Relation Age of Onset     Circulatory Father         PE/DVT     Hypothyroidism Father 30     Diabetes Maternal Grandmother      Diabetes Paternal Grandmother      Diabetes Paternal Grandfather      C.A.D. Paternal Grandfather      Hypertension Maternal Grandfather      Thyroid Disease Paternal Aunt         unknown whether hypo or hyper     Mental Illness No family hx of         EXAM:Vitals: patient declined.    General appearance: Patient is alert and fully cooperative with history & exam.  No sign of distress is noted during the visit.     Psychiatric: Affect is pleasant & appropriate.  Patient appears motivated to improve health.     Respiratory: Breathing is regular & unlabored while sitting.     HEENT: Hearing is intact to spoken word.  Speech is clear.  No gross evidence of visual impairment that would impact ambulation.     Dermatologic: Skin is intact to both lower extremities without significant lesions, rash or abrasion.  No paronychia or evidence of soft tissue infection is noted.     Vascular: DP & PT pulses are intact & regular bilaterally.  No significant edema or varicosities noted.  CFT and skin temperature is normal to both  lower extremities.     Neurologic: Lower extremity sensation is intact to light touch.  No evidence of weakness or contracture in the lower extremities.  No evidence of neuropathy.     Musculoskeletal: Patient is ambulatory without assistive device or brace. No pain to palpation of left 5th metatarsal.     Radiographs:   Left foot xrays - Nondisplaced transverse avulsion-type fracture proximal fifth metatarsal.     ASSESSMENT: Nondisplaced fracture of fifth metatarsal bone, left foot, initial encounter for closed fracture    PLAN:  [facility] in [facility]. Talked about fractures. Discussed that healing can take 6-10 weeks. Risk that the fracture will not heal and we may need to do surgery. Risk is increased 10-15% if you smoke.     He can do pool therapy as long as it is not painful for him. He does get chair lifted into the pool.     Follow up in 1 month for repeat xrays.        Rufina Dasilva DPM, Podiatry/Foot and Ankle Surgery      Again, thank you for allowing me to participate in the care of your patient.        Sincerely,        Rufina Dasilva DPM, Podiatry/Foot and Ankle Surgery

## 2019-10-28 ENCOUNTER — HOSPITAL ENCOUNTER (OUTPATIENT)
Dept: OCCUPATIONAL THERAPY | Facility: CLINIC | Age: 19
Setting detail: THERAPIES SERIES
End: 2019-10-28
Attending: FAMILY MEDICINE
Payer: COMMERCIAL

## 2019-10-28 PROCEDURE — 97535 SELF CARE MNGMENT TRAINING: CPT | Mod: GO | Performed by: OCCUPATIONAL THERAPIST

## 2019-10-29 ENCOUNTER — OFFICE VISIT (OUTPATIENT)
Dept: NEUROPSYCHOLOGY | Facility: CLINIC | Age: 19
End: 2019-10-29
Attending: PSYCHOLOGIST
Payer: COMMERCIAL

## 2019-10-29 ENCOUNTER — HOSPITAL ENCOUNTER (OUTPATIENT)
Facility: CLINIC | Age: 19
Setting detail: SPECIMEN
End: 2019-10-29
Payer: COMMERCIAL

## 2019-10-29 ENCOUNTER — MYC MEDICAL ADVICE (OUTPATIENT)
Dept: GASTROENTEROLOGY | Facility: CLINIC | Age: 19
End: 2019-10-29

## 2019-10-29 ENCOUNTER — HOSPITAL ENCOUNTER (OUTPATIENT)
Facility: CLINIC | Age: 19
Setting detail: SPECIMEN
Discharge: HOME | End: 2019-10-29
Admitting: NURSE PRACTITIONER
Payer: COMMERCIAL

## 2019-10-29 DIAGNOSIS — C71.9 EPENDYMOMA (H): ICD-10-CM

## 2019-10-29 DIAGNOSIS — R47.1 DYSARTHRIA: ICD-10-CM

## 2019-10-29 DIAGNOSIS — K59.01 SLOW TRANSIT CONSTIPATION: Primary | ICD-10-CM

## 2019-10-29 DIAGNOSIS — F32.9 MAJOR DEPRESSION: Primary | ICD-10-CM

## 2019-10-29 DIAGNOSIS — C71.9 EPENDYMOMA (H): Primary | ICD-10-CM

## 2019-10-29 DIAGNOSIS — Z92.21 STATUS POST CHEMOTHERAPY: ICD-10-CM

## 2019-10-29 LAB
ALBUMIN SERPL-MCNC: 3.5 G/DL (ref 3.4–5)
ALP SERPL-CCNC: 146 U/L (ref 40–150)
ALT SERPL W P-5'-P-CCNC: 22 U/L (ref 0–70)
ANION GAP SERPL CALCULATED.3IONS-SCNC: 5 MMOL/L (ref 3–14)
AST SERPL W P-5'-P-CCNC: 36 U/L (ref 0–45)
BASOPHILS # BLD AUTO: 0 10E9/L (ref 0–0.2)
BASOPHILS NFR BLD AUTO: 0.4 %
BILIRUB SERPL-MCNC: 0.4 MG/DL (ref 0.2–1.3)
BUN SERPL-MCNC: 14 MG/DL (ref 7–30)
CALCIUM SERPL-MCNC: 9.3 MG/DL (ref 8.5–10.1)
CHLORIDE SERPL-SCNC: 105 MMOL/L (ref 94–109)
CO2 SERPL-SCNC: 31 MMOL/L (ref 20–32)
CREAT SERPL-MCNC: 0.98 MG/DL (ref 0.66–1.25)
DIFFERENTIAL METHOD BLD: ABNORMAL
EOSINOPHIL # BLD AUTO: 0.3 10E9/L (ref 0–0.7)
EOSINOPHIL NFR BLD AUTO: 6.7 %
ERYTHROCYTE [DISTWIDTH] IN BLOOD BY AUTOMATED COUNT: 13.6 % (ref 10–15)
GFR SERPL CREATININE-BSD FRML MDRD: >90 ML/MIN/{1.73_M2}
GLUCOSE SERPL-MCNC: 84 MG/DL (ref 70–99)
HCT VFR BLD AUTO: 38.5 % (ref 40–53)
HGB BLD-MCNC: 12.3 G/DL (ref 13.3–17.7)
IMM GRANULOCYTES # BLD: 0 10E9/L (ref 0–0.4)
IMM GRANULOCYTES NFR BLD: 0.9 %
LYMPHOCYTES # BLD AUTO: 0.8 10E9/L (ref 0.8–5.3)
LYMPHOCYTES NFR BLD AUTO: 17.9 %
MAGNESIUM SERPL-MCNC: 2.3 MG/DL (ref 1.6–2.3)
MCH RBC QN AUTO: 28.7 PG (ref 26.5–33)
MCHC RBC AUTO-ENTMCNC: 31.9 G/DL (ref 31.5–36.5)
MCV RBC AUTO: 90 FL (ref 78–100)
MONOCYTES # BLD AUTO: 0.6 10E9/L (ref 0–1.3)
MONOCYTES NFR BLD AUTO: 14.1 %
NEUTROPHILS # BLD AUTO: 2.7 10E9/L (ref 1.6–8.3)
NEUTROPHILS NFR BLD AUTO: 60 %
NRBC # BLD AUTO: 0 10*3/UL
NRBC BLD AUTO-RTO: 0 /100
PHOSPHATE SERPL-MCNC: 3.4 MG/DL (ref 2.5–4.5)
PLATELET # BLD AUTO: 121 10E9/L (ref 150–450)
POTASSIUM SERPL-SCNC: 4.4 MMOL/L (ref 3.4–5.3)
PROT SERPL-MCNC: 7 G/DL (ref 6.8–8.8)
RBC # BLD AUTO: 4.29 10E12/L (ref 4.4–5.9)
SODIUM SERPL-SCNC: 141 MMOL/L (ref 133–144)
WBC # BLD AUTO: 4.5 10E9/L (ref 4–11)

## 2019-10-29 PROCEDURE — 84100 ASSAY OF PHOSPHORUS: CPT | Performed by: NURSE PRACTITIONER

## 2019-10-29 PROCEDURE — 85025 COMPLETE CBC W/AUTO DIFF WBC: CPT | Performed by: NURSE PRACTITIONER

## 2019-10-29 PROCEDURE — 80053 COMPREHEN METABOLIC PANEL: CPT | Performed by: NURSE PRACTITIONER

## 2019-10-29 PROCEDURE — 36415 COLL VENOUS BLD VENIPUNCTURE: CPT | Performed by: NURSE PRACTITIONER

## 2019-10-29 PROCEDURE — 83735 ASSAY OF MAGNESIUM: CPT | Performed by: NURSE PRACTITIONER

## 2019-10-29 NOTE — NURSING NOTE
This patient was seen for neuropsychological testing at the request of Dr. Veronica Padilla for the purposes of diagnostic clarification and treatment planning. Total of 3 hours 30 minutes were spent in test administration and scoring by this writer, Nina Lim, psychometrist. See Dr. Padilla's testing evaluation report for a full interpretation of the findings and data.

## 2019-10-29 NOTE — LETTER
10/29/2019      RE: Geo Hicks  [id number] [facility] [id number]       SUMMARY OF NEUROPSYCHOLOGICAL EVALUATION  PEDIATRIC NEUROPSYCHOLOGY CLINIC  DIVISION OF CLINICAL BEHAVIORAL NEUROSCIENCE     Name: Geo Hicks   MRN:  [id number]   YOB: 2000   Date of Visit:   10/29/2019     EVALUATION REPORT    Reason for Re-Evaluation   Geo is a 19-year-old  male with a history of ependymoma (brain tumor) that was diagnosed at age 14. He has undergone multiple tumor resections, chemotherapy, and radiation treatment. He also experienced an intra-tumoral hemorrhage (brain bleed) in May 2015 that resulted in neurological changes including facial numbness, significant loss of mobility, and dysarthria. Geo is currently on COG study [id number] with [employer]. He has been evaluated at this clinic on three previous occasions with the most recent being January 2019. Geo has returned for a follow-up evaluation to provide updated information regarding his neurocognitive and behavioral functioning in the context of his brain tumor and subsequent treatments.    Previous Evaluations:    Geo was first evaluated at this clinic in February 2016. Results from the evaluation revealed strong neurocognitive functioning in the domains of memory, working memory, and verbal comprehension. His adaptive functioning skills in daily life were impaired, which was predominantly driven by Geo s declines in motor skills and visual perception due to his medical condition. Emotionally, Geo endorsed moderate emotional difficulties.      He was re-evaluated in February 2017. Results from that evaluation indicated relative stability in his foundational thinking abilities in comparison to his previous evaluation, as well as intact non-verbal skills. He had significant impairment on a task of visual-motor processing speed due to his sensorimotor limitation. Geo s adaptive functioning skills remained  unchanged in comparison to his previous evaluation. He continued to endorse mild to moderate emotional difficulties.    Results from Geo s most recent evaluation in January 2019 revealed intact and consistent neurocognitive functioning, along with consistent memory and learning skills in comparison to his 2017 evaluation. He continued to experience difficulty on a task of visual-motor processing speed. His adaptive functioning skills showed an increase in growth, as well as an improvement in self-reported feelings of sadness and anxiety. Emerging emotional difficulties related to inflexible beliefs that could not be shaken despite evidence to the contrary were reported in which Geo would become very upset when those beliefs were shaken, and he would refuse to consider alternative ideas about his thoughts. Geo would shut people out and felt they were conspiring against him.  He was diagnosed with a delusional disorder and psychiatric intervention was recommended.      Relevant History: Updated background information was gathered via interviews with Geo and his mother, and review of available records. Geo nunez birth, developmental, medical, family, educational, and social histories have been thoroughly documented in his previous evaluation reports and will not be repeated here. For complete background information, the interested reader is referred to Geo s neuropsychological evaluation reports dated February 2016, February 2017, and January 2019, as well as his medical records.    As a review, Geo was in good health until April 2015 when he presented at the Emergency Department with an abnormal headache and decreased coordination. A CT scan of Geo s head showed a 4cm mass in the posterior fossa, originating in the cerebellar vermis or fourth ventricle with mass effect on the brain stem. Geo underwent a sub-occipital craniotomy for partial resection of the tumor, with a plan for chemotherapy to  follow. In May 2015, Geo presented with acute neurological changes including facial numbness, dizziness, severe headaches, and dysarthria. A CT scan showed an intra-tumoral hemorrhage (brain bleed) with increased local mass effect and cerebellar tonsillar herniation. Geo underwent a second sub-occipital craniotomy for tumor debulking and evacuation of the intra-tumoral hematoma. Diagnostic tests completed during this surgery identified Geo s tumor as a grade II ependymoma, a different form of cancer than was originally suspected. Geo was subsequently started on [id number]. This treatment protocol included chemotherapy with vincristine, carboplatin, cyclophosphamide, etoposide, and cisplatin, as well as radiation.      In January 2016, Geo underwent a third sub-occipital craniotomy as MRI studies revealed that his tumor had increased in size extending into the david, midbrain, and bilateral cerebellar hemispheres. A routine follow-up MRI in December 2016 revealed continued tumor enhancement, as well as a new nodule in his L4 vertebrae. As a result, he was started on an experimental phase I trial, [facility] [id number] with [employer] on 01/10/2017.    As a result of Geo s tumor, inter-tumoral hemorrhage, and subsequent treatments, he has a number of functional limitations. He continues to experience significant gross- and fine-motor coordination difficulties. He requires a wheelchair for mobility and is unable to complete fine-motor tasks independently (e.g., writing, brushing teeth). Additionally, he continues to have significant vision challenges including diplopia for which he wears an eye-patch on alternating eyes and ocular-motor dysfunction. He has pronounced articulation challenges due to motor-speech impairment (dysarthria) that impact the intelligibility of his speech.    Geo s medical history is also significant for moderate obstructive sleep apnea, bilateral mild sensorineural hearing loss,  esotropia of the eyes, and chronic constipation and abdominal complaints.     Updated History:  Since his last visit at this clinic, Geo has begun psychiatric services for depression and delusions/paranoia. He has also begun aquatic-based physical therapy to help with functional mobility. Geo was discharged from his previous physical therapy services where he was working on targeting pelvic floor issues. He continues to attend occupational therapy and psychotherapy services. His most recent MRI was on 08/07/2019 and revealed no significant change in the ependymoma centered within the fourth ventricle, but there was a slight increase in a cystic lesion adjacent to the ependymoma when compared to his previous MRI. Geo remains enrolled on COG [id number] with [employer] and is doing well overall. He is currently prescribed olanzapine, trazadone, sertraline, Linzess, Decadron, Prilosec, Allegra, K-Phos, and vitamins.     Geo reported typically going to bed around 10:00pm and waking up at 10:00am most days. He will play on his phone for [id number] minutes before falling asleep. When he wakes up, he usually feels sleepy. Geo feels his mood has improved since beginning medication.    Geo continues to share time between his mother s and father s homes (one week at each home). Both parents are remarried and have been awarded guardianship of Geo. The families work well together. College remains of no interest to Geo.    Geo is described as very amicable. He is generally happy and cares about the wellbeing of others. He is accepting of most of his disabilities and acknowledges them being the result of his medical history. Geo continues to believe his inability to walk is purposely being caused by his medical care team. Per parent report, Geo is convinced that his medical care team can cure his immobility but choose not to.      Behavioral Observations   Geo was accompanied to the  appointment by his mother. He presented as a casually dressed, appropriately groomed young man who appeared his chronological age. Vision and hearing appeared adequate for testing purposes as Geo was wearing an eye patch over his right eye, which he wears to correct diplopia. Upon being greeted, Geo was seated in his wheelchair. He allowed his mother to push his chair to the testing room.     To help establish and maintain rapport, the examiner engaged in casual conversation with Geo throughout the evaluation. He was presented with a variety of visual and verbal tasks, as well as rating scales to complete. Geo cooperated throughout the testing session. Similar to previous evaluations, Geo had difficulty keeping his mouth closed, which contributed to oral secretions. He was mindful of this and continuously wiped his mouth with the sleeve of his sweatshirt. The examiner offered a Kleenex and paper towel multiple times, but Geo declined each time.     Across the evaluation, mood was neither elevated nor depressed, and affect was appropriate in range and intensity. Rapport was easily established and maintained throughout. Eye-contact was appropriate. Similar to previous evaluations, the rate and prosody of Geo s speech was unremarkable. He continued to demonstrate minor difficulty with modulating the tone of his voice, as well as significant articulation difficulties that impacted the intelligibility of his speech. When Geo exhibited short responses, intelligibility was clearer. The longer of a response he provided, the level of intelligibility decreased, and he was asked to repeat his statements. Geo was patient whenever asked to repeat his statement. His overall attitude was cooperative and friendly, and he completed each of the various tasks presented to him. Language comprehension was adequate given that he did not require any instruction modifications or repetitions. Geo offered both  reciprocal and spontaneous conversation. His approach to tasks was thoughtful, and he maintained persistence on more challenging tasks. If he was unsure of a response, he was generally willing to take a guess.     Geo was alert and oriented to his surroundings. His attention appeared focused and engaged during the evaluation. Geo demonstrated good motivation and effortful responding. Overall, given his level of cooperation and effort, the following results are an accurate representation of Geo s current neuropsychological functioning while in an optimal (i.e., quiet, structured, one-on-one) environment.    Interview:  Geo openly conversed with the examiner. He spoke of his birthday the previous day and how he had celebrated a few days prior by going to [place] with family members. In his free time, Geo enjoys playing videogames (e.g., [employer]ft [employer] and [employer]) and watching Playground Sessions. He remains in contact with his friends through texting more so than seeing them in person. He stated that he does not want to go to college because he does not like the  kids  and he was concerned that nobody would talk to him. Geo reported feeling  good  most days. Being able to see his friends is something that brings him happiness. He could not identify any triggers for sadness or worry. Geo was clearly able to state that he becomes frustrated when people do not understand him or what he is saying, or when they do not see or understand his perspective. When asked about future goals, Geo shared that he would like to be walking and go to [facility].    As a safety check, Geo reported feeling safe in both households. He denied any substance use and/or abuse and thoughts of self-harm.      Neuropsychological Evaluation Methods and Instruments  Review of Records  Clinical Interview  Wechsler Adult Intelligence Scale, 4th Edition   Visual Puzzles   Cancellation  Terri-Steward Executive Function  System  Sorting Test  [place] Verbal Learning Test, 2nd Edition  Adaptive Behavior Assessment System, 3rd Edition  Behavior Rating Inventory of Executive Functioning, Adult   Self-Report   Informant Report (Parent)  Behavior Assessment System for Children, 3rd Edition, Parent Form  Hobson Depression Inventory, 2nd Edition  Hobson Anxiety Inventory    A full summary of test scores is provided in tables at the end of this report.    Results and Impressions   Selected subtests from the intellectual measure were administered to determine if previous areas of difficulty remained.  On the measure where Geo needed to complete visual puzzles he showed good improvement from his previous score.  He continues to have difficulty with fine motor control which negatively affected his ability to quickly find targets and underline them on a page.  His adaptive behavior, as rated by his mother, continues to show difficulties in the areas of self-care, social skills, self-direction and leisure activities with average performance in the area of health and safety, communication, and functional academics.    Geo s memory for oral information was re-evaluated.  Previous testing indicated difficulty with learning new material and retaining this information over time.  Current testing shows improvement in Geo s ability to learn and retain information over a short period of time.  Continued difficulty is present in retaining information over a longer period of time.  He showed improvement in his ability to recall information when provided with cues.  These findings show improvement from previous evaluations.    Geo s attention was rated by his mother as well as himself as being adequate.  Executive functioning are those skills including planning, organization, emotional control, cognitive flexibility, and the ability to take another person s perspective.  Geo rating scale of these skills was basically in the average range for  his age with a slight elevation in his ability to shift from one activity to another.  His mother s ratings of his executive functioning were in the average range, with the exception of a slight elevation in his ability to initiate tasks.  Direct testing of his ability to take another person s perspective indicated difficulty in this area.  He was asked to sort objects based on various aspects of the objects.  When he sorted the objects, he showed average performance.  However, when the examiner sorted the objects, Geo experienced significant difficulty with being able to determine how the objects were sorted.  This difficulty likely translates into difficulty with understanding another person s point of view.  Geo indicated that this difficulty becomes quite frustrating for him as he doesn t feel other people understand him--it is likely that he has difficulty understanding their perspective.    Emotionally Geo indicated that he continues to feel some difficulty with sadness but that it has improved over time and with medication.  Geo denied significant feelings of anxiety at this time while in the past these scores have been higher.  Parent ratings of Geo s emotional functioning indicated no areas of significant concern.  Interviews with Geo and his mother indicated continued feelings of sadness and difficulty in motivating himself to try to new activities.  While there is improvement in his mood, Geo continues to be at risk for depression.  Since he is now under the care of a psychiatrist, we did not interview around his feelings that his medical team was not being helpful to him.  His mother reported that these feelings continue and likely interfere with his compliance with directives.  He is participating in therapy to work on these issues and it is important that this intervention continue.    Diagnoses  F32.9  Major Depressive Disorder, single episode, unspecified    C71.9  Ependymoma  Z92.21  History of chemotherapy  R47.1  Dysarthria      Recommendations    It is recommended that Geo continue working with his psychiatrist and his medical team to assist in providing him with improvement.    It is also strong recommended that he continue working with his therapist and that continued working on his ability to take other s perspectives will be important.  Rather than challenging his strongly held belief that his medical team is not helping him as much as they could, it will be important to help him think about ways to work together.    It is also recommended that Geo consider enrolling in an online program to further his education.  His therapist can help with this in that at this point in time Geo is not actively pursuing any vocational support  It may be important to contact his [facility]  and inquire as to any rehabilitation programs that may be available to Geo--he has used these services in the past.    A re-evaluation of Geo s skills in a year would be important to continue to monitor his progress.          It has been a pleasure working with Geo and his family. If you have any questions or concerns regarding this evaluation, please call the Pediatric Neuropsychology Department at [phone].      Tami Mon.S.  Psychometrist  Pediatric Neuropsychology   Division of Clinical Behavioral Neuroscience       Veronica Padilla, Ph.D., L.P., [facility]  Professor of Pediatrics  Division of Clinical Behavioral Neuroscience  [facility]         PEDIATRIC NEUROPSYCHOLOGY CLINIC TEST SCORES    Note: The test data listed below use one or more of the following formats:      Standard Scores have an average of 100 and a standard deviation of 15 (the average range is 85 to 115).    Scaled Scores have an average of 10 and a standard deviation of 3 (the average range is 7 to 13).    T-Scores have an average of 50 and a standard  deviation of 10 (the average range is 40 to 60).    Z-Scores have an average of 0 and a standard deviation of 1 (the average range is -1 to +1).      COGNITIVE Functioning  Wechsler Adult Intelligence Scale, Fourth Edition   Standard scores from 85 - 115 represent the average range of functioning.  Scaled scores from 7 - 13 represent the average range of functioning.    Index 2016 Standard Score 2017 Standard Score 01/2019 Standard Score   Verbal Comprehension 110 116 108   Perceptual Reasoning -- 100* 88*   Working Memory 102 97 102     Subtest 2016   Scaled Score 2017   Scales Score 01/2019   Scaled Score Current   Scaled Score   Similarities 9 12 9 --   Vocabulary 13 14 12 --   Information 14 13 14 --   Matrix Reasoning -- 11 10 --   Visual Puzzles -- 9 6 9   Digit Span 9 8 9 --   Arithmetic 12 11 12 --   Cancellation -- 1 4 3   *Due to the potential for Geo nunez current motor challenges to affect results, one subtest from this index was not administered (Block Design). The standard score was derived by prorating scores from two subtests that did not rely on motor functions (Matrix Reasoning and Visual Puzzles).      ATTENTION AND EXECUTIVE FUNCTIONING  Terri-Steward Executive Function System Sorting Test  Scaled scores from 7 - 13 represent the average range of functioning.    Measure Scaled Score   Confirmed Correct Sorts 8   Free Sorting Description Score 8   Sort Recognition Description Score 4   Combined Description Score 6     Terri-Steward Executive Function System Proverbs Test (2017)  Scaled scores from 7 - 13 represent the average range of functioning.   Percentile scores 16-84 represent the average range.    Measure Scaled Score   Total Achievement Score: Free Inquiry 14        Percentile   Total Achievement Score: Multiple Choice 100%     Behavior Rating Inventory of Executive Function, Adult Version  T-scores 65 and higher are considered to be in the  clinically significant  range.    Index/Scale  01/2019   Self T-Score Current   Self T-Score 01/2019   Parent T-Score Current   Parent T-Score   Inhibit 43 46 54 45   Shift 47 60 69 53   Emotional Control 38 43 54 56   Self-Monitor 46 37 47 58   Behavioral Regulation Index 41 45 55 53   Initiate 40 47 57 60   Working Memory 49 53 45 58   Plan/Organize 46 41 47 49   Task-Monitor 40 45 45 49   Organization of Materials 47 42 39 49   Metacognition Index 44 44 46 53   Global Executive Composite 42 44 50 53     Behavior Rating Inventory of Executive Function, Second Edition (2016)  T-scores 65 and higher are considered to be in the  clinically significant  range.    Index/Scale Parent T-Score Teacher T-Score   Inhibit 43 44   Self-Monitor 44 43   Behavior Regulation Index 43 43   Shift 50 55   Emotional Control 41 46   Emotion Regulation Index 45 50   Initiate 51 48   Working Memory 48 65   Plan/Organize 45 63   Task Monitor 59 60   Organization of Materials 46 49   Cognitive Regulation Index 49 59   Global Executive Composite 47 54       MEMORY FUNCTIONING  [place] Verbal Learning Test, Second Edition   T-scores from 40 - 60 represent the average range of functioning.  Z-scores from -1.0 to 1.0 represent the average range of functioning. Higher scores are better unless indicated (*)    Measure Raw Score T-Score    2017 01/19 Current 2017 01/19 Current   List A Total Trials 1-5 42 44 40 39 41 39            Measure    Z-Score       2017 01/19 Current   List A Trial 1 Free Recall 5 5 7 -1.0 -1.0 0   List A Trial 5 Free Recall 9 10 10 -2.0 -1.5 -1.0   List B Free Recall 4 5 5 -1.5 -1.0 -1.0   List A Short-Delay Free Recall 6 6 8 -2.0 -2.0 -1.0   List A Short-Delay Cued Recall 8 11 10 -2.0 -0.5 -0.5   List A Long-Delay Free Recall 9 10 6 -1.0 -1.0 -2.0   List A Long-Delay Cued Recall 9 12 11 -1.5 -0.5 -0.5   Correct Recognition Hits 15 14 15 -0.5 -1.0 0.0   False Positives* 0 3 2 -0.5 1.0 0.5   Discriminability 3.7 2.5 3.1 0.5 -1.5 0.0   *A lower score is  better    [place] Verbal Learning Test, Second Edition, Short Form (2016)  T-scores from 40 - 60 represent the average range of functioning.  Z-scores from -1.0 to 1.0 represent the average range of functioning. Higher scores are better unless indicated (*)     Measure Raw Score T-Score   List A Total Trials 1-5 29 48         Measure  Z-Score   Total Learning [place] Trials 1-5 1.3 3.0   List A Short-Delay Free Recall 6 -2.0   List A Long-Delay Free Recall 8 0.5   List A Long-Delay Cued Recall 8 0.0   Correct Recognition Hits 9 -0.5   False Positives 0 0.0   Discriminability 3.5 0.0   Repetitions* 4 1.5   Intrusions* 1 0.5   *A lower score is better        ADAPTIVE FUNCTIONING  Adaptive Behavior Assessment System, Third Edition  Scaled Scores from 7- 13 represent the average range of functioning.  Composite Scores from 85 - 115 represent the average range of functioning.    Skill Area 2016   Scaled Score 2017   Scaled Score 01/19   Scaled Score Current   Scaled Score   Communication 10 9 9 8   Community Use 1 3 6 5   Functional Academics 5 5 7 8   Home Living 2 2 3 4   Health and Safety 2 1 4 7   Leisure 1 4 5 5   Self-Care 4 2 5 5   Self-Direction 6 4 3 5   Social 7 5 9 6     Composite 2016   Standard Score 2017   Standard Score 01/19   Standard Score Current Standard Score   Conceptual 83 78 80 84   Social 73 77 87 78   Practical 55 54 67 71   General Adaptive Composite 66 65 75 75       EMOTIONAL AND BEHAVIORAL FUNCTIONING  Behavior Assessment System for Children, Third Edition, Parent Form  For the Clinical Scales on the BASC-3, scores ranging from 60-69 are considered to be in the  at-risk  range and scores of 70 or higher are considered  clinically significant.   For the Adaptive Scales, scores between 30 and 39 are considered to be in the  at-risk  range and scores of 29 or lower are considered  clinically significant.      Clinical Scales 01/19 T-Score Current T-Score   Hyperactivity 45 44   Aggression 42  42   Conduct Problems  43 42   Anxiety 47 44   Depression 46 46   Somatization 49 51   Atypicality 56 53   Withdrawal 53 55   Attention Problems 47 49        Adaptive Scales     Adaptability 53 41   Social Skills 47 38   Leadership 33 33   Activities of Daily Living 46 43   Functional Communication 47 42        Composite Indices     Externalizing Problems 43 43   Internalizing Problems 47 47   Behavioral Symptoms Index 48 48   Adaptive Skills 45 38     Hobson Depression Inventory, Second Edition  Total scores between 0-13 = Minimal Depression, 14-19 = Mild Depression, 20-28 = Moderate Depression, and 29-63 = Severely Depression     2016 Total Score 2017 Total Score 01/19 Total Score Current Total Score   26 15 9 19      Hobson Anxiety Inventory  Total scores between 0-7 = Minimal Anxiety, 8-15 = Mild Anxiety, 16-25 = Moderate Anxiety, and 26-63 = Severe Anxiety     2016 Total Score 2017 Total Score 01/19 Total Score Current Total Score   16 23 10 0       Neuropsychological testing was administered on 10/29/2019 by psychometrist, Nina Lim, under my direct supervision. Total time spent in test administration and scoring by psychometrist was 3.5 hours. ([id number] & [id number])     Neuropsychological evaluation was completed on 10/29/2019 by Veronica Padilla, PhD, LP, ABPdN. Total time spent on evaluation, including interview, face-to-face, record review, data integration, feedback and report writing, was 5 hours. ([id number] & [id number])      Veronica Padilla, PhD LP

## 2019-10-30 DIAGNOSIS — C71.9 EPENDYMOMA (H): ICD-10-CM

## 2019-11-04 ENCOUNTER — HOSPITAL ENCOUNTER (OUTPATIENT)
Dept: OCCUPATIONAL THERAPY | Facility: CLINIC | Age: 19
Setting detail: THERAPIES SERIES
End: 2019-11-04
Attending: FAMILY MEDICINE
Payer: COMMERCIAL

## 2019-11-04 ENCOUNTER — HEALTH MAINTENANCE LETTER (OUTPATIENT)
Age: 19
End: 2019-11-04

## 2019-11-04 PROCEDURE — 97535 SELF CARE MNGMENT TRAINING: CPT | Mod: GO | Performed by: OCCUPATIONAL THERAPIST

## 2019-11-04 NOTE — PROGRESS NOTES
Outpatient Occupational Therapy Progress Note     Patient: Geo Hicks  : 2000    Beginning/End Dates of Reporting Period:  19 to 10/22/2019    Referring Provider: Jeffrey Anderson Diagnosis: Decreased ADL/IADL     Client Self Report: Interested in the process of having a CADI waiver and is getting case management services via [employer]. Has been getting some PCA help in the a.m. for ADLs/grooming, meal st up, toileting and transitions between activities in the home.  Geo's morning routine consists of being set up with dressing which he performs independently, along with his PT exercises on his padded floor mats.  With ILS casework (~4hrs /week), he will be working more on independent living skills in the home including cooking, and building toileting, grooming and bathing routines with more independence in mind, and also getting out into the community to do errands, outdoor activities and getting into the gym/pool if possible.       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 Geo is actively receiving occupational (ongoing, since 2015), physical, speech and has had vision therapies to maximize his abilities.  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 He also has global ataxic movements that affect his walking, balance (transfers, walks with min A at gait belt), and upper body  motor planning for upper body gross motor accuracy/speed/timing/coordination, limiting his ability to write and use skilled tools of daily living.      Geo is able to dress and feed himself after set up at the  seated level.  He uses the wheelchair when at in the community, but not when at home. No longer attending high school or academic programming after  graduation last spring, but in the past had discussed considering taking some Distance Learning classes via [employer].  At home, ambulates between rooms, completes bathing and toileting with min-mod A of 1.  OT has offered that he may have more ability to become more able to do clothing retrieval, light house keeping, more (I) toileting and light meal prep in the kitchen if he was at the wheel chair level at home, but he prefers the practice of walking between all daily tasks instead. He is however using the w/c while in the OT kitchen setting and applying safety with low/deep reaches, locking brakes to stay safe.  He is better with feeding himself, reducing spillage, spearing, cutting and spreading while using non adapted silverware with better motor control. We continue to work on in hand manipluation skills to address short entry writing, opening food packages, manage zipper/snap/button closures and use self care tools (toothpaste, toothbrush, electric shaver use). He is very motivated to work with OTR in the cooking/kitchen setting; practicing forearm stabilization, safe dynamic reaching, task sequencing and fine motor control while using tools/ making recipes in the kitchen.     Objective Measurements:     Objective Measure: Dyanvision, timed reaction for visual/GMC   Details: Improved motor planning spped-- With L gets 27, then improves to 35 hits/min (stabilizing with R hand on chair, close SBA for balance) and improved to  32,35  hits /min for R UE (L hand stabilizing on chair, close SBA,  moderate LOB to R, L sides, self corrected.            Objective Measure: /Pinch   Details:  is steady with 100# R and improved  by 10# to 110# L (in April, was 90# B).   key pinch improved to 24# R and 22# L. (was 21# B  on 7/24/19).  3 pt is 16# R/stable and  improve to 24# L ( was 21# L in July) .     Objective Measure: Written Communication     Details: Writes 10 characters in 1:05 with right hand using more refined technqiue w all lower case letters, better legibility (was 1:17 in July).  Patient does better w right hand than L today (L 1:17, was 1:25).  Does best if he rests his head on the non-writing arm, moves the eye patch to that same side.  Signature w R  in 5/8 with Capital+lower case letters in 1:13, 80% legibility if printed. L hand takes 1:29 ( was 2:01 in July), but 50% legible. Rapid circles-- R hand: gets 6/10 circles accurate w curved linesin 2:49, faigued at the end.       Objective Measure: GMC / FMC     Details: Box and blocks:  20 blocks with R and L.  ( Was down to 15,18 blocks in July, better now.  Had been 22 L and 15,19 with R 11/12/18.)  9 hole peg test-- able to place 3 pegs into board in :55 sec w L and :60 sec w R. ( drops 5-6 pegs in doing so)  Severe dyskinetic movement and dysmetria (within 3 inches of target) with FNF on self or therapist.         Goals:     Goal Identifier FMC   Goal Description Geo will demonstrate improved fine motor control (as measured by box and blocks test, improved from moving 20 blocks/min to 30 blocks /min) and nine hole peg test  in 1 minute as needed for self feeding, writing, managing food packaging and clothing fasteners.   Target Date 10/22/19   Date Met      Progress: Pt newly testable with nine hole peg test and GMC with box and blocks continues to improve per above.       Goal Identifier Meal Preparation   Goal Description Geo will demonstrate ability to gather needed ingredients/tools from hi/low/deep reaches from wheelchair level, organize steps of task and use various kitchen tools safely to make 2-3 step dishes.   Target Date 10/22/19   Date Met      Progress: Geo has been able to prepare baked items in oven and grilled items in sandwich grill with close SBA, min A for transitions and  safety.  WE are working with making adaptations for his tremors (ways to get around repetitive motor planning tasks like stirring, mixing,  applying forearm stabilizing techniques, knife/stive/oven safety, wheelchair mobility during safe multilevel reaching)     Goal Identifier Written communication   Goal Description Geo will demonstrate improved legibility and smaller writing size to support signing his full name on birthday cards and checks, inside of a  1/2 x 4 inch area,  and writing 5-7 word sentences, 4 of 5 given opportunities.    Target Date 10/22/19   Date Met      Progress: Improved per above     Goal Identifier GMC/Reaction Time   Goal Description Patient will demonstrate improved UE motor and visual reaction time for improved visual skills, improved ability to prevent daily mishaps and safer independent home based mobility by demonstrating sustained reaction time of 2.0 seconds (from 3.20 sec Feb 2017) on Mode A of Dyn[employer]ision.   Target Date 10/22/19   Date Met      Progress: Greatly improved per above       Goal Identifier Trunk Control    Goal Description Geo will complete 10 minutes of dynamic upper body activities while maintaining an 90% upright head/seated posture as needed to support feeding, writing and eye contact during meal time interactions.   Target Date 10/22/19   Date Met      Progress: Goal continued.  His head control has been much better in the past 2 weeks, affect is brighter.       Progress this reporting period: Pt has been seen for 7 visits of skilled OT sine last certification note on 7/24/19.  Progress per above.        Plan:  Continue therapy per current plan of care.     Discharge:  No     Thank you for this thoughtful referral! If you have any questions, please call me [phone].  Nice to share in this patient's care with you.     Sincerely,   Elyse Costello, OTR/l

## 2019-11-04 NOTE — PROGRESS NOTES
[facility]      OUTPATIENT OCCUPATIONAL THERAPY  PLAN OF TREATMENT FOR OUTPATIENT REHABILITATION    Patient's Last Name, First Name, M.I.                YOB: 2000  Geo Hicks                        Provider's Name  [facility] Medical Record No.  [id number]                               Onset Date: 5/1/15    Start of Care Date: 6/16/15   Type:     ___PT   _X_OT   ___SLP Medical Diagnosis: ependymoma, suboccipital craniotomy            OT Diagnosis: Decreased ADL/IADL   # of Visits:  176            ___________________________________________________  Plan of Treatment/Functional Goals:      Frequency/Duration: 1x/week x 12 weeks                _________________________________________________________________________________       Goals:    Goal Identifier [facility]   Goal Description Geo will demonstrate improved fine motor control (as measured by box and blocks test, improved from moving 20 blocks/min to 30 blocks /min) and nine hole peg test  in 1 minute as needed for self feeding, writing, managing food packaging and clothing fasteners.   Target Date 01/20/20   Date Met      Progress:     Goal Identifier Meal Preparation   Goal Description Geo will demonstrate ability to gather needed ingredients/tools from hi/low/deep reaches from wheelchair level, organize steps of task and use various kitchen tools safely to make 2-3 step dishes.   Target Date 01/20/20   Date Met      Progress:     Goal Identifier     Goal Description     Target Date     Date Met      Progress:     Goal Identifier Written communication   Goal Description Geo will demonstrate improved legibility and smaller writing size to support signing his full name on birthday cards and checks, inside of a  1/2 x 4 inch area,  and writing 5-7 word sentences, 4 of 5 given opportunities.    Target Date 01/20/20    Date Met      Progress:     Goal Identifier GMC/Reaction Time   Goal Description Patient will demonstrate improved UE motor and visual reaction time for improved visual skills, improved ability to prevent daily mishaps and safer independent home based mobility by demonstrating sustained reaction time of 2.0 seconds (from 3.20 sec Feb 2017) on Mode A of Dynavision.   Target Date 01/20/20   Date Met      Progress:     Goal Identifier Trunk Control    Goal Description Geo will complete 10 minutes of dynamic upper body activities while maintaining an 90% upright head/seated posture as needed to support feeding, writing and eye contact during meal time interactions.   Target Date 01/20/20   Date Met      Progress:     Progress Toward Goals:   Progress this reporting period: see attached progress note      Certification date from 10/21/19 to 1/20/20.    ANASTASIA Richmond          I CERTIFY THE NEED FOR THESE SERVICES FURNISHED UNDER        THIS PLAN OF TREATMENT AND WHILE UNDER MY CARE     (Physician co-signature of this document indicates review and certification of the therapy plan).                Referring Provider: Jeffrey Espinoza MD

## 2019-11-05 ENCOUNTER — HOSPITAL ENCOUNTER (OUTPATIENT)
Dept: MRI IMAGING | Facility: CLINIC | Age: 19
Discharge: HOME | End: 2019-11-05
Attending: NURSE PRACTITIONER | Admitting: NURSE PRACTITIONER
Payer: COMMERCIAL

## 2019-11-05 ENCOUNTER — HOSPITAL ENCOUNTER (OUTPATIENT)
Dept: MRI IMAGING | Facility: CLINIC | Age: 19
End: 2019-11-05
Attending: NURSE PRACTITIONER
Payer: COMMERCIAL

## 2019-11-05 ENCOUNTER — OFFICE VISIT (OUTPATIENT)
Dept: PEDIATRIC HEMATOLOGY/ONCOLOGY | Facility: CLINIC | Age: 19
End: 2019-11-05
Attending: NURSE PRACTITIONER
Payer: COMMERCIAL

## 2019-11-05 VITALS
BODY MASS INDEX: 29.91 KG/M2 | HEART RATE: 99 BPM | OXYGEN SATURATION: 97 % | DIASTOLIC BLOOD PRESSURE: 79 MMHG | TEMPERATURE: 97.1 F | SYSTOLIC BLOOD PRESSURE: 116 MMHG | HEIGHT: 71 IN | WEIGHT: 213.63 LBS

## 2019-11-05 DIAGNOSIS — C71.9 EPENDYMOMA (H): Primary | ICD-10-CM

## 2019-11-05 DIAGNOSIS — C71.9 EPENDYMOMA (H): ICD-10-CM

## 2019-11-05 DIAGNOSIS — D49.6 NEOPLASM OF POSTERIOR CRANIAL FOSSA (H): ICD-10-CM

## 2019-11-05 LAB
ALBUMIN SERPL-MCNC: 3.1 G/DL (ref 3.4–5)
ALP SERPL-CCNC: 131 U/L (ref 40–150)
ALT SERPL W P-5'-P-CCNC: 20 U/L (ref 0–70)
ANION GAP SERPL CALCULATED.3IONS-SCNC: 2 MMOL/L (ref 3–14)
AST SERPL W P-5'-P-CCNC: 31 U/L (ref 0–45)
BASOPHILS # BLD AUTO: 0 10E9/L (ref 0–0.2)
BASOPHILS NFR BLD AUTO: 0.5 %
BILIRUB SERPL-MCNC: 0.3 MG/DL (ref 0.2–1.3)
BUN SERPL-MCNC: 17 MG/DL (ref 7–30)
CALCIUM SERPL-MCNC: 8.5 MG/DL (ref 8.5–10.1)
CHLORIDE SERPL-SCNC: 108 MMOL/L (ref 94–109)
CO2 SERPL-SCNC: 30 MMOL/L (ref 20–32)
CREAT SERPL-MCNC: 0.96 MG/DL (ref 0.66–1.25)
DIFFERENTIAL METHOD BLD: ABNORMAL
EOSINOPHIL # BLD AUTO: 0.3 10E9/L (ref 0–0.7)
EOSINOPHIL NFR BLD AUTO: 6.7 %
ERYTHROCYTE [DISTWIDTH] IN BLOOD BY AUTOMATED COUNT: 13.5 % (ref 10–15)
GFR SERPL CREATININE-BSD FRML MDRD: >90 ML/MIN/{1.73_M2}
GLUCOSE SERPL-MCNC: 96 MG/DL (ref 70–99)
HCT VFR BLD AUTO: 38.3 % (ref 40–53)
HGB BLD-MCNC: 12.1 G/DL (ref 13.3–17.7)
IMM GRANULOCYTES # BLD: 0 10E9/L (ref 0–0.4)
IMM GRANULOCYTES NFR BLD: 1 %
LYMPHOCYTES # BLD AUTO: 0.8 10E9/L (ref 0.8–5.3)
LYMPHOCYTES NFR BLD AUTO: 21.4 %
MAGNESIUM SERPL-MCNC: 2.3 MG/DL (ref 1.6–2.3)
MCH RBC QN AUTO: 28.1 PG (ref 26.5–33)
MCHC RBC AUTO-ENTMCNC: 31.6 G/DL (ref 31.5–36.5)
MCV RBC AUTO: 89 FL (ref 78–100)
MONOCYTES # BLD AUTO: 0.6 10E9/L (ref 0–1.3)
MONOCYTES NFR BLD AUTO: 15.7 %
NEUTROPHILS # BLD AUTO: 2.1 10E9/L (ref 1.6–8.3)
NEUTROPHILS NFR BLD AUTO: 54.7 %
NRBC # BLD AUTO: 0 10*3/UL
NRBC BLD AUTO-RTO: 0 /100
PHOSPHATE SERPL-MCNC: 2.9 MG/DL (ref 2.5–4.5)
PLATELET # BLD AUTO: 117 10E9/L (ref 150–450)
POTASSIUM SERPL-SCNC: 4.5 MMOL/L (ref 3.4–5.3)
PROT SERPL-MCNC: 6.8 G/DL (ref 6.8–8.8)
RBC # BLD AUTO: 4.31 10E12/L (ref 4.4–5.9)
SODIUM SERPL-SCNC: 140 MMOL/L (ref 133–144)
WBC # BLD AUTO: 3.9 10E9/L (ref 4–11)

## 2019-11-05 PROCEDURE — 83735 ASSAY OF MAGNESIUM: CPT | Performed by: NURSE PRACTITIONER

## 2019-11-05 PROCEDURE — 72157 MRI CHEST SPINE W/O & W/DYE: CPT

## 2019-11-05 PROCEDURE — 85025 COMPLETE CBC W/AUTO DIFF WBC: CPT | Performed by: NURSE PRACTITIONER

## 2019-11-05 PROCEDURE — G0463 HOSPITAL OUTPT CLINIC VISIT: HCPCS | Mod: 25

## 2019-11-05 PROCEDURE — 25500064 ZZH RX 255 OP 636: Performed by: NURSE PRACTITIONER

## 2019-11-05 PROCEDURE — 80053 COMPREHEN METABOLIC PANEL: CPT | Performed by: NURSE PRACTITIONER

## 2019-11-05 PROCEDURE — 72158 MRI LUMBAR SPINE W/O & W/DYE: CPT

## 2019-11-05 PROCEDURE — 36415 COLL VENOUS BLD VENIPUNCTURE: CPT | Performed by: NURSE PRACTITIONER

## 2019-11-05 PROCEDURE — 84100 ASSAY OF PHOSPHORUS: CPT | Performed by: NURSE PRACTITIONER

## 2019-11-05 PROCEDURE — 70553 MRI BRAIN STEM W/O & W/DYE: CPT

## 2019-11-05 PROCEDURE — 72156 MRI NECK SPINE W/O & W/DYE: CPT

## 2019-11-05 PROCEDURE — A9585 GADOBUTROL INJECTION: HCPCS | Performed by: NURSE PRACTITIONER

## 2019-11-05 RX ORDER — SODIUM CHLORIDE 9 MG/ML
1000 INJECTION, SOLUTION INTRAVENOUS CONTINUOUS PRN
Status: CANCELLED
Start: 2019-11-05

## 2019-11-05 RX ORDER — GADOBUTROL 604.72 MG/ML
10 INJECTION INTRAVENOUS ONCE
Status: COMPLETED | OUTPATIENT
Start: 2019-11-05 | End: 2019-11-05

## 2019-11-05 RX ORDER — METHYLPREDNISOLONE SODIUM SUCCINATE 125 MG/2ML
125 INJECTION, POWDER, LYOPHILIZED, FOR SOLUTION INTRAMUSCULAR; INTRAVENOUS
Status: CANCELLED
Start: 2019-11-05

## 2019-11-05 RX ORDER — ALBUTEROL SULFATE 0.83 MG/ML
2.5 SOLUTION RESPIRATORY (INHALATION)
Status: CANCELLED | OUTPATIENT
Start: 2019-11-05

## 2019-11-05 RX ORDER — EPINEPHRINE 1 MG/ML
0.3 INJECTION, SOLUTION, CONCENTRATE INTRAVENOUS EVERY 5 MIN PRN
Status: CANCELLED | OUTPATIENT
Start: 2019-11-05

## 2019-11-05 RX ORDER — MEPERIDINE HYDROCHLORIDE 25 MG/ML
25 INJECTION INTRAMUSCULAR; INTRAVENOUS; SUBCUTANEOUS EVERY 30 MIN PRN
Status: CANCELLED | OUTPATIENT
Start: 2019-11-05

## 2019-11-05 RX ORDER — DIPHENHYDRAMINE HYDROCHLORIDE 50 MG/ML
50 INJECTION INTRAMUSCULAR; INTRAVENOUS
Status: CANCELLED
Start: 2019-11-05

## 2019-11-05 RX ORDER — ALBUTEROL SULFATE 90 UG/1
1-2 AEROSOL, METERED RESPIRATORY (INHALATION)
Status: CANCELLED
Start: 2019-11-05

## 2019-11-05 RX ADMIN — GADOBUTROL 10 ML: 604.72 INJECTION INTRAVENOUS at 08:05

## 2019-11-05 ASSESSMENT — MIFFLIN-ST. JEOR: SCORE: 1995.25

## 2019-11-05 ASSESSMENT — PAIN SCALES - GENERAL: PAINLEVEL: NO PAIN (0)

## 2019-11-05 NOTE — PROGRESS NOTES
HPI     This 18 yo young man comes for re-evaluation with labs and imaging.  He continues to receive therapy according to the following protocol: COG [id number] - A Phase 1 Study of Entinostat, an Oral Histone Deacetylase Inhibitor, in Pediatric Patients With Recurrent or Refractory Solid Tumors, Including CNS Tumors and Lymphoma  Since his last visit, Geo fell and suffered a FRACTURED LEFT FOOT METATARSAL. Nondisplaced transverse avulsion-type fracture proximal  fifth metatarsal was documented on 10/16/2019.      PMH:  Surgery x 2. Initial therapy on [id number] 6/26/15    Relapse, started Entinostat 1/9/17    DIAGNOSIS: EPENDYMOMA  AREAS TREATED: POST FOSSA TUMOR  DOSE: 5940 cGy   TYPE OF RADIATION GIVEN:  IMRT Tomotherapy   9/08/2015 to 10/26/15    Patient Active Problem List   Diagnosis     GERD (gastroesophageal reflux disease)     Closed fracture at the growth plate of right distal fibula      Elevated serum creatinine     Dyspepsia     Intracranial hemorrhage (H)     Hemorrhagic stroke (H)     Ependymoma (H)     Admission for antineoplastic chemotherapy     Post-operative state     S/P craniotomy     S/P biopsy     Noncomitant strabismus     Abducens neuropathy of both eyes     CANDELARIO (obstructive sleep apnea)     Health Care Home     Hypernatremia     Body temperature low     Current chronic use of systemic steroids     Elevated TSH     Status post chemotherapy     Neoplasm of posterior cranial fossa (H)     Chronic constipation     Serum albumin decreased     Closed compression fracture of thoracic vertebra with routine healing, subsequent encounter     Depression     Constipation     Constipation by delayed colonic transit     Slow transit constipation     Current Outpatient Medications   Medication     calcium carbonate-vitamin D 600-400 MG-UNIT CHEW     dexamethasone (DECADRON) 0.5 MG tablet     fexofenadine (ALLEGRA) 180 MG tablet     linaclotide (LINZESS) 290 MCG capsule     omeprazole (PRILOSEC) 20 MG   "capsule     order for DME     potassium phosphate, monobasic, (K-PHOS) 500 MG tablet     study - entinostat (IDS# 5050) 1 mg tablet     sulfamethoxazole-trimethoprim (BACTRIM/SEPTRA) 400-80 MG tablet     traZODone (DESYREL) 50 MG tablet     vitamin D3 (CHOLECALCIFEROL) 2000 units (50 mcg) tablet     vitamin E (TOCOPHEROL) 400 units (360 mg) capsule     OLANZapine (ZYPREXA) 20 MG tablet     OLANZapine (ZYPREXA) 5 MG tablet     sertraline (ZOLOFT) 100 MG tablet     study - entinostat (IDS# 5050) 1 mg tablet     study - entinostat (IDS# 5050) 5 mg tablet     No current facility-administered medications for this visit.         Allergies   Allergen Reactions     Blood Transfusion Related (Informational Only) Swelling     Periorbital swelling post platelet transfusion     No Known Drug Allergies          Review of Systems   Constitutional:        In wheelchair. Alert, engaged.   HENT: Negative.         Bilateral facial palsy    Eyes:        Bilateral oculoplegia   Respiratory: Negative.    Cardiovascular: Negative.    Gastrointestinal:        Patient claims ongoing constipation - this is a perseveration of his.   Genitourinary: Negative.    Musculoskeletal: Positive for falls.        See HPI re: fracture   Skin:        Striae throughout   Neurological: Positive for tremors, speech change and weakness.        Severe ataxia, bilateral cranial nerve palsies   Endo/Heme/Allergies: Negative.    Psychiatric/Behavioral:        See medical record           /79 (BP Location: Left arm, Patient Position: Sitting, Cuff Size: Adult Regular)   Pulse 99   Temp 97.1  F (36.2  C) (Axillary)   Ht 5' 10.63\" (179.4 cm)   Wt 213 lb 10 oz (96.9 kg)   SpO2 97%   BMI 30.11 kg/m      Physical Exam  Vitals signs reviewed. Exam conducted with a chaperone present.   Constitutional:       General: He is not in acute distress.     Appearance: He is normal weight.   HENT:      Head: Normocephalic.      Right Ear: External ear normal.      " Left Ear: External ear normal.      Nose: Nose normal.      Mouth/Throat:      Mouth: Mucous membranes are moist.   Eyes:      Comments: Bilateral oculoplegia   Neck:      Musculoskeletal: Normal range of motion. No neck rigidity.   Cardiovascular:      Rate and Rhythm: Normal rate and regular rhythm.      Pulses: Normal pulses.   Pulmonary:      Effort: Pulmonary effort is normal. No respiratory distress.      Breath sounds: Normal breath sounds.   Abdominal:      General: Bowel sounds are normal. There is no distension.      Palpations: Abdomen is soft. There is no mass.   Musculoskeletal: Normal range of motion.   Skin:     General: Skin is warm and dry.      Capillary Refill: Capillary refill takes less than 2 seconds.      Comments: Striae throughout - stable   Neurological:      Mental Status: He is alert and oriented to person, place, and time.      Cranial Nerves: Cranial nerve deficit present.      Motor: Weakness present.      Coordination: Coordination abnormal.      Gait: Gait abnormal.   Psychiatric:         Mood and Affect: Mood normal.             Results for RAFAELA GUAN (MRN [id number]) as of 11/16/2019 12:10   Ref. Range 11/5/2019 10:24   Sodium Latest Ref Range: 133 - 144 mmol/L 140   Potassium Latest Ref Range: 3.4 - 5.3 mmol/L 4.5   Chloride Latest Ref Range: 94 - 109 mmol/L 108   Carbon Dioxide Latest Ref Range: 20 - 32 mmol/L 30   Urea Nitrogen Latest Ref Range: 7 - 30 mg/dL 17   Creatinine Latest Ref Range: 0.66 - 1.25 mg/dL 0.96   GFR Estimate Latest Ref Range: >60 mL/min/1.73_m2 >90   GFR Estimate If Black Latest Ref Range: >60 mL/min/1.73_m2 >90   Calcium Latest Ref Range: 8.5 - 10.1 mg/dL 8.5   Anion Gap Latest Ref Range: 3 - 14 mmol/L 2 (L)   Magnesium Latest Ref Range: 1.6 - 2.3 mg/dL 2.3   Phosphorus Latest Ref Range: 2.5 - 4.5 mg/dL 2.9   Albumin Latest Ref Range: 3.4 - 5.0 g/dL 3.1 (L)   Protein Total Latest Ref Range: 6.8 - 8.8 g/dL 6.8   Bilirubin Total Latest Ref Range: 0.2 -  1.3 mg/dL 0.3   Alkaline Phosphatase Latest Ref Range: 40 - 150 U/L 131   ALT Latest Ref Range: 0 - 70 U/L 20   AST Latest Ref Range: 0 - 45 U/L 31   Glucose Latest Ref Range: 70 - 99 mg/dL 96   WBC Latest Ref Range: 4.0 - 11.0 10e9/L 3.9 (L)   Hemoglobin Latest Ref Range: 13.3 - 17.7 g/dL 12.1 (L)   Hematocrit Latest Ref Range: 40.0 - 53.0 % 38.3 (L)   Platelet Count Latest Ref Range: 150 - 450 10e9/L 117 (L)   RBC Count Latest Ref Range: 4.4 - 5.9 10e12/L 4.31 (L)   MCV Latest Ref Range: 78 - 100 fl 89   MCH Latest Ref Range: 26.5 - 33.0 pg 28.1   MCHC Latest Ref Range: 31.5 - 36.5 g/dL 31.6   RDW Latest Ref Range: 10.0 - 15.0 % 13.5   Diff Method Unknown Automated Method   % Neutrophils Latest Units: % 54.7   % Lymphocytes Latest Units: % 21.4   % Monocytes Latest Units: % 15.7   % Eosinophils Latest Units: % 6.7   % Basophils Latest Units: % 0.5   % Immature Granulocytes Latest Units: % 1.0   Nucleated RBCs Latest Ref Range: 0 /100 0   Absolute Neutrophil Latest Ref Range: 1.6 - 8.3 10e9/L 2.1   Absolute Lymphocytes Latest Ref Range: 0.8 - 5.3 10e9/L 0.8   Absolute Monocytes Latest Ref Range: 0.0 - 1.3 10e9/L 0.6   Absolute Eosinophils Latest Ref Range: 0.0 - 0.7 10e9/L 0.3   Absolute Basophils Latest Ref Range: 0.0 - 0.2 10e9/L 0.0   Abs Immature Granulocytes Latest Ref Range: 0 - 0.4 10e9/L 0.0   Absolute Nucleated RBC Unknown 0.0       MR BRAIN W/O & W CONTRAST 11/5/2019 9:46 AM     Provided History: Ependymoma; Ependymoma (H).     Comparison: 8/7/2019 and 4/9/2019 brain MRIs      Technique: Multiplanar T1-weighted, axial FLAIR, and susceptibility  images were obtained without intravenous contrast. Following  intravenous gadolinium-based contrast administration, axial  T2-weighted, diffusion, and T1-weighted images (in multiple planes)  were obtained.     Contrast: 10ml gadavist      Findings:     Slight decrease in size of the contrast-enhanced fourth ventricle  mass, which is measured as 1.9 x 1.7 x 2.6 cm,  and  previously  measured as 1.9 x 2.0 x 2.6. Also there is an unchanged hemosiderin  deposition in the mass, no reduced diffusion. No significant change in  T2 hyperintensity in bilateral cerebellar hemispheres also in the  medulla oblongata, and superior cerebellar peduncles T2 hyperintensity  within the cerebellum and pontomedullary junction is similar.     There is no midline shift, or acute intracranial hemorrhage. Third  ventriculostomy remains patent by flow voids. Ventricular sizes are  stable.. Normal major vascular intracranial flow-voids.     No abnormality of the skull marrow signal. Mucosal thickening of the  bilateral maxillary sinuses and ethmoid air cells. Mastoid air cells  are relatively clear. The orbits are grossly unremarkable.                                                                      Impression: Little change to slight decrease in size of the residual  enhancing mass in the fourth ventricle compared to prior exam. Cyst  along the right inferolateral margin of the enhancing mass is also  little changed.     I have personally reviewed the examination and initial interpretation  and I agree with the findings.     STEPHY SCHMIDT MD    Complete spine MR without and with contrast     History: Ependymoma; Ependymoma (H).   ICD-10: Ependymoma (H)     Comparison:  MRI 7/10/2019      Contrast Dose:10ml gadavist (accession [id number]), 10 mL Gadavist  (accession [id number]), 10ml gadavist (accession [id number])     Technique: Sagittal T1 and T2-weighted images of the entire spine, and  axial T1 and T2-weighted images of the lumbar spine were obtained  without intravenous contrast. Post intravenous contrast using  gadolinium sagittal T1-weighted images were obtained of the whole  spine with fat-saturation , with axial postcontrast T1-weighted images  at selected levels.     Findings:   There are 7 cervical, 12 thoracic and 5 lumbar-type vertebrae.     Alignment is preserved throughout the complete  spine.     Minimal compression deformity of T4 anterior vertebral body.     No abnormal bone marrow signal. Schmorl's nodes the opposing endplates  of T6-T11.     No significant spinal canal or neural foraminal stenosis.     No abnormal spinal cord signal. Tip of the conus medullaris is at L1.  Cauda equina nerve roots are normal.     Contrast-enhanced images demonstrate no definite abnormal enhancement  in the visualized paraspinous tissues or in the spinal canal or  vertebra.                                                                      Impression:  No significant change from 7/10/2019.  No evidence of metastatic disease.     SELVIN BARILLAS MD    Impression/Plan:  Ependymoma stable to improving on continued entinostat  No new problems  OK to continue entinostat - check weight for dose alterations.  RTC per protocol.    Leoncio Rousseau MD

## 2019-11-05 NOTE — LETTER
11/5/2019      RE: Geo Hicks  [address] [facility] [id number]       HPI     This 20 yo young man comes for re-evaluation with labs and imaging.  He continues to receive therapy according to the following protocol: COG [id number] - A Phase 1 Study of Entinostat, an Oral Histone Deacetylase Inhibitor, in Pediatric Patients With Recurrent or Refractory Solid Tumors, Including CNS Tumors and Lymphoma  Since his last visit, Geo fell and suffered a FRACTURED LEFT FOOT METATARSAL. Nondisplaced transverse avulsion-type fracture proximal  fifth metatarsal was documented on 10/16/2019.      PMH:  Surgery x 2. Initial therapy on [id number] 6/26/15    Relapse, started Entinostat 1/9/17    DIAGNOSIS: EPENDYMOMA  AREAS TREATED: POST FOSSA TUMOR  DOSE: 5940 cGy   TYPE OF RADIATION GIVEN:  IMRT Tomotherapy   9/08/2015 to 10/26/15    Patient Active Problem List   Diagnosis     GERD (gastroesophageal reflux disease)     Closed fracture at the growth plate of right distal fibula      Elevated serum creatinine     Dyspepsia     Intracranial hemorrhage (H)     Hemorrhagic stroke (H)     Ependymoma (H)     Admission for antineoplastic chemotherapy     Post-operative state     S/P craniotomy     S/P biopsy     Noncomitant strabismus     Abducens neuropathy of both eyes     CANDELARIO (obstructive sleep apnea)     Health Care Home     Hypernatremia     Body temperature low     Current chronic use of systemic steroids     Elevated TSH     Status post chemotherapy     Neoplasm of posterior cranial fossa (H)     Chronic constipation     Serum albumin decreased     Closed compression fracture of thoracic vertebra with routine healing, subsequent encounter     Depression     Constipation     Constipation by delayed colonic transit     Slow transit constipation     Current Outpatient Medications   Medication     calcium carbonate-vitamin D 600-400 MG-UNIT CHEW     dexamethasone (DECADRON) 0.5 MG tablet     fexofenadine (ALLEGRA) 180 MG  "tablet     linaclotide (LINZESS) 290 MCG capsule     omeprazole (PRILOSEC) 20 MG DR capsule     order for DME     potassium phosphate, monobasic, (K-PHOS) 500 MG tablet     study - entinostat (IDS# 5050) 1 mg tablet     sulfamethoxazole-trimethoprim (BACTRIM/SEPTRA) 400-80 MG tablet     traZODone (DESYREL) 50 MG tablet     vitamin D3 (CHOLECALCIFEROL) 2000 units (50 mcg) tablet     vitamin E (TOCOPHEROL) 400 units (360 mg) capsule     OLANZapine (ZYPREXA) 20 MG tablet     OLANZapine (ZYPREXA) 5 MG tablet     sertraline (ZOLOFT) 100 MG tablet     study - entinostat (IDS# 5050) 1 mg tablet     study - entinostat (IDS# 5050) 5 mg tablet     No current facility-administered medications for this visit.         Allergies   Allergen Reactions     Blood Transfusion Related (Informational Only) Swelling     Periorbital swelling post platelet transfusion     No Known Drug Allergies          Review of Systems   Constitutional:        In wheelchair. Alert, engaged.   HENT: Negative.         Bilateral facial palsy    Eyes:        Bilateral oculoplegia   Respiratory: Negative.    Cardiovascular: Negative.    Gastrointestinal:        Patient claims ongoing constipation - this is a perseveration of his.   Genitourinary: Negative.    Musculoskeletal: Positive for falls.        See HPI re: fracture   Skin:        Striae throughout   Neurological: Positive for tremors, speech change and weakness.        Severe ataxia, bilateral cranial nerve palsies   Endo/Heme/Allergies: Negative.    Psychiatric/Behavioral:        See medical record           /79 (BP Location: Left arm, Patient Position: Sitting, Cuff Size: Adult Regular)   Pulse 99   Temp 97.1  F (36.2  C) (Axillary)   Ht 5' 10.63\" (179.4 cm)   Wt 213 lb 10 oz (96.9 kg)   SpO2 97%   BMI 30.11 kg/m       Physical Exam  Vitals signs reviewed. Exam conducted with a chaperone present.   Constitutional:       General: He is not in acute distress.     Appearance: He is normal " weight.   HENT:      Head: Normocephalic.      Right Ear: External ear normal.      Left Ear: External ear normal.      Nose: Nose normal.      Mouth/Throat:      Mouth: Mucous membranes are moist.   Eyes:      Comments: Bilateral oculoplegia   Neck:      Musculoskeletal: Normal range of motion. No neck rigidity.   Cardiovascular:      Rate and Rhythm: Normal rate and regular rhythm.      Pulses: Normal pulses.   Pulmonary:      Effort: Pulmonary effort is normal. No respiratory distress.      Breath sounds: Normal breath sounds.   Abdominal:      General: Bowel sounds are normal. There is no distension.      Palpations: Abdomen is soft. There is no mass.   Musculoskeletal: Normal range of motion.   Skin:     General: Skin is warm and dry.      Capillary Refill: Capillary refill takes less than 2 seconds.      Comments: Striae throughout - stable   Neurological:      Mental Status: He is alert and oriented to person, place, and time.      Cranial Nerves: Cranial nerve deficit present.      Motor: Weakness present.      Coordination: Coordination abnormal.      Gait: Gait abnormal.   Psychiatric:         Mood and Affect: Mood normal.             Results for RAFAELA GUAN (MRN [id number]) as of 11/16/2019 12:10   Ref. Range 11/5/2019 10:24   Sodium Latest Ref Range: 133 - 144 mmol/L 140   Potassium Latest Ref Range: 3.4 - 5.3 mmol/L 4.5   Chloride Latest Ref Range: 94 - 109 mmol/L 108   Carbon Dioxide Latest Ref Range: 20 - 32 mmol/L 30   Urea Nitrogen Latest Ref Range: 7 - 30 mg/dL 17   Creatinine Latest Ref Range: 0.66 - 1.25 mg/dL 0.96   GFR Estimate Latest Ref Range: >60 mL/min/1.73_m2 >90   GFR Estimate If Black Latest Ref Range: >60 mL/min/1.73_m2 >90   Calcium Latest Ref Range: 8.5 - 10.1 mg/dL 8.5   Anion Gap Latest Ref Range: 3 - 14 mmol/L 2 (L)   Magnesium Latest Ref Range: 1.6 - 2.3 mg/dL 2.3   Phosphorus Latest Ref Range: 2.5 - 4.5 mg/dL 2.9   Albumin Latest Ref Range: 3.4 - 5.0 g/dL 3.1 (L)   Protein  Total Latest Ref Range: 6.8 - 8.8 g/dL 6.8   Bilirubin Total Latest Ref Range: 0.2 - 1.3 mg/dL 0.3   Alkaline Phosphatase Latest Ref Range: 40 - 150 U/L 131   ALT Latest Ref Range: 0 - 70 U/L 20   AST Latest Ref Range: 0 - 45 U/L 31   Glucose Latest Ref Range: 70 - 99 mg/dL 96   WBC Latest Ref Range: 4.0 - 11.0 10e9/L 3.9 (L)   Hemoglobin Latest Ref Range: 13.3 - 17.7 g/dL 12.1 (L)   Hematocrit Latest Ref Range: 40.0 - 53.0 % 38.3 (L)   Platelet Count Latest Ref Range: 150 - 450 10e9/L 117 (L)   RBC Count Latest Ref Range: 4.4 - 5.9 10e12/L 4.31 (L)   MCV Latest Ref Range: 78 - 100 fl 89   MCH Latest Ref Range: 26.5 - 33.0 pg 28.1   MCHC Latest Ref Range: 31.5 - 36.5 g/dL 31.6   RDW Latest Ref Range: 10.0 - 15.0 % 13.5   Diff Method Unknown Automated Method   % Neutrophils Latest Units: % 54.7   % Lymphocytes Latest Units: % 21.4   % Monocytes Latest Units: % 15.7   % Eosinophils Latest Units: % 6.7   % Basophils Latest Units: % 0.5   % Immature Granulocytes Latest Units: % 1.0   Nucleated RBCs Latest Ref Range: 0 /100 0   Absolute Neutrophil Latest Ref Range: 1.6 - 8.3 10e9/L 2.1   Absolute Lymphocytes Latest Ref Range: 0.8 - 5.3 10e9/L 0.8   Absolute Monocytes Latest Ref Range: 0.0 - 1.3 10e9/L 0.6   Absolute Eosinophils Latest Ref Range: 0.0 - 0.7 10e9/L 0.3   Absolute Basophils Latest Ref Range: 0.0 - 0.2 10e9/L 0.0   Abs Immature Granulocytes Latest Ref Range: 0 - 0.4 10e9/L 0.0   Absolute Nucleated RBC Unknown 0.0       MR BRAIN W/O & W CONTRAST 11/5/2019 9:46 AM     Provided History: Ependymoma; Ependymoma (H).     Comparison: 8/7/2019 and 4/9/2019 brain MRIs      Technique: Multiplanar T1-weighted, axial FLAIR, and susceptibility  images were obtained without intravenous contrast. Following  intravenous gadolinium-based contrast administration, axial  T2-weighted, diffusion, and T1-weighted images (in multiple planes)  were obtained.     Contrast: 10ml gadavist      Findings:     Slight decrease in size of  the contrast-enhanced fourth ventricle  mass, which is measured as 1.9 x 1.7 x 2.6 cm, and  previously  measured as 1.9 x 2.0 x 2.6. Also there is an unchanged hemosiderin  deposition in the mass, no reduced diffusion. No significant change in  T2 hyperintensity in bilateral cerebellar hemispheres also in the  medulla oblongata, and superior cerebellar peduncles T2 hyperintensity  within the cerebellum and pontomedullary junction is similar.     There is no midline shift, or acute intracranial hemorrhage. Third  ventriculostomy remains patent by flow voids. Ventricular sizes are  stable.. Normal major vascular intracranial flow-voids.     No abnormality of the skull marrow signal. Mucosal thickening of the  bilateral maxillary sinuses and ethmoid air cells. Mastoid air cells  are relatively clear. The orbits are grossly unremarkable.                                                                      Impression: Little change to slight decrease in size of the residual  enhancing mass in the fourth ventricle compared to prior exam. Cyst  along the right inferolateral margin of the enhancing mass is also  little changed.     I have personally reviewed the examination and initial interpretation  and I agree with the findings.     STEPHY SCHMIDT MD    Complete spine MR without and with contrast     History: Ependymoma; Ependymoma (H).   ICD-10: Ependymoma (H)     Comparison:  MRI 7/10/2019      Contrast Dose:10ml gadavist (accession [id number]), 10 mL Gadavist  (accession [id number]), 10ml gadavist (accession [id number])     Technique: Sagittal T1 and T2-weighted images of the entire spine, and  axial T1 and T2-weighted images of the lumbar spine were obtained  without intravenous contrast. Post intravenous contrast using  gadolinium sagittal T1-weighted images were obtained of the whole  spine with fat-saturation , with axial postcontrast T1-weighted images  at selected levels.     Findings:   There are 7 cervical, 12  thoracic and 5 lumbar-type vertebrae.     Alignment is preserved throughout the complete spine.     Minimal compression deformity of T4 anterior vertebral body.     No abnormal bone marrow signal. Schmorl's nodes the opposing endplates  of T6-T11.     No significant spinal canal or neural foraminal stenosis.     No abnormal spinal cord signal. Tip of the conus medullaris is at L1.  Cauda equina nerve roots are normal.     Contrast-enhanced images demonstrate no definite abnormal enhancement  in the visualized paraspinous tissues or in the spinal canal or  vertebra.                                                                      Impression:  No significant change from 7/10/2019.  No evidence of metastatic disease.     SELVIN BARILLAS MD    Impression/Plan:  Ependymoma stable to improving on continued entinostat  No new problems  OK to continue entinostat - check weight for dose alterations.  RTC per protocol.    Leoncio Rousseau MD

## 2019-11-07 DIAGNOSIS — F32.A DEPRESSION, UNSPECIFIED DEPRESSION TYPE: ICD-10-CM

## 2019-11-07 DIAGNOSIS — F22 PARANOIA (H): ICD-10-CM

## 2019-11-07 RX ORDER — OLANZAPINE 5 MG/1
TABLET ORAL
Qty: 30 TABLET | Refills: 1 | Status: SHIPPED | OUTPATIENT
Start: 2019-11-07 | End: 2020-01-02

## 2019-11-07 RX ORDER — OLANZAPINE 5 MG/1
TABLET ORAL
Qty: 30 TABLET | Refills: 0 | Status: CANCELLED | OUTPATIENT
Start: 2019-11-07

## 2019-11-07 RX ORDER — OLANZAPINE 20 MG/1
TABLET ORAL
Qty: 30 TABLET | Refills: 1 | Status: SHIPPED | OUTPATIENT
Start: 2019-11-07 | End: 2020-01-02

## 2019-11-07 NOTE — TELEPHONE ENCOUNTER
Medication requested: OLANZapine (ZYPREXA) 5 MG tablet  Last refilled: 10/7/19  Qty: 30      Last seen: 10/7/19  RTC: 3-4 weeks  Cancel: 0  No-show: 0  Next appt: 11/15/19    Refill decision:   Refill pended and routed to the provider for review/determination due to last note from 10/7/19 is not signed

## 2019-11-10 RX ORDER — SERTRALINE HYDROCHLORIDE 100 MG/1
100 TABLET, FILM COATED ORAL DAILY
Qty: 30 TABLET | Refills: 0 | Status: SHIPPED | OUTPATIENT
Start: 2019-11-10 | End: 2019-11-15

## 2019-11-12 DIAGNOSIS — C71.9 EPENDYMOMA (H): ICD-10-CM

## 2019-11-12 LAB
BASOPHILS # BLD AUTO: 0 10E9/L (ref 0–0.2)
BASOPHILS NFR BLD AUTO: 0.5 %
DIFFERENTIAL METHOD BLD: ABNORMAL
EOSINOPHIL # BLD AUTO: 0.2 10E9/L (ref 0–0.7)
EOSINOPHIL NFR BLD AUTO: 3.7 %
ERYTHROCYTE [DISTWIDTH] IN BLOOD BY AUTOMATED COUNT: 14.1 % (ref 10–15)
HCT VFR BLD AUTO: 36.7 % (ref 40–53)
HGB BLD-MCNC: 11.7 G/DL (ref 13.3–17.7)
LYMPHOCYTES # BLD AUTO: 1.1 10E9/L (ref 0.8–5.3)
LYMPHOCYTES NFR BLD AUTO: 28.2 %
MCH RBC QN AUTO: 28 PG (ref 26.5–33)
MCHC RBC AUTO-ENTMCNC: 31.9 G/DL (ref 31.5–36.5)
MCV RBC AUTO: 88 FL (ref 78–100)
MONOCYTES # BLD AUTO: 0.6 10E9/L (ref 0–1.3)
MONOCYTES NFR BLD AUTO: 15.1 %
NEUTROPHILS # BLD AUTO: 2.1 10E9/L (ref 1.6–8.3)
NEUTROPHILS NFR BLD AUTO: 52.5 %
PLATELET # BLD AUTO: 118 10E9/L (ref 150–450)
RBC # BLD AUTO: 4.18 10E12/L (ref 4.4–5.9)
WBC # BLD AUTO: 4 10E9/L (ref 4–11)

## 2019-11-12 PROCEDURE — 83735 ASSAY OF MAGNESIUM: CPT | Performed by: NURSE PRACTITIONER

## 2019-11-12 PROCEDURE — 85025 COMPLETE CBC W/AUTO DIFF WBC: CPT | Performed by: NURSE PRACTITIONER

## 2019-11-12 PROCEDURE — 80053 COMPREHEN METABOLIC PANEL: CPT | Performed by: NURSE PRACTITIONER

## 2019-11-12 PROCEDURE — 84100 ASSAY OF PHOSPHORUS: CPT | Performed by: NURSE PRACTITIONER

## 2019-11-12 PROCEDURE — 36415 COLL VENOUS BLD VENIPUNCTURE: CPT | Performed by: NURSE PRACTITIONER

## 2019-11-13 LAB
ALBUMIN SERPL-MCNC: 3.1 G/DL (ref 3.4–5)
ALP SERPL-CCNC: 127 U/L (ref 40–150)
ALT SERPL W P-5'-P-CCNC: 17 U/L (ref 0–70)
ANION GAP SERPL CALCULATED.3IONS-SCNC: 5 MMOL/L (ref 3–14)
AST SERPL W P-5'-P-CCNC: 18 U/L (ref 0–45)
BILIRUB SERPL-MCNC: 0.3 MG/DL (ref 0.2–1.3)
BUN SERPL-MCNC: 17 MG/DL (ref 7–30)
CALCIUM SERPL-MCNC: 8.3 MG/DL (ref 8.5–10.1)
CHLORIDE SERPL-SCNC: 103 MMOL/L (ref 94–109)
CO2 SERPL-SCNC: 31 MMOL/L (ref 20–32)
CREAT SERPL-MCNC: 1.17 MG/DL (ref 0.66–1.25)
GFR SERPL CREATININE-BSD FRML MDRD: 89 ML/MIN/{1.73_M2}
GLUCOSE SERPL-MCNC: 124 MG/DL (ref 70–99)
MAGNESIUM SERPL-MCNC: 1.9 MG/DL (ref 1.6–2.3)
PHOSPHATE SERPL-MCNC: 3.2 MG/DL (ref 2.5–4.5)
POTASSIUM SERPL-SCNC: 4.1 MMOL/L (ref 3.4–5.3)
PROT SERPL-MCNC: 6.2 G/DL (ref 6.8–8.8)
SODIUM SERPL-SCNC: 139 MMOL/L (ref 133–144)

## 2019-11-15 ENCOUNTER — OFFICE VISIT (OUTPATIENT)
Dept: PSYCHIATRY | Facility: CLINIC | Age: 19
End: 2019-11-15
Attending: PSYCHIATRY & NEUROLOGY
Payer: COMMERCIAL

## 2019-11-15 VITALS — SYSTOLIC BLOOD PRESSURE: 109 MMHG | DIASTOLIC BLOOD PRESSURE: 77 MMHG | HEART RATE: 96 BPM

## 2019-11-15 DIAGNOSIS — F32.A DEPRESSION, UNSPECIFIED DEPRESSION TYPE: ICD-10-CM

## 2019-11-15 PROCEDURE — G0463 HOSPITAL OUTPT CLINIC VISIT: HCPCS | Mod: ZF

## 2019-11-15 RX ORDER — SERTRALINE HYDROCHLORIDE 100 MG/1
150 TABLET, FILM COATED ORAL DAILY
Qty: 45 TABLET | Refills: 1 | Status: SHIPPED | OUTPATIENT
Start: 2019-11-15 | End: 2020-01-22

## 2019-11-15 ASSESSMENT — PAIN SCALES - GENERAL: PAINLEVEL: NO PAIN (0)

## 2019-11-15 NOTE — PATIENT INSTRUCTIONS
Increase sertraline to 150 mg daily.  Continue olanzapine 25 mg at bedtime.  Continue trazodone 75 mg at bedtime.    _______________________________________      SAFETY PLAN    Completed on 11/15/2019 at which time the following was reported: suicidal ideation.  It was determined that ongoing outpatient care was appropriate in addition to establishing the following care plan:     1. Triggers which can possibly cause thoughts of self-harm:   Thoughts- change thoughts, and worries about doctor's lying to him  Situation- afternoons are harder       2. Coping strategies:  Playing video games helps to take his mind off it     3. Ways to increase safety:  More time spent with people, and activities to distract from worries    4. People I can call for help: (friends, family and/or professionals)    Name: Mom and Dad    Name: Justice Fay DO Phone: [facility] [place] [phone] (clinic);  [phone] (after hours)    Thank you for coming to the PS[facility].    Lab Testing:  If you had lab testing today and your results are reassuring or normal they will be mailed to you or sent through [facility] within 7 days.   If the lab tests need quick action we will call you with the results.  The phone number we will call with results is # [phone] (home) [phone] (work). If this is not the best number please call our clinic and change the number.    Medication Refills:  If you need any refills please call your pharmacy and they will contact us. Our fax number for refills is [phone]. Please allow three business for refill processing.   If you need to  your refill at a new pharmacy, please contact the new pharmacy directly. The new pharmacy will help you get your medications transferred.     Scheduling:  If you have any concerns about today's visit or wish to schedule another appointment please call our office during normal business hours [phone] (8-5:00 M-F)    Contact Us:  Please call [phone]  during business hours (8-5:00 M-F).  If after clinic hours, or on the weekend, please call  [phone].    Financial Assistance [phone]  [facility] Billing [phone]  Central Billing Office, [facility]: [phone]  [facility] Billing [phone]  Medical Records [phone]      MENTAL HEALTH CRISIS NUMBERS:  [facility]:   [facility] - [phone]   Crisis Residence [facility] - [phone]   Walk-In Counseling Center [facility] [place] - [phone]   COPE 24/7 [facility] Mobile Team for Adults - [[phone]]; Child - [[phone]]        [facility]:   [facility] - [phone]   Walk-in counseling [facility] - [phone]   Walk-in counseling [facility] - [phone]   [facility] - [phone]   Urgent Care Adult Mental Health:   --Drop-in, 24/7 crisis line, and [facility] Mobile Team [[phone]]    CRISIS TEXT LINE: Text [phone] from anywhere, anytime, any crisis 24/7;    OR SEE www.crisistextline.org     Poison Control Center - [phone]    CHILD: [facility] needs assessment team - [phone]     [facility] - [phone]; or [facility] - [phone]    If you have a medical emergency please call 911or go to the nearest ER.                    _____________________________________________    Again thank you for choosing [facility] and please let us know how we can best partner with you to improve you and your family's health.  You may be receiving a survey in the mail regarding this appointment. We would love to have your feedback, both positive and negative, so please fill out the survey and return it using the provided envelope. The survey is done by an external company, so your answers are anonymous.

## 2019-11-15 NOTE — PROGRESS NOTES
"  [facility]IN[facility] PROGRESS NOTE     CARE TEAM:  PCP- Jeffrey Espinoza    Specialty Providers- Oncology, Neuropsychology, Physical Therapy, Occupational Therapy    Therapist- Manju Pink at [facility] in [facility] Team- no.    Geo Hicks is a 19 year old male who prefers the name Geo & pronouns he, him, his, himself.    Date of initial diagnostic assessment is 2/7/2019.    Pertinent Background:  This patient first experienced mental health issues in the past few months and has received treatment for paranoia/delusions.  Notably, this patient is undergoing cancer treatment at the [facility] and has been referred for psychiatric assessment by his neuropsychologist who most recently met with him on 1/3/2019.  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 Since then, he has undergone multiple tumor resections, chemotherapy, and radiation treatment. Geo also experienced an intra-tumoral hemorrhage in May 2015 that resulted in neurological changes including facial numbness, significant loss of mobility and dysarthria. In December 2016, a follow-up MRI revealed continued tumor enhancement, however the most recent MRI from 10/16/18 revealed an unchanged fourth ventricle ependymoma in comparison to previous exams, a slight decrease in adjacent edema, as well as a stable size of the ventricular system.  He is currently on COG study [id number] with Entinostat.  Of note, patient had a brief admission to inpatient psychiatry on [facility] from 2/14-2/15/2019 \"for evaluation of delusions and suicidal comments.\"    Psych critical item history includes suicidal ideation, psychosis [sxs include delusions] and psychiatric hospitalization x1.    INTERIM HISTORY                                                                                                                 4, 4   The patient reports good treatment adherence.  History was provided by the patient " "and his dad who were good historians.  The last visit ended with the following med change: Increase olanzapine to 22.5 mg at bedtime for one week, then increase to 25 mg at bedtime.  Since the last visit:  -Presents with dad.  They share the significant news that he's been accepted to move into a new assisted living placement in [place].  He will be sharing an apartment with one other person.  They will both have their own rooms and will share common space.  The facility will be staffed at all times.  Plan is for him to move in in just a week or so.  -Geo shares that his interim mood has been more upset and depressed.  Attributes this in large part to nervousness regarding the move.  Acknowledges that the move is probably a positive step toward greater independence, however also states that \"change is hard.\"  -In terms of this increase in depressed mood allows having had some moments of suicidal thinking over the past week.  When asked for clarification over whether he's had intent to harm himself or a specific plan he states that one day it occurred to him to stop drinking water, however he quickly decided this was something he didn't want to do.  Denies having active SI today, and denies any persistent intent or planning.  Later states: \"I don't want to kill myself.\"  Agrees to speak with family or providers if SI would worsen or he should feel unsafe.  Also participates in creating a safety plan.  -Touched base on his chronic paranoid fears that he's unable to walk because definitve care for his constipation is being withheld - a persistent, delusional preoccupation that has been present in Geo's life for greater than one year at this point.  States that this concern continues to upset him and figure prominently in his thoughts.  -Agreed to increase sertraline to 150 mg daily for added mood support.  Also agreed that we would meet again in clinic in two weeks, shortly after he moves into his new " apartment, to assess his response to that transition.    RECENT SYMPTOMS:   DEPRESSION:  reports-suicidal ideation without planning, without intent, depressed mood, anhedonia, indecisiveness, feeling trapped and overwhelmed;  DENIES- low energy, insomnia, hypersomnia, excessive guilt and excessive crying  ELIZABETH/HYPOMANIA:  reports-none;  DENIES- increased energy, decreased sleep need, increased activity and grandiosity  PSYCHOSIS:  reports-delusions- paranoid [details in Interim History];  DENIES- auditory hallucinations and visual hallucinations  DYSREGULATION:  reports-can become irritable/agitated if beliefs are challenged;  DENIES- violent ideation, SIB, mood dysregulation, impulsive and aggressive  PANIC ATTACK:  none  ANXIETY:  excessive worry and nervous/overwhelmed  TRAUMA RELATED:  none  COMPULSIVE:  none  SLEEP:  initiation and maintenance both improved with trazodone  EATING DISORDER: no    RECENT SUBSTANCE USE:  ALCOHOL- no  TOBACCO- no  CAFFEINE- minimal  OPIOIDS- no         NARCAN KIT- N/A  CANNABIS- no  OTHER ILLICIT DRUGS- no      CURRENT SOCIAL HISTORY:  FINANCIAL SUPPORT- family  CHILDREN- no  LIVING SITUATION- mother and father (who are  and have both remarried) alternating weeks between respective homes  SOCIAL/ SPIRITUAL SUPPORT- mother, father, older brother  FEELS SAFE AT HOME- yes    MEDICAL ROS (2,10):  A comprehensive review of systems was performed and is negative other than noted in the HPI.    PSYCH and CD Critical Summary Points since July 2018 February 2019:  Clinic intake on 2/7.  Later was admitted [facility] for a brief inpatient psych admission from 3/14-3/15, most of which was spent in the ER, due to suicidal comments that concerned family.  Was ultimately deemed safe for outpatient follow-up and was discharged with an increase in olanzapine to 15 mg.  March 2019:  Began sertraline 50 mg daily.  May 2019:  Began trazodone 25-50 mg at bedtime.  June 2019:   "Increased trazodone to 100 mg at bedtime.  July 2019:  Increased Sertraline to 100 mg daily.  August 2019:  Increased olanzapine to 17.5 mg and decreased trazodone to 75 mg, both at bedtime.  September 2019:  Increased olanzapine to 20 mg at bedtime.  October 2019:  Titrated olanzapine to 25 mg at bedtime.    PAST PSYCH MED TRIALS   see EMR Problem List: Hx of psychiatric care    MEDICAL / SURGICAL HISTORY                                   Neurologic Hx- See pertinent background, re: history of ependymoma and related chemo, radiation and tumor resection(s).  Notably has experienced neurological damage due to the above which has resulted in facial numbness, significant loss of mobility and dysarthria.  Has had neuropsychiatric evaluations 2/2016, 2/2017, 1/2019, and 10/2019.  The following is from the \"Results and Impressions\" section of his 10/29/2019 eval with Veronica Padilla, with a passage bolded that relates to noted difficulty in an area of executive functioning that facilitates the ability to see other people's perspectives:     \"Geo s attention was rated by his mother as well as himself as being adequate.  Executive functioning are those skills including planning, organization, emotional control, cognitive flexibility, and the ability to take another person s perspective.  Geo rating scale of these skills was basically in the average range for his age with a slight elevation in his ability to shift from one activity to another.  His mother s ratings of his executive functioning were in the average range, with the exception of a slight elevation in his ability to initiate tasks.  Direct testing of his ability to take another person s perspective indicated difficulty in this area.  He was asked to sort objects based on various aspects of the objects.  When he sorted the objects, he showed average performance.  However, when the examiner sorted the objects, Geo experienced significant " "difficulty with being able to determine how the objects were sorted.  This difficulty likely translates into difficulty with understanding another person s point of view.  Geo indicated that this difficulty becomes quite frustrating for him as he doesn t feel other people understand him--it is likely that he has difficulty understanding their perspective.     Emotionally Geo indicated that he continues to feel some difficulty with sadness but that it has improved over time and with medication.  Geo denied significant feelings of anxiety at this time while in the past these scores have been higher.  Parent ratings of Geo s emotional functioning indicated no areas of significant concern.  Interviews with Geo and his mother indicated continued feelings of sadness and difficulty in motivating himself to try to new activities.  While there is improvement in his mood, Geo continues to be at risk for depression.  Since he is now under the care of a psychiatrist, we did not interview around his feelings that his medical team was not being helpful to him.  His mother reported that these feelings continue and likely interfere with his compliance with directives.  He is participating in therapy to work on these issues and it is important that this intervention continue.\"    Patient Active Problem List   Diagnosis     GERD (gastroesophageal reflux disease)     Closed fracture at the growth plate of right distal fibula      Elevated serum creatinine     Dyspepsia     Intracranial hemorrhage (H)     Hemorrhagic stroke (H)     Ependymoma (H)     Admission for antineoplastic chemotherapy     Post-operative state     S/P craniotomy     S/P biopsy     Noncomitant strabismus     Abducens neuropathy of both eyes     CANDELARIO (obstructive sleep apnea)     Health Care Home     Hypernatremia     Body temperature low     Current chronic use of systemic steroids     Elevated TSH     Status post chemotherapy     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)     Chronic constipation     Serum albumin decreased     Closed compression fracture of thoracic vertebra with routine healing, subsequent encounter     Depression     Constipation     Constipation by delayed colonic transit     Slow transit constipation     Past Surgical History:   Procedure Laterality Date     COLONOSCOPY N/A 9/27/2019    Procedure: Colonoscopy With Biopsies and Polypectomy;  Surgeon: Aniya Wei MD;  Location: [facility]     ESOPHAGOSCOPY, GASTROSCOPY, DUODENOSCOPY (EGD), COMBINED N/A 9/27/2019    Procedure: Upper Endoscopy (EGD) With Biopsies;  Surgeon: Aniya Wei MD;  [facility]: [facility]     GRAFT CARTILAGE FROM POSTERIOR AURICLE Left 10/6/2016    Procedure: GRAFT CARTILAGE FROM POSTERIOR AURICLE;  Surgeon: Tyler Richards MD;  [facility]: [facility]     INCISION AND DRAINAGE PERINEAL, COMBINED Bilateral 7/18/2015    Procedure: COMBINED INCISION AND DRAINAGE PERINEAL;  Surgeon: Dequan Timmons MD;  [facility]: [facility]     OPTICAL TRACKING SYSTEM CRANIOTOMY, EXCISE TUMOR, COMBINED N/A 4/13/2015    Procedure: COMBINED OPTICAL TRACKING SYSTEM CRANIOTOMY, EXCISE TUMOR;  Surgeon: Francis Velazquez MD;  [facility]: [facility]     OPTICAL TRACKING SYSTEM CRANIOTOMY, EXCISE TUMOR, COMBINED N/A 4/16/2015    Procedure: COMBINED OPTICAL TRACKING SYSTEM CRANIOTOMY, EXCISE TUMOR;  Surgeon: Francis Velazquez MD;  [facility]     OPTICAL TRACKING SYSTEM CRANIOTOMY, EXCISE TUMOR, COMBINED Bilateral 5/28/2015    Procedure: COMBINED OPTICAL TRACKING SYSTEM CRANIOTOMY, EXCISE TUMOR;  Surgeon: Francis Velazquez MD;  [facility]     OPTICAL TRACKING SYSTEM CRANIOTOMY, EXCISE TUMOR, COMBINED Bilateral 1/14/2016    Procedure: COMBINED OPTICAL TRACKING SYSTEM CRANIOTOMY, EXCISE TUMOR;  Surgeon: Francis Velazquez MD;  [facility]     OPTICAL TRACKING SYSTEM VENTRICULOSTOMY  4/16/2015    Procedure: OPTICAL TRACKING SYSTEM  VENTRICULOSTOMY;  Surgeon: Francis Velazquez MD;  [facility]     REMOVE PORT VASCULAR ACCESS N/A 10/6/2016    Procedure: REMOVE PORT VASCULAR ACCESS;  Surgeon: Bruno Perea MD;  [facility]     RHINOPLASTY N/A 10/6/2016    Procedure: RHINOPLASTY;  Surgeon: Tyler Richards MD;  [facility]     VASCULAR SURGERY  5-2015    single lumen power port       ALLERGY                                Blood transfusion related (informational only) and No known drug allergies     MEDICATIONS                               Current Outpatient Medications   Medication     calcium carbonate-vitamin D 600-400 MG-UNIT CHEW     dexamethasone (DECADRON) 0.5 MG tablet     fexofenadine (ALLEGRA) 180 MG tablet     OLANZapine (ZYPREXA) 25 MG     potassium phosphate, monobasic, (K-PHOS) 500 MG tablet     sertraline (ZOLOFT) 100 MG tablet     study - entinostat (IDS# 5050) 1 mg tablet     sulfamethoxazole-trimethoprim (BACTRIM/SEPTRA) 400-80 MG tablet     traZODone (DESYREL) 75 MG     vitamin D3 (CHOLECALCIFEROL) 2000 units (50 mcg) tablet     vitamin E (TOCOPHEROL) 400 units (360 mg) capsule     linaclotide (LINZESS) 290 MCG capsule     omeprazole (PRILOSEC) 20 MG DR capsule     order for DME     study - entinostat (IDS# 5050) 1 mg tablet     study - entinostat (IDS# 5050) 5 mg tablet       VITALS                                                                                                                          3, 3   /77   Pulse 96      MENTAL STATUS EXAM                                                                                           9, 14 cog gs     Alertness: alert  and oriented  Appearance: casually groomed, seated in wheelchair, wearing R-sided eye-patch  Behavior/Demeanor: cooperative, with fair eye contact   Speech: prominent dysarthria  Language: good  Psychomotor: mild evident palsy of upper extremities and facial paralysis  Mood: worried and low moods due to his beliefs  Affect:  "restricted; was congruent to mood; was congruent to content  Thought Process/Associations: continued rumination [details in Interim History]  Thought Content:  Reports delusions [details in Interim History];  Denies suicidal ideation and violent ideation  Perception:  Reports none;  Denies auditory hallucinations and visual hallucinations  Insight: partial  Judgment: good  Cognition: (6) oriented: time, person, and place  attention span: intact  concentration: intact  recent memory: intact  remote memory: intact  fund of knowledge: appropriate  Gait and Station: remarkable for:  ataxia - can ambulate but was seen in wheelchair     LABS and DATA     AIMS:  Due.    PHQ9 TODAY = 5.5  PHQ-9 SCORE 2019   PHQ-9 Total Score 6 8 9       ANTIPSYCHOTIC LABS  [glu, A1C, lipids (rohit LDL), liver enzymes, WBC, ANEU, Hgb, plts]  q12 mo  Recent Labs   Lab Test 19  1316 19  1024 10/29/19  1153 10/22/19  1310  19  1100   * 96 84 79   < > 124*   A1C  --   --   --   --   --  5.1    < > = values in this interval not displayed.     Recent Labs   Lab Test 19  1100   CHOL 158   TRIG 236*   LDL 84   HDL 27*     Recent Labs   Lab Test 19  1316 19  1024 10/29/19  1153 10/22/19  1310   AST 18 31 36 21   ALT 17 20 22 26   ALKPHOS 127 131 146 129     Recent Labs   Lab Test 19  1316 19  1024 10/29/19  1153 10/22/19  1310   WBC 4.0 3.9* 4.5 5.3   ANEU 2.1 2.1 2.7 2.8   HGB 11.7* 12.1* 12.3* 12.5*   * 117* 121* 139*     EK2019: 85 BPM, QT/QTcH was 346/389 msec.  Also included comment: \"Abnormal precordial QRS contours - nondiagnostic for this age.\"    EE/15/2019: \"IMPRESSION: Awake and drowsy routine EEG (no video) was normal.  The patient stated he felt dazed during photic stimulation, however, no electrographic seizures or concerning changes on the EEG were seen during that time.  Clinical correlation is advised.  No electrographic seizures, " "epileptiform discharges were seen.\"    DIAGNOSIS     Delusional Disorder, persecutory type, first episode, currently in acute episode  Major Depressive Disorder, single episode, moderate    ASSESSMENT                                                                                                                   m2, h3     TODAY:  Geo Hicks presents to the [facility] clinic for continued medication management of paranoia, delusional thoughts and depressed mood.  He and his father share the good news that Geo has been placed in a supported living apartment in [place] where he will have access to 24-hour care.  Have agreed that this is a positive step towards greater independence, and the hope would be for this transition to [facility] into greater access to day programs, activities, etc.  In the short-term, the stress associated with this move has been somewhat difficult for Geo, and he relates an interim increase in depressed mood with some recent SI thoughts during the past week.  Reports an absence of SI today, and explains that the plan he had imagined was quickly dismissed, denying nayely intent or detailed planning.  Furthermore states \"I don't want to kill myself\" and participates in safety planning.  Father also relates that his perception is that Geo is not unsafe, but that he and Geo's mom will continue to provide constant supervision and support up until the point that Geo transitions into the staffed facility within the next week or two.  Touched base on his paranoia and fears related to his constipation, as described previously, and he allows that this continues to be a stressor.  Agreed to increase sertraline to 150 mg daily for added mood support, and we will plan to meet again sooner than our typical one month in order to provide support as he transitions to the new living facility.  Next appointment in approximately two and a half weeks.    SUICIDE RISK ASSESSMENT: "  Geo Hicks denies present suicidal ideation, however does allow recent fleeting SI thoughts with imagined means of self-imposed dehydration.  Reports these thoughts passed and at no point did he have clear intent.  This patient does have notable risk factors for self-harm including feels trapped, relationship conflicts, new/ worsening medical issue, male and paranoia/ delusions. However, risk is mitigated by no h/o suicide attempt, no planning or intent, describes a safety plan, h/o seeking help when needed, none to minimal alcohol use , commitment to family and stable housing.  Based on all available evidence he does not appear to be at imminent risk for self-harm therefore does not meet criteria for a 72-hr hold/  involuntary hospitalization.  However, based on degree of symptoms therapy, close psych FU and medication adjustments were recommended which the pt did agree to.  Safety plan completed 11/15/2019, provided to patient and his father.    [place] PRESCRIPTION MONITORING PROGRAM [] was not checked today:  not using controlled substances    PSYCHOTROPIC DRUG INTERACTIONS:    Pentoxifylline may enhance the antiplatelet effect of Agents with Antiplatelet Properties.  [Pentoxifylline, Sertraline]     CNS Depressants may enhance the adverse/toxic effect of Selective Serotonin Reuptake Inhibitors. Specifically, the risk of psychomotor impairment may be enhanced.  [Olanzapine, Sertraline]    MANAGEMENT:  Monitoring for adverse effects, routine vitals, routine labs, periodic EKGs and patient/family aware of risks     PLAN                                                                                                                                m2, h3     1) PSYCHOTROPIC MEDICATIONS:  Increase sertraline to 150 mg daily.  Continue olanzapine 25 mg at bedtime.  Continue trazodone 75 mg at bedtime.    2) THERAPY:  Continue with Manju.    3) NEXT DUE:    Labs- AP labs due Feb 2020  EKG- PRN  Rating Scales- AIMS  due    4) REFERRALS:  None today.    5) RTC: 2-3 weeks    6) CRISIS NUMBERS:   Provided routinely in [facility].  [facility] [phone] (clinic)    [phone] (after hours)  CRISIS TEXT LINE: Text [id number] from anywhere in [place], anytime, any crisis 24/7;  OR SEE www.crisistextline.org    TREATMENT RISK STATEMENT:  The risks, benefits, alternatives and potential adverse effects have been discussed and are understood by the pt. The pt understands the risks of using street drugs or alcohol. There are no medical contraindications, the pt agrees to treatment with the ability to do so. The pt knows to call the clinic for any problems or to access emergency care if needed.  Medical and substance use concerns are documented above.  Psychotropic drug interaction check was done, including changes made today.     PROVIDER:  Justice Fay,     Patient staffed in clinic with Dr. Emery who will sign the note.  Supervisor is Dr. Emery.  I saw the patient with the resident, and participated in key portions of the service, including the mental status examination and developing the plan of care. I reviewed key portions of the history with the resident. I agree with the findings and plan as documented in this note.    Marilia Emery MD

## 2019-11-16 ASSESSMENT — ENCOUNTER SYMPTOMS
FALLS: 1
SPEECH CHANGE: 1
RESPIRATORY NEGATIVE: 1
CARDIOVASCULAR NEGATIVE: 1
TREMORS: 1
WEAKNESS: 1

## 2019-11-18 ENCOUNTER — HOSPITAL ENCOUNTER (OUTPATIENT)
Dept: OCCUPATIONAL THERAPY | Facility: CLINIC | Age: 19
Setting detail: THERAPIES SERIES
End: 2019-11-18
Attending: FAMILY MEDICINE
Payer: COMMERCIAL

## 2019-11-18 PROCEDURE — 97535 SELF CARE MNGMENT TRAINING: CPT | Mod: GO | Performed by: OCCUPATIONAL THERAPIST

## 2019-11-19 DIAGNOSIS — C71.9 EPENDYMOMA (H): ICD-10-CM

## 2019-11-19 LAB
BASOPHILS # BLD AUTO: 0 10E9/L (ref 0–0.2)
BASOPHILS NFR BLD AUTO: 0.2 %
DIFFERENTIAL METHOD BLD: ABNORMAL
EOSINOPHIL # BLD AUTO: 0.3 10E9/L (ref 0–0.7)
EOSINOPHIL NFR BLD AUTO: 5 %
ERYTHROCYTE [DISTWIDTH] IN BLOOD BY AUTOMATED COUNT: 14.1 % (ref 10–15)
HCT VFR BLD AUTO: 37.7 % (ref 40–53)
HGB BLD-MCNC: 11.9 G/DL (ref 13.3–17.7)
LYMPHOCYTES # BLD AUTO: 1.2 10E9/L (ref 0.8–5.3)
LYMPHOCYTES NFR BLD AUTO: 24.1 %
MCH RBC QN AUTO: 27.7 PG (ref 26.5–33)
MCHC RBC AUTO-ENTMCNC: 31.6 G/DL (ref 31.5–36.5)
MCV RBC AUTO: 88 FL (ref 78–100)
MONOCYTES # BLD AUTO: 0.9 10E9/L (ref 0–1.3)
MONOCYTES NFR BLD AUTO: 18.5 %
NEUTROPHILS # BLD AUTO: 2.6 10E9/L (ref 1.6–8.3)
NEUTROPHILS NFR BLD AUTO: 52.2 %
PLATELET # BLD AUTO: 106 10E9/L (ref 150–450)
RBC # BLD AUTO: 4.3 10E12/L (ref 4.4–5.9)
WBC # BLD AUTO: 5 10E9/L (ref 4–11)

## 2019-11-19 PROCEDURE — 83735 ASSAY OF MAGNESIUM: CPT | Performed by: NURSE PRACTITIONER

## 2019-11-19 PROCEDURE — 84100 ASSAY OF PHOSPHORUS: CPT | Performed by: NURSE PRACTITIONER

## 2019-11-19 PROCEDURE — 85025 COMPLETE CBC W/AUTO DIFF WBC: CPT | Performed by: NURSE PRACTITIONER

## 2019-11-19 PROCEDURE — 36415 COLL VENOUS BLD VENIPUNCTURE: CPT | Performed by: NURSE PRACTITIONER

## 2019-11-19 PROCEDURE — 80053 COMPREHEN METABOLIC PANEL: CPT | Performed by: NURSE PRACTITIONER

## 2019-11-19 NOTE — PROGRESS NOTES
SUMMARY OF NEUROPSYCHOLOGICAL EVALUATION  PEDIATRIC NEUROPSYCHOLOGY CLINIC  DIVISION OF CLINICAL BEHAVIORAL NEUROSCIENCE     Name: Geo Hicks   MRN:  [id number]   YOB: 2000   Date of Visit:   10/29/2019     EVALUATION REPORT    Reason for Re-Evaluation   Geo is a 19-year-old  male with a history of ependymoma (brain tumor) that was diagnosed at age 14. He has undergone multiple tumor resections, chemotherapy, and radiation treatment. He also experienced an intra-tumoral hemorrhage (brain bleed) in May 2015 that resulted in neurological changes including facial numbness, significant loss of mobility, and dysarthria. Geo is currently on COG study [id number] with [employer]. He has been evaluated at this clinic on three previous occasions with the most recent being January 2019. Geo has returned for a follow-up evaluation to provide updated information regarding his neurocognitive and behavioral functioning in the context of his brain tumor and subsequent treatments.    Previous Evaluations:    Geo was first evaluated at this clinic in February 2016. Results from the evaluation revealed strong neurocognitive functioning in the domains of memory, working memory, and verbal comprehension. His adaptive functioning skills in daily life were impaired, which was predominantly driven by Geo s declines in motor skills and visual perception due to his medical condition. Emotionally, Geo endorsed moderate emotional difficulties.      He was re-evaluated in February 2017. Results from that evaluation indicated relative stability in his foundational thinking abilities in comparison to his previous evaluation, as well as intact non-verbal skills. He had significant impairment on a task of visual-motor processing speed due to his sensorimotor limitation. Geo s adaptive functioning skills remained unchanged in comparison to his previous evaluation. He continued to endorse mild to moderate  emotional difficulties.    Results from Geo s most recent evaluation in January 2019 revealed intact and consistent neurocognitive functioning, along with consistent memory and learning skills in comparison to his 2017 evaluation. He continued to experience difficulty on a task of visual-motor processing speed. His adaptive functioning skills showed an increase in growth, as well as an improvement in self-reported feelings of sadness and anxiety. Emerging emotional difficulties related to inflexible beliefs that could not be shaken despite evidence to the contrary were reported in which Geo would become very upset when those beliefs were shaken, and he would refuse to consider alternative ideas about his thoughts. Geo would shut people out and felt they were conspiring against him.  He was diagnosed with a delusional disorder and psychiatric intervention was recommended.      Relevant History: Updated background information was gathered via interviews with Geo and his mother, and review of available records. Geo s birth, developmental, medical, family, educational, and social histories have been thoroughly documented in his previous evaluation reports and will not be repeated here. For complete background information, the interested reader is referred to Geo s neuropsychological evaluation reports dated February 2016, February 2017, and January 2019, as well as his medical records.    As a review, Geo was in good health until April 2015 when he presented at the Emergency Department with an abnormal headache and decreased coordination. A CT scan of Geo s head showed a 4cm mass in the posterior fossa, originating in the cerebellar vermis or fourth ventricle with mass effect on the brain stem. Geo underwent a sub-occipital craniotomy for partial resection of the tumor, with a plan for chemotherapy to follow. In May 2015, Geo presented with acute neurological changes including facial  numbness, dizziness, severe headaches, and dysarthria. A CT scan showed an intra-tumoral hemorrhage (brain bleed) with increased local mass effect and cerebellar tonsillar herniation. Geo underwent a second sub-occipital craniotomy for tumor debulking and evacuation of the intra-tumoral hematoma. Diagnostic tests completed during this surgery identified Geo s tumor as a grade II ependymoma, a different form of cancer than was originally suspected. Geo was subsequently started on [facility] [place] [facility]-[id number]. This treatment protocol included chemotherapy with vincristine, carboplatin, cyclophosphamide, etoposide, and cisplatin, as well as radiation.      In January 2016, Geo underwent a third sub-occipital craniotomy as MRI studies revealed that his tumor had increased in size extending into the david, midbrain, and bilateral cerebellar hemispheres. A routine follow-up MRI in December 2016 revealed continued tumor enhancement, as well as a new nodule in his L4 vertebrae. As a result, he was started on an experimental phase I trial, COG [id number] with [employer] on 01/10/2017.    As a result of Geo s tumor, inter-tumoral hemorrhage, and subsequent treatments, he has a number of functional limitations. He continues to experience significant gross- and fine-motor coordination difficulties. He requires a wheelchair for mobility and is unable to complete fine-motor tasks independently (e.g., writing, brushing teeth). Additionally, he continues to have significant vision challenges including diplopia for which he wears an eye-patch on alternating eyes and ocular-motor dysfunction. He has pronounced articulation challenges due to motor-speech impairment (dysarthria) that impact the intelligibility of his speech.    Geo s medical history is also significant for moderate obstructive sleep apnea, bilateral mild sensorineural hearing loss, esotropia of the eyes, and chronic constipation and abdominal complaints.     Updated  History:  Since his last visit at this clinic, Geo has begun psychiatric services for depression and delusions/paranoia. He has also begun aquatic-based physical therapy to help with functional mobility. Geo was discharged from his previous physical therapy services where he was working on targeting pelvic floor issues. He continues to attend occupational therapy and psychotherapy services. His most recent MRI was on 08/07/2019 and revealed no significant change in the ependymoma centered within the fourth ventricle, but there was a slight increase in a cystic lesion adjacent to the ependymoma when compared to his previous MRI. Geo remains enrolled on COG [id number] with [employer] and is doing well overall. He is currently prescribed olanzapine, trazadone, sertraline, Linzess, Decadron, Prilosec, Allegra, K-Phos, and vitamins.     Geo reported typically going to bed around 10:00pm and waking up at 10:00am most days. He will play on his phone for [id number] minutes before falling asleep. When he wakes up, he usually feels sleepy. Geo feels his mood has improved since beginning medication.    Geo continues to share time between his mother s and father s homes (one week at each home). Both parents are remarried and have been awarded guardianship of Geo. The families work well together. College remains of no interest to Geo.    Geo is described as very amicable. He is generally happy and cares about the wellbeing of others. He is accepting of most of his disabilities and acknowledges them being the result of his medical history. Geo continues to believe his inability to walk is purposely being caused by his medical care team. Per parent report, Geo is convinced that his medical care team can cure his immobility but choose not to.      Behavioral Observations   Geo was accompanied to the appointment by his mother. He presented as a casually dressed, appropriately groomed young man  who appeared his chronological age. Vision and hearing appeared adequate for testing purposes as Geo was wearing an eye patch over his right eye, which he wears to correct diplopia. Upon being greeted, Geo was seated in his wheelchair. He allowed his mother to push his chair to the testing room.     To help establish and maintain rapport, the examiner engaged in casual conversation with Geo throughout the evaluation. He was presented with a variety of visual and verbal tasks, as well as rating scales to complete. Geo cooperated throughout the testing session. Similar to previous evaluations, Geo had difficulty keeping his mouth closed, which contributed to oral secretions. He was mindful of this and continuously wiped his mouth with the sleeve of his sweatshirt. The examiner offered a Kleenex and paper towel multiple times, but Geo declined each time.     Across the evaluation, mood was neither elevated nor depressed, and affect was appropriate in range and intensity. Rapport was easily established and maintained throughout. Eye-contact was appropriate. Similar to previous evaluations, the rate and prosody of Geo s speech was unremarkable. He continued to demonstrate minor difficulty with modulating the tone of his voice, as well as significant articulation difficulties that impacted the intelligibility of his speech. When Geo exhibited short responses, intelligibility was clearer. The longer of a response he provided, the level of intelligibility decreased, and he was asked to repeat his statements. Geo was patient whenever asked to repeat his statement. His overall attitude was cooperative and friendly, and he completed each of the various tasks presented to him. Language comprehension was adequate given that he did not require any instruction modifications or repetitions. Geo offered both reciprocal and spontaneous conversation. His approach to tasks was thoughtful, and he maintained  persistence on more challenging tasks. If he was unsure of a response, he was generally willing to take a guess.     Geo was alert and oriented to his surroundings. His attention appeared focused and engaged during the evaluation. Geo demonstrated good motivation and effortful responding. Overall, given his level of cooperation and effort, the following results are an accurate representation of Geo s current neuropsychological functioning while in an optimal (i.e., quiet, structured, one-on-one) environment.    Interview:  Geo openly conversed with the examiner. He spoke of his birthday the previous day and how he had celebrated a few days prior by going to [employer] with family members. In his free time, Geo enjoys playing videogames (e.g., Grand Theft Auto and [employer]) and watching [employer]. He remains in contact with his friends through texting more so than seeing them in person. He stated that he does not want to go to college because he does not like the  kids  and he was concerned that nobody would talk to him. Geo reported feeling  good  most days. Being able to see his friends is something that brings him happiness. He could not identify any triggers for sadness or worry. Geo was clearly able to state that he becomes frustrated when people do not understand him or what he is saying, or when they do not see or understand his perspective. When asked about future goals, Geo shared that he would like to be walking and go to [facility].    As a safety check, Geo reported feeling safe in both households. He denied any substance use and/or abuse and thoughts of self-harm.      Neuropsychological Evaluation Methods and Instruments  Review of Records  Clinical Interview  Wechsler Adult Intelligence Scale, 4th Edition   Visual Puzzles   Cancellation  Terri-Steward Executive Function System  Sorting Test  [place] Verbal Learning Test, 2nd Edition  Adaptive Behavior Assessment  System, 3rd Edition  Behavior Rating Inventory of Executive Functioning, Adult   Self-Report   Informant Report (Parent)  Behavior Assessment System for Children, 3rd Edition, Parent Form  Hobson Depression Inventory, 2nd Edition  Hobson Anxiety Inventory    A full summary of test scores is provided in tables at the end of this report.    Results and Impressions   Selected subtests from the intellectual measure were administered to determine if previous areas of difficulty remained.  On the measure where Geo needed to complete visual puzzles he showed good improvement from his previous score.  He continues to have difficulty with fine motor control which negatively affected his ability to quickly find targets and underline them on a page.  His adaptive behavior, as rated by his mother, continues to show difficulties in the areas of self-care, social skills, self-direction and leisure activities with average performance in the area of health and safety, communication, and functional academics.    Geo s memory for oral information was re-evaluated.  Previous testing indicated difficulty with learning new material and retaining this information over time.  Current testing shows improvement in Geo s ability to learn and retain information over a short period of time.  Continued difficulty is present in retaining information over a longer period of time.  He showed improvement in his ability to recall information when provided with cues.  These findings show improvement from previous evaluations.    Geo s attention was rated by his mother as well as himself as being adequate.  Executive functioning are those skills including planning, organization, emotional control, cognitive flexibility, and the ability to take another person s perspective.  Geo rating scale of these skills was basically in the average range for his age with a slight elevation in his ability to shift from one activity to another.  His  mother s ratings of his executive functioning were in the average range, with the exception of a slight elevation in his ability to initiate tasks.  Direct testing of his ability to take another person s perspective indicated difficulty in this area.  He was asked to sort objects based on various aspects of the objects.  When he sorted the objects, he showed average performance.  However, when the examiner sorted the objects, Geo experienced significant difficulty with being able to determine how the objects were sorted.  This difficulty likely translates into difficulty with understanding another person s point of view.  Geo indicated that this difficulty becomes quite frustrating for him as he doesn t feel other people understand him--it is likely that he has difficulty understanding their perspective.    Emotionally Geo indicated that he continues to feel some difficulty with sadness but that it has improved over time and with medication.  Geo denied significant feelings of anxiety at this time while in the past these scores have been higher.  Parent ratings of Geo s emotional functioning indicated no areas of significant concern.  Interviews with Geo and his mother indicated continued feelings of sadness and difficulty in motivating himself to try to new activities.  While there is improvement in his mood, Geo continues to be at risk for depression.  Since he is now under the care of a psychiatrist, we did not interview around his feelings that his medical team was not being helpful to him.  His mother reported that these feelings continue and likely interfere with his compliance with directives.  He is participating in therapy to work on these issues and it is important that this intervention continue.    Diagnoses  F32.9  Major Depressive Disorder, single episode, unspecified   C71.9  Ependymoma  Z92.21  History of chemotherapy  R47.1  Dysarthria      Recommendations    It is recommended  that Geo continue working with his psychiatrist and his medical team to assist in providing him with improvement.    It is also strong recommended that he continue working with his therapist and that continued working on his ability to take other s perspectives will be important.  Rather than challenging his strongly held belief that his medical team is not helping him as much as they could, it will be important to help him think about ways to work together.    It is also recommended that Geo consider enrolling in an online program to further his education.  His therapist can help with this in that at this point in time Geo is not actively pursuing any vocational support  It may be important to contact his [facility]  and inquire as to any rehabilitation programs that may be available to Geo--he has used these services in the past.    A re-evaluation of Geo s skills in a year would be important to continue to monitor his progress.          It has been a pleasure working with Geo and his family. If you have any questions or concerns regarding this evaluation, please call the Pediatric Neuropsychology Department at [phone].      Nina Lim, Psy.S.  Psychometrist  Pediatric Neuropsychology   Division of Clinical Behavioral Neuroscience       Veronica Padilla, Ph.D., L.P., [facility]  Professor of Pediatrics  Division of Clinical Behavioral Neuroscience  [facility]         PEDIATRIC NEUROPSYCHOLOGY CLINIC TEST SCORES    Note: The test data listed below use one or more of the following formats:      Standard Scores have an average of 100 and a standard deviation of 15 (the average range is 85 to 115).    Scaled Scores have an average of 10 and a standard deviation of 3 (the average range is 7 to 13).    T-Scores have an average of 50 and a standard deviation of 10 (the average range is 40 to 60).    Z-Scores have an average of 0 and a standard deviation of 1 (the  average range is -1 to +1).      COGNITIVE Functioning  Wechsler Adult Intelligence Scale, Fourth Edition   Standard scores from 85 - 115 represent the average range of functioning.  Scaled scores from 7 - 13 represent the average range of functioning.    Index 2016 Standard Score 2017 Standard Score 01/2019 Standard Score   Verbal Comprehension 110 116 108   Perceptual Reasoning -- 100* 88*   Working Memory 102 97 102     Subtest 2016   Scaled Score 2017   Scales Score 01/2019   Scaled Score Current   Scaled Score   Similarities 9 12 9 --   Vocabulary 13 14 12 --   Information 14 13 14 --   Matrix Reasoning -- 11 10 --   Visual Puzzles -- 9 6 9   Digit Span 9 8 9 --   Arithmetic 12 11 12 --   Cancellation -- 1 4 3   *Due to the potential for Geo nunez current motor challenges to affect results, one subtest from this index was not administered (Block [employer]). The standard score was derived by prorating scores from two subtests that did not rely on motor functions (Matrix Reasoning and Visual Puzzles).      ATTENTION AND EXECUTIVE FUNCTIONING  Terri-Steward Executive Function System Sorting Test  Scaled scores from 7 - 13 represent the average range of functioning.    Measure Scaled Score   Confirmed Correct Sorts 8   Free Sorting Description Score 8   Sort Recognition Description Score 4   Combined Description Score 6     Terri-Steward Executive Function System Proverbs Test (2017)  Scaled scores from 7 - 13 represent the average range of functioning.   Percentile scores 16-84 represent the average range.    Measure Scaled Score   Total Achievement Score: Free Inquiry 14        Percentile   Total Achievement Score: Multiple Choice 100%     Behavior Rating Inventory of Executive Function, Adult Version  T-scores 65 and higher are considered to be in the  clinically significant  range.    Index/Scale 01/2019   Self T-Score Current   Self T-Score 01/2019   Parent T-Score Current   Parent T-Score   Inhibit 43 46 54 45    Shift 47 60 69 53   Emotional Control 38 43 54 56   Self-Monitor 46 37 47 58   Behavioral Regulation Index 41 45 55 53   Initiate 40 47 57 60   Working Memory 49 53 45 58   Plan/Organize 46 41 47 49   Task-Monitor 40 45 45 49   Organization of Materials 47 42 39 49   Metacognition Index 44 44 46 53   Global Executive Composite 42 44 50 53     Behavior Rating Inventory of Executive Function, Second Edition (2016)  T-scores 65 and higher are considered to be in the  clinically significant  range.    Index/Scale Parent T-Score Teacher T-Score   Inhibit 43 44   Self-Monitor 44 43   Behavior Regulation Index 43 43   Shift 50 55   Emotional Control 41 46   Emotion Regulation Index 45 50   Initiate 51 48   Working Memory 48 65   Plan/Organize 45 63   Task Monitor 59 60   Organization of Materials 46 49   Cognitive Regulation Index 49 59   Global Executive Composite 47 54       MEMORY FUNCTIONING  [place] Verbal Learning Test, Second Edition   T-scores from 40 - 60 represent the average range of functioning.  Z-scores from -1.0 to 1.0 represent the average range of functioning. Higher scores are better unless indicated (*)    Measure Raw Score T-Score    2017 01/19 Current 2017 01/19 Current   List A Total Trials 1-5 42 44 40 39 41 39            Measure    Z-Score       2017 01/19 Current   List A Trial 1 Free Recall 5 5 7 -1.0 -1.0 0   List A Trial 5 Free Recall 9 10 10 -2.0 -1.5 -1.0   List B Free Recall 4 5 5 -1.5 -1.0 -1.0   List A Short-Delay Free Recall 6 6 8 -2.0 -2.0 -1.0   List A Short-Delay Cued Recall 8 11 10 -2.0 -0.5 -0.5   List A Long-Delay Free Recall 9 10 6 -1.0 -1.0 -2.0   List A Long-Delay Cued Recall 9 12 11 -1.5 -0.5 -0.5   Correct Recognition Hits 15 14 15 -0.5 -1.0 0.0   False Positives* 0 3 2 -0.5 1.0 0.5   Discriminability 3.7 2.5 3.1 0.5 -1.5 0.0   *A lower score is better    [place] Verbal Learning Test, Second Edition, Short Form (2016)  T-scores from 40 - 60 represent the average range of  functioning.  Z-scores from -1.0 to 1.0 represent the average range of functioning. Higher scores are better unless indicated (*)     Measure Raw Score T-Score   List A Total Trials 1-5 29 48         Measure  Z-Score   Total Learning Bradford Trials 1-5 1.3 3.0   List A Short-Delay Free Recall 6 -2.0   List A Long-Delay Free Recall 8 0.5   List A Long-Delay Cued Recall 8 0.0   Correct Recognition Hits 9 -0.5   False Positives 0 0.0   Discriminability 3.5 0.0   Repetitions* 4 1.5   Intrusions* 1 0.5   *A lower score is better        ADAPTIVE FUNCTIONING  Adaptive Behavior Assessment System, Third Edition  Scaled Scores from 7- 13 represent the average range of functioning.  Composite Scores from 85 - 115 represent the average range of functioning.    Skill Area 2016   Scaled Score 2017   Scaled Score 01/19   Scaled Score Current   Scaled Score   Communication 10 9 9 8   Community Use 1 3 6 5   Functional Academics 5 5 7 8   Home Living 2 2 3 4   Health and Safety 2 1 4 7   Leisure 1 4 5 5   Self-Care 4 2 5 5   Self-Direction 6 4 3 5   Social 7 5 9 6     Composite 2016   Standard Score 2017   Standard Score 01/19   Standard Score Current Standard Score   Conceptual 83 78 80 84   Social 73 77 87 78   Practical 55 54 67 71   General Adaptive Composite 66 65 75 75       EMOTIONAL AND BEHAVIORAL FUNCTIONING  Behavior Assessment System for Children, Third Edition, Parent Form  For the Clinical Scales on the BASC-3, scores ranging from 60-69 are considered to be in the  at-risk  range and scores of 70 or higher are considered  clinically significant.   For the Adaptive Scales, scores between 30 and 39 are considered to be in the  at-risk  range and scores of 29 or lower are considered  clinically significant.      Clinical Scales 01/19 T-Score Current T-Score   Hyperactivity 45 44   Aggression 42 42   Conduct Problems  43 42   Anxiety 47 44   Depression 46 46   Somatization 49 51   Atypicality 56 53   Withdrawal 53 55    Attention Problems 47 49        Adaptive Scales     Adaptability 53 41   Social Skills 47 38   Leadership 33 33   Activities of Daily Living 46 43   Functional Communication 47 42        Composite Indices     Externalizing Problems 43 43   Internalizing Problems 47 47   Behavioral Symptoms Index 48 48   Adaptive Skills 45 38     Hobson Depression Inventory, Second Edition  Total scores between 0-13 = Minimal Depression, 14-19 = Mild Depression, 20-28 = Moderate Depression, and 29-63 = Severely Depression     2016 Total Score 2017 Total Score 01/19 Total Score Current Total Score   26 15 9 19      Hobson Anxiety Inventory  Total scores between 0-7 = Minimal Anxiety, 8-15 = Mild Anxiety, 16-25 = Moderate Anxiety, and 26-63 = Severe Anxiety     2016 Total Score 2017 Total Score 01/19 Total Score Current Total Score   16 23 10 0           Neuropsychological testing was administered on 10/29/2019 by psychometrist, Nina Lim, under my direct supervision. Total time spent in test administration and scoring by psychometrist was 3.5 hours. ([id number] & [id number])     Neuropsychological evaluation was completed on 10/29/2019 by Veronica Padilla, PhD, , [facility]. Total time spent on evaluation, including interview, face-to-face, record review, data integration, feedback and report writing, was 5 hours. ([id number] & [id number])

## 2019-11-20 ENCOUNTER — MEDICAL CORRESPONDENCE (OUTPATIENT)
Dept: HEALTH INFORMATION MANAGEMENT | Facility: CLINIC | Age: 19
End: 2019-11-20

## 2019-11-20 LAB
ALBUMIN SERPL-MCNC: 3.3 G/DL (ref 3.4–5)
ALP SERPL-CCNC: 130 U/L (ref 40–150)
ALT SERPL W P-5'-P-CCNC: 22 U/L (ref 0–70)
ANION GAP SERPL CALCULATED.3IONS-SCNC: 5 MMOL/L (ref 3–14)
AST SERPL W P-5'-P-CCNC: 22 U/L (ref 0–45)
BILIRUB SERPL-MCNC: 0.2 MG/DL (ref 0.2–1.3)
BUN SERPL-MCNC: 19 MG/DL (ref 7–30)
CALCIUM SERPL-MCNC: 8.8 MG/DL (ref 8.5–10.1)
CHLORIDE SERPL-SCNC: 105 MMOL/L (ref 94–109)
CO2 SERPL-SCNC: 29 MMOL/L (ref 20–32)
CREAT SERPL-MCNC: 1.13 MG/DL (ref 0.66–1.25)
GFR SERPL CREATININE-BSD FRML MDRD: >90 ML/MIN/{1.73_M2}
GLUCOSE SERPL-MCNC: 98 MG/DL (ref 70–99)
MAGNESIUM SERPL-MCNC: 2.3 MG/DL (ref 1.6–2.3)
PHOSPHATE SERPL-MCNC: 3.5 MG/DL (ref 2.5–4.5)
POTASSIUM SERPL-SCNC: 4 MMOL/L (ref 3.4–5.3)
PROT SERPL-MCNC: 6.4 G/DL (ref 6.8–8.8)
SODIUM SERPL-SCNC: 139 MMOL/L (ref 133–144)

## 2019-11-25 ENCOUNTER — TELEPHONE (OUTPATIENT)
Dept: PEDIATRIC HEMATOLOGY/ONCOLOGY | Facility: CLINIC | Age: 19
End: 2019-11-25

## 2019-11-25 NOTE — TELEPHONE ENCOUNTER
Geo recently moved to a group residential home. Recieved a call this am from Alexandra- the RN who manages the home. She called to report that Geo's day 22 dose of Etinostat was given on Day 17 by mistake. She was just made aware of this today. Discussed with Dr. Wilder and PRISCILLA- We will reach out to study chair to determine if any additional testing is needed. I instructed the home RN that we will get labs tomorrow as previously scheduled. We will hold this weeks dose (as it was already given) and we will resume normal dosing if labs are adequate. Will await any further instruction from the study enma. Home RN verbalized understanding of plan. I also called Geo's parents to be sure they are aware. Will continue to follow.

## 2019-11-26 DIAGNOSIS — C71.9 EPENDYMOMA (H): ICD-10-CM

## 2019-11-26 LAB
BASOPHILS # BLD AUTO: 0 10E9/L (ref 0–0.2)
BASOPHILS NFR BLD AUTO: 0.3 %
DIFFERENTIAL METHOD BLD: ABNORMAL
EOSINOPHIL # BLD AUTO: 0.2 10E9/L (ref 0–0.7)
EOSINOPHIL NFR BLD AUTO: 4.7 %
ERYTHROCYTE [DISTWIDTH] IN BLOOD BY AUTOMATED COUNT: 14.4 % (ref 10–15)
HCT VFR BLD AUTO: 40.2 % (ref 40–53)
HGB BLD-MCNC: 12.7 G/DL (ref 13.3–17.7)
LYMPHOCYTES # BLD AUTO: 1 10E9/L (ref 0.8–5.3)
LYMPHOCYTES NFR BLD AUTO: 30.1 %
MCH RBC QN AUTO: 27.8 PG (ref 26.5–33)
MCHC RBC AUTO-ENTMCNC: 31.6 G/DL (ref 31.5–36.5)
MCV RBC AUTO: 88 FL (ref 78–100)
MONOCYTES # BLD AUTO: 0.4 10E9/L (ref 0–1.3)
MONOCYTES NFR BLD AUTO: 11.5 %
NEUTROPHILS # BLD AUTO: 1.8 10E9/L (ref 1.6–8.3)
NEUTROPHILS NFR BLD AUTO: 53.4 %
PLATELET # BLD AUTO: 106 10E9/L (ref 150–450)
RBC # BLD AUTO: 4.57 10E12/L (ref 4.4–5.9)
WBC # BLD AUTO: 3.4 10E9/L (ref 4–11)

## 2019-11-26 PROCEDURE — 84100 ASSAY OF PHOSPHORUS: CPT | Performed by: NURSE PRACTITIONER

## 2019-11-26 PROCEDURE — 83735 ASSAY OF MAGNESIUM: CPT | Performed by: NURSE PRACTITIONER

## 2019-11-26 PROCEDURE — 36415 COLL VENOUS BLD VENIPUNCTURE: CPT | Performed by: NURSE PRACTITIONER

## 2019-11-26 PROCEDURE — 85025 COMPLETE CBC W/AUTO DIFF WBC: CPT | Performed by: NURSE PRACTITIONER

## 2019-11-26 PROCEDURE — 80053 COMPREHEN METABOLIC PANEL: CPT | Performed by: NURSE PRACTITIONER

## 2019-11-27 ENCOUNTER — OFFICE VISIT (OUTPATIENT)
Dept: PODIATRY | Facility: CLINIC | Age: 19
End: 2019-11-27
Payer: COMMERCIAL

## 2019-11-27 VITALS
DIASTOLIC BLOOD PRESSURE: 70 MMHG | HEIGHT: 71 IN | BODY MASS INDEX: 29.82 KG/M2 | WEIGHT: 213 LBS | SYSTOLIC BLOOD PRESSURE: 110 MMHG

## 2019-11-27 DIAGNOSIS — S92.355D NONDISPLACED FRACTURE OF FIFTH METATARSAL BONE, LEFT FOOT, SUBSEQUENT ENCOUNTER FOR FRACTURE WITH ROUTINE HEALING: Primary | ICD-10-CM

## 2019-11-27 LAB
ALBUMIN SERPL-MCNC: 3.5 G/DL (ref 3.4–5)
ALP SERPL-CCNC: 160 U/L (ref 40–150)
ALT SERPL W P-5'-P-CCNC: 19 U/L (ref 0–70)
ANION GAP SERPL CALCULATED.3IONS-SCNC: 4 MMOL/L (ref 3–14)
AST SERPL W P-5'-P-CCNC: 19 U/L (ref 0–45)
BILIRUB SERPL-MCNC: 0.3 MG/DL (ref 0.2–1.3)
BUN SERPL-MCNC: 19 MG/DL (ref 7–30)
CALCIUM SERPL-MCNC: 9.1 MG/DL (ref 8.5–10.1)
CHLORIDE SERPL-SCNC: 106 MMOL/L (ref 94–109)
CO2 SERPL-SCNC: 30 MMOL/L (ref 20–32)
CREAT SERPL-MCNC: 1.41 MG/DL (ref 0.66–1.25)
GFR SERPL CREATININE-BSD FRML MDRD: 71 ML/MIN/{1.73_M2}
GLUCOSE SERPL-MCNC: 151 MG/DL (ref 70–99)
MAGNESIUM SERPL-MCNC: 2.1 MG/DL (ref 1.6–2.3)
PHOSPHATE SERPL-MCNC: 4.4 MG/DL (ref 2.5–4.5)
POTASSIUM SERPL-SCNC: 3.8 MMOL/L (ref 3.4–5.3)
PROT SERPL-MCNC: 6.6 G/DL (ref 6.8–8.8)
SODIUM SERPL-SCNC: 140 MMOL/L (ref 133–144)

## 2019-11-27 PROCEDURE — 99207 ZZC FRACTURE CARE IN GLOBAL PERIOD: CPT | Performed by: PODIATRIST

## 2019-11-27 ASSESSMENT — MIFFLIN-ST. JEOR: SCORE: 1992.41

## 2019-11-27 NOTE — PATIENT INSTRUCTIONS
Thank you for choosing [facility]!    DR. ALARCON'S CLINIC SCHEDULE  MONDAY AM - [place] TUESDAY - [place]   [address] [address] [place] [address] [place] [address]   [phone] / [phone] / [phone]       WEDNESDAY - [place] FRIDAY AM - [facility]   [address]   [place] [address] [place] [address]   [phone] / [phone]       FRIDAY PM - [place] SCHEDULE SURGERY: [phone]   [address] [facility] [address] BILLING QUESTIONS: [phone]   [place] [address] AFTER HOURS: [phone]   [phone] / [phone] APPOINTMENTS: [phone]     [employer] (CPL) [phone]       Follow up as needed        BODY WEIGHT AND YOUR FEET  The following information is included in the after visit summary for all patients. Body weight can be a sensitive issue to discuss in clinic, but we think the following information is very important. Although we focus on the feet and ankles, we do support the overall health of our patients.     Many things can cause foot and ankle problems. Foot structure, activity level, foot mechanics and injuries are common causes of pain. One very important issue that often goes unmentioned, is body weight. Extra weight can cause increased stress on muscles, ligaments, bones and tendons. Sometimes just a few extra pounds is all it takes to put one over her/his threshold. Without reducing that stress, it can be difficult to alleviate pain. As Foot & Ankle specialists, our job is addressing the lower extremity problem and possible causes. Regarding extra body weight, we encourage patients to discuss diet and weight management plans with their primary care doctors. It is this team approach that gives you the best opportunity for pain relief and getting you back on your feet.      [facility] has a Comprehensive Weight Management Program. This program  includes counseling, education, non-surgical and surgical approaches to weight loss. If you are interested in learning more either talk to you primary care provider or call [phone].

## 2019-11-27 NOTE — PROGRESS NOTES
"Podiatry / Foot and Ankle Surgery Progress Note    November 27, 2019    Subject: Patient was seen for follow up on left 5th metatarsal fracture. Patient notes he is feeling really good. A little aching but overall good. Here with dad.    Objective:  Vitals: /70   Ht 1.794 m (5' 10.63\")   Wt 96.6 kg (213 lb)   BMI 30.02 kg/m    BMI= Body mass index is 30.02 kg/m .    General:  Patient is alert and orientated.  NAD    Dermatologic: Skin is intact to both lower extremities without significant lesions, rash or abrasion.  No paronychia or evidence of soft tissue infection is noted.     Vascular: DP & PT pulses are intact & regular bilaterally.  No significant edema or varicosities noted.  CFT and skin temperature is normal to both lower extremities.     Neurologic: Lower extremity sensation is intact to light touch.  No evidence of weakness or contracture in the lower extremities.  No evidence of neuropathy.     Musculoskeletal: Patient is ambulatory without assistive device or brace. No pain to palpation of left 5th metatarsal.      Radiographs:   Left foot xrays - Nondisplaced transverse avulsion-type fracture proximal fifth metatarsal.     ASSESSMENT: Nondisplaced fracture of fifth metatarsal bone, left foot, subsequent encounter for fracture with routine healing       PLAN:  Reviewed patient's chart in [facility]. At this time, recommend going back to shoes and inserts. If pain continues, recommend follow up for repeat xray.     Rufina Dasilva DPM, Podiatry/Foot and Ankle Surgery    Weight management plan: Patient was referred to their PCP to discuss a diet and exercise plan.    "

## 2019-11-27 NOTE — LETTER
"    11/27/2019         RE: Geo Hicks  [address] [address] [address]        Dear Colleague,    Thank you for referring your patient, Geo Hicks, to the [facility]. Please see a copy of my visit note below.    Podiatry / Foot and Ankle Surgery Progress Note    November 27, 2019    Subject: Patient was seen for follow up on left 5th metatarsal fracture. Patient notes he is feeling really good. A little aching but overall good. Here with dad.    Objective:  Vitals: /70   Ht 1.794 m (5' 10.63\")   Wt 96.6 kg (213 lb)   BMI 30.02 kg/m     BMI= Body mass index is 30.02 kg/m .    General:  Patient is alert and orientated.  NAD    Dermatologic: Skin is intact to both lower extremities without significant lesions, rash or abrasion.  No paronychia or evidence of soft tissue infection is noted.     Vascular: DP & PT pulses are intact & regular bilaterally.  No significant edema or varicosities noted.  CFT and skin temperature is normal to both lower extremities.     Neurologic: Lower extremity sensation is intact to light touch.  No evidence of weakness or contracture in the lower extremities.  No evidence of neuropathy.     Musculoskeletal: Patient is ambulatory without assistive device or brace. No pain to palpation of left 5th metatarsal.      Radiographs:   Left foot xrays - Nondisplaced transverse avulsion-type fracture proximal fifth metatarsal.     ASSESSMENT:  Nondisplaced fracture of fifth metatarsal bone, left foot, subsequent encounter for fracture with routine healing       PLAN:  Reviewed patient's chart in [facility]. At this time, recommend going back to shoes and inserts. If pain continues, recommend follow up for repeat xray.     Rufina Dasilva DPM, Podiatry/Foot and Ankle Surgery    Weight management plan: Patient was referred to their PCP to discuss a diet and exercise plan.      Again, thank you for allowing me to participate in the care of your patient.  "       Sincerely,        Rufina Dasilva DPM, Podiatry/Foot and Ankle Surgery

## 2019-12-02 ENCOUNTER — OFFICE VISIT (OUTPATIENT)
Dept: PSYCHIATRY | Facility: CLINIC | Age: 19
End: 2019-12-02
Attending: PSYCHIATRY & NEUROLOGY
Payer: COMMERCIAL

## 2019-12-02 VITALS — DIASTOLIC BLOOD PRESSURE: 72 MMHG | HEART RATE: 103 BPM | SYSTOLIC BLOOD PRESSURE: 109 MMHG

## 2019-12-02 DIAGNOSIS — F32.A DEPRESSION, UNSPECIFIED DEPRESSION TYPE: ICD-10-CM

## 2019-12-02 PROCEDURE — G0463 HOSPITAL OUTPT CLINIC VISIT: HCPCS | Mod: ZF

## 2019-12-02 RX ORDER — TRAZODONE HYDROCHLORIDE 50 MG/1
75 TABLET, FILM COATED ORAL AT BEDTIME
Qty: 45 TABLET | Refills: 1 | Status: SHIPPED | OUTPATIENT
Start: 2019-12-02 | End: 2019-12-17

## 2019-12-02 ASSESSMENT — PAIN SCALES - GENERAL: PAINLEVEL: NO PAIN (0)

## 2019-12-03 ENCOUNTER — OFFICE VISIT (OUTPATIENT)
Dept: PEDIATRIC HEMATOLOGY/ONCOLOGY | Facility: CLINIC | Age: 19
End: 2019-12-03
Attending: PEDIATRICS
Payer: COMMERCIAL

## 2019-12-03 VITALS
RESPIRATION RATE: 18 BRPM | HEART RATE: 99 BPM | TEMPERATURE: 98.2 F | OXYGEN SATURATION: 99 % | WEIGHT: 211 LBS | BODY MASS INDEX: 29.54 KG/M2 | DIASTOLIC BLOOD PRESSURE: 64 MMHG | SYSTOLIC BLOOD PRESSURE: 117 MMHG | HEIGHT: 71 IN

## 2019-12-03 DIAGNOSIS — C71.9 EPENDYMOMA (H): ICD-10-CM

## 2019-12-03 LAB
ALBUMIN SERPL-MCNC: 3.2 G/DL (ref 3.4–5)
ALP SERPL-CCNC: 143 U/L (ref 40–150)
ALT SERPL W P-5'-P-CCNC: 23 U/L (ref 0–70)
ANION GAP SERPL CALCULATED.3IONS-SCNC: 4 MMOL/L (ref 3–14)
AST SERPL W P-5'-P-CCNC: 32 U/L (ref 0–45)
BASOPHILS # BLD AUTO: 0 10E9/L (ref 0–0.2)
BASOPHILS NFR BLD AUTO: 0.9 %
BILIRUB SERPL-MCNC: 0.3 MG/DL (ref 0.2–1.3)
BUN SERPL-MCNC: 16 MG/DL (ref 7–30)
CALCIUM SERPL-MCNC: 8.3 MG/DL (ref 8.5–10.1)
CHLORIDE SERPL-SCNC: 109 MMOL/L (ref 94–109)
CO2 SERPL-SCNC: 28 MMOL/L (ref 20–32)
CREAT SERPL-MCNC: 1.08 MG/DL (ref 0.66–1.25)
DIFFERENTIAL METHOD BLD: ABNORMAL
EOSINOPHIL # BLD AUTO: 0.2 10E9/L (ref 0–0.7)
EOSINOPHIL NFR BLD AUTO: 6.5 %
ERYTHROCYTE [DISTWIDTH] IN BLOOD BY AUTOMATED COUNT: 14.2 % (ref 10–15)
GFR SERPL CREATININE-BSD FRML MDRD: >90 ML/MIN/{1.73_M2}
GLUCOSE SERPL-MCNC: 106 MG/DL (ref 70–99)
HCT VFR BLD AUTO: 38.8 % (ref 40–53)
HGB BLD-MCNC: 12.3 G/DL (ref 13.3–17.7)
IMM GRANULOCYTES # BLD: 0 10E9/L (ref 0–0.4)
IMM GRANULOCYTES NFR BLD: 0.6 %
LYMPHOCYTES # BLD AUTO: 1.1 10E9/L (ref 0.8–5.3)
LYMPHOCYTES NFR BLD AUTO: 31.3 %
MAGNESIUM SERPL-MCNC: 2 MG/DL (ref 1.6–2.3)
MCH RBC QN AUTO: 28 PG (ref 26.5–33)
MCHC RBC AUTO-ENTMCNC: 31.7 G/DL (ref 31.5–36.5)
MCV RBC AUTO: 88 FL (ref 78–100)
MONOCYTES # BLD AUTO: 0.8 10E9/L (ref 0–1.3)
MONOCYTES NFR BLD AUTO: 22.4 %
NEUTROPHILS # BLD AUTO: 1.3 10E9/L (ref 1.6–8.3)
NEUTROPHILS NFR BLD AUTO: 38.3 %
NRBC # BLD AUTO: 0 10*3/UL
NRBC BLD AUTO-RTO: 0 /100
PHOSPHATE SERPL-MCNC: 3.7 MG/DL (ref 2.5–4.5)
PLATELET # BLD AUTO: 77 10E9/L (ref 150–450)
PLATELET # BLD EST: ABNORMAL 10*3/UL
POTASSIUM SERPL-SCNC: 4.4 MMOL/L (ref 3.4–5.3)
PROT SERPL-MCNC: 6.4 G/DL (ref 6.8–8.8)
RBC # BLD AUTO: 4.39 10E12/L (ref 4.4–5.9)
SODIUM SERPL-SCNC: 141 MMOL/L (ref 133–144)
WBC # BLD AUTO: 3.4 10E9/L (ref 4–11)

## 2019-12-03 PROCEDURE — 83735 ASSAY OF MAGNESIUM: CPT | Performed by: NURSE PRACTITIONER

## 2019-12-03 PROCEDURE — 80053 COMPREHEN METABOLIC PANEL: CPT | Performed by: NURSE PRACTITIONER

## 2019-12-03 PROCEDURE — 85025 COMPLETE CBC W/AUTO DIFF WBC: CPT | Performed by: NURSE PRACTITIONER

## 2019-12-03 PROCEDURE — 84100 ASSAY OF PHOSPHORUS: CPT | Performed by: NURSE PRACTITIONER

## 2019-12-03 PROCEDURE — 36415 COLL VENOUS BLD VENIPUNCTURE: CPT | Performed by: NURSE PRACTITIONER

## 2019-12-03 PROCEDURE — G0463 HOSPITAL OUTPT CLINIC VISIT: HCPCS | Mod: ZF

## 2019-12-03 RX ORDER — DIPHENHYDRAMINE HYDROCHLORIDE 50 MG/ML
50 INJECTION INTRAMUSCULAR; INTRAVENOUS
Status: CANCELLED
Start: 2020-01-13

## 2019-12-03 RX ORDER — MEPERIDINE HYDROCHLORIDE 25 MG/ML
25 INJECTION INTRAMUSCULAR; INTRAVENOUS; SUBCUTANEOUS EVERY 30 MIN PRN
Status: CANCELLED | OUTPATIENT
Start: 2020-01-13

## 2019-12-03 RX ORDER — ALBUTEROL SULFATE 0.83 MG/ML
2.5 SOLUTION RESPIRATORY (INHALATION)
Status: CANCELLED | OUTPATIENT
Start: 2020-01-13

## 2019-12-03 RX ORDER — METHYLPREDNISOLONE SODIUM SUCCINATE 125 MG/2ML
125 INJECTION, POWDER, LYOPHILIZED, FOR SOLUTION INTRAMUSCULAR; INTRAVENOUS
Status: CANCELLED
Start: 2020-01-13

## 2019-12-03 RX ORDER — SODIUM CHLORIDE 9 MG/ML
1000 INJECTION, SOLUTION INTRAVENOUS CONTINUOUS PRN
Status: CANCELLED
Start: 2020-01-13

## 2019-12-03 RX ORDER — ALBUTEROL SULFATE 90 UG/1
1-2 AEROSOL, METERED RESPIRATORY (INHALATION)
Status: CANCELLED
Start: 2020-01-13

## 2019-12-03 RX ORDER — EPINEPHRINE 1 MG/ML
0.3 INJECTION, SOLUTION, CONCENTRATE INTRAVENOUS EVERY 5 MIN PRN
Status: CANCELLED | OUTPATIENT
Start: 2020-01-13

## 2019-12-03 ASSESSMENT — ENCOUNTER SYMPTOMS
CARDIOVASCULAR NEGATIVE: 1
WEAKNESS: 1
FALLS: 1
TREMORS: 1
RESPIRATORY NEGATIVE: 1
SPEECH CHANGE: 1

## 2019-12-03 ASSESSMENT — MIFFLIN-ST. JEOR: SCORE: 1982.09

## 2019-12-03 NOTE — NURSING NOTE
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

## 2019-12-03 NOTE — PROGRESS NOTES
HPI/CC: This 19 year old young man comes for re-evaluation with labs while receiving therapy according to the following protocol: COG [id number] - A Phase 1 Study of Entinostat, an Oral Histone Deacetylase Inhibitor, in Pediatric Patients With Recurrent or Refractory Solid Tumors, Including CNS Tumors and Lymphoma    HPI:  He is doing fairly well.  He went to the [employer] over his birthday and won 2500 dollars.  His dad and Geo are telling the story about it.  He is still complaining about constipation.  He is living at his new residence and thinks it is going well.  No fevers.  No new rashes.  No pain. No new concerns.        PMH:  Surgery x 2. Initial therapy on [id number] 6/26/15    Relapse, started Entinostat 1/9/17    DIAGNOSIS: EPENDYMOMA  AREAS TREATED: POST FOSSA TUMOR  DOSE: 5940 cGy   TYPE OF RADIATION GIVEN:  IMRT Tomotherapy   9/08/2015 to 10/26/15    Patient Active Problem List   Diagnosis     GERD (gastroesophageal reflux disease)     Closed fracture at the growth plate of right distal fibula      Elevated serum creatinine     Dyspepsia     Intracranial hemorrhage (H)     Hemorrhagic stroke (H)     Ependymoma (H)     Admission for antineoplastic chemotherapy     Post-operative state     S/P craniotomy     S/P biopsy     Noncomitant strabismus     Abducens neuropathy of both eyes     CANDELARIO (obstructive sleep apnea)     Health Care Home     Hypernatremia     Body temperature low     Current chronic use of systemic steroids     Elevated TSH     Status post chemotherapy     Neoplasm of posterior cranial fossa (H)     Chronic constipation     Serum albumin decreased     Closed compression fracture of thoracic vertebra with routine healing, subsequent encounter     Depression     Constipation     Constipation by delayed colonic transit     Slow transit constipation     Current Outpatient Medications   Medication     calcium carbonate-vitamin D 600-400 MG-UNIT CHEW     dexamethasone (DECADRON) 0.5 MG tablet      fexofenadine (ALLEGRA) 180 MG tablet     linaclotide (LINZESS) 290 MCG capsule     OLANZapine (ZYPREXA) 20 MG tablet     OLANZapine (ZYPREXA) 5 MG tablet     omeprazole (PRILOSEC) 20 MG DR capsule     order for DME     potassium phosphate, monobasic, (K-PHOS) 500 MG tablet     sertraline (ZOLOFT) 100 MG tablet     sulfamethoxazole-trimethoprim (BACTRIM/SEPTRA) 400-80 MG tablet     traZODone (DESYREL) 50 MG tablet     vitamin D3 (CHOLECALCIFEROL) 2000 units (50 mcg) tablet     vitamin E (TOCOPHEROL) 400 units (360 mg) capsule     No current facility-administered medications for this visit.         Allergies   Allergen Reactions     Blood Transfusion Related (Informational Only) Swelling     Periorbital swelling post platelet transfusion     No Known Drug Allergies      Labs:    Results for orders placed or performed in visit on 12/03/19   Phosphorus     Status: None   Result Value Ref Range    Phosphorus 3.7 2.5 - 4.5 mg/dL   Magnesium     Status: None   Result Value Ref Range    Magnesium 2.0 1.6 - 2.3 mg/dL   Comprehensive metabolic panel     Status: Abnormal   Result Value Ref Range    Sodium 141 133 - 144 mmol/L    Potassium 4.4 3.4 - 5.3 mmol/L    Chloride 109 94 - 109 mmol/L    Carbon Dioxide 28 20 - 32 mmol/L    Anion Gap 4 3 - 14 mmol/L    Glucose 106 (H) 70 - 99 mg/dL    Urea Nitrogen 16 7 - 30 mg/dL    Creatinine 1.08 0.66 - 1.25 mg/dL    GFR Estimate >90 >60 mL/min/[1.73_m2]    GFR Estimate If Black >90 >60 mL/min/[1.73_m2]    Calcium 8.3 (L) 8.5 - 10.1 mg/dL    Bilirubin Total 0.3 0.2 - 1.3 mg/dL    Albumin 3.2 (L) 3.4 - 5.0 g/dL    Protein Total 6.4 (L) 6.8 - 8.8 g/dL    Alkaline Phosphatase 143 40 - 150 U/L    ALT 23 0 - 70 U/L    AST 32 0 - 45 U/L   CBC with platelets differential     Status: Abnormal   Result Value Ref Range    WBC 3.4 (L) 4.0 - 11.0 10e9/L    RBC Count 4.39 (L) 4.4 - 5.9 10e12/L    Hemoglobin 12.3 (L) 13.3 - 17.7 g/dL    Hematocrit 38.8 (L) 40.0 - 53.0 %    MCV 88 78 - 100 fl    MCH  "28.0 26.5 - 33.0 pg    MCHC 31.7 31.5 - 36.5 g/dL    RDW 14.2 10.0 - 15.0 %    Platelet Count 77 (L) 150 - 450 10e9/L    Diff Method Automated Method     % Neutrophils 38.3 %    % Lymphocytes 31.3 %    % Monocytes 22.4 %    % Eosinophils 6.5 %    % Basophils 0.9 %    % Immature Granulocytes 0.6 %    Nucleated RBCs 0 0 /100    Absolute Neutrophil 1.3 (L) 1.6 - 8.3 10e9/L    Absolute Lymphocytes 1.1 0.8 - 5.3 10e9/L    Absolute Monocytes 0.8 0.0 - 1.3 10e9/L    Absolute Eosinophils 0.2 0.0 - 0.7 10e9/L    Absolute Basophils 0.0 0.0 - 0.2 10e9/L    Abs Immature Granulocytes 0.0 0 - 0.4 10e9/L    Absolute Nucleated RBC 0.0     Platelet Estimate Confirming automated cell count          Review of Systems   Constitutional:        In wheelchair. Alert, engaged.   HENT: Negative.         Bilateral facial palsy    Eyes:        Bilateral oculoplegia   Respiratory: Negative.    Cardiovascular: Negative.    Gastrointestinal:        Patient claims ongoing constipation - this is a perseveration of his.   Genitourinary: Negative.    Musculoskeletal: Positive for falls.   Skin:        Striae throughout   Neurological: Positive for tremors, speech change and weakness.        Severe ataxia, bilateral cranial nerve palsies   Endo/Heme/Allergies: Negative.    Psychiatric/Behavioral:        See medical record       Vitals signs:    /64 (BP Location: Left arm, Patient Position: Fowlers, Cuff Size: Adult Large)   Pulse 99   Temp 98.2  F (36.8  C) (Oral)   Resp 18   Ht 1.792 m (5' 10.55\")   Wt 95.7 kg (211 lb)   SpO2 99%   BMI 29.80 kg/m      Physical Exam  Vitals signs reviewed. Exam conducted with a chaperone present.   Constitutional:       General: He is not in acute distress.     Appearance: He is normal weight.   HENT:      Head: Normocephalic.      Right Ear: External ear normal.      Left Ear: External ear normal.      Nose: Nose normal.      Mouth/Throat:      Mouth: Mucous membranes are moist.   Eyes:      Comments: " Bilateral oculoplegia   Neck:      Musculoskeletal: Normal range of motion. No neck rigidity.   Cardiovascular:      Rate and Rhythm: Normal rate and regular rhythm.      Pulses: Normal pulses.   Pulmonary:      Effort: Pulmonary effort is normal. No respiratory distress.      Breath sounds: Normal breath sounds.   Abdominal:      General: Bowel sounds are normal. There is no distension.      Palpations: Abdomen is soft. There is no mass.   Musculoskeletal: Normal range of motion.   Skin:     General: Skin is warm and dry.      Capillary Refill: Capillary refill takes less than 2 seconds.      Comments: Striae throughout - stable   Neurological:      Mental Status: He is alert and oriented to person, place, and time.      Cranial Nerves: Cranial nerve deficit present.      Motor: Weakness present.      Coordination: Coordination abnormal.      Gait: Gait abnormal.   Psychiatric:         Mood and Affect: Mood normal.       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    Plan: We will delay beginning the next course for one week.  We will have him return to clinic at that time.   Discussed beginning Elavil with Geo for his constipation per recommendation of GI.  It may also help neuropathy in general.  We will start with 25mg daily each evening and increase to 50mg after one week if okay with psychology.  We can leave it at 25mg daily if concerns of potentiating Zoloft. Message sent to psychology team.

## 2019-12-03 NOTE — LETTER
12/3/2019      RE: Geo Hicks  [address] [facility] [address]       HPI/CC: This 19 year old young man comes for re-evaluation with labs while receiving therapy according to the following protocol: COG [id number] - A Phase 1 Study of Entinostat, an Oral Histone Deacetylase Inhibitor, in Pediatric Patients With Recurrent or Refractory Solid Tumors, Including CNS Tumors and Lymphoma    HPI:  He is doing fairly well.  He went to the [employer] over his birthday and won 2500 dollars.  His dad and Geo are telling the story about it.  He is still complaining about constipation.  He is living at his new residence and thinks it is going well.  No fevers.  No new rashes.  No pain. No new concerns.        PMH:  Surgery x 2. Initial therapy on [id number] 6/26/15    Relapse, started Entinostat 1/9/17    DIAGNOSIS: EPENDYMOMA  AREAS TREATED: POST FOSSA TUMOR  DOSE: 5940 cGy   TYPE OF RADIATION GIVEN:  IMRT Tomotherapy   9/08/2015 to 10/26/15    Patient Active Problem List   Diagnosis     GERD (gastroesophageal reflux disease)     Closed fracture at the growth plate of right distal fibula      Elevated serum creatinine     Dyspepsia     Intracranial hemorrhage (H)     Hemorrhagic stroke (H)     Ependymoma (H)     Admission for antineoplastic chemotherapy     Post-operative state     S/P craniotomy     S/P biopsy     Noncomitant strabismus     Abducens neuropathy of both eyes     CANDELARIO (obstructive sleep apnea)     Health Care Home     Hypernatremia     Body temperature low     Current chronic use of systemic steroids     Elevated TSH     Status post chemotherapy     Neoplasm of posterior cranial fossa (H)     Chronic constipation     Serum albumin decreased     Closed compression fracture of thoracic vertebra with routine healing, subsequent encounter     Depression     Constipation     Constipation by delayed colonic transit     Slow transit constipation     Current Outpatient Medications   Medication     calcium  carbonate-vitamin D 600-400 MG-UNIT CHEW     dexamethasone (DECADRON) 0.5 MG tablet     fexofenadine (ALLEGRA) 180 MG tablet     linaclotide (LINZESS) 290 MCG capsule     OLANZapine (ZYPREXA) 20 MG tablet     OLANZapine (ZYPREXA) 5 MG tablet     omeprazole (PRILOSEC) 20 MG DR capsule     order for DME     potassium phosphate, monobasic, (K-PHOS) 500 MG tablet     sertraline (ZOLOFT) 100 MG tablet     sulfamethoxazole-trimethoprim (BACTRIM/SEPTRA) 400-80 MG tablet     traZODone (DESYREL) 50 MG tablet     vitamin D3 (CHOLECALCIFEROL) 2000 units (50 mcg) tablet     vitamin E (TOCOPHEROL) 400 units (360 mg) capsule     No current facility-administered medications for this visit.         Allergies   Allergen Reactions     Blood Transfusion Related (Informational Only) Swelling     Periorbital swelling post platelet transfusion     No Known Drug Allergies      Labs:    Results for orders placed or performed in visit on 12/03/19   Phosphorus     Status: None   Result Value Ref Range    Phosphorus 3.7 2.5 - 4.5 mg/dL   Magnesium     Status: None   Result Value Ref Range    Magnesium 2.0 1.6 - 2.3 mg/dL   Comprehensive metabolic panel     Status: Abnormal   Result Value Ref Range    Sodium 141 133 - 144 mmol/L    Potassium 4.4 3.4 - 5.3 mmol/L    Chloride 109 94 - 109 mmol/L    Carbon Dioxide 28 20 - 32 mmol/L    Anion Gap 4 3 - 14 mmol/L    Glucose 106 (H) 70 - 99 mg/dL    Urea Nitrogen 16 7 - 30 mg/dL    Creatinine 1.08 0.66 - 1.25 mg/dL    GFR Estimate >90 >60 mL/min/[1.73_m2]    GFR Estimate If Black >90 >60 mL/min/[1.73_m2]    Calcium 8.3 (L) 8.5 - 10.1 mg/dL    Bilirubin Total 0.3 0.2 - 1.3 mg/dL    Albumin 3.2 (L) 3.4 - 5.0 g/dL    Protein Total 6.4 (L) 6.8 - 8.8 g/dL    Alkaline Phosphatase 143 40 - 150 U/L    ALT 23 0 - 70 U/L    AST 32 0 - 45 U/L   CBC with platelets differential     Status: Abnormal   Result Value Ref Range    WBC 3.4 (L) 4.0 - 11.0 10e9/L    RBC Count 4.39 (L) 4.4 - 5.9 10e12/L    Hemoglobin 12.3  "(L) 13.3 - 17.7 g/dL    Hematocrit 38.8 (L) 40.0 - 53.0 %    MCV 88 78 - 100 fl    MCH 28.0 26.5 - 33.0 pg    MCHC 31.7 31.5 - 36.5 g/dL    RDW 14.2 10.0 - 15.0 %    Platelet Count 77 (L) 150 - 450 10e9/L    Diff Method Automated Method     % Neutrophils 38.3 %    % Lymphocytes 31.3 %    % Monocytes 22.4 %    % Eosinophils 6.5 %    % Basophils 0.9 %    % Immature Granulocytes 0.6 %    Nucleated RBCs 0 0 /100    Absolute Neutrophil 1.3 (L) 1.6 - 8.3 10e9/L    Absolute Lymphocytes 1.1 0.8 - 5.3 10e9/L    Absolute Monocytes 0.8 0.0 - 1.3 10e9/L    Absolute Eosinophils 0.2 0.0 - 0.7 10e9/L    Absolute Basophils 0.0 0.0 - 0.2 10e9/L    Abs Immature Granulocytes 0.0 0 - 0.4 10e9/L    Absolute Nucleated RBC 0.0     Platelet Estimate Confirming automated cell count          Review of Systems   Constitutional:        In wheelchair. Alert, engaged.   HENT: Negative.         Bilateral facial palsy    Eyes:        Bilateral oculoplegia   Respiratory: Negative.    Cardiovascular: Negative.    Gastrointestinal:        Patient claims ongoing constipation - this is a perseveration of his.   Genitourinary: Negative.    Musculoskeletal: Positive for falls.   Skin:        Striae throughout   Neurological: Positive for tremors, speech change and weakness.        Severe ataxia, bilateral cranial nerve palsies   Endo/Heme/Allergies: Negative.    Psychiatric/Behavioral:        See medical record       Vitals signs:    /64 (BP Location: Left arm, Patient Position: Fowlers, Cuff Size: Adult Large)   Pulse 99   Temp 98.2  F (36.8  C) (Oral)   Resp 18   Ht 1.792 m (5' 10.55\")   Wt 95.7 kg (211 lb)   SpO2 99%   BMI 29.80 kg/m       Physical Exam  Vitals signs reviewed. Exam conducted with a chaperone present.   Constitutional:       General: He is not in acute distress.     Appearance: He is normal weight.   HENT:      Head: Normocephalic.      Right Ear: External ear normal.      Left Ear: External ear normal.      Nose: Nose " normal.      Mouth/Throat:      Mouth: Mucous membranes are moist.   Eyes:      Comments: Bilateral oculoplegia   Neck:      Musculoskeletal: Normal range of motion. No neck rigidity.   Cardiovascular:      Rate and Rhythm: Normal rate and regular rhythm.      Pulses: Normal pulses.   Pulmonary:      Effort: Pulmonary effort is normal. No respiratory distress.      Breath sounds: Normal breath sounds.   Abdominal:      General: Bowel sounds are normal. There is no distension.      Palpations: Abdomen is soft. There is no mass.   Musculoskeletal: Normal range of motion.   Skin:     General: Skin is warm and dry.      Capillary Refill: Capillary refill takes less than 2 seconds.      Comments: Striae throughout - stable   Neurological:      Mental Status: He is alert and oriented to person, place, and time.      Cranial Nerves: Cranial nerve deficit present.      Motor: Weakness present.      Coordination: Coordination abnormal.      Gait: Gait abnormal.   Psychiatric:         Mood and Affect: Mood normal.       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    Plan: We will delay beginning the next course for one week.  We will have him return to clinic at that time.   Discussed beginning Elavil with Geo for his constipation per recommendation of GI.  It may also help neuropathy in general.  We will start with 25mg daily each evening and increase to 50mg after one week if okay with psychology.  We can leave it at 25mg daily if concerns of potentiating Zoloft. Message sent to psychology team.       ALAN Justin CNP

## 2019-12-04 ENCOUNTER — HOSPITAL ENCOUNTER (OUTPATIENT)
Dept: OCCUPATIONAL THERAPY | Facility: CLINIC | Age: 19
Setting detail: THERAPIES SERIES
End: 2019-12-04
Attending: FAMILY MEDICINE
Payer: COMMERCIAL

## 2019-12-04 PROCEDURE — 97535 SELF CARE MNGMENT TRAINING: CPT | Mod: GO | Performed by: OCCUPATIONAL THERAPIST

## 2019-12-10 ENCOUNTER — OFFICE VISIT (OUTPATIENT)
Dept: PEDIATRIC HEMATOLOGY/ONCOLOGY | Facility: CLINIC | Age: 19
End: 2019-12-10
Attending: NURSE PRACTITIONER
Payer: COMMERCIAL

## 2019-12-10 VITALS
HEIGHT: 71 IN | HEART RATE: 120 BPM | WEIGHT: 209 LBS | RESPIRATION RATE: 20 BRPM | OXYGEN SATURATION: 97 % | TEMPERATURE: 99.1 F | BODY MASS INDEX: 29.26 KG/M2 | SYSTOLIC BLOOD PRESSURE: 85 MMHG | DIASTOLIC BLOOD PRESSURE: 59 MMHG

## 2019-12-10 DIAGNOSIS — K59.00 CONSTIPATION, UNSPECIFIED CONSTIPATION TYPE: ICD-10-CM

## 2019-12-10 DIAGNOSIS — L73.8 BACTERIAL FOLLICULITIS: ICD-10-CM

## 2019-12-10 DIAGNOSIS — D49.6 NEOPLASM OF POSTERIOR CRANIAL FOSSA (H): ICD-10-CM

## 2019-12-10 DIAGNOSIS — C71.9 EPENDYMOMA (H): Primary | ICD-10-CM

## 2019-12-10 LAB
ALBUMIN SERPL-MCNC: 3.3 G/DL (ref 3.4–5)
ALP SERPL-CCNC: 113 U/L (ref 40–150)
ALT SERPL W P-5'-P-CCNC: 24 U/L (ref 0–70)
ANION GAP SERPL CALCULATED.3IONS-SCNC: 2 MMOL/L (ref 3–14)
AST SERPL W P-5'-P-CCNC: 23 U/L (ref 0–45)
BASOPHILS # BLD AUTO: 0 10E9/L (ref 0–0.2)
BASOPHILS NFR BLD AUTO: 0.4 %
BILIRUB SERPL-MCNC: 0.3 MG/DL (ref 0.2–1.3)
BUN SERPL-MCNC: 21 MG/DL (ref 7–30)
CALCIUM SERPL-MCNC: 8.5 MG/DL (ref 8.5–10.1)
CHLORIDE SERPL-SCNC: 107 MMOL/L (ref 94–109)
CO2 SERPL-SCNC: 31 MMOL/L (ref 20–32)
CREAT SERPL-MCNC: 1.3 MG/DL (ref 0.66–1.25)
DIFFERENTIAL METHOD BLD: ABNORMAL
EOSINOPHIL # BLD AUTO: 0.2 10E9/L (ref 0–0.7)
EOSINOPHIL NFR BLD AUTO: 2.5 %
ERYTHROCYTE [DISTWIDTH] IN BLOOD BY AUTOMATED COUNT: 14 % (ref 10–15)
GFR SERPL CREATININE-BSD FRML MDRD: 78 ML/MIN/{1.73_M2}
GLUCOSE SERPL-MCNC: 100 MG/DL (ref 70–99)
HCT VFR BLD AUTO: 41 % (ref 40–53)
HGB BLD-MCNC: 12.8 G/DL (ref 13.3–17.7)
IMM GRANULOCYTES # BLD: 0 10E9/L (ref 0–0.4)
IMM GRANULOCYTES NFR BLD: 0.4 %
LYMPHOCYTES # BLD AUTO: 0.9 10E9/L (ref 0.8–5.3)
LYMPHOCYTES NFR BLD AUTO: 10.3 %
MAGNESIUM SERPL-MCNC: 2 MG/DL (ref 1.6–2.3)
MCH RBC QN AUTO: 27.5 PG (ref 26.5–33)
MCHC RBC AUTO-ENTMCNC: 31.2 G/DL (ref 31.5–36.5)
MCV RBC AUTO: 88 FL (ref 78–100)
MONOCYTES # BLD AUTO: 1.5 10E9/L (ref 0–1.3)
MONOCYTES NFR BLD AUTO: 17.4 %
NEUTROPHILS # BLD AUTO: 5.9 10E9/L (ref 1.6–8.3)
NEUTROPHILS NFR BLD AUTO: 69 %
NRBC # BLD AUTO: 0 10*3/UL
NRBC BLD AUTO-RTO: 0 /100
PHOSPHATE SERPL-MCNC: 4.7 MG/DL (ref 2.5–4.5)
PLATELET # BLD AUTO: 113 10E9/L (ref 150–450)
POTASSIUM SERPL-SCNC: 4.1 MMOL/L (ref 3.4–5.3)
PROT SERPL-MCNC: 6.6 G/DL (ref 6.8–8.8)
RBC # BLD AUTO: 4.65 10E12/L (ref 4.4–5.9)
SODIUM SERPL-SCNC: 140 MMOL/L (ref 133–144)
WBC # BLD AUTO: 8.5 10E9/L (ref 4–11)

## 2019-12-10 PROCEDURE — 80053 COMPREHEN METABOLIC PANEL: CPT | Performed by: NURSE PRACTITIONER

## 2019-12-10 PROCEDURE — 36415 COLL VENOUS BLD VENIPUNCTURE: CPT | Performed by: NURSE PRACTITIONER

## 2019-12-10 PROCEDURE — 84100 ASSAY OF PHOSPHORUS: CPT | Performed by: NURSE PRACTITIONER

## 2019-12-10 PROCEDURE — 85025 COMPLETE CBC W/AUTO DIFF WBC: CPT | Performed by: NURSE PRACTITIONER

## 2019-12-10 PROCEDURE — 83735 ASSAY OF MAGNESIUM: CPT | Performed by: NURSE PRACTITIONER

## 2019-12-10 PROCEDURE — G0463 HOSPITAL OUTPT CLINIC VISIT: HCPCS | Mod: ZF

## 2019-12-10 RX ORDER — METHYLPREDNISOLONE SODIUM SUCCINATE 125 MG/2ML
125 INJECTION, POWDER, LYOPHILIZED, FOR SOLUTION INTRAMUSCULAR; INTRAVENOUS
Status: CANCELLED
Start: 2019-12-10

## 2019-12-10 RX ORDER — ALBUTEROL SULFATE 0.83 MG/ML
2.5 SOLUTION RESPIRATORY (INHALATION)
Status: CANCELLED | OUTPATIENT
Start: 2019-12-10

## 2019-12-10 RX ORDER — MUPIROCIN 20 MG/G
OINTMENT TOPICAL
Qty: 22 G | Refills: 3 | Status: SHIPPED | OUTPATIENT
Start: 2019-12-10 | End: 2019-12-10

## 2019-12-10 RX ORDER — POLYETHYLENE GLYCOL 3350 17 G/17G
1 POWDER, FOR SOLUTION ORAL 3 TIMES DAILY PRN
Qty: 289 G | Refills: 3 | Status: SHIPPED | OUTPATIENT
Start: 2019-12-10 | End: 2019-12-20

## 2019-12-10 RX ORDER — EPINEPHRINE 1 MG/ML
0.3 INJECTION, SOLUTION, CONCENTRATE INTRAVENOUS EVERY 5 MIN PRN
Status: CANCELLED | OUTPATIENT
Start: 2019-12-10

## 2019-12-10 RX ORDER — DIPHENHYDRAMINE HYDROCHLORIDE 50 MG/ML
50 INJECTION INTRAMUSCULAR; INTRAVENOUS
Status: CANCELLED
Start: 2019-12-10

## 2019-12-10 RX ORDER — MUPIROCIN 20 MG/G
OINTMENT TOPICAL
Qty: 22 G | Refills: 3 | Status: SHIPPED | OUTPATIENT
Start: 2019-12-10 | End: 2020-01-01

## 2019-12-10 RX ORDER — POLYETHYLENE GLYCOL 3350 17 G/17G
1 POWDER, FOR SOLUTION ORAL 3 TIMES DAILY PRN
Qty: 289 G | Refills: 3 | Status: SHIPPED | OUTPATIENT
Start: 2019-12-10 | End: 2019-12-10

## 2019-12-10 RX ORDER — SODIUM CHLORIDE 9 MG/ML
1000 INJECTION, SOLUTION INTRAVENOUS CONTINUOUS PRN
Status: CANCELLED
Start: 2019-12-10

## 2019-12-10 RX ORDER — MEPERIDINE HYDROCHLORIDE 25 MG/ML
25 INJECTION INTRAMUSCULAR; INTRAVENOUS; SUBCUTANEOUS EVERY 30 MIN PRN
Status: CANCELLED | OUTPATIENT
Start: 2019-12-10

## 2019-12-10 RX ORDER — ALBUTEROL SULFATE 90 UG/1
1-2 AEROSOL, METERED RESPIRATORY (INHALATION)
Status: CANCELLED
Start: 2019-12-10

## 2019-12-10 ASSESSMENT — ENCOUNTER SYMPTOMS
RESPIRATORY NEGATIVE: 1
TREMORS: 1
CARDIOVASCULAR NEGATIVE: 1
WEAKNESS: 1
FALLS: 1
SPEECH CHANGE: 1

## 2019-12-10 ASSESSMENT — MIFFLIN-ST. JEOR: SCORE: 1978.02

## 2019-12-10 NOTE — PROGRESS NOTES
HPI/CC: This 19 year old young man comes for re-evaluation with labs while receiving therapy according to the following protocol: COG [id number] - A Phase 1 Study of Entinostat, an Oral Histone Deacetylase Inhibitor, in Pediatric Patients With Recurrent or Refractory Solid Tumors, Including CNS Tumors and Lymphoma    HPI:  He is doing fairly well.  No fevers.  He is still complaining about constipation. He broke his screen protector and it is cutting his face.        PMH:  Surgery x 2. Initial therapy on [id number] 6/26/15    Relapse, started Entinostat 1/9/17    DIAGNOSIS: EPENDYMOMA  AREAS TREATED: POST FOSSA TUMOR  DOSE: 5940 cGy   TYPE OF RADIATION GIVEN:  IMRT Tomotherapy   9/08/2015 to 10/26/15    Patient Active Problem List   Diagnosis     GERD (gastroesophageal reflux disease)     Closed fracture at the growth plate of right distal fibula      Elevated serum creatinine     Dyspepsia     Intracranial hemorrhage (H)     Hemorrhagic stroke (H)     Ependymoma (H)     Admission for antineoplastic chemotherapy     Post-operative state     S/P craniotomy     S/P biopsy     Noncomitant strabismus     Abducens neuropathy of both eyes     CANDELARIO (obstructive sleep apnea)     Health Care Home     Hypernatremia     Body temperature low     Current chronic use of systemic steroids     Elevated TSH     Status post chemotherapy     Neoplasm of posterior cranial fossa (H)     Chronic constipation     Serum albumin decreased     Closed compression fracture of thoracic vertebra with routine healing, subsequent encounter     Depression     Constipation     Constipation by delayed colonic transit     Slow transit constipation     Current Outpatient Medications   Medication     amitriptyline (ELAVIL) 25 MG tablet     calcium carbonate-vitamin D 600-400 MG-UNIT CHEW     dexamethasone (DECADRON) 0.5 MG tablet     fexofenadine (ALLEGRA) 180 MG tablet     linaclotide (LINZESS) 290 MCG capsule     OLANZapine (ZYPREXA) 20 MG tablet      OLANZapine (ZYPREXA) 5 MG tablet     omeprazole (PRILOSEC) 20 MG DR capsule     order for DME     potassium phosphate, monobasic, (K-PHOS) 500 MG tablet     sertraline (ZOLOFT) 100 MG tablet     sulfamethoxazole-trimethoprim (BACTRIM/SEPTRA) 400-80 MG tablet     traZODone (DESYREL) 50 MG tablet     vitamin D3 (CHOLECALCIFEROL) 2000 units (50 mcg) tablet     vitamin E (TOCOPHEROL) 400 units (360 mg) capsule     No current facility-administered medications for this visit.         Allergies   Allergen Reactions     Blood Transfusion Related (Informational Only) Swelling     Periorbital swelling post platelet transfusion     No Known Drug Allergies      Labs:    Results for orders placed or performed in visit on 12/10/19   Phosphorus     Status: Abnormal   Result Value Ref Range    Phosphorus 4.7 (H) 2.5 - 4.5 mg/dL   Magnesium     Status: None   Result Value Ref Range    Magnesium 2.0 1.6 - 2.3 mg/dL   Comprehensive metabolic panel     Status: Abnormal   Result Value Ref Range    Sodium 140 133 - 144 mmol/L    Potassium 4.1 3.4 - 5.3 mmol/L    Chloride 107 94 - 109 mmol/L    Carbon Dioxide 31 20 - 32 mmol/L    Anion Gap 2 (L) 3 - 14 mmol/L    Glucose 100 (H) 70 - 99 mg/dL    Urea Nitrogen 21 7 - 30 mg/dL    Creatinine 1.30 (H) 0.66 - 1.25 mg/dL    GFR Estimate 78 >60 mL/min/[1.73_m2]    GFR Estimate If Black >90 >60 mL/min/[1.73_m2]    Calcium 8.5 8.5 - 10.1 mg/dL    Bilirubin Total 0.3 0.2 - 1.3 mg/dL    Albumin 3.3 (L) 3.4 - 5.0 g/dL    Protein Total 6.6 (L) 6.8 - 8.8 g/dL    Alkaline Phosphatase 113 40 - 150 U/L    ALT 24 0 - 70 U/L    AST 23 0 - 45 U/L   CBC with platelets differential     Status: Abnormal   Result Value Ref Range    WBC 8.5 4.0 - 11.0 10e9/L    RBC Count 4.65 4.4 - 5.9 10e12/L    Hemoglobin 12.8 (L) 13.3 - 17.7 g/dL    Hematocrit 41.0 40.0 - 53.0 %    MCV 88 78 - 100 fl    MCH 27.5 26.5 - 33.0 pg    MCHC 31.2 (L) 31.5 - 36.5 g/dL    RDW 14.0 10.0 - 15.0 %    Platelet Count 113 (L) 150 - 450 10e9/L  "   Diff Method Automated Method     % Neutrophils 69.0 %    % Lymphocytes 10.3 %    % Monocytes 17.4 %    % Eosinophils 2.5 %    % Basophils 0.4 %    % Immature Granulocytes 0.4 %    Nucleated RBCs 0 0 /100    Absolute Neutrophil 5.9 1.6 - 8.3 10e9/L    Absolute Lymphocytes 0.9 0.8 - 5.3 10e9/L    Absolute Monocytes 1.5 (H) 0.0 - 1.3 10e9/L    Absolute Eosinophils 0.2 0.0 - 0.7 10e9/L    Absolute Basophils 0.0 0.0 - 0.2 10e9/L    Abs Immature Granulocytes 0.0 0 - 0.4 10e9/L    Absolute Nucleated RBC 0.0          Review of Systems   Constitutional:        In wheelchair. Alert, engaged.   HENT: Negative.         Bilateral facial palsy    Eyes:        Bilateral oculoplegia   Respiratory: Negative.    Cardiovascular: Negative.    Gastrointestinal:        Patient claims ongoing constipation - this is a perseveration of his.   Genitourinary: Negative.    Musculoskeletal: Positive for falls.   Skin:        Striae throughout   Neurological: Positive for tremors, speech change and weakness.        Severe ataxia, bilateral cranial nerve palsies   Endo/Heme/Allergies: Negative.    Psychiatric/Behavioral:        See medical record       Vitals signs:    BP (!) 85/59 (BP Location: Left arm, Patient Position: Fowlers, Cuff Size: Adult Regular)   Pulse 120   Temp 99.1  F (37.3  C) (Temporal)   Resp 20   Ht 1.8 m (5' 10.87\")   Wt 94.8 kg (209 lb)   SpO2 97%   BMI 29.26 kg/m      Physical Exam  Vitals signs reviewed. Exam conducted with a chaperone present.   Constitutional:       General: He is not in acute distress.     Appearance: He is normal weight.   HENT:      Head: Normocephalic.      Right Ear: External ear normal.      Left Ear: External ear normal.      Nose: Nose normal.      Mouth/Throat:      Mouth: Mucous membranes are moist.   Eyes:      Comments: Bilateral oculoplegia   Neck:      Musculoskeletal: Normal range of motion. No neck rigidity.   Cardiovascular:      Rate and Rhythm: Normal rate and regular rhythm. "      Pulses: Normal pulses.   Pulmonary:      Effort: Pulmonary effort is normal. No respiratory distress.      Breath sounds: Normal breath sounds.   Abdominal:      General: Bowel sounds are normal. There is no distension.      Palpations: Abdomen is soft. There is no mass.   Musculoskeletal: Normal range of motion.   Skin:     General: Skin is warm and dry.      Capillary Refill: Capillary refill takes less than 2 seconds.      Comments: Striae throughout - stable   Neurological:      Mental Status: He is alert and oriented to person, place, and time.      Cranial Nerves: Cranial nerve deficit present.      Motor: Weakness present.      Coordination: Coordination abnormal.      Gait: Gait abnormal.   Psychiatric:         Mood and Affect: Mood normal.       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    Plan: Begin Entinostat 6mg weekly beginning tomorrow.  Old medication bottles returned.  Diary turned in and new diary given.  We will have him return to clinic in one month.  We will image in early January.  Discussed beginning Elavil with Geo for his constipation per recommendation of GI.  It may also help neuropathy.  We will start with 25mg daily each evening and increase to 50mg after one week if okay with psychology.  We can leave it at 25mg daily if concerns of potentiating Zoloft. Message sent to psychology team.     Addendum:  I was able to reach Dr. Fay and we made the following plan:    Week 1:  Reduce trazodone to 50 mg at bedtime and start amitriptyline 25 mg at bedtime.  Continue taking the sertraline and olanzapine as before with no changes (we are only going to plan on changing trazodone at this point).     Week 2:  After one week of the above, reduce trazodone to 25 mg at bedtime.  At that time, if tolerated, he can increase the amitriptyline to 50 mg at bedtime.     Week 3:  Discontinue trazodone and continue taking amitriptyline  50 mg at bedtime.     This plan was discussed with the family.  Reviewed with them that it will also be important to be aware of the signs/symptoms of serotonin syndrome given the addition of another serotonergic medication.  While it's unlikely that this will occur, its important to look out for:  confusion, tremor, severe headache, sweats, fever, funny feeling in muscles, need to pace / restlessness, severe nausea, or diarrhea.  They should call psychiatry or our clinic urgently or present to the Emergency Room if Geo develops any of these symptoms.

## 2019-12-10 NOTE — PROGRESS NOTES
"  [facility] PSYCHIATRY CLINIC PROGRESS NOTE     CARE TEAM:  PCP- Jeffrey Espinoza    Specialty Providers- Oncology, Neuropsychology, Physical Therapy, Occupational Therapy    Therapist- Manju Pink at [facility] in [facility] Team- no.    Geo Hicks is a 19 year old male who prefers the name Geo & pronouns he, him, his, himself.    Date of initial diagnostic assessment is 2/7/2019.    Pertinent Background:  This patient first experienced mental health issues in the past few months and has received treatment for paranoia/delusions.  Notably, this patient is undergoing cancer treatment at the [facility] and has been referred for psychiatric assessment by his neuropsychologist who most recently met with him on 1/3/2019.  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 Since then, he has undergone multiple tumor resections, chemotherapy, and radiation treatment. Geo also experienced an intra-tumoral hemorrhage in May 2015 that resulted in neurological changes including facial numbness, significant loss of mobility and dysarthria. In December 2016, a follow-up MRI revealed continued tumor enhancement, however the most recent MRI from 10/16/18 revealed an unchanged fourth ventricle ependymoma in comparison to previous exams, a slight decrease in adjacent edema, as well as a stable size of the ventricular system.  He is currently on COG study [id number] with Entinostat.  Of note, patient had a brief admission to inpatient psychiatry on [facility] from 2/14-2/15/2019 \"for evaluation of delusions and suicidal comments.\"    Psych critical item history includes suicidal ideation, psychosis [sxs include delusions] and psychiatric hospitalization x1.    INTERIM HISTORY                                                                                                                 4, 4   The patient reports good treatment adherence.  History was provided by the patient " "and his dad who were good historians.  The last visit ended with the following med change: Increase sertraline to 150 mg daily.  Since the last visit:  -Presents with dad.  Reports his interim mood as being \"actually pretty good.\"  -Regarding his new staffed living facility, they report that he's still \"breaking it in.\"  There have been a lot of adjustments, and some pros and cons.  One major pro is Geo is able to create his own schedule, and appreciates the ability to sleep when he wants to.  Has his game system, and his AppleTV there and is able to do things he enjoys.  -One drawback is at times he feels like they're excessively cautious with him, such as requiring him to wear a gait belt in the shower.  Dad openly processed with Geo that it's inevitable there will be notable precautions taken in his care due to the liability of any care provider.  Stated they will keep working with the facility to try and find compromise between safety and Geo's sense of comfort/dignity.  -Provided opportunities for An to discuss his paranoia and fears related to his medical conditions/treatment, however today he did not independently elaborate on this.  When asked more directly, allowed that it still bothers him.  Did deny interim suicidal thoughts, as well as SIB thoughts, violent/aggressive ideation, inappropriately elevated mood, AVH, new paranoid concerns, panic/anxious acuity or other hallmarks of psychiatric decompensation with dangerousness.  -Geo's father elaborates that from his perspective Geo is doing quite well, and that since moving into the new place \"we're arguing a lot less - honestly it's like I see a different Geo.\"  -Agreed to maintain current medications without adjustment today.  Congratulated Geo on persevering through a stressful move and on some of the new opportunities that it may afford.    RECENT SYMPTOMS:   DEPRESSION:  reports-low moods, anhedonia, feeling trapped and " overwhelmed - all with mild/moderate interim improvement;  DENIES- suicidal ideation, low energy, insomnia, hypersomnia, excessive guilt, feeling hopeless and excessive crying  ELIZABETH/HYPOMANIA:  reports-none;  DENIES- increased energy, decreased sleep need, increased activity and grandiosity  PSYCHOSIS:  reports-delusions- paranoid [details in Interim History];  DENIES- auditory hallucinations and visual hallucinations  DYSREGULATION:  reports-can become irritable/agitated if beliefs are challenged;  DENIES- violent ideation, SIB, mood dysregulation, impulsive and aggressive  PANIC ATTACK:  none  ANXIETY:  excessive worry and nervous/overwhelmed  TRAUMA RELATED:  none  COMPULSIVE:  none  SLEEP:  initiation and maintenance both improved with trazodone  EATING DISORDER: no    RECENT SUBSTANCE USE:  ALCOHOL- no  TOBACCO- no  CAFFEINE- minimal  OPIOIDS- no         NARCAN KIT- N/A  CANNABIS- no  OTHER ILLICIT DRUGS- no      CURRENT SOCIAL HISTORY:  FINANCIAL SUPPORT- family  CHILDREN- no  LIVING SITUATION- mother and father (who are  and have both remarried) alternating weeks between respective homes  SOCIAL/ SPIRITUAL SUPPORT- mother, father, older brother  FEELS SAFE AT HOME- yes    MEDICAL ROS (2,10):  A comprehensive review of systems was performed and is negative other than noted in the HPI.    PSYCH and CD Critical Summary Points since July 2018 February 2019:  Clinic intake on 2/7.  Later was admitted to [facility] for a brief inpatient psych admission from 3/14-3/15, most of which was spent in the ER, due to suicidal comments that concerned family.  Was ultimately deemed safe for outpatient follow-up and was discharged with an increase in olanzapine to 15 mg.  March 2019:  Began sertraline 50 mg daily.  May 2019:  Began trazodone 25-50 mg at bedtime.  June 2019:  Increased trazodone to 100 mg at bedtime.  July 2019:  Increased Sertraline to 100 mg daily.  August 2019:  Increased olanzapine to  "17.5 mg and decreased trazodone to 75 mg, both at bedtime.  September 2019:  Increased olanzapine to 20 mg at bedtime.  October 2019:  Titrated olanzapine to 25 mg at bedtime.  November 2019:  Increased sertraline to 150 mg daily.    PAST PSYCH MED TRIALS   see EMR Problem List: Hx of psychiatric care    MEDICAL / SURGICAL HISTORY                                   Neurologic Hx- See pertinent background, re: history of ependymoma and related chemo, radiation and tumor resection(s).  Notably has experienced neurological damage due to the above which has resulted in facial numbness, significant loss of mobility and dysarthria.  Has had neuropsychiatric evaluations 2/2016, 2/2017, 1/2019, and 10/2019.  The following is from the \"Results and Impressions\" section of his 10/29/2019 eval with Veronica Padilla, with a passage bolded that relates to noted difficulty in an area of executive functioning that facilitates the ability to see other people's perspectives:     \"Geo s attention was rated by his mother as well as himself as being adequate.  Executive functioning are those skills including planning, organization, emotional control, cognitive flexibility, and the ability to take another person s perspective.  Geo rating scale of these skills was basically in the average range for his age with a slight elevation in his ability to shift from one activity to another.  His mother s ratings of his executive functioning were in the average range, with the exception of a slight elevation in his ability to initiate tasks.  Direct testing of his ability to take another person s perspective indicated difficulty in this area.  He was asked to sort objects based on various aspects of the objects.  When he sorted the objects, he showed average performance.  However, when the examiner sorted the objects, Geo experienced significant difficulty with being able to determine how the objects were sorted.  This difficulty " "likely translates into difficulty with understanding another person s point of view.  Geo indicated that this difficulty becomes quite frustrating for him as he doesn t feel other people understand him--it is likely that he has difficulty understanding their perspective.     Emotionally Geo indicated that he continues to feel some difficulty with sadness but that it has improved over time and with medication.  Geo denied significant feelings of anxiety at this time while in the past these scores have been higher.  Parent ratings of Geo s emotional functioning indicated no areas of significant concern.  Interviews with Geo and his mother indicated continued feelings of sadness and difficulty in motivating himself to try to new activities.  While there is improvement in his mood, Geo continues to be at risk for depression.  Since he is now under the care of a psychiatrist, we did not interview around his feelings that his medical team was not being helpful to him.  His mother reported that these feelings continue and likely interfere with his compliance with directives.  He is participating in therapy to work on these issues and it is important that this intervention continue.\"    Patient Active Problem List   Diagnosis     GERD (gastroesophageal reflux disease)     Closed fracture at the growth plate of right distal fibula      Elevated serum creatinine     Dyspepsia     Intracranial hemorrhage (H)     Hemorrhagic stroke (H)     Ependymoma (H)     Admission for antineoplastic chemotherapy     Post-operative state     S/P craniotomy     S/P biopsy     Noncomitant strabismus     Abducens neuropathy of both eyes     CANDELARIO (obstructive sleep apnea)     Health Care Home     Hypernatremia     Body temperature low     Current chronic use of systemic steroids     Elevated TSH     Status post chemotherapy     Neoplasm of posterior cranial fossa (H)     Chronic constipation     Serum albumin decreased     " Closed compression fracture of thoracic vertebra with routine healing, subsequent encounter     Depression     Constipation     Constipation by delayed colonic transit     Slow transit constipation     Past Surgical History:   Procedure Laterality Date     COLONOSCOPY N/A 9/27/2019    Procedure: Colonoscopy With Biopsies and Polypectomy;  Surgeon: Aniya Wei MD;  [facility]: [facility]     ESOPHAGOSCOPY, GASTROSCOPY, DUODENOSCOPY (EGD), COMBINED N/A 9/27/2019    Procedure: Upper Endoscopy (EGD) With Biopsies;  Surgeon: Aniya Wei MD;  Location: [facility]     GRAFT CARTILAGE FROM POSTERIOR AURICLE Left 10/6/2016    Procedure: GRAFT CARTILAGE FROM POSTERIOR AURICLE;  Surgeon: Tyler Richards MD;  [facility]: [facility]     INCISION AND DRAINAGE PERINEAL, COMBINED Bilateral 7/18/2015    Procedure: COMBINED INCISION AND DRAINAGE PERINEAL;  Surgeon: Dequan Timmons MD;  Location: [facility]     OPTICAL TRACKING SYSTEM CRANIOTOMY, EXCISE TUMOR, COMBINED N/A 4/13/2015    Procedure: COMBINED OPTICAL TRACKING SYSTEM CRANIOTOMY, EXCISE TUMOR;  Surgeon: Francis Velazquez MD;  [facility]     OPTICAL TRACKING SYSTEM CRANIOTOMY, EXCISE TUMOR, COMBINED N/A 4/16/2015    Procedure: COMBINED OPTICAL TRACKING SYSTEM CRANIOTOMY, EXCISE TUMOR;  Surgeon: Francis Velazquez MD;  [facility]     OPTICAL TRACKING SYSTEM CRANIOTOMY, EXCISE TUMOR, COMBINED Bilateral 5/28/2015    Procedure: COMBINED OPTICAL TRACKING SYSTEM CRANIOTOMY, EXCISE TUMOR;  Surgeon: Francis Velazquez MD;  [facility]     OPTICAL TRACKING SYSTEM CRANIOTOMY, EXCISE TUMOR, COMBINED Bilateral 1/14/2016    Procedure: COMBINED OPTICAL TRACKING SYSTEM CRANIOTOMY, EXCISE TUMOR;  Surgeon: Francis Velazquez MD;  [facility]     OPTICAL TRACKING SYSTEM VENTRICULOSTOMY  4/16/2015    Procedure: OPTICAL TRACKING SYSTEM VENTRICULOSTOMY;  Surgeon: Francis Velazquez MD;  [facility]     REMOVE  PORT VASCULAR ACCESS N/A 10/6/2016    Procedure: REMOVE PORT VASCULAR ACCESS;  Surgeon: Bruno Perea MD;  [facility]     RHINOPLASTY N/A 10/6/2016    Procedure: RHINOPLASTY;  Surgeon: Tlyer Richards MD;  [facility]     VASCULAR SURGERY  5-2015    single lumen power port       ALLERGY                                Blood transfusion related (informational only) and No known drug allergies     MEDICATIONS                               Current Outpatient Medications   Medication     calcium carbonate-vitamin D 600-400 MG-UNIT CHEW     dexamethasone (DECADRON) 0.5 MG tablet     fexofenadine (ALLEGRA) 180 MG tablet     OLANZapine (ZYPREXA) 25 MG     potassium phosphate, monobasic, (K-PHOS) 500 MG tablet     sertraline (ZOLOFT) 150 MG daily     study - entinostat (IDS# 5050) 1 mg tablet     sulfamethoxazole-trimethoprim (BACTRIM/SEPTRA) 400-80 MG tablet     traZODone (DESYREL) 75 MG     vitamin D3 (CHOLECALCIFEROL) 2000 units (50 mcg) tablet     vitamin E (TOCOPHEROL) 400 units (360 mg) capsule     linaclotide (LINZESS) 290 MCG capsule     omeprazole (PRILOSEC) 20 MG DR capsule     order for DME     study - entinostat (IDS# 5050) 1 mg tablet     study - entinostat (IDS# 5050) 5 mg tablet       VITALS                                                                                                                          3, 3   /72   Pulse 103      MENTAL STATUS EXAM                                                                                           9, 14 cog gs     Alertness: alert  and oriented  Appearance: casually groomed, seated in wheelchair, wearing R-sided eye-patch  Behavior/Demeanor: cooperative, with fair eye contact   Speech: prominent dysarthria  Language: good  Psychomotor: mild evident palsy of upper extremities and facial paralysis  Mood: low moods at times but with interim improvement  Affect: restricted; was congruent to mood; was congruent to content  Thought  "Process/Associations: continued rumination [details in Interim History]  Thought Content:  Reports delusions [details in Interim History];  Denies suicidal ideation and violent ideation  Perception:  Reports none;  Denies auditory hallucinations and visual hallucinations  Insight: partial  Judgment: good  Cognition: (6) oriented: time, person, and place  attention span: intact  concentration: intact  recent memory: intact  remote memory: intact  fund of knowledge: appropriate  Gait and Station: remarkable for:  ataxia - can ambulate but was seen in wheelchair     LABS and DATA     AIMS:  Due.    PHQ9 TODAY = 5.5  PHQ-9 SCORE 2019   PHQ-9 Total Score 6 8 9       ANTIPSYCHOTIC LABS  [glu, A1C, lipids (rohit LDL), liver enzymes, WBC, ANEU, Hgb, plts]  q12 mo  Recent Labs   Lab Test 12/10/19  1352 19  1329 19  1315 19  1405  19  1100   * 106* 151* 98   < > 124*   A1C  --   --   --   --   --  5.1    < > = values in this interval not displayed.     Recent Labs   Lab Test 19  1100   CHOL 158   TRIG 236*   LDL 84   HDL 27*     Recent Labs   Lab Test 12/10/19  1352 19  1329 19  1315 19  1405   AST 23 32 19 22   ALT 24 23 19 22   ALKPHOS 113 143 160* 130     Recent Labs   Lab Test 12/10/19  1352 19  1329 19  1315 19  1405   WBC 8.5 3.4* 3.4* 5.0   ANEU 5.9 1.3* 1.8 2.6   HGB 12.8* 12.3* 12.7* 11.9*   * 77* 106* 106*     EK2019: 85 BPM, QT/QTcH was 346/389 msec.  Also included comment: \"Abnormal precordial QRS contours - nondiagnostic for this age.\"    EE/15/2019: \"IMPRESSION: Awake and drowsy routine EEG (no video) was normal.  The patient stated he felt dazed during photic stimulation, however, no electrographic seizures or concerning changes on the EEG were seen during that time.  Clinical correlation is advised.  No electrographic seizures, epileptiform discharges were seen.\"    DIAGNOSIS     Delusional " "Disorder, persecutory type, first episode, currently in acute episode  Major Depressive Disorder, single episode, moderate    ASSESSMENT                                                                                                                   m2, h3     TODAY:  Geo Hicks presents to the [facility] clinic for continued medication management of paranoia, delusional thoughts and depressed mood.  Since our last visit Geo has moved into a new supported living environment with 24-hour care.  Overall, appears to have weathered this transition without incident, and mood is notably improved.  States that he's doing \"actually pretty good,\" and denies any recurrence of the suicidal thoughts he had reported at our last appointment.  It sounds like there are still some things that Geo, his family, and the staff are figuring out, but that things are developing in a positive direction and that this move has been a valuable and constructive development for him.  Plan would be for exploration of day programming and other activities moving forward.  Finally, Geo's father provides the hopeful feedback that since moving into the new place, Geo is arguing less with his loved ones and overall seems to be calmer/happier.  Have agreed to make no changes today.    SUICIDE RISK ASSESSMENT:  Geo Hicks denies present or interim suicidal ideation. This patient does have notable risk factors for self-harm including feels trapped, relationship conflicts, new/ worsening medical issue, male and paranoia/ delusions. However, risk is mitigated by no h/o suicide attempt, no planning or intent, describes a safety plan, h/o seeking help when needed, none to minimal alcohol use , commitment to family and stable housing.  Based on all available evidence he does not appear to be at imminent risk for self-harm therefore does not meet criteria for a 72-hr hold/  involuntary hospitalization.  However, based on " degree of symptoms therapy, close psych FU and medication adjustments were recommended which the pt did agree to.  Safety plan completed 11/15/2019, provided to patient and his father.    [place] PRESCRIPTION MONITORING PROGRAM [] was not checked today:  not using controlled substances    PSYCHOTROPIC DRUG INTERACTIONS:    Pentoxifylline may enhance the antiplatelet effect of Agents with Antiplatelet Properties.  [Pentoxifylline, Sertraline]     CNS Depressants may enhance the adverse/toxic effect of Selective Serotonin Reuptake Inhibitors. Specifically, the risk of psychomotor impairment may be enhanced.  [Olanzapine, Sertraline]    MANAGEMENT:  Monitoring for adverse effects, routine vitals, routine labs, periodic EKGs and patient/family aware of risks     PLAN                                                                                                                                m2, h3     1) PSYCHOTROPIC MEDICATIONS:  Continue olanzapine 25 mg at bedtime.  Continue sertraline 150 mg daily.  Continue trazodone 75 mg at bedtime.    2) THERAPY:  Continue with Manju.    3) NEXT DUE:    Labs- AP labs due Feb 2020  EKG- PRN  Rating Scales- AIMS due    4) REFERRALS:  None today.    5) RTC: 3-4 weeks    6) CRISIS NUMBERS:   Provided routinely in [facility] [phone] (clinic)    [phone] (after hours)  CRISIS TEXT LINE: Text [phone] from anywhere in [place], anytime, any crisis 24/7;  OR SEE www.crisistextline.org    TREATMENT RISK STATEMENT:  The risks, benefits, alternatives and potential adverse effects have been discussed and are understood by the pt. The pt understands the risks of using street drugs or alcohol. There are no medical contraindications, the pt agrees to treatment with the ability to do so. The pt knows to call the clinic for any problems or to access emergency care if needed.  Medical and substance use concerns are documented above.  Psychotropic drug interaction check was done,  including changes made today.     PROVIDER:  Justice Fay, DO    Patient staffed in clinic with Dr. Tripathi who will sign the note.  Supervisor is Dr. Emery.  Supervisor Attestation:  I met with Geo Hicks along with his father and the resident physician, Justice Fay MD. I participated in key portions of the service, including the mental status examination and developing the plan of care. I reviewed key portions of the history with the resident. I agree with the findings and plan as documented in this note.  Jorge Luis Tripathi MD

## 2019-12-10 NOTE — LETTER
12/10/2019      RE: Geo Hicks  [id number] [facility] [address]       HPI/CC: This 19 year old young man comes for re-evaluation with labs while receiving therapy according to the following protocol: COG [id number] - A Phase 1 Study of Entinostat, an Oral Histone Deacetylase Inhibitor, in Pediatric Patients With Recurrent or Refractory Solid Tumors, Including CNS Tumors and Lymphoma    HPI:  He is doing fairly well.  No fevers.  He is still complaining about constipation. He broke his screen protector and it is cutting his face.        PMH:  Surgery x 2. Initial therapy on [id number] 6/26/15    Relapse, started Entinostat 1/9/17    DIAGNOSIS: EPENDYMOMA  AREAS TREATED: POST FOSSA TUMOR  DOSE: 5940 cGy   TYPE OF RADIATION GIVEN:  IMRT Tomotherapy   9/08/2015 to 10/26/15    Patient Active Problem List   Diagnosis     GERD (gastroesophageal reflux disease)     Closed fracture at the growth plate of right distal fibula      Elevated serum creatinine     Dyspepsia     Intracranial hemorrhage (H)     Hemorrhagic stroke (H)     Ependymoma (H)     Admission for antineoplastic chemotherapy     Post-operative state     S/P craniotomy     S/P biopsy     Noncomitant strabismus     Abducens neuropathy of both eyes     CANDELARIO (obstructive sleep apnea)     Health Care Home     Hypernatremia     Body temperature low     Current chronic use of systemic steroids     Elevated TSH     Status post chemotherapy     Neoplasm of posterior cranial fossa (H)     Chronic constipation     Serum albumin decreased     Closed compression fracture of thoracic vertebra with routine healing, subsequent encounter     Depression     Constipation     Constipation by delayed colonic transit     Slow transit constipation     Current Outpatient Medications   Medication     amitriptyline (ELAVIL) 25 MG tablet     calcium carbonate-vitamin D 600-400 MG-UNIT CHEW     dexamethasone (DECADRON) 0.5 MG tablet     fexofenadine (ALLEGRA) 180 MG tablet      linaclotide (LINZESS) 290 MCG capsule     OLANZapine (ZYPREXA) 20 MG tablet     OLANZapine (ZYPREXA) 5 MG tablet     omeprazole (PRILOSEC) 20 MG DR capsule     order for DME     potassium phosphate, monobasic, (K-PHOS) 500 MG tablet     sertraline (ZOLOFT) 100 MG tablet     sulfamethoxazole-trimethoprim (BACTRIM/SEPTRA) 400-80 MG tablet     traZODone (DESYREL) 50 MG tablet     vitamin D3 (CHOLECALCIFEROL) 2000 units (50 mcg) tablet     vitamin E (TOCOPHEROL) 400 units (360 mg) capsule     No current facility-administered medications for this visit.         Allergies   Allergen Reactions     Blood Transfusion Related (Informational Only) Swelling     Periorbital swelling post platelet transfusion     No Known Drug Allergies      Labs:    Results for orders placed or performed in visit on 12/10/19   Phosphorus     Status: Abnormal   Result Value Ref Range    Phosphorus 4.7 (H) 2.5 - 4.5 mg/dL   Magnesium     Status: None   Result Value Ref Range    Magnesium 2.0 1.6 - 2.3 mg/dL   Comprehensive metabolic panel     Status: Abnormal   Result Value Ref Range    Sodium 140 133 - 144 mmol/L    Potassium 4.1 3.4 - 5.3 mmol/L    Chloride 107 94 - 109 mmol/L    Carbon Dioxide 31 20 - 32 mmol/L    Anion Gap 2 (L) 3 - 14 mmol/L    Glucose 100 (H) 70 - 99 mg/dL    Urea Nitrogen 21 7 - 30 mg/dL    Creatinine 1.30 (H) 0.66 - 1.25 mg/dL    GFR Estimate 78 >60 mL/min/[1.73_m2]    GFR Estimate If Black >90 >60 mL/min/[1.73_m2]    Calcium 8.5 8.5 - 10.1 mg/dL    Bilirubin Total 0.3 0.2 - 1.3 mg/dL    Albumin 3.3 (L) 3.4 - 5.0 g/dL    Protein Total 6.6 (L) 6.8 - 8.8 g/dL    Alkaline Phosphatase 113 40 - 150 U/L    ALT 24 0 - 70 U/L    AST 23 0 - 45 U/L   CBC with platelets differential     Status: Abnormal   Result Value Ref Range    WBC 8.5 4.0 - 11.0 10e9/L    RBC Count 4.65 4.4 - 5.9 10e12/L    Hemoglobin 12.8 (L) 13.3 - 17.7 g/dL    Hematocrit 41.0 40.0 - 53.0 %    MCV 88 78 - 100 fl    MCH 27.5 26.5 - 33.0 pg    MCHC 31.2 (L) 31.5  "- 36.5 g/dL    RDW 14.0 10.0 - 15.0 %    Platelet Count 113 (L) 150 - 450 10e9/L    Diff Method Automated Method     % Neutrophils 69.0 %    % Lymphocytes 10.3 %    % Monocytes 17.4 %    % Eosinophils 2.5 %    % Basophils 0.4 %    % Immature Granulocytes 0.4 %    Nucleated RBCs 0 0 /100    Absolute Neutrophil 5.9 1.6 - 8.3 10e9/L    Absolute Lymphocytes 0.9 0.8 - 5.3 10e9/L    Absolute Monocytes 1.5 (H) 0.0 - 1.3 10e9/L    Absolute Eosinophils 0.2 0.0 - 0.7 10e9/L    Absolute Basophils 0.0 0.0 - 0.2 10e9/L    Abs Immature Granulocytes 0.0 0 - 0.4 10e9/L    Absolute Nucleated RBC 0.0          Review of Systems   Constitutional:        In wheelchair. Alert, engaged.   HENT: Negative.         Bilateral facial palsy    Eyes:        Bilateral oculoplegia   Respiratory: Negative.    Cardiovascular: Negative.    Gastrointestinal:        Patient claims ongoing constipation - this is a perseveration of his.   Genitourinary: Negative.    Musculoskeletal: Positive for falls.   Skin:        Striae throughout   Neurological: Positive for tremors, speech change and weakness.        Severe ataxia, bilateral cranial nerve palsies   Endo/Heme/Allergies: Negative.    Psychiatric/Behavioral:        See medical record       Vitals signs:    BP (!) 85/59 (BP Location: Left arm, Patient Position: Fowlers, Cuff Size: Adult Regular)   Pulse 120   Temp 99.1  F (37.3  C) (Temporal)   Resp 20   Ht 1.8 m (5' 10.87\")   Wt 94.8 kg (209 lb)   SpO2 97%   BMI 29.26 kg/m       Physical Exam  Vitals signs reviewed. Exam conducted with a chaperone present.   Constitutional:       General: He is not in acute distress.     Appearance: He is normal weight.   HENT:      Head: Normocephalic.      Right Ear: External ear normal.      Left Ear: External ear normal.      Nose: Nose normal.      Mouth/Throat:      Mouth: Mucous membranes are moist.   Eyes:      Comments: Bilateral oculoplegia   Neck:      Musculoskeletal: Normal range of motion. No neck " rigidity.   Cardiovascular:      Rate and Rhythm: Normal rate and regular rhythm.      Pulses: Normal pulses.   Pulmonary:      Effort: Pulmonary effort is normal. No respiratory distress.      Breath sounds: Normal breath sounds.   Abdominal:      General: Bowel sounds are normal. There is no distension.      Palpations: Abdomen is soft. There is no mass.   Musculoskeletal: Normal range of motion.   Skin:     General: Skin is warm and dry.      Capillary Refill: Capillary refill takes less than 2 seconds.      Comments: Striae throughout - stable   Neurological:      Mental Status: He is alert and oriented to person, place, and time.      Cranial Nerves: Cranial nerve deficit present.      Motor: Weakness present.      Coordination: Coordination abnormal.      Gait: Gait abnormal.   Psychiatric:         Mood and Affect: Mood normal.       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    Plan: Begin Entinostat 6mg weekly beginning tomorrow.  Old medication bottles returned.  Diary turned in and new diary given.  We will have him return to clinic in one month.  We will image in early January.  Discussed beginning Elavil with Geo for his constipation per recommendation of GI.  It may also help neuropathy.  We will start with 25mg daily each evening and increase to 50mg after one week if okay with psychology.  We can leave it at 25mg daily if concerns of potentiating Zoloft. Message sent to psychology team.     Addendum:  I was able to reach Dr. Fay and we made the following plan:    Week 1:  Reduce trazodone to 50 mg at bedtime and start amitriptyline 25 mg at bedtime.  Continue taking the sertraline and olanzapine as before with no changes (we are only going to plan on changing trazodone at this point).     Week 2:  After one week of the above, reduce trazodone to 25 mg at bedtime.  At that time, if tolerated, he can increase the amitriptyline to 50 mg  at bedtime.     Week 3:  Discontinue trazodone and continue taking amitriptyline 50 mg at bedtime.     This plan was discussed with the family.  Reviewed with them that it will also be important to be aware of the signs/symptoms of serotonin syndrome given the addition of another serotonergic medication.  While it's unlikely that this will occur, its important to look out for:  confusion, tremor, severe headache, sweats, fever, funny feeling in muscles, need to pace / restlessness, severe nausea, or diarrhea.  They should call psychiatry or our clinic urgently or present to the Emergency Room if Geo develops any of these symptoms.           ALAN Justin CNP

## 2019-12-16 ENCOUNTER — HOSPITAL ENCOUNTER (OUTPATIENT)
Dept: OCCUPATIONAL THERAPY | Facility: CLINIC | Age: 19
Setting detail: THERAPIES SERIES
End: 2019-12-16
Attending: FAMILY MEDICINE
Payer: COMMERCIAL

## 2019-12-16 DIAGNOSIS — F22 PARANOIA (H): ICD-10-CM

## 2019-12-16 DIAGNOSIS — C71.9 EPENDYMOMA (H): ICD-10-CM

## 2019-12-16 DIAGNOSIS — F32.A DEPRESSION, UNSPECIFIED DEPRESSION TYPE: ICD-10-CM

## 2019-12-17 DIAGNOSIS — C71.9 EPENDYMOMA (H): ICD-10-CM

## 2019-12-17 DIAGNOSIS — F32.A DEPRESSION, UNSPECIFIED DEPRESSION TYPE: ICD-10-CM

## 2019-12-17 LAB
BASOPHILS # BLD AUTO: 0 10E9/L (ref 0–0.2)
BASOPHILS NFR BLD AUTO: 0.4 %
DIFFERENTIAL METHOD BLD: ABNORMAL
EOSINOPHIL # BLD AUTO: 0.2 10E9/L (ref 0–0.7)
EOSINOPHIL NFR BLD AUTO: 3.4 %
ERYTHROCYTE [DISTWIDTH] IN BLOOD BY AUTOMATED COUNT: 14 % (ref 10–15)
HCT VFR BLD AUTO: 37.8 % (ref 40–53)
HGB BLD-MCNC: 12.2 G/DL (ref 13.3–17.7)
LYMPHOCYTES # BLD AUTO: 1 10E9/L (ref 0.8–5.3)
LYMPHOCYTES NFR BLD AUTO: 22.2 %
MCH RBC QN AUTO: 27.8 PG (ref 26.5–33)
MCHC RBC AUTO-ENTMCNC: 32.3 G/DL (ref 31.5–36.5)
MCV RBC AUTO: 86 FL (ref 78–100)
MONOCYTES # BLD AUTO: 0.6 10E9/L (ref 0–1.3)
MONOCYTES NFR BLD AUTO: 12.6 %
NEUTROPHILS # BLD AUTO: 2.9 10E9/L (ref 1.6–8.3)
NEUTROPHILS NFR BLD AUTO: 61.4 %
PLATELET # BLD AUTO: 144 10E9/L (ref 150–450)
RBC # BLD AUTO: 4.39 10E12/L (ref 4.4–5.9)
WBC # BLD AUTO: 4.7 10E9/L (ref 4–11)

## 2019-12-17 PROCEDURE — 84100 ASSAY OF PHOSPHORUS: CPT | Performed by: NURSE PRACTITIONER

## 2019-12-17 PROCEDURE — 85025 COMPLETE CBC W/AUTO DIFF WBC: CPT | Performed by: NURSE PRACTITIONER

## 2019-12-17 PROCEDURE — 80053 COMPREHEN METABOLIC PANEL: CPT | Performed by: NURSE PRACTITIONER

## 2019-12-17 PROCEDURE — 83735 ASSAY OF MAGNESIUM: CPT | Performed by: NURSE PRACTITIONER

## 2019-12-17 PROCEDURE — 36415 COLL VENOUS BLD VENIPUNCTURE: CPT | Performed by: NURSE PRACTITIONER

## 2019-12-17 RX ORDER — SERTRALINE HYDROCHLORIDE 100 MG/1
TABLET, FILM COATED ORAL
Refills: 11 | OUTPATIENT
Start: 2019-12-17

## 2019-12-17 RX ORDER — TRAZODONE HYDROCHLORIDE 50 MG/1
TABLET, FILM COATED ORAL
Qty: 45 TABLET | Refills: 1 | Status: SHIPPED | OUTPATIENT
Start: 2019-12-17 | End: 2020-01-02

## 2019-12-17 RX ORDER — OLANZAPINE 5 MG/1
TABLET ORAL
Refills: 11 | OUTPATIENT
Start: 2019-12-17

## 2019-12-17 NOTE — TELEPHONE ENCOUNTER
Denied duplicate  Zoloft 100 m-15-19 for 45 tb w/1 RF. RF @ 1-2-20 appt.   Olanzapine 5 gm: 19 for 30 tb w/1 RF.  RF @ 1-2-20 appt.  Spoke with Luisa ( requesting pharm) confirm above.

## 2019-12-18 LAB
ALBUMIN SERPL-MCNC: 3.5 G/DL (ref 3.4–5)
ALP SERPL-CCNC: 112 U/L (ref 40–150)
ALT SERPL W P-5'-P-CCNC: 20 U/L (ref 0–70)
ANION GAP SERPL CALCULATED.3IONS-SCNC: 4 MMOL/L (ref 3–14)
AST SERPL W P-5'-P-CCNC: 18 U/L (ref 0–45)
BILIRUB SERPL-MCNC: 0.2 MG/DL (ref 0.2–1.3)
BUN SERPL-MCNC: 20 MG/DL (ref 7–30)
CALCIUM SERPL-MCNC: 8.8 MG/DL (ref 8.5–10.1)
CHLORIDE SERPL-SCNC: 108 MMOL/L (ref 94–109)
CO2 SERPL-SCNC: 28 MMOL/L (ref 20–32)
CREAT SERPL-MCNC: 1.12 MG/DL (ref 0.66–1.25)
GFR SERPL CREATININE-BSD FRML MDRD: >90 ML/MIN/{1.73_M2}
GLUCOSE SERPL-MCNC: 108 MG/DL (ref 70–99)
MAGNESIUM SERPL-MCNC: 2 MG/DL (ref 1.6–2.3)
PHOSPHATE SERPL-MCNC: 3.7 MG/DL (ref 2.5–4.5)
POTASSIUM SERPL-SCNC: 4.1 MMOL/L (ref 3.4–5.3)
PROT SERPL-MCNC: 6.6 G/DL (ref 6.8–8.8)
SODIUM SERPL-SCNC: 140 MMOL/L (ref 133–144)

## 2019-12-18 NOTE — TELEPHONE ENCOUNTER
Routing refill request to provider for review/approval because:  Medication is reported/historical, Allegra, Vit D, Vit E  Decadron last filled 1.2.19 #90 R 3  Calcium filled 2.17.17 #90 R 3    New pharmacy per living arrangement now

## 2019-12-19 DIAGNOSIS — F32.A DEPRESSION, UNSPECIFIED DEPRESSION TYPE: ICD-10-CM

## 2019-12-19 RX ORDER — SERTRALINE HYDROCHLORIDE 100 MG/1
TABLET, FILM COATED ORAL
Refills: 11 | OUTPATIENT
Start: 2019-12-19

## 2019-12-19 RX ORDER — DEXAMETHASONE 0.5 MG/1
TABLET ORAL
Qty: 45 TABLET | Refills: 11 | Status: ON HOLD | OUTPATIENT
Start: 2019-12-19 | End: 2020-02-18

## 2019-12-19 RX ORDER — VITAMIN E 268 MG
CAPSULE ORAL
Qty: 30 CAPSULE | Refills: 11 | Status: ON HOLD | OUTPATIENT
Start: 2019-12-19 | End: 2020-02-18

## 2019-12-19 RX ORDER — FEXOFENADINE HCL 180 MG/1
TABLET ORAL
Qty: 30 TABLET | Refills: 11 | Status: ON HOLD | OUTPATIENT
Start: 2019-12-19 | End: 2020-02-18

## 2019-12-19 RX ORDER — CHOLECALCIFEROL (VITAMIN D3) 50 MCG
TABLET ORAL
Qty: 30 TABLET | Refills: 11 | Status: ON HOLD | OUTPATIENT
Start: 2019-12-19 | End: 2020-02-18

## 2019-12-19 NOTE — TELEPHONE ENCOUNTER
Duplicate  Last disp 11-22-19, 45   last written rx: 11-15-19 for 45 tab w/1 RF fax to [facility]   RF @ RTC 1-2-20

## 2019-12-20 ENCOUNTER — TELEPHONE (OUTPATIENT)
Dept: PSYCHIATRY | Facility: CLINIC | Age: 19
End: 2019-12-20

## 2019-12-20 ENCOUNTER — TELEPHONE (OUTPATIENT)
Dept: PEDIATRIC HEMATOLOGY/ONCOLOGY | Facility: CLINIC | Age: 19
End: 2019-12-20

## 2019-12-20 ENCOUNTER — HOSPITAL ENCOUNTER (OUTPATIENT)
Facility: CLINIC | Age: 19
Setting detail: OBSERVATION
Discharge: HOME | End: 2019-12-22
Attending: PEDIATRICS | Admitting: PEDIATRICS
Payer: COMMERCIAL

## 2019-12-20 ENCOUNTER — APPOINTMENT (OUTPATIENT)
Dept: GENERAL RADIOLOGY | Facility: CLINIC | Age: 19
End: 2019-12-20
Payer: COMMERCIAL

## 2019-12-20 DIAGNOSIS — C71.9 EPENDYMOMA (H): ICD-10-CM

## 2019-12-20 DIAGNOSIS — K59.00 CONSTIPATION, UNSPECIFIED CONSTIPATION TYPE: ICD-10-CM

## 2019-12-20 DIAGNOSIS — F32.A DEPRESSION, UNSPECIFIED DEPRESSION TYPE: ICD-10-CM

## 2019-12-20 DIAGNOSIS — K59.01 SLOW TRANSIT CONSTIPATION: ICD-10-CM

## 2019-12-20 LAB — PH GAST: 4.7 [PH]

## 2019-12-20 PROCEDURE — 25000132 ZZH RX MED GY IP 250 OP 250 PS 637: Performed by: STUDENT IN AN ORGANIZED HEALTH CARE EDUCATION/TRAINING PROGRAM

## 2019-12-20 PROCEDURE — 25000132 ZZH RX MED GY IP 250 OP 250 PS 637: Performed by: PEDIATRICS

## 2019-12-20 PROCEDURE — G0378 HOSPITAL OBSERVATION PER HR: HCPCS

## 2019-12-20 PROCEDURE — 71045 X-RAY EXAM CHEST 1 VIEW: CPT

## 2019-12-20 PROCEDURE — 40000268 ZZH STATISTIC NO CHARGES

## 2019-12-20 PROCEDURE — 83986 ASSAY PH BODY FLUID NOS: CPT | Performed by: STUDENT IN AN ORGANIZED HEALTH CARE EDUCATION/TRAINING PROGRAM

## 2019-12-20 PROCEDURE — 74018 RADEX ABDOMEN 1 VIEW: CPT

## 2019-12-20 RX ORDER — DEXAMETHASONE 0.75 MG/1
0.75 TABLET ORAL DAILY
Status: DISCONTINUED | OUTPATIENT
Start: 2019-12-21 | End: 2019-12-22 | Stop reason: HOSPADM

## 2019-12-20 RX ORDER — VITAMIN B COMPLEX
2000 TABLET ORAL DAILY
Status: DISCONTINUED | OUTPATIENT
Start: 2019-12-21 | End: 2019-12-20

## 2019-12-20 RX ORDER — BISACODYL 5 MG/1
10 TABLET, DELAYED RELEASE ORAL DAILY
Qty: 30 TABLET | Refills: 3 | Status: SHIPPED | OUTPATIENT
Start: 2019-12-20 | End: 2020-01-13

## 2019-12-20 RX ORDER — TRAZODONE HYDROCHLORIDE 50 MG/1
50 TABLET, FILM COATED ORAL AT BEDTIME
Status: DISCONTINUED | OUTPATIENT
Start: 2019-12-20 | End: 2019-12-22 | Stop reason: HOSPADM

## 2019-12-20 RX ORDER — SULFAMETHOXAZOLE/TRIMETHOPRIM 800-160 MG
1 TABLET ORAL
Status: DISCONTINUED | OUTPATIENT
Start: 2019-12-21 | End: 2019-12-22 | Stop reason: HOSPADM

## 2019-12-20 RX ORDER — FEXOFENADINE HCL 180 MG/1
180 TABLET ORAL DAILY
Status: DISCONTINUED | OUTPATIENT
Start: 2019-12-20 | End: 2019-12-22 | Stop reason: HOSPADM

## 2019-12-20 RX ORDER — ONDANSETRON 4 MG/1
4 TABLET, ORALLY DISINTEGRATING ORAL EVERY 4 HOURS PRN
Status: DISCONTINUED | OUTPATIENT
Start: 2019-12-20 | End: 2019-12-22 | Stop reason: HOSPADM

## 2019-12-20 RX ORDER — LIDOCAINE 40 MG/G
CREAM TOPICAL
Status: DISCONTINUED | OUTPATIENT
Start: 2019-12-20 | End: 2019-12-22 | Stop reason: HOSPADM

## 2019-12-20 RX ORDER — AMOXICILLIN 250 MG
2 CAPSULE ORAL AT BEDTIME
Qty: 30 TABLET | Refills: 11 | Status: SHIPPED | OUTPATIENT
Start: 2019-12-20 | End: 2019-12-23

## 2019-12-20 RX ORDER — AMOXICILLIN 250 MG
2 CAPSULE ORAL AT BEDTIME
Status: DISCONTINUED | OUTPATIENT
Start: 2019-12-20 | End: 2019-12-20

## 2019-12-20 RX ORDER — VITAMIN E 268 MG
400 CAPSULE ORAL DAILY
Status: DISCONTINUED | OUTPATIENT
Start: 2019-12-21 | End: 2019-12-20

## 2019-12-20 RX ORDER — POLYETHYLENE GLYCOL 3350 17 G/17G
1 POWDER, FOR SOLUTION ORAL 3 TIMES DAILY
Qty: 289 G | Refills: 3 | Status: ON HOLD | OUTPATIENT
Start: 2019-12-20 | End: 2020-02-18

## 2019-12-20 RX ADMIN — OLANZAPINE 25 MG: 5 TABLET, FILM COATED ORAL at 21:33

## 2019-12-20 RX ADMIN — FEXOFENADINE HYDROCHLORIDE 180 MG: 180 TABLET, FILM COATED ORAL at 19:05

## 2019-12-20 RX ADMIN — TRAZODONE HYDROCHLORIDE 50 MG: 50 TABLET ORAL at 21:34

## 2019-12-20 RX ADMIN — AMITRIPTYLINE HYDROCHLORIDE 25 MG: 25 TABLET, FILM COATED ORAL at 21:33

## 2019-12-20 RX ADMIN — POLYETHYLENE GLYCOL 3350, SODIUM SULFATE ANHYDROUS, SODIUM BICARBONATE, SODIUM CHLORIDE, POTASSIUM CHLORIDE 500 ML/HR: 236; 22.74; 6.74; 5.86; 2.97 POWDER, FOR SOLUTION ORAL at 21:35

## 2019-12-20 ASSESSMENT — ACTIVITIES OF DAILY LIVING (ADL)
TOILETING: 4-->COMPLETELY DEPENDENT
RETIRED_EATING: 2-->ASSISTIVE PERSON
BATHING: 4-->COMPLETELY DEPENDENT
COGNITION: 0 - NO COGNITION ISSUES REPORTED
SWALLOWING: 0-->SWALLOWS FOODS/LIQUIDS WITHOUT DIFFICULTY
DRESS: 3-->ASSISTIVE EQUIPMENT AND PERSON
NUMBER_OF_TIMES_PATIENT_HAS_FALLEN_WITHIN_LAST_SIX_MONTHS: 1
TRANSFERRING: 3-->ASSISTIVE EQUIPMENT AND PERSON
FALL_HISTORY_WITHIN_LAST_SIX_MONTHS: YES
AMBULATION: 3-->ASSISTIVE EQUIPMENT AND PERSON
RETIRED_COMMUNICATION: 2-->DIFFICULTY SPEAKING (NOT RELATED TO LANGUAGE BARRIER)

## 2019-12-20 ASSESSMENT — MIFFLIN-ST. JEOR: SCORE: 1900.76

## 2019-12-20 NOTE — TELEPHONE ENCOUNTER
Call from Eric Hicks, Geo's father.  He reports Geo is living in a transitional home and they did not have his pericolace order and not always giving the Miralax three times daily (it was written TID prn so that Geo could refuse if stools too loose) and they were informed today that he has not had a bowel movement for 5 days.  He usually has 2-3 stools on this regime and has constipation due to his tumor and resulting sensation issues. They don't have staff available to assist with home clean out so Geo will need to be admitted. Admissions was contacted and they will return call when bed available. Call placed to transitional home and orders were sent to their pharmacy.  Instructed them to give 2 bisacodyl tablets when they arrive on top of usual regime while we await bed placement.      Addendum: Bed was identified.  Geo's dad was notified and he will pick him up from his transitional home and bring him for rapid eval in ER and then to 5th floor for clean out. Nursing report was given to 5th floor.

## 2019-12-20 NOTE — TELEPHONE ENCOUNTER
Contacted [facility]Y #[id number] - [place], [place] - [address]. Which is where sertraline (ZOLOFT) 100 MG tablet was previously being filled/dispensed.  Pharmacist reports that two different scripts for this medication have now been transferred to [facility].      Writer called [facility] and spoke to Tlyer.  Tyler reports that they received an order for 100mg daily and 150mg daily of the SERTRALINE.  Writer clarified that dose was increased at recent appointment and correct order is for 150mg daily.  This script currently has one refill available so no new orders are needed at this time.

## 2019-12-20 NOTE — ED TRIAGE NOTES
Emergency Department    /77   Pulse 100   Temp 97.6  F (36.4  C) (Tympanic)   Resp 16   Wt 94.8 kg (209 lb)   SpO2 96%   BMI 29.26 kg/m      Geo Hicks presents to the [facility] as a direct admission through the Emergency Department. Refer to vital signs flow sheet. Based upon a brief MD clinical assessment, Geo is stable and will be admitted to the inpatient floor.  Ashleigh Cortes RN  December 20, 2019  3:25 PM

## 2019-12-20 NOTE — H&P
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      GI:  Acute on chronic constipation  - NG tube placement  - AXR  - Bowel clean out with golytely via NGT at 500 ml/hr and continue until clear stool  - Outpatient followed by Dr. Denis-Mary          - Hold OP constipation regimen  - Omeprazole 40 mg daily    FEN:  - Clear liquid diet  - Holding PTA supplements for now: Ca, vitamin D, K-phos, vitamin E  - BMP, Mg, Phos in AM    Heme/Onc:  Ependymoma: on study  - Entinostat 7 mg weekly    Endo:  - Followed by Dr. Martin  - PTA Decadron 0.75 mg QAM    Neuro/Psych:  - Olanzapine 25mg at bedtime  - Amitriptyline 25 mg at bedtime  - Trazadone 50mg at bedtime  - Zoloft 150mg daily    At discharge please provide the following instructions for medication taper which was initiated prior to admission and needs to be communicated exactly as written for his group home to administer:    Week 1:  Reduce trazodone to 50 mg at bedtime and start amitriptyline 25 mg at bedtime.  Continue taking the sertraline and olanzapine as before with no changes (we are only going to plan on changing trazodone at this point).     Week 2:  After one week of the above, reduce trazodone to 25 mg at bedtime.  At that time, if tolerated, he can increase the amitriptyline to 50 mg at bedtime.     Week 3:  Discontinue trazodone and continue taking amitriptyline 50 mg at bedtime.     Allergies:  - Allegra 180mg qPM      Pulm:  #Hx of CANDELARIO: previously  used CPAP  - Routine vitals w/ spot pulse ox    Disposition Plan   Expected discharge: Tomorrow, recommended to prior living arrangement once patient is having clear liquid stools.    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 He reports that he usually stools daily but has not had a bowel movement in several days after missing doses of his normal bowel regimen. He reports that he intermittently gets constipated beyond what his daily home regimen can manage. He says he does well with the placement of the NG tube. He has had no abdominal pain. He denies stools being hard most of the time. He has had no blood in his stool. He otherwise feels well.     Past Medical History    I have reviewed this patient's medical history and updated it with pertinent information if needed.   Past Medical History:   Diagnosis Date     Cranial nerve dysfunction      Dyspepsia      Ependymoma (H)      Gastro-oesophageal reflux disease      Hearing loss      Intracranial hemorrhage (H)      Migraine      Pilonidal cyst     7-2015     Reduced vision      Refractory obstruction of nasal airway     2nd to nasal valve prolapse     Sleep apnea      Strabismus     gaze palsy        Past Surgical History   I have reviewed this patient's surgical history and updated it with pertinent information if needed.  Past Surgical History:   Procedure Laterality Date      COLONOSCOPY N/A 9/27/2019    Procedure: Colonoscopy With Biopsies and Polypectomy;  Surgeon: Aniya Wei MD;  [facility]     ESOPHAGOSCOPY, GASTROSCOPY, DUODENOSCOPY (EGD), COMBINED N/A 9/27/2019    Procedure: Upper Endoscopy (EGD) With Biopsies;  Surgeon: Aniya Wei MD;  [facility]     GRAFT CARTILAGE FROM POSTERIOR AURICLE Left 10/6/2016    Procedure: GRAFT CARTILAGE FROM POSTERIOR AURICLE;  Surgeon: Tyler Richards MD;  [facility]     INCISION AND DRAINAGE PERINEAL, COMBINED Bilateral 7/18/2015    Procedure: COMBINED INCISION AND DRAINAGE PERINEAL;  Surgeon: Dequan Timmons MD;  [facility] OR     OPTICAL TRACKING SYSTEM CRANIOTOMY, EXCISE TUMOR, COMBINED N/A 4/13/2015    Procedure: COMBINED OPTICAL TRACKING SYSTEM CRANIOTOMY, EXCISE TUMOR;  Surgeon: Francis Velazquez MD;  [facility]     OPTICAL TRACKING SYSTEM CRANIOTOMY, EXCISE TUMOR, COMBINED N/A 4/16/2015    Procedure: COMBINED OPTICAL TRACKING SYSTEM CRANIOTOMY, EXCISE TUMOR;  Surgeon: Francis Velazquez MD;  [facility]     OPTICAL TRACKING SYSTEM CRANIOTOMY, EXCISE TUMOR, COMBINED Bilateral 5/28/2015    Procedure: COMBINED OPTICAL TRACKING SYSTEM CRANIOTOMY, EXCISE TUMOR;  Surgeon: Francis Velazquez MD;  [facility]     OPTICAL TRACKING SYSTEM CRANIOTOMY, EXCISE TUMOR, COMBINED Bilateral 1/14/2016    Procedure: COMBINED OPTICAL TRACKING SYSTEM CRANIOTOMY, EXCISE TUMOR;  Surgeon: Francis Velazquez MD;  [facility]     OPTICAL TRACKING SYSTEM VENTRICULOSTOMY  4/16/2015    Procedure: OPTICAL TRACKING SYSTEM VENTRICULOSTOMY;  Surgeon: Francis Velazquez MD;  [facility]     REMOVE PORT VASCULAR ACCESS N/A 10/6/2016    Procedure: REMOVE PORT VASCULAR ACCESS;  Surgeon: Bruno Perea MD;  [facility]     RHINOPLASTY N/A 10/6/2016    Procedure: RHINOPLASTY;  Surgeon: Tyler Richards MD;  [facility]     VASCULAR SURGERY   5-2015    single lumen power port       Prior to Admission Medications   Prior to Admission Medications   Prescriptions Last Dose Informant Patient Reported? Taking?   OLANZapine (ZYPREXA) 20 MG tablet 12/19/2019 at Unknown time  No Yes   Sig: Take 1 tablet (20 mg) along with 1 tablet (5 mg) for a total dose of 25 mg at bedtime.   OLANZapine (ZYPREXA) 5 MG tablet 12/19/2019 at Unknown time  No Yes   Sig: Take 1 tablet (5 mg) along with 1 tablet (20 mg) for a total dose of 25 mg at bedtime.   amitriptyline (ELAVIL) 25 MG tablet 12/19/2019 at Unknown time  No Yes   Sig: Take 1 tablet (25 mg) by mouth At Bedtime for 7 days, THEN 2 tablets (50 mg) At Bedtime for 28 days.   bisacodyl (BISACODYL LAXATIVE) 5 MG EC tablet   No No   Sig: Take 2 tablets (10 mg) by mouth daily for 3 days And then PRN at night if no bowel movement during the day.   calcium carbonate 600 mg-vitamin D 400 units (CALTRATE) 600-400 MG-UNIT per tablet 12/20/2019 at Unknown time  No Yes   Sig: TAKE 2 TABLETS (1,200MG/800 UNITS) BY MOUTH EVERY MORNING.;TAKE 1 TABLET BY MOUTH EVERY EVENING. (NO REFILLS REMAINING)   calcium carbonate-vitamin D 600-400 MG-UNIT CHEW More than a month at Unknown time Father No No   Sig: Take 2 tablets in the morning and 1 tablet in the evening.   dexamethasone (DECADRON) 0.5 MG tablet 12/20/2019 at Unknown time  No Yes   Sig: TAKE ONE AND ONE-HALF TABLETS (0.75MG) BY MOUTH ONCE DAILY WITH BREAKFAST. (NO REFILLS REMAINING)   fexofenadine (ALLEGRA) 180 MG tablet 12/19/2019 at Unknown time  No Yes   Sig: TAKE 1 TABLET BY MOUTH EVERY EVENING. **NON-COVERED MED** (NO REFILLS REMAINING)   linaclotide (LINZESS) 290 MCG capsule 12/20/2019 at Unknown time  No Yes   Sig: Take 1 capsule (290 mcg) by mouth every morning (before breakfast)   mupirocin (BACTROBAN) 2 % external ointment 12/19/2019 at Unknown time  No Yes   Sig: Use 2 times a day to the ear lobes and canals with flare   omeprazole (PRILOSEC) 20 MG DR capsule 12/20/2019 at  Unknown time  No Yes   Sig: Take 2 capsules (40 mg) by mouth every morning   order for DME   No No   Sig: Equipment being ordered: short boot   polyethylene glycol (MIRALAX/GLYCOLAX) powder 12/20/2019 at Unknown time  No Yes   Sig: Take 17 g (1 capful) by mouth 3 times daily   potassium phosphate, monobasic, (K-PHOS) 500 MG tablet 12/20/2019 at Unknown time  No Yes   Sig: Take 1 tablet (500 mg) by mouth 2 times daily   senna-docusate (SENOKOT-S/PERICOLACE) 8.6-50 MG tablet Past Month at Unknown time  No Yes   Sig: Take 2 tablets by mouth At Bedtime   sertraline (ZOLOFT) 100 MG tablet 12/20/2019 at Unknown time  No Yes   Sig: Take 1.5 tablets (150 mg) by mouth daily   study - entinostat (IDS# 5050) 1 mg tablet   No No   Sig: Take 2 tablets (2 mg) by mouth every 7 days for 4 doses Take two 1mg tablet with one 5mg tablet for total dose of 7mg weekly. Take on an empty stomach, at least 1 hour before or 2 hours after a meal.  Swallow tablet whole.   study - entinostat (IDS# [id number]) 1 mg tablet   No No   Sig: Take 2 tablets (2 mg) by mouth every 7 days for 4 doses Take two 1mg tablet with one 5mg tablet for total dose of 7mg weekly. Take on an empty stomach, at least 1 hour before or 2 hours after a meal.  Swallow tablet whole.   study - entinostat (IDS# 5050) 1 mg tablet   No No   Sig: Take 2 tablets (2 mg) by mouth every 7 days for 4 doses Take two 1mg tablet with one 5mg tablet for total dose of 7mg weekly. Take on an empty stomach, at least 1 hour before or 2 hours after a meal.  Swallow tablet whole.   study - entinostat (IDS# 5050) 5 mg tablet   No No   Sig: Take 1 tablet (5 mg) by mouth every 7 days for 4 doses Take one 5mg tablet with two 1mg tablet for total dose of 7mg weekly. Take on an empty stomach, at least 1 hour before or 2 hours after a meal.  Swallow tablet whole.   study - entinostat (IDS# 5050) 5 mg tablet   No No   Sig: Take 1 tablet (5 mg) by mouth every 7 days for 4 doses Take one 5mg tablet with two  1mg tablet for total dose of 7mg weekly. Take on an empty stomach, at least 1 hour before or 2 hours after a meal.  Swallow tablet whole.   study - entinostat (IDS# 5050) 5 mg tablet Past Week at Unknown time  No Yes   Sig: Take 1 tablet (5 mg) by mouth every 7 days for 4 doses Take one 5mg tablet with two 1mg tablet for total dose of 7mg weekly. Take on an empty stomach, at least 1 hour before or 2 hours after a meal.  Swallow tablet whole.   sulfamethoxazole-trimethoprim (BACTRIM/SEPTRA) 400-80 MG tablet Past Week at Unknown time  No Yes   Sig: Take 1 tablet by mouth 2 times daily On Saturdays and Sundays   traZODone (DESYREL) 50 MG tablet 12/19/2019 at Unknown time  No Yes   Sig: Take 1 tablet (50 mg) by mouth At Bedtime for 7 days, THEN 0.5 tablets (25 mg) At Bedtime for 7 days. Then STOP   vitamin D3 (CHOLECALCIFEROL) 2000 units (50 mcg) tablet 12/20/2019 at Unknown time  No Yes   Sig: TAKE 1 TABLET BY MOUTH ONCE DAILY WITH BREAKFAST. (NO REFILLS REMAINING)   vitamin E (TOCOPHEROL) 400 units (360 mg) capsule 12/20/2019 at Unknown time  No Yes   Sig: TAKE 1 CAPSULE BY MOUTH ONCE DAILY. (NO REFILLS REMAINING)      Facility-Administered Medications: None     Allergies   Allergies   Allergen Reactions     Blood Transfusion Related (Informational Only) Swelling     Periorbital swelling post platelet transfusion     No Known Drug Allergies        Social History   Geo grew up in [place], [place].  Parents are  and both parents are remarried.  Dad is a , and mom does  for a testing company.  Siblings include two full brothers (older brother Benitez who is a senior in college and younger brother Gamaliel), a step-brother, and a step-sister. Geo graduated from high school in Spring 2018. He currently lives in a group home.    Family History   I have reviewed this patient's family history and updated it with pertinent information if needed.   Family History   Problem Relation Age of  Onset     Circulatory Father         PE/DVT     Hypothyroidism Father 30     Diabetes Maternal Grandmother      Diabetes Paternal Grandmother      Diabetes Paternal Grandfather      C.A.D. Paternal Grandfather      Hypertension Maternal Grandfather      Thyroid Disease Paternal Aunt         unknown whether hypo or hyper     Mental Illness No family hx of        Review of Systems   The 10 point Review of Systems is negative other than noted in the HPI or here.     Physical Exam   Temp: 96.8  F (36  C) Temp src: Oral BP: 111/75 Pulse: 100 Heart Rate: 93 Resp: 16 SpO2: 98 % O2 Device: None (Room air)    Vital Signs with Ranges  Temp:  [96.8  F (36  C)-97.6  F (36.4  C)] 96.8  F (36  C)  Pulse:  [100] 100  Heart Rate:  [93] 93  Resp:  [16] 16  BP: (111-124)/(75-77) 111/75  SpO2:  [96 %-98 %] 98 %  195 lbs 0 oz    Constitutional: well-appearing, well-nourished young adult male in no distress  HEENT: normocephalic, atraumatic; left eye covered with patch, right conjunctiva clear; nares patent; oral mucosa pink and moist  Neck: soft, supple, no significant lymphadenopathy  CV: regular rate and rhythm, no murmur; peripheral pulses 2+ and equal  Pulm: clear to ausculation without stridor, wheezing, or crackles  GI: soft, distended, non-tender; no hepatosplenomegaly or masses  MSK: no edema or cyanosis; muscle bulk appropriate for age  Neuro: tone decreased for age; coordination deficit with gait disturbance; focal cranial nerve deficits  Skin: multiple striae present; no rash  Lymph: no supraclavicular or axillary lymphadenopathy     Data   Recent Results (from the past 168 hour(s))   CBC with platelets differential    Collection Time: 12/17/19  1:27 PM   Result Value Ref Range    WBC 4.7 4.0 - 11.0 10e9/L    RBC Count 4.39 (L) 4.4 - 5.9 10e12/L    Hemoglobin 12.2 (L) 13.3 - 17.7 g/dL    Hematocrit 37.8 (L) 40.0 - 53.0 %    MCV 86 78 - 100 fl    MCH 27.8 26.5 - 33.0 pg    MCHC 32.3 31.5 - 36.5 g/dL    RDW 14.0 10.0 - 15.0 %     Platelet Count 144 (L) 150 - 450 10e9/L    % Neutrophils 61.4 %    % Lymphocytes 22.2 %    % Monocytes 12.6 %    % Eosinophils 3.4 %    % Basophils 0.4 %    Absolute Neutrophil 2.9 1.6 - 8.3 10e9/L    Absolute Lymphocytes 1.0 0.8 - 5.3 10e9/L    Absolute Monocytes 0.6 0.0 - 1.3 10e9/L    Absolute Eosinophils 0.2 0.0 - 0.7 10e9/L    Absolute Basophils 0.0 0.0 - 0.2 10e9/L    Diff Method Automated Method    Comprehensive metabolic panel    Collection Time: 12/17/19  1:27 PM   Result Value Ref Range    Sodium 140 133 - 144 mmol/L    Potassium 4.1 3.4 - 5.3 mmol/L    Chloride 108 94 - 109 mmol/L    Carbon Dioxide 28 20 - 32 mmol/L    Anion Gap 4 3 - 14 mmol/L    Glucose 108 (H) 70 - 99 mg/dL    Urea Nitrogen 20 7 - 30 mg/dL    Creatinine 1.12 0.66 - 1.25 mg/dL    GFR Estimate >90 >60 mL/min/[1.73_m2]    GFR Estimate If Black >90 >60 mL/min/[1.73_m2]    Calcium 8.8 8.5 - 10.1 mg/dL    Bilirubin Total 0.2 0.2 - 1.3 mg/dL    Albumin 3.5 3.4 - 5.0 g/dL    Protein Total 6.6 (L) 6.8 - 8.8 g/dL    Alkaline Phosphatase 112 40 - 150 U/L    ALT 20 0 - 70 U/L    AST 18 0 - 45 U/L   Magnesium    Collection Time: 12/17/19  1:27 PM   Result Value Ref Range    Magnesium 2.0 1.6 - 2.3 mg/dL   Phosphorus    Collection Time: 12/17/19  1:27 PM   Result Value Ref Range    Phosphorus 3.7 2.5 - 4.5 mg/dL

## 2019-12-20 NOTE — TELEPHONE ENCOUNTER
[facility] Call Center    Phone Message    May a detailed message be left on voicemail: yes    Reason for Call: Medication Refill Request    Has the patient contacted the pharmacy for the refill? Yes   Name of medication being requested: sertraline (pharmacy also called to clarify dose)  Provider who prescribed the medication: Marilia Emery MD/Justice Fay MD  Pharmacy:  [address]  Date medication is needed: ASAP        Action Taken: Message routed to:  Other: Nursing pool

## 2019-12-20 NOTE — ED PROVIDER NOTES
Emergency Department    /77   Pulse 100   Temp 97.6  F (36.4  C) (Tympanic)   Resp 16   Wt 94.8 kg (209 lb)   SpO2 96%   BMI 29.26 kg/m      Geo is a 19 year old M who presents with constipation for direct admission to the [facility]. At this time, based upon a brief clinical assessment, Geo is stable and will be admitted to the inpatient floor.    Aditi Fuller MD  December 20, 2019  3:51 PM               Aditi Fuller MD  12/20/19 8079

## 2019-12-21 PROCEDURE — G0378 HOSPITAL OBSERVATION PER HR: HCPCS

## 2019-12-21 PROCEDURE — 25000132 ZZH RX MED GY IP 250 OP 250 PS 637: Performed by: PEDIATRICS

## 2019-12-21 PROCEDURE — 25000132 ZZH RX MED GY IP 250 OP 250 PS 637: Performed by: STUDENT IN AN ORGANIZED HEALTH CARE EDUCATION/TRAINING PROGRAM

## 2019-12-21 PROCEDURE — 25000131 ZZH RX MED GY IP 250 OP 636 PS 637: Performed by: PEDIATRICS

## 2019-12-21 RX ADMIN — SULFAMETHOXAZOLE AND TRIMETHOPRIM 1 TABLET: 800; 160 TABLET ORAL at 08:27

## 2019-12-21 RX ADMIN — DEXAMETHASONE 0.75 MG: 0.75 TABLET ORAL at 08:27

## 2019-12-21 RX ADMIN — SERTRALINE HYDROCHLORIDE 150 MG: 100 TABLET ORAL at 08:27

## 2019-12-21 RX ADMIN — SULFAMETHOXAZOLE AND TRIMETHOPRIM 1 TABLET: 800; 160 TABLET ORAL at 19:15

## 2019-12-21 RX ADMIN — FEXOFENADINE HYDROCHLORIDE 180 MG: 180 TABLET, FILM COATED ORAL at 08:27

## 2019-12-21 RX ADMIN — OMEPRAZOLE 40 MG: 20 CAPSULE, DELAYED RELEASE ORAL at 08:30

## 2019-12-21 RX ADMIN — POLYETHYLENE GLYCOL 3350, SODIUM SULFATE ANHYDROUS, SODIUM BICARBONATE, SODIUM CHLORIDE, POTASSIUM CHLORIDE 1000 ML/HR: 236; 22.74; 6.74; 5.86; 2.97 POWDER, FOR SOLUTION ORAL at 19:08

## 2019-12-21 RX ADMIN — POLYETHYLENE GLYCOL 3350, SODIUM SULFATE ANHYDROUS, SODIUM BICARBONATE, SODIUM CHLORIDE, POTASSIUM CHLORIDE 1000 ML/HR: 236; 22.74; 6.74; 5.86; 2.97 POWDER, FOR SOLUTION ORAL at 16:11

## 2019-12-21 RX ADMIN — POLYETHYLENE GLYCOL 3350, SODIUM SULFATE ANHYDROUS, SODIUM BICARBONATE, SODIUM CHLORIDE, POTASSIUM CHLORIDE 500 ML/HR: 236; 22.74; 6.74; 5.86; 2.97 POWDER, FOR SOLUTION ORAL at 05:51

## 2019-12-21 NOTE — PROGRESS NOTES
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       GI:  Acute on chronic constipation  - Continue bowel clean out with Golytely with ramping up of rate to 1L today, continue until x2 clear stool   - AXR once clean out complete, prior to NG removal/discharge   - Continue PTA Omeprazole 40 mg daily     FEN:  - Clear liquid diet w/ bowel clean out   - Holding PTA supplements for now: Ca, vitamin D, K-phos, vitamin E (restart at discharge)      Heme/Onc:  Ependymoma: on study  - Entinostat 7 mg weekly     Endo:  - Continue PTA Decadron 0.75 mg QAM     Neuro/Psych:  - Continue PTA Olanzapine 25mg at bedtime  - Continue PTA Amitriptyline 25 mg at bedtime  - Continue PTA Trazadone 50mg at bedtime  - Continue PTA Zoloft 150mg daily     At discharge please provide the following instructions for medication taper which was initiated prior to admission and needs to be communicated exactly as written for his group home to administer:     Week 1:  Reduce trazodone to 50 mg at bedtime and start amitriptyline 25 mg at bedtime.  Continue taking the sertraline and olanzapine as before with no changes (we are only going to plan on changing trazodone at this point).     Week 2:  After one week of the above, reduce trazodone to 25 mg at bedtime.  At that time, if tolerated, he can increase the amitriptyline to 50 mg at bedtime.     Week 3:  Discontinue trazodone and continue taking amitriptyline 50 mg at bedtime.       Allergies:  - Continue PTA Allegra 180mg qPM      Pulm:   #Hx of CANDELARIO: previously used CPAP  - Routine vitals w/ spot pulse ox    This patient was evaluated and discussed with attending  physician Dr. White and fellow Dr. Diaz Reeder MD  Pediatric Resident PGY-3  [facility]  Pager# [phone]    Physician Attestation     I, Mandeep White MD, saw this patient with the resident and agree with the resident s findings and plan of care as documented in the resident s note.       I personally reviewed vital signs, medications, labs and imaging (as applicable).     Mandeep White MD  Pediatric Hematology/Oncology  [facility]  Date of Service (when I saw the patient): 12/21/2019     Interval History   Started Golytely clean out ~11PM at 500mL/hr with no stool. Increased to 750mL prior to rounds with only x1 bowel movement. Increasing abdominal distension but no concerns for nausea/vomiting. Tolerating clear liquid diet and home meds without issue.     Physical Exam   Temp: 97.1  F (36.2  C) Temp src: Axillary BP: 131/81 Pulse: 74 Heart Rate: 87 Resp: 20 SpO2: 98 % O2 Device: None (Room air)    Vitals:    12/20/19 1523 12/20/19 1556   Weight: 94.8 kg (209 lb) 88.5 kg (195 lb)     Vital Signs with Ranges  Temp:  [96.7  F (35.9  C)-97.6  F (36.4  C)] 97.1  F (36.2  C)  Pulse:  [] 74  Heart Rate:  [87-93] 87  Resp:  [16-20] 20  BP: (111-131)/(75-81) 131/81  SpO2:  [96 %-98 %] 98 %  I/O last 3 completed shifts:  In: 5100 [P.O.:600; NG/GT:4500]  Out: 4150 [Urine:4150]    General: Well appearing, NAD.   HEENT: Atraumatic, normocephalic. Pupils equal. Normal conjunctiva and sclera. EOM grossly intact. Nares patent with no congestion or discharge noted. MMM. No obvious oral lesions. Dentition intact.   Respiratory: CTAB with good air movement bilaterally. No wheezing, crackles or areas of consolidation noted. Normal WOB.   Cardiovascular:  RRR. Normal S1/S2. No murmurs noted. Good cap refill <3 sec. Distal pulses intact.   Abdomen: Soft, not significantly tender, mildly distended. Bowel sounds noted.    Skin: No bruising, rashes or other obvious lesions noted. No jaundice.     Medications     polyethylene glycol 500 mL/hr (12/21/19 0551)       amitriptyline  25 mg Oral At Bedtime     dexamethasone  0.75 mg Oral Daily     fexofenadine  180 mg Oral Daily     OLANZapine  25 mg Oral At Bedtime     omeprazole  40 mg Oral QAM     sertraline  150 mg Oral Daily     sodium chloride (PF)  3 mL Intracatheter Q8H     sulfamethoxazole-trimethoprim  1 tablet Oral 2 times per day on Sun Sat     traZODone  50 mg Oral At Bedtime       Data   Results for orders placed or performed during the hospital encounter of 12/20/19 (from the past 24 hour(s))   XR Abdomen 1 View    Narrative    HISTORY: NG tube placement.    COMPARISON: 9/26/2019    FINDINGS: Supine abdomen at 2011 hours. No definite enteric tube is  visualized. Lung bases are clear. Moderate stool in the included upper  abdomen.      Impression    IMPRESSION: No definite enteric tube is identified in the lower chest  or abdomen. Follow-up chest radiograph was obtained.    LAY KINGSLEY MD   XR Chest 1 View    Narrative    HISTORY: NG tube     COMPARISON: abdomen radiograph today    FINDINGS: Supine chest at 20:21 hours. Enteric tube not identified in  the chest or neck. Low lung volumes without focal opacity. Normal  heart size. Unremarkable appearance of the bones.      Impression    IMPRESSION: No enteric tube is seen in the chest or neck.    Result called to Dr. Castro. The tube had subsequently been removed.    LAY KINGSLEY MD   pH Gastric Aspirate   Result Value Ref Range    pH Gastric Aspirate 4.7      *Note: Due to a large number of results and/or encounters for the requested time period, some results have not been displayed. A complete set of results can be found in Results Review.

## 2019-12-21 NOTE — PLAN OF CARE
VSS. Afebrile. No c/o pain or nausea/vomiting. Go-lytely infusing at 1000 ml/hr. Large brown watery stool x2 this shift ~5000 out total. Poor PO water intake. Will continue to monitor and update MD with any changes.

## 2019-12-21 NOTE — PLAN OF CARE
Comments: Discharge Criteria - Outpatient/Observation goals to be met before discharge home:     1. PO intake adequate to maintain hydration status: In progress, pt drinking clear liquids currently.   2. Patient has minimal to no discomfort: Goal met, pt having little to no discomfort.   3. Bowel clean out completed-stools are clear: Goal not met, pt has not stooled.     AVSS. Lung sounds clear on room air. NG tube inserted earlier this evening, no gastric contents returned for pH check. Chest and abdominal x-ray obtained with no NG tube seen on either. MD notified, new NG inserted. Gastric contents obtained and pH checked by lab, ok to use NG per MD. Golytely infusing at 500 mL/hr. Pt has not stooled this evening. Denies pain, no s/s of n/v. Hourly rounding complete. Continue to monitor.

## 2019-12-21 NOTE — PLAN OF CARE
VSS, afebrile.  Golytely infusing.  No stool this shift.  Good UOP, drinking clear fluids.  Denies pain, no n/v.

## 2019-12-22 ENCOUNTER — APPOINTMENT (OUTPATIENT)
Dept: GENERAL RADIOLOGY | Facility: CLINIC | Age: 19
End: 2019-12-22
Attending: PEDIATRICS
Payer: COMMERCIAL

## 2019-12-22 VITALS
HEART RATE: 89 BPM | WEIGHT: 195 LBS | BODY MASS INDEX: 27.92 KG/M2 | RESPIRATION RATE: 14 BRPM | SYSTOLIC BLOOD PRESSURE: 120 MMHG | TEMPERATURE: 97.7 F | DIASTOLIC BLOOD PRESSURE: 72 MMHG | OXYGEN SATURATION: 91 % | HEIGHT: 70 IN

## 2019-12-22 PROCEDURE — 25000132 ZZH RX MED GY IP 250 OP 250 PS 637: Performed by: STUDENT IN AN ORGANIZED HEALTH CARE EDUCATION/TRAINING PROGRAM

## 2019-12-22 PROCEDURE — 25000131 ZZH RX MED GY IP 250 OP 636 PS 637: Performed by: PEDIATRICS

## 2019-12-22 PROCEDURE — 25000132 ZZH RX MED GY IP 250 OP 250 PS 637: Performed by: PEDIATRICS

## 2019-12-22 PROCEDURE — G0378 HOSPITAL OBSERVATION PER HR: HCPCS

## 2019-12-22 PROCEDURE — 74018 RADEX ABDOMEN 1 VIEW: CPT

## 2019-12-22 RX ADMIN — OMEPRAZOLE 40 MG: 20 CAPSULE, DELAYED RELEASE ORAL at 07:54

## 2019-12-22 RX ADMIN — DEXAMETHASONE 0.75 MG: 0.75 TABLET ORAL at 07:54

## 2019-12-22 RX ADMIN — AMITRIPTYLINE HYDROCHLORIDE 25 MG: 25 TABLET, FILM COATED ORAL at 02:43

## 2019-12-22 RX ADMIN — SERTRALINE HYDROCHLORIDE 150 MG: 100 TABLET ORAL at 07:54

## 2019-12-22 RX ADMIN — POLYETHYLENE GLYCOL 3350, SODIUM SULFATE ANHYDROUS, SODIUM BICARBONATE, SODIUM CHLORIDE, POTASSIUM CHLORIDE 1000 ML/HR: 236; 22.74; 6.74; 5.86; 2.97 POWDER, FOR SOLUTION ORAL at 08:29

## 2019-12-22 RX ADMIN — POLYETHYLENE GLYCOL 3350, SODIUM SULFATE ANHYDROUS, SODIUM BICARBONATE, SODIUM CHLORIDE, POTASSIUM CHLORIDE 1000 ML/HR: 236; 22.74; 6.74; 5.86; 2.97 POWDER, FOR SOLUTION ORAL at 00:06

## 2019-12-22 RX ADMIN — POLYETHYLENE GLYCOL 3350, SODIUM SULFATE ANHYDROUS, SODIUM BICARBONATE, SODIUM CHLORIDE, POTASSIUM CHLORIDE 1000 ML/HR: 236; 22.74; 6.74; 5.86; 2.97 POWDER, FOR SOLUTION ORAL at 05:25

## 2019-12-22 RX ADMIN — OLANZAPINE 25 MG: 5 TABLET, FILM COATED ORAL at 02:43

## 2019-12-22 RX ADMIN — FEXOFENADINE HYDROCHLORIDE 180 MG: 180 TABLET, FILM COATED ORAL at 07:54

## 2019-12-22 RX ADMIN — TRAZODONE HYDROCHLORIDE 50 MG: 50 TABLET ORAL at 02:43

## 2019-12-22 RX ADMIN — SULFAMETHOXAZOLE AND TRIMETHOPRIM 1 TABLET: 800; 160 TABLET ORAL at 07:54

## 2019-12-22 NOTE — PLAN OF CARE
Bowel movements appearing more clear throughout the shift but small amounts of stool still noted and color is light brown. Denies pain. Up with assist of two to the bedside commode.

## 2019-12-22 NOTE — DISCHARGE INSTRUCTIONS
For temperature >100.5, increased nausea, vomiting, pain or any other concerns, please call [phone] & ask to talk to the Pediatric Oncology Fellow On Call.

## 2019-12-22 NOTE — PLAN OF CARE
Comments: Discharge Criteria - Outpatient/Observation goals to be met before discharge home:     1. PO intake adequate to maintain hydration status: In progress, pt drinking clear liquids currently.   2. Patient has minimal to no discomfort: Goal met, pt having little to no discomfort.   3. Bowel clean out completed-stools are clear: Goal in progress, pt having watery stools but not yet clear.     AVSS. Lung sounds clear on room air. Heart sounds WDL. Adequate UOP. Pt drinking water. Pt having large, watery brown stools, not yet clear. Go-lytely infusing at 1000 mLs/hr. Pt declining elavil, zyprexa, and trazadone at this time, wishes to take medications later. No s/s of n/v or pain. Hourly rounding complete. Continue to monitor.

## 2019-12-22 NOTE — DISCHARGE SUMMARY
[facility], [place]    Discharge Summary  Pediatric Hematology / Oncology    Date of Admission:  12/20/2019  Date of Discharge:  12/22/2019  Discharging Provider: Dr. Mandeep White    Discharge Diagnoses    -- Recurrent Acute on Chronic Constipation  -- Ependymoma    Other Problems:  Patient Active Problem List   Diagnosis     GERD (gastroesophageal reflux disease)     Closed fracture at the growth plate of right distal fibula      Elevated serum creatinine     Dyspepsia     Intracranial hemorrhage (H)     Hemorrhagic stroke (H)     Ependymoma (H)     Admission for antineoplastic chemotherapy     Post-operative state     S/P craniotomy     S/P biopsy     Noncomitant strabismus     Abducens neuropathy of both eyes     CANDELARIO (obstructive sleep apnea)     Health Care Home     Hypernatremia     Body temperature low     Current chronic use of systemic steroids     Elevated TSH     Status post chemotherapy     Neoplasm of posterior cranial fossa (H)     Chronic constipation     Serum albumin decreased     Closed compression fracture of thoracic vertebra with routine healing, subsequent encounter     Depression     Constipation     Constipation by delayed colonic transit     Slow transit constipation       History of Present Illness   Geo is a 19 year old male with 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 He reports that he usually stools daily but has not had a bowel movement in several days after missing doses of his normal bowel regimen. He reports that he intermittently gets constipated beyond what his daily home regimen can manage. He says he does well with the placement of the NG tube.  He has had no abdominal pain. He denies stools being hard most of the time. He has had no blood in his stool. He otherwise feels well.     Hospital Course   Geo Hicks was admitted on 12/20/2019.  The following problems were addressed during his hospitalization:    Upon admission and NG was placed and after x-ray confirmation Geo was started on a bowel clean out with Golytely with gradual titration of the rate to 1L/hr for adequate efficacy. He continued on this until the clean out was completed which was confirmed via x-ray after which the NG was removed. He was continued on his critical home medications but his supplements were held during his clean out and he was limited to a clear liquid diet until this was completed. He tolerated the clean out without significant issue and did not require any PRN medication for nausea/vomiting. Upon discharge all of his home medications were restarted including his prior bowel regimen medications and he continued on his Trazadone taper w/ Amytriptylene dose adjustments that had been initiated prior to admission without changes.     Geo was evaluated and discussed with Heme/Onc Fellow Dr. Cathie Perales and Attending Dr. Mandeep White.    Annalee Grace DO  [facility] Pediatric Resident PL-1  Pager # [phone]    Physician Attestation     I saw and examined the patient today.  I personally reviewed vital signs, medications, labs, imaging, and discharge plan with the resident, and agree with the final assessment and plan for discharge as noted in the resident s discharge summary. I personally spent < 30 minutes today on discharge activities for this patient.     Mandeep White MD  Pediatric Hematology/Oncology  [facility]  Date of Service (when I saw the patient): 12/22/2019    Significant Results and Procedures   - NG placement (12/20)  - Abdominal x-ray post-clean out 12/22: showed no significant  residual stool. Mild gas distension of the colon. Lung bases clear. No organomegaly or suspicious calcification.       Pending Results   These results will be followed up by the Heme/Onc Team.  Unresulted Labs Ordered in the Past 30 Days of this Admission     No orders found from 11/20/2019 to 12/21/2019.          Primary Care Physician   Jeffrey Espinoza    Physical Exam   Vital Signs with Ranges     I/O last 3 completed shifts:  In: 24911 [P.O.:1000; NG/GT:78641]  Out: 92825 [Urine:2750; Other:41029; Stool:2000]    Constitutional: well-appearing, well-nourished young adult male in no distress, lying comfortably in bed  HEENT: normocephalic, atraumatic; left eye covered with patch, right conjunctiva clear; nares patent; oral mucosa pink and moist  Neck: soft, supple, no significant lymphadenopathy  CV: regular rate and rhythm, no murmur; peripheral pulses 2+ and equal  Pulm: clear to ausculation without stridor, wheezing, or crackles  GI: soft, no, non-tender, non-distended; no hepatosplenomegaly or masses  MSK: no edema or cyanosis; muscle bulk appropriate for age  Neuro: tone decreased for age; coordination deficit with gait disturbance; focal cranial nerve deficits  Skin: multiple striae present; no rash  Lymph: no supraclavicular or axillary lymphadenopathy    Discharge Disposition   Discharged to home  Condition at discharge: Stable    Consultations This Hospital Stay   None    Discharge Orders      Reason for your hospital stay    Geo was admitted for a bowel clean out     Follow Up and recommended labs and tests    Follow up with Heme/Onc and Peds GI as previously scheduled     Activity    Your activity upon discharge: activity as tolerated     When to contact your care team    Contact Heme/Onc with any of the following: fever 100.4F or night, nausea/vomiting and/or pain not controlled with home meds, any other concerns  Contact Peds GI w/ concerns re: constipation/bowel regimen meds     Discharge  Instructions    Continue medication taper/adjustments that was initiated prior to admission as follows:   Week 1:    - Reduce trazodone to 50 mg at bedtime and start amitriptyline 25 mg at bedtime.    - Continue taking the sertraline and olanzapine as before with no changes.     Week 2:    - Reduce trazodone to 25 mg at bedtime.   - If tolerated, increase Amitriptyline to 50 mg at bedtime.     Week 3:    - Discontinue trazodone   - Continue amitriptyline 50 mg at bedtime.     Diet    Follow this diet upon discharge: Regular home diet     Discharge Medications   Discharge Medication List as of 12/22/2019  2:16 PM      CONTINUE these medications which have NOT CHANGED    Details   amitriptyline (ELAVIL) 25 MG tablet Take 1 tablet (25 mg) by mouth At Bedtime for 7 days, THEN 2 tablets (50 mg) At Bedtime for 28 days., Disp-63 tablet, R-0, E-Prescribe      bisacodyl (BISACODYL LAXATIVE) 5 MG EC tablet Take 2 tablets (10 mg) by mouth daily for 3 days And then PRN at night if no bowel movement during the day., Disp-30 tablet, R-3, E-Prescribe      calcium carbonate 600 mg-vitamin D 400 units (CALTRATE) 600-400 MG-UNIT per tablet TAKE 2 TABLETS (1,200MG/800 UNITS) BY MOUTH EVERY MORNING.;TAKE 1 TABLET BY MOUTH EVERY EVENING. (NO REFILLS REMAINING), Disp-90 tablet, R-11, E-PrescribeREFILL REQUEST FOR A NEW PT TO OUR PHARMACY. PLEASE CALL WITH ANY QUESTIONS. THANK YOU. - J4      calcium carbonate-vitamin D 600-400 MG-UNIT CHEW Take 2 tablets in the morning and 1 tablet in the evening., Disp-90 tablet, R-3, E-Prescribe      dexamethasone (DECADRON) 0.5 MG tablet TAKE ONE AND ONE-HALF TABLETS (0.75MG) BY MOUTH ONCE DAILY WITH BREAKFAST. (NO REFILLS REMAINING), Disp-45 tablet, R-11, E-PrescribeREFILL REQUEST FOR A NEW PT TO OUR PHARMACY. PLEASE CALL WITH ANY QUESTIONS. THANK YOU. - J4      fexofenadine (ALLEGRA) 180 MG tablet TAKE 1 TABLET BY MOUTH EVERY EVENING. **NON-COVERED MED** (NO REFILLS REMAINING), Disp-30 tablet, R-11,  E-PrescribeREFILL REQUEST FOR A NEW PT TO OUR PHARMACY. PLEASE CALL WITH ANY QUESTIONS. THANK YOU. - J4      linaclotide (LINZESS) 290 MCG capsule Take 1 capsule (290 mcg) by mouth every morning (before breakfast), Disp-30 capsule, R-3, E-Prescribe      mupirocin (BACTROBAN) 2 % external ointment Use 2 times a day to the ear lobes and canals with flareDisp-22 g, [id number]      !! OLANZapine (ZYPREXA) 20 MG tablet Take 1 tablet (20 mg) along with 1 tablet (5 mg) for a total dose of 25 mg at bedtime., Disp-30 tablet, R-1, E-Prescribe      !! OLANZapine (ZYPREXA) 5 MG tablet Take 1 tablet (5 mg) along with 1 tablet (20 mg) for a total dose of 25 mg at bedtime., Disp-30 tablet, R-1, E-Prescribe      omeprazole (PRILOSEC) 20 MG DR capsule Take 2 capsules (40 mg) by mouth every morning, Disp-60 capsule, R-1, E-Prescribe      order for DME Equipment being ordered: short bootDisp-1 each, R-0, Local Print      polyethylene glycol (MIRALAX/GLYCOLAX) powder Take 17 g (1 capful) by mouth 3 times daily, Disp-289 g, R-3, E-Prescribe      potassium phosphate, monobasic, (K-PHOS) 500 MG tablet Take 1 tablet (500 mg) by mouth 2 times daily, Disp-90 tablet, R-3, E-Prescribe      sertraline (ZOLOFT) 100 MG tablet Take 1.5 tablets (150 mg) by mouth daily, Disp-45 tablet, R-1, E-Prescribe      study - entinostat (IDS# 5050) 1 mg tablet Take 2 tablets (2 mg) by mouth every 7 days for 4 doses Take two 1mg tablet with one 5mg tablet for total dose of 7mg weekly. Take on an empty stomach, at least 1 hour before or 2 hours after a meal.  Swallow tablet whole., Disp-8 tablet, R-0, Local Prin [facility].      study - entinostat (IDS# 5050) 5 mg tablet Take 1 tablet (5 mg) by mouth every 7 days for 4 doses Take one 5mg tablet with two 1mg tablet for total dose of 7mg weekly. Take on an empty stomach, at least 1 hour before or 2 hours after a meal.  Swallow tablet whole., Disp-4 tablet, R-0, Local Print  Deliver to [facility] for dispensing      sulfamethoxazole-trimethoprim (BACTRIM/SEPTRA) 400-80 MG tablet Take 1 tablet by mouth 2 times daily On Saturdays and Sundays, Disp-24 tablet, R-11, E-Prescribe      traZODone (DESYREL) 50 MG tablet Take 1 tablet (50 mg) by mouth At Bedtime for 7 days, THEN 0.5 tablets (25 mg) At Bedtime for 7 days. Then STOP, Disp-45 tablet, R-1, E-Prescribe      vitamin D3 (CHOLECALCIFEROL) 2000 units (50 mcg) tablet TAKE 1 TABLET BY MOUTH ONCE DAILY WITH BREAKFAST. (NO REFILLS REMAINING), Disp-30 tablet, R-11, E-PrescribeREFILL REQUEST FOR A NEW PT TO OUR PHARMACY. PLEASE CALL WITH ANY QUESTIONS. THANK YOU. - J4      vitamin E (TOCOPHEROL) 400 units (360 mg) capsule TAKE 1 CAPSULE BY MOUTH ONCE DAILY. (NO REFILLS REMAINING), Disp-30 capsule, R-11, E-PrescribeREFILL REQUEST FOR A NEW PT TO OUR PHARMACY. PLEASE CALL WITH ANY QUESTIONS. THANK YOU. - J4      senna-docusate (SENOKOT-S/PERICOLACE) 8.6-50 MG tablet Take 2 tablets by mouth At Bedtime, Disp-30 tablet, R-11, E-Prescribe       !! - Potential duplicate medications found. Please discuss with provider.        Allergies   Allergies   Allergen Reactions     Blood Transfusion Related (Informational Only) Swelling     Periorbital swelling post platelet transfusion     No Known Drug Allergies      Most Recent 6 Bacteria Isolates From Any Culture (See EPIC Reports for Culture Details):  Recent Labs   Lab Test 12/24/17  1249 04/03/17  1045 03/03/17  1226 04/08/16  1340 12/11/15  1415 11/23/15  0815   CULT No growth Light growth Normal skin sydnie No Beta Streptococcus isolated Light growth Coagulase negative Staphylococcus Susceptibility testing not   routinely done  * No growth Specimen not received Canceled, Test credited

## 2019-12-23 ENCOUNTER — TELEPHONE (OUTPATIENT)
Dept: FAMILY MEDICINE | Facility: CLINIC | Age: 19
End: 2019-12-23

## 2019-12-23 ENCOUNTER — TELEPHONE (OUTPATIENT)
Dept: PEDIATRIC HEMATOLOGY/ONCOLOGY | Facility: CLINIC | Age: 19
End: 2019-12-23

## 2019-12-23 DIAGNOSIS — C71.9 EPENDYMOMA (H): Primary | ICD-10-CM

## 2019-12-23 DIAGNOSIS — C71.9 EPENDYMOMA (H): ICD-10-CM

## 2019-12-23 DIAGNOSIS — K59.01 SLOW TRANSIT CONSTIPATION: ICD-10-CM

## 2019-12-23 RX ORDER — AMOXICILLIN 250 MG
1 CAPSULE ORAL EVERY MORNING
Qty: 30 TABLET | Refills: 11 | Status: SHIPPED | OUTPATIENT
Start: 2019-12-23 | End: 2019-12-27 | Stop reason: ALTCHOICE

## 2019-12-23 NOTE — TELEPHONE ENCOUNTER
12/23/2019    Oxana care coordinator called from [facility] for patient to have a prescription written for a chewable form. Patient is currently taking Calcium Carbonate tablets which is hard for him so they would like it to be a chewable instead. They still had a few questions on medication and would like someone to call them at Ph:[phone]      Sergio Huang -Patient Representative

## 2019-12-23 NOTE — TELEPHONE ENCOUNTER
Oxana - the RN from [facility] called to ask for an update to Geo's Senna prescription.   Geo's current prescription stated he is to take 2 tabs daily.   Oxana reports he only takes one tab in the morning. This is the families request.   New RX sent to [facility] pharmacy to reflect current correct dosing.   Will continue to follow.

## 2019-12-24 DIAGNOSIS — C71.9 EPENDYMOMA (H): ICD-10-CM

## 2019-12-24 LAB
BASOPHILS # BLD AUTO: 0 10E9/L (ref 0–0.2)
BASOPHILS NFR BLD AUTO: 0.3 %
DIFFERENTIAL METHOD BLD: ABNORMAL
EOSINOPHIL # BLD AUTO: 0.2 10E9/L (ref 0–0.7)
EOSINOPHIL NFR BLD AUTO: 4.3 %
ERYTHROCYTE [DISTWIDTH] IN BLOOD BY AUTOMATED COUNT: 14.2 % (ref 10–15)
HCT VFR BLD AUTO: 38.8 % (ref 40–53)
HGB BLD-MCNC: 12.5 G/DL (ref 13.3–17.7)
LYMPHOCYTES # BLD AUTO: 1.1 10E9/L (ref 0.8–5.3)
LYMPHOCYTES NFR BLD AUTO: 26.9 %
MCH RBC QN AUTO: 27.5 PG (ref 26.5–33)
MCHC RBC AUTO-ENTMCNC: 32.2 G/DL (ref 31.5–36.5)
MCV RBC AUTO: 86 FL (ref 78–100)
MONOCYTES # BLD AUTO: 0.4 10E9/L (ref 0–1.3)
MONOCYTES NFR BLD AUTO: 10.5 %
NEUTROPHILS # BLD AUTO: 2.3 10E9/L (ref 1.6–8.3)
NEUTROPHILS NFR BLD AUTO: 58 %
PLATELET # BLD AUTO: 114 10E9/L (ref 150–450)
RBC # BLD AUTO: 4.54 10E12/L (ref 4.4–5.9)
WBC # BLD AUTO: 3.9 10E9/L (ref 4–11)

## 2019-12-24 PROCEDURE — 80053 COMPREHEN METABOLIC PANEL: CPT | Performed by: NURSE PRACTITIONER

## 2019-12-24 PROCEDURE — 36415 COLL VENOUS BLD VENIPUNCTURE: CPT | Performed by: NURSE PRACTITIONER

## 2019-12-24 PROCEDURE — 85025 COMPLETE CBC W/AUTO DIFF WBC: CPT | Performed by: NURSE PRACTITIONER

## 2019-12-24 PROCEDURE — 84100 ASSAY OF PHOSPHORUS: CPT | Performed by: NURSE PRACTITIONER

## 2019-12-24 PROCEDURE — 83735 ASSAY OF MAGNESIUM: CPT | Performed by: NURSE PRACTITIONER

## 2019-12-24 RX ORDER — SERTRALINE HYDROCHLORIDE 100 MG/1
TABLET, FILM COATED ORAL
Refills: 11 | OUTPATIENT
Start: 2019-12-24

## 2019-12-24 NOTE — TELEPHONE ENCOUNTER
T'd up chewable tabs.     Please advise for change at this time.     Care coordination has called group home to advise on other medication questions (12/23/19)    Aniya Torres RN Flex

## 2019-12-26 LAB
ALBUMIN SERPL-MCNC: 3.4 G/DL (ref 3.4–5)
ALP SERPL-CCNC: 113 U/L (ref 40–150)
ALT SERPL W P-5'-P-CCNC: 22 U/L (ref 0–70)
ANION GAP SERPL CALCULATED.3IONS-SCNC: 5 MMOL/L (ref 3–14)
AST SERPL W P-5'-P-CCNC: 28 U/L (ref 0–45)
BILIRUB SERPL-MCNC: 0.3 MG/DL (ref 0.2–1.3)
BUN SERPL-MCNC: 20 MG/DL (ref 7–30)
CALCIUM SERPL-MCNC: 9.1 MG/DL (ref 8.5–10.1)
CHLORIDE SERPL-SCNC: 107 MMOL/L (ref 94–109)
CO2 SERPL-SCNC: 28 MMOL/L (ref 20–32)
CREAT SERPL-MCNC: 1.24 MG/DL (ref 0.66–1.25)
GFR SERPL CREATININE-BSD FRML MDRD: 83 ML/MIN/{1.73_M2}
GLUCOSE SERPL-MCNC: 151 MG/DL (ref 70–99)
MAGNESIUM SERPL-MCNC: 2.1 MG/DL (ref 1.6–2.3)
PHOSPHATE SERPL-MCNC: 4.1 MG/DL (ref 2.5–4.5)
POTASSIUM SERPL-SCNC: 3.9 MMOL/L (ref 3.4–5.3)
PROT SERPL-MCNC: 6.5 G/DL (ref 6.8–8.8)
SODIUM SERPL-SCNC: 140 MMOL/L (ref 133–144)

## 2019-12-27 ENCOUNTER — TELEPHONE (OUTPATIENT)
Dept: PEDIATRIC HEMATOLOGY/ONCOLOGY | Facility: CLINIC | Age: 19
End: 2019-12-27

## 2019-12-27 DIAGNOSIS — C71.9 EPENDYMOMA (H): ICD-10-CM

## 2019-12-27 DIAGNOSIS — K59.01 SLOW TRANSIT CONSTIPATION: ICD-10-CM

## 2019-12-27 RX ORDER — DOCUSATE SODIUM 100 MG/1
CAPSULE, LIQUID FILLED ORAL
Qty: 30 CAPSULE | Refills: 11 | Status: SHIPPED | OUTPATIENT
Start: 2019-12-27 | End: 2020-01-13

## 2019-12-27 NOTE — TELEPHONE ENCOUNTER
Caller Report:  Infusion RN relayed request from patient's group home nurse manager for colace Rx. Patient's parents had been giving him OTC colace, however, the group home requires an Rx for all medications, even OTC meds. IMAN Schuler had prescribed senna-colace (Pericolace) on 12/23, however, the group home said he needs colace only.    Action/Plan:  Rx changed from Pericolace to Colace per IMAN Schuler, JOSE E.    Princess Lott RN, MS

## 2019-12-30 ENCOUNTER — TELEPHONE (OUTPATIENT)
Dept: FAMILY MEDICINE | Facility: CLINIC | Age: 19
End: 2019-12-30

## 2019-12-30 DIAGNOSIS — Z92.21 STATUS POST CHEMOTHERAPY: ICD-10-CM

## 2019-12-30 DIAGNOSIS — Z79.52 CURRENT CHRONIC USE OF SYSTEMIC STEROIDS: Primary | ICD-10-CM

## 2019-12-30 NOTE — TELEPHONE ENCOUNTER
[facility] calls, asking for calcium tablet change to chewable calcium, Vivactiv, pt takes 2 tablets qam and 1 tablet 6 PM, routed to , if okay approve, remove other calcium from problem list, if not okay route to inform them  Ita Jansen, RN, BSN  Message handled by Nurse Triage.

## 2019-12-31 ENCOUNTER — TELEPHONE (OUTPATIENT)
Dept: PEDIATRIC HEMATOLOGY/ONCOLOGY | Facility: CLINIC | Age: 19
End: 2019-12-31

## 2019-12-31 ENCOUNTER — TELEPHONE (OUTPATIENT)
Dept: PSYCHIATRY | Facility: CLINIC | Age: 19
End: 2019-12-31

## 2019-12-31 NOTE — TELEPHONE ENCOUNTER
Health Call Center    Phone Message    May a detailed message be left on voicemail: yes    Reason for Call: Other:   Manju Pink called from [facility] to report the patient is refusing outpatient mental health services and medical treatment. She would like a psych eval done for the patient. Manju would like a call at [phone].      Action Taken: Message routed to:  Other: Nursing pool

## 2019-12-31 NOTE — TELEPHONE ENCOUNTER
Dad left message noting Geo is refusing labs this week. The will attempt again next week. They are making adjustments to his care plan.

## 2019-12-31 NOTE — TELEPHONE ENCOUNTER
"Received call back from patient's outpatient therapist, Manju Chavez.  Manju reports that Geo's dad came into the [facility] today stating that Geo continues to have delusions re: constipation and is now refusing services.  Geo has told dad, \"What's the point?\"  He doesn't feel as though he is getting the care he needs to address his GI concerns.  Geo did not come to his therapy appointment today or have his scheduled labs drawn for oncology.  It's unclear if he is continuing to take his medications but Geo does reside in a facility with ongoing nursing staff.  He has not made any statements/threats of SI and Mnaju reports that he has a written safety plan in place with her which they review regularly together.  Manju is requesting that Geo receive a \"full psych eval\" at his next appointment with Dr. Fay.  She asks about \"legal parameters\" to force treatment.  Writer advised Manju that first step would be to check in with dad and/or Geo on Thursday morning to determine likelihood that Geo will actually come to his appointment in the afternoon.  As tomorrow is a holiday, clinic staff will be unable to offer any earlier follow-up.      Current safety factors include no reported SI, nursing staff at living facility, and supportive/proactive dad.     Writer will route information to provider as an FYI.  "

## 2020-01-01 ENCOUNTER — TELEPHONE (OUTPATIENT)
Dept: FAMILY MEDICINE | Facility: CLINIC | Age: 20
End: 2020-01-01

## 2020-01-01 ENCOUNTER — TELEPHONE (OUTPATIENT)
Dept: PEDIATRIC HEMATOLOGY/ONCOLOGY | Facility: CLINIC | Age: 20
End: 2020-01-01

## 2020-01-01 ENCOUNTER — VIRTUAL VISIT (OUTPATIENT)
Dept: PEDIATRIC HEMATOLOGY/ONCOLOGY | Facility: CLINIC | Age: 20
End: 2020-01-01
Attending: PEDIATRICS
Payer: COMMERCIAL

## 2020-01-01 ENCOUNTER — TRANSFERRED RECORDS (OUTPATIENT)
Dept: HEALTH INFORMATION MANAGEMENT | Facility: CLINIC | Age: 20
End: 2020-01-01

## 2020-01-01 ENCOUNTER — TELEPHONE (OUTPATIENT)
Dept: PSYCHIATRY | Facility: CLINIC | Age: 20
End: 2020-01-01

## 2020-01-01 ENCOUNTER — HOSPITAL ENCOUNTER (OUTPATIENT)
Dept: MRI IMAGING | Facility: CLINIC | Age: 20
End: 2020-06-11
Attending: PEDIATRICS
Payer: COMMERCIAL

## 2020-01-01 ENCOUNTER — VIRTUAL VISIT (OUTPATIENT)
Dept: PSYCHIATRY | Facility: CLINIC | Age: 20
End: 2020-01-01
Attending: PSYCHIATRY & NEUROLOGY
Payer: COMMERCIAL

## 2020-01-01 ENCOUNTER — APPOINTMENT (OUTPATIENT)
Dept: GENERAL RADIOLOGY | Facility: CLINIC | Age: 20
End: 2020-01-01
Attending: EMERGENCY MEDICINE
Payer: COMMERCIAL

## 2020-01-01 ENCOUNTER — TELEPHONE (OUTPATIENT)
Dept: INFUSION THERAPY | Facility: CLINIC | Age: 20
End: 2020-01-01

## 2020-01-01 ENCOUNTER — HOSPITAL ENCOUNTER (OUTPATIENT)
Dept: MRI IMAGING | Facility: CLINIC | Age: 20
End: 2020-09-10
Attending: PEDIATRICS
Payer: COMMERCIAL

## 2020-01-01 ENCOUNTER — VIRTUAL VISIT (OUTPATIENT)
Dept: PEDIATRIC HEMATOLOGY/ONCOLOGY | Facility: CLINIC | Age: 20
End: 2020-01-01
Attending: NURSE PRACTITIONER
Payer: COMMERCIAL

## 2020-01-01 ENCOUNTER — MEDICAL CORRESPONDENCE (OUTPATIENT)
Dept: HEALTH INFORMATION MANAGEMENT | Facility: CLINIC | Age: 20
End: 2020-01-01

## 2020-01-01 ENCOUNTER — DOCUMENTATION ONLY (OUTPATIENT)
Dept: OTHER | Facility: CLINIC | Age: 20
End: 2020-01-01

## 2020-01-01 ENCOUNTER — ALLIED HEALTH/NURSE VISIT (OUTPATIENT)
Dept: TRANSPLANT | Facility: CLINIC | Age: 20
End: 2020-01-01
Attending: PEDIATRICS
Payer: COMMERCIAL

## 2020-01-01 ENCOUNTER — HEALTH MAINTENANCE LETTER (OUTPATIENT)
Age: 20
End: 2020-01-01

## 2020-01-01 ENCOUNTER — HOSPITAL ENCOUNTER (EMERGENCY)
Facility: CLINIC | Age: 20
Discharge: HOME | End: 2020-10-03
Attending: EMERGENCY MEDICINE | Admitting: EMERGENCY MEDICINE
Payer: COMMERCIAL

## 2020-01-01 ENCOUNTER — MYC MEDICAL ADVICE (OUTPATIENT)
Dept: PEDIATRIC HEMATOLOGY/ONCOLOGY | Facility: CLINIC | Age: 20
End: 2020-01-01

## 2020-01-01 ENCOUNTER — PRE VISIT (OUTPATIENT)
Dept: GASTROENTEROLOGY | Facility: CLINIC | Age: 20
End: 2020-01-01

## 2020-01-01 ENCOUNTER — MYC MEDICAL ADVICE (OUTPATIENT)
Dept: PSYCHIATRY | Facility: CLINIC | Age: 20
End: 2020-01-01

## 2020-01-01 ENCOUNTER — EXTERNAL ORDER RESULTS (OUTPATIENT)
Dept: PEDIATRIC HEMATOLOGY/ONCOLOGY | Facility: CLINIC | Age: 20
End: 2020-01-01

## 2020-01-01 ENCOUNTER — DOCUMENTATION ONLY (OUTPATIENT)
Dept: PEDIATRIC HEMATOLOGY/ONCOLOGY | Facility: CLINIC | Age: 20
End: 2020-01-01

## 2020-01-01 ENCOUNTER — VIRTUAL VISIT (OUTPATIENT)
Dept: GASTROENTEROLOGY | Facility: CLINIC | Age: 20
End: 2020-01-01
Attending: PEDIATRICS
Payer: COMMERCIAL

## 2020-01-01 ENCOUNTER — HOSPITAL ENCOUNTER (EMERGENCY)
Facility: CLINIC | Age: 20
Discharge: HOME | End: 2020-07-09
Attending: PHYSICIAN ASSISTANT | Admitting: PHYSICIAN ASSISTANT
Payer: COMMERCIAL

## 2020-01-01 ENCOUNTER — DOCUMENTATION ONLY (OUTPATIENT)
Dept: FAMILY MEDICINE | Facility: CLINIC | Age: 20
End: 2020-01-01

## 2020-01-01 ENCOUNTER — HOSPITAL ENCOUNTER (EMERGENCY)
Facility: CLINIC | Age: 20
Discharge: HOME | End: 2020-10-01
Attending: PHYSICIAN ASSISTANT | Admitting: PHYSICIAN ASSISTANT
Payer: COMMERCIAL

## 2020-01-01 ENCOUNTER — TELEPHONE (OUTPATIENT)
Dept: ONCOLOGY | Facility: CLINIC | Age: 20
End: 2020-01-01

## 2020-01-01 ENCOUNTER — APPOINTMENT (OUTPATIENT)
Dept: CT IMAGING | Facility: CLINIC | Age: 20
End: 2020-01-01
Attending: PHYSICIAN ASSISTANT
Payer: COMMERCIAL

## 2020-01-01 ENCOUNTER — APPOINTMENT (OUTPATIENT)
Dept: GENERAL RADIOLOGY | Facility: CLINIC | Age: 20
End: 2020-01-01
Attending: PHYSICIAN ASSISTANT
Payer: COMMERCIAL

## 2020-01-01 ENCOUNTER — OFFICE VISIT (OUTPATIENT)
Dept: PEDIATRIC HEMATOLOGY/ONCOLOGY | Facility: CLINIC | Age: 20
End: 2020-01-01
Payer: COMMERCIAL

## 2020-01-01 ENCOUNTER — HOSPITAL ENCOUNTER (OUTPATIENT)
Dept: MRI IMAGING | Facility: CLINIC | Age: 20
End: 2020-12-07
Attending: NURSE PRACTITIONER
Payer: COMMERCIAL

## 2020-01-01 ENCOUNTER — VIRTUAL VISIT (OUTPATIENT)
Dept: GASTROENTEROLOGY | Facility: CLINIC | Age: 20
End: 2020-01-01
Payer: COMMERCIAL

## 2020-01-01 ENCOUNTER — HOSPITAL ENCOUNTER (EMERGENCY)
Facility: CLINIC | Age: 20
Discharge: HOME | End: 2020-08-14
Attending: EMERGENCY MEDICINE | Admitting: EMERGENCY MEDICINE
Payer: COMMERCIAL

## 2020-01-01 VITALS
RESPIRATION RATE: 18 BRPM | TEMPERATURE: 97 F | OXYGEN SATURATION: 100 % | HEART RATE: 100 BPM | DIASTOLIC BLOOD PRESSURE: 71 MMHG | SYSTOLIC BLOOD PRESSURE: 114 MMHG

## 2020-01-01 VITALS
DIASTOLIC BLOOD PRESSURE: 85 MMHG | HEART RATE: 90 BPM | RESPIRATION RATE: 16 BRPM | WEIGHT: 182.98 LBS | OXYGEN SATURATION: 96 % | BODY MASS INDEX: 24.25 KG/M2 | HEIGHT: 73 IN | TEMPERATURE: 97.7 F | SYSTOLIC BLOOD PRESSURE: 142 MMHG

## 2020-01-01 VITALS
OXYGEN SATURATION: 97 % | DIASTOLIC BLOOD PRESSURE: 75 MMHG | RESPIRATION RATE: 18 BRPM | SYSTOLIC BLOOD PRESSURE: 128 MMHG | TEMPERATURE: 97.9 F | HEART RATE: 92 BPM

## 2020-01-01 VITALS — HEIGHT: 72 IN | BODY MASS INDEX: 26.34 KG/M2

## 2020-01-01 VITALS
OXYGEN SATURATION: 96 % | SYSTOLIC BLOOD PRESSURE: 126 MMHG | DIASTOLIC BLOOD PRESSURE: 92 MMHG | HEART RATE: 102 BPM | TEMPERATURE: 97.9 F | RESPIRATION RATE: 19 BRPM

## 2020-01-01 VITALS — WEIGHT: 231.1 LBS | BODY MASS INDEX: 32.35 KG/M2 | HEIGHT: 71 IN

## 2020-01-01 DIAGNOSIS — D49.6 NEOPLASM OF POSTERIOR CRANIAL FOSSA (H): Primary | ICD-10-CM

## 2020-01-01 DIAGNOSIS — D49.6 NEOPLASM OF POSTERIOR CRANIAL FOSSA (H): ICD-10-CM

## 2020-01-01 DIAGNOSIS — K59.04 CHRONIC IDIOPATHIC CONSTIPATION: ICD-10-CM

## 2020-01-01 DIAGNOSIS — I61.9 HEMORRHAGIC STROKE (H): ICD-10-CM

## 2020-01-01 DIAGNOSIS — F32.A DEPRESSION, UNSPECIFIED DEPRESSION TYPE: ICD-10-CM

## 2020-01-01 DIAGNOSIS — F22 PARANOIA (H): ICD-10-CM

## 2020-01-01 DIAGNOSIS — C71.9 EPENDYMOMA (H): ICD-10-CM

## 2020-01-01 DIAGNOSIS — S69.91XD INJURY OF FINGER OF RIGHT HAND, SUBSEQUENT ENCOUNTER: ICD-10-CM

## 2020-01-01 DIAGNOSIS — K59.00 CONSTIPATION, UNSPECIFIED CONSTIPATION TYPE: ICD-10-CM

## 2020-01-01 DIAGNOSIS — K59.00 CONSTIPATION, UNSPECIFIED CONSTIPATION TYPE: Primary | ICD-10-CM

## 2020-01-01 DIAGNOSIS — R45.86 MOOD CHANGES: ICD-10-CM

## 2020-01-01 DIAGNOSIS — R45.1 AGITATION: ICD-10-CM

## 2020-01-01 DIAGNOSIS — R45.1 AGITATION: Primary | ICD-10-CM
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
## 2020-01-01 DIAGNOSIS — F32.A DEPRESSION, UNSPECIFIED DEPRESSION TYPE: Primary | ICD-10-CM

## 2020-01-01 DIAGNOSIS — C71.9 EPENDYMOMA (H): Primary | ICD-10-CM

## 2020-01-01 DIAGNOSIS — S01.81XA FACIAL LACERATION, INITIAL ENCOUNTER: ICD-10-CM

## 2020-01-01 DIAGNOSIS — L03.011 CELLULITIS OF FINGER OF RIGHT HAND: ICD-10-CM

## 2020-01-01 DIAGNOSIS — R10.9 STOMACH PAIN: ICD-10-CM

## 2020-01-01 DIAGNOSIS — F51.04 PSYCHOPHYSIOLOGICAL INSOMNIA: ICD-10-CM

## 2020-01-01 DIAGNOSIS — K59.01 SLOW TRANSIT CONSTIPATION: Primary | ICD-10-CM

## 2020-01-01 DIAGNOSIS — L98.9 SKIN LESION: ICD-10-CM

## 2020-01-01 DIAGNOSIS — L73.8 BACTERIAL FOLLICULITIS: ICD-10-CM

## 2020-01-01 DIAGNOSIS — T14.8XXA WOUND INFECTION: ICD-10-CM

## 2020-01-01 DIAGNOSIS — L08.9 WOUND INFECTION: ICD-10-CM

## 2020-01-01 DIAGNOSIS — D36.9 TUBULAR ADENOMA: ICD-10-CM

## 2020-01-01 DIAGNOSIS — K59.01 SLOW TRANSIT CONSTIPATION: ICD-10-CM

## 2020-01-01 DIAGNOSIS — Z71.9 VISIT FOR COUNSELING: Primary | ICD-10-CM

## 2020-01-01 DIAGNOSIS — R27.0 ATAXIA: ICD-10-CM

## 2020-01-01 DIAGNOSIS — R10.84 ABDOMINAL PAIN, GENERALIZED: ICD-10-CM

## 2020-01-01 DIAGNOSIS — S71.019D: ICD-10-CM

## 2020-01-01 DIAGNOSIS — R63.5 WEIGHT GAIN: ICD-10-CM

## 2020-01-01 DIAGNOSIS — R23.3 BRUISES EASILY: ICD-10-CM

## 2020-01-01 DIAGNOSIS — L03.115 CELLULITIS OF RIGHT LOWER EXTREMITY: ICD-10-CM

## 2020-01-01 DIAGNOSIS — S71.019A: ICD-10-CM

## 2020-01-01 LAB
ALBUMIN SERPL-MCNC: 3.1 G/DL (ref 3.4–5)
ALBUMIN SERPL-MCNC: 3.2 G/DL (ref 3.4–5)
ALBUMIN UR-MCNC: NEGATIVE MG/DL
ALP SERPL-CCNC: 104 U/L (ref 40–150)
ALP SERPL-CCNC: 95 U/L (ref 40–150)
ALT SERPL W P-5'-P-CCNC: 29 U/L (ref 0–70)
ALT SERPL W P-5'-P-CCNC: 31 U/L (ref 0–70)
ALT SERPL-CCNC: <10 U/L (ref 0–55)
ANION GAP SERPL CALCULATED.3IONS-SCNC: 2 MMOL/L (ref 3–14)
ANION GAP SERPL CALCULATED.3IONS-SCNC: 3 MMOL/L (ref 3–14)
ANION GAP SERPL CALCULATED.3IONS-SCNC: 4 MMOL/L
ANION GAP SERPL CALCULATED.3IONS-SCNC: 4 MMOL/L (ref 3–14)
ANION GAP SERPL CALCULATED.3IONS-SCNC: 6 MMOL/L
ANION GAP SERPL CALCULATED.3IONS-SCNC: 8 MMOL/L
ANION GAP SERPL CALCULATED.3IONS-SCNC: <1 MMOL/L (ref 3–14)
ANION GAP SERPL CALCULATED.3IONS-SCNC: NORMAL MMOL/L
APPEARANCE UR: CLEAR
AST SERPL W P-5'-P-CCNC: 24 U/L (ref 0–45)
AST SERPL W P-5'-P-CCNC: ABNORMAL U/L (ref 0–45)
AST SERPL-CCNC: 17 U/L (ref 10–40)
BACTERIA SPEC CULT: ABNORMAL
BACTERIA SPEC CULT: ABNORMAL
BASOPHILS # BLD AUTO: 0 10E9/L (ref 0–0.2)
BASOPHILS NFR BLD AUTO: 0.4 %
BASOPHILS NFR BLD AUTO: 0.4 %
BASOPHILS NFR BLD AUTO: 0.5 %
BASOPHILS NFR BLD AUTO: 0.8 %
BILIRUB SERPL-MCNC: 0.3 MG/DL (ref 0.2–1.3)
BILIRUB SERPL-MCNC: 0.3 MG/DL (ref 0.2–1.3)
BILIRUB UR QL STRIP: NEGATIVE
BUN SERPL-MCNC: 14 MG/DL
BUN SERPL-MCNC: 16 MG/DL
BUN SERPL-MCNC: 17 MG/DL
BUN SERPL-MCNC: 17 MG/DL (ref 7–30)
BUN SERPL-MCNC: 18 MG/DL
BUN SERPL-MCNC: 20 MG/DL (ref 7–30)
BUN SERPL-MCNC: 21 MG/DL (ref 7–30)
BUN SERPL-MCNC: 23 MG/DL (ref 7–30)
CALCIUM SERPL-MCNC: 8.2 MG/DL
CALCIUM SERPL-MCNC: 8.2 MG/DL (ref 8.5–10.1)
CALCIUM SERPL-MCNC: 8.3 MG/DL (ref 8.5–10.1)
CALCIUM SERPL-MCNC: 8.4 MG/DL (ref 8.5–10.1)
CALCIUM SERPL-MCNC: 8.5 MG/DL
CALCIUM SERPL-MCNC: 8.5 MG/DL (ref 8.5–10.1)
CALCIUM SERPL-MCNC: 8.8 MG/DL
CALCIUM SERPL-MCNC: 9.1 MG/DL
CHLORIDE SERPL-SCNC: 105 MMOL/L (ref 94–109)
CHLORIDE SERPL-SCNC: 105 MMOL/L (ref 94–109)
CHLORIDE SERPL-SCNC: 106 MMOL/L (ref 94–109)
CHLORIDE SERPL-SCNC: 106 MMOL/L (ref 94–109)
CHLORIDE SERPLBLD-SCNC: 105 MMOL/L
CHLORIDE SERPLBLD-SCNC: 105 MMOL/L
CHLORIDE SERPLBLD-SCNC: 106 MMOL/L
CHLORIDE SERPLBLD-SCNC: 108 MMOL/L
CO2 SERPL-SCNC: 28 MMOL/L (ref 20–32)
CO2 SERPL-SCNC: 29 MMOL/L
CO2 SERPL-SCNC: 29 MMOL/L
CO2 SERPL-SCNC: 30 MMOL/L
CO2 SERPL-SCNC: 30 MMOL/L (ref 20–32)
CO2 SERPL-SCNC: 31 MMOL/L (ref 20–32)
CO2 SERPL-SCNC: 31 MMOL/L (ref 20–32)
CO2 SERPL-SCNC: 32 MMOL/L
COLOR UR AUTO: NORMAL
CREAT SERPL-MCNC: 0.98 MG/DL (ref 0.66–1.25)
CREAT SERPL-MCNC: 1 MG/DL
CREAT SERPL-MCNC: 1 MG/DL (ref 0.73–1.18)
CREAT SERPL-MCNC: 1.03 MG/DL (ref 0.66–1.25)
CREAT SERPL-MCNC: 1.06 MG/DL
CREAT SERPL-MCNC: 1.08 MG/DL (ref 0.66–1.25)
CREAT SERPL-MCNC: 1.11 MG/DL
CREAT SERPL-MCNC: 1.13 MG/DL
CREAT SERPL-MCNC: 1.13 MG/DL (ref 0.66–1.25)
DIFFERENTIAL METHOD BLD: ABNORMAL
DIFFERENTIAL: ABNORMAL
EOSINOPHIL # BLD AUTO: 0.3 10E9/L (ref 0–0.7)
EOSINOPHIL # BLD AUTO: 0.5 10E9/L (ref 0–0.7)
EOSINOPHIL # BLD AUTO: 0.5 10E9/L (ref 0–0.7)
EOSINOPHIL # BLD AUTO: 1.2 10E9/L (ref 0–0.7)
EOSINOPHIL NFR BLD AUTO: 10 %
EOSINOPHIL NFR BLD AUTO: 22.2 %
EOSINOPHIL NFR BLD AUTO: 4 %
EOSINOPHIL NFR BLD AUTO: 9.8 %
ERYTHROCYTE [DISTWIDTH] IN BLOOD BY AUTOMATED COUNT: 14.6 %
ERYTHROCYTE [DISTWIDTH] IN BLOOD BY AUTOMATED COUNT: 14.9 %
ERYTHROCYTE [DISTWIDTH] IN BLOOD BY AUTOMATED COUNT: 15.1 %
ERYTHROCYTE [DISTWIDTH] IN BLOOD BY AUTOMATED COUNT: 15.1 % (ref 10–15)
ERYTHROCYTE [DISTWIDTH] IN BLOOD BY AUTOMATED COUNT: 15.4 %
ERYTHROCYTE [DISTWIDTH] IN BLOOD BY AUTOMATED COUNT: 16.1 % (ref 10–15)
ERYTHROCYTE [DISTWIDTH] IN BLOOD BY AUTOMATED COUNT: 16.3 % (ref 10–15)
ERYTHROCYTE [DISTWIDTH] IN BLOOD BY AUTOMATED COUNT: 17 % (ref 10–15)
GFR SERPL CREATININE-BSD FRML MDRD: >60 ML/MIN/1.73M2
GFR SERPL CREATININE-BSD FRML MDRD: >90 ML/MIN/{1.73_M2}
GFR SERPL CREATININE-BSD FRML MDRD: ABNORMAL ML/MIN/{1.73_M2}
GFR SERPL CREATININE-BSD FRML MDRD: NORMAL ML/MIN/{1.73_M2}
GLUCOSE SERPL-MCNC: 102 MG/DL (ref 70–99)
GLUCOSE SERPL-MCNC: 132 MG/DL (ref 70–99)
GLUCOSE SERPL-MCNC: 76 MG/DL (ref 70–99)
GLUCOSE SERPL-MCNC: 78 MG/DL (ref 70–99)
GLUCOSE SERPL-MCNC: 82 MG/DL (ref 70–99)
GLUCOSE SERPL-MCNC: 85 MG/DL (ref 70–99)
GLUCOSE SERPL-MCNC: 88 MG/DL (ref 70–100)
GLUCOSE SERPL-MCNC: 88 MG/DL (ref 70–99)
GLUCOSE SERPL-MCNC: 98 MG/DL (ref 70–99)
GLUCOSE UR STRIP-MCNC: NEGATIVE MG/DL
HCT VFR BLD AUTO: 32.7 %
HCT VFR BLD AUTO: 34.4 %
HCT VFR BLD AUTO: 34.9 %
HCT VFR BLD AUTO: 35.7 % (ref 40–53)
HCT VFR BLD AUTO: 35.7 % (ref 40–53)
HCT VFR BLD AUTO: 36.6 % (ref 40–53)
HCT VFR BLD AUTO: 36.8 %
HCT VFR BLD AUTO: 37.9 % (ref 40–53)
HEMOGLOBIN: 10 G/DL (ref 13.3–17.7)
HEMOGLOBIN: 10.6 G/DL (ref 13.3–17.7)
HEMOGLOBIN: 10.6 G/DL (ref 13.3–17.7)
HEMOGLOBIN: 11.3 G/DL (ref 13.3–17.7)
HGB BLD-MCNC: 10.8 G/DL (ref 13.3–17.7)
HGB BLD-MCNC: 10.8 G/DL (ref 13.3–17.7)
HGB BLD-MCNC: 11 G/DL (ref 13.3–17.7)
HGB BLD-MCNC: 11 G/DL (ref 13.3–17.7)
HGB UR QL STRIP: NEGATIVE
IMM GRANULOCYTES # BLD: 0.1 10E9/L (ref 0–0.4)
IMM GRANULOCYTES NFR BLD: 1 %
IMM GRANULOCYTES NFR BLD: 1.1 %
IMM GRANULOCYTES NFR BLD: 1.6 %
KETONES UR STRIP-MCNC: NEGATIVE MG/DL
LACTATE BLD-SCNC: 0.8 MMOL/L (ref 0.7–2)
LEUKOCYTE ESTERASE UR QL STRIP: NEGATIVE
LYMPHOCYTES # BLD AUTO: 1 10E9/L (ref 0.8–5.3)
LYMPHOCYTES # BLD AUTO: 1 10E9/L (ref 0.8–5.3)
LYMPHOCYTES # BLD AUTO: 1.1 10E9/L (ref 0.8–5.3)
LYMPHOCYTES # BLD AUTO: 1.4 10E9/L (ref 0.8–5.3)
LYMPHOCYTES NFR BLD AUTO: 14.1 %
LYMPHOCYTES NFR BLD AUTO: 20 %
LYMPHOCYTES NFR BLD AUTO: 21.6 %
LYMPHOCYTES NFR BLD AUTO: 26 %
Lab: ABNORMAL
MAGNESIUM SERPL-MCNC: 1.9 MG/DL (ref 1.6–2.3)
MAGNESIUM SERPL-MCNC: 2 MG/DL
MAGNESIUM SERPL-MCNC: 2.3 MG/DL (ref 1.6–2.3)
MCH RBC QN AUTO: 23 PG (ref 26.5–33)
MCH RBC QN AUTO: 24.2 PG (ref 26.5–33)
MCH RBC QN AUTO: 24.6 PG (ref 26.5–33)
MCH RBC QN AUTO: 25.4 PG
MCH RBC QN AUTO: 25.6 PG
MCH RBC QN AUTO: 26.5 PG
MCH RBC QN AUTO: 26.5 PG
MCH RBC QN AUTO: 26.5 PG (ref 26.5–33)
MCHC RBC AUTO-ENTMCNC: 29 G/DL (ref 31.5–36.5)
MCHC RBC AUTO-ENTMCNC: 30.1 G/DL (ref 31.5–36.5)
MCHC RBC AUTO-ENTMCNC: 30.3 G/DL (ref 31.5–36.5)
MCHC RBC AUTO-ENTMCNC: 30.3 G/DL (ref 31.5–36.5)
MCHC RBC AUTO-ENTMCNC: 30.4 G/DL
MCHC RBC AUTO-ENTMCNC: 30.6 G/DL
MCHC RBC AUTO-ENTMCNC: 30.7 G/DL
MCHC RBC AUTO-ENTMCNC: 30.8 G/DL
MCV RBC AUTO: 76 FL (ref 78–100)
MCV RBC AUTO: 80 FL (ref 78–100)
MCV RBC AUTO: 83.5 FL
MCV RBC AUTO: 83.6 FL
MCV RBC AUTO: 85 FL (ref 78–100)
MCV RBC AUTO: 86 FL
MCV RBC AUTO: 86.2 FL
MCV RBC AUTO: 88 FL (ref 78–100)
MONOCYTES # BLD AUTO: 0.4 10E9/L (ref 0–1.3)
MONOCYTES # BLD AUTO: 0.7 10E9/L (ref 0–1.3)
MONOCYTES # BLD AUTO: 0.8 10E9/L (ref 0–1.3)
MONOCYTES # BLD AUTO: 1.3 10E9/L (ref 0–1.3)
MONOCYTES NFR BLD AUTO: 15.2 %
MONOCYTES NFR BLD AUTO: 15.5 %
MONOCYTES NFR BLD AUTO: 17.4 %
MONOCYTES NFR BLD AUTO: 7.4 %
NEUTROPHILS # BLD AUTO: 2.2 10E9/L (ref 1.6–8.3)
NEUTROPHILS # BLD AUTO: 2.4 10E9/L (ref 1.6–8.3)
NEUTROPHILS # BLD AUTO: 3.1 10E9/L (ref 1.6–8.3)
NEUTROPHILS # BLD AUTO: 4.6 10E9/L (ref 1.6–8.3)
NEUTROPHILS NFR BLD AUTO: 39.5 %
NEUTROPHILS NFR BLD AUTO: 51 %
NEUTROPHILS NFR BLD AUTO: 56.4 %
NEUTROPHILS NFR BLD AUTO: 62.1 %
NITRATE UR QL: NEGATIVE
NRBC # BLD AUTO: 0 10*3/UL
NRBC # BLD AUTO: 0 10*3/UL
NRBC # BLD AUTO: 0.1 10*3/UL
NRBC BLD AUTO-RTO: 0 /100
NRBC BLD AUTO-RTO: 1 /100
NRBC BLD AUTO-RTO: 1 /100
PH UR STRIP: 5.5 PH (ref 5–7)
PHOSPHATE SERPL-MCNC: 3.5 MG/DL (ref 2.5–4.5)
PHOSPHATE SERPL-MCNC: 3.6 MG/DL
PHOSPHATE SERPL-MCNC: 3.8 MG/DL (ref 2.5–4.5)
PLATELET # BLD AUTO: 110 10^9/L
PLATELET # BLD AUTO: 118 10E9/L (ref 150–450)
PLATELET # BLD AUTO: 118 10^9/L
PLATELET # BLD AUTO: 118 10^9/L
PLATELET # BLD AUTO: 132 10E9/L (ref 150–450)
PLATELET # BLD AUTO: 134 10E9/L (ref 150–450)
PLATELET # BLD AUTO: 138 10^9/L
PLATELET # BLD AUTO: 163 10E9/L (ref 150–450)
POTASSIUM SERPL-SCNC: 3.7 MMOL/L (ref 3.4–5.3)
POTASSIUM SERPL-SCNC: 3.9 MMOL/L
POTASSIUM SERPL-SCNC: 3.9 MMOL/L (ref 3.4–5.3)
POTASSIUM SERPL-SCNC: 3.9 MMOL/L (ref 3.5–5.1)
POTASSIUM SERPL-SCNC: 4 MMOL/L
POTASSIUM SERPL-SCNC: 4 MMOL/L (ref 3.4–5.3)
POTASSIUM SERPL-SCNC: 4.3 MMOL/L
POTASSIUM SERPL-SCNC: 4.5 MMOL/L
POTASSIUM SERPL-SCNC: 6.6 MMOL/L (ref 3.4–5.3)
PROT SERPL-MCNC: 6.8 G/DL (ref 6.8–8.8)
PROT SERPL-MCNC: 7.4 G/DL (ref 6.8–8.8)
RBC # BLD AUTO: 3.91 10^12/L
RBC # BLD AUTO: 4 10^12/L
RBC # BLD AUTO: 4.07 10E12/L (ref 4.4–5.9)
RBC # BLD AUTO: 4.18 10^12/L
RBC # BLD AUTO: 4.27 10^12/L
RBC # BLD AUTO: 4.46 10E12/L (ref 4.4–5.9)
RBC # BLD AUTO: 4.48 10E12/L (ref 4.4–5.9)
RBC # BLD AUTO: 4.79 10E12/L (ref 4.4–5.9)
RBC #/AREA URNS AUTO: <1 /HPF (ref 0–2)
SODIUM SERPL-SCNC: 136 MMOL/L (ref 133–144)
SODIUM SERPL-SCNC: 138 MMOL/L (ref 133–144)
SODIUM SERPL-SCNC: 138 MMOL/L (ref 133–144)
SODIUM SERPL-SCNC: 139 MMOL/L (ref 133–144)
SODIUM SERPL-SCNC: 141 MMOL/L
SODIUM SERPL-SCNC: 141 MMOL/L
SODIUM SERPL-SCNC: 142 MMOL/L
SODIUM SERPL-SCNC: 145 MMOL/L
SOURCE: NORMAL
SP GR UR STRIP: 1.01 (ref 1–1.03)
SPECIMEN SOURCE: ABNORMAL
SQUAMOUS #/AREA URNS AUTO: <1 /HPF (ref 0–1)
UROBILINOGEN UR STRIP-MCNC: NORMAL MG/DL (ref 0–2)
WBC # BLD AUTO: 4 10^9/L
WBC # BLD AUTO: 4 10^9/L
WBC # BLD AUTO: 4.5 10^9/L
WBC # BLD AUTO: 4.7 10E9/L (ref 4–11)
WBC # BLD AUTO: 5.3 10^9/L
WBC # BLD AUTO: 5.5 10E9/L (ref 4–11)
WBC # BLD AUTO: 5.5 10E9/L (ref 4–11)
WBC # BLD AUTO: 7.3 10E9/L (ref 4–11)
WBC #/AREA URNS AUTO: 0 /HPF (ref 0–5)

## 2020-01-01 PROCEDURE — 36415 COLL VENOUS BLD VENIPUNCTURE: CPT | Performed by: NURSE PRACTITIONER

## 2020-01-01 PROCEDURE — 85025 COMPLETE CBC W/AUTO DIFF WBC: CPT | Performed by: NURSE PRACTITIONER

## 2020-01-01 PROCEDURE — 99215 OFFICE O/P EST HI 40 MIN: CPT | Mod: GT | Performed by: NURSE PRACTITIONER

## 2020-01-01 PROCEDURE — 85025 COMPLETE CBC W/AUTO DIFF WBC: CPT | Performed by: PHYSICIAN ASSISTANT

## 2020-01-01 PROCEDURE — 99214 OFFICE O/P EST MOD 30 MIN: CPT | Mod: GT | Performed by: NURSE PRACTITIONER

## 2020-01-01 PROCEDURE — 80048 BASIC METABOLIC PNL TOTAL CA: CPT | Performed by: PHYSICIAN ASSISTANT

## 2020-01-01 PROCEDURE — 84100 ASSAY OF PHOSPHORUS: CPT | Performed by: NURSE PRACTITIONER

## 2020-01-01 PROCEDURE — 72157 MRI CHEST SPINE W/O & W/DYE: CPT

## 2020-01-01 PROCEDURE — 72156 MRI NECK SPINE W/O & W/DYE: CPT

## 2020-01-01 PROCEDURE — 258N000003 HC RX IP 258 OP 636: Performed by: NURSE PRACTITIONER

## 2020-01-01 PROCEDURE — 25500064 ZZH RX 255 OP 636: Performed by: PEDIATRICS

## 2020-01-01 PROCEDURE — 36592 COLLECT BLOOD FROM PICC: CPT | Performed by: NURSE PRACTITIONER

## 2020-01-01 PROCEDURE — 250N000013 HC RX MED GY IP 250 OP 250 PS 637: Performed by: EMERGENCY MEDICINE

## 2020-01-01 PROCEDURE — 70553 MRI BRAIN STEM W/O & W/DYE: CPT

## 2020-01-01 PROCEDURE — 99284 EMERGENCY DEPT VISIT MOD MDM: CPT | Mod: 25

## 2020-01-01 PROCEDURE — 99283 EMERGENCY DEPT VISIT LOW MDM: CPT

## 2020-01-01 PROCEDURE — 250N000013 HC RX MED GY IP 250 OP 250 PS 637: Performed by: PHYSICIAN ASSISTANT

## 2020-01-01 PROCEDURE — 80048 BASIC METABOLIC PNL TOTAL CA: CPT | Performed by: EMERGENCY MEDICINE

## 2020-01-01 PROCEDURE — 99214 OFFICE O/P EST MOD 30 MIN: CPT | Mod: HN | Performed by: STUDENT IN AN ORGANIZED HEALTH CARE EDUCATION/TRAINING PROGRAM

## 2020-01-01 PROCEDURE — 87070 CULTURE OTHR SPECIMN AEROBIC: CPT | Performed by: EMERGENCY MEDICINE

## 2020-01-01 PROCEDURE — 81001 URINALYSIS AUTO W/SCOPE: CPT | Performed by: PHYSICIAN ASSISTANT

## 2020-01-01 PROCEDURE — 80053 COMPREHEN METABOLIC PANEL: CPT | Performed by: NURSE PRACTITIONER

## 2020-01-01 PROCEDURE — 74019 RADEX ABDOMEN 2 VIEWS: CPT

## 2020-01-01 PROCEDURE — 36415 COLL VENOUS BLD VENIPUNCTURE: CPT | Performed by: PHYSICIAN ASSISTANT

## 2020-01-01 PROCEDURE — 73140 X-RAY EXAM OF FINGER(S): CPT | Mod: RT

## 2020-01-01 PROCEDURE — 83735 ASSAY OF MAGNESIUM: CPT | Performed by: NURSE PRACTITIONER

## 2020-01-01 PROCEDURE — 99284 EMERGENCY DEPT VISIT MOD MDM: CPT

## 2020-01-01 PROCEDURE — 85025 COMPLETE CBC W/AUTO DIFF WBC: CPT | Performed by: EMERGENCY MEDICINE

## 2020-01-01 PROCEDURE — 83605 ASSAY OF LACTIC ACID: CPT | Performed by: EMERGENCY MEDICINE

## 2020-01-01 PROCEDURE — 72158 MRI LUMBAR SPINE W/O & W/DYE: CPT

## 2020-01-01 PROCEDURE — A9585 GADOBUTROL INJECTION: HCPCS | Performed by: PEDIATRICS

## 2020-01-01 PROCEDURE — 99285 EMERGENCY DEPT VISIT HI MDM: CPT

## 2020-01-01 PROCEDURE — 12034 INTMD RPR S/TR/EXT 7.6-12.5: CPT

## 2020-01-01 PROCEDURE — 70553 MRI BRAIN STEM W/O & W/DYE: CPT | Mod: 26 | Performed by: RADIOLOGY

## 2020-01-01 PROCEDURE — A9585 GADOBUTROL INJECTION: HCPCS | Performed by: NURSE PRACTITIONER

## 2020-01-01 PROCEDURE — 87077 CULTURE AEROBIC IDENTIFY: CPT | Performed by: EMERGENCY MEDICINE

## 2020-01-01 PROCEDURE — 70450 CT HEAD/BRAIN W/O DYE: CPT

## 2020-01-01 PROCEDURE — 99214 OFFICE O/P EST MOD 30 MIN: CPT | Mod: GC | Performed by: STUDENT IN AN ORGANIZED HEALTH CARE EDUCATION/TRAINING PROGRAM

## 2020-01-01 PROCEDURE — 87186 SC STD MICRODIL/AGAR DIL: CPT | Performed by: EMERGENCY MEDICINE

## 2020-01-01 PROCEDURE — 12013 RPR F/E/E/N/L/M 2.6-5.0 CM: CPT

## 2020-01-01 PROCEDURE — 255N000002 HC RX 255 OP 636: Performed by: NURSE PRACTITIONER

## 2020-01-01 RX ORDER — OLANZAPINE 5 MG/1
TABLET ORAL
Qty: 30 TABLET | Refills: 0 | Status: SHIPPED | OUTPATIENT
Start: 2020-01-01 | End: 2021-01-01

## 2020-01-01 RX ORDER — SERTRALINE HYDROCHLORIDE 100 MG/1
100 TABLET, FILM COATED ORAL DAILY
Qty: 30 TABLET | Refills: 1 | Status: SHIPPED | OUTPATIENT
Start: 2020-01-01 | End: 2020-01-01

## 2020-01-01 RX ORDER — DIPHENHYDRAMINE HYDROCHLORIDE 50 MG/ML
50 INJECTION INTRAMUSCULAR; INTRAVENOUS
Status: CANCELLED
Start: 2020-01-01

## 2020-01-01 RX ORDER — MEPERIDINE HYDROCHLORIDE 25 MG/ML
25 INJECTION INTRAMUSCULAR; INTRAVENOUS; SUBCUTANEOUS EVERY 30 MIN PRN
Status: CANCELLED | OUTPATIENT
Start: 2020-01-01

## 2020-01-01 RX ORDER — LAMOTRIGINE 200 MG/1
200 TABLET ORAL AT BEDTIME
Qty: 30 TABLET | Refills: 2 | Status: SHIPPED | OUTPATIENT
Start: 2020-01-01 | End: 2021-01-01

## 2020-01-01 RX ORDER — EPINEPHRINE 1 MG/ML
0.3 INJECTION, SOLUTION, CONCENTRATE INTRAVENOUS EVERY 5 MIN PRN
Status: CANCELLED | OUTPATIENT
Start: 2020-01-01

## 2020-01-01 RX ORDER — ALBUTEROL SULFATE 90 UG/1
1-2 AEROSOL, METERED RESPIRATORY (INHALATION)
Status: CANCELLED
Start: 2020-01-01

## 2020-01-01 RX ORDER — ACETAMINOPHEN 325 MG/1
975 TABLET ORAL ONCE
Status: COMPLETED | OUTPATIENT
Start: 2020-01-01 | End: 2020-01-01

## 2020-01-01 RX ORDER — OLANZAPINE 20 MG/1
20 TABLET ORAL AT BEDTIME
Qty: 30 TABLET | Refills: 0 | Status: SHIPPED | OUTPATIENT
Start: 2020-01-01 | End: 2020-01-01

## 2020-01-01 RX ORDER — SODIUM CHLORIDE 9 MG/ML
1000 INJECTION, SOLUTION INTRAVENOUS CONTINUOUS PRN
Status: CANCELLED
Start: 2020-01-01

## 2020-01-01 RX ORDER — SERTRALINE HYDROCHLORIDE 100 MG/1
100 TABLET, FILM COATED ORAL DAILY
Qty: 30 TABLET | Refills: 0 | Status: SHIPPED | OUTPATIENT
Start: 2020-01-01 | End: 2020-01-01

## 2020-01-01 RX ORDER — METHYLPREDNISOLONE SODIUM SUCCINATE 125 MG/2ML
125 INJECTION, POWDER, LYOPHILIZED, FOR SOLUTION INTRAMUSCULAR; INTRAVENOUS
Status: CANCELLED
Start: 2020-01-01

## 2020-01-01 RX ORDER — LAMOTRIGINE 200 MG/1
TABLET ORAL
Qty: 31 TABLET | Refills: 11 | OUTPATIENT
Start: 2020-01-01

## 2020-01-01 RX ORDER — DOXYCYCLINE 100 MG/1
100 CAPSULE ORAL 2 TIMES DAILY
Qty: 14 CAPSULE | Refills: 0 | Status: SHIPPED | OUTPATIENT
Start: 2020-01-01 | End: 2020-01-01

## 2020-01-01 RX ORDER — ALBUTEROL SULFATE 0.83 MG/ML
2.5 SOLUTION RESPIRATORY (INHALATION)
Status: CANCELLED | OUTPATIENT
Start: 2020-01-01

## 2020-01-01 RX ORDER — OLANZAPINE 20 MG/1
20 TABLET ORAL AT BEDTIME
Qty: 30 TABLET | Refills: 1 | Status: SHIPPED | OUTPATIENT
Start: 2020-01-01 | End: 2020-01-01

## 2020-01-01 RX ORDER — LAMOTRIGINE 200 MG/1
200 TABLET ORAL AT BEDTIME
Qty: 90 TABLET | Refills: 0 | Status: SHIPPED | OUTPATIENT
Start: 2020-01-01 | End: 2020-01-01

## 2020-01-01 RX ORDER — SERTRALINE HYDROCHLORIDE 100 MG/1
150 TABLET, FILM COATED ORAL DAILY
Qty: 135 TABLET | Refills: 0 | Status: SHIPPED | OUTPATIENT
Start: 2020-01-01 | End: 2020-01-01

## 2020-01-01 RX ORDER — OLANZAPINE 20 MG/1
20 TABLET ORAL AT BEDTIME
Qty: 90 TABLET | Refills: 0 | Status: SHIPPED | OUTPATIENT
Start: 2020-01-01 | End: 2020-01-01

## 2020-01-01 RX ORDER — SERTRALINE HYDROCHLORIDE 100 MG/1
100 TABLET, FILM COATED ORAL DAILY
Qty: 30 TABLET | Refills: 2 | Status: SHIPPED | OUTPATIENT
Start: 2020-01-01 | End: 2021-01-01

## 2020-01-01 RX ORDER — DOXYCYCLINE 100 MG/1
100 CAPSULE ORAL ONCE
Status: COMPLETED | OUTPATIENT
Start: 2020-01-01 | End: 2020-01-01

## 2020-01-01 RX ORDER — DOCUSATE SODIUM 100 MG/1
100 CAPSULE, LIQUID FILLED ORAL 2 TIMES DAILY
Qty: 60 CAPSULE | Refills: 4 | Status: SHIPPED | OUTPATIENT
Start: 2020-01-01 | End: 2020-01-01 | Stop reason: SINTOL

## 2020-01-01 RX ORDER — LACTOBACILLUS RHAMNOSUS GG 10B CELL
2 CAPSULE ORAL DAILY
Qty: 30 CAPSULE | Refills: 3 | Status: SHIPPED | OUTPATIENT
Start: 2020-01-01 | End: 2020-01-01

## 2020-01-01 RX ORDER — TRAZODONE HYDROCHLORIDE 150 MG/1
150 TABLET ORAL
Qty: 30 TABLET | Refills: 1 | Status: SHIPPED | OUTPATIENT
Start: 2020-01-01 | End: 2020-01-01

## 2020-01-01 RX ORDER — TRAZODONE HYDROCHLORIDE 150 MG/1
150 TABLET ORAL
Qty: 30 TABLET | Refills: 0 | Status: SHIPPED | OUTPATIENT
Start: 2020-01-01 | End: 2020-01-01

## 2020-01-01 RX ORDER — LAMOTRIGINE 200 MG/1
200 TABLET ORAL AT BEDTIME
Qty: 30 TABLET | Refills: 1 | Status: SHIPPED | OUTPATIENT
Start: 2020-01-01 | End: 2020-01-01

## 2020-01-01 RX ORDER — DOCUSATE SODIUM 100 MG/1
CAPSULE, LIQUID FILLED ORAL
Status: ON HOLD | COMMUNITY
Start: 2020-01-01 | End: 2021-01-01

## 2020-01-01 RX ORDER — OLANZAPINE 5 MG/1
TABLET ORAL
Qty: 60 TABLET | Refills: 0 | Status: SHIPPED | OUTPATIENT
Start: 2020-01-01 | End: 2020-01-01

## 2020-01-01 RX ORDER — POLYETHYLENE GLYCOL 3350 17 G/17G
1 POWDER, FOR SOLUTION ORAL 3 TIMES DAILY
Qty: 289 G | Refills: 3 | Status: SHIPPED | OUTPATIENT
Start: 2020-01-01 | End: 2020-01-01

## 2020-01-01 RX ORDER — LAMOTRIGINE 200 MG/1
200 TABLET ORAL AT BEDTIME
Qty: 30 TABLET | Refills: 0 | Status: SHIPPED | OUTPATIENT
Start: 2020-01-01 | End: 2020-01-01

## 2020-01-01 RX ORDER — TRAZODONE HYDROCHLORIDE 50 MG/1
50-100 TABLET, FILM COATED ORAL
Qty: 60 TABLET | Refills: 0 | Status: SHIPPED | OUTPATIENT
Start: 2020-01-01 | End: 2020-01-01

## 2020-01-01 RX ORDER — TRAZODONE HYDROCHLORIDE 50 MG/1
50 TABLET, FILM COATED ORAL AT BEDTIME
Qty: 90 TABLET | Refills: 0 | Status: SHIPPED | OUTPATIENT
Start: 2020-01-01 | End: 2020-01-01

## 2020-01-01 RX ORDER — GADOBUTROL 604.72 MG/ML
10 INJECTION INTRAVENOUS ONCE
Status: COMPLETED | OUTPATIENT
Start: 2020-01-01 | End: 2020-01-01

## 2020-01-01 RX ORDER — DOXYCYCLINE 100 MG/1
100 CAPSULE ORAL 2 TIMES DAILY
Qty: 14 CAPSULE | Refills: 0 | Status: ON HOLD | OUTPATIENT
Start: 2020-01-01 | End: 2021-01-01

## 2020-01-01 RX ORDER — LACTOBACILLUS RHAMNOSUS GG 10B CELL
2 CAPSULE ORAL DAILY
Qty: 30 CAPSULE | Refills: 3 | Status: SHIPPED | OUTPATIENT
Start: 2020-01-01

## 2020-01-01 RX ORDER — LIDOCAINE HYDROCHLORIDE AND EPINEPHRINE 10; 10 MG/ML; UG/ML
INJECTION, SOLUTION INFILTRATION; PERINEURAL
Status: DISCONTINUED
Start: 2020-01-01 | End: 2020-01-01 | Stop reason: HOSPADM

## 2020-01-01 RX ORDER — TRAZODONE HYDROCHLORIDE 50 MG/1
TABLET, FILM COATED ORAL
Qty: 31 TABLET | Refills: 11 | OUTPATIENT
Start: 2020-01-01

## 2020-01-01 RX ORDER — OLANZAPINE 20 MG/1
20 TABLET ORAL AT BEDTIME
Qty: 30 TABLET | Refills: 2 | Status: SHIPPED | OUTPATIENT
Start: 2020-01-01 | End: 2021-01-01

## 2020-01-01 RX ORDER — SENNOSIDES 8.6 MG
1 TABLET ORAL DAILY
Qty: 30 TABLET | Refills: 4 | Status: ON HOLD | OUTPATIENT
Start: 2020-01-01 | End: 2021-01-01

## 2020-01-01 RX ORDER — POLYETHYLENE GLYCOL 3350 17 G/17G
1 POWDER, FOR SOLUTION ORAL 3 TIMES DAILY
Qty: 289 G | Refills: 3 | Status: SHIPPED | OUTPATIENT
Start: 2020-01-01

## 2020-01-01 RX ORDER — MUPIROCIN 20 MG/G
OINTMENT TOPICAL
Qty: 22 G | Refills: 3 | Status: SHIPPED | OUTPATIENT
Start: 2020-01-01 | End: 2021-01-01

## 2020-01-01 RX ORDER — TRAZODONE HYDROCHLORIDE 150 MG/1
150 TABLET ORAL
Qty: 30 TABLET | Refills: 2 | Status: SHIPPED | OUTPATIENT
Start: 2020-01-01 | End: 2021-01-01

## 2020-01-01 RX ORDER — TRAZODONE HYDROCHLORIDE 150 MG/1
TABLET ORAL
COMMUNITY
Start: 2019-12-02 | End: 2020-01-01 | Stop reason: DRUGHIGH

## 2020-01-01 RX ADMIN — DOXYCYCLINE HYCLATE 100 MG: 100 CAPSULE ORAL at 17:02

## 2020-01-01 RX ADMIN — GADOBUTROL 8.8 ML: 604.72 INJECTION INTRAVENOUS at 13:27

## 2020-01-01 RX ADMIN — ACETAMINOPHEN 975 MG: 325 TABLET, FILM COATED ORAL at 20:02

## 2020-01-01 RX ADMIN — SODIUM CHLORIDE, PRESERVATIVE FREE 10 ML: 5 INJECTION INTRAVENOUS at 14:07

## 2020-01-01 RX ADMIN — GADOBUTROL 8.8 ML: 604.72 INJECTION INTRAVENOUS at 16:43

## 2020-01-01 RX ADMIN — GADOBUTROL 10 ML: 604.72 INJECTION INTRAVENOUS at 14:08

## 2020-01-01 ASSESSMENT — MIFFLIN-ST. JEOR
SCORE: 2075.77
SCORE: 1893.88

## 2020-01-01 ASSESSMENT — PAIN SCALES - GENERAL
PAINLEVEL: NO PAIN (0)

## 2020-01-01 ASSESSMENT — ENCOUNTER SYMPTOMS
DYSURIA: 0
FEVER: 0
SORE THROAT: 0
NECK PAIN: 0
DIZZINESS: 0
NAUSEA: 0
ARTHRALGIAS: 0
ABDOMINAL PAIN: 1
CHILLS: 0
FEVER: 0
CONSTIPATION: 0
WOUND: 1
COLOR CHANGE: 1
WOUND: 1
HEADACHES: 1
WOUND: 1

## 2020-01-02 ENCOUNTER — OFFICE VISIT (OUTPATIENT)
Dept: PSYCHIATRY | Facility: CLINIC | Age: 20
End: 2020-01-02
Attending: PSYCHIATRY & NEUROLOGY
Payer: COMMERCIAL

## 2020-01-02 VITALS — SYSTOLIC BLOOD PRESSURE: 120 MMHG | HEART RATE: 134 BPM | DIASTOLIC BLOOD PRESSURE: 76 MMHG

## 2020-01-02 DIAGNOSIS — F22 PARANOIA (H): ICD-10-CM

## 2020-01-02 DIAGNOSIS — F32.A DEPRESSION, UNSPECIFIED DEPRESSION TYPE: ICD-10-CM

## 2020-01-02 PROCEDURE — G0463 HOSPITAL OUTPT CLINIC VISIT: HCPCS | Mod: ZF

## 2020-01-02 RX ORDER — OLANZAPINE 20 MG/1
TABLET ORAL
Qty: 30 TABLET | Refills: 1 | Status: ON HOLD | OUTPATIENT
Start: 2020-01-02 | End: 2020-02-18

## 2020-01-02 RX ORDER — OLANZAPINE 5 MG/1
TABLET ORAL
Qty: 10.5 TABLET | Refills: 0 | Status: ON HOLD | OUTPATIENT
Start: 2020-01-02 | End: 2020-02-18

## 2020-01-02 RX ORDER — OLANZAPINE 10 MG/1
TABLET ORAL
Qty: 30 TABLET | Refills: 1 | Status: ON HOLD | OUTPATIENT
Start: 2020-01-02 | End: 2020-02-18

## 2020-01-02 RX ORDER — OLANZAPINE 20 MG/1
TABLET ORAL
Qty: 7 TABLET | Refills: 0 | Status: ON HOLD | OUTPATIENT
Start: 2020-01-02 | End: 2020-02-18

## 2020-01-02 ASSESSMENT — PAIN SCALES - GENERAL: PAINLEVEL: NO PAIN (0)

## 2020-01-02 NOTE — PATIENT INSTRUCTIONS
Increase olanzapine to 27.5 mg at bedtime for one week, THEN increase to 30 mg at bedtime.  Continue sertraline 150 mg daily.  Continue amitriptyline 50 mg at bedtime.    At this point Geo should be off of trazodone - that medication has been discontinued.    [facility] [phone] (clinic)    [phone] (after hours)  CRISIS TEXT LINE: Text [phone] from anywhere in [place], anytime, any crisis 24/7;  OR SEE www.crisistextline.org

## 2020-01-02 NOTE — TELEPHONE ENCOUNTER
"Called dad, Eric, to discuss Geo's recent symptoms/behaviors and to ask about likelihood that Geo will come to today's appointment.  Eric reports that he has not spoken with Geo as he has decided to \"take a step back\".  Eric has made his concerns known to Geo and doesn't feel that Geo wants his input.  Eric suggests that this writer reach out to mom, Christianne, as the plan is for her to bring Geo to his appointment today.     Writer contacted Christianne who reports that Geo did text her 'OK\" yesterday when she reminded him about today's appointment.  However, he has done this before in the past and will choose to not come at the last minute.  Christianne has not heard from Geo yet today.  She thinks he is likely still sleeping because he goes to bed so late.  Christianne is under the impression that Geo is still taking all of his prescribed medications but has no additional information at this time.  Christianne will call back if she hears from Geo and he plans to not come to his appointment this afternoon.    "

## 2020-01-02 NOTE — PROGRESS NOTES
"  [facility] PSYCHIATRY CLINIC PROGRESS NOTE     CARE TEAM:  PCP- Jeffrey Espinoza    Specialty Providers- Oncology, Neuropsychology, Physical Therapy, Occupational Therapy    Therapist- Manju Pink at [facility][note 3] in [facility] Team- no.    Geo Hicks is a 19 year old male who prefers the name Geo & pronouns he, him, his, himself.    Date of initial diagnostic assessment is 2/7/2019.    Pertinent Background:  This patient first experienced mental health issues in the past few months and has received treatment for paranoia/delusions.  Notably, this patient is undergoing cancer treatment at the [facility] and has been referred for psychiatric assessment by his neuropsychologist who most recently met with him on 1/3/2019.  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 Since then, he has undergone multiple tumor resections, chemotherapy, and radiation treatment. Geo also experienced an intra-tumoral hemorrhage in May 2015 that resulted in neurological changes including facial numbness, significant loss of mobility and dysarthria. In December 2016, a follow-up MRI revealed continued tumor enhancement, however the most recent MRI from 10/16/18 revealed an unchanged fourth ventricle ependymoma in comparison to previous exams, a slight decrease in adjacent edema, as well as a stable size of the ventricular system.  He is currently on COG study [id number] with Entinostat.  Of note, patient had a brief admission to inpatient psychiatry on [facility] from 2/14-2/15/2019 \"for evaluation of delusions and suicidal comments.\"    Psych critical item history includes suicidal ideation, psychosis [sxs include delusions] and psychiatric hospitalization x1.    INTERIM HISTORY                                                                                                                 4, 4   The patient reports good treatment adherence.  History was provided by the patient " "and his mother who were good historians.  The last visit ended with no change to the med regimen.  Since that time however have collaborated with oncology providers in cross-tapering Geo from trazodone to amitriptyline, with the latter medication added to help with GI discomfort, and trazodone discontinued in order to reduce overall serotonergic burden.  Since the last visit:  -Presents with mom today.  Reports that his mood has been \"decent.\"  Elaborates some low/depressed moods at times.  -Denies interim SI/SIB thoughts, violent/aggressive ideation, depressive severity, inappropriately elevated moods, AVH, panic/anxious acuity, substance use or other hallmarks of psychiatric decompensation with dangerousness.  -Touched base on the regimen change from trazodone to amitriptyline.  Denies discernible change to mood or GI somatic symptom profile.  Also denies notable changes to sleep, explaining he goes to bed around 1 AM and wakes up around 11 AM, feeling rested.  -Hasn't been going to therapy with Manju, states that he doesn't see \"the point.\"  Talked about pros/cons of visiting Manju, with one \"pro\" being that she knows him pretty well.  He agreed to think about going.  -Talked about the assisted living home he's living in now.  There are two other housemates, a woman in her 30s and a woman in her 70s.  Overall, states that things at the assisted living are \"good,\" even though sometimes the staff and him disagree on certain issues, such as whether he should wear a gait-belt in the shower.  Did some processing and perspective taking, pointing out both sides of the issue.  -Did not spend much time today discussing his belief about his medical providers withholding definitive care for his constipation, causing him to require a wheelchair, however the subject did come up at one point and his mother prompted him to clarify that he believes the proof of his constipation is the fact that he can't walk.  -Agreed to " maximize olanzapine to 30 mg for thought clarity and mood, with Geo being readily amenable to this plan.    RECENT SYMPTOMS:   DEPRESSION:  reports-low moods, anhedonia, feeling trapped and overwhelmed;  DENIES- suicidal ideation, low energy, insomnia, hypersomnia, excessive guilt, feeling hopeless and excessive crying  ELIZABETH/HYPOMANIA:  reports-none;  DENIES- increased energy, decreased sleep need, increased activity and grandiosity  PSYCHOSIS:  reports-delusions- paranoid [details in Interim History];  DENIES- auditory hallucinations and visual hallucinations  DYSREGULATION:  reports-can become irritable/agitated if beliefs are challenged;  DENIES- violent ideation, SIB, mood dysregulation, impulsive and aggressive  PANIC ATTACK:  none  ANXIETY:  excessive worry and nervous/overwhelmed  TRAUMA RELATED:  none  COMPULSIVE:  none  SLEEP:  initiation and maintenance both improved with trazodone  EATING DISORDER: no    RECENT SUBSTANCE USE:  ALCOHOL- no  TOBACCO- no  CAFFEINE- minimal  OPIOIDS- no         NARCAN KIT- N/A  CANNABIS- no  OTHER ILLICIT DRUGS- no      CURRENT SOCIAL HISTORY:  FINANCIAL SUPPORT- family  CHILDREN- no  LIVING SITUATION- mother and father (who are  and have both remarried) alternating weeks between respective homes  SOCIAL/ SPIRITUAL SUPPORT- mother, father, older brother  FEELS SAFE AT HOME- yes    MEDICAL ROS (2,10):  A comprehensive review of systems was performed and is negative other than noted in the HPI.    PSYCH and CD Critical Summary Points since July 2018 February 2019:  Clinic intake on 2/7.  Later was admitted [facility] for a brief inpatient psych admission from 3/14-3/15, most of which was spent in the ER, due to suicidal comments that concerned family.  Was ultimately deemed safe for outpatient follow-up and was discharged with an increase in olanzapine to 15 mg.  March 2019:  Began sertraline 50 mg daily.  May 2019:  Began trazodone 25-50 mg at  "bedtime.  June 2019:  Increased trazodone to 100 mg at bedtime.  July 2019:  Increased Sertraline to 100 mg daily.  August 2019:  Increased olanzapine to 17.5 mg and decreased trazodone to 75 mg, both at bedtime.  September 2019:  Increased olanzapine to 20 mg at bedtime.  October 2019:  Titrated olanzapine to 25 mg at bedtime.  November 2019:  Increased sertraline to 150 mg daily.  December 2019:  In collaboration with oncology providers agreed to cross-taper from trazodone to amitriptyline, with the latter medication added to help with GI discomfort, and trazodone discontinued in order to reduce overall serotonergic burden.    PAST PSYCH MED TRIALS   see EMR Problem List: Hx of psychiatric care    MEDICAL / SURGICAL HISTORY                                   Neurologic Hx- See pertinent background, re: history of ependymoma and related chemo, radiation and tumor resection(s).  Notably has experienced neurological damage due to the above which has resulted in facial numbness, significant loss of mobility and dysarthria.  Has had neuropsychiatric evaluations 2/2016, 2/2017, 1/2019, and 10/2019.  The following is from the \"Results and Impressions\" section of his 10/29/2019 eval with Veronica Padilla, with a passage bolded that relates to noted difficulty in an area of executive functioning that facilitates the ability to see other people's perspectives:     \"Geo s attention was rated by his mother as well as himself as being adequate.  Executive functioning are those skills including planning, organization, emotional control, cognitive flexibility, and the ability to take another person s perspective.  Geo rating scale of these skills was basically in the average range for his age with a slight elevation in his ability to shift from one activity to another.  His mother s ratings of his executive functioning were in the average range, with the exception of a slight elevation in his ability to initiate " "tasks.  Direct testing of his ability to take another person s perspective indicated difficulty in this area.  He was asked to sort objects based on various aspects of the objects.  When he sorted the objects, he showed average performance.  However, when the examiner sorted the objects, Geo experienced significant difficulty with being able to determine how the objects were sorted.  This difficulty likely translates into difficulty with understanding another person s point of view.  Geo indicated that this difficulty becomes quite frustrating for him as he doesn t feel other people understand him--it is likely that he has difficulty understanding their perspective.     Emotionally Geo indicated that he continues to feel some difficulty with sadness but that it has improved over time and with medication.  Geo denied significant feelings of anxiety at this time while in the past these scores have been higher.  Parent ratings of Geo s emotional functioning indicated no areas of significant concern.  Interviews with Geo and his mother indicated continued feelings of sadness and difficulty in motivating himself to try to new activities.  While there is improvement in his mood, Geo continues to be at risk for depression.  Since he is now under the care of a psychiatrist, we did not interview around his feelings that his medical team was not being helpful to him.  His mother reported that these feelings continue and likely interfere with his compliance with directives.  He is participating in therapy to work on these issues and it is important that this intervention continue.\"    Patient Active Problem List   Diagnosis     GERD (gastroesophageal reflux disease)     Closed fracture at the growth plate of right distal fibula      Elevated serum creatinine     Dyspepsia     Intracranial hemorrhage (H)     Hemorrhagic stroke (H)     Ependymoma (H)     Admission for antineoplastic chemotherapy     " Post-operative state     S/P craniotomy     S/P biopsy     Noncomitant strabismus     Abducens neuropathy of both eyes     CANDELARIO (obstructive sleep apnea)     Health Care Home     Hypernatremia     Body temperature low     Current chronic use of systemic steroids     Elevated TSH     Status post chemotherapy     Neoplasm of posterior cranial fossa (H)     Chronic constipation     Serum albumin decreased     Closed compression fracture of thoracic vertebra with routine healing, subsequent encounter     Depression     Constipation     Constipation by delayed colonic transit     Slow transit constipation     Past Surgical History:   Procedure Laterality Date     COLONOSCOPY N/A 9/27/2019    Procedure: Colonoscopy With Biopsies and Polypectomy;  Surgeon: Aniya Wei MD;  Location: [facility]     ESOPHAGOSCOPY, GASTROSCOPY, DUODENOSCOPY (EGD), COMBINED N/A 9/27/2019    Procedure: Upper Endoscopy (EGD) With Biopsies;  Surgeon: Aniya Wei MD;  Location: [facility]     GRAFT CARTILAGE FROM POSTERIOR AURICLE Left 10/6/2016    Procedure: GRAFT CARTILAGE FROM POSTERIOR AURICLE;  Surgeon: Tyler Richards MD;  [facility]: [facility]     INCISION AND DRAINAGE PERINEAL, COMBINED Bilateral 7/18/2015    Procedure: COMBINED INCISION AND DRAINAGE PERINEAL;  Surgeon: Dequan Timmons MD;  Location: [facility]     OPTICAL TRACKING SYSTEM CRANIOTOMY, EXCISE TUMOR, COMBINED N/A 4/13/2015    Procedure: COMBINED OPTICAL TRACKING SYSTEM CRANIOTOMY, EXCISE TUMOR;  Surgeon: Francis Velazquez MD;  [facility]     OPTICAL TRACKING SYSTEM CRANIOTOMY, EXCISE TUMOR, COMBINED N/A 4/16/2015    Procedure: COMBINED OPTICAL TRACKING SYSTEM CRANIOTOMY, EXCISE TUMOR;  Surgeon: Francis Velazquez MD;  [facility]     OPTICAL TRACKING SYSTEM CRANIOTOMY, EXCISE TUMOR, COMBINED Bilateral 5/28/2015    Procedure: COMBINED OPTICAL TRACKING SYSTEM CRANIOTOMY, EXCISE TUMOR;  Surgeon: Francis Velazquez MD;   [facility]     OPTICAL TRACKING SYSTEM CRANIOTOMY, EXCISE TUMOR, COMBINED Bilateral 1/14/2016    Procedure: COMBINED OPTICAL TRACKING SYSTEM CRANIOTOMY, EXCISE TUMOR;  Surgeon: Francis Velazquez MD;  [facility]     OPTICAL TRACKING SYSTEM VENTRICULOSTOMY  4/16/2015    Procedure: OPTICAL TRACKING SYSTEM VENTRICULOSTOMY;  Surgeon: Francis Velazquez MD;  [facility]     REMOVE PORT VASCULAR ACCESS N/A 10/6/2016    Procedure: REMOVE PORT VASCULAR ACCESS;  Surgeon: Bruno Perea MD;  [facility]     RHINOPLASTY N/A 10/6/2016    Procedure: RHINOPLASTY;  Surgeon: Tyler Richards MD;  [facility]     VASCULAR SURGERY  5-2015    single lumen power port       ALLERGY                                Blood transfusion related (informational only) and No known drug allergies     MEDICATIONS                               Current Outpatient Medications   Medication     calcium carbonate-vitamin D 600-400 MG-UNIT CHEW     dexamethasone (DECADRON) 0.5 MG tablet     fexofenadine (ALLEGRA) 180 MG tablet     OLANZapine (ZYPREXA) 25 MG     potassium phosphate, monobasic, (K-PHOS) 500 MG tablet     sertraline (ZOLOFT) 150 MG daily     study - entinostat (IDS# 5050) 1 mg tablet     sulfamethoxazole-trimethoprim (BACTRIM/SEPTRA) 400-80 MG tablet     vitamin D3 (CHOLECALCIFEROL) 2000 units (50 mcg) tablet     vitamin E (TOCOPHEROL) 400 units (360 mg) capsule     linaclotide (LINZESS) 290 MCG capsule     omeprazole (PRILOSEC) 20 MG DR capsule     order for DME     study - entinostat (IDS# 5050) 1 mg tablet     study - entinostat (IDS# 5050) 5 mg tablet     Also:    Amitriptyline 50 mg at bedtime    VITALS                                                                                                                          3, 3   /76   Pulse 134      MENTAL STATUS EXAM                                                                                           9, 14 cog gs     Alertness:  "alert  and oriented  Appearance: casually groomed, seated in wheelchair, wearing R-sided eye-patch  Behavior/Demeanor: cooperative, with fair eye contact   Speech: prominent dysarthria  Language: good  Psychomotor: mild evident palsy of upper extremities and facial paralysis  Mood: \"Decent,\" but allowing low/depressed moods at times  Affect: restricted; was congruent to mood; was congruent to content  Thought Process/Associations: continued rumination [details in Interim History]  Thought Content:  Reports delusions [details in Interim History];  Denies suicidal ideation and violent ideation  Perception:  Reports none;  Denies auditory hallucinations and visual hallucinations  Insight: partial  Judgment: good  Cognition: (6) oriented: time, person, and place  attention span: intact  concentration: intact  recent memory: intact  remote memory: intact  fund of knowledge: appropriate  Gait and Station: remarkable for:  ataxia - can ambulate but was seen in wheelchair     LABS and DATA     AIMS:  Due.    PHQ9 TODAY = Did not complete today.  PHQ-9 SCORE 2019   PHQ-9 Total Score 6 8 9       ANTIPSYCHOTIC LABS  [glu, A1C, lipids (rohit LDL), liver enzymes, WBC, ANEU, Hgb, plts]  q12 mo  Recent Labs   Lab Test 19  1053 19  1327 12/10/19  1352 19  1329  19  1100   * 108* 100* 106*   < > 124*   A1C  --   --   --   --   --  5.1    < > = values in this interval not displayed.     Recent Labs   Lab Test 19  1100   CHOL 158   TRIG 236*   LDL 84   HDL 27*     Recent Labs   Lab Test 19  1053 19  1327 12/10/19  1352 19  1329   AST 28 18 23 32   ALT 22 20 24 23   ALKPHOS 113 112 113 143     Recent Labs   Lab Test 19  1053 19  1327 12/10/19  1352 19  1329   WBC 3.9* 4.7 8.5 3.4*   ANEU 2.3 2.9 5.9 1.3*   HGB 12.5* 12.2* 12.8* 12.3*   * 144* 113* 77*     EK2019: 85 BPM, QT/QTcH was 346/389 msec.  Also included comment: " "\"Abnormal precordial QRS contours - nondiagnostic for this age.\"    EE/15/2019: \"IMPRESSION: Awake and drowsy routine EEG (no video) was normal.  The patient stated he felt dazed during photic stimulation, however, no electrographic seizures or concerning changes on the EEG were seen during that time.  Clinical correlation is advised.  No electrographic seizures, epileptiform discharges were seen.\"    DIAGNOSIS     Delusional Disorder, persecutory type, first episode, currently in acute episode  Major Depressive Disorder, single episode, moderate    ASSESSMENT                                                                                                                   m2, h3     TODAY:  Geo Hicks presents to the [facility]iatry clinic for continued medication management of paranoia, delusional thoughts and depressed mood.  Relates that things are overall good at his new assisted living, with some disagreements over items of care such as staff requiring him to use a gait belt in the shower.  Talked about how there will be some compromises in order to ensure safety.  Endorses mood has been \"decent,\" but allows intermittent low moods.  Also briefly reiterates his belief regarding medical providers withholding definitive care for his constipation, due to which he cannot walk.  Have recently discontinued trazodone in order to reduce serotonergic burden in light of oncology providers recommending amitriptyline for GI distress.  He denies notable effects from this change - GI symptoms are about the same, and denies sleep troubles.  Is staying up later, however this is by choice, and he reports feeling rested.  Denies SI/SIB thoughts, violent/aggressive ideation, inappropriately elevated moods, depressive severity, panic/anxious acuity or other hallmarks of psychiatric decompensation with dangerousness.  Have agreed to maximize olanzapine for treatment of his delusional disorder - will titrate to " 27.5 mg, and then 30 mg qHS.  In the event this maximized dose doesn't lead to discernable improvements in his unshakable belief/paranoia, may consider switching to another agent, potentially clozapine.  No other changes today.    SUICIDE RISK ASSESSMENT:  Geo Hicks denies present or interim suicidal ideation. This patient does have notable risk factors for self-harm including feels trapped, relationship conflicts, new/ worsening medical issue, male and paranoia/ delusions. However, risk is mitigated by no h/o suicide attempt, no planning or intent, describes a safety plan, h/o seeking help when needed, none to minimal alcohol use , commitment to family and stable housing.  Based on all available evidence he does not appear to be at imminent risk for self-harm therefore does not meet criteria for a 72-hr hold/  involuntary hospitalization.  However, based on degree of symptoms therapy, close psych FU and medication adjustments were recommended which the pt did agree to.  Safety plan completed 11/15/2019, provided to patient and his father.    [place] PRESCRIPTION MONITORING PROGRAM [] was not checked today:  not using controlled substances    PSYCHOTROPIC DRUG INTERACTIONS:    Pentoxifylline may enhance the antiplatelet effect of Agents with Antiplatelet Properties.  [Pentoxifylline, Sertraline]     CNS Depressants may enhance the adverse/toxic effect of Selective Serotonin Reuptake Inhibitors. Specifically, the risk of psychomotor impairment may be enhanced.  [Olanzapine, Sertraline]    MANAGEMENT:  Monitoring for adverse effects, routine vitals, routine labs, periodic EKGs and patient/family aware of risks     PLAN                                                                                                                                m2, h3     1) PSYCHOTROPIC MEDICATIONS:  Increase olanzapine to 27.5 mg at bedtime for one week, THEN increase to 30 mg at bedtime.  Continue sertraline 150 mg  daily.  Continue amitriptyline 50 mg at bedtime.     At this point Geo should be off of trazodone - that medication has been discontinued.    2) THERAPY:  Continue with Manju.    3) NEXT DUE:    Labs- AP labs due Feb 2020  EKG- PRN  Rating Scales- AIMS due    4) REFERRALS:  None today.    5) RTC: 3-4 weeks    6) CRISIS NUMBERS:   Provided routinely in [facility].  [facility] [phone] (clinic)    [phone] (after hours)  CRISIS TEXT LINE: Text [id number] from anywhere in [place], anytime, any crisis 24/7;  OR SEE www.crisistextline.org    TREATMENT RISK STATEMENT:  The risks, benefits, alternatives and potential adverse effects have been discussed and are understood by the pt. The pt understands the risks of using street drugs or alcohol. There are no medical contraindications, the pt agrees to treatment with the ability to do so. The pt knows to call the clinic for any problems or to access emergency care if needed.  Medical and substance use concerns are documented above.  Psychotropic drug interaction check was done, including changes made today.     PROVIDER:  Justice Fay,     Patient staffed in clinic with Dr. Marrero who will sign the note.  Supervisor is Dr. Emery.  I saw the patient with the resident, and participated in key portions of the service, including the mental status examination and developing the plan of care. I reviewed key portions of the history with the resident. I agree with the findings and plan as documented in this note.    Amy Marrero MD

## 2020-01-06 DIAGNOSIS — C71.9 EPENDYMOMA (H): Primary | ICD-10-CM

## 2020-01-07 ENCOUNTER — HOSPITAL ENCOUNTER (OUTPATIENT)
Dept: MRI IMAGING | Facility: CLINIC | Age: 20
End: 2020-01-07
Attending: PEDIATRICS
Payer: COMMERCIAL

## 2020-01-07 ENCOUNTER — HOSPITAL ENCOUNTER (OUTPATIENT)
Dept: MRI IMAGING | Facility: CLINIC | Age: 20
Discharge: HOME | End: 2020-01-07
Attending: PEDIATRICS | Admitting: PEDIATRICS
Payer: COMMERCIAL

## 2020-01-07 ENCOUNTER — APPOINTMENT (OUTPATIENT)
Dept: LAB | Facility: CLINIC | Age: 20
End: 2020-01-07
Attending: PEDIATRICS
Payer: COMMERCIAL

## 2020-01-07 ENCOUNTER — OFFICE VISIT (OUTPATIENT)
Dept: PEDIATRIC HEMATOLOGY/ONCOLOGY | Facility: CLINIC | Age: 20
End: 2020-01-07
Attending: PEDIATRICS
Payer: COMMERCIAL

## 2020-01-07 VITALS — BODY MASS INDEX: 28.94 KG/M2 | HEIGHT: 70 IN | WEIGHT: 202.16 LBS

## 2020-01-07 DIAGNOSIS — C71.9 EPENDYMOMA (H): ICD-10-CM

## 2020-01-07 LAB
ALBUMIN SERPL-MCNC: 3.2 G/DL (ref 3.4–5)
ALP SERPL-CCNC: 159 U/L (ref 40–150)
ALT SERPL W P-5'-P-CCNC: 20 U/L (ref 0–70)
ANION GAP SERPL CALCULATED.3IONS-SCNC: 5 MMOL/L (ref 3–14)
AST SERPL W P-5'-P-CCNC: 27 U/L (ref 0–45)
BASOPHILS # BLD AUTO: 0 10E9/L (ref 0–0.2)
BASOPHILS NFR BLD AUTO: 0.6 %
BILIRUB SERPL-MCNC: 0.2 MG/DL (ref 0.2–1.3)
BUN SERPL-MCNC: 20 MG/DL (ref 7–30)
CALCIUM SERPL-MCNC: 8.4 MG/DL (ref 8.5–10.1)
CHLORIDE SERPL-SCNC: 106 MMOL/L (ref 94–109)
CO2 SERPL-SCNC: 29 MMOL/L (ref 20–32)
CREAT SERPL-MCNC: 1.28 MG/DL (ref 0.66–1.25)
DIFFERENTIAL METHOD BLD: ABNORMAL
EOSINOPHIL # BLD AUTO: 0.2 10E9/L (ref 0–0.7)
EOSINOPHIL NFR BLD AUTO: 3 %
ERYTHROCYTE [DISTWIDTH] IN BLOOD BY AUTOMATED COUNT: 14.8 % (ref 10–15)
GFR SERPL CREATININE-BSD FRML MDRD: 80 ML/MIN/{1.73_M2}
GLUCOSE SERPL-MCNC: 85 MG/DL (ref 70–99)
HCT VFR BLD AUTO: 40.5 % (ref 40–53)
HGB BLD-MCNC: 12.7 G/DL (ref 13.3–17.7)
IMM GRANULOCYTES # BLD: 0 10E9/L (ref 0–0.4)
IMM GRANULOCYTES NFR BLD: 0.4 %
LYMPHOCYTES # BLD AUTO: 1.3 10E9/L (ref 0.8–5.3)
LYMPHOCYTES NFR BLD AUTO: 23.8 %
MAGNESIUM SERPL-MCNC: 2.1 MG/DL (ref 1.6–2.3)
MCH RBC QN AUTO: 27.8 PG (ref 26.5–33)
MCHC RBC AUTO-ENTMCNC: 31.4 G/DL (ref 31.5–36.5)
MCV RBC AUTO: 89 FL (ref 78–100)
MONOCYTES # BLD AUTO: 1 10E9/L (ref 0–1.3)
MONOCYTES NFR BLD AUTO: 19.5 %
NEUTROPHILS # BLD AUTO: 2.8 10E9/L (ref 1.6–8.3)
NEUTROPHILS NFR BLD AUTO: 52.7 %
NRBC # BLD AUTO: 0 10*3/UL
NRBC BLD AUTO-RTO: 0 /100
PHOSPHATE SERPL-MCNC: 4 MG/DL (ref 2.5–4.5)
PLATELET # BLD AUTO: 65 10E9/L (ref 150–450)
POTASSIUM SERPL-SCNC: 3.9 MMOL/L (ref 3.4–5.3)
PROT SERPL-MCNC: 6.6 G/DL (ref 6.8–8.8)
RBC # BLD AUTO: 4.57 10E12/L (ref 4.4–5.9)
SODIUM SERPL-SCNC: 140 MMOL/L (ref 133–144)
WBC # BLD AUTO: 5.3 10E9/L (ref 4–11)

## 2020-01-07 PROCEDURE — 36415 COLL VENOUS BLD VENIPUNCTURE: CPT | Performed by: PEDIATRICS

## 2020-01-07 PROCEDURE — 84100 ASSAY OF PHOSPHORUS: CPT | Performed by: PEDIATRICS

## 2020-01-07 PROCEDURE — 85025 COMPLETE CBC W/AUTO DIFF WBC: CPT | Performed by: PEDIATRICS

## 2020-01-07 PROCEDURE — 70553 MRI BRAIN STEM W/O & W/DYE: CPT

## 2020-01-07 PROCEDURE — 72157 MRI CHEST SPINE W/O & W/DYE: CPT

## 2020-01-07 PROCEDURE — 25500064 ZZH RX 255 OP 636: Performed by: PEDIATRICS

## 2020-01-07 PROCEDURE — 80053 COMPREHEN METABOLIC PANEL: CPT | Performed by: PEDIATRICS

## 2020-01-07 PROCEDURE — 83735 ASSAY OF MAGNESIUM: CPT | Performed by: PEDIATRICS

## 2020-01-07 PROCEDURE — 72158 MRI LUMBAR SPINE W/O & W/DYE: CPT

## 2020-01-07 PROCEDURE — A9585 GADOBUTROL INJECTION: HCPCS | Performed by: PEDIATRICS

## 2020-01-07 PROCEDURE — 72156 MRI NECK SPINE W/O & W/DYE: CPT

## 2020-01-07 RX ORDER — GADOBUTROL 604.72 MG/ML
10 INJECTION INTRAVENOUS ONCE
Status: COMPLETED | OUTPATIENT
Start: 2020-01-07 | End: 2020-01-07

## 2020-01-07 RX ADMIN — GADOBUTROL 10 ML: 604.72 INJECTION INTRAVENOUS at 14:06

## 2020-01-07 ASSESSMENT — ENCOUNTER SYMPTOMS
WEAKNESS: 1
SPEECH DIFFICULTY: 1
CONSTIPATION: 1
TREMORS: 1

## 2020-01-07 ASSESSMENT — MIFFLIN-ST. JEOR: SCORE: 1938.25

## 2020-01-07 NOTE — PROGRESS NOTES
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 He is enrolled on COG [id number] - A Phase 1 Study of Entinostat, an Oral Histone Deacetylase Inhibitor, in Pediatric Patients With Recurrent or Refractory Solid Tumors, Including CNS Tumors and Lymphoma.   Geo is in a program with a [employer] in [place] where they do some RC racing. Geo likes his new place. Geo wants to be able to walk and does no longer want his constipation. Geo he requests some help with this issue.    Fam/Soc: Lives between his parents (one week at each home). They have been awarded guardianship of Geo. The families work well together - both parents have remarried. His dad has a clotting disorder, requiring him to take Warfarin daily.     History was obtained from Geo and his father.       Allergies   Allergen Reactions     Blood Transfusion Related (Informational Only) Swelling     Periorbital swelling post platelet transfusion     No Known Drug Allergies        Current Outpatient Medications   Medication     amitriptyline (ELAVIL) 25 MG tablet     Calcium-Vitamin D-Vitamin K (VIACTIV CALCIUM PLUS D) 650-12.5-40 MG-MCG-MCG CHEW     dexamethasone (DECADRON) 0.5 MG tablet     docusate sodium (COLACE) 100 MG capsule     fexofenadine (ALLEGRA) 180 MG tablet     linaclotide (LINZESS) 290 MCG capsule     mupirocin (BACTROBAN) 2 % external ointment     OLANZapine (ZYPREXA) 10 MG tablet     OLANZapine (ZYPREXA) 20 MG tablet     OLANZapine (ZYPREXA) 20 MG tablet     OLANZapine (ZYPREXA) 5 MG tablet     omeprazole (PRILOSEC) 20 MG DR capsule     order for DME     polyethylene glycol (MIRALAX/GLYCOLAX) powder     potassium phosphate, monobasic, (K-PHOS) 500 MG tablet     sertraline (ZOLOFT) 100 MG tablet     sulfamethoxazole-trimethoprim (BACTRIM/SEPTRA) 400-80 MG tablet     vitamin D3 (CHOLECALCIFEROL) 2000 units (50 mcg) tablet      vitamin E (TOCOPHEROL) 400 units (360 mg) capsule     No current facility-administered medications for this visit.        Past Medical History:   Diagnosis Date     Cranial nerve dysfunction      Dyspepsia      Ependymoma (H)      Gastro-oesophageal reflux disease      Hearing loss      Intracranial hemorrhage (H)      Migraine      Pilonidal cyst     7-2015     Reduced vision      Refractory obstruction of nasal airway     2nd to nasal valve prolapse     Sleep apnea      Strabismus     gaze palsy        Past Surgical History:   Procedure Laterality Date     COLONOSCOPY N/A 9/27/2019    Procedure: Colonoscopy With Biopsies and Polypectomy;  Surgeon: Aniya Wei MD;  [facility]     ESOPHAGOSCOPY, GASTROSCOPY, DUODENOSCOPY (EGD), COMBINED N/A 9/27/2019    Procedure: Upper Endoscopy (EGD) With Biopsies;  Surgeon: Aniya Wei MD;  [facility]     GRAFT CARTILAGE FROM POSTERIOR AURICLE Left 10/6/2016    Procedure: GRAFT CARTILAGE FROM POSTERIOR AURICLE;  Surgeon: Tyler Richards MD;  [facility]     INCISION AND DRAINAGE PERINEAL, COMBINED Bilateral 7/18/2015    Procedure: COMBINED INCISION AND DRAINAGE PERINEAL;  Surgeon: Dequan Timmons MD;  [facility]     OPTICAL TRACKING SYSTEM CRANIOTOMY, EXCISE TUMOR, COMBINED N/A 4/13/2015    Procedure: COMBINED OPTICAL TRACKING SYSTEM CRANIOTOMY, EXCISE TUMOR;  Surgeon: Francis Velazquez MD;  [facility]     OPTICAL TRACKING SYSTEM CRANIOTOMY, EXCISE TUMOR, COMBINED N/A 4/16/2015    Procedure: COMBINED OPTICAL TRACKING SYSTEM CRANIOTOMY, EXCISE TUMOR;  Surgeon: Francis Velazquez MD;  [facility]     OPTICAL TRACKING SYSTEM CRANIOTOMY, EXCISE TUMOR, COMBINED Bilateral 5/28/2015    Procedure: COMBINED OPTICAL TRACKING SYSTEM CRANIOTOMY, EXCISE TUMOR;  Surgeon: Francis Velazquez MD;  [facility]     OPTICAL TRACKING SYSTEM CRANIOTOMY, EXCISE TUMOR, COMBINED Bilateral  "1/14/2016    Procedure: COMBINED OPTICAL TRACKING SYSTEM CRANIOTOMY, EXCISE TUMOR;  Surgeon: Francis Velazquez MD;  [facility]     OPTICAL TRACKING SYSTEM VENTRICULOSTOMY  4/16/2015    Procedure: OPTICAL TRACKING SYSTEM VENTRICULOSTOMY;  Surgeon: Francis Velazquez MD;  [facility]     REMOVE PORT VASCULAR ACCESS N/A 10/6/2016    Procedure: REMOVE PORT VASCULAR ACCESS;  Surgeon: Bruno Perea MD;  [facility]     RHINOPLASTY N/A 10/6/2016    Procedure: RHINOPLASTY;  Surgeon: Tyler Richards MD;  [facility]     VASCULAR SURGERY  5-2015    single lumen power port       Family History   Problem Relation Age of Onset     Circulatory Father         PE/DVT     Hypothyroidism Father 30     Diabetes Maternal Grandmother      Diabetes Paternal Grandmother      Diabetes Paternal Grandfather      C.A.D. Paternal Grandfather      Hypertension Maternal Grandfather      Thyroid Disease Paternal Aunt         unknown whether hypo or hyper     Mental Illness No family hx of        Review of Systems   Constitutional:        In wheelchair   Gastrointestinal: Positive for constipation.   Neurological: Positive for tremors, speech difficulty and weakness.       Ht 1.786 m (5' 10.32\")   Wt 91.7 kg (202 lb 2.6 oz)   BMI 28.75 kg/m    Physical Exam  Neurological:      Cranial Nerves: Cranial nerve deficit present.      Motor: Weakness present.      Coordination: Coordination abnormal.      Gait: Gait abnormal.         Results for orders placed or performed in visit on 01/07/20   Phosphorus     Status: None   Result Value Ref Range    Phosphorus 4.0 2.5 - 4.5 mg/dL   Magnesium     Status: None   Result Value Ref Range    Magnesium 2.1 1.6 - 2.3 mg/dL   Comprehensive metabolic panel     Status: Abnormal   Result Value Ref Range    Sodium 140 133 - 144 mmol/L    Potassium 3.9 3.4 - 5.3 mmol/L    Chloride 106 94 - 109 mmol/L    Carbon Dioxide 29 20 - 32 mmol/L    Anion Gap 5 3 - 14 mmol/L    Glucose " 85 70 - 99 mg/dL    Urea Nitrogen 20 7 - 30 mg/dL    Creatinine 1.28 (H) 0.66 - 1.25 mg/dL    GFR Estimate 80 >60 mL/min/[1.73_m2]    GFR Estimate If Black >90 >60 mL/min/[1.73_m2]    Calcium 8.4 (L) 8.5 - 10.1 mg/dL    Bilirubin Total 0.2 0.2 - 1.3 mg/dL    Albumin 3.2 (L) 3.4 - 5.0 g/dL    Protein Total 6.6 (L) 6.8 - 8.8 g/dL    Alkaline Phosphatase 159 (H) 40 - 150 U/L    ALT 20 0 - 70 U/L    AST 27 0 - 45 U/L   CBC with platelets differential     Status: Abnormal   Result Value Ref Range    WBC 5.3 4.0 - 11.0 10e9/L    RBC Count 4.57 4.4 - 5.9 10e12/L    Hemoglobin 12.7 (L) 13.3 - 17.7 g/dL    Hematocrit 40.5 40.0 - 53.0 %    MCV 89 78 - 100 fl    MCH 27.8 26.5 - 33.0 pg    MCHC 31.4 (L) 31.5 - 36.5 g/dL    RDW 14.8 10.0 - 15.0 %    Platelet Count 65 (L) 150 - 450 10e9/L    Diff Method Automated Method     % Neutrophils 52.7 %    % Lymphocytes 23.8 %    % Monocytes 19.5 %    % Eosinophils 3.0 %    % Basophils 0.6 %    % Immature Granulocytes 0.4 %    Nucleated RBCs 0 0 /100    Absolute Neutrophil 2.8 1.6 - 8.3 10e9/L    Absolute Lymphocytes 1.3 0.8 - 5.3 10e9/L    Absolute Monocytes 1.0 0.0 - 1.3 10e9/L    Absolute Eosinophils 0.2 0.0 - 0.7 10e9/L    Absolute Basophils 0.0 0.0 - 0.2 10e9/L    Abs Immature Granulocytes 0.0 0 - 0.4 10e9/L    Absolute Nucleated RBC 0.0      Complete spine MR without and with contrast     History: Ependymoma on Etinostat study- evaluate for  progression/metastisis; Ependymoma (H).   ICD-10: Ependymoma (H)  Comparison:  11/5/2019      Contrast Dose:  10mL Gadavist IV     Technique: Sagittal T1 and T2-weighted images of the entire spine, and  axial T1 and T2-weighted images of the lumbar spine were obtained  without intravenous contrast. Post intravenous contrast using  gadolinium sagittal T1-weighted images were obtained of the whole  spine with fat-saturation , with axial postcontrast T1-weighted images  at selected levels.     Findings: Postoperative changes in the posterior fossa  from prior  ependymoma resection with associated gliosis. Enhancement is seen in  the midline cerebellum at the upper aspect of the sagittal film.  Please refer to the brain MRI for better delineation of this enhancing  mass.     Cervical spine:  The cervical vertebrae appear normally aligned.   There is no significant disc space narrowing at any level.  There is  no definite abnormal signal within the cervical spinal cord at any  level.  The findings on a level by level basis are as follows:  C2-3:  There is no focal abnormality.  C3-4:  There is no focal abnormality.  C4-5:  There is no focal abnormality.  C5-6:  There is no focal abnormality.  C6-7:  There is no focal abnormality.  C7-T1: There is no focal abnormality.  Contrast-enhanced images demonstrate no definite abnormal enhancement  in the visualized paraspinous tissues or in the spinal canal or  vertebra.     Thoracic spine:  The external marker is at the level of T4-5. The tip  of the conus medullaris is at approximately L1. T1 and T2 hyperintense  focus T4 vertebra would be most consistent with a benign hemangioma.  Minimal compression deformity of the superior endplate of T4.  There is no definite abnormal signal in the thoracic spinal cord at  any level. Alignment of the thoracic vertebra appears within normal  limits.  Contrast-enhanced images demonstrate no definite abnormal  enhancement in the visualized paraspinous tissues or in the spinal  canal or vertebra.     Lumbar spine:  There are 5 lumbar-type vertebrae assumed for the  purposes of this dictation.  The tip of the conus medullaris is at  approximately L1.  The lumbar vertebral column appears normally  aligned.  There is no significant disc height narrowing at any level.  On a level by level basis:  L2-3: No significant spinal canal or foraminal narrowing.  L3-4: No significant spinal canal or foraminal narrowing.  L4-5: No significant spinal canal or foraminal narrowing.  L5-S1: No  significant spinal canal or foraminal narrowing.  Contrast-enhanced images demonstrate no definite abnormal enhancement  in the visualized lumbar paraspinous tissues or in the spinal canal or  vertebra.                                                                      Impression:    No significant change from the prior study with no evidence for  metastatic disease in the spine.     IMELDA TAYLOR MD    MR BRAIN W/O & W CONTRAST 1/7/2020 3:19 PM     Provided History: Ependymoma on Etinostat study- evaluate for  progression/metastisis; Ependymoma (H).     ICD-10: Ependymoma (H)     Comparison: Multiple studies since 2/21/2018.     Technique: Multiplanar T1-weighted, axial FLAIR, and susceptibility  images were obtained without intravenous contrast. Following  intravenous gadolinium-based contrast administration, axial  T2-weighted, diffusion, and T1-weighted images (in multiple planes)  were obtained.     Contrast:   10mL Gadavist IV      Findings:     Postoperative changes of suboccipital craniotomy with underlying  resection cavity and cerebellum.     No significant change in size of the 1.9 x 1.8 x 2.6 cm homogeneously  enhancing fourth ventricular mass. Slight decrease in size of the  cyst, the right inferolateral margin of the isthmus, measuring 2.0 x  1.5 x 1.7 cm, previously measured as 2.5 x 2.1 x 2.0 cm. Hemosiderin  deposition within the enhancing mass and at the margin of the cyst.   No significant change in extent of the adjacent T2 hyperintense signal  within the bilateral cerebellar hemisphere, david, medulla oblongata,  superior cerebellar peduncles.     There is no midline shift. The ventricles are proportionate to the  cerebral sulci. Normal major vascular intracranial flow-voids.     No abnormality of the skull marrow signal. The visualized portions of  paranasal sinuses, and mastoid air cells are relatively clear. The  orbits are grossly unremarkable.                                                                       Impression: No significant change in size of ependymoma centered  within the fourth ventricle, dating back to at least 1/16/2019. Slight  decrease in size of the cystic lesion adjacent to enhancing mass.     I have personally reviewed the examination and initial interpretation  and I agree with the findings.     MARYBETH ECHOLS MD      Impression:  1.  Ependymoma  2. MRI 1/7/2020: No metastases, primary tumor stable compared to recent studies.  3. Thrombocytopenia secondary to chemotherapy.    Plan:  1. RTC in 1 weeks for follow up, or sooner PRN.  2. Constipation discussed.        This document serves as a record of the services and decisions personally performed and made by Leoncio Rousseau MD. It was created on his behalf by Elyse Tapia, a trained medical scribe. The creation of this document is based on the provider's statements to the medical scribe.    The documentation recorded by the scribe accurately reflects the services I personally performed and the decisions made by me.      Leoncio Rousseau    CC  Patient Care Team:  Jeffrey Espinoza MD as PCP - General (Family Practice)  Dequan Timmons MD as MD (Surgery)  Leoncio Rousseau MD as MD (Pediatric Hematology/Oncology)  Kristi Schuler, APRN CNP as Nurse Practitioner (Nurse Practitioner - Pediatrics)  Higinio Walters MD (Ophthalmology)  Karina Hodgson MSW as   Eren Reeder MD as MD (Dermatology)  Schwab, Briana, RN as Nurse Coordinator  Perico Holley MD as MD (Pediatric Neurology)  Sarah Vines MD as MD (Pediatric Urology)  Sarah Vines MD as MD (Pediatric Urology)  Imani eMans, RN as Registered Nurse  Imani Means, RN as Registered Nurse  Jorge Luis Tripathi MD as MD (Psychiatry)  Justice Fay MD as Resident (Student in organized health care education/training program)  Jorge Luis Tripathi MD as MD (Psychiatry)  Rupert  Marilia CHATMAN MD as MD (Psychiatry)  NUVIA BRAVO    Copy to patient  RAFAELA GUAN  [address] [facility] [address]    Plan:  Did not make counts for drug today-return next Tuesday for exam/Labs with Rhina    Thank you for choosing [facility].    It was a pleasure to see you today.      CNS Tumor Team  Leoncio Bravo NP APRN  Imani Means RN BSN - Care Coordinator       [facility], 9th Floor - [address] [address]  Scheduling/Appointments: [phone]  Fax: [phone]     Numbers to call:   Monday - Friday, 8:00 am - 5:00 pm:    Office : [phone]    Scheduling/Appointments: [phone]  Nights and weekends:   Call [phone] and ask the  to page the 'Pediatric Heme/Onc fellow on call' if you have an urgent concern that can't wait until the clinic opens.     Scheduling:    [facility], 9th floor [phone]  Infusion Center/Lab: [phone]   Radiology/ [phone]      Services:   [phone]     Imani Means RN, BSN  CNS Tumor Care Coordinator  Office: [phone]  Pager: [phone]  E-mail: [email or web address]     We encourage you to sign up for [employer] for easy communication with us.  Sign up at the clinic  or go to [email or web address].      Please try the Passport to [facility] ([facility]) phone application for Virtual Tours, Procedure Preparation, Resources, Preparation for Hospital Stay and the Coloring Board.

## 2020-01-07 NOTE — LETTER
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 He is enrolled on COG [id number] - A Phase 1 Study of Entinostat, an Oral Histone Deacetylase Inhibitor, in Pediatric Patients With Recurrent or Refractory Solid Tumors, Including CNS Tumors and Lymphoma.   Geo is in a program with a [employer] in [place] where they do some RC racing. Geo likes his new place. Geo wants to be able to walk and does no longer want his constipation. Geo he requests some help with this issue.    Fam/Soc: Lives between his parents (one week at each home). They have been awarded guardianship of Geo. The families work well together - both parents have remarried. His dad has a clotting disorder, requiring him to take Warfarin daily.     History was obtained from Geo and his father.       Allergies   Allergen Reactions     Blood Transfusion Related (Informational Only) Swelling     Periorbital swelling post platelet transfusion     No Known Drug Allergies        Current Outpatient Medications   Medication     amitriptyline (ELAVIL) 25 MG tablet     Calcium-Vitamin D-Vitamin K (VIACTIV CALCIUM PLUS D) 650-12.5-40 MG-MCG-MCG CHEW     dexamethasone (DECADRON) 0.5 MG tablet     docusate sodium (COLACE) 100 MG capsule     fexofenadine (ALLEGRA) 180 MG tablet     linaclotide (LINZESS) 290 MCG capsule     mupirocin (BACTROBAN) 2 % external ointment     OLANZapine (ZYPREXA) 10 MG tablet     OLANZapine (ZYPREXA) 20 MG tablet     OLANZapine (ZYPREXA) 20 MG tablet     OLANZapine (ZYPREXA) 5 MG tablet     omeprazole (PRILOSEC) 20 MG DR capsule     order for DME     polyethylene glycol (MIRALAX/GLYCOLAX) powder     potassium phosphate, monobasic, (K-PHOS) 500 MG tablet     sertraline (ZOLOFT) 100 MG tablet     sulfamethoxazole-trimethoprim  (BACTRIM/SEPTRA) 400-80 MG tablet     vitamin D3 (CHOLECALCIFEROL) 2000 units (50 mcg) tablet     vitamin E (TOCOPHEROL) 400 units (360 mg) capsule     No current facility-administered medications for this visit.        Past Medical History:   Diagnosis Date     Cranial nerve dysfunction      Dyspepsia      Ependymoma (H)      Gastro-oesophageal reflux disease      Hearing loss      Intracranial hemorrhage (H)      Migraine      Pilonidal cyst     7-2015     Reduced vision      Refractory obstruction of nasal airway     2nd to nasal valve prolapse     Sleep apnea      Strabismus     gaze palsy        Past Surgical History:   Procedure Laterality Date     COLONOSCOPY N/A 9/27/2019    Procedure: Colonoscopy With Biopsies and Polypectomy;  Surgeon: Aniya Wei MD;  [facility]     ESOPHAGOSCOPY, GASTROSCOPY, DUODENOSCOPY (EGD), COMBINED N/A 9/27/2019    Procedure: Upper Endoscopy (EGD) With Biopsies;  Surgeon: Aniya Wei MD;  [facility]     GRAFT CARTILAGE FROM POSTERIOR AURICLE Left 10/6/2016    Procedure: GRAFT CARTILAGE FROM POSTERIOR AURICLE;  Surgeon: Tyelr Richards MD;  [facility]     INCISION AND DRAINAGE PERINEAL, COMBINED Bilateral 7/18/2015    Procedure: COMBINED INCISION AND DRAINAGE PERINEAL;  Surgeon: Dequan Timmons MD;  [facility]     OPTICAL TRACKING SYSTEM CRANIOTOMY, EXCISE TUMOR, COMBINED N/A 4/13/2015    Procedure: COMBINED OPTICAL TRACKING SYSTEM CRANIOTOMY, EXCISE TUMOR;  Surgeon: Francis Velazquez MD;  [facility]     OPTICAL TRACKING SYSTEM CRANIOTOMY, EXCISE TUMOR, COMBINED N/A 4/16/2015    Procedure: COMBINED OPTICAL TRACKING SYSTEM CRANIOTOMY, EXCISE TUMOR;  Surgeon: Francis Velazquez MD;  [facility]     OPTICAL TRACKING SYSTEM CRANIOTOMY, EXCISE TUMOR, COMBINED Bilateral 5/28/2015    Procedure: COMBINED OPTICAL TRACKING SYSTEM CRANIOTOMY, EXCISE TUMOR;  Surgeon: Francis Velazquez MD;   "[facility]     OPTICAL TRACKING SYSTEM CRANIOTOMY, EXCISE TUMOR, COMBINED Bilateral 1/14/2016    Procedure: COMBINED OPTICAL TRACKING SYSTEM CRANIOTOMY, EXCISE TUMOR;  Surgeon: Francis Velazquez MD;  [facility]     OPTICAL TRACKING SYSTEM VENTRICULOSTOMY  4/16/2015    Procedure: OPTICAL TRACKING SYSTEM VENTRICULOSTOMY;  Surgeon: Francis Velazquez MD;  [facility]     REMOVE PORT VASCULAR ACCESS N/A 10/6/2016    Procedure: REMOVE PORT VASCULAR ACCESS;  Surgeon: Bruno Perea MD;  [facility]     RHINOPLASTY N/A 10/6/2016    Procedure: RHINOPLASTY;  Surgeon: Tyler Richards MD;  [facility]     VASCULAR SURGERY  5-2015    single lumen power port       Family History   Problem Relation Age of Onset     Circulatory Father         PE/DVT     Hypothyroidism Father 30     Diabetes Maternal Grandmother      Diabetes Paternal Grandmother      Diabetes Paternal Grandfather      C.A.D. Paternal Grandfather      Hypertension Maternal Grandfather      Thyroid Disease Paternal Aunt         unknown whether hypo or hyper     Mental Illness No family hx of        Review of Systems   Constitutional:        In wheelchair   Gastrointestinal: Positive for constipation.   Neurological: Positive for tremors, speech difficulty and weakness.       Ht 1.786 m (5' 10.32\")   Wt 91.7 kg (202 lb 2.6 oz)   BMI 28.75 kg/m     Physical Exam  Neurological:      Cranial Nerves: Cranial nerve deficit present.      Motor: Weakness present.      Coordination: Coordination abnormal.      Gait: Gait abnormal.         Results for orders placed or performed in visit on 01/07/20   Phosphorus     Status: None   Result Value Ref Range    Phosphorus 4.0 2.5 - 4.5 mg/dL   Magnesium     Status: None   Result Value Ref Range    Magnesium 2.1 1.6 - 2.3 mg/dL   Comprehensive metabolic panel     Status: Abnormal   Result Value Ref Range    Sodium 140 133 - 144 mmol/L    Potassium 3.9 3.4 - 5.3 mmol/L    Chloride 106 94 " - 109 mmol/L    Carbon Dioxide 29 20 - 32 mmol/L    Anion Gap 5 3 - 14 mmol/L    Glucose 85 70 - 99 mg/dL    Urea Nitrogen 20 7 - 30 mg/dL    Creatinine 1.28 (H) 0.66 - 1.25 mg/dL    GFR Estimate 80 >60 mL/min/[1.73_m2]    GFR Estimate If Black >90 >60 mL/min/[1.73_m2]    Calcium 8.4 (L) 8.5 - 10.1 mg/dL    Bilirubin Total 0.2 0.2 - 1.3 mg/dL    Albumin 3.2 (L) 3.4 - 5.0 g/dL    Protein Total 6.6 (L) 6.8 - 8.8 g/dL    Alkaline Phosphatase 159 (H) 40 - 150 U/L    ALT 20 0 - 70 U/L    AST 27 0 - 45 U/L   CBC with platelets differential     Status: Abnormal   Result Value Ref Range    WBC 5.3 4.0 - 11.0 10e9/L    RBC Count 4.57 4.4 - 5.9 10e12/L    Hemoglobin 12.7 (L) 13.3 - 17.7 g/dL    Hematocrit 40.5 40.0 - 53.0 %    MCV 89 78 - 100 fl    MCH 27.8 26.5 - 33.0 pg    MCHC 31.4 (L) 31.5 - 36.5 g/dL    RDW 14.8 10.0 - 15.0 %    Platelet Count 65 (L) 150 - 450 10e9/L    Diff Method Automated Method     % Neutrophils 52.7 %    % Lymphocytes 23.8 %    % Monocytes 19.5 %    % Eosinophils 3.0 %    % Basophils 0.6 %    % Immature Granulocytes 0.4 %    Nucleated RBCs 0 0 /100    Absolute Neutrophil 2.8 1.6 - 8.3 10e9/L    Absolute Lymphocytes 1.3 0.8 - 5.3 10e9/L    Absolute Monocytes 1.0 0.0 - 1.3 10e9/L    Absolute Eosinophils 0.2 0.0 - 0.7 10e9/L    Absolute Basophils 0.0 0.0 - 0.2 10e9/L    Abs Immature Granulocytes 0.0 0 - 0.4 10e9/L    Absolute Nucleated RBC 0.0      Complete spine MR without and with contrast     History: Ependymoma on Etinostat study- evaluate for  progression/metastisis; Ependymoma (H).   ICD-10: Ependymoma (H)  Comparison:  11/5/2019      Contrast Dose:  10mL Gadavist IV     Technique: Sagittal T1 and T2-weighted images of the entire spine, and  axial T1 and T2-weighted images of the lumbar spine were obtained  without intravenous contrast. Post intravenous contrast using  gadolinium sagittal T1-weighted images were obtained of the whole  spine with fat-saturation , with axial postcontrast  T1-weighted images  at selected levels.     Findings: Postoperative changes in the posterior fossa from prior  ependymoma resection with associated gliosis. Enhancement is seen in  the midline cerebellum at the upper aspect of the sagittal film.  Please refer to the brain MRI for better delineation of this enhancing  mass.     Cervical spine:  The cervical vertebrae appear normally aligned.   There is no significant disc space narrowing at any level.  There is  no definite abnormal signal within the cervical spinal cord at any  level.  The findings on a level by level basis are as follows:  C2-3:  There is no focal abnormality.  C3-4:  There is no focal abnormality.  C4-5:  There is no focal abnormality.  C5-6:  There is no focal abnormality.  C6-7:  There is no focal abnormality.  C7-T1: There is no focal abnormality.  Contrast-enhanced images demonstrate no definite abnormal enhancement  in the visualized paraspinous tissues or in the spinal canal or  vertebra.     Thoracic spine:  The external marker is at the level of T4-5. The tip  of the conus medullaris is at approximately L1. T1 and T2 hyperintense  focus T4 vertebra would be most consistent with a benign hemangioma.  Minimal compression deformity of the superior endplate of T4.  There is no definite abnormal signal in the thoracic spinal cord at  any level. Alignment of the thoracic vertebra appears within normal  limits.  Contrast-enhanced images demonstrate no definite abnormal  enhancement in the visualized paraspinous tissues or in the spinal  canal or vertebra.     Lumbar spine:  There are 5 lumbar-type vertebrae assumed for the  purposes of this dictation.  The tip of the conus medullaris is at  approximately L1.  The lumbar vertebral column appears normally  aligned.  There is no significant disc height narrowing at any level.  On a level by level basis:  L2-3: No significant spinal canal or foraminal narrowing.  L3-4: No significant spinal canal or  foraminal narrowing.  L4-5: No significant spinal canal or foraminal narrowing.  L5-S1: No significant spinal canal or foraminal narrowing.  Contrast-enhanced images demonstrate no definite abnormal enhancement  in the visualized lumbar paraspinous tissues or in the spinal canal or  vertebra.                                                                      Impression:    No significant change from the prior study with no evidence for  metastatic disease in the spine.     IMELDA TAYLOR MD    MR BRAIN W/O & W CONTRAST 1/7/2020 3:19 PM     Provided History: Ependymoma on Etinostat study- evaluate for  progression/metastisis; Ependymoma (H).     ICD-10: Ependymoma (H)     Comparison: Multiple studies since 2/21/2018.     Technique: Multiplanar T1-weighted, axial FLAIR, and susceptibility  images were obtained without intravenous contrast. Following  intravenous gadolinium-based contrast administration, axial  T2-weighted, diffusion, and T1-weighted images (in multiple planes)  were obtained.     Contrast:   10mL Gadavist IV      Findings:     Postoperative changes of suboccipital craniotomy with underlying  resection cavity and cerebellum.     No significant change in size of the 1.9 x 1.8 x 2.6 cm homogeneously  enhancing fourth ventricular mass. Slight decrease in size of the  cyst, the right inferolateral margin of the isthmus, measuring 2.0 x  1.5 x 1.7 cm, previously measured as 2.5 x 2.1 x 2.0 cm. Hemosiderin  deposition within the enhancing mass and at the margin of the cyst.   No significant change in extent of the adjacent T2 hyperintense signal  within the bilateral cerebellar hemisphere, david, medulla oblongata,  superior cerebellar peduncles.     There is no midline shift. The ventricles are proportionate to the  cerebral sulci. Normal major vascular intracranial flow-voids.     No abnormality of the skull marrow signal. The visualized portions of  paranasal sinuses, and mastoid air cells are relatively  clear. The  orbits are grossly unremarkable.                                                                      Impression: No significant change in size of ependymoma centered  within the fourth ventricle, dating back to at least 1/16/2019. Slight  decrease in size of the cystic lesion adjacent to enhancing mass.     I have personally reviewed the examination and initial interpretation  and I agree with the findings.     MARYBETH ECHOLS MD      Impression:  1.  Ependymoma  2. MRI 1/7/2020: No metastases, primary tumor stable compared to recent studies.  3. Thrombocytopenia secondary to chemotherapy.    Plan:  1. RTC in  1 weeks for follow up, or sooner PRN.  2. Constipation discussed.        This document serves as a record of the services and decisions personally performed and made by Leoncio Rousseau MD. It was created on his behalf by Elyse Tapia, a trained medical scribe. The creation of this document is based on the provider's statements to the medical scribe.    The documentation recorded by the scribe accurately reflects the services I personally performed and the decisions made by me.      Leoncio Rousseau      Patient Care Team:  Jeffrey Espinoza MD as PCP - General (Family Practice)  Dequan Timmons MD as MD (Surgery)  Leoncio Rousseau MD as MD (Pediatric Hematology/Oncology)  Kristi Schuler, APRN CNP as Nurse Practitioner (Nurse Practitioner - Pediatrics)  Higinio Walters MD (Ophthalmology)  Karina Hodgson MSW as   Eren Reeder MD as MD (Dermatology)  Schwab, Briana, RN as Nurse Coordinator  Perico Holley MD as MD (Pediatric Neurology)  Sarah Vines MD as MD (Pediatric Urology)  Sarah Vines MD as MD (Pediatric Urology)  Imani Means, RN as Registered Nurse  Imani Means, RN as Registered Nurse  Jorge Luis Tripathi MD as MD (Psychiatry)  Justice Fay MD as Resident (Student in [facility] health  care education/training program)  Jorge Luis Tripathi MD as MD (Psychiatry)  Marilia Emery MD as MD (Psychiatry)  NUVIA BRAVO    Copy to patient  RAFAELA GUAN  [address] [facility] [address]    Plan:  Did not make counts for drug today-return next Tuesday for exam/Labs with Rhina    Thank you for choosing [facility].    It was a pleasure to see you today.      CNS Tumor Team  Leoncio Bravo NP APRN  Imani Means RN BSN - Care Coordinator       [facility], 9th Floor - [id number] [place] [address]  Scheduling/Appointments: [phone]  Fax: [phone]     Numbers to call:   Monday - Friday, 8:00 am - 5:00 pm:    Office : [phone]    Scheduling/Appointments: [phone]  Nights and weekends:   Call [phone] and ask the  to page the 'Pediatric Heme/Onc fellow on call' if you have an urgent concern that can't wait until the clinic opens.     Scheduling:    [facility], 9th floor [phone]  Infusion Center/Lab: [phone]   Radiology/ [phone]      Services:   [phone]     Imani Means RN, BSN  CNS Tumor Care Coordinator  Office: [phone]  Pager: [phone]  E-mail: [email or web address]     We encourage you to sign up for [employer] for easy communication with us.  Sign up at the clinic  or go to [email or web address].      Please try the Passport to [facility] ([facility]) phone application for Virtual Tours, Procedure Preparation, Resources, Preparation for Hospital Stay and the Coloring Board.       Leoncio Rousseau MD

## 2020-01-08 NOTE — PATIENT INSTRUCTIONS
Plan:  Did not make counts for drug today-return next Tuesday for exam/Labs with Rhina    Thank you for choosing [facility].    It was a pleasure to see you today.      CNS Tumor Team  Leoncoi Schuler NP APRN  Imani Means RN BSN - Care Coordinator       [facility], 9th Floor - [address] [place] [address]  Scheduling/Appointments: [phone]  Fax: [phone]     Numbers to call:   Monday - Friday, 8:00 am - 5:00 pm:    Office : [phone]    Scheduling/Appointments: [phone]  Nights and weekends:   Call [phone] and ask the  to page the 'Pediatric Heme/Onc fellow on call' if you have an urgent concern that can't wait until the clinic opens.     Scheduling:    [facility], 9th floor [phone]  Infusion Center/Lab: [phone]   Radiology/ [phone]      Services:   [phone]     Imani Means RN, BSN  CNS Tumor Care Coordinator  Office: [phone]  Pager: [phone]  E-mail: [email or web address]     We encourage you to sign up for [employer] for easy communication with us.  Sign up at the clinic  or go to [email or web address].      Please try the Passport to [facility] ([facility]) phone application for Virtual Tours, Procedure Preparation, Resources, Preparation for Hospital Stay and the Coloring Board.

## 2020-01-09 DIAGNOSIS — C71.9 EPENDYMOMA (H): Primary | ICD-10-CM

## 2020-01-13 ENCOUNTER — HOSPITAL ENCOUNTER (EMERGENCY)
Facility: CLINIC | Age: 20
Discharge: HOME | End: 2020-01-13
Attending: EMERGENCY MEDICINE | Admitting: EMERGENCY MEDICINE
Payer: COMMERCIAL

## 2020-01-13 VITALS
OXYGEN SATURATION: 98 % | DIASTOLIC BLOOD PRESSURE: 77 MMHG | HEART RATE: 126 BPM | TEMPERATURE: 98.8 F | SYSTOLIC BLOOD PRESSURE: 110 MMHG | RESPIRATION RATE: 20 BRPM

## 2020-01-13 DIAGNOSIS — S81.812A LACERATION OF LEG, LEFT, INITIAL ENCOUNTER: ICD-10-CM

## 2020-01-13 PROCEDURE — 25000132 ZZH RX MED GY IP 250 OP 250 PS 637: Performed by: EMERGENCY MEDICINE

## 2020-01-13 PROCEDURE — 12002 RPR S/N/AX/GEN/TRNK2.6-7.5CM: CPT

## 2020-01-13 PROCEDURE — 99283 EMERGENCY DEPT VISIT LOW MDM: CPT

## 2020-01-13 RX ORDER — LIDOCAINE HYDROCHLORIDE AND EPINEPHRINE 10; 10 MG/ML; UG/ML
INJECTION, SOLUTION INFILTRATION; PERINEURAL
Status: DISCONTINUED
Start: 2020-01-13 | End: 2020-01-14 | Stop reason: HOSPADM

## 2020-01-13 RX ORDER — CEPHALEXIN 500 MG/1
500 CAPSULE ORAL ONCE
Status: COMPLETED | OUTPATIENT
Start: 2020-01-13 | End: 2020-01-13

## 2020-01-13 RX ORDER — CEPHALEXIN 500 MG/1
500 CAPSULE ORAL 3 TIMES DAILY
Qty: 14 CAPSULE | Refills: 0 | Status: ON HOLD | OUTPATIENT
Start: 2020-01-13 | End: 2020-01-27

## 2020-01-13 RX ADMIN — CEPHALEXIN 500 MG: 500 CAPSULE ORAL at 22:26

## 2020-01-13 ASSESSMENT — ENCOUNTER SYMPTOMS
ARTHRALGIAS: 0
WOUND: 1
MYALGIAS: 0

## 2020-01-13 NOTE — ED AVS SNAPSHOT
[facility] Emergency Department  [address]  [facility] [address]  Phone:  [phone]  Fax:  [phone]                                    Geo Hicks   MRN: [id number]    Department:  [facility] Emergency Department   Date of Visit:  1/13/2020           After Visit Summary Signature Page    I have received my discharge instructions, and my questions have been answered. I have discussed any challenges I see with this plan with the nurse or doctor.    ..........................................................................................................................................  Patient/Patient Representative Signature      ..........................................................................................................................................  Patient Representative Print Name and Relationship to Patient    ..................................................               ................................................  Date                                   Time    ..........................................................................................................................................  Reviewed by Signature/Title    ...................................................              ..............................................  Date                                               Time          22EPIC Rev 08/18

## 2020-01-14 ENCOUNTER — OFFICE VISIT (OUTPATIENT)
Dept: PEDIATRIC HEMATOLOGY/ONCOLOGY | Facility: CLINIC | Age: 20
End: 2020-01-14
Attending: NURSE PRACTITIONER
Payer: COMMERCIAL

## 2020-01-14 VITALS
HEART RATE: 114 BPM | TEMPERATURE: 97.7 F | RESPIRATION RATE: 22 BRPM | SYSTOLIC BLOOD PRESSURE: 105 MMHG | HEIGHT: 70 IN | BODY MASS INDEX: 28.56 KG/M2 | DIASTOLIC BLOOD PRESSURE: 76 MMHG | WEIGHT: 199.52 LBS | OXYGEN SATURATION: 97 %

## 2020-01-14 DIAGNOSIS — T14.8XXA HEMATOMA: Primary | ICD-10-CM

## 2020-01-14 DIAGNOSIS — R23.3 BRUISES EASILY: ICD-10-CM

## 2020-01-14 DIAGNOSIS — C71.9 EPENDYMOMA (H): ICD-10-CM

## 2020-01-14 LAB
ALBUMIN SERPL-MCNC: 3.4 G/DL (ref 3.4–5)
ALP SERPL-CCNC: 147 U/L (ref 40–150)
ALT SERPL W P-5'-P-CCNC: 20 U/L (ref 0–70)
ANION GAP SERPL CALCULATED.3IONS-SCNC: 2 MMOL/L (ref 3–14)
APTT PPP: 28 SEC (ref 22–37)
AST SERPL W P-5'-P-CCNC: 27 U/L (ref 0–45)
BASOPHILS # BLD AUTO: 0 10E9/L (ref 0–0.2)
BASOPHILS NFR BLD AUTO: 0.4 %
BILIRUB SERPL-MCNC: 0.3 MG/DL (ref 0.2–1.3)
BUN SERPL-MCNC: 22 MG/DL (ref 7–30)
CALCIUM SERPL-MCNC: 9.4 MG/DL (ref 8.5–10.1)
CHLORIDE SERPL-SCNC: 109 MMOL/L (ref 94–109)
CO2 SERPL-SCNC: 32 MMOL/L (ref 20–32)
CREAT SERPL-MCNC: 1.57 MG/DL (ref 0.66–1.25)
DIFFERENTIAL METHOD BLD: ABNORMAL
EOSINOPHIL # BLD AUTO: 0 10E9/L (ref 0–0.7)
EOSINOPHIL NFR BLD AUTO: 0.4 %
ERYTHROCYTE [DISTWIDTH] IN BLOOD BY AUTOMATED COUNT: 15 % (ref 10–15)
GFR SERPL CREATININE-BSD FRML MDRD: 62 ML/MIN/{1.73_M2}
GLUCOSE SERPL-MCNC: 107 MG/DL (ref 70–99)
HCT VFR BLD AUTO: 44.7 % (ref 40–53)
HGB BLD-MCNC: 14.3 G/DL (ref 13.3–17.7)
IMM GRANULOCYTES # BLD: 0 10E9/L (ref 0–0.4)
IMM GRANULOCYTES NFR BLD: 0.3 %
INR PPP: 1.05 (ref 0.86–1.14)
LYMPHOCYTES # BLD AUTO: 0.7 10E9/L (ref 0.8–5.3)
LYMPHOCYTES NFR BLD AUTO: 10.5 %
MAGNESIUM SERPL-MCNC: 2.4 MG/DL (ref 1.6–2.3)
MCH RBC QN AUTO: 27.8 PG (ref 26.5–33)
MCHC RBC AUTO-ENTMCNC: 32 G/DL (ref 31.5–36.5)
MCV RBC AUTO: 87 FL (ref 78–100)
MONOCYTES # BLD AUTO: 1.3 10E9/L (ref 0–1.3)
MONOCYTES NFR BLD AUTO: 18.9 %
NEUTROPHILS # BLD AUTO: 4.7 10E9/L (ref 1.6–8.3)
NEUTROPHILS NFR BLD AUTO: 69.5 %
NRBC # BLD AUTO: 0 10*3/UL
NRBC BLD AUTO-RTO: 0 /100
PHOSPHATE SERPL-MCNC: 5 MG/DL (ref 2.5–4.5)
PLATELET # BLD AUTO: 91 10E9/L (ref 150–450)
POTASSIUM SERPL-SCNC: 4.3 MMOL/L (ref 3.4–5.3)
PROT SERPL-MCNC: 7 G/DL (ref 6.8–8.8)
RBC # BLD AUTO: 5.14 10E12/L (ref 4.4–5.9)
SODIUM SERPL-SCNC: 143 MMOL/L (ref 133–144)
WBC # BLD AUTO: 6.8 10E9/L (ref 4–11)

## 2020-01-14 PROCEDURE — 84100 ASSAY OF PHOSPHORUS: CPT | Performed by: NURSE PRACTITIONER

## 2020-01-14 PROCEDURE — 85246 CLOT FACTOR VIII VW ANTIGEN: CPT | Performed by: NURSE PRACTITIONER

## 2020-01-14 PROCEDURE — G0463 HOSPITAL OUTPT CLINIC VISIT: HCPCS | Mod: ZF

## 2020-01-14 PROCEDURE — 85025 COMPLETE CBC W/AUTO DIFF WBC: CPT | Performed by: NURSE PRACTITIONER

## 2020-01-14 PROCEDURE — 83735 ASSAY OF MAGNESIUM: CPT | Performed by: NURSE PRACTITIONER

## 2020-01-14 PROCEDURE — 85245 CLOT FACTOR VIII VW RISTOCTN: CPT | Performed by: NURSE PRACTITIONER

## 2020-01-14 PROCEDURE — 36415 COLL VENOUS BLD VENIPUNCTURE: CPT | Performed by: NURSE PRACTITIONER

## 2020-01-14 PROCEDURE — 00000401 ZZHCL STATISTIC THROMBIN TIME NC: Performed by: NURSE PRACTITIONER

## 2020-01-14 PROCEDURE — 85240 CLOT FACTOR VIII AHG 1 STAGE: CPT | Performed by: NURSE PRACTITIONER

## 2020-01-14 PROCEDURE — 80053 COMPREHEN METABOLIC PANEL: CPT | Performed by: NURSE PRACTITIONER

## 2020-01-14 PROCEDURE — 85730 THROMBOPLASTIN TIME PARTIAL: CPT | Performed by: NURSE PRACTITIONER

## 2020-01-14 PROCEDURE — 85610 PROTHROMBIN TIME: CPT | Performed by: NURSE PRACTITIONER

## 2020-01-14 PROCEDURE — 00000447 ZZHCL STATISTIC VON WILLEBRAND MULTIMERS: Performed by: NURSE PRACTITIONER

## 2020-01-14 RX ORDER — AMOXICILLIN 250 MG
1 CAPSULE ORAL DAILY
Qty: 30 TABLET | Refills: 4 | Status: SHIPPED | OUTPATIENT
Start: 2020-01-14 | End: 2020-01-01 | Stop reason: SINTOL

## 2020-01-14 RX ORDER — METHYLPREDNISOLONE SODIUM SUCCINATE 125 MG/2ML
125 INJECTION, POWDER, LYOPHILIZED, FOR SOLUTION INTRAMUSCULAR; INTRAVENOUS
Status: CANCELLED
Start: 2020-01-14

## 2020-01-14 RX ORDER — SODIUM CHLORIDE 9 MG/ML
1000 INJECTION, SOLUTION INTRAVENOUS CONTINUOUS PRN
Status: CANCELLED
Start: 2020-01-14

## 2020-01-14 RX ORDER — EPINEPHRINE 1 MG/ML
0.3 INJECTION, SOLUTION, CONCENTRATE INTRAVENOUS EVERY 5 MIN PRN
Status: CANCELLED | OUTPATIENT
Start: 2020-01-14

## 2020-01-14 RX ORDER — ALBUTEROL SULFATE 0.83 MG/ML
2.5 SOLUTION RESPIRATORY (INHALATION)
Status: CANCELLED | OUTPATIENT
Start: 2020-01-14

## 2020-01-14 RX ORDER — DIPHENHYDRAMINE HYDROCHLORIDE 50 MG/ML
50 INJECTION INTRAMUSCULAR; INTRAVENOUS
Status: CANCELLED
Start: 2020-01-14

## 2020-01-14 RX ORDER — ALBUTEROL SULFATE 90 UG/1
1-2 AEROSOL, METERED RESPIRATORY (INHALATION)
Status: CANCELLED
Start: 2020-01-14

## 2020-01-14 RX ORDER — MEPERIDINE HYDROCHLORIDE 25 MG/ML
25 INJECTION INTRAMUSCULAR; INTRAVENOUS; SUBCUTANEOUS EVERY 30 MIN PRN
Status: CANCELLED | OUTPATIENT
Start: 2020-01-14

## 2020-01-14 ASSESSMENT — ENCOUNTER SYMPTOMS
SPEECH DIFFICULTY: 1
TREMORS: 1
WEAKNESS: 1
CONSTIPATION: 1

## 2020-01-14 ASSESSMENT — PAIN SCALES - GENERAL: PAINLEVEL: NO PAIN (0)

## 2020-01-14 ASSESSMENT — MIFFLIN-ST. JEOR: SCORE: 1926.25

## 2020-01-14 NOTE — ED PROVIDER NOTES
History     Chief Complaint:  Laceration     HPI   Geo Hicks is a 19 year old male with a history of a ependymoma currently enrolled in a phase 1 study of Entinostat oral histone deacetylase inhibitor who presents with his assisted living caregivers with a laceration. The patient was found today to have a large laceration on the left shin which the patient does not know how he received. He is able to walk on the leg and had a full body audit performed by his PCAs, no other reported injuries. The patient is able to walk independently with a gait belt and a walker, but not more than 12 feet according to caregiver. He does however have a history of bed-exiting. Patient has not exhibited any abnormal behaviors otherwise per caregivers.     Allergies:  No known drug allergies      Medications:    Amitriptyline  Decadron  Entinostat  K-PHOS  Linzess  Mupirocin topical  Zyprexa  Prilosec  Sertraline  Sulfamethoxazole    Past Medical History:    Depression  Chronic constipation  Neoplasm of posterior cranial fossa  CANDELARIO  Ependymoma  Hemorrhagic stroke  Dyspepsia  GERD  Cranial nerve dysfunction  Strabismus    Past Surgical History:    Craniotomies  Cartilage graft left posterior auricle  Rhinoplasty     Family History:    Coagulation disorder  Hypothyroid  Diabetes  CAD    Social History:  Patient presents with two female caregivers.  PCP: Jeffrey Espinoza    Marital Status:  Single      Review of Systems   Musculoskeletal: Negative for arthralgias and myalgias.   Skin: Positive for wound.   All other systems reviewed and are negative.        Physical Exam     Patient Vitals for the past 24 hrs:   BP Temp Temp src Pulse Heart Rate Resp SpO2   01/13/20 1928 110/77 98.8  F (37.1  C) Temporal 126 126 20 98 %     Physical Exam    Nursing note and vitals reviewed.    Constitutional: Pleasant and well groomed.          HENT:    Mouth/Throat: Oropharynx is without swelling or erythema. Oral mucosa moist.    Eyes: Baseline patch  over right eye.    Neck: Neck supple. No midline posterior tenderness.   Cardiovascular: Tachycardic rate, regular rhythm and intact distal pulses.    Pulmonary/Chest: Effort normal and breath sounds normal.   Abdominal: Soft.  No distension. There is no tenderness.   Musculoskeletal:  No edema, No calf tenderness- Curvilinear laceration over left shin. Intact dorsi/plantar flexion of left foot.   Neurological:Alert. Baseline per caregivers. Left lower extremity distal light touch sensation.   Skin: Skin is warm and dry.   Psychiatric: Normal mood and affect.         Emergency Department Course     Procedures:    Laceration Repair        LACERATION:  A curvilinear clean 6 cm laceration.      LOCATION:  Left lower extremity      FUNCTION:  Distally sensation, circulation, motor and tendon function are intact.      ANESTHESIA:  Local using Lidocaine 1% with epi total of 7 mLs      PREPARATION:  Irrigation with Normal Saline and Shur Clens. Sharp undermining performed to allow wound margin approximation.       DEBRIDEMENT:  no debridement      CLOSURE:  Wound was undermined and closed with One Layer.  Skin closed with 4.0 Ethylon, 4 horizontal mattress, 8 simple interrupted, 1 vertical mattress.     Interventions:  2226 - Keflex 500 mg PO       Emergency Department Course:  The patient arrived in the emergency department via EMS.     Past medical records, nursing notes, and vitals reviewed.  2039: I performed an exam of the patient and obtained history, as documented above.    2150: I repaired the patient's laceration as noted above. Findings and plan explained to the Patient and caregiver. Patient discharged home with instructions regarding supportive care, medications, and reasons to return. The importance of close follow-up was reviewed.      Impression & Plan     Medical Decision Making:  Geo Hicks is a 19 year old male with a brain mass which has left him unable to care for himself and living in a assisted  living facility who presents with a laceration as noted above.  We know it occurred after 2 PM but the cause is not clear.  Does not appear to be any bony injury the remainder of his exam is without findings concerning for injury.  His mental status is at baseline.  He is ambulating at baseline.  The laceration is high risk for complications due to its location.  Undermining allowed for wound margin approximation.  Antibiotics were initiated due to its high risk for infection.  Recommended 2-day follow-up for wound check either in clinic or with nursing staff and suture removal in 7 to 10 days in clinic.  Understand return with any new worsening symptoms.  Received standard above discharge instructions for laceration management.  Additionally I recommended rest and elevation for the next 24 to 48 hours.      Diagnosis:    ICD-10-CM    1. Laceration of leg, left, initial encounter S81.812A      Disposition:  Discharged to home.    Discharge Medications:  New Prescriptions    CEPHALEXIN (KEFLEX) 500 MG CAPSULE    Take 1 capsule (500 mg) by mouth 3 times daily for 4 days     Scribe Disclosure:  ADDISON, Francis Porter, am serving as a scribe at 8:39 PM on 1/13/2020 to document services personally performed by Ila Lyons MD based on my observations and the provider's statements to me.       Ila Lyons MD  01/14/20 0039

## 2020-01-14 NOTE — DISCHARGE INSTRUCTIONS
Discharge Instructions  Laceration (Cut)    You were seen today for a laceration (cut).  Your provider examined your laceration for any problems such a buried foreign body (like glass, a splinter, or gravel), or injury to blood vessels, tendons, and nerves.  Your provider may have also rinsed and/or scrubbed your laceration to help prevent an infection. It may not be possible to find all problems with your laceration on the first visit; occasionally foreign bodies or a tendon injury can go undetected.    Your laceration may have been closed in one of several ways:  No closure: many wounds will heal just fine without closure.  Stitches: regular stitches that require removal.  Staples: skin staples are often used in the scalp/head.  Wound adhesive (glue): skin glue can be used for certain lacerations and doesn t require removal.  Wound strips (aka Butterfly bandages or steri-strips): these are bandages that help to close a wound.  Absorbable stitches:  dissolving  stitches that go away on their own and usually don t require removal.    A small percentage of wounds will develop an infection regardless of how well the wound is cared for. Antibiotics are generally not indicated to prevent an infection so are only given for a small number of high-risk wounds. Some lacerations are too high risk to close, and are left open to heal because closure can increase the likelihood that an infection will develop.    Remember that all lacerations, no matter how expertly repaired, will cause scarring. We consider many factors, techniques, and materials, in our efforts to provide the best possible cosmetic outcome.    Generally, every Emergency Department visit should have a follow-up clinic visit with either a primary or a specialty clinic/provider. Please follow-up as instructed by your emergency provider today.     Return to the Emergency Department right away if:  You have more redness, swelling, pain, drainage (pus), a bad smell,  or red streaking from your laceration as these symptoms could indicate an infection.  You have a fever of 100.4 F or more.  You have bleeding that you cannot stop at home. If your cut starts to bleed, hold pressure on the bleeding area with a clean cloth or put pressure over the bandage.  If the bleeding does not stop after using constant pressure for 30 minutes, you should return to the Emergency Department for further treatment.  An area past the laceration is cool, pale, or blue compared with the other side, or has a slower return of color when squeezed.  Your dressing seems too tight or starts to get uncomfortable or painful. For children, signs of a problem might be irritability or restlessness.  You have loss of normal function or use of an area, such as being unable to straighten or bend a finger normally.  You have a numb area past the laceration.    Return to the Emergency Department or see your regular provider if:  The laceration starts to come open.   You have something coming out of the cut or a feeling that there is something in the laceration.  Your wound will not heal, or keeps breaking open. There can always be glass, wood, dirt or other things in any wound.  They will not always show up, even on x-rays.  If a wound does not heal, this may be why, and it is important to follow-up with your regular provider.    Home Care:  Take your dressing off in 12-24 hours, or as instructed by your provider, to check your laceration. Remove the dressing sooner if it seems too tight or painful, or if it is getting numb, tingly, or pale past the dressing.  Gently wash your laceration 1-2 times daily with clean water and mild soap. It is okay to shower or run clean water over the laceration, but do not let the laceration soak in water (no swimming).  If your laceration was closed with wound adhesive or strips: pat it dry and leave it open to the air. For all other repairs: after you wash your laceration, or at least  2 times a day, apply antibiotic ointment (such as Neosporin  or Bacitracin ) to the laceration, then cover it with a Band-Aid  or gauze.  Keep the laceration clean. Wear gloves or other protective clothing if you are around dirt.    Follow-up for removal:  If your wound was closed with staples or regular stitches, they need to be removed according to the instructions and timeline specified by your provider today.  If your wound was closed with absorbable ( dissolving ) sutures, they should fall out, dissolve, or not be visible in about one week. If they are still visible, then they should be removed according to the instructions and timeline specified by your provider today.    Scars:  To help minimize scarring:  Wear sunscreen over the healed laceration when out in the sun.  Massage the area regularly once healed.  You may apply Vitamin E to the healed wound.  Wait. Scars improve in appearance over months and years.    If you were given a prescription for medicine here today, be sure to read all of the information (including the package insert) that comes with your prescription.  This will include important information about the medicine, its side effects, and any warnings that you need to know about.  The pharmacist who fills the prescription can provide more information and answer questions you may have about the medicine.  If you have questions or concerns that the pharmacist cannot address, please call or return to the Emergency Department.       Remember that you can always come back to the Emergency Department if you are not able to see your regular provider in the amount of time listed above, if you get any new symptoms, or if there is anything that worries you.    Keep wound elevated. Limit walking for the next 2 days.

## 2020-01-14 NOTE — LETTER
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 He is enrolled on COG [id number] - A Phase 1 Study of Entinostat, an Oral Histone Deacetylase Inhibitor, in Pediatric Patients With Recurrent or Refractory Solid Tumors, Including CNS Tumors and Lymphoma.     HPI: Geo is in a program with a [employer] in [place] where they do some RC racing. Geo likes his new place. Geo wants to be able to walk and does no longer want his constipation. Geo he requests some help with this issue. He had last stool on Saturday evening.  He had hard balls of stool - about three of them. He was seen in the ER for stitches to his LLE yesterday.  He had a laceration which was found on Saturday night by one of his PCA.  Geo doesn't know how it happened.      Fam/Soc: Lives at a transitional home. His parents are  but have been awarded guardianship of Geo. The families work well together - both parents have remarried. His dad has a clotting disorder, requiring him to take Warfarin daily.     History was obtained from Geo and his father.       Allergies   Allergen Reactions     Blood Transfusion Related (Informational Only) Swelling     Periorbital swelling post platelet transfusion     No Known Drug Allergies        Current Outpatient Medications   Medication     amitriptyline (ELAVIL) 25 MG tablet     Calcium-Vitamin D-Vitamin K (VIACTIV CALCIUM PLUS D) 650-12.5-40 MG-MCG-MCG CHEW     cephALEXin (KEFLEX) 500 MG capsule     dexamethasone (DECADRON) 0.5 MG tablet     fexofenadine (ALLEGRA) 180 MG tablet     linaclotide (LINZESS) 290 MCG capsule     mupirocin (BACTROBAN) 2 % external ointment     OLANZapine (ZYPREXA) 10 MG tablet     OLANZapine (ZYPREXA) 20 MG tablet     OLANZapine (ZYPREXA) 20 MG tablet     OLANZapine (ZYPREXA)  5 MG tablet     omeprazole (PRILOSEC) 20 MG DR capsule     order for DME     polyethylene glycol (MIRALAX/GLYCOLAX) powder     potassium phosphate, monobasic, (K-PHOS) 500 MG tablet     senna-docusate (SENOKOT-S/PERICOLACE) 8.6-50 MG tablet     sertraline (ZOLOFT) 100 MG tablet     sulfamethoxazole-trimethoprim (BACTRIM/SEPTRA) 400-80 MG tablet     vitamin D3 (CHOLECALCIFEROL) 2000 units (50 mcg) tablet     vitamin E (TOCOPHEROL) 400 units (360 mg) capsule     No current facility-administered medications for this visit.        Past Medical History:   Diagnosis Date     Cranial nerve dysfunction      Dyspepsia      Ependymoma (H)      Gastro-oesophageal reflux disease      Hearing loss      Intracranial hemorrhage (H)      Migraine      Pilonidal cyst     7-2015     Reduced vision      Refractory obstruction of nasal airway     2nd to nasal valve prolapse     Sleep apnea      Strabismus     gaze palsy        Past Surgical History:   Procedure Laterality Date     COLONOSCOPY N/A 9/27/2019    Procedure: Colonoscopy With Biopsies and Polypectomy;  Surgeon: Aniya Wei MD;  [facility]     ESOPHAGOSCOPY, GASTROSCOPY, DUODENOSCOPY (EGD), COMBINED N/A 9/27/2019    Procedure: Upper Endoscopy (EGD) With Biopsies;  Surgeon: Aniya Wei MD;  [facility]     GRAFT CARTILAGE FROM POSTERIOR AURICLE Left 10/6/2016    Procedure: GRAFT CARTILAGE FROM POSTERIOR AURICLE;  Surgeon: Tyler Richards MD;  [facility]     INCISION AND DRAINAGE PERINEAL, COMBINED Bilateral 7/18/2015    Procedure: COMBINED INCISION AND DRAINAGE PERINEAL;  Surgeon: Dequan Timmons MD;  [facility]     OPTICAL TRACKING SYSTEM CRANIOTOMY, EXCISE TUMOR, COMBINED N/A 4/13/2015    Procedure: COMBINED OPTICAL TRACKING SYSTEM CRANIOTOMY, EXCISE TUMOR;  Surgeon: Francis Velazquez MD;  [facility]     OPTICAL TRACKING SYSTEM CRANIOTOMY, EXCISE TUMOR, COMBINED N/A 4/16/2015    Procedure:  "COMBINED OPTICAL TRACKING SYSTEM CRANIOTOMY, EXCISE TUMOR;  Surgeon: Francis Velazquez MD;  [facility]     OPTICAL TRACKING SYSTEM CRANIOTOMY, EXCISE TUMOR, COMBINED Bilateral 5/28/2015    Procedure: COMBINED OPTICAL TRACKING SYSTEM CRANIOTOMY, EXCISE TUMOR;  Surgeon: Francis Velazquez MD;  [facility]     OPTICAL TRACKING SYSTEM CRANIOTOMY, EXCISE TUMOR, COMBINED Bilateral 1/14/2016    Procedure: COMBINED OPTICAL TRACKING SYSTEM CRANIOTOMY, EXCISE TUMOR;  Surgeon: Francis Velazquez MD;  [facility]     OPTICAL TRACKING SYSTEM VENTRICULOSTOMY  4/16/2015    Procedure: OPTICAL TRACKING SYSTEM VENTRICULOSTOMY;  Surgeon: Francis Velazquez MD;  [facility]     REMOVE PORT VASCULAR ACCESS N/A 10/6/2016    Procedure: REMOVE PORT VASCULAR ACCESS;  Surgeon: Bruno Perea MD;  [facility]     RHINOPLASTY N/A 10/6/2016    Procedure: RHINOPLASTY;  Surgeon: Tyler Richards MD;  [facility]     VASCULAR SURGERY  5-2015    single lumen power port       Family History   Problem Relation Age of Onset     Circulatory Father         PE/DVT     Hypothyroidism Father 30     Diabetes Maternal Grandmother      Diabetes Paternal Grandmother      Diabetes Paternal Grandfather      C.A.D. Paternal Grandfather      Hypertension Maternal Grandfather      Thyroid Disease Paternal Aunt         unknown whether hypo or hyper     Mental Illness No family hx of        Review of Systems   Constitutional:        In wheelchair   HENT: Positive for drooling. Negative for nosebleeds.    Eyes:        Patching for diplopia   Gastrointestinal: Positive for constipation.   Neurological: Positive for tremors, speech difficulty and weakness.     Vital signs:  /76   Pulse 114   Temp 97.7  F (36.5  C) (Oral)   Resp 22   Ht 1.786 m (5' 10.32\")   Wt 90.5 kg (199 lb 8.3 oz)   SpO2 97%   BMI 28.37 kg/m        Physical Exam  HENT:      Ears:      Comments: Small excoriation inside each canal " with minimal bleeding.   Cardiovascular:      Rate and Rhythm: Regular rhythm.      Heart sounds: No murmur.   Pulmonary:      Effort: Pulmonary effort is normal.      Breath sounds: Normal breath sounds.   Abdominal:      Comments: Full   Musculoskeletal:         General: Signs of injury present.      Comments: (See Pictures)  Mild swelling and erythema surrounding the stitched wound.  No tenderness or drainage.  Wound edges well approximated.    Skin:     Findings: Bruising (Lower legs) present.      Comments: Skin tear on right lower extremity that is dressed.  Wound LLE.   Neurological:      Cranial Nerves: Cranial nerve deficit present.      Motor: Weakness present.      Coordination: Coordination abnormal.      Gait: Gait abnormal.               Before          After 11 stitches      Labs:   Results for orders placed or performed in visit on 01/14/20   Phosphorus     Status: Abnormal   Result Value Ref Range    Phosphorus 5.0 (H) 2.5 - 4.5 mg/dL   Magnesium     Status: Abnormal   Result Value Ref Range    Magnesium 2.4 (H) 1.6 - 2.3 mg/dL   Comprehensive metabolic panel     Status: Abnormal   Result Value Ref Range    Sodium 143 133 - 144 mmol/L    Potassium 4.3 3.4 - 5.3 mmol/L    Chloride 109 94 - 109 mmol/L    Carbon Dioxide 32 20 - 32 mmol/L    Anion Gap 2 (L) 3 - 14 mmol/L    Glucose 107 (H) 70 - 99 mg/dL    Urea Nitrogen 22 7 - 30 mg/dL    Creatinine 1.57 (H) 0.66 - 1.25 mg/dL    GFR Estimate 62 >60 mL/min/[1.73_m2]    GFR Estimate If Black 72 >60 mL/min/[1.73_m2]    Calcium 9.4 8.5 - 10.1 mg/dL    Bilirubin Total 0.3 0.2 - 1.3 mg/dL    Albumin 3.4 3.4 - 5.0 g/dL    Protein Total 7.0 6.8 - 8.8 g/dL    Alkaline Phosphatase 147 40 - 150 U/L    ALT 20 0 - 70 U/L    AST 27 0 - 45 U/L   CBC with platelets differential     Status: Abnormal   Result Value Ref Range    WBC 6.8 4.0 - 11.0 10e9/L    RBC Count 5.14 4.4 - 5.9 10e12/L    Hemoglobin 14.3 13.3 - 17.7 g/dL    Hematocrit 44.7 40.0 - 53.0 %    MCV 87 78 -  100 fl    MCH 27.8 26.5 - 33.0 pg    MCHC 32.0 31.5 - 36.5 g/dL    RDW 15.0 10.0 - 15.0 %    Platelet Count 91 (L) 150 - 450 10e9/L    Diff Method Automated Method     % Neutrophils 69.5 %    % Lymphocytes 10.5 %    % Monocytes 18.9 %    % Eosinophils 0.4 %    % Basophils 0.4 %    % Immature Granulocytes 0.3 %    Nucleated RBCs 0 0 /100    Absolute Neutrophil 4.7 1.6 - 8.3 10e9/L    Absolute Lymphocytes 0.7 (L) 0.8 - 5.3 10e9/L    Absolute Monocytes 1.3 0.0 - 1.3 10e9/L    Absolute Eosinophils 0.0 0.0 - 0.7 10e9/L    Absolute Basophils 0.0 0.0 - 0.2 10e9/L    Abs Immature Granulocytes 0.0 0 - 0.4 10e9/L    Absolute Nucleated RBC 0.0      Impression:  1. Ependymoma.  2. MRI 1/7/2020: No metastases, primary tumor stable compared to recent studies.  3. 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    Plan:  1. No Entinostat today.  RTC in 1 week to recheck blood work and be evaluated, or sooner PRN.  2. Constipation discussed.  3. He should have his wound evaluated in 2 days by primary care and stitches removed in 7-10 days.  4. Bactroban to ear canals.   5. Check bleeding studies today due to these injuries and increased bruising.  6. Encourage good hydration today.  7. Paper work filled out and plan of care discussed with his facility.  Kristi Schuler, ALAN CNP

## 2020-01-14 NOTE — LETTER
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 He is enrolled on COG [id number] - A Phase 1 Study of Entinostat, an Oral Histone Deacetylase Inhibitor, in Pediatric Patients With Recurrent or Refractory Solid Tumors, Including CNS Tumors and Lymphoma.     HPI: Geo is in a program with a [employer] in [place] where they do some RC racing. Geo likes his new place. Geo wants to be able to walk and does no longer want his constipation. Geo he requests some help with this issue. He had last stool on Saturday evening.  He had hard balls of stool - about three of them. He was seen in the ER for stitches to his LLE yesterday.  He had a laceration which was found on Saturday night by one of his PCA.  Geo doesn't know how it happened.      Fam/Soc: Lives at a transitional home. His parents are  but have been awarded guardianship of Geo. The families work well together - both parents have remarried. His dad has a clotting disorder, requiring him to take Warfarin daily.     History was obtained from Geo and his father.       Allergies   Allergen Reactions     Blood Transfusion Related (Informational Only) Swelling     Periorbital swelling post platelet transfusion     No Known Drug Allergies        Current Outpatient Medications   Medication     amitriptyline (ELAVIL) 25 MG tablet     Calcium-Vitamin D-Vitamin K (VIACTIV CALCIUM PLUS D) 650-12.5-40 MG-MCG-MCG CHEW     cephALEXin (KEFLEX) 500 MG capsule     dexamethasone (DECADRON) 0.5 MG tablet     fexofenadine (ALLEGRA) 180 MG tablet     linaclotide (LINZESS) 290 MCG capsule     mupirocin (BACTROBAN) 2 % external ointment     OLANZapine (ZYPREXA) 10 MG tablet     OLANZapine (ZYPREXA) 20 MG tablet     OLANZapine (ZYPREXA) 20 MG tablet     OLANZapine  (ZYPREXA) 5 MG tablet     omeprazole (PRILOSEC) 20 MG DR capsule     order for DME     polyethylene glycol (MIRALAX/GLYCOLAX) powder     potassium phosphate, monobasic, (K-PHOS) 500 MG tablet     senna-docusate (SENOKOT-S/PERICOLACE) 8.6-50 MG tablet     sertraline (ZOLOFT) 100 MG tablet     sulfamethoxazole-trimethoprim (BACTRIM/SEPTRA) 400-80 MG tablet     vitamin D3 (CHOLECALCIFEROL) 2000 units (50 mcg) tablet     vitamin E (TOCOPHEROL) 400 units (360 mg) capsule     No current facility-administered medications for this visit.        Past Medical History:   Diagnosis Date     Cranial nerve dysfunction      Dyspepsia      Ependymoma (H)      Gastro-oesophageal reflux disease      Hearing loss      Intracranial hemorrhage (H)      Migraine      Pilonidal cyst     7-2015     Reduced vision      Refractory obstruction of nasal airway     2nd to nasal valve prolapse     Sleep apnea      Strabismus     gaze palsy        Past Surgical History:   Procedure Laterality Date     COLONOSCOPY N/A 9/27/2019    Procedure: Colonoscopy With Biopsies and Polypectomy;  Surgeon: Aniya Wei MD;  [facility]     ESOPHAGOSCOPY, GASTROSCOPY, DUODENOSCOPY (EGD), COMBINED N/A 9/27/2019    Procedure: Upper Endoscopy (EGD) With Biopsies;  Surgeon: Aniya Wei MD;  [facility]     GRAFT CARTILAGE FROM POSTERIOR AURICLE Left 10/6/2016    Procedure: GRAFT CARTILAGE FROM POSTERIOR AURICLE;  Surgeon: Tyler Richards MD;  [facility]     INCISION AND DRAINAGE PERINEAL, COMBINED Bilateral 7/18/2015    Procedure: COMBINED INCISION AND DRAINAGE PERINEAL;  Surgeon: Dequan Timmons MD;  [facility]     OPTICAL TRACKING SYSTEM CRANIOTOMY, EXCISE TUMOR, COMBINED N/A 4/13/2015    Procedure: COMBINED OPTICAL TRACKING SYSTEM CRANIOTOMY, EXCISE TUMOR;  Surgeon: Francis Velazquez MD;  [facility]     OPTICAL TRACKING SYSTEM CRANIOTOMY, EXCISE TUMOR, COMBINED N/A 4/16/2015     "Procedure: COMBINED OPTICAL TRACKING SYSTEM CRANIOTOMY, EXCISE TUMOR;  Surgeon: Francis Velazquez MD;  [facility]     OPTICAL TRACKING SYSTEM CRANIOTOMY, EXCISE TUMOR, COMBINED Bilateral 5/28/2015    Procedure: COMBINED OPTICAL TRACKING SYSTEM CRANIOTOMY, EXCISE TUMOR;  Surgeon: Francis Velazquez MD;  [facility]     OPTICAL TRACKING SYSTEM CRANIOTOMY, EXCISE TUMOR, COMBINED Bilateral 1/14/2016    Procedure: COMBINED OPTICAL TRACKING SYSTEM CRANIOTOMY, EXCISE TUMOR;  Surgeon: Francis Velazquez MD;  [facility]     OPTICAL TRACKING SYSTEM VENTRICULOSTOMY  4/16/2015    Procedure: OPTICAL TRACKING SYSTEM VENTRICULOSTOMY;  Surgeon: Francis Velazquez MD;  [facility]     REMOVE PORT VASCULAR ACCESS N/A 10/6/2016    Procedure: REMOVE PORT VASCULAR ACCESS;  Surgeon: Bruno Perea MD;  [facility]     RHINOPLASTY N/A 10/6/2016    Procedure: RHINOPLASTY;  Surgeon: Tyler Richards MD;  [facility]     VASCULAR SURGERY  5-2015    single lumen power port       Family History   Problem Relation Age of Onset     Circulatory Father         PE/DVT     Hypothyroidism Father 30     Diabetes Maternal Grandmother      Diabetes Paternal Grandmother      Diabetes Paternal Grandfather      C.A.D. Paternal Grandfather      Hypertension Maternal Grandfather      Thyroid Disease Paternal Aunt         unknown whether hypo or hyper     Mental Illness No family hx of        Review of Systems   Constitutional:        In wheelchair   HENT: Positive for drooling. Negative for nosebleeds.    Eyes:        Patching for diplopia   Gastrointestinal: Positive for constipation.   Neurological: Positive for tremors, speech difficulty and weakness.     Vital signs:  /76   Pulse 114   Temp 97.7  F (36.5  C) (Oral)   Resp 22   Ht 1.786 m (5' 10.32\")   Wt 90.5 kg (199 lb 8.3 oz)   SpO2 97%   BMI 28.37 kg/m        Physical Exam  HENT:      Ears:      Comments: Small excoriation inside " each canal with minimal bleeding.   Cardiovascular:      Rate and Rhythm: Regular rhythm.      Heart sounds: No murmur.   Pulmonary:      Effort: Pulmonary effort is normal.      Breath sounds: Normal breath sounds.   Abdominal:      Comments: Full   Musculoskeletal:         General: Signs of injury present.      Comments: (See Pictures)  Mild swelling and erythema surrounding the stitched wound.  No tenderness or drainage.  Wound edges well approximated.    Skin:     Findings: Bruising (Lower legs) present.      Comments: Skin tear on right lower extremity that is dressed.  Wound LLE.   Neurological:      Cranial Nerves: Cranial nerve deficit present.      Motor: Weakness present.      Coordination: Coordination abnormal.      Gait: Gait abnormal.               Before          After 11 stitches      Labs:   Results for orders placed or performed in visit on 01/14/20   Phosphorus     Status: Abnormal   Result Value Ref Range    Phosphorus 5.0 (H) 2.5 - 4.5 mg/dL   Magnesium     Status: Abnormal   Result Value Ref Range    Magnesium 2.4 (H) 1.6 - 2.3 mg/dL   Comprehensive metabolic panel     Status: Abnormal   Result Value Ref Range    Sodium 143 133 - 144 mmol/L    Potassium 4.3 3.4 - 5.3 mmol/L    Chloride 109 94 - 109 mmol/L    Carbon Dioxide 32 20 - 32 mmol/L    Anion Gap 2 (L) 3 - 14 mmol/L    Glucose 107 (H) 70 - 99 mg/dL    Urea Nitrogen 22 7 - 30 mg/dL    Creatinine 1.57 (H) 0.66 - 1.25 mg/dL    GFR Estimate 62 >60 mL/min/[1.73_m2]    GFR Estimate If Black 72 >60 mL/min/[1.73_m2]    Calcium 9.4 8.5 - 10.1 mg/dL    Bilirubin Total 0.3 0.2 - 1.3 mg/dL    Albumin 3.4 3.4 - 5.0 g/dL    Protein Total 7.0 6.8 - 8.8 g/dL    Alkaline Phosphatase 147 40 - 150 U/L    ALT 20 0 - 70 U/L    AST 27 0 - 45 U/L   CBC with platelets differential     Status: Abnormal   Result Value Ref Range    WBC 6.8 4.0 - 11.0 10e9/L    RBC Count 5.14 4.4 - 5.9 10e12/L    Hemoglobin 14.3 13.3 - 17.7 g/dL    Hematocrit 44.7 40.0 - 53.0 %     MCV 87 78 - 100 fl    MCH 27.8 26.5 - 33.0 pg    MCHC 32.0 31.5 - 36.5 g/dL    RDW 15.0 10.0 - 15.0 %    Platelet Count 91 (L) 150 - 450 10e9/L    Diff Method Automated Method     % Neutrophils 69.5 %    % Lymphocytes 10.5 %    % Monocytes 18.9 %    % Eosinophils 0.4 %    % Basophils 0.4 %    % Immature Granulocytes 0.3 %    Nucleated RBCs 0 0 /100    Absolute Neutrophil 4.7 1.6 - 8.3 10e9/L    Absolute Lymphocytes 0.7 (L) 0.8 - 5.3 10e9/L    Absolute Monocytes 1.3 0.0 - 1.3 10e9/L    Absolute Eosinophils 0.0 0.0 - 0.7 10e9/L    Absolute Basophils 0.0 0.0 - 0.2 10e9/L    Abs Immature Granulocytes 0.0 0 - 0.4 10e9/L    Absolute Nucleated RBC 0.0      Impression:  1. Ependymoma.  2. MRI 1/7/2020: No metastases, primary tumor stable compared to recent studies.  3. 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    Plan:  1. No Entinostat today.  RTC in 1 week to recheck blood work and be evaluated, or sooner PRN.  2. Constipation discussed.  3. He should have his wound evaluated in 2 days by primary care and stitches removed in 7-10 days.  4. Bactroban to ear canals.   5. Check bleeding studies today due to these injuries and increased bruising.  6. Encourage good hydration today.  7. Paper work filled out and plan of care discussed with his facility.            Kristi Shculer, ALAN CNP

## 2020-01-14 NOTE — ED NOTES
VSS, Pt verbalized understanding of discharge instructions and ambulated to lobby with steady gait.

## 2020-01-14 NOTE — PATIENT INSTRUCTIONS
Please see your primary care in 2 days regarding your wound (instead of tomorrow).     Begin daily:   senna-docusate (SENOKOT-S/PERICOLACE) 8.6-50 MG tablet 30 tablet 4 1/14/2020  --   Sig - Route: Take 1 tablet by mouth daily And please report to team at [facility] in no stool in 36 hours.     Use in addition to current medications.    Continue Elavil.    Both scripts sent to [facility].    The inside of canals are irritated please use PRN Bactroban twice daily until healed.    Do not start Entinostat - return next week to recheck blood counts.    Continue to encourage fluid.  Geo's creatinine is slightly elevated today

## 2020-01-14 NOTE — NURSING NOTE
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

## 2020-01-14 NOTE — PROGRESS NOTES
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 He is enrolled on COG [id number] - A Phase 1 Study of Entinostat, an Oral Histone Deacetylase Inhibitor, in Pediatric Patients With Recurrent or Refractory Solid Tumors, Including CNS Tumors and Lymphoma.     HPI: Geo is in a program with a [employer] in [place] where they do some RC racing. Geo likes his new place. Geo wants to be able to walk and does no longer want his constipation. Geo he requests some help with this issue. He had last stool on Saturday evening.  He had hard balls of stool - about three of them. He was seen in the ER for stitches to his LLE yesterday.  He had a laceration which was found on Saturday night by one of his PCA.  Geo doesn't know how it happened.      Fam/Soc: Lives at a transitional home. His parents are  but have been awarded guardianship of Geo. The families work well together - both parents have remarried. His dad has a clotting disorder, requiring him to take Warfarin daily.     History was obtained from Geo and his father.       Allergies   Allergen Reactions     Blood Transfusion Related (Informational Only) Swelling     Periorbital swelling post platelet transfusion     No Known Drug Allergies        Current Outpatient Medications   Medication     amitriptyline (ELAVIL) 25 MG tablet     Calcium-Vitamin D-Vitamin K (VIACTIV CALCIUM PLUS D) 650-12.5-40 MG-MCG-MCG CHEW     cephALEXin (KEFLEX) 500 MG capsule     dexamethasone (DECADRON) 0.5 MG tablet     fexofenadine (ALLEGRA) 180 MG tablet     linaclotide (LINZESS) 290 MCG capsule     mupirocin (BACTROBAN) 2 % external ointment     OLANZapine (ZYPREXA) 10 MG tablet     OLANZapine (ZYPREXA) 20 MG tablet     OLANZapine (ZYPREXA) 20 MG tablet     OLANZapine (ZYPREXA) 5 MG tablet     omeprazole (PRILOSEC) 20 MG DR capsule     order for DME      polyethylene glycol (MIRALAX/GLYCOLAX) powder     potassium phosphate, monobasic, (K-PHOS) 500 MG tablet     senna-docusate (SENOKOT-S/PERICOLACE) 8.6-50 MG tablet     sertraline (ZOLOFT) 100 MG tablet     sulfamethoxazole-trimethoprim (BACTRIM/SEPTRA) 400-80 MG tablet     vitamin D3 (CHOLECALCIFEROL) 2000 units (50 mcg) tablet     vitamin E (TOCOPHEROL) 400 units (360 mg) capsule     No current facility-administered medications for this visit.        Past Medical History:   Diagnosis Date     Cranial nerve dysfunction      Dyspepsia      Ependymoma (H)      Gastro-oesophageal reflux disease      Hearing loss      Intracranial hemorrhage (H)      Migraine      Pilonidal cyst     7-2015     Reduced vision      Refractory obstruction of nasal airway     2nd to nasal valve prolapse     Sleep apnea      Strabismus     gaze palsy        Past Surgical History:   Procedure Laterality Date     COLONOSCOPY N/A 9/27/2019    Procedure: Colonoscopy With Biopsies and Polypectomy;  Surgeon: Aniya Wei MD;  [facility]     ESOPHAGOSCOPY, GASTROSCOPY, DUODENOSCOPY (EGD), COMBINED N/A 9/27/2019    Procedure: Upper Endoscopy (EGD) With Biopsies;  Surgeon: Aniya Wei MD;  [facility]     GRAFT CARTILAGE FROM POSTERIOR AURICLE Left 10/6/2016    Procedure: GRAFT CARTILAGE FROM POSTERIOR AURICLE;  Surgeon: Tyler Richards MD;  [facility]     INCISION AND DRAINAGE PERINEAL, COMBINED Bilateral 7/18/2015    Procedure: COMBINED INCISION AND DRAINAGE PERINEAL;  Surgeon: Dequan Timmons MD;  [facility]     OPTICAL TRACKING SYSTEM CRANIOTOMY, EXCISE TUMOR, COMBINED N/A 4/13/2015    Procedure: COMBINED OPTICAL TRACKING SYSTEM CRANIOTOMY, EXCISE TUMOR;  Surgeon: Francis Velazquez MD;  [facility]     OPTICAL TRACKING SYSTEM CRANIOTOMY, EXCISE TUMOR, COMBINED N/A 4/16/2015    Procedure: COMBINED OPTICAL TRACKING SYSTEM CRANIOTOMY, EXCISE TUMOR;  Surgeon:  "Francis Velazquez MD;  [facility]     OPTICAL TRACKING SYSTEM CRANIOTOMY, EXCISE TUMOR, COMBINED Bilateral 5/28/2015    Procedure: COMBINED OPTICAL TRACKING SYSTEM CRANIOTOMY, EXCISE TUMOR;  Surgeon: Francis Velazquez MD;  [facility]     OPTICAL TRACKING SYSTEM CRANIOTOMY, EXCISE TUMOR, COMBINED Bilateral 1/14/2016    Procedure: COMBINED OPTICAL TRACKING SYSTEM CRANIOTOMY, EXCISE TUMOR;  Surgeon: Francis Velazquez MD;  [facility]     OPTICAL TRACKING SYSTEM VENTRICULOSTOMY  4/16/2015    Procedure: OPTICAL TRACKING SYSTEM VENTRICULOSTOMY;  Surgeon: Francis Velazquez MD;  [facility]     REMOVE PORT VASCULAR ACCESS N/A 10/6/2016    Procedure: REMOVE PORT VASCULAR ACCESS;  Surgeon: Bruno Perea MD;  [facility]     RHINOPLASTY N/A 10/6/2016    Procedure: RHINOPLASTY;  Surgeon: Tyler Richards MD;  [facility]     VASCULAR SURGERY  5-2015    single lumen power port       Family History   Problem Relation Age of Onset     Circulatory Father         PE/DVT     Hypothyroidism Father 30     Diabetes Maternal Grandmother      Diabetes Paternal Grandmother      Diabetes Paternal Grandfather      C.A.D. Paternal Grandfather      Hypertension Maternal Grandfather      Thyroid Disease Paternal Aunt         unknown whether hypo or hyper     Mental Illness No family hx of        Review of Systems   Constitutional:        In wheelchair   HENT: Positive for drooling. Negative for nosebleeds.    Eyes:        Patching for diplopia   Gastrointestinal: Positive for constipation.   Neurological: Positive for tremors, speech difficulty and weakness.     Vital signs:  /76   Pulse 114   Temp 97.7  F (36.5  C) (Oral)   Resp 22   Ht 1.786 m (5' 10.32\")   Wt 90.5 kg (199 lb 8.3 oz)   SpO2 97%   BMI 28.37 kg/m       Physical Exam  HENT:      Ears:      Comments: Small excoriation inside each canal with minimal bleeding.   Cardiovascular:      Rate and Rhythm: " Regular rhythm.      Heart sounds: No murmur.   Pulmonary:      Effort: Pulmonary effort is normal.      Breath sounds: Normal breath sounds.   Abdominal:      Comments: Full   Musculoskeletal:         General: Signs of injury present.      Comments: (See Pictures)  Mild swelling and erythema surrounding the stitched wound.  No tenderness or drainage.  Wound edges well approximated.    Skin:     Findings: Bruising (Lower legs) present.      Comments: Skin tear on right lower extremity that is dressed.  Wound LLE.   Neurological:      Cranial Nerves: Cranial nerve deficit present.      Motor: Weakness present.      Coordination: Coordination abnormal.      Gait: Gait abnormal.               Before          After 11 stitches      Labs:   Results for orders placed or performed in visit on 01/14/20   Phosphorus     Status: Abnormal   Result Value Ref Range    Phosphorus 5.0 (H) 2.5 - 4.5 mg/dL   Magnesium     Status: Abnormal   Result Value Ref Range    Magnesium 2.4 (H) 1.6 - 2.3 mg/dL   Comprehensive metabolic panel     Status: Abnormal   Result Value Ref Range    Sodium 143 133 - 144 mmol/L    Potassium 4.3 3.4 - 5.3 mmol/L    Chloride 109 94 - 109 mmol/L    Carbon Dioxide 32 20 - 32 mmol/L    Anion Gap 2 (L) 3 - 14 mmol/L    Glucose 107 (H) 70 - 99 mg/dL    Urea Nitrogen 22 7 - 30 mg/dL    Creatinine 1.57 (H) 0.66 - 1.25 mg/dL    GFR Estimate 62 >60 mL/min/[1.73_m2]    GFR Estimate If Black 72 >60 mL/min/[1.73_m2]    Calcium 9.4 8.5 - 10.1 mg/dL    Bilirubin Total 0.3 0.2 - 1.3 mg/dL    Albumin 3.4 3.4 - 5.0 g/dL    Protein Total 7.0 6.8 - 8.8 g/dL    Alkaline Phosphatase 147 40 - 150 U/L    ALT 20 0 - 70 U/L    AST 27 0 - 45 U/L   CBC with platelets differential     Status: Abnormal   Result Value Ref Range    WBC 6.8 4.0 - 11.0 10e9/L    RBC Count 5.14 4.4 - 5.9 10e12/L    Hemoglobin 14.3 13.3 - 17.7 g/dL    Hematocrit 44.7 40.0 - 53.0 %    MCV 87 78 - 100 fl    MCH 27.8 26.5 - 33.0 pg    MCHC 32.0 31.5 - 36.5  g/dL    RDW 15.0 10.0 - 15.0 %    Platelet Count 91 (L) 150 - 450 10e9/L    Diff Method Automated Method     % Neutrophils 69.5 %    % Lymphocytes 10.5 %    % Monocytes 18.9 %    % Eosinophils 0.4 %    % Basophils 0.4 %    % Immature Granulocytes 0.3 %    Nucleated RBCs 0 0 /100    Absolute Neutrophil 4.7 1.6 - 8.3 10e9/L    Absolute Lymphocytes 0.7 (L) 0.8 - 5.3 10e9/L    Absolute Monocytes 1.3 0.0 - 1.3 10e9/L    Absolute Eosinophils 0.0 0.0 - 0.7 10e9/L    Absolute Basophils 0.0 0.0 - 0.2 10e9/L    Abs Immature Granulocytes 0.0 0 - 0.4 10e9/L    Absolute Nucleated RBC 0.0      Impression:  1. Ependymoma.  2. MRI 1/7/2020: No metastases, primary tumor stable compared to recent studies.  3. 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    Plan:  1. No Entinostat today.  RTC in 1 week to recheck blood work and be evaluated, or sooner PRN.  2. Constipation discussed.  3. He should have his wound evaluated in 2 days by primary care and stitches removed in 7-10 days.  4. Bactroban to ear canals.   5. Check bleeding studies today due to these injuries and increased bruising.  6. Encourage good hydration today.  7. Paper work filled out and plan of care discussed with his facility.

## 2020-01-14 NOTE — ED TRIAGE NOTES
Here with staff of laceration noted to left lower leg. Unknown injury, but staff found patient in the bed with large laceration to left lower leg, does have history of crawling out of bed. Staff dry blood on the floor. History of low platelet due to chemotherapy medications treating brain tumor. ABCs intact.

## 2020-01-15 LAB
FACT VIII ACT/NOR PPP: 335 % (ref 55–200)
VWF CBA/VWF AG PPP IA-RTO: 245 % (ref 50–200)
VWF:AC ACT/NOR PPP IA: 329 % (ref 50–180)

## 2020-01-16 ENCOUNTER — TELEPHONE (OUTPATIENT)
Dept: FAMILY MEDICINE | Facility: CLINIC | Age: 20
End: 2020-01-16

## 2020-01-16 LAB
VON WILLEBRAND INTERPRETATION: NORMAL
VWF MULTIMERS PPP QL: NORMAL

## 2020-01-16 NOTE — TELEPHONE ENCOUNTER
Pt has appointment at [facility] on 01/17/2020.    Sarah Almonte/JOSEFINA]

## 2020-01-16 NOTE — TELEPHONE ENCOUNTER
Called Ox[facility], [phone]. Oxana stated that pt. Needs to be seen in follow up tomorrow. Dr Espinoza is full, she was wondering if they should take him back to er then-sent to triage to discuss.    Sarah Almonte/JOSEFINA

## 2020-01-17 ENCOUNTER — TELEPHONE (OUTPATIENT)
Dept: INTERNAL MEDICINE | Facility: CLINIC | Age: 20
End: 2020-01-17

## 2020-01-17 ENCOUNTER — OFFICE VISIT (OUTPATIENT)
Dept: INTERNAL MEDICINE | Facility: CLINIC | Age: 20
End: 2020-01-17
Payer: COMMERCIAL

## 2020-01-17 VITALS
OXYGEN SATURATION: 94 % | BODY MASS INDEX: 28.49 KG/M2 | TEMPERATURE: 97.3 F | WEIGHT: 199 LBS | HEIGHT: 70 IN | DIASTOLIC BLOOD PRESSURE: 62 MMHG | RESPIRATION RATE: 18 BRPM | SYSTOLIC BLOOD PRESSURE: 118 MMHG | HEART RATE: 114 BPM

## 2020-01-17 DIAGNOSIS — S81.012D LACERATION OF SKIN OF LEFT KNEE, SUBSEQUENT ENCOUNTER: ICD-10-CM

## 2020-01-17 DIAGNOSIS — Z09 HOSPITAL DISCHARGE FOLLOW-UP: Primary | ICD-10-CM

## 2020-01-17 DIAGNOSIS — C71.9 EPENDYMOMA (H): ICD-10-CM

## 2020-01-17 DIAGNOSIS — S81.812D LACERATION OF LEFT LOWER EXTREMITY, SUBSEQUENT ENCOUNTER: Primary | ICD-10-CM

## 2020-01-17 PROCEDURE — 99213 OFFICE O/P EST LOW 20 MIN: CPT | Performed by: INTERNAL MEDICINE

## 2020-01-17 ASSESSMENT — MIFFLIN-ST. JEOR: SCORE: 1923.67

## 2020-01-17 NOTE — PATIENT INSTRUCTIONS
Continue antibiotic   Monitor wound for spreading of redness.   Come back for stitches removal in 5-6 days.

## 2020-01-17 NOTE — PROGRESS NOTES
Subjective     Geo Hicks is a 19 year old male who presents to clinic today for the following health issues:    HPI   ED/UC Followup:    Facility:  [facility]  Date of visit: 01/13/2020  Reason for visit: Laceration of the Lt leg  Current Status: improved      Patient is seen for a follow up visit.  Here with his caregiver. Patient uses a wheelchair, has h/o ependymoma , on experimental treatment, post prior surgical treatment.   Sustained left leg injury and skin laceration , requiring suturing for repair in the ED. Started on antibiotic treatment because of risk of infection.   Currently denies pain in the area. No fevers. Wound is closed and not draining. Pt is able to ambulate with a walker.         Patient Active Problem List   Diagnosis     GERD (gastroesophageal reflux disease)     Closed fracture at the growth plate of right distal fibula      Elevated serum creatinine     Dyspepsia     Intracranial hemorrhage (H)     Hemorrhagic stroke (H)     Ependymoma (H)     Admission for antineoplastic chemotherapy     Post-operative state     S/P craniotomy     S/P biopsy     Noncomitant strabismus     Abducens neuropathy of both eyes     CANDELARIO (obstructive sleep apnea)     Health Care Home     Hypernatremia     Body temperature low     Current chronic use of systemic steroids     Elevated TSH     Status post chemotherapy     Neoplasm of posterior cranial fossa (H)     Chronic constipation     Serum albumin decreased     Closed compression fracture of thoracic vertebra with routine healing, subsequent encounter     Depression     Constipation     Constipation by delayed colonic transit     Slow transit constipation     Past Surgical History:   Procedure Laterality Date     COLONOSCOPY N/A 9/27/2019    Procedure: Colonoscopy With Biopsies and Polypectomy;  Surgeon: Aniya Wei MD;  [facility]     ESOPHAGOSCOPY, GASTROSCOPY, DUODENOSCOPY (EGD), COMBINED N/A 9/27/2019    Procedure: Upper Endoscopy  (EGD) With Biopsies;  Surgeon: Aniya Wei MD;  [facility]     GRAFT CARTILAGE FROM POSTERIOR AURICLE Left 10/6/2016    Procedure: GRAFT CARTILAGE FROM POSTERIOR AURICLE;  Surgeon: Tyler Richards MD;  [facility]     INCISION AND DRAINAGE PERINEAL, COMBINED Bilateral 7/18/2015    Procedure: COMBINED INCISION AND DRAINAGE PERINEAL;  Surgeon: Dequan Timmons MD;  [facility]     OPTICAL TRACKING SYSTEM CRANIOTOMY, EXCISE TUMOR, COMBINED N/A 4/13/2015    Procedure: COMBINED OPTICAL TRACKING SYSTEM CRANIOTOMY, EXCISE TUMOR;  Surgeon: Francis Velazquez MD;  [facility]     OPTICAL TRACKING SYSTEM CRANIOTOMY, EXCISE TUMOR, COMBINED N/A 4/16/2015    Procedure: COMBINED OPTICAL TRACKING SYSTEM CRANIOTOMY, EXCISE TUMOR;  Surgeon: Francis Velazquez MD;  [facility]     OPTICAL TRACKING SYSTEM CRANIOTOMY, EXCISE TUMOR, COMBINED Bilateral 5/28/2015    Procedure: COMBINED OPTICAL TRACKING SYSTEM CRANIOTOMY, EXCISE TUMOR;  Surgeon: Francis Velazquez MD;  [facility]     OPTICAL TRACKING SYSTEM CRANIOTOMY, EXCISE TUMOR, COMBINED Bilateral 1/14/2016    Procedure: COMBINED OPTICAL TRACKING SYSTEM CRANIOTOMY, EXCISE TUMOR;  Surgeon: Francis Velazquez MD;  [facility]     OPTICAL TRACKING SYSTEM VENTRICULOSTOMY  4/16/2015    Procedure: OPTICAL TRACKING SYSTEM VENTRICULOSTOMY;  Surgeon: Francis Velazquez MD;  [facility]     REMOVE PORT VASCULAR ACCESS N/A 10/6/2016    Procedure: REMOVE PORT VASCULAR ACCESS;  Surgeon: Bruno Perea MD;  [facility]     RHINOPLASTY N/A 10/6/2016    Procedure: RHINOPLASTY;  Surgeon: Tyler Richards MD;  [facility]     VASCULAR SURGERY  5-2015    single lumen power port       Social History     Tobacco Use     Smoking status: Never Smoker     Smokeless tobacco: Never Used   Substance Use Topics     Alcohol use: No     Family History   Problem Relation Age of Onset     Circulatory  Father         PE/DVT     Hypothyroidism Father 30     Diabetes Maternal Grandmother      Diabetes Paternal Grandmother      Diabetes Paternal Grandfather      C.A.D. Paternal Grandfather      Hypertension Maternal Grandfather      Thyroid Disease Paternal Aunt         unknown whether hypo or hyper     Mental Illness No family hx of          Current Outpatient Medications   Medication Sig Dispense Refill     amitriptyline (ELAVIL) 25 MG tablet Take 2 tablets (50 mg) by mouth At Bedtime for 28 days 56 tablet 4     Calcium-Vitamin D-Vitamin K (VIACTIV CALCIUM PLUS D) 650-12.5-40 MG-MCG-MCG CHEW Take 1 Dose by mouth 2 times daily Take 2 tablets Q AM and 1 chewable tablet at 6 PM 90 tablet 11     dexamethasone (DECADRON) 0.5 MG tablet TAKE ONE AND ONE-HALF TABLETS (0.75MG) BY MOUTH ONCE DAILY WITH BREAKFAST. (NO REFILLS REMAINING) 45 tablet 11     fexofenadine (ALLEGRA) 180 MG tablet TAKE 1 TABLET BY MOUTH EVERY EVENING. **NON-COVERED MED** (NO REFILLS REMAINING) 30 tablet 11     linaclotide (LINZESS) 290 MCG capsule Take 1 capsule (290 mcg) by mouth every morning (before breakfast) 30 capsule 3     mupirocin (BACTROBAN) 2 % external ointment Use 2 times a day to the ear lobes and canals with flare 22 g 3     OLANZapine (ZYPREXA) 10 MG tablet Take 1 tab (10 mg) along with 1 tab (20 mg) for a total dose of 30 mg at bedtime.  [Begin after one week of 27.5 mg at bedtime]. 30 tablet 1     OLANZapine (ZYPREXA) 20 MG tablet Take 1 tab (20 mg) along with 1.5 tabs (7.5 mg) for a total dose of 27.5 mg at bedtime for 1 week, then will increase to 30 mg at bedtime. 7 tablet 0     OLANZapine (ZYPREXA) 20 MG tablet Take 1 tab (20 mg) along with 1 tab (10 mg) for a total dose of 30 mg at bedtime.  [Begin after one week of 27.5 mg at bedtime]. 30 tablet 1     OLANZapine (ZYPREXA) 5 MG tablet Take 1.5 tabs (7.5 mg) along with 1 tab (20 mg) for a total dose of 27.5 mg at bedtime for 1 week, then will increase to 30 mg at bedtime. 10.5  "tablet 0     omeprazole (PRILOSEC) 20 MG DR capsule Take 2 capsules (40 mg) by mouth every morning 60 capsule 1     order for DME Equipment being ordered: short boot 1 each 0     polyethylene glycol (MIRALAX/GLYCOLAX) powder Take 17 g (1 capful) by mouth 3 times daily 289 g 3     potassium phosphate, monobasic, (K-PHOS) 500 MG tablet Take 1 tablet (500 mg) by mouth 2 times daily 90 tablet 3     senna-docusate (SENOKOT-S/PERICOLACE) 8.6-50 MG tablet Take 1 tablet by mouth daily And please report to team at [facility] in no stool in 36 hours. 30 tablet 4     sertraline (ZOLOFT) 100 MG tablet Take 1.5 tablets (150 mg) by mouth daily 45 tablet 1     sulfamethoxazole-trimethoprim (BACTRIM/SEPTRA) 400-80 MG tablet Take 1 tablet by mouth 2 times daily On Saturdays and Sundays 24 tablet 11     vitamin D3 (CHOLECALCIFEROL) 2000 units (50 mcg) tablet TAKE 1 TABLET BY MOUTH ONCE DAILY WITH BREAKFAST. (NO REFILLS REMAINING) 30 tablet 11     vitamin E (TOCOPHEROL) 400 units (360 mg) capsule TAKE 1 CAPSULE BY MOUTH ONCE DAILY. (NO REFILLS REMAINING) 30 capsule 11         Reviewed and updated as needed this visit by Provider         Review of Systems   ROS COMP: Constitutional, HEENT, cardiovascular, pulmonary, gi and gu systems are negative, except as otherwise noted.      Objective    Ht 1.786 m (5' 10.3\")   BMI 28.38 kg/m    Body mass index is 28.38 kg/m .  Physical Exam   GENERAL: patient is in a wheelchair, alert and no distress  NECK: no adenopathy, no asymmetry, masses, or scars and thyroid normal to palpation  RESP: lungs clear to auscultation - no rales, rhonchi or wheezes  CV: regular rate and rhythm, normal S1 S2, no S3 or S4, no murmur, click or rub, no peripheral edema and peripheral pulses strong  ABDOMEN: soft, nontender, no hepatosplenomegaly, no masses and bowel sounds normal  MS: no gross musculoskeletal defects noted, no edema,          Left leg skin laceration anterior and inferior the the patella, " closed with sutures, 6 cm , no drainage, no swelling, mild skin erythema distally to the laceration, not painful   NEURO: in a wheelchair, impaired speech, muscle spasticity     Diagnostic Test Results:  Labs reviewed in Epic        Assessment & Plan   Problem List Items Addressed This Visit     Ependymoma (H)      Other Visit Diagnoses     Hospital discharge follow-up    -  Primary    Laceration of skin of left knee, subsequent encounter             Healing skin laceration of the left anterior knee, no clinical signs of infection.   Wound was cleansed and redressed.   Monitor for spreading of erythema , likely this is related to injury, hematoma, skin trauma  Remove stitches in 5 -7 days       See Patient Instructions  Return in about 1 week (around 1/24/2020) for Routine Visit.    William Mcrae MD  [facility]

## 2020-01-17 NOTE — NURSING NOTE
"Vital signs:  Temp: 97.3  F (36.3  C) Temp src: Oral BP: 118/62 Pulse: 114   Resp: 18 SpO2: 94 %     Height: 178.6 cm (5' 10.3\") Weight: 90.3 kg (199 lb)(Pt declined)  Estimated body mass index is 28.31 kg/m  as calculated from the following:    Height as of this encounter: 1.786 m (5' 10.3\").    Weight as of this encounter: 90.3 kg (199 lb).          "

## 2020-01-19 ENCOUNTER — TELEPHONE (OUTPATIENT)
Dept: ONCOLOGY | Facility: CLINIC | Age: 20
End: 2020-01-19

## 2020-01-20 NOTE — TELEPHONE ENCOUNTER
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    Geo called to report he had missed some of his meds including his Decadron. Discussed the issue with his care providers and informed that he has been refusing his medications frequently then demanding them hours later which is against their usual policy as they are trying to keep him on a schedule. I asked them to at least give him his Decadron for tonight and will need to readdress with primary team in near future.     Called nursing supervisor (Alexandra Warren: [phone]) and was presented with a long list of concerning safety behaviors for him and staff. Reportedly he is frequently transferring and attempting to ambulate with his walker on his own despite being very unsteady. Also recently being aggressive towards staff - hitting, kicking. He is at risk for losing his spot at the current care center due to the above.     Message sent to primary neuro oncology team who will continue to work on this issue to find a safe solution.     Nati Webb MD MPH  Pediatric Hematology Oncology Fellow  Pager: [phone]

## 2020-01-21 ENCOUNTER — OFFICE VISIT (OUTPATIENT)
Dept: PEDIATRIC HEMATOLOGY/ONCOLOGY | Facility: CLINIC | Age: 20
End: 2020-01-21
Attending: NURSE PRACTITIONER
Payer: COMMERCIAL

## 2020-01-21 ENCOUNTER — TELEPHONE (OUTPATIENT)
Dept: INTERNAL MEDICINE | Facility: CLINIC | Age: 20
End: 2020-01-21

## 2020-01-21 VITALS
DIASTOLIC BLOOD PRESSURE: 83 MMHG | WEIGHT: 198 LBS | TEMPERATURE: 96.7 F | OXYGEN SATURATION: 100 % | HEART RATE: 101 BPM | BODY MASS INDEX: 27.72 KG/M2 | HEIGHT: 71 IN | RESPIRATION RATE: 20 BRPM | SYSTOLIC BLOOD PRESSURE: 117 MMHG

## 2020-01-21 DIAGNOSIS — C71.9 EPENDYMOMA (H): Primary | ICD-10-CM

## 2020-01-21 DIAGNOSIS — D49.6 NEOPLASM OF POSTERIOR CRANIAL FOSSA (H): ICD-10-CM

## 2020-01-21 DIAGNOSIS — F32.A DEPRESSION, UNSPECIFIED DEPRESSION TYPE: ICD-10-CM

## 2020-01-21 LAB
ALBUMIN SERPL-MCNC: 3.3 G/DL (ref 3.4–5)
ALP SERPL-CCNC: 115 U/L (ref 40–150)
ALT SERPL W P-5'-P-CCNC: 20 U/L (ref 0–70)
ANION GAP SERPL CALCULATED.3IONS-SCNC: 1 MMOL/L (ref 3–14)
AST SERPL W P-5'-P-CCNC: 25 U/L (ref 0–45)
BASOPHILS # BLD AUTO: 0.1 10E9/L (ref 0–0.2)
BASOPHILS NFR BLD AUTO: 1.8 %
BILIRUB SERPL-MCNC: 0.5 MG/DL (ref 0.2–1.3)
BUN SERPL-MCNC: 20 MG/DL (ref 7–30)
CALCIUM SERPL-MCNC: 9.2 MG/DL (ref 8.5–10.1)
CHLORIDE SERPL-SCNC: 107 MMOL/L (ref 94–109)
CO2 SERPL-SCNC: 33 MMOL/L (ref 20–32)
CREAT SERPL-MCNC: 1.27 MG/DL (ref 0.66–1.25)
DIFFERENTIAL METHOD BLD: ABNORMAL
EOSINOPHIL # BLD AUTO: 0.1 10E9/L (ref 0–0.7)
EOSINOPHIL NFR BLD AUTO: 3.5 %
ERYTHROCYTE [DISTWIDTH] IN BLOOD BY AUTOMATED COUNT: 15 % (ref 10–15)
GFR SERPL CREATININE-BSD FRML MDRD: 81 ML/MIN/{1.73_M2}
GLUCOSE SERPL-MCNC: 124 MG/DL (ref 70–99)
HCT VFR BLD AUTO: 41.2 % (ref 40–53)
HGB BLD-MCNC: 13.2 G/DL (ref 13.3–17.7)
LYMPHOCYTES # BLD AUTO: 1.1 10E9/L (ref 0.8–5.3)
LYMPHOCYTES NFR BLD AUTO: 31.6 %
MAGNESIUM SERPL-MCNC: 2.2 MG/DL (ref 1.6–2.3)
MCH RBC QN AUTO: 27.7 PG (ref 26.5–33)
MCHC RBC AUTO-ENTMCNC: 32 G/DL (ref 31.5–36.5)
MCV RBC AUTO: 87 FL (ref 78–100)
MONOCYTES # BLD AUTO: 0.5 10E9/L (ref 0–1.3)
MONOCYTES NFR BLD AUTO: 14 %
NEUTROPHILS # BLD AUTO: 1.7 10E9/L (ref 1.6–8.3)
NEUTROPHILS NFR BLD AUTO: 49.1 %
NRBC # BLD AUTO: 0 10*3/UL
NRBC BLD AUTO-RTO: 1 /100
PHOSPHATE SERPL-MCNC: 4.4 MG/DL (ref 2.5–4.5)
PLATELET # BLD AUTO: 119 10E9/L (ref 150–450)
PLATELET # BLD EST: ABNORMAL 10*3/UL
POTASSIUM SERPL-SCNC: 4.8 MMOL/L (ref 3.4–5.3)
PROT SERPL-MCNC: 6.6 G/DL (ref 6.8–8.8)
RBC # BLD AUTO: 4.76 10E12/L (ref 4.4–5.9)
RBC MORPH BLD: NORMAL
SODIUM SERPL-SCNC: 141 MMOL/L (ref 133–144)
WBC # BLD AUTO: 3.5 10E9/L (ref 4–11)

## 2020-01-21 PROCEDURE — 80053 COMPREHEN METABOLIC PANEL: CPT | Performed by: NURSE PRACTITIONER

## 2020-01-21 PROCEDURE — 84100 ASSAY OF PHOSPHORUS: CPT | Performed by: NURSE PRACTITIONER

## 2020-01-21 PROCEDURE — G0463 HOSPITAL OUTPT CLINIC VISIT: HCPCS | Mod: ZF

## 2020-01-21 PROCEDURE — 36415 COLL VENOUS BLD VENIPUNCTURE: CPT | Performed by: NURSE PRACTITIONER

## 2020-01-21 PROCEDURE — 85025 COMPLETE CBC W/AUTO DIFF WBC: CPT | Performed by: NURSE PRACTITIONER

## 2020-01-21 PROCEDURE — 83735 ASSAY OF MAGNESIUM: CPT | Performed by: NURSE PRACTITIONER

## 2020-01-21 ASSESSMENT — PAIN SCALES - GENERAL: PAINLEVEL: NO PAIN (0)

## 2020-01-21 ASSESSMENT — ENCOUNTER SYMPTOMS
SPEECH DIFFICULTY: 1
WEAKNESS: 1
TREMORS: 1
CONSTIPATION: 1

## 2020-01-21 ASSESSMENT — MIFFLIN-ST. JEOR: SCORE: 1928.12

## 2020-01-21 NOTE — NURSING NOTE
"Chief Complaint   Patient presents with     RECHECK     Patient is here today for Ependymoma follow up     /83 (BP Location: Right arm, Patient Position: Fowlers, Cuff Size: Adult Large)   Pulse 101   Temp 96.7  F (35.9  C) (Axillary)   Resp 20   Ht 1.8 m (5' 10.87\")   Wt 89.8 kg (198 lb)   SpO2 100%   BMI 27.72 kg/m      Malinda Russo LPN LPN    January 21, 2020  "

## 2020-01-21 NOTE — LETTER
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 He is enrolled on COG [id number] - A Phase 1 Study of Entinostat, an Oral Histone Deacetylase Inhibitor, in Pediatric Patients With Recurrent or Refractory Solid Tumors, Including CNS Tumors and Lymphoma.     HPI:  Geo has had no fevers. No nausea, or vomiting. He had no missed doses of medication with his last course.  Geo wants to be able to walk and doesn't want his constipation. He blames up for his constipation. Geo had a normal stool two days ago per his report. He noted last evening he had some loose stuff incontinence in incontinence in the evening.  It was a \"fart he didn't trust\".  Review with his home staff reveals it was a very large incontinence requiring a shower.  He will get his stitches out tomorrow LLE yesterday.    He bumped his elbow and has some bruising.     Fam/Soc: Lives at a transitional home. His parents are  but have been awarded guardianship of Geo. The families work well together - both parents have remarried. His dad has a clotting disorder, requiring him to take Warfarin daily.     History was obtained from Geo and his mother.       Allergies   Allergen Reactions     Blood Transfusion Related (Informational Only) Swelling     Periorbital swelling post platelet transfusion     No Known Drug Allergies        Current Outpatient Medications   Medication     amitriptyline (ELAVIL) 25 MG tablet     Calcium-Vitamin D-Vitamin K (VIACTIV CALCIUM PLUS D) 650-12.5-40 MG-MCG-MCG CHEW     dexamethasone (DECADRON) 0.5 MG tablet     fexofenadine (ALLEGRA) 180 MG tablet     linaclotide (LINZESS) 290 MCG capsule     OLANZapine (ZYPREXA) 10 MG tablet     OLANZapine (ZYPREXA) 20 MG tablet     omeprazole (PRILOSEC) 20 MG DR capsule     order for DME     " order for DME     polyethylene glycol (MIRALAX/GLYCOLAX) powder     potassium phosphate, monobasic, (K-PHOS) 500 MG tablet     senna-docusate (SENOKOT-S/PERICOLACE) 8.6-50 MG tablet     sertraline (ZOLOFT) 100 MG tablet     sulfamethoxazole-trimethoprim (BACTRIM/SEPTRA) 400-80 MG tablet     vitamin D3 (CHOLECALCIFEROL) 2000 units (50 mcg) tablet     vitamin E (TOCOPHEROL) 400 units (360 mg) capsule     mupirocin (BACTROBAN) 2 % external ointment     OLANZapine (ZYPREXA) 20 MG tablet     OLANZapine (ZYPREXA) 5 MG tablet     No current facility-administered medications for this visit.        Past Medical History:   Diagnosis Date     Cranial nerve dysfunction      Dyspepsia      Ependymoma (H)      Gastro-oesophageal reflux disease      Hearing loss      Intracranial hemorrhage (H)      Migraine      Pilonidal cyst     7-2015     Reduced vision      Refractory obstruction of nasal airway     2nd to nasal valve prolapse     Sleep apnea      Strabismus     gaze palsy        Past Surgical History:   Procedure Laterality Date     COLONOSCOPY N/A 9/27/2019    Procedure: Colonoscopy With Biopsies and Polypectomy;  Surgeon: Aniya Wei MD;  [facility]: [facility]     ESOPHAGOSCOPY, GASTROSCOPY, DUODENOSCOPY (EGD), COMBINED N/A 9/27/2019    Procedure: Upper Endoscopy (EGD) With Biopsies;  Surgeon: Aniya Wei MD;  [facility]     GRAFT CARTILAGE FROM POSTERIOR AURICLE Left 10/6/2016    Procedure: GRAFT CARTILAGE FROM POSTERIOR AURICLE;  Surgeon: Tyler Richards MD;  [facility]     INCISION AND DRAINAGE PERINEAL, COMBINED Bilateral 7/18/2015    Procedure: COMBINED INCISION AND DRAINAGE PERINEAL;  Surgeon: Deqaun Timmons MD;  [facility]     OPTICAL TRACKING SYSTEM CRANIOTOMY, EXCISE TUMOR, COMBINED N/A 4/13/2015    Procedure: COMBINED OPTICAL TRACKING SYSTEM CRANIOTOMY, EXCISE TUMOR;  Surgeon: Francis Velazquez MD;  [facility]     OPTICAL TRACKING SYSTEM  "CRANIOTOMY, EXCISE TUMOR, COMBINED N/A 4/16/2015    Procedure: COMBINED OPTICAL TRACKING SYSTEM CRANIOTOMY, EXCISE TUMOR;  Surgeon: Francis Velazquez MD;  [facility]     OPTICAL TRACKING SYSTEM CRANIOTOMY, EXCISE TUMOR, COMBINED Bilateral 5/28/2015    Procedure: COMBINED OPTICAL TRACKING SYSTEM CRANIOTOMY, EXCISE TUMOR;  Surgeon: Francis Velazquez MD;  [facility]     OPTICAL TRACKING SYSTEM CRANIOTOMY, EXCISE TUMOR, COMBINED Bilateral 1/14/2016    Procedure: COMBINED OPTICAL TRACKING SYSTEM CRANIOTOMY, EXCISE TUMOR;  Surgeon: Francis Velazquez MD;  [facility]     OPTICAL TRACKING SYSTEM VENTRICULOSTOMY  4/16/2015    Procedure: OPTICAL TRACKING SYSTEM VENTRICULOSTOMY;  Surgeon: Francis Velazquez MD;  [facility]     REMOVE PORT VASCULAR ACCESS N/A 10/6/2016    Procedure: REMOVE PORT VASCULAR ACCESS;  Surgeon: Bruno Perea MD;  [facility]     RHINOPLASTY N/A 10/6/2016    Procedure: RHINOPLASTY;  Surgeon: Tyler Richards MD;  [facility]     VASCULAR SURGERY  5-2015    single lumen power port       Family History   Problem Relation Age of Onset     Circulatory Father         PE/DVT     Hypothyroidism Father 30     Diabetes Maternal Grandmother      Diabetes Paternal Grandmother      Diabetes Paternal Grandfather      C.A.D. Paternal Grandfather      Hypertension Maternal Grandfather      Thyroid Disease Paternal Aunt         unknown whether hypo or hyper     Mental Illness No family hx of        Review of Systems   Constitutional:        In wheelchair   HENT: Positive for drooling. Negative for nosebleeds.    Eyes:        Patching for diplopia   Gastrointestinal: Positive for constipation.   Neurological: Positive for tremors, speech difficulty and weakness.     Vital signs:  /83 (BP Location: Right arm, Patient Position: Fowlers, Cuff Size: Adult Large)   Pulse 101   Temp 96.7  F (35.9  C) (Axillary)   Resp 20   Ht 1.8 m (5' 10.87\")   Wt " 89.8 kg (198 lb)   SpO2 100%   BMI 27.72 kg/m        Physical Exam  HENT:      Ears:      Comments: Small excoriation inside each canal with minimal bleeding.   Cardiovascular:      Rate and Rhythm: Regular rhythm.      Heart sounds: No murmur.   Pulmonary:      Effort: Pulmonary effort is normal.      Breath sounds: Normal breath sounds.   Abdominal:      Comments: Full   Musculoskeletal:      Comments: Stitches dry and intact.  No tenderness or drainage.  Wound edges well approximated.    Skin:     Findings: Bruising (Lower legs and elbow) present.   Neurological:      Cranial Nerves: Cranial nerve deficit present.      Motor: Weakness present.      Coordination: Coordination abnormal.      Gait: Gait abnormal.         Labs:   Results for orders placed or performed in visit on 01/21/20   Phosphorus     Status: None   Result Value Ref Range    Phosphorus 4.4 2.5 - 4.5 mg/dL   Magnesium     Status: None   Result Value Ref Range    Magnesium 2.2 1.6 - 2.3 mg/dL   Comprehensive metabolic panel     Status: Abnormal   Result Value Ref Range    Sodium 141 133 - 144 mmol/L    Potassium 4.8 3.4 - 5.3 mmol/L    Chloride 107 94 - 109 mmol/L    Carbon Dioxide 33 (H) 20 - 32 mmol/L    Anion Gap 1 (L) 3 - 14 mmol/L    Glucose 124 (H) 70 - 99 mg/dL    Urea Nitrogen 20 7 - 30 mg/dL    Creatinine 1.27 (H) 0.66 - 1.25 mg/dL    GFR Estimate 81 >60 mL/min/[1.73_m2]    GFR Estimate If Black >90 >60 mL/min/[1.73_m2]    Calcium 9.2 8.5 - 10.1 mg/dL    Bilirubin Total 0.5 0.2 - 1.3 mg/dL    Albumin 3.3 (L) 3.4 - 5.0 g/dL    Protein Total 6.6 (L) 6.8 - 8.8 g/dL    Alkaline Phosphatase 115 40 - 150 U/L    ALT 20 0 - 70 U/L    AST 25 0 - 45 U/L   CBC with platelets differential     Status: Abnormal   Result Value Ref Range    WBC 3.5 (L) 4.0 - 11.0 10e9/L    RBC Count 4.76 4.4 - 5.9 10e12/L    Hemoglobin 13.2 (L) 13.3 - 17.7 g/dL    Hematocrit 41.2 40.0 - 53.0 %    MCV 87 78 - 100 fl    MCH 27.7 26.5 - 33.0 pg    MCHC 32.0 31.5 - 36.5  g/dL    RDW 15.0 10.0 - 15.0 %    Platelet Count 119 (L) 150 - 450 10e9/L    Diff Method Manual Differential     % Neutrophils 49.1 %    % Lymphocytes 31.6 %    % Monocytes 14.0 %    % Eosinophils 3.5 %    % Basophils 1.8 %    Nucleated RBCs 1 (H) 0 /100    Absolute Neutrophil 1.7 1.6 - 8.3 10e9/L    Absolute Lymphocytes 1.1 0.8 - 5.3 10e9/L    Absolute Monocytes 0.5 0.0 - 1.3 10e9/L    Absolute Eosinophils 0.1 0.0 - 0.7 10e9/L    Absolute Basophils 0.1 0.0 - 0.2 10e9/L    Absolute Nucleated RBC 0.0     RBC Morphology Normal     Platelet Estimate Confirming automated cell count      Impression:  1. Ependymoma.  2. MRI 1/7/2020: No metastases, primary tumor stable compared to recent studies.  3. Thrombocytopenia improved.  Counts are adequate to proceed with his next cycle.    Plan:  1. He will begin Etinostat 7mg weekly. He has lost some weight today but it is not clear if weight is accurate due to extremely large recent incontinence and he hasn't eaten much today.  We will deliver same dosing and recheck his weight at a future visit and make changes at that time if his weight loss is real.   2. Constipation discussed at length. He is very concerned that he needs another clean-out.  Since we are starting his drug today we will arrange for admission on Thursday afternoon.  Geo will notify us if he continues to stool regularly like he did today and we will cancel admission.   3. He should have his wound stitches removed Friday..  4. RTC in 1 week to recheck blood work or sooner PRN.  5. Encourage continued good hydration today. His kidney function is improved form last week and was likely related to contrast with his MRI on 1/7/20.  6. Paper work filled out and plan of care discussed with his facility.    40 minutes of face to face time spent with patient and >50% visit involved counseling and/or coordination of care.      Kristi Schuler, ALAN CNP

## 2020-01-21 NOTE — PATIENT INSTRUCTIONS
Plan:  Medication time changes made. New orders to be sent to [facility] with changes.   Admission scheduled for Bowel Clean out 20 - 2pm  If Geo is inpatient on 20 the inpatient team can remove stitches.    Thank you for choosing [facility].    It was a pleasure to see you today.      CNS Tumor Team  Leoncio Means RN BSN - Care Coordinator       [facility], 9th Floor - [address] [address]  Scheduling/Appointments: [phone]  Fax: [phone]     Numbers to call:   Monday - Friday, 8:00 am - 5:00 pm:    Office : [phone]    Scheduling/Appointments: [phone]  Nights and weekends:   Call [phone] and ask the  to page the 'Pediatric Heme/Onc fellow on call' if you have an urgent concern that can't wait until the clinic opens.     Scheduling:    [facility], 9th floor [phone]  Infusion Center/Lab: [phone]   Radiology/ [phone]      Services:   [phone]     Imani Means RN, BSN  CNS Tumor Care Coordinator  Office: [phone]  Pager: [phone]  E-mail: [email or web address]     We encourage you to sign up for eV[employer] for easy communication with us.  Sign up at the clinic  or go to [email or web address].      Please try the Passport to [facility] ([facility]) phone application for Virtual Tours, Procedure Preparation, Resources, Preparation for Hospital Stay and the Coloring Board.

## 2020-01-22 RX ORDER — SERTRALINE HYDROCHLORIDE 100 MG/1
150 TABLET, FILM COATED ORAL DAILY
Qty: 45 TABLET | Refills: 1 | Status: ON HOLD | OUTPATIENT
Start: 2020-01-22 | End: 2020-02-18

## 2020-01-22 NOTE — TELEPHONE ENCOUNTER
"Medication requested: sertraline (ZOLOFT) 100 MG tablet  Take 1.5 tablets (150 mg) by mouth daily  Last refilled: 11/15/2019  Qty: 45/1 refill      Last seen: 1/2/20  \"Increase olanzapine to 27.5 mg at bedtime for one week, THEN increase to 30 mg at bedtime.  Continue sertraline 150 mg daily.  Continue amitriptyline 50 mg at bedtime.\"  Unsigned note.  RTC: 3-4 weeks  Cancel: 0  No-show: 0  Next appt: 2/6/20    Refill decision:   Refill pended and routed to the provider for review/determination due to  note unsigned 1/2/20      "

## 2020-01-24 ENCOUNTER — ALLIED HEALTH/NURSE VISIT (OUTPATIENT)
Dept: NURSING | Facility: CLINIC | Age: 20
End: 2020-01-24
Payer: COMMERCIAL

## 2020-01-24 DIAGNOSIS — Z48.02 VISIT FOR SUTURE REMOVAL: Primary | ICD-10-CM

## 2020-01-24 NOTE — PROGRESS NOTES
Pt in for suture removal after being placed in ER on 1/13/2020     13 sutures placed.     Would does appear red but not warm to the touch. Small about of serosanguinous drainage of dressing.   Pt denies pain and no purulent drainage.     Some of the edges of the laceration on not complete adhered together.     Wound was assessed and sutures removed with Shay Ordonez MD.   Some slight bleeding during the process.  Complete hemostasis by the time all sutures removed.     Wound was coated with bacitracin, non adhesive dressing placed and held in place with Kerlix and ace wrap.       Father advised to wash wound gently with mild soap and water daily, pat dry and then dress as above.  F/U as needed with PCP    Father expressed understanding and acceptance of the plan.  He had no further questions at this time.  Advised can call back to clinic at any time with concerns.     Aleida Harmon RN

## 2020-01-25 ENCOUNTER — APPOINTMENT (OUTPATIENT)
Dept: GENERAL RADIOLOGY | Facility: CLINIC | Age: 20
End: 2020-01-25
Attending: PEDIATRICS
Payer: COMMERCIAL

## 2020-01-25 ENCOUNTER — HOSPITAL ENCOUNTER (OUTPATIENT)
Facility: CLINIC | Age: 20
Setting detail: OBSERVATION
Discharge: NURSING, REHAB OR LONG-TERM CARE | End: 2020-02-18
Attending: PEDIATRICS | Admitting: PEDIATRICS
Payer: COMMERCIAL

## 2020-01-25 DIAGNOSIS — F22 PARANOIA (H): ICD-10-CM

## 2020-01-25 DIAGNOSIS — K59.00 CONSTIPATION, UNSPECIFIED CONSTIPATION TYPE: ICD-10-CM

## 2020-01-25 DIAGNOSIS — Z92.21 STATUS POST CHEMOTHERAPY: ICD-10-CM

## 2020-01-25 DIAGNOSIS — Z79.52 CURRENT CHRONIC USE OF SYSTEMIC STEROIDS: ICD-10-CM

## 2020-01-25 DIAGNOSIS — L73.8 BACTERIAL FOLLICULITIS: ICD-10-CM

## 2020-01-25 DIAGNOSIS — C71.9 EPENDYMOMA (H): Primary | ICD-10-CM

## 2020-01-25 DIAGNOSIS — F32.A DEPRESSION, UNSPECIFIED DEPRESSION TYPE: ICD-10-CM

## 2020-01-25 DIAGNOSIS — K59.01 SLOW TRANSIT CONSTIPATION: ICD-10-CM

## 2020-01-25 PROCEDURE — G0378 HOSPITAL OBSERVATION PER HR: HCPCS

## 2020-01-25 PROCEDURE — 25000132 ZZH RX MED GY IP 250 OP 250 PS 637: Performed by: STUDENT IN AN ORGANIZED HEALTH CARE EDUCATION/TRAINING PROGRAM

## 2020-01-25 PROCEDURE — 40000986 XR ABDOMEN PORT 1 VW

## 2020-01-25 PROCEDURE — 74018 RADEX ABDOMEN 1 VIEW: CPT

## 2020-01-25 RX ORDER — VITAMIN B COMPLEX
2000 TABLET ORAL DAILY
Status: DISCONTINUED | OUTPATIENT
Start: 2020-01-26 | End: 2020-02-18 | Stop reason: HOSPADM

## 2020-01-25 RX ORDER — FEXOFENADINE HCL 180 MG/1
180 TABLET ORAL AT BEDTIME
Status: DISCONTINUED | OUTPATIENT
Start: 2020-01-25 | End: 2020-02-18 | Stop reason: HOSPADM

## 2020-01-25 RX ORDER — LIDOCAINE 40 MG/G
CREAM TOPICAL
Status: DISCONTINUED | OUTPATIENT
Start: 2020-01-25 | End: 2020-02-18 | Stop reason: HOSPADM

## 2020-01-25 RX ORDER — MUPIROCIN 20 MG/G
OINTMENT TOPICAL DAILY
Status: DISCONTINUED | OUTPATIENT
Start: 2020-01-25 | End: 2020-02-18 | Stop reason: HOSPADM

## 2020-01-25 RX ORDER — SULFAMETHOXAZOLE AND TRIMETHOPRIM 400; 80 MG/1; MG/1
1 TABLET ORAL 2 TIMES DAILY
Status: DISCONTINUED | OUTPATIENT
Start: 2020-01-25 | End: 2020-02-12

## 2020-01-25 RX ORDER — VITAMIN E 268 MG
400 CAPSULE ORAL DAILY
Status: DISCONTINUED | OUTPATIENT
Start: 2020-01-26 | End: 2020-02-18 | Stop reason: HOSPADM

## 2020-01-25 RX ORDER — DEXAMETHASONE 0.75 MG/1
0.75 TABLET ORAL DAILY
Status: DISCONTINUED | OUTPATIENT
Start: 2020-01-26 | End: 2020-02-18 | Stop reason: HOSPADM

## 2020-01-25 RX ADMIN — FEXOFENADINE HYDROCHLORIDE 180 MG: 180 TABLET, FILM COATED ORAL at 20:55

## 2020-01-25 RX ADMIN — SULFAMETHOXAZOLE AND TRIMETHOPRIM 1 TABLET: 400; 80 TABLET ORAL at 20:55

## 2020-01-25 ASSESSMENT — MIFFLIN-ST. JEOR: SCORE: 1904.38

## 2020-01-25 NOTE — H&P
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       GI:  Acute on chronic constipation  - NG tube placement  - AXR  - Bowel clean out with golytely via NGT at 500 ml/hr and continue until clear stool x 3  - Hold PTA bowel regimen (linzess, miralax, senna)  - Omeprazole 40 mg daily     FEN:  - Clear liquid diet  - Holding PTA supplements for now: Ca, vitamin D, K-phos, vitamin E  - Renal panel, Mg in AM     Heme/Onc:  Ependymoma: on study  - Entinostat 7 mg weekly     Endo:  - Followed by Dr. Martin  - PTA Decadron 0.75 mg QAM     Neuro/Psych:  Agitation  Facial paralysis   Ataxia   - Olanzapine 30mg at bedtime  - Amitriptyline 50mg at bedtime  - Zoloft 150mg daily    Allergies:  - Allegra 180mg qPM      Pulm:  Hx of CANDELARIO: previously used CPAP  - Routine vitals w/ spot pulse ox    Geo was evaluated and discussed with Heme/Onc Fellow Dr. Herbie Eddy and Attending Dr. Meño Holland,   Pediatrics resident, PGY-1  Pager: [phone]    Physician Attestation   I, Zoraida Wilder MD, MD, saw this patient with the resident and agree with the resident/fellow's findings and plan of care as documented in the note.      I personally reviewed vital signs, medications, labs and imaging.    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 He reports that he usually stools daily but lately his bowel movements have been hard and he has been having abdominal pain. His last bowel movement was last night and it was hard. He has been having to strain during his bowel movements as well. He reports that he intermittently gets constipated beyond what his daily home regimen can manage. He says he does well with the placement of the NG tube. He has had no blood in his stool. He otherwise feels well. He denies any recent vomiting or diarrhea. He denies cough, runny nose, or fever. He has been having a sore throat lately as well. He recently hurt his leg and had 13 stitches, but this is healing well. Of note, dad reports that lately he has been having some behavioral issues. He has been combative with staff at his assisted living facility, which subsequently lead to him being dismissed from the facility. He has also had a few altercations with dad.     Past Medical History    I have reviewed this patient's medical history and updated it with pertinent information if needed.   Past Medical History:   Diagnosis Date     Cranial nerve dysfunction      Dyspepsia      Ependymoma (H)      Gastro-oesophageal reflux disease      Hearing loss      Intracranial hemorrhage (H)      Migraine      Pilonidal cyst     7-2015     Reduced vision      Refractory obstruction of nasal airway     2nd to nasal valve prolapse     Sleep apnea      Strabismus     gaze palsy        Past Surgical History   I have reviewed this patient's surgical history and updated it with pertinent information if needed.  Past  Surgical History:   Procedure Laterality Date     COLONOSCOPY N/A 9/27/2019    Procedure: Colonoscopy With Biopsies and Polypectomy;  Surgeon: Aniya Wei MD;  [facility]: [facility]     ESOPHAGOSCOPY, GASTROSCOPY, DUODENOSCOPY (EGD), COMBINED N/A 9/27/2019    Procedure: Upper Endoscopy (EGD) With Biopsies;  Surgeon: Aniya Wei MD;  [facility]     GRAFT CARTILAGE FROM POSTERIOR AURICLE Left 10/6/2016    Procedure: GRAFT CARTILAGE FROM POSTERIOR AURICLE;  Surgeon: Tyler Richards MD;  [facility]     INCISION AND DRAINAGE PERINEAL, COMBINED Bilateral 7/18/2015    Procedure: COMBINED INCISION AND DRAINAGE PERINEAL;  Surgeon: Dequan Timmons MD;  [facility]     OPTICAL TRACKING SYSTEM CRANIOTOMY, EXCISE TUMOR, COMBINED N/A 4/13/2015    Procedure: COMBINED OPTICAL TRACKING SYSTEM CRANIOTOMY, EXCISE TUMOR;  Surgeon: Francis Velazquez MD;  [facility]     OPTICAL TRACKING SYSTEM CRANIOTOMY, EXCISE TUMOR, COMBINED N/A 4/16/2015    Procedure: COMBINED OPTICAL TRACKING SYSTEM CRANIOTOMY, EXCISE TUMOR;  Surgeon: Francis Velazquez MD;  [facility]     OPTICAL TRACKING SYSTEM CRANIOTOMY, EXCISE TUMOR, COMBINED Bilateral 5/28/2015    Procedure: COMBINED OPTICAL TRACKING SYSTEM CRANIOTOMY, EXCISE TUMOR;  Surgeon: Francis Velazquez MD;  [facility]     OPTICAL TRACKING SYSTEM CRANIOTOMY, EXCISE TUMOR, COMBINED Bilateral 1/14/2016    Procedure: COMBINED OPTICAL TRACKING SYSTEM CRANIOTOMY, EXCISE TUMOR;  Surgeon: Francis Velazquez MD;  [facility]     OPTICAL TRACKING SYSTEM VENTRICULOSTOMY  4/16/2015    Procedure: OPTICAL TRACKING SYSTEM VENTRICULOSTOMY;  Surgeon: Francis Velazquez MD;  [facility]     REMOVE PORT VASCULAR ACCESS N/A 10/6/2016    Procedure: REMOVE PORT VASCULAR ACCESS;  Surgeon: Bruno Perea MD;  [facility]     RHINOPLASTY N/A 10/6/2016    Procedure: RHINOPLASTY;  Surgeon: Tyler Richards  MD Manohar;  [facility]     VASCULAR SURGERY  5-2015    single lumen power port       Immunization History   Immunization Status:  up to date and documented    Prior to Admission Medications   Prior to Admission Medications   Prescriptions Last Dose Informant Patient Reported? Taking?   Calcium-Vitamin D-Vitamin K (VIACTIV CALCIUM PLUS D) 650-12.5-40 MG-MCG-MCG CHEW   No No   Sig: Take 1 Dose by mouth 2 times daily Take 2 tablets Q AM and 1 chewable tablet at 6 PM   OLANZapine (ZYPREXA) 10 MG tablet   No No   Sig: Take 1 tab (10 mg) along with 1 tab (20 mg) for a total dose of 30 mg at bedtime.  [Begin after one week of 27.5 mg at bedtime].   OLANZapine (ZYPREXA) 20 MG tablet   No No   Sig: Take 1 tab (20 mg) along with 1.5 tabs (7.5 mg) for a total dose of 27.5 mg at bedtime for 1 week, then will increase to 30 mg at bedtime.   Patient not taking: Reported on 1/21/2020   OLANZapine (ZYPREXA) 20 MG tablet   No No   Sig: Take 1 tab (20 mg) along with 1 tab (10 mg) for a total dose of 30 mg at bedtime.  [Begin after one week of 27.5 mg at bedtime].   OLANZapine (ZYPREXA) 5 MG tablet   No No   Sig: Take 1.5 tabs (7.5 mg) along with 1 tab (20 mg) for a total dose of 27.5 mg at bedtime for 1 week, then will increase to 30 mg at bedtime.   Patient not taking: Reported on 1/21/2020   amitriptyline (ELAVIL) 25 MG tablet 1/24/2020 at Unknown time  No Yes   Sig: Take 2 tablets (50 mg) by mouth At Bedtime for 28 days   cephALEXin (KEFLEX) 500 MG capsule   No No   Sig: Take 1 capsule (500 mg) by mouth 3 times daily for 4 days   dexamethasone (DECADRON) 0.5 MG tablet   No No   Sig: TAKE ONE AND ONE-HALF TABLETS (0.75MG) BY MOUTH ONCE DAILY WITH BREAKFAST. (NO REFILLS REMAINING)   fexofenadine (ALLEGRA) 180 MG tablet   No No   Sig: TAKE 1 TABLET BY MOUTH EVERY EVENING. **NON-COVERED MED** (NO REFILLS REMAINING)   linaclotide (LINZESS) 290 MCG capsule   No No   Sig: Take 1 capsule (290 mcg) by mouth every morning (before  breakfast)   mupirocin (BACTROBAN) 2 % external ointment   No No   Sig: Use 2 times a day to the ear lobes and canals with flare   omeprazole (PRILOSEC) 20 MG DR capsule   No No   Sig: Take 2 capsules (40 mg) by mouth every morning   order for DME   No No   Sig: Equipment being ordered: short boot   order for DME   No No   Sig: Equipment being ordered: Dressing  Wound #1 lower leg, W 6 cm x, D 0.5  cm, Drainage scant, Thickness sutured     Type: non stick gauze, Brand: , Size: 4/4   Change: 1 per day    Tape/Wrap: 1 each     attach facesheet with insurance information (delete this line)   polyethylene glycol (MIRALAX/GLYCOLAX) powder   No No   Sig: Take 17 g (1 capful) by mouth 3 times daily   potassium phosphate, monobasic, (K-PHOS) 500 MG tablet   No No   Sig: Take 1 tablet (500 mg) by mouth 2 times daily   senna-docusate (SENOKOT-S/PERICOLACE) 8.6-50 MG tablet   No No   Sig: Take 1 tablet by mouth daily And please report to team at [facility] in no stool in 36 hours.   sertraline (ZOLOFT) 100 MG tablet   No No   Sig: Take 1.5 tablets (150 mg) by mouth daily   study - entinostat (IDS# 5050) 1 mg tablet   No No   Sig: Take 2 tablets (2 mg) by mouth every 7 days for 4 doses Take two 1mg tablet with one 5mg tablet for total dose of 7mg weekly. Take on an empty stomach, at least 1 hour before or 2 hours after a meal.  Swallow tablet whole.   study - entinostat (IDS# 5050) 1 mg tablet   No No   Sig: Take 2 tablets (2 mg) by mouth every 7 days for 4 doses Take two 1mg tablet with one 5mg tablet for total dose of 7mg weekly. Take on an empty stomach, at least 1 hour before or 2 hours after a meal.  Swallow tablet whole.   study - entinostat (IDS# 5050) 5 mg tablet   No No   Sig: Take 1 tablet (5 mg) by mouth every 7 days for 4 doses Take one 5mg tablet with two 1mg tablet for total dose of 7mg weekly. Take on an empty stomach, at least 1 hour before or 2 hours after a meal.  Swallow tablet whole.   study -  "entinostat (IDS# 5050) 5 mg tablet   No No   Sig: Take 1 tablet (5 mg) by mouth every 7 days for 4 doses Take one 5mg tablet with two 1mg tablet for total dose of 7mg weekly. Take on an empty stomach, at least 1 hour before or 2 hours after a meal.  Swallow tablet whole.   sulfamethoxazole-trimethoprim (BACTRIM/SEPTRA) 400-80 MG tablet   No No   Sig: Take 1 tablet by mouth 2 times daily On Saturdays and Sundays   vitamin D3 (CHOLECALCIFEROL) 2000 units (50 mcg) tablet   No No   Sig: TAKE 1 TABLET BY MOUTH ONCE DAILY WITH BREAKFAST. (NO REFILLS REMAINING)   vitamin E (TOCOPHEROL) 400 units (360 mg) capsule   No No   Sig: TAKE 1 CAPSULE BY MOUTH ONCE DAILY. (NO REFILLS REMAINING)      Facility-Administered Medications: None     Allergies   Allergies   Allergen Reactions     Blood Transfusion Related (Informational Only) Swelling     Periorbital swelling post platelet transfusion     No Known Drug Allergies        Social History   I have updated and reviewed the following Social History Narrative:   Pediatric History   Patient Parents     KurtJj \"Eric\" (Father)     Christianne Sevilla (Mother)     Other Topics Concern     Not on file   Social History Narrative    Grown up in [place], [place].  Parents are , and he shares time between his mother's and father's homes. Both parents are remarried.  Dad is a , and mom does  for a testing company.  Siblings include two full brothers (older brother Benitez who is a senior in college and younger brother Gamaliel), a step-brother, and a step-sister. Geo graduated from high school in Spring 2018.  Initially considered attending [employer] in Fall 2019, but reportedly lost interest due to the amount of time it would take him to complete courses and receive a degree.  Does today endorse continued interest in pursuing an advanced course of study at some point, specifically stating he is interested in Electrical Engineering.  No " access to guns or weapons enjoys playing video games.  Guns are currently locked in the house.   He was recently dismissed from his assisted living facility for combative behavior and is currently living with dad.     Family History   I have reviewed this patient's family history and updated it with pertinent information if needed.   Family History   Problem Relation Age of Onset     Circulatory Father         PE/DVT     Hypothyroidism Father 30     Diabetes Maternal Grandmother      Diabetes Paternal Grandmother      Diabetes Paternal Grandfather      C.A.D. Paternal Grandfather      Hypertension Maternal Grandfather      Thyroid Disease Paternal Aunt         unknown whether hypo or hyper     Mental Illness No family hx of        Review of Systems   The 10 point Review of Systems is negative other than noted in the HPI or here.    Physical Exam   Temp: 97.6  F (36.4  C) Temp src: Oral BP: 103/59 Pulse: 104 Heart Rate: 104 Resp: 18 SpO2: 92 % O2 Device: None (Room air)    Vital Signs with Ranges  Temp:  [97.6  F (36.4  C)] 97.6  F (36.4  C)  Pulse:  [104] 104  Heart Rate:  [104] 104  Resp:  [18] 18  BP: (103)/(59) 103/59  SpO2:  [92 %] 92 %  196 lbs 14.4 oz    GENERAL: Laying in bed, well appearing, talkative  SKIN: Clear. No significant rash, abnormal pigmentation or lesions. Multiple wounds on legs, face, in various stages of healing.   HEAD: Normocephalic  EYES: Pupils equal, round, reactive. Normal conjunctivae.  EARS: Normal canals.  NOSE: Normal without discharge.  MOUTH/THROAT: Clear. No oral lesions. Poor dentition  LUNGS: Clear. No rales, rhonchi, wheezing or retractions  HEART: Regular rhythm. Normal S1/S2. No murmurs. Normal pulses.  ABDOMEN: Soft, non-tender, not distended, no masses or hepatosplenomegaly. Bowel sounds normal.   NEUROLOGIC: No focal findings. Right sided facial palsy.   EXTREMITIES: ataxic movements, atrophy of lower extremities.    Data   I personally reviewed no images or EKG's  today.

## 2020-01-26 PROCEDURE — 25000132 ZZH RX MED GY IP 250 OP 250 PS 637: Performed by: STUDENT IN AN ORGANIZED HEALTH CARE EDUCATION/TRAINING PROGRAM

## 2020-01-26 PROCEDURE — 25000125 ZZHC RX 250: Performed by: STUDENT IN AN ORGANIZED HEALTH CARE EDUCATION/TRAINING PROGRAM

## 2020-01-26 PROCEDURE — G0378 HOSPITAL OBSERVATION PER HR: HCPCS

## 2020-01-26 PROCEDURE — 25000131 ZZH RX MED GY IP 250 OP 636 PS 637: Performed by: STUDENT IN AN ORGANIZED HEALTH CARE EDUCATION/TRAINING PROGRAM

## 2020-01-26 RX ADMIN — DEXAMETHASONE 0.75 MG: 0.75 TABLET ORAL at 08:50

## 2020-01-26 RX ADMIN — OMEPRAZOLE 40 MG: 20 CAPSULE, DELAYED RELEASE ORAL at 08:50

## 2020-01-26 RX ADMIN — POLYETHYLENE GLYCOL 3350, SODIUM SULFATE ANHYDROUS, SODIUM BICARBONATE, SODIUM CHLORIDE, POTASSIUM CHLORIDE 500 ML/HR: 236; 22.74; 6.74; 5.86; 2.97 POWDER, FOR SOLUTION ORAL at 00:25

## 2020-01-26 RX ADMIN — SULFAMETHOXAZOLE AND TRIMETHOPRIM 1 TABLET: 400; 80 TABLET ORAL at 20:20

## 2020-01-26 RX ADMIN — SULFAMETHOXAZOLE AND TRIMETHOPRIM 1 TABLET: 400; 80 TABLET ORAL at 08:50

## 2020-01-26 RX ADMIN — MUPIROCIN: 20 OINTMENT TOPICAL at 08:50

## 2020-01-26 RX ADMIN — SERTRALINE HYDROCHLORIDE 150 MG: 100 TABLET ORAL at 08:50

## 2020-01-26 RX ADMIN — POLYETHYLENE GLYCOL 3350, SODIUM SULFATE ANHYDROUS, SODIUM BICARBONATE, SODIUM CHLORIDE, POTASSIUM CHLORIDE 750 ML/HR: 236; 22.74; 6.74; 5.86; 2.97 POWDER, FOR SOLUTION ORAL at 18:30

## 2020-01-26 RX ADMIN — FEXOFENADINE HYDROCHLORIDE 180 MG: 180 TABLET, FILM COATED ORAL at 21:51

## 2020-01-26 NOTE — PLAN OF CARE
VSS afebrile, no pain, nausea or vomiting. Abdomen soft with active bowel sounds. Golytely rate increased to 750/hr per NG tube. Dressing changed on L knee old wound using NS wash and Bacitracin. Please monitor for further signs of infection. One large BM, soft and watery around 1100, after which no output yet. Keep monitoring GI status and notify MD if no stools. May need rate increased up to 1L/h.

## 2020-01-26 NOTE — PLAN OF CARE
Patient admitted around 1715 for NG tube placement and bowel clean out. NG tube placed, advanced per recommendations from x-ray. OK to start Golytely. No PIV placed at this time, patient tolerating clear liquids. Void x1. Per patient, last stool yesterday. Dad not present at bedside, here for admission and then left until patient is ready for discharge. Hourly rounding completed. Continue plan of care.

## 2020-01-26 NOTE — PLAN OF CARE
Afebrile. VSS. LSC on RA. GoLytely at goal of 500/hr. Medium BMx1. Denies pain or n/v. Assist of 2 with gait belt to [facility] [place], otherwise wheelchair bound. Hourly rounding completed. Will continue POC.

## 2020-01-26 NOTE — PROGRESS NOTES
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 He is hemodynamically stable and otherwise clinically well-appearing.    Updates today:  - Increased rate of NG GoLytely infusion to 750 cc/hr this morning  - Patient started stooling early this afternoon, will plan to continue clean out until stools are clear x3    FEN:  - Clear liquid diet during bowel clean out  - Holding PTA supplements for now: Ca, vitamin D, K-phos, vitamin E    GI:   Acute on chronic constipation  - Continue NG GoLytely clean out at 750 ml/hr until clear stool x 3  - Consider post clean out abdominal XR  - Hold PTA bowel regimen (linzess, miralax, senna)  - Omeprazole 40 mg daily     Heme/Onc:  Grade 2 Ependymoma s/p multiple resections, radiation and chemothearpy: on study ADVL 1513.  - Entinostat 7 mg weekly    Endo:  Chronic steroid therapy - followed by Dr. Martin  Iatrogenic adrenal insufficiency  - PTA Decadron 0.75 mg QAM  - Continue bactrim ppx 400mg BID     MSK:  Steroid-induced skin atrophy  Multiple skin wounds  - Apply bacitracin to wounds as needed with dressing changes    Neuro/Psych:  Hx brain tumor and hemorrhagic stroke  Cranial nerve palsies  Ataxia   Depression  Agitation  - Olanzapine 30mg at bedtime  - Amitriptyline 50mg at bedtime  - Zoloft 150mg daily    Allergies:  - Continue PTA Allegra 180mg qPM      Pulm:  Hx of CANDELARIO: previously used CPAP  - Routine vitals w/ spot pulse ox        Diet: Clear Liquid Diet    Lines: no  access  Code Status: Full    Disposition Plan   Expected discharge: Pending completion of bowel clean out. Anticipate discharge in the next 1 or 2 days.    Geo was evaluated and discussed with Heme/Onc Fellow Dr. Herbie Eddy and Attending physician Dr. Wilder.    Bruce Hutchinson MD  PL-1, Pediatrics  [facility]  Pager: [phone]    Physician Attestation   I, Zoraida Wilder MD, MD, saw this patient with the resident and agree with the resident/fellow's findings and plan of care as documented in the note.      I personally reviewed vital signs, medications, labs and imaging    Zoraida Wilder MD, MD  Date of Service (when I saw the patient): 01/26/20    ______________________________________________________________________    Interval History   No acute events overnight. Tolerated NG GoLytely well at rate of 500cc/hr overnight without issue. Had one medium bowel movement shortly after start of clean out, but then no other stool overnight.    Data reviewed today: I reviewed all medications, new labs and imaging results over the last 24 hours.    Physical Exam   Vital Signs: Temp: 95.9  F (35.5  C) Temp src: Axillary BP: 108/77 Pulse: 95 Heart Rate: 95 Resp: 18 SpO2: 97 % O2 Device: None (Room air)    Weight: 196 lbs 14.4 oz    GENERAL: Laying in bed, well appearing, talkative, no acute distress  SKIN:  Multiple wounds on legs, face, in various stages of healing. Bandaging noted in place below left knee. Purplish striae are seen over extremities.   EYES: Normal conjunctivae.  LUNGS: Clear. No rales, rhonchi, wheezing or retractions  HEART: Regular rhythm. Normal S1/S2. No murmurs. Normal pulses.  ABDOMEN: Soft, non-tender, not distended, no masses or hepatosplenomegaly. Bowel sounds normal.   NEUROLOGIC: Right sided facial palsy. Ataxic movements.  EXTREMITIES: Arophy of lower extremities.    Data   Recent Labs   Lab 01/21/20  1313   WBC 3.5*   HGB 13.2*    MCV 87   *      POTASSIUM 4.8   CHLORIDE 107   CO2 33*   BUN 20   CR 1.27*   ANIONGAP 1*   KATIE 9.2   *   ALBUMIN 3.3*   PROTTOTAL 6.6*   BILITOTAL 0.5   ALKPHOS 115   ALT 20   AST 25     Recent Results (from the past 24 hour(s))   XR Abdomen Port 1 View    Narrative    XR ABDOMEN PORT 1 VW  1/25/2020 9:18 PM      HISTORY: ng placement, pre-bowel cleanout    COMPARISON: 12/22/2019    FINDINGS:   Portable supine view of the upper abdomen. Enteric tube tip and  sidehole projecting over the lower esophagus. Gas distended bowel  loops noted in the upper abdomen.      Impression    IMPRESSION:   Enteric tube tip and sidehole projected over the lower esophagus.  Recommend advancement at least 10 cm.    MARIO ALBERTO AYALA MD   XR Abdomen Port 1 View    Narrative    XR ABDOMEN PORT 1 VW  1/25/2020 11:13 PM      HISTORY: after NG advancement, pre-bowel cleanout    COMPARISON: Same day    FINDINGS:   Portable supine view of the abdomen. Enteric tube tip and sidehole  project over the stomach. There is diffuse nonobstructive bowel gas  distention.      Impression    IMPRESSION:   Enteric tube tip and sidehole project over the stomach.    MARIO ALBERTO AYALA MD     Medications     polyethylene glycol 750 mL/hr (01/26/20 0852)       [Held by provider] Calcium-Vitamin D-Vitamin K  1 Dose Oral BID     dexamethasone  0.75 mg Oral Daily     fexofenadine  180 mg Oral At Bedtime     mupirocin   Topical Daily     omeprazole  40 mg Oral QAM     [Held by provider] potassium phosphate (monobasic)  500 mg Oral BID     sertraline  150 mg Oral Daily     sodium chloride (PF)  3 mL Intracatheter Q8H     sulfamethoxazole-trimethoprim  1 tablet Oral BID     [Held by provider] vitamin D3  2,000 Units Oral Daily     [Held by provider] vitamin E  400 Units Oral Daily

## 2020-01-27 ENCOUNTER — APPOINTMENT (OUTPATIENT)
Dept: GENERAL RADIOLOGY | Facility: CLINIC | Age: 20
End: 2020-01-27
Attending: PEDIATRICS
Payer: COMMERCIAL

## 2020-01-27 ENCOUNTER — TELEPHONE (OUTPATIENT)
Dept: PSYCHIATRY | Facility: CLINIC | Age: 20
End: 2020-01-27

## 2020-01-27 PROCEDURE — G0378 HOSPITAL OBSERVATION PER HR: HCPCS

## 2020-01-27 PROCEDURE — 25000131 ZZH RX MED GY IP 250 OP 636 PS 637: Performed by: STUDENT IN AN ORGANIZED HEALTH CARE EDUCATION/TRAINING PROGRAM

## 2020-01-27 PROCEDURE — 74018 RADEX ABDOMEN 1 VIEW: CPT

## 2020-01-27 PROCEDURE — 25000132 ZZH RX MED GY IP 250 OP 250 PS 637: Performed by: STUDENT IN AN ORGANIZED HEALTH CARE EDUCATION/TRAINING PROGRAM

## 2020-01-27 RX ADMIN — OMEPRAZOLE 40 MG: 20 CAPSULE, DELAYED RELEASE ORAL at 09:13

## 2020-01-27 RX ADMIN — SULFAMETHOXAZOLE AND TRIMETHOPRIM 1 TABLET: 400; 80 TABLET ORAL at 19:21

## 2020-01-27 RX ADMIN — MUPIROCIN: 20 OINTMENT TOPICAL at 09:18

## 2020-01-27 RX ADMIN — FEXOFENADINE HYDROCHLORIDE 180 MG: 180 TABLET, FILM COATED ORAL at 21:46

## 2020-01-27 RX ADMIN — SULFAMETHOXAZOLE AND TRIMETHOPRIM 1 TABLET: 400; 80 TABLET ORAL at 09:13

## 2020-01-27 RX ADMIN — SERTRALINE HYDROCHLORIDE 50 MG: 100 TABLET ORAL at 09:14

## 2020-01-27 RX ADMIN — DEXAMETHASONE 0.75 MG: 0.75 TABLET ORAL at 09:13

## 2020-01-27 NOTE — PROGRESS NOTES
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    Updates today:  - Clear stools and follow up Abdominal XR showing minimal colonic stool  - resumed regular diet  - Preparation for discharge complicated by paranoia, agitation and refusal to discharge  - Meet with father who feels discharge to home is an unsafe option  - consult social work  - Page to primary psychiatrist Dr. Fay (Adult), outstanding as well as Adult IP resident  - Discussed the case with Dr. Jim of peds psych who will reach out to Adult colleagues and follow up in AM  - Patient has had history of behavioral outburst, refusal of behavioral meds related to his medical care.   - Recent dismissal from group home, he remains admitted pending safe dispo plan.     FEN:  - Regular diet  - Holding PTA supplements for now: Ca, vitamin D, K-phos, vitamin E    GI:   Acute on chronic constipation  - resume bowel regimen (linzess, miralax, senna)  - Omeprazole 40 mg daily     Heme/Onc:  Grade 2 Ependymoma s/p multiple resections, radiation and chemothearpy: on study ADVL 1513.  - Entinostat 7 mg weekly    Endo:  Chronic steroid therapy - followed by Dr. Martin  Iatrogenic adrenal insufficiency  - PTA Decadron 0.75 mg QAM  - Continue bactrim ppx 400mg BID     MSK:  Steroid-induced skin atrophy  Multiple skin wounds  - Apply bacitracin to wounds as needed with dressing changes    Neuro/Psych:  Hx brain tumor and hemorrhagic stroke  Cranial nerve palsies   "Ataxia   Depression  Agitation  - Olanzapine 30mg at bedtime  - Amitriptyline 50mg at bedtime  - Zoloft 150mg daily  - Paged Dr. Fay of Adult Psychiatry  - may need Acute Peds Psych Consult if behaviors continue.     Allergies:  - Continue PTA Allegra 180mg qPM      Pulm:  Hx of CANDELARIO: previously used CPAP  - Routine vitals w/ spot pulse ox        Diet: Diet  Regular Diet Adult    Lines: no access  Code Status: Full    Disposition Plan   Expected discharge: Pending completion of bowel clean out. Anticipate discharge in the next 1 or 2 days.    Geo was evaluated and discussed  Attending physician Dr. Wlider.    Sven Santos MD  PGY-2  Pager: [phone]    Physician Attestation   I, Zoraida Wilder MD, MD, saw this patient with the resident and agree with the resident/fellow's findings and plan of care as documented in the note.      I personally reviewed vital signs, medications, labs and imaging.    Zoraida Wilder MD, MD  Date of Service (when I saw the patient): 01/27/20    ______________________________________________________________________    Interval History   Afebrile. VSS. Clear bowel movement overnight. Patient states.\"you are lying\" \" I am not Cleaned out\" Became agitated with father, throwing him out of the room, when he requested that they leave. Patient screaming and swinging in the air. Settles and falls asleep when alone in the room.       No other complaints.        Data reviewed today: I reviewed all medications, new labs and imaging results over the last 24 hours.    Physical Exam   Vital Signs: Temp: 98.5  F (36.9  C) Temp src: Axillary BP: 121/71 Pulse: 82 Heart Rate: 80 Resp: 16 SpO2: 91 % O2 Device: None (Room air)    Weight: 196 lbs 14.4 oz    GENERAL: Laying in bed, well appearing, talkative,    SKIN:  Purplish striae are seen over extremities.   EYES: Normal conjunctivae.  LUNGS: Clear. No rales, rhonchi, wheezing or retractions  HEART: Regular rhythm. Normal " S1/S2. No murmurs. Normal pulses.  ABDOMEN: Soft, non-tender, not distended, no masses or hepatosplenomegaly.   NEUROLOGIC: Right sided facial palsy. slurred speech  EXTREMITIES: Arophy of lower extremities.    Data   Recent Labs   Lab 01/21/20  1313   WBC 3.5*   HGB 13.2*   MCV 87   *      POTASSIUM 4.8   CHLORIDE 107   CO2 33*   BUN 20   CR 1.27*   ANIONGAP 1*   KATIE 9.2   *   ALBUMIN 3.3*   PROTTOTAL 6.6*   BILITOTAL 0.5   ALKPHOS 115   ALT 20   AST 25     Recent Results (from the past 24 hour(s))   XR Abdomen Port 1 View    Narrative    XR ABDOMEN PORT 1 VW 1/27/2020 9:41 AM    CLINICAL HISTORY: Release once patient has had 3 bowel movements,  Post-bowl clean-out    COMPARISON: 1/25/2020    FINDINGS: Image is affected by patient motion. Feeding tube in the  stomach. Decreased colonic stool. Nonobstructive bowel gas pattern.      Impression    IMPRESSION: Decreased colonic stool.    HEMA CAO MD     Medications     polyethylene glycol 750 mL/hr (01/26/20 1830)       [Held by provider] Calcium-Vitamin D-Vitamin K  1 Dose Oral BID     dexamethasone  0.75 mg Oral Daily     fexofenadine  180 mg Oral At Bedtime     mupirocin   Topical Daily     omeprazole  40 mg Oral QAM     [Held by provider] potassium phosphate (monobasic)  500 mg Oral BID     sertraline  150 mg Oral Daily     sodium chloride (PF)  3 mL Intracatheter Q8H     sulfamethoxazole-trimethoprim  1 tablet Oral BID     [Held by provider] vitamin D3  2,000 Units Oral Daily     [Held by provider] vitamin E  400 Units Oral Daily

## 2020-01-27 NOTE — DISCHARGE INSTRUCTIONS
For temperature >100.5, increased nausea, vomiting, pain or any other concerns, please call [phone] & ask to talk to the Pediatric Oncology Fellow On Call.    Tuesdays - Labs at local clinic.    Friday, February 21 @ 3 PM - Psychology    Tuesday, February 25 @ 1:30 PM - [facility] for labs & exam.

## 2020-01-27 NOTE — PLAN OF CARE
Pt admitted for clean out; xray showed pt is clear; pt having clear liquid stools; team here to discuss plan with patient, but pt is reluctant to go home ; dad is here; plan to review process with dad.

## 2020-01-27 NOTE — TELEPHONE ENCOUNTER
Social Work   Incoming/Outgoing Call  [facility]    Incoming From:  Eric Hicks, patient's father and guardian    Reason for Call:  Eric looking for assistance for patient's immediate needs    Response/Plan:  Eric gave writer brief update on patient. Geo moved to an assisted living placement in November of 2019. Last week, he was evicted from the placement. Eric noted reason for eviction was that patient was transferring himself from his bed to the floor and other places. He would end up with bruises and cuts he could not explain. Last week he transferred himself and ended up with 13 stiches after falling. Geo has also been frustrated and combative with assisted living staff. It appears he may have tried to kick or hit due to frustration. He was staying with his father since eviction. They are having a hard time figuring out what to do next. Geo became frustrated/combative with his father over the weekend and Eric is unsure if he can continue to manage situation and handle Geo's needs.     Eric emailed RYLAND Hodgson with pediatric hematology/oncology. Eric also provided contact information for Lamin Martinez, patient's waiver worker at [facility] ([phone]). He gave verbal permission for writer to talk with worker.     Geo is currently in hospital for bowel clean out. Eric is unsure if he feels safe having Geo return to his home today.    RYLAND talked briefly with Rema Hodgson,  with peds oncology. She is open to coordinating with writer and community waiver worker to identify next steps. Per Rema, Geo is refusing to return to his father's home. His father has stated he does not feel safe bringing Geo home.    Writer had conference call with Rema and Lamin Martinez. Lamin anabella saw family last Thursday (upon eviction from Assisted Living). He reports little to no advanced notice of Geo being asked to leave to the [facility]. RYLAND expressed concern about legalities of action, however given that AL  has expressed concern about their ability to maintain safety of patient, it would likely not be appropriate to request he return there in the short term.    Lamin has a list of waiver placements and has been talking with his coworkers about placement options. He was agreeable to writer calling [facility] to seek assistance in appropriate placements. RYLAND also encouraged Lamin to look at [facility]'s [place] openings list to see if there may be an appropriate opening. Lamin has used list once or twice before for other clients. Lamin will also check on possibility of short term respite placement for Geo.    Group will continue to communicate to assist patient to getting to appropriate placement as soon as possible, although this will likely take some time. Placement will need to be able to meet patient's physical needs, along with having experience and ability to assist Geo in managing frustrations related to physical and mental health.    RYLAND left message with [facility] CADI waiver team to request ideas for appropriate placement.    Update: RYLAND received call back from [facility] with alternative contact info. RYLAND talked with Grace (??) Yaw. She is sending an email to CADI worker letting him know  He can reach out to the licenser's department, who can give him information on openings and appropriateness of services. There is one small approval that will need to be made and Grace will also send him information on this.    Will route to patient's current psychiatric provider(s) as an FYI.   Please call or EPIC message with any questions or concerns.    Mayte Tafoya, GARCIA, [facility]  [phone]

## 2020-01-27 NOTE — TELEPHONE ENCOUNTER
"Social Work   Incoming/Outgoing Email  [facility]    Correspondence with: Eric Hicks    Reason for contact:  Correspondence with team on patient needs    Content:      Donnell Kwong-    I think that's a great idea. I'm going to try one more time to meet with Geo now and then I can give you a call. Thanks!    Karina  From: aMyte Tafoya   Sent: Monday, January 27, 2020 11:27 AM  To: Eric Hicks,\"Karina Hodgson\",Higinio Yuen   Subject: Re: Geo Update And a request for some help.      I'm wondering if it would be helpful to do a conference call (either with the 3 social workers or with family as well)? This way we wouldn't be duplicating phone conversations, etc and possibly work through ideas together.     If anyone is open to this, I should be available between noon and 12:30 and anytime after 1:30 today.    Elenita  [phone]    From: Eric Hicks   Sent: Monday, January 27, 2020 10:53 AM  To: Karina Hodgson ([employer]); Christianne Sevilla ; Mayte Tafoya; Higinio Martinez  Subject: Re: Geo Update And a request for some help.       + Lamin      From: Kairna Hodgson   Sent: Monday, January 27, 2020 10:51:26 AM  To: Christianne Sevilla; Eric Hicks; Mayte Tafoya ([facility])  Subject: RE: Geo Update And a request for some help.     Hi everyone! I m wondering if the CADI  has some ideas too, as I can t imagine this is the first time someone has been asked to leave their assisted living facility. If you d like, I d be happy to talk with Lamin if you want to send me his contact info. As I was talking to colleagues, I m wondering if a group home that specializes in TBI s would be helpful, as they are more accustomed to the medical piece and the behavior piece, in addition to having a younger clientele. There is a good resource for looking for homes through [employer] https://www.5by/services/apply/Scimetrika-key- I made an account and was able to " log in and find available group home placements. But again, I feel like Ray would know a lot more about this and how it works. Let s get him in on the conversation so we can divide and conquer.           Karina         From: Christianne Sevilla  Sent: Monday, January 27, 2020 10:03 AM  To: Eric Hicks; Karina Hodgson; Mayte Tafoya ([facility])  Subject: Re: Geo Update And a request for some help.         Circling back to answer Karina's questions:         Margaret-         A couple of questions. What assisted was he at that he was evicted from? Was it paid by a CADI waiver or were you paying privately? Does it have to be in [facility]?         [facility] [place] is the assisted living company (they maybe associated with [employer] also).    CADI paid for the services    Private pay for the rent    I believe Ray our  said it could be in other [facility].         Eric please let Karina know if something else is needed.         Christianne    From: Eric Hicks   Sent: Monday, January 27, 2020 9:48 AM  To: Karina Hodgson; [email or web address]  Cc: Christianne Sevilla  Subject: Re: Geo Update And a request for some help.         + Elenita         From: Karina Hodgson  Sent: Monday, January 27, 2020 9:32 AM  To: 'Eric Hicks'  Cc: Christianne Sevilla   Subject: RE: Geo Update And a request for some help.         Margaret-         A couple of questions. What assisted was he at that he was evicted from? Was it paid by a CADI waiver or were you paying privately? Does it have to be in [facility]?                   From: Eric Hicks  Sent: Sunday, January 26, 2020 7:29 PM  To: Karina Hodgson  Cc: Christianne Sevilla  Subject: Geo Update And a request for some help.         Donnell Collins,         You may or may not have heard that Geo was evicted from his customized assisted living home, last Thursday.  There were a couple big reasons for this.  First, he was unsafely transferring himself between his bed,  "the floor, and his office chair; against the \"rules\" in his lease.  This resulted in cuts and bruising that he could not explain; including a cut requiring 13 stitches.  Second, he was becoming combative with some of the staff.          Christianne and I are a bit concerned that we may not be able to care for Geo, safely.  This is due to his recent deterioration in his walking and some of his decisions about his transferring.  The deterioration in his walking is the result of him quitting his therapies, we believe.         So, Christianne and I are wondering if you ever work with any residences that deal specifically with young adults like Geo.  On that note do you know of any support groups for young adults that have gone through what Geo has (or have similar disabilities, while maintaining strong cognitive skills).         We really want the best for Geo.  We are both concerned that we will not be able to provide a home for Geo as he gets older, or if he continues to make unsafe decisions.         I do plan to reach out to Elenita Tafoya, the Psychiatry clinic .  I did not have her email, otherwise I would have copied her on this.  Please feel free to discuss this with her, if you know her or feel comfortable doing so.          Christianne, please feel free to add anything additional.         Thanks,             Will route to patient's current psychiatric provider(s) as an FYI.   Please call or EPIC message with any questions or concerns.    Mayte Tafoya, MSW, LICSW      "

## 2020-01-27 NOTE — PROGRESS NOTES
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    RYLAND met with Geo individually and coordinated care separately with conversations with dadEric, Mom-Christianne, medical team, Psych RYLAND Kwong, and CADI RYLAND Kimble ([phone]).     Geo was recently \"evicted\" (1.22.20) from his customized assisted living home for a multitude of reasons, mainly medication refusal and aggressive behavior towards staff. He has been staying with dad, Eric, since that time which has been increasingly difficult and unsafe. Eric identified that Geo's physical and mental health have significantly deteriorated. Eric is unable to physically assist Geo in walking, causing safety risks to them both. Geo has chosen to not participate in therapies for several months. Geo is also impulsive and will at times get out of bed without warning, putting himself in danger of falling/getting hurt. He has also been aggressive towards Eric.     Geo is fixated on being constipated despite the medical teams efforts to prove to him that he is not. He feels the doctors are lying to him \"they're all against me\". He reports feeling increasingly frustrated.     INTERVENTION:     Supportive check in with Geo. Asked Geo to tell RYLAND what he wants/where he wants to live. He was unable to identify, and RYLAND had significant difficulty understanding Geo (speech has declined). He confirmed his suspicions that the doctors are lying to him \"I know they have the answers, and they aren't " "giving them to me!\".     RYLAND met separately with dad, Eric. Eric does not feel safe taking Geo in the car or Geo living with him at this time. This is mainly due to his physical deterioration and Erics inability to physically support Geo with his ADL's without putting his and Geo's safety at risk. Mom, Christianne, feels similiarly.     RYLAND also spoke with psych SW Elenita and CADI worker Lamin. Lamin is actively working on finding Geo a new living situation, though this could take several weeks.     ASSESSMENT:     Geo was somewhat engaged, though clearly frustrated. He does not feel better after his bowel clean out and does not believe it was successful. He was very difficult to understand, moreso than he has been historically. He feels he is being lied to. Geo feels as though his constipation is the sole reason why he is unable to walk.    Parents are understandably overwhelmed. Neither parent feels safe bringing Geo at this time, as he can not physically walk or stand without significant assistance, resulting in falls. Parents are very reasonable and good advocates for Geo, though currently feel stuck. Parents are legal guardians.     PLAN:     Social work will continue to assess needs and provide ongoing psychosocial support and access to resources.       GARCIA Lewis, [facility]  Pediatric Hem/Onc   Phone: [phone]  Pager: [phone]            "

## 2020-01-27 NOTE — PLAN OF CARE
Afebrile, VSS. Golytely running. Pt having clear stools. At first pt stated he was done with the clean out and wanted to stop the clean out but then stated he was upset and thought he wasn't cleaned out and wanted to speak with the doctor. MD spoke with pt. Pt got upset when talking about getting a xray and stopping the bowel clean out. Plan is to stop Golytely and take xray in the morning to determine if clean out is done or not. Pt ok after this plan was made. Utilizing 2-3 staff with assisting to the commode. No family present at bedside. No further issues overnight. Will update as needed.

## 2020-01-27 NOTE — PLAN OF CARE
Pt will not be going home today; dad feels it is not safe to take pt home and pt does not want to go home; Social Work here to talk with pt and dad ; may wait to find placement in a group home: pt was upset earlier with the plan but seems to have calmed now; NG still inserted; will monitor overnight; notify team of any new changes.

## 2020-01-27 NOTE — PLAN OF CARE
AVSS. No c/o nausea, no emesis. No pain. Golytly continues to run at 750ml/hr through NG. Stools trending toward clear, but has yet to have a clear stool. Good stool and urine output. No family at the bedside. Assist of 2 or 3 up to the commode with gait belt use. No other concerns. Will continue to monitor.

## 2020-01-28 ENCOUNTER — TELEPHONE (OUTPATIENT)
Dept: BEHAVIORAL HEALTH | Facility: CLINIC | Age: 20
End: 2020-01-28

## 2020-01-28 ENCOUNTER — TELEPHONE (OUTPATIENT)
Dept: PSYCHIATRY | Facility: CLINIC | Age: 20
End: 2020-01-28

## 2020-01-28 LAB
BASOPHILS # BLD AUTO: 0 10E9/L (ref 0–0.2)
BASOPHILS NFR BLD AUTO: 0.6 %
DIFFERENTIAL METHOD BLD: ABNORMAL
EOSINOPHIL # BLD AUTO: 0.2 10E9/L (ref 0–0.7)
EOSINOPHIL NFR BLD AUTO: 3.2 %
ERYTHROCYTE [DISTWIDTH] IN BLOOD BY AUTOMATED COUNT: 14.3 % (ref 10–15)
HCT VFR BLD AUTO: 41.4 % (ref 40–53)
HGB BLD-MCNC: 13.6 G/DL (ref 13.3–17.7)
IMM GRANULOCYTES # BLD: 0 10E9/L (ref 0–0.4)
IMM GRANULOCYTES NFR BLD: 0.6 %
LYMPHOCYTES # BLD AUTO: 0.7 10E9/L (ref 0.8–5.3)
LYMPHOCYTES NFR BLD AUTO: 12.9 %
MCH RBC QN AUTO: 27.8 PG (ref 26.5–33)
MCHC RBC AUTO-ENTMCNC: 32.9 G/DL (ref 31.5–36.5)
MCV RBC AUTO: 85 FL (ref 78–100)
MONOCYTES # BLD AUTO: 0.4 10E9/L (ref 0–1.3)
MONOCYTES NFR BLD AUTO: 8.2 %
NEUTROPHILS # BLD AUTO: 4 10E9/L (ref 1.6–8.3)
NEUTROPHILS NFR BLD AUTO: 74.5 %
NRBC # BLD AUTO: 0 10*3/UL
NRBC BLD AUTO-RTO: 0 /100
PLATELET # BLD AUTO: 132 10E9/L (ref 150–450)
RBC # BLD AUTO: 4.89 10E12/L (ref 4.4–5.9)
WBC # BLD AUTO: 5.4 10E9/L (ref 4–11)

## 2020-01-28 PROCEDURE — 85025 COMPLETE CBC W/AUTO DIFF WBC: CPT | Performed by: PEDIATRICS

## 2020-01-28 PROCEDURE — 25000132 ZZH RX MED GY IP 250 OP 250 PS 637: Performed by: STUDENT IN AN ORGANIZED HEALTH CARE EDUCATION/TRAINING PROGRAM

## 2020-01-28 PROCEDURE — 36415 COLL VENOUS BLD VENIPUNCTURE: CPT | Performed by: PEDIATRICS

## 2020-01-28 PROCEDURE — G0378 HOSPITAL OBSERVATION PER HR: HCPCS

## 2020-01-28 PROCEDURE — 25000131 ZZH RX MED GY IP 250 OP 636 PS 637: Performed by: STUDENT IN AN ORGANIZED HEALTH CARE EDUCATION/TRAINING PROGRAM

## 2020-01-28 RX ADMIN — OMEPRAZOLE 40 MG: 20 CAPSULE, DELAYED RELEASE ORAL at 10:12

## 2020-01-28 RX ADMIN — FEXOFENADINE HYDROCHLORIDE 180 MG: 180 TABLET, FILM COATED ORAL at 21:59

## 2020-01-28 RX ADMIN — MUPIROCIN: 20 OINTMENT TOPICAL at 10:14

## 2020-01-28 RX ADMIN — DEXAMETHASONE 0.75 MG: 0.75 TABLET ORAL at 10:12

## 2020-01-28 RX ADMIN — SULFAMETHOXAZOLE AND TRIMETHOPRIM 1 TABLET: 400; 80 TABLET ORAL at 20:50

## 2020-01-28 RX ADMIN — SERTRALINE HYDROCHLORIDE 150 MG: 100 TABLET ORAL at 10:12

## 2020-01-28 NOTE — DISCHARGE SUMMARY
[facility], [place]  Discharge Summary - Pediatrics       Date of Admission:  1/25/2020  Date of Discharge:  2/18/2020  Discharging Provider: Zoraida Wilder MD  Discharge Service: Pediatric Hematology-Oncology    Discharge Diagnoses    1. Acute on chronic constipation  2. History of depression  3. Behavioral outbursts  4. Paranoia   5. History of Grade II Ependymoma  6. History of iatrogenic adrenal insufficiency    Follow-ups Needed After Discharge    - Follow up with psychiatry on 2/21/2020 @ 3:00 PM (scheduled)  - Follow up with heme/onc on 2/25/2020 @ 1:30 PM (scheduled)     Discharge Disposition   Discharged to [facility]  Condition at discharge: Stable    Hospital Course   Geo Hicks was admitted on 1/25/2020 for acute constipation with need for bowel cleanout.  The following problems were addressed during his hospitalization:    1. Acute on chronic constipation  NG tube was placed with successful cleanout performed via Golytely protocol. AXR obtained after clearance of stool which reinforced successful cleanout. Geo was continued on his previous bowel regimen without issue. Throughout admission he occasionally needed additional doses of Miralax or magnesium citrate - these were generally given when Geo did not stool for >48 hours.    Bowel regimen on discharge was polyethylene glycol 17g 4 times daily, Senna-docusate 8.6-50mg 1 tablet daily, and linaclotide 290mcg daily.    2. History of depression  3. Behavioral outbursts  4. Paranoia   Geo was recently dismissed from his group home 1/22/20 for medication refusal and aggressive behaviors toward staff. During his stay in our facility, he also expressed his wish not to take medication and experienced thoughts that the medical staff was withholding information from him in relation to his constipation and lying to him about treatment success. This was linked to his ongoing thoughts that he is  unable to walk and has ongoing medical issues solely due to his constipation. He expressed a desire to keep his NG tube and stay in the hospital as he needed to continue his cleanout to improve these conditions. Initially, adult inpatient psychiatry was considered for discharge placement, however due to long waiting list, this was not an option per family. His mental status improved once his constipation was relieved. He was eventually discharged to a new group home. Social work was very involved during Geo's hospitalization with planning for a safe and appropriate discharge for him.    5. History of Grade II Ependymoma  Geo did not have any complications related to his underlying oncologic history. He was continued on his Entinostat weekly dose during hospitalization. At discharge, he was due for the start of a new cycle, however, his platelets were below study threshold. He should follow up in Pediatric Oncology clinic on 2/25 for lab testing and to deliver weekly Entinostat if he meets study thresholds.     6. History of iatrogenic adrenal insufficiency  Geo did not have any complications related to his history of chronic steroid use and adrenal insufficiency. He was continued on his prior to admission hydrocortisone without issue. Geo is due for follow up with his outpatient endocrinologist Dr. Martin (he was last seen by Dr. Martin over a year ago). There was discussion at that time about transitioning to hydrocortisone from decadron in the past, but Geo continues on Decadron 0.75mg daily.     7. Moderate-severe oral dysphagia with suspected pharyngeal dysphagia   Seen by SLP while inpatient. Recommended dysphagia level three diet. However, this diet was frustrating to Geo and causing escalating frustration. Per discussion with Geo and his father, relaxed diet restrictions. SLP recommendations as follows: Recommend cutting Geo's food into small bite-size pieces. If Geo is eating  carrots, please cut into matchstick pieces as compared to coin-shaped. Please avoid cutting foods into coin shapes, as this increases his choking risk.     Consultations This Hospital Stay   PSYCHIATRY IP CONSULT  PSYCHIATRY IP CONSULT  PEDS PSYCHOLOGY IP CONSULT  PHYSICAL THERAPY PEDS IP CONSULT  PHYSICAL MEDICINE & REHAB ASSESSMENT FOR REHAB PLACEMENT ADULT IP CONSULT  WOUND OSTOMY CONTINENCE NURSE  IP CONSULT  SPEECH LANGUAGE PATH PEDS IP CONSULT    Code Status   Full Code     Geo was evaluated and discussed with Heme/Onc Fellow Dr. Eddy and Attending Dr. Wilder.    Jennifer Montaño MD  Pediatrics, PGY-1    Physician Attestation   I, Zoraida Wilder MD, saw and evaluated this patient prior to discharge.  I discussed the patient with the resident/fellow and agree with plan of care as documented in the note.      I personally reviewed vital signs, medications, labs and imaging.    I personally spent 45 minutes on discharge activities.    Zoraida Wilder MD, MD  Date of Service (when I saw the patient): 02/18/20      ______________________________________________  Physical Exam   Vital Signs: Temp: 97.7  F (36.5  C) Temp src: Axillary BP: 117/74 Pulse: 87 Heart Rate: 112 Resp: 14 SpO2: 93 % O2 Device: None (Room air)    Weight: 196 lbs 14.4 oz    GENERAL: Laying in bed listening to music, awake, well-appearing and in no acute distress  SKIN:  Purplish striae are seen over upper and lower extremities and abdomen (baseline). Small superficial wound on left second toe, improving. Several healing and new wounds over shins. Stable. No active bleeding.  CV: Distal extremities warm and well perfused. Heart rate and rhythm regular.   RESP: Breathing comfortably, no signs of increased work of breathing or respiratory distress. Lungs clear to auscultation bilaterally.   ABD: Soft and nontender. Bowel sounds soft.   NEURO: Loss of mouth motor control and slurred speech at baseline.       Primary Care  Physician   Jeffrey Espinoza    Discharge Orders      Reason for your hospital stay    Constipation     Activity    Your activity upon discharge: activity as tolerated     Follow Up and recommended labs and tests    Follow up at your previously scheduled oncology appointment on 2/18/20    Needs weekly Entinostat 1/29/20    Additional psychiatry follow-up per direction of inpatient psychiatry.     Full Code     Diet    Regular diet       Significant Results and Procedures   Most Recent 3 CBC's:  Recent Labs   Lab Test 02/18/20  0713 02/12/20  0559 02/11/20  0612   WBC 3.8* 5.1 3.7*   HGB 12.1* 12.9* 12.4*   MCV 88 87 86   PLT 76* 81* 84*       Discharge Medications   Current Discharge Medication List      CONTINUE these medications which have CHANGED    Details   amitriptyline (ELAVIL) 25 MG tablet Take 2 tablets (50 mg) by mouth At Bedtime  Qty: 56 tablet, Refills: 4    Comments: Still has home supply, transitioning pharmacies. Group home or family will call to fill.  Associated Diagnoses: Ependymoma (H)      Calcium-Vitamin D-Vitamin K (VIACTIV CALCIUM PLUS D) 650-12.5-40 MG-MCG-MCG CHEW Take 1 chew tab by mouth 2 times daily  Qty: 90 tablet, Refills: 11    Comments: Still has home supply, transitioning pharmacies. Group home or family will call to fill.  Associated Diagnoses: Current chronic use of systemic steroids; Status post chemotherapy      dexamethasone (DECADRON) 0.5 MG tablet TAKE ONE AND ONE-HALF TABLETS (0.75MG) BY MOUTH ONCE DAILY WITH BREAKFAST.  Qty: 45 tablet, Refills: 11    Comments: Still has home supply, transitioning pharmacies. Group home or family will call to fill.  Associated Diagnoses: Ependymoma (H)      fexofenadine (ALLEGRA) 180 MG tablet TAKE 1 TABLET BY MOUTH EVERY EVENING.  Qty: 30 tablet, Refills: 11    Comments: Still has home supply, transitioning pharmacies. Group home or family will call to fill.  Associated Diagnoses: Ependymoma (H)      linaclotide (LINZESS) 290 MCG capsule Take  1 capsule (290 mcg) by mouth every morning (before breakfast)  Qty: 30 capsule, Refills: 3    Comments: Still has home supply, transitioning pharmacies. Group home or family will call to fill.  Associated Diagnoses: Slow transit constipation      !! OLANZapine (ZYPREXA) 10 MG tablet Take 1 tab (10 mg) along with 1 tab (20 mg) for a total dose of 30 mg at bedtime.  Qty: 30 tablet, Refills: 1    Comments: Still has home supply, transitioning pharmacies. Group home or family will call to fill.  Associated Diagnoses: Paranoia (H); Depression, unspecified depression type      !! OLANZapine (ZYPREXA) 20 MG tablet Take 1 tab (20 mg) along with 1 tab (10 mg) for a total dose of 30 mg at bedtime.  Qty: 30 tablet, Refills: 1    Comments: Still has home supply, transitioning pharmacies. Group home or family will call to fill.  Associated Diagnoses: Paranoia (H); Depression, unspecified depression type      omeprazole (PRILOSEC) 20 MG DR capsule Take 2 capsules (40 mg) by mouth every morning  Qty: 60 capsule, Refills: 1    Comments: Still has home supply, transitioning pharmacies. Group home or family will call to fill.  Associated Diagnoses: Ependymoma (H)      polyethylene glycol (MIRALAX) powder Take 17 g (1 capful) by mouth 3 times daily  Qty: 289 g, Refills: 3    Comments: Still has home supply, transitioning pharmacies. Group home or family will call to fill.  Associated Diagnoses: Constipation, unspecified constipation type      potassium phosphate, monobasic, (K-PHOS) 500 MG tablet Take 1 tablet (500 mg) by mouth 2 times daily  Qty: 90 tablet, Refills: 3    Comments: Still has home supply, transitioning pharmacies. Group home or family will call to fill.  Associated Diagnoses: Ependymoma (H)      sertraline (ZOLOFT) 100 MG tablet Take 1.5 tablets (150 mg) by mouth daily  Qty: 45 tablet, Refills: 1    Comments: Still has home supply, transitioning pharmacies. Group home or family will call to fill.  Associated Diagnoses:  Depression, unspecified depression type      sulfamethoxazole-trimethoprim (BACTRIM) 400-80 MG tablet Take 1 tablet by mouth 2 times daily On Saturdays and Sundays  Qty: 24 tablet, Refills: 11    Comments: Still has home supply, transitioning pharmacies. Group home or family will call to fill.  Associated Diagnoses: Ependymoma (H)      vitamin D3 (CHOLECALCIFEROL) 2000 units (50 mcg) tablet TAKE 1 TABLET BY MOUTH ONCE DAILY WITH BREAKFAST.  Qty: 30 tablet, Refills: 11    Comments: Still has home supply, transitioning pharmacies. Group home or family will call to fill.  Associated Diagnoses: Ependymoma (H)      vitamin E (TOCOPHEROL) 400 units (360 mg) capsule TAKE 1 CAPSULE BY MOUTH ONCE DAILY.  Qty: 30 capsule, Refills: 11    Comments: Still has home supply, transitioning pharmacies. Group home or family will call to fill.  Associated Diagnoses: Ependymoma (H)       !! - Potential duplicate medications found. Please discuss with provider.      CONTINUE these medications which have NOT CHANGED    Details   mupirocin (BACTROBAN) 2 % external ointment Use 2 times a day to the ear lobes and canals with flare  Qty: 22 g, Refills: 3    Associated Diagnoses: Bacterial folliculitis; Ependymoma (H)      !! order for DME Equipment being ordered: Dressing  Wound #1 lower leg, W 6 cm x, D 0.5  cm, Drainage scant, Thickness sutured     Type: non stick gauze, Brand: , Size: 4/4   Change: 1 per day    Tape/Wrap: 1 each     attach facesheet with insurance information (delete this line)  Qty: 5 each, Refills: 0    Associated Diagnoses: Laceration of left lower extremity, subsequent encounter      !! order for DME Equipment being ordered: short boot  Qty: 1 each, Refills: 0    Associated Diagnoses: Closed fracture of base of fifth metatarsal bone of left foot, initial encounter      senna-docusate (SENOKOT-S/PERICOLACE) 8.6-50 MG tablet Take 1 tablet by mouth daily And please report to team at [facility] in no stool in 36  hours.  Qty: 30 tablet, Refills: 4    Associated Diagnoses: Ependymoma (H)       !! - Potential duplicate medications found. Please discuss with provider.      STOP taking these medications       study - entinostat (IDS# 5050) 1 mg tablet Comments:   Reason for Stopping:         study - entinostat (IDS# 5050) 5 mg tablet Comments:   Reason for Stopping:         cephALEXin (KEFLEX) 500 MG capsule Comments:   Reason for Stopping:         study - entinostat (IDS# 5050) 1 mg tablet Comments:   Reason for Stopping:         study - entinostat (IDS# 5050) 5 mg tablet Comments:   Reason for Stopping:             Allergies   Allergies   Allergen Reactions     Blood Transfusion Related (Informational Only) Swelling     Periorbital swelling post platelet transfusion     No Known Drug Allergies

## 2020-01-28 NOTE — PLAN OF CARE
AVSS. LS clear on RA. No c/o pain. No n/v. Good PO intake. Good UOP; stooling watery stools. Plan to discharge tomorrow at some point after psych is consulted. No family at bedside. Pt updated on POC for evening. Hourly rounding completed. Will continue to monitor and reassess.

## 2020-01-28 NOTE — PROGRESS NOTES
After reviewing notes I see that plan has been made for transfer to inpatient psychiatry with Dr. Mckay involved. I discussed case with Dr. Foote here in consult liaison psychiatry and we will defer seeing this patient this afternoon but we will review if consult is still needed in AM. I    At this time it appears there is already a plan in place based on current assessment that he is not safe to return home with parents.

## 2020-01-28 NOTE — TELEPHONE ENCOUNTER
S: Current documentation does not indicate criteria (SI, HI, acute psychosis)  for inpatient mental health care. No psych consult ordered as of 1/28 at 1450.    B: [facility] Red Team requested inpatient psych bed for patient.    A: Writer explained to physician on Red Team that additional documentation indicating clear inpatient criteria is needed to pursue inpatient mental health placement.    R: Physician reports plan to enter psych consult.       1500: Dr. Robb and Dr. Manuel called to discuss case and need for psych placement. Dr. Robb will consult with Dr. Ramirez and update Inpatient Intake with next steps.

## 2020-01-28 NOTE — TELEPHONE ENCOUNTER
"S: Dr Hutchinson ([phone], ext [phone]) called saying he is referring pt to inpt mh unit due to increased bx prob's and aggression.  B: Pt admitted to Hematology/oncology unit 1/25 for a \"bowel clean out.\" Hx of brain cancer. He has had many neuro and bx changes after brain surgery and radiation to tx his prior brain tumor. Pt is now med cleared but is refusing to leave the hospital and is refusing to allow his NG tube to be removed even though he no longer needs it. Pt is in a wheelchair and per Dr Hutchinson is \"wheelchair bound.\" He has a fixed delusion that he can't walk due to constipation, saying his doctors are withholding tx from him and that they have not \"cleaned him out\" yet. Pt was living in a group home but was \"kicked out\" 1/22 due to refusing meds and due to being aggressive to staff. His parents are reportedly refusing to have him come home since he is not med compliant. For past 3-4 weeks he has had increased agitation and aggression. Pt has been refusing PT for past several months. Staff are reportedly pursuing another group home for pt but were told this could possibly take weeks to months. Per Dr BELL, they are pursuing a psych consult for pt. Pt has vision prob's and wears an eye patch. He also has hearing loss. Pt has sleep apnea but does not use a C-Pap. Hx of GERD. He has been using a commode in his room. ADL's are \"a challenge.\" Yesterday when pt was told his bowel clean out was finished, he became upset and verbally aggressive. He has not been physically aggressive on the unit. Dr Hutchinson said it is \"hard to say\" whether pt needs ip or op mh. He requests pt be placed on the wait list. He does have an outpt psychiatrist.   A: denies SI, refusing meds, med cleared,   R: his parents are his legal guardians, per Dr Hutchinson, but he said he is not sure who has the power to make his medical decisions for him. mbw  Author received a msg from Sal in intake that Kaleb called and said that Clarisse has agreed " to take the pt when she has an opening on #22, ahead of others on the wait list. Author discussed with Barbara Sanchez paged. dane  Discussed kassandra/ Daniel who said he does not appear to meet inpt mh criteria but if admit is needed he recommends pt admit to #12. dane

## 2020-01-28 NOTE — PROGRESS NOTES
"SPIRITUAL HEALTH SERVICES  SPIRITUAL ASSESSMENT Progress Note  [facility] ([facility]) Peds Unit 5     REFERRAL SOURCE:   initiated    Supportive check in with Geo.  Geo engaged well with me though it was difficult at times to interpret his speech.  He wants to be at the hospital but stated \"they don't know what they're doing with me.\"  Geo likes to listen to music to pass the time. I offered a blessing.     PLAN: I will be out for the next month but will continue to follow Geo when I return.      Ita Boswell M.Div.  Peds Heme/Onc   Pager [phone]  [email or web address]      "

## 2020-01-28 NOTE — PLAN OF CARE
VSS. No c/o pain or nausea. No UO or stool output noted overnight. Pt slept well, no family at bedside. Hourly rounding complete. Will continue to monitor.

## 2020-01-28 NOTE — PROGRESS NOTES
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    Updates today:  - Awaiting adult psych placement  - Plan for weekly Etinostat tomorrow    FEN:  - Regular diet  - Holding PTA supplements for now: Ca, vitamin D, K-phos, vitamin E    GI:   Acute on chronic constipation  - Continue PTA bowel regimen (linzess, miralax, senna)  - Omeprazole 40 mg daily     Heme/Onc:  Grade 2 Ependymoma s/p multiple resections, radiation and chemothearpy: on study ADVL 1513.  - Entinostat 7 mg weekly (next 1/29/20)    Endo:  Chronic steroid therapy - followed by Dr. Martin  Iatrogenic adrenal insufficiency  - PTA Decadron 0.75 mg QAM  - Continue bactrim ppx 400mg BID     MSK:  Steroid-induced skin atrophy  Multiple skin wounds  - Apply bacitracin to wounds as needed with dressing changes    Neuro/Psych:  Hx brain tumor and hemorrhagic stroke  Cranial nerve palsies  Ataxia   Depression  Agitation  - Olanzapine 30mg at bedtime  - Amitriptyline 50mg at bedtime  - Zoloft 150mg daily  - Awaiting placement in adult psychiatry or other safe dispo. In contact with Dr. Justice Fay (outpatient psychiatrist)    Allergies:  - PTA Allegra 180mg qPM      Pulm:  Hx of CANDELARIO: previously used CPAP  - Routine vitals w/ spot pulse ox        Diet: Diet  Regular Diet Adult    Lines: no access  Code Status: Full    Disposition Plan   Expected discharge: Pending safe disposition to adult psychiatry or skilled care facility.    This patient was discussed with  "Dr. Wilder, pediatric hematologist-oncologist. All aspects of the exam & documentation were approved by the attending physician.     Darshan Vargas MD  Pediatrics-PGY3  (p): [phone]    Physician Attestation   I, Zoraida Wilder MD, MD, saw this patient with the resident and agree with the resident/fellow's findings and plan of care as documented in the note.      I personally reviewed vital signs, medications, labs and imaging.    Zoraida Wilder MD, MD  Date of Service (when I saw the patient): 1/28/20    ______________________________________________________________________    Interval History   Nursing notes reviewed. Increasing agitation and paranoid behavior- refusing to take NG out because \" the team is keeping stuff from me and I know I'm not cleaned out yet.\" Refusing prior to admission psychiatric meds. More talkative with relatively intelligible speech at times.     Data reviewed today: I reviewed all medications, new labs and imaging results over the last 24 hours.    Physical Exam   Vital Signs: Temp: 97  F (36.1  C) Temp src: Axillary BP: 124/62 Pulse: 100 Heart Rate: 79 Resp: 18 SpO2: 95 % O2 Device: None (Room air)    Weight: 196 lbs 14.4 oz    GENERAL: Laying in bed, well appearing, talkative but intelligible at times  SKIN:  Purplish striae are seen over extremities (baseline)   EYES: Normal conjunctivae (L eye visualized). R eye with patch over.   LUNGS: Clear. No rales, rhonchi, wheezing or retractions  HEART: Regular rhythm. Normal S1/S2. No murmurs. Normal pulses.  ABDOMEN: Soft, non-tender, not distended  NEUROLOGIC: Right sided facial palsy. slurred speech  EXTREMITIES: Arophy of lower extremities.    Data   No lab results found in last 7 days.  No results found for this or any previous visit (from the past 24 hour(s)).  Medications       [Held by provider] Calcium-Vitamin D-Vitamin K  1 Dose Oral BID     dexamethasone  0.75 mg Oral Daily     fexofenadine  180 mg " Oral At Bedtime     mupirocin   Topical Daily     omeprazole  40 mg Oral QAM     [Held by provider] potassium phosphate (monobasic)  500 mg Oral BID     sertraline  150 mg Oral Daily     sodium chloride (PF)  3 mL Intracatheter Q8H     sulfamethoxazole-trimethoprim  1 tablet Oral BID     [Held by provider] vitamin D3  2,000 Units Oral Daily     [Held by provider] vitamin E  400 Units Oral Daily

## 2020-01-28 NOTE — PROGRESS NOTES
[facility]  PEDIATRIC HEMATOLOGY/ONCOLOGY   SOCIAL WORK PROGRESS NOTE      DATA:     Geo Hicks is a 19 year old male with history of grade II ependymoma near 4th ventricle diagnosed 04/2015, s/p tumor resections (x3), complicated by hemorrhage requiring evacuation, also treated with radiation therapy, chemotherapy and complicated by multiple neurologic deficits currently on study [id number] who was admitted for bowel clean-out to treat recurrent acute on chronic constipation.     RYLAND worked with Geo's care team today to help coordinate care. SW consulted with Blue Team who agreed that Geo is not safe to discharge home to parents given his physical inabilities and his increasing behaviors/agitation and fixed delusions. Per medical team, Geo has been accepted to transfer to the adult inpatient psychiatric unit with a potential transfer of this evening dependent on bed availability. SW updated dad Eric and mom Christianne who were agreeable to the plan. Eric has Geo's oral chemo and will bring it to the hospital this evening or tomorrow morning.     INTERVENTION:     Care coordination. Per medical team, Geo has been accepted to transfer to the inpatient psychiatric unit. Updated parents.     ASSESSMENT:     Per medical team, Geo was agreeable to plan. SW not present at time of discussion. Geo is familiar with his mental health team.     PLAN:     Social work will continue to assess needs and provide ongoing psychosocial support and access to resources.           GARCIA Lewis, [facility]  Pediatric Hem/Onc   Phone: [phone]  Pager: [phone]

## 2020-01-28 NOTE — TELEPHONE ENCOUNTER
"Called and spoke to Eric Hicks about Geo's current status.    Eric explained that Geo was somewhat abruptly kicked out of his assisted living on Thursday 1/23, then Saturday 1/25 came in for the bowel clean-out that has led to his current hospitalization.  Explains that when he came into the hospital to pick Geo up from the cleanout Monday 1/27, Geo said \"what are you doing here?\" and was unwilling to consider going home.  Eric then elaborates that lately Geo has been injuring himself by trying to transfer unassisted, as well as, they believe, attempting to walk when no one is around.  This has led to significant injuries/lacerations that Geo has not been able or willing to account for.  Due to this, the assisted living facility didn't feel like they could safely take care of him, and Eric feels that Geo is currently unsafe at home due both to this behavior as well as his recent increase in agitation and combativeness.    Eric is uncertain how many doses of medication Geo may have missed over that period of time at the assisted living when they were refusing to give him meds after midnight.  Thinks he may have a copy of their MAR, and will try to submit that via [employer].  In the meantime, agrees that Geo may be currently refusing olanzapine due to not wanting to take sedating medications while in the hospital.  In terms of Geo's delusion that providers are withholding definitive care for his constipation in order to keep him from being able to walk, states that he received a text from Geo today telling him, in effect, \"it's okay that you and mom are lying to me about things because you yourself have been duped by the doctors.\"    At the time of this note Geo's inpatient pediatrics team is in the process of trying to help facilitate his admission to inpatient psychiatry.  One major concern is clearly patient placement, and would comment that Geo's last assisted living was perhaps " not the best fit - moving forward, should consider environments tailored to meet the needs other young men with brain injury.    Justice Fay, DO  Psychiatry PGY4

## 2020-01-28 NOTE — PLAN OF CARE
Pt cooperative today; NG removed ; waiting to hear about placement on an adult unit; social work here to meet with pt today as well; con't to monitor; notify team of any new changes.

## 2020-01-29 ENCOUNTER — TELEPHONE (OUTPATIENT)
Dept: BEHAVIORAL HEALTH | Facility: CLINIC | Age: 20
End: 2020-01-29

## 2020-01-29 ENCOUNTER — DOCUMENTATION ONLY (OUTPATIENT)
Dept: OTHER | Facility: CLINIC | Age: 20
End: 2020-01-29

## 2020-01-29 PROCEDURE — 25000131 ZZH RX MED GY IP 250 OP 636 PS 637: Performed by: STUDENT IN AN ORGANIZED HEALTH CARE EDUCATION/TRAINING PROGRAM

## 2020-01-29 PROCEDURE — 25000132 ZZH RX MED GY IP 250 OP 250 PS 637

## 2020-01-29 PROCEDURE — G0378 HOSPITAL OBSERVATION PER HR: HCPCS

## 2020-01-29 PROCEDURE — 25000132 ZZH RX MED GY IP 250 OP 250 PS 637: Performed by: STUDENT IN AN ORGANIZED HEALTH CARE EDUCATION/TRAINING PROGRAM

## 2020-01-29 RX ORDER — SENNOSIDES 8.6 MG
8.6 TABLET ORAL DAILY
Status: DISCONTINUED | OUTPATIENT
Start: 2020-01-29 | End: 2020-01-30

## 2020-01-29 RX ORDER — POLYETHYLENE GLYCOL 3350 17 G/17G
17 POWDER, FOR SOLUTION ORAL 3 TIMES DAILY
Status: DISCONTINUED | OUTPATIENT
Start: 2020-01-29 | End: 2020-02-01

## 2020-01-29 RX ORDER — SENNOSIDES 8.6 MG
8.6 TABLET ORAL 2 TIMES DAILY
Status: DISCONTINUED | OUTPATIENT
Start: 2020-01-29 | End: 2020-01-29

## 2020-01-29 RX ORDER — OLANZAPINE 15 MG/1
30 TABLET ORAL AT BEDTIME
Status: DISCONTINUED | OUTPATIENT
Start: 2020-01-29 | End: 2020-02-18 | Stop reason: HOSPADM

## 2020-01-29 RX ORDER — AMITRIPTYLINE HYDROCHLORIDE 50 MG/1
50 TABLET ORAL AT BEDTIME
Status: DISCONTINUED | OUTPATIENT
Start: 2020-01-29 | End: 2020-02-18 | Stop reason: HOSPADM

## 2020-01-29 RX ADMIN — SULFAMETHOXAZOLE AND TRIMETHOPRIM 1 TABLET: 400; 80 TABLET ORAL at 09:41

## 2020-01-29 RX ADMIN — DEXAMETHASONE 0.75 MG: 0.75 TABLET ORAL at 09:40

## 2020-01-29 RX ADMIN — SENNOSIDES 8.6 MG: 8.6 TABLET, FILM COATED ORAL at 16:49

## 2020-01-29 RX ADMIN — SULFAMETHOXAZOLE AND TRIMETHOPRIM 1 TABLET: 400; 80 TABLET ORAL at 20:25

## 2020-01-29 RX ADMIN — AMITRIPTYLINE HYDROCHLORIDE 50 MG: 50 TABLET, FILM COATED ORAL at 22:05

## 2020-01-29 RX ADMIN — SERTRALINE HYDROCHLORIDE 150 MG: 100 TABLET ORAL at 09:40

## 2020-01-29 RX ADMIN — OMEPRAZOLE 40 MG: 20 CAPSULE, DELAYED RELEASE ORAL at 09:40

## 2020-01-29 RX ADMIN — POLYETHYLENE GLYCOL 3350 17 G: 17 POWDER, FOR SOLUTION ORAL at 20:25

## 2020-01-29 RX ADMIN — OLANZAPINE 30 MG: 15 TABLET, FILM COATED ORAL at 22:05

## 2020-01-29 RX ADMIN — MUPIROCIN: 20 OINTMENT TOPICAL at 09:41

## 2020-01-29 RX ADMIN — FEXOFENADINE HYDROCHLORIDE 180 MG: 180 TABLET, FILM COATED ORAL at 22:05

## 2020-01-29 NOTE — PLAN OF CARE
Pt is calm and cooperative, AVSS, no pain, no n/v. Pt had good PO intake of food and fluids, voiding adequately, no stool. Pt's dressing on L tibia changed, wound cleansed c NS and bacitracin. No contact from pt's parents. Plan to transfer to adult psych tonight or tomorrow. Continue to monitor and notify provider of any changes.

## 2020-01-29 NOTE — PLAN OF CARE
Avss. No c/o pain, nausea or vomiting noted. Calm and cooperative. Void x1 no stools noted . Good PO intake. Tibia dressing changed per orders. Dad here to bring his home chemo. Will give dose this evening 2 hours after last PO intake. Plan to transfer to [facility] when a bed is available . Notify MD of change in status

## 2020-01-29 NOTE — PROGRESS NOTES
I spoke with Dr. Hutchinson and confirmed the plan is to admit to psychiatry. He indicated he would discontinue psych consult request.

## 2020-01-29 NOTE — TELEPHONE ENCOUNTER
R: Nina ARCHER on hematology called earlier this am for an update. Author explained we are waiting to hear from the doctor and will call her when we have more info.  Jen Manuel called for an update and author informed her we are waiting to hear from the doctor and will then call the unit when we have more info. dane  Paged Luis Aser at 11:06 pm. dane  Per Mary in intake, Aliciavinay declined. Mary said since intake did not hear back from alicia, that she discussed case with Ashley and that he feels pt's impulsivity and falls are concerning but are not likely something that will improve w/ psych treatment. He said he feels pt needs a nursing home w/ appropriate levels of care and asked if pt could admit to our TCU until placement is established, per his email to Mary. Mary discussed w/ author and requested author call and inform the medical team of the above info. She said that Ashley and Jasper do not need to speak regarding Pavey's decline of pt due to feeling this is not necessary since we have not heard back from Alicia. Author called Dr Hutchinson and informed him of the above. He said he really feels that alicia and ashley need to speak, since alicia originally had reportedly accepted the pt. He said he will call her and requests that author call her again also. He said he wants intake to keep trying to reach WIsuleimanser to ask her to speak with ashley. Author agreed to page [email or web address] again and to keep trying to reach her (or will pass to other staff in intake if still no response when author leaves for the day).  Paged Alicia at 2:48 pm. dane  Author discussed with Mary who said she just spoke with Ashley about the above info. She said Ashley said that if Alicia calls back and still believes that inpt [facility] admit is appropriate, then that is ok to admit pt; HOWEVER, pt can not skip over patients in the er who are on the waitlist (per Ashley) . mbw  Dr Norton called saying he is going to call Alicia again and asked author to  also try her again. dane  Paged wieryn. dane  Passed to Aditi in intake. dane

## 2020-01-29 NOTE — PLAN OF CARE
Afebrile, VSS. Slept well overnight. No void overnight. Anticipate discharge to inpatient psych today if bed available, patient unaware of this plan at this time. Will continue to monitor and follow POC.

## 2020-01-30 ENCOUNTER — TELEPHONE (OUTPATIENT)
Dept: BEHAVIORAL HEALTH | Facility: CLINIC | Age: 20
End: 2020-01-30

## 2020-01-30 PROCEDURE — 25000132 ZZH RX MED GY IP 250 OP 250 PS 637: Performed by: STUDENT IN AN ORGANIZED HEALTH CARE EDUCATION/TRAINING PROGRAM

## 2020-01-30 PROCEDURE — G0378 HOSPITAL OBSERVATION PER HR: HCPCS

## 2020-01-30 PROCEDURE — 25000131 ZZH RX MED GY IP 250 OP 636 PS 637: Performed by: STUDENT IN AN ORGANIZED HEALTH CARE EDUCATION/TRAINING PROGRAM

## 2020-01-30 PROCEDURE — 25000132 ZZH RX MED GY IP 250 OP 250 PS 637

## 2020-01-30 PROCEDURE — 25000125 ZZHC RX 250: Performed by: STUDENT IN AN ORGANIZED HEALTH CARE EDUCATION/TRAINING PROGRAM

## 2020-01-30 PROCEDURE — 99221 1ST HOSP IP/OBS SF/LOW 40: CPT | Performed by: NURSE PRACTITIONER

## 2020-01-30 RX ORDER — BISACODYL 5 MG
10 TABLET, DELAYED RELEASE (ENTERIC COATED) ORAL DAILY
Status: DISCONTINUED | OUTPATIENT
Start: 2020-01-30 | End: 2020-02-18 | Stop reason: HOSPADM

## 2020-01-30 RX ADMIN — MELATONIN 2000 UNITS: at 10:03

## 2020-01-30 RX ADMIN — SENNOSIDES 8.6 MG: 8.6 TABLET, FILM COATED ORAL at 10:01

## 2020-01-30 RX ADMIN — POTASSIUM PHOSPHATE, MONOBASIC 500 MG: 500 TABLET, SOLUBLE ORAL at 10:03

## 2020-01-30 RX ADMIN — FEXOFENADINE HYDROCHLORIDE 180 MG: 180 TABLET, FILM COATED ORAL at 21:26

## 2020-01-30 RX ADMIN — BISACODYL 10 MG: 5 TABLET ORAL at 14:53

## 2020-01-30 RX ADMIN — OMEPRAZOLE 40 MG: 20 CAPSULE, DELAYED RELEASE ORAL at 10:02

## 2020-01-30 RX ADMIN — Medication 400 UNITS: at 10:03

## 2020-01-30 RX ADMIN — OLANZAPINE 30 MG: 15 TABLET, FILM COATED ORAL at 21:26

## 2020-01-30 RX ADMIN — POLYETHYLENE GLYCOL 3350 17 G: 17 POWDER, FOR SOLUTION ORAL at 10:01

## 2020-01-30 RX ADMIN — MUPIROCIN: 20 OINTMENT TOPICAL at 11:24

## 2020-01-30 RX ADMIN — SULFAMETHOXAZOLE AND TRIMETHOPRIM 1 TABLET: 400; 80 TABLET ORAL at 10:03

## 2020-01-30 RX ADMIN — LINACLOTIDE 290 MCG: 145 CAPSULE, GELATIN COATED ORAL at 10:01

## 2020-01-30 RX ADMIN — DEXAMETHASONE 0.75 MG: 0.75 TABLET ORAL at 10:02

## 2020-01-30 RX ADMIN — AMITRIPTYLINE HYDROCHLORIDE 50 MG: 50 TABLET, FILM COATED ORAL at 21:26

## 2020-01-30 RX ADMIN — SULFAMETHOXAZOLE AND TRIMETHOPRIM 1 TABLET: 400; 80 TABLET ORAL at 20:20

## 2020-01-30 RX ADMIN — POLYETHYLENE GLYCOL 3350 17 G: 17 POWDER, FOR SOLUTION ORAL at 14:53

## 2020-01-30 RX ADMIN — POTASSIUM PHOSPHATE, MONOBASIC 500 MG: 500 TABLET, SOLUBLE ORAL at 20:20

## 2020-01-30 RX ADMIN — SERTRALINE HYDROCHLORIDE 150 MG: 100 TABLET ORAL at 10:02

## 2020-01-30 RX ADMIN — Medication 1 DOSE: at 16:21

## 2020-01-30 NOTE — PROGRESS NOTES
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 He remains admitted with disposition pending determination of placement where he can continue to receive appropriate cares.    Updates today:  - Adult psychiatry consult to facilitate care and medication management while awaiting placement  - Patient with no bowel movement for several days. Senna discontinued due to patient preference, switched to dulcolax daily instead  - Medical director of adult inpatient psychiatry confirming decision that patient does not meet criteria for inpatient admission.  - Care coordinators and  working to explore other options for placement.    FEN:  - Regular diet  - Restarted PTA supplements: Ca, vitamin D, K-phos, vitamin E    GI:   Acute on chronic constipation  - Continue PTA bowel regimen     - Linzess QAM      - Miralax TID     - Dulcolax QD  - Omeprazole 40 mg daily     Heme/Onc:  Grade 2 Ependymoma s/p multiple resections, radiation and chemothearpy: on study ADVL 1513.  - Entinostat 7 mg weekly (next 2/5/20)    Endo:  Chronic steroid therapy - followed by Dr. Martin  Iatrogenic adrenal insufficiency  - PTA Decadron 0.75 mg QAM  - Continue bactrim ppx 400mg BID     MSK:  Steroid-induced skin atrophy  Multiple skin wounds  - Apply bacitracin to wounds as needed with dressing changes    Neuro/Psych:  Hx brain tumor and hemorrhagic stroke  Cranial nerve palsies  Ataxia    Depression  Agitation  - Olanzapine 30mg at bedtime  - Amitriptyline 50mg at bedtime  - Zoloft 150mg daily  - Will continue contact with Dr. Justice Fay (outpatient psychiatrist)    Allergies:  - PTA Allegra 180mg qPM      Pulm:  Hx of CANDELARIO: previously used CPAP  - Routine vitals w/ spot pulse ox        Diet: Diet  Regular Diet Adult    Lines: no access  Code Status: Full    Disposition Plan   Expected discharge: Pending safe disposition to adult psychiatry or skilled care facility.    This patient was discussed with Dr. Lowry, attending pediatric oncologist. All aspects of the exam & documentation were approved by the attending physician.     Bruce Hutchinson MD  PL-1, Pediatrics  [facility]  Pager: [phone]    I saw and evaluated the patient and agree with the resident's assessment and plan.  Eddie Lowry MD, MPH, [facility]  Division of Pediatric Hematology/Oncology    ______________________________________________________________________    Interval History   Nursing notes reviewed. No acute events. Tolerating good PO intake & re-initiation of bowel regimen. Voiding, but no stool recorded x3d. Will not be admitted to adult psychiatry service.    Data reviewed today: I reviewed all medications, new labs and imaging results over the last 24 hours.    Physical Exam   Vital Signs: Temp: 97.5  F (36.4  C) Temp src: Axillary BP: 113/70 Pulse: 89 Heart Rate: 89 Resp: 18 SpO2: 92 % O2 Device: None (Room air)    Weight: 196 lbs 14.4 oz    GENERAL: Sitting up in wheelchair, well-appearing, no acute distress, significant dysarthria  SKIN:  Purplish striae are seen over extremities (baseline)   EYES: Normal conjunctivae of right eye. L eye with patch over.   NEUROLOGIC: Right sided facial palsy. Moderate dysarthria and slurred speech  EXTREMITIES: Atrophy of lower extremities. Healing 3cm ellipse-shaped wound on left lower  extremity with overlying eschar. No appreciable erythema or edema surrounding wound.    Data   No results found for this or any previous visit (from the past 24 hour(s)).     Medications       amitriptyline  50 mg Oral At Bedtime     Calcium-Vitamin D-Vitamin K  1 Dose Oral BID     dexamethasone  0.75 mg Oral Daily     fexofenadine  180 mg Oral At Bedtime     linaclotide  290 mcg Oral QAM AC     mupirocin   Topical Daily     OLANZapine  30 mg Oral At Bedtime     omeprazole  40 mg Oral QAM     polyethylene glycol  17 g Oral TID     potassium phosphate (monobasic)  500 mg Oral BID     sennosides  8.6 mg Oral Daily     sertraline  150 mg Oral Daily     sodium chloride (PF)  3 mL Intracatheter Q8H     sulfamethoxazole-trimethoprim  1 tablet Oral BID     vitamin D3  2,000 Units Oral Daily     vitamin E  400 Units Oral Daily

## 2020-01-30 NOTE — CONSULTS
"  [facility], [place]   Initial Psychiatric Consult   Consult date: January 30, 2020             Reason for Consult, requesting source:    Hx paranoia, delusions, depression with SI  Requesting source: Zoraida Wilder Md        HPI:     Mr. Geo Hicks is a 19-year-old male who was admitted on 1/25/20 for bowel clean-out to treat recurrent acute on chronic constipation. PMH significant for ependymoma dx 4/2015, s/p tumor resections (x3), c/b hemorrhage requiring evacuation, also treated with radiation and chemotherapy, left with multiple neurological deficits. Psychiatric history notable for major depressive disorder and delusional disorder.     Mr. Hicks was recently evicted from his customized assisted living home for reasons which include medication refusal and apparent aggression toward staff. Initially, it was felt that he would benefit from psychiatric hospitalization, however it was determined today that he is not meeting criteria.     Per chart review, Mr. Hicks has at least a 1 year history of delusional thoughts surrounding the idea that healthcare providers are withholding treatments that would help cure his constipation and ultimately permit him to walk. Per documentation by Justice Fay MD on 2/7/19:    \"Has presented today to our clinic due to concerns regarding paranoid ideation and delusional beliefs, specifically that his oncology care team and his father are withholding more definitive care from him that would permit him to no longer require a wheelchair. This appears to emphasize concerns regarding his severe constipation, that has required several hospitalizations for bowel cleanout.  Geo has remarked that he does not believe these treatments have actually been administered, and that x-rays demonstrating clear bowels are fraudulent.  While this is the most prominent delusional construct there have been two other instances in which he has vehemently " "maintained beliefs contrary to reality and intractable to reasoning/consultation.  Olanzapine was started a month ago without notable benefit in regard to his delusions.  Likely too early to assess the efficacy of this intervention, as it may be extremely difficult to alter previously formulated delusions and instead antipsychotic treatment may be more beneficial in terms of preventing further paranoid elaboration.\"    When I met with Geo today, he reinforced the same beliefs as documented by Dr. Fay last February. Despite increasing doses of olanzapine, these fixed delusional beliefs have persisted. He denies history of auditory or visual hallucinations.     In terms of other symptoms, Mr. Hicks reports a \"very good\" mood today. He occasionally gets anxious, but uses coping skills such as distraction to help with this. He feels that his current medication regimen is working well. He denies any thoughts to harm himself or others. He reports sleeping well and eating well. He says he is enjoying watching movies here in the hospital.         Past Psychiatric History:     Mr. Hicks has a history of one psychiatric hospitalization in February 2019 for suicidal ideation and delusional thoughts. No previous suicide attempts. Psychiatrist is Dr. Justice Fay at [facility]. Therapist is Manju Pink at [facility] in [place]. Previous diagnoses include major depressive disorder and delusional disorder. Medication trials have included olanzapine, sertraline, trazodone, amitriptyline.          Substance Use and History:     No history of abuse or dependence of alcohol or illicit substances. No history of chemical dependency treatment.         Past Medical History:   PAST MEDICAL HISTORY:   Past Medical History:   Diagnosis Date     Cranial nerve dysfunction      Dyspepsia      Ependymoma (H)      Gastro-oesophageal reflux disease      Hearing loss      Intracranial hemorrhage (H)      Migraine      " Pilonidal cyst     7-2015     Reduced vision      Refractory obstruction of nasal airway     2nd to nasal valve prolapse     Sleep apnea      Strabismus     gaze palsy        PAST SURGICAL HISTORY:   Past Surgical History:   Procedure Laterality Date     COLONOSCOPY N/A 9/27/2019    Procedure: Colonoscopy With Biopsies and Polypectomy;  Surgeon: Aniya Wei MD;  Location: [facility]     ESOPHAGOSCOPY, GASTROSCOPY, DUODENOSCOPY (EGD), COMBINED N/A 9/27/2019    Procedure: Upper Endoscopy (EGD) With Biopsies;  Surgeon: Aniya Wei MD;  [facility]: [facility]     GRAFT CARTILAGE FROM POSTERIOR AURICLE Left 10/6/2016    Procedure: GRAFT CARTILAGE FROM POSTERIOR AURICLE;  Surgeon: Tyler Richards MD;  [facility]     INCISION AND DRAINAGE PERINEAL, COMBINED Bilateral 7/18/2015    Procedure: COMBINED INCISION AND DRAINAGE PERINEAL;  Surgeon: Dequan Timmons MD;  [facility]     OPTICAL TRACKING SYSTEM CRANIOTOMY, EXCISE TUMOR, COMBINED N/A 4/13/2015    Procedure: COMBINED OPTICAL TRACKING SYSTEM CRANIOTOMY, EXCISE TUMOR;  Surgeon: Francis Velazquez MD;  [facility]     OPTICAL TRACKING SYSTEM CRANIOTOMY, EXCISE TUMOR, COMBINED N/A 4/16/2015    Procedure: COMBINED OPTICAL TRACKING SYSTEM CRANIOTOMY, EXCISE TUMOR;  Surgeon: Francis Velazquez MD;  [facility]     OPTICAL TRACKING SYSTEM CRANIOTOMY, EXCISE TUMOR, COMBINED Bilateral 5/28/2015    Procedure: COMBINED OPTICAL TRACKING SYSTEM CRANIOTOMY, EXCISE TUMOR;  Surgeon: Francis Velazquez MD;  [facility]     OPTICAL TRACKING SYSTEM CRANIOTOMY, EXCISE TUMOR, COMBINED Bilateral 1/14/2016    Procedure: COMBINED OPTICAL TRACKING SYSTEM CRANIOTOMY, EXCISE TUMOR;  Surgeon: Francis Velazquez MD;  [facility]     OPTICAL TRACKING SYSTEM VENTRICULOSTOMY  4/16/2015    Procedure: OPTICAL TRACKING SYSTEM VENTRICULOSTOMY;  Surgeon: Francis Velazquez MD;  [facility]     REMOVE PORT  VASCULAR ACCESS N/A 10/6/2016    Procedure: REMOVE PORT VASCULAR ACCESS;  Surgeon: Bruno Perea MD;  [facility]     RHINOPLASTY N/A 10/6/2016    Procedure: RHINOPLASTY;  Surgeon: Tyler Richards MD;  [facility]     VASCULAR SURGERY  5-2015    single lumen power port             Family History:   FAMILY HISTORY:   Family History   Problem Relation Age of Onset     Circulatory Father         PE/DVT     Hypothyroidism Father 30     Diabetes Maternal Grandmother      Diabetes Paternal Grandmother      Diabetes Paternal Grandfather      C.A.D. Paternal Grandfather      Hypertension Maternal Grandfather      Thyroid Disease Paternal Aunt         unknown whether hypo or hyper     Mental Illness No family hx of      No known family history of mental health or chemical dependency disorders        Social History:   SOCIAL HISTORY:   Social History     Tobacco Use     Smoking status: Never Smoker     Smokeless tobacco: Never Used   Substance Use Topics     Alcohol use: No     Mr. Hicks grew up in [place], [place]. Parents are  and have both remarried. Previously, he was sharing time between mother's and fathers' homes, however had recently been living in a group home due to parents' inability to care for his needs.  Dad is a , and mom does  for a testing company.  Siblings include two full brothers (older brother Benitez who is a senior in college and younger brother Gamaliel), a step-brother, and a step-sister. Geo graduated from high school in Spring 2018. No access to guns or weapons enjoys playing video games.         Physical ROS:   The patient endorsed feelings of constipation. The remainder of 10-point review of systems was negative except as noted in HPI.         PTA Medications:     Medications Prior to Admission   Medication Sig Dispense Refill Last Dose     amitriptyline (ELAVIL) 25 MG tablet Take 2 tablets (50 mg) by mouth At Bedtime for 28 days 56 tablet 4  1/24/2020 at Unknown time     Calcium-Vitamin D-Vitamin K (VIACTIV CALCIUM PLUS D) 650-12.5-40 MG-MCG-MCG CHEW Take 1 Dose by mouth 2 times daily Take 2 tablets Q AM and 1 chewable tablet at 6 PM 90 tablet 11 Taking     dexamethasone (DECADRON) 0.5 MG tablet TAKE ONE AND ONE-HALF TABLETS (0.75MG) BY MOUTH ONCE DAILY WITH BREAKFAST. (NO REFILLS REMAINING) 45 tablet 11 Taking     fexofenadine (ALLEGRA) 180 MG tablet TAKE 1 TABLET BY MOUTH EVERY EVENING. **NON-COVERED MED** (NO REFILLS REMAINING) 30 tablet 11 Taking     linaclotide (LINZESS) 290 MCG capsule Take 1 capsule (290 mcg) by mouth every morning (before breakfast) 30 capsule 3 Taking     mupirocin (BACTROBAN) 2 % external ointment Use 2 times a day to the ear lobes and canals with flare 22 g 3 Taking     OLANZapine (ZYPREXA) 10 MG tablet Take 1 tab (10 mg) along with 1 tab (20 mg) for a total dose of 30 mg at bedtime.  [Begin after one week of 27.5 mg at bedtime]. 30 tablet 1 Taking     OLANZapine (ZYPREXA) 20 MG tablet Take 1 tab (20 mg) along with 1.5 tabs (7.5 mg) for a total dose of 27.5 mg at bedtime for 1 week, then will increase to 30 mg at bedtime. (Patient not taking: Reported on 1/21/2020) 7 tablet 0 Not Taking     OLANZapine (ZYPREXA) 20 MG tablet Take 1 tab (20 mg) along with 1 tab (10 mg) for a total dose of 30 mg at bedtime.  [Begin after one week of 27.5 mg at bedtime]. 30 tablet 1 Taking     OLANZapine (ZYPREXA) 5 MG tablet Take 1.5 tabs (7.5 mg) along with 1 tab (20 mg) for a total dose of 27.5 mg at bedtime for 1 week, then will increase to 30 mg at bedtime. (Patient not taking: Reported on 1/21/2020) 10.5 tablet 0 Not Taking     omeprazole (PRILOSEC) 20 MG DR capsule Take 2 capsules (40 mg) by mouth every morning 60 capsule 1 Taking     order for DME Equipment being ordered: Dressing  Wound #1 lower leg, W 6 cm x, D 0.5  cm, Drainage scant, Thickness sutured     Type: non stick gauze, Brand: , Size: 4/4   Change: 1 per day    Tape/Wrap: 1  each     attach facesheet with insurance information (delete this line) 5 each 0 Taking     order for DME Equipment being ordered: short boot 1 each 0 Taking     polyethylene glycol (MIRALAX/GLYCOLAX) powder Take 17 g (1 capful) by mouth 3 times daily 289 g 3 Taking     potassium phosphate, monobasic, (K-PHOS) 500 MG tablet Take 1 tablet (500 mg) by mouth 2 times daily 90 tablet 3 Taking     senna-docusate (SENOKOT-S/PERICOLACE) 8.6-50 MG tablet Take 1 tablet by mouth daily And please report to team at [facility] in no stool in 36 hours. 30 tablet 4 Taking     sertraline (ZOLOFT) 100 MG tablet Take 1.5 tablets (150 mg) by mouth daily 45 tablet 1      study - entinostat (IDS# 5050) 1 mg tablet Take 2 tablets (2 mg) by mouth every 7 days for 4 doses Take two 1mg tablet with one 5mg tablet for total dose of 7mg weekly. Take on an empty stomach, at least 1 hour before or 2 hours after a meal.  Swallow tablet whole. 8 tablet 0      study - entinostat (IDS# 5050) 5 mg tablet Take 1 tablet (5 mg) by mouth every 7 days for 4 doses Take one 5mg tablet with two 1mg tablet for total dose of 7mg weekly. Take on an empty stomach, at least 1 hour before or 2 hours after a meal.  Swallow tablet whole. 4 tablet 0      sulfamethoxazole-trimethoprim (BACTRIM/SEPTRA) 400-80 MG tablet Take 1 tablet by mouth 2 times daily On Saturdays and Sundays 24 tablet 11 Taking     vitamin D3 (CHOLECALCIFEROL) 2000 units (50 mcg) tablet TAKE 1 TABLET BY MOUTH ONCE DAILY WITH BREAKFAST. (NO REFILLS REMAINING) 30 tablet 11 Taking     vitamin E (TOCOPHEROL) 400 units (360 mg) capsule TAKE 1 CAPSULE BY MOUTH ONCE DAILY. (NO REFILLS REMAINING) 30 capsule 11 Taking     [DISCONTINUED] cephALEXin (KEFLEX) 500 MG capsule Take 1 capsule (500 mg) by mouth 3 times daily for 4 days 14 capsule 0 Taking     [DISCONTINUED] study - entinostat (IDS# 5050) 1 mg tablet Take 2 tablets (2 mg) by mouth every 7 days for 4 doses Take two 1mg tablet with one 5mg  "tablet for total dose of 7mg weekly. Take on an empty stomach, at least 1 hour before or 2 hours after a meal.  Swallow tablet whole. 8 tablet 0      [DISCONTINUED] study - entinostat (IDS# 5050) 5 mg tablet Take 1 tablet (5 mg) by mouth every 7 days for 4 doses Take one 5mg tablet with two 1mg tablet for total dose of 7mg weekly. Take on an empty stomach, at least 1 hour before or 2 hours after a meal.  Swallow tablet whole. 4 tablet 0 Past Week at Unknown time          Allergies:     Allergies   Allergen Reactions     Blood Transfusion Related (Informational Only) Swelling     Periorbital swelling post platelet transfusion     No Known Drug Allergies           Labs:     Recent Results (from the past 48 hour(s))   CBC with platelets differential    Collection Time: 01/28/20  4:56 PM   Result Value Ref Range    WBC 5.4 4.0 - 11.0 10e9/L    RBC Count 4.89 4.4 - 5.9 10e12/L    Hemoglobin 13.6 13.3 - 17.7 g/dL    Hematocrit 41.4 40.0 - 53.0 %    MCV 85 78 - 100 fl    MCH 27.8 26.5 - 33.0 pg    MCHC 32.9 31.5 - 36.5 g/dL    RDW 14.3 10.0 - 15.0 %    Platelet Count 132 (L) 150 - 450 10e9/L    Diff Method Automated Method     % Neutrophils 74.5 %    % Lymphocytes 12.9 %    % Monocytes 8.2 %    % Eosinophils 3.2 %    % Basophils 0.6 %    % Immature Granulocytes 0.6 %    Nucleated RBCs 0 0 /100    Absolute Neutrophil 4.0 1.6 - 8.3 10e9/L    Absolute Lymphocytes 0.7 (L) 0.8 - 5.3 10e9/L    Absolute Monocytes 0.4 0.0 - 1.3 10e9/L    Absolute Eosinophils 0.2 0.0 - 0.7 10e9/L    Absolute Basophils 0.0 0.0 - 0.2 10e9/L    Abs Immature Granulocytes 0.0 0 - 0.4 10e9/L    Absolute Nucleated RBC 0.0           Physical and Psychiatric Examination:     /70   Pulse 89   Temp 97.5  F (36.4  C) (Axillary)   Resp 18   Ht 1.77 m (5' 9.69\")   Wt 89.3 kg (196 lb 14.4 oz)   SpO2 92%   BMI 28.51 kg/m    Weight is 196 lbs 14.4 oz  Body mass index is 28.51 kg/m .    Physical Exam:  I have reviewed the physical exam as documented by " "by the medical team and agree with findings and assessment and have no additional findings to add at this time.    Mental Status Exam:    Appearance: age-appearing, seated in wheelchair, wearing eye patch over one eye with glasses, in no acute distress  Behavior: calm and pleasant during interview  Orientation: awake, alert and oriented to time, place and person  Movements: no tremors or EPS noted on exam  Mood: \"very good\"  Affect: mood-congruent, stable, within a normal range  Speech: significant articulation difficulties, unintelligible at times. Normal rate and tone.  Memory: grossly intact based on recall of recent and remote events and conversations  Fund of knowledge: average for development  Concentration: attentive to conversation  Thought process and content: thoughts were linear and organized. Persistent delusions regarding medical providers withholding some treatment which could cure his medical problems. Denies auditory or visual hallucinations.   Insight: fair  Judgement: grossly intact  Safety: denies suicidal or homicidal ideation, plan, or intent           DSM-5 Diagnosis:   #1 Major depressive disorder, in partial remission  #2 Delusional disorder          Assessment:   Mr. Geo Hicks is a 19-year-old male who was admitted on 1/25/20 for bowel clean-out to treat recurrent acute on chronic constipation. PMH significant for ependymoma with history of multiple resections, radiation, and chemotherapy, c/b hemorrhage, with consequential neurological deficits. Psychiatric history notable for MDD and delusional disorder. Psychiatry is consulted today for assessment of delusional thinking.    Mr. Hicks continues to experience persistent delusional beliefs, which are well documented throughout the chart, in regard to medical staff withholding some type of treatment which could cure his chronic constipation and ultimately allow him to be able to walk independently. He has been working with Dr. Fay for " "psychiatric medication management, as well as a therapist at the [facility]. Most recent changes include increasing olanzapine from 27.5 mg to 30 mg and switching from trazodone to amitriptyline with the thought that amitriptyline could help with visceral hypersensitivity/dyschezia, per GI. Both olanzapine and amitriptyline do have the potential to worsen constipation.    It does not appear that increasing doses of olanzapine have produced much benefit for patient's fixed delusions. Previous neuropsychological documentation was reviewed, which stated: \"It is also strong recommended that he continue working with his therapist and that continued working on his ability to take other s perspectives will be important.  Rather than challenging his strongly held belief that his medical team is not helping him as much as they could, it will be important to help him think about ways to work together.\"    At this time, there is no clear indication to change any of his psychotropic medications and therefore I would recommend continuing current regimen at prescribed doses. If symptoms appear to change or evolve, we could reevaluate.           Summary of Recommendations:   1) Recommend to continue current psychotropic medication regimen as ordered. It is unlikely that any further increase in antipsychotic medication would be of any benefit for his strongly held delusional beliefs.    2) Could consider a psychology consultation to assist Mr. Hicks in finding ways to work on accepting that he may not ever receive the treatment that he thinks the medical team is withholding, and how he might go on with his life if this were the case.     3) For acute episodes of agitation, could utilize IV/IM Haldol 5 mg.    4) May consider checking an ECG to monitor QTc now that he is on a fairly high dose of olanzapine.    5) Please feel free to re-consult psychiatry as needed during hospitalization for any changes in condition. Thank you for " this interesting consult.

## 2020-01-30 NOTE — PROGRESS NOTES
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 He remains admitted with disposition pending determination of placement where he can continue to receive appropriate cares.    Updates today:  - Father brought in study chemotherapy medication which was relabeled and administered.  - Patient determined not to meet criteria for adult inpatient psychiatric placement and so placement deferred at this time, though discussion is ongoing and patient remains on wait list.  - Team continuing to pursue various inpatient placement options for continued treatment. Will continue contact with care coordinator, social work, nurse manager, systems , psychiatric intake services and both inpatient and consulting psychiatric providers    FEN:  - Regular diet  - Restarted PTA supplements: Ca, vitamin D, K-phos, vitamin E    GI:   Acute on chronic constipation  - Continue PTA bowel regimen     - Linzess QAM      - Miralax TID     - Senna every day PRN  - Omeprazole 40 mg daily     Heme/Onc:  Grade 2 Ependymoma s/p multiple resections, radiation and chemothearpy: on study ADVL 1513.  - Entinostat 7 mg weekly (next 2/5/20)    Endo:  Chronic steroid therapy - followed by Dr. Martin  Iatrogenic adrenal insufficiency  - PTA Decadron 0.75 mg QAM  - Continue bactrim ppx 400mg BID     MSK:  Steroid-induced skin atrophy  Multiple skin wounds  - Apply bacitracin to wounds as needed with dressing changes    Neuro/Psych:  Hx brain  tumor and hemorrhagic stroke  Cranial nerve palsies  Ataxia   Depression  Agitation  - Olanzapine 30mg at bedtime  - Amitriptyline 50mg at bedtime  - Zoloft 150mg daily  - Will continue contact with Dr. Justice Fay (outpatient psychiatrist)    Allergies:  - PTA Allegra 180mg qPM      Pulm:  Hx of CANDELARIO: previously used CPAP  - Routine vitals w/ spot pulse ox        Diet: Diet  Regular Diet Adult    Lines: no access  Code Status: Full    Disposition Plan   Expected discharge: Pending safe disposition to adult psychiatry or skilled care facility.    This patient was discussed with Dr. Wilder, attending pediatric oncologist. All aspects of the exam & documentation were approved by the attending physician.     Bruce Hutchinson MD  PL-1, Pediatrics  [facility]  Pager: [phone]    Physician Attestation   I, Zoraida Wilder MD, MD, saw this patient with the resident and agree with the resident/fellow's findings and plan of care as documented in the note.      I personally reviewed vital signs, medications, labs and imaging.    Zoraida Wilder MD, MD  Date of Service (when I saw the patient): 1/29/20      ______________________________________________________________________    Interval History   Nursing notes reviewed. No acute events. Patient joseline cooperative and amenable to removal of NG yesterday. Tolerating good PO intake. Voiding, but no stool recorded x2d.    Data reviewed today: I reviewed all medications, new labs and imaging results over the last 24 hours.    Physical Exam   Vital Signs: Temp: 97.8  F (36.6  C) Temp src: Axillary BP: 113/73 Pulse: 98   Resp: 24 SpO2: 98 % O2 Device: None (Room air)    Weight: 196 lbs 14.4 oz    GENERAL: Lying in bed, well-appearing, talkative but somewhat difficult to understand 2/2 significant dysarthria  SKIN:  Purplish striae are seen over extremities (baseline)   EYES: Normal conjunctivae of left eye. R eye with  patch over.   LUNGS: Clear. No rales, rhonchi, wheezing or retractions  HEART: Regular rhythm. Normal S1/S2. No murmurs. Normal pulses.  ABDOMEN: Soft, non-tender, not distended  NEUROLOGIC: Right sided facial palsy. Moderate dysarthria and slurred speech  EXTREMITIES: Atrophy of lower extremities.    Data   Recent Labs   Lab 01/28/20  1656   WBC 5.4   HGB 13.6   MCV 85   *     No results found for this or any previous visit (from the past 24 hour(s)).  Medications       amitriptyline  50 mg Oral At Bedtime     [Held by provider] Calcium-Vitamin D-Vitamin K  1 Dose Oral BID     dexamethasone  0.75 mg Oral Daily     fexofenadine  180 mg Oral At Bedtime     [START ON 1/30/2020] linaclotide  290 mcg Oral QAM AC     mupirocin   Topical Daily     OLANZapine  30 mg Oral At Bedtime     omeprazole  40 mg Oral QAM     polyethylene glycol  17 g Oral TID     [Held by provider] potassium phosphate (monobasic)  500 mg Oral BID     sennosides  8.6 mg Oral Daily     sertraline  150 mg Oral Daily     sodium chloride (PF)  3 mL Intracatheter Q8H     sulfamethoxazole-trimethoprim  1 tablet Oral BID     [Held by provider] vitamin D3  2,000 Units Oral Daily     [Held by provider] vitamin E  400 Units Oral Daily

## 2020-01-30 NOTE — TELEPHONE ENCOUNTER
R: author discussed case w/ Mary in intake who said she discussed the case w/ Dr Savita Ng, and that per Dr Ng, pt is not appropriate for inpt psych unit due to not meeting inpt mh criteria. Author called and informed Dr Vargas who said he would notify Dr Hutchinson. dane

## 2020-01-30 NOTE — PLAN OF CARE
Afeb, VSS. Voiding well, no stool. Good PO intake. Dad stopped by and expressed frustration with whole situation, answered questions that he has. Pt resting comfortably between cares. Awaiting psych placement. Will continue to monitor and notify MD of any changes or concerns.

## 2020-01-30 NOTE — PLAN OF CARE
A/vss, psych consult ordered. Waiting for placement.  Eating and drinking well.  [place] wound healing.  Continue to monitor.

## 2020-01-30 NOTE — PROGRESS NOTES
"SW in email communication with [facility] , Lamin Martinez ([phone])  who is working on finding Geo a longer term living option (group home/assisted living). As of today, Lamin stated     \"I m in the process of chatting with the primary licensers who would know of the best placement options for Geo and I m waiting to hear back from them regarding possible options\"    Social work will continue to assess needs and provide ongoing psychosocial support and access to resources.       Addendum: Parents are touring potential placement options late next week with GARCIA Ramirez CM., [facility]  Pediatric Hem/Onc   Phone: [phone]  Pager: [phone]    "

## 2020-01-31 ENCOUNTER — APPOINTMENT (OUTPATIENT)
Dept: PHYSICAL THERAPY | Facility: CLINIC | Age: 20
End: 2020-01-31
Attending: PEDIATRICS
Payer: COMMERCIAL

## 2020-01-31 LAB — INTERPRETATION ECG - MUSE: NORMAL

## 2020-01-31 PROCEDURE — 97530 THERAPEUTIC ACTIVITIES: CPT | Mod: GP

## 2020-01-31 PROCEDURE — 25000132 ZZH RX MED GY IP 250 OP 250 PS 637: Performed by: STUDENT IN AN ORGANIZED HEALTH CARE EDUCATION/TRAINING PROGRAM

## 2020-01-31 PROCEDURE — 25000131 ZZH RX MED GY IP 250 OP 636 PS 637: Performed by: STUDENT IN AN ORGANIZED HEALTH CARE EDUCATION/TRAINING PROGRAM

## 2020-01-31 PROCEDURE — 25000125 ZZHC RX 250: Performed by: STUDENT IN AN ORGANIZED HEALTH CARE EDUCATION/TRAINING PROGRAM

## 2020-01-31 PROCEDURE — 25000132 ZZH RX MED GY IP 250 OP 250 PS 637

## 2020-01-31 PROCEDURE — G0378 HOSPITAL OBSERVATION PER HR: HCPCS

## 2020-01-31 PROCEDURE — 97162 PT EVAL MOD COMPLEX 30 MIN: CPT | Mod: GP

## 2020-01-31 PROCEDURE — 93005 ELECTROCARDIOGRAM TRACING: CPT

## 2020-01-31 RX ADMIN — SERTRALINE HYDROCHLORIDE 150 MG: 100 TABLET ORAL at 08:41

## 2020-01-31 RX ADMIN — OLANZAPINE 30 MG: 15 TABLET, FILM COATED ORAL at 22:45

## 2020-01-31 RX ADMIN — Medication 1 DOSE: at 08:39

## 2020-01-31 RX ADMIN — POTASSIUM PHOSPHATE, MONOBASIC 500 MG: 500 TABLET, SOLUBLE ORAL at 19:44

## 2020-01-31 RX ADMIN — POLYETHYLENE GLYCOL 3350 17 G: 17 POWDER, FOR SOLUTION ORAL at 13:35

## 2020-01-31 RX ADMIN — POLYETHYLENE GLYCOL 3350 17 G: 17 POWDER, FOR SOLUTION ORAL at 08:40

## 2020-01-31 RX ADMIN — SULFAMETHOXAZOLE AND TRIMETHOPRIM 1 TABLET: 400; 80 TABLET ORAL at 08:40

## 2020-01-31 RX ADMIN — Medication 1 DOSE: at 16:42

## 2020-01-31 RX ADMIN — SULFAMETHOXAZOLE AND TRIMETHOPRIM 1 TABLET: 400; 80 TABLET ORAL at 19:44

## 2020-01-31 RX ADMIN — LINACLOTIDE 290 MCG: 145 CAPSULE, GELATIN COATED ORAL at 08:40

## 2020-01-31 RX ADMIN — MELATONIN 2000 UNITS: at 08:41

## 2020-01-31 RX ADMIN — OMEPRAZOLE 40 MG: 20 CAPSULE, DELAYED RELEASE ORAL at 08:41

## 2020-01-31 RX ADMIN — Medication 400 UNITS: at 08:40

## 2020-01-31 RX ADMIN — POTASSIUM PHOSPHATE, MONOBASIC 500 MG: 500 TABLET, SOLUBLE ORAL at 08:41

## 2020-01-31 RX ADMIN — MUPIROCIN: 20 OINTMENT TOPICAL at 08:42

## 2020-01-31 RX ADMIN — POLYETHYLENE GLYCOL 3350 17 G: 17 POWDER, FOR SOLUTION ORAL at 19:44

## 2020-01-31 RX ADMIN — AMITRIPTYLINE HYDROCHLORIDE 50 MG: 50 TABLET, FILM COATED ORAL at 22:45

## 2020-01-31 RX ADMIN — FEXOFENADINE HYDROCHLORIDE 180 MG: 180 TABLET, FILM COATED ORAL at 22:45

## 2020-01-31 RX ADMIN — BISACODYL 10 MG: 5 TABLET ORAL at 08:40

## 2020-01-31 RX ADMIN — DEXAMETHASONE 0.75 MG: 0.75 TABLET ORAL at 08:41

## 2020-01-31 NOTE — PROGRESS NOTES
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 He remains admitted with disposition pending determination of placement where he can continue to receive appropriate cares.    Updates today:  - Psychology consult    FEN:  - Regular diet  - Restarted PTA supplements: Ca, vitamin D, K-phos, vitamin E    GI:   Acute on chronic constipation  - Continue PTA bowel regimen     - Linzess QAM      - Miralax TID     - Dulcolax QD  - Omeprazole 40 mg daily     Heme/Onc:  Grade 2 Ependymoma s/p multiple resections, radiation and chemothearpy: on study ADVL 1513.  - Entinostat 7 mg weekly (next 2/5/20)    Endo:  Chronic steroid therapy - followed by Dr. Martin  Iatrogenic adrenal insufficiency  - PTA Decadron 0.75 mg QAM  - Continue bactrim ppx 400mg BID     MSK:  Steroid-induced skin atrophy  Multiple skin wounds  - Apply bacitracin to wounds as needed with dressing changes    Neuro/Psych:  Hx brain tumor and hemorrhagic stroke  Cranial nerve palsies  Ataxia   Depression  Agitation  - Olanzapine 30mg at bedtime  - Amitriptyline 50mg at bedtime  - Zoloft 150mg daily  - Haldol 5 mg IV/IM if needed for acute agitation  - Psychiatry consult, appreciate recs  - Psychology consult    Allergies:  - PTA Allegra 180mg qPM      Pulm:  Hx of CANDELARIO: previously used CPAP  - Routine vitals w/ spot pulse ox        Diet: Diet  Regular Diet Adult    Lines: no access  Code Status: Full    Disposition Plan   Expected  discharge: Pending safe disposition to adult psychiatry or skilled care facility.    This patient was discussed with Dr. Lowry, attending pediatric oncologist. All aspects of the exam & documentation were approved by the attending physician.     Darshan Vargas MD  Pediatrics-PGY3  (p): [phone]    I saw and evaluated the patient and agree with the resident's assessment and plan.  Eddie Lowry MD, MPH, [facility]  Division of Pediatric Hematology/Oncology    _____________________________________________________________________    Interval History   Nursing notes reviewed. No acute events. Tolerating good PO intake. Stooled x1 with introduction of dulcolax. Psychiatry came to consult (see note). Parents working to find new group home for Geo (touring next week per ).     Data reviewed today: I reviewed all medications, new labs and imaging results over the last 24 hours.    Physical Exam   Vital Signs: Temp: 96.8  F (36  C) Temp src: Axillary BP: 91/50 Pulse: 85 Heart Rate: 100 Resp: 18 SpO2: 95 % O2 Device: None (Room air)    Weight: 196 lbs 14.4 oz    GENERAL: Sleeping comfortably  SKIN:  Purplish striae are seen over extremities (baseline)   EYES: External lid (L) normal. R covered by patch  CV: RRR, no murmurs  RESP: CTAB, no focality  EXTREMITIES: Atrophy of lower extremities. Healing 3cm ellipse-shaped wound on left lower extremity with overlying eschar. No appreciable erythema or edema surrounding wound.    Data   No results found for this or any previous visit (from the past 24 hour(s)).     Medications       amitriptyline  50 mg Oral At Bedtime     bisacodyl  10 mg Oral Daily     Calcium-Vitamin D-Vitamin K  1 Dose Oral BID     dexamethasone  0.75 mg Oral Daily     fexofenadine  180 mg Oral At Bedtime     linaclotide  290 mcg Oral QAM AC     mupirocin   Topical Daily     OLANZapine  30 mg Oral At Bedtime     omeprazole  40 mg Oral QAM      polyethylene glycol  17 g Oral TID     potassium phosphate (monobasic)  500 mg Oral BID     sertraline  150 mg Oral Daily     sodium chloride (PF)  3 mL Intracatheter Q8H     sulfamethoxazole-trimethoprim  1 tablet Oral BID     vitamin D3  2,000 Units Oral Daily     vitamin E  400 Units Oral Daily

## 2020-01-31 NOTE — PLAN OF CARE
Discharge Planner PT   Patient plan for discharge: extended care facility upon care coordination  Current status: Patient with ataxic movements limiting safe mobility. Adequate LE strength upon assessment though motor planning and coordination of tasks tends to lead to unsafe mobility. Patient requiring modA-maxA for stand pivot transfers secondary to safety as a result. Standing balance otherwise safe if not given pre-gait or coordination task.  Barriers to return to prior living situation: safe transfers, coordination to extended care facility  Recommendations for discharge: assist with transfers to ensure safety, limit ambulation  Rationale for recommendations: impaired mobility with ataxic movements leading to unsafe mobility and transfers       Entered by: Radha Marie 01/31/2020 1:02 PM       Parvin Marie, PT, -[phone]

## 2020-01-31 NOTE — PLAN OF CARE
No issues on day shift. Pt.has been in good spirits and without complaints of pain or discomfort. No stool output today and he continues on scheduled Miralax. PT here as consulted. Will continue bowel regimen and cares. Notify team of any barriers or changes in status.

## 2020-01-31 NOTE — PLAN OF CARE
Afebrile, VSS. No complaints of pain or nausea. Good UOP. No BM. Hourly rounding complete. Pt waiting for placement. Will update as needed.

## 2020-01-31 NOTE — PROGRESS NOTES
CLINICAL NUTRITION SERVICES - PEDIATRIC ASSESSMENT NOTE    REASON FOR ASSESSMENT  Geo Hicks is a 19 year old male seen by the dietitian for LOS    ANTHROPOMETRICS  Height/Length: 177 cm  Weight: 89.3 kg   BMI: 28.5 kg/m2- overweight status    NUTRITION HISTORY  Patient is on a Regular diet at home.  Typical food/fluid intake is good.  Spoke with Geo and he ate a banana, apple and hard boiled egg for breakfast. Intakes recorded at 100% in nursing notes.  Information obtained from Patient and Chart  Factors affecting nutrition intake include:LOS    CURRENT NUTRITION ORDERS  Diet: Regular    PHYSICAL FINDINGS  Observed  No nutrition related findings observed    Awaiting placement    LABS  Labs reviewed    MEDICATIONS  Medications reviewed    ASSESSED NUTRITION NEEDS:  Estimated Energy Needs: 20-25 kcal/kg  Estimated Protein Needs: 0.8 g/kg  Estimated Fluid Needs: 1 mL/kcal      PEDIATRIC NUTRITION STATUS VALIDATION  Patient does not meet criteria for malnutrition.    NUTRITION DIAGNOSIS:  No Nutrition diagnosis identified    INTERVENTIONS  Nutrition Prescription  Po to meets    Nutrition Education:   Spoke with pt, encouraged po    Goals   1. Intake of % of meals   2. Wt maintenance    FOLLOW UP/MONITORING  Food and Beverage intake- monitor po    Mary Beth Baumann, JORGE, LD, [facility]  [phone]

## 2020-01-31 NOTE — PROGRESS NOTES
01/31/20 1529   Child Life   Location Med/Surg   Intervention Supportive Check In   Preparation Comment Supportive check in with patient who was eating lunch and watching Avengers: End Game. Engaged patient in conversation regarding interests in the hospital. Patient stated liking phone, listening to music and watching movies. This writer introduced/offered other activities which patient was less interested in. Patient did play BINGO last week and shared it was challenging, this writer will follow up next week to support patient in ZTV programming. Patient also expressed an interest in dogs, loving all dogs. This writer will refer patient to [facility] dog and handler.    Family Support Comment no family present   Anxiety Low Anxiety   Outcomes/Follow Up Continue to Follow/Support

## 2020-01-31 NOTE — PROGRESS NOTES
01/31/20 1200   Living Environment   Lives With parent(s)   Home Accessibility stairs to enter home   Number of Stairs, Within Home, Primary 2   Functional Level Prior   Usual Activity Tolerance fair   Current Activity Tolerance poor   Activity/Exercise/Self-Care Comment Per patient, he has been mostly in bed for 5 days with increased tremor though RN states this is his baseline.   Ambulation 3-->assistive equipment and person   Transferring 3-->assistive equipment and person   Toileting 4-->completely dependent   Bathing 4-->completely dependent   Cognition 0 - no cognition issues reported   Fall history within last six months no   Which of the above functional risks had a recent onset or change? 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     Parent/Caregiver Involvement   (Parents not present given patient age)   Precautions/Limitations fall precautions   Weight-Bearing Status - LUE full weight-bearing   Weight-Bearing Status - RUE full weight-bearing   Weight-Bearing Status - LLE full weight-bearing   Weight-Bearing Status - RLE full weight-bearing   General Observations Patient with significant tremor that appears more prominent  in R side of body, ataxic extremity movements with any motor planning/coordination   General Info Comments Activity: up with assist   Pain Assessment   Patient Currently in Pain No   Cognitive Status Examination   Orientation orientation to person, place and time   Level of Consciousness alert   Follows Commands and Answers Questions 75% of the time   Personal Safety and Judgment impaired   Memory intact   Cognitive Comment Patient with some impaired safety with mobility though is able to recognize when unsafe after questioned by therapist after the fact   Behavior   Behavior cooperative   Posture    Posture deficits were identified   Posture: Deficits Identified rounded shoulders  (forward head posture)   Range of Motion (ROM)   ROM Comment Not formally assessed is functional for baseline PLOF   Strength   Manual Muscle Testing Results Strength is functional   Lower Extremity Strength  Global strength screen 5/5   Functional Motor Performance Gross Motor Skills   Coordination Comments Ataxic extremities with any coordinated task (toe taps, marching) or motor planning   Functional Motor Performance-Higher Level Motor Skills   Higher Level Gross Motor Skill Comments Not appropriate to assess   Gait   Gait Comments Attempting ~ 5 ft ambulation, patient with ataxic gait using FWW with significant imbalance requiring max A at gait belt for safety   Balance   Balance Comments Able to sustain standing balance > 3 minutes safely though becomes impaired when given additional motor planning task   General Therapy Interventions   Planned Therapy Interventions Therapeutic Procedures;Therapeutic Activities;Neuromuscular Re-education;Gait Training   Clinical Impression   Criteria for Skilled Interventions Met (PT) yes;meets criteria;skilled treatment is necessary   PT Diagnosis (PT) impaired mobility   Functional limitations due to impairments impaired mobility   Clinical Presentation Evolving/Changing   Clinical Presentation  Rationale > 3 body systems and functions impacting PT POC   Clinical Decision Making (Complexity) Moderate complexity   Therapy Frequency Daily   Predicted Duration of Therapy Intervention (PT) 1 week   Anticipated Equipment Needs at Discharge   (Patient with FWW and wheelchair already)   Anticipated Discharge Disposition extended care facility   Risk & Benefits of therapy have been explained Yes   Patient, Family & other staff in agreement with plan of care Yes   Clinical Impression Comments Geo presents inpatient with impaired mobility and unsafe transfers secondary to tremor and ataxic extremity movements when coordinating task. Patient would benefit from IP PT to ensure safe transfers and mobility prior to discharge to extended care facility.   Total Evaluation Time   Total Evaluation Time (Minutes) 5     Parvin Marie, PT, -[phone]

## 2020-02-01 ENCOUNTER — APPOINTMENT (OUTPATIENT)
Dept: PHYSICAL THERAPY | Facility: CLINIC | Age: 20
End: 2020-02-01
Attending: PEDIATRICS
Payer: COMMERCIAL

## 2020-02-01 PROCEDURE — 25000132 ZZH RX MED GY IP 250 OP 250 PS 637: Performed by: STUDENT IN AN ORGANIZED HEALTH CARE EDUCATION/TRAINING PROGRAM

## 2020-02-01 PROCEDURE — 25000125 ZZHC RX 250: Performed by: STUDENT IN AN ORGANIZED HEALTH CARE EDUCATION/TRAINING PROGRAM

## 2020-02-01 PROCEDURE — 25000131 ZZH RX MED GY IP 250 OP 636 PS 637: Performed by: STUDENT IN AN ORGANIZED HEALTH CARE EDUCATION/TRAINING PROGRAM

## 2020-02-01 PROCEDURE — 97530 THERAPEUTIC ACTIVITIES: CPT | Mod: GP

## 2020-02-01 PROCEDURE — 25000132 ZZH RX MED GY IP 250 OP 250 PS 637

## 2020-02-01 PROCEDURE — G0378 HOSPITAL OBSERVATION PER HR: HCPCS

## 2020-02-01 RX ORDER — POLYETHYLENE GLYCOL 3350 17 G/17G
17 POWDER, FOR SOLUTION ORAL 4 TIMES DAILY
Status: DISCONTINUED | OUTPATIENT
Start: 2020-02-01 | End: 2020-02-13

## 2020-02-01 RX ADMIN — POLYETHYLENE GLYCOL 3350 17 G: 17 POWDER, FOR SOLUTION ORAL at 17:19

## 2020-02-01 RX ADMIN — POLYETHYLENE GLYCOL 3350 17 G: 17 POWDER, FOR SOLUTION ORAL at 10:14

## 2020-02-01 RX ADMIN — LINACLOTIDE 290 MCG: 145 CAPSULE, GELATIN COATED ORAL at 10:13

## 2020-02-01 RX ADMIN — POLYETHYLENE GLYCOL 3350 17 G: 17 POWDER, FOR SOLUTION ORAL at 14:11

## 2020-02-01 RX ADMIN — FEXOFENADINE HYDROCHLORIDE 180 MG: 180 TABLET, FILM COATED ORAL at 21:11

## 2020-02-01 RX ADMIN — Medication 1 DOSE: at 10:10

## 2020-02-01 RX ADMIN — AMITRIPTYLINE HYDROCHLORIDE 50 MG: 50 TABLET, FILM COATED ORAL at 21:11

## 2020-02-01 RX ADMIN — OLANZAPINE 30 MG: 15 TABLET, FILM COATED ORAL at 21:10

## 2020-02-01 RX ADMIN — Medication 400 UNITS: at 10:12

## 2020-02-01 RX ADMIN — MUPIROCIN: 20 OINTMENT TOPICAL at 14:12

## 2020-02-01 RX ADMIN — SULFAMETHOXAZOLE AND TRIMETHOPRIM 1 TABLET: 400; 80 TABLET ORAL at 10:10

## 2020-02-01 RX ADMIN — MELATONIN 2000 UNITS: at 10:13

## 2020-02-01 RX ADMIN — POTASSIUM PHOSPHATE, MONOBASIC 500 MG: 500 TABLET, SOLUBLE ORAL at 20:09

## 2020-02-01 RX ADMIN — Medication 1 DOSE: at 16:24

## 2020-02-01 RX ADMIN — OMEPRAZOLE 40 MG: 20 CAPSULE, DELAYED RELEASE ORAL at 10:11

## 2020-02-01 RX ADMIN — SULFAMETHOXAZOLE AND TRIMETHOPRIM 1 TABLET: 400; 80 TABLET ORAL at 20:09

## 2020-02-01 RX ADMIN — DEXAMETHASONE 0.75 MG: 0.75 TABLET ORAL at 10:11

## 2020-02-01 RX ADMIN — POTASSIUM PHOSPHATE, MONOBASIC 500 MG: 500 TABLET, SOLUBLE ORAL at 10:12

## 2020-02-01 RX ADMIN — SERTRALINE HYDROCHLORIDE 150 MG: 100 TABLET ORAL at 10:10

## 2020-02-01 RX ADMIN — BISACODYL 10 MG: 5 TABLET ORAL at 10:12

## 2020-02-01 RX ADMIN — POLYETHYLENE GLYCOL 3350 17 G: 17 POWDER, FOR SOLUTION ORAL at 20:09

## 2020-02-01 NOTE — PLAN OF CARE
No issues of concern on day shift. No stool output noted. Mom,brother,and step dad here for brief visit. Up in WC with PT x1.He has been fully compliant with medication administration and has been pleasant and cooperative with cares. Notify team of any changes in VS or if having any behavioral issues.

## 2020-02-01 NOTE — PROGRESS NOTES
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 He remains admitted with disposition pending determination of placement where he can continue to receive appropriate cares.    Updates today:  - No updates regarding placement  - Still with no stool since 1/27. Geo is amenable to increasing Miralax, will move to QID, and consider increasing dose as needed if still ineffective. Geo declined starting docusate today.    FEN:  - Regular diet  - Restarted PTA supplements: Ca, vitamin D, K-phos, vitamin E    GI:   Acute on chronic constipation  - Continue PTA bowel regimen     - Linzess QAM      - Miralax QID, consider increasing further as needed     - Dulcolax QD  - Omeprazole 40 mg daily     Heme/Onc:  Grade 2 Ependymoma s/p multiple resections, radiation and chemothearpy: on study ADVL 1513.  - Entinostat 7 mg weekly (next 2/5/20)    Endo:  Chronic steroid therapy - followed by Dr. Martin  Iatrogenic adrenal insufficiency  - PTA Decadron 0.75 mg QAM  - Continue bactrim ppx 400mg BID     MSK:  Steroid-induced skin atrophy  Multiple skin wounds  - Apply bacitracin to wounds as needed with dressing changes    Neuro/Psych:  Hx brain tumor and hemorrhagic stroke  Cranial nerve palsies  Ataxia   Depression  Agitation  - Olanzapine 30mg at bedtime  - Amitriptyline 50mg at bedtime  - Zoloft 150mg daily  - Haldol 5 mg IV/IM if needed for acute agitation  - Psychiatry consult, appreciate recs  -  Psychology consult    Allergies:  - PTA Allegra 180mg qPM      Pulm:  Hx of CANDELARIO: previously used CPAP  - Routine vitals w/ spot pulse ox        Diet: Diet  Regular Diet Adult    Lines: no access  Code Status: Full    Disposition Plan   Expected discharge: Pending safe disposition to adult psychiatry or skilled care facility.    This patient was discussed with Dr. Lowry, attending pediatric oncologist.    Bruce Hutchinson MD  PL-1, Pediatrics  [facility]  Pager: [phone]    I saw and evaluated the patient and agree with the resident's assessment and plan.  Eddie Lowry MD, MPH, [facility]  Division of Pediatric Hematology/Oncology      ______________________________________________________________________    Interval History   Nursing notes reviewed. No acute events. Tolerating good PO intake. Geo continues to be in good spirits, but is not having bowel movements. There was a recorded stool on 1/30, but was documented as 0cc. He declines docusate as he has tried it previously and did not feel that it was effective.    Data reviewed today: I reviewed all medications, new labs and imaging results over the last 24 hours.    Physical Exam   Vital Signs: Temp: 97.4  F (36.3  C) Temp src: Axillary BP: 128/77 Pulse: 87 Heart Rate: 114 Resp: 18 SpO2: 99 % O2 Device: None (Room air)    Weight: 196 lbs 14.4 oz    GENERAL: Awake, alert, sitting in wheelchair.  SKIN:  Purplish striae are seen over extremities (baseline)   EYES: External lid (L) normal. R covered by patch  CV: RRR, no murmurs  RESP: CTAB, no focality  EXTREMITIES: Atrophy of lower extremities. Healing 3cm ellipse-shaped wound on left lower extremity with overlying eschar. No appreciable erythema or edema surrounding wound.  NEURO: Significant dysarthria    Data   No results found for this or any previous visit (from the past 24 hour(s)).     Medications        amitriptyline  50 mg Oral At Bedtime     bisacodyl  10 mg Oral Daily     Calcium-Vitamin D-Vitamin K  1 Dose Oral BID     dexamethasone  0.75 mg Oral Daily     fexofenadine  180 mg Oral At Bedtime     linaclotide  290 mcg Oral QAM AC     mupirocin   Topical Daily     OLANZapine  30 mg Oral At Bedtime     omeprazole  40 mg Oral QAM     polyethylene glycol  17 g Oral TID     potassium phosphate (monobasic)  500 mg Oral BID     sertraline  150 mg Oral Daily     sodium chloride (PF)  3 mL Intracatheter Q8H     sulfamethoxazole-trimethoprim  1 tablet Oral BID     vitamin D3  2,000 Units Oral Daily     vitamin E  400 Units Oral Daily

## 2020-02-01 NOTE — PLAN OF CARE
Afebrile. VSS. Lung sounds clear.   No reports of pain or discomfort.   Pt slept throughout shift comfortably.   Did not wake to void or stool.   Pt has not stooled since 1/27 (four days), to continue to monitor.   Hourly rounding completed.

## 2020-02-01 NOTE — PLAN OF CARE
[id number]. AVSS. No c/o pain or nausea. Good PO food and fluid intake. HEREDIA. No stool. Happy and appropriate. Neuros intact. Hourly rounding completed.

## 2020-02-01 NOTE — PLAN OF CARE
PT:  Pt seen for progression with transfers.  Pt able to complete sit<>stand and stand pivot transfers with use of walker and modA from PT for steadying.  Continue to encourage use of walker and A x 2 for safety with transfers, cues for taking his time help.

## 2020-02-02 ENCOUNTER — APPOINTMENT (OUTPATIENT)
Dept: PHYSICAL THERAPY | Facility: CLINIC | Age: 20
End: 2020-02-02
Attending: PEDIATRICS
Payer: COMMERCIAL

## 2020-02-02 PROCEDURE — 25000125 ZZHC RX 250: Performed by: STUDENT IN AN ORGANIZED HEALTH CARE EDUCATION/TRAINING PROGRAM

## 2020-02-02 PROCEDURE — 25000132 ZZH RX MED GY IP 250 OP 250 PS 637: Performed by: STUDENT IN AN ORGANIZED HEALTH CARE EDUCATION/TRAINING PROGRAM

## 2020-02-02 PROCEDURE — 97110 THERAPEUTIC EXERCISES: CPT | Mod: GP

## 2020-02-02 PROCEDURE — 97112 NEUROMUSCULAR REEDUCATION: CPT | Mod: GP

## 2020-02-02 PROCEDURE — 25000132 ZZH RX MED GY IP 250 OP 250 PS 637

## 2020-02-02 PROCEDURE — G0378 HOSPITAL OBSERVATION PER HR: HCPCS

## 2020-02-02 PROCEDURE — 97530 THERAPEUTIC ACTIVITIES: CPT | Mod: GP

## 2020-02-02 PROCEDURE — 25000131 ZZH RX MED GY IP 250 OP 636 PS 637: Performed by: STUDENT IN AN ORGANIZED HEALTH CARE EDUCATION/TRAINING PROGRAM

## 2020-02-02 RX ADMIN — FEXOFENADINE HYDROCHLORIDE 180 MG: 180 TABLET, FILM COATED ORAL at 21:58

## 2020-02-02 RX ADMIN — MELATONIN 2000 UNITS: at 10:07

## 2020-02-02 RX ADMIN — DEXAMETHASONE 0.75 MG: 0.75 TABLET ORAL at 10:10

## 2020-02-02 RX ADMIN — POTASSIUM PHOSPHATE, MONOBASIC 500 MG: 500 TABLET, SOLUBLE ORAL at 10:09

## 2020-02-02 RX ADMIN — Medication 400 UNITS: at 10:09

## 2020-02-02 RX ADMIN — POLYETHYLENE GLYCOL 3350 17 G: 17 POWDER, FOR SOLUTION ORAL at 16:00

## 2020-02-02 RX ADMIN — Medication 1 DOSE: at 16:06

## 2020-02-02 RX ADMIN — BISACODYL 10 MG: 5 TABLET ORAL at 10:10

## 2020-02-02 RX ADMIN — OLANZAPINE 30 MG: 15 TABLET, FILM COATED ORAL at 21:58

## 2020-02-02 RX ADMIN — LINACLOTIDE 290 MCG: 145 CAPSULE, GELATIN COATED ORAL at 10:10

## 2020-02-02 RX ADMIN — POLYETHYLENE GLYCOL 3350 17 G: 17 POWDER, FOR SOLUTION ORAL at 12:46

## 2020-02-02 RX ADMIN — SERTRALINE HYDROCHLORIDE 150 MG: 100 TABLET ORAL at 10:08

## 2020-02-02 RX ADMIN — SULFAMETHOXAZOLE AND TRIMETHOPRIM 1 TABLET: 400; 80 TABLET ORAL at 10:07

## 2020-02-02 RX ADMIN — AMITRIPTYLINE HYDROCHLORIDE 50 MG: 50 TABLET, FILM COATED ORAL at 21:58

## 2020-02-02 RX ADMIN — POLYETHYLENE GLYCOL 3350 17 G: 17 POWDER, FOR SOLUTION ORAL at 19:59

## 2020-02-02 RX ADMIN — Medication 1 DOSE: at 10:05

## 2020-02-02 RX ADMIN — SULFAMETHOXAZOLE AND TRIMETHOPRIM 1 TABLET: 400; 80 TABLET ORAL at 19:59

## 2020-02-02 RX ADMIN — OMEPRAZOLE 40 MG: 20 CAPSULE, DELAYED RELEASE ORAL at 10:07

## 2020-02-02 RX ADMIN — POLYETHYLENE GLYCOL 3350 17 G: 17 POWDER, FOR SOLUTION ORAL at 10:04

## 2020-02-02 RX ADMIN — MUPIROCIN: 20 OINTMENT TOPICAL at 17:36

## 2020-02-02 RX ADMIN — POTASSIUM PHOSPHATE, MONOBASIC 500 MG: 500 TABLET, SOLUBLE ORAL at 19:59

## 2020-02-02 NOTE — PROGRESS NOTES
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 He remains admitted with disposition pending determination of placement where he can continue to receive appropriate cares.    Updates today:  - None    FEN:  - Regular diet  - Restarted PTA supplements: Ca, vitamin D, K-phos, vitamin E    GI:   Acute on chronic constipation  - Continue PTA bowel regimen     - Linzess QAM      - Miralax QID, consider increasing further as needed     - Dulcolax QD  - Omeprazole 40 mg daily     Heme/Onc:  Grade 2 Ependymoma s/p multiple resections, radiation and chemothearpy: on study ADVL 1513.  - Entinostat 7 mg weekly (next 2/5/20)    Endo:  Chronic steroid therapy - followed by Dr. Martin  Iatrogenic adrenal insufficiency  - PTA Decadron 0.75 mg QAM  - Continue bactrim ppx 400mg BID     MSK:  Steroid-induced skin atrophy  Multiple skin wounds  - Apply bacitracin to wounds as needed with dressing changes    Neuro/Psych:  Hx brain tumor and hemorrhagic stroke  Cranial nerve palsies  Ataxia   Depression  Agitation  - Olanzapine 30mg at bedtime  - Amitriptyline 50mg at bedtime  - Zoloft 150mg daily  - Haldol 5 mg IV/IM if needed for acute agitation  - Psychiatry consult, appreciate recs  - Psychology consult    Allergies:  - PTA Allegra 180mg qPM      Pulm:  Hx of CANDELARIO: previously used CPAP  - Routine vitals w/ spot pulse ox        Diet: Diet  Regular Diet Adult    Lines: no access  Code Status:  Full    Disposition Plan   Expected discharge: Pending safe disposition to adult psychiatry or skilled care facility.    This patient was discussed with Dr. Lowry, attending pediatric oncologist.    Darshan Vargas MD  Pediatrics-PGY3  (p): [phone]    I saw and evaluated the patient and agree with the resident's assessment and plan.  Eddie Lowry MD, MPH, [facility]  Division of Pediatric Hematology/Oncology    ______________________________________________________________________    Interval History   Nursing notes reviewed. No acute events. Tolerating good PO intake and taking meds well. Large stool last night after increasing Miralax. No news on placement.    Data reviewed today: I reviewed all medications, new labs and imaging results over the last 24 hours.    Physical Exam   Vital Signs: Temp: 97.1  F (36.2  C) Temp src: Axillary BP: 98/57 Pulse: 77 Heart Rate: 114 Resp: 18 SpO2: 95 % O2 Device: None (Room air)    Weight: 196 lbs 14.4 oz    GENERAL: Sleeping comfortably on L side  SKIN:  Purplish striae are seen over extremities (baseline)   EYES: External lid (L) normal. R covered by patch  CV: RRR, no murmurs  RESP: CTAB, no focality  EXTREMITIES: Atrophy of lower extremities. Healing 3cm ellipse-shaped wound on left lower extremity with overlying eschar. No appreciable erythema or edema surrounding wound.  NEURO: Non-focal    Data   No results found for this or any previous visit (from the past 24 hour(s)).     Medications       amitriptyline  50 mg Oral At Bedtime     bisacodyl  10 mg Oral Daily     Calcium-Vitamin D-Vitamin K  1 Dose Oral BID     dexamethasone  0.75 mg Oral Daily     fexofenadine  180 mg Oral At Bedtime     linaclotide  290 mcg Oral QAM AC     mupirocin   Topical Daily     OLANZapine  30 mg Oral At Bedtime     omeprazole  40 mg Oral QAM     polyethylene glycol  17 g Oral 4x Daily     potassium phosphate (monobasic)  500 mg Oral  BID     sertraline  150 mg Oral Daily     sulfamethoxazole-trimethoprim  1 tablet Oral BID     vitamin D3  2,000 Units Oral Daily     vitamin E  400 Units Oral Daily

## 2020-02-02 NOTE — PLAN OF CARE
[id number]. AVSS. Good PO fluid and food intake. No stool. No contact with family. Happy and appropriate. Hourly rounding completed.

## 2020-02-02 NOTE — PROGRESS NOTES
PT: Pt seen today for progression with strength, balance and safety with transfers, needing cues to complete slower movements for stability.  Pt very motivated to work hard with PT.  A x 2 for safety with transfers outside of therapy.

## 2020-02-03 PROCEDURE — 25000125 ZZHC RX 250: Performed by: STUDENT IN AN ORGANIZED HEALTH CARE EDUCATION/TRAINING PROGRAM

## 2020-02-03 PROCEDURE — 25000132 ZZH RX MED GY IP 250 OP 250 PS 637

## 2020-02-03 PROCEDURE — 25000132 ZZH RX MED GY IP 250 OP 250 PS 637: Performed by: STUDENT IN AN ORGANIZED HEALTH CARE EDUCATION/TRAINING PROGRAM

## 2020-02-03 PROCEDURE — G0378 HOSPITAL OBSERVATION PER HR: HCPCS

## 2020-02-03 PROCEDURE — 25000131 ZZH RX MED GY IP 250 OP 636 PS 637: Performed by: STUDENT IN AN ORGANIZED HEALTH CARE EDUCATION/TRAINING PROGRAM

## 2020-02-03 RX ADMIN — OLANZAPINE 30 MG: 15 TABLET, FILM COATED ORAL at 21:49

## 2020-02-03 RX ADMIN — POTASSIUM PHOSPHATE, MONOBASIC 500 MG: 500 TABLET, SOLUBLE ORAL at 10:28

## 2020-02-03 RX ADMIN — SULFAMETHOXAZOLE AND TRIMETHOPRIM 1 TABLET: 400; 80 TABLET ORAL at 10:28

## 2020-02-03 RX ADMIN — BISACODYL 10 MG: 5 TABLET ORAL at 10:27

## 2020-02-03 RX ADMIN — POLYETHYLENE GLYCOL 3350 17 G: 17 POWDER, FOR SOLUTION ORAL at 14:19

## 2020-02-03 RX ADMIN — POLYETHYLENE GLYCOL 3350 17 G: 17 POWDER, FOR SOLUTION ORAL at 10:27

## 2020-02-03 RX ADMIN — POTASSIUM PHOSPHATE, MONOBASIC 500 MG: 500 TABLET, SOLUBLE ORAL at 20:20

## 2020-02-03 RX ADMIN — Medication 400 UNITS: at 10:29

## 2020-02-03 RX ADMIN — LINACLOTIDE 290 MCG: 145 CAPSULE, GELATIN COATED ORAL at 10:28

## 2020-02-03 RX ADMIN — DEXAMETHASONE 0.75 MG: 0.75 TABLET ORAL at 10:28

## 2020-02-03 RX ADMIN — OMEPRAZOLE 40 MG: 20 CAPSULE, DELAYED RELEASE ORAL at 10:28

## 2020-02-03 RX ADMIN — FEXOFENADINE HYDROCHLORIDE 180 MG: 180 TABLET, FILM COATED ORAL at 21:49

## 2020-02-03 RX ADMIN — AMITRIPTYLINE HYDROCHLORIDE 50 MG: 50 TABLET, FILM COATED ORAL at 21:49

## 2020-02-03 RX ADMIN — Medication 1 DOSE: at 10:29

## 2020-02-03 RX ADMIN — MELATONIN 2000 UNITS: at 10:28

## 2020-02-03 RX ADMIN — MUPIROCIN: 20 OINTMENT TOPICAL at 10:29

## 2020-02-03 RX ADMIN — SULFAMETHOXAZOLE AND TRIMETHOPRIM 1 TABLET: 400; 80 TABLET ORAL at 20:20

## 2020-02-03 RX ADMIN — SERTRALINE HYDROCHLORIDE 150 MG: 100 TABLET ORAL at 10:28

## 2020-02-03 RX ADMIN — POLYETHYLENE GLYCOL 3350 17 G: 17 POWDER, FOR SOLUTION ORAL at 20:26

## 2020-02-03 RX ADMIN — Medication 1 DOSE: at 16:43

## 2020-02-03 NOTE — PLAN OF CARE
AVSS. No complaints of pain or nausea overnight. Good PO intake. Voiding well. One large stool overnight. Pt slept through the night well. Hourly rounding completed. Continue to monitor.

## 2020-02-03 NOTE — PLAN OF CARE
[id number]. Afebrile, AVSS. No c/o pain. No c/o n/v. Has good PO intake, and uop. Pt curious when he will be discharged, reassured that team will cover this topic during the week. No family at bedside. Will continue to monitor pt status and update MD with changes.

## 2020-02-03 NOTE — PLAN OF CARE
"/60   Pulse 91   Temp 96.7  F (35.9  C) (Axillary)   Resp 18   Ht 1.77 m (5' 9.69\")   Wt 89.3 kg (196 lb 14.4 oz)   SpO2 99%   BMI 28.51 kg/m      Time: 9932-1650     Reason for admission: Constipation  Vitals: VSS  Activity: Assist x2  Pain: denies  Neuro: WDL (slurred/garbled speech per/ ataxia per baseline)  Cardiac: WDL   Respiratory: LS clear on RA   GI: +BS, +flatus, -BM   : voiding spontaneously   Diet: regular diet   Incisions/Drains: N/A      New changes this shift: none      Continue to monitor and follow POC      "

## 2020-02-04 ENCOUNTER — APPOINTMENT (OUTPATIENT)
Dept: PHYSICAL THERAPY | Facility: CLINIC | Age: 20
End: 2020-02-04
Attending: PEDIATRICS
Payer: COMMERCIAL

## 2020-02-04 LAB
BASOPHILS # BLD AUTO: 0 10E9/L (ref 0–0.2)
BASOPHILS NFR BLD AUTO: 0.4 %
DIFFERENTIAL METHOD BLD: ABNORMAL
EOSINOPHIL # BLD AUTO: 0.3 10E9/L (ref 0–0.7)
EOSINOPHIL NFR BLD AUTO: 5.3 %
ERYTHROCYTE [DISTWIDTH] IN BLOOD BY AUTOMATED COUNT: 14.7 % (ref 10–15)
HCT VFR BLD AUTO: 37.2 % (ref 40–53)
HGB BLD-MCNC: 12 G/DL (ref 13.3–17.7)
IMM GRANULOCYTES # BLD: 0 10E9/L (ref 0–0.4)
IMM GRANULOCYTES NFR BLD: 0.5 %
LYMPHOCYTES # BLD AUTO: 1.1 10E9/L (ref 0.8–5.3)
LYMPHOCYTES NFR BLD AUTO: 18.6 %
MCH RBC QN AUTO: 28 PG (ref 26.5–33)
MCHC RBC AUTO-ENTMCNC: 32.3 G/DL (ref 31.5–36.5)
MCV RBC AUTO: 87 FL (ref 78–100)
MONOCYTES # BLD AUTO: 0.5 10E9/L (ref 0–1.3)
MONOCYTES NFR BLD AUTO: 8.5 %
NEUTROPHILS # BLD AUTO: 3.8 10E9/L (ref 1.6–8.3)
NEUTROPHILS NFR BLD AUTO: 66.7 %
NRBC # BLD AUTO: 0 10*3/UL
NRBC BLD AUTO-RTO: 0 /100
PLATELET # BLD AUTO: 102 10E9/L (ref 150–450)
RBC # BLD AUTO: 4.29 10E12/L (ref 4.4–5.9)
WBC # BLD AUTO: 5.7 10E9/L (ref 4–11)

## 2020-02-04 PROCEDURE — 25000131 ZZH RX MED GY IP 250 OP 636 PS 637: Performed by: STUDENT IN AN ORGANIZED HEALTH CARE EDUCATION/TRAINING PROGRAM

## 2020-02-04 PROCEDURE — 85025 COMPLETE CBC W/AUTO DIFF WBC: CPT | Performed by: STUDENT IN AN ORGANIZED HEALTH CARE EDUCATION/TRAINING PROGRAM

## 2020-02-04 PROCEDURE — 25000132 ZZH RX MED GY IP 250 OP 250 PS 637

## 2020-02-04 PROCEDURE — 25000132 ZZH RX MED GY IP 250 OP 250 PS 637: Performed by: STUDENT IN AN ORGANIZED HEALTH CARE EDUCATION/TRAINING PROGRAM

## 2020-02-04 PROCEDURE — 97530 THERAPEUTIC ACTIVITIES: CPT | Mod: GP

## 2020-02-04 PROCEDURE — G0378 HOSPITAL OBSERVATION PER HR: HCPCS

## 2020-02-04 PROCEDURE — 97110 THERAPEUTIC EXERCISES: CPT | Mod: GP

## 2020-02-04 PROCEDURE — 36415 COLL VENOUS BLD VENIPUNCTURE: CPT | Performed by: STUDENT IN AN ORGANIZED HEALTH CARE EDUCATION/TRAINING PROGRAM

## 2020-02-04 PROCEDURE — 25000125 ZZHC RX 250: Performed by: STUDENT IN AN ORGANIZED HEALTH CARE EDUCATION/TRAINING PROGRAM

## 2020-02-04 RX ADMIN — Medication 1 DOSE: at 10:08

## 2020-02-04 RX ADMIN — POLYETHYLENE GLYCOL 3350 17 G: 17 POWDER, FOR SOLUTION ORAL at 10:07

## 2020-02-04 RX ADMIN — Medication 1 DOSE: at 16:08

## 2020-02-04 RX ADMIN — LINACLOTIDE 290 MCG: 145 CAPSULE, GELATIN COATED ORAL at 10:08

## 2020-02-04 RX ADMIN — FEXOFENADINE HYDROCHLORIDE 180 MG: 180 TABLET, FILM COATED ORAL at 21:40

## 2020-02-04 RX ADMIN — MUPIROCIN: 20 OINTMENT TOPICAL at 10:08

## 2020-02-04 RX ADMIN — SULFAMETHOXAZOLE AND TRIMETHOPRIM 1 TABLET: 400; 80 TABLET ORAL at 10:07

## 2020-02-04 RX ADMIN — OLANZAPINE 30 MG: 15 TABLET, FILM COATED ORAL at 21:40

## 2020-02-04 RX ADMIN — OMEPRAZOLE 40 MG: 20 CAPSULE, DELAYED RELEASE ORAL at 10:07

## 2020-02-04 RX ADMIN — POLYETHYLENE GLYCOL 3350 17 G: 17 POWDER, FOR SOLUTION ORAL at 18:19

## 2020-02-04 RX ADMIN — BISACODYL 10 MG: 5 TABLET ORAL at 10:07

## 2020-02-04 RX ADMIN — Medication 400 UNITS: at 10:08

## 2020-02-04 RX ADMIN — AMITRIPTYLINE HYDROCHLORIDE 50 MG: 50 TABLET, FILM COATED ORAL at 21:44

## 2020-02-04 RX ADMIN — SULFAMETHOXAZOLE AND TRIMETHOPRIM 1 TABLET: 400; 80 TABLET ORAL at 20:17

## 2020-02-04 RX ADMIN — SERTRALINE HYDROCHLORIDE 150 MG: 100 TABLET ORAL at 10:07

## 2020-02-04 RX ADMIN — POTASSIUM PHOSPHATE, MONOBASIC 500 MG: 500 TABLET, SOLUBLE ORAL at 20:17

## 2020-02-04 RX ADMIN — POLYETHYLENE GLYCOL 3350 17 G: 17 POWDER, FOR SOLUTION ORAL at 15:12

## 2020-02-04 RX ADMIN — DEXAMETHASONE 0.75 MG: 0.75 TABLET ORAL at 10:08

## 2020-02-04 RX ADMIN — MELATONIN 2000 UNITS: at 10:07

## 2020-02-04 RX ADMIN — POTASSIUM PHOSPHATE, MONOBASIC 500 MG: 500 TABLET, SOLUBLE ORAL at 10:08

## 2020-02-04 NOTE — PROGRESS NOTES
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 He remains admitted with disposition pending determination of placement where he can continue to receive appropriate cares.    Updates today:  - None    FEN:  - Regular diet  - Restarted PTA supplements: Ca, vitamin D, K-phos, vitamin E    GI:   Acute on chronic constipation  - Continue PTA bowel regimen     - Linzess QAM      - Miralax QID, consider increasing further as needed     - Dulcolax QD  - Omeprazole 40 mg daily     Heme/Onc:  Grade 2 Ependymoma s/p multiple resections, radiation and chemothearpy: on study ADVL 1513.  - Entinostat 7 mg weekly (next 2/5/20)    Endo:  Chronic steroid therapy - followed by Dr. Martin  Iatrogenic adrenal insufficiency  - PTA Decadron 0.75 mg QAM  - Continue bactrim ppx 400mg BID     MSK:  Steroid-induced skin atrophy  Multiple skin wounds  - Apply bacitracin to wounds as needed with dressing changes    Neuro/Psych:  Hx brain tumor and hemorrhagic stroke  Cranial nerve palsies  Ataxia   Depression  Agitation  - Olanzapine 30mg at bedtime  - Amitriptyline 50mg at bedtime  - Zoloft 150mg daily  - Haldol 5 mg IV/IM if needed for acute agitation  - Psychiatry consult  - Psychology consult    Allergies:  - PTA Allegra 180mg qPM      Pulm:  Hx of CANDELARIO: previously used CPAP  - Routine vitals w/ spot pulse ox        Diet: Diet  Regular Diet Adult    Lines: no access  Code Status: Full    Disposition Plan    Expected discharge: Pending safe disposition to adult psychiatry or skilled care facility.    This patient was discussed with Dr. Lowry, attending pediatric oncologist.      Bruce Hutchinson MD  PL-1, Pediatrics  [facility]  Pager: [phone]    I saw and evaluated the patient and agree with the resident's assessment and plan.  Eddie Lowry MD, MPH, [facility]  Division of Pediatric Hematology/Oncology    ______________________________________________________________________    Interval History   Nursing notes reviewed. No acute events. Geo is in good spirits and was reportedly very motivated during his PT session yesterday. He has inquired about when he might be discharged. Tolerating good PO, had BM yesterday.    Data reviewed today: I reviewed all medications, new labs and imaging results over the last 24 hours.    Physical Exam   Vital Signs: Temp: 98.1  F (36.7  C) Temp src: Axillary BP: 116/81 Pulse: 91 Heart Rate: 96 Resp: 21 SpO2: 98 % O2 Device: None (Room air)    Weight: 196 lbs 14.4 oz    GENERAL: Awake, alert, sitting up in bed. No acute distress  SKIN:  Purplish striae are seen over extremities (baseline)   EYES: Right eye covered by eye patch. Left eye normal in appearance.  CV: RRR, normal S1/S2. No murmurs appreciated.  RESP: CTAB, no adventitious sounds  EXTREMITIES: Atrophy of lower extremities. Healing 3cm ellipse-shaped wound on left lower extremity with overlying eschar. No appreciable erythema or edema surrounding wound.    Data   No results found for this or any previous visit (from the past 24 hour(s)).     Medications       amitriptyline  50 mg Oral At Bedtime     bisacodyl  10 mg Oral Daily     Calcium-Vitamin D-Vitamin K  1 Dose Oral BID     dexamethasone  0.75 mg Oral Daily     fexofenadine  180 mg Oral At Bedtime     linaclotide  290 mcg Oral QAM AC     mupirocin   Topical Daily     OLANZapine   30 mg Oral At Bedtime     omeprazole  40 mg Oral QAM     polyethylene glycol  17 g Oral 4x Daily     potassium phosphate (monobasic)  500 mg Oral BID     sertraline  150 mg Oral Daily     sulfamethoxazole-trimethoprim  1 tablet Oral BID     vitamin D3  2,000 Units Oral Daily     vitamin E  400 Units Oral Daily

## 2020-02-04 NOTE — PLAN OF CARE
VSS, afebrile. No c/o pain or discomfort. Voiding. One large loose stool. Pt slept well overnight. No family at bedside. Hourly rounding complete. Will continue to monitor.

## 2020-02-04 NOTE — PLAN OF CARE
Pt is calm and cooperative, AVSS, no pain, no n/v. Pt has good PO intake of food and fluids, void x4, no stool, continued scheduled miralax. Dressing changed and medication applied to L tibial wound. Parents not at bedside. Continue to monitor and notify provider of any changes.

## 2020-02-04 NOTE — PROGRESS NOTES
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 He remains admitted with disposition pending determination of placement where he can continue to receive appropriate cares.    Updates today:  - None    FEN:  - Regular diet  - Continue PTA supplements: Ca, vitamin D, K-phos, vitamin E    GI:   Acute on chronic constipation  - Continue PTA bowel regimen     - Linzess QAM      - Miralax QID, consider increasing further as needed     - Dulcolax QD  - Omeprazole 40 mg daily     Heme/Onc:  Grade 2 Ependymoma s/p multiple resections, radiation and chemothearpy: on study ADVL 1513.  - Entinostat 7 mg weekly (next 2/5/20)    Endo:  Chronic steroid therapy - followed by Dr. Martin  Iatrogenic adrenal insufficiency  - PTA Decadron 0.75 mg QAM  - Continue bactrim ppx 400mg BID     MSK:  Steroid-induced skin atrophy  Multiple skin wounds  - Apply bacitracin to wounds as needed with dressing changes    Neuro/Psych:  Hx brain tumor and hemorrhagic stroke  Cranial nerve palsies  Ataxia   Depression  Agitation  - Olanzapine 30mg at bedtime  - Amitriptyline 50mg at bedtime  - Zoloft 150mg daily  - Haldol 5 mg IV/IM if needed for acute agitation  - Psychiatry consult  - Psychology consult    Allergies:  - PTA Allegra 180mg qPM      Pulm:  Hx of CANDELARIO: previously used CPAP  - Routine vitals w/ spot pulse ox        Diet: Diet  Regular Diet Adult    Lines: no access  Code Status: Full    Disposition Plan    Expected discharge: Pending safe disposition to adult psychiatry or skilled care facility.    Geo was evaluated and discussed with Heme/Onc Fellow Dr. Eddy and Attending Dr. Alen Holland,   Pediatrics resident, PGY-1  Pager: [phone]    I saw and evaluated the patient and agree with the resident's assessment and plan.   I spoke to Geo's Dad and he was interested in Geo getting a referral for outpatient PT and also an assessment to see if Geo is eligible for inpatient PT.  He reiterated that he feels that it would not be safe to have Geo at home and that they will be touring a group home on Thursday and then he will come to hospital to take Geo to his mental health appointment.  Eddie Lowry MD, MPH, [facility]  Division of Pediatric Hematology/Oncology    ______________________________________________________________________    Interval History   No acute overnight events, nursing notes reviewed. Geo is in a good mood today. He was very talkative and reports that he does not need anything and is feeling well.  He was curious to know when he could go home. Tolerating good PO, BM in last 24 hours and overnight    Data reviewed today: I reviewed all medications, new labs and imaging results over the last 24 hours.    Physical Exam   Vital Signs: Temp: 96.9  F (36.1  C) Temp src: Axillary BP: 101/65   Heart Rate: 74 Resp: 20 SpO2: 94 % O2 Device: None (Room air)    Weight: 196 lbs 14.4 oz    GENERAL: Awake, laying in bed, sits up on exam, pleasant and talkative  SKIN:  Purplish striae are seen over upper extremities (baseline)   EYES: Right eye uncovered. Left eye normal in appearance.  CV: Not tachycardic, extremities warm, well perfused  RESP: Breathing comfortably, no signs of increased work of breathing or respiratory distress  ABD: non-distended, non painful   EXTREMITIES: Atrophy of lower extremities. Full range of motion  of upper extremities    Data   No results found for this or any previous visit (from the past 24 hour(s)).     Medications       amitriptyline  50 mg Oral At Bedtime     bisacodyl  10 mg Oral Daily     Calcium-Vitamin D-Vitamin K  1 Dose Oral BID     dexamethasone  0.75 mg Oral Daily     fexofenadine  180 mg Oral At Bedtime     linaclotide  290 mcg Oral QAM AC     mupirocin   Topical Daily     OLANZapine  30 mg Oral At Bedtime     omeprazole  40 mg Oral QAM     polyethylene glycol  17 g Oral 4x Daily     potassium phosphate (monobasic)  500 mg Oral BID     sertraline  150 mg Oral Daily     sulfamethoxazole-trimethoprim  1 tablet Oral BID     vitamin D3  2,000 Units Oral Daily     vitamin E  400 Units Oral Daily

## 2020-02-04 NOTE — PLAN OF CARE
"/65   Pulse 91   Temp 96.9  F (36.1  C) (Axillary)   Resp 20   Ht 1.77 m (5' 9.69\")   Wt 89.3 kg (196 lb 14.4 oz)   SpO2 94%   BMI 28.51 kg/m      Time: 8599-3803     Reason for admission: Constipation  Vitals: VSS  Activity: Assist x2  Pain: denies  Neuro: WDL (slurred/garbled speech per/ ataxia per baseline)  Cardiac: WDL   Respiratory: LS clear on RA   GI: +BS, +flatus, +BM   : voiding spontaneously   Diet: regular diet   Incisions/Drains: N/A      New changes this shift: none      Continue to monitor and follow POC  "

## 2020-02-04 NOTE — PROGRESS NOTES
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    SW met with Geo to update him on his current plan. Geo expressed confusion as to why he was still in the hospital.     INTERVENTION:     SW discussed with Geo the current plan of finding him a more appropriate, long term placement. Discussed the pros and cons of his last facility and stressed importance of finding the right fit for him to feel comfortable, independent, and safe. SW identified this could take several more days, even weeks. Geo agreed that returning home to either of his parents home would be ideal or safe at this time.     SW also emailed  to get update. Parents still scheduled to tour potential group home on Thursday, 2.6. If parents feel this is an appropriate place for Geo to live, the process could still take \"days to weeks\", as it is dependent on room availability, paperwork, county payment process, etc.     ASSESSMENT:     Geo was easily engaged. He's had a fun day participating in hospital activities with C[employer]. He is participating in PT. Geo was surprised at the length of time he may be in the hospital, though expressed understanding when SW explained reasoning. He continues to be frustrated that \"my doctors aren't helping me\" and continues to feel constipated.     PLAN:     Social work will continue to assess needs and provide ongoing psychosocial support and access to resources.       GARCIA Lewis, LICSW  Pediatric Hem/Onc Social " Worker  Phone: [phone]  Pager: [phone]

## 2020-02-04 NOTE — PLAN OF CARE
Discharge Planner PT   Patient plan for discharge: Did not discuss  Current status: Patient very ataxic with all transfers, needing SBA for supine<>sit EOB. Patient engaged in LE strengthening exercises using 5lb weights with good tolerance. Patient practicing sit<>stand needing Presley-modA and close guarding as he tends to launch up and forward quickly. Patient engaged in standing balance activities and short pre-gait activities. Overall tolerated well, continue to use GB and Ax2 for safety with transfers.  Barriers to return to prior living situation: Medical, safety with mobility  Recommendations for discharge: Facility/ LTC   Rationale for recommendations: Patient would benefit from continued skilled PT service after discharge to progress strength and activity tolerance and safety with mobility.       Entered by: Amena Goodson 02/04/2020 12:50 PM

## 2020-02-05 ENCOUNTER — APPOINTMENT (OUTPATIENT)
Dept: PHYSICAL THERAPY | Facility: CLINIC | Age: 20
End: 2020-02-05
Attending: PEDIATRICS
Payer: COMMERCIAL

## 2020-02-05 PROCEDURE — 97530 THERAPEUTIC ACTIVITIES: CPT | Mod: GP

## 2020-02-05 PROCEDURE — 25000125 ZZHC RX 250: Performed by: STUDENT IN AN ORGANIZED HEALTH CARE EDUCATION/TRAINING PROGRAM

## 2020-02-05 PROCEDURE — 25000132 ZZH RX MED GY IP 250 OP 250 PS 637

## 2020-02-05 PROCEDURE — G0378 HOSPITAL OBSERVATION PER HR: HCPCS

## 2020-02-05 PROCEDURE — 25000132 ZZH RX MED GY IP 250 OP 250 PS 637: Performed by: STUDENT IN AN ORGANIZED HEALTH CARE EDUCATION/TRAINING PROGRAM

## 2020-02-05 PROCEDURE — 97110 THERAPEUTIC EXERCISES: CPT | Mod: GP

## 2020-02-05 PROCEDURE — 25000131 ZZH RX MED GY IP 250 OP 636 PS 637: Performed by: STUDENT IN AN ORGANIZED HEALTH CARE EDUCATION/TRAINING PROGRAM

## 2020-02-05 RX ADMIN — SULFAMETHOXAZOLE AND TRIMETHOPRIM 1 TABLET: 400; 80 TABLET ORAL at 10:16

## 2020-02-05 RX ADMIN — OLANZAPINE 30 MG: 15 TABLET, FILM COATED ORAL at 22:03

## 2020-02-05 RX ADMIN — DEXAMETHASONE 0.75 MG: 0.75 TABLET ORAL at 10:16

## 2020-02-05 RX ADMIN — Medication 1 DOSE: at 10:17

## 2020-02-05 RX ADMIN — POLYETHYLENE GLYCOL 3350 17 G: 17 POWDER, FOR SOLUTION ORAL at 14:05

## 2020-02-05 RX ADMIN — Medication 1 DOSE: at 16:12

## 2020-02-05 RX ADMIN — OMEPRAZOLE 40 MG: 20 CAPSULE, DELAYED RELEASE ORAL at 10:16

## 2020-02-05 RX ADMIN — POLYETHYLENE GLYCOL 3350 17 G: 17 POWDER, FOR SOLUTION ORAL at 10:17

## 2020-02-05 RX ADMIN — POLYETHYLENE GLYCOL 3350 17 G: 17 POWDER, FOR SOLUTION ORAL at 21:00

## 2020-02-05 RX ADMIN — MUPIROCIN: 20 OINTMENT TOPICAL at 10:18

## 2020-02-05 RX ADMIN — BISACODYL 10 MG: 5 TABLET ORAL at 10:16

## 2020-02-05 RX ADMIN — POTASSIUM PHOSPHATE, MONOBASIC 500 MG: 500 TABLET, SOLUBLE ORAL at 10:17

## 2020-02-05 RX ADMIN — MELATONIN 2000 UNITS: at 10:17

## 2020-02-05 RX ADMIN — SERTRALINE HYDROCHLORIDE 150 MG: 100 TABLET ORAL at 10:16

## 2020-02-05 RX ADMIN — SULFAMETHOXAZOLE AND TRIMETHOPRIM 1 TABLET: 400; 80 TABLET ORAL at 21:00

## 2020-02-05 RX ADMIN — Medication 400 UNITS: at 10:16

## 2020-02-05 RX ADMIN — FEXOFENADINE HYDROCHLORIDE 180 MG: 180 TABLET, FILM COATED ORAL at 22:03

## 2020-02-05 RX ADMIN — AMITRIPTYLINE HYDROCHLORIDE 50 MG: 50 TABLET, FILM COATED ORAL at 22:03

## 2020-02-05 RX ADMIN — POTASSIUM PHOSPHATE, MONOBASIC 500 MG: 500 TABLET, SOLUBLE ORAL at 21:00

## 2020-02-05 RX ADMIN — LINACLOTIDE 290 MCG: 145 CAPSULE, GELATIN COATED ORAL at 10:15

## 2020-02-05 RX ADMIN — POLYETHYLENE GLYCOL 3350 17 G: 17 POWDER, FOR SOLUTION ORAL at 16:11

## 2020-02-05 NOTE — PLAN OF CARE
Pt is calm and cooperative. AVSS, no pain, no n/v. Pt has good PO intake of food and fluids, void x2, stool x1, continued scheduled miralax. Dressing changed on L tibial wound, medication applied. Continue to monitor and notify provider of any changes.

## 2020-02-05 NOTE — PLAN OF CARE
PT Unit 5: Geo was seen by PT with session focused on progression of gross strength through sitting, standing exercises and transfer up to bedside wheelchair at end of session for lunch. Pt tolerated session well with min fatigue, requiring max A for balance in standing but demonstrating improved control with increased repetitions of exercises. Will continue to follow pt 5x/week for progression of exercises and ambulation independence.     Meggan Cotto, PT, [phone]

## 2020-02-05 NOTE — PLAN OF CARE
"/67   Pulse 94   Temp 97.4  F (36.3  C) (Axillary)   Resp 20   Ht 1.77 m (5' 9.69\")   Wt 89.3 kg (196 lb 14.4 oz)   SpO2 96%   BMI 28.51 kg/m      Time: 5921-8164     Reason for admission: Constipation  Vitals: VSS  Activity: Assist x2  Pain: denies  Neuro: WDL (slurred/garbled speech per/ ataxia per baseline)  Cardiac: WDL   Respiratory: LS clear on RA   GI: +BS, +flatus, -BM   : voiding spontaneously   Diet: regular diet   Incisions/Drains: N/A      New changes this shift: Parents have a meeting with a group home tomorrow.  Psych meeting tomorrow.  Trying to figure out if Psych MD can come see Geo here instead of the clinic.       Continue to monitor and follow POC  "

## 2020-02-05 NOTE — PROGRESS NOTES
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 He remains admitted with disposition pending determination of placement where he can continue to receive appropriate cares.    Updates today:  - Entinostat today     FEN:  - Regular diet  - Continue PTA supplements: Ca, vitamin D, K-phos, vitamin E    GI:   Acute on chronic constipation  - Continue PTA bowel regimen     - Linzess QAM      - Miralax QID, consider increasing further as needed     - Dulcolax QD  - Omeprazole 40 mg daily     Heme/Onc:  Grade 2 Ependymoma s/p multiple resections, radiation and chemothearpy: on study ADVL 1513.  - Entinostat 7 mg weekly (next 2/5/20)  - CBC every Tuesday     Endo:  Chronic steroid therapy - followed by Dr. Martin  Iatrogenic adrenal insufficiency  - PTA Decadron 0.75 mg QAM  - Continue bactrim ppx 400mg BID     MSK:  Steroid-induced skin atrophy  Multiple skin wounds  - Apply bacitracin to wounds as needed with dressing changes    Neuro/Psych:  Hx brain tumor and hemorrhagic stroke  Cranial nerve palsies  Ataxia   Depression  Agitation  - Olanzapine 30mg at bedtime  - Amitriptyline 50mg at bedtime  - Zoloft 150mg daily  - Haldol 5 mg IV/IM if needed for acute agitation  - Psychiatry consult  - Psychology consult    Allergies:  - PTA Allegra 180mg qPM      Pulm:  Hx of CANDELARIO: previously used CPAP  - Routine vitals w/ spot pulse ox        Diet: Diet  Regular Diet Adult    Lines: no access  Code  Status: Full    Disposition Plan   Expected discharge: Pending safe disposition to adult psychiatry or skilled care facility.    Geo was evaluated and discussed with Heme/Onc Fellow Dr. Eddy and Attending Dr. Alen Holland DO  Pediatrics resident, PGY-1  Pager: [phone]    I saw and evaluated the patient and agree with the resident's assessment and plan.  Eddie Lowry MD, MPH, [facility]  Division of Pediatric Hematology/Oncology        ______________________________________________________________________    Interval History   No acute overnight events, nursing notes reviewed. Tolerating good PO intake. Talked with Geo's mother yesterday. Scheduled to have psych appointment with Dr. Fay tomorrow.    Data reviewed today: I reviewed all medications, new labs and imaging results over the last 24 hours.    Physical Exam   Vital Signs: Temp: 97  F (36.1  C) Temp src: Axillary BP: 114/67 Pulse: 94 Heart Rate: 86 Resp: 22 SpO2: 96 % O2 Device: None (Room air)    Weight: 196 lbs 14.4 oz    GENERAL: sleeping, resting comfortably in bed, in no acute distress  SKIN:  Purplish striae are seen over upper extremities (baseline)   EYES: Right eye covered by eye patch. Left eye normal in appearance.  CV: Not tachycardic, extremities warm, well perfused  RESP: Breathing comfortably, no signs of increased work of breathing or respiratory distress  ABD: non-distended, non painful   EXTREMITIES: Atrophy of lower extremities. Full range of motion of upper extremities    Data   No results found for this or any previous visit (from the past 24 hour(s)).     Medications       amitriptyline  50 mg Oral At Bedtime     bisacodyl  10 mg Oral Daily     Calcium-Vitamin D-Vitamin K  1 Dose Oral BID     dexamethasone  0.75 mg Oral Daily     fexofenadine  180 mg Oral At Bedtime     linaclotide  290 mcg Oral QAM AC     mupirocin   Topical Daily     OLANZapine  30 mg Oral  At Bedtime     omeprazole  40 mg Oral QAM     polyethylene glycol  17 g Oral 4x Daily     potassium phosphate (monobasic)  500 mg Oral BID     sertraline  150 mg Oral Daily     sulfamethoxazole-trimethoprim  1 tablet Oral BID     vitamin D3  2,000 Units Oral Daily     vitamin E  400 Units Oral Daily

## 2020-02-05 NOTE — PLAN OF CARE
Pt is calm and cooperative. AVSS, no c/o pain of n/v. Good PO intake. Dressing on L tibia CDI. Continue to monitor and update MD with any changes.

## 2020-02-06 ENCOUNTER — APPOINTMENT (OUTPATIENT)
Dept: PHYSICAL THERAPY | Facility: CLINIC | Age: 20
End: 2020-02-06
Attending: PEDIATRICS
Payer: COMMERCIAL

## 2020-02-06 ENCOUNTER — DOCUMENTATION ONLY (OUTPATIENT)
Dept: PSYCHIATRY | Facility: CLINIC | Age: 20
End: 2020-02-06

## 2020-02-06 PROCEDURE — 25000131 ZZH RX MED GY IP 250 OP 636 PS 637: Performed by: STUDENT IN AN ORGANIZED HEALTH CARE EDUCATION/TRAINING PROGRAM

## 2020-02-06 PROCEDURE — 25000132 ZZH RX MED GY IP 250 OP 250 PS 637

## 2020-02-06 PROCEDURE — 25000125 ZZHC RX 250: Performed by: STUDENT IN AN ORGANIZED HEALTH CARE EDUCATION/TRAINING PROGRAM

## 2020-02-06 PROCEDURE — 25000132 ZZH RX MED GY IP 250 OP 250 PS 637: Performed by: STUDENT IN AN ORGANIZED HEALTH CARE EDUCATION/TRAINING PROGRAM

## 2020-02-06 PROCEDURE — 97116 GAIT TRAINING THERAPY: CPT | Mod: GP

## 2020-02-06 PROCEDURE — 97530 THERAPEUTIC ACTIVITIES: CPT | Mod: GP

## 2020-02-06 PROCEDURE — 97110 THERAPEUTIC EXERCISES: CPT | Mod: GP

## 2020-02-06 PROCEDURE — G0378 HOSPITAL OBSERVATION PER HR: HCPCS

## 2020-02-06 RX ADMIN — SULFAMETHOXAZOLE AND TRIMETHOPRIM 1 TABLET: 400; 80 TABLET ORAL at 10:09

## 2020-02-06 RX ADMIN — MELATONIN 2000 UNITS: at 10:09

## 2020-02-06 RX ADMIN — SULFAMETHOXAZOLE AND TRIMETHOPRIM 1 TABLET: 400; 80 TABLET ORAL at 19:48

## 2020-02-06 RX ADMIN — POLYETHYLENE GLYCOL 3350 17 G: 17 POWDER, FOR SOLUTION ORAL at 19:48

## 2020-02-06 RX ADMIN — POLYETHYLENE GLYCOL 3350 17 G: 17 POWDER, FOR SOLUTION ORAL at 16:07

## 2020-02-06 RX ADMIN — LINACLOTIDE 290 MCG: 145 CAPSULE, GELATIN COATED ORAL at 10:09

## 2020-02-06 RX ADMIN — OMEPRAZOLE 40 MG: 20 CAPSULE, DELAYED RELEASE ORAL at 10:09

## 2020-02-06 RX ADMIN — Medication 1 DOSE: at 10:10

## 2020-02-06 RX ADMIN — DEXAMETHASONE 0.75 MG: 0.75 TABLET ORAL at 10:10

## 2020-02-06 RX ADMIN — Medication 400 UNITS: at 10:09

## 2020-02-06 RX ADMIN — POTASSIUM PHOSPHATE, MONOBASIC 500 MG: 500 TABLET, SOLUBLE ORAL at 19:48

## 2020-02-06 RX ADMIN — AMITRIPTYLINE HYDROCHLORIDE 50 MG: 50 TABLET, FILM COATED ORAL at 21:59

## 2020-02-06 RX ADMIN — SERTRALINE HYDROCHLORIDE 150 MG: 100 TABLET ORAL at 10:09

## 2020-02-06 RX ADMIN — BISACODYL 10 MG: 5 TABLET ORAL at 10:10

## 2020-02-06 RX ADMIN — POLYETHYLENE GLYCOL 3350 17 G: 17 POWDER, FOR SOLUTION ORAL at 10:09

## 2020-02-06 RX ADMIN — POTASSIUM PHOSPHATE, MONOBASIC 500 MG: 500 TABLET, SOLUBLE ORAL at 10:10

## 2020-02-06 RX ADMIN — FEXOFENADINE HYDROCHLORIDE 180 MG: 180 TABLET, FILM COATED ORAL at 21:59

## 2020-02-06 RX ADMIN — POLYETHYLENE GLYCOL 3350 17 G: 17 POWDER, FOR SOLUTION ORAL at 14:01

## 2020-02-06 RX ADMIN — OLANZAPINE 30 MG: 15 TABLET, FILM COATED ORAL at 21:59

## 2020-02-06 RX ADMIN — Medication 1 DOSE: at 16:07

## 2020-02-06 RX ADMIN — MUPIROCIN: 20 OINTMENT TOPICAL at 10:10

## 2020-02-06 NOTE — PROGRESS NOTES
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 He remains admitted with disposition pending determination of placement where he can continue to receive appropriate cares.    Updates today:  - met with his outpatient psychiatrist, Dr. Fay today    FEN:  - Regular diet  - Continue PTA supplements: Ca, vitamin D, K-phos, vitamin E    GI:   Acute on chronic constipation  - Continue PTA bowel regimen     - Linzess QAM      - Miralax QID, consider increasing further as needed     - Dulcolax QD  - Omeprazole 40 mg daily     Heme/Onc:  Grade 2 Ependymoma s/p multiple resections, radiation and chemothearpy: on study ADVL 1513.  - Entinostat 7 mg weekly on Wednesdays (next 2/12/20)  - CBC every Tuesday     Endo:  Chronic steroid therapy - followed by Dr. Martin  Iatrogenic adrenal insufficiency  - PTA Decadron 0.75 mg QAM  - Continue bactrim ppx 400mg BID     MSK:  Steroid-induced skin atrophy  Multiple skin wounds  - Apply bacitracin to wounds as needed with dressing changes    Neuro/Psych:  Hx brain tumor and hemorrhagic stroke  Cranial nerve palsies  Ataxia   Depression  Agitation  - Olanzapine 30mg at bedtime  - Amitriptyline 50mg at bedtime  - Zoloft 150mg daily  - Haldol 5 mg IV/IM if needed for acute agitation  - Psychiatry consult   -Met with outpatient psychiatrist, Dr. Fay, today as previously scheduled  - Psychology consult    Allergies:  - PTA Allegra 180mg qPM      Pulm:  Hx of  "CANDELARIO: previously used CPAP  - Routine vitals w/ spot pulse ox        Diet: Diet  Regular Diet Adult    Lines: no access  Code Status: Full    Disposition Plan   Expected discharge: Pending safe disposition to adult psychiatry or skilled care facility.    Geo was evaluated and discussed with Heme/Onc Fellow Dr. Eddy and Attending Dr. Soham Holland DO  Pediatrics resident, PGY-1  Pager: [phone]    Physician Attestation   I, Leoncio Rousseau MD, saw this patient with the resident and agree with the resident/fellow's findings and plan of care as documented in the note.      I personally reviewed vital signs, medications and labs.    Key findings:  I also examined the patient and agree with the assessment as noted    Leoncio Rousseau MD  Date of Service (when I saw the patient): 2/6/20      ______________________________________________________________________    Interval History   No events overnight, nursing notes reviewed. Reports that he has been stooling, but stools are small and \"not enough.\" Good urine output. Talkative and in good spirits this morning.     Data reviewed today: I reviewed all medications, new labs and imaging results over the last 24 hours.    Physical Exam   Vital Signs: Temp: 97.2  F (36.2  C) Temp src: Axillary BP: 107/64 Pulse: 95 Heart Rate: 86 Resp: 14 SpO2: 96 % O2 Device: None (Room air)    Weight: 196 lbs 14.4 oz    GENERAL: Laying in bed, well-appearing, talkative and interactive  SKIN:  Purplish striae are seen over upper extremities (baseline)   EYES: Right eye covered by eye patch. Left eye normal in appearance. Glasses on over eye patch  CV: Not tachycardic, extremities warm, well perfused  RESP: Breathing comfortably, no signs of increased work of breathing or respiratory distress  ABD: non-distended, non painful   EXTREMITIES: Atrophy of lower extremities. Full range of motion of upper extremities  NEURO: aphasia at baseline     Data   No " results found for this or any previous visit (from the past 24 hour(s)).     Medications       amitriptyline  50 mg Oral At Bedtime     bisacodyl  10 mg Oral Daily     Calcium-Vitamin D-Vitamin K  1 Dose Oral BID     dexamethasone  0.75 mg Oral Daily     fexofenadine  180 mg Oral At Bedtime     linaclotide  290 mcg Oral QAM AC     mupirocin   Topical Daily     OLANZapine  30 mg Oral At Bedtime     omeprazole  40 mg Oral QAM     polyethylene glycol  17 g Oral 4x Daily     potassium phosphate (monobasic)  500 mg Oral BID     sertraline  150 mg Oral Daily     sulfamethoxazole-trimethoprim  1 tablet Oral BID     vitamin D3  2,000 Units Oral Daily     vitamin E  400 Units Oral Daily

## 2020-02-06 NOTE — PLAN OF CARE
8426-5111 AF, VSS, assessment unchanged. Geo is taking good PO, has good UOP, & no stool this shift. Taking meds without difficulty, see MAR. Will continue to monitor.

## 2020-02-06 NOTE — PLAN OF CARE
PT Unit 5: Geo was seen by PT this AM with session focused on progression of gross strength and gait training. Pt ambulated in the krishna with max A and 2WW for balance. Instructed in seated exercises with min A and tactile cues for form. PT will continue to see 5x/week to progress as tolerated.   Discharge Planner PT   Patient plan for discharge: TBD  Barriers to return to prior living situation: Impaired functional mobility, Medical POC  Recommendations for discharge: LTC facility or previous setting   Rationale for recommendations: Impaired functional mobility requiring significant assist for transfers       Entered by: Meggan Cotto 02/06/2020 12:05 PM     Meggan Cotto, PT, [phone]

## 2020-02-06 NOTE — PROGRESS NOTES
"   02/04/20 1600   Child Life   Location Med/Surg   Intervention Developmental Play;Therapeutic Intervention   Preparation Comment This writer played Trivia with patient in room. After playing Trivia, took patient down to [employer] to pick out a prize. Also showed patient the KREZ. Patient asked this writer to help, help patient go to an office to talk about doctors and not getting help. This writer redirected patient and patient enjoyed seeing the KREZ. When passing the welcome desk, patient said \"you are going the wrong way\" (meaning not to the office an back upstairs). This writer again redirected patient. This writer shared this information with the medical team.   Family Support Comment no family present   Outcomes/Follow Up Continue to Follow/Support     "

## 2020-02-06 NOTE — PLAN OF CARE
AVSS. No c/o pain or n/v. Good PO and UOP. Medium BM this shift. Will continue to monitor and update MD with any changes.

## 2020-02-06 NOTE — PLAN OF CARE
"/64   Pulse 95   Temp 97.2  F (36.2  C) (Axillary)   Resp 14   Ht 1.77 m (5' 9.69\")   Wt 89.3 kg (196 lb 14.4 oz)   SpO2 96%   BMI 28.51 kg/m      Time: 7425-8666     Reason for admission: Constipation  Vitals: VSS  Activity: Assist x2  Pain: denies  Neuro: WDL (slurred/garbled speech per/ ataxia per baseline)  Cardiac: WDL   Respiratory: LS clear on RA   GI: +BS, +flatus, -BM   : voiding spontaneously   Diet: regular diet   Incisions/Drains: N/A      New changes this shift: Parents toured a group home today. No word on results of the tour.  Psych meeting today with Dr. Fay. MD will come see romain in his room. Got a shower and linen change today.      Continue to monitor and follow POC  "

## 2020-02-07 ENCOUNTER — APPOINTMENT (OUTPATIENT)
Dept: PHYSICAL THERAPY | Facility: CLINIC | Age: 20
End: 2020-02-07
Attending: PEDIATRICS
Payer: COMMERCIAL

## 2020-02-07 PROCEDURE — 97530 THERAPEUTIC ACTIVITIES: CPT | Mod: GP

## 2020-02-07 PROCEDURE — 25000132 ZZH RX MED GY IP 250 OP 250 PS 637: Performed by: STUDENT IN AN ORGANIZED HEALTH CARE EDUCATION/TRAINING PROGRAM

## 2020-02-07 PROCEDURE — 97116 GAIT TRAINING THERAPY: CPT | Mod: GP,59

## 2020-02-07 PROCEDURE — 25000125 ZZHC RX 250: Performed by: STUDENT IN AN ORGANIZED HEALTH CARE EDUCATION/TRAINING PROGRAM

## 2020-02-07 PROCEDURE — 25000131 ZZH RX MED GY IP 250 OP 636 PS 637: Performed by: STUDENT IN AN ORGANIZED HEALTH CARE EDUCATION/TRAINING PROGRAM

## 2020-02-07 PROCEDURE — 97110 THERAPEUTIC EXERCISES: CPT | Mod: GP

## 2020-02-07 PROCEDURE — G0378 HOSPITAL OBSERVATION PER HR: HCPCS

## 2020-02-07 PROCEDURE — 25000132 ZZH RX MED GY IP 250 OP 250 PS 637

## 2020-02-07 RX ADMIN — MUPIROCIN: 20 OINTMENT TOPICAL at 09:11

## 2020-02-07 RX ADMIN — SULFAMETHOXAZOLE AND TRIMETHOPRIM 1 TABLET: 400; 80 TABLET ORAL at 09:10

## 2020-02-07 RX ADMIN — SULFAMETHOXAZOLE AND TRIMETHOPRIM 1 TABLET: 400; 80 TABLET ORAL at 19:54

## 2020-02-07 RX ADMIN — BISACODYL 10 MG: 5 TABLET ORAL at 09:10

## 2020-02-07 RX ADMIN — SERTRALINE HYDROCHLORIDE 150 MG: 100 TABLET ORAL at 09:10

## 2020-02-07 RX ADMIN — POTASSIUM PHOSPHATE, MONOBASIC 500 MG: 500 TABLET, SOLUBLE ORAL at 09:10

## 2020-02-07 RX ADMIN — Medication 400 UNITS: at 09:10

## 2020-02-07 RX ADMIN — Medication 1 DOSE: at 09:10

## 2020-02-07 RX ADMIN — POLYETHYLENE GLYCOL 3350 17 G: 17 POWDER, FOR SOLUTION ORAL at 16:34

## 2020-02-07 RX ADMIN — LINACLOTIDE 290 MCG: 145 CAPSULE, GELATIN COATED ORAL at 09:10

## 2020-02-07 RX ADMIN — MELATONIN 2000 UNITS: at 09:10

## 2020-02-07 RX ADMIN — DEXAMETHASONE 0.75 MG: 0.75 TABLET ORAL at 09:10

## 2020-02-07 RX ADMIN — POLYETHYLENE GLYCOL 3350 17 G: 17 POWDER, FOR SOLUTION ORAL at 09:10

## 2020-02-07 RX ADMIN — FEXOFENADINE HYDROCHLORIDE 180 MG: 180 TABLET, FILM COATED ORAL at 22:38

## 2020-02-07 RX ADMIN — OMEPRAZOLE 40 MG: 20 CAPSULE, DELAYED RELEASE ORAL at 09:10

## 2020-02-07 RX ADMIN — POTASSIUM PHOSPHATE, MONOBASIC 500 MG: 500 TABLET, SOLUBLE ORAL at 19:54

## 2020-02-07 RX ADMIN — AMITRIPTYLINE HYDROCHLORIDE 50 MG: 50 TABLET, FILM COATED ORAL at 22:38

## 2020-02-07 RX ADMIN — OLANZAPINE 30 MG: 15 TABLET, FILM COATED ORAL at 22:38

## 2020-02-07 RX ADMIN — Medication 1 DOSE: at 16:34

## 2020-02-07 NOTE — PROGRESS NOTES
Brief social check in with Geo as he was resting. Geo has the understanding that he is being evaluated for additional physical therapy needs. SW communicated with parents via email. They liked the group home they toured yesterday and will make a final decision by Monday, Feb 10th. If they choose to move forward, Geo may be able to move in the week of Feb 17th. Dad inquired about the potential of in home PT. Will consult with RN CC re: possibilities. Social work will continue to assess needs and provide ongoing psychosocial support and access to resources.       GARCIA Lewis, [facility]  Pediatric Hem/Onc   Phone: [phone]  Pager: [phone]

## 2020-02-07 NOTE — PROGRESS NOTES
"Visited with Geo this afternoon over on the [facility] lieberman.  As I arrived his father was gathering some things and preparing to leave.  Spoke with him (Eric Hicks) for a few minutes who stated that they hopefully had a possible new living facility for Geo that would not have a bed this week or next but possibly the week after next.  Felt like this might be a better fit.  Also related his observation that Geo has been more relaxed, happier and less argumentative as the [facility] admission has progressed and that he suspects this may be due to having missed multiple doses of antipsychotic and antidepressant medications at his previous assisted living prior to being asked to leave.  (Would comment that from inspection of the last set of MAR's, this does appear to be the case).    After Eric left sat and talked with Geo for about 20 minutes.  Discussed his stay in the hospital and he related that it's been fine for the most part.  Has been doing physical therapy on the lieberman and watching a lot of TV.  We agreed that there's a lot of police procedurals on television, with some being better than others.  Mood wise reports feeling \"OK\" today.  Denies SI/SIB thoughts, violent/agressive ideation, markedly depressed or anxious moods, inappropriately elevated moods or other hallmarks of psychiatric acuity with dangerousness.    Briefly touched base on his fears that providers are lying and withholding definitive care that would fix his mobility issues.  Asked if he is seeing things any differently after this admission, and he said no.  Stressed that he \"KNOWS\" these concerns to be legitimate.  (NOTE: Geo will often resist re-framing techniques or attempts to soften the supposed objective truth of this matter by repeating \"I KNOW it's true - I KNOW it.\")  Also talked about his desire to have a dog and he elaborated his feeling that this will be impossible for him unless or until he is able to walk again - " "(he had been having a relatively low key argument with his dad on this subject when I arrived).  States he has wanted a dog for years, and wants a [place].    Asked about his PT exercises he's doing on the lieberman and he described them to me.  Used some motivational interviewing techniques surrounding the suggestion that he go back to visiting PT and other therapies after discharge, and he agreed to consider this.  This included visiting his psychotherapist Manju.  He said he would think about this, and might be open to it.  Provided him with positive feedback on his openness.    As we ended our visit told Geo that I was glad he was holding up mood-wise on the lieberman and managing to occupy his time in a peaceful manner.  Also reinforced that his dad and team are working to find him a new place to live.  He told me: \"I never give up.\"    Would note that Geo appeared calm, in relatively good spirits, and demonstrated the ability to touch on his delusional concerns briefly then drop the subject, which has been a noted benefit since starting and titrating olanzapine.  Would not recommend any medication changes today.  As stated, it is suspected that at least some of his recent mood and behavioral difficulties leading up to his present hospitalization at [facility] were likely contributed to by missing multiple medication doses in the week(s) leading up to being asked to leave his last assisted living.  This was then acutely compounded by the shock of being asked to leave abruptly.  This last set of MARs have been provided by his father for review, and will be scanned into the chart.    Justice Fay, DO  Psychiatry PGY4  "

## 2020-02-07 NOTE — PROGRESS NOTES
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 He remains admitted with disposition pending determination of placement where he can continue to receive appropriate cares.    FEN:  - Regular diet  - Continue PTA supplements: Ca, vitamin D, K-phos, vitamin E    GI:   Acute on chronic constipation  - Continue PTA bowel regimen     - Linzess QAM      - Miralax QID, consider increasing further as needed     - Dulcolax QD  - Omeprazole 40 mg daily     Heme/Onc:  Grade 2 Ependymoma s/p multiple resections, radiation and chemothearpy: on study ADVL 1513.  - Entinostat 7 mg weekly on Wednesdays (next 2/12/20)  - CBC every Tuesday     Endo:  Chronic steroid therapy - followed by Dr. Martin  Iatrogenic adrenal insufficiency  - PTA Decadron 0.75 mg QAM  - Continue bactrim ppx 400mg BID     MSK:  Steroid-induced skin atrophy  Multiple skin wounds  - Apply bacitracin to wounds as needed with dressing changes    Neuro/Psych:  Hx brain tumor and hemorrhagic stroke  Cranial nerve palsies  Ataxia   Depression  Agitation  - Olanzapine 30mg at bedtime  - Amitriptyline 50mg at bedtime  - Zoloft 150mg daily  - Haldol 5 mg IV/IM if needed for acute agitation  - Psychiatry consult  - Psychology consult    Allergies:  - PTA Allegra 180mg qPM      Pulm:  Hx of CANDELARIO: previously used CPAP  - Routine vitals w/ spot pulse ox        Diet: Diet  Regular Diet Adult    Lines: no access  Code Status: Full    Disposition  Plan   Expected discharge: Pending safe disposition to adult psychiatry or skilled care facility.    Geo was evaluated and discussed with Heme/Onc Fellow Dr. Eddy and Attending Dr. Soham Holland DO  Pediatrics resident, PGY-1  Pager: [phone]    Physician Attestation   I, Leoncio Rousseau MD, saw this patient with the resident and agree with the resident/fellow's findings and plan of care as documented in the note.      I personally reviewed vital signs, medications and labs.    Key findings:  I also examined the patient and agree with the assessment as noted    Leoncio Rousseau MD  Date of Service (when I saw the patient): 2/7/20      ______________________________________________________________________    Interval History   Had psych appointment yesterday with his primary outpatient psychiatrist, went well. Dad was visiting yesterday and spent some time here with Geo. Geo had an active day yesterday.     Data reviewed today: I reviewed all medications, new labs and imaging results over the last 24 hours.    Physical Exam   Vital Signs: Temp: 97  F (36.1  C) Temp src: Axillary BP: 125/77 Pulse: 95 Heart Rate: 112 Resp: 14 SpO2: 95 % O2 Device: None (Room air)    Weight: 196 lbs 14.4 oz    GENERAL: Laying in bed, sleeping, but wakes on exam  SKIN:  Purplish striae are seen over upper extremities (baseline)   EYES: Neither eye covered by eye patch or glasses, normal conjunctiva   CV: Not tachycardic, extremities warm, well perfused  RESP: Breathing comfortably, no signs of increased work of breathing or respiratory distress  ABD: non-distended, non painful   EXTREMITIES: Atrophy of lower extremities. Full range of motion of upper extremities  NEURO: aphasia at baseline     Data   No results found for this or any previous visit (from the past 24 hour(s)).     Medications       amitriptyline  50 mg Oral At Bedtime     bisacodyl  10 mg Oral Daily     Calcium-Vitamin  D-Vitamin K  1 Dose Oral BID     dexamethasone  0.75 mg Oral Daily     fexofenadine  180 mg Oral At Bedtime     linaclotide  290 mcg Oral QAM AC     mupirocin   Topical Daily     OLANZapine  30 mg Oral At Bedtime     omeprazole  40 mg Oral QAM     polyethylene glycol  17 g Oral 4x Daily     potassium phosphate (monobasic)  500 mg Oral BID     sertraline  150 mg Oral Daily     sulfamethoxazole-trimethoprim  1 tablet Oral BID     vitamin D3  2,000 Units Oral Daily     vitamin E  400 Units Oral Daily

## 2020-02-07 NOTE — PROGRESS NOTES
CLINICAL NUTRITION SERVICES - REASSESSMENT NOTE    ANTHROPOMETRICS from 1/25  Height/Length: 177 cm  Weight: 89.3 kg   BMI: 28.5 kg/m2- overweight status    CURRENT NUTRITION ORDERS  Diet:regular adult diet    Intake/Tolerance: eating 100% of meals.    Current factors affecting nutrition intake include:LOS    NEW FINDINGS:  Awaiting placement    LABS  Labs reviewed    MEDICATIONS  Medications reviewed    ASSESSED NUTRITION NEEDS:  Estimated Energy Needs: 20-25 kcal/kg  Estimated Protein Needs: 0.8 g/kg  Estimated Fluid Needs: 1 mL/kcal      PEDIATRIC NUTRITION STATUS VALIDATION  Patient does not meet criteria for malnutrition.    EVALUATION OF PREVIOUS PLAN OF CARE:   Monitoring from previous assessment:  Food and Beverage intake- eating 100% of meals    Previous Goals:    1. Intake of % of meals- goal met   2. Wt maintenance- no new weight to assess    Previous Nutrition Diagnosis:   No Nutrition diagnosis identified    NUTRITION DIAGNOSIS:  No Nutrition diagnosis identified    INTERVENTIONS  Nutrition Prescription  Po to meets    Goals   1. Intake of % of meals   2. Wt maintenance    FOLLOW UP/MONITORING  Food and Beverage intake- monitor po     Mary Beth Baumann, JORGE, LD, [facility]  [phone]

## 2020-02-07 NOTE — PLAN OF CARE
Discharge Planner PT   Patient plan for discharge: possible acute rehab (PM&R MD present for session)  Current status: Session focused on ambulation and transfers practice to promote safety. Geo walked 100' + 50' today using weighted FWW, with max assist of 1-2 for safety with ataxic gait pattern. Max verbal cues given throughout to move slow and controlled for safe ambulation and transfers. Physical therapy will continue to follow 5x/week in order to progress safety with mobility.  Barriers to return to prior living situation: Impaired safety with functional mobility  Recommendations for discharge: Acute rehab or Long-term Care  Rationale for recommendations: impaired safety, ataxic gait, unclear baseline function       Entered by: Gretchen Kaminski 02/07/2020 2:32 PM      Gretchen Kaminski, SPT

## 2020-02-07 NOTE — PLAN OF CARE
AVSS. No c/o pain or n/v. Good UOP. Large BM this shift. Will continue to monitor and update MD with any changes.

## 2020-02-07 NOTE — CONSULTS
[facility]   PM&R CONSULT    Consulting Provider: Skyler Rizzo   Reason for Consult: Assessment of rehabilitation   Location of Patient: [facility]IL[facility], 0245  Date of Encounter: 2/7/2020   Date of Admission: 1/25/2020        ASSESSMENT/PLAN:    Mr. Geo Hicks is a 19 year old yo male who presents with severe ataxia effecting all 4 quadrants leading to impaired mobility and adl's. He was seen today, on examination he has good strength throughout, and also observed in physical therapy.   His speech is severely ataxic, barely understandable. However, he is able to state that he is at baseline functionally. He needs some assistance with feeding self due to the ataxia.   Per my discussion with the team, he is a resident of group home, called [facility]', but he states he will not be returning back to his group home and alternate disposition will need to be looked into. He has had outbursts in the past.   Given the baseline function at present, he does not have acute inpatient rehab needs, a TCU disposition may be more appropriate till a alternate group home can be found for him.   Discussed with peds team, they are agreeable to the plan of care      Thank you for consulting the PM&R Department.   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     He remains admitted with disposition pending determination of placement where he can continue to receive appropriate cares.      PREVIOUS LEVEL OF FUNCTION   Needs assistance  for gait and transfers. He needs assistance with basic adl's.       CURRENT FUNCTION   In PT, he ambulated in the krishna with max A of 2 and 2WW for balance.     LIVING SITUATION/SUPPORT  Was living in a group home. Has family, and per team father has visited in the last 2 days.     SOCIAL HISTORY  Social History     Socioeconomic History     Marital status: Single     Spouse name: Not on file     Number of children: Not on file     Years of education: Not on file     Highest education level: Not on file   Occupational History     Not on file   Social Needs     Financial resource strain: Not on file     Food insecurity:     Worry: Not on file     Inability: Not on file     Transportation needs:     Medical: Not on file     Non-medical: Not on file   Tobacco Use     Smoking status: Never Smoker     Smokeless tobacco: Never Used   Substance and Sexual Activity     Alcohol use: No     Drug use: No     Sexual activity: Never   Lifestyle     Physical activity:     Days per week: Not on file     Minutes per session: Not on file     Stress: Not on file   Relationships     Social connections:     Talks on phone: Not on file     Gets together: Not on file     Attends Denominational service: Not on file     Active member of club or organization: Not on file     Attends meetings of clubs or organizations: Not on file     Relationship status: Not on file     Intimate partner violence:     Fear of current or ex partner: Not on file     Emotionally abused: Not on file     Physically abused: Not on file     Forced sexual activity: Not on file   Other Topics Concern     Not on file   Social History Narrative    Grown up in [place], [place].  Parents are , and he shares time between his mother's and father's homes. Both parents are remarried.  Dad is a , and mom does  for a testing company.  Siblings include two full brothers (older brother Benitez who is a senior in college and younger brother Gamaliel), a  step-brother, and a step-sister. Geo graduated from high school in Spring 2018.  Initially considered attending [employer] in Fall 2019, but reportedly lost interest due to the amount of time it would take him to complete courses and receive a degree.  Does today endorse continued interest in pursuing an advanced course of study at some point, specifically stating he is interested in Electrical Engineering.  No access to guns or weapons enjoys playing video games.  Guns are currently locked in the house.       Past Medical History:  Past Medical History:   Diagnosis Date     Cranial nerve dysfunction      Dyspepsia      Ependymoma (H)      Gastro-oesophageal reflux disease      Hearing loss      Intracranial hemorrhage (H)      Migraine      Pilonidal cyst     7-2015     Reduced vision      Refractory obstruction of nasal airway     2nd to nasal valve prolapse     Sleep apnea      Strabismus     gaze palsy            Current Medications:  Current Facility-Administered Medications   Medication     amitriptyline (ELAVIL) tablet 50 mg     bisacodyl (DULCOLAX) EC tablet 10 mg     Calcium-Vitamin D-Vitamin K 650-12.5-40 MG-MCG-MCG CHEW 1 Dose  ( VIACTIV )     dexamethasone (DECADRON) tablet 0.75 mg     fexofenadine (ALLEGRA) tablet 180 mg     lidocaine (LMX4) cream     lidocaine 1 % 0.1-1 mL     linaclotide (LINZESS) capsule 290 mcg     mupirocin (BACTROBAN) 2 % ointment     OLANZapine (zyPREXA) tablet 30 mg     omeprazole (priLOSEC) CR capsule 40 mg     polyethylene glycol (MIRALAX/GLYCOLAX) Packet 17 g     potassium phosphate (monobasic) (K-PHOS) tablet 500 mg     sertraline (ZOLOFT) tablet 150 mg     sodium chloride (PF) 0.9% PF flush 0.2-5 mL     sulfamethoxazole-trimethoprim (BACTRIM/SEPTRA) 400-80 MG per tablet 1 tablet     Vitamin D3 (CHOLECALCIFEROL) 25 mcg (1000 units) tablet 2,000 Units     vitamin E (TOCOPHEROL) 400 units (360 mg) capsule 400 Units         Review of Systems:  Total of ten  "systems reviewed, pertinent positives and negatives as follows  Instability with standing and walking.    Feels generally at baseline   Denies weakness   Denies any tingling or numbness  No problems with bladder.   LBM today  No chest pain. No cough or SOB.  No visual changes.   No headache or photophobia.   No nausea, or abdominal pain.  Slept poorly last night  No joint pain, muscle pain or swelling.  Mood is good, denies any symptoms of depression.   Remainder of the review of the systems was negative.          Labs   Lab Results   Component Value Date    WBC 5.7 02/04/2020    HGB 12.0 (L) 02/04/2020    HCT 37.2 (L) 02/04/2020    MCV 87 02/04/2020     (L) 02/04/2020     Lab Results   Component Value Date     01/21/2020    POTASSIUM 4.8 01/21/2020    CHLORIDE 107 01/21/2020    CO2 33 (H) 01/21/2020     (H) 01/21/2020     Lab Results   Component Value Date    GFRESTIMATED 81 01/21/2020    GFRESTBLACK >90 01/21/2020     Lab Results   Component Value Date    AST 25 01/21/2020    ALT 20 01/21/2020    ALKPHOS 115 01/21/2020    BILITOTAL 0.5 01/21/2020     Lab Results   Component Value Date    INR 1.05 01/14/2020     Lab Results   Component Value Date    BUN 20 01/21/2020    CR 1.27 (H) 01/21/2020         ON EXAMINATION:  Vitals:    02/06/20 0910 02/06/20 1610 02/07/20 0000 02/07/20 0835   BP: 107/64 119/72 125/77 99/55   Pulse: 95   88   Resp: 14 16 14 16   Temp: 97.2  F (36.2  C) 98.4  F (36.9  C) 97  F (36.1  C) 96.3  F (35.7  C)   TempSrc: Axillary Axillary Axillary Axillary   SpO2: 96% 93% 95% 97%   Weight:       Height:           Physical Exam:  Blood pressure 99/55, pulse 88, temperature 96.3  F (35.7  C), temperature source Axillary, resp. rate 16, height 1.77 m (5' 9.69\"), weight 89.3 kg (196 lb 14.4 oz), SpO2 97 %.    GEN: NAD, seated/lying comfortably in bed  Seen transferring   He is ataxic during the transfer, hitting the back of the heels to the base of the bed  Several scab and " injuries of various ages are visible throughout   He is alert, appropriate, cooperative  Speech is ataxic  Comprehension is intact   HEENT: NCAT  EOM good   MSK: full active and passive ROM at all major joints of the bilaterally upper and lower extremities  No muscle atrophy noted  ABD: soft, non tender, pos BS  NEURO:   CRANIAL NERVES: Discs flat. Pupils equal, round and reactive to light.  Extraocular movements full. Visual fields full. Face moves symmetrically.  Tongue midline.    Strength: 5/5 throughout    Gait: with a walker and mod assist of 2 with several loss of balance requiring max to mod assist to recover. Able to ambulate long distances, but limited by the endurance of the physical therapists    Cognition: fund of knowledge and train of thought appropriate  SKIN: no rashes or lesions noted.   EXT: no edema bilaterally, chronic stasis changes, no ulcers.   PSYCH: normal affect.     Thank you for the consult. Please call for any questions. Pager number [phone]     Ewa Gorman   Total of 70 min spent in this encounter more than 50% in counseling and coordination.

## 2020-02-08 PROCEDURE — 25000132 ZZH RX MED GY IP 250 OP 250 PS 637: Performed by: STUDENT IN AN ORGANIZED HEALTH CARE EDUCATION/TRAINING PROGRAM

## 2020-02-08 PROCEDURE — 25000132 ZZH RX MED GY IP 250 OP 250 PS 637

## 2020-02-08 PROCEDURE — G0378 HOSPITAL OBSERVATION PER HR: HCPCS

## 2020-02-08 PROCEDURE — 25000131 ZZH RX MED GY IP 250 OP 636 PS 637: Performed by: STUDENT IN AN ORGANIZED HEALTH CARE EDUCATION/TRAINING PROGRAM

## 2020-02-08 PROCEDURE — 25000125 ZZHC RX 250: Performed by: STUDENT IN AN ORGANIZED HEALTH CARE EDUCATION/TRAINING PROGRAM

## 2020-02-08 RX ADMIN — Medication 400 UNITS: at 09:55

## 2020-02-08 RX ADMIN — LINACLOTIDE 290 MCG: 145 CAPSULE, GELATIN COATED ORAL at 09:54

## 2020-02-08 RX ADMIN — AMITRIPTYLINE HYDROCHLORIDE 50 MG: 50 TABLET, FILM COATED ORAL at 21:59

## 2020-02-08 RX ADMIN — POTASSIUM PHOSPHATE, MONOBASIC 500 MG: 500 TABLET, SOLUBLE ORAL at 20:14

## 2020-02-08 RX ADMIN — Medication 1 DOSE: at 09:54

## 2020-02-08 RX ADMIN — Medication 1 DOSE: at 16:05

## 2020-02-08 RX ADMIN — SERTRALINE HYDROCHLORIDE 150 MG: 100 TABLET ORAL at 09:55

## 2020-02-08 RX ADMIN — OMEPRAZOLE 40 MG: 20 CAPSULE, DELAYED RELEASE ORAL at 09:54

## 2020-02-08 RX ADMIN — POLYETHYLENE GLYCOL 3350 17 G: 17 POWDER, FOR SOLUTION ORAL at 20:14

## 2020-02-08 RX ADMIN — FEXOFENADINE HYDROCHLORIDE 180 MG: 180 TABLET, FILM COATED ORAL at 21:59

## 2020-02-08 RX ADMIN — POTASSIUM PHOSPHATE, MONOBASIC 500 MG: 500 TABLET, SOLUBLE ORAL at 09:55

## 2020-02-08 RX ADMIN — POLYETHYLENE GLYCOL 3350 17 G: 17 POWDER, FOR SOLUTION ORAL at 16:05

## 2020-02-08 RX ADMIN — MUPIROCIN: 20 OINTMENT TOPICAL at 21:59

## 2020-02-08 RX ADMIN — MELATONIN 2000 UNITS: at 09:55

## 2020-02-08 RX ADMIN — SULFAMETHOXAZOLE AND TRIMETHOPRIM 1 TABLET: 400; 80 TABLET ORAL at 20:14

## 2020-02-08 RX ADMIN — DEXAMETHASONE 0.75 MG: 0.75 TABLET ORAL at 09:55

## 2020-02-08 RX ADMIN — POLYETHYLENE GLYCOL 3350 17 G: 17 POWDER, FOR SOLUTION ORAL at 13:02

## 2020-02-08 RX ADMIN — OLANZAPINE 30 MG: 15 TABLET, FILM COATED ORAL at 21:59

## 2020-02-08 RX ADMIN — BISACODYL 10 MG: 5 TABLET ORAL at 09:56

## 2020-02-08 RX ADMIN — POLYETHYLENE GLYCOL 3350 17 G: 17 POWDER, FOR SOLUTION ORAL at 09:53

## 2020-02-08 RX ADMIN — SULFAMETHOXAZOLE AND TRIMETHOPRIM 1 TABLET: 400; 80 TABLET ORAL at 09:55

## 2020-02-08 NOTE — PROGRESS NOTES
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 He remains admitted with disposition pending determination of placement where he can continue to receive appropriate cares.    FEN:  - Regular diet  - Continue PTA supplements: Ca, vitamin D, K-phos, vitamin E    GI:   Acute on chronic constipation  - Continue PTA bowel regimen     - Linzess QAM      - Miralax QID, consider increasing further as needed     - Dulcolax QD  - Omeprazole 40 mg daily     Heme/Onc:  Grade 2 Ependymoma s/p multiple resections, radiation and chemothearpy: on study ADVL 1513.  - Entinostat 7 mg weekly on Wednesdays (next 2/12/20)  - CBC every Tuesday     Endo:  Chronic steroid therapy - followed by Dr. Martin  Iatrogenic adrenal insufficiency  - PTA Decadron 0.75 mg QAM  - Continue bactrim ppx 400mg BID     MSK:  Steroid-induced skin atrophy  Multiple skin wounds  - Apply bacitracin to wounds as needed with dressing changes  -Will need outpatient PT for strengthening    Neuro/Psych:  Hx brain tumor and hemorrhagic stroke  Cranial nerve palsies  Ataxia   Depression  Agitation  - Olanzapine 30mg at bedtime  - Amitriptyline 50mg at bedtime  - Zoloft 150mg daily  - Haldol 5 mg IV/IM if needed for acute agitation  - Psychiatry consult  - Psychology consult    Allergies:  - PTA Allegra 180mg qPM      Pulm:  Hx of CANDELARIO: previously used CPAP  - Routine vitals w/ spot pulse ox        Diet: Diet  Regular Diet Adult    Lines:  no access  Code Status: Full    Disposition Plan   Expected discharge: Pending safe disposition to adult psychiatry or skilled care facility.    Geo was evaluated and discussed with Heme/Onc Fellow Dr. Eddy and Attending Dr. Soham Holland DO  Pediatrics resident, PGY-1  Pager: [phone]    Physician Attestation   I, Leoncio Rousseau MD, saw this patient with the resident and agree with the resident/fellow's findings and plan of care as documented in the note.      I personally reviewed vital signs, medications and labs.    Key findings:  I also examined the patient and agree with the assessment as noted    Leoncio Rousseau MD  Date of Service (when I saw the patient): 2/8/20      ______________________________________________________________________    Interval History   Doing well this morning. Spent time with dad yesterday. Was evaluated yesterday by physical medicine, went well. Good urine output, no pain or nausea complaints.     Data reviewed today: I reviewed all medications, new labs and imaging results over the last 24 hours.    Physical Exam   Vital Signs: Temp: 97.2  F (36.2  C) Temp src: Axillary BP: 105/61 Pulse: 88 Heart Rate: 90 Resp: 16 SpO2: 94 % O2 Device: None (Room air)    Weight: 196 lbs 14.4 oz    GENERAL: Sitting up in bed, well-appearing.   SKIN:  Purplish striae are seen over upper extremities (baseline)   EYES: Right eye covered with eye patch.   CV: Not tachycardic, extremities warm, well perfused  RESP: Breathing comfortably, no signs of increased work of breathing or respiratory distress  ABD: non-distended, non painful   EXTREMITIES: Atrophy of lower extremities. Full range of motion of upper extremities  NEURO: aphasia at baseline     Data   No results found for this or any previous visit (from the past 24 hour(s)).     Medications       amitriptyline  50 mg Oral At Bedtime     bisacodyl  10 mg Oral Daily     Calcium-Vitamin D-Vitamin K  1 Dose  Oral BID     dexamethasone  0.75 mg Oral Daily     fexofenadine  180 mg Oral At Bedtime     linaclotide  290 mcg Oral QAM AC     mupirocin   Topical Daily     OLANZapine  30 mg Oral At Bedtime     omeprazole  40 mg Oral QAM     polyethylene glycol  17 g Oral 4x Daily     potassium phosphate (monobasic)  500 mg Oral BID     sertraline  150 mg Oral Daily     sulfamethoxazole-trimethoprim  1 tablet Oral BID     vitamin D3  2,000 Units Oral Daily     vitamin E  400 Units Oral Daily

## 2020-02-08 NOTE — PLAN OF CARE
Pt calm and cooperative, AVSS, no pain, no n/v. Pt has good PO intake, voiding well, no stool. Dressing changed on L tibial wound, medication applied. Continue to monitor and notify provider of any changes.

## 2020-02-08 NOTE — PLAN OF CARE
Afebrile. VSS. LS clear on RA. Slept through the night. Small UOP before bed. Plan to continue to monitor until plan in place for placement. Hourly monitoring completed, will continue to monitor.

## 2020-02-08 NOTE — PLAN OF CARE
VSS afeb. No c/o pain. Pt pleasant, cooperative with all cares. Good appetite. Awaiting discharge disposition.

## 2020-02-09 PROCEDURE — 25000132 ZZH RX MED GY IP 250 OP 250 PS 637: Performed by: STUDENT IN AN ORGANIZED HEALTH CARE EDUCATION/TRAINING PROGRAM

## 2020-02-09 PROCEDURE — G0378 HOSPITAL OBSERVATION PER HR: HCPCS

## 2020-02-09 PROCEDURE — 25000131 ZZH RX MED GY IP 250 OP 636 PS 637: Performed by: STUDENT IN AN ORGANIZED HEALTH CARE EDUCATION/TRAINING PROGRAM

## 2020-02-09 PROCEDURE — 25000132 ZZH RX MED GY IP 250 OP 250 PS 637

## 2020-02-09 PROCEDURE — 25000125 ZZHC RX 250: Performed by: STUDENT IN AN ORGANIZED HEALTH CARE EDUCATION/TRAINING PROGRAM

## 2020-02-09 RX ADMIN — Medication 400 UNITS: at 09:32

## 2020-02-09 RX ADMIN — Medication 1 DOSE: at 09:31

## 2020-02-09 RX ADMIN — POLYETHYLENE GLYCOL 3350 17 G: 17 POWDER, FOR SOLUTION ORAL at 09:32

## 2020-02-09 RX ADMIN — OMEPRAZOLE 40 MG: 20 CAPSULE, DELAYED RELEASE ORAL at 09:31

## 2020-02-09 RX ADMIN — Medication 1 DOSE: at 16:08

## 2020-02-09 RX ADMIN — AMITRIPTYLINE HYDROCHLORIDE 50 MG: 50 TABLET, FILM COATED ORAL at 21:42

## 2020-02-09 RX ADMIN — POLYETHYLENE GLYCOL 3350 17 G: 17 POWDER, FOR SOLUTION ORAL at 16:08

## 2020-02-09 RX ADMIN — MELATONIN 2000 UNITS: at 09:32

## 2020-02-09 RX ADMIN — POLYETHYLENE GLYCOL 3350 17 G: 17 POWDER, FOR SOLUTION ORAL at 20:10

## 2020-02-09 RX ADMIN — POTASSIUM PHOSPHATE, MONOBASIC 500 MG: 500 TABLET, SOLUBLE ORAL at 20:10

## 2020-02-09 RX ADMIN — LINACLOTIDE 290 MCG: 145 CAPSULE, GELATIN COATED ORAL at 09:31

## 2020-02-09 RX ADMIN — SULFAMETHOXAZOLE AND TRIMETHOPRIM 1 TABLET: 400; 80 TABLET ORAL at 09:32

## 2020-02-09 RX ADMIN — BISACODYL 10 MG: 5 TABLET ORAL at 09:31

## 2020-02-09 RX ADMIN — SULFAMETHOXAZOLE AND TRIMETHOPRIM 1 TABLET: 400; 80 TABLET ORAL at 20:10

## 2020-02-09 RX ADMIN — MUPIROCIN: 20 OINTMENT TOPICAL at 12:20

## 2020-02-09 RX ADMIN — POLYETHYLENE GLYCOL 3350 17 G: 17 POWDER, FOR SOLUTION ORAL at 12:19

## 2020-02-09 RX ADMIN — DEXAMETHASONE 0.75 MG: 0.75 TABLET ORAL at 09:31

## 2020-02-09 RX ADMIN — FEXOFENADINE HYDROCHLORIDE 180 MG: 180 TABLET, FILM COATED ORAL at 21:42

## 2020-02-09 RX ADMIN — OLANZAPINE 30 MG: 15 TABLET, FILM COATED ORAL at 21:42

## 2020-02-09 RX ADMIN — POTASSIUM PHOSPHATE, MONOBASIC 500 MG: 500 TABLET, SOLUBLE ORAL at 09:32

## 2020-02-09 RX ADMIN — SERTRALINE HYDROCHLORIDE 150 MG: 100 TABLET ORAL at 09:31

## 2020-02-09 NOTE — PLAN OF CARE
Afeb. No c/o pain. Good appetite. Pt took a shower today & was out of bed in wheelchair for a good portion of the day. Awaiting safe disposition.

## 2020-02-09 NOTE — PLAN OF CARE
VSS. No c/o pain or nausea. Good UO. Stool x1. Pt slept well overnight. No family at bedside. Hourly rounding complete. Will continue to monitor.

## 2020-02-09 NOTE — PLAN OF CARE
Pt is calm and cooperative, AVSS, no pain, no n/v. Pt has good PO intake, voiding well, no stool, continued scheduled miralax. Dressing changed on L tibial wound, medication applied. Continue to monitor and notify provider of any changes.

## 2020-02-09 NOTE — PROGRESS NOTES
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 He remains admitted with disposition pending determination of placement where he can continue to receive appropriate cares.    FEN:  - Regular diet  - Continue PTA supplements: Ca, vitamin D, K-phos, vitamin E    GI:   Acute on chronic constipation  - Continue PTA bowel regimen     - Linzess QAM      - Miralax QID, consider increasing further as needed     - Dulcolax QD  - Omeprazole 40 mg daily     Heme/Onc:  Grade 2 Ependymoma s/p multiple resections, radiation and chemothearpy: on study ADVL 1513.  - Entinostat 7 mg weekly on Wednesdays (next 2/12/20)  - CBC every Tuesday     Endo:  Chronic steroid therapy - followed by Dr. Martin  Iatrogenic adrenal insufficiency  - PTA Decadron 0.75 mg QAM  - Continue bactrim ppx 400mg BID     MSK:  Steroid-induced skin atrophy  Multiple skin wounds  - Apply bacitracin to wounds as needed with dressing changes  -Will need outpatient PT for strengthening    Neuro/Psych:  Hx brain tumor and hemorrhagic stroke  Cranial nerve palsies  Ataxia   Depression  Agitation  - Olanzapine 30mg at bedtime  - Amitriptyline 50mg at bedtime  - Zoloft 150mg daily  - Haldol 5 mg IV/IM if needed for acute agitation  - Psychiatry consult  - Psychology consult    Allergies:  - PTA Allegra 180mg qPM      Pulm:  Hx of CANDELARIO: previously used CPAP  - Routine vitals w/ spot pulse ox        Diet: Diet  Regular Diet Adult    Lines:  no access  Code Status: Full    Disposition Plan   Expected discharge: Pending safe disposition to adult psychiatry or skilled care facility.    Geo was evaluated and discussed with Heme/Onc Fellow Dr. Eddy and Attending Dr. Soham Reeder MD  Pediatric resident, PGY-3  Pager: [phone]    Physician Attestation   I, Leoncio Rousseau MD, saw this patient with the resident and agree with the resident/fellow's findings and plan of care as documented in the note.      I personally reviewed vital signs, medications and labs.    Key findings:  I also examined the patient and agree with the assessment as noted    Leoncio Rousseau MD  Date of Service (when I saw the patient): 2/9/20      ______________________________________________________________________    Interval History   Afebrile with stable vitals. Nursing notes reviewed. No concerns.     Data reviewed today: I reviewed all medications, new labs and imaging results over the last 24 hours.    Physical Exam   Vital Signs: Temp: 97.3  F (36.3  C) Temp src: Axillary BP: 112/54 Pulse: 88 Heart Rate: 83 Resp: 16 SpO2: 97 % O2 Device: None (Room air)    Weight: 196 lbs 14.4 oz    GENERAL: Sitting up in bed, well-appearing, listening to music on headphones   SKIN:  Purplish striae are seen over upper extremities (baseline)   EYES: Right eye covered with eye patch.   CV: RRR, no murmurs, distal pulses intact   RESP: Breathing comfortably, no signs of increased work of breathing or respiratory distress  ABD: non-distended, non painful, soft, + bowel sounds   NEURO: aphasia at baseline    Data   No results found for this or any previous visit (from the past 24 hour(s)).     Medications       amitriptyline  50 mg Oral At Bedtime     bisacodyl  10 mg Oral Daily     Calcium-Vitamin D-Vitamin K  1 Dose Oral BID     dexamethasone  0.75 mg Oral Daily     fexofenadine  180 mg Oral At Bedtime     linaclotide  290 mcg Oral QAM AC     mupirocin    Topical Daily     OLANZapine  30 mg Oral At Bedtime     omeprazole  40 mg Oral QAM     polyethylene glycol  17 g Oral 4x Daily     potassium phosphate (monobasic)  500 mg Oral BID     sertraline  150 mg Oral Daily     sulfamethoxazole-trimethoprim  1 tablet Oral BID     vitamin D3  2,000 Units Oral Daily     vitamin E  400 Units Oral Daily

## 2020-02-10 ENCOUNTER — APPOINTMENT (OUTPATIENT)
Dept: PHYSICAL THERAPY | Facility: CLINIC | Age: 20
End: 2020-02-10
Attending: PEDIATRICS
Payer: COMMERCIAL

## 2020-02-10 PROCEDURE — 25000131 ZZH RX MED GY IP 250 OP 636 PS 637: Performed by: STUDENT IN AN ORGANIZED HEALTH CARE EDUCATION/TRAINING PROGRAM

## 2020-02-10 PROCEDURE — G0378 HOSPITAL OBSERVATION PER HR: HCPCS

## 2020-02-10 PROCEDURE — 25000125 ZZHC RX 250: Performed by: STUDENT IN AN ORGANIZED HEALTH CARE EDUCATION/TRAINING PROGRAM

## 2020-02-10 PROCEDURE — 25000132 ZZH RX MED GY IP 250 OP 250 PS 637

## 2020-02-10 PROCEDURE — 97530 THERAPEUTIC ACTIVITIES: CPT | Mod: GP

## 2020-02-10 PROCEDURE — 25000132 ZZH RX MED GY IP 250 OP 250 PS 637: Performed by: STUDENT IN AN ORGANIZED HEALTH CARE EDUCATION/TRAINING PROGRAM

## 2020-02-10 RX ADMIN — OLANZAPINE 30 MG: 15 TABLET, FILM COATED ORAL at 21:55

## 2020-02-10 RX ADMIN — LINACLOTIDE 290 MCG: 145 CAPSULE, GELATIN COATED ORAL at 09:35

## 2020-02-10 RX ADMIN — POLYETHYLENE GLYCOL 3350 17 G: 17 POWDER, FOR SOLUTION ORAL at 15:46

## 2020-02-10 RX ADMIN — DEXAMETHASONE 0.75 MG: 0.75 TABLET ORAL at 09:34

## 2020-02-10 RX ADMIN — SULFAMETHOXAZOLE AND TRIMETHOPRIM 1 TABLET: 400; 80 TABLET ORAL at 09:35

## 2020-02-10 RX ADMIN — OMEPRAZOLE 40 MG: 20 CAPSULE, DELAYED RELEASE ORAL at 09:34

## 2020-02-10 RX ADMIN — POLYETHYLENE GLYCOL 3350 17 G: 17 POWDER, FOR SOLUTION ORAL at 19:38

## 2020-02-10 RX ADMIN — MELATONIN 2000 UNITS: at 09:34

## 2020-02-10 RX ADMIN — POTASSIUM PHOSPHATE, MONOBASIC 500 MG: 500 TABLET, SOLUBLE ORAL at 19:38

## 2020-02-10 RX ADMIN — POLYETHYLENE GLYCOL 3350 17 G: 17 POWDER, FOR SOLUTION ORAL at 09:33

## 2020-02-10 RX ADMIN — SULFAMETHOXAZOLE AND TRIMETHOPRIM 1 TABLET: 400; 80 TABLET ORAL at 19:38

## 2020-02-10 RX ADMIN — POTASSIUM PHOSPHATE, MONOBASIC 500 MG: 500 TABLET, SOLUBLE ORAL at 09:34

## 2020-02-10 RX ADMIN — Medication 400 UNITS: at 09:36

## 2020-02-10 RX ADMIN — Medication 1 DOSE: at 09:41

## 2020-02-10 RX ADMIN — Medication 1 DOSE: at 15:46

## 2020-02-10 RX ADMIN — BISACODYL 10 MG: 5 TABLET ORAL at 09:33

## 2020-02-10 RX ADMIN — MUPIROCIN: 20 OINTMENT TOPICAL at 15:46

## 2020-02-10 RX ADMIN — POLYETHYLENE GLYCOL 3350 17 G: 17 POWDER, FOR SOLUTION ORAL at 12:38

## 2020-02-10 RX ADMIN — FEXOFENADINE HYDROCHLORIDE 180 MG: 180 TABLET, FILM COATED ORAL at 21:55

## 2020-02-10 RX ADMIN — SERTRALINE HYDROCHLORIDE 150 MG: 100 TABLET ORAL at 09:35

## 2020-02-10 RX ADMIN — AMITRIPTYLINE HYDROCHLORIDE 50 MG: 50 TABLET, FILM COATED ORAL at 21:55

## 2020-02-10 NOTE — PROGRESS NOTES
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 He remains admitted with disposition pending determination of placement where he can continue to receive appropriate cares.    FEN:  - Regular diet  - Continue PTA supplements: Ca, vitamin D, K-phos, vitamin E    GI:   Acute on chronic constipation  - Continue PTA bowel regimen     - Linzess QAM      - Miralax QID, consider increasing further as needed     - Dulcolax QD  - Omeprazole 40 mg daily     Heme/Onc:  Grade 2 Ependymoma s/p multiple resections, radiation and chemothearpy: on study ADVL 1513.  - Entinostat 7 mg weekly on Wednesdays (next 2/12/20)  - CBC every Tuesday     Endo:  Chronic steroid therapy - followed by Dr. Martin  Iatrogenic adrenal insufficiency  - PTA Decadron 0.75 mg QAM  - Continue bactrim ppx 400mg BID     MSK:  Steroid-induced skin atrophy  Multiple skin wounds  - Apply bacitracin to wounds as needed with dressing changes  -Will need outpatient PT for strengthening    Neuro/Psych:  Hx brain tumor and hemorrhagic stroke  Cranial nerve palsies  Ataxia   Depression  Agitation  - Olanzapine 30mg at bedtime  - Amitriptyline 50mg at bedtime  - Zoloft 150mg daily  - Haldol 5 mg IV/IM if needed for acute agitation  - Psychiatry consult  - Psychology consult    Allergies:  - PTA Allegra 180mg qPM      Pulm:  Hx of CANDELARIO: previously used CPAP  - Routine vitals w/ spot pulse ox        Diet: Diet  Regular Diet Adult    Lines:  no access  Code Status: Full    Disposition Plan   Expected discharge: Pending safe disposition to adult psychiatry or skilled care facility.    Geo was evaluated and discussed with Heme/Onc Fellow Dr. Eddy and Attending Dr. Soham Holland DO  Pediatrics resident, PGY-1  Pager: [phone]    Physician Attestation   I, Leoncio Rousseau MD, saw this patient with the resident and agree with the resident/fellow's findings and plan of care as documented in the note.      I personally reviewed vital signs, medications and labs.    Key findings:  I also examined the patient and agree with the assessment as noted    Leoncio Rousseau MD  Date of Service (when I saw the patient): 2/10/20        ______________________________________________________________________    Interval History   Nursing notes reviewed. Afebrile, good urine output. Tolerating PO well. Talkative today.     Data reviewed today: I reviewed all medications, new labs and imaging results over the last 24 hours.    Physical Exam   Vital Signs: Temp: 97.1  F (36.2  C) Temp src: Axillary BP: 115/66   Heart Rate: 86 Resp: 18 SpO2: 96 % O2 Device: None (Room air)    Weight: 196 lbs 14.4 oz    GENERAL: laying in bed, awake, well-appearing and in no acute distress  SKIN:  Purplish striae are seen over upper extremities (baseline)   EYES: Right eye covered with eye patch.   CV: RRR, no murmurs, distal pulses intact   RESP: Breathing comfortably, no signs of increased work of breathing or respiratory distress  ABD: non-distended, non painful, soft, + bowel sounds   NEURO: aphasia at baseline    Data   No results found for this or any previous visit (from the past 24 hour(s)).     Medications       amitriptyline  50 mg Oral At Bedtime     bisacodyl  10 mg Oral Daily     Calcium-Vitamin D-Vitamin K  1 Dose Oral BID     dexamethasone  0.75 mg Oral Daily     fexofenadine  180 mg Oral At Bedtime     linaclotide  290 mcg Oral QAM AC      mupirocin   Topical Daily     OLANZapine  30 mg Oral At Bedtime     omeprazole  40 mg Oral QAM     polyethylene glycol  17 g Oral 4x Daily     potassium phosphate (monobasic)  500 mg Oral BID     sertraline  150 mg Oral Daily     sulfamethoxazole-trimethoprim  1 tablet Oral BID     vitamin D3  2,000 Units Oral Daily     vitamin E  400 Units Oral Daily

## 2020-02-10 NOTE — PLAN OF CARE
VSS, denies discomfort. Continues with scheduled miralax, no stool this shift. Comfortable and pleasant most of shift, but very agitated this afternoon when PT here. Family should decided on location for care today and will likely transfer there next week. No family present this shift.

## 2020-02-10 NOTE — PLAN OF CARE
VSS. No c/o pain or nausea. Good UO, no stool. No contact from family this shift. Hourly rounding complete. Will continue to monitor.

## 2020-02-10 NOTE — PLAN OF CARE
Pt is calm and cooperative, AVSS, no pain, no n/v. Pt has good PO intake, voiding well, stool x1, continue scheduled miralax. Continue to monitor and notify provider of any changes.

## 2020-02-10 NOTE — PLAN OF CARE
Discharge Planner PT   Patient plan for discharge: Long term care facility with continued therapy  Current status: Geo with increased agitation during today's session, reporting frustration with stomach pain that has been ongoing for two years (difficult to understand). Completing x4 transfers from bed --> wheelchair --> commode --> wheelchair -->bed with modA x2. Increased instability with Geo requiring maxA x2 to remain balanced with transfers at times.   Barriers to return to prior living situation: medical status.  Recommendations for discharge: Long term care facility with continued therapy  Rationale for recommendations: Will continue to follow 5x/week during IP stay to assist with improving safety with bed mobility, transfers and ambulation.        Entered by: Windy Bowling 02/10/2020 2:54 PM

## 2020-02-11 ENCOUNTER — APPOINTMENT (OUTPATIENT)
Dept: PHYSICAL THERAPY | Facility: CLINIC | Age: 20
End: 2020-02-11
Attending: PEDIATRICS
Payer: COMMERCIAL

## 2020-02-11 LAB
BASOPHILS # BLD AUTO: 0 10E9/L (ref 0–0.2)
BASOPHILS NFR BLD AUTO: 0.5 %
DIFFERENTIAL METHOD BLD: ABNORMAL
EOSINOPHIL # BLD AUTO: 0.2 10E9/L (ref 0–0.7)
EOSINOPHIL NFR BLD AUTO: 4.6 %
ERYTHROCYTE [DISTWIDTH] IN BLOOD BY AUTOMATED COUNT: 15 % (ref 10–15)
HCT VFR BLD AUTO: 38.6 % (ref 40–53)
HGB BLD-MCNC: 12.4 G/DL (ref 13.3–17.7)
IMM GRANULOCYTES # BLD: 0 10E9/L (ref 0–0.4)
IMM GRANULOCYTES NFR BLD: 0.3 %
LYMPHOCYTES # BLD AUTO: 1.1 10E9/L (ref 0.8–5.3)
LYMPHOCYTES NFR BLD AUTO: 29.3 %
MCH RBC QN AUTO: 27.7 PG (ref 26.5–33)
MCHC RBC AUTO-ENTMCNC: 32.1 G/DL (ref 31.5–36.5)
MCV RBC AUTO: 86 FL (ref 78–100)
MONOCYTES # BLD AUTO: 0.6 10E9/L (ref 0–1.3)
MONOCYTES NFR BLD AUTO: 16.5 %
NEUTROPHILS # BLD AUTO: 1.8 10E9/L (ref 1.6–8.3)
NEUTROPHILS NFR BLD AUTO: 48.8 %
NRBC # BLD AUTO: 0 10*3/UL
NRBC BLD AUTO-RTO: 0 /100
PLATELET # BLD AUTO: 84 10E9/L (ref 150–450)
RBC # BLD AUTO: 4.47 10E12/L (ref 4.4–5.9)
WBC # BLD AUTO: 3.7 10E9/L (ref 4–11)

## 2020-02-11 PROCEDURE — 97110 THERAPEUTIC EXERCISES: CPT | Mod: GP

## 2020-02-11 PROCEDURE — 25000131 ZZH RX MED GY IP 250 OP 636 PS 637: Performed by: STUDENT IN AN ORGANIZED HEALTH CARE EDUCATION/TRAINING PROGRAM

## 2020-02-11 PROCEDURE — 25000132 ZZH RX MED GY IP 250 OP 250 PS 637

## 2020-02-11 PROCEDURE — 25000132 ZZH RX MED GY IP 250 OP 250 PS 637: Performed by: STUDENT IN AN ORGANIZED HEALTH CARE EDUCATION/TRAINING PROGRAM

## 2020-02-11 PROCEDURE — 25000125 ZZHC RX 250: Performed by: STUDENT IN AN ORGANIZED HEALTH CARE EDUCATION/TRAINING PROGRAM

## 2020-02-11 PROCEDURE — 36415 COLL VENOUS BLD VENIPUNCTURE: CPT | Performed by: STUDENT IN AN ORGANIZED HEALTH CARE EDUCATION/TRAINING PROGRAM

## 2020-02-11 PROCEDURE — G0378 HOSPITAL OBSERVATION PER HR: HCPCS

## 2020-02-11 PROCEDURE — 85025 COMPLETE CBC W/AUTO DIFF WBC: CPT | Performed by: STUDENT IN AN ORGANIZED HEALTH CARE EDUCATION/TRAINING PROGRAM

## 2020-02-11 RX ADMIN — SULFAMETHOXAZOLE AND TRIMETHOPRIM 1 TABLET: 400; 80 TABLET ORAL at 09:10

## 2020-02-11 RX ADMIN — AMITRIPTYLINE HYDROCHLORIDE 50 MG: 50 TABLET, FILM COATED ORAL at 22:20

## 2020-02-11 RX ADMIN — POLYETHYLENE GLYCOL 3350 17 G: 17 POWDER, FOR SOLUTION ORAL at 19:53

## 2020-02-11 RX ADMIN — Medication 400 UNITS: at 09:10

## 2020-02-11 RX ADMIN — SERTRALINE HYDROCHLORIDE 150 MG: 100 TABLET ORAL at 09:07

## 2020-02-11 RX ADMIN — Medication 1 DOSE: at 09:10

## 2020-02-11 RX ADMIN — BISACODYL 10 MG: 5 TABLET ORAL at 09:20

## 2020-02-11 RX ADMIN — FEXOFENADINE HYDROCHLORIDE 180 MG: 180 TABLET, FILM COATED ORAL at 22:20

## 2020-02-11 RX ADMIN — POTASSIUM PHOSPHATE, MONOBASIC 500 MG: 500 TABLET, SOLUBLE ORAL at 09:07

## 2020-02-11 RX ADMIN — OLANZAPINE 30 MG: 15 TABLET, FILM COATED ORAL at 22:20

## 2020-02-11 RX ADMIN — POLYETHYLENE GLYCOL 3350 17 G: 17 POWDER, FOR SOLUTION ORAL at 16:28

## 2020-02-11 RX ADMIN — OMEPRAZOLE 40 MG: 20 CAPSULE, DELAYED RELEASE ORAL at 09:09

## 2020-02-11 RX ADMIN — MELATONIN 2000 UNITS: at 09:09

## 2020-02-11 RX ADMIN — SULFAMETHOXAZOLE AND TRIMETHOPRIM 1 TABLET: 400; 80 TABLET ORAL at 19:54

## 2020-02-11 RX ADMIN — POLYETHYLENE GLYCOL 3350 17 G: 17 POWDER, FOR SOLUTION ORAL at 09:11

## 2020-02-11 RX ADMIN — LINACLOTIDE 290 MCG: 145 CAPSULE, GELATIN COATED ORAL at 09:09

## 2020-02-11 RX ADMIN — Medication 1 DOSE: at 16:28

## 2020-02-11 RX ADMIN — DEXAMETHASONE 0.75 MG: 0.75 TABLET ORAL at 09:11

## 2020-02-11 RX ADMIN — MUPIROCIN: 20 OINTMENT TOPICAL at 10:07

## 2020-02-11 RX ADMIN — POTASSIUM PHOSPHATE, MONOBASIC 500 MG: 500 TABLET, SOLUBLE ORAL at 19:54

## 2020-02-11 NOTE — PLAN OF CARE
Afebrile, VSS. No c/o pain or N/V. Slept well overnight. No UOP. Hourly rounding completed. Will continue to monitor and update MD with any changes.

## 2020-02-11 NOTE — PLAN OF CARE
"Pt is calm and cooperative, AVSS, c/o abdominal discomfort, declined intervention, says it feels \"similar to when I am constipated,\" MD notified, soft, non-tender, no n/v. Pt has good PO intake of food and fluids, voiding well, last BM 2/9/2020 evening, continued scheduled miralax. Dressing changed on bilateral tibial wounds, medication applied. Continue to monitor and notify provider of any changes.   "

## 2020-02-11 NOTE — PROGRESS NOTES
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 He remains admitted with disposition pending determination of placement where he can continue to receive appropriate cares.    FEN:  - Regular diet  - Continue PTA supplements: Ca, vitamin D, K-phos, vitamin E    GI:   Acute on chronic constipation  - Continue PTA bowel regimen     - Linzess QAM      - Miralax QID, consider increasing further as needed     - Dulcolax QD  - Omeprazole 40 mg daily     Heme/Onc:  Grade 2 Ependymoma s/p multiple resections, radiation and chemotherapy: on study ADVL 1513.  - Entinostat 7 mg weekly on Wednesdays (next 2/12/20); dose tomorrow pending platelet count in AM  - CBC every Tuesday; repeat CBC tomorrow (2/11) prior to chemo given platelet count <100 today    Endo:  Chronic steroid therapy - followed by Dr. Martin  Iatrogenic adrenal insufficiency  - PTA Decadron 0.75 mg QAM  - Continue bactrim ppx 400mg BID     MSK:  Steroid-induced skin atrophy  Multiple skin wounds  - Apply bacitracin to wounds as needed with dressing changes  - Will need outpatient PT for strengthening    Neuro/Psych:  Hx brain tumor and hemorrhagic stroke  Cranial nerve palsies  Ataxia   Depression  Agitation  - Olanzapine 30mg at bedtime  - Amitriptyline 50mg at bedtime  - Zoloft 150mg daily  - Haldol 5 mg IV/IM if needed for acute agitation  - Psychiatry consult  - Psychology consult    Allergies:  - PTA Allegra 180mg qPM       Pulm:  Hx of CANDELARIO: previously used CPAP  - Routine vitals w/ spot pulse ox        Diet: Diet  Regular Diet Adult    Lines: no access  Code Status: Full    Disposition Plan   Expected discharge: Pending safe disposition to adult psychiatry or skilled care facility.    Geo was evaluated and discussed with Heme/Onc Fellow Dr. Eddy and Attending Dr. Rousseau.    Jennifer Mnotaño MD  Pediatrics, PGY-1    Physician Attestation   I, Leoncio Rousseau MD, saw this patient with the resident and agree with the resident/fellow's findings and plan of care as documented in the note.      I personally reviewed vital signs, medications and labs.    Key findings:  I also examined the patient and agree with the assessment as noted    Leoncio Rousseau MD  Date of Service (when I saw the patient): 2/11/20    ______________________________________________    Interval History   Nursing notes reviewed. Vitals stable. No bowel movement yesterday per nurse. Taking bowel regimen as prescribed.    Data reviewed today: I reviewed all medications, new labs and imaging results over the last 24 hours.    Physical Exam   Vital Signs: Temp: 97  F (36.1  C) Temp src: Axillary BP: (!) 115/92 Pulse: 86 Heart Rate: 94 Resp: 18 SpO2: 96 % O2 Device: None (Room air)    Weight: 196 lbs 14.4 oz    GENERAL: laying in bed, awake, well-appearing and in no acute distress  SKIN:  Purplish striae are seen over upper extremities (baseline)   EYES: Right eye covered with eye patch.   CV: RRR, no murmurs, distal pulses intact   RESP: Breathing comfortably, no signs of increased work of breathing or respiratory distress  ABD: non-distended, non painful, soft, + bowel sounds   NEURO: aphasia at baseline    Data   No results found for this or any previous visit (from the past 24 hour(s)).     Medications       amitriptyline  50 mg Oral At Bedtime     bisacodyl  10 mg Oral Daily     Calcium-Vitamin D-Vitamin K  1 Dose Oral BID      dexamethasone  0.75 mg Oral Daily     fexofenadine  180 mg Oral At Bedtime     linaclotide  290 mcg Oral QAM AC     mupirocin   Topical Daily     OLANZapine  30 mg Oral At Bedtime     omeprazole  40 mg Oral QAM     polyethylene glycol  17 g Oral 4x Daily     potassium phosphate (monobasic)  500 mg Oral BID     sertraline  150 mg Oral Daily     sulfamethoxazole-trimethoprim  1 tablet Oral BID     vitamin D3  2,000 Units Oral Daily     vitamin E  400 Units Oral Daily

## 2020-02-11 NOTE — PLAN OF CARE
Discharge Planner PT   Patient plan for discharge: Long term care facility with continue therapy if able  Current status: Geo participating in supine bed exercise with ankle weight and red therabands today. Declining OOB activity due to reports of abdominal pain. Does become agitated when talking about his abdominal pain.  Barriers to return to prior living situation: medical status.  Recommendations for discharge: Long term care facility with continued therapy if able  Rationale for recommendations: Unsure of patients baseline - Will continue to follow patient 5x/week to progress safety and IND with functional transfers and ambulation with use of FWW.        Entered by: Windy Bowling 02/11/2020 4:01 PM

## 2020-02-11 NOTE — PLAN OF CARE
Afebrile. VSS. Denies nausea and pain. Good PO intake, adequate UOP. No stool. New wound on second toe of L foot noted, MD notified. Hourly rounding completed.

## 2020-02-12 ENCOUNTER — APPOINTMENT (OUTPATIENT)
Dept: PHYSICAL THERAPY | Facility: CLINIC | Age: 20
End: 2020-02-12
Attending: PEDIATRICS
Payer: COMMERCIAL

## 2020-02-12 ENCOUNTER — MYC MEDICAL ADVICE (OUTPATIENT)
Dept: PSYCHIATRY | Facility: CLINIC | Age: 20
End: 2020-02-12

## 2020-02-12 LAB
BASOPHILS # BLD AUTO: 0 10E9/L (ref 0–0.2)
BASOPHILS NFR BLD AUTO: 0.8 %
DIFFERENTIAL METHOD BLD: ABNORMAL
EOSINOPHIL # BLD AUTO: 0.2 10E9/L (ref 0–0.7)
EOSINOPHIL NFR BLD AUTO: 3.9 %
ERYTHROCYTE [DISTWIDTH] IN BLOOD BY AUTOMATED COUNT: 15 % (ref 10–15)
HCT VFR BLD AUTO: 39.7 % (ref 40–53)
HGB BLD-MCNC: 12.9 G/DL (ref 13.3–17.7)
IMM GRANULOCYTES # BLD: 0 10E9/L (ref 0–0.4)
IMM GRANULOCYTES NFR BLD: 0.6 %
LYMPHOCYTES # BLD AUTO: 1.5 10E9/L (ref 0.8–5.3)
LYMPHOCYTES NFR BLD AUTO: 29.6 %
MCH RBC QN AUTO: 28.2 PG (ref 26.5–33)
MCHC RBC AUTO-ENTMCNC: 32.5 G/DL (ref 31.5–36.5)
MCV RBC AUTO: 87 FL (ref 78–100)
MONOCYTES # BLD AUTO: 0.9 10E9/L (ref 0–1.3)
MONOCYTES NFR BLD AUTO: 17.1 %
NEUTROPHILS # BLD AUTO: 2.5 10E9/L (ref 1.6–8.3)
NEUTROPHILS NFR BLD AUTO: 48 %
NRBC # BLD AUTO: 0 10*3/UL
NRBC BLD AUTO-RTO: 0 /100
PLATELET # BLD AUTO: 81 10E9/L (ref 150–450)
RBC # BLD AUTO: 4.58 10E12/L (ref 4.4–5.9)
WBC # BLD AUTO: 5.1 10E9/L (ref 4–11)

## 2020-02-12 PROCEDURE — G0463 HOSPITAL OUTPT CLINIC VISIT: HCPCS

## 2020-02-12 PROCEDURE — 25000132 ZZH RX MED GY IP 250 OP 250 PS 637: Performed by: STUDENT IN AN ORGANIZED HEALTH CARE EDUCATION/TRAINING PROGRAM

## 2020-02-12 PROCEDURE — 97530 THERAPEUTIC ACTIVITIES: CPT | Mod: GP

## 2020-02-12 PROCEDURE — 36415 COLL VENOUS BLD VENIPUNCTURE: CPT | Performed by: PEDIATRICS

## 2020-02-12 PROCEDURE — 25000131 ZZH RX MED GY IP 250 OP 636 PS 637: Performed by: STUDENT IN AN ORGANIZED HEALTH CARE EDUCATION/TRAINING PROGRAM

## 2020-02-12 PROCEDURE — 85025 COMPLETE CBC W/AUTO DIFF WBC: CPT | Performed by: PEDIATRICS

## 2020-02-12 PROCEDURE — 25000125 ZZHC RX 250: Performed by: STUDENT IN AN ORGANIZED HEALTH CARE EDUCATION/TRAINING PROGRAM

## 2020-02-12 PROCEDURE — 25000132 ZZH RX MED GY IP 250 OP 250 PS 637: Performed by: PEDIATRICS

## 2020-02-12 PROCEDURE — 25000132 ZZH RX MED GY IP 250 OP 250 PS 637

## 2020-02-12 PROCEDURE — G0378 HOSPITAL OBSERVATION PER HR: HCPCS

## 2020-02-12 RX ORDER — SULFAMETHOXAZOLE AND TRIMETHOPRIM 400; 80 MG/1; MG/1
1 TABLET ORAL
Status: DISCONTINUED | OUTPATIENT
Start: 2020-02-15 | End: 2020-02-18 | Stop reason: HOSPADM

## 2020-02-12 RX ORDER — MAGNESIUM CARB/ALUMINUM HYDROX 105-160MG
296 TABLET,CHEWABLE ORAL ONCE
Status: COMPLETED | OUTPATIENT
Start: 2020-02-12 | End: 2020-02-12

## 2020-02-12 RX ORDER — MAGNESIUM CARB/ALUMINUM HYDROX 105-160MG
148 TABLET,CHEWABLE ORAL ONCE
Status: COMPLETED | OUTPATIENT
Start: 2020-02-12 | End: 2020-02-12

## 2020-02-12 RX ORDER — ACETAMINOPHEN 325 MG/1
650 TABLET ORAL EVERY 6 HOURS PRN
Status: DISCONTINUED | OUTPATIENT
Start: 2020-02-12 | End: 2020-02-18 | Stop reason: HOSPADM

## 2020-02-12 RX ADMIN — POLYETHYLENE GLYCOL 3350 17 G: 17 POWDER, FOR SOLUTION ORAL at 17:12

## 2020-02-12 RX ADMIN — MUPIROCIN: 20 OINTMENT TOPICAL at 21:33

## 2020-02-12 RX ADMIN — POLYETHYLENE GLYCOL 3350 17 G: 17 POWDER, FOR SOLUTION ORAL at 14:46

## 2020-02-12 RX ADMIN — LINACLOTIDE 290 MCG: 145 CAPSULE, GELATIN COATED ORAL at 08:21

## 2020-02-12 RX ADMIN — AMITRIPTYLINE HYDROCHLORIDE 50 MG: 50 TABLET, FILM COATED ORAL at 22:13

## 2020-02-12 RX ADMIN — MAGNESIUM CITRATE 148 ML: 1.75 LIQUID ORAL at 18:19

## 2020-02-12 RX ADMIN — BISACODYL 10 MG: 5 TABLET ORAL at 08:18

## 2020-02-12 RX ADMIN — Medication 1 DOSE: at 08:23

## 2020-02-12 RX ADMIN — POTASSIUM PHOSPHATE, MONOBASIC 500 MG: 500 TABLET, SOLUBLE ORAL at 21:30

## 2020-02-12 RX ADMIN — MELATONIN 2000 UNITS: at 08:19

## 2020-02-12 RX ADMIN — Medication 1 DOSE: at 17:12

## 2020-02-12 RX ADMIN — OLANZAPINE 30 MG: 15 TABLET, FILM COATED ORAL at 22:13

## 2020-02-12 RX ADMIN — POTASSIUM PHOSPHATE, MONOBASIC 500 MG: 500 TABLET, SOLUBLE ORAL at 08:20

## 2020-02-12 RX ADMIN — POLYETHYLENE GLYCOL 3350 17 G: 17 POWDER, FOR SOLUTION ORAL at 08:24

## 2020-02-12 RX ADMIN — OMEPRAZOLE 40 MG: 20 CAPSULE, DELAYED RELEASE ORAL at 08:23

## 2020-02-12 RX ADMIN — ACETAMINOPHEN 650 MG: 325 TABLET, FILM COATED ORAL at 01:11

## 2020-02-12 RX ADMIN — POLYETHYLENE GLYCOL 3350 17 G: 17 POWDER, FOR SOLUTION ORAL at 21:30

## 2020-02-12 RX ADMIN — DEXAMETHASONE 0.75 MG: 0.75 TABLET ORAL at 08:21

## 2020-02-12 RX ADMIN — Medication 400 UNITS: at 08:22

## 2020-02-12 RX ADMIN — SULFAMETHOXAZOLE AND TRIMETHOPRIM 1 TABLET: 400; 80 TABLET ORAL at 08:22

## 2020-02-12 RX ADMIN — FEXOFENADINE HYDROCHLORIDE 180 MG: 180 TABLET, FILM COATED ORAL at 22:13

## 2020-02-12 RX ADMIN — MAGNESIUM CITRATE 296 ML: 1.75 LIQUID ORAL at 12:04

## 2020-02-12 RX ADMIN — SERTRALINE HYDROCHLORIDE 150 MG: 100 TABLET ORAL at 08:32

## 2020-02-12 NOTE — PROGRESS NOTES
"PT Unit 5: Geo was seen by PT with session focused on progression of functional transfers with stand pivot to commode. Pt experiencing frustration amongst transfer back to commode with pants falling to the ground and pt attempting to squat<>stand to pick them up with max A from PT at gait belt and max verbal cues for safety. Pt transferring back to sitting on EOB and then supine with min A. Once seated up in bed, pt resuming eating carrots from bedside table, putting a second carrot in his mouth before  chewing and swallowing. Pt began choking on carrot, with ability to cough it up and talk when asked if he was choking responded \"no, im fine.\" Student RN present, RN notified. Returned in room with RN, pt escalating with taking his carrots away, throwing his tray and hitting the bedside table with his fist. Exited room with RN and student RN to allow patient to calm down and discussed incident with MD and patients unsafe behaviors. Will continue to follow 5x/week to progress as tolerated.    Meggan Cotto, PT, [phone]    "

## 2020-02-12 NOTE — PROGRESS NOTES
Afebrile, VSS. Denies pain. [facility] Nurse evaluated toe and lower extremity, instructed leaving wounds open to air.  During transfer with 2 person assist from commode to bed Pt began to yell and pushed staff away. Allowed to sit back in bed. Once in bed continued yelling at staff and attempted to eat a carrot, causing him to cough for 1 minute before responding to staff. Appeared to be choking. Was able to talk, and no signs of distress apparent. Continued to cough intermittently for approximately 5 minutes afterward. RN removed carrots and Pt pushed tray table away and flipped tray of food onto floor. Staff notified charge RN and provider, who was unable to perform assessment due to Pt's agitation. Pt has since become less agitated, but is still requesting to have time alone.

## 2020-02-12 NOTE — PLAN OF CARE
Afebrile, VSS. Prn tylenol given x1 for toe pain. Pt refusing any interventions on toe. Not currently bleeding at this time. Good UOP. No stool. 2 person assist. Compliant with falls precautions. No issues overnight. Hourly rounding completed. Will continue to monitor and update MD with any changes.

## 2020-02-12 NOTE — PLAN OF CARE
Afebrile vss. Reports mild pain in left toe abrasion. Developed this abrasion this afternoon. Wound consult placed. Eating and drinking well. Good urine output. Last stool 2/9/20, giving scheduled miralax QID. Continues to be 2 person assist. Study drug will be held tomorrow if platelets are under 100 (84 today). Dad came to visit, pt became agitated. Calmed down quickly after he left, continued to be pleasant with staff. Will continue to monitor and notify MD as needed.

## 2020-02-12 NOTE — PROGRESS NOTES
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 He remains admitted with disposition pending determination of placement where he can continue to receive appropriate cares.    A: Left 2nd toe abrasion, pt reports he bumped area on the table yesterday. Area is currently clean with dry drainage and no active drainage or signs of infection. Periwound with slight discoloration consistent with bruising not erythema. L 2nd toe area measures 0.5cm x 0.4cm x 0cm. Pt also has 2 areas to shin with dried drainage and no signs or symptoms of infection. Pt has tremors and is on steroids with bruising all over his arms/legs      Left 2nd toe                                                            Left Shin/Knee           R: Keep area clean and dry, ok to apply thin layer of vaseline to prevent area from reopening.     WOC nurse signing off, please re consult for further needs

## 2020-02-12 NOTE — PROGRESS NOTES
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 He remains admitted with disposition pending determination of placement where he can continue to receive appropriate cares.    FEN:  - Regular diet  - Continue PTA supplements: Ca, vitamin D, K-phos, vitamin E    GI:   Acute on chronic constipation  - Continue PTA bowel regimen     - Linzess QAM      - Miralax QID, consider increasing further as needed     - Dulcolax every day     - Dose of Mg citrate 2/12 given no stool in 2 days  - Omeprazole 40 mg daily     Heme/Onc:  Grade 2 Ependymoma s/p multiple resections, radiation and chemotherapy: on study ADVL 1513.  - Entinostat 7 mg weekly on Wednesdays (next 2/19/20). Per study protocol, okay to receive chemo today with platelet count of 81  - CBC every Tuesday prior to chemotherapy    Endo:  Chronic steroid therapy - followed by Dr. Martin  Iatrogenic adrenal insufficiency  - PTA Decadron 0.75 mg QAM. Per last endocrine visit 2/2019, had tried to transition to hydrocortisone, but he had another surgery and decadron was restarted. Will need endocrine follow up to discuss steroid plan  - Continue bactrim ppx 400mg BID     MSK:  Steroid-induced skin atrophy  Multiple skin wounds, most recent on left second toe  - Apply bacitracin to wounds as needed with dressing changes  - Wound consult 2/12. Recommend vaseline, but no further treatment needed.   - Will need outpatient PT for  strengthening    Neuro/Psych:  Hx brain tumor and hemorrhagic stroke  Cranial nerve palsies  Ataxia   Depression  Agitation  - Olanzapine 30mg at bedtime  - Amitriptyline 50mg at bedtime  - Zoloft 150mg daily  - Haldol 5 mg IV/IM if needed for acute agitation  - Psychiatry consult  - Psychology consult    Allergies:  - PTA Allegra 180mg qPM      Pulm:  Hx of CANDELARIO: previously used CPAP  - Routine vitals w/ spot pulse ox        Diet: Diet  Regular Diet Adult    Lines: no access  Code Status: Full    Disposition Plan   Expected discharge: Planned for 2/18. Pending safe disposition to adult psychiatry or skilled care facility.    Geo was evaluated and discussed with Heme/Onc Fellow Dr. Eddy and Attending Dr. Rousseau.    Jennifer Montaño MD  Pediatrics, PGY-1    Physician Attestation   I, Leoncio Rousseau MD, saw this patient with the resident and agree with the resident/fellow's findings and plan of care as documented in the note.      I personally reviewed vital signs, medications and labs.    Key findings:  I also examined the patient and agree with the assessment as noted.    Leoncio Rousseau MD  Date of Service (when I saw the patient): 2/12/20      ______________________________________________    Interval History   Nursing notes reviewed. Geo has some pain in his toe where there is an open wound, but is generally feeling well. Continues to work with PT. No bowel movement for the past two days. Taking bowel regimen as prescribed.    Data reviewed today: I reviewed all medications, new labs and imaging results over the last 24 hours.    Physical Exam   Vital Signs: Temp: 96.6  F (35.9  C) Temp src: Axillary BP: 131/75   Heart Rate: 86 Resp: 16 SpO2: 96 % O2 Device: None (Room air)    Weight: 196 lbs 14.4 oz    GENERAL: laying in bed, awake, well-appearing and in no acute distress  SKIN:  Purplish striae are seen over upper extremities and abdomen (baseline). Small superficial wound on  left second toe with surrounding bruising  EYES: Right eye covered with eye patch.   CV: Distal extremities warm and well perfused  RESP: Breathing comfortably, no signs of increased work of breathing or respiratory distress  ABD: non-distended, soft   NEURO: aphasia at baseline    Data   No results found for this or any previous visit (from the past 24 hour(s)).     Medications       amitriptyline  50 mg Oral At Bedtime     bisacodyl  10 mg Oral Daily     Calcium-Vitamin D-Vitamin K  1 Dose Oral BID     dexamethasone  0.75 mg Oral Daily     fexofenadine  180 mg Oral At Bedtime     linaclotide  290 mcg Oral QAM AC     magnesium citrate  296 mL Oral Once     mupirocin   Topical Daily     OLANZapine  30 mg Oral At Bedtime     omeprazole  40 mg Oral QAM     polyethylene glycol  17 g Oral 4x Daily     potassium phosphate (monobasic)  500 mg Oral BID     sertraline  150 mg Oral Daily     study - entinostat  7 mg Oral Once     [START ON 2/15/2020] sulfamethoxazole-trimethoprim  1 tablet Oral 2 times per day on Sun Sat     vitamin D3  2,000 Units Oral Daily     vitamin E  400 Units Oral Daily

## 2020-02-13 ENCOUNTER — APPOINTMENT (OUTPATIENT)
Dept: SPEECH THERAPY | Facility: CLINIC | Age: 20
End: 2020-02-13
Attending: PEDIATRICS
Payer: COMMERCIAL

## 2020-02-13 PROCEDURE — 25000132 ZZH RX MED GY IP 250 OP 250 PS 637: Performed by: STUDENT IN AN ORGANIZED HEALTH CARE EDUCATION/TRAINING PROGRAM

## 2020-02-13 PROCEDURE — G0378 HOSPITAL OBSERVATION PER HR: HCPCS

## 2020-02-13 PROCEDURE — 25000132 ZZH RX MED GY IP 250 OP 250 PS 637: Performed by: PEDIATRICS

## 2020-02-13 PROCEDURE — 92526 ORAL FUNCTION THERAPY: CPT | Mod: GN | Performed by: SPEECH-LANGUAGE PATHOLOGIST

## 2020-02-13 PROCEDURE — 25000131 ZZH RX MED GY IP 250 OP 636 PS 637: Performed by: STUDENT IN AN ORGANIZED HEALTH CARE EDUCATION/TRAINING PROGRAM

## 2020-02-13 PROCEDURE — 25000132 ZZH RX MED GY IP 250 OP 250 PS 637

## 2020-02-13 PROCEDURE — 92610 EVALUATE SWALLOWING FUNCTION: CPT | Mod: GN | Performed by: SPEECH-LANGUAGE PATHOLOGIST

## 2020-02-13 PROCEDURE — 25000125 ZZHC RX 250: Performed by: STUDENT IN AN ORGANIZED HEALTH CARE EDUCATION/TRAINING PROGRAM

## 2020-02-13 RX ORDER — OLANZAPINE 15 MG/1
15 TABLET ORAL ONCE
Status: DISCONTINUED | OUTPATIENT
Start: 2020-02-13 | End: 2020-02-13

## 2020-02-13 RX ORDER — POLYETHYLENE GLYCOL 3350 17 G/17G
34 POWDER, FOR SOLUTION ORAL 4 TIMES DAILY
Status: DISCONTINUED | OUTPATIENT
Start: 2020-02-13 | End: 2020-02-14

## 2020-02-13 RX ORDER — MAGNESIUM CARB/ALUMINUM HYDROX 105-160MG
296 TABLET,CHEWABLE ORAL ONCE
Status: COMPLETED | OUTPATIENT
Start: 2020-02-13 | End: 2020-02-13

## 2020-02-13 RX ORDER — OLANZAPINE 15 MG/1
15 TABLET ORAL
Status: COMPLETED | OUTPATIENT
Start: 2020-02-13 | End: 2020-02-13

## 2020-02-13 RX ADMIN — POLYETHYLENE GLYCOL 3350 34 G: 17 POWDER, FOR SOLUTION ORAL at 15:39

## 2020-02-13 RX ADMIN — LINACLOTIDE 290 MCG: 145 CAPSULE, GELATIN COATED ORAL at 06:28

## 2020-02-13 RX ADMIN — Medication 1 DOSE: at 09:10

## 2020-02-13 RX ADMIN — OLANZAPINE 30 MG: 15 TABLET, FILM COATED ORAL at 21:38

## 2020-02-13 RX ADMIN — Medication 400 UNITS: at 09:10

## 2020-02-13 RX ADMIN — Medication 1 DOSE: at 15:39

## 2020-02-13 RX ADMIN — POLYETHYLENE GLYCOL 3350 34 G: 17 POWDER, FOR SOLUTION ORAL at 12:08

## 2020-02-13 RX ADMIN — SERTRALINE HYDROCHLORIDE 150 MG: 100 TABLET ORAL at 09:12

## 2020-02-13 RX ADMIN — POTASSIUM PHOSPHATE, MONOBASIC 500 MG: 500 TABLET, SOLUBLE ORAL at 21:38

## 2020-02-13 RX ADMIN — POLYETHYLENE GLYCOL 3350 17 G: 17 POWDER, FOR SOLUTION ORAL at 09:09

## 2020-02-13 RX ADMIN — OLANZAPINE 15 MG: 15 TABLET, FILM COATED ORAL at 06:27

## 2020-02-13 RX ADMIN — POLYETHYLENE GLYCOL 3350 34 G: 17 POWDER, FOR SOLUTION ORAL at 21:38

## 2020-02-13 RX ADMIN — DEXAMETHASONE 0.75 MG: 0.75 TABLET ORAL at 09:10

## 2020-02-13 RX ADMIN — MELATONIN 2000 UNITS: at 09:10

## 2020-02-13 RX ADMIN — OMEPRAZOLE 40 MG: 20 CAPSULE, DELAYED RELEASE ORAL at 09:10

## 2020-02-13 RX ADMIN — FEXOFENADINE HYDROCHLORIDE 180 MG: 180 TABLET, FILM COATED ORAL at 21:38

## 2020-02-13 RX ADMIN — MUPIROCIN: 20 OINTMENT TOPICAL at 15:49

## 2020-02-13 RX ADMIN — MAGNESIUM CITRATE 296 ML: 1.75 LIQUID ORAL at 14:01

## 2020-02-13 RX ADMIN — AMITRIPTYLINE HYDROCHLORIDE 50 MG: 50 TABLET, FILM COATED ORAL at 21:38

## 2020-02-13 RX ADMIN — BISACODYL 10 MG: 5 TABLET ORAL at 09:10

## 2020-02-13 RX ADMIN — POTASSIUM PHOSPHATE, MONOBASIC 500 MG: 500 TABLET, SOLUBLE ORAL at 09:10

## 2020-02-13 NOTE — PROGRESS NOTES
"Code 21 called at 1515, everyone showed up by 1525. Pt was in wheelchair, agitated and left into elevator bay. Expressed that he was \"going downstairs.\" Did not understand why he couldn't go downstairs alone, stated he \"wasn't going to fall.\" This nurse and Amena Epps RN were able to calm pt down and direct him back to his room. No hands were laid on pt. Oral zyprexa not given due to pt refusal.  "

## 2020-02-13 NOTE — PLAN OF CARE
Following 1500 Code 21 incident, Pt is calm and cooperative, AVSS, no pain, no n/v. Pt had good PO intake of food and fluids, voiding adequately, no stool, continued scheduled miralax, one time dose of Mg Citrate given. Dressing on R tibial wound changed, medications applied. Continue to monitor and notify provider of any changes.

## 2020-02-13 NOTE — PROGRESS NOTES
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 He remains admitted with disposition pending determination of placement where he can continue to receive appropriate cares.    Geo has participated in two video fluoroscopic swallow studies, per chart review. In a previous note, Pt's father was unsure if Geo participated in VFSS at [place].     VFSS 6/2015 (Acute Rehab): Silent aspiration of thin liquids; effective airway protection of nectar-thickened liquids and solids. SLP recommended nectar-thickened liquids.     VFSS 8/28/18 (outpatient): Residue of thin barium in trachea and thus, aspiration suspected off-fluoro. Recommended nectar-thickened liquids with repeat VFSS in 4-6 weeks. Pt drank from a large sports bottle with large straw (Pt drinks quickly and large boluses). Pt continues to drink from a similar bottle.    Clinical Swallow Evaluation   Oral Musculature anomalies present   Secretion Management   Intermittent drooling d/t open mouth posture   Mandibular Strength and Mobility impaired   Oral Labial Strength and Mobility impaired retraction;impaired pursing;impaired seal;impaired  coordination  (Unable to complete lip closure)   Lingual Strength and Mobility impaired coordination   Laryngeal Function   (Coughing throughout exam)   Oral Musculature Comments Able to follow directions during oral [facility] exam to lateralize tongue, elevate, retract. Unable to achieve lip closure to hold air in cheeks.    Additional Documentation   (Screened for inpatient speech needs)   Additional evaluation(s) completed today Yes   Rationale for completing additional evaluation History of dysarthia   Swallow Compensations   Swallow Compensations Covers mouth to present anterior loss of food, appears to inhale food (audible inhale) to move food to posterior oral cavity.    Results Suspect aspiration   General Therapy Interventions   Planned Therapy Interventions Dysphagia Treatment   Intervention Comments Compensatory strategies, diet/liquid recommendation   Swallow Eval: Clinical Impressions   Skilled Criteria for Therapy Intervention Skilled criteria met.  Treatment indicated.   Functional Assessment Scale (FAS) 3   Dysphagia Outcome Severity Scale (REJI) Level 3 - REJI   Treatment Diagnosis Moderate-severe oral dysphagia with suspected pharyngeal dysphagia   Diet texture recommendations Dysphagia diet level 3;Thin liquids   Recommended Feeding/Eating Techniques small sips/bites  (Maintain upright posture)   Therapy Frequency 2x/week   Predicted Duration of Therapy Intervention (days/wks) LOS   Anticipated Discharge Disposition   (Home to new group home)   Risks and Benefits of Treatment have been explained. Yes   Patient, family and/or staff in agreement with Plan of Care Yes   Clinical Impression Comments Geo self-fed a banana, x5 apple slices, and a pancake during today's evaluation. Pt is unable to achieve lip closure and uses compensatory strategies to minimize oral loss, including pinching lips around straw (with fingers), inhaling when he puts food in his mouth, and holds a napkin in front of his mouth  to prevent anterior loss. Frequent coughing during exam during chewing. Inhaling compensatory strategy increases Pt's risk of aspiration and choking. SLP will reinforce use of napkin in front of his mouth and using his fingers to help prevent oral loss.     SLP Feeding Recommendations for Geo:    -Dysphagia Diet level 3 (This diet is similar to a regular diet, however excludes foods that have a high choking risk, as well as sticky or crunchy foods)    -thin liquids    -Consideration of repeat VFSS. Per conversation with medical team, Geo's parents can give input on if they would like a VFSS in effort to consider Pt's QOL.    -1:1 supervision    -small bites/sips to prevent choking event   Total Evaluation Time   Total Evaluation Time (Minutes) 35, 10 minutes treatment       Thank you for this referral.   Leatha Tovar MS, CCC-SLP    Pager: [phone]

## 2020-02-13 NOTE — PROGRESS NOTES
Brief supportive check in with Geo. He was easily engaged and admitted to being increasingly frustrated. He identified feeling constantly constipated for the past 2 years and does not feel like he is being heard by doctors. SW redirected conversation to discuss overall feelings of isolation and potential coping strategies to combat this (distractions, out of room fun time, more structure in his day). Discussed plan to move into new group home next week which Geo is agreeable to. Hopeful being discharged from the hospital after an extended stay will improve his mood, as well as living with others his age and ability level. Offered empathic listening and support. Social work will continue to assess needs and provide ongoing psychosocial support and access to resources.       GARCIA Lewis, [facility]  Pediatric Hem/Onc   Phone: [phone]  Pager: [phone]

## 2020-02-13 NOTE — PROGRESS NOTES
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 He remains admitted with disposition pending determination of placement where he can continue to receive appropriate cares.    SW spent extended time today on the phone with both parents, Eric and Christianne. They continue to express concerns re: Geo's behaviors. They are thoroughly and transparently having conversations with the group home to ensure they are capable of handling Geo's challenges to avoid being in this same situation in the future.  has confidence in this group home company  and has had similar clients be successful here.     Current plan is for Geo to move to [facility] in [place] with a tentative move in date of Tuesday, February 18th around 1pm.     INTERVENTION:     Supportive conversation with both parents re: their ongoing concerns. Discussed increased agitated behaviors staff has seen in last 24 hrs.  Provided validation re: the difficult decisions that they have been asked to make on behalf of Geo. Offered positive feedback re: their thoughtfulness and ability to work together to set Geo up for success.     ASSESSMENT:     Geo has displayed an increased in agitation resulting in two Code 21's in past 24 hours. Many likely attributing factors, including length of unexpected hospitalization, fixed delusions, isolation, boredom, potentially confusion. Parents are on board with the move to group [place] next week. Will confirm their ability to handle  Geo's challenging behaviors with the group home this afternoon.     PLAN:     Social work will continue to assess needs and provide ongoing psychosocial support and access to resources.         GARCIA Lewis, [facility]  Pediatric Hem/Onc   Phone: [phone]  Pager: [phone]

## 2020-02-13 NOTE — PROGRESS NOTES
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 He remains admitted with disposition pending determination of placement where he can continue to receive appropriate cares.    FEN:  - Evaluated by speech today after episode of choking/concern for aspiration yesterday while eating a carrot; noted to audibly inhale food to compensate for inadequate swallow ability  - Dysphagia level 3 diet (per speech recommendations on 2/13)  - Continue PTA supplements: Ca, vitamin D, K-phos, vitamin E    GI:   Acute on chronic constipation  - Continue PTA bowel regimen w/ adjustments:     - Linzess QAM      - Miralax QID; increased dose of Miralax from 17g to 34g on 2/13, remains QID     - Dulcolax every day     - Additional 300 ml mag citrate today given no stool in >48 hours  - Omeprazole 40 mg daily     Heme/Onc:  Grade 2 Ependymoma s/p multiple resections, radiation and chemotherapy: on study ADVL 1513.  - Entinostat 7 mg weekly on Wednesdays (next 2/19/20)  - CBC every Tuesday prior to chemotherapy    Endo:  Chronic steroid therapy - followed by Dr. Martin  Iatrogenic adrenal insufficiency  - PTA Decadron 0.75 mg QAM. Per last endocrine visit 2/2019, had tried to transition to hydrocortisone, but he had another surgery and decadron was restarted. Will need endocrine follow up to discuss steroid plan.  - Continue bactrim ppx 400mg Sat/Sun     MSK:  Steroid-induced skin atrophy  Multiple skin wounds, most  recent on left second toe  - Apply bacitracin to wounds as needed with dressing changes  - Wound consult 2/12. Recommend vaseline, but no further treatment needed.   - Will need outpatient PT for strengthening    Neuro/Psych:  Hx brain tumor and hemorrhagic stroke  Cranial nerve palsies  Ataxia   Depression  Agitation  - Olanzapine 30mg at bedtime  - Amitriptyline 50mg at bedtime  - Zoloft 150mg daily  - Haldol 5 mg IV/IM if needed for acute agitation  - Psychiatry consult  - Psychology consult    Allergies:  - PTA Allegra 180mg qPM      Pulm:  Hx of CANDELARIO: previously used CPAP  - Routine vitals w/ spot pulse ox        Diet: Diet  Dysphagia Diet Level 3 Advanced Thin Liquids (water, ice chips, juice, milk gelatin, ice cream, etc)    Lines: no access  Code Status: Full    Disposition Plan   Expected discharge: Planned for 2/18. Pending safe disposition to adult psychiatry or skilled care facility.    Geo was evaluated and discussed with Heme/Onc Fellow Dr. Eddy and Attending Dr. Wilder.    Tamiko Ferrara MD  Med-Peds PGY-2    Physician Attestation   I, Zoraida Wildre MD, MD, saw this patient with the resident and agree with the resident/fellow's findings and plan of care as documented in the note.      I personally reviewed vital signs, medications, labs and imaging.    Zoraida Wilder MD, MD  Date of Service (when I saw the patient): 2/13/20      ______________________________________________    Interval History   Nursing notes reviewed. Geo was rather agitated yesterday afternoon, trying to leave the floor to go down the elevator on his own; ultimately a code 21 was called and he was able to be escorted back to his room with assistance from nursing staff. He would not take a dose of zyprexa at this time, but did take his scheduled evening dose. Early this morning Geo was again restless, thrashing in bed, hitting limbs on side rails; code 21 again called, this time Geo agreed to take a  dose of zyprexa and he calmed down shortly after. No hands-on restraint needed in either situation.    Bowel regimen was increased yesterday w/ addition of mag citrate x 2, however Geo still has not stooled in last 48 hours. He denies any abdominal pain today.    Data reviewed today: I reviewed all medications, new labs and imaging results over the last 24 hours.    Physical Exam   Vital Signs: Temp: 98  F (36.7  C) Temp src: Axillary BP: 118/71 Pulse: 105 Heart Rate: 97 Resp: 16 SpO2: 92 % O2 Device: None (Room air)    Weight: 196 lbs 14.4 oz    GENERAL: laying in bed, awake, well-appearing and in no acute distress  SKIN:  purplish striae are seen over upper extremities and abdomen (baseline); small superficial wound on left second toe with surrounding bruising; significant bruising on lower extremities bilaterally, similar to previous day  EYES: right eye covered with eye patch.   CV: distal extremities warm and well perfused  RESP: breathing comfortably, no signs of increased work of breathing or respiratory distress  ABD: non-distended, soft   NEURO: aphasia at baseline    Data   No results found for this or any previous visit (from the past 24 hour(s)).     Medications       amitriptyline  50 mg Oral At Bedtime     bisacodyl  10 mg Oral Daily     Calcium-Vitamin D-Vitamin K  1 Dose Oral BID     dexamethasone  0.75 mg Oral Daily     fexofenadine  180 mg Oral At Bedtime     linaclotide  290 mcg Oral QAM AC     mupirocin   Topical Daily     OLANZapine  30 mg Oral At Bedtime     omeprazole  40 mg Oral QAM     polyethylene glycol  34 g Oral 4x Daily     potassium phosphate (monobasic)  500 mg Oral BID     sertraline  150 mg Oral Daily     study - entinostat  7 mg Oral Once     [START ON 2/15/2020] sulfamethoxazole-trimethoprim  1 tablet Oral 2 times per day on Sun Sat     vitamin D3  2,000 Units Oral Daily     vitamin E  400 Units Oral Daily

## 2020-02-13 NOTE — PLAN OF CARE
"Sleeping between cares.  No gagging or retching like occurred last night seen tonight.  Continues with scheduled zofran, simethicone, and tylenol.  Slightly \"whiney\" per mother's report just prior to 0200 when scheduled zofran and tylenol due.  Medications given and warm blanket applied.  Was quiet by 0230.  No stool output noted.  Good diaper at the start of the shift.  Dry at 0200 and 0400.  Tolerating pedilyte feedings.  Mother now is throwing up and caught what Yaneli has.  Still need to send C-diff stool culture when able. IV keppra given at the end of the shift.     "

## 2020-02-13 NOTE — PLAN OF CARE
"Calm and cooperative at the start of the shift.  No issues until mid shift had awakened and slightly confused with attendant. Unit aide orient patient and he went back to sleep for about and hour.  RN called into room at 0525.  Geo more agitated and laying crosswise at the top of the bed. Unit aide already in the room trying to help. Restless in bed and speech difficult to understand talking about \"circles\" and \"loops\". Recognized RN and complied with request to change position in bed to the long way. Was quiet for a few minutes the became restless and flailing arms and legs and shifting around in bed occasionally hitting side rails with limbs.  Hard to differentiate ataxia from purposeful movements.   MD contacted and attempts to calm Geo continued.  Unable to understand  what Geo was trying to communicate the causes of his frustration.  Geo unable to use phone to type frustrations.  Asking staff to  \"just leave him alone.\"  When left alone would be quiet for a few minutes then behaviors exhibited would include put feet into lower side rails and grabbing upper side rails with hands and arched back for 5 seconds and then relaxing,  kicking at side rails, restlessness, tossing and turing in bed,thrashing,  turning sideways in bed.  Code 21 called at 0600.  Geo's assigned RN continued to try to diffuse situation, tried to minimize staff in the room he didn't know, with minimal change in behavior.  Code 21 team offer option of additional staff if hands on needed and options support.  15 mg Zyprexa ordered x 1 if he would take it.  One code 21 staff member and patient's RN went in to give Zyprexa. Geo did take it about 0630 with cooperative attitude from unit RN.  No hands on or restraint of Geo was needed.  Geo remained restless in bed but less agitated and less thrashing movements, no longer hitting side rails, or twisting around in bed, much calmer by 0700.    "

## 2020-02-14 ENCOUNTER — APPOINTMENT (OUTPATIENT)
Dept: SPEECH THERAPY | Facility: CLINIC | Age: 20
End: 2020-02-14
Attending: PEDIATRICS
Payer: COMMERCIAL

## 2020-02-14 PROCEDURE — 25000132 ZZH RX MED GY IP 250 OP 250 PS 637: Performed by: PEDIATRICS

## 2020-02-14 PROCEDURE — 92526 ORAL FUNCTION THERAPY: CPT | Mod: GN | Performed by: SPEECH-LANGUAGE PATHOLOGIST

## 2020-02-14 PROCEDURE — G0378 HOSPITAL OBSERVATION PER HR: HCPCS

## 2020-02-14 PROCEDURE — 25000132 ZZH RX MED GY IP 250 OP 250 PS 637

## 2020-02-14 PROCEDURE — 25000132 ZZH RX MED GY IP 250 OP 250 PS 637: Performed by: STUDENT IN AN ORGANIZED HEALTH CARE EDUCATION/TRAINING PROGRAM

## 2020-02-14 PROCEDURE — 25000131 ZZH RX MED GY IP 250 OP 636 PS 637: Performed by: STUDENT IN AN ORGANIZED HEALTH CARE EDUCATION/TRAINING PROGRAM

## 2020-02-14 PROCEDURE — 25000125 ZZHC RX 250: Performed by: STUDENT IN AN ORGANIZED HEALTH CARE EDUCATION/TRAINING PROGRAM

## 2020-02-14 RX ORDER — POLYETHYLENE GLYCOL 3350 17 G/17G
17 POWDER, FOR SOLUTION ORAL 4 TIMES DAILY
Status: DISCONTINUED | OUTPATIENT
Start: 2020-02-14 | End: 2020-02-18 | Stop reason: HOSPADM

## 2020-02-14 RX ADMIN — SERTRALINE HYDROCHLORIDE 150 MG: 100 TABLET ORAL at 07:49

## 2020-02-14 RX ADMIN — Medication 400 UNITS: at 07:50

## 2020-02-14 RX ADMIN — OLANZAPINE 30 MG: 15 TABLET, FILM COATED ORAL at 21:55

## 2020-02-14 RX ADMIN — BISACODYL 10 MG: 5 TABLET ORAL at 07:50

## 2020-02-14 RX ADMIN — POLYETHYLENE GLYCOL 3350 34 G: 17 POWDER, FOR SOLUTION ORAL at 07:50

## 2020-02-14 RX ADMIN — MELATONIN 2000 UNITS: at 07:49

## 2020-02-14 RX ADMIN — FEXOFENADINE HYDROCHLORIDE 180 MG: 180 TABLET, FILM COATED ORAL at 21:54

## 2020-02-14 RX ADMIN — AMITRIPTYLINE HYDROCHLORIDE 50 MG: 50 TABLET, FILM COATED ORAL at 21:55

## 2020-02-14 RX ADMIN — POLYETHYLENE GLYCOL 3350 17 G: 17 POWDER, FOR SOLUTION ORAL at 19:49

## 2020-02-14 RX ADMIN — DEXAMETHASONE 0.75 MG: 0.75 TABLET ORAL at 07:50

## 2020-02-14 RX ADMIN — POTASSIUM PHOSPHATE, MONOBASIC 500 MG: 500 TABLET, SOLUBLE ORAL at 19:49

## 2020-02-14 RX ADMIN — LINACLOTIDE 290 MCG: 145 CAPSULE, GELATIN COATED ORAL at 07:49

## 2020-02-14 RX ADMIN — MUPIROCIN: 20 OINTMENT TOPICAL at 19:10

## 2020-02-14 RX ADMIN — Medication 1 DOSE: at 07:50

## 2020-02-14 RX ADMIN — Medication 1 DOSE: at 16:36

## 2020-02-14 RX ADMIN — OMEPRAZOLE 40 MG: 20 CAPSULE, DELAYED RELEASE ORAL at 07:49

## 2020-02-14 RX ADMIN — POTASSIUM PHOSPHATE, MONOBASIC 500 MG: 500 TABLET, SOLUBLE ORAL at 07:49

## 2020-02-14 NOTE — PROGRESS NOTES
02/14/20 [facility]   Child Life   Location Med/Surg   Intervention Therapeutic Intervention  (This writer encouraged patient and NST sitter to participate in programming today. Provided flyers for [employer] and [employer].)   Anxiety Low Anxiety  (Patient appeared to be comfortable and coping well during visit. Patient pleasant and remembered this person by name.)   Outcomes/Follow Up Continue to Follow/Support;Provided Materials

## 2020-02-14 NOTE — PLAN OF CARE
Discharge Planner SLP   Patient plan for discharge: no slp services  Current status: Pt participated in education with SLP re: recommendations and rationale. Pt verbalized understanding and intent to follow them. Plan to sign off   Barriers to return to prior living situation: none  Recommendations for discharge: caregiver wishes  Rationale for recommendations: Safest and least restrictive options for pt       Entered by: Arabella Juan 02/14/2020 4:34 PM

## 2020-02-14 NOTE — PLAN OF CARE
SLP: Recommend cutting Geo's food into small bite-size pieces. If Geo is eating carrots, please cut into matchstick pieces as compared to coin-shaped. Please avoid cutting foods into coin shapes, as this increases his choking risk. This information will be posted in Geo's room and SLP team will sign off. Please refer to discharge note for details.     Thank you for this referral.   Leatha Tovar MS, CCC-SLP    Pager: [phone]

## 2020-02-14 NOTE — PROGRESS NOTES
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 He remains admitted with disposition pending determination of placement where he can continue to receive appropriate cares.    FEN:  - Regular diet. Discussed speech and diet recommendations with Geo, his father, SLP, and nursing manager. Dysphagia diet significantly limits the types of foods he was able to order, causing increasing frustration and behaviors. Per discussion with his father and Geo, he accepts the hazards presented by regular diet. To mitigate the risk of choking, will have 1:1 observe meal times and cut all food in small, non-circular pieces.   - Continue PTA supplements: Ca, vitamin D, K-phos, vitamin E    GI:   Acute on chronic constipation  - Continue PTA bowel regimen     - Linzess QAM      - Miralax QID increased to 34g dose yesterday, decreased back to 17g per Geo's request this afternoon     - Dulcolax every day  - Omeprazole 40 mg daily     Heme/Onc:  Grade 2 Ependymoma s/p multiple resections, radiation and chemotherapy: on study ADVL 1513.  - Entinostat 7 mg weekly on Wednesdays (next 2/19/20).   - CBC every Tuesday prior to chemotherapy    Endo:  Chronic steroid therapy - followed by Dr. Martin  Iatrogenic adrenal insufficiency  - PTA Decadron 0.75 mg QAM. Per last endocrine visit 2/2019, had tried to transition to hydrocortisone, but he had another surgery and decadron was restarted. Will need endocrine  follow up to discuss steroid plan  - Continue bactrim ppx 400mg BID Sat/Sun     MSK:  Steroid-induced skin atrophy  Multiple skin wounds, most recent on left second toe  - Apply bacitracin to wounds as needed with dressing changes  - Wound consult 2/12. Recommend vaseline, but no further treatment needed.   - Will need outpatient PT for strengthening    Neuro/Psych:  Hx brain tumor and hemorrhagic stroke  Cranial nerve palsies  Ataxia   Depression  Agitation  - Olanzapine 30mg at bedtime  - Amitriptyline 50mg at bedtime  - Zoloft 150mg daily  - Haldol 5 mg IV/IM if needed for acute agitation  - Psychiatry consult  - Psychology consult    Allergies:  - PTA Allegra 180mg qPM      Pulm:  Hx of CANDELARIO: previously used CPAP  - Routine vitals w/ spot pulse ox        Diet: Diet  Peds Diet Age 9-18 yrs    Lines: no access  Code Status: Full    Disposition Plan   Expected discharge: Planned for 2/18 to group home    Geo was evaluated and discussed with Heme/Onc Fellow Dr. Eddy and Attending Dr. Meño Montaño MD  Pediatrics, PGY-1    Physician Attestation   I, Zoraida Wilder MD, MD, saw this patient with the resident and agree with the resident/fellow's findings and plan of care as documented in the note.      I personally reviewed vital signs, medications, labs and imaging.    Zoraida Wilder MD, MD  Date of Service (when I saw the patient): 2/14/20      ______________________________________________    Interval History   Geo was very frustrated with the dietary changes, but agrees with the current diet plan of regular diet, but cut up to mitigate choking risk. He has been less irritated today and is understanding of the 1:1 sitter.     Data reviewed today: I reviewed all medications, new labs and imaging results over the last 24 hours.    Physical Exam   Vital Signs: Temp: 97.2  F (36.2  C) Temp src: Axillary BP: 110/66 Pulse: 96 Heart Rate: 96 Resp: 17 SpO2: 94 % O2 Device: None  (Room air)    Weight: 196 lbs 14.4 oz    GENERAL: laying in bed, awake, well-appearing and in no acute distress  SKIN:  Purplish striae are seen over upper extremities and abdomen (baseline). Small superficial wound on left second toe with surrounding bruising  CV: Distal extremities warm and well perfused. Heart rate and rhythm regular.   RESP: Breathing comfortably, no signs of increased work of breathing or respiratory distress. Lungs clear to auscultation bilaterally.   ABD: Rounded, but soft and nontender. Bowel sounds soft.   NEURO: aphasia at baseline    Data   No results found for this or any previous visit (from the past 24 hour(s)).     Medications       amitriptyline  50 mg Oral At Bedtime     bisacodyl  10 mg Oral Daily     Calcium-Vitamin D-Vitamin K  1 Dose Oral BID     dexamethasone  0.75 mg Oral Daily     fexofenadine  180 mg Oral At Bedtime     linaclotide  290 mcg Oral QAM AC     mupirocin   Topical Daily     OLANZapine  30 mg Oral At Bedtime     omeprazole  40 mg Oral QAM     polyethylene glycol  34 g Oral 4x Daily     potassium phosphate (monobasic)  500 mg Oral BID     sertraline  150 mg Oral Daily     study - entinostat  7 mg Oral Once     [START ON 2/15/2020] sulfamethoxazole-trimethoprim  1 tablet Oral 2 times per day on Sun Sat     vitamin D3  2,000 Units Oral Daily     vitamin E  400 Units Oral Daily

## 2020-02-14 NOTE — PLAN OF CARE
Afebrile, VSS. Denies pain. Mag citrate given x1, and miralax dose increased. Pt had large BM after the mag citrate. Fair urine output.Pt had speech eval completed, diet now changed to dysphagia level 3, Pt upset and frustrated about the food changes, but able to be calmed by the MD and nursing staff.Plan to reevaluate diet tomorrow with team.

## 2020-02-14 NOTE — PLAN OF CARE
VSS. Afebrile. Denies pain and nausea. Diet orders changed to regular, pt tolerating with food cut into strips. Adequate UOP, large soft stool x1. Sitter remains at bedside. POC reviewed with pt. Hourly rounding completed.

## 2020-02-14 NOTE — PROVIDER NOTIFICATION
Provider notified that diastolic BP was 97. Pt was thrashing about a bit during reading; only took 1 measurement to try to calm pt. OVSS. Will continue to monitor and reassess and notify MD of any further changes.

## 2020-02-14 NOTE — PLAN OF CARE
AVSS. Denies pain. Slept well. Got pt up to the shower last evening, linens changed. Pt tolerated the shower well. Behaviorally appropriate all shift; visibly upset when caregivers are unable to understand what he's requesting, but calms quickly. Woke up a couple of times overnight a little bit confused and tried to get out of bed, but he was easily redirectable. Good UOP; no BM. BS active. Good appetite - pt wants to discuss diet changes today. LS clear. Sitter at bedside. Hourly rounding completed. Will continue to monitor and reassess.

## 2020-02-14 NOTE — PROGRESS NOTES
CLINICAL NUTRITION SERVICES - REASSESSMENT NOTE     ANTHROPOMETRICS from 1/25  Height/Length: 177 cm  Weight: 89.3 kg   BMI: 28.5 kg/m2- overweight status     CURRENT NUTRITION ORDERS  Diet:Regular diet     Intake/Tolerance: eating 100% of meals. There was an episode of choking on 2/12 and then Geo was evaluated by SLP and they recommend DD3 diet with thin liquids.  This really upset patient.  Per MD, risk and benefits were discussed with pt and his dad and it was decided to continue with the regular diet with plan to avoid raw carrots and cut up food for Geo.     Current factors affecting nutrition intake include: need for modified diet texture     NEW FINDINGS:  Awaiting placement     LABS  Labs reviewed     MEDICATIONS  Medications reviewed     ASSESSED NUTRITION NEEDS:  Estimated Energy Needs: 20-25 kcal/kg  Estimated Protein Needs: 0.8 g/kg  Estimated Fluid Needs: 1 mL/kcal       PEDIATRIC NUTRITION STATUS VALIDATION  Patient does not meet criteria for malnutrition.     EVALUATION OF PREVIOUS PLAN OF CARE:   Monitoring from previous assessment:  Food and Beverage intake- eating 100% of meals     Previous Goals:    1. Intake of % of meals- goal met   2. Wt maintenance- no new weight to assess     Previous Nutrition Diagnosis:   No Nutrition diagnosis identified     NUTRITION DIAGNOSIS:  Predicted suboptimal nutrient intake related to reliance on po to meet needs and need for modified diet    INTERVENTIONS  Nutrition Prescription  Po to meets    Implementation:  Meals and Snacks-  Po and diet texture discussed as written above.  Continuing with regular diet.    Goals   1. Intake of % of meals   2. Wt maintenance    FOLLOW UP/MONITORING  Food and Beverage intake- monitor po     Mary Beth Baumann, RD, LD, [facility]  [phone]

## 2020-02-14 NOTE — PLAN OF CARE
"Speech Language Therapy Discharge Summary    Reason for therapy discharge:    Patient/family request discontinuation of services.    Progress towards therapy goal(s). See goals on Care Plan in Epic electronic health record for goal details.  No goals created as pt family prefer to focus on QOL > diet modifications.      Therapy recommendation(s):    No further therapy is recommended.  Continue home exercise program.    Moderate-severe oral dysphagia with suspected pharyngeal dysphagia characterized by incomplete lip closure, limited mastication, anterior bolus loss, and behavioral signs of aspiration.   Pt participated in an inpatient clinical swallow evaluation on 2/13/2020 that revealed \"Pt is unable to achieve lip closure and uses compensatory strategies to minimize oral loss, including pinching lips around straw (with fingers), inhaling when he puts food in his mouth, and holds a napkin in front of his mouth to prevent anterior loss. Frequent coughing during exam during chewing. Inhaling compensatory strategy increases Pt's risk of aspiration and choking\". Based off performance on evaluation, it was recommended:    SLP Feeding Recommendations for Geo:  -Dysphagia Diet level 3 (This diet is similar to a regular diet, however excludes foods that have a high choking risk, as well as sticky or crunchy foods)   -thin liquids   -Consideration of repeat VFSS. Per conversation with medical team, Geo's parents can give input on if they would like a VFSS in effort to consider Pt's QOL.   -1:1 supervision for meals  -small bites/sips to prevent choking event    However, after consulting with family and medical team (2/14/2020), it was determined to favor QOL over diet modifications or pursue additional swallowing interventions despite being at an increased risk for aspiration. Pt and family verbalized understanding of risk for aspiration and intent to follow \"reduced\" recommendations.    \"Recommend cutting Geo's " "food into small bite-size pieces. If Geo is eating carrots, please cut into matchstick pieces as compared to coin-shaped. Please avoid cutting foods into coin shapes, as this increases his choking risk. This information will be posted in Geo's room and SLP team will sign off. Please refer to discharge note for details\".    ULICES Gonzalez., Speech-Language Pathology Student      "

## 2020-02-15 PROCEDURE — G0378 HOSPITAL OBSERVATION PER HR: HCPCS

## 2020-02-15 PROCEDURE — 25000132 ZZH RX MED GY IP 250 OP 250 PS 637: Performed by: STUDENT IN AN ORGANIZED HEALTH CARE EDUCATION/TRAINING PROGRAM

## 2020-02-15 PROCEDURE — 25000132 ZZH RX MED GY IP 250 OP 250 PS 637: Performed by: PEDIATRICS

## 2020-02-15 PROCEDURE — 25000132 ZZH RX MED GY IP 250 OP 250 PS 637

## 2020-02-15 PROCEDURE — 25000131 ZZH RX MED GY IP 250 OP 636 PS 637: Performed by: STUDENT IN AN ORGANIZED HEALTH CARE EDUCATION/TRAINING PROGRAM

## 2020-02-15 PROCEDURE — 25000125 ZZHC RX 250: Performed by: STUDENT IN AN ORGANIZED HEALTH CARE EDUCATION/TRAINING PROGRAM

## 2020-02-15 RX ADMIN — FEXOFENADINE HYDROCHLORIDE 180 MG: 180 TABLET, FILM COATED ORAL at 21:34

## 2020-02-15 RX ADMIN — BISACODYL 10 MG: 5 TABLET ORAL at 09:57

## 2020-02-15 RX ADMIN — LINACLOTIDE 290 MCG: 145 CAPSULE, GELATIN COATED ORAL at 09:57

## 2020-02-15 RX ADMIN — OLANZAPINE 30 MG: 15 TABLET, FILM COATED ORAL at 21:34

## 2020-02-15 RX ADMIN — OMEPRAZOLE 40 MG: 20 CAPSULE, DELAYED RELEASE ORAL at 09:58

## 2020-02-15 RX ADMIN — POLYETHYLENE GLYCOL 3350 17 G: 17 POWDER, FOR SOLUTION ORAL at 09:57

## 2020-02-15 RX ADMIN — Medication 1 DOSE: at 16:10

## 2020-02-15 RX ADMIN — POTASSIUM PHOSPHATE, MONOBASIC 500 MG: 500 TABLET, SOLUBLE ORAL at 19:45

## 2020-02-15 RX ADMIN — SERTRALINE HYDROCHLORIDE 150 MG: 100 TABLET ORAL at 09:58

## 2020-02-15 RX ADMIN — DEXAMETHASONE 0.75 MG: 0.75 TABLET ORAL at 09:58

## 2020-02-15 RX ADMIN — SULFAMETHOXAZOLE AND TRIMETHOPRIM 1 TABLET: 400; 80 TABLET ORAL at 19:44

## 2020-02-15 RX ADMIN — POLYETHYLENE GLYCOL 3350 17 G: 17 POWDER, FOR SOLUTION ORAL at 19:10

## 2020-02-15 RX ADMIN — AMITRIPTYLINE HYDROCHLORIDE 50 MG: 50 TABLET, FILM COATED ORAL at 21:34

## 2020-02-15 RX ADMIN — POLYETHYLENE GLYCOL 3350 17 G: 17 POWDER, FOR SOLUTION ORAL at 16:10

## 2020-02-15 RX ADMIN — POTASSIUM PHOSPHATE, MONOBASIC 500 MG: 500 TABLET, SOLUBLE ORAL at 09:58

## 2020-02-15 RX ADMIN — Medication 1 DOSE: at 09:57

## 2020-02-15 RX ADMIN — SULFAMETHOXAZOLE AND TRIMETHOPRIM 1 TABLET: 400; 80 TABLET ORAL at 09:57

## 2020-02-15 RX ADMIN — POLYETHYLENE GLYCOL 3350 17 G: 17 POWDER, FOR SOLUTION ORAL at 12:21

## 2020-02-15 RX ADMIN — MELATONIN 2000 UNITS: at 09:58

## 2020-02-15 RX ADMIN — Medication 400 UNITS: at 09:58

## 2020-02-15 RX ADMIN — MUPIROCIN: 20 OINTMENT TOPICAL at 19:09

## 2020-02-15 NOTE — PLAN OF CARE
Afebrile, vitals stable. Denies pain/discomfort. Adequate oral intake and urine output. No problems eating or taking oral medications.Calm and cooperative this shift. Mom visiting this afternoon. Continue plan of care and to monitor.

## 2020-02-15 NOTE — PLAN OF CARE
AVSS. No c/o pain or nausea. Good PO intake and output. Pt upset when told meals had to be cut into smaller pieces. Refused to eat some of dinner. Sitter at bedside, attentive to pt. Hourly rounding completed. Will continue to monitor and notify team of changes.

## 2020-02-15 NOTE — PROGRESS NOTES
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 He remains admitted with disposition pending placement where he can continue to receive appropriate cares. As of now, plan for discharge 2/18/2020.    FEN:  - Regular diet. Discussed speech and diet recommendations with Geo, his father, SLP, and nursing manager on 2/14. Dysphagia diet significantly limits the types of foods he was able to order, causing increasing frustration and behaviors. Per discussion with his father and Geo, he accepts the hazards presented by regular diet. To mitigate the risk of choking, will have 1:1 observe meal times and cut all food in small, non-circular pieces.   - Continue PTA supplements: Ca, vitamin D, K-phos, vitamin E    GI:   Acute on chronic constipation  - Continue PTA bowel regimen     - Linzess QAM      - Miralax 17 grams QID (tried increasing to 34 grams, decreased 2/14 due to Geo's preference)     - Dulcolax every day  - Omeprazole 40 mg daily     Heme/Onc:  Grade 2 Ependymoma s/p multiple resections, radiation and chemotherapy: on study ADVL 1513.  - Entinostat 7 mg weekly on Wednesdays (next 2/19/20)  - CBC every Tuesday prior to chemotherapy    Endo:  Chronic steroid therapy - followed by Dr. Martin  Iatrogenic adrenal insufficiency  - PTA Decadron 0.75 mg QAM. Per last endocrine visit 2/2019, had tried to transition to hydrocortisone, but he had another surgery and decadron was restarted.  Will need endocrine follow up to discuss steroid plan.  - Continue bactrim ppx 400mg BID Sat/Sun     MSK:  Steroid-induced skin atrophy  Multiple skin wounds, most recent on left second toe  - Apply bacitracin to wounds as needed with dressing changes  - Wound consult 2/12. Recommend vaseline, but no further treatment needed.   - Will need outpatient PT for strengthening    Neuro/Psych:  Hx brain tumor and hemorrhagic stroke  Cranial nerve palsies  Ataxia   Depression  Agitation  - Olanzapine 30mg at bedtime  - Amitriptyline 50mg at bedtime  - Zoloft 150mg daily  - Haldol 5 mg IV/IM if needed for acute agitation  - Psychiatry consult  - Psychology consult    Allergies:  - PTA Allegra 180mg qPM      Pulm:  Hx of CANDELARIO: previously used CPAP  - Routine vitals w/ spot pulse ox        Diet: Diet  Peds Diet Age 9-18 yrs    Lines: No access  Code Status: Full    Disposition Plan   Expected discharge: Planned for 2/18 to group home.    Geo was evaluated and discussed with Heme/Onc Attending Dr. Meño Ferrara MD  Med-Peds PGY-2    Physician Attestation   I, Zoraida Wilder MD, MD, saw this patient with the resident and agree with the resident/fellow's findings and plan of care as documented in the note.      I personally reviewed vital signs, medications, labs and imaging.    Zoraida Wilder MD, MD  Date of Service (when I saw the patient): 02/15/20    ______________________________________________    Interval History     Geo had an uneventful night; still frustrated at times with the need to have his food cut into small pieces and at times not eating his food because of this, but overall tolerating diet fine. Per nurse and bedside sitter, Geo is in a good mood this morning, cooperative with cares/vitals/meals. He had a large stool this AM.    Data reviewed today: I reviewed all medications, new labs and imaging results over the last 24 hours.    Physical Exam   Vital Signs: Temp: 97.3  F  (36.3  C) Temp src: Axillary BP: 111/64 Pulse: 89   Resp: 20 SpO2: 94 % O2 Device: None (Room air)    Weight: 196 lbs 14.4 oz    GENERAL: Laying in bed, awake, well-appearing and in no acute distress  SKIN:  Purplish striae are seen over upper extremities and abdomen (baseline). Small superficial wound on left second toe with surrounding bruising  CV: Distal extremities warm and well perfused. Heart rate and rhythm regular.   RESP: Breathing comfortably, no signs of increased work of breathing or respiratory distress. Lungs clear to auscultation bilaterally.   ABD: Rounded, but soft and nontender. Bowel sounds soft.   NEURO: Aphasia at baseline    Data   No results found for this or any previous visit (from the past 24 hour(s)).     Medications       amitriptyline  50 mg Oral At Bedtime     bisacodyl  10 mg Oral Daily     Calcium-Vitamin D-Vitamin K  1 Dose Oral BID     dexamethasone  0.75 mg Oral Daily     fexofenadine  180 mg Oral At Bedtime     linaclotide  290 mcg Oral QAM AC     mupirocin   Topical Daily     OLANZapine  30 mg Oral At Bedtime     omeprazole  40 mg Oral QAM     polyethylene glycol  17 g Oral 4x Daily     potassium phosphate (monobasic)  500 mg Oral BID     sertraline  150 mg Oral Daily     study - entinostat  7 mg Oral Once     sulfamethoxazole-trimethoprim  1 tablet Oral 2 times per day on Sun Sat     vitamin D3  2,000 Units Oral Daily     vitamin E  400 Units Oral Daily

## 2020-02-16 PROCEDURE — 25000125 ZZHC RX 250: Performed by: STUDENT IN AN ORGANIZED HEALTH CARE EDUCATION/TRAINING PROGRAM

## 2020-02-16 PROCEDURE — 25000132 ZZH RX MED GY IP 250 OP 250 PS 637: Performed by: STUDENT IN AN ORGANIZED HEALTH CARE EDUCATION/TRAINING PROGRAM

## 2020-02-16 PROCEDURE — G0378 HOSPITAL OBSERVATION PER HR: HCPCS

## 2020-02-16 PROCEDURE — 25000132 ZZH RX MED GY IP 250 OP 250 PS 637: Performed by: PEDIATRICS

## 2020-02-16 PROCEDURE — 25000131 ZZH RX MED GY IP 250 OP 636 PS 637: Performed by: STUDENT IN AN ORGANIZED HEALTH CARE EDUCATION/TRAINING PROGRAM

## 2020-02-16 PROCEDURE — 25000132 ZZH RX MED GY IP 250 OP 250 PS 637

## 2020-02-16 RX ORDER — OLANZAPINE 15 MG/1
15 TABLET ORAL
Status: DISCONTINUED | OUTPATIENT
Start: 2020-02-16 | End: 2020-02-16

## 2020-02-16 RX ORDER — OLANZAPINE 10 MG/1
10 TABLET ORAL
Status: DISCONTINUED | OUTPATIENT
Start: 2020-02-16 | End: 2020-02-18 | Stop reason: HOSPADM

## 2020-02-16 RX ADMIN — SULFAMETHOXAZOLE AND TRIMETHOPRIM 1 TABLET: 400; 80 TABLET ORAL at 11:26

## 2020-02-16 RX ADMIN — OLANZAPINE 30 MG: 15 TABLET, FILM COATED ORAL at 21:41

## 2020-02-16 RX ADMIN — LINACLOTIDE 290 MCG: 145 CAPSULE, GELATIN COATED ORAL at 07:29

## 2020-02-16 RX ADMIN — SERTRALINE HYDROCHLORIDE 150 MG: 100 TABLET ORAL at 11:26

## 2020-02-16 RX ADMIN — Medication 400 UNITS: at 11:27

## 2020-02-16 RX ADMIN — DEXAMETHASONE 0.75 MG: 0.75 TABLET ORAL at 11:26

## 2020-02-16 RX ADMIN — POLYETHYLENE GLYCOL 3350 17 G: 17 POWDER, FOR SOLUTION ORAL at 11:27

## 2020-02-16 RX ADMIN — POLYETHYLENE GLYCOL 3350 17 G: 17 POWDER, FOR SOLUTION ORAL at 15:36

## 2020-02-16 RX ADMIN — MUPIROCIN: 20 OINTMENT TOPICAL at 19:36

## 2020-02-16 RX ADMIN — OMEPRAZOLE 40 MG: 20 CAPSULE, DELAYED RELEASE ORAL at 11:27

## 2020-02-16 RX ADMIN — POTASSIUM PHOSPHATE, MONOBASIC 500 MG: 500 TABLET, SOLUBLE ORAL at 11:25

## 2020-02-16 RX ADMIN — POLYETHYLENE GLYCOL 3350 17 G: 17 POWDER, FOR SOLUTION ORAL at 19:36

## 2020-02-16 RX ADMIN — MELATONIN 2000 UNITS: at 11:26

## 2020-02-16 RX ADMIN — Medication 1 DOSE: at 15:36

## 2020-02-16 RX ADMIN — AMITRIPTYLINE HYDROCHLORIDE 50 MG: 50 TABLET, FILM COATED ORAL at 21:41

## 2020-02-16 RX ADMIN — FEXOFENADINE HYDROCHLORIDE 180 MG: 180 TABLET, FILM COATED ORAL at 21:41

## 2020-02-16 RX ADMIN — Medication 1 DOSE: at 11:27

## 2020-02-16 RX ADMIN — POLYETHYLENE GLYCOL 3350 17 G: 17 POWDER, FOR SOLUTION ORAL at 11:25

## 2020-02-16 RX ADMIN — SULFAMETHOXAZOLE AND TRIMETHOPRIM 1 TABLET: 400; 80 TABLET ORAL at 19:36

## 2020-02-16 RX ADMIN — POTASSIUM PHOSPHATE, MONOBASIC 500 MG: 500 TABLET, SOLUBLE ORAL at 19:36

## 2020-02-16 RX ADMIN — BISACODYL 10 MG: 5 TABLET ORAL at 11:26

## 2020-02-16 NOTE — PLAN OF CARE
Patient AVSS overnight. Appropriate call light use to use restroom. Bedside commode used x1, no BM. Assist of 3, 2 to hold gait belt, 1 to move commode. Patient very unsteady on feet with baseline muscle spasms. Patient began to escalate and become frustrated while trying to sit on commode but was easily redirected. Calm and pleasant for majority of shift. Right shin dressing clean, dry, intact. No contact from family.

## 2020-02-16 NOTE — PROGRESS NOTES
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 He remains admitted with disposition pending placement where he can continue to receive appropriate cares. As of now, plan for discharge 2/18/2020.    FEN:  - Regular diet. Discussed speech and diet recommendations with Geo, his father, SLP, and nursing manager on 2/14. Dysphagia diet significantly limits the types of foods he was able to order, causing increasing frustration and behaviors. Per discussion with his father and Geo, he accepts the hazards presented by regular diet. To mitigate the risk of choking, will have 1:1 observe meal times and cut food into smaller, non-circular pieces.   - Continue PTA supplements: Ca, vitamin D, K-phos, vitamin E    GI:   Acute on chronic constipation  - Continue PTA bowel regimen     - Linzess QAM      - Miralax 17 grams QID (tried increasing to 34 grams, decreased 2/14 due to Geo's preference)     - Dulcolax every day  - Omeprazole 40 mg daily     Heme/Onc:  Grade 2 Ependymoma s/p multiple resections, radiation and chemotherapy: on study ADVL 1513.  - Entinostat 7 mg weekly on Wednesdays (next 2/19/20)  - CBC every Tuesday prior to chemotherapy    Endo:  Chronic steroid therapy - followed by Dr. Martin  Iatrogenic adrenal insufficiency  - PTA Decadron 0.75 mg QAM. Per last endocrine visit 2/2019, had tried to transition to hydrocortisone, but he had another surgery and decadron was restarted.  Will need endocrine follow up to discuss steroid plan.  - Continue bactrim ppx 400mg BID Sat/Sun     MSK:  Steroid-induced skin atrophy  Multiple skin wounds, most recent on left second toe  - Apply bacitracin to wounds as needed with dressing changes  - Wound consult 2/12. Recommend vaseline, but no further treatment needed.   - Will need outpatient PT for strengthening    Neuro/Psych:  Hx brain tumor and hemorrhagic stroke  Cranial nerve palsies  Ataxia   Depression  Agitation  - Olanzapine 30mg at bedtime  - Amitriptyline 50mg at bedtime  - Zoloft 150mg daily  - Haldol 5 mg IV/IM if needed for acute agitation  - Olanzapine 10mg PRN for increasing agitation and danger to self or others    Allergies:  - PTA Allegra 180mg qPM      Pulm:  Hx of CANDELARIO: previously used CPAP  - Routine vitals w/ spot pulse ox        Diet: Diet  Peds Diet Age 9-18 yrs    Lines: No access  Code Status: Full    Disposition Plan   Expected discharge: Planned for 2/18 to group home.    Jennifer Montaño MD  Pediatrics, PGY-1    Physician Attestation   I, Zoraida Wilder MD, MD, saw this patient with the resident and agree with the resident/fellow's findings and plan of care as documented in the note.      I personally reviewed vital signs, medications and labs.    Zoraida Wilder MD, MD  Date of Service (when I saw the patient): 02/16/20    ________________________________________    Interval History     Geo had an uneventful night; still frustrated at times with the need to have his food cut into small pieces and at times not eating his food because of this, but overall tolerating diet fine. He was was frustrated this morning, but was able to deescalate and go back to sleep for awhile. Reports overall things are going okay.     Data reviewed today: I reviewed all medications, new labs and imaging results over the last 24 hours.    Physical Exam   Vital Signs: Temp: 97  F (36.1  C) Temp src: Axillary BP: 114/64 Pulse: 80  Heart Rate: 75 Resp: 20 SpO2: 95 % O2 Device: None (Room air)    Weight: 196 lbs 14.4 oz    GENERAL: Laying in bed, awake, well-appearing and in no acute distress  SKIN:  Purplish striae are seen over upper and lower extremities and abdomen (baseline). Small superficial wound on left second toe, improving. Several healing and new wounds over shins. Stable. No active bleeding.  CV: Distal extremities warm and well perfused. Heart rate and rhythm regular.   RESP: Breathing comfortably, no signs of increased work of breathing or respiratory distress. Lungs clear to auscultation bilaterally.   ABD: Soft and nontender. Bowel sounds soft.   NEURO: Loss of mouth motor control and slurred speech at baseline.     Data   No results found for this or any previous visit (from the past 24 hour(s)).     Medications       amitriptyline  50 mg Oral At Bedtime     bisacodyl  10 mg Oral Daily     Calcium-Vitamin D-Vitamin K  1 Dose Oral BID     dexamethasone  0.75 mg Oral Daily     fexofenadine  180 mg Oral At Bedtime     linaclotide  290 mcg Oral QAM AC     mupirocin   Topical Daily     OLANZapine  30 mg Oral At Bedtime     omeprazole  40 mg Oral QAM     polyethylene glycol  17 g Oral 4x Daily     potassium phosphate (monobasic)  500 mg Oral BID     sertraline  150 mg Oral Daily     study - entinostat  7 mg Oral Once     sulfamethoxazole-trimethoprim  1 tablet Oral 2 times per day on Sun Sat     vitamin D3  2,000 Units Oral Daily     vitamin E  400 Units Oral Daily

## 2020-02-16 NOTE — PLAN OF CARE
AVSS. No c/o pain or nausea. Pt agitated when food arrived in slices, refused to eat. MD notified. Allowed food to be kept whole as long as sitter present. Pt agreeable to plan. Great PO intake and UO. Mom at bedside, for some of shift, attentive to pt. Hourly rounding completed. Will continue to monitor and notify team of changes.

## 2020-02-16 NOTE — PLAN OF CARE
Afebrile, vitals stable. Denies pain/discomfort. Adequate oral intake and urine output. No problems eating or taking oral medications.Calm and cooperative this shift. Continue plan of care and to monitor.

## 2020-02-17 ENCOUNTER — TELEPHONE (OUTPATIENT)
Dept: PSYCHIATRY | Facility: CLINIC | Age: 20
End: 2020-02-17

## 2020-02-17 PROCEDURE — G0378 HOSPITAL OBSERVATION PER HR: HCPCS

## 2020-02-17 PROCEDURE — 25000132 ZZH RX MED GY IP 250 OP 250 PS 637: Performed by: STUDENT IN AN ORGANIZED HEALTH CARE EDUCATION/TRAINING PROGRAM

## 2020-02-17 PROCEDURE — 25000125 ZZHC RX 250: Performed by: STUDENT IN AN ORGANIZED HEALTH CARE EDUCATION/TRAINING PROGRAM

## 2020-02-17 PROCEDURE — 25000131 ZZH RX MED GY IP 250 OP 636 PS 637: Performed by: STUDENT IN AN ORGANIZED HEALTH CARE EDUCATION/TRAINING PROGRAM

## 2020-02-17 PROCEDURE — 25000132 ZZH RX MED GY IP 250 OP 250 PS 637

## 2020-02-17 RX ORDER — METHYLPREDNISOLONE SODIUM SUCCINATE 125 MG/2ML
125 INJECTION, POWDER, LYOPHILIZED, FOR SOLUTION INTRAMUSCULAR; INTRAVENOUS
Status: CANCELLED
Start: 2020-02-25

## 2020-02-17 RX ORDER — ALBUTEROL SULFATE 90 UG/1
1-2 AEROSOL, METERED RESPIRATORY (INHALATION)
Status: CANCELLED
Start: 2020-02-25

## 2020-02-17 RX ORDER — MEPERIDINE HYDROCHLORIDE 25 MG/ML
25 INJECTION INTRAMUSCULAR; INTRAVENOUS; SUBCUTANEOUS EVERY 30 MIN PRN
Status: CANCELLED | OUTPATIENT
Start: 2020-02-25

## 2020-02-17 RX ORDER — EPINEPHRINE 1 MG/ML
0.3 INJECTION, SOLUTION, CONCENTRATE INTRAVENOUS EVERY 5 MIN PRN
Status: CANCELLED | OUTPATIENT
Start: 2020-02-25

## 2020-02-17 RX ORDER — SODIUM CHLORIDE 9 MG/ML
1000 INJECTION, SOLUTION INTRAVENOUS CONTINUOUS PRN
Status: CANCELLED
Start: 2020-02-25

## 2020-02-17 RX ORDER — ALBUTEROL SULFATE 0.83 MG/ML
2.5 SOLUTION RESPIRATORY (INHALATION)
Status: CANCELLED | OUTPATIENT
Start: 2020-02-25

## 2020-02-17 RX ORDER — DIPHENHYDRAMINE HYDROCHLORIDE 50 MG/ML
50 INJECTION INTRAMUSCULAR; INTRAVENOUS
Status: CANCELLED
Start: 2020-02-25

## 2020-02-17 RX ADMIN — SERTRALINE HYDROCHLORIDE 150 MG: 100 TABLET ORAL at 08:01

## 2020-02-17 RX ADMIN — OLANZAPINE 30 MG: 15 TABLET, FILM COATED ORAL at 21:58

## 2020-02-17 RX ADMIN — Medication 400 UNITS: at 08:01

## 2020-02-17 RX ADMIN — AMITRIPTYLINE HYDROCHLORIDE 50 MG: 50 TABLET, FILM COATED ORAL at 21:58

## 2020-02-17 RX ADMIN — MELATONIN 2000 UNITS: at 08:02

## 2020-02-17 RX ADMIN — OMEPRAZOLE 40 MG: 20 CAPSULE, DELAYED RELEASE ORAL at 08:01

## 2020-02-17 RX ADMIN — DEXAMETHASONE 0.75 MG: 0.75 TABLET ORAL at 08:01

## 2020-02-17 RX ADMIN — FEXOFENADINE HYDROCHLORIDE 180 MG: 180 TABLET, FILM COATED ORAL at 21:58

## 2020-02-17 RX ADMIN — POTASSIUM PHOSPHATE, MONOBASIC 500 MG: 500 TABLET, SOLUBLE ORAL at 21:09

## 2020-02-17 RX ADMIN — Medication 1 DOSE: at 15:56

## 2020-02-17 RX ADMIN — POTASSIUM PHOSPHATE, MONOBASIC 500 MG: 500 TABLET, SOLUBLE ORAL at 08:02

## 2020-02-17 RX ADMIN — POLYETHYLENE GLYCOL 3350 17 G: 17 POWDER, FOR SOLUTION ORAL at 12:50

## 2020-02-17 RX ADMIN — BISACODYL 10 MG: 5 TABLET ORAL at 08:01

## 2020-02-17 RX ADMIN — POLYETHYLENE GLYCOL 3350 17 G: 17 POWDER, FOR SOLUTION ORAL at 15:56

## 2020-02-17 RX ADMIN — MUPIROCIN: 20 OINTMENT TOPICAL at 19:05

## 2020-02-17 RX ADMIN — LINACLOTIDE 290 MCG: 145 CAPSULE, GELATIN COATED ORAL at 08:01

## 2020-02-17 RX ADMIN — POLYETHYLENE GLYCOL 3350 17 G: 17 POWDER, FOR SOLUTION ORAL at 08:02

## 2020-02-17 RX ADMIN — POLYETHYLENE GLYCOL 3350 17 G: 17 POWDER, FOR SOLUTION ORAL at 21:09

## 2020-02-17 RX ADMIN — Medication 1 DOSE: at 08:02

## 2020-02-17 NOTE — PROGRESS NOTES
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 He remains admitted with disposition pending placement where he can continue to receive appropriate cares. As of now, plan for discharge 2/18/2020.    FEN:  - Regular diet. Discussed speech and diet recommendations with Geo, his father, SLP, and nursing manager on 2/14. Dysphagia diet significantly limits the types of foods he was able to order, causing increasing frustration and behaviors. Per discussion with his father and Geo, he accepts the hazards presented by regular diet. To mitigate the risk of choking, will have 1:1 observe meal times and cut food into smaller, non-circular pieces.   - Continue PTA supplements: Ca, vitamin D, K-phos, vitamin E    GI:   Acute on chronic constipation  - Continue PTA bowel regimen     - Linzess QAM      - Miralax 17 grams QID (tried increasing to 34 grams when no stool for three days, decreased 2/14 due to Geo's preference)     - Dulcolax every day  - Omeprazole 40 mg daily     Heme/Onc:  Grade 2 Ependymoma s/p multiple resections, radiation and chemotherapy: on study ADVL 1513.  - Entinostat 7 mg weekly on Wednesdays (next 2/19/20)  - CBC every Tuesday prior to chemotherapy. CBC, CMP, Mg, Phos 2/18 prior to new cycle.     Endo:  Chronic steroid therapy - followed by Dr. Martin  Iatrogenic adrenal insufficiency  - PTA Decadron 0.75 mg QAM. Per last endocrine visit 2/2019, had tried to transition  to hydrocortisone, but he had another surgery and decadron was restarted. Will need endocrine follow up to discuss steroid plan.  - Continue bactrim ppx 400mg BID Sat/Sun     MSK:  Steroid-induced skin atrophy  Multiple skin wounds, most recent on left second toe  - Apply bacitracin to wounds as needed with dressing changes  - Wound consult 2/12. Recommend vaseline, but no further treatment needed.   - Will need outpatient PT for strengthening    Neuro/Psych:  Hx brain tumor and hemorrhagic stroke  Cranial nerve palsies  Ataxia   Depression  Agitation  - Olanzapine 30mg at bedtime  - Amitriptyline 50mg at bedtime  - Zoloft 150mg daily  - Haldol 5 mg IV/IM if needed for acute agitation  - Olanzapine 10mg PRN for increasing agitation and danger to self or others    Allergies:  - PTA Allegra 180mg qPM      Pulm:  Hx of CANDELARIO: previously used CPAP  - Routine vitals w/ spot pulse ox        Diet: Diet  Peds Diet Age 9-18 yrs    Lines: No access  Code Status: Full    Disposition Plan   Expected discharge: Planned for 2/18 to [facility].    Jennifer Montaño MD  Pediatrics, PGY-1    Physician Attestation   I, Zoraida Wilder MD, MD, saw this patient with the resident and agree with the resident/fellow's findings and plan of care as documented in the note.      I personally reviewed vital signs, medications, labs and imaging.    Zoraida Wilder MD, MD  Date of Service (when I saw the patient): 02/17/20      ___________________________________    Interval History     Geo had a good day yesterday and good night. Slept well. Drinking well. No stool yesterday. Reports he would like to think about whether he needs another medication to help him stool today. He is overall feeling okay and that his phone is keeping him occupied.     Data reviewed today: I reviewed all medications, new labs and imaging results over the last 24 hours.    Physical Exam   Vital Signs: Temp: 97.3  F (36.3  C)  Temp src: Axillary BP: 127/78 Pulse: 86 Heart Rate: 98 Resp: 20 SpO2: 96 % O2 Device: None (Room air)    Weight: 196 lbs 14.4 oz    GENERAL: Laying in bed listening to music, awake, well-appearing and in no acute distress  SKIN:  Purplish striae are seen over upper and lower extremities and abdomen (baseline). Small superficial wound on left second toe, improving. Several healing and new wounds over shins. Stable. No active bleeding.  CV: Distal extremities warm and well perfused. Heart rate and rhythm regular.   RESP: Breathing comfortably, no signs of increased work of breathing or respiratory distress. Lungs clear to auscultation bilaterally.   ABD: Soft and nontender. Bowel sounds soft.   NEURO: Loss of mouth motor control and slurred speech at baseline.     Data   No results found for this or any previous visit (from the past 24 hour(s)).     Medications       amitriptyline  50 mg Oral At Bedtime     bisacodyl  10 mg Oral Daily     Calcium-Vitamin D-Vitamin K  1 Dose Oral BID     dexamethasone  0.75 mg Oral Daily     fexofenadine  180 mg Oral At Bedtime     linaclotide  290 mcg Oral QAM AC     mupirocin   Topical Daily     OLANZapine  30 mg Oral At Bedtime     omeprazole  40 mg Oral QAM     polyethylene glycol  17 g Oral 4x Daily     potassium phosphate (monobasic)  500 mg Oral BID     sertraline  150 mg Oral Daily     sulfamethoxazole-trimethoprim  1 tablet Oral 2 times per day on Sun Sat     vitamin D3  2,000 Units Oral Daily     vitamin E  400 Units Oral Daily

## 2020-02-17 NOTE — PLAN OF CARE
AVSS. No c/o pain or nausea. Calm and cooperative this shift. Great PO intake and UO. No family at bedside this shift. Hourly rounding completed. Will continue to monitor and notify team of changes.

## 2020-02-17 NOTE — PLAN OF CARE
VSS. Denies pain. Adequate UOP, no stool. Behavior appropriate. No family at bedside, pt updated on POC. Hourly rounding completed.

## 2020-02-17 NOTE — TELEPHONE ENCOUNTER
Called and spoke with Eric Hicks to address recent MyChart messages and for treatment planning for Geo.  Explained that one option would be to start an anticonvulsant mood stabilizer, and that this writer has reached out to Oncology to see if lamotrigine could be compatible with Geo's study drug.  Also agreed to seek out other/new options for psychotherapy.  Agreed that Geo would meet with this writer for a clinic follow-up appointment this Friday 2/21 at 3PM.  Between now and then Geo is moving into a new place.  Will touch base on possible med changes this Friday.    Justice Fay, DO  Psychiatry PGY4

## 2020-02-17 NOTE — PROGRESS NOTES
02/14/20 1600   Child Life   Location Med/Surg   Intervention Supportive Check In  (CCLS and Marium, facility dog, provided supportive check-in. Pt laying in bed at time of check-in, verbalized being excited to see Rockgraciela and lay with him. Pt talked about how much he enjoyed it, will continue to visit as appropriate to promote increased mood, mobility and motivation ).   Outcomes/Follow Up Continue to Follow/Support

## 2020-02-17 NOTE — PLAN OF CARE
Afebrile. VSS. No c/o pain or discomfort. Voiding. No stool. Slept well. In pleasant mood. No family at bedside. Hourly rounding complete. Will continue to monitor.

## 2020-02-18 VITALS
WEIGHT: 196.9 LBS | SYSTOLIC BLOOD PRESSURE: 117 MMHG | TEMPERATURE: 97.7 F | HEIGHT: 70 IN | RESPIRATION RATE: 14 BRPM | BODY MASS INDEX: 28.19 KG/M2 | HEART RATE: 87 BPM | DIASTOLIC BLOOD PRESSURE: 74 MMHG | OXYGEN SATURATION: 93 %

## 2020-02-18 LAB
ALBUMIN SERPL-MCNC: 2.9 G/DL (ref 3.4–5)
ALP SERPL-CCNC: 149 U/L (ref 40–150)
ALT SERPL W P-5'-P-CCNC: 22 U/L (ref 0–70)
ANION GAP SERPL CALCULATED.3IONS-SCNC: 2 MMOL/L (ref 3–14)
AST SERPL W P-5'-P-CCNC: 27 U/L (ref 0–45)
BASOPHILS # BLD AUTO: 0 10E9/L (ref 0–0.2)
BASOPHILS NFR BLD AUTO: 1.1 %
BILIRUB SERPL-MCNC: 0.3 MG/DL (ref 0.2–1.3)
BUN SERPL-MCNC: 11 MG/DL (ref 7–30)
CALCIUM SERPL-MCNC: 8.6 MG/DL (ref 8.5–10.1)
CHLORIDE SERPL-SCNC: 107 MMOL/L (ref 94–109)
CO2 SERPL-SCNC: 34 MMOL/L (ref 20–32)
CREAT SERPL-MCNC: 1.24 MG/DL (ref 0.66–1.25)
DIFFERENTIAL METHOD BLD: ABNORMAL
EOSINOPHIL # BLD AUTO: 0.1 10E9/L (ref 0–0.7)
EOSINOPHIL NFR BLD AUTO: 3.7 %
ERYTHROCYTE [DISTWIDTH] IN BLOOD BY AUTOMATED COUNT: 15.3 % (ref 10–15)
GFR SERPL CREATININE-BSD FRML MDRD: 83 ML/MIN/{1.73_M2}
GLUCOSE SERPL-MCNC: 92 MG/DL (ref 70–99)
HCT VFR BLD AUTO: 37.8 % (ref 40–53)
HGB BLD-MCNC: 12.1 G/DL (ref 13.3–17.7)
IMM GRANULOCYTES # BLD: 0 10E9/L (ref 0–0.4)
IMM GRANULOCYTES NFR BLD: 0.5 %
LYMPHOCYTES # BLD AUTO: 1.2 10E9/L (ref 0.8–5.3)
LYMPHOCYTES NFR BLD AUTO: 30.7 %
MAGNESIUM SERPL-MCNC: 2.4 MG/DL (ref 1.6–2.3)
MCH RBC QN AUTO: 28.3 PG (ref 26.5–33)
MCHC RBC AUTO-ENTMCNC: 32 G/DL (ref 31.5–36.5)
MCV RBC AUTO: 88 FL (ref 78–100)
MONOCYTES # BLD AUTO: 0.6 10E9/L (ref 0–1.3)
MONOCYTES NFR BLD AUTO: 16.8 %
NEUTROPHILS # BLD AUTO: 1.8 10E9/L (ref 1.6–8.3)
NEUTROPHILS NFR BLD AUTO: 47.2 %
NRBC # BLD AUTO: 0 10*3/UL
NRBC BLD AUTO-RTO: 0 /100
PHOSPHATE SERPL-MCNC: 4.1 MG/DL (ref 2.5–4.5)
PLATELET # BLD AUTO: 76 10E9/L (ref 150–450)
POTASSIUM SERPL-SCNC: 3.8 MMOL/L (ref 3.4–5.3)
PROT SERPL-MCNC: 5.8 G/DL (ref 6.8–8.8)
RBC # BLD AUTO: 4.28 10E12/L (ref 4.4–5.9)
SODIUM SERPL-SCNC: 143 MMOL/L (ref 133–144)
WBC # BLD AUTO: 3.8 10E9/L (ref 4–11)

## 2020-02-18 PROCEDURE — 83735 ASSAY OF MAGNESIUM: CPT | Performed by: PEDIATRICS

## 2020-02-18 PROCEDURE — 25000132 ZZH RX MED GY IP 250 OP 250 PS 637: Performed by: PEDIATRICS

## 2020-02-18 PROCEDURE — 25000132 ZZH RX MED GY IP 250 OP 250 PS 637

## 2020-02-18 PROCEDURE — 80053 COMPREHEN METABOLIC PANEL: CPT | Performed by: PEDIATRICS

## 2020-02-18 PROCEDURE — 36415 COLL VENOUS BLD VENIPUNCTURE: CPT | Performed by: PEDIATRICS

## 2020-02-18 PROCEDURE — 85025 COMPLETE CBC W/AUTO DIFF WBC: CPT | Performed by: PEDIATRICS

## 2020-02-18 PROCEDURE — 25000132 ZZH RX MED GY IP 250 OP 250 PS 637: Performed by: STUDENT IN AN ORGANIZED HEALTH CARE EDUCATION/TRAINING PROGRAM

## 2020-02-18 PROCEDURE — 25000125 ZZHC RX 250: Performed by: STUDENT IN AN ORGANIZED HEALTH CARE EDUCATION/TRAINING PROGRAM

## 2020-02-18 PROCEDURE — 84100 ASSAY OF PHOSPHORUS: CPT | Performed by: PEDIATRICS

## 2020-02-18 PROCEDURE — 25000131 ZZH RX MED GY IP 250 OP 636 PS 637: Performed by: STUDENT IN AN ORGANIZED HEALTH CARE EDUCATION/TRAINING PROGRAM

## 2020-02-18 PROCEDURE — G0378 HOSPITAL OBSERVATION PER HR: HCPCS

## 2020-02-18 RX ORDER — BISACODYL 5 MG
10 TABLET, DELAYED RELEASE (ENTERIC COATED) ORAL DAILY
Qty: 60 TABLET | Refills: 3 | Status: SHIPPED | OUTPATIENT
Start: 2020-02-19 | End: 2020-02-18

## 2020-02-18 RX ORDER — FEXOFENADINE HCL 180 MG/1
TABLET ORAL
Qty: 30 TABLET | Refills: 11 | Status: ON HOLD | OUTPATIENT
Start: 2020-02-18 | End: 2021-01-01

## 2020-02-18 RX ORDER — MAGNESIUM CARB/ALUMINUM HYDROX 105-160MG
296 TABLET,CHEWABLE ORAL ONCE
Status: COMPLETED | OUTPATIENT
Start: 2020-02-18 | End: 2020-02-18

## 2020-02-18 RX ORDER — OLANZAPINE 20 MG/1
TABLET ORAL
Qty: 30 TABLET | Refills: 1 | Status: SHIPPED | OUTPATIENT
Start: 2020-02-18 | End: 2020-02-24

## 2020-02-18 RX ORDER — SULFAMETHOXAZOLE AND TRIMETHOPRIM 400; 80 MG/1; MG/1
1 TABLET ORAL 2 TIMES DAILY
Qty: 24 TABLET | Refills: 11 | Status: SHIPPED | OUTPATIENT
Start: 2020-02-18

## 2020-02-18 RX ORDER — OLANZAPINE 10 MG/1
TABLET ORAL
Qty: 30 TABLET | Refills: 1 | Status: SHIPPED | OUTPATIENT
Start: 2020-02-18 | End: 2020-02-24

## 2020-02-18 RX ORDER — CHOLECALCIFEROL (VITAMIN D3) 50 MCG
TABLET ORAL
Qty: 30 TABLET | Refills: 11 | Status: ON HOLD | OUTPATIENT
Start: 2020-02-18 | End: 2021-01-01

## 2020-02-18 RX ORDER — SERTRALINE HYDROCHLORIDE 100 MG/1
150 TABLET, FILM COATED ORAL DAILY
Qty: 45 TABLET | Refills: 1 | Status: SHIPPED | OUTPATIENT
Start: 2020-02-18 | End: 2020-04-13

## 2020-02-18 RX ORDER — DEXAMETHASONE 0.5 MG/1
TABLET ORAL
Qty: 45 TABLET | Refills: 11 | Status: ON HOLD | OUTPATIENT
Start: 2020-02-18 | End: 2021-01-01

## 2020-02-18 RX ORDER — VITAMIN E 268 MG
CAPSULE ORAL
Qty: 30 CAPSULE | Refills: 11 | Status: ON HOLD | OUTPATIENT
Start: 2020-02-18 | End: 2021-01-01

## 2020-02-18 RX ORDER — POLYETHYLENE GLYCOL 3350 17 G/17G
1 POWDER, FOR SOLUTION ORAL 3 TIMES DAILY
Qty: 289 G | Refills: 3 | Status: SHIPPED | OUTPATIENT
Start: 2020-02-18 | End: 2020-01-01

## 2020-02-18 RX ADMIN — LINACLOTIDE 290 MCG: 145 CAPSULE, GELATIN COATED ORAL at 07:10

## 2020-02-18 RX ADMIN — Medication 400 UNITS: at 09:19

## 2020-02-18 RX ADMIN — MELATONIN 2000 UNITS: at 09:24

## 2020-02-18 RX ADMIN — POTASSIUM PHOSPHATE, MONOBASIC 500 MG: 500 TABLET, SOLUBLE ORAL at 09:24

## 2020-02-18 RX ADMIN — DEXAMETHASONE 0.75 MG: 0.75 TABLET ORAL at 09:20

## 2020-02-18 RX ADMIN — OMEPRAZOLE 40 MG: 20 CAPSULE, DELAYED RELEASE ORAL at 09:22

## 2020-02-18 RX ADMIN — BISACODYL 10 MG: 5 TABLET ORAL at 09:20

## 2020-02-18 RX ADMIN — MAGNESIUM CITRATE 296 ML: 1.75 LIQUID ORAL at 10:07

## 2020-02-18 RX ADMIN — SERTRALINE HYDROCHLORIDE 150 MG: 100 TABLET ORAL at 09:23

## 2020-02-18 RX ADMIN — Medication 1 DOSE: at 09:27

## 2020-02-18 RX ADMIN — POLYETHYLENE GLYCOL 3350 17 G: 17 POWDER, FOR SOLUTION ORAL at 09:21

## 2020-02-18 NOTE — PROGRESS NOTES
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 He remains admitted with disposition pending placement where he can continue to receive appropriate cares. As of now, plan for discharge 2/18/2020.    FEN:  - Regular diet. Discussed speech and diet recommendations with Geo, his father, SLP, and nursing manager on 2/14. Dysphagia diet significantly limits the types of foods he was able to order, causing increasing frustration and behaviors. Per discussion with his father and Geo, he accepts the hazards presented by regular diet. To mitigate the risk of choking, will have 1:1 observe meal times and cut food into smaller, non-circular pieces.   - Continue PTA supplements: Ca, vitamin D, K-phos, vitamin E    GI:   Acute on chronic constipation  - Continue PTA bowel regimen     - Linzess QAM      - Miralax 17 grams QID (tried increasing to 34 grams when no stool for three days, decreased 2/14 due to Geo's preference)     - Dulcolax every day     - Omeprazole 40 mg daily     Heme/Onc:  Grade 2 Ependymoma s/p multiple resections, radiation and chemotherapy: on study ADVL 1513.  - Entinostat 7 mg weekly on Wednesdays (next 2/19/20)  - CBC every Tuesday prior to chemotherapy. CBC, CMP, Mg, Phos 2/18 prior to new cycle.  - Platelets today are 76, which is below the threshold for starting the new cycle. No Entinostat 2/19. Will need to return to clinic next week for labs and possible study drug  administration.     Endo:  Chronic steroid therapy - followed by Dr. Martin  Iatrogenic adrenal insufficiency  - PTA Decadron 0.75 mg QAM. Per last endocrine visit 2/2019, had tried to transition to hydrocortisone, but he had another surgery and decadron was restarted. Will need endocrine follow up to discuss steroid plan.  - Continue bactrim ppx 400mg BID Sat/Sun     MSK:  Steroid-induced skin atrophy  Multiple skin wounds, most recent on left second toe  - Apply bacitracin to wounds as needed with dressing changes  - Wound consult 2/12. Recommend vaseline, but no further treatment needed.   - Will need outpatient PT for strengthening    Neuro/Psych:  Hx brain tumor and hemorrhagic stroke  Cranial nerve palsies  Ataxia   Depression  Agitation  - Olanzapine 30mg at bedtime  - Amitriptyline 50mg at bedtime  - Zoloft 150mg daily  - Haldol 5 mg IV/IM if needed for acute agitation  - Olanzapine 10mg PRN for increasing agitation and danger to self or others    Allergies:  - PTA Allegra 180mg qPM      Pulm:  Hx of CANDELARIO: previously used CPAP  - Routine vitals w/ spot pulse ox        Diet: Diet  Peds Diet Age 9-18 yrs    Lines: No access  Code Status: Full    Disposition Plan   Expected discharge: Planned for 2/18 to [facility].    Jennifer Montaño MD  Pediatrics, PGY-1    Physician Attestation   I, Zoraida Wilder MD, MD, saw this patient with the resident and agree with the resident/fellow's findings and plan of care as documented in the note.      I personally reviewed vital signs, medications, labs and imaging.    Zoraida Wilder MD, MD  Date of Service (when I saw the patient): 02/18/20    ___________________________________    Interval History     Geo had a good night. He was somewhat agitated around the time of discharge, especially surrounding his bowel movements, but was redirectable. His mom arrived today for discharge and his care plan was discussed with her.     Data  reviewed today: I reviewed all medications, new labs and imaging results over the last 24 hours.    Physical Exam   Vital Signs: Temp: 97.8  F (36.6  C) Temp src: Axillary BP: 129/78   Heart Rate: 112 Resp: 20 SpO2: 93 % O2 Device: None (Room air)    Weight: 196 lbs 14.4 oz    GENERAL: Laying in bed listening to music, awake, well-appearing and in no acute distress  SKIN:  Purplish striae are seen over upper and lower extremities and abdomen (baseline). Small superficial wound on left second toe, improving. Several healing and new wounds over shins. Stable. No active bleeding.  CV: Distal extremities warm and well perfused. Heart rate and rhythm regular.   RESP: Breathing comfortably, no signs of increased work of breathing or respiratory distress. Lungs clear to auscultation bilaterally.   ABD: Soft and nontender. Bowel sounds soft.   NEURO: Loss of mouth motor control and slurred speech at baseline.     Data   No results found for this or any previous visit (from the past 24 hour(s)).     Medications       amitriptyline  50 mg Oral At Bedtime     bisacodyl  10 mg Oral Daily     Calcium-Vitamin D-Vitamin K  1 Dose Oral BID     dexamethasone  0.75 mg Oral Daily     fexofenadine  180 mg Oral At Bedtime     linaclotide  290 mcg Oral QAM AC     mupirocin   Topical Daily     OLANZapine  30 mg Oral At Bedtime     omeprazole  40 mg Oral QAM     polyethylene glycol  17 g Oral 4x Daily     potassium phosphate (monobasic)  500 mg Oral BID     sertraline  150 mg Oral Daily     sulfamethoxazole-trimethoprim  1 tablet Oral 2 times per day on Sun Sat     vitamin D3  2,000 Units Oral Daily     vitamin E  400 Units Oral Daily

## 2020-02-18 NOTE — PLAN OF CARE
Pleasant and cooperative at the start of the shift.  Up to the commode without problems.  No stool sinced 2/15.  Sleeping without issues the rest of the shift.  Possible discharge today.

## 2020-02-18 NOTE — PLAN OF CARE
Pt AVSS, pt denied pain this am, was in good spirits, MDs ordered a dose of Mg Citrate which pt took cooperatively, mom arrived around 1130, MDs notified and came to talk with mom, discharge instructions and medications reviewed with mom, meds being sent to pharmacy of [facility], pt c/o stomach cramping and hot packs were applied to his abdomen and pt sat on the commode for awhile, pt had small smear of stool and voided before he left, pt discharged with mom who will take him to group [place].

## 2020-02-18 NOTE — PLAN OF CARE
Physical Therapy Discharge Summary    Reason for therapy discharge:    Discharged to Group home     Progress towards therapy goal(s). See goals on Care Plan in [facility] electronic health record for goal details.  Goals partially met.  Barriers to achieving goals:   discharge from facility.    Therapy recommendation(s):    Continued therapy is recommended.  Rationale/Recommendations:  home PT for focus on safety with functional mobility.  Continue home exercise program.

## 2020-02-18 NOTE — PROGRESS NOTES
02/17/20 1700   Child Life   Location Med/Surg   Intervention Supportive Check In  (Pt eager to spend time with Marium, facility dog. As pt and Marium spent time together pt reported having a good day. Is expecting to discharge tomorrow but does not know details yet. Will conitnue to follow. )   Outcomes/Follow Up Continue to Follow/Support

## 2020-02-18 NOTE — PLAN OF CARE
Pt is calm and cooperative, AVSS, no pain, no n/v. Pt has good PO intake, voiding well, no stool, continued scheduled miralax. Plan to discharge tomorrow to group home. Continue to monitor and notify provider of any changes.

## 2020-02-20 NOTE — TELEPHONE ENCOUNTER
Social Work   Incoming/Outgoing Email  [facility]    Correspondence with: Christianne Lynne (parents and co-guardians), Karina Hodgson (SW), Lamin Martinez (CADI )    Reason for contact:  RYLAND following up on email sent by Eric (see My Chart message below)    Content:      Donnell Reyes and Christianne,    My apologies for not getting back to this message until now. I was out of the office last week.     First, I want to second Karina's comments that you are not abandoning him and reinforce how much love and support you have shown him. You are such strong advocates for his needs. I can imagine how hard it is to see him going through all of this and the worry that can happen about his future needs.    Prior to joining the Psychiatry clinic team, I worked as a  and  supervisor in the community. I can tell you from experience that assisted living and group home settings have dealt with concerns similar to Geo's, and sometimes much worse. It is my opinion that what happened with the last setting should not have happened. Typically, these settings have rules they need to abide by that require giving notice of intent to discharge a patient (rather than simply discharging someone) unless there is a major concern regarding staff safety or safety of other individuals. This allows a person's team to work with the person, placement, and anyone else to figure out alternative ways to manage symptoms, behaviors, etc to make sure a person's needs are met. Even if he were discharged, this does not necessarily mean other settings would not look at taking on his needs. This is why you have an awesome cadi  that can assist in advocating for his needs. In addition, your concerns about making sure Geo found a placement that would meet his needs is part of the reason we had Ray work with a [facility] specialist that hopefully had a good understanding of who in the community  could meet Geo's needs.     I see that he was discharged to the new living setting. My hope is that things are going well for him. My suggestion is to keep an open line of communication with the staff at his home so you are aware of what is happening and so that hopefully they will reach out to you if they have concerns so that they can be addressed in a timely fashion.    Elenita        Will route to patient's current psychiatric provider(s) as an FYI.   Please call or EPIC message with any questions or concerns.    Mayte Tafoya, GARCIA, [facility]  [phone]

## 2020-02-21 ENCOUNTER — ALLIED HEALTH/NURSE VISIT (OUTPATIENT)
Dept: PSYCHIATRY | Facility: CLINIC | Age: 20
End: 2020-02-21
Attending: SOCIAL WORKER
Payer: COMMERCIAL

## 2020-02-21 ENCOUNTER — HOSPITAL ENCOUNTER (EMERGENCY)
Facility: CLINIC | Age: 20
Discharge: HOME | End: 2020-02-21
Attending: FAMILY MEDICINE | Admitting: EMERGENCY MEDICINE
Payer: COMMERCIAL

## 2020-02-21 ENCOUNTER — OFFICE VISIT (OUTPATIENT)
Dept: PSYCHIATRY | Facility: CLINIC | Age: 20
End: 2020-02-21
Attending: PSYCHIATRY & NEUROLOGY
Payer: COMMERCIAL

## 2020-02-21 VITALS — DIASTOLIC BLOOD PRESSURE: 77 MMHG | HEART RATE: 84 BPM | SYSTOLIC BLOOD PRESSURE: 109 MMHG

## 2020-02-21 VITALS
HEART RATE: 84 BPM | OXYGEN SATURATION: 94 % | SYSTOLIC BLOOD PRESSURE: 116 MMHG | RESPIRATION RATE: 18 BRPM | DIASTOLIC BLOOD PRESSURE: 66 MMHG

## 2020-02-21 DIAGNOSIS — F32.A DEPRESSION, UNSPECIFIED DEPRESSION TYPE: ICD-10-CM

## 2020-02-21 DIAGNOSIS — Z71.89 COUNSELING AND COORDINATION OF CARE: Primary | ICD-10-CM

## 2020-02-21 DIAGNOSIS — R45.1 AGITATION: Primary | ICD-10-CM

## 2020-02-21 DIAGNOSIS — S81.811A LACERATION OF RIGHT LOWER EXTREMITY, INITIAL ENCOUNTER: ICD-10-CM

## 2020-02-21 PROCEDURE — 12001 RPR S/N/AX/GEN/TRNK 2.5CM/<: CPT | Performed by: EMERGENCY MEDICINE

## 2020-02-21 PROCEDURE — G0463 HOSPITAL OUTPT CLINIC VISIT: HCPCS | Mod: ZF

## 2020-02-21 PROCEDURE — 12001 RPR S/N/AX/GEN/TRNK 2.5CM/<: CPT | Mod: Z6 | Performed by: EMERGENCY MEDICINE

## 2020-02-21 PROCEDURE — 99283 EMERGENCY DEPT VISIT LOW MDM: CPT | Performed by: EMERGENCY MEDICINE

## 2020-02-21 PROCEDURE — 99284 EMERGENCY DEPT VISIT MOD MDM: CPT | Mod: 25 | Performed by: EMERGENCY MEDICINE

## 2020-02-21 PROCEDURE — 99281 EMR DPT VST MAYX REQ PHY/QHP: CPT

## 2020-02-21 RX ORDER — CEPHALEXIN 500 MG/1
500 CAPSULE ORAL 4 TIMES DAILY
Qty: 28 CAPSULE | Refills: 0 | Status: SHIPPED | OUTPATIENT
Start: 2020-02-21 | End: 2020-02-28

## 2020-02-21 RX ORDER — LIDOCAINE HYDROCHLORIDE AND EPINEPHRINE 10; 10 MG/ML; UG/ML
INJECTION, SOLUTION INFILTRATION; PERINEURAL
Status: DISCONTINUED
Start: 2020-02-21 | End: 2020-02-21 | Stop reason: HOSPADM

## 2020-02-21 RX ORDER — LAMOTRIGINE 25 MG/1
TABLET ORAL
Qty: 42 TABLET | Refills: 0 | Status: SHIPPED | OUTPATIENT
Start: 2020-02-21 | End: 2020-04-13

## 2020-02-21 RX ORDER — LAMOTRIGINE 100 MG/1
TABLET ORAL
Qty: 53 TABLET | Refills: 0 | Status: SHIPPED | OUTPATIENT
Start: 2020-03-20 | End: 2020-03-16

## 2020-02-21 ASSESSMENT — PAIN SCALES - GENERAL: PAINLEVEL: NO PAIN (0)

## 2020-02-21 NOTE — ED AVS SNAPSHOT
[facility], [place], Emergency Department  [address]  [facility] [address]  Phone:  [phone]  Fax:  [phone]                                    Geo Hicks   MRN: [id number]    Department:  [facility], Emergency Department   Date of Visit:  2/21/2020           After Visit Summary Signature Page    I have received my discharge instructions, and my questions have been answered. I have discussed any challenges I see with this plan with the nurse or doctor.    ..........................................................................................................................................  Patient/Patient Representative Signature      ..........................................................................................................................................  Patient Representative Print Name and Relationship to Patient    ..................................................               ................................................  Date                                   Time    ..........................................................................................................................................  Reviewed by Signature/Title    ...................................................              ..............................................  Date                                               Time          22EPIC Rev 08/18

## 2020-02-21 NOTE — DISCHARGE INSTRUCTIONS
Please take your antibiotic as prescribed.  Have your sutures taken out in 7-10 days. Return if any worsening signs of infection.

## 2020-02-21 NOTE — PATIENT INSTRUCTIONS
Continue olanzapine 30 mg at bedtime.    Continue sertraline 150 mg daily.    Continue amitriptyline 50 mg at bedtime (as prescribed by Oncology providers for GI discomfort).    ADDITIONALLY, we recommend starting Lamictal with an initial goal dose of 200 mg daily via these specific directions:  -- Take 25 mg daily for two weeks.  -- Then, increase to 50 mg daily for two weeks.  -- Then, increase to 100 mg daily for one week.  -- Then, increase to 200 mg tabs daily.      CRITICAL ADDITIONAL DOSING INSTRUCTIONS FOR LAMICTAL:  - lamotrigine must be taken daily  - if you miss up to 3 or more days of medication, DO NOT restart at previous dose; you will be at risk for developing a serious rash;  please call the clinic if you miss 2 or more days of medication        DO:  - call clinic if you, or another provider, makes any medication changes  - call clinic if your use of Ibuprofen (or similar) increases significantly  - call clinic if you experience any symptom listed below           FOOD: take with or without food         COMMON ADVERSE EFFECTS:  non-dangerous rash (7-14%), GI distress and blurred vision (up to 25%), dizziness (up to 54%),  headache (29%), ataxia, insomnia, somnolence, tremor, dizziness, low risk for anxiety and depression;  [please call the clinic or present to urgent care for any rash]        CALL CLINIC URGENTLY or EMERGENCY ROOM:  if you have any concerns, especially the following...  - unusual itching, dark urine, yellow skin/eyes, any skin changes  - thoughts of death or hurting yourself     - LAMICTAL RASH (Li-Sebastian rash)-    You indicated that you understand use and risks of Lamictal.  This includes the fact that Lamictal must be     taken daily and cannot be restarted at previous dose if 2 or more days of medication are missed (see above).       If the following RASH occurs, STOP lamotrigine and go to the ED:    rash accompanied by fever or flu-like symptoms, and loss of  appetite.    involves the face, mouth, tongue, nose, eyes ('above the neck') and genital or anal areas.    more prominent in the neck and upper trunk.     merging rash that appear red and swollen (wheal or hives)    blistering     purplish, small spots that are tender to touch.    skin redness and swelling all over the body, with or without skin shedding

## 2020-02-21 NOTE — PROGRESS NOTES
"  [facility] PSYCHIATRY CLINIC PROGRESS NOTE     CARE TEAM:  PCP- Jeffrey Espinoza    Specialty Providers- Oncology, Neuropsychology, Physical Therapy, Occupational Therapy    Therapist- Manju Pink at [facility] in [facility] Team- no.    Geo Hicks is a 19 year old male who prefers the name Geo & pronouns he, him, his, himself.    Date of initial diagnostic assessment is 2/7/2019.    Pertinent Background:  This patient first experienced mental health issues in the past few months and has received treatment for paranoia/delusions.  Notably, this patient is undergoing cancer treatment at the [facility] and has been referred for psychiatric assessment by his neuropsychologist who most recently met with him on 1/3/2019.  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 Since then, he has undergone multiple tumor resections, chemotherapy, and radiation treatment. Geo also experienced an intra-tumoral hemorrhage in May 2015 that resulted in neurological changes including facial numbness, significant loss of mobility and dysarthria. In December 2016, a follow-up MRI revealed continued tumor enhancement, however the most recent MRI from 10/16/18 revealed an unchanged fourth ventricle ependymoma in comparison to previous exams, a slight decrease in adjacent edema, as well as a stable size of the ventricular system.  He is currently on COG study [id number] with Entinostat.  Of note, patient had a brief admission to inpatient psychiatry on [facility] from 2/14-2/15/2019 \"for evaluation of delusions and suicidal comments.\"    Psych critical item history includes suicidal ideation, psychosis [sxs include delusions] and psychiatric hospitalization x1.    INTERIM HISTORY                                                                                                                 4, 4   History was provided by the patient and his father who were good historians.  The " "last visit ended with the following med change: titrated olanzapine from 25 mg to 30 mg at bedtime.  Since the last visit:  -Presents for first clinic visit since hospital discharge.  Visits with his dad, who supplements history.  -Reports that his mood is \"not bad.\"  During the visit does not relate his previously described delusional concerns directly, and does not appear to argue with his father.  -Has moved into a new assisted living and they report that this is going well so far.  Talks about some of the other people that live there, in particular a male closer to his age that he states is \"a good madiha.\"  -Denies SI/SIB thoughts, violent/aggressive ideation, panic/anxious acuity, hypo-/manic features or other hallmarks of psychiatric decompensation with dangerousness.  -Touched based on medications and agreed to start lamotrigine to help calm intermittent angry outbursts, for mood support, and agitation.  -Agreed plan will be to consider olanzapine taper after he is established on lamotrigine.    RECENT SYMPTOMS:   DEPRESSION:  reports-low moods, anhedonia, feeling trapped and overwhelmed;  DENIES- suicidal ideation, low energy, insomnia, hypersomnia, excessive guilt, feeling hopeless and excessive crying  ELIZABETH/HYPOMANIA:  reports-none;  DENIES- increased energy, decreased sleep need, increased activity and grandiosity  PSYCHOSIS:  reports-delusions- paranoid [details in Interim History];  DENIES- auditory hallucinations and visual hallucinations  DYSREGULATION:  reports-can become irritable/agitated if beliefs are challenged;  DENIES- violent ideation, SIB, mood dysregulation, impulsive and aggressive  PANIC ATTACK:  none  ANXIETY:  excessive worry and nervous/overwhelmed  TRAUMA RELATED:  none  COMPULSIVE:  none  SLEEP:  initiation and maintenance both improved with trazodone  EATING DISORDER: no    RECENT SUBSTANCE USE:  ALCOHOL- no  TOBACCO- no  CAFFEINE- minimal  OPIOIDS- no         NARCAN KIT- " N/A  CANNABIS- no  OTHER ILLICIT DRUGS- no      CURRENT SOCIAL HISTORY:  FINANCIAL SUPPORT- family  CHILDREN- no  LIVING SITUATION- assisted living facility  SOCIAL/ SPIRITUAL SUPPORT- mother, father, older brother  FEELS SAFE AT HOME- yes    MEDICAL ROS (2,10):  A comprehensive review of systems was performed and is negative other than noted in the HPI.    PSYCH and CD Critical Summary Points since July 2018 February 2019:  Clinic intake on 2/7.  Later was admitted [facility] for a brief inpatient psych admission from 3/14-3/15, most of which was spent in the ER, due to suicidal comments that concerned family.  Was ultimately deemed safe for outpatient follow-up and was discharged with an increase in olanzapine to 15 mg.  March 2019:  Began sertraline 50 mg daily.  May 2019:  Began trazodone 25-50 mg at bedtime.  June 2019:  Increased trazodone to 100 mg at bedtime.  July 2019:  Increased Sertraline to 100 mg daily.  August 2019:  Increased olanzapine to 17.5 mg and decreased trazodone to 75 mg, both at bedtime.  September 2019:  Increased olanzapine to 20 mg at bedtime.  October 2019:  Titrated olanzapine to 25 mg at bedtime.  November 2019:  Increased sertraline to 150 mg daily.  December 2019:  In collaboration with oncology providers agreed to cross-taper from trazodone to amitriptyline, with the latter medication added to help with GI discomfort, and trazodone discontinued in order to reduce overall serotonergic burden.  January 2020:  Olanzapine increased from 25 mg to 30 mg at bedtime.  Later that month was admitted for routine bowel clean-out to inpatient pediatrics shortly after being asked to leave his group home - ultimately, family and Geo did not feel it was safe or desirable for him to return home and so remained admitted to peds until a new assisted living placement could be found, ultimately being discharged 2/18/2020.    PAST PSYCH MED TRIALS   see EMR Problem List: Hx of  "psychiatric care    MEDICAL / SURGICAL HISTORY                                   Neurologic Hx- See pertinent background, re: history of ependymoma and related chemo, radiation and tumor resection(s).  Notably has experienced neurological damage due to the above which has resulted in facial numbness, significant loss of mobility and dysarthria.  Has had neuropsychiatric evaluations 2/2016, 2/2017, 1/2019, and 10/2019.  The following is from the \"Results and Impressions\" section of his 10/29/2019 eval with Veronica Padilla, with a passage bolded that relates to noted difficulty in an area of executive functioning that facilitates the ability to see other people's perspectives:     \"Geo s attention was rated by his mother as well as himself as being adequate.  Executive functioning are those skills including planning, organization, emotional control, cognitive flexibility, and the ability to take another person s perspective.  Geo rating scale of these skills was basically in the average range for his age with a slight elevation in his ability to shift from one activity to another.  His mother s ratings of his executive functioning were in the average range, with the exception of a slight elevation in his ability to initiate tasks.  Direct testing of his ability to take another person s perspective indicated difficulty in this area.  He was asked to sort objects based on various aspects of the objects.  When he sorted the objects, he showed average performance.  However, when the examiner sorted the objects, Geo experienced significant difficulty with being able to determine how the objects were sorted.  This difficulty likely translates into difficulty with understanding another person s point of view.  Geo indicated that this difficulty becomes quite frustrating for him as he doesn t feel other people understand him--it is likely that he has difficulty understanding their " "perspective.     Emotionally Geo indicated that he continues to feel some difficulty with sadness but that it has improved over time and with medication.  Geo denied significant feelings of anxiety at this time while in the past these scores have been higher.  Parent ratings of Geo s emotional functioning indicated no areas of significant concern.  Interviews with Geo and his mother indicated continued feelings of sadness and difficulty in motivating himself to try to new activities.  While there is improvement in his mood, Geo continues to be at risk for depression.  Since he is now under the care of a psychiatrist, we did not interview around his feelings that his medical team was not being helpful to him.  His mother reported that these feelings continue and likely interfere with his compliance with directives.  He is participating in therapy to work on these issues and it is important that this intervention continue.\"    Patient Active Problem List   Diagnosis     GERD (gastroesophageal reflux disease)     Closed fracture at the growth plate of right distal fibula      Elevated serum creatinine     Dyspepsia     Intracranial hemorrhage (H)     Hemorrhagic stroke (H)     Ependymoma (H)     Admission for antineoplastic chemotherapy     Post-operative state     S/P craniotomy     S/P biopsy     Noncomitant strabismus     Abducens neuropathy of both eyes     CANDELARIO (obstructive sleep apnea)     Health Care Home     Hypernatremia     Body temperature low     Current chronic use of systemic steroids     Elevated TSH     Status post chemotherapy     Neoplasm of posterior cranial fossa (H)     Chronic constipation     Serum albumin decreased     Closed compression fracture of thoracic vertebra with routine healing, subsequent encounter     Depression     Constipation     Constipation by delayed colonic transit     Slow transit constipation     Past Surgical History:   Procedure Laterality Date     " COLONOSCOPY N/A 9/27/2019    Procedure: Colonoscopy With Biopsies and Polypectomy;  Surgeon: Aniya Wei MD;  Location: [facility]     ESOPHAGOSCOPY, GASTROSCOPY, DUODENOSCOPY (EGD), COMBINED N/A 9/27/2019    Procedure: Upper Endoscopy (EGD) With Biopsies;  Surgeon: Aniya Wei MD;  Location: [facility]     GRAFT CARTILAGE FROM POSTERIOR AURICLE Left 10/6/2016    Procedure: GRAFT CARTILAGE FROM POSTERIOR AURICLE;  Surgeon: Tyler Richards MD;  Location: [facility]     INCISION AND DRAINAGE PERINEAL, COMBINED Bilateral 7/18/2015    Procedure: COMBINED INCISION AND DRAINAGE PERINEAL;  Surgeon: Dequan Timmons MD;  Location: [facility]     OPTICAL TRACKING SYSTEM CRANIOTOMY, EXCISE TUMOR, COMBINED N/A 4/13/2015    Procedure: COMBINED OPTICAL TRACKING SYSTEM CRANIOTOMY, EXCISE TUMOR;  Surgeon: Francis Velazquez MD;  [facility]     OPTICAL TRACKING SYSTEM CRANIOTOMY, EXCISE TUMOR, COMBINED N/A 4/16/2015    Procedure: COMBINED OPTICAL TRACKING SYSTEM CRANIOTOMY, EXCISE TUMOR;  Surgeon: Francis Velazquez MD;  [facility]     OPTICAL TRACKING SYSTEM CRANIOTOMY, EXCISE TUMOR, COMBINED Bilateral 5/28/2015    Procedure: COMBINED OPTICAL TRACKING SYSTEM CRANIOTOMY, EXCISE TUMOR;  Surgeon: Francis Velazquez MD;  [facility]     OPTICAL TRACKING SYSTEM CRANIOTOMY, EXCISE TUMOR, COMBINED Bilateral 1/14/2016    Procedure: COMBINED OPTICAL TRACKING SYSTEM CRANIOTOMY, EXCISE TUMOR;  Surgeon: Francis Velazquez MD;  [facility]     OPTICAL TRACKING SYSTEM VENTRICULOSTOMY  4/16/2015    Procedure: OPTICAL TRACKING SYSTEM VENTRICULOSTOMY;  Surgeon: Francis Velazquez MD;  [facility]     REMOVE PORT VASCULAR ACCESS N/A 10/6/2016    Procedure: REMOVE PORT VASCULAR ACCESS;  Surgeon: Bruno Perea MD;  [facility]     RHINOPLASTY N/A 10/6/2016    Procedure: RHINOPLASTY;  Surgeon: Tyler Richards MD;  [facility]     VASCULAR SURGERY   "5-2015    single lumen power port       ALLERGY                                Blood transfusion related (informational only) and No known drug allergies     MEDICATIONS                               Current Outpatient Medications   Medication     calcium carbonate-vitamin D 600-400 MG-UNIT CHEW     dexamethasone (DECADRON) 0.5 MG tablet     fexofenadine (ALLEGRA) 180 MG tablet     OLANZapine (ZYPREXA) 30 MG     potassium phosphate, monobasic, (K-PHOS) 500 MG tablet     sertraline (ZOLOFT) 150 MG daily     study - entinostat (IDS# 5050) 1 mg tablet     sulfamethoxazole-trimethoprim (BACTRIM/SEPTRA) 400-80 MG tablet     vitamin D3 (CHOLECALCIFEROL) 2000 units (50 mcg) tablet     vitamin E (TOCOPHEROL) 400 units (360 mg) capsule     linaclotide (LINZESS) 290 MCG capsule     omeprazole (PRILOSEC) 20 MG DR capsule     order for DME     study - entinostat (IDS# 5050) 1 mg tablet     study - entinostat (IDS# 5050) 5 mg tablet     Also:    Amitriptyline 50 mg at bedtime    VITALS                                                                                                                          3, 3   /77   Pulse 84      MENTAL STATUS EXAM                                                                                           9, 14 cog gs     Alertness: alert  and oriented  Appearance: casually groomed, seated in wheelchair, wearing R-sided eye-patch  Behavior/Demeanor: cooperative, with fair eye contact   Speech: prominent dysarthria, slightly worse today  Language: good  Psychomotor: mild evident palsy of upper extremities and facial paralysis  Mood: \"Not bad,\" but allowing low/depressed moods at times  Affect: restricted; was congruent to mood; was congruent to content  Thought Process/Associations: continued rumination [details in Interim History]  Thought Content:  Reports delusions [details in Interim History];  Denies suicidal ideation and violent ideation  Perception:  Reports none;  Denies auditory " "hallucinations and visual hallucinations  Insight: partial to poor  Judgment: good  Cognition: (6) oriented: time, person, and place  attention span: intact  concentration: intact  recent memory: intact  remote memory: intact  fund of knowledge: appropriate  Gait and Station: remarkable for:  ataxia - can ambulate but was seen in wheelchair     LABS and DATA     AIMS:  Due.    PHQ9 TODAY = Did not complete today.  PHQ-9 SCORE 2019   PHQ-9 Total Score 6 8 9       ANTIPSYCHOTIC LABS  [glu, A1C, lipids (rohit LDL), liver enzymes, WBC, ANEU, Hgb, plts]  q12 mo  Recent Labs   Lab Test 20  0713 20  1313 20  1330 20  1232  19  1100   GLC 92 124* 107* 85   < > 124*   A1C  --   --   --   --   --  5.1    < > = values in this interval not displayed.     Recent Labs   Lab Test 19  1100   CHOL 158   TRIG 236*   LDL 84   HDL 27*     Recent Labs   Lab Test 20  0713 20  1313 20  1330 20  1232   AST 27 25 27 27   ALT 22 20 20 20   ALKPHOS 149 115 147 159*     Recent Labs   Lab Test 20  0713 20  0559 20  0612 20  0609   WBC 3.8* 5.1 3.7* 5.7   ANEU 1.8 2.5 1.8 3.8   HGB 12.1* 12.9* 12.4* 12.0*   PLT 76* 81* 84* 102*     EK2019: 85 BPM, QT/QTcH was 346/389 msec.  Also included comment: \"Abnormal precordial QRS contours - nondiagnostic for this age.\"    EE/15/2019: \"IMPRESSION: Awake and drowsy routine EEG (no video) was normal.  The patient stated he felt dazed during photic stimulation, however, no electrographic seizures or concerning changes on the EEG were seen during that time.  Clinical correlation is advised.  No electrographic seizures, epileptiform discharges were seen.\"    DIAGNOSIS     Delusional Disorder, persecutory type, first episode, currently in acute episode  Major Depressive Disorder, single episode, moderate    ASSESSMENT                                                                              " "                                     m2, h3     TODAY:  Geo Hicks presents to the [facility] Outpatient Psychiatry clinic for continued medication management of paranoia, delusional thoughts and depressed mood.  Relates that things have been okay since hospital discharge earlier this week.  Describes mood as \"not bad.\"  Has moved into a new assisted living and so far that is going okay.  This sounds like a better fit than his last placement.  Denies SI, interim agitation, hypo-/manic features or other mood acuity.  After having talked about options for helping to reduced angry outbursts, agitation and for mood support, have agreed to start lamotrigine titration.  Discussed risks/benefits and what to look out for in terms of rash.  Also explained the need for titration and restarting if missing more than two days of the med.  Agreed that once he's established on an initial target dose of lamotrigine (200 mg daily) it would make sense to begin tapering olanzapine, as it's unclear whether his high dose of that medication (30 mg qHS) is conferring sufficient benefit to warrant its use.  At the same time, would be apprehensive to reduce this medication too soon, prior to being on a therapeutic dose of lamotrigine, as it's critical that he successfully settles into his new assisted living, and would be concerned that an uptick in agitation would jeopardize that.  No other changes today.    SUICIDE RISK ASSESSMENT:  Geo Hicks denies present or interim suicidal ideation. This patient does have notable risk factors for self-harm including feels trapped, relationship conflicts, new/ worsening medical issue, male and paranoia/ delusions. However, risk is mitigated by no h/o suicide attempt, no planning or intent, describes a safety plan, h/o seeking help when needed, none to minimal alcohol use , commitment to family and stable housing.  Based on all available evidence he does not appear to be at imminent risk for self-harm " therefore does not meet criteria for a 72-hr hold/  involuntary hospitalization.  However, based on degree of symptoms therapy, close psych FU and medication adjustments were recommended which the pt did agree to.  Safety plan completed 11/15/2019, provided to patient and his father.    [place] PRESCRIPTION MONITORING PROGRAM [] was not checked today:  not using controlled substances    PSYCHOTROPIC DRUG INTERACTIONS:    Pentoxifylline may enhance the antiplatelet effect of Agents with Antiplatelet Properties.  [Pentoxifylline, Sertraline]     CNS Depressants may enhance the adverse/toxic effect of Selective Serotonin Reuptake Inhibitors. Specifically, the risk of psychomotor impairment may be enhanced.  [Olanzapine, Sertraline]    MANAGEMENT:  Monitoring for adverse effects, routine vitals, routine labs, periodic EKGs and patient/family aware of risks     PLAN                                                                                                                                m2, h3     1) PSYCHOTROPIC MEDICATIONS:  Continue olanzapine 30 mg at bedtime.     Continue sertraline 150 mg daily.     Continue amitriptyline 50 mg at bedtime (as prescribed by Oncology providers for GI discomfort).     ADDITIONALLY, we recommend starting Lamictal with an initial goal dose of 200 mg daily via these specific directions:  -- Take 25 mg daily for two weeks.  -- Then, increase to 50 mg daily for two weeks.  -- Then, increase to 100 mg daily for one week.  -- Then, increase to 200 mg tabs daily.    2) THERAPY:  Recommended, and have provided a list of options - in particular it is felt Geo would benefit from ACT (Acceptance and Commitment Therapy) with a provider knowledgeable/familiar with psychosis.    3) NEXT DUE:  Labs- AP labs due Feb 2020  EKG- PRN  Rating Scales- AIMS due    4) REFERRALS:  Therapy, see above.    5) RTC: 3-4 weeks    6) CRISIS NUMBERS:   [place] [facility] [phone]  (cl[place])    [phone] (after hours)  CRISIS TEXT LINE: Text [id number] from anywhere in [place], anytime, any crisis 24/7;  OR SEE www.crisistextline.org    TREATMENT RISK STATEMENT:  The risks, benefits, alternatives and potential adverse effects have been discussed and are understood by the pt. The pt understands the risks of using street drugs or alcohol. There are no medical contraindications, the pt agrees to treatment with the ability to do so. The pt knows to call the clinic for any problems or to access emergency care if needed.  Medical and substance use concerns are documented above.  Psychotropic drug interaction check was done, including changes made today.     PROVIDER:  Justice Fay, DO    Patient staffed in clinic with Dr. Carpenter who will sign the note.  Supervisor is Dr. Emery.    Attestation:  I, Bruno Carpenter MD, personally performed an examination of this patient on Feb 21, 2020 and I have reviewed the resident's documentation.  I have edited the note to reflect all relevant changes. I agree with resident findings and plan in this resident H&P.  Geo presents today with his father for follow-up of mood instability and paranoia.  He is agreeable to initiate a titration of lamotrigine to target mood stability and angry outbursts.  We will titrate it in the usual fashion with a target dose of 200 mg daily.  If Geo responds well we can consider whether to taper his olanzapine, which he is taking at a relatively high dose.  He has moved into a new assisted living facility and reports that this is going well.  He is agreeable with the above plan.  He will return for follow-up in 1 month.  Bruno Carpenter MD

## 2020-02-24 ENCOUNTER — TELEPHONE (OUTPATIENT)
Dept: PSYCHIATRY | Facility: CLINIC | Age: 20
End: 2020-02-24

## 2020-02-24 DIAGNOSIS — F22 PARANOIA (H): Primary | ICD-10-CM

## 2020-02-24 DIAGNOSIS — C71.9 EPENDYMOMA (H): Primary | ICD-10-CM

## 2020-02-24 DIAGNOSIS — R45.1 AGITATION: ICD-10-CM

## 2020-02-24 RX ORDER — OLANZAPINE 5 MG/1
TABLET ORAL
Qty: 7 TABLET | Refills: 0 | Status: SHIPPED | OUTPATIENT
Start: 2020-02-24 | End: 2020-04-13

## 2020-02-24 RX ORDER — OLANZAPINE 20 MG/1
TABLET ORAL
Qty: 30 TABLET | Refills: 0 | Status: SHIPPED | OUTPATIENT
Start: 2020-02-24 | End: 2020-04-13

## 2020-02-24 NOTE — PROGRESS NOTES
Called and spoke to Eric Hicks about some of the abnormal movements and agitation the family have been noticing recently, and in particular over this past weekend.  Had already discussed potentially reducing olanzapine due to uncertain benefit of the higher dose, with initial consideration for waiting until having made some progress on Lamictal titration - however, given these new concerns, agreed to begin olanzapine dose reduction today, under the consideration that medication side effects may be playing a role.    Agreed to reduce olanzapine to 25 mg at bedtime for one week, then down to 20 mg at bedtime.    Justice Fay, DO  Psychiatry PGY4

## 2020-02-24 NOTE — TELEPHONE ENCOUNTER
"Social Work   Incoming/Outgoing Email  [facility]    Correspondence with: Geo's team: Eric Hicks (father and co-guardian), Christianne Mckenzie (mother and co-guardian), Kristi Schuler (provider).   Reason for contact:  Parents are expressing concern about recent symptom developments and would like feedback from providers      Content:      Initial message from Eric Hicks received 2/23/20 at 7:22 pm:     Donnell Lantigua,  (Elenita, can you please share this with Dr. Cruz?)    Christianne and I  have some real concerns, for Geo, that have come to light over the last week.  We are seeing changes in his movements and behaviors.  He is becoming more easily agitated, which we have seen over the last year and more so the recent months and weeks.  This is really impacting his transition to the new residence and we are doing what we can to work through this.      More concerning to us is an increase in \"involuntary body movements\"; along with this is realistic dream verbalization (I don't know what else to call it?).   I will give a couple of examples:  Today, I was there and helping him after a shower.  He was laying on his bed; he hadn't been laying down long.  He began to breath weird, lucia snoring.  His legs were twitching and he was moving his hands, as if he was doing something, but didn't have anything in his hands.  Then he had a really crazy movement (whole body).  I asked him what he was doing.  He said, \"he thought he was riding a motorcycle and was loosing control\".  on Friday he had an appointment with Dr. Cruz.  In the way there he did the \"sleeping thing\".  He was constantly moving his legs, and reaching to the floor.  at one point, he unbuckled his seat belt.  I loudly asked him what he was doing.  He said, \"I thought we are home\".  I told him we weren't going home and  that he had an appointment with Dr. cruz.  He said \"oh yeah\", he buckled up, and that was it.  His knees are really beat up.  They look " "like carpet abrasions.  I ask him if he has been on the floor.  He said he woke up a few times on the floor.  I don't know that these are just from falling out of bed.  I am thinking that once he is on the floor he is moving around and not even knowing he is doing this?  I recorded some of this \"sleeping thing\", today.  I will try to load it to his chart? or send it in a separate email.    Anyway, we are very concerned.  Christianne has been talking to her sister, who is a licensed therapist.  She did some research and is wondering if Geo is experiencing some form of nuerotoxicity or encephalopathy.  With possible causes being related to his medications; specifically the chemo (trial medicine)?    A couple of websites Christianne and her sister found.  https://www.ncbi.nlm.nih.gov/m/pubmed/75731284/    https://www.[employer]/hepatic-encephalopathy#types-and-causes      Second message from Christianne Sevilla on 2/24/20 at 2:22 am:     Thank you Eric for sending this email.    I am very concerned and just looking for answers.  There is something different going on with Geo and it is very, very concerning.  The websites that we found certainly may not be what Geo is experiencing but it has crossed my mind the question of has be been on chemotherapy too long or is his dosage too high, I know it's a study that he's on an that is something that they learn.  It also crosses my he has had psychiatric drugs over the last year or so, is his body responding different to those drugs...??  Or is -it something else going on with his body and/or mind.    There is something happening and it is horrible to see him declining so rapidly.  I am just looking for what maybe going on and if there is anything we can do to help Geo.  Again, I may not be on the right track... just looking for possibilities.  I certainly don't want to pull him off of anything unless we thinking that there is something specific going on.  Hopefully, these " emails and the video Eric has will help the team determine what might be happening with Geo.    I was over at Geo's house this last weekend and the arms and leg jerking, twitching and at times whole body jerking (involuntary body movements) is getting really bad.  I noticed he is also keeping his eye's closed most of the time.  Along with his increased almost like acting out his dreams, it's kind of like he's not sleeping (but his eyes are closed) but yet dreaming and his body is going through some motions and then when you say something to him he snaps out of it and can tell you (most of the time) what he was doing or dreaming about.  Looking back there maybe was one incident about a month and a half ago where he was doing that dreaming stuff in the car but at the time I was thinking he was dreaming and sleeping.  And then shortly after he went into the hospital so not seeing him as much for 4 weeks or so I couldn't monitor if this continued.  I do remember them saying that he woke up one morning in the hospital talking about some odd things (circles and loops).      Rhina,    Remember when Geo was in the clinic for last months check up (this was the appointment before he went in for a GI clean out) and he had 'head rushes' or he was light headed?  When Geo was in the hospital this last time I was visiting his on the Saturday before he was to be discharged.  There was a 20 minute time frame where he had about 5 of those 'head rush' situations in that 20 minute period.  He said at one point he could feel something in his side (about in the lower rib cage on the side) just before the 'head rush' happened.  He was sitting up when this happened.  He put his bed down flat and those stopped, I am not sure if it was coincidental or if lowering the head of the bed resolved it.  I informed the nurse and the doctor came into to see Geo but nothing was found.    Please feel free to call me tomorrow to discuss  this further.  I am sure Eric would be fine with a phone call also.        Response email from Rhina Schuler on 2/24/20 at 8:37 am:     I agree this is concerning.    Dr. Fay and I spoke last week because I know he is looking at some other options for his mental health.      I am concerned about his high dose of Olanzapine long term.  Have you showed this to psychiatry.     I also have NO issue with you stopping his chemo drug.  I really want to get some quality back for Geo and I think the only way is to pair down everything and see what we can do.    Certainly, he can have continued brain volume loss post radiation but these jerks seem more related to psychiatry meds but I am not the expert there!    Rhina      Writer send brief follow up noting she would share information with Dr. Fay    Secondary follow up from Rhina Schuler:      Perfect!    Again - no issue with stopping his study drug.  We have talked about it before.  He has had a good response and been on it a long time.  I think he could be fine without it and we would have one less thing to figure in.    I am sure he is sick of it!  We have others that take the med daily so once a week is a little different but they grow weary of it after about 2 years.    Its hard to always feel like you need medicine and right now its the anger and other psychological things that are really making his life not fun. I think that needs to be the focus.    I commend you chester again for working so hard to make his life the very best it can be.  You know I love this kid!    Heather            Will route to patient's current psychiatric provider(s) as an FYI.   Please call or EPIC message with any questions or concerns.    Mayte Tafoya, LICSW, MSW, LGSW  (Add #)

## 2020-02-25 ENCOUNTER — APPOINTMENT (OUTPATIENT)
Dept: LAB | Facility: CLINIC | Age: 20
End: 2020-02-25
Payer: COMMERCIAL

## 2020-02-25 ENCOUNTER — OFFICE VISIT (OUTPATIENT)
Dept: PEDIATRIC HEMATOLOGY/ONCOLOGY | Facility: CLINIC | Age: 20
End: 2020-02-25
Attending: NURSE PRACTITIONER
Payer: COMMERCIAL

## 2020-02-25 VITALS
BODY MASS INDEX: 27.19 KG/M2 | HEIGHT: 71 IN | SYSTOLIC BLOOD PRESSURE: 119 MMHG | DIASTOLIC BLOOD PRESSURE: 77 MMHG | RESPIRATION RATE: 18 BRPM | OXYGEN SATURATION: 98 % | WEIGHT: 194.22 LBS | HEART RATE: 93 BPM

## 2020-02-25 DIAGNOSIS — D49.6 NEOPLASM OF POSTERIOR CRANIAL FOSSA (H): ICD-10-CM

## 2020-02-25 DIAGNOSIS — C71.9 EPENDYMOMA (H): Primary | ICD-10-CM

## 2020-02-25 DIAGNOSIS — R25.9 ABNORMAL INVOLUNTARY MOVEMENT: ICD-10-CM

## 2020-02-25 LAB
ALBUMIN SERPL-MCNC: 3.2 G/DL (ref 3.4–5)
ALP SERPL-CCNC: 126 U/L (ref 40–150)
ALT SERPL W P-5'-P-CCNC: 24 U/L (ref 0–70)
ANION GAP SERPL CALCULATED.3IONS-SCNC: 3 MMOL/L (ref 3–14)
AST SERPL W P-5'-P-CCNC: 42 U/L (ref 0–45)
BASOPHILS # BLD AUTO: 0 10E9/L (ref 0–0.2)
BASOPHILS NFR BLD AUTO: 0.7 %
BILIRUB SERPL-MCNC: 0.5 MG/DL (ref 0.2–1.3)
BUN SERPL-MCNC: 13 MG/DL (ref 7–30)
CALCIUM SERPL-MCNC: 8.9 MG/DL (ref 8.5–10.1)
CHLORIDE SERPL-SCNC: 105 MMOL/L (ref 94–109)
CO2 SERPL-SCNC: 31 MMOL/L (ref 20–32)
CREAT SERPL-MCNC: 1.11 MG/DL (ref 0.66–1.25)
DIFFERENTIAL METHOD BLD: ABNORMAL
EOSINOPHIL # BLD AUTO: 0.2 10E9/L (ref 0–0.7)
EOSINOPHIL NFR BLD AUTO: 3.7 %
ERYTHROCYTE [DISTWIDTH] IN BLOOD BY AUTOMATED COUNT: 17.2 % (ref 10–15)
GFR SERPL CREATININE-BSD FRML MDRD: >90 ML/MIN/{1.73_M2}
GLUCOSE SERPL-MCNC: 93 MG/DL (ref 70–99)
HCT VFR BLD AUTO: 37.1 % (ref 40–53)
HGB BLD-MCNC: 11.7 G/DL (ref 13.3–17.7)
IMM GRANULOCYTES # BLD: 0 10E9/L (ref 0–0.4)
IMM GRANULOCYTES NFR BLD: 0.7 %
LYMPHOCYTES # BLD AUTO: 1 10E9/L (ref 0.8–5.3)
LYMPHOCYTES NFR BLD AUTO: 24.3 %
MAGNESIUM SERPL-MCNC: 2.2 MG/DL (ref 1.6–2.3)
MCH RBC QN AUTO: 28.7 PG (ref 26.5–33)
MCHC RBC AUTO-ENTMCNC: 31.5 G/DL (ref 31.5–36.5)
MCV RBC AUTO: 91 FL (ref 78–100)
MONOCYTES # BLD AUTO: 0.9 10E9/L (ref 0–1.3)
MONOCYTES NFR BLD AUTO: 19.9 %
NEUTROPHILS # BLD AUTO: 2.2 10E9/L (ref 1.6–8.3)
NEUTROPHILS NFR BLD AUTO: 50.7 %
NRBC # BLD AUTO: 0 10*3/UL
NRBC BLD AUTO-RTO: 1 /100
PHOSPHATE SERPL-MCNC: 3.9 MG/DL (ref 2.5–4.5)
PLATELET # BLD AUTO: 114 10E9/L (ref 150–450)
POTASSIUM SERPL-SCNC: 4.5 MMOL/L (ref 3.4–5.3)
PROT SERPL-MCNC: 6.5 G/DL (ref 6.8–8.8)
RBC # BLD AUTO: 4.07 10E12/L (ref 4.4–5.9)
SODIUM SERPL-SCNC: 139 MMOL/L (ref 133–144)
WBC # BLD AUTO: 4.3 10E9/L (ref 4–11)

## 2020-02-25 PROCEDURE — G0463 HOSPITAL OUTPT CLINIC VISIT: HCPCS | Mod: ZF

## 2020-02-25 PROCEDURE — 84100 ASSAY OF PHOSPHORUS: CPT | Performed by: PEDIATRICS

## 2020-02-25 PROCEDURE — 80053 COMPREHEN METABOLIC PANEL: CPT | Performed by: PEDIATRICS

## 2020-02-25 PROCEDURE — 36415 COLL VENOUS BLD VENIPUNCTURE: CPT | Performed by: PEDIATRICS

## 2020-02-25 PROCEDURE — 83735 ASSAY OF MAGNESIUM: CPT | Performed by: PEDIATRICS

## 2020-02-25 PROCEDURE — 85025 COMPLETE CBC W/AUTO DIFF WBC: CPT | Performed by: PEDIATRICS

## 2020-02-25 RX ORDER — LACTOBACILLUS RHAMNOSUS GG 10B CELL
2 CAPSULE ORAL DAILY
Qty: 30 CAPSULE | Refills: 3 | Status: SHIPPED | OUTPATIENT
Start: 2020-02-25 | End: 2020-01-01

## 2020-02-25 ASSESSMENT — MIFFLIN-ST. JEOR: SCORE: 1912.87

## 2020-02-25 ASSESSMENT — PAIN SCALES - GENERAL: PAINLEVEL: NO PAIN (0)

## 2020-02-25 ASSESSMENT — ENCOUNTER SYMPTOMS
TREMORS: 1
SPEECH DIFFICULTY: 1
WEAKNESS: 1
DIFFICULTY URINATING: 0
CONSTIPATION: 1

## 2020-02-25 NOTE — LETTER
2020       Re:: Geo CHATMAN Kurt   2000       Fax to: [facility] at [phone]      To whom it may concern:      Please change his leg dressings (bilaterally) daily until completely healed.  Observe for redness and/or drainage  Please place Vaseline impregnated gauze over the lesion followed by dry 4 X 4 gauze.  Cover with foam tape (so that is easily removal without further injuring skin).        Sincerely,          Kristi Schuler CNP

## 2020-02-25 NOTE — PROGRESS NOTES
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 He is enrolled on COG [id number] - A Phase 1 Study of Entinostat, an Oral Histone Deacetylase Inhibitor, in Pediatric Patients With Recurrent or Refractory Solid Tumors, Including CNS Tumors and Lymphoma.     HPI: Geo has had no fevers. No nausea, or vomiting.  He had no missed doses of medication with his last course.  He has a lot of bruising on his forearms/elbows.  Dad brought a video of him acting out something in his sleep.  He is intermittently moving his upper extremities and having some jerks.   Geo wants to be able to walk and doesn't want his constipation. He blames up for his constipation. He quit his pool therapy because he can't walk.   He was inpatient for bowel clean out and stayed related to placement. He thinks it has been a while since he had a stool.  He has had urine incontinence in his bed on Sunday and it made him very mad.  There was a \"bent table\" in the room. He was seen in the ER Friday night.  He has had some good days the last couple of days at his new home.  He had no missed doses of medication with his last course. Dad thinks his speech is worse.   Fam/Soc: Lives at a transitional home - [facility].  His parents are  but have been awarded guardianship of Geo. The families work well together - both parents have remarried. His dad has a clotting disorder, requiring him to take Warfarin daily.     History was obtained from Geo and his mother.       Allergies   Allergen Reactions     Blood Transfusion Related (Informational Only) Swelling     Periorbital swelling post platelet transfusion     No Known Drug Allergies        Current Outpatient Medications   Medication     amitriptyline (ELAVIL) 25 MG tablet     Calcium-Vitamin D-Vitamin K (VIACTIV CALCIUM PLUS D) 650-12.5-40 MG-MCG-MCG CHEW     cephALEXin " (KEFLEX) 500 MG capsule     dexamethasone (DECADRON) 0.5 MG tablet     fexofenadine (ALLEGRA) 180 MG tablet     [START ON 3/20/2020] lamoTRIgine (LAMICTAL) 100 MG tablet     lamoTRIgine (LAMICTAL) 25 MG tablet     linaclotide (LINZESS) 290 MCG capsule     mupirocin (BACTROBAN) 2 % external ointment     OLANZapine (ZYPREXA) 20 MG tablet     OLANZapine (ZYPREXA) 5 MG tablet     omeprazole (PRILOSEC) 20 MG DR capsule     order for DME     order for DME     polyethylene glycol (MIRALAX) powder     potassium phosphate, monobasic, (K-PHOS) 500 MG tablet     senna-docusate (SENOKOT-S/PERICOLACE) 8.6-50 MG tablet     sertraline (ZOLOFT) 100 MG tablet     sulfamethoxazole-trimethoprim (BACTRIM) 400-80 MG tablet     vitamin D3 (CHOLECALCIFEROL) 2000 units (50 mcg) tablet     vitamin E (TOCOPHEROL) 400 units (360 mg) capsule     amitriptyline (ELAVIL) 25 MG tablet     No current facility-administered medications for this visit.        Past Medical History:   Diagnosis Date     Cranial nerve dysfunction      Dyspepsia      Ependymoma (H)      Gastro-oesophageal reflux disease      Hearing loss      Intracranial hemorrhage (H)      Migraine      Pilonidal cyst     7-2015     Reduced vision      Refractory obstruction of nasal airway     2nd to nasal valve prolapse     Sleep apnea      Strabismus     gaze palsy        Past Surgical History:   Procedure Laterality Date     COLONOSCOPY N/A 9/27/2019    Procedure: Colonoscopy With Biopsies and Polypectomy;  Surgeon: Aniya Wei MD;  Location: [facility]     ESOPHAGOSCOPY, GASTROSCOPY, DUODENOSCOPY (EGD), COMBINED N/A 9/27/2019    Procedure: Upper Endoscopy (EGD) With Biopsies;  Surgeon: Aniya Wei MD;  Location: [facility] OR     GRAFT CARTILAGE FROM POSTERIOR AURICLE Left 10/6/2016    Procedure: GRAFT CARTILAGE FROM POSTERIOR AURICLE;  Surgeon: Tyler Richards MD;  [facility]: [facility]     INCISION AND DRAINAGE PERINEAL, COMBINED Bilateral 7/18/2015     Procedure: COMBINED INCISION AND DRAINAGE PERINEAL;  Surgeon: Dequan Timmons MD;  Location: [facility]     OPTICAL TRACKING SYSTEM CRANIOTOMY, EXCISE TUMOR, COMBINED N/A 4/13/2015    Procedure: COMBINED OPTICAL TRACKING SYSTEM CRANIOTOMY, EXCISE TUMOR;  Surgeon: Francis Velazquez MD;  [facility]     OPTICAL TRACKING SYSTEM CRANIOTOMY, EXCISE TUMOR, COMBINED N/A 4/16/2015    Procedure: COMBINED OPTICAL TRACKING SYSTEM CRANIOTOMY, EXCISE TUMOR;  Surgeon: Francis Velazquez MD;  [facility]     OPTICAL TRACKING SYSTEM CRANIOTOMY, EXCISE TUMOR, COMBINED Bilateral 5/28/2015    Procedure: COMBINED OPTICAL TRACKING SYSTEM CRANIOTOMY, EXCISE TUMOR;  Surgeon: Francis Velazquez MD;  [facility]     OPTICAL TRACKING SYSTEM CRANIOTOMY, EXCISE TUMOR, COMBINED Bilateral 1/14/2016    Procedure: COMBINED OPTICAL TRACKING SYSTEM CRANIOTOMY, EXCISE TUMOR;  Surgeon: Francis Velazquez MD;  [facility]     OPTICAL TRACKING SYSTEM VENTRICULOSTOMY  4/16/2015    Procedure: OPTICAL TRACKING SYSTEM VENTRICULOSTOMY;  Surgeon: Francis Velazquez MD;  [facility]     REMOVE PORT VASCULAR ACCESS N/A 10/6/2016    Procedure: REMOVE PORT VASCULAR ACCESS;  Surgeon: Bruno Perea MD;  [facility]     RHINOPLASTY N/A 10/6/2016    Procedure: RHINOPLASTY;  Surgeon: Tyler Richards MD;  [facility]     VASCULAR SURGERY  5-2015    single lumen power port       Family History   Problem Relation Age of Onset     Circulatory Father         PE/DVT     Hypothyroidism Father 30     Diabetes Maternal Grandmother      Diabetes Paternal Grandmother      Diabetes Paternal Grandfather      C.A.D. Paternal Grandfather      Hypertension Maternal Grandfather      Thyroid Disease Paternal Aunt         unknown whether hypo or hyper     Mental Illness No family hx of        Review of Systems   Constitutional:        In wheelchair   HENT: Positive for drooling. Negative for nosebleeds.    Eyes:     "    Patching for diplopia - dropped his patch in the car. Wearing glasses today.    Gastrointestinal: Positive for constipation.   Genitourinary: Negative for difficulty urinating.   Musculoskeletal:        More tremor.    Neurological: Positive for tremors, speech difficulty and weakness.     Vital signs:  /77 (BP Location: Left arm, Patient Position: Sitting, Cuff Size: Adult Regular)   Pulse 93   Resp 18   Ht 1.803 m (5' 10.98\")   Wt 88.1 kg (194 lb 3.6 oz)   SpO2 98%   BMI 27.10 kg/m       Wt Readings from Last 4 Encounters:   02/25/20 88.1 kg (194 lb 3.6 oz)   01/25/20 89.3 kg (196 lb 14.4 oz)   01/21/20 89.8 kg (198 lb)   01/17/20 90.3 kg (199 lb)         Physical Exam  Cardiovascular:      Rate and Rhythm: Regular rhythm.      Heart sounds: No murmur.   Pulmonary:      Effort: Pulmonary effort is normal.      Breath sounds: Normal breath sounds.   Abdominal:      Comments: Full   Musculoskeletal:      Comments: Stitches dry and intact.  No tenderness or drainage.  Wound edges well approximated.    Skin:     Findings: Bruising (Lower legs and elbow) present.   Neurological:      Cranial Nerves: Cranial nerve deficit present.      Motor: Weakness present.      Coordination: Coordination abnormal.      Gait: Gait abnormal.       Labs:   Results for orders placed or performed in visit on 02/25/20   Magnesium     Status: None   Result Value Ref Range    Magnesium 2.2 1.6 - 2.3 mg/dL   Comprehensive metabolic panel     Status: Abnormal   Result Value Ref Range    Sodium 139 133 - 144 mmol/L    Potassium 4.5 3.4 - 5.3 mmol/L    Chloride 105 94 - 109 mmol/L    Carbon Dioxide 31 20 - 32 mmol/L    Anion Gap 3 3 - 14 mmol/L    Glucose 93 70 - 99 mg/dL    Urea Nitrogen 13 7 - 30 mg/dL    Creatinine 1.11 0.66 - 1.25 mg/dL    GFR Estimate >90 >60 mL/min/[1.73_m2]    GFR Estimate If Black >90 >60 mL/min/[1.73_m2]    Calcium 8.9 8.5 - 10.1 mg/dL    Bilirubin Total 0.5 0.2 - 1.3 mg/dL    Albumin 3.2 (L) 3.4 - 5.0 " g/dL    Protein Total 6.5 (L) 6.8 - 8.8 g/dL    Alkaline Phosphatase 126 40 - 150 U/L    ALT 24 0 - 70 U/L    AST 42 0 - 45 U/L   CBC with platelets differential     Status: Abnormal   Result Value Ref Range    WBC 4.3 4.0 - 11.0 10e9/L    RBC Count 4.07 (L) 4.4 - 5.9 10e12/L    Hemoglobin 11.7 (L) 13.3 - 17.7 g/dL    Hematocrit 37.1 (L) 40.0 - 53.0 %    MCV 91 78 - 100 fl    MCH 28.7 26.5 - 33.0 pg    MCHC 31.5 31.5 - 36.5 g/dL    RDW 17.2 (H) 10.0 - 15.0 %    Platelet Count 114 (L) 150 - 450 10e9/L    Diff Method Automated Method     % Neutrophils 50.7 %    % Lymphocytes 24.3 %    % Monocytes 19.9 %    % Eosinophils 3.7 %    % Basophils 0.7 %    % Immature Granulocytes 0.7 %    Nucleated RBCs 1 (H) 0 /100    Absolute Neutrophil 2.2 1.6 - 8.3 10e9/L    Absolute Lymphocytes 1.0 0.8 - 5.3 10e9/L    Absolute Monocytes 0.9 0.0 - 1.3 10e9/L    Absolute Eosinophils 0.2 0.0 - 0.7 10e9/L    Absolute Basophils 0.0 0.0 - 0.2 10e9/L    Abs Immature Granulocytes 0.0 0 - 0.4 10e9/L    Absolute Nucleated RBC 0.0    Phosphorus     Status: None   Result Value Ref Range    Phosphorus 3.9 2.5 - 4.5 mg/dL       Impression:  1. Ependymoma.  2. MRI 1/7/2020: No metastases, primary tumor stable compared to recent studies.  3. Thrombocytopenia improved.  Counts are adequate to proceed with his next cycle.  4. Restless movements of legs and involuntary movements.  5. Behavioral issues.  6. Constipation    Plan:  1. He will begin Etinostat 6 mg weekly. This is a slight decrease due to weight loss.  New dosing was dicussed with Geo and his dad.   2. Constipation discussed.  I will ask if the [facility] can track his stools so that we have another resource. Start Culturelle 2 capsules daily - we will trial this month and see if it is helpful.   3. He should bandage his legs with gauze and use antibiotic ointment so that the dressings don't lift his skin with changes.   4. RTC in 1 week to recheck blood work or sooner PRN.  5. Encourage  continued good hydration.   6. Referral sent to [facility] for Sleep study(polysomnography) with EEG. (Sent to the attention of Serena [phone])   7. Dressing change orders sent to [place] [facility] so that supplies can be ordered. They should change dressing daily and use Vaseline impregnated gauze prior to dry gauze so that it doesn't lift the new granulation and scabbing each time.  8.  We had a conference with Geo, his dad and Dr. Fay regarding concerns of these involuntary movements.  We will stop his Elavil.  Dr. Fay will be making changes in his psychological medications.  This may help us understand if these movements are at all related to his psych medicines.        40 minutes of face to face time spent with patient and >50% visit involved counseling and/or coordination of care.

## 2020-02-25 NOTE — NURSING NOTE
"Chief Complaint   Patient presents with     RECHECK     Patient being seen today for ependymoma follow-up exam       /77 (BP Location: Left arm, Patient Position: Sitting, Cuff Size: Adult Regular)   Pulse 93   Resp 18   Ht 1.803 m (5' 10.98\")   Wt 88.1 kg (194 lb 3.6 oz)   SpO2 98%   BMI 27.10 kg/m      Aren Baumann LPN  February 25, 2020  "

## 2020-02-25 NOTE — Clinical Note
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 He is enrolled on COG [id number] - A Phase 1 Study of Entinostat, an Oral Histone Deacetylase Inhibitor, in Pediatric Patients With Recurrent or Refractory Solid Tumors, Including CNS Tumors and Lymphoma.     HPI:  Geo has had no fevers. No nausea, or vomiting. He had no missed doses of medication with his last course.  Geo wants to be able to walk and doesn't want his constipation. He blames up for his constipation. Geo had a normal stool two days ago per his report. He noted last evening he had some loose stuff incontinence in incontinence in the evening.  It was a \"fart he didn't trust\".  Review with his home staff reveals it was a very large incontinence requiring a shower.  He will get his stitches out tomorrow LLE yesterday.    He bumped his elbow and has some bruising.     HPI: Geo has had no fevers. No nausea, or vomiting.  He had no missed doses of medication with his last course.     Fam/Soc: Lives at a transitional home - [facility].  His parents are  but have been awarded guardianship of Geo. The families work well together - both parents have remarried. His dad has a clotting disorder, requiring him to take Warfarin daily.     History was obtained from Geo and his mother.       Allergies   Allergen Reactions     Blood Transfusion Related (Informational Only) Swelling     Periorbital swelling post platelet transfusion     No Known Drug Allergies        Current Outpatient Medications   Medication     amitriptyline (ELAVIL) 25 MG tablet     Calcium-Vitamin D-Vitamin K (VIACTIV CALCIUM PLUS D) 650-12.5-40 MG-MCG-MCG CHEW     cephALEXin (KEFLEX) 500 MG capsule     dexamethasone (DECADRON) 0.5 MG tablet     fexofenadine (ALLEGRA) 180 " MG tablet     [START ON 3/20/2020] lamoTRIgine (LAMICTAL) 100 MG tablet     lamoTRIgine (LAMICTAL) 25 MG tablet     linaclotide (LINZESS) 290 MCG capsule     mupirocin (BACTROBAN) 2 % external ointment     OLANZapine (ZYPREXA) 20 MG tablet     OLANZapine (ZYPREXA) 5 MG tablet     omeprazole (PRILOSEC) 20 MG DR capsule     order for DME     order for DME     polyethylene glycol (MIRALAX) powder     potassium phosphate, monobasic, (K-PHOS) 500 MG tablet     senna-docusate (SENOKOT-S/PERICOLACE) 8.6-50 MG tablet     sertraline (ZOLOFT) 100 MG tablet     sulfamethoxazole-trimethoprim (BACTRIM) 400-80 MG tablet     vitamin D3 (CHOLECALCIFEROL) 2000 units (50 mcg) tablet     vitamin E (TOCOPHEROL) 400 units (360 mg) capsule     amitriptyline (ELAVIL) 25 MG tablet     No current facility-administered medications for this visit.        Past Medical History:   Diagnosis Date     Cranial nerve dysfunction      Dyspepsia      Ependymoma (H)      Gastro-oesophageal reflux disease      Hearing loss      Intracranial hemorrhage (H)      Migraine      Pilonidal cyst     7-2015     Reduced vision      Refractory obstruction of nasal airway     2nd to nasal valve prolapse     Sleep apnea      Strabismus     gaze palsy        Past Surgical History:   Procedure Laterality Date     COLONOSCOPY N/A 9/27/2019    Procedure: Colonoscopy With Biopsies and Polypectomy;  Surgeon: Aniya Wei MD;  [facility]     ESOPHAGOSCOPY, GASTROSCOPY, DUODENOSCOPY (EGD), COMBINED N/A 9/27/2019    Procedure: Upper Endoscopy (EGD) With Biopsies;  Surgeon: Aniya Wei MD;  [facility]     GRAFT CARTILAGE FROM POSTERIOR AURICLE Left 10/6/2016    Procedure: GRAFT CARTILAGE FROM POSTERIOR AURICLE;  Surgeon: Tyler Richards MD;  [facility]     INCISION AND DRAINAGE PERINEAL, COMBINED Bilateral 7/18/2015    Procedure: COMBINED INCISION AND DRAINAGE PERINEAL;  Surgeon: Dequan Timmons MD;   [facility]     OPTICAL TRACKING SYSTEM CRANIOTOMY, EXCISE TUMOR, COMBINED N/A 4/13/2015    Procedure: COMBINED OPTICAL TRACKING SYSTEM CRANIOTOMY, EXCISE TUMOR;  Surgeon: Francis Velazquez MD;  [facility]     OPTICAL TRACKING SYSTEM CRANIOTOMY, EXCISE TUMOR, COMBINED N/A 4/16/2015    Procedure: COMBINED OPTICAL TRACKING SYSTEM CRANIOTOMY, EXCISE TUMOR;  Surgeon: Francis Velazquez MD;  [facility]     OPTICAL TRACKING SYSTEM CRANIOTOMY, EXCISE TUMOR, COMBINED Bilateral 5/28/2015    Procedure: COMBINED OPTICAL TRACKING SYSTEM CRANIOTOMY, EXCISE TUMOR;  Surgeon: Francis Velazquez MD;  [facility]     OPTICAL TRACKING SYSTEM CRANIOTOMY, EXCISE TUMOR, COMBINED Bilateral 1/14/2016    Procedure: COMBINED OPTICAL TRACKING SYSTEM CRANIOTOMY, EXCISE TUMOR;  Surgeon: Francis Velazquez MD;  [facility]     OPTICAL TRACKING SYSTEM VENTRICULOSTOMY  4/16/2015    Procedure: OPTICAL TRACKING SYSTEM VENTRICULOSTOMY;  Surgeon: Francis Velazquez MD;  [facility]     REMOVE PORT VASCULAR ACCESS N/A 10/6/2016    Procedure: REMOVE PORT VASCULAR ACCESS;  Surgeon: Bruno Perea MD;  [facility]     RHINOPLASTY N/A 10/6/2016    Procedure: RHINOPLASTY;  Surgeon: Tyler Richards MD;  [facility]     VASCULAR SURGERY  5-2015    single lumen power port       Family History   Problem Relation Age of Onset     Circulatory Father         PE/DVT     Hypothyroidism Father 30     Diabetes Maternal Grandmother      Diabetes Paternal Grandmother      Diabetes Paternal Grandfather      C.A.D. Paternal Grandfather      Hypertension Maternal Grandfather      Thyroid Disease Paternal Aunt         unknown whether hypo or hyper     Mental Illness No family hx of        Review of Systems   Constitutional:        In wheelchair   HENT: Positive for drooling. Negative for nosebleeds.    Eyes:        Patching for diplopia   Gastrointestinal: Positive for constipation.   Neurological:  "Positive for tremors, speech difficulty and weakness.     Vital signs:  /77 (BP Location: Left arm, Patient Position: Sitting, Cuff Size: Adult Regular)   Pulse 93   Resp 18   Ht 1.803 m (5' 10.98\")   Wt 88.1 kg (194 lb 3.6 oz)   SpO2 98%   BMI 27.10 kg/m        Physical Exam  HENT:      Ears:      Comments: Small excoriation inside each canal with minimal bleeding.   Cardiovascular:      Rate and Rhythm: Regular rhythm.      Heart sounds: No murmur.   Pulmonary:      Effort: Pulmonary effort is normal.      Breath sounds: Normal breath sounds.   Abdominal:      Comments: Full   Musculoskeletal:      Comments: Stitches dry and intact.  No tenderness or drainage.  Wound edges well approximated.    Skin:     Findings: Bruising (Lower legs and elbow) present.   Neurological:      Cranial Nerves: Cranial nerve deficit present.      Motor: Weakness present.      Coordination: Coordination abnormal.      Gait: Gait abnormal.         Labs:   Results for orders placed or performed in visit on 02/25/20   CBC with platelets differential     Status: Abnormal   Result Value Ref Range    WBC 4.3 4.0 - 11.0 10e9/L    RBC Count 4.07 (L) 4.4 - 5.9 10e12/L    Hemoglobin 11.7 (L) 13.3 - 17.7 g/dL    Hematocrit 37.1 (L) 40.0 - 53.0 %    MCV 91 78 - 100 fl    MCH 28.7 26.5 - 33.0 pg    MCHC 31.5 31.5 - 36.5 g/dL    RDW 17.2 (H) 10.0 - 15.0 %    Platelet Count 114 (L) 150 - 450 10e9/L    Diff Method Automated Method     % Neutrophils 50.7 %    % Lymphocytes 24.3 %    % Monocytes 19.9 %    % Eosinophils 3.7 %    % Basophils 0.7 %    % Immature Granulocytes 0.7 %    Nucleated RBCs 1 (H) 0 /100    Absolute Neutrophil 2.2 1.6 - 8.3 10e9/L    Absolute Lymphocytes 1.0 0.8 - 5.3 10e9/L    Absolute Monocytes 0.9 0.0 - 1.3 10e9/L    Absolute Eosinophils 0.2 0.0 - 0.7 10e9/L    Absolute Basophils 0.0 0.0 - 0.2 10e9/L    Abs Immature Granulocytes 0.0 0 - 0.4 10e9/L    Absolute Nucleated RBC 0.0      Impression:  1. Ependymoma.  2. MRI " 1/7/2020: No metastases, primary tumor stable compared to recent studies.  3. Thrombocytopenia improved.  Counts are adequate to proceed with his next cycle.    Plan:  1. He will begin Etinostat 7mg weekly. He has lost some weight today but it is not clear if weight is accurate due to extremely large recent incontinence and he hasn't eaten much today.  We will deliver same dosing and recheck his weight at a future visit and make changes at that time if his weight loss is real.   2. Constipation discussed at length. He is very concerned that he needs another clean-out.  Since we are starting his drug today we will arrange for admission on Thursday afternoon.  Geo will notify us if he continues to stool regularly like he did today and we will cancel admission.   3. He should have his wound stitches removed Friday..  4. RTC in 1 week to recheck blood work or sooner PRN.  5. Encourage continued good hydration today. His kidney function is improved form last week and was likely related to contrast with his MRI on 1/7/20.  6. Paper work filled out and plan of care discussed with his facility.    40 minutes of face to face time spent with patient and >50% visit involved counseling and/or coordination of care.                  ALAN Justin CNP

## 2020-02-25 NOTE — LETTER
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 He is enrolled on COG [id number] - A Phase 1 Study of Entinostat, an Oral Histone Deacetylase Inhibitor, in Pediatric Patients With Recurrent or Refractory Solid Tumors, Including CNS Tumors and Lymphoma.     HPI: Geo has had no fevers. No nausea, or vomiting.  He had no missed doses of medication with his last course.  He has a lot of bruising on his forearms/elbows.  Dad brought a video of him acting out something in his sleep.  He is intermittently moving his upper extremities and having some jerks.   Geo wants to be able to walk and doesn't want his constipation. He blames up for his constipation. He quit his pool therapy because he can't walk.   He was inpatient for bowel clean out and stayed related to placement. He thinks it has been a while since he had a stool.  He has had urine incontinence in his bed on Sunday and it made him very mad.  There was a \"bent table\" in the room. He was seen in the ER Friday night.  He has had some good days the last couple of days at his new home.  He had no missed doses of medication with his last course. Dad thinks his speech is worse.   Fam/Soc: Lives at a transitional home - [facility].  His parents are  but have been awarded guardianship of Geo. The families work well together - both parents have remarried. His dad has a clotting disorder, requiring him to take Warfarin daily.     History was obtained from Geo and his mother.       Allergies   Allergen Reactions     Blood Transfusion Related (Informational Only) Swelling     Periorbital swelling post platelet transfusion     No Known Drug Allergies        Current Outpatient Medications   Medication     amitriptyline (ELAVIL) 25 MG tablet     " Calcium-Vitamin D-Vitamin K (VIACTIV CALCIUM PLUS D) 650-12.5-40 MG-MCG-MCG CHEW     cephALEXin (KEFLEX) 500 MG capsule     dexamethasone (DECADRON) 0.5 MG tablet     fexofenadine (ALLEGRA) 180 MG tablet     [START ON 3/20/2020] lamoTRIgine (LAMICTAL) 100 MG tablet     lamoTRIgine (LAMICTAL) 25 MG tablet     linaclotide (LINZESS) 290 MCG capsule     mupirocin (BACTROBAN) 2 % external ointment     OLANZapine (ZYPREXA) 20 MG tablet     OLANZapine (ZYPREXA) 5 MG tablet     omeprazole (PRILOSEC) 20 MG DR capsule     order for DME     order for DME     polyethylene glycol (MIRALAX) powder     potassium phosphate, monobasic, (K-PHOS) 500 MG tablet     senna-docusate (SENOKOT-S/PERICOLACE) 8.6-50 MG tablet     sertraline (ZOLOFT) 100 MG tablet     sulfamethoxazole-trimethoprim (BACTRIM) 400-80 MG tablet     vitamin D3 (CHOLECALCIFEROL) 2000 units (50 mcg) tablet     vitamin E (TOCOPHEROL) 400 units (360 mg) capsule     amitriptyline (ELAVIL) 25 MG tablet     No current facility-administered medications for this visit.        Past Medical History:   Diagnosis Date     Cranial nerve dysfunction      Dyspepsia      Ependymoma (H)      Gastro-oesophageal reflux disease      Hearing loss      Intracranial hemorrhage (H)      Migraine      Pilonidal cyst     7-2015     Reduced vision      Refractory obstruction of nasal airway     2nd to nasal valve prolapse     Sleep apnea      Strabismus     gaze palsy        Past Surgical History:   Procedure Laterality Date     COLONOSCOPY N/A 9/27/2019    Procedure: Colonoscopy With Biopsies and Polypectomy;  Surgeon: Aniya Wei MD;  [facility]     ESOPHAGOSCOPY, GASTROSCOPY, DUODENOSCOPY (EGD), COMBINED N/A 9/27/2019    Procedure: Upper Endoscopy (EGD) With Biopsies;  Surgeon: Aniya Wei MD;  [facility]     GRAFT CARTILAGE FROM POSTERIOR AURICLE Left 10/6/2016    Procedure: GRAFT CARTILAGE FROM POSTERIOR AURICLE;  Surgeon: Susie  Tyler Villela MD;  [facility]     INCISION AND DRAINAGE PERINEAL, COMBINED Bilateral 7/18/2015    Procedure: COMBINED INCISION AND DRAINAGE PERINEAL;  Surgeon: Dequan Timmons MD;  [facility]     OPTICAL TRACKING SYSTEM CRANIOTOMY, EXCISE TUMOR, COMBINED N/A 4/13/2015    Procedure: COMBINED OPTICAL TRACKING SYSTEM CRANIOTOMY, EXCISE TUMOR;  Surgeon: Francis Velazquez MD;  [facility]     OPTICAL TRACKING SYSTEM CRANIOTOMY, EXCISE TUMOR, COMBINED N/A 4/16/2015    Procedure: COMBINED OPTICAL TRACKING SYSTEM CRANIOTOMY, EXCISE TUMOR;  Surgeon: Francis Velazquez MD;  [facility]     OPTICAL TRACKING SYSTEM CRANIOTOMY, EXCISE TUMOR, COMBINED Bilateral 5/28/2015    Procedure: COMBINED OPTICAL TRACKING SYSTEM CRANIOTOMY, EXCISE TUMOR;  Surgeon: Francis Velazquez MD;  [facility]     OPTICAL TRACKING SYSTEM CRANIOTOMY, EXCISE TUMOR, COMBINED Bilateral 1/14/2016    Procedure: COMBINED OPTICAL TRACKING SYSTEM CRANIOTOMY, EXCISE TUMOR;  Surgeon: Francis Velazquez MD;  [facility]     OPTICAL TRACKING SYSTEM VENTRICULOSTOMY  4/16/2015    Procedure: OPTICAL TRACKING SYSTEM VENTRICULOSTOMY;  Surgeon: Francis Velazquez MD;  [facility]     REMOVE PORT VASCULAR ACCESS N/A 10/6/2016    Procedure: REMOVE PORT VASCULAR ACCESS;  Surgeon: Bruno Perea MD;  [facility]     RHINOPLASTY N/A 10/6/2016    Procedure: RHINOPLASTY;  Surgeon: Tyler Richards MD;  [facility]     VASCULAR SURGERY  5-2015    single lumen power port       Family History   Problem Relation Age of Onset     Circulatory Father         PE/DVT     Hypothyroidism Father 30     Diabetes Maternal Grandmother      Diabetes Paternal Grandmother      Diabetes Paternal Grandfather      C.A.D. Paternal Grandfather      Hypertension Maternal Grandfather      Thyroid Disease Paternal Aunt         unknown whether hypo or hyper     Mental Illness No family hx of        Review of Systems  "  Constitutional:        In wheelchair   HENT: Positive for drooling. Negative for nosebleeds.    Eyes:        Patching for diplopia - dropped his patch in the car. Wearing glasses today.    Gastrointestinal: Positive for constipation.   Genitourinary: Negative for difficulty urinating.   Musculoskeletal:        More tremor.    Neurological: Positive for tremors, speech difficulty and weakness.     Vital signs:  /77 (BP Location: Left arm, Patient Position: Sitting, Cuff Size: Adult Regular)   Pulse 93   Resp 18   Ht 1.803 m (5' 10.98\")   Wt 88.1 kg (194 lb 3.6 oz)   SpO2 98%   BMI 27.10 kg/m        Wt Readings from Last 4 Encounters:   02/25/20 88.1 kg (194 lb 3.6 oz)   01/25/20 89.3 kg (196 lb 14.4 oz)   01/21/20 89.8 kg (198 lb)   01/17/20 90.3 kg (199 lb)         Physical Exam  Cardiovascular:      Rate and Rhythm: Regular rhythm.      Heart sounds: No murmur.   Pulmonary:      Effort: Pulmonary effort is normal.      Breath sounds: Normal breath sounds.   Abdominal:      Comments: Full   Musculoskeletal:      Comments: Stitches dry and intact.  No tenderness or drainage.  Wound edges well approximated.    Skin:     Findings: Bruising (Lower legs and elbow) present.   Neurological:      Cranial Nerves: Cranial nerve deficit present.      Motor: Weakness present.      Coordination: Coordination abnormal.      Gait: Gait abnormal.       Labs:   Results for orders placed or performed in visit on 02/25/20   Magnesium     Status: None   Result Value Ref Range    Magnesium 2.2 1.6 - 2.3 mg/dL   Comprehensive metabolic panel     Status: Abnormal   Result Value Ref Range    Sodium 139 133 - 144 mmol/L    Potassium 4.5 3.4 - 5.3 mmol/L    Chloride 105 94 - 109 mmol/L    Carbon Dioxide 31 20 - 32 mmol/L    Anion Gap 3 3 - 14 mmol/L    Glucose 93 70 - 99 mg/dL    Urea Nitrogen 13 7 - 30 mg/dL    Creatinine 1.11 0.66 - 1.25 mg/dL    GFR Estimate >90 >60 mL/min/[1.73_m2]    GFR Estimate If Black >90 >60 " mL/min/[1.73_m2]    Calcium 8.9 8.5 - 10.1 mg/dL    Bilirubin Total 0.5 0.2 - 1.3 mg/dL    Albumin 3.2 (L) 3.4 - 5.0 g/dL    Protein Total 6.5 (L) 6.8 - 8.8 g/dL    Alkaline Phosphatase 126 40 - 150 U/L    ALT 24 0 - 70 U/L    AST 42 0 - 45 U/L   CBC with platelets differential     Status: Abnormal   Result Value Ref Range    WBC 4.3 4.0 - 11.0 10e9/L    RBC Count 4.07 (L) 4.4 - 5.9 10e12/L    Hemoglobin 11.7 (L) 13.3 - 17.7 g/dL    Hematocrit 37.1 (L) 40.0 - 53.0 %    MCV 91 78 - 100 fl    MCH 28.7 26.5 - 33.0 pg    MCHC 31.5 31.5 - 36.5 g/dL    RDW 17.2 (H) 10.0 - 15.0 %    Platelet Count 114 (L) 150 - 450 10e9/L    Diff Method Automated Method     % Neutrophils 50.7 %    % Lymphocytes 24.3 %    % Monocytes 19.9 %    % Eosinophils 3.7 %    % Basophils 0.7 %    % Immature Granulocytes 0.7 %    Nucleated RBCs 1 (H) 0 /100    Absolute Neutrophil 2.2 1.6 - 8.3 10e9/L    Absolute Lymphocytes 1.0 0.8 - 5.3 10e9/L    Absolute Monocytes 0.9 0.0 - 1.3 10e9/L    Absolute Eosinophils 0.2 0.0 - 0.7 10e9/L    Absolute Basophils 0.0 0.0 - 0.2 10e9/L    Abs Immature Granulocytes 0.0 0 - 0.4 10e9/L    Absolute Nucleated RBC 0.0    Phosphorus     Status: None   Result Value Ref Range    Phosphorus 3.9 2.5 - 4.5 mg/dL       Impression:  1. Ependymoma.  2. MRI 1/7/2020: No metastases, primary tumor stable compared to recent studies.  3. Thrombocytopenia improved.  Counts are adequate to proceed with his next cycle.  4. Restless movements of legs and involuntary movements.  5. Behavioral issues.  6. Constipation    Plan:  1. He will begin Etinostat 6 mg weekly. This is a slight decrease due to weight loss.  New dosing was dicussed with Geo and his dad.   2. Constipation discussed.  I will ask if the [facility] can track his stools so that we have another resource. Start Culturelle 2 capsules daily - we will trial this month and see if it is helpful.   3. He should bandage his legs with gauze and use antibiotic ointment so that the  dressings don't lift his skin with changes.   4. RTC in 1 week to recheck blood work or sooner PRN.  5. Encourage continued good hydration.   6. Referral sent to [facility] for Sleep study(polysomnography) with EEG. (Sent to the attention of Serena [phone])   7. Dressing change orders sent to [facility] so that supplies can be ordered. They should change dressing daily and use Vaseline impregnated gauze prior to dry gauze so that it doesn't lift the new granulation and scabbing each time.  8.  We had a conference with Geo, his dad and Dr. Fay regarding concerns of these involuntary movements.  We will stop his Elavil.  Dr. Fay will be making changes in his psychological medications.  This may help us understand if these movements are at all related to his psych medicines.        40 minutes of face to face time spent with patient and >50% visit involved counseling and/or coordination of care.          ALAN Justin CNP

## 2020-02-27 ENCOUNTER — TELEPHONE (OUTPATIENT)
Dept: PSYCHIATRY | Facility: CLINIC | Age: 20
End: 2020-02-27

## 2020-02-27 NOTE — TELEPHONE ENCOUNTER
Writer notified by provider to update  ([facility]) regarding the medication changes:   1. Patient should be taking Zyprexa 25 mg X 1 week then decreased to 20 mg daily   2. Discontinue amitriptyline per oncologist     Placed a call to [facility] at [phone] to confirm if MERRITT can be obtained. Per manager Cruz, they do not have an active MERRITT to speak with this writer. She requested this writer fax a blank MERRITT to [phone]. Writer faxed blank MERRITT to preferred fax number. She agreed to have the LG completed on 2/28 and fax it back. Writer agreed with the plan.     Writer placed a call to Eric carroll at [phone] to relay the above information. No answer at number provided. LVM, requesting a call back. Clinic number provided.

## 2020-02-27 NOTE — TELEPHONE ENCOUNTER
Writer received incoming call from Eric carroll returning this writers call. Updated dad that patient Zyprexa was decreased. Gabby was aware of the change and confirmed that patient is taking Zyprexa 25 mg X 1 week then decrease to 20 mg daily. He also plans to reach out to oncology to confirm the order for discontinuing amitriptyline. He mentioned needing an order for group home. Gabby will notify the group home and will sign a MERRITT tomorrow.

## 2020-02-28 NOTE — ED PROVIDER NOTES
History     Chief Complaint   Patient presents with     Laceration     Laceration to right lower leg from this morning, sent down from the psychiatry clinic. Bleeding controlled.      HPI  Geo Hicks is a 19 year old male with a history of ependymoma who presents with a right leg laceration.  He was going to a clinic appointment when he apparently banged his leg and had some bleeding.  There was some redness noted to the leg but he denies any fever or chills.  No discharge from the wound.  Leading was controlled with compression.  He denies any other injuries.     PAST MEDICAL HISTORY:   Past Medical History:   Diagnosis Date     Cranial nerve dysfunction      Dyspepsia      Ependymoma (H)      Gastro-oesophageal reflux disease      Hearing loss      Intracranial hemorrhage (H)      Migraine      Pilonidal cyst     7-2015     Reduced vision      Refractory obstruction of nasal airway     2nd to nasal valve prolapse     Sleep apnea      Strabismus     gaze palsy        PAST SURGICAL HISTORY:   Past Surgical History:   Procedure Laterality Date     COLONOSCOPY N/A 9/27/2019    Procedure: Colonoscopy With Biopsies and Polypectomy;  Surgeon: Aniya Wei MD;  [facility]     ESOPHAGOSCOPY, GASTROSCOPY, DUODENOSCOPY (EGD), COMBINED N/A 9/27/2019    Procedure: Upper Endoscopy (EGD) With Biopsies;  Surgeon: Aniya Wei MD;  [facility]     GRAFT CARTILAGE FROM POSTERIOR AURICLE Left 10/6/2016    Procedure: GRAFT CARTILAGE FROM POSTERIOR AURICLE;  Surgeon: Tyler Richards MD;  [facility]     INCISION AND DRAINAGE PERINEAL, COMBINED Bilateral 7/18/2015    Procedure: COMBINED INCISION AND DRAINAGE PERINEAL;  Surgeon: Dequan Timmons MD;  [facility]     OPTICAL TRACKING SYSTEM CRANIOTOMY, EXCISE TUMOR, COMBINED N/A 4/13/2015    Procedure: COMBINED OPTICAL TRACKING SYSTEM CRANIOTOMY, EXCISE TUMOR;  Surgeon: Francis Velazquez MD;  [facility]      OPTICAL TRACKING SYSTEM CRANIOTOMY, EXCISE TUMOR, COMBINED N/A 4/16/2015    Procedure: COMBINED OPTICAL TRACKING SYSTEM CRANIOTOMY, EXCISE TUMOR;  Surgeon: Francis Velazquez MD;  [facility]     OPTICAL TRACKING SYSTEM CRANIOTOMY, EXCISE TUMOR, COMBINED Bilateral 5/28/2015    Procedure: COMBINED OPTICAL TRACKING SYSTEM CRANIOTOMY, EXCISE TUMOR;  Surgeon: Francis Velazquez MD;  [facility]     OPTICAL TRACKING SYSTEM CRANIOTOMY, EXCISE TUMOR, COMBINED Bilateral 1/14/2016    Procedure: COMBINED OPTICAL TRACKING SYSTEM CRANIOTOMY, EXCISE TUMOR;  Surgeon: Francis Velazquez MD;  [facility]     OPTICAL TRACKING SYSTEM VENTRICULOSTOMY  4/16/2015    Procedure: OPTICAL TRACKING SYSTEM VENTRICULOSTOMY;  Surgeon: Francis Velazquez MD;  [facility]     REMOVE PORT VASCULAR ACCESS N/A 10/6/2016    Procedure: REMOVE PORT VASCULAR ACCESS;  Surgeon: Bruno Perea MD;  [facility]     RHINOPLASTY N/A 10/6/2016    Procedure: RHINOPLASTY;  Surgeon: Tyler Richards MD;  [facility]     VASCULAR SURGERY  5-2015    single lumen power port       FAMILY HISTORY:   Family History   Problem Relation Age of Onset     Circulatory Father         PE/DVT     Hypothyroidism Father 30     Diabetes Maternal Grandmother      Diabetes Paternal Grandmother      Diabetes Paternal Grandfather      C.A.D. Paternal Grandfather      Hypertension Maternal Grandfather      Thyroid Disease Paternal Aunt         unknown whether hypo or hyper     Mental Illness No family hx of        SOCIAL HISTORY:   Social History     Tobacco Use     Smoking status: Never Smoker     Smokeless tobacco: Never Used   Substance Use Topics     Alcohol use: No       Discharge Medication List as of 2/21/2020  5:39 PM      START taking these medications    Details   cephALEXin (KEFLEX) 500 MG capsule Take 1 capsule (500 mg) by mouth 4 times daily for 7 days, Disp-28 capsule, R-0, Local Print         CONTINUE these  medications which have NOT CHANGED    Details   !! order for DME Equipment being ordered: Dressing  Wound #1 lower leg, W 6 cm x, D 0.5  cm, Drainage scant, Thickness sutured     Type: non stick gauze, Brand: , Size: 4/4   Change: 1 per day    Tape/Wrap: 1 each     attach facesheet with insurance information (delete t his line)Disp-5 each, R-0, Local Print      !! order for DME Equipment being ordered: short bootDisp-1 each, R-0, Local Print      senna-docusate (SENOKOT-S/PERICOLACE) 8.6-50 MG tablet Take 1 tablet by mouth daily And please report to team at [facility] in no stool in 36 hours., Disp-30 tablet, R-4, E-Prescribe      Calcium-Vitamin D-Vitamin K (VIACTIV CALCIUM PLUS D) 650-12.5-40 MG-MCG-MCG CHEW Take 1 chew tab by mouth 2 times daily, Disp-90 tablet, R-11, E-PrescribeStill has home supply, transitioning pharmacies. Group home or family will call to fill.      dexamethasone (DECADRON) 0.5 MG tablet TAKE ONE AND ONE-HALF TABLETS (0.75MG) BY MOUTH ONCE DAILY WITH BREAKFAST., Disp-45 tablet, R-11, E-PrescribeStill has home supply, transitioning pharmacies. Group home or family will call to fill.      fexofenadine (ALLEGRA) 180 MG tablet TAKE 1 TABLET BY MOUTH EVERY EVENING., Disp-30 tablet, R-11, E-PrescribeStill has home supply, transitioning pharmacies. Group home or family will call to fill.      linaclotide (LINZESS) 290 MCG capsule Take 1 capsule (290 mcg) by mouth every morning (before breakfast), Disp-30 capsule, R-3, E-PrescribeStill has home supply, transitioning pharmacies. Group home or family will call to fill.      mupirocin (BACTROBAN) 2 % external ointment Use 2 times a day to the ear lobes and canals with flareDisp-22 g, [id number]      omeprazole (PRILOSEC) 20 MG DR capsule Take 2 capsules (40 mg) by mouth every morning, Disp-60 capsule, R-1, E-PrescribeStill has home supply, transitioning pharmacies. Group home or family will call to fill.      polyethylene glycol (MIRALAX)  powder Take 17 g (1 capful) by mouth 3 times daily, Disp-289 g, R-3, E-PrescribeStill has home supply, transitioning pharmacies. Group home or family will call to fill.      potassium phosphate, monobasic, (K-PHOS) 500 MG tablet Take 1 tablet (500 mg) by mouth 2 times daily, Disp-90 tablet, R-3, E-PrescribeStill has home supply, transitioning pharmacies. Group home or family will call to fill.      sertraline (ZOLOFT) 100 MG tablet Take 1.5 tablets (150 mg) by mouth daily, Disp-45 tablet, R-1, E-PrescribeStill has home supply, transitioning pharmacies. Group home or family will call to fill.      sulfamethoxazole-trimethoprim (BACTRIM) 400-80 MG tablet Take 1 tablet by mouth 2 times daily On Saturdays and Sundays, Disp-24 tablet, R-11, E-PrescribeStill has home supply, transitioning pharmacies. Group home or family will call to fill.      vitamin D3 (CHOLECALCIFEROL) 2000 units (50 mcg) tablet TAKE 1 TABLET BY MOUTH ONCE DAILY WITH BREAKFAST., Disp-30 tablet, R-11, E-PrescribeStill has home supply, transitioning pharmacies. Group home or family will call to fill.      vitamin E (TOCOPHEROL) 400 units (360 mg) capsule TAKE 1 CAPSULE BY MOUTH ONCE DAILY., Disp-30 capsule, R-11, E-PrescribeStill has home supply, transitioning pharmacies. Group home or family will call to fill.      amitriptyline (ELAVIL) 25 MG tablet Take 2 tablets (50 mg) by mouth At Bedtime, Disp-56 tablet, R-4, E-PrescribeStill has home supply, transitioning pharmacies. Group home or family will call to fill.      !! OLANZapine (ZYPREXA) 10 MG tablet Take 1 tab (10 mg) along with 1 tab (20 mg) for a total dose of 30 mg at bedtime., Disp-30 tablet, R-1, E-PrescribeStill has home supply, transitioning pharmacies. Group home or family will call to fill.      !! OLANZapine (ZYPREXA) 20 MG tablet Take 1 tab (20 mg) along with 1 tab (10 mg) for a total dose of 30 mg at bedtime., Disp-30 tablet, R-1, E-PrescribeStill has home supply, transitioning  pharmacies. Group home or family will call to fill.       !! - Potential duplicate medications found. Please discuss with provider.      STOP taking these medications       study - entinostat (IDS# 5050) 1 mg tablet Comments:   Reason for Stopping:         study - entinostat (IDS# 5050) 5 mg tablet Comments:   Reason for Stopping:                  Allergies   Allergen Reactions     Blood Transfusion Related (Informational Only) Swelling     Periorbital swelling post platelet transfusion     No Known Drug Allergies                I have reviewed the Medications, Allergies, Past Medical and Surgical History, and Social History in the Epic system.    Review of Systems   All other systems reviewed and are negative.      Physical Exam   BP: 116/66  Pulse: 84  Resp: 18  SpO2: 94 %      Physical Exam  Vitals signs and nursing note reviewed.   Constitutional:       General: He is not in acute distress.     Appearance: He is not diaphoretic.   HENT:      Head: Normocephalic and atraumatic.   Neck:      Musculoskeletal: Normal range of motion and neck supple.   Cardiovascular:      Rate and Rhythm: Normal rate.   Pulmonary:      Effort: Pulmonary effort is normal.   Abdominal:      Palpations: Abdomen is soft.   Musculoskeletal: Normal range of motion.   Skin:     General: Skin is warm and dry.      Comments: 2 cm laceration to the right pretibial area, there is some mild erythema surrounding it and a shallow ulceration   Neurological:      Mental Status: He is alert and oriented to person, place, and time.      Sensory: No sensory deficit.         ED Course        Procedures                 [facility] Procedure Note        Laceration Repair:    Performed by: Vikas Mcgee MD  Authorized by: Vikas Mcgee MD  Consent given by: Patient who states understanding of the procedure being performed after discussing the risks, benefits and alternatives.    Preparation: Patient was prepped and draped in usual  sterile fashion.  Irrigation solution: saline    Body area:right leg  Laceration length: 2cm  Contamination: The wound is not contaminated.  Foreign bodies:none  Tendon involvement: none  Anesthesia: Local  Local anesthetic: Lidocaine     1%  Anesthetic total: 3ml    Debridement: none  Skin closure: Closed with 3 x 4.0 Ethilon  Technique: interrupted  Approximation: close  Approximation difficulty: simple    Patient tolerance: Patient tolerated the procedure well with no immediate complications.            Labs Ordered and Resulted from Time of ED Arrival Up to the Time of Departure from the ED - No data to display         Assessments & Plan (with Medical Decision Making)   19-year-old male who presents with a right leg laceration.  There is some signs of infection around the wound which appeared subacute.  The wound was approximated but left loose given the possible infection.  He was discharged with Keflex and instructed to follow-up in 7 days for suture removal and wound evaluation.  If any worsening signs of infection he should return to the ER.       I have reviewed the nursing notes.    I have reviewed the findings, diagnosis, plan and need for follow up with the patient.    Discharge Medication List as of 2/21/2020  5:39 PM      START taking these medications    Details   cephALEXin (KEFLEX) 500 MG capsule Take 1 capsule (500 mg) by mouth 4 times daily for 7 days, Disp-28 capsule, R-0, Local Print             Final diagnoses:   Laceration of right lower extremity, initial encounter       2/21/2020   [facility], [place], EMERGENCY DEPARTMENT     Vikas Mcgee MD  02/28/20 0025

## 2020-02-29 ENCOUNTER — HOSPITAL ENCOUNTER (EMERGENCY)
Facility: CLINIC | Age: 20
Discharge: HOME | End: 2020-02-29
Attending: EMERGENCY MEDICINE | Admitting: EMERGENCY MEDICINE
Payer: COMMERCIAL

## 2020-02-29 VITALS
RESPIRATION RATE: 16 BRPM | OXYGEN SATURATION: 93 % | TEMPERATURE: 96.7 F | SYSTOLIC BLOOD PRESSURE: 100 MMHG | HEART RATE: 93 BPM | DIASTOLIC BLOOD PRESSURE: 56 MMHG

## 2020-02-29 DIAGNOSIS — S81.812A LACERATION OF LEFT LOWER EXTREMITY, INITIAL ENCOUNTER: ICD-10-CM

## 2020-02-29 PROCEDURE — 12002 RPR S/N/AX/GEN/TRNK2.6-7.5CM: CPT

## 2020-02-29 PROCEDURE — 99283 EMERGENCY DEPT VISIT LOW MDM: CPT

## 2020-02-29 RX ORDER — LIDOCAINE HYDROCHLORIDE AND EPINEPHRINE 10; 10 MG/ML; UG/ML
INJECTION, SOLUTION INFILTRATION; PERINEURAL
Status: DISCONTINUED
Start: 2020-02-29 | End: 2020-02-29 | Stop reason: HOSPADM

## 2020-02-29 NOTE — DISCHARGE INSTRUCTIONS
Please make an appointment to follow up with your primary care provider or the emergency department in 10 days for wound check and suture removal.     Return to ER immediately if you develop: signs of a wound infection (RED/SWOLLEN/DRAINS PUS LIKE A PIMPLE) OR you have any other concerns about your health.    Keep dressings clean and dry          Discharge Instructions  Laceration (Cut)    You were seen today for a laceration (cut).  Your doctor examined your laceration for any problems such a buried foreign body (like glass, a splinter, or gravel), or injury to blood vessels, tendons, and nerves.  Your doctor may have also rinsed and/or scrubbed your laceration to help prevent an infection.  Your laceration may have been closed with glue, staples or sutures (stitches).      It may not be possible to find all problems with your laceration on the first visit, and we can't always prevent infections.  Antibiotics are only given when the benefit is more than the risk, and don't prevent all infections. Some lacerations are too high risk to close, and are left open to heal.  All lacerations, no matter how expertly repaired, will cause scarring.    Return to the Emergency Department right away if:  You have more redness, swelling, pain, drainage (pus), a bad smell, or red streaking from your laceration.    You have a fever of 101oF or more.  You have bleeding that you can t stop at home. If your cut starts to bleed, hold pressure on the bleeding area with a clean cloth or put pressure over the bandage.  If the bleeding doesn t stop after using constant pressure for 30 minutes, you should return to the Emergency Department for further treatment.  An area past the laceration is cool, pale, or blue compared with the other side, or has a slower return of color when squeezed.  Your dressing seems too tight or starts to get uncomfortable or painful.  You have loss of normal function or use of an area, such as being unable to  straighten or bend a finger normally.  You have a numb area past the laceration.    Return to the Emergency Department or see your regular doctor if:  The laceration starts to come open.   You have something coming out of the cut or a feeling that there is something in the laceration.  Your wound will not heal, or keeps breaking open. There can always be glass, wood, dirt or other things in any wound.  They won t always show up even on x-rays.  If a wound doesn t heal, this may be why, and it is important to follow-up with your regular doctor    Home Care:  Take your dressing off in 12 hours, or as instructed by your doctor, to check your laceration. Remove the dressing sooner if it seems too tight or painful, or if it is getting numb, tingly, or pale past the dressing.  Gently wash your laceration 2 times a day with clean cloth and soap.   It is okay to shower, but do not let the laceration soak in water.    If your laceration was closed with wound adhesive or strips: pat it dry and leave it open to the air.   For all other repairs: after you wash your laceration, or at least 2 times a day, apply bacitracin or other antibiotic ointment to the laceration, then cover it with a band-aid or gauze.  Keep the laceration clean. Wear gloves or other protective clothing if you are around dirt.    Follow-up:  You need to follow-up with your regular doctor in  10 days.  Your sutures or staples need to be removed in 10 days. Schedule an appointment with your regular doctor (or return to the Emergency Department) to have this done.    Scars:  To help minimize scarring:  Wear sunscreen over the healed laceration when out in the sun.  Massage the area regularly.  You may use Vitamin E Oil.  Wait a year.  Most scars will start to fade within a year.      Remember that you can always come back to the Emergency Department if you are not able to see your normal doctor in the amount of time listed above, if you get any new symptoms,  or if there is anything that worries you.

## 2020-02-29 NOTE — ED AVS SNAPSHOT
[facility] Emergency Department  [address]  [facility] [address]  Phone:  [phone]  Fax:  [phone]                                    Geo Hicks   MRN: [id number]    Department:  [facility] Emergency Department   Date of Visit:  2/29/2020           After Visit Summary Signature Page    I have received my discharge instructions, and my questions have been answered. I have discussed any challenges I see with this plan with the nurse or doctor.    ..........................................................................................................................................  Patient/Patient Representative Signature      ..........................................................................................................................................  Patient Representative Print Name and Relationship to Patient    ..................................................               ................................................  Date                                   Time    ..........................................................................................................................................  Reviewed by Signature/Title    ...................................................              ..............................................  Date                                               Time          22EPIC Rev 08/18

## 2020-02-29 NOTE — ED PROVIDER NOTES
History     Chief Complaint:Laceration     HPI   Geo Hicks is a 19 year old male who presents with a laceration.  Complex PMH, but frequent falls due to brain CA, ataxia.  Has L leg laceration that happened some time last evening at group home.  No pain, no other assoc trauma.  At baseline per mother.  2 suture R leg due to be removed from a previous fall.      Allergies:  Blood transfusion related      Medications:    amitriptyline   Decadron   Allegra   Lactobacillus-Inulin  Lamictal   Bactropan ointment   Zyprexa   Prilosec   Miralax   Senokot-S;Pericolace   K-Phos     Past Medical History:    Cranial nerve dysfunction   Dyspepsia   Ependymoma   GERD  Hearing loss   Intracranial hemorrhage   Migraine   Pilonidal cyst   Reduced vision   hemorraghic stroke   Refractory obstruction of nasal airway   Sleep apnea   Strabismus     Past Surgical History:    Colonoscopy   EGD  Graft cartilage from posterior auricle   Incision and drainage   Optical tracking system craniotomy   Remove port vascular access  Rhinoplasty   Single lumen power port     Family History:    Father: PE/DVT, hypothyroidism   Maternal grandmother: diabetes  Paternal grandmother: diabetes  Paternal grandfather: diabetes, Coronary Artery Disease  Maternal grandfather: hypertension     Social History:  The patient was accompanied to the ED by brother and mother.  Smoking Status: Never Smoker  Smokeless Tobacco: Never Used  Alcohol Use: Negative   Drug Use: Negative   PCP: Jeffrey Espinoza   Marital Status:  Single      Review of Systems   ROS: completed with mother, 10 point ROS neg other than the symptoms noted above in the HPI.      Physical Exam     Patient Vitals for the past 24 hrs:   BP Temp Temp src Pulse Heart Rate Resp SpO2   02/29/20 1141 100/56 96.7  F (35.9  C) Temporal 93 93 16 93 %      Physical Exam    Neck: supple  CV: ppi, regular   Resp: speaking in full sentences without any resp distress  Abd:   Ext:   RUE: resolving ecchymosis  upper arm.  No bony ttp.  Multiple superficial wounds hand+ forearm - no surrounding erythema, purulent drainage  LUE: no bony ttp, 7mm superficial wound distal ulnar side of the forearm, no surrounding erythema  RLE: mid anterior tibia, 3cm wound with scab and 2 mid-wound suture.    LLE: 5cm mid tibia lateral wound, no FB, no bleeding  Skin: warm dry well perfused  Neuro: Alert, no acute motor or sensory deficits from baseline.         Emergency Department Course     Procedures:      Narrative: Procedure: Laceration Repair        LACERATION:  A simple clean 5 cm laceration.      LOCATION:  L lateral lower leg (mid calf)      FUNCTION:  Distally sensation, circulation and motor are intact.      ANESTHESIA:  Local using lidocaine 1% with epinephrine total of 8 mLs      PREPARATION:  Irrigation and Scrubbing with Normal Saline and Shur Clens      DEBRIDEMENT:  no debridement and wound explored, no foreign body found      CLOSURE:  Wound was closed with One Layer.  Skin closed with 3-0 ethilon using 5 interrupted sutures.     Emergency Department Course:    1200 Nursing notes and vitals reviewed. I performed an exam of the patient as documented above.       Prior to discharge, I personally reviewed the results with the patient and caretaker and all related questions were answered. The patient and caretaker verbalized understanding and is amenable to plan.     Impression & Plan      Medical Decision Making:  Geo Hicks is a 19 year old male who presents to the emergency department today for evaluation of L leg wound.  No fb, no e/o infection.  No concern for bony injury.  Wound repaired as above.  DC home with mother.      Diagnosis:    ICD-10-CM    1. Laceration of left lower extremity, initial encounter S81.812A      Disposition:   The patient is discharged to home.     Discharge Medications:  No discharge medications.     Scribe Disclosure:  I, Orla Severson, carmela serving as a scribe at 12:12 PM on 2/29/2020 to  document services personally performed by Gurvinder Alcaraz MD based on my observations and the provider's statements to me.   [facility] EMERGENCY DEPARTMENT       Gurvinder Alcaraz MD  02/29/20 2648

## 2020-02-29 NOTE — ED TRIAGE NOTES
Pt with laceration to left lower leg. Pt from group home, states he thinks he hit leg last night sometime, unable to state what happened. Small amount of blood noted through bandage. ABC's intact, alert and baseline per family.

## 2020-03-02 ENCOUNTER — TELEPHONE (OUTPATIENT)
Dept: PSYCHIATRY | Facility: CLINIC | Age: 20
End: 2020-03-02

## 2020-03-02 NOTE — TELEPHONE ENCOUNTER
Writer placed a call to [employer] at [phone] to get an updated status regarding MERRITT. No answer at number provided. LVM, requesting a call back. Clinic number provided.

## 2020-03-02 NOTE — TELEPHONE ENCOUNTER
On 2/28/2020 the patient's guardian signed an MERRITT authorizing medical records to be released from [facility] to [facility] for the purpose of continuing care. I sent the request to medical records via the tube system and kept a copy in psychiatry until scanning is complete.  Alisha Devries CMA    On 2/28/2020 the patient's guardian signed a PHI authorizing person to person communication with [facility] for medical information.  I sent this document to scanning on 3/2/2020 and kept a copy in Psychiatry until scanning is confirmed. Noy Devries CMA

## 2020-03-03 DIAGNOSIS — C71.9 EPENDYMOMA (H): ICD-10-CM

## 2020-03-03 LAB
BASOPHILS # BLD AUTO: 0 10E9/L (ref 0–0.2)
BASOPHILS NFR BLD AUTO: 1 %
DIFFERENTIAL METHOD BLD: ABNORMAL
EOSINOPHIL # BLD AUTO: 0.2 10E9/L (ref 0–0.7)
EOSINOPHIL NFR BLD AUTO: 5 %
ERYTHROCYTE [DISTWIDTH] IN BLOOD BY AUTOMATED COUNT: 17 % (ref 10–15)
HCT VFR BLD AUTO: 36.1 % (ref 40–53)
HGB BLD-MCNC: 11.4 G/DL (ref 13.3–17.7)
LYMPHOCYTES # BLD AUTO: 1.1 10E9/L (ref 0.8–5.3)
LYMPHOCYTES NFR BLD AUTO: 33 %
MCH RBC QN AUTO: 28.6 PG (ref 26.5–33)
MCHC RBC AUTO-ENTMCNC: 31.6 G/DL (ref 31.5–36.5)
MCV RBC AUTO: 91 FL (ref 78–100)
MONOCYTES # BLD AUTO: 0.7 10E9/L (ref 0–1.3)
MONOCYTES NFR BLD AUTO: 21 %
NEUTROPHILS # BLD AUTO: 1.4 10E9/L (ref 1.6–8.3)
NEUTROPHILS NFR BLD AUTO: 40 %
PLATELET # BLD AUTO: 171 10E9/L (ref 150–450)
PLATELET # BLD EST: ABNORMAL 10*3/UL
RBC # BLD AUTO: 3.99 10E12/L (ref 4.4–5.9)
RBC MORPH BLD: NORMAL
WBC # BLD AUTO: 3.4 10E9/L (ref 4–11)

## 2020-03-03 PROCEDURE — 36415 COLL VENOUS BLD VENIPUNCTURE: CPT | Performed by: NURSE PRACTITIONER

## 2020-03-03 PROCEDURE — 80053 COMPREHEN METABOLIC PANEL: CPT | Performed by: NURSE PRACTITIONER

## 2020-03-03 PROCEDURE — 85025 COMPLETE CBC W/AUTO DIFF WBC: CPT | Performed by: NURSE PRACTITIONER

## 2020-03-03 PROCEDURE — 84100 ASSAY OF PHOSPHORUS: CPT | Performed by: NURSE PRACTITIONER

## 2020-03-03 PROCEDURE — 83735 ASSAY OF MAGNESIUM: CPT | Performed by: NURSE PRACTITIONER

## 2020-03-03 NOTE — TELEPHONE ENCOUNTER
Writer placed a call to [facility] at [phone] to review medications. No answer at number provided. LVM, requesting a call back. Clinic number provided.

## 2020-03-03 NOTE — PROGRESS NOTES
Social Work Follow-Up  [facility]    Patient Name:  Geo Hicks  /Age:  2000 (19 year old)    Reason for Follow-Up:  SW provided phone support  to Geo's family and coordination with other departments during Geo's recent hospitalization. Geo recently moved to new group home setting and SW following up on needs. In addition, provider requested therapy referrals for Geo.    Data/Intervention:  -Met with patient and his father/guardian, Eric Hicks.    -Update on Geo and current situation. Things are going okay for Geo. He is trying to get used to new staff and a new routine. Eric reports that it will likely also take some time for group home staff to better understand Geo and get to know his personality and needs. Geo appeared ambivalent about new home. When his father suggested getting a hammock for the back yard, Geo appeared to support this plan (he often spends many happy hours in the hammock at the [facility]).  -Provided empathic listening to both Eric and Geo.  -Writer observed Geo have several involuntary jerks of his head, sometimes affecting upper half of body as well, during session with Geo and his dad.  -Provided psychoeducation on therapy and recommendations. SW reviewed that team is recommending Acceptance and Commitment Therapy (ACT) to see if this may help Geo with current distressing thoughts/delusions that doctors are purposely causing constipation which has caused his paralysis. SW also recommending finding therapist that has experience with chronic illness and trauma, due to Geo's diagnosis and treatment. Geo and Eric took the information and stated they would call on options. SW encouraged him to reach out if they needed additional ideas.  Therapy resources:   Diane Grinnell, [facility] [phone], [address], [place]   [facility] [phone], [place], [place]   Ashleigh Villela  [phone], [place], [place]       Assessment:  Geo is a 19 year old male whose parents are guardians. Patient was recently hospitalized after bowel cleansing due to increasing delusional thoughts and concern for both patient and parent safety if he were to return home. He moved into a new foster facility upon hospital discharge. It appears that Geo is managing change at the moment. It will likely take some time and patience on everyone's part to adjust to the new change and to find best practices for Geo.    Plan:  SW will continue to provide support and coordination as needed.    Will route to patient's psychiatric provider(s) as an FYI.    Please call or EPIC message with any questions or concerns.    Mayte Tafoya (Patty) MSW, LICSW  [phone]    This is a non-billable encounter as it was solely for the purposes of outreach and/or care coordination.

## 2020-03-04 LAB
ALBUMIN SERPL-MCNC: 3 G/DL (ref 3.4–5)
ALP SERPL-CCNC: 110 U/L (ref 40–150)
ALT SERPL W P-5'-P-CCNC: 23 U/L (ref 0–70)
ANION GAP SERPL CALCULATED.3IONS-SCNC: 3 MMOL/L (ref 3–14)
AST SERPL W P-5'-P-CCNC: 29 U/L (ref 0–45)
BILIRUB SERPL-MCNC: 0.3 MG/DL (ref 0.2–1.3)
BUN SERPL-MCNC: 8 MG/DL (ref 7–30)
CALCIUM SERPL-MCNC: 9 MG/DL (ref 8.5–10.1)
CHLORIDE SERPL-SCNC: 108 MMOL/L (ref 94–109)
CO2 SERPL-SCNC: 29 MMOL/L (ref 20–32)
CREAT SERPL-MCNC: 1.07 MG/DL (ref 0.66–1.25)
GFR SERPL CREATININE-BSD FRML MDRD: >90 ML/MIN/{1.73_M2}
GLUCOSE SERPL-MCNC: 88 MG/DL (ref 70–99)
MAGNESIUM SERPL-MCNC: 2.2 MG/DL (ref 1.6–2.3)
PHOSPHATE SERPL-MCNC: 3.5 MG/DL (ref 2.5–4.5)
POTASSIUM SERPL-SCNC: 4 MMOL/L (ref 3.4–5.3)
PROT SERPL-MCNC: 6.3 G/DL (ref 6.8–8.8)
SODIUM SERPL-SCNC: 140 MMOL/L (ref 133–144)

## 2020-03-06 ENCOUNTER — OFFICE VISIT (OUTPATIENT)
Dept: PSYCHIATRY | Facility: CLINIC | Age: 20
End: 2020-03-06
Attending: PSYCHIATRY & NEUROLOGY
Payer: COMMERCIAL

## 2020-03-06 VITALS — HEART RATE: 105 BPM | DIASTOLIC BLOOD PRESSURE: 69 MMHG | SYSTOLIC BLOOD PRESSURE: 111 MMHG

## 2020-03-06 DIAGNOSIS — F22 PARANOIA (H): Primary | ICD-10-CM

## 2020-03-06 DIAGNOSIS — F32.A DEPRESSION, UNSPECIFIED DEPRESSION TYPE: ICD-10-CM

## 2020-03-06 PROCEDURE — G0463 HOSPITAL OUTPT CLINIC VISIT: HCPCS | Mod: ZF

## 2020-03-06 ASSESSMENT — PAIN SCALES - GENERAL: PAINLEVEL: NO PAIN (0)

## 2020-03-06 NOTE — PATIENT INSTRUCTIONS
Continue olanzapine 20 mg at bedtime.  Continue sertraline 150 mg daily.  Continue gradual dose increase/titration schedule of lamotrigine - should currently be just about ready to step up to 50 mg daily.

## 2020-03-10 ENCOUNTER — OFFICE VISIT (OUTPATIENT)
Dept: NURSING | Facility: CLINIC | Age: 20
End: 2020-03-10
Payer: COMMERCIAL

## 2020-03-10 DIAGNOSIS — Z48.02 VISIT FOR SUTURE REMOVAL: Primary | ICD-10-CM

## 2020-03-10 DIAGNOSIS — C71.9 EPENDYMOMA (H): ICD-10-CM

## 2020-03-10 LAB
BASOPHILS # BLD AUTO: 0 10E9/L (ref 0–0.2)
BASOPHILS NFR BLD AUTO: 0.5 %
DIFFERENTIAL METHOD BLD: ABNORMAL
EOSINOPHIL # BLD AUTO: 0.4 10E9/L (ref 0–0.7)
EOSINOPHIL NFR BLD AUTO: 10.4 %
ERYTHROCYTE [DISTWIDTH] IN BLOOD BY AUTOMATED COUNT: 15.2 % (ref 10–15)
HCT VFR BLD AUTO: 37.1 % (ref 40–53)
HGB BLD-MCNC: 11.7 G/DL (ref 13.3–17.7)
LYMPHOCYTES # BLD AUTO: 1 10E9/L (ref 0.8–5.3)
LYMPHOCYTES NFR BLD AUTO: 27.3 %
MCH RBC QN AUTO: 27.7 PG (ref 26.5–33)
MCHC RBC AUTO-ENTMCNC: 31.5 G/DL (ref 31.5–36.5)
MCV RBC AUTO: 88 FL (ref 78–100)
MONOCYTES # BLD AUTO: 0.4 10E9/L (ref 0–1.3)
MONOCYTES NFR BLD AUTO: 10.7 %
NEUTROPHILS # BLD AUTO: 1.9 10E9/L (ref 1.6–8.3)
NEUTROPHILS NFR BLD AUTO: 51.1 %
PLATELET # BLD AUTO: 152 10E9/L (ref 150–450)
RBC # BLD AUTO: 4.22 10E12/L (ref 4.4–5.9)
WBC # BLD AUTO: 3.7 10E9/L (ref 4–11)

## 2020-03-10 PROCEDURE — 36415 COLL VENOUS BLD VENIPUNCTURE: CPT | Performed by: NURSE PRACTITIONER

## 2020-03-10 PROCEDURE — 80053 COMPREHEN METABOLIC PANEL: CPT | Performed by: NURSE PRACTITIONER

## 2020-03-10 PROCEDURE — 84100 ASSAY OF PHOSPHORUS: CPT | Performed by: NURSE PRACTITIONER

## 2020-03-10 PROCEDURE — 83735 ASSAY OF MAGNESIUM: CPT | Performed by: NURSE PRACTITIONER

## 2020-03-10 PROCEDURE — 85025 COMPLETE CBC W/AUTO DIFF WBC: CPT | Performed by: NURSE PRACTITIONER

## 2020-03-10 NOTE — PROGRESS NOTES
Suture removal:     Date sutures applied: 2/29/2020         Where (setting) in which they applied:ER visit    Description:  Type: sutures  Location: left calf     History:    Cause of laceration: wound/fall in group home     Accompanying Signs & Symptoms: (staff: if yes-describe)  Redness: no  Warmth: no  Drainage: no  Still bleeding: no  Fevers: no    Last tetanus shot: last tetanus booster within 10 years 6/15/2011     5 sutures removed with out issue-   Wound is well healed.       Site coated with bacitracin, non adhesive placed and calf wrapped with Kerlix and Coban as pt has multiple falls.  Hopefully this will offer some protection to the calf.     F/U as needed.       Aleida Harmon RN

## 2020-03-11 LAB
ALBUMIN SERPL-MCNC: 3.1 G/DL (ref 3.4–5)
ALP SERPL-CCNC: 115 U/L (ref 40–150)
ALT SERPL W P-5'-P-CCNC: 18 U/L (ref 0–70)
ANION GAP SERPL CALCULATED.3IONS-SCNC: 6 MMOL/L (ref 3–14)
AST SERPL W P-5'-P-CCNC: 21 U/L (ref 0–45)
BILIRUB SERPL-MCNC: 0.3 MG/DL (ref 0.2–1.3)
BUN SERPL-MCNC: 15 MG/DL (ref 7–30)
CALCIUM SERPL-MCNC: 8.5 MG/DL (ref 8.5–10.1)
CHLORIDE SERPL-SCNC: 106 MMOL/L (ref 94–109)
CO2 SERPL-SCNC: 28 MMOL/L (ref 20–32)
CREAT SERPL-MCNC: 1.03 MG/DL (ref 0.66–1.25)
GFR SERPL CREATININE-BSD FRML MDRD: >90 ML/MIN/{1.73_M2}
GLUCOSE SERPL-MCNC: 112 MG/DL (ref 70–99)
MAGNESIUM SERPL-MCNC: 2.3 MG/DL (ref 1.6–2.3)
PHOSPHATE SERPL-MCNC: 3.1 MG/DL (ref 2.5–4.5)
POTASSIUM SERPL-SCNC: 3.9 MMOL/L (ref 3.4–5.3)
PROT SERPL-MCNC: 6.5 G/DL (ref 6.8–8.8)
SODIUM SERPL-SCNC: 140 MMOL/L (ref 133–144)

## 2020-03-12 DIAGNOSIS — F32.A DEPRESSION, UNSPECIFIED DEPRESSION TYPE: ICD-10-CM

## 2020-03-12 DIAGNOSIS — R45.1 AGITATION: ICD-10-CM

## 2020-03-16 DIAGNOSIS — C71.9 EPENDYMOMA (H): Primary | ICD-10-CM

## 2020-03-16 RX ORDER — LAMOTRIGINE 100 MG/1
200 TABLET ORAL AT BEDTIME
Qty: 60 TABLET | Refills: 0 | Status: SHIPPED | OUTPATIENT
Start: 2020-03-16 | End: 2020-04-13

## 2020-03-16 NOTE — TELEPHONE ENCOUNTER
Last seen: 3/6  RTC: unspecified  Non-provider cancel: None  No-show: None  Next appt: 4/3     Incoming refill from [facility], [facility]. - [place], [place] - [address] [address]  via electronic request     Medication requested:  lamoTRIgine (LAMICTAL) 100 MG tablet  53 tablet  0  3/20/2020  4/26/2020  --    Sig - Route: Take 1 tablet (100 mg) by mouth At Bedtime for 7 days, THEN 2 tablets (200 mg) At Bedtime. [Begin only AFTER completing first month's titration to 100 mg       Writer called pharmacy ([phone] ) and was informed that this request is for the next month out, which will be shipping 3/23-3/25.  Writer e-prescribed 30-day supply to [facility] ([phone]). Qty 60, refills 0.

## 2020-03-18 ENCOUNTER — TELEPHONE (OUTPATIENT)
Dept: PEDIATRIC HEMATOLOGY/ONCOLOGY | Facility: CLINIC | Age: 20
End: 2020-03-18

## 2020-03-18 NOTE — TELEPHONE ENCOUNTER
Return call placed to Dr. Moncada.  He spoke with Geo's dad related to the overnight sleep study/EEG ordered.  Geo's dad notes he can not talk Geo into ever wearing positive pressure mask.  Dr. Moncada discussed their movement disorders clinic run by a Neurologist.  He is being referred there for the involuntary movements as they want to continue that work-up.  I am in agreement with that and they will call dad to set up the appointment

## 2020-03-19 ENCOUNTER — DOCUMENTATION ONLY (OUTPATIENT)
Dept: PEDIATRIC HEMATOLOGY/ONCOLOGY | Facility: CLINIC | Age: 20
End: 2020-03-19

## 2020-03-19 DIAGNOSIS — T14.8XXA WOUND INFECTION: Primary | ICD-10-CM

## 2020-03-19 DIAGNOSIS — L08.9 WOUND INFECTION: Primary | ICD-10-CM

## 2020-03-19 RX ORDER — ALBUTEROL SULFATE 90 UG/1
1-2 AEROSOL, METERED RESPIRATORY (INHALATION)
Status: CANCELLED
Start: 2020-03-24

## 2020-03-19 RX ORDER — EPINEPHRINE 1 MG/ML
0.3 INJECTION, SOLUTION, CONCENTRATE INTRAVENOUS EVERY 5 MIN PRN
Status: CANCELLED | OUTPATIENT
Start: 2020-03-24

## 2020-03-19 RX ORDER — METHYLPREDNISOLONE SODIUM SUCCINATE 125 MG/2ML
125 INJECTION, POWDER, LYOPHILIZED, FOR SOLUTION INTRAMUSCULAR; INTRAVENOUS
Status: CANCELLED
Start: 2020-03-24

## 2020-03-19 RX ORDER — CEPHALEXIN 500 MG/1
500 CAPSULE ORAL 3 TIMES DAILY
Qty: 21 CAPSULE | Refills: 0 | Status: SHIPPED | OUTPATIENT
Start: 2020-03-19 | End: 2020-03-26

## 2020-03-19 RX ORDER — DIPHENHYDRAMINE HYDROCHLORIDE 50 MG/ML
50 INJECTION INTRAMUSCULAR; INTRAVENOUS
Status: CANCELLED
Start: 2020-03-24

## 2020-03-19 RX ORDER — SODIUM CHLORIDE 9 MG/ML
1000 INJECTION, SOLUTION INTRAVENOUS CONTINUOUS PRN
Status: CANCELLED
Start: 2020-03-24

## 2020-03-19 RX ORDER — ALBUTEROL SULFATE 0.83 MG/ML
2.5 SOLUTION RESPIRATORY (INHALATION)
Status: CANCELLED | OUTPATIENT
Start: 2020-03-24

## 2020-03-19 RX ORDER — MEPERIDINE HYDROCHLORIDE 25 MG/ML
25 INJECTION INTRAMUSCULAR; INTRAVENOUS; SUBCUTANEOUS EVERY 30 MIN PRN
Status: CANCELLED | OUTPATIENT
Start: 2020-03-24

## 2020-03-20 NOTE — PROGRESS NOTES
Call received from Geo's residential home at the [facility].    They were concerned about increasing redness and concern for infection of his leg wound.  They are not wanting him to leave the facility for an appointment due to COVID-19 restrictions so a photo was sent               Wound shows increased redness and inflammation since my last examination.  He has decreased sensation in his lower extremities so pain with palpation is not a good way to understand his wounds.      Keflex 500mg TID for 7 days was sent to [facility] and will be delivered to the home. Geo and/or the [facility] will notify me if no improvement or if he develops fever or other concerning new symptoms.

## 2020-03-23 DIAGNOSIS — C71.9 EPENDYMOMA (H): Primary | ICD-10-CM

## 2020-03-25 NOTE — PROGRESS NOTES
Outpatient Occupational Therapy Discharge Note     Patient: Geo Hicks  : 2000    Beginning/End Dates of Reporting Period: 10/22/2019 - 2019    Referring Provider: Jeffrey Anderson Diagnosis: Decreased ADL/IADL     Client Self Report:Geo has moved into a group home here in [place]..  Presents today with his mother, /supervisor and 2 other care providers for introductions.  So far, he is frustrated by how many rules there are and feels like many of them have no reason.     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 Geo is actively receiving occupational (ongoing, since 2015), physical, speech and has had vision therapies to maximize his abilities.  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 He also has global ataxic movements that affect his walking, balance (transfers, walks with min A at gait belt), and upper body  motor planning for upper body gross motor accuracy/speed/timing/coordination, limiting his ability to write and use skilled tools of daily living.      Geo is able to dress and feed himself after set up at the seated level.  He uses the wheelchair when at in the community, but not when at home. No longer attending high school or academic programming after  graduation last spring, but in the past had discussed considering taking some Distance Learning classes via [employer].  At home, ambulates between rooms, completes bathing and toileting with min-mod A of 1.  OT has offered that he may have more ability to become more able to do clothing retrieval, light  house keeping, more (I) toileting and light meal prep in the kitchen if he was at the wheel chair level at home, but he prefers the practice of walking between all daily tasks instead. He is however using the w/c while in the OT kitchen setting and applying safety with low/deep reaches, locking brakes to stay safe.  He is better with feeding himself, reducing spillage, spearing, cutting and spreading while using non adapted silverware with better motor control. We continue to work on in hand manipluation skills to address short entry writing, opening food packages, manage zipper/snap/button closures and use self care tools (toothpaste, toothbrush, electric shaver use). He is very motivated to work with OTR in the cooking/kitchen setting; practicing forearm stabilization, safe dynamic reaching, task sequencing and fine motor control while using tools/ making recipes in the kitchen.     Objective Measurements:     Objective Measure: Dyanvision, timed reaction for visual/GMC   Details: Improved motor planning spped-- With L gets 27, then improves to 35 hits/min (stabilizing with R hand on chair, close SBA for balance) and improved to  32,35  hits /min for R UE (L hand stabilizing on chair, close SBA,  moderate LOB to R, L sides, self corrected.            Objective Measure: /Pinch   Details:  is steady with 100# R and improved  by 10# to 110# L (in April, was 90# B).   key pinch improved to 24# R and 22# L. (was 21# B  on 7/24/19).  3 pt is 16# R/stable and improve to 24# L ( was 21# L in July) .     Objective Measure: Written Communication     Details: Writes 10 characters in 1:05 with right hand using more refined technqiue w all lower case letters, better legibility (was 1:17 in July).  Patient does better w right hand than L today (L 1:17, was 1:25).  Does best if he rests his head on the non-writing arm, moves the eye patch to that same side.  Signature w R  in 5/8 with Capital+lower case letters in 1:13,  80% legibility if printed. L hand takes 1:29 ( was 2:01 in July), but 50% legible. Rapid circles-- R hand: gets 6/10 circles accurate w curved linesin 2:49, faigued at the end.       Objective Measure: GMC / FMC     Details: Box and blocks:  20 blocks with R and L.  ( Was down to 15,18 blocks in July, better now.  Had been 22 L and 15,19 with R 11/12/18.)  9 hole peg test-- able to place 3 pegs into board in :55 sec w L and :60 sec w R. ( drops 5-6 pegs in doing so)  Severe dyskinetic movement and dysmetria (within 3 inches of target) with FNF on self or therapist.         Goals:     Goal Identifier FMC   Goal Description Geo will demonstrate improved fine motor control (as measured by box and blocks test, improved from moving 20 blocks/min to 30 blocks /min) and nine hole peg test  in 1 minute as needed for self feeding, writing, managing food packaging and clothing fasteners.   Target Date 10/22/19   Date Met      Progress: Pt newly testable with nine hole peg test and GMC with box and blocks continues to improve per above.       Goal Identifier Meal Preparation   Goal Description Geo will demonstrate ability to gather needed ingredients/tools from hi/low/deep reaches from wheelchair level, organize steps of task and use various kitchen tools safely to make 2-3 step dishes.   Target Date 10/22/19   Date Met      Progress: Geo has been able to prepare baked items in oven and grilled items in sandwich grill with close SBA, min A for transitions and safety.  WE are working with making adaptations for his tremors (ways to get around repetitive motor planning tasks like stirring, mixing,  applying forearm stabilizing techniques, knife/stive/oven safety, wheelchair mobility during safe multilevel reaching)     Goal Identifier Written communication   Goal Description Geo will demonstrate improved legibility and smaller writing size to support signing his full name on birthday cards and checks, inside of a   1/2 x 4 inch area,  and writing 5-7 word sentences, 4 of 5 given opportunities.    Target Date 10/22/19   Date Met      Progress: Improved per above     Goal Identifier GMC/Reaction Time   Goal Description Patient will demonstrate improved UE motor and visual reaction time for improved visual skills, improved ability to prevent daily mishaps and safer independent home based mobility by demonstrating sustained reaction time of 2.0 seconds (from 3.20 sec Feb 2017) on Mode A of [employer].   Target Date 10/22/19   Date Met      Progress: Greatly improved per above       Goal Identifier Trunk Control    Goal Description Geo will complete 10 minutes of dynamic upper body activities while maintaining an 90% upright head/seated posture as needed to support feeding, writing and eye contact during meal time interactions.   Target Date 10/22/19   Date Met      Progress: Goal continued.  His head control has been much better in the past 2 weeks, affect is brighter.       Progress this reporting period: Pt has been seen for 4 visits of skilled OT sine last certification note on 11/22/19.  Progress per above.        Plan:  Continue therapy per current plan of care.     Discharge: yes    --OT DISCHARGE SUMMARY--     Pt did not return after his 12/4/19 visit in OT.  Please consider the following information as the discharge, as of 3/25/20. Thanks for the thoughtful referral!  Elyse Costello, OTR/l

## 2020-03-31 ENCOUNTER — VIRTUAL VISIT (OUTPATIENT)
Dept: PEDIATRIC HEMATOLOGY/ONCOLOGY | Facility: CLINIC | Age: 20
End: 2020-03-31
Attending: PEDIATRICS
Payer: COMMERCIAL

## 2020-03-31 DIAGNOSIS — C71.9 EPENDYMOMA (H): ICD-10-CM

## 2020-03-31 DIAGNOSIS — D49.6 NEOPLASM OF POSTERIOR CRANIAL FOSSA (H): Primary | ICD-10-CM

## 2020-03-31 NOTE — LETTER
"3/31/2020      RE: Geo Hicks  [id number] [address] [facility] [id number]          Pediatric Hematology/Oncology Phone Note     Geo Hicks is being evaluated via a billable telephone visit due to COVID-19 clinic restrictions for in-person visits.   The patient has been notified of following:   \"This telephone visit will be conducted via a call between you and your physician/provider. We have found that certain health care needs can be provided without the need for a physical exam. This service lets us provide the care you need with a short phone conversation. If a prescription is necessary we can send it directly to your pharmacy. If lab work is needed we can place an order for that and you can then stop by our lab to have the test done at a later time. If during the course of the call the physician/provider feels a telephone visit is not appropriate, you will not be charged for this service.\"   CC: Geo Hicks is a 19 year old male with ependymoma who is enrolled on COG [id number] - A Phase 1 Study of Entinostat, an Oral Histone Deacetylase Inhibitor, in Pediatric Patients With Recurrent or Refractory Solid Tumors, Including CNS Tumors and Lymphoma and requires follow-up   HPI: Geo has had no fevers. No nausea, or vomiting. He has a lot of bruising on his forearms/elbows. He has small cut on his finger and a cut on the outside of his right foot that he is not sure how they happens but notes they are very small cuts. He is intermittently moving his upper extremities and having some jerks when he is sleeping per staff.  They have noted no seizure activity.  They have also noted restless legs when he is sleeping. Geo had a bowel movement last night. He has had urine incontinence in his bed last week and it was unclear if he didn't ask in time to be helped or just didn't ask.  This is an unusual occurrence for him.  He was treated with Keflex for 7 days for worsening wound. He completed on 2/28/20. The " wound is much better, it is just dry and scabbed now.  He had no missed doses of medication with his last course.   History was obtained from Geo and his care giver at [facility].   Allergies:     Allergies   Allergen Reactions     Blood Transfusion Related (Informational Only) Swelling     Periorbital swelling post platelet transfusion     No Known Drug Allergies      Current Outpatient Medications   Medication     Calcium-Vitamin D-Vitamin K (VIACTIV CALCIUM PLUS D) 650-12.5-40 MG-MCG-MCG CHEW     dexamethasone (DECADRON) 0.5 MG tablet     fexofenadine (ALLEGRA) 180 MG tablet     Lactobacillus-Inulin ([facility]) CAPS     lamoTRIgine (LAMICTAL) 100 MG tablet     lamoTRIgine (LAMICTAL) 25 MG tablet     linaclotide (LINZESS) 290 MCG capsule     mupirocin (BACTROBAN) 2 % external ointment     OLANZapine (ZYPREXA) 20 MG tablet     OLANZapine (ZYPREXA) 5 MG tablet     omeprazole (PRILOSEC) 20 MG DR capsule     order for DME     order for DME     polyethylene glycol (MIRALAX) powder     potassium phosphate, monobasic, (K-PHOS) 500 MG tablet     senna-docusate (SENOKOT-S/PERICOLACE) 8.6-50 MG tablet     sertraline (ZOLOFT) 100 MG tablet     study - entinostat, IDS# [id number], 1 mg tablet     study - entinostat, IDS# [id number], 5 mg tablet     sulfamethoxazole-trimethoprim (BACTRIM) 400-80 MG tablet     vitamin D3 (CHOLECALCIFEROL) 2000 units (50 mcg) tablet     vitamin E (TOCOPHEROL) 400 units (360 mg) capsule     No current facility-administered medications for this visit.        PAST MEDICAL HISTORY:   Past Medical History:   Diagnosis Date     Cranial nerve dysfunction      Dyspepsia      Ependymoma (H)      Gastro-oesophageal reflux disease      Hearing loss      Intracranial hemorrhage (H)      Migraine      Pilonidal cyst     7-2015     Reduced vision      Refractory obstruction of nasal airway     2nd to nasal valve prolapse     Sleep apnea      Strabismus     gaze palsy        PAST SURGICAL HISTORY:    Past Surgical History:   Procedure Laterality Date     COLONOSCOPY N/A 9/27/2019    Procedure: Colonoscopy With Biopsies and Polypectomy;  Surgeon: Aniya Wei MD;  Location: [facility] OR     ESOPHAGOSCOPY, GASTROSCOPY, DUODENOSCOPY (EGD), COMBINED N/A 9/27/2019    Procedure: Upper Endoscopy (EGD) With Biopsies;  Surgeon: Aniya Wei MD;  [facility]: [facility] OR     GRAFT CARTILAGE FROM POSTERIOR AURICLE Left 10/6/2016    Procedure: GRAFT CARTILAGE FROM POSTERIOR AURICLE;  Surgeon: Tyler Richards MD;  [facility]: [facility] OR     INCISION AND DRAINAGE PERINEAL, COMBINED Bilateral 7/18/2015    Procedure: COMBINED INCISION AND DRAINAGE PERINEAL;  Surgeon: Dequan Timmons MD;  [facility]: [facility] OR     OPTICAL TRACKING SYSTEM CRANIOTOMY, EXCISE TUMOR, COMBINED N/A 4/13/2015    Procedure: COMBINED OPTICAL TRACKING SYSTEM CRANIOTOMY, EXCISE TUMOR;  Surgeon: Francis Velazquez MD;  [facility]     OPTICAL TRACKING SYSTEM CRANIOTOMY, EXCISE TUMOR, COMBINED N/A 4/16/2015    Procedure: COMBINED OPTICAL TRACKING SYSTEM CRANIOTOMY, EXCISE TUMOR;  Surgeon: Francis Velazquez MD;  [facility]     OPTICAL TRACKING SYSTEM CRANIOTOMY, EXCISE TUMOR, COMBINED Bilateral 5/28/2015    Procedure: COMBINED OPTICAL TRACKING SYSTEM CRANIOTOMY, EXCISE TUMOR;  Surgeon: Francis Velazquez MD;  [facility]     OPTICAL TRACKING SYSTEM CRANIOTOMY, EXCISE TUMOR, COMBINED Bilateral 1/14/2016    Procedure: COMBINED OPTICAL TRACKING SYSTEM CRANIOTOMY, EXCISE TUMOR;  Surgeon: Francis Velazquez MD;  [facility]     OPTICAL TRACKING SYSTEM VENTRICULOSTOMY  4/16/2015    Procedure: OPTICAL TRACKING SYSTEM VENTRICULOSTOMY;  Surgeon: Francis Velazquez MD;  [facility]     REMOVE PORT VASCULAR ACCESS N/A 10/6/2016    Procedure: REMOVE PORT VASCULAR ACCESS;  Surgeon: Bruno Perea MD;  [facility]     RHINOPLASTY N/A 10/6/2016    Procedure: RHINOPLASTY;  Surgeon:  Tyler Richards MD;  [facility]     VASCULAR SURGERY  5-2015    single lumen power port       FAMILY HISTORY:   Family History   Problem Relation Age of Onset     Circulatory Father         PE/DVT     Hypothyroidism Father 30     Diabetes Maternal Grandmother      Diabetes Paternal Grandmother      Diabetes Paternal Grandfather      C.A.D. Paternal Grandfather      Hypertension Maternal Grandfather      Thyroid Disease Paternal Aunt         unknown whether hypo or hyper     Mental Illness No family hx of    Fam/Soc: Lives at a transitional home - [facility]. His parents are  but have been awarded guardianship of Geo. The families work well together - both parents have remarried. His dad has a clotting disorder, requiring him to take Warfarin daily. Mother and Father are both ill currently with respiratory illnesses.     SOCIAL HISTORY:   Social History     Tobacco Use     Smoking status: Never Smoker     Smokeless tobacco: Never Used   Substance Use Topics     Alcohol use: No     Review of Systems   Constitutional: In wheelchair   HENT: Positive for drooling. Negative for nosebleeds.   Eyes: Patching for diplopia.   Gastrointestinal: Positive for constipation.   Genitourinary: Negative for difficulty urinating.   Musculoskeletal: Unable to walk without assistance.    Neurological: Positive for tremors, speech difficulty and weakness.     Vital signs:   Temp 97.7 Pulse 105 /66 Sats 93%   Karnofsky 60%  Unable to get weight today.   Wt Readings from Last 2 Encounters:   02/25/20 88.1 kg (194 lb 3.6 oz)   01/25/20 89.3 kg (196 lb 14.4 oz)     Exam:  No physical exam was completed today due to the COVID restrictions in place by Geo's facility. They are not allowing him to leave the facility for safety of all the residents.   Labs:  Unable to get home care to do labs. We continue to research service who may be able to do this.     Impression:   1. Ependymoma Most recent MRI  1/7/2020: No metastases, primary tumor stable compared to recent studies.   2. Constipation.  3. Geo is one week delayed and has never has thrombocytopenia requiring more than a one week delay.     4. Restless movements of legs and involuntary movements that seem improved since recent medication changes (stopping Elavil and psychological medication changes).    5. Behavioral issues have not been a recent issue.     Plan:   1. He will begin Etinostat 6 mg weekly.  As noted we are unable to obtain labs as he is not allowed to leave his facility and we have been unable to secure a home care company within his network.  However, he has never had greater than a week delay due to thrombocytopenia so we feel benefit outweighs risk as he has had good response on this medicaton.  Medication was delivered to family member and diary was provided.   2. Constipation discussed. The [facility] record his stools and he has been having regular stools.    3. He should bandage his legs for skin tears or wounds - If needed they should change dressing daily and use Vaseline impregnated gauze prior to dry gauze so that it doesn't lift the new granulation and scabbing each time.   4.  He is due for imaging in 4 weeks.  His tumor has been very stable so I am hopeful we can delay it with the current COVID-19 restrictions.  We will check with the study.   5. He should continue close follow-up with Psychiatry. We will continue to monitor restless less and involuntary movements.  He will be seen at [facility] as soon as in-person visits can occur as it is not urgent right now.      I have reviewed and updated the patient's Past Medical History, Social History, Family History and Medication List.    Telephone contact:  Phone call start:  1:24    Phone call end:    1:37     Second call:   Phone call start: 2:36  Phone call end:  2:49      Total 26 minutes       ALAN Justin CNP

## 2020-03-31 NOTE — PROGRESS NOTES
"   Pediatric Hematology/Oncology Phone Note     Geo Hicks is being evaluated via a billable telephone visit due to COVID-19 clinic restrictions for in-person visits.   The patient has been notified of following:   \"This telephone visit will be conducted via a call between you and your physician/provider. We have found that certain health care needs can be provided without the need for a physical exam. This service lets us provide the care you need with a short phone conversation. If a prescription is necessary we can send it directly to your pharmacy. If lab work is needed we can place an order for that and you can then stop by our lab to have the test done at a later time. 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 No nausea, or vomiting. He has a lot of bruising on his forearms/elbows. He has small cut on his finger and a cut on the outside of his right foot that he is not sure how they happens but notes they are very small cuts. He is intermittently moving his upper extremities and having some jerks when he is sleeping per staff.  They have noted no seizure activity.  They have also noted restless legs when he is sleeping. Geo had a bowel movement last night. He has had urine incontinence in his bed last week and it was unclear if he didn't ask in time to be helped or just didn't ask.  This is an unusual occurrence for him.  He was treated with Keflex for 7 days for worsening wound. He completed on 2/28/20. The wound is much better, it is just dry and scabbed now.  He had no missed doses of " medication with his last course.   History was obtained from Geo and his care giver at [facility].   Allergies:     Allergies   Allergen Reactions     Blood Transfusion Related (Informational Only) Swelling     Periorbital swelling post platelet transfusion     No Known Drug Allergies      Current Outpatient Medications   Medication     Calcium-Vitamin D-Vitamin K (VIACTIV CALCIUM PLUS D) 650-12.5-40 MG-MCG-MCG CHEW     dexamethasone (DECADRON) 0.5 MG tablet     fexofenadine (ALLEGRA) 180 MG tablet     Lactobacillus-Inulin ([facility]) CAPS     lamoTRIgine (LAMICTAL) 100 MG tablet     lamoTRIgine (LAMICTAL) 25 MG tablet     linaclotide (LINZESS) 290 MCG capsule     mupirocin (BACTROBAN) 2 % external ointment     OLANZapine (ZYPREXA) 20 MG tablet     OLANZapine (ZYPREXA) 5 MG tablet     omeprazole (PRILOSEC) 20 MG DR capsule     order for DME     order for DME     polyethylene glycol (MIRALAX) powder     potassium phosphate, monobasic, (K-PHOS) 500 MG tablet     senna-docusate (SENOKOT-S/PERICOLACE) 8.6-50 MG tablet     sertraline (ZOLOFT) 100 MG tablet     study - entinostat, IDS# [id number], 1 mg tablet     study - entinostat, IDS# [id number], 5 mg tablet     sulfamethoxazole-trimethoprim (BACTRIM) 400-80 MG tablet     vitamin D3 (CHOLECALCIFEROL) 2000 units (50 mcg) tablet     vitamin E (TOCOPHEROL) 400 units (360 mg) capsule     No current facility-administered medications for this visit.        PAST MEDICAL HISTORY:   Past Medical History:   Diagnosis Date     Cranial nerve dysfunction      Dyspepsia      Ependymoma (H)      Gastro-oesophageal reflux disease      Hearing loss      Intracranial hemorrhage (H)      Migraine      Pilonidal cyst     7-2015     Reduced vision      Refractory obstruction of nasal airway     2nd to nasal valve prolapse     Sleep apnea      Strabismus     gaze palsy        PAST SURGICAL HISTORY:   Past Surgical History:   Procedure Laterality Date     COLONOSCOPY N/A  9/27/2019    Procedure: Colonoscopy With Biopsies and Polypectomy;  Surgeon: Aniya Wei MD;  [facility]: [facility]     ESOPHAGOSCOPY, GASTROSCOPY, DUODENOSCOPY (EGD), COMBINED N/A 9/27/2019    Procedure: Upper Endoscopy (EGD) With Biopsies;  Surgeon: Aniya Wei MD;  [facility]     GRAFT CARTILAGE FROM POSTERIOR AURICLE Left 10/6/2016    Procedure: GRAFT CARTILAGE FROM POSTERIOR AURICLE;  Surgeon: Tyler Richards MD;  [facility]     INCISION AND DRAINAGE PERINEAL, COMBINED Bilateral 7/18/2015    Procedure: COMBINED INCISION AND DRAINAGE PERINEAL;  Surgeon: Dequan Timmons MD;  [facility]     OPTICAL TRACKING SYSTEM CRANIOTOMY, EXCISE TUMOR, COMBINED N/A 4/13/2015    Procedure: COMBINED OPTICAL TRACKING SYSTEM CRANIOTOMY, EXCISE TUMOR;  Surgeon: Francis Velazquez MD;  [facility]     OPTICAL TRACKING SYSTEM CRANIOTOMY, EXCISE TUMOR, COMBINED N/A 4/16/2015    Procedure: COMBINED OPTICAL TRACKING SYSTEM CRANIOTOMY, EXCISE TUMOR;  Surgeon: Francis Velazquez MD;  [facility]     OPTICAL TRACKING SYSTEM CRANIOTOMY, EXCISE TUMOR, COMBINED Bilateral 5/28/2015    Procedure: COMBINED OPTICAL TRACKING SYSTEM CRANIOTOMY, EXCISE TUMOR;  Surgeon: Francis Velazquez MD;  [facility]     OPTICAL TRACKING SYSTEM CRANIOTOMY, EXCISE TUMOR, COMBINED Bilateral 1/14/2016    Procedure: COMBINED OPTICAL TRACKING SYSTEM CRANIOTOMY, EXCISE TUMOR;  Surgeon: Francis Velazquez MD;  [facility]     OPTICAL TRACKING SYSTEM VENTRICULOSTOMY  4/16/2015    Procedure: OPTICAL TRACKING SYSTEM VENTRICULOSTOMY;  Surgeon: Francis Velazquez MD;  [facility]     REMOVE PORT VASCULAR ACCESS N/A 10/6/2016    Procedure: REMOVE PORT VASCULAR ACCESS;  Surgeon: Bruno Perea MD;  [facility]     RHINOPLASTY N/A 10/6/2016    Procedure: RHINOPLASTY;  Surgeon: Tyler Richards MD;  [facility]     VASCULAR SURGERY  5-2015    single  lumen power port       FAMILY HISTORY:   Family History   Problem Relation Age of Onset     Circulatory Father         PE/DVT     Hypothyroidism Father 30     Diabetes Maternal Grandmother      Diabetes Paternal Grandmother      Diabetes Paternal Grandfather      C.A.D. Paternal Grandfather      Hypertension Maternal Grandfather      Thyroid Disease Paternal Aunt         unknown whether hypo or hyper     Mental Illness No family hx of    Fam/Soc: Lives at a transitional home - [facility]. His parents are  but have been awarded guardianship of Geo. The families work well together - both parents have remarried. His dad has a clotting disorder, requiring him to take Warfarin daily. Mother and Father are both ill currently with respiratory illnesses.     SOCIAL HISTORY:   Social History     Tobacco Use     Smoking status: Never Smoker     Smokeless tobacco: Never Used   Substance Use Topics     Alcohol use: No     Review of Systems   Constitutional: In wheelchair   HENT: Positive for drooling. Negative for nosebleeds.   Eyes: Patching for diplopia.   Gastrointestinal: Positive for constipation.   Genitourinary: Negative for difficulty urinating.   Musculoskeletal: Unable to walk without assistance.    Neurological: Positive for tremors, speech difficulty and weakness.     Vital signs:   Temp 97.7 Pulse 105 /66 Sats 93%   Karnofsky 60%  Unable to get weight today.   Wt Readings from Last 2 Encounters:   02/25/20 88.1 kg (194 lb 3.6 oz)   01/25/20 89.3 kg (196 lb 14.4 oz)     Exam:  No physical exam was completed today due to the COVID restrictions in place by Geo's facility. They are not allowing him to leave the facility for safety of all the residents.   Labs:  Unable to get home care to do labs. We continue to research service who may be able to do this.     Impression:   1. Ependymoma Most recent MRI 1/7/2020: No metastases, primary tumor stable compared to recent studies.   2.  Constipation.  3. Geo is one week delayed and has never has thrombocytopenia requiring more than a one week delay.     4. Restless movements of legs and involuntary movements that seem improved since recent medication changes (stopping Elavil and psychological medication changes).    5. Behavioral issues have not been a recent issue.     Plan:   1. He will begin Etinostat 6 mg weekly.  As noted we are unable to obtain labs as he is not allowed to leave his facility and we have been unable to secure a home care company within his network.  However, he has never had greater than a week delay due to thrombocytopenia so we feel benefit outweighs risk as he has had good response on this medicaton.  Medication was delivered to family member and diary was provided.   2. Constipation discussed. The [facility] record his stools and he has been having regular stools.    3. He should bandage his legs for skin tears or wounds - If needed they should change dressing daily and use Vaseline impregnated gauze prior to dry gauze so that it doesn't lift the new granulation and scabbing each time.   4.  He is due for imaging in 4 weeks.  His tumor has been very stable so I am hopeful we can delay it with the current COVID-19 restrictions.  We will check with the study.   5. He should continue close follow-up with Psychiatry. We will continue to monitor restless less and involuntary movements.  He will be seen at [place] as soon as in-person visits can occur as it is not urgent right now.      I have reviewed and updated the patient's Past Medical History, Social History, Family History and Medication List.    Telephone contact:  Phone call start:  1:24    Phone call end:    1:37     Second call:   Phone call start: 2:36  Phone call end:  2:49      Total 26 minutes

## 2020-04-03 ENCOUNTER — VIRTUAL VISIT (OUTPATIENT)
Dept: PSYCHIATRY | Facility: CLINIC | Age: 20
End: 2020-04-03
Attending: PSYCHIATRY & NEUROLOGY
Payer: COMMERCIAL

## 2020-04-03 DIAGNOSIS — F32.A DEPRESSION, UNSPECIFIED DEPRESSION TYPE: Primary | ICD-10-CM

## 2020-04-03 DIAGNOSIS — F51.04 PSYCHOPHYSIOLOGICAL INSOMNIA: ICD-10-CM

## 2020-04-03 DIAGNOSIS — F22 PARANOIA (H): ICD-10-CM

## 2020-04-03 DIAGNOSIS — R45.1 AGITATION: ICD-10-CM

## 2020-04-05 NOTE — PROGRESS NOTES
"VIDEO VISIT  Geo Hicks is a 19 year old patient who is being evaluated via a billable video visit.      The patient has been notified of following:   \"We have found that certain health care needs can be provided without the need for an in-person physical exam. This service lets us provide the care you need with a video conversation. If a prescription is necessary we can send it directly to your pharmacy. If lab work is needed we can place an order for that and you can then stop by our lab to have the test done at a later time. Insurers are generally covering virtual visits as they would in-office visits so billing should not be different than normal.  If for some reason you do get billed incorrectly, you should contact the billing office to correct it and that number is in the AVS .    Patient has given verbal consent for video visit?: Yes   How would you like to obtain your AVS?: cacaoTV  AVS SmartPhrase [PsychAVS] has been placed in 'Patient Instructions': Yes      Video- Visit Details  Type of service:  video visit for medication management  Time of service:    Date:  04/03/2020    Video Start Time:  10:33 AM Video End Time:  10:58 AM    Reason for video visit:  Patient unable to travel due to Covid-19  Originating Site (patient location):  Group Home  Distant Site (provider location):  Remote location  Mode of Communication:  Video Conference via Aleth[facility] PS[facility] PROGRESS NOTE     CARE TEAM:  PCP- Jeffrey Espinoza    Specialty Providers- Oncology, Neuropsychology, Physical Therapy, Occupational Therapy    Therapist- most recently Manju Pink at [facility] in [facility] Team- no.    Geo Hicks is a 19 year old male who prefers the name Geo & pronouns he, him, his, himself.    Date of initial diagnostic assessment is 2/7/2019.    Pertinent Background:  This patient first experienced significant mental health issues in 2018 and has received treatment for " "paranoia/delusions and depressed mood.  Notably, Geo is undergoing cancer treatment with the [facility] and was referred for psychiatric management with our department by neuropsychology.  He has a history of ependymoma that was diagnosed at age 14. Since then, he has undergone multiple tumor resections, chemotherapy, and radiation treatment. Geo also experienced an intra-tumoral hemorrhage in May 2015 that resulted in neurological changes including facial numbness, significant loss of mobility and dysarthria.  He is currently on COG study [id number] with Entinostat.  Since 2018, Geo has developed a fixed delusion that definitive care is being withheld by his oncology providers, specifically believing that they are choosing not to treat his constipation, and that it is due to this that he is no longer able to walk without assistance.  Has demonstrated agitation and emotional discord with behavioral disturbance in the setting of this belief.  While his emotional upset has been somewhat amenable to treatment with medications, his delusional belief has remained so far resistant to management.    Psych critical item history includes suicidal ideation, psychosis [sxs include delusions] and psychiatric hospitalization x1.    INTERIM HISTORY                                                                                                                 4, 4   History was provided by the patient and his father who were good historians.  The last visit ended with no change to the med regimen.  Since the last visit:  -Presents via telemedicine from his group home.  Group home staff accompany him in the background.  -States that his mood is \"pretty good,\" and \"not bad.\"  His staff corroborates this assessment.  -Reports that he's enjoyed some recent activities with other group home members, including bowling.  -Feels like medications are well-tolerated.  When asked about recent concerns for leg " jerking/involuntary movements, he states that this has either gotten better or stayed the same.  Notably, this type of movement was not noticed by this provider during our video interview.  -Denies SI/SIB thoughts, violent/aggressive ideation, depressive severity, inappropriately elevated moods or other hallmarks of psychiatric decompensation with dangerousness.  -Does mention some difficulty with sleep initiation and we agreed to restart trazodone, now that he's no longer taking amitriptyline.    RECENT SYMPTOMS:   DEPRESSION:  reports-low moods, anhedonia, feeling trapped and overwhelmed;  DENIES- suicidal ideation, low energy, insomnia, hypersomnia, excessive guilt, feeling hopeless and excessive crying  ELIZABETH/HYPOMANIA:  reports-none;  DENIES- increased energy, decreased sleep need, increased activity and grandiosity  PSYCHOSIS:  reports-delusions- paranoid [details in Interim History];  DENIES- auditory hallucinations and visual hallucinations  DYSREGULATION:  reports-can become irritable/agitated if beliefs are challenged;  DENIES- violent ideation, SIB, mood dysregulation, impulsive and aggressive  PANIC ATTACK:  none  ANXIETY:  excessive worry and nervous/overwhelmed  TRAUMA RELATED:  none  COMPULSIVE:  none  SLEEP:  initiation and maintenance both improved with trazodone  EATING DISORDER: no    RECENT SUBSTANCE USE:  ALCOHOL- no  TOBACCO- no  CAFFEINE- minimal  OPIOIDS- no         NARCAN KIT- N/A  CANNABIS- no  OTHER ILLICIT DRUGS- no      CURRENT SOCIAL HISTORY:  FINANCIAL SUPPORT- family  CHILDREN- no  LIVING SITUATION- assisted living facility  SOCIAL/ SPIRITUAL SUPPORT- mother, father, older brother  FEELS SAFE AT HOME- yes    MEDICAL ROS (2,10):  A comprehensive review of systems was performed and is negative other than noted in the HPI.    PSYCH and CD Critical Summary Points since July 2018 February 2019:  Clinic intake on 2/7.  Later was admitted to [facility] for a brief inpatient psych  admission from 3/14-3/15, most of which was spent in the ER, due to suicidal comments that concerned family.  Was ultimately deemed safe for outpatient follow-up and was discharged with an increase in olanzapine to 15 mg.  March 2019:  Began sertraline 50 mg daily.  May 2019:  Began trazodone 25-50 mg at bedtime.  June 2019:  Increased trazodone to 100 mg at bedtime.  July 2019:  Increased Sertraline to 100 mg daily.  August 2019:  Increased olanzapine to 17.5 mg and decreased trazodone to 75 mg, both at bedtime.  September 2019:  Increased olanzapine to 20 mg at bedtime.  October 2019:  Titrated olanzapine to 25 mg at bedtime.  November 2019:  Increased sertraline to 150 mg daily.  December 2019:  In collaboration with oncology providers agreed to cross-taper from trazodone to amitriptyline, with the latter medication added to help with GI discomfort, and trazodone discontinued in order to reduce overall serotonergic burden.  January 2020:  Olanzapine increased from 25 mg to 30 mg at bedtime.  Later that month was admitted for routine bowel clean-out to inpatient pediatrics shortly after being asked to leave his group home - ultimately, family and Geo did not feel it was safe or desirable for him to return home and so remained admitted to peds until a new assisted living placement could be found, ultimately being discharged 2/18/2020.  February 2020:  Initiated lamotrigine titration to 200 mg, tapered olanzapine to 20 mg and (in collaboration with oncology providers) discontinued amitriptyline.    PAST PSYCH MED TRIALS   see EMR Problem List: Hx of psychiatric care    MEDICAL / SURGICAL HISTORY                                   Neurologic Hx- See pertinent background, re: history of ependymoma and related chemo, radiation and tumor resection(s).  Notably has experienced neurological damage due to the above which has resulted in facial numbness, significant loss of mobility and dysarthria.  Has had  "neuropsychiatric evaluations 2/2016, 2/2017, 1/2019, and 10/2019.  The following is from the \"Results and Impressions\" section of his 10/29/2019 eval with Veronica Padilla, with a passage bolded that relates to noted difficulty in an area of executive functioning that facilitates the ability to see other people's perspectives:     \"Geo s attention was rated by his mother as well as himself as being adequate.  Executive functioning are those skills including planning, organization, emotional control, cognitive flexibility, and the ability to take another person s perspective.  Geo rating scale of these skills was basically in the average range for his age with a slight elevation in his ability to shift from one activity to another.  His mother s ratings of his executive functioning were in the average range, with the exception of a slight elevation in his ability to initiate tasks.  Direct testing of his ability to take another person s perspective indicated difficulty in this area.  He was asked to sort objects based on various aspects of the objects.  When he sorted the objects, he showed average performance.  However, when the examiner sorted the objects, Geo experienced significant difficulty with being able to determine how the objects were sorted.  This difficulty likely translates into difficulty with understanding another person s point of view.  Geo indicated that this difficulty becomes quite frustrating for him as he doesn t feel other people understand him--it is likely that he has difficulty understanding their perspective.     Emotionally Geo indicated that he continues to feel some difficulty with sadness but that it has improved over time and with medication.  Geo denied significant feelings of anxiety at this time while in the past these scores have been higher.  Parent ratings of Geo s emotional functioning indicated no areas of significant concern.  Interviews with " "Geo and his mother indicated continued feelings of sadness and difficulty in motivating himself to try to new activities.  While there is improvement in his mood, Geo continues to be at risk for depression.  Since he is now under the care of a psychiatrist, we did not interview around his feelings that his medical team was not being helpful to him.  His mother reported that these feelings continue and likely interfere with his compliance with directives.  He is participating in therapy to work on these issues and it is important that this intervention continue.\"    Patient Active Problem List   Diagnosis     GERD (gastroesophageal reflux disease)     Closed fracture at the growth plate of right distal fibula      Elevated serum creatinine     Dyspepsia     Intracranial hemorrhage (H)     Hemorrhagic stroke (H)     Ependymoma (H)     Admission for antineoplastic chemotherapy     Post-operative state     S/P craniotomy     S/P biopsy     Noncomitant strabismus     Abducens neuropathy of both eyes     CANDELARIO (obstructive sleep apnea)     Health Care Home     Hypernatremia     Body temperature low     Current chronic use of systemic steroids     Elevated TSH     Status post chemotherapy     Neoplasm of posterior cranial fossa (H)     Chronic constipation     Serum albumin decreased     Closed compression fracture of thoracic vertebra with routine healing, subsequent encounter     Depression     Constipation     Constipation by delayed colonic transit     Slow transit constipation     Past Surgical History:   Procedure Laterality Date     COLONOSCOPY N/A 9/27/2019    Procedure: Colonoscopy With Biopsies and Polypectomy;  Surgeon: Aniya Wei MD;  [facility]: [facility]     ESOPHAGOSCOPY, GASTROSCOPY, DUODENOSCOPY (EGD), COMBINED N/A 9/27/2019    Procedure: Upper Endoscopy (EGD) With Biopsies;  Surgeon: Aniya Wei MD;  [facility]: [facility]     GRAFT CARTILAGE FROM POSTERIOR AURICLE Left " 10/6/2016    Procedure: GRAFT CARTILAGE FROM POSTERIOR AURICLE;  Surgeon: Tyler Richards MD;  [facility]     INCISION AND DRAINAGE PERINEAL, COMBINED Bilateral 7/18/2015    Procedure: COMBINED INCISION AND DRAINAGE PERINEAL;  Surgeon: Dequan Timmons MD;  [facility]     OPTICAL TRACKING SYSTEM CRANIOTOMY, EXCISE TUMOR, COMBINED N/A 4/13/2015    Procedure: COMBINED OPTICAL TRACKING SYSTEM CRANIOTOMY, EXCISE TUMOR;  Surgeon: Francis Velazquez MD;  [facility]     OPTICAL TRACKING SYSTEM CRANIOTOMY, EXCISE TUMOR, COMBINED N/A 4/16/2015    Procedure: COMBINED OPTICAL TRACKING SYSTEM CRANIOTOMY, EXCISE TUMOR;  Surgeon: Francis Velazquez MD;  [facility]     OPTICAL TRACKING SYSTEM CRANIOTOMY, EXCISE TUMOR, COMBINED Bilateral 5/28/2015    Procedure: COMBINED OPTICAL TRACKING SYSTEM CRANIOTOMY, EXCISE TUMOR;  Surgeon: Francis Velazquez MD;  [facility]     OPTICAL TRACKING SYSTEM CRANIOTOMY, EXCISE TUMOR, COMBINED Bilateral 1/14/2016    Procedure: COMBINED OPTICAL TRACKING SYSTEM CRANIOTOMY, EXCISE TUMOR;  Surgeon: Francis Velazquez MD;  [facility]     OPTICAL TRACKING SYSTEM VENTRICULOSTOMY  4/16/2015    Procedure: OPTICAL TRACKING SYSTEM VENTRICULOSTOMY;  Surgeon: Francis Velazquez MD;  [facility]     REMOVE PORT VASCULAR ACCESS N/A 10/6/2016    Procedure: REMOVE PORT VASCULAR ACCESS;  Surgeon: Bruno Perea MD;  [facility]     RHINOPLASTY N/A 10/6/2016    Procedure: RHINOPLASTY;  Surgeon: Tyler Richards MD;  [facility]     VASCULAR SURGERY  5-2015    single lumen power port       ALLERGY                                Blood transfusion related (informational only) and No known drug allergies     MEDICATIONS                               Current Outpatient Medications   Medication     lamoTRIgine (LAMICTAL) 200 MG tablet     OLANZapine (ZYPREXA) 20 MG tablet     sertraline (ZOLOFT) 150 MG tablet     Calcium-Vitamin  "D-Vitamin K (VIACTIV CALCIUM PLUS D) 650-12.5-40 MG-MCG-MCG CHEW     dexamethasone (DECADRON) 0.5 MG tablet     fexofenadine (ALLEGRA) 180 MG tablet     Lactobacillus-Inulin ([facility]) CAPS     linaclotide (LINZESS) 290 MCG capsule     mupirocin (BACTROBAN) 2 % external ointment     omeprazole (PRILOSEC) 20 MG DR capsule     order for DME     order for DME     polyethylene glycol (MIRALAX) powder     potassium phosphate, monobasic, (K-PHOS) 500 MG tablet     senna-docusate (SENOKOT-S/PERICOLACE) 8.6-50 MG tablet     study - entinostat, IDS# [id number], 1 mg tablet     study - entinostat, IDS# [id number], 5 mg tablet     sulfamethoxazole-trimethoprim (BACTRIM) 400-80 MG tablet     vitamin D3 (CHOLECALCIFEROL) 2000 units (50 mcg) tablet     vitamin E (TOCOPHEROL) 400 units (360 mg) capsule       VITALS                                                                                                                          3, 3   There were no vitals taken for this visit.     MENTAL STATUS EXAM                                                                                           9, 14 cog gs     Alertness: alert  and oriented  Appearance: casually groomed, seated in wheelchair, wearing R-sided eye-patch  Behavior/Demeanor: cooperative, with fair eye contact   Speech: prominent dysarthria  Language: good  Psychomotor: mild evident palsy of upper extremities and facial paralysis  Mood: \"Not bad.\"  Affect: restricted; was congruent to mood; was congruent to content  Thought Process/Associations: unremarkable  Thought Content:  Reports delusions;  Denies suicidal ideation and violent ideation  Perception:  Reports none;  Denies auditory hallucinations and visual hallucinations  Insight: partial to poor  Judgment: good  Cognition: (6) oriented: time, person, and place  attention span: intact  concentration: intact  recent memory: intact  remote memory: intact  fund of knowledge: appropriate  Gait and Station: " "remarkable for:  ataxia - can ambulate but was seen in wheelchair     LABS and DATA     AIMS:  Due at next \"in-clinic\" visit.    PHQ9 TODAY = Did not complete today.  PHQ-9 SCORE 2019   PHQ-9 Total Score 6 8 9       ANTIPSYCHOTIC LABS  [glu, A1C, lipids (rohit LDL), liver enzymes, WBC, ANEU, Hgb, plts]  q12 mo  Recent Labs   Lab Test 04/21/20 03/10/20  1338 20  1314 20  1413  19  1100   GLC 71 112* 88 93   < > 124*   A1C  --   --   --   --   --  5.1    < > = values in this interval not displayed.     Recent Labs   Lab Test 19  1100   CHOL 158   TRIG 236*   LDL 84   HDL 27*     Recent Labs   Lab Test 03/10/20  1338 20  1314 20  1413 20  0713   AST 21 29 42 27   ALT 18 23 24 22   ALKPHOS 115 110 126 149     Recent Labs   Lab Test 04/21/20 03/10/20  1338 20  1314 20  1413 20  0713   WBC 4.1 3.7* 3.4* 4.3 3.8*   ANEU  --  1.9 1.4* 2.2 1.8   HGB 10.5* 11.7* 11.4* 11.7* 12.1*    152 171 114* 76*     EK2019: 85 BPM, QT/QTcH was 346/389 msec.  Also included comment: \"Abnormal precordial QRS contours - nondiagnostic for this age.\"    EE/15/2019: \"IMPRESSION: Awake and drowsy routine EEG (no video) was normal.  The patient stated he felt dazed during photic stimulation, however, no electrographic seizures or concerning changes on the EEG were seen during that time.  Clinical correlation is advised.  No electrographic seizures, epileptiform discharges were seen.\"    DIAGNOSIS     Delusional Disorder, persecutory type, first episode, currently in acute episode  Major Depressive Disorder, single episode, moderate    ASSESSMENT                                                                                                                   m2, h3     TODAY:  Geo Hicks presents to the [facility] clinic for continued medication management of paranoia, delusional thoughts and depressed mood.  Meets with this " writer via telemedicine with group home staff in accompaniment.  Relates positive interim mood, and talked about some things he has enjoyed doing with group home peers, such as bowling.  Denies worsening of leg jerking, and this was not noticed visibly during our interview.  Also denies SI/SIB thoughts, violent/aggressive ideation, inappropriately elevated moods, depressive severity, panic/anxious acuity or other hallmarks of psychiatric decompensation with dangerousness.  Does relate some difficulty with sleep initiation, and as his amitriptyline has been discontinued, agreed that his overall serotonergic burden is low enough that we could restart trazodone at 50 mg.  No other changes today.    (Note: After the appointment, this writer spoke with his father, Eric Hicks, who as Geo's guardian provided consent to add trazodone 50 mg nightly to his regimen.)    SUICIDE RISK ASSESSMENT:  Geo Hicks denies present or interim suicidal ideation. This patient does have notable risk factors for self-harm including feels trapped, relationship conflicts, new/ worsening medical issue, male and paranoia/ delusions. However, risk is mitigated by no h/o suicide attempt, no planning or intent, describes a safety plan, h/o seeking help when needed, none to minimal alcohol use , commitment to family and stable housing.  Based on all available evidence he does not appear to be at imminent risk for self-harm therefore does not meet criteria for a 72-hr hold/  involuntary hospitalization.  However, based on degree of symptoms therapy, close psych FU and medication adjustments were recommended which the pt did agree to.  Safety plan completed 11/15/2019, provided to patient and his father.    [place] PRESCRIPTION MONITORING PROGRAM [] was not checked today:  not using controlled substances    PSYCHOTROPIC DRUG INTERACTIONS:    Pentoxifylline may enhance the antiplatelet effect of Agents with Antiplatelet Properties.     CNS  "Depressants may enhance the adverse/toxic effect of Selective Serotonin Reuptake Inhibitors. Specifically, the risk of psychomotor impairment may be enhanced.    CNS Depressants may enhance the adverse/toxic effect of other CNS Depressants.    Selective Serotonin Reuptake Inhibitors may enhance the serotonergic effect of Serotonergic Non-Opioid CNS Depressants. This could result in serotonin syndrome.    MANAGEMENT:  Monitoring for adverse effects, routine vitals, routine labs, periodic EKGs and patient/family aware of risks     PLAN                                                                                                                                m2, h3     1) PSYCHOTROPIC MEDICATIONS:  Restart trazodone 50 mg at bedtime.  Continue olanzapine 20 mg at bedtime.  Continue sertraline 150 mg daily.  Continue lamotrigine 200 mg daily.    2) THERAPY:  Recommended, and have provided a list of options - in particular it is felt Geo would benefit from ACT (Acceptance and Commitment Therapy) with a provider knowledgeable/familiar with psychosis.    3) NEXT DUE:  Labs- AP labs due Feb 2020  EKG- PRN  Rating Scales- AIMS due with next \"in-clinic\" visit    4) REFERRALS:  Therapy, see above.    5) RTC: 4-6 weeks    6) CRISIS NUMBERS:   Provided routinely in [facility] [phone] (clinic)    [phone] (after hours)  CRISIS TEXT LINE: Text [id number] from anywhere in [place], anytime, any crisis 24/7;  OR SEE www.crisistextline.org    TREATMENT RISK STATEMENT:  The risks, benefits, alternatives and potential adverse effects have been discussed and are understood by the pt. The pt understands the risks of using street drugs or alcohol. There are no medical contraindications, the pt agrees to treatment with the ability to do so. The pt knows to call the clinic for any problems or to access emergency care if needed.  Medical and substance use concerns are documented above.  Psychotropic drug interaction check " was done, including changes made today.     PROVIDER:  Justice Fay DO    Supervisor is Dr. Emery, who will review and sign the note.  I did not see this pt directly. I have reviewed the documentation, and I agree with the resident's plan of care.    Marilia Emery MD

## 2020-04-06 ENCOUNTER — NURSE TRIAGE (OUTPATIENT)
Dept: NURSING | Facility: CLINIC | Age: 20
End: 2020-04-06

## 2020-04-06 RX ORDER — TRAZODONE HYDROCHLORIDE 50 MG/1
50 TABLET, FILM COATED ORAL AT BEDTIME
Qty: 30 TABLET | Refills: 1 | Status: SHIPPED | OUTPATIENT
Start: 2020-04-06 | End: 2020-01-01

## 2020-04-07 NOTE — TELEPHONE ENCOUNTER
"Patient calling \"I just called there ([facility]) for my constipation. I can't use Miralax, it gives me diarrhea. I would rather use go lightly. It doesn't work and I have been dealing with this for 2 years.\" Advised he call his PCP or have group home brewer PCP in am to discuss.  Beatriz Angel RN [facility]        Reason for Disposition    Health Information question, no triage required and triager able to answer question    Protocols used: INFORMATION ONLY CALL - NO TRIAGE-P-AH      "

## 2020-04-07 NOTE — TELEPHONE ENCOUNTER
Patient reports he has been dealing with a lot of constipation. Writer has a very hard time understanding patient. He lives in a group home. Asked patient to get a staff member to help with telling me what he is saying.     A group home staff member came on the phone. Patient says his last bowel movement was yesterday. Denies abdominal pain. Denies vomiting. Patient takes Miralax every day. Discussed he can follow up with PCP regarding Miralax usage and constipation concerns. But advised to call back if he develops abdominal pain, fever, vomiting, or no bowel movement in 4 days. Caller verbalized understanding and had no further questions.     Arabella Holley, RN/STEVEN [facility]    Reason for Disposition    Constipation is a chronic symptom (recurrent or ongoing AND present > 4 weeks)    Additional Information    Negative: Patient sounds very sick or weak to the triager    Negative: [1] Vomiting AND [2] abdomen looks much more swollen than usual    Negative: [1] Vomiting AND [2] contains bile (green color)    Negative: [1] Constant abdominal pain AND [2] present > 2 hours    Negative: [1] Intermittent mild abdominal pain AND [2] fever    Negative: Last bowel movement (BM) > 4 days ago    Negative: Abdomen is more swollen than usual    Negative: [1] Rectal pain or fullness from fecal impaction (rectum full of stool) AND [2] NOT better after SITZ bath, suppository or enema    Negative: Leaking stool    Negative: Unable to have a bowel movement (BM) without manually removing stool (using finger to pull out stool or perform disimpaction)    Negative: Unable to have a bowel movement (BM) without laxative or enema    Negative: [1] Constipation persists > 1 week AND [2] no improvement after using CARE ADVICE    Negative: [1] Weight loss > 10 pounds (5 kg) AND [2] not dieting    Negative: Pencil-like, narrow stools    Negative: Taking new prescription medication    Negative: [1] Uses laxative (e.g., PEG /  Miralax. Milk of magnesia) or enema AND [2] > once a month    Negative: [1] Minor bleeding from rectum (e.g., blood just on toilet paper, few drops, streaks on surface of normal formed BM) AND [2] 3 or more times    Protocols used: CONSTIPATION-A-AH

## 2020-04-08 DIAGNOSIS — C71.9 EPENDYMOMA (H): Primary | ICD-10-CM

## 2020-04-09 DIAGNOSIS — R45.1 AGITATION: ICD-10-CM

## 2020-04-09 DIAGNOSIS — F32.A DEPRESSION, UNSPECIFIED DEPRESSION TYPE: ICD-10-CM

## 2020-04-10 ENCOUNTER — TELEPHONE (OUTPATIENT)
Dept: FAMILY MEDICINE | Facility: CLINIC | Age: 20
End: 2020-04-10

## 2020-04-10 NOTE — TELEPHONE ENCOUNTER
Malissa with [facility]  ([phone]) to request VO orders for Home care that was placed by hem/onc specialist.    They need the following order:    SN 1x wk for 1 wk; 3x wk for 1 wk, 1x wk for 1 wk with 2 prn    PT/OT to eval a treat    Wound care orders for nurse 3x wk for 1 wk    They also need to know when the lab need to be drawn.  Per Referral placed by specialist:      REASON FOR REFERRAL: Assessment & Treatment: RN needed for wound care and assessment. Patient with Ataxia and frequent falls leading to wounds. Patient is on long term oral chemotherapy and has poor wound healing.     ADDITIONAL SERVICES NEEDED: Lab draws- CBC with platelets and CMP, Mag, Phos monthly     Please advise if you are ok with giving VO    Renan Richardson RN, BSN

## 2020-04-13 ENCOUNTER — CARE COORDINATION (OUTPATIENT)
Dept: PEDIATRIC HEMATOLOGY/ONCOLOGY | Facility: CLINIC | Age: 20
End: 2020-04-13

## 2020-04-13 RX ORDER — LAMOTRIGINE 100 MG/1
200 TABLET ORAL AT BEDTIME
Qty: 60 TABLET | Refills: 1 | Status: SHIPPED | OUTPATIENT
Start: 2020-04-13 | End: 2020-01-01

## 2020-04-13 RX ORDER — OLANZAPINE 20 MG/1
20 TABLET ORAL AT BEDTIME
Qty: 30 TABLET | Refills: 1 | Status: SHIPPED | OUTPATIENT
Start: 2020-04-13 | End: 2020-01-01

## 2020-04-13 RX ORDER — SERTRALINE HYDROCHLORIDE 100 MG/1
150 TABLET, FILM COATED ORAL DAILY
Qty: 45 TABLET | Refills: 1 | Status: SHIPPED | OUTPATIENT
Start: 2020-04-13 | End: 2020-01-01

## 2020-04-13 RX ORDER — LAMOTRIGINE 200 MG/1
TABLET ORAL
Qty: 30 TABLET | Refills: 11 | OUTPATIENT
Start: 2020-04-13

## 2020-04-13 NOTE — TELEPHONE ENCOUNTER
L/M on Malissa's V/M with verbal and fax # to fax for signature and phone # if questions.  Cate Morales RN

## 2020-04-14 NOTE — PROGRESS NOTES
[facility]      OUTPATIENT SPEECH LANGUAGE PATHOLOGY EVALUATION  PLAN OF TREATMENT FOR OUTPATIENT REHABILITATION  (COMPLETE FOR INITIAL CLAIMS ONLY)  Patient's Last Name, First Name, M.I.  YOB: 2000  Geo Hicks                        Provider's Name  [facility] Medical Record No.  [id number]                               Onset Date:          Start of Care Date: 02/13/20         Type:     ___PT   ___OT   _X_SLP Medical Diagnosis:      History of Grade II Ependymoma                 SLP Diagnosis:  Moderate-severe oral dysphagia with suspected pharyngeal dysphagia        Visits from SOC:  1   ________________________________________________________________  Plan of Treatment/Functional Goals    Planned Interventions:   Dysphagia Treatment            1. eGo will demonstrate tolerance of a Dysphagia Diet Level 3 across three meals when given external verbal cues x2.       2.  Geo will participate in education regarding compensatory strategies to increase speech intelligibility.      3. Geo will participate in education regarding compensatory strategies that will reduce risk of choking and aspiration.                                                     Goals: See Speech Language Pathology Goals on Care Plan in Epic electronic health record.    Therapy Frequency:2x/week      (2/13/20-2/14/20)  Predicted Duration of Therapy Intervention: LOS         ________________________________________________________________________________    I CERTIFY THE NEED FOR THESE SERVICES FURNISHED UNDER        THIS PLAN OF TREATMENT AND WHILE UNDER MY CARE     (Physician co-signature of this document indicates review and certification of the therapy plan).                                      Referring Physician: Jennifer Montaño MD; Order sent to Zoraida Wilder MD                      Initial Assessment        See Speech Language Pathology documentation in Epic electronic health record, evaluation dated  02/13/20

## 2020-04-15 ENCOUNTER — TELEPHONE (OUTPATIENT)
Dept: FAMILY MEDICINE | Facility: CLINIC | Age: 20
End: 2020-04-15

## 2020-04-15 NOTE — TELEPHONE ENCOUNTER
Phone call to home care - message left for Malissa BARRIOS on confidential voicemail to draw these labs as soon as she is able - last completed over one month ago     Clinic number left for Malissa to return call with questions/concerns     Michelle Rodriguez, Registered Nurse   [facility]3]

## 2020-04-15 NOTE — TELEPHONE ENCOUNTER
Bob with [facility]  calling to get VO for SN 1x wk for 1, 2x wk for 2 and 1x wk for 1    VO given    Renan Richardson RN, BSN

## 2020-04-15 NOTE — TELEPHONE ENCOUNTER
Please advise on when labs are to be done,  The HC order came from specialist stating ADDITIONAL SERVICES NEEDED: Lab draws- CBC with platelets and CMP, Mag, Phos monthly      There is no notes as to when this is suppose to start.    Please call Malissa back with [facility]    Renan Richardson RN, BSN

## 2020-04-16 ENCOUNTER — TELEPHONE (OUTPATIENT)
Dept: FAMILY MEDICINE | Facility: CLINIC | Age: 20
End: 2020-04-16

## 2020-04-16 NOTE — TELEPHONE ENCOUNTER
Malissa calling [facility] to request verbal orders for below.  Wound care is noted on 1st message below.  Gave verbal order.  Will fax for signature.  Caet Morales RN    Many supracervical wounds on knees, elbows, shins and face  Wound care 3 times a week.  cleanse with gentle wound cleanser, sterile saline or soap and water, bandaid over til dry and scabbed and then discontinue.  One larger scabbed wound on (R) shin.   Gauze and tape to wound by nurse as wound is fragile.       April 15, 2020   Michelle Rodriguez RN           3:12 PM   Note      Phone call to home care - message left for Malissa BARRIOS on confidential voicemail to draw these labs as soon as she is able - last completed over one month ago      Clinic number left for Malissa to return call with questions/concerns      Michelle Rodriguez Registered Nurse   [facility]                   3:10 PM      Michelle Rodriguez RN contacted [facility]  (Home Care)               3:04 PM   Jeffrey Espinoza MD routed this conversation to Cr Triage   Jeffrey Espinoza MD           3:04 PM   Note      These labs are to start now.  Last done 3/10  Jeffrey Espinoza MD                    1:56 PM   Marilia Richardson, RN routed this conversation to Jeffrey Espinoza MD Phan, Deanna E RN           1:56 PM   Note      Please advise on when labs are to be done,  The HC order came from specialist stating ADDITIONAL SERVICES NEEDED: Lab draws- CBC with platelets and CMP, Mag, Phos monthly       There is no notes as to when this is suppose to start.     Please call Malissa back with [facility]     Marilia Richardson RN, BSN         April 13, 2020   Me           9:11 AM   Note      L/M on Malissa's V/M with verbal and fax # to fax for signature and phone # if questions.  Cate Morales, GIANNI                    9:00 AM   Jeffrey Espinoza MD routed this conversation to Cr Triage   Jeffrey Espinoza MD           9:00 AM   Note      OK with verbal order  Jeffrey Espinoza MD          April 10, 2020              4:45 PM   Renan Richardson, RN routed this conversation to Jeffrey Espinoza MD Phan, Deanna E, RN           4:45 PM   Note      Malissa with [facility]  ([phone]) to request VO orders for Home care that was placed by hem/onc specialist.     They need the following order:     SN 1x wk for 1 wk; 3x wk for 1 wk, 1x wk for 1 wk with 2 prn     PT/OT to eval a treat     Wound care orders for nurse 3x wk for 1 wk     They also need to know when the lab need to be drawn.  Per Referral placed by specialist:       REASON FOR REFERRAL: Assessment & Treatment: RN needed for wound care and assessment. Patient with Ataxia and frequent falls leading to wounds. Patient is on long term oral chemotherapy and has poor wound healing.     ADDITIONAL SERVICES NEEDED: Lab draws- CBC with platelets and CMP, Mag, Phos monthly      Please advise if you are ok with giving VO     Renan Richardson RN, BSN

## 2020-04-17 ENCOUNTER — TELEPHONE (OUTPATIENT)
Dept: FAMILY MEDICINE | Facility: CLINIC | Age: 20
End: 2020-04-17

## 2020-04-17 NOTE — TELEPHONE ENCOUNTER
Bob calling from [facility].      OT  1xwk/1wk  2xwk/2wk  1xwk/1wk    Verbal order given.  Will fax for MD signature.  Cate Morales RN

## 2020-04-19 NOTE — PROGRESS NOTES
"  [facility] PSYCHIATRY CLINIC PROGRESS NOTE     CARE TEAM:  PCP- Jeffrey Espinoza    Specialty Providers- Oncology, Neuropsychology, Physical Therapy, Occupational Therapy    Therapist- Manju Pink at [facility] in [facility] Team- no.    Geo Hicks is a 19 year old male who prefers the name Geo & pronouns he, him, his, himself.    Date of initial diagnostic assessment is 2/7/2019.    Pertinent Background:  This patient first experienced mental health issues in the past few months and has received treatment for paranoia/delusions.  Notably, this patient is undergoing cancer treatment at the [facility] and has been referred for psychiatric assessment by his neuropsychologist who most recently met with him on 1/3/2019.  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 Since then, he has undergone multiple tumor resections, chemotherapy, and radiation treatment. Geo also experienced an intra-tumoral hemorrhage in May 2015 that resulted in neurological changes including facial numbness, significant loss of mobility and dysarthria. In December 2016, a follow-up MRI revealed continued tumor enhancement, however the most recent MRI from 10/16/18 revealed an unchanged fourth ventricle ependymoma in comparison to previous exams, a slight decrease in adjacent edema, as well as a stable size of the ventricular system.  He is currently on COG study [id number] with Entinostat.  Of note, patient had a brief admission to inpatient psychiatry on [facility] from 2/14-2/15/2019 \"for evaluation of delusions and suicidal comments.\"    Psych critical item history includes suicidal ideation, psychosis [sxs include delusions] and psychiatric hospitalization x1.    INTERIM HISTORY                                                                                                                 4, 4   History was provided by the patient and his father who were good historians.  The " "last visit ended with the following med change: initiated lamotrigine titration to 200 mg daily.  Shortly after this visit also tapered olanzapine to 20 mg at bedtime and (in collaboration with oncology providers) discontinued amitriptyline.  Since the last visit:  -Geo reports that mood has been \"pretty good\" and \"nothing new.\"  -Dad elaborates that after moving into his new group home Geo seemed irritable the first week, however this has mostly resolved.  Geo added to this \"I just need to learn not to get angry at staff.\"  Talked about how staff do what they can to understand his requests, and that his getting angry probably only makes it harder for them to understand.  He acknowledged this.  -Has been at the 20 mg dose of olanzapine for about 2 days.  This dose has been lowered due to concern for jerking leg movements, and notably this did appear to be less prominent today.  Geo's dad also agreed that it was less noticeable.  Geo himself was uncertain as to whether it was better or the same.  -Denies any reoccurrence of the \"dream enactment behavior\" type phenomenon noted previously by Geo's mother.  -Continues with lamotrigine titration.  Denies any med issues or concerns.  -Overall, appears to be settling into his new group home relatively well at this point.    RECENT SYMPTOMS:   DEPRESSION:  reports-low moods, anhedonia, feeling trapped and overwhelmed;  DENIES- suicidal ideation, low energy, insomnia, hypersomnia, excessive guilt, feeling hopeless and excessive crying  ELIZABETH/HYPOMANIA:  reports-none;  DENIES- increased energy, decreased sleep need, increased activity and grandiosity  PSYCHOSIS:  reports-delusions- paranoid;  DENIES- auditory hallucinations and visual hallucinations  DYSREGULATION:  reports-can become irritable/agitated if beliefs are challenged;  DENIES- violent ideation, SIB, mood dysregulation, impulsive and aggressive  PANIC ATTACK:  none  ANXIETY:  excessive worry and " nervous/overwhelmed  TRAUMA RELATED:  none  COMPULSIVE:  none  SLEEP:  initiation and maintenance both improved with trazodone  EATING DISORDER: no    RECENT SUBSTANCE USE:  ALCOHOL- no  TOBACCO- no  CAFFEINE- minimal  OPIOIDS- no         NARCAN KIT- N/A  CANNABIS- no  OTHER ILLICIT DRUGS- no      CURRENT SOCIAL HISTORY:  FINANCIAL SUPPORT- family  CHILDREN- no  LIVING SITUATION- assisted living facility  SOCIAL/ SPIRITUAL SUPPORT- mother, father, older brother  FEELS SAFE AT HOME- yes    MEDICAL ROS (2,10):  A comprehensive review of systems was performed and is negative other than noted in the HPI.    PSYCH and CD Critical Summary Points since July 2018 February 2019:  Clinic intake on 2/7.  Later was admitted [facility] for a brief inpatient psych admission from 3/14-3/15, most of which was spent in the ER, due to suicidal comments that concerned family.  Was ultimately deemed safe for outpatient follow-up and was discharged with an increase in olanzapine to 15 mg.  March 2019:  Began sertraline 50 mg daily.  May 2019:  Began trazodone 25-50 mg at bedtime.  June 2019:  Increased trazodone to 100 mg at bedtime.  July 2019:  Increased Sertraline to 100 mg daily.  August 2019:  Increased olanzapine to 17.5 mg and decreased trazodone to 75 mg, both at bedtime.  September 2019:  Increased olanzapine to 20 mg at bedtime.  October 2019:  Titrated olanzapine to 25 mg at bedtime.  November 2019:  Increased sertraline to 150 mg daily.  December 2019:  In collaboration with oncology providers agreed to cross-taper from trazodone to amitriptyline, with the latter medication added to help with GI discomfort, and trazodone discontinued in order to reduce overall serotonergic burden.  January 2020:  Olanzapine increased from 25 mg to 30 mg at bedtime.  Later that month was admitted for routine bowel clean-out to inpatient pediatrics shortly after being asked to leave his group home - ultimately, family and Geo  "did not feel it was safe or desirable for him to return home and so remained admitted to peds until a new assisted living placement could be found, ultimately being discharged 2/18/2020.  February 2020:  Initiated lamotrigine titration to 200 mg, tapered olanzapine to 20 mg and (in collaboration with oncology providers) discontinued amitriptyline.    PAST PSYCH MED TRIALS   see EMR Problem List: Hx of psychiatric care    MEDICAL / SURGICAL HISTORY                                   Neurologic Hx- See pertinent background, re: history of ependymoma and related chemo, radiation and tumor resection(s).  Notably has experienced neurological damage due to the above which has resulted in facial numbness, significant loss of mobility and dysarthria.  Has had neuropsychiatric evaluations 2/2016, 2/2017, 1/2019, and 10/2019.  The following is from the \"Results and Impressions\" section of his 10/29/2019 eval with Veronica Padilla, with a passage bolded that relates to noted difficulty in an area of executive functioning that facilitates the ability to see other people's perspectives:     \"Geo s attention was rated by his mother as well as himself as being adequate.  Executive functioning are those skills including planning, organization, emotional control, cognitive flexibility, and the ability to take another person s perspective.  Geo rating scale of these skills was basically in the average range for his age with a slight elevation in his ability to shift from one activity to another.  His mother s ratings of his executive functioning were in the average range, with the exception of a slight elevation in his ability to initiate tasks.  Direct testing of his ability to take another person s perspective indicated difficulty in this area.  He was asked to sort objects based on various aspects of the objects.  When he sorted the objects, he showed average performance.  However, when the examiner sorted the " "objects, Geo experienced significant difficulty with being able to determine how the objects were sorted.  This difficulty likely translates into difficulty with understanding another person s point of view.  Geo indicated that this difficulty becomes quite frustrating for him as he doesn t feel other people understand him--it is likely that he has difficulty understanding their perspective.     Emotionally Geo indicated that he continues to feel some difficulty with sadness but that it has improved over time and with medication.  Geo denied significant feelings of anxiety at this time while in the past these scores have been higher.  Parent ratings of Geo s emotional functioning indicated no areas of significant concern.  Interviews with Geo and his mother indicated continued feelings of sadness and difficulty in motivating himself to try to new activities.  While there is improvement in his mood, Geo continues to be at risk for depression.  Since he is now under the care of a psychiatrist, we did not interview around his feelings that his medical team was not being helpful to him.  His mother reported that these feelings continue and likely interfere with his compliance with directives.  He is participating in therapy to work on these issues and it is important that this intervention continue.\"    Patient Active Problem List   Diagnosis     GERD (gastroesophageal reflux disease)     Closed fracture at the growth plate of right distal fibula      Elevated serum creatinine     Dyspepsia     Intracranial hemorrhage (H)     Hemorrhagic stroke (H)     Ependymoma (H)     Admission for antineoplastic chemotherapy     Post-operative state     S/P craniotomy     S/P biopsy     Noncomitant strabismus     Abducens neuropathy of both eyes     CANDELARIO (obstructive sleep apnea)     Health Care Home     Hypernatremia     Body temperature low     Current chronic use of systemic steroids     Elevated TSH     " Status post chemotherapy     Neoplasm of posterior cranial fossa (H)     Chronic constipation     Serum albumin decreased     Closed compression fracture of thoracic vertebra with routine healing, subsequent encounter     Depression     Constipation     Constipation by delayed colonic transit     Slow transit constipation     Past Surgical History:   Procedure Laterality Date     COLONOSCOPY N/A 9/27/2019    Procedure: Colonoscopy With Biopsies and Polypectomy;  Surgeon: Aniya Wei MD;  [facility]: [facility]     ESOPHAGOSCOPY, GASTROSCOPY, DUODENOSCOPY (EGD), COMBINED N/A 9/27/2019    Procedure: Upper Endoscopy (EGD) With Biopsies;  Surgeon: Aniya Wei MD;  [facility]: [facility]     GRAFT CARTILAGE FROM POSTERIOR AURICLE Left 10/6/2016    Procedure: GRAFT CARTILAGE FROM POSTERIOR AURICLE;  Surgeon: Tyler Richards MD;  [facility]: [facility]     INCISION AND DRAINAGE PERINEAL, COMBINED Bilateral 7/18/2015    Procedure: COMBINED INCISION AND DRAINAGE PERINEAL;  Surgeon: Dequan Timmons MD;  [facility]     OPTICAL TRACKING SYSTEM CRANIOTOMY, EXCISE TUMOR, COMBINED N/A 4/13/2015    Procedure: COMBINED OPTICAL TRACKING SYSTEM CRANIOTOMY, EXCISE TUMOR;  Surgeon: Francis Velazquez MD;  [facility]     OPTICAL TRACKING SYSTEM CRANIOTOMY, EXCISE TUMOR, COMBINED N/A 4/16/2015    Procedure: COMBINED OPTICAL TRACKING SYSTEM CRANIOTOMY, EXCISE TUMOR;  Surgeon: Francis Velazquez MD;  [facility]     OPTICAL TRACKING SYSTEM CRANIOTOMY, EXCISE TUMOR, COMBINED Bilateral 5/28/2015    Procedure: COMBINED OPTICAL TRACKING SYSTEM CRANIOTOMY, EXCISE TUMOR;  Surgeon: Francis Velazquez MD;  [facility]     OPTICAL TRACKING SYSTEM CRANIOTOMY, EXCISE TUMOR, COMBINED Bilateral 1/14/2016    Procedure: COMBINED OPTICAL TRACKING SYSTEM CRANIOTOMY, EXCISE TUMOR;  Surgeon: Francis Velazquez MD;  [facility]     OPTICAL TRACKING SYSTEM VENTRICULOSTOMY  4/16/2015     Procedure: OPTICAL TRACKING SYSTEM VENTRICULOSTOMY;  Surgeon: Francis Velazquez MD;  [facility]     REMOVE PORT VASCULAR ACCESS N/A 10/6/2016    Procedure: REMOVE PORT VASCULAR ACCESS;  Surgeon: Bruno Perea MD;  [facility]     RHINOPLASTY N/A 10/6/2016    Procedure: RHINOPLASTY;  Surgeon: Tyler Richards MD;  [facility]     VASCULAR SURGERY  5-2015    single lumen power port       ALLERGY                                Blood transfusion related (informational only) and No known drug allergies     MEDICATIONS                               Current Outpatient Medications   Medication     Calcium-Vitamin D-Vitamin K (VIACTIV CALCIUM PLUS D) 650-12.5-40 MG-MCG-MCG CHEW     dexamethasone (DECADRON) 0.5 MG tablet     fexofenadine (ALLEGRA) 180 MG tablet     Lactobacillus-Inulin ([facility]) CAPS     linaclotide (LINZESS) 290 MCG capsule     mupirocin (BACTROBAN) 2 % external ointment     omeprazole (PRILOSEC) 20 MG DR capsule     order for DME     order for DME     polyethylene glycol (MIRALAX) powder     potassium phosphate, monobasic, (K-PHOS) 500 MG tablet     senna-docusate (SENOKOT-S/PERICOLACE) 8.6-50 MG tablet     sulfamethoxazole-trimethoprim (BACTRIM) 400-80 MG tablet     vitamin D3 (CHOLECALCIFEROL) 2000 units (50 mcg) tablet     vitamin E (TOCOPHEROL) 400 units (360 mg) capsule     lamoTRIgine (LAMICTAL) 100 MG tablet     OLANZapine (ZYPREXA) 20 MG tablet     sertraline (ZOLOFT) 100 MG tablet     study - entinostat, IDS# [id number], 1 mg tablet     study - entinostat, IDS# [id number], 5 mg tablet       VITALS                                                                                                                          3, 3   /69   Pulse 105      MENTAL STATUS EXAM                                                                                           9, 14 cog gs     Alertness: alert  and oriented  Appearance: casually groomed, seated in wheelchair,  "wearing R-sided eye-patch  Behavior/Demeanor: cooperative, with fair eye contact   Speech: prominent dysarthria  Language: good  Psychomotor: mild evident palsy of upper extremities and facial paralysis  Mood: \"Pretty good,\" but allowing low/depressed moods at times  Affect: restricted; was congruent to mood; was congruent to content  Thought Process/Associations: continued rumination  Thought Content:  Reports delusions;  Denies suicidal ideation and violent ideation  Perception:  Reports none;  Denies auditory hallucinations and visual hallucinations  Insight: partial to poor  Judgment: good  Cognition: (6) oriented: time, person, and place  attention span: intact  concentration: intact  recent memory: intact  remote memory: intact  fund of knowledge: appropriate  Gait and Station: remarkable for:  ataxia - can ambulate but was seen in wheelchair     LABS and DATA     AIMS:  Due.    PHQ9 TODAY = Did not complete today.  PHQ-9 SCORE 2019   PHQ-9 Total Score 6 8 9       ANTIPSYCHOTIC LABS  [glu, A1C, lipids (rohit LDL), liver enzymes, WBC, ANEU, Hgb, plts]  q12 mo  Recent Labs   Lab Test 03/10/20  1338 20  1314 20  1413 20  0713  19  1100   * 88 93 92   < > 124*   A1C  --   --   --   --   --  5.1    < > = values in this interval not displayed.     Recent Labs   Lab Test 19  1100   CHOL 158   TRIG 236*   LDL 84   HDL 27*     Recent Labs   Lab Test 03/10/20  1338 20  1314 20  1413 20  0713   AST 21 29 42 27   ALT 18 23 24 22   ALKPHOS 115 110 126 149     Recent Labs   Lab Test 03/10/20  1338 20  1314 20  1413 20  0713   WBC 3.7* 3.4* 4.3 3.8*   ANEU 1.9 1.4* 2.2 1.8   HGB 11.7* 11.4* 11.7* 12.1*    171 114* 76*     EK2019: 85 BPM, QT/QTcH was 346/389 msec.  Also included comment: \"Abnormal precordial QRS contours - nondiagnostic for this age.\"    EE/15/2019: \"IMPRESSION: Awake and drowsy routine EEG (no " "video) was normal.  The patient stated he felt dazed during photic stimulation, however, no electrographic seizures or concerning changes on the EEG were seen during that time.  Clinical correlation is advised.  No electrographic seizures, epileptiform discharges were seen.\"    DIAGNOSIS     Delusional Disorder, persecutory type, first episode, currently in acute episode  Major Depressive Disorder, single episode, moderate    ASSESSMENT                                                                                                                   m2, h3     TODAY:  Geo Hicks presents to the [facility] clinic for continued medication management of paranoia, delusional thoughts and depressed mood.  Relates that things have been getting better at his new group home.  The first week was irritable at times with staff, however this is improved.  Is trying to learn to be patient with their understanding of his dysarthria.  Endorses interim mood as \"pretty good.\"  Denies SI, interim agitation, hypo-/manic features or other mood acuity.  At this point is taking (or should be about to start) lamotrigine 50 mg per the titration, as well as olanzapine 20 mg, and denies medication concerns.  Olanzapine had been lowered in part due to concerns for leg jerking, and this did appear to be improved today both to myself as well as Geo's father.  Geo's ongoing delusional concerns related to his medical team were only very briefly acknowledged, and were not a source of perseverative comment today.  Overall, agreed that he is settling in gradually to his new home.  Agreed to make no changes today.    SUICIDE RISK ASSESSMENT:  Geo Hicks denies present or interim suicidal ideation. This patient does have notable risk factors for self-harm including feels trapped, relationship conflicts, new/ worsening medical issue, male and paranoia/ delusions. However, risk is mitigated by no h/o suicide attempt, no " planning or intent, describes a safety plan, h/o seeking help when needed, none to minimal alcohol use , commitment to family and stable housing.  Based on all available evidence he does not appear to be at imminent risk for self-harm therefore does not meet criteria for a 72-hr hold/  involuntary hospitalization.  However, based on degree of symptoms therapy, close psych FU and medication adjustments were recommended which the pt did agree to.  Safety plan completed 11/15/2019, provided to patient and his father.    [place] PRESCRIPTION MONITORING PROGRAM [] was not checked today:  not using controlled substances    PSYCHOTROPIC DRUG INTERACTIONS:    Pentoxifylline may enhance the antiplatelet effect of Agents with Antiplatelet Properties.  [Pentoxifylline, Sertraline]     CNS Depressants may enhance the adverse/toxic effect of Selective Serotonin Reuptake Inhibitors. Specifically, the risk of psychomotor impairment may be enhanced.  [Olanzapine, Sertraline]    MANAGEMENT:  Monitoring for adverse effects, routine vitals, routine labs, periodic EKGs and patient/family aware of risks     PLAN                                                                                                                                m2, h3     1) PSYCHOTROPIC MEDICATIONS:  Continue olanzapine 20 mg at bedtime.  Continue sertraline 150 mg daily.  Continue gradual dose increase/titration schedule of lamotrigine - should currently be just about ready to step up to 50 mg daily.    2) THERAPY:  Recommended, and have provided a list of options - in particular it is felt Geo would benefit from ACT (Acceptance and Commitment Therapy) with a provider knowledgeable/familiar with psychosis.    3) NEXT DUE:  Labs- AP labs due Feb 2020  EKG- PRN  Rating Scales- AIMS due    4) REFERRALS:  Therapy, see above.    5) RTC: 3-4 weeks    6) CRISIS NUMBERS:   Provided routinely in [facility].  [facility] [phone] (clinic)    [phone] (after  hours)  CRISIS TEXT LINE: Text [id number] from anywhere in [place], anytime, any crisis 24/7;  OR SEE www.crisistextline.org    TREATMENT RISK STATEMENT:  The risks, benefits, alternatives and potential adverse effects have been discussed and are understood by the pt. The pt understands the risks of using street drugs or alcohol. There are no medical contraindications, the pt agrees to treatment with the ability to do so. The pt knows to call the clinic for any problems or to access emergency care if needed.  Medical and substance use concerns are documented above.  Psychotropic drug interaction check was done, including changes made today.     PROVIDER:  Justice Fay,     Patient staffed in clinic with Dr. Emery who will sign the note.  Supervisor is Dr. Emery.  I saw the patient with the resident, and participated in key portions of the service, including the mental status examination and developing the plan of care. I reviewed key portions of the history with the resident. I agree with the findings and plan as documented in this note.    Marilia Emery MD

## 2020-04-20 ENCOUNTER — TELEPHONE (OUTPATIENT)
Dept: FAMILY MEDICINE | Facility: CLINIC | Age: 20
End: 2020-04-20

## 2020-04-20 NOTE — TELEPHONE ENCOUNTER
Recd 5 page fax from [facility].  Please sign Plan of Care orders and fax to [phone].  Form in BW in box.    Lakeshia Martin

## 2020-04-21 ENCOUNTER — TELEPHONE (OUTPATIENT)
Dept: FAMILY MEDICINE | Facility: CLINIC | Age: 20
End: 2020-04-21

## 2020-04-21 ENCOUNTER — TRANSFERRED RECORDS (OUTPATIENT)
Dept: HEALTH INFORMATION MANAGEMENT | Facility: CLINIC | Age: 20
End: 2020-04-21

## 2020-04-21 NOTE — TELEPHONE ENCOUNTER
Received 2 page fax for Skilled Nurse  Patient Missed Visit for Dr Espinoza to complete. Form in the in-basket at 's desk.

## 2020-04-21 NOTE — TELEPHONE ENCOUNTER
Received 3 page fax for Physician Orders for Dr Espinoza to complete. Form in the in-basket at 's desk.

## 2020-04-21 NOTE — TELEPHONE ENCOUNTER
Form reviewed     [facility] - Physician orders     OT 1 x week x 1 week   2 x week x 2 weeks  1 x week x 1 week   To address ADL's, DME, home safety, upper extremity home exercise program     Form needs to be signed by in clinic provider as pcp is out of the office this week due to Covid 19 altered schedules - given to covering provider for signature     Michelle Rodriguez Registered Nurse   [facility]

## 2020-04-21 NOTE — TELEPHONE ENCOUNTER
Form reviewed, received from [facility]     Advising pcp of missed visits, unable to coordinate a make up visit with patient     Needs signature from in clinic provider - Dr. Espinoza is out of the office this week due to Covid 19 altered schedules     Michelle Rodriguez, Registered Nurse   [facility]

## 2020-04-22 ENCOUNTER — MEDICAL CORRESPONDENCE (OUTPATIENT)
Dept: HEALTH INFORMATION MANAGEMENT | Facility: CLINIC | Age: 20
End: 2020-04-22

## 2020-04-22 ENCOUNTER — TELEPHONE (OUTPATIENT)
Dept: FAMILY MEDICINE | Facility: CLINIC | Age: 20
End: 2020-04-22

## 2020-04-22 NOTE — TELEPHONE ENCOUNTER
2nd form received for signature on lab orders     CBC/platelets, CMP, Mag/Phos needed monthly, given to provider for signature     Michelle Rodriguez Registered Nurse   [facility]

## 2020-04-22 NOTE — TELEPHONE ENCOUNTER
Faxed physician orders received for signature of below verbal orders for wound care on knees, elbows, shins - needs provider signature     Michelle Rodriguez, Registered Nurse   [facility]

## 2020-04-22 NOTE — TELEPHONE ENCOUNTER
Plan of care orders received from [facility] 4/9-6/7/2020     Needs provider signature     Michelle Rodriguez, Registered Nurse   [facility]

## 2020-04-23 ENCOUNTER — TELEPHONE (OUTPATIENT)
Dept: FAMILY MEDICINE | Facility: CLINIC | Age: 20
End: 2020-04-23

## 2020-04-23 NOTE — TELEPHONE ENCOUNTER
Homecare calling to report a fall today at 1:45.  Pt lost his balance walking back to his wheelchair. He had a skin tear on his leg that reopened but no other injuries noted and no other concerns.    FYI to PCP    Renan Richardson RN, BSN

## 2020-04-24 ENCOUNTER — TELEPHONE (OUTPATIENT)
Dept: FAMILY MEDICINE | Facility: CLINIC | Age: 20
End: 2020-04-24
Payer: COMMERCIAL

## 2020-04-24 NOTE — TELEPHONE ENCOUNTER
Received 3 page fax for Physican Orders for Dr Espinoza to complete. Form in the in-basket at 's desk.

## 2020-04-26 NOTE — PATIENT INSTRUCTIONS
Treatment Plan Today:  As discussed.    Restart trazodone 50 mg at bedtime for sleep support.  Continue olanzapine 20 mg at bedtime.  Continue sertraline 150 mg daily.  Continue lamotrigine 200 mg daily.    ------------------------------------------------------------------------    Thank you for coming to the [facility].    Lab Testing:  If you had lab testing today and your results are reassuring or normal they will be mailed to you or sent through [employer] within 7 days. If the lab tests need quick action we will call you with the results. The phone number we will call with results is # [phone] (home) . If this is not the best number please call our clinic and change the number.    Medication Refills:  If you need any refills please call your pharmacy and they will contact us. Our fax number for refills is [phone]. Please allow three business for refill processing. If you need to  your refill at a new pharmacy, please contact the new pharmacy directly. The new pharmacy will help you get your medications transferred.     Scheduling:  If you have any concerns about today's visit or wish to schedule another appointment please call our office during normal business hours [phone] (8-5:00 M-F)    Contact Us:  Please call [phone] during business hours (8-5:00 M-F).  If after clinic hours, or on the weekend, please call  [phone].    Financial Assistance [phone]  [facility] Billing [phone]  Central Billing Office, [facility]: [phone]  [place] Billing [phone]  Medical Records [phone]    MENTAL HEALTH CRISIS NUMBERS:  [facility]:   [facility] -[phone]   [facility] Residence [facility] -[phone]   Walk-In Counseling Center [facility] -[phone]   COPE 24/7 [place] Mobile Team -[phone] (adults)/[phone] (child)     [facility]:   [facility] - [phone]   Walk-in counseling [facility] -  [phone]   Walk-in counseling [facility] - [phone]   Crisis Residence [facility] Residence - [phone]   Urgent Care Adult Mental [facility]-[phone] mobile unit/ 24/7 crisis line    Other Crisis Numbers:   National Suicide Prevention Lifeline: 348-232-QBVA (930-149-2186)  CRISIS TEXT LINE: Text  to:685165  for any crisis 24/7;    OR   - see www.crisistextline.org   Poison Control Center - 1-110-307-1851  CHILD: [facility] needs assessment team - [phone]   Trans Lifeline - 1-347.748.7258; [employer] Lifeline - 1-420.878.9638    For a medical emergency please call  911 or go to the nearest ER.     ---------------------------------------------------------------    Again thank you for choosing [facility] and please let us know how we can best partner with you to improve you and your family's health.    You may be receiving a survey regarding this appointment. We would love to have your feedback, both positive and negative. The survey is done by an external company, so your answers are anonymous.

## 2020-04-28 ENCOUNTER — TELEPHONE (OUTPATIENT)
Dept: FAMILY MEDICINE | Facility: CLINIC | Age: 20
End: 2020-04-28

## 2020-04-28 NOTE — TELEPHONE ENCOUNTER
Received 2 page fax for Patient Fall for Dr Espinoza to complete. Form in the in-basket at [facility] in AA's folder.

## 2020-04-28 NOTE — TELEPHONE ENCOUNTER
Received 5 page fax for Plan of Care for Dr Espinoza to complete. Form in the in-basket at [facility] in AA's folder.

## 2020-04-29 ENCOUNTER — VIRTUAL VISIT (OUTPATIENT)
Dept: PEDIATRIC HEMATOLOGY/ONCOLOGY | Facility: CLINIC | Age: 20
End: 2020-04-29
Attending: PEDIATRICS
Payer: COMMERCIAL

## 2020-04-29 ENCOUNTER — TELEPHONE (OUTPATIENT)
Dept: FAMILY MEDICINE | Facility: CLINIC | Age: 20
End: 2020-04-29

## 2020-04-29 ENCOUNTER — MEDICAL CORRESPONDENCE (OUTPATIENT)
Dept: HEALTH INFORMATION MANAGEMENT | Facility: CLINIC | Age: 20
End: 2020-04-29

## 2020-04-29 DIAGNOSIS — M79.18 BUTTOCK PAIN: ICD-10-CM

## 2020-04-29 DIAGNOSIS — C71.9 EPENDYMOMA (H): ICD-10-CM

## 2020-04-29 DIAGNOSIS — S51.001A ELBOW WOUND, RIGHT, INITIAL ENCOUNTER: ICD-10-CM

## 2020-04-29 DIAGNOSIS — D49.6 NEOPLASM OF POSTERIOR CRANIAL FOSSA (H): Primary | ICD-10-CM

## 2020-04-29 RX ORDER — EPINEPHRINE 1 MG/ML
0.3 INJECTION, SOLUTION, CONCENTRATE INTRAVENOUS EVERY 5 MIN PRN
Status: CANCELLED | OUTPATIENT
Start: 2020-04-29

## 2020-04-29 RX ORDER — MEPERIDINE HYDROCHLORIDE 25 MG/ML
25 INJECTION INTRAMUSCULAR; INTRAVENOUS; SUBCUTANEOUS EVERY 30 MIN PRN
Status: CANCELLED | OUTPATIENT
Start: 2020-04-29

## 2020-04-29 RX ORDER — DIPHENHYDRAMINE HYDROCHLORIDE 50 MG/ML
50 INJECTION INTRAMUSCULAR; INTRAVENOUS
Status: CANCELLED
Start: 2020-04-29

## 2020-04-29 RX ORDER — ALBUTEROL SULFATE 0.83 MG/ML
2.5 SOLUTION RESPIRATORY (INHALATION)
Status: CANCELLED | OUTPATIENT
Start: 2020-04-29

## 2020-04-29 RX ORDER — METHYLPREDNISOLONE SODIUM SUCCINATE 125 MG/2ML
125 INJECTION, POWDER, LYOPHILIZED, FOR SOLUTION INTRAMUSCULAR; INTRAVENOUS
Status: CANCELLED
Start: 2020-04-29

## 2020-04-29 RX ORDER — SODIUM CHLORIDE 9 MG/ML
1000 INJECTION, SOLUTION INTRAVENOUS CONTINUOUS PRN
Status: CANCELLED
Start: 2020-04-29

## 2020-04-29 RX ORDER — ALBUTEROL SULFATE 90 UG/1
1-2 AEROSOL, METERED RESPIRATORY (INHALATION)
Status: CANCELLED
Start: 2020-04-29

## 2020-04-29 NOTE — TELEPHONE ENCOUNTER
Received 2 page fax for Physician Orders for Dr Espinoza to complete. Form in the in-basket at [facility] in 's folder.

## 2020-04-29 NOTE — PROGRESS NOTES
"   Pediatric Hematology/Oncology Video Note     Geo Hicks is being evaluated via a billable video visit due to COVID-19 clinic restrictions for in-person visits.   The patient has been notified of following:   \"This video visit will be conducted via a call between you and your physician/provider. We have found that certain health care needs can be provided without the need for a physical exam. This service lets us provide the care you need with a short video conversation. If a prescription is necessary we can send it directly to your pharmacy. If lab work is needed we can place an order for that and you can then stop by our lab to have the test done at a later time. 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 No nausea, or vomiting. He has a lot of bruising on his forearms.  His right elbow has a wound they are cleaning and treating with bacitracin.  He often bangs it on his wheelchair tray.  He had small cut on his finger and a cut on the outside of his right foot that is healed. His wound on lower extremity is much better, it does sometimes worsening if he hits it during a transfer.  He is intermittently moving his upper extremities and having some jerks but much less often per staff.  They have noted no seizure activity.  Geo is complaining of his bottom hurting all the time.  He denies that he has a pilonidal cyst again.  He states he doesn't have to have a bowel movement now.  He doesn't think it is from sitting in his wheelchair.  He does not want me to look at it.  Geo is upset with me today " "as he has been related to my not \"fixing his constipation\".  He became upset at the end of our call and hung up.    He had no missed doses of medication with his last course.   History was obtained from Geo and his care giver at [facility].   Allergies:     Allergies   Allergen Reactions     Blood Transfusion Related (Informational Only) Swelling     Periorbital swelling post platelet transfusion     No Known Drug Allergies      Current Outpatient Medications   Medication     Calcium-Vitamin D-Vitamin K (VIACTIV CALCIUM PLUS D) 650-12.5-40 MG-MCG-MCG CHEW     dexamethasone (DECADRON) 0.5 MG tablet     fexofenadine (ALLEGRA) 180 MG tablet     Lactobacillus-Inulin ([facility] HEALTH & WELLNESS) CAPS     lamoTRIgine (LAMICTAL) 100 MG tablet     linaclotide (LINZESS) 290 MCG capsule     mupirocin (BACTROBAN) 2 % external ointment     OLANZapine (ZYPREXA) 20 MG tablet     omeprazole (PRILOSEC) 20 MG DR capsule     order for DME     order for DME     polyethylene glycol (MIRALAX) powder     potassium phosphate, monobasic, (K-PHOS) 500 MG tablet     senna-docusate (SENOKOT-S/PERICOLACE) 8.6-50 MG tablet     sertraline (ZOLOFT) 100 MG tablet     study - entinostat, IDS# 5050, 1 mg tablet     study - entinostat, IDS# [id number], 5 mg tablet     sulfamethoxazole-trimethoprim (BACTRIM) 400-80 MG tablet     traZODone (DESYREL) 50 MG tablet     vitamin D3 (CHOLECALCIFEROL) 2000 units (50 mcg) tablet     vitamin E (TOCOPHEROL) 400 units (360 mg) capsule     No current facility-administered medications for this visit.        PAST MEDICAL HISTORY:   Past Medical History:   Diagnosis Date     Cranial nerve dysfunction      Dyspepsia      Ependymoma (H)      Gastro-oesophageal reflux disease      Hearing loss      Intracranial hemorrhage (H)      Migraine      Pilonidal cyst     7-2015     Reduced vision      Refractory obstruction of nasal airway     2nd to nasal valve prolapse     Sleep apnea      Strabismus     gaze palsy  "       PAST SURGICAL HISTORY:   Past Surgical History:   Procedure Laterality Date     COLONOSCOPY N/A 9/27/2019    Procedure: Colonoscopy With Biopsies and Polypectomy;  Surgeon: Aniya Wei MD;  [facility]     ESOPHAGOSCOPY, GASTROSCOPY, DUODENOSCOPY (EGD), COMBINED N/A 9/27/2019    Procedure: Upper Endoscopy (EGD) With Biopsies;  Surgeon: Aniya Wei MD;  [facility]     GRAFT CARTILAGE FROM POSTERIOR AURICLE Left 10/6/2016    Procedure: GRAFT CARTILAGE FROM POSTERIOR AURICLE;  Surgeon: Tyler Richards MD;  [facility]     INCISION AND DRAINAGE PERINEAL, COMBINED Bilateral 7/18/2015    Procedure: COMBINED INCISION AND DRAINAGE PERINEAL;  Surgeon: Dequan Timmons MD;  [facility]     OPTICAL TRACKING SYSTEM CRANIOTOMY, EXCISE TUMOR, COMBINED N/A 4/13/2015    Procedure: COMBINED OPTICAL TRACKING SYSTEM CRANIOTOMY, EXCISE TUMOR;  Surgeon: Francis Velazquez MD;  [facility]     OPTICAL TRACKING SYSTEM CRANIOTOMY, EXCISE TUMOR, COMBINED N/A 4/16/2015    Procedure: COMBINED OPTICAL TRACKING SYSTEM CRANIOTOMY, EXCISE TUMOR;  Surgeon: Francis Velazquez MD;  [facility]     OPTICAL TRACKING SYSTEM CRANIOTOMY, EXCISE TUMOR, COMBINED Bilateral 5/28/2015    Procedure: COMBINED OPTICAL TRACKING SYSTEM CRANIOTOMY, EXCISE TUMOR;  Surgeon: Francis Velazquez MD;  [facility]     OPTICAL TRACKING SYSTEM CRANIOTOMY, EXCISE TUMOR, COMBINED Bilateral 1/14/2016    Procedure: COMBINED OPTICAL TRACKING SYSTEM CRANIOTOMY, EXCISE TUMOR;  Surgeon: Francis Velazquez MD;  [facility]     OPTICAL TRACKING SYSTEM VENTRICULOSTOMY  4/16/2015    Procedure: OPTICAL TRACKING SYSTEM VENTRICULOSTOMY;  Surgeon: Francis Velazquez MD;  [facility]     REMOVE PORT VASCULAR ACCESS N/A 10/6/2016    Procedure: REMOVE PORT VASCULAR ACCESS;  Surgeon: Bruno Perea MD;  [facility]     RHINOPLASTY N/A 10/6/2016    Procedure:  RHINOPLASTY;  Surgeon: Tyler Richards MD;  [facility]     VASCULAR SURGERY  5-2015    single lumen power port       FAMILY HISTORY:   Family History   Problem Relation Age of Onset     Circulatory Father         PE/DVT     Hypothyroidism Father 30     Diabetes Maternal Grandmother      Diabetes Paternal Grandmother      Diabetes Paternal Grandfather      C.A.D. Paternal Grandfather      Hypertension Maternal Grandfather      Thyroid Disease Paternal Aunt         unknown whether hypo or hyper     Mental Illness No family hx of    Fam/Soc: Lives at a transitional home - [facility]. His parents are  but have been awarded guardianship of Geo. The families work well together - both parents have remarried. His dad has a clotting disorder, requiring him to take Warfarin daily. Mother and Father were both ill with respiratory illnesses and have improved.     SOCIAL HISTORY:   Social History     Tobacco Use     Smoking status: Never Smoker     Smokeless tobacco: Never Used   Substance Use Topics     Alcohol use: No     Review of Systems   Constitutional: In wheelchair   HENT: Less drooling noted today. Negative for nosebleeds.   Eyes: Patching for diplopia.  Gastrointestinal: Positive for constipation. He is a good eater.   Genitourinary: Negative for difficulty urinating.   Musculoskeletal: Unable to walk without assistance.    Neurological: Positive for tremors, speech difficulty and weakness.     Vital signs done yesterday at 11 AM:   Temp 97.9 Pulse 71 /79     Karn[place]sky 60%  Unable to get weight today - his ataxia does not allow him to stand on the digital scale at his facility.   Wt Readings from Last 2 Encounters:   02/25/20 88.1 kg (194 lb 3.6 oz)   01/25/20 89.3 kg (196 lb 14.4 oz)     Exam:  No physical exam was completed today due to the COVID restrictions in place by Geo's facility. They are not allowing him to leave the facility for safety of all the residents.   Labs:   Labs from 4/21/2020 were reviewed  Component      Latest Ref Rng & Units 4/21/2020   WBC      10:9/L 4.1   RBC Count      10:12/L 3.85   Hemoglobin      13.3 - 17.7 g/dL 10.5 (A)   Hematocrit      % 33   MCV      fl 85.7   MCH      pg 27.3   MCHC      g/dL 31.8   RDW      % 14.4   Platelet Count      10:9/L 108   Differential       See Scanned Document   Absolute Neutrophils (External)       2.0   Absolute Lymphocytes (External)       1.1   Absolute Monocytes (External)       0.8   Absolute Eosinophils (External)       0.2   Sodium      mmol/L 144   Potassium      mmol/L 3.7   Chloride      mmol/L 108   Carbon Dioxide      mmol/L 29   Anion Gap      mmol/L 7   Glucose      70 - 99 mg/dL 71   Urea Nitrogen      mg/dL 15   Creatinine      mg/dL 0.94   Calcium      mg/dL 15   Magnesium      mg/dL 2.0   Phosphorus (External)       2.9     Impression:   1. Ependymoma Most recent MRI 1/7/2020: No metastases, primary tumor stable compared to recent studies.   2. Chronic constipation.  3. Geo's labs are adequate to proceed.  His Hgb is slightly low.     4. Restless movements of legs and involuntary movements that seem improved since medication changes (stopping Elavil and psychological medication changes).    5. Behavioral issues have not been a recent issue until today.     Plan:   1. He will begin Etinostat 6 mg weekly today. We have been unable to obtain labs as he is not allowed to leave his facility but we did find a company who will work with him for the next several weeks.  They adalberto labs last week but we just received results yesterday and he is adequate to proceed. Labs were reviewed with Geo.  Medication was delivered to dad and a diary was provided. We will recheck his labs in 4 weeks.   2. Constipation discussed. The [facility] record his stools and he has been having regular stools. There are a couple missing days of documentation but staff notes he goes every day.   3. Staff will assess his bottom with  next trip to the bathroom since he requires assistance.  They will notify me if concerns. They had not heard him complain of his bottom until our video call. I will ask nursing to assess his elbow and his bottom at their next visit.   4.  He is due for imaging however his tumor has been very stable and with the Department of Health regulations/CDC recommendations during shelter at home and the current restrictions by his facility, he can not leave for imaging.  Doing so may jeopardize his space at this facility and he has no where else to go and is no longer safe to be cared for at home due to his psychological diagnosis.  We will delay the scan due to these COVID-19 restrictions.     5. He should continue close follow-up with Psychiatry. We will continue to monitor restless less and involuntary movements.  He will be seen at [place] as soon as in-person visits can occur as it is not urgent right now.    6. Continue regular skin cares.  We will have nursing assess elbow at their next visit.     I have reviewed and updated the patient's Past Medical History, Social History, Family History and Medication List.       Telephone contact with staff for information  Phone call start: 2:18  Phone call end:  2:23       Video Visit  Video start: 2:28  Video end: 2:50    Follow-up phone call to staff    Phone call start:3:02  Phone call end: 3:08     Total 33 minutes of face to face time spent with patient and >50% visit involved counseling and/or coordination of care.

## 2020-04-29 NOTE — LETTER
"4/29/2020      RE: Geo Hicks  [id number] [facility] [id number]          Pediatric Hematology/Oncology Video Note     Geo Hicks is being evaluated via a billable video visit due to COVID-19 clinic restrictions for in-person visits.   The patient has been notified of following:   \"This video visit will be conducted via a call between you and your physician/provider. We have found that certain health care needs can be provided without the need for a physical exam. This service lets us provide the care you need with a short video conversation. If a prescription is necessary we can send it directly to your pharmacy. If lab work is needed we can place an order for that and you can then stop by our lab to have the test done at a later time. 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 No nausea, or vomiting. He has a lot of bruising on his forearms.  His right elbow has a wound they are cleaning and treating with bacitracin.  He often bangs it on his wheelchair tray.  He had small cut on his finger and a cut on the outside of his right foot that is healed. His wound on lower extremity is much better, it does sometimes worsening if he hits it during a transfer.  He is intermittently moving his upper extremities and having some jerks but much less often per staff.  They have noted no seizure activity.  Geo is complaining of his bottom hurting all the time.  He denies that he has a pilonidal cyst again.  He states he doesn't have to have a bowel movement now.  He doesn't think it is from sitting in " "his wheelchair.  He does not want me to look at it.  Geo is upset with me today as he has been related to my not \"fixing his constipation\".  He became upset at the end of our call and hung up.    He had no missed doses of medication with his last course.   History was obtained from Geo and his care giver at [facility].   Allergies:     Allergies   Allergen Reactions     Blood Transfusion Related (Informational Only) Swelling     Periorbital swelling post platelet transfusion     No Known Drug Allergies      Current Outpatient Medications   Medication     Calcium-Vitamin D-Vitamin K (VIACTIV CALCIUM PLUS D) 650-12.5-40 MG-MCG-MCG CHEW     dexamethasone (DECADRON) 0.5 MG tablet     fexofenadine (ALLEGRA) 180 MG tablet     Lactobacillus-Inulin ([facility]ALTH [facility] WELLNESS) CAPS     lamoTRIgine (LAMICTAL) 100 MG tablet     linaclotide (LINZESS) 290 MCG capsule     mupirocin (BACTROBAN) 2 % external ointment     OLANZapine (ZYPREXA) 20 MG tablet     omeprazole (PRILOSEC) 20 MG DR capsule     order for DME     order for DME     polyethylene glycol (MIRALAX) powder     potassium phosphate, monobasic, (K-PHOS) 500 MG tablet     senna-docusate (SENOKOT-S/PERICOLACE) 8.6-50 MG tablet     sertraline (ZOLOFT) 100 MG tablet     study - entinostat, IDS# [id number], 1 mg tablet     study - entinostat, IDS# [id number], 5 mg tablet     sulfamethoxazole-trimethoprim (BACTRIM) 400-80 MG tablet     traZODone (DESYREL) 50 MG tablet     vitamin D3 (CHOLECALCIFEROL) 2000 units (50 mcg) tablet     vitamin E (TOCOPHEROL) 400 units (360 mg) capsule     No current facility-administered medications for this visit.        PAST MEDICAL HISTORY:   Past Medical History:   Diagnosis Date     Cranial nerve dysfunction      Dyspepsia      Ependymoma (H)      Gastro-oesophageal reflux disease      Hearing loss      Intracranial hemorrhage (H)      Migraine      Pilonidal cyst     7-2015     Reduced vision      Refractory obstruction of nasal airway     " 2nd to nasal valve prolapse     Sleep apnea      Strabismus     gaze palsy        PAST SURGICAL HISTORY:   Past Surgical History:   Procedure Laterality Date     COLONOSCOPY N/A 9/27/2019    Procedure: Colonoscopy With Biopsies and Polypectomy;  Surgeon: Aniya Wei MD;  [facility]     ESOPHAGOSCOPY, GASTROSCOPY, DUODENOSCOPY (EGD), COMBINED N/A 9/27/2019    Procedure: Upper Endoscopy (EGD) With Biopsies;  Surgeon: Aniya Wei MD;  [facility]     GRAFT CARTILAGE FROM POSTERIOR AURICLE Left 10/6/2016    Procedure: GRAFT CARTILAGE FROM POSTERIOR AURICLE;  Surgeon: Tyler Richards MD;  [facility]     INCISION AND DRAINAGE PERINEAL, COMBINED Bilateral 7/18/2015    Procedure: COMBINED INCISION AND DRAINAGE PERINEAL;  Surgeon: Dequan Timmons MD;  [facility]     OPTICAL TRACKING SYSTEM CRANIOTOMY, EXCISE TUMOR, COMBINED N/A 4/13/2015    Procedure: COMBINED OPTICAL TRACKING SYSTEM CRANIOTOMY, EXCISE TUMOR;  Surgeon: Francis Velazquez MD;  [facility]     OPTICAL TRACKING SYSTEM CRANIOTOMY, EXCISE TUMOR, COMBINED N/A 4/16/2015    Procedure: COMBINED OPTICAL TRACKING SYSTEM CRANIOTOMY, EXCISE TUMOR;  Surgeon: Francis Velazquez MD;  [facility]     OPTICAL TRACKING SYSTEM CRANIOTOMY, EXCISE TUMOR, COMBINED Bilateral 5/28/2015    Procedure: COMBINED OPTICAL TRACKING SYSTEM CRANIOTOMY, EXCISE TUMOR;  Surgeon: Francis Velazquez MD;  [facility]     OPTICAL TRACKING SYSTEM CRANIOTOMY, EXCISE TUMOR, COMBINED Bilateral 1/14/2016    Procedure: COMBINED OPTICAL TRACKING SYSTEM CRANIOTOMY, EXCISE TUMOR;  Surgeon: Francis Velazquez MD;  [facility]     OPTICAL TRACKING SYSTEM VENTRICULOSTOMY  4/16/2015    Procedure: OPTICAL TRACKING SYSTEM VENTRICULOSTOMY;  Surgeon: Francis Velazquez MD;  [facility]     REMOVE PORT VASCULAR ACCESS N/A 10/6/2016    Procedure: REMOVE PORT VASCULAR ACCESS;  Surgeon: Eliazar  Bruno MORALES MD;  [facility]     RHINOPLASTY N/A 10/6/2016    Procedure: RHINOPLASTY;  Surgeon: Tyler Richards MD;  [facility]     VASCULAR SURGERY  5-2015    single lumen power port       FAMILY HISTORY:   Family History   Problem Relation Age of Onset     Circulatory Father         PE/DVT     Hypothyroidism Father 30     Diabetes Maternal Grandmother      Diabetes Paternal Grandmother      Diabetes Paternal Grandfather      C.A.D. Paternal Grandfather      Hypertension Maternal Grandfather      Thyroid Disease Paternal Aunt         unknown whether hypo or hyper     Mental Illness No family hx of    Fam/Soc: Lives at a transitional home - [facility]. His parents are  but have been awarded guardianship of Geo. The families work well together - both parents have remarried. His dad has a clotting disorder, requiring him to take Warfarin daily. Mother and Father were both ill with respiratory illnesses and have improved.     SOCIAL HISTORY:   Social History     Tobacco Use     Smoking status: Never Smoker     Smokeless tobacco: Never Used   Substance Use Topics     Alcohol use: No     Review of Systems   Constitutional: In wheelchair   HENT: Less drooling noted today. Negative for nosebleeds.   Eyes: Patching for diplopia.  Gastrointestinal: Positive for constipation. He is a good eater.   Genitourinary: Negative for difficulty urinating.   Musculoskeletal: Unable to walk without assistance.    Neurological: Positive for tremors, speech difficulty and weakness.     Vital signs done yesterday at 11 AM:   Temp 97.9 Pulse 71 /79     Karnofsky 60%  Unable to get weight today - his ataxia does not allow him to stand on the digital scale at his facility.   Wt Readings from Last 2 Encounters:   02/25/20 88.1 kg (194 lb 3.6 oz)   01/25/20 89.3 kg (196 lb 14.4 oz)     Exam:  No physical exam was completed today due to the COVID restrictions in place by Geo's facility. They are not  allowing him to leave the facility for safety of all the residents.   Labs:  Labs from 4/21/2020 were reviewed  Component      Latest Ref Rng & Units 4/21/2020   WBC      10:9/L 4.1   RBC Count      10:12/L 3.85   Hemoglobin      13.3 - 17.7 g/dL 10.5 (A)   Hematocrit      % 33   MCV      fl 85.7   MCH      pg 27.3   MCHC      g/dL 31.8   RDW      % 14.4   Platelet Count      10:9/L 108   Differential       See Scanned Document   Absolute Neutrophils (External)       2.0   Absolute Lymphocytes (External)       1.1   Absolute Monocytes (External)       0.8   Absolute Eosinophils (External)       0.2   Sodium      mmol/L 144   Potassium      mmol/L 3.7   Chloride      mmol/L 108   Carbon Dioxide      mmol/L 29   Anion Gap      mmol/L 7   Glucose      70 - 99 mg/dL 71   Urea Nitrogen      mg/dL 15   Creatinine      mg/dL 0.94   Calcium      mg/dL 15   Magnesium      mg/dL 2.0   Phosphorus (External)       2.9     Impression:   1. Ependymoma Most recent MRI 1/7/2020: No metastases, primary tumor stable compared to recent studies.   2. Chronic constipation.  3. Geo's labs are adequate to proceed.  His Hgb is slightly low.     4. Restless movements of legs and involuntary movements that seem improved since medication changes (stopping Elavil and psychological medication changes).    5. Behavioral issues have not been a recent issue until today.     Plan:   1. He will begin Etinostat 6 mg weekly today. We have been unable to obtain labs as he is not allowed to leave his facility but we did find a company who will work with him for the next several weeks.  They adalberto labs last week but we just received results yesterday and he is adequate to proceed. Labs were reviewed with Geo.  Medication was delivered to dad and a diary was provided. We will recheck his labs in 4 weeks.   2. Constipation discussed. The [facility] record his stools and he has been having regular stools. There are a couple missing days of documentation  but staff notes he goes every day.   3. Staff will assess his bottom with next trip to the bathroom since he requires assistance.  They will notify me if concerns. They had not heard him complain of his bottom until our video call. I will ask nursing to assess his elbow and his bottom at their next visit.   4.  He is due for imaging however his tumor has been very stable and with the Department of Health regulations/CDC recommendations during shelter at home and the current restrictions by his facility, he can not leave for imaging.  Doing so may jeopardize his space at this facility and he has no where else to go and is no longer safe to be cared for at home due to his psychological diagnosis.  We will delay the scan due to these COVID-19 restrictions.     5. He should continue close follow-up with Psychiatry. We will continue to monitor restless less and involuntary movements.  He will be seen at [facility] [place] as soon as in-person visits can occur as it is not urgent right now.    6. Continue regular skin cares.  We will have nursing assess elbow at their next visit.     I have reviewed and updated the patient's Past Medical History, Social History, Family History and Medication List.       Telephone contact with staff for information  Phone call start: 2:18  Phone call end:  2:23       Video Visit  Video start: 2:28  Video end: 2:50    Follow-up phone call to staff    Phone call start:3:02  Phone call end: 3:08     Total 33 minutes of face to face time spent with patient and >50% visit involved counseling and/or coordination of care.      Kristi Schuler, ALAN CNP

## 2020-05-01 ENCOUNTER — MEDICAL CORRESPONDENCE (OUTPATIENT)
Dept: HEALTH INFORMATION MANAGEMENT | Facility: CLINIC | Age: 20
End: 2020-05-01

## 2020-05-05 ENCOUNTER — TRANSFERRED RECORDS (OUTPATIENT)
Dept: HEALTH INFORMATION MANAGEMENT | Facility: CLINIC | Age: 20
End: 2020-05-05

## 2020-05-05 ENCOUNTER — TELEPHONE (OUTPATIENT)
Dept: FAMILY MEDICINE | Facility: CLINIC | Age: 20
End: 2020-05-05

## 2020-05-05 ENCOUNTER — MEDICAL CORRESPONDENCE (OUTPATIENT)
Dept: HEALTH INFORMATION MANAGEMENT | Facility: CLINIC | Age: 20
End: 2020-05-05

## 2020-05-05 LAB
ANION GAP SERPL CALCULATED.3IONS-SCNC: 3 MMOL/L
BUN SERPL-MCNC: 14 MG/DL
CALCIUM SERPL-MCNC: 8.1 MG/DL
CHLORIDE SERPLBLD-SCNC: 109 MMOL/L
CO2 SERPL-SCNC: 30 MMOL/L
CREAT SERPL-MCNC: 0.97 MG/DL
DIFFERENTIAL: ABNORMAL
ERYTHROCYTE [DISTWIDTH] IN BLOOD BY AUTOMATED COUNT: 14.7 %
GFR SERPL CREATININE-BSD FRML MDRD: NORMAL ML/MIN/{1.73_M2}
GLUCOSE SERPL-MCNC: 80 MG/DL (ref 70–99)
HCT VFR BLD AUTO: 33.1 %
HEMOGLOBIN: 10.3 G/DL (ref 13.3–17.7)
MCH RBC QN AUTO: 26.9 PG
MCHC RBC AUTO-ENTMCNC: 31.1 G/DL
MCV RBC AUTO: 86.4 FL
PLATELET # BLD AUTO: 106 10^9/L
POTASSIUM SERPL-SCNC: 4.2 MMOL/L
RBC # BLD AUTO: 3.83 10^12/L
SODIUM SERPL-SCNC: 142 MMOL/L
WBC # BLD AUTO: 3.2 10^9/L

## 2020-05-05 NOTE — TELEPHONE ENCOUNTER
Received 2 page fax for Physical Therapy Discharge Order and Summary for Dr Espinoza to complete. Form in the in-basket at 's desk.

## 2020-05-07 ENCOUNTER — TELEPHONE (OUTPATIENT)
Dept: FAMILY MEDICINE | Facility: CLINIC | Age: 20
End: 2020-05-07

## 2020-05-07 NOTE — TELEPHONE ENCOUNTER
Received 2 page fax for Occupational Therapy Discharge Order and Summary for Dr Espinoza to complete. Form in the in-basket at 's desk.

## 2020-05-08 ENCOUNTER — MEDICAL CORRESPONDENCE (OUTPATIENT)
Dept: HEALTH INFORMATION MANAGEMENT | Facility: CLINIC | Age: 20
End: 2020-05-08

## 2020-05-27 NOTE — PROGRESS NOTES
"   Pediatric Hematology/Oncology Video Note     Geo Hicks is being evaluated via a billable video visit due to COVID-19 clinic restrictions for in-person visits.   The patient has been notified of following:   \"This video visit will be conducted via a call between you and your physician/provider. We have found that certain health care needs can be provided without the need for a physical exam. This service lets us provide the care you need with a short video conversation. If a prescription is necessary we can send it directly to your pharmacy. If lab work is needed we can place an order for that and you can then stop by our lab to have the test done at a later time. 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 No nausea, or vomiting. His bruising is improved on his forearms.  His wounds are healing well.  There is a nurse who comes weekly to evaluate them and confirms the improvement.   They have noted no seizure activity.  Geo notes his bottom is better.  He has had no headaches.  He had no missed doses of medication with his last course.   History was obtained from Geo and his care giver at [facility].   Allergies:     Allergies   Allergen Reactions     Blood Transfusion Related (Informational Only) Swelling     Periorbital swelling post platelet transfusion     No Known Drug Allergies      Current Outpatient Medications   Medication     dexamethasone (DECADRON) 0.5 MG tablet     fexofenadine (ALLEGRA) 180 MG tablet     Lactobacillus-Inulin ([facility]ALTH [facility] WELLNESS) CAPS     lamoTRIgine " (LAMICTAL) 100 MG tablet     linaclotide (LINZESS) 290 MCG capsule     mupirocin (BACTROBAN) 2 % external ointment     OLANZapine (ZYPREXA) 20 MG tablet     omeprazole (PRILOSEC) 20 MG DR capsule     order for DME     order for DME     polyethylene glycol (MIRALAX) powder     potassium phosphate, monobasic, (K-PHOS) 500 MG tablet     senna-docusate (SENOKOT-S/PERICOLACE) 8.6-50 MG tablet     sertraline (ZOLOFT) 100 MG tablet     study - entinostat, IDS# [id number], 1 mg tablet     study - entinostat, IDS# [id number], 5 mg tablet     sulfamethoxazole-trimethoprim (BACTRIM) 400-80 MG tablet     traZODone (DESYREL) 50 MG tablet     vitamin D3 (CHOLECALCIFEROL) 2000 units (50 mcg) tablet     vitamin E (TOCOPHEROL) 400 units (360 mg) capsule     No current facility-administered medications for this visit.        PAST MEDICAL HISTORY:   Past Medical History:   Diagnosis Date     Cranial nerve dysfunction      Dyspepsia      Ependymoma (H)      Gastro-oesophageal reflux disease      Hearing loss      Intracranial hemorrhage (H)      Migraine      Pilonidal cyst     7-2015     Reduced vision      Refractory obstruction of nasal airway     2nd to nasal valve prolapse     Sleep apnea      Strabismus     gaze palsy        PAST SURGICAL HISTORY:   Past Surgical History:   Procedure Laterality Date     COLONOSCOPY N/A 9/27/2019    Procedure: Colonoscopy With Biopsies and Polypectomy;  Surgeon: Aniya Wei MD;  [facility]     ESOPHAGOSCOPY, GASTROSCOPY, DUODENOSCOPY (EGD), COMBINED N/A 9/27/2019    Procedure: Upper Endoscopy (EGD) With Biopsies;  Surgeon: Aniya Wei MD;  [facility]     GRAFT CARTILAGE FROM POSTERIOR AURICLE Left 10/6/2016    Procedure: GRAFT CARTILAGE FROM POSTERIOR AURICLE;  Surgeon: Tyler Richards MD;  [facility]     INCISION AND DRAINAGE PERINEAL, COMBINED Bilateral 7/18/2015    Procedure: COMBINED INCISION AND DRAINAGE PERINEAL;  Surgeon: Dequan Timmons  MD Jamey;  [facility]     OPTICAL TRACKING SYSTEM CRANIOTOMY, EXCISE TUMOR, COMBINED N/A 4/13/2015    Procedure: COMBINED OPTICAL TRACKING SYSTEM CRANIOTOMY, EXCISE TUMOR;  Surgeon: Francis Velazquez MD;  [facility]     OPTICAL TRACKING SYSTEM CRANIOTOMY, EXCISE TUMOR, COMBINED N/A 4/16/2015    Procedure: COMBINED OPTICAL TRACKING SYSTEM CRANIOTOMY, EXCISE TUMOR;  Surgeon: Francis Velazquez MD;  [facility]     OPTICAL TRACKING SYSTEM CRANIOTOMY, EXCISE TUMOR, COMBINED Bilateral 5/28/2015    Procedure: COMBINED OPTICAL TRACKING SYSTEM CRANIOTOMY, EXCISE TUMOR;  Surgeon: Francis Velazquez MD;  [facility]     OPTICAL TRACKING SYSTEM CRANIOTOMY, EXCISE TUMOR, COMBINED Bilateral 1/14/2016    Procedure: COMBINED OPTICAL TRACKING SYSTEM CRANIOTOMY, EXCISE TUMOR;  Surgeon: Francis Velazquez MD;  [facility]     OPTICAL TRACKING SYSTEM VENTRICULOSTOMY  4/16/2015    Procedure: OPTICAL TRACKING SYSTEM VENTRICULOSTOMY;  Surgeon: Francis Velazquez MD;  [facility]     REMOVE PORT VASCULAR ACCESS N/A 10/6/2016    Procedure: REMOVE PORT VASCULAR ACCESS;  Surgeon: Bruno Perea MD;  [facility]     RHINOPLASTY N/A 10/6/2016    Procedure: RHINOPLASTY;  Surgeon: Tyler Richards MD;  [facility]     VASCULAR SURGERY  5-2015    single lumen power port       FAMILY HISTORY:   Family History   Problem Relation Age of Onset     Circulatory Father         PE/DVT     Hypothyroidism Father 30     Diabetes Maternal Grandmother      Diabetes Paternal Grandmother      Diabetes Paternal Grandfather      C.A.D. Paternal Grandfather      Hypertension Maternal Grandfather      Thyroid Disease Paternal Aunt         unknown whether hypo or hyper     Mental Illness No family hx of    Fam/Soc: Lives at a transitional home - [facility]. His parents are  but have been awarded guardianship of Geo. The families work well together - both parents have  remarried. His dad has a clotting disorder, requiring him to take Warfarin daily. Mother and Father were both ill with respiratory illnesses and have improved.     SOCIAL HISTORY:   Social History     Tobacco Use     Smoking status: Never Smoker     Smokeless tobacco: Never Used   Substance Use Topics     Alcohol use: No     Review of Systems   Constitutional: In wheelchair   HENT: Less drooling noted today. Negative for nosebleeds.   Eyes: Patching for diplopia. Eye glasses in place.  Gastrointestinal: Positive for constipation. He is a good eater.   Genitourinary: Negative for difficulty urinating.   Musculoskeletal: Unable to walk without assistance.    Neurological: Positive for tremors, speech difficulty and weakness.     Vital signs done   Temp 97.8 Pulse 96 /70     Karnofsky 60  Unable to get weight today - his ataxia does not allow him to stand on the digital scale at his facility.   Wt Readings from Last 2 Encounters:   02/25/20 88.1 kg (194 lb 3.6 oz)   01/25/20 89.3 kg (196 lb 14.4 oz)     Exam:  Physical Exam:    Constitutional - Happy, well nourished teen  Eyes - with out redness, or discharge. Patch in place Glasses on.  Musculoskeletal - Moving upper extremities.  Ataxia.   Skin - Scattered bruising.  Neurological - alert & oriented.  Answering questions.      The rest of a comprehensive physical examination is deferred due to PHE (public health emergency) video visit restrictions  .     Labs:  Labs from 5/19/2020 were reviewed  WBC 4.0  Hgb 10.4   Platelets 124,000  ANC 2.2  Chemistries done at the same time reveal:  Na 141  K 4.1  Chloride 106  CO2 29  ALK Phos slightly elevated at 157  AST also slight elevation of 56  AST normal at 19  Bilirubin also normal at 0.4  Total protein is 6.5  Albumin is low at 3  Glucose is 107 (non-fasting)  MAGNESIUM 1.7  PHOS 3    Impression:   1. Ependymoma Most recent MRI 1/7/2020: No metastases, primary tumor stable compared to recent studies.   2. Chronic  constipation.  3. Geo's labs are adequate to proceed.  His Hgb is slightly low.     4. Restless movements of legs and involuntary movements that seem improved since medication changes (stopping Elavil and psychological medication changes).      Plan:   1. He will begin Entinostat 6 mg weekly today. We have been unable to obtain labs as he is not allowed to leave his facility but we did find a company who will work with him for the next several weeks.  They adalberto labs last week but we just received results yesterday and he is adequate to proceed. Labs were reviewed with Geo.  Medication was delivered to dad and a diary was provided to the staff and [facility]. We will recheck his labs in 4 weeks.     2.  He is due for imaging however his tumor has been very stable and with the Department of Health regulations/CDC recommendations during shelter at home and the current restrictions by his facility, it has been difficult to have them allow his leaving the facility.  Doing so may jeopardize his space at this facility and he has no where else to go and is no longer safe to be cared for at home due to his psychological diagnosis.  We will proceed with imaging next month since we will be nearing 6 months and this was discussed with Geo, his dad and the facility.  We will take careful precautions.      3. He should continue close follow-up with Psychiatry. I will send a message to psychiatry about their need to Lamotrigine refill. We will continue to monitor restless less and involuntary movements.  He will be seen at [place] as soon as in-person visits can occur as it is not urgent right now.    4. Continue regular skin cares.  We will have nursing assess his skin again at their next visit.     I have reviewed and updated the patient's Past Medical History, Social History, Family History and Medication List.      Video Visit  Video start: 2:28  Video end: 2:53       Total 25 minutes of face to face time spent  with patient and >50% visit involved counseling and/or coordination of care.

## 2020-05-27 NOTE — LETTER
"5/27/2020      RE: Geo Hicks  [address] [facility] [address]          Pediatric Hematology/Oncology Video Note     Geo Hicks is being evaluated via a billable video visit due to COVID-19 clinic restrictions for in-person visits.   The patient has been notified of following:   \"This video visit will be conducted via a call between you and your physician/provider. We have found that certain health care needs can be provided without the need for a physical exam. This service lets us provide the care you need with a short video conversation. If a prescription is necessary we can send it directly to your pharmacy. If lab work is needed we can place an order for that and you can then stop by our lab to have the test done at a later time. 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 No nausea, or vomiting. His bruising is improved on his forearms.  His wounds are healing well.  There is a nurse who comes weekly to evaluate them and confirms the improvement.   They have noted no seizure activity.  Geo notes his bottom is better.  He has had no headaches.  He had no missed doses of medication with his last course.   History was obtained from Geo and his care giver at [facility].   Allergies:     Allergies   Allergen Reactions     Blood Transfusion Related (Informational Only) Swelling     Periorbital swelling post platelet transfusion     No Known Drug Allergies      Current Outpatient Medications   Medication     dexamethasone (DECADRON) 0.5 MG tablet     fexofenadine (ALLEGRA) 180 MG " tablet     Lactobacillus-Inulin ([facility]) CAPS     lamoTRIgine (LAMICTAL) 100 MG tablet     linaclotide (LINZESS) 290 MCG capsule     mupirocin (BACTROBAN) 2 % external ointment     OLANZapine (ZYPREXA) 20 MG tablet     omeprazole (PRILOSEC) 20 MG DR capsule     order for DME     order for DME     polyethylene glycol (MIRALAX) powder     potassium phosphate, monobasic, (K-PHOS) 500 MG tablet     senna-docusate (SENOKOT-S/PERICOLACE) 8.6-50 MG tablet     sertraline (ZOLOFT) 100 MG tablet     study - entinostat, IDS# 5050, 1 mg tablet     study - entinostat, IDS# [id number], 5 mg tablet     sulfamethoxazole-trimethoprim (BACTRIM) 400-80 MG tablet     traZODone (DESYREL) 50 MG tablet     vitamin D3 (CHOLECALCIFEROL) 2000 units (50 mcg) tablet     vitamin E (TOCOPHEROL) 400 units (360 mg) capsule     No current facility-administered medications for this visit.        PAST MEDICAL HISTORY:   Past Medical History:   Diagnosis Date     Cranial nerve dysfunction      Dyspepsia      Ependymoma (H)      Gastro-oesophageal reflux disease      Hearing loss      Intracranial hemorrhage (H)      Migraine      Pilonidal cyst     7-2015     Reduced vision      Refractory obstruction of nasal airway     2nd to nasal valve prolapse     Sleep apnea      Strabismus     gaze palsy        PAST SURGICAL HISTORY:   Past Surgical History:   Procedure Laterality Date     COLONOSCOPY N/A 9/27/2019    Procedure: Colonoscopy With Biopsies and Polypectomy;  Surgeon: Aniya Wei MD;  Location: [facility]     ESOPHAGOSCOPY, GASTROSCOPY, DUODENOSCOPY (EGD), COMBINED N/A 9/27/2019    Procedure: Upper Endoscopy (EGD) With Biopsies;  Surgeon: Aniya Wei MD;  [facility]     GRAFT CARTILAGE FROM POSTERIOR AURICLE Left 10/6/2016    Procedure: GRAFT CARTILAGE FROM POSTERIOR AURICLE;  Surgeon: Tyler Richards MD;  [facility]     INCISION AND DRAINAGE PERINEAL, COMBINED Bilateral  7/18/2015    Procedure: COMBINED INCISION AND DRAINAGE PERINEAL;  Surgeon: Dequan Timmons MD;  [facility]     OPTICAL TRACKING SYSTEM CRANIOTOMY, EXCISE TUMOR, COMBINED N/A 4/13/2015    Procedure: COMBINED OPTICAL TRACKING SYSTEM CRANIOTOMY, EXCISE TUMOR;  Surgeon: Francis Velazquez MD;  [facility]     OPTICAL TRACKING SYSTEM CRANIOTOMY, EXCISE TUMOR, COMBINED N/A 4/16/2015    Procedure: COMBINED OPTICAL TRACKING SYSTEM CRANIOTOMY, EXCISE TUMOR;  Surgeon: Francis Velazquez MD;  [facility]     OPTICAL TRACKING SYSTEM CRANIOTOMY, EXCISE TUMOR, COMBINED Bilateral 5/28/2015    Procedure: COMBINED OPTICAL TRACKING SYSTEM CRANIOTOMY, EXCISE TUMOR;  Surgeon: Francis Velazquez MD;  [facility]     OPTICAL TRACKING SYSTEM CRANIOTOMY, EXCISE TUMOR, COMBINED Bilateral 1/14/2016    Procedure: COMBINED OPTICAL TRACKING SYSTEM CRANIOTOMY, EXCISE TUMOR;  Surgeon: Francis Velazquez MD;  [facility]     OPTICAL TRACKING SYSTEM VENTRICULOSTOMY  4/16/2015    Procedure: OPTICAL TRACKING SYSTEM VENTRICULOSTOMY;  Surgeon: Francis Velazquez MD;  [facility]     REMOVE PORT VASCULAR ACCESS N/A 10/6/2016    Procedure: REMOVE PORT VASCULAR ACCESS;  Surgeon: Bruno Perea MD;  [facility]     RHINOPLASTY N/A 10/6/2016    Procedure: RHINOPLASTY;  Surgeon: Tyler Richards MD;  [facility]     VASCULAR SURGERY  5-2015    single lumen power port       FAMILY HISTORY:   Family History   Problem Relation Age of Onset     Circulatory Father         PE/DVT     Hypothyroidism Father 30     Diabetes Maternal Grandmother      Diabetes Paternal Grandmother      Diabetes Paternal Grandfather      C.A.D. Paternal Grandfather      Hypertension Maternal Grandfather      Thyroid Disease Paternal Aunt         unknown whether hypo or hyper     Mental Illness No family hx of    Fam/Soc: Lives at a [facility]. His parents are  but have been  awarded guardianship of Geo. The families work well together - both parents have remarried. His dad has a clotting disorder, requiring him to take Warfarin daily. Mother and Father were both ill with respiratory illnesses and have improved.     SOCIAL HISTORY:   Social History     Tobacco Use     Smoking status: Never Smoker     Smokeless tobacco: Never Used   Substance Use Topics     Alcohol use: No     Review of Systems   Constitutional: In wheelchair   HENT: Less drooling noted today. Negative for nosebleeds.   Eyes: Patching for diplopia. Eye glasses in place.  Gastrointestinal: Positive for constipation. He is a good eater.   Genitourinary: Negative for difficulty urinating.   Musculoskeletal: Unable to walk without assistance.    Neurological: Positive for tremors, speech difficulty and weakness.     Vital signs done   Temp 97.8 Pulse 96 /70     Karnofsky 60  Unable to get weight today - his ataxia does not allow him to stand on the digital scale at his facility.   Wt Readings from Last 2 Encounters:   02/25/20 88.1 kg (194 lb 3.6 oz)   01/25/20 89.3 kg (196 lb 14.4 oz)     Exam:  Physical Exam:    Constitutional - Happy, well nourished teen  Eyes - with out redness, or discharge. Patch in place Glasses on.  Musculoskeletal - Moving upper extremities.  Ataxia.   Skin - Scattered bruising.  Neurological - alert & oriented.  Answering questions.      The rest of a comprehensive physical examination is deferred due to PHE (public health emergency) video visit restrictions  .     Labs:  Labs from 5/19/2020 were reviewed  WBC 4.0  Hgb 10.4   Platelets 124,000  ANC 2.2  Chemistries done at the same time reveal:  Na 141  K 4.1  Chloride 106  CO2 29  ALK Phos slightly elevated at 157  AST also slight elevation of 56  AST normal at 19  Bilirubin also normal at 0.4  Total protein is 6.5  Albumin is low at 3  Glucose is 107 (non-fasting)  MAGNESIUM 1.7  PHOS 3    Impression:   1. Ependymoma Most recent MRI  1/7/2020: No metastases, primary tumor stable compared to recent studies.   2. Chronic constipation.  3. Geo's labs are adequate to proceed.  His Hgb is slightly low.     4. Restless movements of legs and involuntary movements that seem improved since medication changes (stopping Elavil and psychological medication changes).      Plan:   1. He will begin Entinostat 6 mg weekly today. We have been unable to obtain labs as he is not allowed to leave his facility but we did find a company who will work with him for the next several weeks.  They adalberto labs last week but we just received results yesterday and he is adequate to proceed. Labs were reviewed with Geo.  Medication was delivered to dad and a diary was provided to the staff and [facility]. We will recheck his labs in 4 weeks.     2.  He is due for imaging however his tumor has been very stable and with the Department of Health regulations/CDC recommendations during shelter at home and the current restrictions by his facility, it has been difficult to have them allow his leaving the facility.  Doing so may jeopardize his space at this facility and he has no where else to go and is no longer safe to be cared for at home due to his psychological diagnosis.  We will proceed with imaging next month since we will be nearing 6 months and this was discussed with Geo, his dad and the facility.  We will take careful precautions.      3. He should continue close follow-up with Psychiatry. I will send a message to psychiatry about their need to Lamotrigine refill. We will continue to monitor restless less and involuntary movements.  [facility] will be seen at [facility] as soon as in-person visits can occur as it is not urgent right now.    4. Continue regular skin cares.  We will have nursing assess his skin again at their next visit.     I have reviewed and updated the patient's Past Medical History, Social History, Family History and Medication List.      Video  Visit  Video start: 2:28  Video end: 2:53       Total 25 minutes of face to face time spent with patient and >50% visit involved counseling and/or coordination of care.        ALAN Justin CNP

## 2020-05-27 NOTE — NURSING NOTE
"Geo Hicks is a 19 year old male who is being evaluated via a billable video visit.      The patient has been notified of following:     \"This video visit will be conducted via a call between you and your physician/provider. We have found that certain health care needs can be provided without the need for an in-person physical exam.  This service lets us provide the care you need with a video conversation.  If a prescription is necessary we can send it directly to your pharmacy.  If lab work is needed we can place an order for that and you can then stop by our lab to have the test done at a later time.    If during the course of the call the physician/provider feels a video visit is not appropriate, you will not be charged for this service.\"     Patient has given verbal consent for Video visit? Yes    Patient would like the video invitation sent by: [phone]    Video Start Time: 2:17 PM    Geo Hicks complains of    Chief Complaint   Patient presents with     RECHECK     Patient is here for Neoplasm of posterior cranial fossa follow up       No Pain (0)  Data Unavailable      I have reviewed and updated the patient's Past Medical History, Social History, Family History and Medication List.    ALLERGIES  Blood transfusion related (informational only) and No known drug allergies      "

## 2020-06-04 NOTE — TELEPHONE ENCOUNTER
Received 2 page fax for Physician Orders for Dr Espinoza to complete. Form in the in-basket at [facility] in 's in-basket. When completed, please fax to [phone].    Melvina Paredes,

## 2020-06-04 NOTE — TELEPHONE ENCOUNTER
Sue called to report new wound on patient on R shin 1.5x3.5 cm fairly superficial cleaning with saline, bacitracin and putting island dressing or bandaids unless MD wants different care for them.    Kan CRYSTAL RN, BSN

## 2020-06-04 NOTE — TELEPHONE ENCOUNTER
Received 2 page fax from [facility] for Dr. Espinoza to complete. Placed in 's in-basket at his desk. When completed, please fax to [phone].    Melvina Paredes,

## 2020-06-04 NOTE — TELEPHONE ENCOUNTER
Malissa calling from [facility] for orders-    Skilled Nurse effective 6/7/20  1xwk/9wks  2 as needed    Verbal order given.  Will fax for MD signature.  Cate Morales RN

## 2020-06-05 PROBLEM — D36.9 TUBULAR ADENOMA: Status: ACTIVE | Noted: 2020-01-01

## 2020-06-05 NOTE — PROGRESS NOTES
"     Pediatric Gastroenterology,   Hepatology, and Nutrition               Outpatient follow up consultation    Consultation requested by Jeffrey Espinoza     Diagnoses:  Patient Active Problem List   Diagnosis     GERD (gastroesophageal reflux disease)     Closed fracture at the growth plate of right distal fibula      Elevated serum creatinine     Dyspepsia     Intracranial hemorrhage (H)     Hemorrhagic stroke (H)     Ependymoma (H)     Admission for antineoplastic chemotherapy     Post-operative state     S/P craniotomy     S/P biopsy     Noncomitant strabismus     Abducens neuropathy of both eyes     CANDELARIO (obstructive sleep apnea)     Health Care Home     Hypernatremia     Body temperature low     Current chronic use of systemic steroids     Elevated TSH     Status post chemotherapy     Neoplasm of posterior cranial fossa (H)     Chronic constipation     Serum albumin decreased     Closed compression fracture of thoracic vertebra with routine healing, subsequent encounter     Depression     Constipation     Constipation by delayed colonic transit     Slow transit constipation         HPI: 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  Towards the end of the visit eGo got very upset for unclear reasons the caregiver from his group home did a good job of calming him down but it was clear that more of a visit with me would not be helpful.  I then called his dad who said that Geo feels that if he was not \"constipated\" he could get up and walk he also stated that Geo does not think that we actually did a colonoscopy when he was sedated.  Dad understands that Geo's constipation is currently well managed and that the psychiatric side of things is what needs focus right now, and they are doing that.    Per report from geo and his :  He is stooling soft stools every day, they are soft stools. He " still feels like he has stool in his rectum even after he goes.  No blood in his stools.    They had tried amitriptyline but needed to stop it because of shaking as a side effect.   Miralax 1 cap 3 times a day  Linzess 290 mcg  Senna 1 tablet daily    His colonoscopy was normal outside of a polyp:   Colon polyp was a tubular adenoma 1.6 cm in size.  Based on this it is a higher risk polyp and would require repeat colonoscopy in 3 years.      No nausea or vomiting    Review of Systems: A  review of systems was negative except as note in this note and below.     Allergies: Blood transfusion related (informational only) and No known drug allergies    Medications:   Current Outpatient Medications   Medication Sig Dispense Refill     dexamethasone (DECADRON) 0.5 MG tablet TAKE ONE AND ONE-HALF TABLETS (0.75MG) BY MOUTH ONCE DAILY WITH BREAKFAST. 45 tablet 11     fexofenadine (ALLEGRA) 180 MG tablet TAKE 1 TABLET BY MOUTH EVERY EVENING. 30 tablet 11     Lactobacillus-Inulin ([facility]) CAPS Take 2 capsules by mouth daily 30 capsule 3     lamoTRIgine (LAMICTAL) 200 MG tablet Take 1 tablet (200 mg) by mouth At Bedtime 90 tablet 0     linaclotide (LINZESS) 290 MCG capsule Take 1 capsule (290 mcg) by mouth every morning (before breakfast) 30 capsule 3     mupirocin (BACTROBAN) 2 % external ointment Use 2 times a day to the ear lobes and canals with flare 22 g 3     OLANZapine (ZYPREXA) 20 MG tablet Take 1 tablet (20 mg) by mouth At Bedtime 30 tablet 1     omeprazole (PRILOSEC) 20 MG DR capsule Take 2 capsules (40 mg) by mouth every morning 60 capsule 1     order for DME Equipment being ordered: Dressing  Wound #1 lower leg, W 6 cm x, D 0.5  cm, Drainage scant, Thickness sutured     Type: non stick gauze, Brand: , Size: 4/4   Change: 1 per day    Tape/Wrap: 1 each     attach facesheet with insurance information (delete this line) 5 each 0     order for DME Equipment being ordered: short boot 1 each 0      polyethylene glycol (MIRALAX) powder Take 17 g (1 capful) by mouth 3 times daily 289 g 3     potassium phosphate, monobasic, (K-PHOS) 500 MG tablet Take 1 tablet (500 mg) by mouth 2 times daily 90 tablet 3     senna-docusate (SENOKOT-S/PERICOLACE) 8.6-50 MG tablet Take 1 tablet by mouth daily And please report to team at [facility] in no stool in 36 hours. 30 tablet 4     sertraline (ZOLOFT) 100 MG tablet Take 1.5 tablets (150 mg) by mouth daily 45 tablet 1     study - entinostat, IDS# 5050, 1 mg tablet Take 1 tablet (1 mg) by mouth every 7 days for 4 doses Takeone 1mg tablet with one 5mg tablet for total dose of 6mg weekly. Take on an empty stomach, at least 1 hour before or 2 hours after a meal.  Swallow tablet whole. 4 tablet 0     study - entinostat, IDS# 5050, 5 mg tablet Take 1 tablet (5 mg) by mouth every 7 days for 4 doses Take one 5mg tablet with two 1mg tablet for total dose of 6 mg weekly. Take on an empty stomach, at least 1 hour before or 2 hours after a meal.  Swallow tablet whole. 4 tablet 0     sulfamethoxazole-trimethoprim (BACTRIM) 400-80 MG tablet Take 1 tablet by mouth 2 times daily On Saturdays and Sundays 24 tablet 11     traZODone (DESYREL) 50 MG tablet Take 1 tablet (50 mg) by mouth At Bedtime 30 tablet 1     vitamin D3 (CHOLECALCIFEROL) 2000 units (50 mcg) tablet TAKE 1 TABLET BY MOUTH ONCE DAILY WITH BREAKFAST. 30 tablet 11     vitamin E (TOCOPHEROL) 400 units (360 mg) capsule TAKE 1 CAPSULE BY MOUTH ONCE DAILY. 30 capsule 11       Past Medical History: I have reviewed this patient's past medical history and updated as appropriate.   Past Medical History:   Diagnosis Date     Cranial nerve dysfunction      Dyspepsia      Ependymoma (H)      Gastro-oesophageal reflux disease      Hearing loss      Intracranial hemorrhage (H)      Migraine      Pilonidal cyst     7-2015     Reduced vision      Refractory obstruction of nasal airway     2nd to nasal valve prolapse     Sleep apnea       Strabismus     gaze palsy         Past Surgical History: I have reviewed this patient's past medical history and updated as appropriate.   Past Surgical History:   Procedure Laterality Date     COLONOSCOPY N/A 9/27/2019    Procedure: Colonoscopy With Biopsies and Polypectomy;  Surgeon: Aniya Wei MD;  [facility]: [facility]     ESOPHAGOSCOPY, GASTROSCOPY, DUODENOSCOPY (EGD), COMBINED N/A 9/27/2019    Procedure: Upper Endoscopy (EGD) With Biopsies;  Surgeon: Aniya Wei MD;  [facility]     GRAFT CARTILAGE FROM POSTERIOR AURICLE Left 10/6/2016    Procedure: GRAFT CARTILAGE FROM POSTERIOR AURICLE;  Surgeon: Tyler Richards MD;  [facility]     INCISION AND DRAINAGE PERINEAL, COMBINED Bilateral 7/18/2015    Procedure: COMBINED INCISION AND DRAINAGE PERINEAL;  Surgeon: Dequan Timmons MD;  [facility]     OPTICAL TRACKING SYSTEM CRANIOTOMY, EXCISE TUMOR, COMBINED N/A 4/13/2015    Procedure: COMBINED OPTICAL TRACKING SYSTEM CRANIOTOMY, EXCISE TUMOR;  Surgeon: Francis Velazquez MD;  [facility]     OPTICAL TRACKING SYSTEM CRANIOTOMY, EXCISE TUMOR, COMBINED N/A 4/16/2015    Procedure: COMBINED OPTICAL TRACKING SYSTEM CRANIOTOMY, EXCISE TUMOR;  Surgeon: Francis Velazquez MD;  [facility]     OPTICAL TRACKING SYSTEM CRANIOTOMY, EXCISE TUMOR, COMBINED Bilateral 5/28/2015    Procedure: COMBINED OPTICAL TRACKING SYSTEM CRANIOTOMY, EXCISE TUMOR;  Surgeon: Francis Velazquez MD;  [facility]     OPTICAL TRACKING SYSTEM CRANIOTOMY, EXCISE TUMOR, COMBINED Bilateral 1/14/2016    Procedure: COMBINED OPTICAL TRACKING SYSTEM CRANIOTOMY, EXCISE TUMOR;  Surgeon: Francis Velazquez MD;  [facility]     OPTICAL TRACKING SYSTEM VENTRICULOSTOMY  4/16/2015    Procedure: OPTICAL TRACKING SYSTEM VENTRICULOSTOMY;  Surgeon: Francis Velazquez MD;  [facility]     REMOVE PORT VASCULAR ACCESS N/A 10/6/2016    Procedure: REMOVE PORT  VASCULAR ACCESS;  Surgeon: Bruno Perea MD;  [facility]     RHINOPLASTY N/A 10/6/2016    Procedure: RHINOPLASTY;  Surgeon: Tyler Richards MD;  [facility]     VASCULAR SURGERY  5-2015    single lumen power port       Family History: I have reviewed this patient's past family history today and updated as appropriate.  Family History   Problem Relation Age of Onset     Circulatory Father         PE/DVT     Hypothyroidism Father 30     Diabetes Maternal Grandmother      Diabetes Paternal Grandmother      Diabetes Paternal Grandfather      C.A.D. Paternal Grandfather      Hypertension Maternal Grandfather      Thyroid Disease Paternal Aunt         unknown whether hypo or hyper     Mental Illness No family hx of         Social History: Splits time between mom and dad's house    Physical exam:  Vital Signs: There were no vitals taken for this visit.. (Normalized stature-for-age data not available for patients older than 20 years. Normalized weight-for-age data not available for patients older than 20 years. There is no height or weight on file to calculate BMI. 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  His constipation is well conroled and current symptoms are delusional thinking that we are not treating his constipation and that if his constipation was treated he would be able to walk.  He also hs a tubular adenoma 1.6 cm in size removed at his  colonoscopy.  Based on this it is a higher risk polyp and would require repeat colonoscopy in 3 years.          -Continue with current constipation management (Linzess, senna, MiraLAX) titrating MiraLAX to 2-3 soft Castor stool scale 4-6 type stools a day.   -Encouraged continued use of Pelvic Floor physical therapy techniques  -Continue time toileting and proper positioning with stooling  -Will need a colonoscopy in 3 years or sooner for any blood in the stool or other concerning symptoms  -Discussed with dad that I would recommend that they establish care with an adult Gastroenterologist to follow the polyp, I will continue to prescribe his constipation medications for up to 1 year or until they are seen by adult GI whichever is sooner, scheduling number for adult GI was provided via [employer] in the patient instructions   -Discussed the importance of letting us know if any blood is noted in the stool  -Dad was in agreement that focusing on the psychiatric component is key right now    No orders of the defined types were placed in this encounter.    I discussed the plan of care with Geo and his dad including  symptoms, differential diagnosis, diagnostic work up, treatment, potential side effects, and complications and follow up plan.  Questions were answered.    Follow up: No follow-ups on file. or earlier should patient become symptomatic.      Aniya Wei MD  Pediatric Gastroenterology  [facility]    CC  Patient Care Team:  Jeffrey Espinoza MD as PCP - General (Family Practice)  Dequan Timmons MD as MD (Surgery)  Leoncio Rousseau MD as MD (Pediatric Hematology/Oncology)  Kristi Schuler, APRN CNP as Nurse Practitioner (Nurse Practitioner - Pediatrics)  Higinio Walters MD (Ophthalmology)  Karina Hodgson MSW as   Eren Reeder MD as MD (Dermatology)  Schwab, Briana, RN as Nurse Coordinator  Perico Holley MD as MD (Pediatric  Neurology)  Sarah Vines MD as MD (Pediatric Urology)  Sarah Vines MD as MD (Pediatric Urology)  Imani Means, RN as Registered Nurse  Imani Means RN as Registered Nurse  Jorge Luis Tripathi MD as MD (Psychiatry)  Justice Fay MD as Resident (Student in [facility] health care education/training program)  Jorge Luis Tripathi MD as MD (Psychiatry)  Marilia Emery MD as MD (Psychiatry)  William Mcrae MD as Assigned PCP     Geo Hicks is a 19 year old male who is being evaluated via a billable video visit    Video-Visit Details  Type of service:  Video Visit    Video Start Time: 16:02  Video End Time: 16:18   Then 10 min on the phone with Geo's dad Eric Hicks    Originating Location (pt. Location): Home    Distant Location (provider location):  [facility]     Platform used for Video Visit: Oscar Wei MD

## 2020-06-05 NOTE — PROGRESS NOTES
"VIDEO VISIT  Geo Hicks is a 19 year old patient who is being evaluated via a billable video visit.    The patient has been notified of following:   \"We have found that certain health care needs can be provided without the need for an in-person physical exam. This service lets us provide the care you need with a video conversation. If a prescription is necessary we can send it directly to your pharmacy. If lab work is needed we can place an order for that and you can then stop by our lab to have the test done at a later time. Insurers are generally covering virtual visits as they would in-office visits so billing should not be different than normal.  If for some reason you do get billed incorrectly, you should contact the billing office to correct it and that number is in the AVS .    Patient has given verbal consent for video visit?: Yes   How would you like to obtain your AVS?: Inuvo  AVS SmartPhrase [PsychAVS] has been placed in 'Patient Instructions': Yes      Video- Visit Details  Type of service:  video visit for medication management  Time of service:    Date:  06/05/2020    Video Start Time:  10:37 AM Video End Time:  11:04 AM    Reason for video visit:  Patient unable to travel due to Covid-19  Originating Site (patient location):  Group Home  Distant Site (provider location):  Remote location  Mode of Communication:  Video Conference via [employer] PSYCHIATRY CLINIC PROGRESS NOTE     CARE TEAM:  PCP- Jeffrey Espinoza    Specialty Providers- Oncology, Neuropsychology, Physical Therapy, Occupational Therapy    Therapist- most recently [facility] Team- no.    Geo Hicks is a 19 year old male who prefers the name Geo & pronouns he, him, his, himself.    Date of initial diagnostic assessment is 2/7/2019.    Pertinent Background:  This patient first experienced significant mental health issues in 2018 and has received treatment for " "paranoia/delusions and depressed mood.  Notably, Geo is undergoing cancer treatment with the [facility] at [facility] and was referred for psychiatric management with our department by neuropsychology.  He has a history of ependymoma that was diagnosed at age 14. Since then, he has undergone multiple tumor resections, chemotherapy, and radiation treatment. Geo also experienced an intra-tumoral hemorrhage in May 2015 that resulted in neurological changes including facial numbness, significant loss of mobility and dysarthria.  He is currently on COG study [id number] with Entinostat.  Since 2018, Geo has developed a fixed delusion that definitive care is being withheld by his oncology providers, specifically believing that they are choosing not to treat his constipation, and that it is due to this that he is no longer able to walk without assistance.  Has demonstrated agitation and emotional discord with behavioral disturbance in the setting of this belief.  While his emotional upset has been somewhat amenable to treatment with medications, his delusional belief has remained so far resistant to management.    Psych critical item history includes suicidal ideation, psychosis [sxs include delusions] and psychiatric hospitalization x1.    INTERIM HISTORY                                                                                                                 4, 4   History was provided by the patient who was a fair historian.  The last visit ended with the following med change: restarted trazodone 50 mg at bedtime for sleep support.  Since the last visit:  -Presents via telemedicine from his group home.  Group home staff accompany him in the background.  -States that his mood has been overall stable and \"okay,\" with some lower moods at times associated with frustration over having to remain in the group home due to COVID-19.  Elaborates however that things in the house are good, and he keeps himself occupied " "by playing video games.  -Denies SI/SIB thoughts, violent/aggressive ideation, depressive severity, inappropriately elevated moods or other hallmarks of psychiatric decompensation with dangerousness.  -Has not been able to visit in person with his parents because of the COVID-19 restrictions, and states that he does miss them, however at the same time it's been good to have a bit of a break.  -In terms of the previous concern of jerking leg movements, this was not noted during our interview and Geo states that he's \"not really noticing it anymore.\"  -At one point he alluded to his concerns regarding his providers conspiring to not treat his constipation, due to which he continues to require a wheelchair (his longstanding delusion, see previous notes) by making a statement to the effect of \"those things they don't want me to talk about.\"  In response, this writer asked if that was something he still thinks about a lot.  He replied that it was.  I then asked if he is thinking about it the same amount, more frequently, or if he thinks about it less.  He stated that actually the frequency of those thoughts \"has gone down a little bit - it's slowly going down.\"  -Brought up our previous recommendation for his continuing with a new therapist, as he has stopped meeting with his last therapist, Manju.  He stated that this was something he was open to doing.  -Overall, reported feeling like his medications are helpful and well-tolerated, and we agreed to make no changes today.    RECENT SYMPTOMS:   DEPRESSION:  reports-low moods, anhedonia, feeling trapped and overwhelmed;  DENIES- suicidal ideation, low energy, insomnia, hypersomnia, excessive guilt, feeling hopeless and excessive crying  ELIZABETH/HYPOMANIA:  reports-none;  DENIES- increased energy, decreased sleep need, increased activity and grandiosity  PSYCHOSIS:  reports-delusions- paranoid [details in Interim History];  DENIES- auditory hallucinations and visual " hallucinations  DYSREGULATION:  reports-can become irritable/agitated if beliefs are challenged;  DENIES- violent ideation, SIB, mood dysregulation, impulsive and aggressive  PANIC ATTACK:  none  ANXIETY:  excessive worry and nervous/overwhelmed  TRAUMA RELATED:  none  COMPULSIVE:  none  SLEEP:  initiation and maintenance both improved with trazodone  EATING DISORDER: no    RECENT SUBSTANCE USE:  ALCOHOL- no  TOBACCO- no  CAFFEINE- minimal  OPIOIDS- no         NARCAN KIT- N/A  CANNABIS- no  OTHER ILLICIT DRUGS- no      CURRENT SOCIAL HISTORY:  FINANCIAL SUPPORT- family  CHILDREN- no  LIVING SITUATION- assisted living facility  SOCIAL/ SPIRITUAL SUPPORT- mother, father, older brother  FEELS SAFE AT HOME- yes    MEDICAL ROS (2,10):  A comprehensive review of systems was performed and is negative other than noted in the HPI.    PSYCH and CD Critical Summary Points since July 2018 February 2019:  Clinic intake on 2/7.  Later was admitted to [facility] for a brief inpatient psych admission from 3/14-3/15, most of which was spent in the ER, due to suicidal comments that concerned family.  Was ultimately deemed safe for outpatient follow-up and was discharged with an increase in olanzapine to 15 mg.  March 2019:  Began sertraline 50 mg daily.  May 2019:  Began trazodone 25-50 mg at bedtime.  June 2019:  Increased trazodone to 100 mg at bedtime.  July 2019:  Increased Sertraline to 100 mg daily.  August 2019:  Increased olanzapine to 17.5 mg and decreased trazodone to 75 mg, both at bedtime.  September 2019:  Increased olanzapine to 20 mg at bedtime.  October 2019:  Titrated olanzapine to 25 mg at bedtime.  November 2019:  Increased sertraline to 150 mg daily.  December 2019:  In collaboration with oncology providers agreed to cross-taper from trazodone to amitriptyline, with the latter medication added to help with GI discomfort, and trazodone discontinued in order to reduce overall serotonergic burden.  January  "2020:  Olanzapine increased from 25 mg to 30 mg at bedtime.  Later that month was admitted for routine bowel clean-out to inpatient pediatrics shortly after being asked to leave his group home - ultimately, family and Geo did not feel it was safe or desirable for him to return home and so remained admitted to peds until a new assisted living placement could be found, ultimately being discharged 2/18/2020.  February 2020:  Initiated lamotrigine titration to 200 mg, tapered olanzapine to 20 mg and (in collaboration with oncology providers) discontinued amitriptyline.  April 2020:  Restarted trazodone 50 mg at bedtime for sleep support.    PAST PSYCH MED TRIALS   see EMR Problem List: Hx of psychiatric care    MEDICAL / SURGICAL HISTORY                                   Neurologic Hx- See pertinent background, re: history of ependymoma and related chemo, radiation and tumor resection(s).  Notably has experienced neurological damage due to the above which has resulted in facial numbness, significant loss of mobility and dysarthria.  Has had neuropsychiatric evaluations 2/2016, 2/2017, 1/2019, and 10/2019.  The following is from the \"Results and Impressions\" section of his 10/29/2019 eval with Veronica Padilla, with a passage bolded that relates to noted difficulty in an area of executive functioning that facilitates the ability to see other people's perspectives:     \"Geo s attention was rated by his mother as well as himself as being adequate.  Executive functioning are those skills including planning, organization, emotional control, cognitive flexibility, and the ability to take another person s perspective.  Geo rating scale of these skills was basically in the average range for his age with a slight elevation in his ability to shift from one activity to another.  His mother s ratings of his executive functioning were in the average range, with the exception of a slight elevation in his ability to " "initiate tasks.  Direct testing of his ability to take another person s perspective indicated difficulty in this area.  He was asked to sort objects based on various aspects of the objects.  When he sorted the objects, he showed average performance.  However, when the examiner sorted the objects, Geo experienced significant difficulty with being able to determine how the objects were sorted.  This difficulty likely translates into difficulty with understanding another person s point of view.  Geo indicated that this difficulty becomes quite frustrating for him as he doesn t feel other people understand him--it is likely that he has difficulty understanding their perspective.     Emotionally Geo indicated that he continues to feel some difficulty with sadness but that it has improved over time and with medication.  Geo denied significant feelings of anxiety at this time while in the past these scores have been higher.  Parent ratings of Geo s emotional functioning indicated no areas of significant concern.  Interviews with Geo and his mother indicated continued feelings of sadness and difficulty in motivating himself to try to new activities.  While there is improvement in his mood, Geo continues to be at risk for depression.  Since he is now under the care of a psychiatrist, we did not interview around his feelings that his medical team was not being helpful to him.  His mother reported that these feelings continue and likely interfere with his compliance with directives.  He is participating in therapy to work on these issues and it is important that this intervention continue.\"    Patient Active Problem List   Diagnosis     GERD (gastroesophageal reflux disease)     Closed fracture at the growth plate of right distal fibula      Elevated serum creatinine     Dyspepsia     Intracranial hemorrhage (H)     Hemorrhagic stroke (H)     Ependymoma (H)     Admission for antineoplastic chemotherapy     " Post-operative state     S/P craniotomy     S/P biopsy     Noncomitant strabismus     Abducens neuropathy of both eyes     CANDELARIO (obstructive sleep apnea)     Health Care Home     Hypernatremia     Body temperature low     Current chronic use of systemic steroids     Elevated TSH     Status post chemotherapy     Neoplasm of posterior cranial fossa (H)     Chronic constipation     Serum albumin decreased     Closed compression fracture of thoracic vertebra with routine healing, subsequent encounter     Depression     Constipation     Constipation by delayed colonic transit     Slow transit constipation     Tubular adenoma     Past Surgical History:   Procedure Laterality Date     COLONOSCOPY N/A 9/27/2019    Procedure: Colonoscopy With Biopsies and Polypectomy;  Surgeon: Aniya Wei MD;  [facility]: [facility]     ESOPHAGOSCOPY, GASTROSCOPY, DUODENOSCOPY (EGD), COMBINED N/A 9/27/2019    Procedure: Upper Endoscopy (EGD) With Biopsies;  Surgeon: Aniya Wei MD;  Location: [facility]     GRAFT CARTILAGE FROM POSTERIOR AURICLE Left 10/6/2016    Procedure: GRAFT CARTILAGE FROM POSTERIOR AURICLE;  Surgeon: Tyler Richards MD;  [facility]     INCISION AND DRAINAGE PERINEAL, COMBINED Bilateral 7/18/2015    Procedure: COMBINED INCISION AND DRAINAGE PERINEAL;  Surgeon: Dequan Timmons MD;  [facility]     OPTICAL TRACKING SYSTEM CRANIOTOMY, EXCISE TUMOR, COMBINED N/A 4/13/2015    Procedure: COMBINED OPTICAL TRACKING SYSTEM CRANIOTOMY, EXCISE TUMOR;  Surgeon: Francis Velazquez MD;  [facility]     OPTICAL TRACKING SYSTEM CRANIOTOMY, EXCISE TUMOR, COMBINED N/A 4/16/2015    Procedure: COMBINED OPTICAL TRACKING SYSTEM CRANIOTOMY, EXCISE TUMOR;  Surgeon: Francis Velazquez MD;  [facility]     OPTICAL TRACKING SYSTEM CRANIOTOMY, EXCISE TUMOR, COMBINED Bilateral 5/28/2015    Procedure: COMBINED OPTICAL TRACKING SYSTEM CRANIOTOMY, EXCISE TUMOR;  Surgeon:  Francis Velazquez MD;  [facility]     OPTICAL TRACKING SYSTEM CRANIOTOMY, EXCISE TUMOR, COMBINED Bilateral 1/14/2016    Procedure: COMBINED OPTICAL TRACKING SYSTEM CRANIOTOMY, EXCISE TUMOR;  Surgeon: Francis Velazquez MD;  [facility]     OPTICAL TRACKING SYSTEM VENTRICULOSTOMY  4/16/2015    Procedure: OPTICAL TRACKING SYSTEM VENTRICULOSTOMY;  Surgeon: Francis Velazquez MD;  [facility]     REMOVE PORT VASCULAR ACCESS N/A 10/6/2016    Procedure: REMOVE PORT VASCULAR ACCESS;  Surgeon: Bruno Perea MD;  [facility]     RHINOPLASTY N/A 10/6/2016    Procedure: RHINOPLASTY;  Surgeon: Tyler Richards MD;  [facility]     VASCULAR SURGERY  5-2015    single lumen power port       ALLERGY                                Blood transfusion related (informational only) and No known drug allergies     MEDICATIONS                               Current Outpatient Medications   Medication     OLANZapine (ZYPREXA) 20 MG tablet     sertraline (ZOLOFT) 100 MG tablet     traZODone (DESYREL) 50 MG tablet     dexamethasone (DECADRON) 0.5 MG tablet     docusate sodium (COLACE) 100 MG capsule     fexofenadine (ALLEGRA) 180 MG tablet     Lactobacillus-Inulin ([facility]) CAPS     lamoTRIgine (LAMICTAL) 200 MG tablet     linaclotide (LINZESS) 290 MCG capsule     mupirocin (BACTROBAN) 2 % external ointment     omeprazole (PRILOSEC) 20 MG DR capsule     order for DME     order for DME     polyethylene glycol (MIRALAX) 17 GM/SCOOP powder     potassium phosphate, monobasic, (K-PHOS) 500 MG tablet     senna-docusate (SENOKOT-S/PERICOLACE) 8.6-50 MG tablet     sennosides (SENOKOT) 8.6 MG tablet     study - entinostat, IDS# [id number], 1 mg tablet     study - entinostat, IDS# [id number], 5 mg tablet     study - entinostat, IDS# [id number], 5 mg tablet     sulfamethoxazole-trimethoprim (BACTRIM) 400-80 MG tablet     vitamin D3 (CHOLECALCIFEROL) 2000 units (50 mcg) tablet     vitamin E (TOCOPHEROL)  "400 units (360 mg) capsule     No current facility-administered medications for this visit.        VITALS                                                                                                                          3, 3   There were no vitals taken for this visit.     MENTAL STATUS EXAM                                                                                           9, 14 cog gs     Alertness: alert  and oriented  Appearance: casually groomed, seated in wheelchair, wearing R-sided eye-patch  Behavior/Demeanor: cooperative, with fair eye contact   Speech: prominent dysarthria  Language: good  Psychomotor: mild evident palsy of upper extremities and facial paralysis  Mood: \"OK.\"  Affect: restricted; was congruent to mood; was congruent to content  Thought Process/Associations: unremarkable  Thought Content:  Reports delusions;  Denies suicidal ideation and violent ideation  Perception:  Reports none;  Denies auditory hallucinations and visual hallucinations  Insight: partial to poor  Judgment: good  Cognition: (6) oriented: time, person, and place  attention span: intact  concentration: intact  recent memory: intact  remote memory: intact  fund of knowledge: appropriate  Gait and Station: remarkable for:  ataxia - can ambulate but was seen in wheelchair     LABS and DATA     AIMS:  Due at next \"in-clinic\" visit.    PHQ9 TODAY = Did not complete today.  PHQ-9 SCORE 4/29/2019 8/19/2019 9/9/2019   PHQ-9 Total Score 6 8 9       ANTIPSYCHOTIC LABS  [glu, A1C, lipids (rohit LDL), liver enzymes, WBC, ANEU, Hgb, plts]  q12 mo  Recent Labs   Lab Test 06/16/20 06/01/20  1503 05/05/20  1100 04/21/20 02/26/19  1100   GLC 88 102* 80 71   < > 124*   A1C  --   --   --   --   --  5.1    < > = values in this interval not displayed.     Recent Labs   Lab Test 02/26/19  1100   CHOL 158   TRIG 236*   LDL 84   HDL 27*     Recent Labs   Lab Test 03/10/20  1338 03/03/20  1314 02/25/20  1413 02/18/20  0713   AST 21 29 42 " "27   ALT 18 23 24 22   ALKPHOS 115 110 126 149     Recent Labs   Lab Test 20  1503 20  1100 04/21/20 03/10/20  1338 20  1314 20  1413 20  0713   WBC 4.0 5.3 3.2 4.1 3.7* 3.4* 4.3 3.8*   ANEU  --   --   --   --  1.9 1.4* 2.2 1.8   HGB 10.6* 11.3* 10.3* 10.5* 11.7* 11.4* 11.7* 12.1*    118 106 108 152 171 114* 76*     EK2019: 85 BPM, QT/QTcH was 346/389 msec.  Also included comment: \"Abnormal precordial QRS contours - nondiagnostic for this age.\"    EE/15/2019: \"IMPRESSION: Awake and drowsy routine EEG (no video) was normal.  The patient stated he felt dazed during photic stimulation, however, no electrographic seizures or concerning changes on the EEG were seen during that time.  Clinical correlation is advised.  No electrographic seizures, epileptiform discharges were seen.\"    DIAGNOSIS     Delusional Disorder, persecutory type, first episode, currently in acute episode  Major Depressive Disorder, single episode, moderate    ASSESSMENT                                                                                                                   m2, h3     TODAY:  Geo Hicks presents to the [facility] for continued medication management of paranoia, delusional thoughts and depressed mood.  Meets with this writer via telemedicine with group home staff in accompaniment.  Relates mood has been stable/\"okay\" however with some lower moods at times which he attributed to frustration over COVID-19 restrictions, i.e. having to remain in his group home.  States he passes the time by playing video games.  Denies SI/SIB thoughts, violent/aggressive ideation, inappropriately elevated moods, depressive severity, panic/anxious acuity or other hallmarks of psychiatric decompensation with dangerousness.  Relates he continues to think about his characteristic delusion of a medical conspiracy against him (see previous notes) however reports that " "he is thinking about this less, and that the frequency of these thoughts are \"slowly going down.\"  Also reports that the leg jerking we had previously noticed at higher doses of olanzapine (greater than 20 mg daily) no longer appears to be apparent to him.  Overall, feels like current meds are helpful and well-tolerated, and we agreed to make no changes.  Brought up the subject of looking into a new therapist, and he reported that he was open to this.  After our visit, this writer called his father, who is his guardian, and touched base on this recommendation for therapy.  His dad stated that they actually had a referral in process and he believes Geo will be meeting with a new therapist soon.    SUICIDE RISK ASSESSMENT:  Geo Hicks denies present or interim suicidal ideation. This patient does have notable risk factors for self-harm including feels trapped, relationship conflicts, new/ worsening medical issue, male and paranoia/ delusions. However, risk is mitigated by no h/o suicide attempt, no planning or intent, describes a safety plan, h/o seeking help when needed, none to minimal alcohol use , commitment to family and stable housing.  Based on all available evidence he does not appear to be at imminent risk for self-harm therefore does not meet criteria for a 72-hr hold/  involuntary hospitalization.  However, based on degree of symptoms therapy, close psych FU and medication adjustments were recommended which the pt did agree to.  Safety plan completed 11/15/2019, provided to patient and his father.    [place] PRESCRIPTION MONITORING PROGRAM [] was not checked today:  not using controlled substances    PSYCHOTROPIC DRUG INTERACTIONS:    Pentoxifylline may enhance the antiplatelet effect of Agents with Antiplatelet Properties.     CNS Depressants may enhance the adverse/toxic effect of Selective Serotonin Reuptake Inhibitors. Specifically, the risk of psychomotor impairment may be enhanced.    CNS " "Depressants may enhance the adverse/toxic effect of other CNS Depressants.    Selective Serotonin Reuptake Inhibitors may enhance the serotonergic effect of Serotonergic Non-Opioid CNS Depressants. This could result in serotonin syndrome.    MANAGEMENT:  Monitoring for adverse effects, routine vitals, routine labs, periodic EKGs and patient/family aware of risks     PLAN                                                                                                                                m2, h3     1) PSYCHOTROPIC MEDICATIONS:  Continue olanzapine 20 mg at bedtime.  Continue sertraline 150 mg daily.  Continue lamotrigine 200 mg daily.  Continue trazodone 50 mg at bedtime.    2) THERAPY:  Recommended, and have provided a list of options - in particular it is felt Geo would benefit from ACT (Acceptance and Commitment Therapy) with a provider knowledgeable/familiar with psychosis.    3) NEXT DUE:  Labs- Lipids and A1c due.  EKG- PRN  Rating Scales- AIMS due with next \"in-clinic\" visit    4) REFERRALS:  Therapy, see above.    5) RTC: 6-8 weeks.    6) CRISIS NUMBERS:   Provided routinely in [facility] [phone] (clinic)    [phone] (after hours)  CRISIS TEXT LINE: Text [id number] from anywhere in [place], anytime, any crisis 24/7;  OR SEE www.crisistextline.org    TREATMENT RISK STATEMENT:  The risks, benefits, alternatives and potential adverse effects have been discussed and are understood by the pt. The pt understands the risks of using street drugs or alcohol. There are no medical contraindications, the pt agrees to treatment with the ability to do so. The pt knows to call the clinic for any problems or to access emergency care if needed.  Medical and substance use concerns are documented above.  Psychotropic drug interaction check was done, including changes made today.     PROVIDER:  Justice Fay DO    Supervisor is Dr. Emery, who will review and sign the note.  I did not see this pt " directly. I have reviewed the documentation, and I agree with the resident's plan of care.    Marilia Emery MD

## 2020-06-05 NOTE — TELEPHONE ENCOUNTER
On 6/5/2020, at 1000, writer called patient at mobile to confirm Virtual Visit. Writer unable to make contact with patient. Writer left detailed voice message for callback. [phone], left as call back number. CORDELL Wheeler, EMT

## 2020-06-05 NOTE — PATIENT INSTRUCTIONS
If you have any questions during regular office hours, please contact the nurse line at [phone] (Radha or Yvonne).  If acute urgent concerns arise after hours, you can call [phone] and ask to speak to the pediatric gastroenterologist on call.  If you have clinic scheduling needs, please call the Call Center at [phone].  If you need to schedule Radiology tests, call [phone].  Outside lab and imaging results should be faxed to [phone]. If you go to a lab outside of [place] we will not automatically get those results. You will need to ask them to send them to us.  My Chart messages are for routine communication and questions and are usually answered within 48-72 hours. If you have an urgent concern or require sooner response, please call us.    Please make an appointment with adult gastroenterology: [email or web address]  Phone: [phone]  Dr. Yuen or any of their providers would be appropriate for Geo    Please let us know if there are any concerns for blood in Geo's stool  I will fill his constipation medications until he sees adult GI or for 1 year, whichever is shorter

## 2020-06-08 NOTE — PROGRESS NOTES
"Geo Hicks is a 19 year old male who is being evaluated via a billable video visit.      The patient has been notified of following:     \"This video visit will be conducted via a call between you and your physician/provider. We have found that certain health care needs can be provided without the need for an in-person physical exam.  This service lets us provide the care you need with a video conversation.  If a prescription is necessary we can send it directly to your pharmacy.  If lab work is needed we can place an order for that and you can then stop by our lab to have the test done at a later time.    Video visits are billed at different rates depending on your insurance coverage.  Please reach out to your insurance provider with any questions.    If during the course of the call the physician/provider feels a video visit is not appropriate, you will not be charged for this service.\"    Patient has given verbal consent for Video visit? Yes    How would you like to obtain your AVS? Juma    Patient would like the video invitation sent by:   [email or web address]     [phone]     [phone]    Will anyone else be joining your video visit? No              "

## 2020-06-08 NOTE — LETTER
"  6/8/2020      RE: Geo Hicks  [address] [facility] [address]            Pediatric Gastroenterology,   Hepatology, and Nutrition               Outpatient follow up consultation    Consultation requested by Jeffrey Espinoza     Diagnoses:  Patient Active Problem List   Diagnosis     GERD (gastroesophageal reflux disease)     Closed fracture at the growth plate of right distal fibula      Elevated serum creatinine     Dyspepsia     Intracranial hemorrhage (H)     Hemorrhagic stroke (H)     Ependymoma (H)     Admission for antineoplastic chemotherapy     Post-operative state     S/P craniotomy     S/P biopsy     Noncomitant strabismus     Abducens neuropathy of both eyes     CANDELARIO (obstructive sleep apnea)     Health Care Home     Hypernatremia     Body temperature low     Current chronic use of systemic steroids     Elevated TSH     Status post chemotherapy     Neoplasm of posterior cranial fossa (H)     Chronic constipation     Serum albumin decreased     Closed compression fracture of thoracic vertebra with routine healing, subsequent encounter     Depression     Constipation     Constipation by delayed colonic transit     Slow transit constipation         HPI: 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  Towards the end of the visit Geo got very upset for unclear reasons the caregiver from his group home did a good job of calming him down but it was clear that more of a visit with me would not be helpful.  I then called his dad who said that Geo feels that if he was not \"constipated\" he could get up and walk he also stated that Geo does not think that we actually did a colonoscopy when he was sedated.  Dad understands that Geo's constipation is currently well managed and that the psychiatric side of things is what needs focus right now, and they are doing that.    Per report from geo and his " :  He is stooling soft stools every day, they are soft stools. He still feels like he has stool in his rectum even after he goes.  No blood in his stools.    They had tried amitriptyline but needed to stop it because of shaking as a side effect.   Miralax 1 cap 3 times a day  Linzess 290 mcg  Senna 1 tablet daily    His colonoscopy was normal outside of a polyp:   Colon polyp was a tubular adenoma 1.6 cm in size.  Based on this it is a higher risk polyp and would require repeat colonoscopy in 3 years.      No nausea or vomiting    Review of Systems: A  review of systems was negative except as note in this note and below.     Allergies: Blood transfusion related (informational only) and No known drug allergies    Medications:   Current Outpatient Medications   Medication Sig Dispense Refill     dexamethasone (DECADRON) 0.5 MG tablet TAKE ONE AND ONE-HALF TABLETS (0.75MG) BY MOUTH ONCE DAILY WITH BREAKFAST. 45 tablet 11     fexofenadine (ALLEGRA) 180 MG tablet TAKE 1 TABLET BY MOUTH EVERY EVENING. 30 tablet 11     Lactobacillus-Inulin ([facility]) CAPS Take 2 capsules by mouth daily 30 capsule 3     lamoTRIgine (LAMICTAL) 200 MG tablet Take 1 tablet (200 mg) by mouth At Bedtime 90 tablet 0     linaclotide (LINZESS) 290 MCG capsule Take 1 capsule (290 mcg) by mouth every morning (before breakfast) 30 capsule 3     mupirocin (BACTROBAN) 2 % external ointment Use 2 times a day to the ear lobes and canals with flare 22 g 3     OLANZapine (ZYPREXA) 20 MG tablet Take 1 tablet (20 mg) by mouth At Bedtime 30 tablet 1     omeprazole (PRILOSEC) 20 MG DR capsule Take 2 capsules (40 mg) by mouth every morning 60 capsule 1     order for DME Equipment being ordered: Dressing  Wound #1 lower leg, W 6 cm x, D 0.5  cm, Drainage scant, Thickness sutured     Type: non stick gauze, Brand: , Size: 4/4   Change: 1 per day    Tape/Wrap: 1 each     attach facesheet with insurance information (delete this  line) 5 each 0     order for DME Equipment being ordered: short boot 1 each 0     polyethylene glycol (MIRALAX) powder Take 17 g (1 capful) by mouth 3 times daily 289 g 3     potassium phosphate, monobasic, (K-PHOS) 500 MG tablet Take 1 tablet (500 mg) by mouth 2 times daily 90 tablet 3     senna-docusate (SENOKOT-S/PERICOLACE) 8.6-50 MG tablet Take 1 tablet by mouth daily And please report to team at [facility] in no stool in 36 hours. 30 tablet 4     sertraline (ZOLOFT) 100 MG tablet Take 1.5 tablets (150 mg) by mouth daily 45 tablet 1     study - entinostat, IDS# 5050, 1 mg tablet Take 1 tablet (1 mg) by mouth every 7 days for 4 doses Takeone 1mg tablet with one 5mg tablet for total dose of 6mg weekly. Take on an empty stomach, at least 1 hour before or 2 hours after a meal.  Swallow tablet whole. 4 tablet 0     study - entinostat, IDS# 5050, 5 mg tablet Take 1 tablet (5 mg) by mouth every 7 days for 4 doses Take one 5mg tablet with two 1mg tablet for total dose of 6 mg weekly. Take on an empty stomach, at least 1 hour before or 2 hours after a meal.  Swallow tablet whole. 4 tablet 0     sulfamethoxazole-trimethoprim (BACTRIM) 400-80 MG tablet Take 1 tablet by mouth 2 times daily On Saturdays and Sundays 24 tablet 11     traZODone (DESYREL) 50 MG tablet Take 1 tablet (50 mg) by mouth At Bedtime 30 tablet 1     vitamin D3 (CHOLECALCIFEROL) 2000 units (50 mcg) tablet TAKE 1 TABLET BY MOUTH ONCE DAILY WITH BREAKFAST. 30 tablet 11     vitamin E (TOCOPHEROL) 400 units (360 mg) capsule TAKE 1 CAPSULE BY MOUTH ONCE DAILY. 30 capsule 11       Past Medical History: I have reviewed this patient's past medical history and updated as appropriate.   Past Medical History:   Diagnosis Date     Cranial nerve dysfunction      Dyspepsia      Ependymoma (H)      Gastro-oesophageal reflux disease      Hearing loss      Intracranial hemorrhage (H)      Migraine      Pilonidal cyst     7-2015     Reduced vision      Refractory  obstruction of nasal airway     2nd to nasal valve prolapse     Sleep apnea      Strabismus     gaze palsy         Past Surgical History: I have reviewed this patient's past medical history and updated as appropriate.   Past Surgical History:   Procedure Laterality Date     COLONOSCOPY N/A 9/27/2019    Procedure: Colonoscopy With Biopsies and Polypectomy;  Surgeon: Aniya Wei MD;  [facility]: [facility]     ESOPHAGOSCOPY, GASTROSCOPY, DUODENOSCOPY (EGD), COMBINED N/A 9/27/2019    Procedure: Upper Endoscopy (EGD) With Biopsies;  Surgeon: Aniya Wei MD;  [facility]     GRAFT CARTILAGE FROM POSTERIOR AURICLE Left 10/6/2016    Procedure: GRAFT CARTILAGE FROM POSTERIOR AURICLE;  Surgeon: Tyler Richards MD;  [facility]     INCISION AND DRAINAGE PERINEAL, COMBINED Bilateral 7/18/2015    Procedure: COMBINED INCISION AND DRAINAGE PERINEAL;  Surgeon: Dequan Timmons MD;  [facility]     OPTICAL TRACKING SYSTEM CRANIOTOMY, EXCISE TUMOR, COMBINED N/A 4/13/2015    Procedure: COMBINED OPTICAL TRACKING SYSTEM CRANIOTOMY, EXCISE TUMOR;  Surgeon: Francis Velazquez MD;  [facility]     OPTICAL TRACKING SYSTEM CRANIOTOMY, EXCISE TUMOR, COMBINED N/A 4/16/2015    Procedure: COMBINED OPTICAL TRACKING SYSTEM CRANIOTOMY, EXCISE TUMOR;  Surgeon: Francis Velazquez MD;  [facility]     OPTICAL TRACKING SYSTEM CRANIOTOMY, EXCISE TUMOR, COMBINED Bilateral 5/28/2015    Procedure: COMBINED OPTICAL TRACKING SYSTEM CRANIOTOMY, EXCISE TUMOR;  Surgeon: Francis Velazquez MD;  [facility]     OPTICAL TRACKING SYSTEM CRANIOTOMY, EXCISE TUMOR, COMBINED Bilateral 1/14/2016    Procedure: COMBINED OPTICAL TRACKING SYSTEM CRANIOTOMY, EXCISE TUMOR;  Surgeon: Francis Velazquez MD;  [facility]     OPTICAL TRACKING SYSTEM VENTRICULOSTOMY  4/16/2015    Procedure: OPTICAL TRACKING SYSTEM VENTRICULOSTOMY;  Surgeon: Francis Velazquez MD;  [facility]     REMOVE PORT VASCULAR ACCESS N/A 10/6/2016    Procedure: REMOVE PORT VASCULAR ACCESS;  Surgeon: Bruno Perea MD;  [facility]     RHINOPLASTY N/A 10/6/2016    Procedure: RHINOPLASTY;  Surgeon: Tyler Richards MD;  [facility]     VASCULAR SURGERY  5-2015    single lumen power port       Family History: I have reviewed this patient's past family history today and updated as appropriate.  Family History   Problem Relation Age of Onset     Circulatory Father         PE/DVT     Hypothyroidism Father 30     Diabetes Maternal Grandmother      Diabetes Paternal Grandmother      Diabetes Paternal Grandfather      C.A.D. Paternal Grandfather      Hypertension Maternal Grandfather      Thyroid Disease Paternal Aunt         unknown whether hypo or hyper     Mental Illness No family hx of         Social History: Splits time between mom and dad's house    Physical exam:  Vital Signs: There were no vitals taken for this visit.. (Normalized stature-for-age data not available for patients older than 20 years. Normalized weight-for-age data not available for patients older than 20 years. There is no height or weight on file to calculate BMI. 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  His constipation is well conroled and current symptoms are delusional thinking that we are not treating his constipation and that if his constipation was treated he would be able  to walk.  He also hs a tubular adenoma 1.6 cm in size removed at his colonoscopy.  Based on this it is a higher risk polyp and would require repeat colonoscopy in 3 years.          -Continue with current constipation management (Linzess, senna, MiraLAX) titrating MiraLAX to 2-3 soft [place] stool scale 4-6 type stools a day.   -Encouraged continued use of Pelvic Floor physical therapy techniques  -Continue time toileting and proper positioning with stooling  -Will need a colonoscopy in 3 years or sooner for any blood in the stool or other concerning symptoms  -Discussed with dad that I would recommend that they establish care with an adult Gastroenterologist to follow the polyp, I will continue to prescribe his constipation medications for up to 1 year or until they are seen by adult GI whichever is sooner, scheduling number for adult GI was provided via [employer] in the patient instructions   -Discussed the importance of letting us know if any blood is noted in the stool  -Dad was in agreement that focusing on the psychiatric component is key right now    No orders of the defined types were placed in this encounter.    I discussed the plan of care with Geo and his dad including  symptoms, differential diagnosis, diagnostic work up, treatment, potential side effects, and complications and follow up plan.  Questions were answered.    Follow up: No follow-ups on file. or earlier should patient become symptomatic.      Aniya Wei MD  Pediatric Gastroenterology  [facility]  Patient Care Team:  Jeffrey Espinoza MD as PCP - General (Family Practice)  Dequan Timmons MD as MD (Surgery)  Leoncio Rousseau MD as MD (Pediatric Hematology/Oncology)  Kristi Schuler APRN CNP as Nurse Practitioner (Nurse Practitioner - Pediatrics)  Higinio Walters MD (Ophthalmology)  Karina Hodgson MSW as   Eren Reeder MD as MD (Dermatology)  Schwab,  "GIANNI Sales as Nurse Coordinator  Perico Holley MD as MD (Pediatric Neurology)  Sarah Vines MD as MD (Pediatric Urology)  Sarah Vines MD as MD (Pediatric Urology)  Imani Means, GIANNI as Registered Nurse  Imani Means RN as Registered Nurse  Jorge Luis Tripathi MD as MD (Psychiatry)  Justice Fay MD as Resident (Student in organized health care education/training program)  Jorge Luis Tripathi MD as MD (Psychiatry)  Marilia Emery MD as MD (Psychiatry)  William Mcrae MD as Assigned PCP     Geo Hicks is a 19 year old male who is being evaluated via a billable video visit    Video-Visit Details  Type of service:  Video Visit    Video Start Time: 16:02  Video End Time: 16:18   Then 10 min on the phone with Geo's dad Eric Hicks    Originating Location (pt. Location): Home    Distant Location (provider location):  [place]     Platform used for Video Visit: Oscar Wei MD      Geo Hicks is a 19 year old male who is being evaluated via a billable video visit.      The patient has been notified of following:     \"This video visit will be conducted via a call between you and your physician/provider. We have found that certain health care needs can be provided without the need for an in-person physical exam.  This service lets us provide the care you need with a video conversation.  If a prescription is necessary we can send it directly to your pharmacy.  If lab work is needed we can place an order for that and you can then stop by our lab to have the test done at a later time.    Video visits are billed at different rates depending on your insurance coverage.  Please reach out to your insurance provider with any questions.    If during the course of the call the physician/provider feels a video visit is not appropriate, you will not be charged for this service.\"    Patient has given verbal consent for Video visit? Yes    How would you like to " obtain your AVS? MyChart    Patient would like the video invitation sent by:   [email or web address]     [phone]     [phone]    Will anyone else be joining your video visit? No                Aniya Wei MD

## 2020-06-16 NOTE — TELEPHONE ENCOUNTER
Received 2 page fax from [facility], incident report for Dr. Espinoza to sign. When completed, please fax to [phone]. Placed in Dr. Espinoza's in-basket at his desk.    Melvina Paredes,

## 2020-06-19 NOTE — TELEPHONE ENCOUNTER
Received 2 page fax for Prescription for Dr Espinoza to complete. Form in the in-basket at 's desk.

## 2020-06-24 NOTE — PROGRESS NOTES
"   Pediatric Hematology/Oncology Video Note     Geo Hicks is being evaluated via a billable video visit due to COVID-19 clinic restrictions for in-person visits.   The patient has been notified of following:   \"This video visit will be conducted via a call between you and your physician/provider. We have found that certain health care needs can be provided without the need for a physical exam. This service lets us provide the care you need with a short video conversation. If a prescription is necessary we can send it directly to your pharmacy. If lab work is needed we can place an order for that and you can then stop by our lab to have the test done at a later time. 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 No nausea, or vomiting. His bruising is improved on his forearms.  His wounds are healing well.  He has one wound on the left lower leg that is scabbed but still a bit raised but non tender and non-erythematous. There is a nurse who comes weekly to evaluate them and confirms the improvement.   They have noted no seizure activity.  He has had no headaches.  He had no missed doses of medication with his last course.   History was obtained from Geo and his care giver at [facility].   Allergies:     Allergies   Allergen Reactions     Blood Transfusion Related (Informational Only) Swelling     Periorbital swelling post platelet transfusion     No Known Drug Allergies      Current Outpatient Medications   Medication     dexamethasone (DECADRON) 0.5 MG tablet     docusate sodium (COLACE) 100 MG " capsule     fexofenadine (ALLEGRA) 180 MG tablet     Lactobacillus-Inulin ([facility] HEALTH & WELLNESS) CAPS     lamoTRIgine (LAMICTAL) 200 MG tablet     linaclotide (LINZESS) 290 MCG capsule     mupirocin (BACTROBAN) 2 % external ointment     OLANZapine (ZYPREXA) 20 MG tablet     omeprazole (PRILOSEC) 20 MG DR capsule     order for DME     order for DME     polyethylene glycol (MIRALAX) 17 GM/SCOOP powder     potassium phosphate, monobasic, (K-PHOS) 500 MG tablet     senna-docusate (SENOKOT-S/PERICOLACE) 8.6-50 MG tablet     sennosides (SENOKOT) 8.6 MG tablet     sertraline (ZOLOFT) 100 MG tablet     study - entinostat, IDS# [id number], 1 mg tablet     study - entinostat, IDS# [id number], 5 mg tablet     study - entinostat, IDS# [id number], 5 mg tablet     sulfamethoxazole-trimethoprim (BACTRIM) 400-80 MG tablet     traZODone (DESYREL) 50 MG tablet     vitamin D3 (CHOLECALCIFEROL) 2000 units (50 mcg) tablet     vitamin E (TOCOPHEROL) 400 units (360 mg) capsule     No current facility-administered medications for this visit.        PAST MEDICAL HISTORY:   Past Medical History:   Diagnosis Date     Cranial nerve dysfunction      Dyspepsia      Ependymoma (H)      Gastro-oesophageal reflux disease      Hearing loss      Intracranial hemorrhage (H)      Migraine      Pilonidal cyst     7-2015     Reduced vision      Refractory obstruction of nasal airway     2nd to nasal valve prolapse     Sleep apnea      Strabismus     gaze palsy        PAST SURGICAL HISTORY:   Past Surgical History:   Procedure Laterality Date     COLONOSCOPY N/A 9/27/2019    Procedure: Colonoscopy With Biopsies and Polypectomy;  Surgeon: Aniya Wei MD;  [facility]     ESOPHAGOSCOPY, GASTROSCOPY, DUODENOSCOPY (EGD), COMBINED N/A 9/27/2019    Procedure: Upper Endoscopy (EGD) With Biopsies;  Surgeon: Aniya Wei MD;  [facility]     GRAFT CARTILAGE FROM POSTERIOR AURICLE Left 10/6/2016    Procedure: GRAFT CARTILAGE  FROM POSTERIOR AURICLE;  Surgeon: Tyler Richards MD;  [facility]     INCISION AND DRAINAGE PERINEAL, COMBINED Bilateral 7/18/2015    Procedure: COMBINED INCISION AND DRAINAGE PERINEAL;  Surgeon: Dequan Timmons MD;  [facility]     OPTICAL TRACKING SYSTEM CRANIOTOMY, EXCISE TUMOR, COMBINED N/A 4/13/2015    Procedure: COMBINED OPTICAL TRACKING SYSTEM CRANIOTOMY, EXCISE TUMOR;  Surgeon: Francis Velazquez MD;  [facility]     OPTICAL TRACKING SYSTEM CRANIOTOMY, EXCISE TUMOR, COMBINED N/A 4/16/2015    Procedure: COMBINED OPTICAL TRACKING SYSTEM CRANIOTOMY, EXCISE TUMOR;  Surgeon: Francis Velazquez MD;  [facility]     OPTICAL TRACKING SYSTEM CRANIOTOMY, EXCISE TUMOR, COMBINED Bilateral 5/28/2015    Procedure: COMBINED OPTICAL TRACKING SYSTEM CRANIOTOMY, EXCISE TUMOR;  Surgeon: Francis Velazquez MD;  [facility]     OPTICAL TRACKING SYSTEM CRANIOTOMY, EXCISE TUMOR, COMBINED Bilateral 1/14/2016    Procedure: COMBINED OPTICAL TRACKING SYSTEM CRANIOTOMY, EXCISE TUMOR;  Surgeon: Francis Velazquez MD;  [facility]     OPTICAL TRACKING SYSTEM VENTRICULOSTOMY  4/16/2015    Procedure: OPTICAL TRACKING SYSTEM VENTRICULOSTOMY;  Surgeon: Francis Velazquez MD;  [facility]     REMOVE PORT VASCULAR ACCESS N/A 10/6/2016    Procedure: REMOVE PORT VASCULAR ACCESS;  Surgeon: Bruno Perea MD;  [facility]     RHINOPLASTY N/A 10/6/2016    Procedure: RHINOPLASTY;  Surgeon: Tyler Richards MD;  [facility]     VASCULAR SURGERY  5-2015    single lumen power port       FAMILY HISTORY:   Family History   Problem Relation Age of Onset     Circulatory Father         PE/DVT     Hypothyroidism Father 30     Diabetes Maternal Grandmother      Diabetes Paternal Grandmother      Diabetes Paternal Grandfather      C.A.D. Paternal Grandfather      Hypertension Maternal Grandfather      Thyroid Disease Paternal Aunt         unknown whether hypo or hyper      Mental Illness No family hx of    Fam/Soc: Lives at a transitional home - [facility]. His parents are  but have been awarded guardianship of Geo. The families work well together - both parents have remarried. His father has a clotting disorder, requiring him to take Warfarin daily    SOCIAL HISTORY:   Social History     Tobacco Use     Smoking status: Never Smoker     Smokeless tobacco: Never Used   Substance Use Topics     Alcohol use: No     Review of Systems   Constitutional: Sitting on the side of his bed.  HENT: Less drooling noted today. Negative for nosebleeds.   Eyes: Patching for diplopia.   Gastrointestinal: Positive for constipation. Last BM was yesterday.  He is a good eater.   Genitourinary: Negative for difficulty urinating.   Musculoskeletal: Unable to walk without assistance.    Neurological: Positive for tremors, speech difficulty and weakness.   Imaging:  Brain MRI completed 6/11/20 reveals the following findings:    Postsurgical changes of suboccipital craniotomy with underlying  resection cavity and cerebellum.     No significant change in size of a 1.9 x 1.8 x 2.6 cm homogeneously  enhancing fourth ventricular mass. A nonenhancing cystic structure  along the right posterior lateral aspect of the mass measuring 1.3 x  1.1 cm, slightly decreased in size from 1/7/2020, previously measuring  2.1 x 1.6 cm. There is hemosiderin deposition within the enhancing  mass and along the margins of the cystic component. No significant  change in extent of the adjacent T2 hyperintense signal within the  bilateral cerebellar hemispheres, david and medulla in the superior  cerebellar peduncles.     There is no midline shift. The ventricles are proportional to the  cerebral sulci. The basilar cisterns are patent. No diffusion  restriction. The major intracranial vascular flow voids are patent.      No abnormality of the skull marrow signal is noted. Paranasal sinuses  and mastoid air cells are  clear. Orbits are unremarkable.                                                                      Impression:  No significant change in size of the ependymoma centered within the  right fourth ventricle, since the previous MRI on 1/7/2020. Slightly decreased size of the cystic lesion adjacent to the enhancing mass.    Vital signs done   Temp 97.8 Pulse 96 /70     Karnofsky 60     Wt Readings from Last 2 Encounters:   02/25/20 88.1 kg (194 lb 3.6 oz)   01/25/20 89.3 kg (196 lb 14.4 oz)     Exam:  Physical Exam:    Constitutional - Happy, well nourished teen  Eyes - with out redness, or discharge. Patch in place Glasses on.  Musculoskeletal - Moving upper extremities.  Ataxia.   Skin - Scattered bruising.  left lower leg lesion that is scabbed but still a bit raised.  non tender and non-erythematous.  Neurological - alert & oriented.  Answering questions.      The rest of a comprehensive physical examination is deferred due to PHE (public health emergency) video visit restrictions    Labs:  Labs from 6/16/2020 were reviewed:  WBC 4.0  Hgb 10.6  Platelets 118,000  ANC 1.6  Chemistries done at the same time reveal:  Na 145  K 3.9  Calcium 8.8  Alk Phos slightly elevated at 158  AST 17  ALT less than 10    Total protein 5.7     Albumin 3.0    Glucose 88    Magnesium 2    Phosphorus 4.7  See Epic for lab copy for remainder of labs.   Impression:   1. Ependymoma Most recent MRI 6/11/20: No significant change in size of the ependymoma centered within the right fourth ventricle, since the previous MRI on 1/7/2020. Slightly decreased size of the cystic lesion adjacent to the enhancing mass.  2. Chronic constipation.  3. Geo's labs are adequate to proceed with the next month of therapy.         Plan:   1. He will begin Entinostat 6 mg weekly today.   Labs were reviewed with Geo.  Medication was delivered to dad and a diary was provided to the staff and [facility]. We will recheck his labs in 4 weeks.     2.   Reviewed MRI with Geo.  His tumor has been very stable and it is good to see the cystic portion slightly decreased.       3. He should continue close follow-up with Psychiatry.    4. Continue regular skin cares.  We will have nursing assess his skin again at their next visit.   5.  We will send a Door chart to the facility so Geo can monitor the consistency of his stools. He will continue to follow with GI. He continues to be upset with discussions on constipation.     I have reviewed and updated the patient's Past Medical History, Social History, Family History and Medication List.      Video Visit  Video start:1:30  Video end: 1:58       Total 28 minutes of face to face time spent with patient and >50% visit involved counseling and/or coordination of care.

## 2020-06-24 NOTE — LETTER
"  6/24/2020      RE: Geo Hicks  [address] [facility] [id number]          Pediatric Hematology/Oncology Video Note     Geo Hicks is being evaluated via a billable video visit due to COVID-19 clinic restrictions for in-person visits.   The patient has been notified of following:   \"This video visit will be conducted via a call between you and your physician/provider. We have found that certain health care needs can be provided without the need for a physical exam. This service lets us provide the care you need with a short video conversation. If a prescription is necessary we can send it directly to your pharmacy. If lab work is needed we can place an order for that and you can then stop by our lab to have the test done at a later time. 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 No nausea, or vomiting. His bruising is improved on his forearms.  His wounds are healing well.  He has one wound on the left lower leg that is scabbed but still a bit raised but non tender and non-erythematous. There is a nurse who comes weekly to evaluate them and confirms the improvement.   They have noted no seizure activity.  He has had no headaches.  He had no missed doses of medication with his last course.   History was obtained from Geo and his care giver at [facility].   Allergies:     Allergies   Allergen Reactions     Blood Transfusion Related (Informational Only) Swelling     Periorbital swelling post platelet transfusion     No Known Drug Allergies      Current Outpatient Medications   Medication "     dexamethasone (DECADRON) 0.5 MG tablet     docusate sodium (COLACE) 100 MG capsule     fexofenadine (ALLEGRA) 180 MG tablet     Lactobacillus-Inulin ([facility] HEALTH & WELLNESS) CAPS     lamoTRIgine (LAMICTAL) 200 MG tablet     linaclotide (LINZESS) 290 MCG capsule     mupirocin (BACTROBAN) 2 % external ointment     OLANZapine (ZYPREXA) 20 MG tablet     omeprazole (PRILOSEC) 20 MG DR capsule     order for DME     order for DME     polyethylene glycol (MIRALAX) 17 GM/SCOOP powder     potassium phosphate, monobasic, (K-PHOS) 500 MG tablet     senna-docusate (SENOKOT-S/PERICOLACE) 8.6-50 MG tablet     sennosides (SENOKOT) 8.6 MG tablet     sertraline (ZOLOFT) 100 MG tablet     study - entinostat, IDS# [id number], 1 mg tablet     study - entinostat, IDS# [id number], 5 mg tablet     study - entinostat, IDS# [id number], 5 mg tablet     sulfamethoxazole-trimethoprim (BACTRIM) 400-80 MG tablet     traZODone (DESYREL) 50 MG tablet     vitamin D3 (CHOLECALCIFEROL) 2000 units (50 mcg) tablet     vitamin E (TOCOPHEROL) 400 units (360 mg) capsule     No current facility-administered medications for this visit.        PAST MEDICAL HISTORY:   Past Medical History:   Diagnosis Date     Cranial nerve dysfunction      Dyspepsia      Ependymoma (H)      Gastro-oesophageal reflux disease      Hearing loss      Intracranial hemorrhage (H)      Migraine      Pilonidal cyst     7-2015     Reduced vision      Refractory obstruction of nasal airway     2nd to nasal valve prolapse     Sleep apnea      Strabismus     gaze palsy        PAST SURGICAL HISTORY:   Past Surgical History:   Procedure Laterality Date     COLONOSCOPY N/A 9/27/2019    Procedure: Colonoscopy With Biopsies and Polypectomy;  Surgeon: Aniya Wei MD;  [facility]     ESOPHAGOSCOPY, GASTROSCOPY, DUODENOSCOPY (EGD), COMBINED N/A 9/27/2019    Procedure: Upper Endoscopy (EGD) With Biopsies;  Surgeon: Aniya Wei MD;  [facility]     GRAFT  CARTILAGE FROM POSTERIOR AURICLE Left 10/6/2016    Procedure: GRAFT CARTILAGE FROM POSTERIOR AURICLE;  Surgeon: Tyler Richards MD;  [facility]     INCISION AND DRAINAGE PERINEAL, COMBINED Bilateral 7/18/2015    Procedure: COMBINED INCISION AND DRAINAGE PERINEAL;  Surgeon: Dequan Timmons MD;  [facility]     OPTICAL TRACKING SYSTEM CRANIOTOMY, EXCISE TUMOR, COMBINED N/A 4/13/2015    Procedure: COMBINED OPTICAL TRACKING SYSTEM CRANIOTOMY, EXCISE TUMOR;  Surgeon: Francis Velazquez MD;  [facility]     OPTICAL TRACKING SYSTEM CRANIOTOMY, EXCISE TUMOR, COMBINED N/A 4/16/2015    Procedure: COMBINED OPTICAL TRACKING SYSTEM CRANIOTOMY, EXCISE TUMOR;  Surgeon: Francis Velazquez MD;  [facility]     OPTICAL TRACKING SYSTEM CRANIOTOMY, EXCISE TUMOR, COMBINED Bilateral 5/28/2015    Procedure: COMBINED OPTICAL TRACKING SYSTEM CRANIOTOMY, EXCISE TUMOR;  Surgeon: Francis Velazquez MD;  [facility]     OPTICAL TRACKING SYSTEM CRANIOTOMY, EXCISE TUMOR, COMBINED Bilateral 1/14/2016    Procedure: COMBINED OPTICAL TRACKING SYSTEM CRANIOTOMY, EXCISE TUMOR;  Surgeon: Francis Velazquez MD;  [facility]     OPTICAL TRACKING SYSTEM VENTRICULOSTOMY  4/16/2015    Procedure: OPTICAL TRACKING SYSTEM VENTRICULOSTOMY;  Surgeon: Francis Velazquez MD;  [facility]     REMOVE PORT VASCULAR ACCESS N/A 10/6/2016    Procedure: REMOVE PORT VASCULAR ACCESS;  Surgeon: Bruno Perea MD;  [facility]     RHINOPLASTY N/A 10/6/2016    Procedure: RHINOPLASTY;  Surgeon: Tyler Richards MD;  [facility]     VASCULAR SURGERY  5-2015    single lumen power port       FAMILY HISTORY:   Family History   Problem Relation Age of Onset     Circulatory Father         PE/DVT     Hypothyroidism Father 30     Diabetes Maternal Grandmother      Diabetes Paternal Grandmother      Diabetes Paternal Grandfather      C.A.D. Paternal Grandfather      Hypertension Maternal Grandfather       Thyroid Disease Paternal Aunt         unknown whether hypo or hyper     Mental Illness No family hx of    Fam/Soc: Lives at a transitional home - [facility]. His parents are  but have been awarded guardianship of Geo. The families work well together - both parents have remarried. His father has a clotting disorder, requiring him to take Warfarin daily    SOCIAL HISTORY:   Social History     Tobacco Use     Smoking status: Never Smoker     Smokeless tobacco: Never Used   Substance Use Topics     Alcohol use: No     Review of Systems   Constitutional: Sitting on the side of his bed.  HENT: Less drooling noted today. Negative for nosebleeds.   Eyes: Patching for diplopia.   Gastrointestinal: Positive for constipation. Last BM was yesterday.  He is a good eater.   Genitourinary: Negative for difficulty urinating.   Musculoskeletal: Unable to walk without assistance.    Neurological: Positive for tremors, speech difficulty and weakness.   Imaging:  Brain MRI completed 6/11/20 reveals the following findings:    Postsurgical changes of suboccipital craniotomy with underlying  resection cavity and cerebellum.     No significant change in size of a 1.9 x 1.8 x 2.6 cm homogeneously  enhancing fourth ventricular mass. A nonenhancing cystic structure  along the right posterior lateral aspect of the mass measuring 1.3 x  1.1 cm, slightly decreased in size from 1/7/2020, previously measuring  2.1 x 1.6 cm. There is hemosiderin deposition within the enhancing  mass and along the margins of the cystic component. No significant  change in extent of the adjacent T2 hyperintense signal within the  bilateral cerebellar hemispheres, david and medulla in the superior  cerebellar peduncles.     There is no midline shift. The ventricles are proportional to the  cerebral sulci. The basilar cisterns are patent. No diffusion  restriction. The major intracranial vascular flow voids are patent.      No abnormality of the  skull marrow signal is noted. Paranasal sinuses  and mastoid air cells are clear. Orbits are unremarkable.                                                                      Impression:  No significant change in size of the ependymoma centered within the  right fourth ventricle, since the previous MRI on 1/7/2020. Slightly decreased size of the cystic lesion adjacent to the enhancing mass.    Vital signs done   Temp 97.8 Pulse 96 /70     Karnofsky 60     Wt Readings from Last 2 Encounters:   02/25/20 88.1 kg (194 lb 3.6 oz)   01/25/20 89.3 kg (196 lb 14.4 oz)     Exam:  Physical Exam:    Constitutional - Happy, well nourished teen  Eyes - with out redness, or discharge. Patch in place Glasses on.  Musculoskeletal - Moving upper extremities.  Ataxia.   Skin - Scattered bruising.  left lower leg lesion that is scabbed but still a bit raised.  non tender and non-erythematous.  Neurological - alert & oriented.  Answering questions.      The rest of a comprehensive physical examination is deferred due to PHE (public health emergency) video visit restrictions    Labs:  Labs from 6/16/2020 were reviewed:  WBC 4.0  Hgb 10.6  Platelets 118,000  ANC 1.6  Chemistries done at the same time reveal:  Na 145  K 3.9  Calcium 8.8  Alk Phos slightly elevated at 158  AST 17  ALT less than 10    Total protein 5.7     Albumin 3.0    Glucose 88    Magnesium 2    Phosphorus 4.7  See Epic for lab copy for remainder of labs.   Impression:   1. Ependymoma Most recent MRI 6/11/20: No significant change in size of the ependymoma centered within the right fourth ventricle, since the previous MRI on 1/7/2020. Slightly decreased size of the cystic lesion adjacent to the enhancing mass.  2. Chronic constipation.  3. Geo's labs are adequate to proceed with the next month of therapy.         Plan:   1. He will begin Entinostat 6 mg weekly today.   Labs were reviewed with Geo.  Medication was delivered to dad and a diary was provided to  the staff and [facility]. We will recheck his labs in 4 weeks.     2.  Reviewed MRI with Geo.  His tumor has been very stable and it is good to see the cystic portion slightly decreased.       3. He should continue close follow-up with Psychiatry.    4. Continue regular skin cares.  We will have nursing assess his skin again at their next visit.   5.  We will send a Powers[facility] chart to the facility so Geo can monitor the consistency of his stools. He will continue to follow with GI. He continues to be upset with discussions on constipation.     I have reviewed and updated the patient's Past Medical History, Social History, Family History and Medication List.      Video Visit  Video start:1:30  Video end: 1:58       Total 28 minutes of face to face time spent with patient and >50% visit involved counseling and/or coordination of care.        ALAN Justin CNP

## 2020-06-27 NOTE — PATIENT INSTRUCTIONS
Thank you for coming to the [facility].    Lab Testing:  If you had lab testing today and your results are reassuring or normal they will be mailed to you or sent through [employer] within 7 days. If the lab tests need quick action we will call you with the results. The phone number we will call with results is # [phone] (home) . If this is not the best number please call our clinic and change the number.    Medication Refills:  If you need any refills please call your pharmacy and they will contact us. Our fax number for refills is [phone]. Please allow three business for refill processing. If you need to  your refill at a new pharmacy, please contact the new pharmacy directly. The new pharmacy will help you get your medications transferred.     Scheduling:  If you have any concerns about today's visit or wish to schedule another appointment please call our office during normal business hours [phone] (8-5:00 M-F)    Contact Us:  Please call [phone] during business hours (8-5:00 M-F).  If after clinic hours, or on the weekend, please call  [phone].    Financial Assistance [phone]  [facility] Billing [phone]  Central Billing Office, [facility]: [phone]  [facility] [phone]  Medical Records [phone]      MENTAL HEALTH CRISIS NUMBERS:  For a medical emergency please call  911 or go to the nearest ER.     [facility]:   [facility] -[phone]   Crisis Residence [facility] -[phone]   Walk-In Counseling [facility] -[phone]   COPE 24/7 [place] Mobile Team -[phone] (adults)/[phone] (child)  CHILD: [facility] needs assessment team - [phone]      [facility]:   [facility] - [phone]   Walk-in counseling [facility] - [phone]   Walk-in counseling [facility] - [phone]   Crisis Residence [facility] - [phone]  Urgent  [facility] Adult [facility]-[phone] mobile unit/ 24/7 crisis line    National Crisis Numbers:   National Suicide Prevention Lifeline: 6-628-765-TALK (738-590-6686)  Poison Control Center - 5-279-123-6310  [employer]/resources for a list of additional resources (SOS)  Trans Lifeline a hotline for transgender people 0-894-039-2511  [employer] a hotline for LGBT youth 1-896.592.9846  Crisis Text Line: For any crisis 24/7   To: 813975  see www.crisistextline.org  - IF MAKING A CALL FEELS TOO HARD, send a text!         Again thank you for choosing [facility] and please let us know how we can best partner with you to improve you and your family's health.    You may be receiving a survey regarding this appointment. We would love to have your feedback, both positive and negative. The survey is done by an external company, so your answers are anonymous.

## 2020-07-01 NOTE — TELEPHONE ENCOUNTER
RECORDS RECEIVED FROM: Internal    DATE RECEIVED: 8/19/20    NOTES STATUS DETAILS   OFFICE NOTE from referring provider Internal Virtual OV 6/24/20   OFFICE NOTE from other specialist [facility] recs in [facility]   DISCHARGE SUMMARY from hospital Internal ED note 12/20/19, 9/26/19, 8/22/18   MEDICATION LIST Internal         ENDOSCOPY  Internal 9/27/19   COLONOSCOPY Internal 9/27/19   PERTINENT LABS Internal    IMAGING (CT, MRI, EGD) Internal XR ABD 1/27/20, 1/25/20     REFERRAL INFORMATION    Date referral was placed: 8/19/20    Date all records received:    Date records were scanned into EPIC:    Date records were sent to Provider to review:    Date and recommendation received from provider:  LETTER SENT  SCHEDULE APPOINTMENT   Date patient was contacted to schedule: 6/30/20

## 2020-07-09 NOTE — ED TRIAGE NOTES
Patient presents with complaints of abdomen pain abdomen pain for the past week. Per father, patient does have history of constipation. ABC intact without need for intervention at this time.

## 2020-07-09 NOTE — TELEPHONE ENCOUNTER
Recd 8 page fax from [facility].  Please sign Plan of Care orders and fax to [phone].  Form in BW in box.    Lakeshia Martin

## 2020-07-09 NOTE — ED PROVIDER NOTES
History     Chief Complaint:    Abdominal Pain      The history is provided by the patient and a parent.      Geo Hicks is a 19 year old male with a history of ependymoma and hemorrhagic stroke who presents with abdominal pain. He states that he has had this pain for awhile but with in the last week it has gotten worse. His last good bowel movement was last night. He denies dysuria, constipation, fever, chills, sore throat, abdominal surgery, penile or scrotum pain, leg pain or swelling.     Allergies:  Blood Transfusion Related  No Known Drug Allergies    Medications:    Linzess  Decadrone  Culturelle  Colace  Senokot  Zyprexa  Etinostat  Allegra  Lamictal  Prilosec  Zoloft  Bactrim  Desyrel    Past Medical History:    Cranial nerve dysfunction  Dyspepsia  Ependymoma  GERD  Intracranial hemorrhage   Pilonidal cyst  Strabismus  Hemorrhagic stroke  Hypernatremia  Abducens neuropathy  Tubular adenoma    Past Surgical History:    EGD combined  Graft cartilage from posterior auricle  I&D perineal  Craniotomy; excise tumor x3  Remove port vascular access  Rhinoplasty  Single lumen power port    Family History:    Father: PE/DVT, hypothyroidism    Social History:  The patient was accompanied to the ED by father.  Smoking Status: Never Smoker  Smokeless Tobacco: Never Used  Alcohol Use: Negative  Drug Use: Negative  PCP: Jeffrey Espinoza    Marital Status:  Single      Review of Systems   Constitutional: Negative for chills and fever.   HENT: Negative for sore throat.    Cardiovascular: Negative for leg swelling.   Gastrointestinal: Positive for abdominal pain. Negative for constipation.   Genitourinary: Negative for dysuria, penile pain, scrotal swelling and testicular pain.   Musculoskeletal: Negative for arthralgias.   All other systems reviewed and are negative.    Ten review of systems negative, apart from what is noted above in HPI.     Physical Exam     Patient Vitals for the past 24 hrs:   BP Temp Temp src  Pulse Resp SpO2   07/09/20 1324 114/71 97  F (36.1  C) Temporal 100 18 100 %     Physical Exam  General: Alert and interactive. Patient with eye patch on left eye.   Difficulty with speech, chronic per father.   Eyes: The pupils are equal and round. EOMs intact. No scleral icterus.  ENT: No abnormalities to the external nose or ears. Mucous membranes moist. Posterior oropharynx is non-erythematous.      Neck: Trachea is in the midline. No nuchal rigidity.     CV: Tachycardia. S1 and S2 normal without murmur, click, gallop or rub.   Resp: Breath sounds are clear bilaterally, without rhonchi, wheezes, rales. Non-labored, no retractions or accessory muscle use.     GI: Abdomen is soft without distension. No tenderness to palpation. No peritoneal signs.    MS: Moving all extremities well. Good muscle tone.   Skin: Warm and dry. No rash or lesions noted. Multiple skin wounds and bruises to bilateral LEs.   Neuro: Alert and oriented x 3. No focal neurologic deficits. Good strength and sensation in upper and lower extremities. Psych: Awake. Alert.  Normal affect. Appropriate interactions.  Lymph: No anterior or posterior cervical lymphadenopathy noted.    Emergency Department Course     Imaging:  Radiology findings were communicated with the patient and family who voiced understanding of the findings.  XR Abdomen, G/E 2 views:  Scattered prominent gas containing segments of small  bowel. No significant air-fluid levels on the decubitus film. No  significant colonic distention. These findings are not convincing for  an ileus or obstruction. Mild to moderate fecal retention within the  colon from the mid transverse segment through the distal sigmoid  segment. Mild retention within the rectum is also noted. These  findings are consistent with a history of constipation. No grossly  evident free air. No definite renal stone. As per radiology.    Laboratory:  Laboratory findings were communicated with the patient and family who  voiced understanding of the findings.    BMP: Glucose 82, Calcium: 8.2 (L), o/w WNL (Creatinine: 1.03)    CBC: WBC: 5.5, HGB: 10.8 (L), PLT: 118 (L)    UA with Microscopic: Negative    Emergency Department Course:  Past medical records, nursing notes, and vitals reviewed.    1339 I performed an exam of the patient as documented above.     IV was inserted and blood was drawn for laboratory testing, results above.    The patient provided a urine sample here in the emergency department. This was sent for laboratory testing, findings above.    The patient was sent for a Abdomen X-ray while in the emergency department, results above.     1622 I rechecked the patient and discussed the results of his workup thus far.     Findings and plan explained to the Patient and father. Patient discharged home with instructions regarding supportive care, medications, and reasons to return. The importance of close follow-up was reviewed.     I personally reviewed the laboratory and imaging results with the Patient and father and answered all related questions prior to discharge.     Impression & Plan     Medical Decision Making:  Geo Hicks is a 19 year old male with a history of ependymoma and hemorrhagic stroke and chronic constipation currently on multiple constipation medications who presents for evaluation of generalized abdominal pain. Per report from father, the patient has chronic constipation but also admits that many of the patient's complaints with constipation are delusional in nature, diagnosed by a psychiatrist. The patient is at a residential facility and has good bowel movements daily.  He denies any dysuria, penile or scrotal pain, leg pain, swelling, or other worrisome concerns.  His laboratory work appears normal without any signs of leukocytosis, worsening anemia, renal dysfunction. Urine shows no markers for infection.  X-ray of the abdomen shows constipation but no signs of bowel obstruction or ileus. I  explained this to the patient, and I have told him to continue his regimen at his care facility, as this seems to be working well for him. His abdominal exam is benign, and my suspicion for intraabdominal pathology is quite low. The patient and his father are in agreement to this plan and are stable for discharge home.    Diagnosis:    ICD-10-CM    1. Constipation, unspecified constipation type  K59.00    2. Abdominal pain, generalized  R10.84        Disposition:  Discharged to home.    Scribe Disclosure:  I, Vlad Loyola, am serving as a scribe at 1:39 PM on 7/9/2020 to document services personally performed by Shannon Reddy PA-C based on my observations and the provider's statements to me.        Shannon Reddy PA-C  07/09/20 7345

## 2020-07-09 NOTE — ED AVS SNAPSHOT
[facility] Emergency Department  [address]  [facility] [address]  Phone:  [phone]  Fax:  [phone]                                    Geo Hicks   MRN: [id number]    Department:  [facility] Emergency Department   Date of Visit:  7/9/2020           After Visit Summary Signature Page    I have received my discharge instructions, and my questions have been answered. I have discussed any challenges I see with this plan with the nurse or doctor.    ..........................................................................................................................................  Patient/Patient Representative Signature      ..........................................................................................................................................  Patient Representative Print Name and Relationship to Patient    ..................................................               ................................................  Date                                   Time    ..........................................................................................................................................  Reviewed by Signature/Title    ...................................................              ..............................................  Date                                               Time          22EPIC Rev 08/18

## 2020-07-10 NOTE — PROGRESS NOTES
Video- Visit Details  Type of service:  video visit for medication management  Time of service:    Date:  07/10/2020    Video Start Time:  2:14 PM        Video End Time:  3:15 PM    Reason for video visit:  Patient unable to travel due to Covid-19  Originating Site (patient location):  [facility]- [place]   Location- [facility]  Distant Site (provider location):  Remote location  Mode of Communication:  Video Conference via [email or web address]  Consent:  Patient has given verbal consent for video visit?: Yes         [facility]  Psychiatry Clinic  TRANSFER of CARE DIAGNOSTIC ASSESSMENT     CARE TEAM:  PCP- Jeffrey Espinoza   Specialty Providers- Oncology, Neuropsychology, Physical Therapy, Occupational Therapy    Therapist- most recently Manju Pink at [facility] in [facility] Team- no.      Geo Hicks is a 19 year old patient who prefers the name Geo and pronouns he, him, his.     PERTINENT BACKGROUND                [last transfer eval 07/10/20]   This patient first experienced significant mental health issues in 2018 and has received treatment for paranoia/delusions and depressed mood. Notably, Geo is undergoing cancer treatment with the [facility] and was referred for psychiatric management with our department by neuropsychology.  He has a history of ependymoma that was diagnosed at age 14. Since then, he has undergone multiple tumor resections, chemotherapy, and radiation treatment. Geo also experienced an intra-tumoral hemorrhage in May 2015 that resulted in neurological changes including facial numbness, significant loss of mobility and dysarthria.  He is currently on COG study [id number] with Entinostat.  Since 2018, Geo has developed a fixed delusion that definitive care is being withheld by his oncology providers, specifically believing that they are choosing not to treat his constipation, and that it is due to this that he is no longer able to walk  "without assistance.  Has demonstrated agitation and emotional discord with behavioral disturbance in the setting of this belief.  While his emotional upset has been somewhat amenable to treatment with medications, his delusional belief has remained so far resistant to management.    Psych critical item history includes suicidal ideation, psychosis [sxs include delusions] and psych hosp (<3)    INTERIM HISTORY                                 [4, 4]   History was provided by the patient and group home staff who was a good historian. Treatment adherence is good.  Since the last visit:   - \"I don't know\" when asked how things had been since his last visit. However, later went on to say \"not too well\". He clarified by talking about how his constipation continues to bother him.    - Constipation has been been an issue in the past \"for a long time\"  -- He then asked staff to leave part-way through the interview to discuss the following:    --- Notes that he cant walk which he attributes to the constipation, however he \"knows that they are withholding treatment for his constipation\" and is very frustrated by that. He does not really feel like he can trust doctors because they are not treating him and are withholding treatment for his constipation.  - He notes that he has been feeling more depressed lately, symptoms in ROS, and feels trapped both because he cannot walk, but also because of COVID limiting his ability to get out of his current group home.   - He does report suicidal thoughts, most recently on Monday, with thoughts that he could \"jump out of a moving car\" but has no intent to act on these thoughts. He notes that he wants to keep living by is feeling \"really down\" right now and like he doesn't have a lot to live for. He is feeling safe at his current group home and reports a safety plan of notifying staff, if his suicidal thoughts return/worsen or that he could call the crisis line or our clinic phone " "line.    Spoke to father after visit with Geo:  - Reported that the weekend was really difficult for Geo, calling his parents numerous times and that on Monday his father went to see him and Geo got in the car with him and got into an argument stating, \"If you are just going to lye to me I'm going to jump out of the car.\"    RECENT PSYCH ROS:   Depression:  suicidal ideation with plan, without intent, depressed mood, poor concentration /memory, feeling worthless and feeling hopeless  Elevated:  none  Psychosis:  delusions- paranoid [details in Interim History]  Anxiety:  excessive worry and nervous/overwhelmed  Panic Attack:  none  Trauma Related:  none  Dysregulation:  suicidal ideation with plan; without intent [details in Interim History] and can become agitated if beliefs are challenged.  Eating Disorder: no     Adverse Effects:  denies  Pertinent Negatives:  No violent ideation, self-injury, hallucinations and aggression  Recent Substance Use:  none reported    FAMILY and SOCIAL HISTORY             [1ea, 1ea]       patient reported                    FAMILY HX- Denied    Financial/ Work- family or friend       Partner/ - single  Children- no      Living situation- Group Home, previously living alternate weeks with mother and father.      Social/ Spiritual Support- mother, father, older brother, friend Irma     Legal- no  FEELS SAFE AT HOME- yes     Trauma History (self-report)- medical/life threatening illness  Early History/Education-  Grown up in [place], [place].  Parents are , and he shares time between his mother s and father s homes. Both parents are remarried.  Dad is a , and mom does  for a testing company.  Siblings include two full brothers (older brother Benitez who is a senior in college and younger brother Gamaliel), a step-brother, and a step-sister. Geo graduated from high school in Spring 2018.  Initially considered attending [employer] in Fall 2019, but reportedly lost interest due to the amount of time it would take him to complete courses and receive a degree.  Does today endorse continued interest in pursuing an advanced course of study at some point, specifically stating he is interested in Electrical Engineering.    Other- N/A     PSYCHIATRIC HISTORY                                                            SIB- no  Suicidal Ideation Hx- yes  Suicide Attempt- no  Violence/Aggression Hx- no  Psychosis Hx- yes, paranoia and delusions/fixed beliefs  Eating Disorder Hx- no    Psych Hosp- #- no   Commitment- no  ECT- no  Outpatient Programs - no    Past Psychotropic Med Trials- see next section    PAST MED TRIALS                                                              Medication  Dose (mg) Effect  Dates of Use   methylphenidate   [id number]         sertraline 150  current   trazodone 50  current   amitriptyline            hydrochlorothiazide      lorazepam            lamotrigine 200  current         olanzapine 20 2019-current     SUBSTANCE USE HISTORY     Past Use- none reported  Treatment- no  Medical Consequences- no  HIV/Hepatitis- no  Legal Consequences- no    MEDICAL HISTORY and ALLERGY     ALLERGIES: Blood transfusion related (informational only) and No known drug allergies     Neurologic Hx- See pertinent background, re: history of ependymoma and related chemo, radiation and tumor resection(s).  Notably has experienced neurological damage due to the above which has resulted in facial numbness, significant loss of mobility and dysarthria.  Has had neuropsychiatric evaluations 2/2016, 2/2017, 1/2019, and 10/2019.  The following is from the \"Results and Impressions\" section of his 10/29/2019 eval with Veronica Padilla, with a passage bolded that relates to noted difficulty in an area of executive functioning that facilitates the ability to see other people's perspectives:     \"Geo s attention was rated by his " mother as well as himself as being adequate.  Executive functioning are those skills including planning, organization, emotional control, cognitive flexibility, and the ability to take another person s perspective.  Geo rating scale of these skills was basically in the average range for his age with a slight elevation in his ability to shift from one activity to another.  His mother s ratings of his executive functioning were in the average range, with the exception of a slight elevation in his ability to initiate tasks.  Direct testing of his ability to take another person s perspective indicated difficulty in this area.  He was asked to sort objects based on various aspects of the objects.  When he sorted the objects, he showed average performance.  However, when the examiner sorted the objects, Geo experienced significant difficulty with being able to determine how the objects were sorted.  This difficulty likely translates into difficulty with understanding another person s point of view.  Geo indicated that this difficulty becomes quite frustrating for him as he doesn t feel other people understand him--it is likely that he has difficulty understanding their perspective.     Emotionally Geo indicated that he continues to feel some difficulty with sadness but that it has improved over time and with medication.  Geo denied significant feelings of anxiety at this time while in the past these scores have been higher.  Parent ratings of Geo s emotional functioning indicated no areas of significant concern.  Interviews with Geo and his mother indicated continued feelings of sadness and difficulty in motivating himself to try to new activities.  While there is improvement in his mood, Geo continues to be at risk for depression.  Since he is now under the care of a psychiatrist, we did not interview around his feelings that his medical team was not being helpful to him.  His mother reported that  "these feelings continue and likely interfere with his compliance with directives.  He is participating in therapy to work on these issues and it is important that this intervention continue.\"    Patient Active Problem List   Diagnosis     GERD (gastroesophageal reflux disease)     Closed fracture at the growth plate of right distal fibula      Elevated serum creatinine     Dyspepsia     Intracranial hemorrhage (H)     Hemorrhagic stroke (H)     Ependymoma (H)     Admission for antineoplastic chemotherapy     Post-operative state     S/P craniotomy     S/P biopsy     Noncomitant strabismus     Abducens neuropathy of both eyes     CANDELARIO (obstructive sleep apnea)     Health Care Home     Hypernatremia     Body temperature low     Current chronic use of systemic steroids     Elevated TSH     Status post chemotherapy     Neoplasm of posterior cranial fossa (H)     Chronic constipation     Serum albumin decreased     Closed compression fracture of thoracic vertebra with routine healing, subsequent encounter     Depression     Constipation     Constipation by delayed colonic transit     Slow transit constipation     Tubular adenoma         MEDICAL REVIEW OF SYSTEMS         [2, 10]   Pregnant or breastfeeding- no      Contraception- no    A comprehensive review of systems was performed and is negative other than noted in the HPI.    MEDICATIONS        Current Outpatient Medications   Medication Sig Dispense Refill     dexamethasone (DECADRON) 0.5 MG tablet TAKE ONE AND ONE-HALF TABLETS (0.75MG) BY MOUTH ONCE DAILY WITH BREAKFAST. 45 tablet 11     docusate sodium (COLACE) 100 MG capsule Take 1 capsule (100 mg) by mouth 2 times daily 60 capsule 4     fexofenadine (ALLEGRA) 180 MG tablet TAKE 1 TABLET BY MOUTH EVERY EVENING. 30 tablet 11     Lactobacillus-Inulin ([facility]) CAPS Take 2 capsules by mouth daily 30 capsule 3     lamoTRIgine (LAMICTAL) 200 MG tablet Take 1 tablet (200 mg) by mouth At Bedtime 90 " tablet 0     linaclotide (LINZESS) 290 MCG capsule Take 1 capsule (290 mcg) by mouth every morning (before breakfast) 30 capsule 3     mupirocin (BACTROBAN) 2 % external ointment Use 2 times a day to the ear lobes and canals with flare 22 g 3     OLANZapine (ZYPREXA) 20 MG tablet Take 1 tablet (20 mg) by mouth At Bedtime 90 tablet 0     omeprazole (PRILOSEC) 20 MG DR capsule Take 2 capsules (40 mg) by mouth every morning 60 capsule 1     order for DME Equipment being ordered: Dressing  Wound #1 lower leg, W 6 cm x, D 0.5  cm, Drainage scant, Thickness sutured     Type: non stick gauze, Brand: , Size: 4/4   Change: 1 per day    Tape/Wrap: 1 each     attach facesheet with insurance information (delete this line) 5 each 0     order for DME Equipment being ordered: short boot 1 each 0     polyethylene glycol (MIRALAX) 17 GM/SCOOP powder Take 17 g (1 capful) by mouth 3 times daily 289 g 3     potassium phosphate, monobasic, (K-PHOS) 500 MG tablet Take 1 tablet (500 mg) by mouth 2 times daily 90 tablet 3     senna-docusate (SENOKOT-S/PERICOLACE) 8.6-50 MG tablet Take 1 tablet by mouth daily And please report to team at [facility] in no stool in 36 hours. 30 tablet 4     sennosides (SENOKOT) 8.6 MG tablet Take 1 tablet by mouth daily 30 tablet 4     sertraline (ZOLOFT) 100 MG tablet Take 1.5 tablets (150 mg) by mouth daily 135 tablet 0     study - entinostat, IDS# 5050, 1 mg tablet Take 1 tablet (1 mg) by mouth every 7 days for 4 doses Takeone 1mg tablet with one 5mg tablet for total dose of 6mg weekly. Take on an empty stomach, at least 1 hour before or 2 hours after a meal.  Swallow tablet whole. 4 tablet 0     study - entinostat, IDS# 5050, 5 mg tablet Take 1 tablet (5 mg) by mouth every 7 days for 4 doses Take one 5mg tablet with one 1mg tablet for total dose of 6 mg weekly. Take on an empty stomach, at least 1 hour before or 2 hours after a meal.  Swallow tablet whole. 4 tablet 0     study - entinostat, IDS#  "[id number], 5 mg tablet Take 1 tablet (5 mg) by mouth every 7 days for 4 doses Take one 5mg tablet with one 1mg tablet for total dose of 6 mg weekly. Take on an empty stomach, at least 1 hour before or 2 hours after a meal.  Swallow tablet whole. 4 tablet 0     sulfamethoxazole-trimethoprim (BACTRIM) 400-80 MG tablet Take 1 tablet by mouth 2 times daily On Saturdays and Sundays 24 tablet 11     traZODone (DESYREL) 50 MG tablet Take 1 tablet (50 mg) by mouth At Bedtime 90 tablet 0     vitamin D3 (CHOLECALCIFEROL) 2000 units (50 mcg) tablet TAKE 1 TABLET BY MOUTH ONCE DAILY WITH BREAKFAST. 30 tablet 11     vitamin E (TOCOPHEROL) 400 units (360 mg) capsule TAKE 1 CAPSULE BY MOUTH ONCE DAILY. 30 capsule 11     VITALS                                                                [3, 3]   There were no vitals taken for this visit.   MENTAL STATUS EXAM                       [9, 14 cog gs]     Alertness: alert  and oriented  Appearance: casually groomed and patient has eye patch over right eye and drooling during interview  Behavior/Demeanor: cooperative and pleasant, with good  eye contact   Speech: prominent dysarthria  Language: no obvious problem and dysarthria make it difficult to understand at times  Psychomotor: Mild palsy in bilateral upper extremities, fidgeting, and facial paralysis present.  Mood: \"not too good\"  Affect: full range; congruent to: mood- yes, content- yes  Thought Process/Associations: unremarkable  Thought Content:  Reports suicidal ideation with plan; without intent [details in history] and delusions [details in history];  Denies violent ideation  Perception:  Reports none;  Denies auditory hallucinations and visual hallucinations  Insight: poor  Judgment: good  Cognition: (6) oriented: time, person, and place  attention span: intact  concentration: intact  recent memory: intact  remote memory: intact  fund of knowledge: delayed  Gait and Station: N/A (telehealth)    LABS and DATA     PHQ9 TODAY = Not " completed due to COVID 19 video visit  PHQ 8/19/2019 9/9/2019 11/15/2019   PHQ-9 Total Score 8 9 -   Q9: Thoughts of better off dead/self-harm past 2 weeks More than half the days More than half the days (No Data)       Recent Labs   Lab Test 07/09/20  1409 06/16/20 06/01/20  1503  03/10/20  1338   CR 1.03 1.00  1.00 1.11   < > 1.03   GFRESTIMATED >90 >60  --   --  >90    < > = values in this interval not displayed.     ANTIPSYCHOTIC LABS  [glu, A1C, lipids (rohit LDL), liver enzymes, WBC, ANEU, Hgb, plts]  q12 mo  Recent Labs   Lab Test 07/09/20  1409 06/16/20 06/01/20  1503 05/05/20  1100  02/26/19  1100   GLC 82 88  88 102* 80   < > 124*   A1C  --   --   --   --   --  5.1    < > = values in this interval not displayed.     Recent Labs   Lab Test 02/26/19  1100   CHOL 158   TRIG 236*   LDL 84   HDL 27*     Recent Labs   Lab Test 06/16/20 03/10/20  1338 03/03/20  1314 02/25/20  1413 02/18/20  0713   AST 17 21 29 42 27   ALT <10 18 23 24 22   ALKPHOS  --  115 110 126 149     Recent Labs   Lab Test 07/09/20  1409 06/16/20 06/01/20  1503 05/05/20  1100  03/10/20  1338 03/03/20  1314 02/25/20  1413   WBC 5.5 4.0 5.3 3.2   < > 3.7* 3.4* 4.3   ANEU 2.2  --   --   --   --  1.9 1.4* 2.2   HGB 10.8* 10.6* 11.3* 10.3*   < > 11.7* 11.4* 11.7*   * 118 118 106   < > 152 171 114*    < > = values in this interval not displayed.         PSYCHOTROPIC DRUG INTERACTIONS     Increased risk of QTc Prolongation: olanzapine, sertraline, trazodone  Increased risk of lamotrigine toxicity (fatigue, sedation, confusion): lamotrigine, sertraline     MANAGEMENT:  Monitoring for adverse effects    RISK STATEMENT for SAFETY     Geo Hicks endorsed suicidal ideation. SUICIDE RISK ASSESSMENT-  Risk factors for self-harm: suicidal ideation/plan earlier this week [details in interim history], hopelessness, feels trapped and delusions [outlined in interim history].  Mitigating factors: no h/o suicide attempt, no current suicidal ideation,  "no access to lethal means, describes a safety plan, minimal substance use , good social support   and stable housing.  The patient does not appear to be at imminent risk for self-harm, hospitalization is not recommended which the pt does  agree to. No hospitalization will be arranged. Based on degree of symptoms close psych follow-up was/were recommended which the pt does agree to.  Safety plan completed 11/15/2019.    DIAGNOSES                                           [m2, h3]    Delusional Disorder, persecutory type, first episode, currently in acute episode  Major Depressive Disorder, single episode, moderate    ASSESSMENT                                        [m2, h3]        Geo Hicks presents for continued medication management of paranoia, delusional thoughts and depressed mood.  Meets with this writer via telemedicine with group home staff in accompaniment. Relates mood as \"not too well\", principally noting feeling trapped and hopeless with his current situation, in part due to restrictions due to COVID. Reports recent suicidal ideation, most recently 4 days prior to our visit with thoughts of jumping of of a moving car. However, no suicidal ideation since then. Denies self-injurious behavior, violent/aggressive ideation, inappropriately elevated moods, depressive severity, panic/anxious acuity or other hallmarks of psychiatric decompensation with dangerousness. Relates he continues to think about his characteristic delusion of a medical conspiracy against him (see previous notes) and at this time isn't really trusting of doctors that much. Also reports that the leg jerking we had previously noticed at higher doses of olanzapine (greater than 20 mg daily) no longer appears to be apparent to him.  Overall, feels like current meds are helpful and well-tolerated and he is now seeing a therapist regularly. We agreed to make no medication changes, however due to the more recent return of some suicidal ideation " "discussed a safety plan and decided to schedule another appointment in 4 weeks to check in.    After our visit, this writer called his father, who is his guardian, and touched base on this recommendation for pharmacotherapy and follow-up. His father was aware of his increased suicidal ideation on Monday and it was in the context of an argument with dad.     Future considerations could include:  - increase sertraline  - increase lamotrigine    [place] was [place] today:  Indicates no medication misuse .    PLAN                                                            [m2, h3]                                               1) Meds  - Continue olanzapine 20 mg at bedtime.  - Continue sertraline 150 mg daily.  - Continue lamotrigine 200 mg daily.  - Continue trazodone 50 mg at bedtime.    2) Therapy-  Patient started a new therapist recently, father to get MERRITT from therapist for our clinic.    3) Next Due   Labs- Lipids and A1c due, pending COVID-19.  EKG- prn  Rating scales- AIMS due with next \"in-clinic\" visit    5) Referrals  No Referrals needed today    3) RTC: 4 weeks    4) Crisis Numbers:   Provided routinely in AVS     After hours:  [phone]      TREATMENT RISK STATEMENT:  The risks, benefits, alternatives and potential adverse effects have been discussed and are understood by the pt. The pt understands the risks of using street drugs or alcohol. There are no medical contraindications, the pt agrees to treatment with the ability to do so. The pt knows to call the clinic for any problems or to access emergency care if needed.  Medical and substance use concerns are documented above.  Psychotropic drug interaction check was done, including changes made today.    PROVIDER: Jj Louis MD    Patient staffed in clinic with Dr. Carpenter who will sign the note.  Supervisor is Dr. Marrero.    TELEHEALTH ATTENDING ATTESTATION  Following the ACGME guidelines on telemedicine and direct supervision due to COVID-19, I " was concurrently participating in and/or monitoring the patient care through appropriate telecommunication technology.  I discussed the key portions of the service with the resident, including the mental status examination and developing the plan of care. I reviewed key portions of the history with the resident. I agree with the findings and plan as documented in this note.  Today Geo reports some symptoms of depression and passive suicidal ideation, both of which may be situational.  The suicidal ideation occurred in the context of an argument with his father.  We reviewed a safety plan today, with which Geo was agreeable.  He wishes to continue his medications at the current doses.  He has recently started seeing a therapist, and appropriate intervention for his current symptoms.  We can consider increasing either lamotrigine or sertraline in the future if needed.  Dr. Louis touched base with Geo's father today.  Geo will return for follow-up in 1 month.  Bruno Carpenter MD

## 2020-07-10 NOTE — PATIENT INSTRUCTIONS
Treatment Plan Today:   1) continue current medications  2) schedule follow-up appointment in 4 weeks.    ------------------------------------------------------------------------    Thank you for coming to the [facility].    Lab Testing:  If you had lab testing today and your results are reassuring or normal they will be mailed to you or sent through [employer] within 7 days. If the lab tests need quick action we will call you with the results. The phone number we will call with results is # [phone] (home) . If this is not the best number please call our clinic and change the number.    Medication Refills:  If you need any refills please call your pharmacy and they will contact us. Our fax number for refills is [phone]. Please allow three business for refill processing. If you need to  your refill at a new pharmacy, please contact the new pharmacy directly. The new pharmacy will help you get your medications transferred.     Scheduling:  If you have any concerns about today's visit or wish to schedule another appointment please call our office during normal business hours [phone] (8-5:00 M-F)    Contact Us:  Please call [phone] during business hours (8-5:00 M-F).  If after clinic hours, or on the weekend, please call  [phone].    Financial Assistance [phone]  [facility] Billing [phone]  Central Billing Office, [facility]: [phone]  [facility]illing [phone]  Medical Records [phone]      MENTAL HEALTH CRISIS NUMBERS:  For a medical emergency please call  911 or go to the nearest ER.     [facility]:   [facility] -[phone]   Crisis Residence [facility] -[phone]   Walk-In Counseling [facility] -[phone]   COPE 24/7 [place] Mobile Team -[phone] (adults)/[phone] (child)  CHILD: [facility] needs assessment team - [phone]      [facility] - [phone]   Walk-in counseling [facility] - [phone]   Walk-in counseling [facility] - [phone]   Crisis Residence [facility] - [phone]  Urgent Care Adult Mental [facility]-[phone] mobile unit/ 24/7 crisis line    National Crisis Numbers:   National Suicide Prevention Lifeline: 7-855-584-TALK (539-641-0461)  Poison Control Center - 0-813-620-2278  [employer]/resources for a list of additional resources (SOS)  Trans Lifeline a hotline for transgender people 9-982-765-4956  The [employer] a hotline for LGBT youth 1-131.943.5952  Crisis Text Line: For any crisis 24/7   To: 386227  see www.crisistextline.org  - IF MAKING A CALL FEELS TOO HARD, send a text!         Again thank you for choosing [facility] and please let us know how we can best partner with you to improve you and your family's health.    You may be receiving a survey regarding this appointment. We would love to have your feedback, both positive and negative. The survey is done by an external company, so your answers are anonymous.

## 2020-07-10 NOTE — PROGRESS NOTES
"VIDEO VISIT  Geo Hicks is a 19 year old patient who is being evaluated via a billable video visit.      The patient has been notified of following:   \"This video visit will be conducted via a call between you and your physician/provider. We have found that certain health care needs can be provided without the need for an in-person physical exam. This service lets us provide the care you need with a video conversation. If a prescription is necessary we can send it directly to your pharmacy. If lab work is needed we can place an order for that and you can then stop by our lab to have the test done at a later time. Insurers are generally covering virtual visits as they would in-office visits so billing should not be different than normal.  If for some reason you do get billed incorrectly, you should contact the billing office to correct it and that number is in the AVS .    Patient has given verbal consent for video visit?:  Yes    How would you like to obtain your AVS?:  Juma    Patient would prefer that any video invitations be sent by: Send to e-mail at: [email or web address]      AVS SmartPhrase [PsychAVS] has been placed in 'Patient Instructions':  Yes       "

## 2020-07-21 NOTE — PROGRESS NOTES
"   Pediatric Hematology/Oncology Video Note     Geo Hicks is being evaluated via a billable video visit due to COVID-19 clinic restrictions for in-person visits.   The patient has been notified of following:   \"This video visit will be conducted via a call between you and your physician/provider. We have found that certain health care needs can be provided without the need for a physical exam. This service lets us provide the care you need with a short video conversation. If a prescription is necessary we can send it directly to your pharmacy. If lab work is needed we can place an order for that and you can then stop by our lab to have the test done at a later time. 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 No nausea, or vomiting. His bruising is minimal today  His wounds are healing well.  He has few scrapes on his left lower leg but non tender and non-erythematous - healing well. There is a nurse who comes Wednesday to evaluate them and confirms the improvement.   They have noted no seizure activity.  He has had no headaches.  He had no missed doses of medication with his last course.   He had a visit to the ER for abdominal pain on 7/9/20.  They obtained an x-ray which showed:  Scattered prominent gas containing segments of small bowel. No significant air-fluid levels on the decubitus film. No  significant colonic distention. These findings are not convincing for  an ileus or obstruction. Mild to moderate fecal retention within the  colon from the mid transverse segment through the distal " sigmoid  segment. Mild retention within the rectum is also noted. These  findings are consistent with a history of constipation. No grossly  evident free air. No definite renal stone.  He is eating very large meals.      History was obtained from Geo and his care giver at [facility].   Allergies:     Allergies   Allergen Reactions     Blood Transfusion Related (Informational Only) Swelling     Periorbital swelling post platelet transfusion     No Known Drug Allergies      Current Outpatient Medications   Medication     dexamethasone (DECADRON) 0.5 MG tablet     docusate sodium (COLACE) 100 MG capsule     fexofenadine (ALLEGRA) 180 MG tablet     Lactobacillus-Inulin ([facility]) CAPS     lamoTRIgine (LAMICTAL) 200 MG tablet     linaclotide (LINZESS) 290 MCG capsule     mupirocin (BACTROBAN) 2 % external ointment     OLANZapine (ZYPREXA) 20 MG tablet     omeprazole (PRILOSEC) 20 MG DR capsule     order for DME     order for DME     polyethylene glycol (MIRALAX) 17 GM/SCOOP powder     potassium phosphate, monobasic, (K-PHOS) 500 MG tablet     senna-docusate (SENOKOT-S/PERICOLACE) 8.6-50 MG tablet     sennosides (SENOKOT) 8.6 MG tablet     sertraline (ZOLOFT) 100 MG tablet     study - entinostat, IDS# 5050, 1 mg tablet     study - entinostat, IDS# [id number], 5 mg tablet     sulfamethoxazole-trimethoprim (BACTRIM) 400-80 MG tablet     traZODone (DESYREL) 50 MG tablet     vitamin D3 (CHOLECALCIFEROL) 2000 units (50 mcg) tablet     vitamin E (TOCOPHEROL) 400 units (360 mg) capsule     No current facility-administered medications for this visit.        PAST MEDICAL HISTORY:   Past Medical History:   Diagnosis Date     Cranial nerve dysfunction      Dyspepsia      Ependymoma (H)      Gastro-oesophageal reflux disease      Hearing loss      Intracranial hemorrhage (H)      Migraine      Pilonidal cyst     7-2015     Reduced vision      Refractory obstruction of nasal airway     2nd to nasal valve prolapse      Sleep apnea      Strabismus     gaze palsy        PAST SURGICAL HISTORY:   Past Surgical History:   Procedure Laterality Date     COLONOSCOPY N/A 9/27/2019    Procedure: Colonoscopy With Biopsies and Polypectomy;  Surgeon: Aniya Wei MD;  [facility]     ESOPHAGOSCOPY, GASTROSCOPY, DUODENOSCOPY (EGD), COMBINED N/A 9/27/2019    Procedure: Upper Endoscopy (EGD) With Biopsies;  Surgeon: Aniya Wei MD;  [facility]     GRAFT CARTILAGE FROM POSTERIOR AURICLE Left 10/6/2016    Procedure: GRAFT CARTILAGE FROM POSTERIOR AURICLE;  Surgeon: Tyler Richards MD;  [facility]     INCISION AND DRAINAGE PERINEAL, COMBINED Bilateral 7/18/2015    Procedure: COMBINED INCISION AND DRAINAGE PERINEAL;  Surgeon: Dequan Timmons MD;  [facility]     OPTICAL TRACKING SYSTEM CRANIOTOMY, EXCISE TUMOR, COMBINED N/A 4/13/2015    Procedure: COMBINED OPTICAL TRACKING SYSTEM CRANIOTOMY, EXCISE TUMOR;  Surgeon: Francis Velazquez MD;  [facility]     OPTICAL TRACKING SYSTEM CRANIOTOMY, EXCISE TUMOR, COMBINED N/A 4/16/2015    Procedure: COMBINED OPTICAL TRACKING SYSTEM CRANIOTOMY, EXCISE TUMOR;  Surgeon: Francis Velazquez MD;  [facility]     OPTICAL TRACKING SYSTEM CRANIOTOMY, EXCISE TUMOR, COMBINED Bilateral 5/28/2015    Procedure: COMBINED OPTICAL TRACKING SYSTEM CRANIOTOMY, EXCISE TUMOR;  Surgeon: Francis Velazquez MD;  [facility]     OPTICAL TRACKING SYSTEM CRANIOTOMY, EXCISE TUMOR, COMBINED Bilateral 1/14/2016    Procedure: COMBINED OPTICAL TRACKING SYSTEM CRANIOTOMY, EXCISE TUMOR;  Surgeon: Francis Velazquez MD;  [facility]     OPTICAL TRACKING SYSTEM VENTRICULOSTOMY  4/16/2015    Procedure: OPTICAL TRACKING SYSTEM VENTRICULOSTOMY;  Surgeon: Francis Velazquez MD;  [facility]     REMOVE PORT VASCULAR ACCESS N/A 10/6/2016    Procedure: REMOVE PORT VASCULAR ACCESS;  Surgeon: Bruno Perea MD;  [facility]      RHINOPLASTY N/A 10/6/2016    Procedure: RHINOPLASTY;  Surgeon: Tyler Richards MD;  [facility]     VASCULAR SURGERY  5-2015    single lumen power port       FAMILY HISTORY:   Family History   Problem Relation Age of Onset     Circulatory Father         PE/DVT     Hypothyroidism Father 30     Diabetes Maternal Grandmother      Diabetes Paternal Grandmother      Diabetes Paternal Grandfather      C.A.D. Paternal Grandfather      Hypertension Maternal Grandfather      Thyroid Disease Paternal Aunt         unknown whether hypo or hyper     Mental Illness No family hx of    Fam/Soc: Lives at a transitional home - [facility]. His parents are  but have been awarded guardianship of Geo. The families work well together - both parents have remarried. His father has a clotting disorder, requiring him to take Warfarin daily    SOCIAL HISTORY:   Social History     Tobacco Use     Smoking status: Never Smoker     Smokeless tobacco: Never Used   Substance Use Topics     Alcohol use: No     Review of Systems   Constitutional: Sitting up in his wheelchair, eye patch in place.    HENT: Less drooling noted today. Negative for nosebleeds.   Eyes: Patching for diplopia.   Gastrointestinal: Positive for constipation. Last BM was two days ago per his report.  He is a good eater.   Genitourinary: Negative for difficulty urinating.   Musculoskeletal: Unable to walk without assistance.    Neurological: Positive for tremors, speech difficulty and weakness.     Labs from 7/14 (see Epic for full scan)    WBC 4.4  Plts 129  ANC 1.9    K 4  Calcium 8.9  Creat 0.96  Alk phos 122  AST 18  ALT 17  Bili 0.2  Total protein is slightly low at 6.2 as is albumin at 3  Mag 2  Phos 4.3    Vital signs  - unable to obtain today.  Nurse will be visiting tomorrow. They have been stable.    K[place] 59     Wt Readings from Last 2 Encounters:   02/25/20 88.1 kg (194 lb 3.6 oz)   01/25/20 89.3 kg (196 lb 14.4 oz)      Exam:    Vital signs: Most recently obtained on 7/9/20  /71  Pulse 100 Resp 18  Temp  97  Sats 100%     Physical Exam:    Constitutional - Happy, well nourished teen  Eyes - with out redness, or discharge. Patch in place Glasses on.  Musculoskeletal - Moving upper extremities.  Ataxia.   Skin - Scattered bruising.  left lower leg lesion that is scabbed but still a bit raised.  non tender and non-erythematous.  Neurological - alert & oriented.  Answering questions.      The rest of a comprehensive physical examination is deferred due to PHE (public health emergency) video visit restrictions    Labs:  Labs from 6/16/2020 were reviewed:  WBC 4.0  Hgb 10.6  Platelets 118,000  ANC 1.6  Chemistries done at the same time reveal:  Na 145  K 3.9  Calcium 8.8  Alk Phos slightly elevated at 158  AST 17  ALT less than 10    Total protein 5.7     Albumin 3.0    Glucose 88    Magnesium 2    Phosphorus 4.7  See Epic for lab copy for remainder of labs.     Impression:   1. Ependymoma Most recent MRI 6/11/20: No significant change in size of the ependymoma centered within the right fourth ventricle, since the previous MRI on 1/7/2020. Slightly decreased size of the cystic lesion adjacent to the enhancing mass.  2. Chronic constipation.  3. Geo's labs are adequate to proceed with the next month of therapy.         Plan:   1. He will begin Entinostat 6 mg weekly tomorrow.   Labs were reviewed with Geo.  Medication was delivered to dad and a diary was provided to the staff and [facility]. We will recheck his labs in 4 weeks.     2. Regarding the stomach pain.  Discussion with GI reveal Senokot is the most likely reason of his medications.  Most recently his Senokot was changed to the individual drugs because the pharmacy could not get the combination product. This may be contributing to his discomfort a long with over eating.  It was discussed with Geo that we will stop the two drug given his recent x-ray and  monitor stool and abdominal discomfort. He should continue regular Miralax.  We will obtain amylase and lipase with next blood draw as well.   3. He should continue close follow-up with Psychiatry.    4. Continue regular skin cares.  We will have nursing assess his skin again at their next visit.       I have reviewed and updated the patient's Past Medical History, Social History, Family History and Medication List.      Video Visit  Video start:2:25  Video end: 2:53       Total 28 minutes of face to face time spent with patient and >50% visit involved counseling and/or coordination of care.

## 2020-07-21 NOTE — LETTER
"7/21/2020      RE: Geo Hicks  [address] [facility] [address]          Pediatric Hematology/Oncology Video Note     Geo Hicks is being evaluated via a billable video visit due to COVID-19 clinic restrictions for in-person visits.   The patient has been notified of following:   \"This video visit will be conducted via a call between you and your physician/provider. We have found that certain health care needs can be provided without the need for a physical exam. This service lets us provide the care you need with a short video conversation. If a prescription is necessary we can send it directly to your pharmacy. If lab work is needed we can place an order for that and you can then stop by our lab to have the test done at a later time. 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 No nausea, or vomiting. His bruising is minimal today  His wounds are healing well.  He has few scrapes on his left lower leg but non tender and non-erythematous - healing well. There is a nurse who comes Wednesday to evaluate them and confirms the improvement.   They have noted no seizure activity.  He has had no headaches.  He had no missed doses of medication with his last course.   He had a visit to the ER for abdominal pain on 7/9/20.  They obtained an x-ray which showed:  Scattered prominent gas containing segments of small bowel. No significant air-fluid levels on the decubitus film. No  significant colonic distention. These findings are not convincing for  an ileus or obstruction. Mild to moderate fecal " retention within the  colon from the mid transverse segment through the distal sigmoid  segment. Mild retention within the rectum is also noted. These  findings are consistent with a history of constipation. No grossly  evident free air. No definite renal stone.  He is eating very large meals.      History was obtained from Geo and his care giver at [facility].   Allergies:     Allergies   Allergen Reactions     Blood Transfusion Related (Informational Only) Swelling     Periorbital swelling post platelet transfusion     No Known Drug Allergies      Current Outpatient Medications   Medication     dexamethasone (DECADRON) 0.5 MG tablet     docusate sodium (COLACE) 100 MG capsule     fexofenadine (ALLEGRA) 180 MG tablet     Lactobacillus-Inulin ([facility]) CAPS     lamoTRIgine (LAMICTAL) 200 MG tablet     linaclotide (LINZESS) 290 MCG capsule     mupirocin (BACTROBAN) 2 % external ointment     OLANZapine (ZYPREXA) 20 MG tablet     omeprazole (PRILOSEC) 20 MG DR capsule     order for DME     order for DME     polyethylene glycol (MIRALAX) 17 GM/SCOOP powder     potassium phosphate, monobasic, (K-PHOS) 500 MG tablet     senna-docusate (SENOKOT-S/PERICOLACE) 8.6-50 MG tablet     sennosides (SENOKOT) 8.6 MG tablet     sertraline (ZOLOFT) 100 MG tablet     study - entinostat, IDS# [id number], 1 mg tablet     study - entinostat, IDS# [id number], 5 mg tablet     sulfamethoxazole-trimethoprim (BACTRIM) 400-80 MG tablet     traZODone (DESYREL) 50 MG tablet     vitamin D3 (CHOLECALCIFEROL) 2000 units (50 mcg) tablet     vitamin E (TOCOPHEROL) 400 units (360 mg) capsule     No current facility-administered medications for this visit.        PAST MEDICAL HISTORY:   Past Medical History:   Diagnosis Date     Cranial nerve dysfunction      Dyspepsia      Ependymoma (H)      Gastro-oesophageal reflux disease      Hearing loss      Intracranial hemorrhage (H)      Migraine      Pilonidal cyst     7-2015     Reduced vision       Refractory obstruction of nasal airway     2nd to nasal valve prolapse     Sleep apnea      Strabismus     gaze palsy        PAST SURGICAL HISTORY:   Past Surgical History:   Procedure Laterality Date     COLONOSCOPY N/A 9/27/2019    Procedure: Colonoscopy With Biopsies and Polypectomy;  Surgeon: Aniya Wei MD;  [facility]     ESOPHAGOSCOPY, GASTROSCOPY, DUODENOSCOPY (EGD), COMBINED N/A 9/27/2019    Procedure: Upper Endoscopy (EGD) With Biopsies;  Surgeon: Aniya Wei MD;  [facility]     GRAFT CARTILAGE FROM POSTERIOR AURICLE Left 10/6/2016    Procedure: GRAFT CARTILAGE FROM POSTERIOR AURICLE;  Surgeon: Tyler Richards MD;  [facility]     INCISION AND DRAINAGE PERINEAL, COMBINED Bilateral 7/18/2015    Procedure: COMBINED INCISION AND DRAINAGE PERINEAL;  Surgeon: Dequan Timmons MD;  [facility]     OPTICAL TRACKING SYSTEM CRANIOTOMY, EXCISE TUMOR, COMBINED N/A 4/13/2015    Procedure: COMBINED OPTICAL TRACKING SYSTEM CRANIOTOMY, EXCISE TUMOR;  Surgeon: Francis Velazquez MD;  [facility]     OPTICAL TRACKING SYSTEM CRANIOTOMY, EXCISE TUMOR, COMBINED N/A 4/16/2015    Procedure: COMBINED OPTICAL TRACKING SYSTEM CRANIOTOMY, EXCISE TUMOR;  Surgeon: Francis Velazquez MD;  [facility]     OPTICAL TRACKING SYSTEM CRANIOTOMY, EXCISE TUMOR, COMBINED Bilateral 5/28/2015    Procedure: COMBINED OPTICAL TRACKING SYSTEM CRANIOTOMY, EXCISE TUMOR;  Surgeon: Francis Velazquez MD;  [facility]     OPTICAL TRACKING SYSTEM CRANIOTOMY, EXCISE TUMOR, COMBINED Bilateral 1/14/2016    Procedure: COMBINED OPTICAL TRACKING SYSTEM CRANIOTOMY, EXCISE TUMOR;  Surgeon: Francis Velazquez MD;  [facility]     OPTICAL TRACKING SYSTEM VENTRICULOSTOMY  4/16/2015    Procedure: OPTICAL TRACKING SYSTEM VENTRICULOSTOMY;  Surgeon: Francis Velazquez MD;  [facility]     REMOVE PORT VASCULAR ACCESS N/A 10/6/2016    Procedure:  REMOVE PORT VASCULAR ACCESS;  Surgeon: Bruno Perea MD;  [facility]     RHINOPLASTY N/A 10/6/2016    Procedure: RHINOPLASTY;  Surgeon: Tyler Richards MD;  [facility]     VASCULAR SURGERY  5-2015    single lumen power port       FAMILY HISTORY:   Family History   Problem Relation Age of Onset     Circulatory Father         PE/DVT     Hypothyroidism Father 30     Diabetes Maternal Grandmother      Diabetes Paternal Grandmother      Diabetes Paternal Grandfather      C.A.D. Paternal Grandfather      Hypertension Maternal Grandfather      Thyroid Disease Paternal Aunt         unknown whether hypo or hyper     Mental Illness No family hx of    Fam/Soc: Lives at a transitional home - [facility]. His parents are  but have been awarded guardianship of Geo. The families work well together - both parents have remarried. His father has a clotting disorder, requiring him to take Warfarin daily    SOCIAL HISTORY:   Social History     Tobacco Use     Smoking status: Never Smoker     Smokeless tobacco: Never Used   Substance Use Topics     Alcohol use: No     Review of Systems   Constitutional: Sitting up in his wheelchair, eye patch in place.    HENT: Less drooling noted today. Negative for nosebleeds.   Eyes: Patching for diplopia.   Gastrointestinal: Positive for constipation. Last BM was two days ago per his report.  He is a good eater.   Genitourinary: Negative for difficulty urinating.   Musculoskeletal: Unable to walk without assistance.    Neurological: Positive for tremors, speech difficulty and weakness.     Labs from 7/14 (see Epic for full scan)    WBC 4.4  Plts 129  ANC 1.9    K 4  Calcium 8.9  Creat 0.96  Alk phos 122  AST 18  ALT 17  Bili 0.2  Total protein is slightly low at 6.2 as is albumin at 3  Mag 2  Phos 4.3    Vital signs  - unable to obtain today.  Nurse will be visiting tomorrow. They have been stable.    Karnofsky 60     Wt Readings from Last 2 Encounters:    02/25/20 88.1 kg (194 lb 3.6 oz)   01/25/20 89.3 kg (196 lb 14.4 oz)     Exam:    Vital signs: Most recently obtained on 7/9/20  /71  Pulse 100 Resp 18  Temp  97  Sats 100%     Physical Exam:    Constitutional - Happy, well nourished teen  Eyes - with out redness, or discharge. Patch in place Glasses on.  Musculoskeletal - Moving upper extremities.  Ataxia.   Skin - Scattered bruising.  left lower leg lesion that is scabbed but still a bit raised.  non tender and non-erythematous.  Neurological - alert & oriented.  Answering questions.      The rest of a comprehensive physical examination is deferred due to PHE (public health emergency) video visit restrictions    Labs:  Labs from 6/16/2020 were reviewed:  WBC 4.0  Hgb 10.6  Platelets 118,000  ANC 1.6  Chemistries done at the same time reveal:  Na 145  K 3.9  Calcium 8.8  Alk Phos slightly elevated at 158  AST 17  ALT less than 10    Total protein 5.7     Albumin 3.0    Glucose 88    Magnesium 2    Phosphorus 4.7  See Epic for lab copy for remainder of labs.     Impression:   1. Ependymoma Most recent MRI 6/11/20: No significant change in size of the ependymoma centered within the right fourth ventricle, since the previous MRI on 1/7/2020. Slightly decreased size of the cystic lesion adjacent to the enhancing mass.  2. Chronic constipation.  3. Geo's labs are adequate to proceed with the next month of therapy.         Plan:   1. He will begin Entinostat 6 mg weekly tomorrow.   Labs were reviewed with Geo.  Medication was delivered to dad and a diary was provided to the staff and [facility]. We will recheck his labs in 4 weeks.     2. Regarding the stomach pain.  Discussion with GI reveal Senokot is the most likely reason of his medications.  Most recently his Senokot was changed to the individual drugs because the pharmacy could not get the combination product. This may be contributing to his discomfort a long with over eating.  It was discussed with  Geo that we will stop the two drug given his recent x-ray and monitor stool and abdominal discomfort. He should continue regular Miralax.  We will obtain amylase and lipase with next blood draw as well.   3. He should continue close follow-up with Psychiatry.    4. Continue regular skin cares.  We will have nursing assess his skin again at their next visit.       I have reviewed and updated the patient's Past Medical History, Social History, Family History and Medication List.      Video Visit  Video start:2:25  Video end: 2:53       Total 28 minutes of face to face time spent with patient and >50% visit involved counseling and/or coordination of care.        Kristi Schuler, ALAN CNP

## 2020-07-23 NOTE — TELEPHONE ENCOUNTER
Geo's group home called triage line to report his study drug had not been given yesterday and they wondered if it could be started today. Provider Rhina Schuler notified and she noted it was ok for patient to start drug today, just ensure patient gets full course of medication. This information was relayed to Madalyn at the group home and she had no further questions.

## 2020-08-05 NOTE — TELEPHONE ENCOUNTER
[facility] calling for orders-    Skilled Nurse  1xwk/8wks  2 as needed  Starting 8/9/20    Verbal order given.  Will fax for MD signature.  Cate Morales RN

## 2020-08-13 NOTE — ED AVS SNAPSHOT
[facility] Emergency Department  [address]  [facility] [address]  Phone:  [phone]  Fax:  [phone]                                    Geo Hicks   MRN: [id number]    Department:  [facility] Emergency Department   Date of Visit:  8/13/2020           After Visit Summary Signature Page    I have received my discharge instructions, and my questions have been answered. I have discussed any challenges I see with this plan with the nurse or doctor.    ..........................................................................................................................................  Patient/Patient Representative Signature      ..........................................................................................................................................  Patient Representative Print Name and Relationship to Patient    ..................................................               ................................................  Date                                   Time    ..........................................................................................................................................  Reviewed by Signature/Title    ...................................................              ..............................................  Date                                               Time          22EPIC Rev 08/18

## 2020-08-14 NOTE — ED PROVIDER NOTES
History     Chief Complaint:  Facial Laceration      HPI   Geo Hicks is a 19 year old male with a history of ependymoma and hemorraghic stroke who presents his father for the evaluation of facial laceration. The patient's father reports that he attempted to stand out of his wheelchair earlier this evening, fell forward, and hit his forehead on a bookshelf. The patient notes that he sustained a laceration to his right eye lid. Patient is typically unstable on his feet at baseline and uses a wheelchair for ambulation. The patient denies other issues.  Tetanus last updated in 2019.    Allergies:  No known drug allergies.      Medications:    Allegra  Lamictal  Linzess  Zyprexa  Prilosec  Senokot  Zoloft  Desyrel  Vitamin E and D.A.     Past Medical History:    Cranial nerve dysfunction  Dyspepsia  Ependymoma  GERD  Intracranial hemorrhage  Migraine  Pilonidal cyst  Reduced vision  Sleep apnea  Strabismus   Constipation  Tubular adenoma     Past Surgical History:    EGD combined  Graft cartilage from posterior auricle  I&D perineal  Craniotomy; excise tumor x3  Remove port vascular access  Rhinoplasty  Single lumen power port     Family History:    Father: PE/DVT, hypothyroidism     Social History:  The patient was accompanied to the ED by father.  Smoking Status: Never Smoker  Smokeless Tobacco: Never Used  Alcohol Use: Negative  Drug Use: Negative  PCP: Jeffrey Espinoza    Review of Systems   Skin: Positive for wound.   All other systems reviewed and are negative.      Physical Exam     Patient Vitals for the past 24 hrs:   BP Temp Temp src Pulse Heart Rate Resp SpO2   08/13/20 2247 (!) 126/92 97.9  F (36.6  C) Axillary 102 102 19 96 %        Physical Exam  General: Patient is alert and interactive when I enter the room  Head:  The scalp, face, and head appear normal except for laceration, see below  Eyes:  The pupils are equal, round, and reactive to light    Conjunctivae and sclerae are normal  ENT:    External  acoustic canals are normal    The oropharynx is normal without erythema.     Uvula is in the midline  Neck:  Normal range of motion  CV:  Regular rate. S1/S2. No murmurs.   Resp:  Lungs are clear without wheezes or rales. No distress  GI:  Abdomen is soft, no rigidity, guarding, or rebound    No distension. No tenderness to palpation in any quadrant.     MS:  Normal tone. Joints grossly normal without effusions.     No asymmetric leg swelling, calf or thigh tenderness.    Skin:  Laceration to forehead, CN's V and VII intact. No rash or lesions noted. Normal capillary refill noted  Neuro: Speech is normal and fluent. Face is symmetric.     Moving all extremities well.   Psych:  Awake. Alert.  Normal affect.  Appropriate interactions.  Lymph: No anterior cervical lymphadenopathy noted    Emergency Department Course     Procedures:    Laceration Repair        LACERATION:  A simple clean 3.0 cm laceration.      LOCATION:  Right eye lid      FUNCTION:  Distally sensation are intact.      ANESTHESIA:  Local using 1% lidocaine total of 5 mLs. Supraorbital and supratrochlear nerve blocks done.       PREPARATION:  Irrigation with Normal Saline and Shur Clens      DEBRIDEMENT:  no debridement      CLOSURE:  Wound was closed with One Layer.  Skin closed with 3 x 6.0 Prolene using horizontal mattress sutures.    Emergency Department Course:  Past medical records, nursing notes, and vitals reviewed.  2343: I performed an exam of the patient and obtained history, as documented above.     0033: I rechecked the patient. Explained findings to patient. Laceration repair procedure performed.     Findings and plan explained to the Patient. Patient discharged home with instructions regarding supportive care, medications, and reasons to return. The importance of close follow-up was reviewed.       Impression & Plan    Medical Decision Making:  Geo Hicks is a 19 year old male who presents for evaluation of a laceration to the  face/head.  . I doubt a midface fracture and will not CT scan at this point. No signs of entrapment or displaced fracture on detailed midface clinical exam.  Cervical spine is cleared clinically.  The head to toe trauma is exam is negative otherwise and further trauma workup is not necessary. The wound was carefully evaluated and explored.  The laceration was closed with sutures as noted above. There is no evidence of muscular, tendon, or bony damage with this laceration.  No signs of foreign body.  Possible complications (infection, scarring) were reviewed with the patient.  By the [place] head CT rules the patient does not warrant head CT evaluation. Based on workup I believe patient is very low risk for skull fracture and/or intracerebral bleeding now or in future. Discussed delayed bleed.  Concussion is likewise of very low probability with no loss of consciousness and normal mental status here. Follow up with primary care will be indicated for suture removal as noted in the discharge section.    Diagnosis:    ICD-10-CM    1. Facial laceration, initial encounter  S01.81XA        Disposition:  Discharged to home.    Scribe Disclosure:  I, Lalit Coy, am serving as a scribe at 11:43 PM on 8/13/2020 to document services personally performed by Benny Stanford MD based on my observations and the provider's statements to me.      Lalit Coy  8/13/2020   [facility] EMERGENCY DEPARTMENT       Benny Stanford MD  08/14/20 0059

## 2020-08-14 NOTE — ED TRIAGE NOTES
Presents with father with facial laceration to L forehead. Pt attempted to stand, he is unsteady at baseline and hit his head on a bookshelf.  VSS on RA.

## 2020-08-18 NOTE — TELEPHONE ENCOUNTER
Left detailed message on confidential voicemail  for [facility] at [facility] with message below.     Aniya Torres RN Flex

## 2020-08-18 NOTE — TELEPHONE ENCOUNTER
[facility] calling for verbal order from provider to have Home Care RN to remove sutures tomorrow in home at visit instead of patient coming into the clinic to have done this week. High risk for COVID.     This will be removed a day earlier than advised in ED notes from 8/13/2020.     Please advise.     Aniya Torres RN Flex

## 2020-08-18 NOTE — PATIENT INSTRUCTIONS
Video visit in 1 month    Thank you for choosing [facility].    It was a pleasure to see you today.      CNS Tumor Team  Leoncio Schuler NP APRN  Imani Means RN BSN - Care Coordinator       [facility], 9th Floor - [facility]  [address] [place] [address]  Scheduling/Appointments: [phone]  Fax: [phone]     Numbers to call:   Monday - Friday, 8:00 am - 5:00 pm:    Office : [phone]    Scheduling/Appointments: [phone]  Nights and weekends:   Call [phone] and ask the  to page the 'Pediatric Heme/Onc fellow on call' if you have an urgent concern that can't wait until the clinic opens.     Scheduling:    [facility], 9th floor [phone]  Infusion Center/Lab: [phone]   Radiology/ [phone]      Services:   [phone]     Imani Means RN, BSN  CNS Tumor Care Coordinator  Office: [phone]  Pager: [phone]  E-mail: [email or web address]     We encourage you to sign up for PHRQL for easy communication with us.  Sign up at the clinic  or go to [email or web address].      Please try the [facility] ([facility]) phone application for Virtual Tours, Procedure Preparation, Resources, Preparation for Hospital Stay and the Coloring Board.

## 2020-08-18 NOTE — LETTER
"  8/18/2020      RE: Geo Hicks  [id number] [facility] [address]       Geo Hicks is a 19 year old male who is being evaluated via a billable video visit.      The patient has been notified of following:     \"This video visit will be conducted via a call between you and your physician/provider. We have found that certain health care needs can be provided without the need for an in-person physical exam.  This service lets us provide the care you need with a video conversation.  If a prescription is necessary we can send it directly to your pharmacy.  If lab work is needed we can place an order for that and you can then stop by our lab to have the test done at a later time.    Video visits are billed at different rates depending on your insurance coverage.  Please reach out to your insurance provider with any questions.    If during the course of the call the physician/provider feels a video visit is not appropriate, you will not be charged for this service.\"    Patient has given verbal consent for Video visit? Yes  How would you like to obtain your AVS? MyChart  If you are dropped from the video visit, the video invite should be resent to: Send to e-mail at: [email or web address]  Will anyone else be joining your video visit? No        Video-Visit Details    Type of service:  Video Visit    Video Start Time: 3:50 PM  Video End Time: 4:10 PM    Originating Location (pt. 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 Fell recently, causing laceration of scalp. Seen ED. No other new " problems. Living in his group care facility. No recent illnesses. Ongoing issues with constipation.      Oncology History:    [id number]   6/26/15    Started Entinostat 1/9/17    DIAGNOSIS: EPENDYMOMA  AREAS TREATED: POST FOSSA TUMOR  DOSE: 5940 cGy   TYPE OF RADIATION GIVEN: with IMRT Tomotherapy   9/08/2015 to 10/26/15    Patient Active Problem List   Diagnosis     GERD (gastroesophageal reflux disease)     Closed fracture at the growth plate of right distal fibula      Elevated serum creatinine     Dyspepsia     Intracranial hemorrhage (H)     Hemorrhagic stroke (H)     Ependymoma (H)     Admission for antineoplastic chemotherapy     Post-operative state     S/P craniotomy     S/P biopsy     Noncomitant strabismus     Abducens neuropathy of both eyes     CANDELARIO (obstructive sleep apnea)     Health Care Home     Hypernatremia     Body temperature low     Current chronic use of systemic steroids     Elevated TSH     Status post chemotherapy     Neoplasm of posterior cranial fossa (H)     Chronic constipation     Serum albumin decreased     Closed compression fracture of thoracic vertebra with routine healing, subsequent encounter     Depression     Constipation     Constipation by delayed colonic transit     Slow transit constipation     Tubular adenoma     Current Outpatient Medications   Medication     dexamethasone (DECADRON) 0.5 MG tablet      MG capsule     fexofenadine (ALLEGRA) 180 MG tablet     Lactobacillus-Inulin ([facility]) CAPS     lamoTRIgine (LAMICTAL) 200 MG tablet     linaclotide (LINZESS) 290 MCG capsule     mupirocin (BACTROBAN) 2 % external ointment     OLANZapine (ZYPREXA) 20 MG tablet     omeprazole (PRILOSEC) 20 MG DR capsule     order for DME     order for DME     polyethylene glycol (MIRALAX) 17 GM/Dose powder     potassium phosphate, monobasic, (K-PHOS) 500 MG tablet     sennosides (SENOKOT) 8.6 MG tablet     sertraline (ZOLOFT) 100 MG tablet     study - entinostat,  IDS# 5050, 1 mg tablet     study - entinostat, IDS# 5050, 5 mg tablet     sulfamethoxazole-trimethoprim (BACTRIM) 400-80 MG tablet     traZODone (DESYREL) 50 MG tablet     vitamin D3 (CHOLECALCIFEROL) 2000 units (50 mcg) tablet     vitamin E (TOCOPHEROL) 400 units (360 mg) capsule     No current facility-administered medications for this visit.         Allergies   Allergen Reactions     Blood Transfusion Related (Informational Only) Swelling     Periorbital swelling post platelet transfusion     No Known Drug Allergies        Review Of Systems  Skin: positive for striae, recent scalp laceration  Eyes: positive for oculoplegia  Ears/Nose/Throat: negative  Respiratory: No shortness of breath, dyspnea on exertion, cough, or hemoptysis  Cardiovascular: negative  Gastrointestinal: constipation  Genitourinary: negative  Musculoskeletal: negative  Neurologic: positive for  local weakness, speech problems, incoordination and behavior changes  Psychiatric: negative, anxiety, agitation and perseveration re: constipation  Hematologic/Lymphatic/Immunologic: negative  Endocrine: negative    Impression:  Ependymoma in 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

## 2020-08-18 NOTE — PROGRESS NOTES
"Geo Hicks is a 19 year old male who is being evaluated via a billable video visit.      The patient has been notified of following:     \"This video visit will be conducted via a call between you and your physician/provider. We have found that certain health care needs can be provided without the need for an in-person physical exam.  This service lets us provide the care you need with a video conversation.  If a prescription is necessary we can send it directly to your pharmacy.  If lab work is needed we can place an order for that and you can then stop by our lab to have the test done at a later time.    Video visits are billed at different rates depending on your insurance coverage.  Please reach out to your insurance provider with any questions.    If during the course of the call the physician/provider feels a video visit is not appropriate, you will not be charged for this service.\"    Patient has given verbal consent for Video visit? Yes  How would you like to obtain your AVS? MyChart  If you are dropped from the video visit, the video invite should be resent to: Send to e-mail at: [email or web address]  Will anyone else be joining your video visit? No  {If patient encounters technical issues they should call [phone] :[id number]}      Video-Visit Details    Type of service:  Video Visit    Video Start Time: 3:50 PM  Video End Time: {video visit start/end time:108534}    Originating Location (pt. Location): Home    Distant Location (provider location):  [facility] HEMATOLOGY ONCOLOGY     Platform used for Video Visit: [facility]    Leoncio Rousseau MD      CC: Ependymoma - Entinostat therapy F/U    HPI: No new changes. Fell recently, causing laceration of scalp. No other new problems.      Oncology History:    [id number]   6/26/15    Started Entinostat 1/9/17    DIAGNOSIS: EPENDYMOMA  AREAS TREATED: POST FOSSA TUMOR  DOSE: 5940 cGy   TYPE OF RADIATION GIVEN: with IMRT Tomotherapy   9/08/2015 to 10/26/15    "

## 2020-08-18 NOTE — TELEPHONE ENCOUNTER
As long as it looks well healed per RN, may remove tomorrow. If not, schedule RN only clinic visit.    Natalio Langley PA-C on 8/18/2020 at 3:17 PM

## 2020-08-18 NOTE — PATIENT INSTRUCTIONS
Nice to see you again Geo, as we discussed:   1) continue current medications  2) schedule follow-up appointment in 4 weeks.    ------------------------------------------------------------------------    Thank you for coming to the PSYCHIATRY CLINIC.    Lab Testing:  If you had lab testing today and your results are reassuring or normal they will be mailed to you or sent through [employer] within 7 days. If the lab tests need quick action we will call you with the results. The phone number we will call with results is # [phone] (home) . If this is not the best number please call our clinic and change the number.    Medication Refills:  If you need any refills please call your pharmacy and they will contact us. Our fax number for refills is [phone]. Please allow three business for refill processing. If you need to  your refill at a new pharmacy, please contact the new pharmacy directly. The new pharmacy will help you get your medications transferred.     Scheduling:  If you have any concerns about today's visit or wish to schedule another appointment please call our office during normal business hours [phone] (8-5:00 M-F)    Contact Us:  Please call [phone] during business hours (8-5:00 M-F).  If after clinic hours, or on the weekend, please call  [phone].    Financial Assistance [phone]  [facility] Billing [phone]  Central Billing Office, [facility]: [phone]  [facility] Billing [phone]  Medical Records [phone]      MENTAL HEALTH CRISIS NUMBERS:  For a medical emergency please call  911 or go to the nearest ER.     [facility]:   [facility] -[phone]   Crisis Residence [facility] -[phone]   Walk-In Counseling Center [facility] -[phone]   COPE 24/7 [facility] Mobile Team -[phone] (adults)/[phone] (child)  CHILD: [facility] needs assessment team - [phone]      [facility] - [phone]    Walk-in counseling [facility] - [phone]   Walk-in counseling [facility] - [phone]   Crisis Residence [facility] Residence - [phone]  Urgent Care Adult Mental [facility]-[phone] mobile unit/ 24/7 crisis line    National Crisis Numbers:   National Suicide Prevention Lifeline: 4-681-809-TALK (554-481-1270)  Poison Control Center - 0-313-619-9181  Easydiagnosis/resources for a list of additional resources (SOS)  Trans Lifeline a hotline for transgender people 4-095-950-4948  [facility] a hotline for LGBT youth 1-657.181.6241  Crisis Text Line: For any crisis 24/7   To: 040683  see www.crisistextline.org  - IF MAKING A CALL FEELS TOO HARD, send a text!         Again thank you for choosing PSYCHIATRY CLINIC and please let us know how we can best partner with you to improve you and your family's health.    You may be receiving a survey regarding this appointment. We would love to have your feedback, both positive and negative. The survey is done by an external company, so your answers are anonymous.

## 2020-08-18 NOTE — PROGRESS NOTES
"VIDEO VISIT  Geo Hicks is a 19 year old patient who is being evaluated via a billable video visit.      The patient has been notified of following:   \"We have found that certain health care needs can be provided without the need for an in-person physical exam. This service lets us provide the care you need with a video conversation. If a prescription is necessary we can send it directly to your pharmacy. If lab work is needed we can place an order for that and you can then stop by our lab to have the test done at a later time. Insurers are generally covering virtual visits as they would in-office visits so billing should not be different than normal.  If for some reason you do get billed incorrectly, you should contact the billing office to correct it and that number is in the AVS .    Patient has given verbal consent for video visit?: Yes   How would you like to obtain your AVS?: Taigen  AVS SmartPhrase [PsychAVS] has been placed in 'Patient Instructions': Yes      Video- Visit Details  Type of service:  video visit for medication management  Time of service:    Date:  08/21/2020    Video Start Time:  2:36 PM        Video End Time:  3:26 PM    Reason for video visit:  Patient unable to travel due to Covid-19  Originating Site (patient location):  [facility]   Location- [facility]  Distant Site (provider location):  Remote location  Mode of Communication:  Video Conference via [email or web address]  Consent:  Patient has given verbal consent for video visit?: Yes         [facility]  Psychiatry Clinic  PSYCHIATRIC PROGRESS NOTE     CARE TEAM:    PCP- Jeffrey Espinoza     Specialty Providers- Oncology, Neuropsychology, Physical Therapy, Occupational Therapy      Therapist- most recently Manju Pink at [facility] Team- no.      Geo Hicks is a 19 year old patient who prefers the name Geo and pronouns he, him, his.     PERTINENT BACKGROUND                " "[last transfer eval 07/10/20]   This patient first experienced significant mental health issues in 2018 and has received treatment for paranoia/delusions and depressed mood. Notably, Geo is undergoing cancer treatment with the [facility] and was referred for psychiatric management with our department by neuropsychology.  He has a history of ependymoma that was diagnosed at age 14. Since then, he has undergone multiple tumor resections, chemotherapy, and radiation treatment. Geo also experienced an intra-tumoral hemorrhage in May 2015 that resulted in neurological changes including facial numbness, significant loss of mobility and dysarthria.  He is currently on COG study [id number] with Entinostat. Since 2018, Geo has developed a fixed delusion that definitive care is being withheld by his oncology providers, specifically believing that they are choosing not to treat his constipation, and that it is due to this that he is no longer able to walk without assistance.  Has demonstrated agitation and emotional discord with behavioral disturbance in the setting of this belief.  While his emotional upset has been somewhat amenable to treatment with medications, his delusional belief has remained so far resistant to management.    Psych critical item history includes suicidal ideation, psychosis [sxs include delusions] and psych hosp (<3)    INTERIM HISTORY                                 [4, 4]   History was provided by the patient and group home staff who was a good historian. Treatment adherence is good.  Since the last visit:   - \"I'm doing good\". Feels like the other doctors are hurting his mental health, because \"knows that they are withholding treatment\" for his constipation.   - Continues to feel trapped because the doctors are not letting him walk and because of COVID limiting what he can do.  - \"Knows\" both home and parents are working with doctors to keep treatment from him. He would \"like " "to\" believe that parents and home love and care for him but \"I don't know\".  - \"Don't really want to answer question those questions [about suicide] because last time I ended up in the hospital.\" When asked about intent or planning he said, \"there is not any good way and I don't want to.\" He is feeling safe at his current group home and reports a safety plan of notifying staff, if his suicidal thoughts return/worsen or that he could call the crisis line or our clinic phone line.  - Started seeing a new therapist since the last visit and thinks she's \"pretty good\" and \"likes\" her.  - Enjoys watching [employer] and playing games.     Spoke to mother after visit with Geo:  - Notes that he was in physical therapy and was constipated. Therapist stated \"something like, \"it's really hard to walk if you are constipated so if you can take care of that then you'll be able to walk.\"\"  - Doesn't want to live any other way but \"blaming\", \"doesn't want to do anything fun\", \"going to programs\", or \"do something to occupy time\".   - Only concern is that if he is trying to start to walk on his own more frequently he might get hurt more, citing recent ED visit.     RECENT PSYCH ROS:   Depression:  suicidal ideation without plan, without intent, depressed mood, feeling hopeless and feeling trapped  Elevated:  none  Psychosis:  delusions- paranoid [details in Interim History]  Anxiety:  excessive worry and nervous/overwhelmed  Panic Attack:  none  Trauma Related:  none  Dysregulation:  suicidal ideation with plan; without intent [details in Interim History] and can become agitated if beliefs are challenged.  Eating Disorder: no     Adverse Effects:  denies  Pertinent Negatives:  No violent ideation, self-injury, hallucinations and aggression  Recent Substance Use:  none reported    FAMILY and SOCIAL HISTORY             [1ea, 1ea]       patient reported                    FAMILY HX- Denied    Financial/ Work- family or friend     " "  Partner/ - single  Children- no      Living situation- Group Home, previously living alternate weeks with mother and father.      Social/ Spiritual Support- mother, father, older brother, friend Rima     Legal- no  FEELS SAFE AT HOME- yes     Trauma History (self-report)- medical/life threatening illness  Early History/Education-  Grown up in [place], [place].  Parents are , and he shares time between his mother s and father s homes. Both parents are remarried.  Dad is a , and mom does  for a testing company.  Siblings include two full brothers (older brother Benitez who is a senior in college and younger brother Gamaliel), a step-brother, and a step-sister. Geo graduated from high school in Spring 2018.  Initially considered attending [employer] in Fall 2019, but reportedly lost interest due to the amount of time it would take him to complete courses and receive a degree.  Does today endorse continued interest in pursuing an advanced course of study at some point, specifically stating he is interested in Electrical Engineering.    Other- N/A     PSYCH and SUBSTANCE USE Critical Summary Points since July 2020         None    MEDICAL HISTORY and ALLERGY     ALLERGIES: Blood transfusion related (informational only) and No known drug allergies     Neurologic Hx- See pertinent background, re: history of ependymoma and related chemo, radiation and tumor resection(s).  Notably has experienced neurological damage due to the above which has resulted in facial numbness, significant loss of mobility and dysarthria.  Has had neuropsychiatric evaluations 2/2016, 2/2017, 1/2019, and 10/2019.  The following is from the \"Results and Impressions\" section of his 10/29/2019 eval with Veronica Padilla, with a passage bolded that relates to noted difficulty in an area of executive functioning that facilitates the ability to see other people's " "perspectives:     \"Geo s attention was rated by his mother as well as himself as being adequate.  Executive functioning are those skills including planning, organization, emotional control, cognitive flexibility, and the ability to take another person s perspective.  Geo rating scale of these skills was basically in the average range for his age with a slight elevation in his ability to shift from one activity to another.  His mother s ratings of his executive functioning were in the average range, with the exception of a slight elevation in his ability to initiate tasks.  Direct testing of his ability to take another person s perspective indicated difficulty in this area.  He was asked to sort objects based on various aspects of the objects.  When he sorted the objects, he showed average performance.  However, when the examiner sorted the objects, Geo experienced significant difficulty with being able to determine how the objects were sorted.  This difficulty likely translates into difficulty with understanding another person s point of view.  Geo indicated that this difficulty becomes quite frustrating for him as he doesn t feel other people understand him--it is likely that he has difficulty understanding their perspective.     Emotionally Geo indicated that he continues to feel some difficulty with sadness but that it has improved over time and with medication.  Geo denied significant feelings of anxiety at this time while in the past these scores have been higher.  Parent ratings of Geo s emotional functioning indicated no areas of significant concern.  Interviews with Geo and his mother indicated continued feelings of sadness and difficulty in motivating himself to try to new activities.  While there is improvement in his mood, Geo continues to be at risk for depression.  Since he is now under the care of a psychiatrist, we did not interview around his feelings that his medical team " "was not being helpful to him.  His mother reported that these feelings continue and likely interfere with his compliance with directives.  He is participating in therapy to work on these issues and it is important that this intervention continue.\"    Patient Active Problem List   Diagnosis     GERD (gastroesophageal reflux disease)     Closed fracture at the growth plate of right distal fibula      Elevated serum creatinine     Dyspepsia     Intracranial hemorrhage (H)     Hemorrhagic stroke (H)     Ependymoma (H)     Admission for antineoplastic chemotherapy     Post-operative state     S/P craniotomy     S/P biopsy     Noncomitant strabismus     Abducens neuropathy of both eyes     CANDELARIO (obstructive sleep apnea)     Health Care Home     Hypernatremia     Body temperature low     Current chronic use of systemic steroids     Elevated TSH     Status post chemotherapy     Neoplasm of posterior cranial fossa (H)     Chronic constipation     Serum albumin decreased     Closed compression fracture of thoracic vertebra with routine healing, subsequent encounter     Depression     Constipation     Constipation by delayed colonic transit     Slow transit constipation     Tubular adenoma         MEDICAL REVIEW OF SYSTEMS         [2, 10]   Pregnant or breastfeeding- no      Contraception- no    A comprehensive review of systems was performed and is negative other than noted in the HPI.    MEDICATIONS        Current Outpatient Medications   Medication Sig Dispense Refill     dexamethasone (DECADRON) 0.5 MG tablet TAKE ONE AND ONE-HALF TABLETS (0.75MG) BY MOUTH ONCE DAILY WITH BREAKFAST. 45 tablet 11      MG capsule        fexofenadine (ALLEGRA) 180 MG tablet TAKE 1 TABLET BY MOUTH EVERY EVENING. 30 tablet 11     Lactobacillus-Inulin ([facility]) CAPS Take 2 capsules by mouth daily 30 capsule 3     lamoTRIgine (LAMICTAL) 200 MG tablet Take 1 tablet (200 mg) by mouth At Bedtime 90 tablet 0     linaclotide " (LINZESS) 290 MCG capsule Take 1 capsule (290 mcg) by mouth every morning (before breakfast) 30 capsule 3     mupirocin (BACTROBAN) 2 % external ointment Use 2 times a day to the ear lobes and canals with flare (Patient not taking: Reported on 8/19/2020) 22 g 3     OLANZapine (ZYPREXA) 20 MG tablet Take 1 tablet (20 mg) by mouth At Bedtime 90 tablet 0     omeprazole (PRILOSEC) 20 MG DR capsule Take 2 capsules (40 mg) by mouth every morning 60 capsule 1     order for DME Equipment being ordered: Dressing  Wound #1 lower leg, W 6 cm x, D 0.5  cm, Drainage scant, Thickness sutured     Type: non stick gauze, Brand: , Size: 4/4   Change: 1 per day    Tape/Wrap: 1 each     attach facesheet with insurance information (delete this line) 5 each 0     order for DME Equipment being ordered: short boot 1 each 0     polyethylene glycol (MIRALAX) 17 GM/Dose powder Take 17 g (1 capful) by mouth 3 times daily 289 g 3     potassium phosphate, monobasic, (K-PHOS) 500 MG tablet Take 1 tablet (500 mg) by mouth 2 times daily 90 tablet 3     sennosides (SENOKOT) 8.6 MG tablet Take 1 tablet by mouth daily 30 tablet 4     sertraline (ZOLOFT) 100 MG tablet Take 1.5 tablets (150 mg) by mouth daily 135 tablet 0     study - entinostat, IDS# 5050, 1 mg tablet Take 1 tablet (1 mg) by mouth every 7 days for 4 doses Takeone 1mg tablet with one 5mg tablet for total dose of 6mg weekly. Take on an empty stomach, at least 1 hour before or 2 hours after a meal.  Swallow tablet whole. 4 tablet 0     study - entinostat, IDS# 5050, 5 mg tablet Take 1 tablet (5 mg) by mouth every 7 days for 4 doses Take one 5mg tablet with one 1mg tablet for total dose of 6 mg weekly. Take on an empty stomach, at least 1 hour before or 2 hours after a meal.  Swallow tablet whole. 4 tablet 0     sulfamethoxazole-trimethoprim (BACTRIM) 400-80 MG tablet Take 1 tablet by mouth 2 times daily On Saturdays and Sundays 24 tablet 11     traZODone (DESYREL) 50 MG tablet Take 1  "tablet (50 mg) by mouth At Bedtime 90 tablet 0     vitamin D3 (CHOLECALCIFEROL) 2000 units (50 mcg) tablet TAKE 1 TABLET BY MOUTH ONCE DAILY WITH BREAKFAST. 30 tablet 11     vitamin E (TOCOPHEROL) 400 units (360 mg) capsule TAKE 1 CAPSULE BY MOUTH ONCE DAILY. 30 capsule 11     VITALS                                                                [3, 3]   There were no vitals taken for this visit.   MENTAL STATUS EXAM                       [9, 14 cog gs]     Alertness: alert  and oriented  Appearance: casually groomed and patient has eye patch over right eye and drooling during interview  Behavior/Demeanor: cooperative and pleasant, with good  eye contact   Speech: prominent dysarthria  Language: no obvious problem and dysarthria make it difficult to understand at times  Psychomotor: Mild palsy in bilateral upper extremities, fidgeting, and facial paralysis present.  Mood: \"not too good\"  Affect: full range; congruent to: mood- yes, content- yes  Thought Process/Associations: unremarkable  Thought Content:  Reports suicidal ideation with plan; without intent [details in history] and delusions [details in history];  Denies violent ideation  Perception:  Reports none;  Denies auditory hallucinations and visual hallucinations  Insight: poor  Judgment: good  Cognition: (6) oriented: time, person, and place  attention span: intact  concentration: intact  recent memory: intact  remote memory: intact  fund of knowledge: delayed  Gait and Station: N/A (telehealth)    LABS and DATA     PHQ9 TODAY = Not completed due to COVID 19 video visit  PHQ 8/19/2019 9/9/2019 11/15/2019   PHQ-9 Total Score 8 9 -   Q9: Thoughts of better off dead/self-harm past 2 weeks More than half the days More than half the days (No Data)       Recent Labs   Lab Test 08/11/20 07/09/20  1409 06/16/20  03/10/20  1338   CR 1.06 1.03 1.00  1.00   < > 1.03   GFRESTIMATED  --  >90 >60  --  >90    < > = values in this interval not displayed. " "    ANTIPSYCHOTIC LABS  [glu, A1C, lipids (rohit LDL), liver enzymes, WBC, ANEU, Hgb, plts]  q12 mo  Recent Labs   Lab Test 08/11/20 07/09/20  1409 06/16/20 06/01/20  1503  02/26/19  1100   GLC 76 82 88  88 102*   < > 124*   A1C  --   --   --   --   --  5.1    < > = values in this interval not displayed.     Recent Labs   Lab Test 02/26/19  1100   CHOL 158   TRIG 236*   LDL 84   HDL 27*     Recent Labs   Lab Test 06/16/20 03/10/20  1338 03/03/20  1314 02/25/20  1413 02/18/20  0713   AST 17 21 29 42 27   ALT <10 18 23 24 22   ALKPHOS  --  115 110 126 149     Recent Labs   Lab Test 08/11/20 07/09/20  1409 06/16/20 06/01/20  1503  03/10/20  1338 03/03/20  1314 02/25/20  1413   WBC 4.5 5.5 4.0 5.3   < > 3.7* 3.4* 4.3   ANEU  --  2.2  --   --   --  1.9 1.4* 2.2   HGB 10* 10.8* 10.6* 11.3*   < > 11.7* 11.4* 11.7*    118* 118 118   < > 152 171 114*    < > = values in this interval not displayed.     PSYCHOTROPIC DRUG INTERACTIONS     Increased risk of QTc Prolongation: olanzapine, sertraline, trazodone  Increased risk of lamotrigine toxicity (fatigue, sedation, confusion): lamotrigine, sertraline     MANAGEMENT:  Monitoring for adverse effects    RISK STATEMENT for SAFETY     Geo Hicks endorsed no safety concerns today and did not appear to be at imminent risk.    DIAGNOSES                                           [m2, h3]    Delusional Disorder, persecutory type, first episode, currently in acute episode  Major Depressive Disorder, single episode, moderate    ASSESSMENT                                        [m2, h3]        Geo Kurt presents for continued medication management of paranoia, delusional thoughts, and depressed mood.  Meets with this writer via telemedicine with group home staff in accompaniment. Relates mood as \"not too well\", principally noting feeling trapped and hopeless with his current situation, in part due to restrictions due to COVID. Denies suicidal ideation, self-injurious behavior, " "violent/aggressive ideation, inappropriately elevated moods, depressive severity, panic/anxious acuity or other hallmarks of psychiatric decompensation with dangerousness. Relates he continues to think about his characteristic delusion of a medical conspiracy against him (see previous notes) and at this time isn't really trusting of doctors that much. Overall, feels like current meds are helpful and well-tolerated and he is now seeing a therapist regularly, who he gets along with and finds helpful. We agreed to make no medication changes.    After our visit, this writer called his mother, who is his guardian, and touched base on this recommendation for pharmacotherapy and follow-up.     Future considerations could include:  - increase sertraline  - increase lamotrigine    [place]:  Indicates no medication misuse .    PLAN                                                            [m2, h3]                                               1) Meds  - Continue olanzapine 20 mg at bedtime.  - Continue sertraline 150 mg daily.  - Continue lamotrigine 200 mg daily.  - Continue trazodone 50 mg at bedtime.    2) Therapy-  Patient started a new therapist recently, father to get MERRITT from therapist for our clinic.    3) Next Due   Labs- Lipids and A1c due, pending COVID-19.  EKG- prn  Rating scales- AIMS due with next \"in-clinic\" visit    5) Referrals  No Referrals needed today    3) RTC: 4 weeks    4) Crisis Numbers:   Provided routinely in AVS     After hours:  [phone]      TREATMENT RISK STATEMENT:  The risks, benefits, alternatives and potential adverse effects have been discussed and are understood by the pt. The pt understands the risks of using street drugs or alcohol. There are no medical contraindications, the pt agrees to treatment with the ability to do so. The pt knows to call the clinic for any problems or to access emergency care if needed.  Medical and substance use concerns are documented " above.  Psychotropic drug interaction check was done, including changes made today.    PROVIDER: Jj Louis MD    Patient not staffed in clinic.  Note will be reviewed and signed by supervisor Dr. Marrero.  I did not see this patient directly. I have reviewed the documentation and I agree with the resident's plan of care.     Amy Marrero MD

## 2020-08-19 NOTE — PROGRESS NOTES
"Geo Hicks is a 19 year old male who is being evaluated via a billable video visit.      The patient has been notified of following:     \"This video visit will be conducted via a call between you and your physician/provider. We have found that certain health care needs can be provided without the need for an in-person physical exam.  This service lets us provide the care you need with a video conversation.  If a prescription is necessary we can send it directly to your pharmacy.  If lab work is needed we can place an order for that and you can then stop by our lab to have the test done at a later time.    Video visits are billed at different rates depending on your insurance coverage.  Please reach out to your insurance provider with any questions.    If during the course of the call the physician/provider feels a video visit is not appropriate, you will not be charged for this service.\"    Patient has given verbal consent for Video visit? Yes  How would you like to obtain your AVS? MyChart  If you are dropped from the video visit, the video invite should be resent to: Text to cell phone: [phone], Patient and Staff-Lance will be on pt's iphone.  Will anyone else be joining your video visit? Yes: Father-Eric on computer [email or web address]        During this virtual visit the patient is located in [place], patient verifies this as the location during the entirety of this visit.       Video-Visit Details    Type of service:  Video Visit    Video Start Time: 12:03 pm  Video End Time: 12:44 pm    Originating Location (pt. Location): Home    Distant Location (provider location):  [facility]     Platform used for Video Visit: [employer]    ------------------------  GI CLINIC VISIT    CC/BOBBY MD:  Referred Self  REASON FOR CONSULTATION:   Mr. Hicks  is a 19 year old male who I was asked to see in consultation at the request of  Referred Herbert for   Chief Complaint   Patient presents with "     Consult     Constipation       ASSESSMENT/PLAN:  (K59.00) Constipation, unspecified constipation type  (primary encounter diagnosis)  (D36.9) Tubular adenoma  Comment:    Constipation currently well-managed on Linzess (moving bowels daily, large amount, [place] 4-6 without straining). Will continue with meds with action plan as outlined below.    Large tubular adenoma (>10 mm, pedunculated) removed in 2019 --> due for surveillance in 2022.   Plan:   - continue with Linzess 290 mcg daily  - ok for prn docusate or milk of magnesia if 2-3+ days of no BM and increased abdominal symptoms  - if no response to above measures please contact GI clinic  - continue efforts to increase dietary fiber  - due for repeat colonoscopy in three years  - follow-up in GI clinic in 3-4 months with Dr. Yuen       Thank you for this consultation.  It was a pleasure to participate in the care of this patient; please contact us with any further questions.  A total of 41 video face-to-face minutes was spent with this patient, >50% of which was counseling regarding the above delineated issues.    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 Mr. Hicks is joined in his room by Cruz from his group home and via phone by his father Eric Hicks who both provide additional history.     They share that Dr. Espinoza is his PCP and that he sees Rhina Schuler CNP at [facility] for oncology who they view as a point person for many aspects of his care. He was followed by Dr. Wei of our pediatric GI clinic prior to this visit.  "    Currently Geo is on only Linzess 290 mcg daily. He had been on docusate and senna but these were discontinued earlier this summer due to concern for their contribution to abdominal pain/cramping (abd pain in July seems to have resolved and Cruz reports Geo has not mentioned issues with discomfort in the last month). He had been on MiraLax in the past as well; unclear when this was stopped but is not on current med list. He does have standing orders for MOM or docusate as prn if 3 days with no BM. Cruz shares that if these measures are not working, they will reach out to Geo's care providers for additional recommendations, though this has not been needed for Geo as of late. At this time Geo is moving his bowels 1-2 times daily on most days. BMs are described as \"very large\" and consistency ranges from loose to formed but soft. More recently his care team has been encouraging more dietary fiber intake and Geo is also on Culturelle.     In discussion today, Geo shares that his bowel pattern is \"ok\" but he is focused on his concern that this is a longterm problem and wondering how we address this. It seems he is hopeful constipation with jey without continued need for medications. His father, Eric, shares that Geo has much concern over getting \"backed up\" and that Geo feels that constipation drives his difficulty with walking and inhibits his physical mobility. There have been times when clinically (and based on AXR) Geo has not been markedly constipated but he remains very preoccupied that he is full of stool. Geo confirms this is a worry of his. Eric also shares that Geo had an EGD and colonoscopy with polypectomy; Geo does not believe the procedures were done or that a polyp was removed.       PROBLEM LIST  Patient Active Problem List    Diagnosis Date Noted     Tubular adenoma 06/05/2020     Priority: Medium     Constipation 09/26/2019     Priority: Medium     " Depression 02/14/2019     Priority: Medium     Serum albumin decreased 11/07/2018     Priority: Medium     Closed compression fracture of thoracic vertebra with routine healing, subsequent encounter 11/07/2018     Priority: Medium     Chronic constipation 10/17/2018     Priority: Medium     Constipation by delayed colonic transit 07/12/2018     Priority: Medium     Slow transit constipation 07/12/2018     Priority: Medium     Neoplasm of posterior cranial fossa (H) 10/04/2017     Priority: Medium     Elevated TSH 09/30/2017     Priority: Medium     Status post chemotherapy 09/30/2017     Priority: Medium     Body temperature low 09/20/2017     Priority: Medium     Current chronic use of systemic steroids 09/20/2017     Priority: Medium     Hypernatremia 03/15/2017     Priority: Medium     Health Care Home 12/26/2016     Priority: Medium     CANDELARIO (obstructive sleep apnea) 10/06/2016     Priority: Medium     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     Priority: Medium     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     Priority: Medium     S/P craniotomy 01/15/2016     Priority: Medium     S/P biopsy 01/15/2016     Priority: Medium     Post-operative state 01/14/2016     Priority: Medium     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     Priority: Medium     Admission for antineoplastic chemotherapy 06/26/2015     Priority: Medium     Hemorrhagic stroke (H) 06/04/2015     Priority: Medium     Intracranial hemorrhage (H) 05/26/2015     Priority: Medium     Dyspepsia 04/15/2014     Priority: Medium     Elevated serum creatinine 03/19/2014     Priority: Medium     Closed fracture at the growth plate of right distal fibula  02/27/2014     Priority: Medium     GERD (gastroesophageal reflux disease) 04/03/2009     Priority: Medium       PERTINENT PAST MEDICAL HISTORY:  Past Medical History:   Diagnosis Date     Cranial nerve dysfunction      Dyspepsia      Ependymoma (H)      Gastro-oesophageal reflux disease      Hearing loss      Intracranial  hemorrhage (H)      Migraine      Pilonidal cyst     7-2015     Reduced vision      Refractory obstruction of nasal airway     2nd to nasal valve prolapse     Sleep apnea      Strabismus     gaze palsy        PREVIOUS SURGERIES:  Past Surgical History:   Procedure Laterality Date     COLONOSCOPY N/A 9/27/2019    Procedure: Colonoscopy With Biopsies and Polypectomy;  Surgeon: Aniya Wei MD;  Location: [facility]     ESOPHAGOSCOPY, GASTROSCOPY, DUODENOSCOPY (EGD), COMBINED N/A 9/27/2019    Procedure: Upper Endoscopy (EGD) With Biopsies;  Surgeon: Aniya Wei MD;  [facility]: [facility]     GRAFT CARTILAGE FROM POSTERIOR AURICLE Left 10/6/2016    Procedure: GRAFT CARTILAGE FROM POSTERIOR AURICLE;  Surgeon: Tyler Richards MD;  [facility]     INCISION AND DRAINAGE PERINEAL, COMBINED Bilateral 7/18/2015    Procedure: COMBINED INCISION AND DRAINAGE PERINEAL;  Surgeon: Dequan Timmons MD;  [facility]     OPTICAL TRACKING SYSTEM CRANIOTOMY, EXCISE TUMOR, COMBINED N/A 4/13/2015    Procedure: COMBINED OPTICAL TRACKING SYSTEM CRANIOTOMY, EXCISE TUMOR;  Surgeon: Francis Velazquez MD;  [facility]     OPTICAL TRACKING SYSTEM CRANIOTOMY, EXCISE TUMOR, COMBINED N/A 4/16/2015    Procedure: COMBINED OPTICAL TRACKING SYSTEM CRANIOTOMY, EXCISE TUMOR;  Surgeon: Francis Velazquez MD;  [facility]     OPTICAL TRACKING SYSTEM CRANIOTOMY, EXCISE TUMOR, COMBINED Bilateral 5/28/2015    Procedure: COMBINED OPTICAL TRACKING SYSTEM CRANIOTOMY, EXCISE TUMOR;  Surgeon: Francis Velazquez MD;  [facility]     OPTICAL TRACKING SYSTEM CRANIOTOMY, EXCISE TUMOR, COMBINED Bilateral 1/14/2016    Procedure: COMBINED OPTICAL TRACKING SYSTEM CRANIOTOMY, EXCISE TUMOR;  Surgeon: Francsi Velazquez MD;  [facility]     OPTICAL TRACKING SYSTEM VENTRICULOSTOMY  4/16/2015    Procedure: OPTICAL TRACKING SYSTEM VENTRICULOSTOMY;  Surgeon: Francis Velazquez MD;   [facility]     REMOVE PORT VASCULAR ACCESS N/A 10/6/2016    Procedure: REMOVE PORT VASCULAR ACCESS;  Surgeon: Bruno Perea MD;  [facility]     RHINOPLASTY N/A 10/6/2016    Procedure: RHINOPLASTY;  Surgeon: Tyler Richards MD;  [facility]     VASCULAR SURGERY  5-2015    single lumen power port       ALLERGIES:   Allergies   Allergen Reactions     Blood Transfusion Related (Informational Only) Swelling     Periorbital swelling post platelet transfusion     No Known Drug Allergies        PERTINENT MEDICATIONS:  Current Outpatient Medications   Medication     dexamethasone (DECADRON) 0.5 MG tablet     fexofenadine (ALLEGRA) 180 MG tablet     Lactobacillus-Inulin ([facility]) CAPS     lamoTRIgine (LAMICTAL) 200 MG tablet     linaclotide (LINZESS) 290 MCG capsule     OLANZapine (ZYPREXA) 20 MG tablet     omeprazole (PRILOSEC) 20 MG DR capsule     order for DME     order for DME     polyethylene glycol (MIRALAX) 17 GM/Dose powder     potassium phosphate, monobasic, (K-PHOS) 500 MG tablet     sennosides (SENOKOT) 8.6 MG tablet     sertraline (ZOLOFT) 100 MG tablet     study - entinostat, IDS# [id number], 1 mg tablet     study - entinostat, IDS# [id number], 5 mg tablet     sulfamethoxazole-trimethoprim (BACTRIM) 400-80 MG tablet     traZODone (DESYREL) 50 MG tablet     vitamin D3 (CHOLECALCIFEROL) 2000 units (50 mcg) tablet     vitamin E (TOCOPHEROL) 400 units (360 mg) capsule      MG capsule     mupirocin (BACTROBAN) 2 % external ointment     No current facility-administered medications for this visit.        SOCIAL HISTORY:  Social History     Socioeconomic History     Marital status: Single     Spouse name: Not on file     Number of children: Not on file     Years of education: Not on file     Highest education level: Not on file   Occupational History     Not on file   Social Needs     Financial resource strain: Not on file     Food insecurity     Worry: Not on file     Inability:  Not on file     Transportation needs     Medical: Not on file     Non-medical: Not on file   Tobacco Use     Smoking status: Never Smoker     Smokeless tobacco: Never Used   Substance and Sexual Activity     Alcohol use: No     Drug use: No     Sexual activity: Never   Lifestyle     Physical activity     Days per week: Not on file     Minutes per session: Not on file     Stress: Not on file   Relationships     Social connections     Talks on phone: Not on file     Gets together: Not on file     Attends Restorationism service: Not on file     Active member of club or organization: Not on file     Attends meetings of clubs or organizations: Not on file     Relationship status: Not on file     Intimate partner violence     Fear of current or ex partner: Not on file     Emotionally abused: Not on file     Physically abused: Not on file     Forced sexual activity: Not on file   Other Topics Concern     Not on file   Social History Narrative    Grown up in [place], [place].  Parents are , and he shares time between his mother's and father's homes. Both parents are remarried.  Dad is a , and mom does  for a testing company.  Siblings include two full brothers (older brother Benitez who is a senior in college and younger brother Gamaliel), a step-brother, and a step-sister. Geo graduated from high school in Spring 2018.  Initially considered attending [employer] in Fall 2019, but reportedly lost interest due to the amount of time it would take him to complete courses and receive a degree.  Does today endorse continued interest in pursuing an advanced course of study at some point, specifically stating he is interested in Electrical Engineering.  No access to guns or weapons enjoys playing video games.  Guns are currently locked in the house.       FAMILY HISTORY:  Family History   Problem Relation Age of Onset     Circulatory Father         PE/DVT     Hypothyroidism Father 30      Diabetes Maternal Grandmother      Diabetes Paternal Grandmother      Diabetes Paternal Grandfather      RACHELASHAUNNA. Paternal Grandfather      Hypertension Maternal Grandfather      Thyroid Disease Paternal Aunt         unknown whether hypo or hyper     Mental Illness No family hx of      Paternal grandfather: polyps in his 50s    PHYSICAL EXAMINATION:  Vitals Ht 1.829 m (6')   BMI 26.34 kg/m     Wt   Wt Readings from Last 2 Encounters:   02/25/20 88.1 kg (194 lb 3.6 oz)   01/25/20 89.3 kg (196 lb 14.4 oz)    Gen: Pt sitting up in NAD, interactive and cooperative on exam  Eyes: sclerae anicteric, no injection  ENT:  MMM  Resp: Breathing comfortably, speaking in full sentences  Skin: No jaundice  Neuro: alert,  answers questions appropriately      PERTINENT STUDIES:    External Order Results on 08/18/2020   Component Date Value Ref Range Status     WBC 08/11/2020 4.5  10^9/L Final     RBC Count 08/11/2020 3.91  10^12/L Final     Hemoglobin 08/11/2020 10* 13.3 - 17.7 g/dL Final     Hematocrit 08/11/2020 32.7  % Final     MCV 08/11/2020 83.6  fl Final     MCH 08/11/2020 25.6  pg Final     MCHC 08/11/2020 30.6  g/dL Final     RDW 08/11/2020 14.9  % Final     Platelet Count 08/11/2020 138  10^9/L Final     Differential 08/11/2020 See Scanned Document   Final     Sodium 08/11/2020 141  mmol/L Final     Potassium 08/11/2020 4.3  mmol/L Final     Chloride 08/11/2020 105  mmol/L Final     Carbon Dioxide 08/11/2020 32  mmol/L Final     Anion Gap 08/11/2020 4  mmol/L Final     Glucose 08/11/2020 76  70 - 99 mg/dL Final     Urea Nitrogen 08/11/2020 14  mg/dL Final     Creatinine 08/11/2020 1.06  mg/dL Final     Calcium 08/11/2020 8.2  mg/dL Final

## 2020-08-19 NOTE — LETTER
"8/19/2020     RE: Geo Hicks  [id number] [facility] [address]    Dear Colleague,    Thank you for referring your patient, Geo Hicks, to the [facility]. Please see a copy of my visit note below.    Geo Hicks is a 19 year old male who is being evaluated via a billable video visit.      The patient has been notified of following:     \"This video visit will be conducted via a call between you and your physician/provider. We have found that certain health care needs can be provided without the need for an in-person physical exam.  This service lets us provide the care you need with a video conversation.  If a prescription is necessary we can send it directly to your pharmacy.  If lab work is needed we can place an order for that and you can then stop by our lab to have the test done at a later time.    Video visits are billed at different rates depending on your insurance coverage.  Please reach out to your insurance provider with any questions.    If during the course of the call the physician/provider feels a video visit is not appropriate, you will not be charged for this service.\"    Patient has given verbal consent for Video visit? Yes  How would you like to obtain your AVS? MyChart  If you are dropped from the video visit, the video invite should be resent to: Text to cell phone: [phone], Patient and Staff-Lance will be on pt's iphone.  Will anyone else be joining your video visit? Yes: Father-Eric on computer [email or web address]        During this virtual visit the patient is located in [place], patient verifies this as the location during the entirety of this visit.       Video-Visit Details    Type of service:  Video Visit    Video Start Time: 12:03 pm  Video End Time: 12:44 pm    Originating Location (pt. Location): Home    Distant Location (provider location):  [facility]     Platform used for Video Visit: " Oscar    ------------------------  GI CLINIC VISIT    CC/REFERRING MD:  Referred Self  REASON FOR CONSULTATION:   Mr. Hicks  is a 19 year old male who I was asked to see in consultation at the request of . Referred Self for   Chief Complaint   Patient presents with     Consult     Constipation       ASSESSMENT/PLAN:  (K59.00) Constipation, unspecified constipation type  (primary encounter diagnosis)  (D36.9) Tubular adenoma  Comment:    Constipation currently well-managed on Linzess (moving bowels daily, large amount, [place] 4-6 without straining). Will continue with meds with action plan as outlined below.    Large tubular adenoma (>10 mm, pedunculated) removed in 2019 --> due for surveillance in 2022.   Plan:   - continue with Linzess 290 mcg daily  - ok for prn docusate or milk of magnesia if 2-3+ days of no BM and increased abdominal symptoms  - if no response to above measures please contact GI clinic  - continue efforts to increase dietary fiber  - due for repeat colonoscopy in three years  - follow-up in GI clinic in 3-4 months with Dr. Yuen       Thank you for this consultation.  It was a pleasure to participate in the care of this patient; please contact us with any further questions.  A total of 41 video face-to-face minutes was spent with this patient, >50% of which was counseling regarding the above delineated issues.    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 Mr. Hicks is joined in his room by Cruz from his group home and via phone by his father Eric Hicks who both provide additional  "history.     They share that Dr. Espinoza is his PCP and that he sees Rhina Schuler CNP at [facility] for oncology who they view as a point person for many aspects of his care. He was followed by Dr. Wei of our pediatric GI clinic prior to this visit.     Currently Geo is on only Linzess 290 mcg daily. He had been on docusate and senna but these were discontinued earlier this summer due to concern for their contribution to abdominal pain/cramping (abd pain in July seems to have resolved and Cruz reports Geo has not mentioned issues with discomfort in the last month). He had been on MiraLax in the past as well; unclear when this was stopped but is not on current med list. He does have standing orders for MOM or docusate as prn if 3 days with no BM. Cruz shares that if these measures are not working, they will reach out to Geo's care providers for additional recommendations, though this has not been needed for Geo as of late. At this time Geo is moving his bowels 1-2 times daily on most days. BMs are described as \"very large\" and consistency ranges from loose to formed but soft. More recently his care team has been encouraging more dietary fiber intake and Geo is also on Culturelle.     In discussion today, Geo shares that his bowel pattern is \"ok\" but he is focused on his concern that this is a longterm problem and wondering how we address this. It seems he is hopeful constipation with jey without continued need for medications. His father, Eric, shares that Geo has much concern over getting \"backed up\" and that Geo feels that constipation drives his difficulty with walking and inhibits his physical mobility. There have been times when clinically (and based on AXR) Geo has not been markedly constipated but he remains very preoccupied that he is full of stool. Geo confirms this is a worry of his. Eric also shares that Geo had an EGD and colonoscopy with polypectomy; " Geo does not believe the procedures were done or that a polyp was removed.       PROBLEM LIST  Patient Active Problem List    Diagnosis Date Noted     Tubular adenoma 06/05/2020     Priority: Medium     Constipation 09/26/2019     Priority: Medium     Depression 02/14/2019     Priority: Medium     Serum albumin decreased 11/07/2018     Priority: Medium     Closed compression fracture of thoracic vertebra with routine healing, subsequent encounter 11/07/2018     Priority: Medium     Chronic constipation 10/17/2018     Priority: Medium     Constipation by delayed colonic transit 07/12/2018     Priority: Medium     Slow transit constipation 07/12/2018     Priority: Medium     Neoplasm of posterior cranial fossa (H) 10/04/2017     Priority: Medium     Elevated TSH 09/30/2017     Priority: Medium     Status post chemotherapy 09/30/2017     Priority: Medium     Body temperature low 09/20/2017     Priority: Medium     Current chronic use of systemic steroids 09/20/2017     Priority: Medium     Hypernatremia 03/15/2017     Priority: Medium     Health Care Home 12/26/2016     Priority: Medium     CANDELARIO (obstructive sleep apnea) 10/06/2016     Priority: Medium     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     Priority: Medium     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     Priority: Medium     S/P craniotomy 01/15/2016     Priority: Medium     S/P biopsy 01/15/2016     Priority: Medium     Post-operative state 01/14/2016     Priority: Medium     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     Priority: Medium     Admission for antineoplastic chemotherapy 06/26/2015     Priority: Medium     Hemorrhagic stroke (H) 06/04/2015     Priority: Medium     Intracranial hemorrhage (H) 05/26/2015     Priority: Medium     Dyspepsia 04/15/2014     Priority: Medium     Elevated serum creatinine 03/19/2014     Priority: Medium     Closed fracture at the growth plate of right distal fibula  02/27/2014     Priority: Medium     GERD (gastroesophageal reflux  disease) 04/03/2009     Priority: Medium       PERTINENT PAST MEDICAL HISTORY:  Past Medical History:   Diagnosis Date     Cranial nerve dysfunction      Dyspepsia      Ependymoma (H)      Gastro-oesophageal reflux disease      Hearing loss      Intracranial hemorrhage (H)      Migraine      Pilonidal cyst     7-2015     Reduced vision      Refractory obstruction of nasal airway     2nd to nasal valve prolapse     Sleep apnea      Strabismus     gaze palsy        PREVIOUS SURGERIES:  Past Surgical History:   Procedure Laterality Date     COLONOSCOPY N/A 9/27/2019    Procedure: Colonoscopy With Biopsies and Polypectomy;  Surgeon: Aniya Wei MD;  [facility]: [facility]     ESOPHAGOSCOPY, GASTROSCOPY, DUODENOSCOPY (EGD), COMBINED N/A 9/27/2019    Procedure: Upper Endoscopy (EGD) With Biopsies;  Surgeon: Aniya Wei MD;  [facility]     GRAFT CARTILAGE FROM POSTERIOR AURICLE Left 10/6/2016    Procedure: GRAFT CARTILAGE FROM POSTERIOR AURICLE;  Surgeon: Tyler Richards MD;  [facility]     INCISION AND DRAINAGE PERINEAL, COMBINED Bilateral 7/18/2015    Procedure: COMBINED INCISION AND DRAINAGE PERINEAL;  Surgeon: Dequan Timmons MD;  [facility]     OPTICAL TRACKING SYSTEM CRANIOTOMY, EXCISE TUMOR, COMBINED N/A 4/13/2015    Procedure: COMBINED OPTICAL TRACKING SYSTEM CRANIOTOMY, EXCISE TUMOR;  Surgeon: Francis Velazquez MD;  [facility]     OPTICAL TRACKING SYSTEM CRANIOTOMY, EXCISE TUMOR, COMBINED N/A 4/16/2015    Procedure: COMBINED OPTICAL TRACKING SYSTEM CRANIOTOMY, EXCISE TUMOR;  Surgeon: Francis Velazquez MD;  [facility]     OPTICAL TRACKING SYSTEM CRANIOTOMY, EXCISE TUMOR, COMBINED Bilateral 5/28/2015    Procedure: COMBINED OPTICAL TRACKING SYSTEM CRANIOTOMY, EXCISE TUMOR;  Surgeon: Francis Velazquez MD;  [facility]     OPTICAL TRACKING SYSTEM CRANIOTOMY, EXCISE TUMOR, COMBINED Bilateral 1/14/2016    Procedure: COMBINED  OPTICAL TRACKING SYSTEM CRANIOTOMY, EXCISE TUMOR;  Surgeon: Francis Velazquez MD;  [facility]     OPTICAL TRACKING SYSTEM VENTRICULOSTOMY  4/16/2015    Procedure: OPTICAL TRACKING SYSTEM VENTRICULOSTOMY;  Surgeon: Francis Velazquez MD;  [facility]     REMOVE PORT VASCULAR ACCESS N/A 10/6/2016    Procedure: REMOVE PORT VASCULAR ACCESS;  Surgeon: Bruno Perea MD;  [facility]     RHINOPLASTY N/A 10/6/2016    Procedure: RHINOPLASTY;  Surgeon: Tyler Richards MD;  [facility]     VASCULAR SURGERY  5-2015    single lumen power port       ALLERGIES:   Allergies   Allergen Reactions     Blood Transfusion Related (Informational Only) Swelling     Periorbital swelling post platelet transfusion     No Known Drug Allergies        PERTINENT MEDICATIONS:  Current Outpatient Medications   Medication     dexamethasone (DECADRON) 0.5 MG tablet     fexofenadine (ALLEGRA) 180 MG tablet     Lactobacillus-Inulin ([facility] HEALTH & WELLNESS) CAPS     lamoTRIgine (LAMICTAL) 200 MG tablet     linaclotide (LINZESS) 290 MCG capsule     OLANZapine (ZYPREXA) 20 MG tablet     omeprazole (PRILOSEC) 20 MG DR capsule     order for DME     order for DME     polyethylene glycol (MIRALAX) 17 GM/Dose powder     potassium phosphate, monobasic, (K-PHOS) 500 MG tablet     sennosides (SENOKOT) 8.6 MG tablet     sertraline (ZOLOFT) 100 MG tablet     study - entinostat, IDS# [id number], 1 mg tablet     study - entinostat, IDS# [id number], 5 mg tablet     sulfamethoxazole-trimethoprim (BACTRIM) 400-80 MG tablet     traZODone (DESYREL) 50 MG tablet     vitamin D3 (CHOLECALCIFEROL) 2000 units (50 mcg) tablet     vitamin E (TOCOPHEROL) 400 units (360 mg) capsule      MG capsule     mupirocin (BACTROBAN) 2 % external ointment     No current facility-administered medications for this visit.        SOCIAL HISTORY:  Social History     Socioeconomic History     Marital status: Single     Spouse name: Not on file     Number  of children: Not on file     Years of education: Not on file     Highest education level: Not on file   Occupational History     Not on file   Social Needs     Financial resource strain: Not on file     Food insecurity     Worry: Not on file     Inability: Not on file     Transportation needs     Medical: Not on file     Non-medical: Not on file   Tobacco Use     Smoking status: Never Smoker     Smokeless tobacco: Never Used   Substance and Sexual Activity     Alcohol use: No     Drug use: No     Sexual activity: Never   Lifestyle     Physical activity     Days per week: Not on file     Minutes per session: Not on file     Stress: Not on file   Relationships     Social connections     Talks on phone: Not on file     Gets together: Not on file     Attends [employer] service: Not on file     Active member of club or organization: Not on file     Attends meetings of clubs or organizations: Not on file     Relationship status: Not on file     Intimate partner violence     Fear of current or ex partner: Not on file     Emotionally abused: Not on file     Physically abused: Not on file     Forced sexual activity: Not on file   Other Topics Concern     Not on file   Social History Narrative    Grown up in [place], [place].  Parents are , and he shares time between his mother's and father's homes. Both parents are remarried.  Dad is a , and mom does  for a testing company.  Siblings include two full brothers (older brother Benitez who is a senior in college and younger brother Gamaliel), a step-brother, and a step-sister. Geo graduated from high school in Spring 2018.  Initially considered attending [employer] in Fall 2019, but reportedly lost interest due to the amount of time it would take him to complete courses and receive a degree.  Does today endorse continued interest in pursuing an advanced course of study at some point, specifically stating he is interested in  Electrical Engineering.  No access to guns or weapons enjoys playing video games.  Guns are currently locked in the house.       FAMILY HISTORY:  Family History   Problem Relation Age of Onset     Circulatory Father         PE/DVT     Hypothyroidism Father 30     Diabetes Maternal Grandmother      Diabetes Paternal Grandmother      Diabetes Paternal Grandfather      C.A.D. Paternal Grandfather      Hypertension Maternal Grandfather      Thyroid Disease Paternal Aunt         unknown whether hypo or hyper     Mental Illness No family hx of      Paternal grandfather: polyps in his 50s    PHYSICAL EXAMINATION:  Vitals Ht 1.829 m (6')   BMI 26.34 kg/m     Wt   Wt Readings from Last 2 Encounters:   02/25/20 88.1 kg (194 lb 3.6 oz)   01/25/20 89.3 kg (196 lb 14.4 oz)    Gen: Pt sitting up in NAD, interactive and cooperative on exam  Eyes: sclerae anicteric, no injection  ENT:  MMM  Resp: Breathing comfortably, speaking in full sentences  Skin: No jaundice  Neuro: alert,  answers questions appropriately      PERTINENT STUDIES:    External Order Results on 08/18/2020   Component Date Value Ref Range Status     WBC 08/11/2020 4.5  10^9/L Final     RBC Count 08/11/2020 3.91  10^12/L Final     Hemoglobin 08/11/2020 10* 13.3 - 17.7 g/dL Final     Hematocrit 08/11/2020 32.7  % Final     MCV 08/11/2020 83.6  fl Final     MCH 08/11/2020 25.6  pg Final     MCHC 08/11/2020 30.6  g/dL Final     RDW 08/11/2020 14.9  % Final     Platelet Count 08/11/2020 138  10^9/L Final     Differential 08/11/2020 See Scanned Document   Final     Sodium 08/11/2020 141  mmol/L Final     Potassium 08/11/2020 4.3  mmol/L Final     Chloride 08/11/2020 105  mmol/L Final     Carbon Dioxide 08/11/2020 32  mmol/L Final     Anion Gap 08/11/2020 4  mmol/L Final     Glucose 08/11/2020 76  70 - 99 mg/dL Final     Urea Nitrogen 08/11/2020 14  mg/dL Final     Creatinine 08/11/2020 1.06  mg/dL Final     Calcium 08/11/2020 8.2  mg/dL Final     Again, thank you for  allowing me to participate in the care of your patient.      Sincerely,      Sara Yuen MD

## 2020-08-19 NOTE — PROGRESS NOTES
"RYLAND received e-mail from Geo's motherChristianne requesting assistance completing the \"Part B\" portion of a [employer] application. RYLAND completed application with CNS Coordinator, Imani Means RN, who signed the application. Completed portion of the application scan/emailed to MomChristianne with Dad, Jj, copied. Original placed in patient chart should MM require original. No further needs noted. Social work will continue to assess needs and provide ongoing psychosocial support and access to resources.      GARCIA Delatorre, LICSW, OSW-C  Clinical    Pediatric Hematology Oncology   [facility]   Monday-Thursday   Phone: [phone]  Pager: [phone]    NO LETTER    "

## 2020-08-19 NOTE — NURSING NOTE
Chief Complaint   Patient presents with     Consult     Constipation       Vitals:    08/19/20 1140   Height: 1.829 m (6')       Body mass index is 26.34 kg/m .      Poornima Kellogg LPN

## 2020-08-21 NOTE — TELEPHONE ENCOUNTER
On 08/21/2020, at 1350, writer called patient at [phone] to confirm Virtual Visit. Writer unable to make contact with patient. Writer left detailed voice message for call back. [phone] left as call back number. Yissel Garzon MA

## 2020-08-25 NOTE — PATIENT INSTRUCTIONS
- continue with Linzess 290 mcg daily  - ok for prn docusate or milk of magnesia if 2-3+ days of no BM and increased abdominal symptoms  - if no response to above measures please contact GI clinic  - continue efforts to increase dietary fiber  - due for repeat colonoscopy in three years  - follow-up in GI clinic in 3-4 months with Dr. Yuen         If you have any questions, please don't hesitate to contact me through our GI RN Clinic Coordinator, Candis Grijalva, at [phone].

## 2020-08-28 NOTE — PROGRESS NOTES
"Geo Hicks is a 19 year old male who is being evaluated via a billable video visit.      The patient has been notified of following:     \"This video visit will be conducted via a call between you and your physician/provider. We have found that certain health care needs can be provided without the need for an in-person physical exam.  This service lets us provide the care you need with a video conversation.  If a prescription is necessary we can send it directly to your pharmacy.  If lab work is needed we can place an order for that and you can then stop by our lab to have the test done at a later time.    Video visits are billed at different rates depending on your insurance coverage.  Please reach out to your insurance provider with any questions.    If during the course of the call the physician/provider feels a video visit is not appropriate, you will not be charged for this service.\"    Patient has given verbal consent for Video visit? Yes  How would you like to obtain your AVS? MyChart  If you are dropped from the video visit, the video invite should be resent to: Send to e-mail at: [email or web address]  Will anyone else be joining your video visit? No        Video-Visit Details    Type of service:  Video Visit    Video Start Time: 3:50 PM  Video End Time: 4:10 PM    Originating Location (pt. 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 Fell recently, causing laceration of scalp. Seen ED. No other new problems. Living in his group care facility. No recent illnesses. Ongoing issues with " constipation.      Oncology History:    [id number]   6/26/15    Started Entinostat 1/9/17    DIAGNOSIS: EPENDYMOMA  AREAS TREATED: POST FOSSA TUMOR  DOSE: 5940 cGy   TYPE OF RADIATION GIVEN: with IMRT Tomotherapy   9/08/2015 to 10/26/15    Patient Active Problem List   Diagnosis     GERD (gastroesophageal reflux disease)     Closed fracture at the growth plate of right distal fibula      Elevated serum creatinine     Dyspepsia     Intracranial hemorrhage (H)     Hemorrhagic stroke (H)     Ependymoma (H)     Admission for antineoplastic chemotherapy     Post-operative state     S/P craniotomy     S/P biopsy     Noncomitant strabismus     Abducens neuropathy of both eyes     CANDELARIO (obstructive sleep apnea)     Health Care Home     Hypernatremia     Body temperature low     Current chronic use of systemic steroids     Elevated TSH     Status post chemotherapy     Neoplasm of posterior cranial fossa (H)     Chronic constipation     Serum albumin decreased     Closed compression fracture of thoracic vertebra with routine healing, subsequent encounter     Depression     Constipation     Constipation by delayed colonic transit     Slow transit constipation     Tubular adenoma     Current Outpatient Medications   Medication     dexamethasone (DECADRON) 0.5 MG tablet      MG capsule     fexofenadine (ALLEGRA) 180 MG tablet     Lactobacillus-Inulin ([facility] HEALTH & WELLNESS) CAPS     lamoTRIgine (LAMICTAL) 200 MG tablet     linaclotide (LINZESS) 290 MCG capsule     mupirocin (BACTROBAN) 2 % external ointment     OLANZapine (ZYPREXA) 20 MG tablet     omeprazole (PRILOSEC) 20 MG DR capsule     order for DME     order for DME     polyethylene glycol (MIRALAX) 17 GM/Dose powder     potassium phosphate, monobasic, (K-PHOS) 500 MG tablet     sennosides (SENOKOT) 8.6 MG tablet     sertraline (ZOLOFT) 100 MG tablet     study - entinostat, IDS# 5050, 1 mg tablet     study - entinostat, IDS# [id number], 5 mg tablet      sulfamethoxazole-trimethoprim (BACTRIM) 400-80 MG tablet     traZODone (DESYREL) 50 MG tablet     vitamin D3 (CHOLECALCIFEROL) 2000 units (50 mcg) tablet     vitamin E (TOCOPHEROL) 400 units (360 mg) capsule     No current facility-administered medications for this visit.         Allergies   Allergen Reactions     Blood Transfusion Related (Informational Only) Swelling     Periorbital swelling post platelet transfusion     No Known Drug Allergies        Review Of Systems  Skin: positive for striae, recent scalp laceration  Eyes: positive for oculoplegia  Ears/Nose/Throat: negative  Respiratory: No shortness of breath, dyspnea on exertion, cough, or hemoptysis  Cardiovascular: negative  Gastrointestinal: constipation  Genitourinary: negative  Musculoskeletal: negative  Neurologic: positive for  local weakness, speech problems, incoordination and behavior changes  Psychiatric: negative, anxiety, agitation and perseveration re: constipation  Hematologic/Lymphatic/Immunologic: negative  Endocrine: negative    Impression:  Ependymoma in 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

## 2020-09-01 NOTE — TELEPHONE ENCOUNTER
Reason for Call:  Form, our goal is to have forms completed with 72 hours, however, some forms may require a visit or additional information.    Type of letter, form or note:  orders    Who is the form from?: Home care    Where did the form come from: form was faxed in    What clinic location was the form placed at?: [facility]     Where the form was placed: Dr Espinoza Box/Folder    What number is listed as a contact on the form?: none       Additional comments: please fax and sign. Date to [phone]    Call taken on 9/1/2020 at 10:16 AM by Raiza Myrick

## 2020-09-01 NOTE — TELEPHONE ENCOUNTER
Reason for Call:  Form, our goal is to have forms completed with 72 hours, however, some forms may require a visit or additional information.    Type of letter, form or note:  orders    Who is the form from?: Home care    Where did the form come from: form was faxed in    What clinic location was the form placed at?: [facility]     Where the form was placed: DR Espinoza  Box/Folder    What number is listed as a contact on the form?: none       Additional comments: please sign/date and fax to [phone]    Call taken on 9/1/2020 at 7:38 AM by Raiza Myrick

## 2020-09-02 NOTE — TELEPHONE ENCOUNTER
Reason for Call:  Form, our goal is to have forms completed with 72 hours, however, some forms may require a visit or additional information.    Type of letter, form or note:  orders    Who is the form from?: Home care     Where did the form come from: form was faxed in    What clinic location was the form placed at?: [facility]     Where the form was placed: DR Espinoza  Box/Folder    What number is listed as a contact on the form?: none       Additional comments: please sign/date and fax to [phone]    Call taken on 9/2/2020 at 11:39 AM by Raiza Myrick

## 2020-09-03 NOTE — TELEPHONE ENCOUNTER
4 faxed pages was received from [employer] requesting current list of ICD 10 Codes. Completed, signed forms were faxed to [employer] at [phone]. The original copies were sent to scanning.Bertha Montoya LPN

## 2020-09-09 NOTE — TELEPHONE ENCOUNTER
Reason for Call:  Form, our goal is to have forms completed with 72 hours, however, some forms may require a visit or additional information.    Type of letter, form or note:  orders    Who is the form from?: Home care    Where did the form come from: form was faxed in    What clinic location was the form placed at?: [facility]     Where the form was placed: Dr Espinoza  Box/Folder    What number is listed as a contact on the form?: [phone]       Additional comments: please sign.date and fax to [phone]    Call taken on 9/9/2020 at 10:32 AM by Raiza Myrick

## 2020-09-11 NOTE — TELEPHONE ENCOUNTER
[facility] Call Center    Phone Message    May a detailed message be left on voicemail: yes     Reason for Call: Other:   Madalyn, staff from [facility], called to inquire about increasing the doses of the patient's medications. Madalyn noted the patient has been having continuous aggressive behavior for the past 3 hours and she feels he may have developed a tolerance to the medications. Behavior includes hitting objects in his room (table, book case), shouting, throwing objects, and broke his glasses.    If any changes are made, Madalyn confirmed the preferred pharmacy is [facility] [address] [place], [place] - [address] [address] SRosa Elena Dawsonie can be reached at [phone].      Action Taken: Message routed to:  Other: Nursing pool    Travel Screening: Not Applicable

## 2020-09-11 NOTE — TELEPHONE ENCOUNTER
Spoke with patient's Dad. He is in agreement with having PRN medication. Informed him that the medication is olanzapine, and Geo can take 5 mg when agitated, with another 5 mg after 60 minutes if he continues to be dysregulated. He approved and requested that this be sent to the pharmacy urgently.    He also wanted to update that Geo was calm for a bit but is now agitated and aggressive again. The  have asked a crisis center to come and try to de-escalate him. He is hoping that Geo will not need to be taken to the ED, but he did give them permission to do so if needed.     Routed to Dr. Louis for FYI.

## 2020-09-11 NOTE — TELEPHONE ENCOUNTER
Sounds like this is an acute reaction to something rather than a progression/worsening of his symptoms especially because he was able to calm self down for lunch. My primary recommendation would be if the group home cannot control his behaviors and keep him/staff safe that he should go to the ED. If it seems like he is safe there but is just very agitated and aggressive could consider a 5-10mg prn daily of olanzapine. As he is currently on this medication and should be able to help. However, this would need to be okayed by his guardian (father and/or mother).   Thanks!  Jj

## 2020-09-11 NOTE — TELEPHONE ENCOUNTER
"Called Madalyn to get more information. She said that Geo has been mostly non-stop aggressive for the past 3 hours. He did have a brief episode of calm when they had lunch, but he went back to aggressive behaviors listed in note below when he was done. She thinks this might have been triggered by his having to get MRIs yesterday because he hates medical appointments in general. She said that he talked to his father and told him that he is mad because \"my life is shitty.\"  She is not sure what they can do right now to get him calmed down. Recommended taking him to the ED since the aggression is prolonged and he might be having some bad reaction to the contrast used. Also explained that even if his medication doses are adjusted, this will not solve the immediate problem of verbal and physical aggression.  She will talk to her . Writer will relay information to Dr. Louis for recommendations.  "

## 2020-09-11 NOTE — TELEPHONE ENCOUNTER
Called Madalyn to relay Dr. Louis's recommendations. She said that Geo appears to have calmed down for now, and they left a message for his parents regarding the potential need to take him to the ED or call 911 if he starts escalating again during the evening shift. She is in agreement with having PRN medication.      Called patient's father and left  requesting a return call to discuss adding olanzapine 5-10 mg daily PRN for agitation. Provided clinic number.

## 2020-09-15 NOTE — LETTER
"9/15/2020      RE: Geo Hicks  [address] [address] [facility] [id number]       Geo Hicks is a 19 year old male who is being evaluated via a billable video visit.      The patient has been notified of following:     \"This video visit will be conducted via a call between you and your physician/provider. We have found that certain health care needs can be provided without the need for an in-person physical exam.  This service lets us provide the care you need with a video conversation.  If a prescription is necessary we can send it directly to your pharmacy.  If lab work is needed we can place an order for that and you can then stop by our lab to have the test done at a later time.    Video visits are billed at different rates depending on your insurance coverage.  Please reach out to your insurance provider with any questions.    If during the course of the call the physician/provider feels a video visit is not appropriate, you will not be charged for this service.\"    Patient has given verbal consent for Video visit? Yes  How would you like to obtain your AVS? MyChart  If you are dropped from the video visit, the video invite should be resent to: Send to phone at   [phone]  Will anyone else be joining your video visit? Yes e-mail at: [email or web address]        Video-Visit Details    Type of service:  Video Visit    Video Start Time: 1:55 PM  Video End Time: 3:03 PM    Originating Location (pt. 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  They look excellent.  His scalp laceration is healing " beautifully. He notes he pulled some skin off of his finger and it got very swollen and red.  It is improving but he wanted me to know.  He is living in his group care facility. No recent illnesses. Ongoing issues with constipation.  He is discussing that he doesn't have incentive for getting better when providers are with holding cure.     Imaging:    Postsurgical changes of suboccipital craniotomy and cerebellum resection cavity similar to prior. There is a 2.0 x 1.9 x 2.5 cm  homogeneously enhancing mass within the fourth ventricle which previously measured 2.0 x 1.9 x 2.5 cm when measured similarly. This  masses heterogeneous on T1 and T2 imaging. No significant restricted diffusion. There is mild to moderate perilesional edema in the cerebellum which appears unchanged from prior. Susceptibility weighted imaging artifact within the mass similar to prior. There is an  additional adjacent cystic focus immediately right lateral of the above-described in the right cerebellar hemisphere which measures 1.3 x 1.2 cm, previously 1.4 x 1.3 cm. Mild edema extends into the posterior aspect of the brainstem similar to prior.     No midline shift. No extra-axial fluid collection. No acute infarction on diffusion-weighted imaging. Major intracranial vascular flow voids  are patent. Ventricular sizes are unchanged.     No abnormality of the skull marrow. Visualized paranasal sinuses are clear. Mastoid air cells are clear. Orbits are grossly unremarkable.                                                                      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 The perilesional cystic focus is also unchanged to minimally decreased in size.      Labs:    Drawn today through Homecare  White count 5,000  Hgb 11.9  Platelets 131  ANC 2.3       Oncology History:    [id number]   6/26/15    Started Entinostat 1/9/17    DIAGNOSIS: EPENDYMOMA  AREAS TREATED:  POST FOSSA TUMOR  DOSE: 5940 cGy   TYPE OF RADIATION GIVEN: with IMRT Tomotherapy   9/08/2015 to 10/26/15    Patient Active Problem List   Diagnosis     GERD (gastroesophageal reflux disease)     Closed fracture at the growth plate of right distal fibula      Elevated serum creatinine     Dyspepsia     Intracranial hemorrhage (H)     Hemorrhagic stroke (H)     Ependymoma (H)     Admission for antineoplastic chemotherapy     Post-operative state     S/P craniotomy     S/P biopsy     Noncomitant strabismus     Abducens neuropathy of both eyes     CANDELARIO (obstructive sleep apnea)     Health Care Home     Hypernatremia     Body temperature low     Current chronic use of systemic steroids     Elevated TSH     Status post chemotherapy     Neoplasm of posterior cranial fossa (H)     Chronic constipation     Serum albumin decreased     Closed compression fracture of thoracic vertebra with routine healing, subsequent encounter     Depression     Constipation     Constipation by delayed colonic transit     Slow transit constipation     Tubular adenoma     Current Outpatient Medications   Medication     dexamethasone (DECADRON) 0.5 MG tablet      MG capsule     fexofenadine (ALLEGRA) 180 MG tablet     Lactobacillus-Inulin ([facility]ALTH & WELLNESS) CAPS     lamoTRIgine (LAMICTAL) 200 MG tablet     linaclotide (LINZESS) 290 MCG capsule     mupirocin (BACTROBAN) 2 % external ointment     OLANZapine (ZYPREXA) 20 MG tablet     OLANZapine (ZYPREXA) 5 MG tablet     omeprazole (PRILOSEC) 20 MG DR capsule     order for DME     order for DME     polyethylene glycol (MIRALAX) 17 GM/Dose powder     potassium phosphate, monobasic, (K-PHOS) 500 MG tablet     sennosides (SENOKOT) 8.6 MG tablet     sertraline (ZOLOFT) 100 MG tablet     study - entinostat, IDS# 5050, 1 mg tablet     study - entinostat, IDS# [id number], 5 mg tablet     sulfamethoxazole-trimethoprim (BACTRIM) 400-80 MG tablet     traZODone (DESYREL) 50 MG tablet     vitamin  D3 (CHOLECALCIFEROL) 2000 units (50 mcg) tablet     vitamin E (TOCOPHEROL) 400 units (360 mg) capsule     No current facility-administered medications for this visit.         Allergies   Allergen Reactions     Blood Transfusion Related (Informational Only) Swelling     Periorbital swelling post platelet transfusion     No Known Drug Allergies        Review Of Systems  Skin: positive for striae, scalp laceration.  Stitches were removed 7 days after they were put in.  Healing well.   Eyes: positive for oculoplegia  Ears/Nose/Throat: negative  Respiratory: No shortness of breath, dyspnea on exertion, cough, or hemoptysis  Cardiovascular: negative  Gastrointestinal: constipation.  Genitourinary: negative  Musculoskeletal: negative  Neurologic: positive for  local weakness, speech problems, incoordination and behavior changes  Psychiatric: negative, anxiety, agitation and perseveration re: constipation.  He constantly thinks about what he can't do anymore.  Continues to be upset with providers for not provider cure.  Hematologic/Lymphatic/Immunologic: negative  Endocrine: negative    Physical Exam    GENERAL: Healthy, and alert.  EYES: Eyes grossly normal to inspection.  No discharge or erythema,patch in place over right eye. HENT: Normal cephalic, healing laceration on scalp.  External ears, nose and mouth without ulcers or lesions.  Occasional drooling  RESP: No audible wheeze, cough, or visible cyanosis.  No visible retractions or increased work of breathing.    SKIN: Scattered bruising. No significant rash, abnormal pigmentation or lesions.  See pic (although blurry as he was moving) of area that he pulled skin off finger. Scabbed and healing.     NEURO: prominent dysarthria,   PSYCH: Mentation appears normal, affect normal/bright, judgement and insight intact, normal speech and appearance well-groomed.    Impression:  Ependymoma in good control on Entinostat - MRI imaging last week shows stability to slight  decrease.  Good response compared to 1 year ago.   Mental health issues  Constipation.       Plan:  Reviewed labs and MRI with Geo and his dad.   Ongoing chemotherapy.  He will begin Entinostat 6 mg weekly today. We will assure the family has new diaries.  Continue constipation management.  Acknowledged his frustrations with his lack of abilities and not being able to walk. Discussed that the isolation is exacerbated with the current COVID situation. Reiterated again that constipation requires ongoing management and even if it was in perfect control it would unfortunately not change the fact that he cannot walk.  Encouraged him to discuss these feelings with psychology.  Discussed his skin.  I will change the orders on his Bactroban should any difficult lesions like that occur.  Geo thinks it is fine now but will use in the future if needed.   RTC for next cycle in one month.  We will image again in 3 months.          ALAN Justin CNP

## 2020-09-15 NOTE — PROGRESS NOTES
"Geo Hicks is a 19 year old male who is being evaluated via a billable video visit.      The patient has been notified of following:     \"This video visit will be conducted via a call between you and your physician/provider. We have found that certain health care needs can be provided without the need for an in-person physical exam.  This service lets us provide the care you need with a video conversation.  If a prescription is necessary we can send it directly to your pharmacy.  If lab work is needed we can place an order for that and you can then stop by our lab to have the test done at a later time.    Video visits are billed at different rates depending on your insurance coverage.  Please reach out to your insurance provider with any questions.    If during the course of the call the physician/provider feels a video visit is not appropriate, you will not be charged for this service.\"    Patient has given verbal consent for Video visit? Yes  How would you like to obtain your AVS? MyChart  If you are dropped from the video visit, the video invite should be resent to: Send to phone at   [phone]  Will anyone else be joining your video visit? Yes e-mail at: [email or web address]        Video-Visit Details    Type of service:  Video Visit    Video Start Time: 1:55 PM  Video End Time: 3:03 PM    Originating Location (pt. 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  They look excellent.  His scalp laceration is healing beautifully. He notes he pulled some skin off of his finger and it got very swollen " and red.  It is improving but he wanted me to know.  He is living in his group care facility. No recent illnesses. Ongoing issues with constipation.  He is discussing that he doesn't have incentive for getting better when providers are with holding cure. He missed no doses with his last cycle and tolerated it well.     Imaging:    Postsurgical changes of suboccipital craniotomy and cerebellum resection cavity similar to prior. There is a 2.0 x 1.9 x 2.5 cm  homogeneously enhancing mass within the fourth ventricle which previously measured 2.0 x 1.9 x 2.5 cm when measured similarly. This  masses heterogeneous on T1 and T2 imaging. No significant restricted diffusion. There is mild to moderate perilesional edema in the cerebellum which appears unchanged from prior. Susceptibility weighted imaging artifact within the mass similar to prior. There is an  additional adjacent cystic focus immediately right lateral of the above-described in the right cerebellar hemisphere which measures 1.3 x 1.2 cm, previously 1.4 x 1.3 cm. Mild edema extends into the posterior aspect of the brainstem similar to prior.     No midline shift. No extra-axial fluid collection. No acute infarction on diffusion-weighted imaging. Major intracranial vascular flow voids  are patent. Ventricular sizes are unchanged.     No abnormality of the skull marrow. Visualized paranasal sinuses are clear. Mastoid air cells are clear. Orbits are grossly unremarkable.                                                                      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 The perilesional cystic focus is also unchanged to minimally decreased in size.      Labs:    Drawn today through Home[facility]  White count 5,000  Hgb 11.9  Platelets 131  ANC 2.3       Oncology History:    [id number]   6/26/15    Started Entinostat 1/9/17    DIAGNOSIS: EPENDYMOMA  AREAS TREATED: POST FOSSA  TUMOR  DOSE: 5940 cGy   TYPE OF RADIATION GIVEN: with IMRT Tomotherapy   9/08/2015 to 10/26/15    Patient Active Problem List   Diagnosis     GERD (gastroesophageal reflux disease)     Closed fracture at the growth plate of right distal fibula      Elevated serum creatinine     Dyspepsia     Intracranial hemorrhage (H)     Hemorrhagic stroke (H)     Ependymoma (H)     Admission for antineoplastic chemotherapy     Post-operative state     S/P craniotomy     S/P biopsy     Noncomitant strabismus     Abducens neuropathy of both eyes     CANDELARIO (obstructive sleep apnea)     Health Care Home     Hypernatremia     Body temperature low     Current chronic use of systemic steroids     Elevated TSH     Status post chemotherapy     Neoplasm of posterior cranial fossa (H)     Chronic constipation     Serum albumin decreased     Closed compression fracture of thoracic vertebra with routine healing, subsequent encounter     Depression     Constipation     Constipation by delayed colonic transit     Slow transit constipation     Tubular adenoma     Current Outpatient Medications   Medication     dexamethasone (DECADRON) 0.5 MG tablet      MG capsule     fexofenadine (ALLEGRA) 180 MG tablet     Lactobacillus-Inulin ([facility] HEALTH & WELLNESS) CAPS     lamoTRIgine (LAMICTAL) 200 MG tablet     linaclotide (LINZESS) 290 MCG capsule     mupirocin (BACTROBAN) 2 % external ointment     OLANZapine (ZYPREXA) 20 MG tablet     OLANZapine (ZYPREXA) 5 MG tablet     omeprazole (PRILOSEC) 20 MG DR capsule     order for DME     order for DME     polyethylene glycol (MIRALAX) 17 GM/Dose powder     potassium phosphate, monobasic, (K-PHOS) 500 MG tablet     sennosides (SENOKOT) 8.6 MG tablet     sertraline (ZOLOFT) 100 MG tablet     study - entinostat, IDS# 5050, 1 mg tablet     study - entinostat, IDS# 5050, 5 mg tablet     sulfamethoxazole-trimethoprim (BACTRIM) 400-80 MG tablet     traZODone (DESYREL) 50 MG tablet     vitamin D3  (CHOLECALCIFEROL) 2000 units (50 mcg) tablet     vitamin E (TOCOPHEROL) 400 units (360 mg) capsule     No current facility-administered medications for this visit.         Allergies   Allergen Reactions     Blood Transfusion Related (Informational Only) Swelling     Periorbital swelling post platelet transfusion     No Known Drug Allergies        Review Of Systems  Skin: positive for striae, scalp laceration.  Stitches were removed 7 days after they were put in.  Healing well.   Eyes: positive for oculoplegia  Ears/Nose/Throat: negative  Respiratory: No shortness of breath, dyspnea on exertion, cough, or hemoptysis  Cardiovascular: negative  Gastrointestinal: constipation.  Genitourinary: negative  Musculoskeletal: negative  Neurologic: positive for  local weakness, speech problems, incoordination and behavior changes  Psychiatric: negative, anxiety, agitation and perseveration re: constipation.  He constantly thinks about what he can't do anymore.  Continues to be upset with providers for not provider cure.  Hematologic/Lymphatic/Immunologic: negative  Endocrine: negative    Physical Exam    GENERAL: Healthy, and alert.  EYES: Eyes grossly normal to inspection.  No discharge or erythema,patch in place over right eye. HENT: Normal cephalic, healing laceration on scalp.  External ears, nose and mouth without ulcers or lesions.  Occasional drooling  RESP: No audible wheeze, cough, or visible cyanosis.  No visible retractions or increased work of breathing.    SKIN: Scattered bruising. No significant rash, abnormal pigmentation or lesions.  See pic (although blurry as he was moving) of area that he pulled skin off finger. Scabbed and healing.     NEURO: prominent dysarthria,   PSYCH: Mentation appears normal, affect normal/bright, judgement and insight intact, normal speech and appearance well-groomed.    Impression:  Ependymoma in good control on Entinostat - MRI imaging last week shows stability to slight decrease.   Good response compared to 1 year ago.   Mental health issues  Constipation.       Plan:  Reviewed labs and MRI with Geo and his dad.   Ongoing chemotherapy.  He will begin Entinostat 6 mg weekly today. We will assure the family has new diaries.  Continue constipation management.  Acknowledged his frustrations with his lack of abilities and not being able to walk. Discussed that the isolation is exacerbated with the current COVID situation. Reiterated again that constipation requires ongoing management and even if it was in perfect control it would unfortunately not change the fact that he cannot walk.  Encouraged him to discuss these feelings with psychology.  Discussed his skin.  I will change the orders on his Bactroban should any difficult lesions like that occur.  Geo thinks it is fine now but will use in the future if needed.   RTC for next cycle in one month.  We will image again in 3 months.

## 2020-09-15 NOTE — MR AVS SNAPSHOT
After Visit Summary   9/20/2017    Geo Hicks    MRN: [id number]           Patient Information     Date Of Birth          2000        Visit Information        Provider Department      9/20/2017 2:10 PM Karina Hodgson MSW Peds Hematology Oncology        Today's Diagnoses     Encounter for counseling    -  1          [facility], 9th floor  [address] [place] [address]  Phone: [phone]  Clinic Hours:   Monday-Friday:   7 am to 5:00 pm   closed weekends and major  holidays     If your fever is 100.5  or greater,   call the clinic during business hours.   After hours call [phone] and ask for the pediatric hematology / oncology physician to be paged for you.               Follow-ups after your visit        Your next 10 appointments already scheduled     Sep 27, 2017 11:00 AM CDT   Return Visit with ALAN Aguilar CNP   Peds Hematology Oncology ([facility])    [facility]  [address]th [address] [address] [phone]            Oct 02, 2017  1:15 PM CDT   PEDS TREATMENT with Noemy Caldwell, SLP   [facility] Speech Therapy ([facility])    [address] [address] [phone]            Oct 02, 2017  2:00 PM CDT   PEDS TREATMENT with Imani Landers, PT   [facility] Physical Therapy ([facility])    [address] [address] [phone]            Oct 02, 2017  3:15 PM CDT   Treatment 45 with Elyse Costello OTR   [facility] Occupational Therapy ([facility])    [address] [id number] [phone]            Oct 04, 2017  9:30 AM CDT   Return Visit with ALAN Tinoco CNP   Peds Hematology Oncology ([facility])    [facility]  [address]th [address] [address] [phone]             Oct 09, 2017  2:00 PM CDT   PEDS TREATMENT with Imani Landers, PT   [facility] Physical Therapy ([facility])    [address] [facility] [address] [phone]            Oct 09, 2017  3:15 PM CDT   Treatment 45 with Elyse Costello, OTR   [facility] Occupational Therapy ([facility])    [address] [facility] [facility] [address] [phone]            Oct 11, 2017 11:00 AM CDT   Return Visit with ALAN Aguilar CNP   Peds Hematology Oncology ([facility])    [facility]  9th Floor  [address] [facility] [address] [phone]            Oct 16, 2017  2:00 PM CDT   PEDS TREATMENT with Imani Landers, PT   [facility] Physical Therapy ([facility])    [address] [facility] [facility] [address] [phone]            Oct 16, 2017  3:15 PM CDT   Treatment 45 with Elyse Costello, OTR   [facility] Occupational Therapy ([facility])    [address] [facility] [address] [phone]              Who to contact     Please call your clinic at [phone] to:    Ask questions about your health    Make or cancel appointments    Discuss your medicines    Learn about your test results    Speak to your doctor   If you have compliments or concerns about an experience at your clinic, or if you wish to file a complaint, please contact [facility] Patient Relations at [phone] or email us at [email or web address]         Additional Information About Your Visit        MyChart Information     MyChart gives you secure access to your electronic health record. If you see a primary care provider, you can also send messages to your care team and make appointments. If you have questions, please call your primary care clinic.  If you do not have a primary care provider, please call [phone] and they will assist you.      MyChart is  an electronic gateway that provides easy, online access to your medical records. With [employer], you can request a clinic appointment, read your test results, renew a prescription or communicate with your care team.     To access your existing account, please contact your [facility] or call [phone] for assistance.        Care EveryWhere ID     This is your Care EveryWhere ID. This could be used by other organizations to access your [place] medical records  Opted out of Care Everywhere exchange         Blood Pressure from Last 3 Encounters:   09/20/17 113/77   09/20/17 113/78   09/13/17 119/66    Weight from Last 3 Encounters:   09/20/17 75.7 kg (166 lb 14.2 oz) (76 %)*   09/20/17 75.7 kg (166 lb 14.2 oz) (76 %)*   09/13/17 74.1 kg (163 lb 5.8 oz) (72 %)*     * Growth percentiles are based on [facility] 2-20 Years data.              Today, you had the following     No orders found for display       Primary Care Provider Office Phone # Fax #    Jeffrey Espinoza [phone] [address] [place] [address]        Equal Access to Services     DARYL HANDY : Hadii aad ku hadasho Sokimberlee, waaxda luqadaha, qaybta kaalmada adelityada, ciera dooley. So [facility] [phone].    ATENCIÓN: Si habla español, tiene a antonio disposición servicios gratuitos de asistencia lingüística. Ll[facility] [phone].    We comply with applicable federal civil rights laws and [place] laws. We do not discriminate on the basis of race, color, national origin, age, disability sex, sexual orientation or gender identity.            Thank you!     Thank you for choosing PEDS HEMATOLOGY ONCOLOGY  for your care. Our goal is always to provide you with excellent care. Hearing back from our patients is one way we can continue to improve our services. Please take a few minutes to complete the written survey that you may receive in the mail after your visit with us. Thank you!              Your Updated Medication List - Protect others around you: Learn how to safely use, store and throw away your medicines at www.disposemymeds.org.          This list is accurate as of: 9/20/17 11:59 PM.  Always use your most recent med list.                   Brand Name Dispense Instructions for use Diagnosis    calcium carbonate-vitamin D 600-400 MG-UNIT Chew     90 tablet    Take 2 tablets in the morning and 1 tablet in the evening.    Ependymoma (H)       Cholecalciferol 400 UNITS Chew     60 tablet    Take 1 tablet (400 Units) by mouth every morning    Ependymoma (H)       Clindamycin Phos-Benzoyl Perox 1.2-3.75 % Gel     50 g    Externally apply 1 Application topically nightly as needed    Ependymoma (H), Secondary hypertension, unspecified, Acne vulgaris       * dexamethasone 1 MG tablet    DECADRON    150 tablet    Reported on 3/31/2017    Ependymoma (H)       * dexamethasone 0.5 MG tablet    DECADRON     TAKE 1.5 TABLETS (0.75 MG) BY MOUTH DAILY (WITH BREAKFAST)        docusate sodium 100 MG tablet    COLACE    60 tablet    Take 100 mg by mouth 2 times daily as needed for constipation    Ependymoma (H)       fluticasone 50 MCG/ACT spray    FLONASE    1 Bottle    Spray 1-2 sprays into both nostrils daily    Ependymoma (H), Chronic seasonal allergic rhinitis, unspecified trigger       Glycerin (Laxative) 2.1 G Supp     25 suppository    Place 1 suppository rectally daily as needed        ketoconazole 2 % shampoo    NIZORAL    120 mL    Use a few times per week on the scalp as shampoo    Dermatitis, seborrheic       mupirocin 2 % ointment    BACTROBAN    22 g    Use 2 times a day to the buttock with flare    Bacterial folliculitis, Ependymoma (H)       omeprazole 20 MG CR capsule    priLOSEC    90 capsule    Take 1 capsule (20 mg) by mouth daily    Posterior fossa tumor (H)       pentoxifylline 400 MG CR tablet    TRENtal    270 tablet    Take 1 tablet (400 mg) by mouth 3 times daily (with meals)     Ependymoma (H), 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       polyethylene glycol Packet    MIRALAX/GLYCOLAX     Take 17 g by mouth daily as needed for constipation    Slow transit constipation       potassium phosphate (monobasic) 500 MG tablet    K-PHOS    90 tablet    Take 1 tablet (500 mg) by mouth 3 times daily    Hypophosphatemia, Ependymoma (H), Posterior fossa tumor (H)       sodium chloride 0.65 % nasal spray    OCEAN NASAL SPRAY    1 Bottle    Spray 2 sprays into both nostrils 4 times daily    Post-operative state       sulfamethoxazole-trimethoprim 400-80 MG per tablet    BACTRIM/SEPTRA    24 tablet    Take 1 tablet by mouth 2 times daily On Saturdays and Sundays    Ependymoma (H)       vitamin E 400 UNIT capsule    GNP VITAMIN E    30 capsule    Take 1 capsule (400 Units) by mouth daily    Ependymoma (H)       * Notice:  This list has 2 medication(s) that are the same as other medications prescribed for you. Read the directions carefully, and ask your doctor or other care provider to review them with you.       103.9

## 2020-09-24 NOTE — TELEPHONE ENCOUNTER
From: Coffin, Richard Breyen, MD   Sent: 9/24/2020   3:39 PM CDT   To: Psychiatry Scheduling-p     I called pt's group home and father and discussed some medication changes today. I sent the order to the pharmacy but was wondering if you could fax a copy of the new trazodone order to his group home?     trazodone 50-100mg at bedtime prn for sleep       Attn: Cruz Harper   Fax: [phone]     Jj West         =========================================      Writer printed med order, faxed to indicated number.

## 2020-09-24 NOTE — TELEPHONE ENCOUNTER
----- Message from Zeynep Mosqueda sent at 9/24/2020  2:13 PM CDT -----  Regarding: Call back requested  Contact: [phone]  Cruz Zhang Dr[address] , called. She requested a call back from you. She would like to speak to you about pt's current behaviors and a little bit about medication as well. She did not want to elaborate over the phone and requested for a direct msg to be sent to you.    Cruz, [phone]    Thank you,  Zeynep

## 2020-09-24 NOTE — TELEPHONE ENCOUNTER
I think that we could offer an appointment and see if we should see him before October 9.  Thanks,  Jj

## 2020-09-24 NOTE — TELEPHONE ENCOUNTER
"Called Cruz Harper, , back who notes that Geo has been several episodes of significant verbal and physical escalation and aggression.  Reviewed events leading to last night's incident.  Initially, patient did not believe he had received trazodone, however when he was told by staff that the pills were gone from that day he did not believe them attempted to swipe at the medication container and staff.  He then got angry and left the group home, they were unable to convince him to come back and he went to [place] [address], where police were able to find him and redirect him back to the group home.  She notes that if these behaviors continue they will be unable to manage them at the group home and will likely request to be admitted to the hospital for evaluation.    Currently, team is meeting with him to discuss behavioral interventions depending on if he becomes verbally or physically aggressive. Kdla noted that \"a lot of his behaviors are related to medications\". Specifically, about the trazodone and staff withholding medications, as noted above.  Discussed with her the option of changing trazodone to a as needed medication, allowing Geo to request medication when he would like it and increasing the dose as he was previously on 100 mg.  She believes that this would be a positive intervention to try at this point.    Changes discussed with Jj Hicks, who was in agreement, only concern he raised being whether Geo not taking this medication would lead to worsening. Discussed that is unlikely due to medications primary target being sleep. However, if he were to not be taking it and worsen would restart as a scheduled medication.    Will send changes to pharmacy and fax orders as below:  - Change trazodone 50-100mg at bedtime prn for sleep    Attn: Cruz Harper  Fax: [phone]       Jj Louis MD  "

## 2020-09-30 NOTE — TELEPHONE ENCOUNTER
Took a call from Michell, court coordinator. She indicated that there was a Zoom meeting today to discuss behavior and sleep issues and she would like to follow up with Dr. Louis.    She reports that Geo takes trazodone 100 mg at bedtime (he takes it around 11-11:30 pm typically) but he is up until past 4 am. She would like to discuss possibly increasing the dose, adding another medication, or changing the medication to help with his insomnia.    She can be reached today at [phone] (her personal number).     Routed to Dr. Louis to request that he call Michell.

## 2020-10-01 NOTE — ED PROVIDER NOTES
History     Chief Complaint:  Wound Check    HPI   Geo Hicks is a 19 year old male with a history of brain cancer status post multiple craniotomies, hemorrhagic stroke not anticoagulated, and chronic constipation who presents to the emergency department for evaluation of a wound on his right hip. The patient reports that at 1530 he was doing his exercises when he hit his right hip on his bedframe. He now has a wound to that area. The patient was alone at the time and is wheelchair bound. His father reports that he is currently undergoing cancer treatment and has been taking Decadron for a while which has made the patient's skin frail. The father also notes that the patient has a brain tumor and history of hemorraghic stroke and these have greatly decreased the patient's sensation on his right side. The patient denies any head or neck injury. However, later during his visit, the patient notes that he is having headache, but denies any vision changes, dizziness or nausea. The patient last had his tetanus vaccination updated in 2019.     The father of the patient states the patient has a delusional disorder where he believes that his constipation is the main cause of his inability to walk. He believes that the physicians he sees know how to fix this, but will not. The father also states that he believes that patient was trying to walk today when he fell and injured himself.     Allergies:  Blood Transfusion Related (Informational Only)  No Known Drug Allergies    Medications:    Decadron  DOK  Allegra  Culturelle  Lamictalf  Linaclotide  Zyprexa  Prilosec  Miralax  K-phos  Senokot  Zoloft  Bactrim  Desyrel    Past Medical History:    Cranial nerve dysfunction  Dyspepsia  Ependymoma  GERD  Hearing loss  Intracranial hemorrhage  Migraine  Pilonidal cyst  Reduced vision  Refractory obstruction of nasal airway  Sleep apnea  Strabismus  Hemorrhagic stroke  Hypernatremia  Neoplasm of posterior cranial fossa  Chronic  "constipation  Depression    Past Surgical History:    Colonoscopy  Esophagoscopy, gastroscopy, duodenoscopy, combined  Cartilage graft   Incision and drainage  Craniotomy and tumor excision x4  Ventriculostomy  Vascular port placement  Vascular port removal  Rhinoplasty     Family History:    Father: PE/DVT, hypothyroidism    Social History:  The patient was accompanied to the ED by his father.  Smoking Status: Never  Smokeless Tobacco: Never   Alcohol Use: No  Drug Use: No  Marital Status:  Single     Review of Systems   Eyes: Negative for visual disturbance.   Gastrointestinal: Negative for nausea.   Musculoskeletal: Negative for neck pain.   Skin: Positive for wound.   Neurological: Positive for headaches. Negative for dizziness.   All other systems reviewed and are negative.    Physical Exam   Vitals:  Patient Vitals for the past 24 hrs:   BP Temp Temp src Pulse Resp SpO2 Height Weight   10/01/20 2140 -- -- -- -- 16 -- -- --   10/01/20 1930 (!) 142/85 -- -- 90 -- -- -- --   10/01/20 1830 135/81 -- -- 98 -- -- -- --   10/01/20 1739 -- -- -- -- -- -- -- 83 kg (182 lb 15.7 oz)   10/01/20 1730 118/84 97.7  F (36.5  C) Temporal 101 18 96 % 1.854 m (6' 1\") --       Physical Exam  General: Resting comfortably in the bed  Skin: Large jagged 8 cm laceration over the greater trochanter region of the right hip.  This extends to the subcutaneous tissue but not muscle.  Multiple scattered healing abrasions and ecchymosis throughout upper and lower extremities.  HEENT: Head: Normocephalic, atraumatic, no visible masses.   Eyes: Conjunctiva clear.  Ears: EACs clear, TMs translucent.   Nose: No external lesions, mucosa non-inflamed, septum and turbinates normal.   Throat/pharynx: Mucous membranes moist, no mucosal lesions.   Cardiac: Normal rate and regular rhythm, no murmur or gallop.   Lungs: Clear to auscultation.  Abdomen: Abdomen soft, non-tender. No rebound tenderness of guarding.   Musculoskeletal: Moving all 4 " extremities spontaneously.  Good coloration throughout right lower extremity.  Good dorsalis pedis and posterior tibialis pulses of the right foot.  Full flexion, extension, internal and external rotation of the right hip.  Full flexion-extension of the right knee without difficulty.  Good dorsiflexion plantarflexion of the right foot.  No tenderness lungs cervical, thoracic, lumbar spine.  Full range of motion neck without difficulty.  Neurologic: Oriented x 3. GCS: 15.  Psychiatric: Intact recent and remote memory, judgment and insight, normal mood and affect.     Emergency Department Course     Imaging:  Radiographic findings were communicated with the patient who voiced understanding of the findings.    CT Head WO Contrast  1.  No acute intracranial process.  Reading per radiology.    Procedures:    Laceration Repair        LACERATION:  A subcutaneous clean 8 cm laceration.      LOCATION:  Right hip      FUNCTION:  Distally sensation, circulation, motor and tendon function are intact.      ANESTHESIA:  Local using 1% lidocaine with epinephrine total of 5 mLs      PREPARATION:  Irrigation and Scrubbing with Normal Saline and Shur Clens      DEBRIDEMENT:  no debridement      CLOSURE:  Wound was closed with Two Layers: Subcutaneous layer closed with 5 x 4.0 Vicryl Sutures. Skin closed with 12 x 3.0 Ethylon using interrupted sutures    Interventions:  2002 Tylenol 975 mg PO    Emergency Department Course:  Past medical records, nursing notes, and vitals reviewed.    1831 I performed an exam of the patient as documented above.     1839 I rechecked the patient and administered anesthetic.    1958 I rechecked the patient and repaired the laceration as documented above. At this time the patient states that he is having a headache.     The patient was sent for a head CT while in the emergency department, results above.     2133 I rechecked the patient and discussed the results of his workup thus far.     Findings and  plan explained to the Patient and father. Patient discharged home with instructions regarding supportive care, medications, and reasons to return. The importance of close follow-up was reviewed.     I personally reviewed the laboratory results with the Patient and father and answered all related questions prior to discharge.       Impression & Plan      Medical Decision Making:  Geo Hicks is a 19 year old male who presents to the ED today for evaluation of a laceration and possible fall.  Details of the patient's history can be noted in the HPI.  The wound was carefully explored and evaluated.  The laceration was closed as noted in the procedure note above.  There was no evidence of muscular, tendon, bone, or nerve damage with this laceration.  It was large and extended through the subcutaneous tissue.  There is no evidence of foreign body.  Possible complications such as infection and scarring were reviewed with the patient.  They will return to the ED for any signs of increased redness, streaking, drainage, fevers, new concerns.  They will apply bacitracin daily.  Additional suture care was discussed they will follow-up with her primary care provider or another medical facility and provided in the discharge paperwork. Suture removal in 10-14 later in the ED course, patient did note that he had a mild headache.  Days. Tetanus is up-to-date.     It was unclear whether patient had had head trauma with his fall.  Head to toe trauma exam did not indicate any other acute injuries.  However, given his complex past medical history with intracranial tumor/hemorrhage, etc. we did obtain a head CT, which showed unchanged from previous imaging.  We did discuss close head injury return precautions, concussion symptoms, second impact syndrome.  He and his father expressed understanding.  Continue Tylenol at home for mild headaches and close follow-up with PCP or neurology.  All questions were answered prior to the patient's  discharge.  They were in agreement with the treatment plan as stated above.     Diagnosis:    ICD-10-CM    1. Hip laceration  S71.019A    2. Fall, initial encounter  W19.XXXA        Disposition:  discharged to home    Discharge Medications:  None      Darshan JAIME, am serving as a scribe on 10/1/2020 at 6:31 PM to personally document services performed by Trinidad Nye PA based on my observations and the provider's statements to me.    Darshna Ferraro  10/1/2020   [facility] EMERGENCY DEPT    Dragon Disclosure   This was created at least in part with a voice recognition software. Mistakes/typos may be present.         Trinidad Nye PA  10/02/20 0016

## 2020-10-01 NOTE — ED AVS SNAPSHOT
[facility] Emergency Dept  [address]  [facility] [address]  Phone: [phone]  Fax: [phone]                                    Geo Hicks   MRN: [id number]    Department: [facility] Emergency Dept   Date of Visit: 10/1/2020           After Visit Summary Signature Page    I have received my discharge instructions, and my questions have been answered. I have discussed any challenges I see with this plan with the nurse or doctor.    ..........................................................................................................................................  Patient/Patient Representative Signature      ..........................................................................................................................................  Patient Representative Print Name and Relationship to Patient    ..................................................               ................................................  Date                                   Time    ..........................................................................................................................................  Reviewed by Signature/Title    ...................................................              ..............................................  Date                                               Time          22EPIC Rev 08/18

## 2020-10-01 NOTE — ED TRIAGE NOTES
Pt presents for evaluation of a wound to the right hip. Pt was exercising and thinks he hit the bed frame. Injury happened today. Wound is deep per dad, nothing on it currently. Last tetanus was in 2019.

## 2020-10-01 NOTE — TELEPHONE ENCOUNTER
"Michell notes that she wanted to speak with this provider about issues with Geo's sleep. She notes that he has been having difficulty falling asleep and staying asleep overnight and that he has reported to staff that he will sometimes wake up at night due to nightmares. However, she reports that other nights he will be up \"until 3AM\" and has been \"banging on the walls\" several nights. They report that previous changes in increased trazodone dose and changing from a scheduled medication to as needed have not been helpful. Facility requesting additional medications to aid in sleep.     Believe that it would be most beneficial to optimize Geo's current medication regimen rather than add additional agents on to his regimen. Therefore, will plan to increase trazodone dose to 150mg at bedtime prn for insomnia and depression and decrease his sertraline to 100mg, given that both of these medications are serotonergic. Will also plan to see Geo in 1 week at my next scheduled appointment with him to discuss how these changes were tolerated and explore additional options. These changes were discussed with Eric Hicks, who agrees with them. Will fax orders to [phone]    Case discussed with my attending, Dr. Tripathi.     "

## 2020-10-02 NOTE — DISCHARGE INSTRUCTIONS
"Watch the area surrounding the wound for signs of infection which can include increased redness, drainage, fevers, or swelling. Inspect the area daily. No swimming or baths for the next 3-5 days, showering is ok. See primary clinic or wound care nurse days for stitches/staple removal. Apply antibacterial ointment such as bacitracin to the wound daily.     Your exam is reassuring here today, however look for signs of head injury at home.  Symptoms of a concussion can include headache, nausea, dizziness, fatigue, \"out of it feeling.\"  If these present, brain rest at home, cut screen time in half, no physical activity, see primary care provider for recheck before returning to normal activities.  Reasons to return to the emergency department would be for multiple episodes of vomiting, confusion, loss of consciousness, seizures at home.  These are signs of more serious head injury and you should be reevaluated.    Discharge Instructions  Laceration (Cut)    You were seen today for a laceration (cut).  Your doctor examined your laceration for any problems such a buried foreign body (like glass, a splinter, or gravel), or injury to blood vessels, tendons, and nerves.  Your doctor may have also rinsed and/or scrubbed your laceration to help prevent an infection.  Your laceration may have been closed with glue, staples or sutures (stitches).      It may not be possible to find all problems with your laceration on the first visit, and we can't always prevent infections.  Antibiotics are only given when the benefit is more than the risk, and don't prevent all infections. Some lacerations are too high risk to close, and are left open to heal.  All lacerations, no matter how expertly repaired, will cause scarring.    Return to the Emergency Department right away if:  You have more redness, swelling, pain, drainage (pus), a bad smell, or red streaking from your laceration.    You have a fever of 101oF or more.  You have bleeding " that you can t stop at home. If your cut starts to bleed, hold pressure on the bleeding area with a clean cloth or put pressure over the bandage.  If the bleeding doesn t stop after using constant pressure for 30 minutes, you should return to the Emergency Department for further treatment.  An area past the laceration is cool, pale, or blue compared with the other side, or has a slower return of color when squeezed.  Your dressing seems too tight or starts to get uncomfortable or painful.  You have loss of normal function or use of an area, such as being unable to straighten or bend a finger normally.  You have a numb area past the laceration.    Return to the Emergency Department or see your regular doctor if:  The laceration starts to come open.   You have something coming out of the cut or a feeling that there is something in the laceration.  Your wound will not heal, or keeps breaking open. There can always be glass, wood, dirt or other things in any wound.  They won t always show up, even on x-rays.  If a wound doesn t heal, this may be why, and it is important to follow-up with your regular doctor.    Home Care:  Take your dressing off in 12 hours, or as instructed by your doctor, to check your laceration. Remove the dressing sooner if it seems too tight or painful, or if it is getting numb, tingly, or pale past the dressing.  Gently wash your laceration 2 times a day with clean cloth and soap.   It is okay to shower, but do not let the laceration soak in water.    If your laceration was closed with wound adhesive or strips: pat it dry and leave it open to the air.   For all other repairs: after you wash your laceration, or at least 2 times a day, apply bacitracin or other antibiotic ointment to the laceration, then cover it with a Band-Aid  or gauze.  Keep the laceration clean. Wear gloves or other protective clothing if you are around dirt.    Follow-up:  You need to follow-up with your regular doctor in  "10-14 days.  Your sutures or staples need to be removed in 10-14 days. Schedule an appointment with your regular doctor to have this done.    Scars:  To help minimize scarring:  Wear sunscreen over the healed laceration when out in the sun.  Massage the area regularly.  You may use Vitamin E oil.  Wait a year.  Most scars will start to fade within a year.    Probiotics: If you have been given an antibiotic, you may want to also take a probiotic pill or eat yogurt with live cultures. Probiotics have \"good bacteria\" to help your intestines stay healthy. Studies have shown that probiotics help prevent diarrhea and other intestine problems (including C. diff infection) when you take antibiotics. You can buy these without a prescription in the pharmacy section of the store.     If you were given a prescription for medicine here today, be sure to read all of the information (including the package insert) that comes with your prescription.  This will include important information about the medicine, its side effects, and any warnings that you need to know about.  The pharmacist who fills the prescription can provide more information and answer questions you may have about the medicine.  If you have questions or concerns that the pharmacist cannot address, please call or return to the Emergency Department.     Opioid Medication Information    Pain medications are among the most commonly prescribed medicines, so we are including this information for all our patients. If you did not receive pain medication or get a prescription for pain medicine, you can ignore it.     You may have been given a prescription for an opioid (narcotic) pain medicine and/or have received a pain medicine while here in the Emergency Department. These medicines can make you drowsy or impaired. You must not drive, operate dangerous equipment, or engage in any other dangerous activities while taking these medications. If you drive while taking these " medications, you could be arrested for DUI, or driving under the influence. Do not drink any alcohol while you are taking these medications.     Opioid pain medications can cause addiction. If you have a history of chemical dependency of any type, you are at a higher risk of becoming addicted to pain medications.  Only take these prescribed medications to treat your pain when all other options have been tried. Take it for as short a time and as few doses as possible. Store your pain pills in a secure place, as they are frequently stolen and provide a dangerous opportunity for children or visitors in your house to start abusing these powerful medications. We will not replace any lost or stolen medicine.  As soon as your pain is better, you should flush all your remaining medication.     Many prescription pain medications contain Tylenol  (acetaminophen), including Vicodin , Tylenol #3 , Norco , Lortab , and Percocet .  You should not take any extra pills of Tylenol  if you are using these prescription medications or you can get very sick.  Do not ever take more than 3000 mg of acetaminophen in any 24 hour period.    All opioids tend to cause constipation. Drink plenty of water and eat foods that have a lot of fiber, such as fruits, vegetables, prune juice, apple juice and high fiber cereal.  Take a laxative if you don t move your bowels at least every other day. Miralax , Milk of Magnesia, Colace , or Senna  can be used to keep you regular.      Remember that you can always come back to the Emergency Department if you are not able to see your regular doctor in the amount of time listed above, if you get any new symptoms, or if there is anything that worries you.

## 2020-10-02 NOTE — TELEPHONE ENCOUNTER
[facility] calls,    Gave verbal orders for    Recertification for skilled nursing for wound care:  1x a week for 9 weeks with 2 prn visits    Shukri Mishra RN

## 2020-10-03 NOTE — ED PROVIDER NOTES
History     Chief Complaint:  Wound check    HPI   Geo Hicks is a 19 year old male with a history of brain cancer status post multiple previous craniotomies, hemorrhagic stroke not anticoagulated, and chronic constipation who presents for a wound check. Patient was seen in the ED 2 days ago for a wound to his right hip from a fall. His laceration was repaired involving placement of both deep and superficial sutures. Patient presents today with his father and is concerned that the wound is not healing properly and may be getting infected. Father also expresses concern for a wound to the distal aspect of the right ring finger.  Father has looked through text exchanges with patient's mother, who reports noting this wound as far back as early September, though this is the first time father has noted it. Patient denies any fever, exposure to gardening plants, or biting his fingers.  He is a non-smoker.        Allergies:  Blood Transfusion Related (Informational Only)  No Known Drug Allergies     Medications:    Decadron   Lamictal   Linzess   Zyprexa   Prilosec   Trazodone   Zoloft      Past Medical History:    Cranial nerve dysfunction  Dyspepsia  Ependymoma  GERD  Hearing loss  Intracranial hemorrhage  Migraine  Pilonidal cyst  Reduced vision  Refractory obstruction of nasal airway  Sleep apnea  Strabismus  Hemorrhagic stroke  Hypernatremia  Neoplasm of posterior cranial fossa  Chronic constipation  Depression     Past Surgical History:    Colonoscopy  Esophagoscopy, gastroscopy, duodenoscopy, combined  Cartilage graft   Incision and drainage  Craniotomy and tumor excision x4  Ventriculostomy  Vascular port placement  Vascular port removal  Rhinoplasty      Family History:    Father: PE/DVT, hypothyroidism     Social History:  The patient was accompanied to the ED by his father.  Smoking Status: Never  Smokeless Tobacco: Never   Alcohol Use: No  Drug Use: No  Marital Status:  Single     Review of Systems    Constitutional: Negative for fever.   Skin: Positive for color change, rash and wound.   All other systems reviewed and are negative.      Physical Exam     Patient Vitals for the past 24 hrs:   BP Temp Temp src Pulse Resp SpO2   10/03/20 1630 128/75 -- -- 92 -- --   10/03/20 1615 132/56 -- -- 97 -- --   10/03/20 1600 128/83 -- -- -- -- --   10/03/20 1545 129/68 -- -- 83 -- 97 %   10/03/20 1530 131/71 -- -- 92 -- 95 %   10/03/20 1515 128/81 -- -- 95 -- 96 %   10/03/20 1500 122/67 -- -- 93 -- 100 %   10/03/20 1445 119/77 -- -- 95 -- 94 %   10/03/20 1353 121/74 97.9  F (36.6  C) Temporal 108 18 95 %       Physical Exam  General:   Well-nourished   Speaking in full sentences  Eyes:   Conjunctiva without injection or scleral icterus  ENT:   Moist mucous membranes   Nares patent   Pinnae normal  Neck:   Full ROM   No stiffness appreciated  Resp:   Lungs CTAB   No crackles, wheezing or audible rubs   Good air movement  CV:    Tachycardic rate, regular rhythm   S1 and S2 present   No murmur, gallop or rub  GI:   BS present   Abdomen soft without distention   Non-tender to light and deep palpation   No guarding or rebound tenderness  Skin:   Right upper leg   6.5 cm laceration with sutures in place, surrounded by erythema, warmth, and expressible drainage, primarily from superior aspect of wound   Right hand:   Ulceration / scabbed distal aspect of digit #4   Erythema extending proximally, distal to MCP   No fusiform dilation of digit   No tenderness to palpation along flexor surface of hand   No pain with passive extension of digit   Cap refill <3 sec   Finger does not feel cool, is warm/well-perfused   Scab and minimal erythema surrounding MCP of R hand  MSK:   Moves all extremities   No focal deformities or swelling  Neuro:   Alert   Answers questions appropriately  Psych:   Normal affect, normal mood              Emergency Department Course     Imaging:  Radiology findings were communicated with the patient who voiced  understanding of the findings.    XR finger right 2 view:  Soft tissue defect in the tip of the fourth finger, compatible with  laceration. Soft tissue swelling throughout the fourth finger. No  fracture, erosion, or periosteal reaction in the tip of the distal  phalanx to indicate osteomyelitis. Normal joint alignment and spacing  throughout the joints of the hand. Reading per radiology.       Laboratory:  Laboratory findings were communicated with the patient who voiced understanding of the findings.    Wound culture aerobic bacteria: Pending    CBC: (WBC 7.3, HGB 11.0 (low),  (low))  BMP: AWNL (Creatinine 1.13)  Lactic Acid: 0.8      Emergency Department Course:  Past medical records, nursing notes, and vitals reviewed.    (1406) I performed an exam of the patient as documented above.     IV was inserted and blood was drawn for laboratory testing, results above.    The patient was sent for a finger x-ray while in the emergency department, results above.     (1442) I rechecked the patient and discussed the results of his workup thus far.     (1445) I removed stiches from the patient' leg.     (1500) I spoke with Dr. Davey of the Ortho service regarding patient's presentation, findings, and plan of care.    (1502) I rechecked the patient and discussed the results of her workup thus far.     (1610) I respoke with Dr. Davey of the Ortho service regarding patient's presentation, findings, and plan of care.     (1625) I rechecked the patient with Dr. Davey.    Findings and plan explained to the Patient. Patient discharged home with instructions regarding supportive care, medications, and reasons to return. The importance of close follow-up was reviewed. The patient was prescribed Vibramycin.    I personally reviewed the laboratory and imaging results with the Patient and answered all related questions prior to discharge.     Impression & Plan     Medical Decision Making:  Geo Hicks is a very pleasant  19-year-old male with a complex PMH significant for intracranial neoplasm s/p craniotomy, previous hemorrhagic stroke, presenting to the ED for evaluation of a wound check.  VS on presentation reveal elevated HR, which improved/normalized during his ED course.  Examination as outlined above, notable for a wound to the lateral aspect of his right hip, as well as a ulceration/scab over the distal aspect of his right fourth digit with erythema extending proximally.  With regards to his hip wound, this is most consistent with developing cellulitis/infection.  I was able to express some fluid from the most anterior aspect of the wound.  Given concern for underlying fluid collection, the superficial sutures were removed in this area.  The wound was cleansed, and drainage was expressed.  In light of the nature of this wound, I did consult orthopedic surgery, who have seen and evaluated the patient in the ED.  Together, we examined and probed the wound, which does not show evidence to suggest penetration to the level of the joint.  IT band remains intact.  Wound was cleansed, and covered with wet-to-dry dressings.  In discussion with orthopedics, they did not recommend further removal of remaining sutures.  Do suspect this would cause notable dehiscence of the wound as well.  Patient will be started on oral doxycycline.  Wound culture was obtained, with results pending.  The area of erythema was outlined with a marking pen.  Initial dose of doxycycline provided from the ED and remaining course was sent through electronically to the patient's primary pharmacy.  He currently resides in a group home and father will assist with dressing changes until this coming week.  Recommended wet to dry dressings as recommended by orthopedic surgery twice daily.  Laboratory studies reveal normal WBC count and normal lactate.  He is not meeting criteria for severe sepsis nor septic shock.  With regards the patient's finger wound, this also  is consistent with developing cellulitis.  He does recall pinching the distal aspect of his finger a few weeks previous.  His mother reportedly noted this wound at the beginning of September.  He does have mild erythema tracking proximally.  The area is warm, and erythematous, and not consistent with digital ischemia.  There is no current evidence to suggest flexor tenosynovitis.  No other unusual exposures such as contact with nayeli bushes, or aquatic exposure is reported.  Given the patient's clinical well appearance and above work-up, I feel he is appropriate and safe for discharge from the ED with very close outpatient follow-up.  Referral information was provided to follow-up with [facility] orthopedics as an outpatient.  He is encouraged to return immediately to the ER with worsening pain, discharge, spreading erythema beyond the borders outlined today, fevers, or any other concerns.  Father and patient both felt comfortable with this plan of care.  All of their questions were answered prior to discharge.    Diagnosis:    ICD-10-CM    1. Cellulitis of right lower extremity  L03.115    2. Cellulitis of finger of right hand  L03.011        Disposition:  Discharged to home.    Discharge Medications:  New Prescriptions    DOXYCYCLINE HYCLATE (VIBRAMYCIN) 100 MG CAPSULE    Take 1 capsule (100 mg) by mouth 2 times daily       Scribe Disclosure:  I, Ramana Parker, am serving as a scribe at 2:06 PM on 10/3/2020 to document services personally performed by Jeffrey Hutson MD based on my observations and the provider's statements to me.   10/3/2020   [facility] EMERGENCY DEPT       Jeffrey Hutson MD  10/04/20 0820

## 2020-10-03 NOTE — ED TRIAGE NOTES
ABCs intact. Pt was seen two days ago after a fall. Pt has sutures placed. Pt's dad states the wound looks infected. Denies fever, cough.

## 2020-10-03 NOTE — ED AVS SNAPSHOT
[facility] Emergency Dept  [address]  [facility] [address]  Phone: [phone]  Fax: [phone]                                    Geo Hicks   MRN: [id number]    Department: [facility] Emergency Dept   Date of Visit: 10/3/2020           After Visit Summary Signature Page    I have received my discharge instructions, and my questions have been answered. I have discussed any challenges I see with this plan with the nurse or doctor.    ..........................................................................................................................................  Patient/Patient Representative Signature      ..........................................................................................................................................  Patient Representative Print Name and Relationship to Patient    ..................................................               ................................................  Date                                   Time    ..........................................................................................................................................  Reviewed by Signature/Title    ...................................................              ..............................................  Date                                               Time          22EPIC Rev 08/18

## 2020-10-03 NOTE — DISCHARGE INSTRUCTIONS
Please begin the antibiotic as discussed.    Please follow-up with [facility] orthopedics in follow-up to ensure your wound is healing appropriately.    Please continue with wet-to-dry dressings as discussed.  You may change the dressings twice a day.    Monitor closely for signs of spreading redness, drainage of pus, fevers, or worsened pain.  These signs should prompt return to the ER for further evaluation.    Discharge Instructions  Cellulitis    Cellulitis is an infection of the skin that occurs when bacteria enter the skin.   Symptoms are generally redness, swelling, warmth and pain.  Your infection appeared to be appropriate to treat at home with antibiotics.  However, sometimes your infection may be worse than it seemed at first, or may worsen with time. If you have new or worse symptoms, you may need to be seen again in the Emergency Department or by your primary provider.    Generally, every Emergency Department visit should have a follow-up clinic visit with either a primary or a specialty clinic/provider. Please follow-up as instructed by your emergency provider today.    Return to the Emergency Department if:  The redness, pain, or swelling gets a lot worse.  If the red area was marked, return if it is red significantly beyond the marked area.  You are unable to get your antibiotics, or are vomiting (throwing up) these pills, or you cannot take them.  You are feeling more ill, weak or lightheaded.  You start to run a new fever (temperature >101 F).  Anything else about the infection worries or concerns you.  Treatment:  Start your antibiotics right away, and take them as prescribed. Be sure to finish the whole prescription, even if you are better.  Apply a heating pad, warm packs, or warm water soaks to the infected area for 15 minutes at a time, at least 3 times a day. Do not use a heating pad on your feet or legs if you have diabetes. Do not sleep with a heating pad on, since this can cause burns or  skin injury.  Rest your injured area for at least 1-2 days. After that you may start using your extremity again as long as there is not too much pain.   Raise the injured area above the level of your heart as much as possible in the first 1-2 days.  Tylenol  (acetaminophen), Motrin  (ibuprofen), or Advil  (ibuprofen) may help may help reduce pain and fever and may help you feel more comfortable. Be sure to read and follow the package directions, and ask your provider if you have questions.    If you were given a prescription for medicine here today, be sure to read all of the information (including the package insert) that comes with your prescription.  This will include important information about the medicine, its side effects, and any warnings that you need to know about.  The pharmacist who fills the prescription can provide more information and answer questions you may have about the medicine.  If you have questions or concerns that the pharmacist cannot address, please call or return to the Emergency Department.     Remember that you can always come back to the Emergency Department if you are not able to see your regular provider in the amount of time listed above, if you get any new symptoms, or if there is anything that worries you.

## 2020-10-06 NOTE — RESULT ENCOUNTER NOTE
"Final Wound Culture (right leg) Report on 10/6/20.  Emergency Dept discharge antibiotic prescribed: Doxycycline 100 mg PO tablet, 1 tablet (100 mg) by mouth 2 times daily for 7 days  Bacteria #1: Moderate growth Staphylococcus aureus  is [SUSCEPTIBLE] to ED discharge antibiotic  Bacteria #2: Moderate growth Streptococcus mitis group, Susceptibility testing not routinely done  Incision and Drainage performed in [place] ED [Yes / No]: Stitches removed \"drainage was expressed.\"  Recommendations in treatment per  ED Lab result protocol."

## 2020-10-07 NOTE — TELEPHONE ENCOUNTER
Medication requested: lamoTRIgine (LAMICTAL) 200 MG tablet Take 1 tablet (200 mg) by mouth At Bedtime   Last refilled: 7/10/20  Qty: 90/0  OLANZapine (ZYPREXA) 20 MG tablet  Last refilled: 7/10/20  Qty: 90/0      Last seen: 8/21/20  RTC: 4 weeks  Cancel: 1  No-show: 0  Next appt: 10/9/20    Refill decision:   Refilled for 30 days per protocol.

## 2020-10-09 NOTE — PROGRESS NOTES
"VIDEO VISIT  Geo Hicks is a 19 year old patient who is being evaluated via a billable video visit.      The patient has been notified of following:   \"We have found that certain health care needs can be provided without the need for an in-person physical exam. This service lets us provide the care you need with a video conversation. If a prescription is necessary we can send it directly to your pharmacy. If lab work is needed we can place an order for that and you can then stop by our lab to have the test done at a later time. Insurers are generally covering virtual visits as they would in-office visits so billing should not be different than normal.  If for some reason you do get billed incorrectly, you should contact the billing office to correct it and that number is in the AVS .    Patient has given verbal consent for video visit?: Yes   How would you like to obtain your AVS?: Monitor Backlinks  AVS SmartPhrase [PsychAVS] has been placed in 'Patient Instructions': Yes      Video- Visit Details  Type of service:  video visit for medication management  Time of service:    Date:  10/09/2020    Video Start Time:  1:48 PM        Video End Time:   2:45 PM    Reason for video visit:  Patient unable to travel due to Covid-19  Originating Site (patient location):  [facility]   Location- [facility]  Distant Site (provider location):  Remote location  Mode of Communication:  Video Conference via D[employer]me  Consent:  Patient has given verbal consent for video visit?: Yes         [facility]  Psychiatry Clinic  PSYCHIATRIC PROGRESS NOTE     CARE TEAM:    PCP- Jeffrey Espinoza     Specialty Providers- Oncology, Neuropsychology, Physical Therapy, Occupational Therapy      Therapist- most recently Manju Pink at [facility] Team- no.      Geo Hicks is a 19 year old patient who prefers the name Geo and pronouns he, him, his.     PERTINENT BACKGROUND                " [last transfer eval 07/10/20]   This patient first experienced significant mental health issues in 2018 and has received treatment for paranoia/delusions and depressed mood. Notably, Geo is undergoing cancer treatment with the [facility] and was referred for psychiatric management with our department by neuropsychology.  He has a history of ependymoma that was diagnosed at age 14. Since then, he has undergone multiple tumor resections, chemotherapy, and radiation treatment. Geo also experienced an intra-tumoral hemorrhage in May 2015 that resulted in neurological changes including facial numbness, significant loss of mobility and dysarthria.  He is currently on COG study [id number] with [employer]. Since 2018, Geo has developed a fixed delusion that definitive care is being withheld by his oncology providers, specifically believing that they are choosing not to treat his constipation, and that it is due to this that he is no longer able to walk without assistance.  Has demonstrated agitation and emotional discord with behavioral disturbance in the setting of this belief.  While his emotional upset has been somewhat amenable to treatment with medications, his delusional belief has remained so far resistant to management.    Psych critical item history includes suicidal ideation, psychosis [sxs include delusions] and psych hosp (<3)    INTERIM HISTORY                                 [4, 4]   History was provided by the patient and group home staff who was a good historian. Treatment adherence is good.  Since the last visit:   - Geo was having some difficulty with sleep and increased agitation with group home staff. Resulting in a team meeting and medication adjustments since last appoint (see phone notes). Since last appointment the following changes have been made: increased trazodone dose to 150mg at bedtime prn for insomnia and depression and decreased sertraline to 100mg, given that both of  "these medications are serotonergic.  - Notes he's doing \"good\". His sleep has been \"good\" when he's able to get his trazodone and the group home staff want him to get it, \"but my doctor's don't want me to get the trazodone\". Attempted to explore this thought during session with limited success. Including talking to Geo about the fact that this writer is the one prescribing and making changes to trazodone and my desire for him to be allowed to have the medication to help with sleep.   - Also discussed why he was leaving the group home on several occassions. Discussed themes for feeling stuck and having limited choices. He also notes, \"people get mad when I leave\".   - Geo continues to express that he has suicidal thought \"everyday\". These thoughts are often fleeting and \"come and go\". He does not have any plan or intent to act on them however, they remain persistent.  - Continues to be perseverative about doctors withholding treatment from him (treatment for his constipation which would allow him to walk and now treatment with trazodone to allow him to sleep).  - He notes that his therapist is \"nice\" and he enjoys talking to them but is not sure if it has been particularly helpful.    - Spoke with father who believes that behaviorally he has been doing better over the last week (since the team meeting and increased trazodone). He does note two ED visits and some difficulty surrounding the infection to his hip and finger, which apparently have been present over the last month.  - When discussing Geo's new perception of doctors withholding treatment from him father relates a story in which Geo was little and at the doctor's office. Was going to be given a flu shot and asked his father to not get it \"because the doctors are trying to kill me\". His father laughed at the time but in hindsight is wondering if this is part of his overall belief system.    RECENT PSYCH ROS:   Depression:  suicidal ideation " without plan, without intent, depressed mood, feeling hopeless and feeling trapped  Elevated:  none  Psychosis:  delusions- Continues to be perseverative about doctors withholding treatment from him (treatment for his constipation which would allow him to walk and now treatment with trazodone to allow him to sleep). [details in Interim History]  Anxiety:  excessive worry and nervous/overwhelmed  Panic Attack:  none  Trauma Related:  none  Dysregulation:  suicidal ideation with plan; without intent [details in Interim History] and can become agitated if beliefs are challenged.  Eating Disorder: no     Adverse Effects:  denies  Pertinent Negatives:  No violent ideation, self-injury, hallucinations and aggression  Recent Substance Use:  none reported    FAMILY and SOCIAL HISTORY             [1ea, 1ea]       patient reported                    FAMILY HX- Denied    Financial/ Work- family or friend       Partner/ - single  Children- no      Living situation- Group Home, previously living alternate weeks with mother and father.      Social/ Spiritual Support- mother, father, older brother, friend Rima     Legal- no  FEELS SAFE AT HOME- yes     Trauma History (self-report)- medical/life threatening illness  Early History/Education-  Grown up in [place], [place].  Parents are , and he shares time between his mother s and father s homes. Both parents are remarried.  Dad is a , and mom does  for a testing company.  Siblings include two full brothers (older brother Benitez who is a senior in college and younger brother Gamaliel), a step-brother, and a step-sister. Geo graduated from high school in Spring 2018.  Initially considered attending [employer] in Fall 2019, but reportedly lost interest due to the amount of time it would take him to complete courses and receive a degree.  Does today endorse continued interest in pursuing an advanced course of study at some  "point, specifically stating he is interested in Electrical Engineering.    Other- N/A     PSYCH and SUBSTANCE USE Critical Summary Points since July 2020         - September - increased trazodone to 150mg at bedtime prn for insomnia and depression and decreased sertraline to 100mg    MEDICAL HISTORY and ALLERGY     ALLERGIES: Blood transfusion related (informational only) and No known drug allergies     Neurologic Hx- See pertinent background, re: history of ependymoma and related chemo, radiation and tumor resection(s).  Notably has experienced neurological damage due to the above which has resulted in facial numbness, significant loss of mobility and dysarthria.  Has had neuropsychiatric evaluations 2/2016, 2/2017, 1/2019, and 10/2019.  The following is from the \"Results and Impressions\" section of his 10/29/2019 eval with Veronica Padilla, with a passage bolded that relates to noted difficulty in an area of executive functioning that facilitates the ability to see other people's perspectives:     \"Geo s attention was rated by his mother as well as himself as being adequate.  Executive functioning are those skills including planning, organization, emotional control, cognitive flexibility, and the ability to take another person s perspective.  Geo rating scale of these skills was basically in the average range for his age with a slight elevation in his ability to shift from one activity to another.  His mother s ratings of his executive functioning were in the average range, with the exception of a slight elevation in his ability to initiate tasks.  Direct testing of his ability to take another person s perspective indicated difficulty in this area.  He was asked to sort objects based on various aspects of the objects.  When he sorted the objects, he showed average performance.  However, when the examiner sorted the objects, Geo experienced significant difficulty with being able to determine how " "the objects were sorted.  This difficulty likely translates into difficulty with understanding another person s point of view.  Geo indicated that this difficulty becomes quite frustrating for him as he doesn't feel other people understand him--it is likely that he has difficulty understanding their perspective.     Emotionally Geo indicated that he continues to feel some difficulty with sadness but that it has improved over time and with medication.  Geo denied significant feelings of anxiety at this time while in the past these scores have been higher.  Parent ratings of Geo s emotional functioning indicated no areas of significant concern.  Interviews with Geo and his mother indicated continued feelings of sadness and difficulty in motivating himself to try to new activities.  While there is improvement in his mood, Geo continues to be at risk for depression.  Since he is now under the care of a psychiatrist, we did not interview around his feelings that his medical team was not being helpful to him.  His mother reported that these feelings continue and likely interfere with his compliance with directives.  He is participating in therapy to work on these issues and it is important that this intervention continue.\"    Patient Active Problem List   Diagnosis     GERD (gastroesophageal reflux disease)     Closed fracture at the growth plate of right distal fibula      Elevated serum creatinine     Dyspepsia     Intracranial hemorrhage (H)     Hemorrhagic stroke (H)     Ependymoma (H)     Admission for antineoplastic chemotherapy     Post-operative state     S/P craniotomy     S/P biopsy     Noncomitant strabismus     Abducens neuropathy of both eyes     CANDELARIO (obstructive sleep apnea)     Health Care Home     Hypernatremia     Body temperature low     Current chronic use of systemic steroids     Elevated TSH     Status post chemotherapy     Neoplasm of posterior cranial fossa (H)     Chronic " constipation     Serum albumin decreased     Closed compression fracture of thoracic vertebra with routine healing, subsequent encounter     Depression     Constipation     Constipation by delayed colonic transit     Slow transit constipation     Tubular adenoma         MEDICAL REVIEW OF SYSTEMS         [2, 10]   Pregnant or breastfeeding- no      Contraception- no    A comprehensive review of systems was performed and is negative other than noted in the HPI.    MEDICATIONS        Current Outpatient Medications   Medication Sig Dispense Refill     dexamethasone (DECADRON) 0.5 MG tablet TAKE ONE AND ONE-HALF TABLETS (0.75MG) BY MOUTH ONCE DAILY WITH BREAKFAST. 45 tablet 11      MG capsule        doxycycline hyclate (VIBRAMYCIN) 100 MG capsule Take 1 capsule (100 mg) by mouth 2 times daily 14 capsule 0     fexofenadine (ALLEGRA) 180 MG tablet TAKE 1 TABLET BY MOUTH EVERY EVENING. 30 tablet 11     Lactobacillus-Inulin ([facility]) CAPS Take 2 capsules by mouth daily 30 capsule 3     lamoTRIgine (LAMICTAL) 200 MG tablet Take 1 tablet (200 mg) by mouth At Bedtime 30 tablet 0     linaclotide (LINZESS) 290 MCG capsule Take 1 capsule (290 mcg) by mouth every morning (before breakfast) 30 capsule 3     mupirocin (BACTROBAN) 2 % external ointment Use three times a day PRN with skin lesions or open sores. 22 g 3     OLANZapine (ZYPREXA) 20 MG tablet Take 1 tablet (20 mg) by mouth At Bedtime 30 tablet 0     OLANZapine (ZYPREXA) 5 MG tablet Take 1 tab (5 mg) by mouth daily as needed for agitation. May take another dose after 60 minutes if not effective. 60 tablet 0     omeprazole (PRILOSEC) 20 MG DR capsule Take 2 capsules (40 mg) by mouth every morning 60 capsule 1     order for DME Equipment being ordered: Dressing  Wound #1 lower leg, W 6 cm x, D 0.5  cm, Drainage scant, Thickness sutured     Type: non stick gauze, Brand: , Size: 4/4   Change: 1 per day    Tape/Wrap: 1 each     attach facesheet with  "insurance information (delete this line) 5 each 0     order for DME Equipment being ordered: short boot 1 each 0     polyethylene glycol (MIRALAX) 17 GM/Dose powder Take 17 g (1 capful) by mouth 3 times daily 289 g 3     potassium phosphate, monobasic, (K-PHOS) 500 MG tablet Take 1 tablet (500 mg) by mouth 2 times daily 90 tablet 3     sennosides (SENOKOT) 8.6 MG tablet Take 1 tablet by mouth daily 30 tablet 4     sertraline (ZOLOFT) 100 MG tablet Take 1 tablet (100 mg) by mouth daily 30 tablet 0     sulfamethoxazole-trimethoprim (BACTRIM) 400-80 MG tablet Take 1 tablet by mouth 2 times daily On Saturdays and Sundays 24 tablet 11     traZODone (DESYREL) 150 MG tablet Take 1 tablet (150 mg) by mouth nightly as needed for sleep or depression 30 tablet 0     vitamin D3 (CHOLECALCIFEROL) 2000 units (50 mcg) tablet TAKE 1 TABLET BY MOUTH ONCE DAILY WITH BREAKFAST. 30 tablet 11     vitamin E (TOCOPHEROL) 400 units (360 mg) capsule TAKE 1 CAPSULE BY MOUTH ONCE DAILY. 30 capsule 11     VITALS                                                                [3, 3]   There were no vitals taken for this visit.   MENTAL STATUS EXAM                       [9, 14 cog gs]     Alertness: alert  and oriented  Appearance: casually groomed and patient has eye patch over right eye  Behavior/Demeanor: cooperative and pleasant, with good  eye contact   Speech: prominent dysarthria  Language: no obvious problem and dysarthria make it difficult to understand at times  Psychomotor: Mild palsy in bilateral upper extremities, fidgeting, and facial paralysis present.  Mood: \"pretty good\"  Affect: full range; congruent to: mood- yes, content- yes  Thought Process/Associations: unremarkable  Thought Content:  Reports suicidal ideation with plan; without intent [details in history] and delusions [details in history];  Denies violent ideation  Perception:  Reports none;  Denies auditory hallucinations and visual hallucinations  Insight: " poor  Judgment: good  Cognition: (6) oriented: time, person, and place  attention span: intact  concentration: intact  recent memory: intact  remote memory: intact  fund of knowledge: delayed  Gait and Station: N/A (telehealth)    LABS and DATA     PHQ9 TODAY = Not completed due to COVID 19 video visit  PHQ 8/19/2019 9/9/2019 11/15/2019   PHQ-9 Total Score 8 9 -   Q9: Thoughts of better off dead/self-harm past 2 weeks More than half the days More than half the days (No Data)       Recent Labs   Lab Test 10/03/20  1423 09/29/20 08/25/20 07/09/20  1409 07/09/20  1409 06/16/20   CR 1.13 0.96 1.13   < > 1.03 1.00  1.00   GFRESTIMATED >90  --   --   --  >90 >60    < > = values in this interval not displayed.     ANTIPSYCHOTIC LABS  [glu, A1C, lipids (rohit LDL), liver enzymes, WBC, ANEU, Hgb, plts]  q12 mo  Recent Labs   Lab Test 10/03/20  1423 09/29/20 08/25/20 08/11/20 02/26/19  1100 02/26/19  1100   GLC 98 77 85 76   < > 124*   A1C  --   --   --   --   --  5.1    < > = values in this interval not displayed.     Recent Labs   Lab Test 02/26/19  1100   CHOL 158   TRIG 236*   LDL 84   HDL 27*     Recent Labs   Lab Test 06/16/20 03/10/20  1338 03/03/20  1314 02/25/20  1413 02/18/20  0713   AST 17 21 29 42 27   ALT <10 18 23 24 22   ALKPHOS  --  115 110 126 149     Recent Labs   Lab Test 10/03/20  1423 09/29/20 08/25/20 08/11/20 07/09/20  1409 03/10/20  1338 03/10/20  1338 03/03/20  1314   WBC 7.3 6.5 4.0 4.5 5.5   < > 3.7* 3.4*   ANEU 4.6  --   --   --  2.2  --  1.9 1.4*   HGB 11.0* 10.4* 10.6* 10* 10.8*   < > 11.7* 11.4*   * 120 110 138 118*   < > 152 171    < > = values in this interval not displayed.     PSYCHOTROPIC DRUG INTERACTIONS     Increased risk of QTc Prolongation: olanzapine, sertraline, trazodone  Increased risk of lamotrigine toxicity (fatigue, sedation, confusion): lamotrigine, sertraline     MANAGEMENT:  Monitoring for adverse effects    RISK STATEMENT for SAFETY     Geo Hicks endorsed suicidal  "ideation. SUICIDE RISK ASSESSMENT-  Risk factors for self-harm: hopelessness, feels trapped and new/ worsening medical issue.  Mitigating factors: no h/o suicide attempt, no plan or intent, no access to lethal means, h/o seeking help , minimal substance use , good social support  , stable housing and stable finances.  The patient does not appear to be at imminent risk for self-harm, hospitalization is not recommended which the pt does not agree to. No hospitalization will be arranged. Based on degree of symptoms close psych follow-up was/were recommended which the pt does  agree to. Additional steps to minimize risk: anxiety/ insomnia mngmt.  Safety Plan placed in Pt Instructions: Yes.  Rate SI-Patient has chronic passive suicidal ideation, which primarily appears to be associated with his chronic medical conditions, current physical limitations, and frustration with how others treat him. I do not believe that a psychiatric hospitalization would be benefical and would not change those underlying stressors primarily responsible for his chronic SI..    DIAGNOSES                                           [m2, h3]    Delusional Disorder, persecutory type, first episode, currently in acute episode  Major Depressive Disorder, single episode, moderate    ASSESSMENT                                        [m2, h3]        Geo Hicks presents for medication management of paranoia, delusional thoughts, and depressed mood.  Meets with this writer via telemedicine with group home staff in accompaniment. Relates mood as \"pretty good\", principally noting feeling trapped and hopeless with his current situation, in part due to restrictions due to COVID. He continues to report passive suicidal ideation. Since the last appointment trazodone was increased, after team meeting, which Geo reports as helpful but now reports the belief that his doctors are withholding trazodone from him because they do not want him to sleep. Attempted to " "explore this idea and confront this belief during session today with limited success. I am unsure at this time if these are a separate belief/delusion or if it is consistent with his previously described beliefs/delusion that there is a medical conspiracy against him, which may go back further than cancer/constipation treatment to his childhood and influenza vaccine, believing that the doctors were attempting to kill him then too.     Over the last week he has also had several ED visits for hip and finger lacerations/infections which are currently being treated with antibiotics, thought the hip laceration is new the finger appears to have been an issue over the last month and it is possible that some of his increased agitation is secondary to pain/discomfort from this infection and may improve with treatment. Also over the last week increased his trazodone, after team meeting, and he has been doing well in that time. Therefore, we agreed to make no medication changes.    After our visit, this writer called his mother, who is his guardian, and touched base on this recommendation for pharmacotherapy and follow-up.     Future considerations could include:  - increase trazodone vs sertraline  - increase lamotrigine    [place]:  Indicates no medication misuse .    PLAN                                                            [m2, h3]                                               1) Meds  - Continue olanzapine 20 mg at bedtime  - Continue sertraline 100 mg daily  - Continue lamotrigine 200 mg daily  - Continue trazodone 150 mg at bedtime    2) Therapy-  Patient started a new therapist recently, father to get MERRITT from therapist for our clinic.    3) Next Due   Labs- Lipids and A1c due, pending COVID-19.  EKG- prn  Rating scales- AIMS due with next \"in-clinic\" visit    5) Referrals  No Referrals needed today    3) RTC: 4 weeks    4) Crisis Numbers:   Provided routinely in AVS     After hours:  " [phone]      TREATMENT RISK STATEMENT:  The risks, benefits, alternatives and potential adverse effects have been discussed and are understood by the pt. The pt understands the risks of using street drugs or alcohol. There are no medical contraindications, the pt agrees to treatment with the ability to do so. The pt knows to call the clinic for any problems or to access emergency care if needed.  Medical and substance use concerns are documented above.  Psychotropic drug interaction check was done, including changes made today.    PROVIDER: Jj Louis MD    Patient not staffed in clinic.  Note will be reviewed and signed by supervisor Dr. Marrero.  I did not see this patient directly. I have reviewed the documentation and I agree with the resident's plan of care.     Amy Marrero MD

## 2020-10-09 NOTE — PATIENT INSTRUCTIONS
Nice to see you again Geo, as we discussed:   1) continue current medications  2) schedule follow-up appointment in 4 weeks.    ------------------------------------------------------------------------    Thank you for coming to the PSYCHIATRY CLINIC.    Lab Testing:  If you had lab testing today and your results are reassuring or normal they will be mailed to you or sent through [facility] within 7 days. If the lab tests need quick action we will call you with the results. The phone number we will call with results is # [phone] (home) . If this is not the best number please call our clinic and change the number.    Medication Refills:  If you need any refills please call your pharmacy and they will contact us. Our fax number for refills is [phone]. Please allow three business for refill processing. If you need to  your refill at a new pharmacy, please contact the new pharmacy directly. The new pharmacy will help you get your medications transferred.     Scheduling:  If you have any concerns about today's visit or wish to schedule another appointment please call our office during normal business hours [phone] (8-5:00 M-F)    Contact Us:  Please call [phone] during business hours (8-5:00 M-F).  If after clinic hours, or on the weekend, please call  [phone].    Financial Assistance [phone]  [facility] Billing [phone]  Central Billing Office, [facility]: [phone]  [facility] Billing [phone]  Medical Records [phone]      MENTAL HEALTH CRISIS NUMBERS:  For a medical emergency please call  911 or go to the nearest ER.     [facility]:   [facility] -[phone]   Crisis Residence [facility] -[phone]   Walk-In Counseling Center [facility] -[phone]   COPE 24/7 [facility] Mobile Team -[phone] (adults)/[phone] (child)  CHILD: [facility] needs assessment team - [phone]      [facility] - [phone]    Walk-in counseling [facility] - [phone]   Walk-in counseling [facility] - [phone]   Crisis Residence [facility] - [phone]  Urgent [facility]-[phone] mobile unit/ 24/7 crisis line    National Crisis Numbers:   National Suicide Prevention Lifeline: 7-203-822-TALK (038-624-4468)  Poison Control Center - 8-543-665-5231  Front Up/resources for a list of additional resources (SOS)  Trans Lifeline a hotline for transgender people 7-001-521-6520  [employer] a hotline for LGBT youth 1-733.708.6901  Crisis Text Line: For any crisis 24/7   To: 648174  see www.crisistextline.org  - IF MAKING A CALL FEELS TOO HARD, send a text!         Again thank you for choosing PSYCHIATRY CLINIC and please let us know how we can best partner with you to improve you and your family's health.    You may be receiving a survey regarding this appointment. We would love to have your feedback, both positive and negative. The survey is done by an external company, so your answers are anonymous.

## 2020-10-09 NOTE — TELEPHONE ENCOUNTER
On 10/9/2020, at 1319, writer called patient at mobile to confirm Virtual Visit. Writer unable to make contact with patient. Writer left detailed voice message for callback. [phone], left as call back number. CORDELL Wheeler, EMT

## 2020-10-13 NOTE — PROGRESS NOTES
"Geo Hicks is a 19 year old male who is being evaluated via a billable video visit.      The patient has been notified of following:     \"This video visit will be conducted via a call between you and your physician/provider. We have found that certain health care needs can be provided without the need for an in-person physical exam.  This service lets us provide the care you need with a video conversation.  If a prescription is necessary we can send it directly to your pharmacy.  If lab work is needed we can place an order for that and you can then stop by our lab to have the test done at a later time.    Video visits are billed at different rates depending on your insurance coverage.  Please reach out to your insurance provider with any questions.    If during the course of the call the physician/provider feels a video visit is not appropriate, you will not be charged for this service.\"    Patient has given verbal consent for Video visit? Yes  How would you like to obtain your AVS? MyChart  If you are dropped from the video visit, the video invite should be resent to: Text to cell phone: [phone]  Will anyone else be joining your video visit? No      Danielle Hardin, EMT    "

## 2020-10-13 NOTE — LETTER
"  10/13/2020      RE: Geo Hicks  [address] [address]       Geo Hicks is a 19 year old male who is being evaluated via a billable video visit.      The patient has been notified of following:     \"This video visit will be conducted via a call between you and your physician/provider. We have found that certain health care needs can be provided without the need for an in-person physical exam.  This service lets us provide the care you need with a video conversation.  If a prescription is necessary we can send it directly to your pharmacy.  If lab work is needed we can place an order for that and you can then stop by our lab to have the test done at a later time.    Video visits are billed at different rates depending on your insurance coverage.  Please reach out to your insurance provider with any questions.    If during the course of the call the physician/provider feels a video visit is not appropriate, you will not be charged for this service.\"    Patient has given verbal consent for Video visit? Yes  How would you like to obtain your AVS? MyChart  If you are dropped from the video visit, the video invite should be resent to: Text to cell phone: [phone]  Will anyone else be joining your video visit? GRAHAM Bah      Geo Hicks is a 19 year old male who is being evaluated via a billable video visit.      The patient has been notified of following:     \"This video visit will be conducted via a call between you and your physician/provider. We have found that certain health care needs can be provided without the need for an in-person physical exam.  This service lets us provide the care you need with a video conversation.  If a prescription is necessary we can send it directly to your pharmacy.  If lab work is needed we can place an order for that and you can then stop by our lab to have the test done at a later time.    Video visits are billed at different rates " "depending on your insurance coverage.  Please reach out to your insurance provider with any questions.    If during the course of the call the physician/provider feels a video visit is not appropriate, you will not be charged for this service.\"    Patient has given verbal consent for Video visit? Yes    Video-Visit Details    Type of service:  Video Visit    Video Start Time: 1:31 PM  Video End Time: 1:58 PM    Originating Location (pt. 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  They look excellent. However, discussion with Dad reveals Geo had a finger injury that he was hiding from people and the tip of his finger got necrotic and a bit fell off.  He also said his wound is not healing well.  Geo has the finger wrapped today and holds it up for me.  He was seen for a hip wound check on October 3 due to concerns of it not healing well. The providers note a 6.5 cm laceration with sutures in place, surrounded by erythema, warmth, and expressible drainage, primarily from superior aspect of wound.  Additionally his Right hand: Ulceration / scabbed distal aspect of digit #4. He was cultured and started on antibiotics.  They note he did not meet criteria for sepsis and had a normal white count. he grew moderate growth Staphylococcus aureus which was susceptible to Doxycycline. He reports no pain with the current wounds. He is living in his group care facility. No recent illnesses. Ongoing concerns about his constipation although reports to me he has some diarrhea due to the antibiotics. He missed no doses " with his last cycle and tolerated it well.       Labs:    Reviewed from 10/03/20:  White count 7,300    Hgb 11.0  Platelets 132  ANC 4.6      Oncology History:    [id number]   6/26/15    Started Entinostat 1/9/17    DIAGNOSIS: EPENDYMOMA  AREAS TREATED: POST FOSSA TUMOR  DOSE: 5940 cGy   TYPE OF RADIATION GIVEN: with IMRT Tomotherapy   9/08/2015 to 10/26/15    Patient Active Problem List   Diagnosis     GERD (gastroesophageal reflux disease)     Closed fracture at the growth plate of right distal fibula      Elevated serum creatinine     Dyspepsia     Intracranial hemorrhage (H)     Hemorrhagic stroke (H)     Ependymoma (H)     Admission for antineoplastic chemotherapy     Post-operative state     S/P craniotomy     S/P biopsy     Noncomitant strabismus     Abducens neuropathy of both eyes     CANDELARIO (obstructive sleep apnea)     Health Care Home     Hypernatremia     Body temperature low     Current chronic use of systemic steroids     Elevated TSH     Status post chemotherapy     Neoplasm of posterior cranial fossa (H)     Chronic constipation     Serum albumin decreased     Closed compression fracture of thoracic vertebra with routine healing, subsequent encounter     Depression     Constipation     Constipation by delayed colonic transit     Slow transit constipation     Tubular adenoma     Current Outpatient Medications   Medication     dexamethasone (DECADRON) 0.5 MG tablet      MG capsule     doxycycline hyclate (VIBRAMYCIN) 100 MG capsule     fexofenadine (ALLEGRA) 180 MG tablet     Lactobacillus-Inulin ([facility] HEALTH & WELLNESS) CAPS     lamoTRIgine (LAMICTAL) 200 MG tablet     linaclotide (LINZESS) 290 MCG capsule     mupirocin (BACTROBAN) 2 % external ointment     OLANZapine (ZYPREXA) 20 MG tablet     OLANZapine (ZYPREXA) 5 MG tablet     omeprazole (PRILOSEC) 20 MG DR capsule     order for DME     order for DME     polyethylene glycol (MIRALAX) 17 GM/Dose powder     potassium phosphate, monobasic,  (K-PHOS) 500 MG tablet     sennosides (SENOKOT) 8.6 MG tablet     sertraline (ZOLOFT) 100 MG tablet     study - entinostat, IDS# [id number], 1 mg tablet     study - entinostat, IDS# 5050, 5 mg tablet     sulfamethoxazole-trimethoprim (BACTRIM) 400-80 MG tablet     traZODone (DESYREL) 150 MG tablet     vitamin D3 (CHOLECALCIFEROL) 2000 units (50 mcg) tablet     vitamin E (TOCOPHEROL) 400 units (360 mg) capsule     No current facility-administered medications for this visit.         Allergies   Allergen Reactions     Blood Transfusion Related (Informational Only) Swelling     Periorbital swelling post platelet transfusion     No Known Drug Allergies        Review Of Systems  Skin: positive for striae, Hip wound and finger wound. See HPI.   Eyes: positive for oculoplegia  Ears/Nose/Throat: negative  Respiratory: No shortness of breath, dyspnea on exertion, cough, or hemoptysis  Cardiovascular: negative  Gastrointestinal: constipation.  Genitourinary: negative  Musculoskeletal: negative  Neurologic: positive for  local weakness, speech problems, incoordination and behavior changes  Psychiatric: negative, anxiety, agitation and perseveration re: constipation.  Continues to be upset with providers for not provider cure.  Hematologic/Lymphatic/Immunologic: negative  Endocrine: negative    Physical Exam    GENERAL: Healthy, and alert.  EYES: Eyes grossly normal to inspection.  No discharge or erythema,patch in place over right eye. HENT: Normal cephalic, healing laceration on scalp.  External ears, nose and mouth without ulcers or lesions.  Occasional drooling  RESP: No audible wheeze, cough, or visible cyanosis.  No visible retractions or increased work of breathing.    SKIN: Scattered bruising. No significant rash, abnormal pigmentation or lesions. Finger is bandaged.  Hip wound not examined today.  NEURO: prominent dysarthria,   PSYCH: Mentation appears normal, affect normal/bright, judgement and insight intact, normal speech  and appearance well-groomed.    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      Plan:  Reviewed labs with Geo.   Discussed with Geo that we will hold his Entinostat due to wound infection.    He has follow-up later this week regarding the wounds.  Acknowledged his frustrations with his lack of abilities and not being able to walk.   Discussed that he may continue to experience some diarrhea until antibiotics are complete.   We will image again in 2 months.  We will order a new wheelchair for Geo.  We will meet again next week to consider starting his next cycle.  Notified his facility of the hold.  We will recheck counts.           ALAN Justin CNP

## 2020-10-16 NOTE — TELEPHONE ENCOUNTER
"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 Followed-up by e-mail to both ([email or web address]; [email or web address]). Christianne shared a little about Geo's history and indicated that in the last 2 weeks he has been more stable in the group home setting and with his behaviors. He continues to see a psychologist every week on Saturdays, which has been a helpful intervention for him. Christianne shared that the previous SW would often meet with Geo in clinic and mom would duck out so they could have a therapeutic intervention. Christianne feels it best for SW to meet Geo face-to-face at MRI apt in early December. She may not inform him yet that Karina left, but oriented him to my role as part of the care team. Christianne was open to processing how she is coping with Geo's medical status and their \"new norm\", welcomed ongoing, frequent outreach by SW.       INTERVENTION:     Supportive visit, engaging pt and family in adjustment counseling.   Provided family with introduction to available resources.    ASSESSMENT:     Mom/co-guardian, Christianne, easily engaged with SW over the phone. Mood appears stable. Appear to be coping with treatment and diagnosis well. Strong family support. Adequately connected to resources. Christianne asked appropriate questions and was grateful for the contact. She requested SW call and check-in every other " week and confirmed desire for follow-up e-mail recapping our conversation. SW included pt's dad/co-guardian, Eric, on that e-mail.       PLAN:     Social work will continue to assess needs and provide ongoing psychosocial support and access to resources.   Per mom's guidance, SW will meet patient for the first time during face-to-face encounter in early December when he returns for imaging.     GARCIA Kohli [facility] Coast HospitalSW  Peds Hem/Onc Social Work  Ph: [phone]  Pager: [phone]

## 2020-10-17 NOTE — PROGRESS NOTES
"Geo Hicks is a 19 year old male who is being evaluated via a billable video visit.      The patient has been notified of following:     \"This video visit will be conducted via a call between you and your physician/provider. We have found that certain health care needs can be provided without the need for an in-person physical exam.  This service lets us provide the care you need with a video conversation.  If a prescription is necessary we can send it directly to your pharmacy.  If lab work is needed we can place an order for that and you can then stop by our lab to have the test done at a later time.    Video visits are billed at different rates depending on your insurance coverage.  Please reach out to your insurance provider with any questions.    If during the course of the call the physician/provider feels a video visit is not appropriate, you will not be charged for this service.\"    Patient has given verbal consent for Video visit? Yes    Video-Visit Details    Type of service:  Video Visit    Video Start Time: 1:31 PM  Video End Time: 1:58 PM    Originating Location (pt. 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  They look excellent. However, discussion with Dad reveals Geo had a finger injury that he was hiding from people and the tip of his finger got necrotic and a bit fell off.  He also said his wound is not healing well.  Geo has the finger wrapped today and holds it up for me.  He was seen for a hip wound check on October 3 due to concerns of it not healing well. The providers " note a 6.5 cm laceration with sutures in place, surrounded by erythema, warmth, and expressible drainage, primarily from superior aspect of wound.  Additionally his Right hand: Ulceration / scabbed distal aspect of digit #4. He was cultured and started on antibiotics.  They note he did not meet criteria for sepsis and had a normal white count. he grew moderate growth Staphylococcus aureus which was susceptible to Doxycycline. He reports no pain with the current wounds. He is living in his group care facility. No recent illnesses. Ongoing concerns about his constipation although reports to me he has some diarrhea due to the antibiotics. He missed no doses with his last cycle and tolerated it well.       Labs:    Reviewed from 10/03/20:  White count 7,300    Hgb 11.0  Platelets 132  ANC 4.6      Oncology History:    [id number]   6/26/15    Started Entinostat 1/9/17    DIAGNOSIS: EPENDYMOMA  AREAS TREATED: POST FOSSA TUMOR  DOSE: 5940 cGy   TYPE OF RADIATION GIVEN: with IMRT Tomotherapy   9/08/2015 to 10/26/15    Patient Active Problem List   Diagnosis     GERD (gastroesophageal reflux disease)     Closed fracture at the growth plate of right distal fibula      Elevated serum creatinine     Dyspepsia     Intracranial hemorrhage (H)     Hemorrhagic stroke (H)     Ependymoma (H)     Admission for antineoplastic chemotherapy     Post-operative state     S/P craniotomy     S/P biopsy     Noncomitant strabismus     Abducens neuropathy of both eyes     CANDELARIO (obstructive sleep apnea)     Health Care Home     Hypernatremia     Body temperature low     Current chronic use of systemic steroids     Elevated TSH     Status post chemotherapy     Neoplasm of posterior cranial fossa (H)     Chronic constipation     Serum albumin decreased     Closed compression fracture of thoracic vertebra with routine healing, subsequent encounter     Depression     Constipation     Constipation by delayed colonic transit     Slow transit constipation      Tubular adenoma     Current Outpatient Medications   Medication     dexamethasone (DECADRON) 0.5 MG tablet      MG capsule     doxycycline hyclate (VIBRAMYCIN) 100 MG capsule     fexofenadine (ALLEGRA) 180 MG tablet     Lactobacillus-Inulin ([facility] HEALTH & WELLNESS) CAPS     lamoTRIgine (LAMICTAL) 200 MG tablet     linaclotide (LINZESS) 290 MCG capsule     mupirocin (BACTROBAN) 2 % external ointment     OLANZapine (ZYPREXA) 20 MG tablet     OLANZapine (ZYPREXA) 5 MG tablet     omeprazole (PRILOSEC) 20 MG DR capsule     order for DME     order for DME     polyethylene glycol (MIRALAX) 17 GM/Dose powder     potassium phosphate, monobasic, (K-PHOS) 500 MG tablet     sennosides (SENOKOT) 8.6 MG tablet     sertraline (ZOLOFT) 100 MG tablet     study - entinostat, IDS# 5050, 1 mg tablet     study - entinostat, IDS# 5050, 5 mg tablet     sulfamethoxazole-trimethoprim (BACTRIM) 400-80 MG tablet     traZODone (DESYREL) 150 MG tablet     vitamin D3 (CHOLECALCIFEROL) 2000 units (50 mcg) tablet     vitamin E (TOCOPHEROL) 400 units (360 mg) capsule     No current facility-administered medications for this visit.         Allergies   Allergen Reactions     Blood Transfusion Related (Informational Only) Swelling     Periorbital swelling post platelet transfusion     No Known Drug Allergies        Review Of Systems  Skin: positive for striae, Hip wound and finger wound. See HPI.   Eyes: positive for oculoplegia  Ears/Nose/Throat: negative  Respiratory: No shortness of breath, dyspnea on exertion, cough, or hemoptysis  Cardiovascular: negative  Gastrointestinal: constipation.  Genitourinary: negative  Musculoskeletal: negative  Neurologic: positive for  local weakness, speech problems, incoordination and behavior changes  Psychiatric: negative, anxiety, agitation and perseveration re: constipation.  Continues to be upset with providers for not provider cure.  Hematologic/Lymphatic/Immunologic: negative  Endocrine:  negative    Physical Exam    GENERAL: Healthy, and alert.  EYES: Eyes grossly normal to inspection.  No discharge or erythema,patch in place over right eye. HENT: Normal cephalic, healing laceration on scalp.  External ears, nose and mouth without ulcers or lesions.  Occasional drooling  RESP: No audible wheeze, cough, or visible cyanosis.  No visible retractions or increased work of breathing.    SKIN: Scattered bruising. No significant rash, abnormal pigmentation or lesions. Finger is bandaged.  Hip wound not examined today.  NEURO: prominent dysarthria,   PSYCH: Mentation appears normal, affect normal/bright, judgement and insight intact, normal speech and appearance well-groomed.    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      Plan:  Reviewed labs with Geo.   Discussed with Geo that we will hold his Entinostat due to wound infection.    He has follow-up later this week regarding the wounds.  Acknowledged his frustrations with his lack of abilities and not being able to walk.   Discussed that he may continue to experience some diarrhea until antibiotics are complete.   We will image again in 2 months.  We will order a new wheelchair for Geo.  We will meet again next week to consider starting his next cycle.  Notified his facility of the hold.  We will recheck counts.

## 2020-10-27 NOTE — LETTER
[facility] PEDS CNS TUMOR/NEUROFIBROMATOSIS      [facility]  9th [address]  [address] [place] [address]  Phone: [phone]     PHYSICIAN ORDERS      DATE & TIME ISSUED: 2020 2:22 PM  PATIENT NAME: Geo Hicks   : 2000  [facility] MR#: [id number]  DX: Ependymoma    Fax to : [phone]      Orders requested: Please resume Entinostat.- Take 1mg tablet with 5mg tablet for 6mg every 7 days for 4 weeks.      If questions please call: [phone]         Kristi Schuler CNP   Pediatric Neuro-oncology and Neurofibromatosis programs   [facility] Oncology

## 2020-10-27 NOTE — LETTER
[facility] PEDS CNS TUMOR/NEUROFIBROMATOSIS      [facility]  9th [address]  [address] [place] [address]  Phone: [phone]     PHYSICIAN ORDERS      DATE & TIME ISSUED: 2020 12:54 PM  PATIENT NAME: Geo Hicks   : 2000  [facility] MR#: [id number]  DX: Epndymoma      Orders requested: Please provide home physical therapy as he cannot leave his facility currently and needs services.   Please evaluate and treat this young man with ataxia related to his tumor, history of recent falls and injury.      If questions please call: Rhina at [phone]           Kristi Schuler [facility]   Pediatric Neuro-oncology and Neurofibromatosis programs   [facility] Pediatric Hematology Oncology

## 2020-10-27 NOTE — LETTER
"10/27/2020      RE: Geo Hicks  [address] [address] [place] [address]         The patient has been notified of following:     \"This video visit will be conducted via a call between you and your physician/provider. We have found that certain health care needs can be provided without the need for an in-person physical exam.  This service lets us provide the care you need with a video conversation.  If a prescription is necessary we can send it directly to your pharmacy.  If lab work is needed we can place an order for that and you can then stop by our lab to have the test done at a later time.    Video visits are billed at different rates depending on your insurance coverage.  Please reach out to your insurance provider with any questions.    If during the course of the call the physician/provider feels a video visit is not appropriate, you will not be charged for this service.\"    Patient has given verbal consent for Video visit? Yes    Video-Visit Details    Type of service:  Video Visit    Video Start Time: 1:49 PM  Video End Time: 2:29 PM    Originating Location (pt. 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  They are working to get them instituted again since he is unable to come to the clinic for labs due to his living situation.  Dad also checked further about the wheelchair.  Wheelchair provider that contract with [employer] needs his height and weight and type of care needed.  His finger injury is wrapped and healing well.  The health aide in the " room on the visit with us today reports there was a bit of redness on the finger during the last bandage change which the examining nurse had some concerns about.  However, it was shared via photographs with his hand doctor and she felt it was expected and antibiotics were not needed.  Geo reports there is some mild discomfort but doesn't really bother him. His hip wound is not longer draining.  Nursing sees him daily for dressing changes.  He completed antibiotic with Doxycycline.   He is living in his group care facility. They are having a Halloween party today which he notes is a lot of fun.  No recent illnesses. Ongoing concerns about his constipation although reports to me he has some diarrhea due to the antibiotics and so that was a little better. He missed no doses with his last cycle and tolerated it well. He is wondering about starting physical therapy. He also speaks privately with me about some of his thoughts.    No current fever or sign of current infection.       Labs:    Reviewed from 10/03/20:  White count 7,300    Hgb 11.0  Platelets 132  ANC 4.6      Oncology History:    [id number]   6/26/15    Started Entinostat 1/9/17    DIAGNOSIS: EPENDYMOMA  AREAS TREATED: POST FOSSA TUMOR  DOSE: 5940 cGy   TYPE OF RADIATION GIVEN: with IMRT Tomotherapy   9/08/2015 to 10/26/15    Patient Active Problem List   Diagnosis     GERD (gastroesophageal reflux disease)     Closed fracture at the growth plate of right distal fibula      Elevated serum creatinine     Dyspepsia     Intracranial hemorrhage (H)     Hemorrhagic stroke (H)     Ependymoma (H)     Admission for antineoplastic chemotherapy     Post-operative state     S/P craniotomy     S/P biopsy     Noncomitant strabismus     Abducens neuropathy of both eyes     CANDELARIO (obstructive sleep apnea)     Health Care Home     Hypernatremia     Body temperature low     Current chronic use of systemic steroids     Elevated TSH     Status post chemotherapy     Neoplasm of  posterior cranial fossa (H)     Chronic constipation     Serum albumin decreased     Closed compression fracture of thoracic vertebra with routine healing, subsequent encounter     Depression     Constipation     Constipation by delayed colonic transit     Slow transit constipation     Tubular adenoma     Current Outpatient Medications   Medication     dexamethasone (DECADRON) 0.5 MG tablet      MG capsule     doxycycline hyclate (VIBRAMYCIN) 100 MG capsule     fexofenadine (ALLEGRA) 180 MG tablet     Lactobacillus-Inulin ([facility] HEALTH & WELLNESS) CAPS     lamoTRIgine (LAMICTAL) 200 MG tablet     linaclotide (LINZESS) 290 MCG capsule     mupirocin (BACTROBAN) 2 % external ointment     OLANZapine (ZYPREXA) 20 MG tablet     OLANZapine (ZYPREXA) 5 MG tablet     omeprazole (PRILOSEC) 20 MG DR capsule     order for DME     order for DME     polyethylene glycol (MIRALAX) 17 GM/Dose powder     potassium phosphate, monobasic, (K-PHOS) 500 MG tablet     sennosides (SENOKOT) 8.6 MG tablet     sertraline (ZOLOFT) 100 MG tablet     study - entinostat, IDS# 5050, 1 mg tablet     study - entinostat, IDS# 5050, 5 mg tablet     sulfamethoxazole-trimethoprim (BACTRIM) 400-80 MG tablet     traZODone (DESYREL) 150 MG tablet     vitamin D3 (CHOLECALCIFEROL) 2000 units (50 mcg) tablet     vitamin E (TOCOPHEROL) 400 units (360 mg) capsule     No current facility-administered medications for this visit.         Allergies   Allergen Reactions     Blood Transfusion Related (Informational Only) Swelling     Periorbital swelling post platelet transfusion     No Known Drug Allergies        Review of Systems  Skin: positive for striae, Hip wound and finger wound. See HPI.   Eyes: positive for oculoplegia  Ears/Nose/Throat: negative  Respiratory: No shortness of breath, dyspnea on exertion, cough, or hemoptysis  Cardiovascular: negative  Gastrointestinal: constipation. He finished antibiotics on the 19th.    Genitourinary:  negative  Musculoskeletal: negative  Neurologic: positive for  local weakness, speech problems, incoordination and behavior changes  Psychiatric: negative, anxiety, agitation and perseveration re: constipation.  His outlook seems improved today.  Hematologic/Lymphatic/Immunologic: negative  Endocrine: negative    Physical Exam    GENERAL: Healthy, and alert.  EYES: Eyes grossly normal to inspection.  No discharge or erythema,patch in place over right eye. HENT: Normal cephalic.  External ears, nose and mouth without ulcers or lesions.    RESP: No audible wheeze, cough, or visible cyanosis.  No visible retractions or increased work of breathing.    SKIN: Scattered bruising. No significant rash, abnormal pigmentation or lesions.  Hip wound not examined today.  MSK: Right hand digit #4 wrapped. Hip not examined today.   NEURO: prominent dysarthria,    PSYCH: Mentation appears normal, affect normal/bright, judgement and insight intact, normal speech and appearance well-groomed.    Impression:  Ependymoma in good control on Entinostat    Right hip and finger healing well  Mental health issues appear some improved today.  Diarrhea secondary to antibiotics though Geo continues with concerns of constipation.      Plan:    Discussed with Geo that we are able to resume his Entinostat due to no sign of infection.     I am very pleased that he is willing to try Physical Therapy again.  I will work with his primary and home care to see if we can start in home services.  I think it is very important for him.  Discussed his constipation.  We will image again in 2 months.  We will order a new wheelchair for Geo.  Dad notes that the type of chair needed is foldable chair with cushion seat and supportive back.   If he is able to see PT - we will ask for specific recommendations.   Notified his facility of resuming drug    We will recheck counts as soon as we are able to. We will also get a careful height and weight if  possible during a nurse or PT visit.      Addendum:    We were able to get counts on 10/29/20  Component      Latest Ref Rng & Units 10/30/2020   WBC      4.0 - 11.0 10e9/L 5.5   RBC Count      4.4 - 5.9 10e12/L 4.46   Hemoglobin      13.3 - 17.7 g/dL 10.8 (L)   Hematocrit      40.0 - 53.0 % 35.7 (L)   MCV      78 - 100 fl 80   MCH      26.5 - 33.0 pg 24.2 (L)   MCHC      31.5 - 36.5 g/dL 30.3 (L)   RDW      10.0 - 15.0 % 16.1 (H)   Platelet Count      150 - 450 10e9/L 163   % Neutrophils      % 56.4   % Lymphocytes      % 26.0   % Monocytes      % 7.4   % Eosinophils      % 9.8   % Basophils      % 0.4   Absolute Neutrophil      1.6 - 8.3 10e9/L 3.1   Absolute Lymphocytes      0.8 - 5.3 10e9/L 1.4   Absolute Monocytes      0.0 - 1.3 10e9/L 0.4   Absolute Eosinophils      0.0 - 0.7 10e9/L 0.5   Absolute Basophils      0.0 - 0.2 10e9/L 0.0   Diff Method       Automated Method             Kristi Schuler, ALAN CNP

## 2020-10-27 NOTE — PROGRESS NOTES
"  The patient has been notified of following:     \"This video visit will be conducted via a call between you and your physician/provider. We have found that certain health care needs can be provided without the need for an in-person physical exam.  This service lets us provide the care you need with a video conversation.  If a prescription is necessary we can send it directly to your pharmacy.  If lab work is needed we can place an order for that and you can then stop by our lab to have the test done at a later time.    Video visits are billed at different rates depending on your insurance coverage.  Please reach out to your insurance provider with any questions.    If during the course of the call the physician/provider feels a video visit is not appropriate, you will not be charged for this service.\"    Patient has given verbal consent for Video visit? Yes    Video-Visit Details    Type of service:  Video Visit    Video Start Time: 1:49 PM  Video End Time: 2:29 PM    Originating Location (pt. 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  They are working to get them instituted again since he is unable to come to the clinic for labs due to his living situation.  Dad also checked further about the wheelchair.  Wheelchair provider that contract with [facility] needs his height and weight and type of care needed.  His finger injury is wrapped and healing well.  The health aide in the room on the visit with us today reports there was a bit of redness on the finger " during the last bandage change which the examining nurse had some concerns about.  However, it was shared via photographs with his hand doctor and she felt it was expected and antibiotics were not needed.  Geo reports there is some mild discomfort but doesn't really bother him. His hip wound is not longer draining.  Nursing sees him daily for dressing changes.  He completed antibiotic with Doxycycline.   He is living in his group care facility. They are having a Halloween party today which he notes is a lot of fun.  No recent illnesses. Ongoing concerns about his constipation although reports to me he has some diarrhea due to the antibiotics and so that was a little better. He missed no doses with his last cycle and tolerated it well. He is wondering about starting physical therapy. He also speaks privately with me about some of his thoughts.    No current fever or sign of current infection.       Labs:    Reviewed from 10/03/20:  White count 7,300    Hgb 11.0  Platelets 132  ANC 4.6      Oncology History:    [id number]   6/26/15    Started Entinostat 1/9/17    DIAGNOSIS: EPENDYMOMA  AREAS TREATED: POST FOSSA TUMOR  DOSE: 5940 cGy   TYPE OF RADIATION GIVEN: with IMRT Tomotherapy   9/08/2015 to 10/26/15    Patient Active Problem List   Diagnosis     GERD (gastroesophageal reflux disease)     Closed fracture at the growth plate of right distal fibula      Elevated serum creatinine     Dyspepsia     Intracranial hemorrhage (H)     Hemorrhagic stroke (H)     Ependymoma (H)     Admission for antineoplastic chemotherapy     Post-operative state     S/P craniotomy     S/P biopsy     Noncomitant strabismus     Abducens neuropathy of both eyes     CANDELARIO (obstructive sleep apnea)     Health Care Home     Hypernatremia     Body temperature low     Current chronic use of systemic steroids     Elevated TSH     Status post chemotherapy     Neoplasm of posterior cranial fossa (H)     Chronic constipation     Serum albumin decreased      Closed compression fracture of thoracic vertebra with routine healing, subsequent encounter     Depression     Constipation     Constipation by delayed colonic transit     Slow transit constipation     Tubular adenoma     Current Outpatient Medications   Medication     dexamethasone (DECADRON) 0.5 MG tablet      MG capsule     doxycycline hyclate (VIBRAMYCIN) 100 MG capsule     fexofenadine (ALLEGRA) 180 MG tablet     Lactobacillus-Inulin ([facility] HEALTH & WELLNESS) CAPS     lamoTRIgine (LAMICTAL) 200 MG tablet     linaclotide (LINZESS) 290 MCG capsule     mupirocin (BACTROBAN) 2 % external ointment     OLANZapine (ZYPREXA) 20 MG tablet     OLANZapine (ZYPREXA) 5 MG tablet     omeprazole (PRILOSEC) 20 MG DR capsule     order for DME     order for DME     polyethylene glycol (MIRALAX) 17 GM/Dose powder     potassium phosphate, monobasic, (K-PHOS) 500 MG tablet     sennosides (SENOKOT) 8.6 MG tablet     sertraline (ZOLOFT) 100 MG tablet     study - entinostat, IDS# 5050, 1 mg tablet     study - entinostat, IDS# 5050, 5 mg tablet     sulfamethoxazole-trimethoprim (BACTRIM) 400-80 MG tablet     traZODone (DESYREL) 150 MG tablet     vitamin D3 (CHOLECALCIFEROL) 2000 units (50 mcg) tablet     vitamin E (TOCOPHEROL) 400 units (360 mg) capsule     No current facility-administered medications for this visit.         Allergies   Allergen Reactions     Blood Transfusion Related (Informational Only) Swelling     Periorbital swelling post platelet transfusion     No Known Drug Allergies        Review of Systems  Skin: positive for striae, Hip wound and finger wound. See HPI.   Eyes: positive for oculoplegia  Ears/Nose/Throat: negative  Respiratory: No shortness of breath, dyspnea on exertion, cough, or hemoptysis  Cardiovascular: negative  Gastrointestinal: constipation. He finished antibiotics on the 19th.    Genitourinary: negative  Musculoskeletal: negative  Neurologic: positive for  local weakness, speech  problems, incoordination and behavior changes  Psychiatric: negative, anxiety, agitation and perseveration re: constipation.  His outlook seems improved today.  Hematologic/Lymphatic/Immunologic: negative  Endocrine: negative    Physical Exam    GENERAL: Healthy, and alert.  EYES: Eyes grossly normal to inspection.  No discharge or erythema,patch in place over right eye. HENT: Normal cephalic.  External ears, nose and mouth without ulcers or lesions.    RESP: No audible wheeze, cough, or visible cyanosis.  No visible retractions or increased work of breathing.    SKIN: Scattered bruising. No significant rash, abnormal pigmentation or lesions.  Hip wound not examined today.  MSK: Right hand digit #4 wrapped. Hip not examined today.   NEURO: prominent dysarthria,    PSYCH: Mentation appears normal, affect normal/bright, judgement and insight intact, normal speech and appearance well-groomed.    Impression:  Ependymoma in good control on Entinostat    Right hip and finger healing well  Mental health issues appear some improved today.  Diarrhea secondary to antibiotics though Geo continues with concerns of constipation.      Plan:    Discussed with Geo that we are able to resume his Entinostat due to no sign of infection.     I am very pleased that he is willing to try Physical Therapy again.  I will work with his primary and home care to see if we can start in home services.  I think it is very important for him.  Discussed his constipation.  We will image again in 2 months.  We will order a new wheelchair for Geo.  Dad notes that the type of chair needed is foldable chair with cushion seat and supportive back.   If he is able to see PT - we will ask for specific recommendations.   Notified his facility of resuming drug    We will recheck counts as soon as we are able to. We will also get a careful height and weight if possible during a nurse or PT visit.      Addendum:    We were able to get counts on  10/29/20  Component      Latest Ref Rng & Units 10/30/2020   WBC      4.0 - 11.0 10e9/L 5.5   RBC Count      4.4 - 5.9 10e12/L 4.46   Hemoglobin      13.3 - 17.7 g/dL 10.8 (L)   Hematocrit      40.0 - 53.0 % 35.7 (L)   MCV      78 - 100 fl 80   MCH      26.5 - 33.0 pg 24.2 (L)   MCHC      31.5 - 36.5 g/dL 30.3 (L)   RDW      10.0 - 15.0 % 16.1 (H)   Platelet Count      150 - 450 10e9/L 163   % Neutrophils      % 56.4   % Lymphocytes      % 26.0   % Monocytes      % 7.4   % Eosinophils      % 9.8   % Basophils      % 0.4   Absolute Neutrophil      1.6 - 8.3 10e9/L 3.1   Absolute Lymphocytes      0.8 - 5.3 10e9/L 1.4   Absolute Monocytes      0.0 - 1.3 10e9/L 0.4   Absolute Eosinophils      0.0 - 0.7 10e9/L 0.5   Absolute Basophils      0.0 - 0.2 10e9/L 0.0   Diff Method       Automated Method

## 2020-10-29 NOTE — LETTER
Date:November 2, 2020      Provider requested that no letter be sent. Do not send.       [facility] Health Information       Patient

## 2020-10-29 NOTE — PROGRESS NOTES
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    SW has been in consultation with Hem/Onc care team re: new research guidelines that indicate any patient with a legal guardian is required to have guardianship paperwork that clearly states (or is amended to state) they are authorized to consent for research studies (including experimental treatment of any kind) in addition to medical treatment generally.\"    After reviewing legal documents on file, the guardianship paperwork will need to be amended to include this language, in the anticipated case of ongoing required consents. Reviewed this with dad, Eric, over the phone this morning. He acknowledged that Rhina had mentioned this, but he had yet to follow-up. He validated they could get this done and he has employee legal assistance program through his work he could engage for help. I indicated a legal resource available should he request (Cancer [employer]).    INTERVENTION:     Provided family with resourcess    ASSESSMENT:     Parents continue to easily engage with SW. Over the phone, dad's mood appears stable. He was responsive and felt confident in his ability to engage the court system for current legal needs. Strong family support. Adequately connected to resources.     PLAN:     Social work will continue to assess needs and provide ongoing psychosocial support and access to resources.   Plan on meeting patient face-to-face during Dec. 2020 clinic visit    GARCIA Kohli [facility] Hospital[facility]  Peds Hem/Onc Social Work  Ph: [phone]  Pager: [phone]    NO LETTER              "

## 2020-10-29 NOTE — LETTER
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    SW has been in consultation with Hem/Onc care team re: new research guidelines that indicate any patient with a legal guardian is required to have guardianship paperwork that clearly states (or is amended to state) they are authorized to consent for research studies (including experimental treatment of any kind) in addition to medical treatment generally.\"    After reviewing legal documents on file, the guardianship paperwork will need to be amended to include this language, in the anticipated case of ongoing required consents. Reviewed this with dad, Eric, over the phone this morning. He acknowledged that Rhina had mentioned this, but he had yet to follow-up. He validated they could get this done and he has employee legal assistance program through his work he could engage for help. I indicated a legal resource available should he request (Cancer Legal Care).    INTERVENTION:     Provided family with resourcess    ASSESSMENT:     Parents continue to easily engage with RYLAND. Over the phone, carly's mood appears stable. He was responsive and felt confident in his ability to engage the court system for current legal needs. Strong family support. Adequately connected to resources.     PLAN:     Social work will continue to assess needs and provide ongoing psychosocial support and access to resources.   Plan on meeting patient face-to-face during Dec. 2020 clinic visit    GARCIA Kohli LICSW  Peds Hem/Onc Social " Work  Ph: [phone]  Pager: [phone]    NO LETTER                  GARCIA Palomino

## 2020-11-05 NOTE — TELEPHONE ENCOUNTER
Homecare Orders Requested:     Kylie at Phone Number [facility]  from [phone] Homecare Agency called to request orders for the following Services:    Skilled Nursing for:6 times a week x 1 week and 1 time a week x 3 weeks, PT eval and treat    Verbal orders provided, OLIVIA to NAI Jansen RN, BSN  Message handled by CLINIC NURSE.

## 2020-11-13 NOTE — PROGRESS NOTES
"Video- Visit Details  Type of service:  video visit for medication management  Time of service:    Date:  11/13/2020    Video Start Time:  10:18 AM        Video End Time:   10:47 AM    Reason for video visit:  Patient unable to travel due to Covid-19  Originating Site (patient location):  [facility]   Location- detention  Distant Site (provider location):  Remote location  Mode of Communication:  Video Conference via [email or web address]  Consent:  Patient has given verbal consent for video visit?: Yes         [facility]  Psychiatry Clinic  PSYCHIATRIC PROGRESS NOTE     CARE TEAM:    PCP- Jeffrey Espinoza     Specialty Providers- Oncology, Neuropsychology, Physical Therapy, Occupational Therapy      Therapist- most recently Manju Pink at [facility] in [facility] Team- no.      Geo Hicks is a 20 year old patient who prefers the name Geo and pronouns he, him, his.     PERTINENT BACKGROUND                [last transfer eval 07/10/20]     Psych critical item history includes suicidal ideation, psychosis [sxs include delusions] and psych hosp (<3)    INTERIM HISTORY                                 [4, 4]   History was provided by the patient and group home staff who was a good historian. Treatment adherence is good.  Since the last visit:   - \"I'm doing good\" today.  - Since last appointment \"I think I'm doing good\"  - Cites primary reason due to \"the doctors have been trying to help me recently\", which he did not believe at his previous appointment (primarily around sleep and constipation).  - Now notes \"trazodone helps me sleep but it takes a long time to work.\"  - Probably takes 15 minutes to \"rest\" and \"sometimes feels like hours to sleep.\" He does acknowledges that he doesn't really know how long it actually takes him to fall asleep.   - He reports less depressed mood, feeling hopeless and trapped, suicidal ideation, anxiety, nervousness, and feeling overwhelmed than at " "last appointment or any previous appointment I've had with him.  - When asked if he thinks that he's doing well he replied, \"I would say so\" that he's doing a lot better \"because doctors are actually trying to help me.\"    Christianne and Eric joined us for the call:  - Christianne reports that he has been \"doing pretty well over the last couple months.\" Haven't been able to visit much recently due to parents with a \"COVID scare\" and the \"group home now under lock down.\" But that they have been able to get out to an appointment and they have not had any issues requiring the group home to call them.  -- Christianne reported concern that Geo had been eating a significant about of food resulting in vomiting at times since his last appointment, however he did not report this today.  - Eric reports that he has been able to have \"good conversations over the phone and in person\".     RECENT PSYCH ROS:   Depression:  denies, as above  Elevated:  none  Psychosis:  delusions- Continues to be perseverative about doctors withholding treatment from him (though this has significantly improved in the last month). [details in Interim History]  Anxiety: denies excessive worry and nervous/overwhelmed  Panic Attack:  none  Trauma Related:  none  Dysregulation:  none  Eating Disorder: no     Adverse Effects:  denies  Pertinent Negatives:  No suicidal ideation, violent ideation, self-injury, hallucinations and aggression  Recent Substance Use:  none reported    FAMILY and SOCIAL HISTORY             [1ea, 1ea]       patient reported                    FAMILY HX- Denied    Financial/ Work- family or friend       Partner/ - single  Children- no      Living situation- Group Home, previously living alternate weeks with mother and father.      Social/ Spiritual Support- mother, father, older brother, friend Rima     Legal- no  FEELS SAFE AT HOME- yes     Trauma History (self-report)- medical/life threatening illness  Early History/Education-  Grown up " "in [place], [place].  Parents are , and he shares time between his mother s and father s homes. Both parents are remarried.  Dad is a , and mom does  for a testing company.  Siblings include two full brothers (older brother Benitez who is a senior in college and younger brother Gamaliel), a step-brother, and a step-sister. Geo graduated from high school in Spring 2018.  Initially considered attending [employer] in Fall 2019, but reportedly lost interest due to the amount of time it would take him to complete courses and receive a degree.   Other- N/A     PSYCH and SUBSTANCE USE Critical Summary Points since July 2020         - September - increased trazodone to 150mg at bedtime prn for insomnia and depression and decreased sertraline to 100mg    MEDICAL HISTORY and ALLERGY     ALLERGIES: Blood transfusion related (informational only) and No known drug allergies     Neurologic Hx- See pertinent background, re: history of ependymoma and related chemo, radiation and tumor resection(s).  Notably has experienced neurological damage due to the above which has resulted in facial numbness, significant loss of mobility and dysarthria.  Has had neuropsychiatric evaluations 2/2016, 2/2017, 1/2019, and 10/2019.  The following is from the \"Results and Impressions\" section of his 10/29/2019 eval with Veronica Padilla, with a passage bolded that relates to noted difficulty in an area of executive functioning that facilitates the ability to see other people's perspectives:     \"Goe s attention was rated by his mother as well as himself as being adequate.  Executive functioning are those skills including planning, organization, emotional control, cognitive flexibility, and the ability to take another person s perspective.  Geo rating scale of these skills was basically in the average range for his age with a slight elevation in his ability to shift from one activity " "to another.  His mother s ratings of his executive functioning were in the average range, with the exception of a slight elevation in his ability to initiate tasks.  Direct testing of his ability to take another person s perspective indicated difficulty in this area.  He was asked to sort objects based on various aspects of the objects.  When he sorted the objects, he showed average performance.  However, when the examiner sorted the objects, Geo experienced significant difficulty with being able to determine how the objects were sorted.  This difficulty likely translates into difficulty with understanding another person s point of view.  Geo indicated that this difficulty becomes quite frustrating for him as he doesn't feel other people understand him--it is likely that he has difficulty understanding their perspective.     Emotionally Geo indicated that he continues to feel some difficulty with sadness but that it has improved over time and with medication.  Geo denied significant feelings of anxiety at this time while in the past these scores have been higher.  Parent ratings of Geo s emotional functioning indicated no areas of significant concern.  Interviews with Geo and his mother indicated continued feelings of sadness and difficulty in motivating himself to try to new activities.  While there is improvement in his mood, Geo continues to be at risk for depression.  Since he is now under the care of a psychiatrist, we did not interview around his feelings that his medical team was not being helpful to him.  His mother reported that these feelings continue and likely interfere with his compliance with directives.  He is participating in therapy to work on these issues and it is important that this intervention continue.\"    Patient Active Problem List   Diagnosis     GERD (gastroesophageal reflux disease)     Closed fracture at the growth plate of right distal fibula      Elevated serum " creatinine     Dyspepsia     Intracranial hemorrhage (H)     Hemorrhagic stroke (H)     Ependymoma (H)     Admission for antineoplastic chemotherapy     Post-operative state     S/P craniotomy     S/P biopsy     Noncomitant strabismus     Abducens neuropathy of both eyes     CANDELARIO (obstructive sleep apnea)     Health Care Home     Hypernatremia     Body temperature low     Current chronic use of systemic steroids     Elevated TSH     Status post chemotherapy     Neoplasm of posterior cranial fossa (H)     Chronic constipation     Serum albumin decreased     Closed compression fracture of thoracic vertebra with routine healing, subsequent encounter     Depression     Constipation     Constipation by delayed colonic transit     Slow transit constipation     Tubular adenoma         MEDICAL REVIEW OF SYSTEMS         [2, 10]   Contraception- no    A comprehensive review of systems was performed and is negative other than noted in the HPI.    MEDICATIONS        Current Outpatient Medications   Medication Sig Dispense Refill     dexamethasone (DECADRON) 0.5 MG tablet TAKE ONE AND ONE-HALF TABLETS (0.75MG) BY MOUTH ONCE DAILY WITH BREAKFAST. 45 tablet 11      MG capsule        doxycycline hyclate (VIBRAMYCIN) 100 MG capsule Take 1 capsule (100 mg) by mouth 2 times daily 14 capsule 0     fexofenadine (ALLEGRA) 180 MG tablet TAKE 1 TABLET BY MOUTH EVERY EVENING. 30 tablet 11     Lactobacillus-Inulin ([facility]) CAPS Take 2 capsules by mouth daily 30 capsule 3     lamoTRIgine (LAMICTAL) 200 MG tablet Take 1 tablet (200 mg) by mouth At Bedtime 30 tablet 1     linaclotide (LINZESS) 290 MCG capsule Take 1 capsule (290 mcg) by mouth every morning (before breakfast) 30 capsule 3     mupirocin (BACTROBAN) 2 % external ointment Use three times a day PRN with skin lesions or open sores. 22 g 3     OLANZapine (ZYPREXA) 20 MG tablet Take 1 tablet (20 mg) by mouth At Bedtime 30 tablet 1     OLANZapine (ZYPREXA) 5  MG tablet Take 1 tab (5 mg) by mouth daily as needed for agitation. May take another dose after 60 minutes if not effective. 60 tablet 0     omeprazole (PRILOSEC) 20 MG DR capsule Take 2 capsules (40 mg) by mouth every morning 60 capsule 1     order for DME Equipment being ordered: Dressing  Wound #1 lower leg, W 6 cm x, D 0.5  cm, Drainage scant, Thickness sutured     Type: non stick gauze, Brand: , Size: 4/4   Change: 1 per day    Tape/Wrap: 1 each     attach facesheet with insurance information (delete this line) 5 each 0     order for DME Equipment being ordered: short boot 1 each 0     polyethylene glycol (MIRALAX) 17 GM/Dose powder Take 17 g (1 capful) by mouth 3 times daily 289 g 3     potassium phosphate, monobasic, (K-PHOS) 500 MG tablet Take 1 tablet (500 mg) by mouth 2 times daily 90 tablet 3     sennosides (SENOKOT) 8.6 MG tablet Take 1 tablet by mouth daily 30 tablet 4     sertraline (ZOLOFT) 100 MG tablet Take 1 tablet (100 mg) by mouth daily 30 tablet 1     sulfamethoxazole-trimethoprim (BACTRIM) 400-80 MG tablet Take 1 tablet by mouth 2 times daily On Saturdays and Sundays 24 tablet 11     traZODone (DESYREL) 150 MG tablet Take 1 tablet (150 mg) by mouth nightly as needed for sleep or depression 30 tablet 1     vitamin D3 (CHOLECALCIFEROL) 2000 units (50 mcg) tablet TAKE 1 TABLET BY MOUTH ONCE DAILY WITH BREAKFAST. 30 tablet 11     vitamin E (TOCOPHEROL) 400 units (360 mg) capsule TAKE 1 CAPSULE BY MOUTH ONCE DAILY. 30 capsule 11     VITALS                                                                [3, 3]   There were no vitals taken for this visit.   MENTAL STATUS EXAM                       [9, 14 cog gs]     Alertness: alert  and oriented  Appearance: casually groomed  Behavior/Demeanor: cooperative and pleasant, with good  eye contact   Speech: prominent dysarthria  Language: no obvious problem and dysarthria make it difficult to understand at times  Psychomotor: Mild palsy in bilateral upper  "extremities, fidgeting, and facial paralysis present.  Mood: \"I'm okay\"  Affect: full range; congruent to: mood- yes, content- yes  Thought Process/Associations: unremarkable  Thought Content:  Reports none;  Denies suicidal & violent ideation and delusions  Perception:  Reports none;  Denies auditory hallucinations and visual hallucinations  Insight: gaining/ maintaining insight is challenging  Judgment: good  Cognition: (6) oriented: time, person, and place  attention span: intact  concentration: intact  recent memory: intact  remote memory: intact  fund of knowledge: delayed  Gait and Station: N/A (telehealth)    LABS and DATA     PHQ9 TODAY = Not completed due to COVID 19 video visit  PHQ 8/19/2019 9/9/2019 11/15/2019   PHQ-9 Total Score 8 9 -   Q9: Thoughts of better off dead/self-harm past 2 weeks More than half the days More than half the days (No Data)       Recent Labs   Lab Test 10/30/20  1230 10/03/20  1423 09/29/20 07/09/20  1409 07/09/20  1409   CR 1.08 1.13 0.96   < > 1.03   GFRESTIMATED >90 >90  --   --  >90    < > = values in this interval not displayed.     ANTIPSYCHOTIC LABS  [glu, A1C, lipids (rohit LDL), liver enzymes, WBC, ANEU, Hgb, plts]  q12 mo  Recent Labs   Lab Test 10/30/20  1230 10/03/20  1423 09/29/20 08/25/20 02/26/19  1100 02/26/19  1100   * 98 77 85   < > 124*   A1C  --   --   --   --   --  5.1    < > = values in this interval not displayed.     Recent Labs   Lab Test 02/26/19  1100   CHOL 158   TRIG 236*   LDL 84   HDL 27*     Recent Labs   Lab Test 10/30/20  1230 06/16/20 03/10/20  1338 03/03/20  1314 02/25/20  1413   AST 24 17 21 29 42   ALT 29 <10 18 23 24   ALKPHOS 95  --  115 110 126     Recent Labs   Lab Test 10/30/20  1230 10/03/20  1423 09/29/20 08/25/20 07/09/20  1409 07/09/20  1409 03/10/20  1338 03/10/20  1338   WBC 5.5 7.3 6.5 4.0   < > 5.5   < > 3.7*   ANEU 3.1 4.6  --   --   --  2.2  --  1.9   HGB 10.8* 11.0* 10.4* 10.6*   < > 10.8*   < > 11.7*    132* 120 " 110   < > 118*   < > 152    < > = values in this interval not displayed.     PSYCHOTROPIC DRUG INTERACTIONS     Increased risk of QTc Prolongation: olanzapine, sertraline, trazodone  Increased risk of lamotrigine toxicity (fatigue, sedation, confusion): lamotrigine, sertraline     MANAGEMENT:  Monitoring for adverse effects    RISK STATEMENT for SAFETY     Geo Hicks previously endorsed suicidal ideation. SUICIDE RISK ASSESSMENT-  Risk factors for self-harm: hopelessness, feels trapped and new/ worsening medical issue.  Mitigating factors: no h/o suicide attempt, no plan or intent, no access to lethal means, h/o seeking help , minimal substance use , good social support  , stable housing and stable finances.  The patient does not appear to be at imminent risk for self-harm, hospitalization is not recommended which the pt does not agree to. No hospitalization will be arranged. Based on degree of symptoms close psych follow-up was/were recommended which the pt does  agree to. Additional steps to minimize risk: anxiety/ insomnia mngmt.  Safety Plan placed in Pt Instructions: Yes.  Rate SI-Patient has chronic passive suicidal ideation, which primarily appears to be associated with his chronic medical conditions, current physical limitations, and frustration with how others treat him. I do not believe that a psychiatric hospitalization would be benefical and would not change those underlying stressors primarily responsible for his chronic SI..    DIAGNOSES                                           [m2, h3]    Delusional Disorder, persecutory type, first episode  Major Depressive Disorder, single episode, moderate    ASSESSMENT                                        [m2, h3]        Geo Kurt presents for medication management of paranoia, delusional thoughts, and depressed mood.  Meets with this writer via telemedicine with parents also joining the visit today. Reports significantly improved mood and anxiety since  "last appointment, which he attributes primarily to the fact that \"doctors are actually trying to help me.\" Sleep primarily has been one target that he feels has been helped with trazodone, though it still can take a while to fall asleep. Praised Geo for how well he has been handling the recent increase in lock down and the challenges and isolation COVID restrictions have created. Family had no additional concerns today and also feel like overall Geo has been doing well, they have been able to have \"good conversations\" with him despite the limited in-person contact due to COVID and group home now being \"under lockdown.\" Since increase in trazodone he has been doing better overall and therefore, we agreed to make no medication changes at this time.    [place]NPMP was checked today:  Indicates no medication misuse .    PLAN                                                            [m2, h3]                                               1) Meds  - Continue olanzapine 20mg at bedtime  - Continue olanzapine 5mg as needed for severe agitation/anxiety, can take second dose 60 minutes after if ineffective.  - Continue sertraline 100mg daily  - Continue lamotrigine 200mg daily  - Continue trazodone 150mg at bedtime    2) Therapy-  Patient started a new therapist recently, father to get MERRITT from therapist for our clinic.    3) Next Due   Labs- Lipids and A1c due, pending COVID-19.  EKG- prn  Rating scales- AIMS due with next \"in-clinic\" visit    5) Referrals  No Referrals needed today    3) RTC: 4-6 weeks    4) Crisis Numbers:   Provided routinely in AVS     After hours:  [phone]      TREATMENT RISK STATEMENT:  The risks, benefits, alternatives and potential adverse effects have been discussed and are understood by the pt. The pt understands the risks of using street drugs or alcohol. There are no medical contraindications, the pt agrees to treatment with the ability to do so. The pt knows to call the clinic for any " problems or to access emergency care if needed.  Medical and substance use concerns are documented above.  Psychotropic drug interaction check was done, including changes made today.    PROVIDER: Jj Louis MD    Patient not staffed in clinic.  Note will be reviewed and signed by supervisor Dr. Marrero.  I did not see this patient directly. I have reviewed the documentation and I agree with the resident's plan of care.     Amy Marrero MD

## 2020-11-13 NOTE — PROGRESS NOTES
"VIDEO VISIT  Geo Hicks is a 20 year old patient who is being evaluated via a billable video visit.      The patient has been notified of following:   \"This video visit will be conducted via a call between you and your physician/provider. We have found that certain health care needs can be provided without the need for an in-person physical exam. This service lets us provide the care you need with a video conversation. If a prescription is necessary we can send it directly to your pharmacy. If lab work is needed we can place an order for that and you can then stop by our lab to have the test done at a later time. Insurers are generally covering virtual visits as they would in-office visits so billing should not be different than normal.  If for some reason you do get billed incorrectly, you should contact the billing office to correct it and that number is in the AVS .    Video Conference to be completed via:  [email or web address]    Patient has given verbal consent for video visit?:  Yes    Patient would prefer that any video invitations be sent by: Send to e-mail at: [email or web address]      How would patient like to obtain AVS?:  Juma    AVS SmartPhrase [PsychAVS] has been placed in 'Patient Instructions':  Yes    "

## 2020-11-13 NOTE — PATIENT INSTRUCTIONS
Nice to see you again Geo, as we discussed:   1) continue current medications  2) schedule follow-up appointment in 4-6 weeks.    ------------------------------------------------------------------------    Thank you for coming to the PSYCHIATRY CLINIC.    Lab Testing:  If you had lab testing today and your results are reassuring or normal they will be mailed to you or sent through [facility] within 7 days. If the lab tests need quick action we will call you with the results. The phone number we will call with results is # [phone] (home) . If this is not the best number please call our clinic and change the number.    Medication Refills:  If you need any refills please call your pharmacy and they will contact us. Our fax number for refills is [phone]. Please allow three business for refill processing. If you need to  your refill at a new pharmacy, please contact the new pharmacy directly. The new pharmacy will help you get your medications transferred.     Scheduling:  If you have any concerns about today's visit or wish to schedule another appointment please call our office during normal business hours [phone] (8-5:00 M-F)    Contact Us:  Please call [phone] during business hours (8-5:00 M-F).  If after clinic hours, or on the weekend, please call  [phone].    Financial Assistance [phone]  [facility] Billing [phone]  Central Billing Office, [facility]: [phone]  [facility] Billing [phone]  Medical Records [phone]      MENTAL HEALTH CRISIS NUMBERS:  For a medical emergency please call  911 or go to the nearest ER.     [facility]:    [phone]   Crisis Residence [facility] -[phone]   Walk-In Counseling Center \[facility] [place] [facility]\"" -[phone]   COPE 24/7 [facility] Mobile Team -[phone] (adults)/[phone] (child)  CHILD: [facility] needs assessment team - [phone]      [facility] -  [phone]   Walk-in counseling [facility] - [phone]   Walk-in counseling [facility] - [phone]   Crisis Residence [facility] - [phone]  Urgent Care Adult Mental [facility]-[phone] mobile unit/ 24/7 crisis line    National Crisis Numbers:   National Suicide Prevention Lifeline: 3-491-675-TALK (089-270-0849)  Poison Control Center - 2-796-678-6082  The[employer]/resources for a list of additional resources (SOS)  Trans Lifeline a hotline for transgender people 8-700-232-2135  The [employer] a hotline for LGBT youth 1-456.354.9267  Crisis Text Line: For any crisis 24/7   To: 604102  see www.crisistextline.org  - IF MAKING A CALL FEELS TOO HARD, send a text!         Again thank you for choosing PSYCHIATRY CLINIC and please let us know how we can best partner with you to improve you and your family's health.    You may be receiving a survey regarding this appointment. We would love to have your feedback, both positive and negative. The survey is done by an external company, so your answers are anonymous.

## 2020-11-24 NOTE — LETTER
"11/24/2020      RE: Geo Hicks  [id number] [facility] [address]         The patient has been notified of following:     \"This video visit will be conducted via a call between you and your physician/provider. We have found that certain health care needs can be provided without the need for an in-person physical exam.  This service lets us provide the care you need with a video conversation.  If a prescription is necessary we can send it directly to your pharmacy.  If lab work is needed we can place an order for that and you can then stop by our lab to have the test done at a later time.    Video visits are billed at different rates depending on your insurance coverage.  Please reach out to your insurance provider with any questions.    If during the course of the call the physician/provider feels a video visit is not appropriate, you will not be charged for this service.\"    Patient has given verbal consent for Video visit? Yes    Video-Visit Details    Type of service:  Video Visit    Video Start Time: 2:00 PM  Video End Time: 2:50 PM    Originating Location (pt. 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 We also obtained new weight and his BSA is 2.1  His finger injury is wrapped and healing fairly well.  He said it healed quickly to begin with but the final part is going slowly.  Geo reports there is some mild discomfort but doesn't really bother him. His hip wound is no longer draining.  Nursing was seeing him for dressing changes. He says he gets uncomfortable when " "he sits for a long time.   He is living in his group care facility.  No recent illnesses. Ongoing concerns about his constipation although reports to me he had a bowel movement this morning.  Its somewhat loose today. Geo also reports that \"he eats a lot\".  He missed no doses with his last cycle and tolerated it well. He is still wanting to walk and wanting me to cure his constipation problems so that he can.  No current fever or sign of current infection.       Labs:    Reviewed from 10/29/20:  White count 5500    Hgb 10.8  Platelets 163,000  ANC 3100      Oncology History:    [id number]   6/26/15    Started Entinostat 1/9/17    DIAGNOSIS: EPENDYMOMA  AREAS TREATED: POST FOSSA TUMOR  DOSE: 5940 cGy   TYPE OF RADIATION GIVEN: with IMRT Tomotherapy   9/08/2015 to 10/26/15    Patient Active Problem List   Diagnosis     GERD (gastroesophageal reflux disease)     Closed fracture at the growth plate of right distal fibula      Elevated serum creatinine     Dyspepsia     Intracranial hemorrhage (H)     Hemorrhagic stroke (H)     Ependymoma (H)     Admission for antineoplastic chemotherapy     Post-operative state     S/P craniotomy     S/P biopsy     Noncomitant strabismus     Abducens neuropathy of both eyes     CANDELARIO (obstructive sleep apnea)     Health Care Home     Hypernatremia     Body temperature low     Current chronic use of systemic steroids     Elevated TSH     Status post chemotherapy     Neoplasm of posterior cranial fossa (H)     Chronic constipation     Serum albumin decreased     Closed compression fracture of thoracic vertebra with routine healing, subsequent encounter     Depression     Constipation     Constipation by delayed colonic transit     Slow transit constipation     Tubular adenoma     Current Outpatient Medications   Medication     dexamethasone (DECADRON) 0.5 MG tablet      MG capsule     doxycycline hyclate (VIBRAMYCIN) 100 MG capsule     fexofenadine (ALLEGRA) 180 MG tablet     " Lactobacillus-Inulin ([facility] WELLNESS) CAPS     lamoTRIgine (LAMICTAL) 200 MG tablet     linaclotide (LINZESS) 290 MCG capsule     mupirocin (BACTROBAN) 2 % external ointment     OLANZapine (ZYPREXA) 20 MG tablet     OLANZapine (ZYPREXA) 5 MG tablet     omeprazole (PRILOSEC) 20 MG DR capsule     order for DME     order for DME     polyethylene glycol (MIRALAX) 17 GM/Dose powder     potassium phosphate, monobasic, (K-PHOS) 500 MG tablet     sennosides (SENOKOT) 8.6 MG tablet     sertraline (ZOLOFT) 100 MG tablet     study - entinostat, IDS# [id number], 1 mg tablet     study - entinostat, IDS# [id number], 5 mg tablet     sulfamethoxazole-trimethoprim (BACTRIM) 400-80 MG tablet     traZODone (DESYREL) 150 MG tablet     vitamin D3 (CHOLECALCIFEROL) 2000 units (50 mcg) tablet     vitamin E (TOCOPHEROL) 400 units (360 mg) capsule     No current facility-administered medications for this visit.         Allergies   Allergen Reactions     Blood Transfusion Related (Informational Only) Swelling     Periorbital swelling post platelet transfusion     No Known Drug Allergies        Review of Systems  Skin: positive for striae, Hip wound and finger wound. See HPI.   Eyes: positive for oculoplegia  Ears/Nose/Throat: negative  Respiratory: No shortness of breath, dyspnea on exertion, cough, or hemoptysis  Cardiovascular: negative  Gastrointestinal: constipation. He finished antibiotics on the 19th.    Genitourinary: negative  Musculoskeletal: negative  Neurologic: positive for  local weakness, speech problems, incoordination and behavior changes  Psychiatric: negative, anxiety, agitation and perseveration re: constipation.  His outlook seems improved today.  Hematologic/Lymphatic/Immunologic: negative  Endocrine: negative    Physical Exam    GENERAL: Healthy, and alert.  EYES: Eyes grossly normal to inspection.  No discharge or erythema,patch in place over right eye. HENT: Normal cephalic.  External ears, nose and mouth without  ulcers or lesions.  Drooling today.  RESP: No audible wheeze, cough, or visible cyanosis.  No visible retractions or increased work of breathing.    SKIN: Scattered bruising. No significant rash, abnormal pigmentation or lesions.  Hip wound not examined today.  MSK: Right hand digit #4 wrapped. Hip not examined today.   NEURO: prominent dysarthria,  His speech is more difficult to understand today.  PSYCH:  Affect normal, and appearance well-groomed.    Impression:  Ependymoma in good control on Entinostat    Right hip and finger healing well  Mental health issues more difficult today.  Daily loose bowel movements however Geo continues with concerns of constipation.      Plan:    Discussed with Geo that we are able to start his next Entinostat cycle.  He should take 6mg weekly for 4 doses. We will have them complete new diaries.    I discussed that he should continue to exercise.  I will continue work with his primary and home care to see if we can start in home services for Physical Therapy him if they haven't started.  I think it is very important for him.  Discussed his constipation at length. Discussed eating habit contributing to his current symptoms.  Suggested decreasing intake and having more frequent small meals but Geo is concerned about starving.  Discussed with Geo that I would like him to consider using his CPAP again as I think increased oxygenation especially at night when he is sleeping would help healing and help his ability to think.   We will image again in 3 weeks.   We will also get a careful height and weight and labs when he is here.  We will follow up on the new wheelchair for Geo.    Support given to Geo for the difficult situation he is in and the difficulties he faces trying to accomplish the things he wants to do.         ALAN Justin CNP

## 2020-11-24 NOTE — PROGRESS NOTES
"  The patient has been notified of following:     \"This video visit will be conducted via a call between you and your physician/provider. We have found that certain health care needs can be provided without the need for an in-person physical exam.  This service lets us provide the care you need with a video conversation.  If a prescription is necessary we can send it directly to your pharmacy.  If lab work is needed we can place an order for that and you can then stop by our lab to have the test done at a later time.    Video visits are billed at different rates depending on your insurance coverage.  Please reach out to your insurance provider with any questions.    If during the course of the call the physician/provider feels a video visit is not appropriate, you will not be charged for this service.\"    Patient has given verbal consent for Video visit? Yes    Video-Visit Details    Type of service:  Video Visit    Video Start Time: 2:00 PM  Video End Time: 2:50 PM    Originating Location (pt. 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 We also obtained new weight and his BSA is 2.1  His finger injury is wrapped and healing fairly well.  He said it healed quickly to begin with but the final part is going slowly.  Geo reports there is some mild discomfort but doesn't really bother him. His hip wound is no longer draining.  Nursing was seeing him for dressing changes. He says he gets uncomfortable when he sits for a long time.   He is living in his group care facility.  No recent " "illnesses. Ongoing concerns about his constipation although reports to me he had a bowel movement this morning.  Its somewhat loose today. Geo also reports that \"he eats a lot\".  He missed no doses with his last cycle and tolerated it well. He is still wanting to walk and wanting me to cure his constipation problems so that he can.  No current fever or sign of current infection.       Labs:    Reviewed from 10/29/20:  White count 5500    Hgb 10.8  Platelets 163,000  ANC 3100      Oncology History:    [id number]   6/26/15    Started Entinostat 1/9/17    DIAGNOSIS: EPENDYMOMA  AREAS TREATED: POST FOSSA TUMOR  DOSE: 5940 cGy   TYPE OF RADIATION GIVEN: with IMRT Tomotherapy   9/08/2015 to 10/26/15    Patient Active Problem List   Diagnosis     GERD (gastroesophageal reflux disease)     Closed fracture at the growth plate of right distal fibula      Elevated serum creatinine     Dyspepsia     Intracranial hemorrhage (H)     Hemorrhagic stroke (H)     Ependymoma (H)     Admission for antineoplastic chemotherapy     Post-operative state     S/P craniotomy     S/P biopsy     Noncomitant strabismus     Abducens neuropathy of both eyes     CANDELARIO (obstructive sleep apnea)     Health Care Home     Hypernatremia     Body temperature low     Current chronic use of systemic steroids     Elevated TSH     Status post chemotherapy     Neoplasm of posterior cranial fossa (H)     Chronic constipation     Serum albumin decreased     Closed compression fracture of thoracic vertebra with routine healing, subsequent encounter     Depression     Constipation     Constipation by delayed colonic transit     Slow transit constipation     Tubular adenoma     Current Outpatient Medications   Medication     dexamethasone (DECADRON) 0.5 MG tablet      MG capsule     doxycycline hyclate (VIBRAMYCIN) 100 MG capsule     fexofenadine (ALLEGRA) 180 MG tablet     Lactobacillus-Inulin ([facility]ALTH [facility] WELLNESS) CAPS     lamoTRIgine (LAMICTAL) " 200 MG tablet     linaclotide (LINZESS) 290 MCG capsule     mupirocin (BACTROBAN) 2 % external ointment     OLANZapine (ZYPREXA) 20 MG tablet     OLANZapine (ZYPREXA) 5 MG tablet     omeprazole (PRILOSEC) 20 MG DR capsule     order for DME     order for DME     polyethylene glycol (MIRALAX) 17 GM/Dose powder     potassium phosphate, monobasic, (K-PHOS) 500 MG tablet     sennosides (SENOKOT) 8.6 MG tablet     sertraline (ZOLOFT) 100 MG tablet     study - entinostat, IDS# [id number], 1 mg tablet     study - entinostat, IDS# 5050, 5 mg tablet     sulfamethoxazole-trimethoprim (BACTRIM) 400-80 MG tablet     traZODone (DESYREL) 150 MG tablet     vitamin D3 (CHOLECALCIFEROL) 2000 units (50 mcg) tablet     vitamin E (TOCOPHEROL) 400 units (360 mg) capsule     No current facility-administered medications for this visit.         Allergies   Allergen Reactions     Blood Transfusion Related (Informational Only) Swelling     Periorbital swelling post platelet transfusion     No Known Drug Allergies        Review of Systems  Skin: positive for striae, Hip wound and finger wound. See HPI.   Eyes: positive for oculoplegia  Ears/Nose/Throat: negative  Respiratory: No shortness of breath, dyspnea on exertion, cough, or hemoptysis  Cardiovascular: negative  Gastrointestinal: constipation. He finished antibiotics on the 19th.    Genitourinary: negative  Musculoskeletal: negative  Neurologic: positive for  local weakness, speech problems, incoordination and behavior changes  Psychiatric: negative, anxiety, agitation and perseveration re: constipation.  His outlook seems improved today.  Hematologic/Lymphatic/Immunologic: negative  Endocrine: negative    Physical Exam    GENERAL: Healthy, and alert.  EYES: Eyes grossly normal to inspection.  No discharge or erythema,patch in place over right eye. HENT: Normal cephalic.  External ears, nose and mouth without ulcers or lesions.  Drooling today.  RESP: No audible wheeze, cough, or visible  cyanosis.  No visible retractions or increased work of breathing.    SKIN: Scattered bruising. No significant rash, abnormal pigmentation or lesions.  Hip wound not examined today.  MSK: Right hand digit #4 wrapped. Hip not examined today.   NEURO: prominent dysarthria,  His speech is more difficult to understand today.  PSYCH:  Affect normal, and appearance well-groomed.    Impression:  Ependymoma in good control on Entinostat    Right hip and finger healing well  Mental health issues more difficult today.  Daily loose bowel movements however Geo continues with concerns of constipation.      Plan:    Discussed with Geo that we are able to start his next Entinostat cycle.  He should take 6mg weekly for 4 doses. We will have them complete new diaries.    I discussed that he should continue to exercise.  I will continue work with his primary and home care to see if we can start in home services for Physical Therapy him if they haven't started.  I think it is very important for him.  Discussed his constipation at length. Discussed eating habit contributing to his current symptoms.  Suggested decreasing intake and having more frequent small meals but Geo is concerned about starving.  Discussed with Geo that I would like him to consider using his CPAP again as I think increased oxygenation especially at night when he is sleeping would help healing and help his ability to think.   We will image again in 3 weeks.   We will also get a careful height and weight and labs when he is here.  We will follow up on the new wheelchair for Geo.    Support given to Geo for the difficult situation he is in and the difficulties he faces trying to accomplish the things he wants to do.

## 2020-11-25 NOTE — PROGRESS NOTES
See orders from October I received again.  Fax reports multiple attempts have been made.  Multiple orders have been signed  Abstract before sending  Jeffrey Espinoza MD

## 2020-12-03 NOTE — TELEPHONE ENCOUNTER
[place], [facility]      Skilled Nurse  1xwk/9wks  2 as needed    PT evaluate and treat    Verbal order given.  Will fax for MD signature.  Cate Morales RN

## 2020-12-08 NOTE — PROGRESS NOTES
"  The patient has been notified of following:     \"This video visit will be conducted via a call between you and your physician/provider. We have found that certain health care needs can be provided without the need for an in-person physical exam.  This service lets us provide the care you need with a video conversation.  If a prescription is necessary we can send it directly to your pharmacy.  If lab work is needed we can place an order for that and you can then stop by our lab to have the test done at a later time.    Video visits are billed at different rates depending on your insurance coverage.  Please reach out to your insurance provider with any questions.    If during the course of the call the physician/provider feels a video visit is not appropriate, you will not be charged for this service.\"    Patient has given verbal consent for Video visit? Yes    Video-Visit Details    Type of service:  Video Visit    Video Start Time: 2:55 PM  Video End Time: 3:54 PM    Originating Location (pt. 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  His mom was present for most of the visit.    HPI: Geo had a bowel movement - soft.   Geo had labs on 12/7/20 which looked ok.  We also obtained new height and  weight and his BSA is 2.29  His finger injury is healing well.  Geo reports there is some mild discomfort but doesn't really bother him. His hip wound improved.  . He says he gets uncomfortable when he sits for a long time.   He is living in his group care facility.  No recent illnesses. No current fever or sign of current infection. Ongoing concerns " about his constipation although reports to me he continues to have bowel movements almost daily and sometimes more than once.  He reports them to be moderate to large.  We tried to have a discussion about diet and watching calories to see if that would help how he is feeling but he became upset so we changed subjects. He missed no doses with his last cycle and tolerated it well. He is still wanting to walk and wanting me to cure his constipation problems so that he can.      Labs:    Component      Latest Ref Rng & Units 12/7/2020   WBC      4.0 - 11.0 10e9/L 4.7   RBC Count      4.4 - 5.9 10e12/L 4.79   Hemoglobin      13.3 - 17.7 g/dL 11.0 (L)   Hematocrit      40.0 - 53.0 % 36.6 (L)   MCV      78 - 100 fl 76 (L)   MCH      26.5 - 33.0 pg 23.0 (L)   MCHC      31.5 - 36.5 g/dL 30.1 (L)   RDW      10.0 - 15.0 % 17.0 (H)   Platelet Count      150 - 450 10e9/L 134 (L)   Diff Method       Automated Method   % Neutrophils      % 51.0   % Lymphocytes      % 21.6   % Monocytes      % 15.5   % Eosinophils      % 10.0   % Basophils      % 0.8   % Immature Granulocytes      % 1.1   Nucleated RBCs      0 /100 0   Absolute Neutrophil      1.6 - 8.3 10e9/L 2.4   Absolute Lymphocytes      0.8 - 5.3 10e9/L 1.0   Absolute Monocytes      0.0 - 1.3 10e9/L 0.7   Absolute Eosinophils      0.0 - 0.7 10e9/L 0.5   Absolute Basophils      0.0 - 0.2 10e9/L 0.0   Abs Immature Granulocytes      0 - 0.4 10e9/L 0.1   Absolute Nucleated RBC       0.0   Sodium      133 - 144 mmol/L 136   Potassium      3.4 - 5.3 mmol/L 6.6 (HH)   Chloride      94 - 109 mmol/L 105   Carbon Dioxide      20 - 32 mmol/L 31   Anion Gap      3 - 14 mmol/L <1 (L)   Glucose      70 - 99 mg/dL 78   Urea Nitrogen      7 - 30 mg/dL 17   Creatinine      0.66 - 1.25 mg/dL 0.98   GFR Estimate      >60 mL/min/1.73:m2 >90   GFR Estimate If Black      >60 mL/min/1.73:m2 >90   Calcium      8.5 - 10.1 mg/dL 8.3 (L)   Bilirubin Total      0.2 - 1.3 mg/dL 0.3   Albumin      3.4 -  5.0 g/dL 3.2 (L)   Protein Total      6.8 - 8.8 g/dL 7.4   Alkaline Phosphatase      40 - 150 U/L 104   ALT      0 - 70 U/L 31   AST      0 - 45 U/L Unsatisfactory specimen - hemolyzed   Magnesium      1.6 - 2.3 mg/dL 2.3   Phosphorus      2.5 - 4.5 mg/dL 3.5     Potassium not accurate.  Specimen was hemolyzed.      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 Per chart,  patient is enrolled in COG [id number] - A Phase 1 Study of Entinostat,  an Oral Histone Deacetylase Inhibitor  ICD-10: Ependymoma (H)    Comparison: Brain MRI 9/10/2020, 6/11/2020, 1/7/2020.    Technique: Multiplanar T1-weighted, axial FLAIR, and susceptibility  images were obtained without intravenous contrast. Following  intravenous gadolinium-based contrast administration, axial  T2-weighted, diffusion, and T1-weighted images (in multiple planes)  were obtained. Perfusion images also obtained    Contrast: 10 mL Gadavist    Findings: Postsurgical changes of suboccipital craniotomy with  expected hemosiderin staining within the resection cavity. No  significant change in a 1.9 x 1.8 x 2.5 mass within the fourth  ventricle. No significant change in surrounding edema. Slightly  decreased size of cystic focus just to the right, measuring 10 x 10  mm, previously 13 x 10 mm when measured similarly. Midbrain and  cerebellar atrophy are similar. Third ventriculostomy appears narrow,  but patent.    No new enhancing lesions identified.There is no midline shift or  evidence of new intracranial hemorrhage. The ventricles are  proportionate to the cerebral sulci. Normal major vascular  intracranial flow-voids.    No abnormality of the skull marrow signal. The  visualized portions of  paranasal sinuses are relatively clear. Small left mastoid effusion.  The orbits are grossly unremarkable.      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 Slight decrease in size of adjacent cystic focus.    I have personally reviewed the examination and initial interpretation  and I agree with the findings.    STEPHY SCHMIDT MD     *Note: Due to a large number of results and/or encounters for the requested time period, some results have not been displayed. A complete set of results can be found in Results Review.        Oncology History:    [id number]   6/26/15    Started Entinostat 1/9/17    DIAGNOSIS: EPENDYMOMA  AREAS TREATED: POST FOSSA TUMOR  DOSE: 5940 cGy   TYPE OF RADIATION GIVEN: with IMRT Tomotherapy   9/08/2015 to 10/26/15    Patient Active Problem List   Diagnosis     GERD (gastroesophageal reflux disease)     Closed fracture at the growth plate of right distal fibula      Elevated serum creatinine     Dyspepsia     Intracranial hemorrhage (H)     Hemorrhagic stroke (H)     Ependymoma (H)     Admission for antineoplastic chemotherapy     Post-operative state     S/P craniotomy     S/P biopsy     Noncomitant strabismus     Abducens neuropathy of both eyes     CANDELARIO (obstructive sleep apnea)     Health Care Home     Hypernatremia     Body temperature low     Current chronic use of systemic steroids     Elevated TSH     Status post chemotherapy     Neoplasm of posterior cranial fossa (H)     Chronic constipation     Serum albumin decreased     Closed compression fracture of thoracic vertebra with routine healing, subsequent encounter     Depression     Constipation     Constipation by delayed colonic transit     Slow transit constipation     Tubular adenoma     Current Outpatient Medications   Medication     dexamethasone (DECADRON) 0.5 MG tablet      MG capsule     doxycycline hyclate (VIBRAMYCIN) 100 MG capsule      fexofenadine (ALLEGRA) 180 MG tablet     Lactobacillus-Inulin ([facility]NESS) CAPS     lamoTRIgine (LAMICTAL) 200 MG tablet     linaclotide (LINZESS) 290 MCG capsule     mupirocin (BACTROBAN) 2 % external ointment     OLANZapine (ZYPREXA) 20 MG tablet     OLANZapine (ZYPREXA) 5 MG tablet     omeprazole (PRILOSEC) 20 MG DR capsule     order for DME     order for DME     polyethylene glycol (MIRALAX) 17 GM/Dose powder     potassium phosphate, monobasic, (K-PHOS) 500 MG tablet     sennosides (SENOKOT) 8.6 MG tablet     sertraline (ZOLOFT) 100 MG tablet     study - entinostat, IDS# 5050, 1 mg tablet     study - entinostat, IDS# 5050, 5 mg tablet     sulfamethoxazole-trimethoprim (BACTRIM) 400-80 MG tablet     traZODone (DESYREL) 150 MG tablet     vitamin D3 (CHOLECALCIFEROL) 2000 units (50 mcg) tablet     vitamin E (TOCOPHEROL) 400 units (360 mg) capsule     No current facility-administered medications for this visit.         Allergies   Allergen Reactions     Blood Transfusion Related (Informational Only) Swelling     Periorbital swelling post platelet transfusion     No Known Drug Allergies        Review of Systems  Skin: positive for striae, Hip wound and finger wound. See HPI.   Eyes: positive for oculoplegia  Ears/Nose/Throat: negative  Respiratory: No shortness of breath, dyspnea on exertion, cough, or hemoptysis  Cardiovascular: negative  Gastrointestinal: constipation. He finished antibiotics on the 19th.    Genitourinary: negative  Musculoskeletal: negative  Neurologic: positive for  local weakness, speech problems, incoordination and behavior changes  Psychiatric: negative, anxiety, agitation and perseveration re: constipation.  His outlook seems improved today compared to our last visit.  Hematologic/Lymphatic/Immunologic: negative  Endocrine: negative    Physical Exam  BP Readings from Last 1 Encounters:   10/03/20 128/75      Pulse Readings from Last 1 Encounters:   10/03/20 92      Resp  "Readings from Last 1 Encounters:   10/03/20 18      Temp Readings from Last 1 Encounters:   10/03/20 97.9  F (36.6  C) (Temporal)      SpO2 Readings from Last 1 Encounters:   10/03/20 97%      Wt Readings from Last 1 Encounters:   12/07/20 104.8 kg (231 lb 1.6 oz)      Ht Readings from Last 1 Encounters:   12/07/20 1.804 m (5' 11.02\")       GENERAL: Healthy, and alert.  EYES: Eyes grossly normal to inspection.  No discharge or erythema,patch in place over right eye. HENT: Normal cephalic.  External ears, nose and mouth without ulcers or lesions.  Drooling some today.  RESP: No audible wheeze, cough, or visible cyanosis.  No visible retractions or increased work of breathing.    SKIN: Scattered bruising. No significant rash, abnormal pigmentation or lesions.  Hip wound not examined today.  MSK: Right hand digit #4 healing. Hip not examined today.   NEURO: prominent dysarthria,  His speech continues to be difficult to understand at times.  PSYCH:  Affect varies with the topic, and appearance well-groomed.    Impression:  Ependymoma in good control on Entinostat    Right hip and finger healing well  Mental health issues more difficult today.  Daily loose bowel movements however Geo continues with concerns of constipation.      Plan:    Discussed with Geo and his mom the results of the imaging.  It is good to see the cystic portion continue to decrease.    I discussed that he should continue to exercise. He does some exercises at home.  We will continue to advocate for in home services for Physical Therapy during COVID.  I think it is very important for him.    Discussed his constipation at length and eating habits more briefly.  Continued to encourage exercise.  Acknowledge that the isolation is extremely difficult.   Support given to Geo for the difficult situation he is in and the difficulties he faces trying to accomplish the things he wants to do.   "

## 2020-12-08 NOTE — LETTER
"12/8/2020      RE: Geo Hicks  [id number] [address] [facility] [id number]         The patient has been notified of following:     \"This video visit will be conducted via a call between you and your physician/provider. We have found that certain health care needs can be provided without the need for an in-person physical exam.  This service lets us provide the care you need with a video conversation.  If a prescription is necessary we can send it directly to your pharmacy.  If lab work is needed we can place an order for that and you can then stop by our lab to have the test done at a later time.    Video visits are billed at different rates depending on your insurance coverage.  Please reach out to your insurance provider with any questions.    If during the course of the call the physician/provider feels a video visit is not appropriate, you will not be charged for this service.\"    Patient has given verbal consent for Video visit? Yes    Video-Visit Details    Type of service:  Video Visit    Video Start Time: 2:55 PM  Video End Time: 3:54 PM    Originating Location (pt. 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  His mom was present for most of the visit.    HPI: Geo had a bowel movement - soft.   Geo had labs on 12/7/20 which looked ok.  We also obtained new height and  weight and his BSA is 2.29  His finger injury is healing well.  Geo reports there is some mild discomfort but doesn't really bother him. His hip wound improved.  . He says he gets uncomfortable when he sits for a long time.   He is living in his group care facility.  No " recent illnesses. No current fever or sign of current infection. Ongoing concerns about his constipation although reports to me he continues to have bowel movements almost daily and sometimes more than once.  He reports them to be moderate to large.  We tried to have a discussion about diet and watching calories to see if that would help how he is feeling but he became upset so we changed subjects. He missed no doses with his last cycle and tolerated it well. He is still wanting to walk and wanting me to cure his constipation problems so that he can.      Labs:    Component      Latest Ref Rng & Units 12/7/2020   WBC      4.0 - 11.0 10e9/L 4.7   RBC Count      4.4 - 5.9 10e12/L 4.79   Hemoglobin      13.3 - 17.7 g/dL 11.0 (L)   Hematocrit      40.0 - 53.0 % 36.6 (L)   MCV      78 - 100 fl 76 (L)   MCH      26.5 - 33.0 pg 23.0 (L)   MCHC      31.5 - 36.5 g/dL 30.1 (L)   RDW      10.0 - 15.0 % 17.0 (H)   Platelet Count      150 - 450 10e9/L 134 (L)   Diff Method       Automated Method   % Neutrophils      % 51.0   % Lymphocytes      % 21.6   % Monocytes      % 15.5   % Eosinophils      % 10.0   % Basophils      % 0.8   % Immature Granulocytes      % 1.1   Nucleated RBCs      0 /100 0   Absolute Neutrophil      1.6 - 8.3 10e9/L 2.4   Absolute Lymphocytes      0.8 - 5.3 10e9/L 1.0   Absolute Monocytes      0.0 - 1.3 10e9/L 0.7   Absolute Eosinophils      0.0 - 0.7 10e9/L 0.5   Absolute Basophils      0.0 - 0.2 10e9/L 0.0   Abs Immature Granulocytes      0 - 0.4 10e9/L 0.1   Absolute Nucleated RBC       0.0   Sodium      133 - 144 mmol/L 136   Potassium      3.4 - 5.3 mmol/L 6.6 (HH)   Chloride      94 - 109 mmol/L 105   Carbon Dioxide      20 - 32 mmol/L 31   Anion Gap      3 - 14 mmol/L <1 (L)   Glucose      70 - 99 mg/dL 78   Urea Nitrogen      7 - 30 mg/dL 17   Creatinine      0.66 - 1.25 mg/dL 0.98   GFR Estimate      >60 mL/min/1.73:m2 >90   GFR Estimate If Black      >60 mL/min/1.73:m2 >90   Calcium      8.5 -  10.1 mg/dL 8.3 (L)   Bilirubin Total      0.2 - 1.3 mg/dL 0.3   Albumin      3.4 - 5.0 g/dL 3.2 (L)   Protein Total      6.8 - 8.8 g/dL 7.4   Alkaline Phosphatase      40 - 150 U/L 104   ALT      0 - 70 U/L 31   AST      0 - 45 U/L Unsatisfactory specimen - hemolyzed   Magnesium      1.6 - 2.3 mg/dL 2.3   Phosphorus      2.5 - 4.5 mg/dL 3.5     Potassium not accurate.  Specimen was hemolyzed.      Imaging:   Results for orders placed or performed during the hospital encounter of 12/07/20 (from the past 48 hour(s))   MR Brain w/o & w Contrast    Narrative     MR BRAIN W/O & W CONTRAST 12/7/2020 2:43 PM    Provided History: 18 yo with ependymoma status post surgery radiation  chemo and continues on phase 1 study drug.; Ependymoma (H). Per chart,  patient is enrolled in COG [id number] - A Phase 1 Study of Entinostat,  an Oral Histone Deacetylase Inhibitor  ICD-10: Ependymoma (H)    Comparison: Brain MRI 9/10/2020, 6/11/2020, 1/7/2020.    Technique: Multiplanar T1-weighted, axial FLAIR, and susceptibility  images were obtained without intravenous contrast. Following  intravenous gadolinium-based contrast administration, axial  T2-weighted, diffusion, and T1-weighted images (in multiple planes)  were obtained. Perfusion images also obtained    Contrast: 10 mL Gadavist    Findings: Postsurgical changes of suboccipital craniotomy with  expected hemosiderin staining within the resection cavity. No  significant change in a 1.9 x 1.8 x 2.5 mass within the fourth  ventricle. No significant change in surrounding edema. Slightly  decreased size of cystic focus just to the right, measuring 10 x 10  mm, previously 13 x 10 mm when measured similarly. Midbrain and  cerebellar atrophy are similar. Third ventriculostomy appears narrow,  but patent.    No new enhancing lesions identified.There is no midline shift or  evidence of new intracranial hemorrhage. The ventricles are  proportionate to the cerebral sulci. Normal major  vascular  intracranial flow-voids.    No abnormality of the skull marrow signal. The visualized portions of  paranasal sinuses are relatively clear. Small left mastoid effusion.  The orbits are grossly unremarkable.      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 Slight decrease in size of adjacent cystic focus.    I have personally reviewed the examination and initial interpretation  and I agree with the findings.    STEPHY SCHMIDT MD     *Note: Due to a large number of results and/or encounters for the requested time period, some results have not been displayed. A complete set of results can be found in Results Review.        Oncology History:    [id number]   6/26/15    Started Entinostat 1/9/17    DIAGNOSIS: EPENDYMOMA  AREAS TREATED: POST FOSSA TUMOR  DOSE: 5940 cGy   TYPE OF RADIATION GIVEN: with IMRT Tomotherapy   9/08/2015 to 10/26/15    Patient Active Problem List   Diagnosis     GERD (gastroesophageal reflux disease)     Closed fracture at the growth plate of right distal fibula      Elevated serum creatinine     Dyspepsia     Intracranial hemorrhage (H)     Hemorrhagic stroke (H)     Ependymoma (H)     Admission for antineoplastic chemotherapy     Post-operative state     S/P craniotomy     S/P biopsy     Noncomitant strabismus     Abducens neuropathy of both eyes     CANDELARIO (obstructive sleep apnea)     Health Care Home     Hypernatremia     Body temperature low     Current chronic use of systemic steroids     Elevated TSH     Status post chemotherapy     Neoplasm of posterior cranial fossa (H)     Chronic constipation     Serum albumin decreased     Closed compression fracture of thoracic vertebra with routine healing, subsequent encounter     Depression     Constipation     Constipation by delayed colonic transit     Slow transit constipation     Tubular adenoma     Current Outpatient Medications   Medication     dexamethasone (DECADRON) 0.5 MG  tablet      MG capsule     doxycycline hyclate (VIBRAMYCIN) 100 MG capsule     fexofenadine (ALLEGRA) 180 MG tablet     Lactobacillus-Inulin ([facility]ALTH [facility] WELLNESS) CAPS     lamoTRIgine (LAMICTAL) 200 MG tablet     linaclotide (LINZESS) 290 MCG capsule     mupirocin (BACTROBAN) 2 % external ointment     OLANZapine (ZYPREXA) 20 MG tablet     OLANZapine (ZYPREXA) 5 MG tablet     omeprazole (PRILOSEC) 20 MG DR capsule     order for DME     order for DME     polyethylene glycol (MIRALAX) 17 GM/Dose powder     potassium phosphate, monobasic, (K-PHOS) 500 MG tablet     sennosides (SENOKOT) 8.6 MG tablet     sertraline (ZOLOFT) 100 MG tablet     study - entinostat, IDS# 5050, 1 mg tablet     study - entinostat, IDS# 5050, 5 mg tablet     sulfamethoxazole-trimethoprim (BACTRIM) 400-80 MG tablet     traZODone (DESYREL) 150 MG tablet     vitamin D3 (CHOLECALCIFEROL) 2000 units (50 mcg) tablet     vitamin E (TOCOPHEROL) 400 units (360 mg) capsule     No current facility-administered medications for this visit.         Allergies   Allergen Reactions     Blood Transfusion Related (Informational Only) Swelling     Periorbital swelling post platelet transfusion     No Known Drug Allergies        Review of Systems  Skin: positive for striae, Hip wound and finger wound. See HPI.   Eyes: positive for oculoplegia  Ears/Nose/Throat: negative  Respiratory: No shortness of breath, dyspnea on exertion, cough, or hemoptysis  Cardiovascular: negative  Gastrointestinal: constipation. He finished antibiotics on the 19th.    Genitourinary: negative  Musculoskeletal: negative  Neurologic: positive for  local weakness, speech problems, incoordination and behavior changes  Psychiatric: negative, anxiety, agitation and perseveration re: constipation.  His outlook seems improved today compared to our last visit.  Hematologic/Lymphatic/Immunologic: negative  Endocrine: negative    Physical Exam  BP Readings from Last 1 Encounters:  "  10/03/20 128/75      Pulse Readings from Last 1 Encounters:   10/03/20 92      Resp Readings from Last 1 Encounters:   10/03/20 18      Temp Readings from Last 1 Encounters:   10/03/20 97.9  F (36.6  C) (Temporal)      SpO2 Readings from Last 1 Encounters:   10/03/20 97%      Wt Readings from Last 1 Encounters:   12/07/20 104.8 kg (231 lb 1.6 oz)      Ht Readings from Last 1 Encounters:   12/07/20 1.804 m (5' 11.02\")       GENERAL: Healthy, and alert.  EYES: Eyes grossly normal to inspection.  No discharge or erythema,patch in place over right eye. HENT: Normal cephalic.  External ears, nose and mouth without ulcers or lesions.  Drooling some today.  RESP: No audible wheeze, cough, or visible cyanosis.  No visible retractions or increased work of breathing.    SKIN: Scattered bruising. No significant rash, abnormal pigmentation or lesions.  Hip wound not examined today.  MSK: Right hand digit #4 healing. Hip not examined today.   NEURO: prominent dysarthria,  His speech continues to be difficult to understand at times.  PSYCH:  Affect varies with the topic, and appearance well-groomed.    Impression:  Ependymoma in good control on Entinostat    Right hip and finger healing well  Mental health issues more difficult today.  Daily loose bowel movements however Geo continues with concerns of constipation.      Plan:    Discussed with Geo and his mom the results of the imaging.  It is good to see the cystic portion continue to decrease.    I discussed that he should continue to exercise. He does some exercises at home.  We will continue to advocate for in home services for Physical Therapy during COVID.  I think it is very important for him.    Discussed his constipation at length and eating habits more briefly.  Continued to encourage exercise.  Acknowledge that the isolation is extremely difficult.   Support given to Geo for the difficult situation he is in and the difficulties he faces trying to accomplish " the things he wants to do.       ALAN Justin CNP

## 2020-12-21 NOTE — PROGRESS NOTES
"VIDEO VISIT  Geo Hicks is a 20 year old patient who is being evaluated via a billable video visit.      The patient has been notified of following:   \"This video visit will be conducted via a call between you and your physician/provider. We have found that certain health care needs can be provided without the need for an in-person physical exam. This service lets us provide the care you need with a video conversation. If a prescription is necessary we can send it directly to your pharmacy. If lab work is needed we can place an order for that and you can then stop by our lab to have the test done at a later time. Insurers are generally covering virtual visits as they would in-office visits so billing should not be different than normal.  If for some reason you do get billed incorrectly, you should contact the billing office to correct it and that number is in the AVS .    Video Conference to be completed via:  [email or web address]    Patient has given verbal consent for video visit?:  Yes    Patient would prefer that any video invitations be sent by: Send to e-mail at: [email or web address]      How would patient like to obtain AVS?:  Juma    AVS SmartPhrase [PsychAVS] has been placed in 'Patient Instructions':  Yes    "

## 2020-12-21 NOTE — PATIENT INSTRUCTIONS
Nice to see you again Geo, as we discussed:   1) continue current medications  2) schedule follow-up appointment in 6-8 weeks.    ------------------------------------------------------------------------    Thank you for coming to the PSYCHIATRY CLINIC.    Lab Testing:  If you had lab testing today and your results are reassuring or normal they will be mailed to you or sent through Yadio within 7 days. If the lab tests need quick action we will call you with the results. The phone number we will call with results is # [phone] (home) . If this is not the best number please call our clinic and change the number.    Medication Refills:  If you need any refills please call your pharmacy and they will contact us. Our fax number for refills is [phone]. Please allow three business for refill processing. If you need to  your refill at a new pharmacy, please contact the new pharmacy directly. The new pharmacy will help you get your medications transferred.     Scheduling:  If you have any concerns about today's visit or wish to schedule another appointment please call our office during normal business hours [phone] (8-5:00 M-F)    Contact Us:  Please call [phone] during business hours (8-5:00 M-F).  If after clinic hours, or on the weekend, please call  [phone].    Financial Assistance [phone]  [facility] Billing [phone]  Central Billing Office, [facility]: [phone]  [facility] Billing [phone]  Medical Records [phone]      MENTAL HEALTH CRISIS NUMBERS:  For a medical emergency please call  911 or go to the nearest ER.     [facility]:   [facility] -[phone]   Crisis Residence [facility] -[phone]   [facility] [place] -[phone]   COPE 24/7 [facility] Mobile Team -[phone] (adults)/[phone] (child)  CHILD: [facility] needs assessment team - [phone]      [facility] -  [phone]   Walk-in counseling [facility] - [phone]   Walk-in counseling [facility] - [phone]   Crisis Residence [facility] - [phone]  Urgent Care Adult Mental Zbkvzt-[phone] mobile unit/ 24/7 crisis line    National Crisis Numbers:   National Suicide Prevention Lifeline: 8-060-146-TALK (713-955-0414)  Poison Control Center - 3-849-346-9921  Foxfly/resources for a list of additional resources (SOS)  Trans Lifeline a hotline for transgender people 6-310-516-8135  The Shiv[employer] Project a hotline for LGBT youth 1-195.849.7735  Crisis Text Line: For any crisis 24/7   To: 401030  see www.crisistextline.org  - IF MAKING A CALL FEELS TOO HARD, send a text!         Again thank you for choosing PSYCHIATRY CLINIC and please let us know how we can best partner with you to improve you and your family's health.    You may be receiving a survey regarding this appointment. We would love to have your feedback, both positive and negative. The survey is done by an external company, so your answers are anonymous.

## 2020-12-21 NOTE — PROGRESS NOTES
"Video- Visit Details  Type of service:  video visit for medication management  Time of service:    Date:  12/21/2020    Video Start Time:  2:06 PM        Video End Time:   2:50 PM    Reason for video visit:  Patient unable to travel due to Covid-19  Originating Site (patient location):  [facility]   Location- intermediate  Distant Site (provider location):  Remote location  Mode of Communication:  Video Conference via [email or web address]  Consent:  Patient has given verbal consent for video visit?: Yes         [facility]  Psychiatry Clinic  PSYCHIATRIC PROGRESS NOTE     CARE TEAM:    PCP- Jeffrey Espinoza     Specialty Providers- Oncology, Neuropsychology, Physical Therapy, Occupational Therapy      Therapist- most recently Manju Pink at [facility] in [facility] Team- no.      Geo Hicks is a 20 year old patient who prefers the name Geo and pronouns he, him, his.     PERTINENT BACKGROUND                [last transfer eval 07/10/20]     Psych critical item history includes suicidal ideation, psychosis [sxs include delusions] and psych hosp (<3)    INTERIM HISTORY                                 [4, 4]   History was provided by the patient and group home staff who was a good historian. Treatment adherence is good.  Since the last visit:   - Notes that \"I'm good\"  - Notes that \"the not walking thing has not been great\"  - Some issues with his equipment, wheelchair and walker that are preventing him from walking.  - Notes that he's \"physically ready to walk\" but continues to be perseverative about doctors withholding treatment for his constipation, which has limited his ability to walk.  - Feels like there was \"something else\" he wanted to address but cannot recall.    - Sleep \"is not too great\" but is not sure \"what the issue is\" believes it might be \"staying asleep.\" Will wake up \"most of the time.\" When he wakes up he will \"either lay there or look at my phone.\"  - Believes " "trazodone is helpful.  - Appetite/Weight: When asked about if he's noticed a change in appetite recently as his weight was elevated at this last appointment. He notes, \"I'm so sorry. I can tell you exactly why.\" Notes that \"I'm constantly gaining weight.\" Believes that one of the reasons \"is because of constipation\". Also reports, \"they don't feed you enough so I'll eat everything they give me\" and then will eat snacks, \"cookies and crackers and M&Ms\". Notes that he does try to find snacks that are more filling \"so I don't eat as many calories.\"    Attempted to call guardians: Christianne and Eric, did not answer.    RECENT PSYCH ROS:   Depression:  denies, as above  Elevated:  none  Psychosis:  delusions- Continues to be perseverative about doctors withholding treatment from him (though this has significantly improved in the last month). [details in Interim History]  Anxiety: denies excessive worry and nervous/overwhelmed  Panic Attack:  none  Trauma Related:  none  Dysregulation:  none  Eating Disorder: no     Adverse Effects:  denies  Pertinent Negatives:  No suicidal ideation, violent ideation, self-injury, hallucinations and aggression  Recent Substance Use:  none reported    FAMILY and SOCIAL HISTORY             [1ea, 1ea]       patient reported                    FAMILY HX- Denied    Financial/ Work- family or friend       Partner/ - single  Children- no      Living situation- Group Home, previously living alternate weeks with mother and father.      Social/ Spiritual Support- mother, father, older brother, friend Rima     Legal- no  FEELS SAFE AT HOME- yes     Trauma History (self-report)- medical/life threatening illness  Early History/Education-  Grown up in [place], [place].  Parents are , and he shares time between his mother s and father s homes. Both parents are remarried.  Dad is a , and mom does  for a testing company.  Siblings include two full brothers " "(older brother Benitez who is a senior in college and younger brother Gamaliel), a step-brother, and a step-sister. Geo graduated from high school in Spring 2018.  Initially considered attending [employer] in Fall 2019, but reportedly lost interest due to the amount of time it would take him to complete courses and receive a degree.   Other- N/A     PSYCH and SUBSTANCE USE Critical Summary Points since July 2020         - September - increased trazodone to 150mg at bedtime prn for insomnia and depression and decreased sertraline to 100mg    MEDICAL HISTORY and ALLERGY     ALLERGIES: Blood transfusion related (informational only) and No known drug allergies     Neurologic Hx- See pertinent background, re: history of ependymoma and related chemo, radiation and tumor resection(s).  Notably has experienced neurological damage due to the above which has resulted in facial numbness, significant loss of mobility and dysarthria.  Has had neuropsychiatric evaluations 2/2016, 2/2017, 1/2019, and 10/2019.  The following is from the \"Results and Impressions\" section of his 10/29/2019 eval with Veronica Padilla, with a passage bolded that relates to noted difficulty in an area of executive functioning that facilitates the ability to see other people's perspectives:     \"Geo s attention was rated by his mother as well as himself as being adequate.  Executive functioning are those skills including planning, organization, emotional control, cognitive flexibility, and the ability to take another person s perspective.  Geo rating scale of these skills was basically in the average range for his age with a slight elevation in his ability to shift from one activity to another.  His mother s ratings of his executive functioning were in the average range, with the exception of a slight elevation in his ability to initiate tasks.  Direct testing of his ability to take another person s perspective indicated " "difficulty in this area.  He was asked to sort objects based on various aspects of the objects.  When he sorted the objects, he showed average performance.  However, when the examiner sorted the objects, Geo experienced significant difficulty with being able to determine how the objects were sorted.  This difficulty likely translates into difficulty with understanding another person s point of view.  Geo indicated that this difficulty becomes quite frustrating for him as he doesn't feel other people understand him--it is likely that he has difficulty understanding their perspective.     Emotionally Geo indicated that he continues to feel some difficulty with sadness but that it has improved over time and with medication.  Geo denied significant feelings of anxiety at this time while in the past these scores have been higher.  Parent ratings of Geo s emotional functioning indicated no areas of significant concern.  Interviews with Geo and his mother indicated continued feelings of sadness and difficulty in motivating himself to try to new activities.  While there is improvement in his mood, Geo continues to be at risk for depression.  Since he is now under the care of a psychiatrist, we did not interview around his feelings that his medical team was not being helpful to him.  His mother reported that these feelings continue and likely interfere with his compliance with directives.  He is participating in therapy to work on these issues and it is important that this intervention continue.\"    Patient Active Problem List   Diagnosis     GERD (gastroesophageal reflux disease)     Closed fracture at the growth plate of right distal fibula      Elevated serum creatinine     Dyspepsia     Intracranial hemorrhage (H)     Hemorrhagic stroke (H)     Ependymoma (H)     Admission for antineoplastic chemotherapy     Post-operative state     S/P craniotomy     S/P biopsy     Noncomitant strabismus     " Abducens neuropathy of both eyes     CANDELARIO (obstructive sleep apnea)     Health Care Home     Hypernatremia     Body temperature low     Current chronic use of systemic steroids     Elevated TSH     Status post chemotherapy     Neoplasm of posterior cranial fossa (H)     Chronic constipation     Serum albumin decreased     Closed compression fracture of thoracic vertebra with routine healing, subsequent encounter     Depression     Constipation     Constipation by delayed colonic transit     Slow transit constipation     Tubular adenoma         MEDICAL REVIEW OF SYSTEMS         [2, 10]   Contraception- no    A comprehensive review of systems was performed and is negative other than noted in the HPI.    MEDICATIONS        Current Outpatient Medications   Medication Sig Dispense Refill     dexamethasone (DECADRON) 0.5 MG tablet TAKE ONE AND ONE-HALF TABLETS (0.75MG) BY MOUTH ONCE DAILY WITH BREAKFAST. 45 tablet 11      MG capsule        doxycycline hyclate (VIBRAMYCIN) 100 MG capsule Take 1 capsule (100 mg) by mouth 2 times daily 14 capsule 0     fexofenadine (ALLEGRA) 180 MG tablet TAKE 1 TABLET BY MOUTH EVERY EVENING. 30 tablet 11     Lactobacillus-Inulin ([facility]) CAPS Take 2 capsules by mouth daily 30 capsule 3     lamoTRIgine (LAMICTAL) 200 MG tablet Take 1 tablet (200 mg) by mouth At Bedtime 30 tablet 1     linaclotide (LINZESS) 290 MCG capsule Take 1 capsule (290 mcg) by mouth every morning (before breakfast) 30 capsule 3     mupirocin (BACTROBAN) 2 % external ointment Use three times a day PRN with skin lesions or open sores. 22 g 3     OLANZapine (ZYPREXA) 20 MG tablet Take 1 tablet (20 mg) by mouth At Bedtime 30 tablet 1     OLANZapine (ZYPREXA) 5 MG tablet Take 1 tab (5 mg) by mouth daily as needed for agitation. May take another dose after 60 minutes if not effective. 60 tablet 0     omeprazole (PRILOSEC) 20 MG DR capsule Take 2 capsules (40 mg) by mouth every morning 60 capsule 1  "    order for DME Equipment being ordered: Dressing  Wound #1 lower leg, W 6 cm x, D 0.5  cm, Drainage scant, Thickness sutured     Type: non stick gauze, Brand: , Size: 4/4   Change: 1 per day    Tape/Wrap: 1 each     attach facesheet with insurance information (delete this line) 5 each 0     order for DME Equipment being ordered: short boot 1 each 0     polyethylene glycol (MIRALAX) 17 GM/Dose powder Take 17 g (1 capful) by mouth 3 times daily 289 g 3     potassium phosphate, monobasic, (K-PHOS) 500 MG tablet Take 1 tablet (500 mg) by mouth 2 times daily 90 tablet 3     sennosides (SENOKOT) 8.6 MG tablet Take 1 tablet by mouth daily 30 tablet 4     sertraline (ZOLOFT) 100 MG tablet Take 1 tablet (100 mg) by mouth daily 30 tablet 1     sulfamethoxazole-trimethoprim (BACTRIM) 400-80 MG tablet Take 1 tablet by mouth 2 times daily On Saturdays and Sundays 24 tablet 11     traZODone (DESYREL) 150 MG tablet Take 1 tablet (150 mg) by mouth nightly as needed for sleep or depression 30 tablet 1     vitamin D3 (CHOLECALCIFEROL) 2000 units (50 mcg) tablet TAKE 1 TABLET BY MOUTH ONCE DAILY WITH BREAKFAST. 30 tablet 11     vitamin E (TOCOPHEROL) 400 units (360 mg) capsule TAKE 1 CAPSULE BY MOUTH ONCE DAILY. 30 capsule 11     VITALS                                                                [3, 3]   There were no vitals taken for this visit.   MENTAL STATUS EXAM                       [9, 14 cog gs]     Alertness: alert  and oriented  Appearance: casually groomed  Behavior/Demeanor: cooperative and pleasant, with good  eye contact   Speech: prominent dysarthria  Language: no obvious problem and dysarthria make it difficult to understand at times  Psychomotor: Mild palsy in bilateral upper extremities, fidgeting, and facial paralysis present.  Mood: \"I'm good\"  Affect: full range; congruent to: mood- yes, content- yes  Thought Process/Associations: unremarkable  Thought Content:  Reports none;  Denies suicidal & violent " ideation and delusions  Perception:  Reports none;  Denies auditory hallucinations and visual hallucinations  Insight: gaining/ maintaining insight is challenging  Judgment: good  Cognition: (6) oriented: time, person, and place  attention span: intact  concentration: intact  recent memory: intact  remote memory: intact  fund of knowledge: appropriate  Gait and Station: N/A (telehealth)    LABS and DATA     PHQ9 TODAY = Not completed due to COVID 19 video visit  PHQ 8/19/2019 9/9/2019 11/15/2019   PHQ-9 Total Score 8 9 -   Q9: Thoughts of better off dead/self-harm past 2 weeks More than half the days More than half the days (No Data)       Recent Labs   Lab Test 12/07/20  1455 10/30/20  1230 10/03/20  1423   CR 0.98 1.08 1.13   GFRESTIMATED >90 >90 >90     ANTIPSYCHOTIC LABS  [glu, A1C, lipids (rohit LDL), liver enzymes, WBC, ANEU, Hgb, plts]  q12 mo  Recent Labs   Lab Test 12/07/20  1455 10/30/20  1230 10/03/20  1423 09/29/20 02/26/19  1100 02/26/19  1100   GLC 78 132* 98 77   < > 124*   A1C  --   --   --   --   --  5.1    < > = values in this interval not displayed.     Recent Labs   Lab Test 02/26/19  1100   CHOL 158   TRIG 236*   LDL 84   HDL 27*     Recent Labs   Lab Test 12/07/20  1455 10/30/20  1230 06/16/20 03/10/20  1338 03/03/20  1314   AST Unsatisfactory specimen - hemolyzed 24 17 21 29   ALT 31 29 <10 18 23   ALKPHOS 104 95  --  115 110     Recent Labs   Lab Test 12/07/20  1455 10/30/20  1230 10/03/20  1423 09/29/20 07/09/20  1409 07/09/20  1409   WBC 4.7 5.5 7.3 6.5   < > 5.5   ANEU 2.4 3.1 4.6  --   --  2.2   HGB 11.0* 10.8* 11.0* 10.4*   < > 10.8*   * 163 132* 120   < > 118*    < > = values in this interval not displayed.     PSYCHOTROPIC DRUG INTERACTIONS     Increased risk of QTc Prolongation: olanzapine, sertraline, trazodone  Increased risk of lamotrigine toxicity (fatigue, sedation, confusion): lamotrigine, sertraline     MANAGEMENT:  Monitoring for adverse effects    RISK STATEMENT for  "SAFETY     Geo Hicks previously endorsed suicidal ideation. SUICIDE RISK ASSESSMENT-  Risk factors for self-harm: hopelessness, feels trapped and new/ worsening medical issue.  Mitigating factors: no h/o suicide attempt, no plan or intent, no access to lethal means, h/o seeking help , minimal substance use , good social support  , stable housing and stable finances.  The patient does not appear to be at imminent risk for self-harm, hospitalization is not recommended which the pt does not agree to. No hospitalization will be arranged. Based on degree of symptoms close psych follow-up was/were recommended which the pt does  agree to. Additional steps to minimize risk: anxiety/ insomnia mngmt.  Safety Plan placed in Pt Instructions: Yes.  Rate SI-Patient has chronic passive suicidal ideation, which primarily appears to be associated with his chronic medical conditions, current physical limitations, and frustration with how others treat him. I do not believe that a psychiatric hospitalization would be benefical and would not change those underlying stressors primarily responsible for his chronic SI..    DIAGNOSES                                           [m2, h3]    Delusional Disorder, persecutory type, first episode  Major Depressive Disorder, single episode, moderate    ASSESSMENT                                        [m2, h3]        Geo Hicks presents for medication management of paranoia, delusional thoughts, and depressed mood.  Reports interim symptom stability in mood since last appointment. Continues to perseverate about constipation and that he would \"physically be able to walk\" if the doctors would treat his constipation. Also blames weight gain on constipation, appears to have ~30lb weight gain since February, also reports snacking as he feels the group home does not feed him enough. Attempted to discuss medication to assist with appetite suppression however he continued to perseverate about " "constipation and not being fed enough as the primary cause for weight gain and increased appetite respectively. Weight gain could be secondary to olanzapine and increased appetite, however has been on it for a number of years without this significant of weight gain so could also be due to limited activity due to COVID restrictions. Sleep seems stable notes ongoing issues with waking up at night, sometimes he will look at his phone after he is awake for a while, discussed ways of limiting blue light exposure during this time, including night-mode and black and white screen settings for overnight. No safety issues today. No medication changes. Refills provided    Called parents/guardians, was unable to reach them. However, left a message. Father called back reporting no concerns from their perspective and that they would schedule Geo a follow-up appointment in 6-8 weeks.     [place]NPMP was checked today:  Indicates no medication misuse .    PLAN                                                            [m2, h3]                                               1) Meds  - Continue olanzapine 20mg at bedtime  - Continue olanzapine 5mg as needed for severe agitation/anxiety, can take second dose 60 minutes after if ineffective.  - Continue sertraline 100mg daily  - Continue lamotrigine 200mg daily  - Continue trazodone 150mg at bedtime    2) Therapy-  Patient started a new therapist recently, father to get MERRITT from therapist for our clinic.    3) Next Due   Labs- Lipids and A1c due, pending COVID-19.  EKG- prn  Rating scales- AIMS due with next \"in-clinic\" visit    5) Referrals  No Referrals needed today    3) RTC: 6-8 weeks    4) Crisis Numbers:   Provided routinely in AVS     After hours:  [phone]      TREATMENT RISK STATEMENT:  The risks, benefits, alternatives and potential adverse effects have been discussed and are understood by the pt. The pt understands the risks of using street drugs or alcohol. There are no " medical contraindications, the pt agrees to treatment with the ability to do so. The pt knows to call the clinic for any problems or to access emergency care if needed.  Medical and substance use concerns are documented above.  Psychotropic drug interaction check was done, including changes made today.    PROVIDER: Jj Louis MD    Patient staffed with Kaleb in clinic who will review and sign note. Supervisor is Dr. Marrero.    The author of this note documented a reason for not sharing it with the patient.     TELEHEALTH ATTENDING ATTESTATION  Following the ACGME guidelines on telemedicine and direct supervision due to COVID-19, I was concurrently participating in and/or monitoring the patient care through appropriate telecommunication technology.  I discussed the key portions of the service with the resident, including the mental status examination and developing the plan of care. I reviewed key portions of the history with the resident. I agree with the findings and plan as documented in this note.   Ajay Manuel MD

## 2020-12-21 NOTE — TELEPHONE ENCOUNTER
Bob Blackwood from home care called ([phone]).    PT evaluation is complete.    Needs verbal ok for PT follow up visits: once a week for 6 weeks and 1 prn visit for gait training/strenthening.    Thanks,  Argelia Esteves RN

## 2020-12-21 NOTE — TELEPHONE ENCOUNTER
PT OK  Charting protocol is a message to and from for documentation of orders  Bob should use that in future  Please call him  Jeffrey Espinoza MD

## 2020-12-21 NOTE — TELEPHONE ENCOUNTER
Spoke w/Bob' from home care and verified Dr. Espinoza would like orders sent to him in written form for verification in the future and Bob' V/U.      Verbal orders verified for PT once a week for 6 weeks and a PRN x 3 visit for gait training and strengthening.    Roseann Pisano, Registered Nurse, Patient Advocate Liaison [facility]  [phone]

## 2020-12-22 NOTE — NURSING NOTE
"Geo Hicks is a 20 year old male who is being evaluated via a billable video visit.      The patient has been notified of following:     \"This video visit will be conducted via a call between you and your physician/provider. We have found that certain health care needs can be provided without the need for an in-person physical exam.  This service lets us provide the care you need with a video conversation.  If a prescription is necessary we can send it directly to your pharmacy.  If lab work is needed we can place an order for that and you can then stop by our lab to have the test done at a later time.    If during the course of the call the physician/provider feels a video visit is not appropriate, you will not be charged for this service.\"     Patient has given verbal consent for Video visit? 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    ALLERGIES  Blood transfusion related (informational only) and No known drug allergies    "

## 2020-12-22 NOTE — PROGRESS NOTES
"  The patient has been notified of following:     \"This video visit will be conducted via a call between you and your physician/provider. We have found that certain health care needs can be provided without the need for an in-person physical exam.  This service lets us provide the care you need with a video conversation.  If a prescription is necessary we can send it directly to your pharmacy.  If lab work is needed we can place an order for that and you can then stop by our lab to have the test done at a later time.    Video visits are billed at different rates depending on your insurance coverage.  Please reach out to your insurance provider with any questions.    If during the course of the call the physician/provider feels a video visit is not appropriate, you will not be charged for this service.\"    Patient has given verbal consent for Video visit? Yes    Video-Visit Details    Type of service:  Video Visit    Video Start Time: 2:55 PM  Video End Time: 3:54 PM    Originating Location (pt. 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      HPI: Geo had a bowel movement - soft.   Geo had labs on 12/7/20 which looked ok.  We also obtained new height and  weight and his BSA is 2.29  His finger injury is healing well.  Geo reports there is some mild discomfort but doesn't really bother him. His hip wound improved.  He says he gets uncomfortable when he sits for a long time.   He is living in his group care facility.  No recent illnesses. No current fever or sign of current infection. Ongoing concerns about his constipation although reports to me " he continues to have bowel movements almost daily and sometimes more than once.  He reports them to be moderate to large.  We tried to have a discussion about diet and watching calories to see if that would help how he is feeling but he feels he needs it or he will starve.  Discussed that it may be helpful to understand what he is eating (Calories, protein, minerals etc).  He missed no doses with his last cycle - (he took the dose on Thanksgiving one day late due to the Holiday) and tolerated the cycle well. He is asking questions about physical therapy. He is also asking questions about his face not working well and if stem cells could help that.       Labs:    Component      Latest Ref Rng & Units 12/7/2020   WBC      4.0 - 11.0 10e9/L 4.7   RBC Count      4.4 - 5.9 10e12/L 4.79   Hemoglobin      13.3 - 17.7 g/dL 11.0 (L)   Hematocrit      40.0 - 53.0 % 36.6 (L)   MCV      78 - 100 fl 76 (L)   MCH      26.5 - 33.0 pg 23.0 (L)   MCHC      31.5 - 36.5 g/dL 30.1 (L)   RDW      10.0 - 15.0 % 17.0 (H)   Platelet Count      150 - 450 10e9/L 134 (L)   Diff Method       Automated Method   % Neutrophils      % 51.0   % Lymphocytes      % 21.6   % Monocytes      % 15.5   % Eosinophils      % 10.0   % Basophils      % 0.8   % Immature Granulocytes      % 1.1   Nucleated RBCs      0 /100 0   Absolute Neutrophil      1.6 - 8.3 10e9/L 2.4   Absolute Lymphocytes      0.8 - 5.3 10e9/L 1.0   Absolute Monocytes      0.0 - 1.3 10e9/L 0.7   Absolute Eosinophils      0.0 - 0.7 10e9/L 0.5   Absolute Basophils      0.0 - 0.2 10e9/L 0.0   Abs Immature Granulocytes      0 - 0.4 10e9/L 0.1   Absolute Nucleated RBC       0.0   Sodium      133 - 144 mmol/L 136   Potassium      3.4 - 5.3 mmol/L 6.6 (HH)   Chloride      94 - 109 mmol/L 105   Carbon Dioxide      20 - 32 mmol/L 31   Anion Gap      3 - 14 mmol/L <1 (L)   Glucose      70 - 99 mg/dL 78   Urea Nitrogen      7 - 30 mg/dL 17   Creatinine      0.66 - 1.25 mg/dL 0.98   GFR Estimate       >60 mL/min/1.73:m2 >90   GFR Estimate If Black      >60 mL/min/1.73:m2 >90   Calcium      8.5 - 10.1 mg/dL 8.3 (L)   Bilirubin Total      0.2 - 1.3 mg/dL 0.3   Albumin      3.4 - 5.0 g/dL 3.2 (L)   Protein Total      6.8 - 8.8 g/dL 7.4   Alkaline Phosphatase      40 - 150 U/L 104   ALT      0 - 70 U/L 31   AST      0 - 45 U/L Unsatisfactory specimen - hemolyzed   Magnesium      1.6 - 2.3 mg/dL 2.3   Phosphorus      2.5 - 4.5 mg/dL 3.5     Potassium not accurate.  Specimen was hemolyzed. We will recheck.       Imaging:   No results found. However, due to the size of the patient record, not all encounters were searched. Please check Results Review for a complete set of results.     Oncology History:    [id number]   6/26/15    Started Entinostat 1/9/17    DIAGNOSIS: EPENDYMOMA  AREAS TREATED: POST FOSSA TUMOR  DOSE: 5940 cGy   TYPE OF RADIATION GIVEN: with IMRT Tomotherapy   9/08/2015 to 10/26/15    Patient Active Problem List   Diagnosis     GERD (gastroesophageal reflux disease)     Closed fracture at the growth plate of right distal fibula      Elevated serum creatinine     Dyspepsia     Intracranial hemorrhage (H)     Hemorrhagic stroke (H)     Ependymoma (H)     Admission for antineoplastic chemotherapy     Post-operative state     S/P craniotomy     S/P biopsy     Noncomitant strabismus     Abducens neuropathy of both eyes     CANDELARIO (obstructive sleep apnea)     Health Care Home     Hypernatremia     Body temperature low     Current chronic use of systemic steroids     Elevated TSH     Status post chemotherapy     Neoplasm of posterior cranial fossa (H)     Chronic constipation     Serum albumin decreased     Closed compression fracture of thoracic vertebra with routine healing, subsequent encounter     Depression     Constipation     Constipation by delayed colonic transit     Slow transit constipation     Tubular adenoma     Current Outpatient Medications   Medication     dexamethasone (DECADRON) 0.5 MG tablet       MG capsule     doxycycline hyclate (VIBRAMYCIN) 100 MG capsule     fexofenadine (ALLEGRA) 180 MG tablet     Lactobacillus-Inulin ([facility] WELLNESS) CAPS     lamoTRIgine (LAMICTAL) 200 MG tablet     linaclotide (LINZESS) 290 MCG capsule     mupirocin (BACTROBAN) 2 % external ointment     OLANZapine (ZYPREXA) 20 MG tablet     OLANZapine (ZYPREXA) 5 MG tablet     omeprazole (PRILOSEC) 20 MG DR capsule     order for DME     order for DME     polyethylene glycol (MIRALAX) 17 GM/Dose powder     potassium phosphate, monobasic, (K-PHOS) 500 MG tablet     sennosides (SENOKOT) 8.6 MG tablet     sertraline (ZOLOFT) 100 MG tablet     study - entinostat, IDS# 5050, 1 mg tablet     study - entinostat, IDS# 5050, 5 mg tablet     sulfamethoxazole-trimethoprim (BACTRIM) 400-80 MG tablet     traZODone (DESYREL) 150 MG tablet     vitamin D3 (CHOLECALCIFEROL) 2000 units (50 mcg) tablet     vitamin E (TOCOPHEROL) 400 units (360 mg) capsule     No current facility-administered medications for this visit.         Allergies   Allergen Reactions     Blood Transfusion Related (Informational Only) Swelling     Periorbital swelling post platelet transfusion     No Known Drug Allergies        Review of Systems  Skin: positive for striae, Hip wound and finger wound. See HPI.   Eyes: positive for oculoplegia  Ears/Nose/Throat: negative  Respiratory: No shortness of breath, dyspnea on exertion, cough, or hemoptysis  Cardiovascular: negative  Gastrointestinal: constipation.   Genitourinary: negative  Musculoskeletal: negative  Neurologic: positive for  local weakness, speech problems, incoordination and behavior changes  Psychiatric: negative, anxiety, agitation and perseveration re: constipation.  His outlook seems improved today compared to our last visit.  Hematologic/Lymphatic/Immunologic: negative  Endocrine: negative    Physical Exam  BP Readings from Last 1 Encounters:   10/03/20 128/75      Pulse Readings from Last 1  "Encounters:   10/03/20 92      Resp Readings from Last 1 Encounters:   10/03/20 18      Temp Readings from Last 1 Encounters:   10/03/20 97.9  F (36.6  C) (Temporal)      SpO2 Readings from Last 1 Encounters:   10/03/20 97%      Wt Readings from Last 1 Encounters:   12/07/20 104.8 kg (231 lb 1.6 oz)      Ht Readings from Last 1 Encounters:   12/07/20 1.804 m (5' 11.02\")     Wt Readings from Last 2 Encounters:   12/07/20 104.8 kg (231 lb 1.6 oz)   10/01/20 83 kg (182 lb 15.7 oz)       GENERAL: Healthy, and alert.  EYES: Eyes grossly normal to inspection.  No discharge or erythema,patch in place over right eye. HENT: Normal cephalic.  External ears, nose and mouth without ulcers or lesions.  Drooling some today.  RESP: No audible wheeze, cough, or visible cyanosis.  No visible retractions or increased work of breathing.    SKIN: Scattered bruising. No significant rash, abnormal pigmentation or lesions.  Hip wound not examined today.  MSK: Right hand digit #4 healing. Hip not examined today.   NEURO: prominent dysarthria,  His speech continues to be difficult to understand at times.  PSYCH:  Affect varies with the topic, and appearance well-groomed.    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  Daily loose bowel movements however Geo continues with concerns of constipation  Weight increase.      Plan:    Discussed with Geo results of the imaging again.  It is good to see the cystic portion continue to decrease.    I discussed that he should continue to exercise. He does some exercises at home.  We will continue to advocate for in home services for Physical Therapy during COVID.  I think it is very important for him.  It appears it will start in about 2 weeks.    Discussed his constipation at length and eating habits.  He has agreed to let the staff collect diet history for 2-3 days so we can analyze for protein, calories " and minerals.  We will review the information together.  Continued to encourage exercise. He is excited about physical therapy. He is asking about what is ordered.  I relayed that we ordered evaluate and treat.  He asked about a walked.  I said if she feels that is needed, she will tell us and we can order durable medical equipment.  Discussed that orders need to go through his primary so are sometimes slightly delayed.  Acknowledged again that the isolation is extremely difficult.  Support given to Geo for the difficult situation he is in and the difficulties he faces trying to accomplish the things he wants to do.     He will begin his next cycle on Entinostat - His dose increases to 7mg orally weekly for four doses.    Addendum:  Labs done the day following the visit - they continue to be excellent for starting his cycle:  WBC 6.7  Hgb 11.7  Platelets 176,000  ANC 4000    Comprehensive  Na 145  K 3.8  Cl 107  CO2 28  Ca 8.5  BUN 13  Cr 0.94  Alk Phos 174  AST 16  ALT 15  Bili Total 0.3  Total Protein 7.4  Albumin 3.5  Glucose 123 (Taken after breakfast)  Magnesium 2.1  Phosphorus 3.8

## 2020-12-22 NOTE — LETTER
"12/22/2020      RE: Geo Hicks  [id number] [address] [facility] [id number]         The patient has been notified of following:     \"This video visit will be conducted via a call between you and your physician/provider. We have found that certain health care needs can be provided without the need for an in-person physical exam.  This service lets us provide the care you need with a video conversation.  If a prescription is necessary we can send it directly to your pharmacy.  If lab work is needed we can place an order for that and you can then stop by our lab to have the test done at a later time.    Video visits are billed at different rates depending on your insurance coverage.  Please reach out to your insurance provider with any questions.    If during the course of the call the physician/provider feels a video visit is not appropriate, you will not be charged for this service.\"    Patient has given verbal consent for Video visit? Yes    Video-Visit Details    Type of service:  Video Visit    Video Start Time: 2:55 PM  Video End Time: 3:54 PM    Originating Location (pt. 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      HPI: Geo had a bowel movement - soft.   Geo had labs on 12/7/20 which looked ok.  We also obtained new height and  weight and his BSA is 2.29  His finger injury is healing well.  Geo reports there is some mild discomfort but doesn't really bother him. His hip wound improved.  He says he gets uncomfortable when he sits for a long time.   He is living in his group care facility.  No recent illnesses. No current fever or sign of " current infection. Ongoing concerns about his constipation although reports to me he continues to have bowel movements almost daily and sometimes more than once.  He reports them to be moderate to large.  We tried to have a discussion about diet and watching calories to see if that would help how he is feeling but he feels he needs it or he will starve.  Discussed that it may be helpful to understand what he is eating (Calories, protein, minerals etc).  He missed no doses with his last cycle - (he took the dose on Thanksgiving one day late due to the Holiday) and tolerated the cycle well. He is asking questions about physical therapy. He is also asking questions about his face not working well and if stem cells could help that.       Labs:    Component      Latest Ref Rng & Units 12/7/2020   WBC      4.0 - 11.0 10e9/L 4.7   RBC Count      4.4 - 5.9 10e12/L 4.79   Hemoglobin      13.3 - 17.7 g/dL 11.0 (L)   Hematocrit      40.0 - 53.0 % 36.6 (L)   MCV      78 - 100 fl 76 (L)   MCH      26.5 - 33.0 pg 23.0 (L)   MCHC      31.5 - 36.5 g/dL 30.1 (L)   RDW      10.0 - 15.0 % 17.0 (H)   Platelet Count      150 - 450 10e9/L 134 (L)   Diff Method       Automated Method   % Neutrophils      % 51.0   % Lymphocytes      % 21.6   % Monocytes      % 15.5   % Eosinophils      % 10.0   % Basophils      % 0.8   % Immature Granulocytes      % 1.1   Nucleated RBCs      0 /100 0   Absolute Neutrophil      1.6 - 8.3 10e9/L 2.4   Absolute Lymphocytes      0.8 - 5.3 10e9/L 1.0   Absolute Monocytes      0.0 - 1.3 10e9/L 0.7   Absolute Eosinophils      0.0 - 0.7 10e9/L 0.5   Absolute Basophils      0.0 - 0.2 10e9/L 0.0   Abs Immature Granulocytes      0 - 0.4 10e9/L 0.1   Absolute Nucleated RBC       0.0   Sodium      133 - 144 mmol/L 136   Potassium      3.4 - 5.3 mmol/L 6.6 (HH)   Chloride      94 - 109 mmol/L 105   Carbon Dioxide      20 - 32 mmol/L 31   Anion Gap      3 - 14 mmol/L <1 (L)   Glucose      70 - 99 mg/dL 78   Urea  Nitrogen      7 - 30 mg/dL 17   Creatinine      0.66 - 1.25 mg/dL 0.98   GFR Estimate      >60 mL/min/1.73:m2 >90   GFR Estimate If Black      >60 mL/min/1.73:m2 >90   Calcium      8.5 - 10.1 mg/dL 8.3 (L)   Bilirubin Total      0.2 - 1.3 mg/dL 0.3   Albumin      3.4 - 5.0 g/dL 3.2 (L)   Protein Total      6.8 - 8.8 g/dL 7.4   Alkaline Phosphatase      40 - 150 U/L 104   ALT      0 - 70 U/L 31   AST      0 - 45 U/L Unsatisfactory specimen - hemolyzed   Magnesium      1.6 - 2.3 mg/dL 2.3   Phosphorus      2.5 - 4.5 mg/dL 3.5     Potassium not accurate.  Specimen was hemolyzed. We will recheck.       Imaging:   No results found. However, due to the size of the patient record, not all encounters were searched. Please check Results Review for a complete set of results.     Oncology History:    [id number]   6/26/15    Started Entinostat 1/9/17    DIAGNOSIS: EPENDYMOMA  AREAS TREATED: POST FOSSA TUMOR  DOSE: 5940 cGy   TYPE OF RADIATION GIVEN: with IMRT Tomotherapy   9/08/2015 to 10/26/15    Patient Active Problem List   Diagnosis     GERD (gastroesophageal reflux disease)     Closed fracture at the growth plate of right distal fibula      Elevated serum creatinine     Dyspepsia     Intracranial hemorrhage (H)     Hemorrhagic stroke (H)     Ependymoma (H)     Admission for antineoplastic chemotherapy     Post-operative state     S/P craniotomy     S/P biopsy     Noncomitant strabismus     Abducens neuropathy of both eyes     CANDELARIO (obstructive sleep apnea)     Health Care Home     Hypernatremia     Body temperature low     Current chronic use of systemic steroids     Elevated TSH     Status post chemotherapy     Neoplasm of posterior cranial fossa (H)     Chronic constipation     Serum albumin decreased     Closed compression fracture of thoracic vertebra with routine healing, subsequent encounter     Depression     Constipation     Constipation by delayed colonic transit     Slow transit constipation     Tubular adenoma      Current Outpatient Medications   Medication     dexamethasone (DECADRON) 0.5 MG tablet      MG capsule     doxycycline hyclate (VIBRAMYCIN) 100 MG capsule     fexofenadine (ALLEGRA) 180 MG tablet     Lactobacillus-Inulin ([facility]ALTH [facility] WELLNESS) CAPS     lamoTRIgine (LAMICTAL) 200 MG tablet     linaclotide (LINZESS) 290 MCG capsule     mupirocin (BACTROBAN) 2 % external ointment     OLANZapine (ZYPREXA) 20 MG tablet     OLANZapine (ZYPREXA) 5 MG tablet     omeprazole (PRILOSEC) 20 MG DR capsule     order for DME     order for DME     polyethylene glycol (MIRALAX) 17 GM/Dose powder     potassium phosphate, monobasic, (K-PHOS) 500 MG tablet     sennosides (SENOKOT) 8.6 MG tablet     sertraline (ZOLOFT) 100 MG tablet     study - entinostat, IDS# 5050, 1 mg tablet     study - entinostat, IDS# 5050, 5 mg tablet     sulfamethoxazole-trimethoprim (BACTRIM) 400-80 MG tablet     traZODone (DESYREL) 150 MG tablet     vitamin D3 (CHOLECALCIFEROL) 2000 units (50 mcg) tablet     vitamin E (TOCOPHEROL) 400 units (360 mg) capsule     No current facility-administered medications for this visit.         Allergies   Allergen Reactions     Blood Transfusion Related (Informational Only) Swelling     Periorbital swelling post platelet transfusion     No Known Drug Allergies        Review of Systems  Skin: positive for striae, Hip wound and finger wound. See HPI.   Eyes: positive for oculoplegia  Ears/Nose/Throat: negative  Respiratory: No shortness of breath, dyspnea on exertion, cough, or hemoptysis  Cardiovascular: negative  Gastrointestinal: constipation.   Genitourinary: negative  Musculoskeletal: negative  Neurologic: positive for  local weakness, speech problems, incoordination and behavior changes  Psychiatric: negative, anxiety, agitation and perseveration re: constipation.  His outlook seems improved today compared to our last visit.  Hematologic/Lymphatic/Immunologic: negative  Endocrine: negative    Physical  "Exam  BP Readings from Last 1 Encounters:   10/03/20 128/75      Pulse Readings from Last 1 Encounters:   10/03/20 92      Resp Readings from Last 1 Encounters:   10/03/20 18      Temp Readings from Last 1 Encounters:   10/03/20 97.9  F (36.6  C) (Temporal)      SpO2 Readings from Last 1 Encounters:   10/03/20 97%      Wt Readings from Last 1 Encounters:   12/07/20 104.8 kg (231 lb 1.6 oz)      Ht Readings from Last 1 Encounters:   12/07/20 1.804 m (5' 11.02\")     Wt Readings from Last 2 Encounters:   12/07/20 104.8 kg (231 lb 1.6 oz)   10/01/20 83 kg (182 lb 15.7 oz)       GENERAL: Healthy, and alert.  EYES: Eyes grossly normal to inspection.  No discharge or erythema,patch in place over right eye. HENT: Normal cephalic.  External ears, nose and mouth without ulcers or lesions.  Drooling some today.  RESP: No audible wheeze, cough, or visible cyanosis.  No visible retractions or increased work of breathing.    SKIN: Scattered bruising. No significant rash, abnormal pigmentation or lesions.  Hip wound not examined today.  MSK: Right hand digit #4 healing. Hip not examined today.   NEURO: prominent dysarthria,  His speech continues to be difficult to understand at times.  PSYCH:  Affect varies with the topic, and appearance well-groomed.    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  Daily loose bowel movements however Geo continues with concerns of constipation  Weight increase.      Plan:    Discussed with Geo results of the imaging again.  It is good to see the cystic portion continue to decrease.    I discussed that he should continue to exercise. He does some exercises at home.  We will continue to advocate for in home services for Physical Therapy during COVID.  I think it is very important for him.  It appears it will start in about 2 weeks.    Discussed his constipation at length and eating habits.  He has " agreed to let the staff collect diet history for 2-3 days so we can analyze for protein, calories and minerals.  We will review the information together.  Continued to encourage exercise. He is excited about physical therapy. He is asking about what is ordered.  I relayed that we ordered evaluate and treat.  He asked about a walked.  I said if she feels that is needed, she will tell us and we can order durable medical equipment.  Discussed that orders need to go through his primary so are sometimes slightly delayed.  Acknowledged again that the isolation is extremely difficult.  Support given to Geo for the difficult situation he is in and the difficulties he faces trying to accomplish the things he wants to do.     He will begin his next cycle on Entinostat - His dose increases to 7mg orally weekly for four doses.    Addendum:  Labs done the day following the visit - they continue to be excellent for starting his cycle:  WBC 6.7  Hgb 11.7  Platelets 176,000  ANC 4000    Comprehensive  Na 145  K 3.8  Cl 107  CO2 28  Ca 8.5  BUN 13  Cr 0.94  Alk Phos 174  AST 16  ALT 15  Bili Total 0.3  Total Protein 7.4  Albumin 3.5  Glucose 123 (Taken after breakfast)  Magnesium 2.1  Phosphorus 3.8        Kristi Schuler, APRN CNP

## 2020-12-22 NOTE — TELEPHONE ENCOUNTER
Pt's dad, Eric, has been working to amend Geo's guardianship paperwork to include language that allows guardians to consent for experimental treatment, which would include research protocols for patient's cancer dx. SW emailed [employer] for review & including into patient's Electronic Medical Record.    GARCIA Kohli LICSW  Peds Hem/Onc Social Work  Ph: [phone]  Pager: [phone]    NO LETTER

## 2020-12-22 NOTE — LETTER
[facility] PEDS CNS TUMOR/NEUROFIBROMATOSIS      [facility]  9th [address]  [address] [place] [address]  Phone: [phone]     PHYSICIAN ORDERS      DATE & TIME ISSUED: 2020    PATIENT NAME: Geo Hicks   : 2000  [facility] MR#: [id number]  DX: Epenymoma      Orders requested: Geo will begin new dosing for Etinostat.    1. He will take Etinostat 7mg weekly for 4 doses.      2. Also please keep record of everything he eats and drinks for 2-3 days if possible.  Fax results to [phone]     If questions please call: Rhina at [phone]             Kristi Schuler CNP   Pediatric Neuro-oncology and Neurofibromatosis programs   [facility] Pediatric Hematology Oncology

## 2021-01-01 ENCOUNTER — TELEPHONE (OUTPATIENT)
Dept: PSYCHIATRY | Facility: CLINIC | Age: 21
End: 2021-01-01

## 2021-01-01 ENCOUNTER — VIRTUAL VISIT (OUTPATIENT)
Dept: PSYCHIATRY | Facility: CLINIC | Age: 21
End: 2021-01-01
Attending: PSYCHIATRY & NEUROLOGY
Payer: COMMERCIAL

## 2021-01-01 ENCOUNTER — APPOINTMENT (OUTPATIENT)
Dept: GENERAL RADIOLOGY | Facility: CLINIC | Age: 21
DRG: 871 | End: 2021-01-01
Attending: PEDIATRICS
Payer: COMMERCIAL

## 2021-01-01 ENCOUNTER — TELEPHONE (OUTPATIENT)
Dept: ONCOLOGY | Facility: CLINIC | Age: 21
End: 2021-01-01

## 2021-01-01 ENCOUNTER — HOSPITAL ENCOUNTER (OUTPATIENT)
Dept: MRI IMAGING | Facility: CLINIC | Age: 21
End: 2021-03-16
Attending: PEDIATRICS
Payer: COMMERCIAL

## 2021-01-01 ENCOUNTER — APPOINTMENT (OUTPATIENT)
Dept: PHYSICAL THERAPY | Facility: CLINIC | Age: 21
DRG: 871 | End: 2021-01-01
Attending: PEDIATRICS
Payer: COMMERCIAL

## 2021-01-01 ENCOUNTER — TRANSFERRED RECORDS (OUTPATIENT)
Dept: HEALTH INFORMATION MANAGEMENT | Facility: CLINIC | Age: 21
End: 2021-01-01

## 2021-01-01 ENCOUNTER — TELEPHONE (OUTPATIENT)
Dept: FAMILY MEDICINE | Facility: CLINIC | Age: 21
End: 2021-01-01

## 2021-01-01 ENCOUNTER — EXTERNAL ORDER RESULTS (OUTPATIENT)
Dept: PEDIATRIC HEMATOLOGY/ONCOLOGY | Facility: CLINIC | Age: 21
End: 2021-01-01

## 2021-01-01 ENCOUNTER — APPOINTMENT (OUTPATIENT)
Dept: GENERAL RADIOLOGY | Facility: CLINIC | Age: 21
End: 2021-01-01
Attending: PEDIATRICS
Payer: COMMERCIAL

## 2021-01-01 ENCOUNTER — OFFICE VISIT (OUTPATIENT)
Dept: URGENT CARE | Facility: URGENT CARE | Age: 21
End: 2021-01-01
Payer: COMMERCIAL

## 2021-01-01 ENCOUNTER — HOSPITAL ENCOUNTER (OUTPATIENT)
Dept: GENERAL RADIOLOGY | Facility: CLINIC | Age: 21
End: 2021-03-10
Attending: NURSE PRACTITIONER
Payer: COMMERCIAL

## 2021-01-01 ENCOUNTER — HOSPITAL ENCOUNTER (EMERGENCY)
Facility: CLINIC | Age: 21
Discharge: ANOTHER HOSPITAL | End: 2021-05-01
Attending: EMERGENCY MEDICINE | Admitting: EMERGENCY MEDICINE
Payer: COMMERCIAL

## 2021-01-01 ENCOUNTER — DOCUMENTATION ONLY (OUTPATIENT)
Dept: FAMILY MEDICINE | Facility: CLINIC | Age: 21
End: 2021-01-01

## 2021-01-01 ENCOUNTER — VIRTUAL VISIT (OUTPATIENT)
Dept: PEDIATRIC HEMATOLOGY/ONCOLOGY | Facility: CLINIC | Age: 21
End: 2021-01-01
Attending: NURSE PRACTITIONER
Payer: COMMERCIAL

## 2021-01-01 ENCOUNTER — HOSPITAL ENCOUNTER (OUTPATIENT)
Facility: CLINIC | Age: 21
Setting detail: OBSERVATION
Discharge: HOME | End: 2021-03-12
Attending: PEDIATRICS | Admitting: PEDIATRICS
Payer: COMMERCIAL

## 2021-01-01 ENCOUNTER — APPOINTMENT (OUTPATIENT)
Dept: MRI IMAGING | Facility: CLINIC | Age: 21
End: 2021-01-01
Attending: EMERGENCY MEDICINE
Payer: COMMERCIAL

## 2021-01-01 ENCOUNTER — DOCUMENTATION ONLY (OUTPATIENT)
Dept: CARE COORDINATION | Facility: CLINIC | Age: 21
End: 2021-01-01

## 2021-01-01 ENCOUNTER — OFFICE VISIT (OUTPATIENT)
Dept: PEDIATRIC HEMATOLOGY/ONCOLOGY | Facility: CLINIC | Age: 21
End: 2021-01-01
Attending: PEDIATRICS
Payer: COMMERCIAL

## 2021-01-01 ENCOUNTER — APPOINTMENT (OUTPATIENT)
Dept: GENERAL RADIOLOGY | Facility: CLINIC | Age: 21
End: 2021-01-01
Attending: EMERGENCY MEDICINE
Payer: COMMERCIAL

## 2021-01-01 ENCOUNTER — HOSPITAL ENCOUNTER (INPATIENT)
Facility: CLINIC | Age: 21
LOS: 10 days | DRG: 871 | End: 2021-05-12
Attending: PEDIATRICS | Admitting: PEDIATRICS
Payer: COMMERCIAL

## 2021-01-01 ENCOUNTER — PATIENT OUTREACH (OUTPATIENT)
Dept: FAMILY MEDICINE | Facility: CLINIC | Age: 21
End: 2021-01-01

## 2021-01-01 VITALS
OXYGEN SATURATION: 97 % | BODY MASS INDEX: 38.85 KG/M2 | DIASTOLIC BLOOD PRESSURE: 68 MMHG | RESPIRATION RATE: 20 BRPM | SYSTOLIC BLOOD PRESSURE: 118 MMHG | WEIGHT: 277.78 LBS | HEART RATE: 107 BPM | TEMPERATURE: 98.6 F

## 2021-01-01 VITALS
HEART RATE: 34 BPM | WEIGHT: 262.35 LBS | RESPIRATION RATE: 18 BRPM | BODY MASS INDEX: 36.69 KG/M2 | OXYGEN SATURATION: 31 % | DIASTOLIC BLOOD PRESSURE: 50 MMHG | TEMPERATURE: 98.1 F | SYSTOLIC BLOOD PRESSURE: 83 MMHG

## 2021-01-01 VITALS
TEMPERATURE: 98.4 F | HEART RATE: 63 BPM | SYSTOLIC BLOOD PRESSURE: 121 MMHG | DIASTOLIC BLOOD PRESSURE: 70 MMHG | OXYGEN SATURATION: 98 %

## 2021-01-01 VITALS
HEIGHT: 71 IN | RESPIRATION RATE: 24 BRPM | OXYGEN SATURATION: 93 % | WEIGHT: 258.38 LBS | BODY MASS INDEX: 36.17 KG/M2 | HEART RATE: 90 BPM | SYSTOLIC BLOOD PRESSURE: 111 MMHG | TEMPERATURE: 98.4 F | DIASTOLIC BLOOD PRESSURE: 59 MMHG

## 2021-01-01 VITALS
RESPIRATION RATE: 22 BRPM | SYSTOLIC BLOOD PRESSURE: 121 MMHG | DIASTOLIC BLOOD PRESSURE: 75 MMHG | HEART RATE: 107 BPM | OXYGEN SATURATION: 92 % | BODY MASS INDEX: 35.8 KG/M2 | WEIGHT: 256.84 LBS

## 2021-01-01 VITALS
TEMPERATURE: 97.3 F | SYSTOLIC BLOOD PRESSURE: 135 MMHG | OXYGEN SATURATION: 91 % | WEIGHT: 254 LBS | BODY MASS INDEX: 33.66 KG/M2 | DIASTOLIC BLOOD PRESSURE: 92 MMHG | HEART RATE: 78 BPM | HEIGHT: 73 IN | RESPIRATION RATE: 24 BRPM

## 2021-01-01 DIAGNOSIS — R63.5 WEIGHT GAIN: ICD-10-CM

## 2021-01-01 DIAGNOSIS — E78.2 ELEVATED TRIGLYCERIDES WITH HIGH CHOLESTEROL: ICD-10-CM

## 2021-01-01 DIAGNOSIS — F32.A DEPRESSION, UNSPECIFIED DEPRESSION TYPE: ICD-10-CM

## 2021-01-01 DIAGNOSIS — R63.5 WEIGHT GAIN: Primary | ICD-10-CM

## 2021-01-01 DIAGNOSIS — R27.0 ATAXIA: ICD-10-CM

## 2021-01-01 DIAGNOSIS — R45.86 MOOD CHANGES: ICD-10-CM

## 2021-01-01 DIAGNOSIS — F22 PARANOIA (H): ICD-10-CM

## 2021-01-01 DIAGNOSIS — D49.6 NEOPLASM OF POSTERIOR CRANIAL FOSSA (H): Primary | ICD-10-CM

## 2021-01-01 DIAGNOSIS — R79.81 LOW OXYGEN SATURATION: ICD-10-CM

## 2021-01-01 DIAGNOSIS — R45.1 AGITATION: ICD-10-CM

## 2021-01-01 DIAGNOSIS — Z79.899 ENCOUNTER FOR LONG-TERM CURRENT USE OF MEDICATION: Primary | ICD-10-CM

## 2021-01-01 DIAGNOSIS — D49.6 NEOPLASM OF POSTERIOR CRANIAL FOSSA (H): ICD-10-CM

## 2021-01-01 DIAGNOSIS — Z79.52 LONG TERM (CURRENT) USE OF SYSTEMIC STEROIDS: ICD-10-CM

## 2021-01-01 DIAGNOSIS — C71.9 EPENDYMOMA (H): Primary | ICD-10-CM

## 2021-01-01 DIAGNOSIS — C71.9 EPENDYMOMA (H): ICD-10-CM

## 2021-01-01 DIAGNOSIS — K59.00 CONSTIPATION, UNSPECIFIED CONSTIPATION TYPE: ICD-10-CM

## 2021-01-01 DIAGNOSIS — R68.89 DIFFICULTY TRANSFERRING: ICD-10-CM

## 2021-01-01 DIAGNOSIS — F51.04 PSYCHOPHYSIOLOGICAL INSOMNIA: ICD-10-CM

## 2021-01-01 DIAGNOSIS — R05.9 COUGH: ICD-10-CM

## 2021-01-01 DIAGNOSIS — E78.1 HIGH BLOOD TRIGLYCERIDES: ICD-10-CM

## 2021-01-01 DIAGNOSIS — E66.811 CLASS 1 OBESITY WITHOUT SERIOUS COMORBIDITY WITH BODY MASS INDEX (BMI) OF 33.0 TO 33.9 IN ADULT, UNSPECIFIED OBESITY TYPE: Primary | ICD-10-CM

## 2021-01-01 DIAGNOSIS — E87.29 CO2 RETENTION: ICD-10-CM

## 2021-01-01 DIAGNOSIS — K59.04 CHRONIC IDIOPATHIC CONSTIPATION: ICD-10-CM

## 2021-01-01 DIAGNOSIS — J69.0 ASPIRATION PNEUMONITIS (H): ICD-10-CM

## 2021-01-01 DIAGNOSIS — Z92.3 HISTORY OF THERAPEUTIC RADIATION: ICD-10-CM

## 2021-01-01 DIAGNOSIS — S81.812A LACERATION OF LEG, LEFT, INITIAL ENCOUNTER: Primary | ICD-10-CM

## 2021-01-01 DIAGNOSIS — R09.02 HYPOXIA: ICD-10-CM

## 2021-01-01 LAB
ALBUMIN SERPL-MCNC: 2.5 G/DL (ref 3.4–5)
ALBUMIN SERPL-MCNC: 2.7 G/DL (ref 3.4–5)
ALBUMIN SERPL-MCNC: 2.7 G/DL (ref 3.4–5)
ALBUMIN SERPL-MCNC: 2.9 G/DL (ref 3.4–5)
ALBUMIN SERPL-MCNC: 3 G/DL (ref 3.4–5)
ALBUMIN SERPL-MCNC: 3.6 G/DL
ALBUMIN UR-MCNC: 20 MG/DL
ALP SERPL-CCNC: 148 U/L (ref 40–150)
ALP SERPL-CCNC: 155 U/L
ALP SERPL-CCNC: 165 U/L (ref 40–150)
ALP SERPL-CCNC: 94 U/L (ref 40–150)
ALP SERPL-CCNC: 94 U/L (ref 40–150)
ALT SERPL W P-5'-P-CCNC: 20 U/L (ref 0–70)
ALT SERPL W P-5'-P-CCNC: 22 U/L (ref 0–70)
ALT SERPL W P-5'-P-CCNC: 24 U/L (ref 0–70)
ALT SERPL W P-5'-P-CCNC: 32 U/L (ref 0–70)
ALT SERPL-CCNC: 25 U/L
ANION GAP SERPL CALCULATED.3IONS-SCNC: 1 MMOL/L (ref 3–14)
ANION GAP SERPL CALCULATED.3IONS-SCNC: 1 MMOL/L (ref 3–14)
ANION GAP SERPL CALCULATED.3IONS-SCNC: 2 MMOL/L (ref 3–14)
ANION GAP SERPL CALCULATED.3IONS-SCNC: 4 MMOL/L (ref 3–14)
ANION GAP SERPL CALCULATED.3IONS-SCNC: 5 MMOL/L (ref 3–14)
ANION GAP SERPL CALCULATED.3IONS-SCNC: 8 MMOL/L (ref 3–14)
ANION GAP SERPL CALCULATED.3IONS-SCNC: 9 MMOL/L
ANION GAP SERPL CALCULATED.3IONS-SCNC: <1 MMOL/L (ref 3–14)
ANION GAP SERPL CALCULATED.3IONS-SCNC: <1 MMOL/L (ref 3–14)
ANISOCYTOSIS BLD QL SMEAR: ABNORMAL
APPEARANCE UR: CLEAR
AST SERPL W P-5'-P-CCNC: 13 U/L (ref 0–45)
AST SERPL W P-5'-P-CCNC: 17 U/L (ref 0–45)
AST SERPL W P-5'-P-CCNC: 34 U/L (ref 0–45)
AST SERPL W P-5'-P-CCNC: ABNORMAL U/L (ref 0–45)
AST SERPL-CCNC: 22 U/L
BACTERIA #/AREA URNS HPF: ABNORMAL /HPF
BACTERIA SPEC CULT: NO GROWTH
BACTERIA SPEC CULT: NO GROWTH
BASE EXCESS BLDV CALC-SCNC: 10.8 MMOL/L
BASE EXCESS BLDV CALC-SCNC: 10.9 MMOL/L
BASE EXCESS BLDV CALC-SCNC: 11.3 MMOL/L
BASE EXCESS BLDV CALC-SCNC: 11.7 MMOL/L
BASE EXCESS BLDV CALC-SCNC: 11.8 MMOL/L
BASE EXCESS BLDV CALC-SCNC: 12 MMOL/L
BASE EXCESS BLDV CALC-SCNC: 12.4 MMOL/L
BASE EXCESS BLDV CALC-SCNC: 12.7 MMOL/L
BASE EXCESS BLDV CALC-SCNC: 13.1 MMOL/L
BASE EXCESS BLDV CALC-SCNC: 13.2 MMOL/L
BASE EXCESS BLDV CALC-SCNC: 14 MMOL/L
BASE EXCESS BLDV CALC-SCNC: 3.5 MMOL/L
BASE EXCESS BLDV CALC-SCNC: 3.7 MMOL/L
BASE EXCESS BLDV CALC-SCNC: 4 MMOL/L
BASE EXCESS BLDV CALC-SCNC: 4.8 MMOL/L
BASE EXCESS BLDV CALC-SCNC: 5.5 MMOL/L
BASE EXCESS BLDV CALC-SCNC: 6 MMOL/L
BASE EXCESS BLDV CALC-SCNC: 6.8 MMOL/L
BASE EXCESS BLDV CALC-SCNC: 6.9 MMOL/L
BASE EXCESS BLDV CALC-SCNC: 7 MMOL/L
BASE EXCESS BLDV CALC-SCNC: 7.1 MMOL/L
BASE EXCESS BLDV CALC-SCNC: 7.2 MMOL/L
BASE EXCESS BLDV CALC-SCNC: 7.3 MMOL/L
BASE EXCESS BLDV CALC-SCNC: 7.4 MMOL/L
BASE EXCESS BLDV CALC-SCNC: 7.6 MMOL/L
BASE EXCESS BLDV CALC-SCNC: 7.9 MMOL/L
BASE EXCESS BLDV CALC-SCNC: 8.2 MMOL/L
BASE EXCESS BLDV CALC-SCNC: 8.3 MMOL/L
BASE EXCESS BLDV CALC-SCNC: 8.8 MMOL/L
BASOPHILS # BLD AUTO: 0 10E9/L (ref 0–0.2)
BASOPHILS # BLD AUTO: 0.1 10E9/L (ref 0–0.2)
BASOPHILS NFR BLD AUTO: 0.3 %
BASOPHILS NFR BLD AUTO: 0.5 %
BASOPHILS NFR BLD AUTO: 0.6 %
BASOPHILS NFR BLD AUTO: 0.6 %
BASOPHILS NFR BLD AUTO: 0.9 %
BASOPHILS NFR BLD AUTO: 1 %
BASOPHILS NFR BLD AUTO: 1.1 %
BILIRUB DIRECT SERPL-MCNC: <0.1 MG/DL (ref 0–0.2)
BILIRUB SERPL-MCNC: 0.2 MG/DL (ref 0.2–1.3)
BILIRUB SERPL-MCNC: 0.2 MG/DL (ref 0.2–1.3)
BILIRUB SERPL-MCNC: 0.3 MG/DL
BILIRUB SERPL-MCNC: 0.3 MG/DL (ref 0.2–1.3)
BILIRUB SERPL-MCNC: 0.3 MG/DL (ref 0.2–1.3)
BILIRUB UR QL STRIP: NEGATIVE
BUN SERPL-MCNC: 15 MG/DL (ref 7–30)
BUN SERPL-MCNC: 16 MG/DL (ref 7–30)
BUN SERPL-MCNC: 16 MG/DL (ref 7–30)
BUN SERPL-MCNC: 17 MG/DL (ref 7–30)
BUN SERPL-MCNC: 17 MG/DL (ref 7–30)
BUN SERPL-MCNC: 18 MG/DL (ref 7–30)
BUN SERPL-MCNC: 19 MG/DL (ref 7–30)
BUN SERPL-MCNC: 21 MG/DL (ref 7–30)
BUN SERPL-MCNC: 22 MG/DL (ref 7–30)
BUN SERPL-MCNC: 23 MG/DL (ref 7–30)
BUN SERPL-MCNC: 8 MG/DL (ref 7–30)
BUN SERPL-MCNC: ABNORMAL MG/DL
CALCIUM SERPL-MCNC: 7.4 MG/DL (ref 8.5–10.1)
CALCIUM SERPL-MCNC: 7.9 MG/DL (ref 8.5–10.1)
CALCIUM SERPL-MCNC: 8 MG/DL (ref 8.5–10.1)
CALCIUM SERPL-MCNC: 8.1 MG/DL (ref 8.5–10.1)
CALCIUM SERPL-MCNC: 8.2 MG/DL (ref 8.5–10.1)
CALCIUM SERPL-MCNC: 8.3 MG/DL (ref 8.5–10.1)
CALCIUM SERPL-MCNC: 8.5 MG/DL (ref 8.5–10.1)
CALCIUM SERPL-MCNC: 8.8 MG/DL
CHLORIDE SERPL-SCNC: 100 MMOL/L (ref 94–109)
CHLORIDE SERPL-SCNC: 100 MMOL/L (ref 94–109)
CHLORIDE SERPL-SCNC: 101 MMOL/L (ref 94–109)
CHLORIDE SERPL-SCNC: 103 MMOL/L (ref 94–109)
CHLORIDE SERPL-SCNC: 104 MMOL/L (ref 94–109)
CHLORIDE SERPL-SCNC: 105 MMOL/L (ref 94–109)
CHLORIDE SERPL-SCNC: 106 MMOL/L (ref 94–109)
CHLORIDE SERPL-SCNC: 107 MMOL/L (ref 94–109)
CHLORIDE SERPL-SCNC: 107 MMOL/L (ref 94–109)
CHLORIDE SERPL-SCNC: 108 MMOL/L (ref 94–109)
CHLORIDE SERPLBLD-SCNC: 102 MMOL/L
CHOLEST SERPL-MCNC: 217 MG/DL
CHOLEST SERPL-MCNC: 218 MG/DL
CO2 SERPL-SCNC: 28 MMOL/L (ref 20–32)
CO2 SERPL-SCNC: 28 MMOL/L (ref 20–32)
CO2 SERPL-SCNC: 29 MMOL/L
CO2 SERPL-SCNC: 32 MMOL/L (ref 20–32)
CO2 SERPL-SCNC: 33 MMOL/L (ref 20–32)
CO2 SERPL-SCNC: 33 MMOL/L (ref 20–32)
CO2 SERPL-SCNC: 34 MMOL/L (ref 20–32)
CO2 SERPL-SCNC: 36 MMOL/L (ref 20–32)
CO2 SERPL-SCNC: 38 MMOL/L (ref 20–32)
CO2 SERPL-SCNC: 39 MMOL/L (ref 20–32)
COLOR UR AUTO: YELLOW
CORTIS SERPL-MCNC: 0.6 UG/DL (ref 4–22)
CREAT SERPL-MCNC: 0.83 MG/DL (ref 0.66–1.25)
CREAT SERPL-MCNC: 0.84 MG/DL (ref 0.66–1.25)
CREAT SERPL-MCNC: 0.86 MG/DL (ref 0.66–1.25)
CREAT SERPL-MCNC: 0.89 MG/DL (ref 0.66–1.25)
CREAT SERPL-MCNC: 0.9 MG/DL (ref 0.66–1.25)
CREAT SERPL-MCNC: 0.92 MG/DL
CREAT SERPL-MCNC: 0.96 MG/DL (ref 0.66–1.25)
CREAT SERPL-MCNC: 0.98 MG/DL (ref 0.66–1.25)
CREAT SERPL-MCNC: 1.02 MG/DL (ref 0.66–1.25)
CREAT SERPL-MCNC: 1.03 MG/DL (ref 0.66–1.25)
CREAT SERPL-MCNC: 1.04 MG/DL (ref 0.66–1.25)
CREAT SERPL-MCNC: 1.06 MG/DL (ref 0.66–1.25)
CREAT SERPL-MCNC: 1.07 MG/DL (ref 0.66–1.25)
CREAT SERPL-MCNC: 1.12 MG/DL (ref 0.66–1.25)
CRP SERPL-MCNC: 13.6 MG/L (ref 0–8)
CRP SERPL-MCNC: 16.7 MG/L (ref 0–8)
CRP SERPL-MCNC: 28.4 MG/L (ref 0–8)
CRP SERPL-MCNC: <2.9 MG/L (ref 0–8)
DIFFERENTIAL METHOD BLD: ABNORMAL
EOSINOPHIL # BLD AUTO: 0 10E9/L (ref 0–0.7)
EOSINOPHIL # BLD AUTO: 0.1 10E9/L (ref 0–0.7)
EOSINOPHIL # BLD AUTO: 0.2 10E9/L (ref 0–0.7)
EOSINOPHIL # BLD AUTO: 0.3 10E9/L (ref 0–0.7)
EOSINOPHIL # BLD AUTO: 0.4 10E9/L (ref 0–0.7)
EOSINOPHIL COUNT (ABSOLUTE): 0.4 X10E3/UL
EOSINOPHIL NFR BLD AUTO: 0.5 %
EOSINOPHIL NFR BLD AUTO: 0.7 %
EOSINOPHIL NFR BLD AUTO: 0.8 %
EOSINOPHIL NFR BLD AUTO: 1.6 %
EOSINOPHIL NFR BLD AUTO: 1.8 %
EOSINOPHIL NFR BLD AUTO: 2 %
EOSINOPHIL NFR BLD AUTO: 4.1 %
EOSINOPHIL NFR BLD AUTO: 5.5 %
EOSINOPHIL NFR BLD AUTO: 7.3 %
ERYTHROCYTE [DISTWIDTH] IN BLOOD BY AUTOMATED COUNT: 18.6 %
ERYTHROCYTE [DISTWIDTH] IN BLOOD BY AUTOMATED COUNT: 19.1 % (ref 10–15)
ERYTHROCYTE [DISTWIDTH] IN BLOOD BY AUTOMATED COUNT: 19.2 % (ref 10–15)
ERYTHROCYTE [DISTWIDTH] IN BLOOD BY AUTOMATED COUNT: 19.4 % (ref 10–15)
ERYTHROCYTE [DISTWIDTH] IN BLOOD BY AUTOMATED COUNT: 19.5 % (ref 10–15)
ERYTHROCYTE [DISTWIDTH] IN BLOOD BY AUTOMATED COUNT: 19.6 % (ref 10–15)
ERYTHROCYTE [DISTWIDTH] IN BLOOD BY AUTOMATED COUNT: 19.6 % (ref 10–15)
ERYTHROCYTE [DISTWIDTH] IN BLOOD BY AUTOMATED COUNT: 19.8 % (ref 10–15)
ERYTHROCYTE [DISTWIDTH] IN BLOOD BY AUTOMATED COUNT: 19.9 % (ref 10–15)
ERYTHROCYTE [DISTWIDTH] IN BLOOD BY AUTOMATED COUNT: 19.9 % (ref 10–15)
ERYTHROCYTE [DISTWIDTH] IN BLOOD BY AUTOMATED COUNT: 20 % (ref 10–15)
FLUAV RNA RESP QL NAA+PROBE: NEGATIVE
FLUBV RNA RESP QL NAA+PROBE: NEGATIVE
GFR SERPL CREATININE-BSD FRML MDRD: >60 ML/MIN/1.73M2
GFR SERPL CREATININE-BSD FRML MDRD: >90 ML/MIN/{1.73_M2}
GLUCOSE BLDC GLUCOMTR-MCNC: 102 MG/DL (ref 70–99)
GLUCOSE BLDC GLUCOMTR-MCNC: 104 MG/DL (ref 70–99)
GLUCOSE BLDC GLUCOMTR-MCNC: 114 MG/DL (ref 70–99)
GLUCOSE BLDC GLUCOMTR-MCNC: 121 MG/DL (ref 70–99)
GLUCOSE BLDC GLUCOMTR-MCNC: 129 MG/DL (ref 70–99)
GLUCOSE BLDC GLUCOMTR-MCNC: 140 MG/DL (ref 70–99)
GLUCOSE BLDC GLUCOMTR-MCNC: 93 MG/DL (ref 70–99)
GLUCOSE SERPL-MCNC: 100 MG/DL (ref 70–99)
GLUCOSE SERPL-MCNC: 102 MG/DL (ref 70–99)
GLUCOSE SERPL-MCNC: 105 MG/DL (ref 70–99)
GLUCOSE SERPL-MCNC: 112 MG/DL (ref 70–99)
GLUCOSE SERPL-MCNC: 113 MG/DL (ref 70–99)
GLUCOSE SERPL-MCNC: 114 MG/DL (ref 70–99)
GLUCOSE SERPL-MCNC: 128 MG/DL (ref 70–99)
GLUCOSE SERPL-MCNC: 132 MG/DL (ref 70–99)
GLUCOSE SERPL-MCNC: 164 MG/DL (ref 70–99)
GLUCOSE SERPL-MCNC: 74 MG/DL (ref 70–99)
GLUCOSE SERPL-MCNC: 78 MG/DL (ref 70–99)
GLUCOSE SERPL-MCNC: 82 MG/DL (ref 70–99)
GLUCOSE SERPL-MCNC: 84 MG/DL (ref 70–99)
GLUCOSE SERPL-MCNC: 97 MG/DL (ref 70–99)
GLUCOSE UR STRIP-MCNC: NEGATIVE MG/DL
HBV SURFACE AG SERPL QL IA: NONREACTIVE
HCO3 BLDV-SCNC: 31 MMOL/L (ref 21–28)
HCO3 BLDV-SCNC: 32 MMOL/L (ref 21–28)
HCO3 BLDV-SCNC: 33 MMOL/L (ref 21–28)
HCO3 BLDV-SCNC: 34 MMOL/L (ref 21–28)
HCO3 BLDV-SCNC: 35 MMOL/L (ref 21–28)
HCO3 BLDV-SCNC: 36 MMOL/L (ref 21–28)
HCO3 BLDV-SCNC: 37 MMOL/L (ref 21–28)
HCO3 BLDV-SCNC: 38 MMOL/L (ref 21–28)
HCO3 BLDV-SCNC: 39 MMOL/L (ref 21–28)
HCO3 BLDV-SCNC: 40 MMOL/L (ref 21–28)
HCO3 BLDV-SCNC: 41 MMOL/L (ref 21–28)
HCT VFR BLD AUTO: 27.9 % (ref 40–53)
HCT VFR BLD AUTO: 29.3 % (ref 40–53)
HCT VFR BLD AUTO: 30.1 % (ref 40–53)
HCT VFR BLD AUTO: 30.5 % (ref 40–53)
HCT VFR BLD AUTO: 32.1 % (ref 40–53)
HCT VFR BLD AUTO: 33.1 % (ref 40–53)
HCT VFR BLD AUTO: 33.3 % (ref 40–53)
HCT VFR BLD AUTO: 34.3 % (ref 40–53)
HCT VFR BLD AUTO: 37.6 % (ref 40–53)
HCT VFR BLD AUTO: 37.8 % (ref 40–53)
HCT VFR BLD AUTO: 40.2 %
HCV RNA SERPL NAA+PROBE-ACNC: NORMAL [IU]/ML
HCV RNA SERPL NAA+PROBE-LOG IU: NORMAL LOG IU/ML
HDLC SERPL-MCNC: 23 MG/DL
HDLC SERPL-MCNC: 27 MG/DL
HEMOGLOBIN: 11.5 G/DL (ref 13.3–17.7)
HGB BLD-MCNC: 11.1 G/DL (ref 13.3–17.7)
HGB BLD-MCNC: 11.2 G/DL (ref 13.3–17.7)
HGB BLD-MCNC: 8.1 G/DL (ref 13.3–17.7)
HGB BLD-MCNC: 8.1 G/DL (ref 13.3–17.7)
HGB BLD-MCNC: 8.5 G/DL (ref 13.3–17.7)
HGB BLD-MCNC: 8.6 G/DL (ref 13.3–17.7)
HGB BLD-MCNC: 8.9 G/DL (ref 13.3–17.7)
HGB BLD-MCNC: 9.3 G/DL (ref 13.3–17.7)
HGB BLD-MCNC: 9.8 G/DL (ref 13.3–17.7)
HGB BLD-MCNC: 9.9 G/DL (ref 13.3–17.7)
HGB UR QL STRIP: NEGATIVE
HIV 1+2 AB+HIV1 P24 AG SERPL QL IA: NONREACTIVE
HIV EXPOSURE DRAW AND HOLD: NORMAL
HIV1+2 AB SPEC QL IA.RAPID: NONREACTIVE
HYPOCHROMIA BLD QL: PRESENT
IMM GRANULOCYTES # BLD: 0.1 10E9/L (ref 0–0.4)
IMM GRANULOCYTES NFR BLD: 1 %
IMM GRANULOCYTES NFR BLD: 1.1 %
IMM GRANULOCYTES NFR BLD: 1.2 %
IMM GRANULOCYTES NFR BLD: 1.4 %
IMM GRANULOCYTES NFR BLD: 1.5 %
IMM GRANULOCYTES NFR BLD: 1.6 %
IMM GRANULOCYTES NFR BLD: 1.7 %
IMM GRANULOCYTES NFR BLD: 1.9 %
IMM GRANULOCYTES NFR BLD: 2 %
INR PPP: 1.03 (ref 0.86–1.14)
INR PPP: 1.05 (ref 0.86–1.14)
INTERPRETATION ECG - MUSE: NORMAL
KETONES UR STRIP-MCNC: NEGATIVE MG/DL
LABORATORY COMMENT REPORT: NORMAL
LABORATORY COMMENT REPORT: NORMAL
LACTATE BLD-SCNC: 0.9 MMOL/L (ref 0.7–2)
LDLC SERPL CALC-MCNC: 117 MG/DL
LEUKOCYTE ESTERASE UR QL STRIP: NEGATIVE
LYMPHOCYTES # BLD AUTO: 0.4 10E9/L (ref 0.8–5.3)
LYMPHOCYTES # BLD AUTO: 0.5 10E9/L (ref 0.8–5.3)
LYMPHOCYTES # BLD AUTO: 0.6 10E9/L (ref 0.8–5.3)
LYMPHOCYTES # BLD AUTO: 0.7 10E9/L (ref 0.8–5.3)
LYMPHOCYTES # BLD AUTO: 0.9 10E9/L (ref 0.8–5.3)
LYMPHOCYTES # BLD AUTO: 1 10E9/L (ref 0.8–5.3)
LYMPHOCYTES # BLD AUTO: 1.1 10E9/L (ref 0.8–5.3)
LYMPHOCYTES # BLD AUTO: 1.2 10E9/L (ref 0.8–5.3)
LYMPHOCYTES # BLD AUTO: 1.2 X10E3/UL
LYMPHOCYTES # BLD AUTO: 1.3 10E9/L (ref 0.8–5.3)
LYMPHOCYTES NFR BLD AUTO: 10.3 %
LYMPHOCYTES NFR BLD AUTO: 11.1 %
LYMPHOCYTES NFR BLD AUTO: 11.7 %
LYMPHOCYTES NFR BLD AUTO: 12 %
LYMPHOCYTES NFR BLD AUTO: 13.9 %
LYMPHOCYTES NFR BLD AUTO: 19.4 %
LYMPHOCYTES NFR BLD AUTO: 19.9 %
LYMPHOCYTES NFR BLD AUTO: 27.6 %
LYMPHOCYTES NFR BLD AUTO: 9.1 %
MACROCYTES BLD QL SMEAR: PRESENT
MAGNESIUM SERPL-MCNC: 1.6 MG/DL (ref 1.6–2.3)
MAGNESIUM SERPL-MCNC: 2.1 MG/DL
MAGNESIUM SERPL-MCNC: 2.1 MG/DL (ref 1.6–2.3)
MAGNESIUM SERPL-MCNC: 2.2 MG/DL (ref 1.6–2.3)
MAGNESIUM SERPL-MCNC: 2.4 MG/DL (ref 1.6–2.3)
MCH RBC QN AUTO: 22.4 PG (ref 26.5–33)
MCH RBC QN AUTO: 22.5 PG (ref 26.5–33)
MCH RBC QN AUTO: 22.8 PG
MCH RBC QN AUTO: 22.9 PG (ref 26.5–33)
MCH RBC QN AUTO: 23 PG (ref 26.5–33)
MCH RBC QN AUTO: 23.1 PG (ref 26.5–33)
MCH RBC QN AUTO: 23.1 PG (ref 26.5–33)
MCH RBC QN AUTO: 23.4 PG (ref 26.5–33)
MCH RBC QN AUTO: 23.9 PG (ref 26.5–33)
MCHC RBC AUTO-ENTMCNC: 27.6 G/DL (ref 31.5–36.5)
MCHC RBC AUTO-ENTMCNC: 27.7 G/DL (ref 31.5–36.5)
MCHC RBC AUTO-ENTMCNC: 28.1 G/DL (ref 31.5–36.5)
MCHC RBC AUTO-ENTMCNC: 28.2 G/DL (ref 31.5–36.5)
MCHC RBC AUTO-ENTMCNC: 28.2 G/DL (ref 31.5–36.5)
MCHC RBC AUTO-ENTMCNC: 28.6 G/DL
MCHC RBC AUTO-ENTMCNC: 28.9 G/DL (ref 31.5–36.5)
MCHC RBC AUTO-ENTMCNC: 29 G/DL (ref 31.5–36.5)
MCHC RBC AUTO-ENTMCNC: 29.4 G/DL (ref 31.5–36.5)
MCHC RBC AUTO-ENTMCNC: 29.4 G/DL (ref 31.5–36.5)
MCHC RBC AUTO-ENTMCNC: 29.8 G/DL (ref 31.5–36.5)
MCV RBC AUTO: 78 FL (ref 78–100)
MCV RBC AUTO: 78 FL (ref 78–100)
MCV RBC AUTO: 79.8 FL
MCV RBC AUTO: 80 FL (ref 78–100)
MCV RBC AUTO: 81 FL (ref 78–100)
MCV RBC AUTO: 83 FL (ref 78–100)
MCV RBC AUTO: 83 FL (ref 78–100)
MICROCYTES BLD QL SMEAR: PRESENT
MONOCYTES # BLD AUTO: 0.4 10E9/L (ref 0–1.3)
MONOCYTES # BLD AUTO: 0.7 10E9/L (ref 0–1.3)
MONOCYTES # BLD AUTO: 0.8 10E9/L (ref 0–1.3)
MONOCYTES # BLD AUTO: 1 10E9/L (ref 0–1.3)
MONOCYTES # BLD AUTO: 1.2 10E9/L (ref 0–1.3)
MONOCYTES # BLD AUTO: 1.2 10E9/L (ref 0–1.3)
MONOCYTES # BLD AUTO: 1.3 10E9/L (ref 0–1.3)
MONOCYTES NFR BLD AUTO: 11.4 %
MONOCYTES NFR BLD AUTO: 13.1 %
MONOCYTES NFR BLD AUTO: 14.4 %
MONOCYTES NFR BLD AUTO: 15 %
MONOCYTES NFR BLD AUTO: 15.5 %
MONOCYTES NFR BLD AUTO: 15.7 %
MONOCYTES NFR BLD AUTO: 16.4 %
MONOCYTES NFR BLD AUTO: 8.4 %
MONOCYTES NFR BLD AUTO: 9 %
MRSA DNA SPEC QL NAA+PROBE: NEGATIVE
MUCOUS THREADS #/AREA URNS LPF: PRESENT /LPF
NEUTROPHILS # BLD AUTO: 2 10E9/L (ref 1.6–8.3)
NEUTROPHILS # BLD AUTO: 2.8 10E9/L (ref 1.6–8.3)
NEUTROPHILS # BLD AUTO: 3 10E9/L (ref 1.6–8.3)
NEUTROPHILS # BLD AUTO: 3 10E9/L (ref 1.6–8.3)
NEUTROPHILS # BLD AUTO: 3.1 10*3/UL
NEUTROPHILS # BLD AUTO: 3.4 10E9/L (ref 1.6–8.3)
NEUTROPHILS # BLD AUTO: 3.6 10E9/L (ref 1.6–8.3)
NEUTROPHILS # BLD AUTO: 5.6 10E9/L (ref 1.6–8.3)
NEUTROPHILS # BLD AUTO: 5.6 10E9/L (ref 1.6–8.3)
NEUTROPHILS # BLD AUTO: 7 10E9/L (ref 1.6–8.3)
NEUTROPHILS NFR BLD AUTO: 48.3 %
NEUTROPHILS NFR BLD AUTO: 55.7 %
NEUTROPHILS NFR BLD AUTO: 57 %
NEUTROPHILS NFR BLD AUTO: 68.1 %
NEUTROPHILS NFR BLD AUTO: 72.1 %
NEUTROPHILS NFR BLD AUTO: 73.3 %
NEUTROPHILS NFR BLD AUTO: 73.9 %
NEUTROPHILS NFR BLD AUTO: 74.8 %
NEUTROPHILS NFR BLD AUTO: 77.2 %
NITRATE UR QL: NEGATIVE
NONHDLC SERPL-MCNC: 190 MG/DL
NONHDLC SERPL-MCNC: 195 MG/DL
NRBC # BLD AUTO: 0 10*3/UL
NRBC # BLD AUTO: 0.1 10*3/UL
NRBC # BLD AUTO: 0.2 10*3/UL
NRBC BLD AUTO-RTO: 0 /100
NRBC BLD AUTO-RTO: 1 /100
NRBC BLD AUTO-RTO: 2 /100
O2/TOTAL GAS SETTING VFR VENT: 3 %
O2/TOTAL GAS SETTING VFR VENT: 40 %
O2/TOTAL GAS SETTING VFR VENT: 40 %
O2/TOTAL GAS SETTING VFR VENT: 45 %
O2/TOTAL GAS SETTING VFR VENT: 50 %
O2/TOTAL GAS SETTING VFR VENT: 55 %
O2/TOTAL GAS SETTING VFR VENT: 60 %
O2/TOTAL GAS SETTING VFR VENT: 65 %
O2/TOTAL GAS SETTING VFR VENT: ABNORMAL %
OXYHGB MFR BLDV: 91 %
PCO2 BLDV: 49 MM HG (ref 40–50)
PCO2 BLDV: 59 MM HG (ref 40–50)
PCO2 BLDV: 61 MM HG (ref 40–50)
PCO2 BLDV: 61 MM HG (ref 40–50)
PCO2 BLDV: 62 MM HG (ref 40–50)
PCO2 BLDV: 62 MM HG (ref 40–50)
PCO2 BLDV: 66 MM HG (ref 40–50)
PCO2 BLDV: 68 MM HG (ref 40–50)
PCO2 BLDV: 69 MM HG (ref 40–50)
PCO2 BLDV: 71 MM HG (ref 40–50)
PCO2 BLDV: 72 MM HG (ref 40–50)
PCO2 BLDV: 73 MM HG (ref 40–50)
PCO2 BLDV: 74 MM HG (ref 40–50)
PCO2 BLDV: 74 MM HG (ref 40–50)
PCO2 BLDV: 75 MM HG (ref 40–50)
PCO2 BLDV: 75 MM HG (ref 40–50)
PCO2 BLDV: 76 MM HG (ref 40–50)
PCO2 BLDV: 76 MM HG (ref 40–50)
PCO2 BLDV: 77 MM HG (ref 40–50)
PCO2 BLDV: 77 MM HG (ref 40–50)
PCO2 BLDV: 78 MM HG (ref 40–50)
PCO2 BLDV: 79 MM HG (ref 40–50)
PCO2 BLDV: 81 MM HG (ref 40–50)
PCO2 BLDV: 83 MM HG (ref 40–50)
PCO2 BLDV: 84 MM HG (ref 40–50)
PCO2 BLDV: 84 MM HG (ref 40–50)
PCO2 BLDV: 85 MM HG (ref 40–50)
PH BLDV: 7.25 PH (ref 7.32–7.43)
PH BLDV: 7.26 PH (ref 7.32–7.43)
PH BLDV: 7.27 PH (ref 7.32–7.43)
PH BLDV: 7.28 PH (ref 7.32–7.43)
PH BLDV: 7.29 PH (ref 7.32–7.43)
PH BLDV: 7.3 PH (ref 7.32–7.43)
PH BLDV: 7.31 PH (ref 7.32–7.43)
PH BLDV: 7.32 PH (ref 7.32–7.43)
PH BLDV: 7.33 PH (ref 7.32–7.43)
PH BLDV: 7.34 PH (ref 7.32–7.43)
PH BLDV: 7.34 PH (ref 7.32–7.43)
PH BLDV: 7.35 PH (ref 7.32–7.43)
PH BLDV: 7.36 PH (ref 7.32–7.43)
PH BLDV: 7.37 PH (ref 7.32–7.43)
PH BLDV: 7.37 PH (ref 7.32–7.43)
PH BLDV: 7.39 PH (ref 7.32–7.43)
PH BLDV: 7.44 PH (ref 7.32–7.43)
PH GAST: 5.3 [PH]
PH GAST: NORMAL [PH]
PH UR STRIP: 5.5 PH (ref 5–7)
PHOSPHATE SERPL-MCNC: 2.6 MG/DL (ref 2.5–4.5)
PHOSPHATE SERPL-MCNC: 2.7 MG/DL (ref 2.5–4.5)
PHOSPHATE SERPL-MCNC: 3 MG/DL (ref 2.5–4.5)
PHOSPHATE SERPL-MCNC: 3.2 MG/DL (ref 2.5–4.5)
PHOSPHATE SERPL-MCNC: 3.3 MG/DL (ref 2.5–4.5)
PHOSPHATE SERPL-MCNC: 3.7 MG/DL (ref 2.5–4.5)
PHOSPHATE SERPL-MCNC: 3.8 MG/DL
PHOSPHATE SERPL-MCNC: 3.8 MG/DL (ref 2.5–4.5)
PHOSPHATE SERPL-MCNC: 4.6 MG/DL (ref 2.5–4.5)
PLATELET # BLD AUTO: 108 10E9/L (ref 150–450)
PLATELET # BLD AUTO: 113 10E9/L (ref 150–450)
PLATELET # BLD AUTO: 118 10E9/L (ref 150–450)
PLATELET # BLD AUTO: 119 10E9/L (ref 150–450)
PLATELET # BLD AUTO: 131 10E9/L (ref 150–450)
PLATELET # BLD AUTO: 133 10E9/L (ref 150–450)
PLATELET # BLD AUTO: 136 10^9/L
PLATELET # BLD AUTO: 171 10E9/L (ref 150–450)
PLATELET # BLD AUTO: 90 10E9/L (ref 150–450)
PLATELET # BLD AUTO: 96 10E9/L (ref 150–450)
PLATELET # BLD AUTO: 98 10E9/L (ref 150–450)
PLATELET # BLD EST: ABNORMAL 10*3/UL
PO2 BLDV: 19 MM HG (ref 25–47)
PO2 BLDV: 30 MM HG (ref 25–47)
PO2 BLDV: 32 MM HG (ref 25–47)
PO2 BLDV: 33 MM HG (ref 25–47)
PO2 BLDV: 36 MM HG (ref 25–47)
PO2 BLDV: 36 MM HG (ref 25–47)
PO2 BLDV: 43 MM HG (ref 25–47)
PO2 BLDV: 44 MM HG (ref 25–47)
PO2 BLDV: 47 MM HG (ref 25–47)
PO2 BLDV: 50 MM HG (ref 25–47)
PO2 BLDV: 51 MM HG (ref 25–47)
PO2 BLDV: 55 MM HG (ref 25–47)
PO2 BLDV: 56 MM HG (ref 25–47)
PO2 BLDV: 57 MM HG (ref 25–47)
PO2 BLDV: 57 MM HG (ref 25–47)
PO2 BLDV: 58 MM HG (ref 25–47)
PO2 BLDV: 58 MM HG (ref 25–47)
PO2 BLDV: 61 MM HG (ref 25–47)
PO2 BLDV: 61 MM HG (ref 25–47)
PO2 BLDV: 62 MM HG (ref 25–47)
PO2 BLDV: 63 MM HG (ref 25–47)
PO2 BLDV: 64 MM HG (ref 25–47)
PO2 BLDV: 65 MM HG (ref 25–47)
PO2 BLDV: 66 MM HG (ref 25–47)
PO2 BLDV: 71 MM HG (ref 25–47)
PO2 BLDV: 71 MM HG (ref 25–47)
PO2 BLDV: 88 MM HG (ref 25–47)
PO2 BLDV: 90 MM HG (ref 25–47)
PO2 BLDV: 99 MM HG (ref 25–47)
POLYCHROMASIA BLD QL SMEAR: SLIGHT
POTASSIUM SERPL-SCNC: 3.2 MMOL/L (ref 3.4–5.3)
POTASSIUM SERPL-SCNC: 3.3 MMOL/L (ref 3.4–5.3)
POTASSIUM SERPL-SCNC: 3.5 MMOL/L (ref 3.4–5.3)
POTASSIUM SERPL-SCNC: 3.5 MMOL/L (ref 3.4–5.3)
POTASSIUM SERPL-SCNC: 3.6 MMOL/L (ref 3.4–5.3)
POTASSIUM SERPL-SCNC: 3.8 MMOL/L (ref 3.4–5.3)
POTASSIUM SERPL-SCNC: 3.8 MMOL/L (ref 3.4–5.3)
POTASSIUM SERPL-SCNC: 4 MMOL/L (ref 3.4–5.3)
POTASSIUM SERPL-SCNC: 4 MMOL/L (ref 3.4–5.3)
POTASSIUM SERPL-SCNC: 4.1 MMOL/L
POTASSIUM SERPL-SCNC: 4.3 MMOL/L (ref 3.4–5.3)
POTASSIUM SERPL-SCNC: 4.4 MMOL/L (ref 3.4–5.3)
POTASSIUM SERPL-SCNC: 4.5 MMOL/L (ref 3.4–5.3)
POTASSIUM SERPL-SCNC: 5.1 MMOL/L (ref 3.4–5.3)
PREALB SERPL IA-MCNC: 25 MG/DL (ref 15–45)
PREALB SERPL IA-MCNC: 50 MG/DL (ref 15–45)
PROCALCITONIN SERPL-MCNC: 0.21 NG/ML
PROCALCITONIN SERPL-MCNC: 0.23 NG/ML
PROT SERPL-MCNC: 6 G/DL (ref 6.8–8.8)
PROT SERPL-MCNC: 6.2 G/DL (ref 6.8–8.8)
PROT SERPL-MCNC: 6.3 G/DL (ref 6.8–8.8)
PROT SERPL-MCNC: 6.9 G/DL (ref 6.8–8.8)
PROT SERPL-MCNC: 7.3 G/DL
RBC # BLD AUTO: 3.5 10E12/L (ref 4.4–5.9)
RBC # BLD AUTO: 3.52 10E12/L (ref 4.4–5.9)
RBC # BLD AUTO: 3.71 10E12/L (ref 4.4–5.9)
RBC # BLD AUTO: 3.75 10E12/L (ref 4.4–5.9)
RBC # BLD AUTO: 3.97 10E12/L (ref 4.4–5.9)
RBC # BLD AUTO: 4.13 10E12/L (ref 4.4–5.9)
RBC # BLD AUTO: 4.15 10E12/L (ref 4.4–5.9)
RBC # BLD AUTO: 4.18 10E12/L (ref 4.4–5.9)
RBC # BLD AUTO: 4.84 10E12/L (ref 4.4–5.9)
RBC # BLD AUTO: 4.85 10E12/L (ref 4.4–5.9)
RBC # BLD AUTO: 5.04 10^12/L
RBC #/AREA URNS AUTO: 1 /HPF (ref 0–2)
RSV RNA SPEC QL NAA+PROBE: NORMAL
SARS-COV-2 RNA RESP QL NAA+PROBE: NEGATIVE
SARS-COV-2 RNA RESP QL NAA+PROBE: NEGATIVE
SARS-COV-2 RNA RESP QL NAA+PROBE: NORMAL
SODIUM SERPL-SCNC: 136 MMOL/L (ref 133–144)
SODIUM SERPL-SCNC: 137 MMOL/L (ref 133–144)
SODIUM SERPL-SCNC: 138 MMOL/L (ref 133–144)
SODIUM SERPL-SCNC: 140 MMOL/L
SODIUM SERPL-SCNC: 140 MMOL/L (ref 133–144)
SODIUM SERPL-SCNC: 141 MMOL/L (ref 133–144)
SODIUM SERPL-SCNC: 142 MMOL/L (ref 133–144)
SOURCE: ABNORMAL
SP GR UR STRIP: 1.03 (ref 1–1.03)
SPECIMEN SOURCE: NORMAL
SQUAMOUS #/AREA URNS AUTO: <1 /HPF (ref 0–1)
TRANS CELLS #/AREA URNS HPF: 1 /HPF (ref 0–1)
TRIGL SERPL-MCNC: 367 MG/DL
TRIGLYCERIDES (EXTERNAL): 540
UROBILINOGEN UR STRIP-MCNC: NORMAL MG/DL (ref 0–2)
WBC # BLD AUTO: 3.8 10E9/L (ref 4–11)
WBC # BLD AUTO: 4.1 10E9/L (ref 4–11)
WBC # BLD AUTO: 4.2 10E9/L (ref 4–11)
WBC # BLD AUTO: 4.4 10E9/L (ref 4–11)
WBC # BLD AUTO: 4.9 10^9/L
WBC # BLD AUTO: 5.4 10E9/L (ref 4–11)
WBC # BLD AUTO: 5.5 10E9/L (ref 4–11)
WBC # BLD AUTO: 6.3 10E9/L (ref 4–11)
WBC # BLD AUTO: 7.7 10E9/L (ref 4–11)
WBC # BLD AUTO: 8 10E9/L (ref 4–11)
WBC # BLD AUTO: 9.5 10E9/L (ref 4–11)
WBC #/AREA URNS AUTO: 2 /HPF (ref 0–5)

## 2021-01-01 PROCEDURE — 99207 PR CONSULT E&M CHANGED TO INITIAL LEVEL: CPT | Performed by: NURSE PRACTITIONER

## 2021-01-01 PROCEDURE — 94640 AIRWAY INHALATION TREATMENT: CPT | Mod: 76

## 2021-01-01 PROCEDURE — 99214 OFFICE O/P EST MOD 30 MIN: CPT | Mod: GC | Performed by: STUDENT IN AN ORGANIZED HEALTH CARE EDUCATION/TRAINING PROGRAM

## 2021-01-01 PROCEDURE — 97162 PT EVAL MOD COMPLEX 30 MIN: CPT | Mod: GP | Performed by: PHYSICAL THERAPIST

## 2021-01-01 PROCEDURE — 250N000012 HC RX MED GY IP 250 OP 636 PS 637: Performed by: STUDENT IN AN ORGANIZED HEALTH CARE EDUCATION/TRAINING PROGRAM

## 2021-01-01 PROCEDURE — 999N000078 HC STATISTIC INTRAPULMONARY PERCUSSIVE VENT

## 2021-01-01 PROCEDURE — 250N000013 HC RX MED GY IP 250 OP 250 PS 637: Performed by: PEDIATRICS

## 2021-01-01 PROCEDURE — 250N000011 HC RX IP 250 OP 636: Performed by: STUDENT IN AN ORGANIZED HEALTH CARE EDUCATION/TRAINING PROGRAM

## 2021-01-01 PROCEDURE — 97530 THERAPEUTIC ACTIVITIES: CPT | Mod: GP | Performed by: PHYSICAL THERAPIST

## 2021-01-01 PROCEDURE — 999N000157 HC STATISTIC RCP TIME EA 10 MIN

## 2021-01-01 PROCEDURE — G0378 HOSPITAL OBSERVATION PER HR: HCPCS

## 2021-01-01 PROCEDURE — 258N000003 HC RX IP 258 OP 636: Performed by: STUDENT IN AN ORGANIZED HEALTH CARE EDUCATION/TRAINING PROGRAM

## 2021-01-01 PROCEDURE — 250N000009 HC RX 250: Performed by: STUDENT IN AN ORGANIZED HEALTH CARE EDUCATION/TRAINING PROGRAM

## 2021-01-01 PROCEDURE — 258N000003 HC RX IP 258 OP 636: Performed by: PEDIATRICS

## 2021-01-01 PROCEDURE — 71045 X-RAY EXAM CHEST 1 VIEW: CPT | Mod: 26 | Performed by: RADIOLOGY

## 2021-01-01 PROCEDURE — 250N000013 HC RX MED GY IP 250 OP 250 PS 637: Performed by: STUDENT IN AN ORGANIZED HEALTH CARE EDUCATION/TRAINING PROGRAM

## 2021-01-01 PROCEDURE — 99292 CRITICAL CARE ADDL 30 MIN: CPT | Performed by: PEDIATRICS

## 2021-01-01 PROCEDURE — 97110 THERAPEUTIC EXERCISES: CPT | Mod: GP

## 2021-01-01 PROCEDURE — 80048 BASIC METABOLIC PNL TOTAL CA: CPT | Performed by: STUDENT IN AN ORGANIZED HEALTH CARE EDUCATION/TRAINING PROGRAM

## 2021-01-01 PROCEDURE — 94660 CPAP INITIATION&MGMT: CPT

## 2021-01-01 PROCEDURE — 99233 SBSQ HOSP IP/OBS HIGH 50: CPT | Mod: GC | Performed by: PEDIATRICS

## 2021-01-01 PROCEDURE — 74018 RADEX ABDOMEN 1 VIEW: CPT | Mod: 26 | Performed by: RADIOLOGY

## 2021-01-01 PROCEDURE — 250N000013 HC RX MED GY IP 250 OP 250 PS 637

## 2021-01-01 PROCEDURE — 99215 OFFICE O/P EST HI 40 MIN: CPT | Mod: GT | Performed by: NURSE PRACTITIONER

## 2021-01-01 PROCEDURE — 83735 ASSAY OF MAGNESIUM: CPT | Performed by: STUDENT IN AN ORGANIZED HEALTH CARE EDUCATION/TRAINING PROGRAM

## 2021-01-01 PROCEDURE — 250N000011 HC RX IP 250 OP 636: Performed by: PEDIATRICS

## 2021-01-01 PROCEDURE — 203N000001 HC R&B PICU UMMC

## 2021-01-01 PROCEDURE — 97112 NEUROMUSCULAR REEDUCATION: CPT | Mod: GP

## 2021-01-01 PROCEDURE — 82803 BLOOD GASES ANY COMBINATION: CPT

## 2021-01-01 PROCEDURE — 86140 C-REACTIVE PROTEIN: CPT | Performed by: STUDENT IN AN ORGANIZED HEALTH CARE EDUCATION/TRAINING PROGRAM

## 2021-01-01 PROCEDURE — 94640 AIRWAY INHALATION TREATMENT: CPT

## 2021-01-01 PROCEDURE — 99233 SBSQ HOSP IP/OBS HIGH 50: CPT | Performed by: NURSE PRACTITIONER

## 2021-01-01 PROCEDURE — 85027 COMPLETE CBC AUTOMATED: CPT | Performed by: PEDIATRICS

## 2021-01-01 PROCEDURE — 99225 PR SUBSEQUENT OBSERVATION CARE,LEVEL II: CPT | Mod: GC | Performed by: PEDIATRICS

## 2021-01-01 PROCEDURE — 99213 OFFICE O/P EST LOW 20 MIN: CPT | Performed by: FAMILY MEDICINE

## 2021-01-01 PROCEDURE — 99291 CRITICAL CARE FIRST HOUR: CPT | Mod: GC | Performed by: PEDIATRICS

## 2021-01-01 PROCEDURE — 84100 ASSAY OF PHOSPHORUS: CPT | Performed by: PEDIATRICS

## 2021-01-01 PROCEDURE — 82803 BLOOD GASES ANY COMBINATION: CPT | Performed by: STUDENT IN AN ORGANIZED HEALTH CARE EDUCATION/TRAINING PROGRAM

## 2021-01-01 PROCEDURE — 250N000009 HC RX 250: Performed by: PEDIATRICS

## 2021-01-01 PROCEDURE — 36415 COLL VENOUS BLD VENIPUNCTURE: CPT | Performed by: STUDENT IN AN ORGANIZED HEALTH CARE EDUCATION/TRAINING PROGRAM

## 2021-01-01 PROCEDURE — 255N000002 HC RX 255 OP 636: Performed by: EMERGENCY MEDICINE

## 2021-01-01 PROCEDURE — 82803 BLOOD GASES ANY COMBINATION: CPT | Performed by: EMERGENCY MEDICINE

## 2021-01-01 PROCEDURE — 80069 RENAL FUNCTION PANEL: CPT | Performed by: STUDENT IN AN ORGANIZED HEALTH CARE EDUCATION/TRAINING PROGRAM

## 2021-01-01 PROCEDURE — 84145 PROCALCITONIN (PCT): CPT | Performed by: EMERGENCY MEDICINE

## 2021-01-01 PROCEDURE — 83735 ASSAY OF MAGNESIUM: CPT | Performed by: PEDIATRICS

## 2021-01-01 PROCEDURE — 250N000011 HC RX IP 250 OP 636

## 2021-01-01 PROCEDURE — 99215 OFFICE O/P EST HI 40 MIN: CPT | Performed by: PEDIATRICS

## 2021-01-01 PROCEDURE — 36415 COLL VENOUS BLD VENIPUNCTURE: CPT

## 2021-01-01 PROCEDURE — 97530 THERAPEUTIC ACTIVITIES: CPT | Mod: GP

## 2021-01-01 PROCEDURE — 71045 X-RAY EXAM CHEST 1 VIEW: CPT

## 2021-01-01 PROCEDURE — A9585 GADOBUTROL INJECTION: HCPCS | Performed by: PEDIATRICS

## 2021-01-01 PROCEDURE — 272N000270 HC CIRCUIT, METANEB

## 2021-01-01 PROCEDURE — 85025 COMPLETE CBC W/AUTO DIFF WBC: CPT

## 2021-01-01 PROCEDURE — 85610 PROTHROMBIN TIME: CPT | Performed by: PEDIATRICS

## 2021-01-01 PROCEDURE — 85025 COMPLETE CBC W/AUTO DIFF WBC: CPT | Performed by: STUDENT IN AN ORGANIZED HEALTH CARE EDUCATION/TRAINING PROGRAM

## 2021-01-01 PROCEDURE — 999N000065 XR CHEST PORT 1 VIEW

## 2021-01-01 PROCEDURE — A9585 GADOBUTROL INJECTION: HCPCS | Performed by: EMERGENCY MEDICINE

## 2021-01-01 PROCEDURE — 99223 1ST HOSP IP/OBS HIGH 75: CPT | Mod: GC | Performed by: PEDIATRICS

## 2021-01-01 PROCEDURE — 999N000007 HC SITE CHECK

## 2021-01-01 PROCEDURE — 83605 ASSAY OF LACTIC ACID: CPT | Performed by: EMERGENCY MEDICINE

## 2021-01-01 PROCEDURE — 258N000003 HC RX IP 258 OP 636

## 2021-01-01 PROCEDURE — 84100 ASSAY OF PHOSPHORUS: CPT | Performed by: STUDENT IN AN ORGANIZED HEALTH CARE EDUCATION/TRAINING PROGRAM

## 2021-01-01 PROCEDURE — 82803 BLOOD GASES ANY COMBINATION: CPT | Performed by: PEDIATRICS

## 2021-01-01 PROCEDURE — 85025 COMPLETE CBC W/AUTO DIFF WBC: CPT | Performed by: EMERGENCY MEDICINE

## 2021-01-01 PROCEDURE — 99285 EMERGENCY DEPT VISIT HI MDM: CPT | Mod: 25

## 2021-01-01 PROCEDURE — 70553 MRI BRAIN STEM W/O & W/DYE: CPT | Mod: 26 | Performed by: RADIOLOGY

## 2021-01-01 PROCEDURE — 80053 COMPREHEN METABOLIC PANEL: CPT | Performed by: PEDIATRICS

## 2021-01-01 PROCEDURE — 84134 ASSAY OF PREALBUMIN: CPT | Performed by: PEDIATRICS

## 2021-01-01 PROCEDURE — 87640 STAPH A DNA AMP PROBE: CPT | Performed by: STUDENT IN AN ORGANIZED HEALTH CARE EDUCATION/TRAINING PROGRAM

## 2021-01-01 PROCEDURE — 87636 SARSCOV2 & INF A&B AMP PRB: CPT | Performed by: EMERGENCY MEDICINE

## 2021-01-01 PROCEDURE — 70544 MR ANGIOGRAPHY HEAD W/O DYE: CPT

## 2021-01-01 PROCEDURE — 999N001017 HC STATISTIC GLUCOSE BY METER IP

## 2021-01-01 PROCEDURE — 80048 BASIC METABOLIC PNL TOTAL CA: CPT | Performed by: PEDIATRICS

## 2021-01-01 PROCEDURE — 83986 ASSAY PH BODY FLUID NOS: CPT | Performed by: PEDIATRICS

## 2021-01-01 PROCEDURE — 84145 PROCALCITONIN (PCT): CPT | Performed by: STUDENT IN AN ORGANIZED HEALTH CARE EDUCATION/TRAINING PROGRAM

## 2021-01-01 PROCEDURE — 99233 SBSQ HOSP IP/OBS HIGH 50: CPT | Mod: 25 | Performed by: PSYCHIATRY & NEUROLOGY

## 2021-01-01 PROCEDURE — C9803 HOPD COVID-19 SPEC COLLECT: HCPCS

## 2021-01-01 PROCEDURE — 99220 PR INITIAL OBSERVATION CARE,LEVEL III: CPT | Mod: GC | Performed by: PEDIATRICS

## 2021-01-01 PROCEDURE — 99217 PR OBSERVATION CARE DISCHARGE: CPT | Mod: GC | Performed by: PEDIATRICS

## 2021-01-01 PROCEDURE — 83986 ASSAY PH BODY FLUID NOS: CPT | Mod: 91 | Performed by: STUDENT IN AN ORGANIZED HEALTH CARE EDUCATION/TRAINING PROGRAM

## 2021-01-01 PROCEDURE — 5A09557 ASSISTANCE WITH RESPIRATORY VENTILATION, GREATER THAN 96 CONSECUTIVE HOURS, CONTINUOUS POSITIVE AIRWAY PRESSURE: ICD-10-PCS | Performed by: PEDIATRICS

## 2021-01-01 PROCEDURE — 86140 C-REACTIVE PROTEIN: CPT

## 2021-01-01 PROCEDURE — 999N000040 HC STATISTIC CONSULT NO CHARGE VASC ACCESS

## 2021-01-01 PROCEDURE — 85025 COMPLETE CBC W/AUTO DIFF WBC: CPT | Performed by: PEDIATRICS

## 2021-01-01 PROCEDURE — 99356 PR PROLONGED SERV,INPATIENT,1ST HR: CPT | Mod: GC | Performed by: PEDIATRICS

## 2021-01-01 PROCEDURE — 93005 ELECTROCARDIOGRAM TRACING: CPT

## 2021-01-01 PROCEDURE — 82805 BLOOD GASES W/O2 SATURATION: CPT | Performed by: STUDENT IN AN ORGANIZED HEALTH CARE EDUCATION/TRAINING PROGRAM

## 2021-01-01 PROCEDURE — 99292 CRITICAL CARE ADDL 30 MIN: CPT | Mod: GC | Performed by: PEDIATRICS

## 2021-01-01 PROCEDURE — 99232 SBSQ HOSP IP/OBS MODERATE 35: CPT | Mod: GC | Performed by: PEDIATRICS

## 2021-01-01 PROCEDURE — 80061 LIPID PANEL: CPT | Performed by: STUDENT IN AN ORGANIZED HEALTH CARE EDUCATION/TRAINING PROGRAM

## 2021-01-01 PROCEDURE — 99357 PR PROLONGED SERV,INPATIENT,EA ADDL 30 MIN: CPT | Mod: GC | Performed by: PEDIATRICS

## 2021-01-01 PROCEDURE — 272N000482 HC MASK, CPAP TOTAL HEADGEAR, LATEX FREE

## 2021-01-01 PROCEDURE — 36415 COLL VENOUS BLD VENIPUNCTURE: CPT | Performed by: EMERGENCY MEDICINE

## 2021-01-01 PROCEDURE — 99357 PR PROLONGED SERV,INPATIENT,EA ADDL 30 MIN: CPT | Performed by: NURSE PRACTITIONER

## 2021-01-01 PROCEDURE — 99291 CRITICAL CARE FIRST HOUR: CPT | Performed by: PEDIATRICS

## 2021-01-01 PROCEDURE — 87641 MR-STAPH DNA AMP PROBE: CPT | Performed by: STUDENT IN AN ORGANIZED HEALTH CARE EDUCATION/TRAINING PROGRAM

## 2021-01-01 PROCEDURE — C9113 INJ PANTOPRAZOLE SODIUM, VIA: HCPCS | Performed by: STUDENT IN AN ORGANIZED HEALTH CARE EDUCATION/TRAINING PROGRAM

## 2021-01-01 PROCEDURE — 70553 MRI BRAIN STEM W/O & W/DYE: CPT

## 2021-01-01 PROCEDURE — 999N000044 HC STATISTIC CVC DRESSING CHANGE

## 2021-01-01 PROCEDURE — 250N000012 HC RX MED GY IP 250 OP 636 PS 637

## 2021-01-01 PROCEDURE — 250N000009 HC RX 250

## 2021-01-01 PROCEDURE — 99207 PR INPT ADMISSION FROM CLINIC: CPT | Performed by: NURSE PRACTITIONER

## 2021-01-01 PROCEDURE — 96365 THER/PROPH/DIAG IV INF INIT: CPT | Mod: 59

## 2021-01-01 PROCEDURE — 94003 VENT MGMT INPAT SUBQ DAY: CPT

## 2021-01-01 PROCEDURE — 82533 TOTAL CORTISOL: CPT | Performed by: NURSE PRACTITIONER

## 2021-01-01 PROCEDURE — 97112 NEUROMUSCULAR REEDUCATION: CPT | Mod: GP | Performed by: PHYSICAL THERAPIST

## 2021-01-01 PROCEDURE — 99417 PROLNG OP E/M EACH 15 MIN: CPT | Mod: GT | Performed by: NURSE PRACTITIONER

## 2021-01-01 PROCEDURE — 96366 THER/PROPH/DIAG IV INF ADDON: CPT

## 2021-01-01 PROCEDURE — 99223 1ST HOSP IP/OBS HIGH 75: CPT | Performed by: NURSE PRACTITIONER

## 2021-01-01 PROCEDURE — U0005 INFEC AGEN DETEC AMPLI PROBE: HCPCS | Mod: GT | Performed by: NURSE PRACTITIONER

## 2021-01-01 PROCEDURE — 99232 SBSQ HOSP IP/OBS MODERATE 35: CPT | Performed by: PEDIATRICS

## 2021-01-01 PROCEDURE — 81001 URINALYSIS AUTO W/SCOPE: CPT | Performed by: STUDENT IN AN ORGANIZED HEALTH CARE EDUCATION/TRAINING PROGRAM

## 2021-01-01 PROCEDURE — 999N000097 HC STATISTIC MECHANICAL IN-EXSUFFLATION TREATMENT

## 2021-01-01 PROCEDURE — 74018 RADEX ABDOMEN 1 VIEW: CPT

## 2021-01-01 PROCEDURE — 255N000002 HC RX 255 OP 636: Performed by: PEDIATRICS

## 2021-01-01 PROCEDURE — 80053 COMPREHEN METABOLIC PANEL: CPT | Performed by: EMERGENCY MEDICINE

## 2021-01-01 PROCEDURE — 99356 PR PROLONGED SERV,INPATIENT,1ST HR: CPT | Performed by: NURSE PRACTITIONER

## 2021-01-01 PROCEDURE — 99231 SBSQ HOSP IP/OBS SF/LOW 25: CPT | Performed by: NURSE PRACTITIONER

## 2021-01-01 PROCEDURE — 82248 BILIRUBIN DIRECT: CPT | Performed by: PEDIATRICS

## 2021-01-01 PROCEDURE — 999N000127 HC STATISTIC PERIPHERAL IV START W US GUIDANCE

## 2021-01-01 PROCEDURE — 120N000007 HC R&B PEDS UMMC

## 2021-01-01 PROCEDURE — 999N000065 XR ABDOMEN PORT 1 VW

## 2021-01-01 PROCEDURE — 272N000064 HC CIRCUIT HUMIDITY W/CPAP BIPAP

## 2021-01-01 PROCEDURE — U0003 INFECTIOUS AGENT DETECTION BY NUCLEIC ACID (DNA OR RNA); SEVERE ACUTE RESPIRATORY SYNDROME CORONAVIRUS 2 (SARS-COV-2) (CORONAVIRUS DISEASE [COVID-19]), AMPLIFIED PROBE TECHNIQUE, MAKING USE OF HIGH THROUGHPUT TECHNOLOGIES AS DESCRIBED BY CMS-2020-01-R: HCPCS | Mod: GT | Performed by: NURSE PRACTITIONER

## 2021-01-01 PROCEDURE — 36415 COLL VENOUS BLD VENIPUNCTURE: CPT | Performed by: PEDIATRICS

## 2021-01-01 PROCEDURE — 250N000011 HC RX IP 250 OP 636: Performed by: EMERGENCY MEDICINE

## 2021-01-01 PROCEDURE — 97110 THERAPEUTIC EXERCISES: CPT | Mod: GP | Performed by: PHYSICAL THERAPIST

## 2021-01-01 PROCEDURE — 87040 BLOOD CULTURE FOR BACTERIA: CPT | Mod: 91 | Performed by: EMERGENCY MEDICINE

## 2021-01-01 RX ORDER — ALBUTEROL SULFATE 0.83 MG/ML
2.5 SOLUTION RESPIRATORY (INHALATION) 4 TIMES DAILY PRN
Status: DISCONTINUED | OUTPATIENT
Start: 2021-01-01 | End: 2021-01-01

## 2021-01-01 RX ORDER — SULFAMETHOXAZOLE AND TRIMETHOPRIM 400; 80 MG/1; MG/1
1 TABLET ORAL
Status: DISCONTINUED | OUTPATIENT
Start: 2021-01-01 | End: 2021-01-01 | Stop reason: HOSPADM

## 2021-01-01 RX ORDER — ALBUTEROL SULFATE 90 UG/1
1-2 AEROSOL, METERED RESPIRATORY (INHALATION)
Status: CANCELLED
Start: 2021-01-01

## 2021-01-01 RX ORDER — FEXOFENADINE HCL 180 MG/1
180 TABLET ORAL AT BEDTIME
Status: DISCONTINUED | OUTPATIENT
Start: 2021-01-01 | End: 2021-01-01

## 2021-01-01 RX ORDER — DEXAMETHASONE 0.75 MG/1
0.75 TABLET ORAL
Status: DISCONTINUED | OUTPATIENT
Start: 2021-01-01 | End: 2021-01-01

## 2021-01-01 RX ORDER — OMEPRAZOLE
40 KIT
Status: DISCONTINUED | OUTPATIENT
Start: 2021-01-01 | End: 2021-01-01

## 2021-01-01 RX ORDER — SODIUM CHLORIDE 9 MG/ML
INJECTION, SOLUTION INTRAVENOUS CONTINUOUS
Status: DISCONTINUED | OUTPATIENT
Start: 2021-01-01 | End: 2021-05-13 | Stop reason: HOSPADM

## 2021-01-01 RX ORDER — LACTOBACILLUS RHAMNOSUS GG 10B CELL
2 CAPSULE ORAL DAILY
Status: CANCELLED | OUTPATIENT
Start: 2021-01-01

## 2021-01-01 RX ORDER — SULFAMETHOXAZOLE AND TRIMETHOPRIM 400; 80 MG/1; MG/1
1 TABLET ORAL
Status: DISCONTINUED | OUTPATIENT
Start: 2021-01-01 | End: 2021-01-01

## 2021-01-01 RX ORDER — OLANZAPINE 5 MG/1
5 TABLET ORAL EVERY MORNING
Status: DISCONTINUED | OUTPATIENT
Start: 2021-01-01 | End: 2021-01-01

## 2021-01-01 RX ORDER — LACTOBACILLUS RHAMNOSUS GG 10B CELL
2 CAPSULE ORAL DAILY
Status: DISCONTINUED | OUTPATIENT
Start: 2021-01-01 | End: 2021-01-01

## 2021-01-01 RX ORDER — ONDANSETRON 4 MG/1
4 TABLET, ORALLY DISINTEGRATING ORAL EVERY 6 HOURS PRN
Status: DISCONTINUED | OUTPATIENT
Start: 2021-01-01 | End: 2021-01-01 | Stop reason: HOSPADM

## 2021-01-01 RX ORDER — HALOPERIDOL 5 MG/ML
5 INJECTION INTRAMUSCULAR
Status: COMPLETED | OUTPATIENT
Start: 2021-01-01 | End: 2021-01-01

## 2021-01-01 RX ORDER — TRAZODONE HYDROCHLORIDE 150 MG/1
150 TABLET ORAL
Status: DISCONTINUED | OUTPATIENT
Start: 2021-01-01 | End: 2021-01-01 | Stop reason: HOSPADM

## 2021-01-01 RX ORDER — FUROSEMIDE 10 MG/ML
20 INJECTION INTRAMUSCULAR; INTRAVENOUS ONCE
Status: COMPLETED | OUTPATIENT
Start: 2021-01-01 | End: 2021-01-01

## 2021-01-01 RX ORDER — VITAMIN B COMPLEX
2000 TABLET ORAL
Status: DISCONTINUED | OUTPATIENT
Start: 2021-01-01 | End: 2021-01-01

## 2021-01-01 RX ORDER — ATROPINE SULFATE 10 MG/ML
2 SOLUTION/ DROPS OPHTHALMIC
Status: DISCONTINUED | OUTPATIENT
Start: 2021-01-01 | End: 2021-05-13 | Stop reason: HOSPADM

## 2021-01-01 RX ORDER — DEXAMETHASONE 0.75 MG/1
0.75 TABLET ORAL DAILY
Status: DISCONTINUED | OUTPATIENT
Start: 2021-01-01 | End: 2021-01-01 | Stop reason: HOSPADM

## 2021-01-01 RX ORDER — LIDOCAINE HYDROCHLORIDE 20 MG/ML
JELLY TOPICAL
Status: DISCONTINUED | OUTPATIENT
Start: 2021-01-01 | End: 2021-01-01 | Stop reason: HOSPADM

## 2021-01-01 RX ORDER — LORAZEPAM 2 MG/ML
1 CONCENTRATE ORAL AT BEDTIME
Status: DISCONTINUED | OUTPATIENT
Start: 2021-01-01 | End: 2021-01-01

## 2021-01-01 RX ORDER — DEXTROSE MONOHYDRATE 100 MG/ML
INJECTION, SOLUTION INTRAVENOUS
Status: DISPENSED
Start: 2021-01-01 | End: 2021-01-01

## 2021-01-01 RX ORDER — OLANZAPINE 10 MG/1
10 TABLET ORAL AT BEDTIME
Status: DISCONTINUED | OUTPATIENT
Start: 2021-01-01 | End: 2021-01-01

## 2021-01-01 RX ORDER — HALOPERIDOL 5 MG/ML
5 INJECTION INTRAMUSCULAR ONCE
Status: COMPLETED | OUTPATIENT
Start: 2021-01-01 | End: 2021-01-01

## 2021-01-01 RX ORDER — MORPHINE SULFATE 2 MG/ML
1 INJECTION, SOLUTION INTRAMUSCULAR; INTRAVENOUS EVERY 6 HOURS PRN
Status: DISCONTINUED | OUTPATIENT
Start: 2021-01-01 | End: 2021-01-01

## 2021-01-01 RX ORDER — SODIUM CHLORIDE 9 MG/ML
1000 INJECTION, SOLUTION INTRAVENOUS CONTINUOUS PRN
Status: CANCELLED
Start: 2021-01-01

## 2021-01-01 RX ORDER — LORAZEPAM 2 MG/ML
1 INJECTION INTRAMUSCULAR ONCE
Status: COMPLETED | OUTPATIENT
Start: 2021-01-01 | End: 2021-01-01

## 2021-01-01 RX ORDER — ALBUTEROL SULFATE 0.83 MG/ML
2.5 SOLUTION RESPIRATORY (INHALATION)
Status: DISCONTINUED | OUTPATIENT
Start: 2021-01-01 | End: 2021-01-01

## 2021-01-01 RX ORDER — DIPHENHYDRAMINE HYDROCHLORIDE 50 MG/ML
50 INJECTION INTRAMUSCULAR; INTRAVENOUS
Status: CANCELLED
Start: 2021-01-01

## 2021-01-01 RX ORDER — LORAZEPAM 2 MG/ML
2 INJECTION INTRAMUSCULAR PRN
Status: DISCONTINUED | OUTPATIENT
Start: 2021-01-01 | End: 2021-05-13 | Stop reason: HOSPADM

## 2021-01-01 RX ORDER — HALOPERIDOL 5 MG/ML
5 INJECTION INTRAMUSCULAR
Status: DISCONTINUED | OUTPATIENT
Start: 2021-01-01 | End: 2021-01-01

## 2021-01-01 RX ORDER — SERTRALINE HYDROCHLORIDE 100 MG/1
100 TABLET, FILM COATED ORAL DAILY
Qty: 30 TABLET | Refills: 2 | Status: SHIPPED | OUTPATIENT
Start: 2021-01-01

## 2021-01-01 RX ORDER — LORAZEPAM 2 MG/ML
2 INJECTION INTRAMUSCULAR ONCE
Status: COMPLETED | OUTPATIENT
Start: 2021-01-01 | End: 2021-01-01

## 2021-01-01 RX ORDER — TRAZODONE HYDROCHLORIDE 150 MG/1
150 TABLET ORAL
Status: DISCONTINUED | OUTPATIENT
Start: 2021-01-01 | End: 2021-01-01

## 2021-01-01 RX ORDER — LAMOTRIGINE 200 MG/1
200 TABLET ORAL AT BEDTIME
Status: DISCONTINUED | OUTPATIENT
Start: 2021-01-01 | End: 2021-01-01 | Stop reason: HOSPADM

## 2021-01-01 RX ORDER — SERTRALINE HYDROCHLORIDE 100 MG/1
100 TABLET, FILM COATED ORAL DAILY
Qty: 30 TABLET | Refills: 2 | Status: SHIPPED | OUTPATIENT
Start: 2021-01-01 | End: 2021-01-01

## 2021-01-01 RX ORDER — VITAMIN E 268 MG
400 CAPSULE ORAL DAILY
Status: CANCELLED | OUTPATIENT
Start: 2021-01-01

## 2021-01-01 RX ORDER — DIPHENHYDRAMINE HYDROCHLORIDE 50 MG/ML
50 INJECTION INTRAMUSCULAR; INTRAVENOUS EVERY 6 HOURS PRN
Status: DISCONTINUED | OUTPATIENT
Start: 2021-01-01 | End: 2021-01-01

## 2021-01-01 RX ORDER — OLANZAPINE 2.5 MG/1
TABLET, FILM COATED ORAL
Qty: 30 TABLET | Refills: 2 | Status: SHIPPED | OUTPATIENT
Start: 2021-01-01 | End: 2021-01-01 | Stop reason: DRUGHIGH

## 2021-01-01 RX ORDER — NALOXONE HYDROCHLORIDE 0.4 MG/ML
0.4 INJECTION, SOLUTION INTRAMUSCULAR; INTRAVENOUS; SUBCUTANEOUS
Status: DISCONTINUED | OUTPATIENT
Start: 2021-01-01 | End: 2021-05-13 | Stop reason: HOSPADM

## 2021-01-01 RX ORDER — GADOBUTROL 604.72 MG/ML
15 INJECTION INTRAVENOUS ONCE
Status: COMPLETED | OUTPATIENT
Start: 2021-01-01 | End: 2021-01-01

## 2021-01-01 RX ORDER — DOCUSATE SODIUM 100 MG/1
100 CAPSULE, LIQUID FILLED ORAL 2 TIMES DAILY
Status: CANCELLED | OUTPATIENT
Start: 2021-01-01

## 2021-01-01 RX ORDER — CHOLECALCIFEROL (VITAMIN D3) 50 MCG
2000 TABLET ORAL
COMMUNITY

## 2021-01-01 RX ORDER — LANOLIN ALCOHOL/MO/W.PET/CERES
6 CREAM (GRAM) TOPICAL
Status: DISCONTINUED | OUTPATIENT
Start: 2021-01-01 | End: 2021-01-01

## 2021-01-01 RX ORDER — DEXAMETHASONE 0.5 MG/1
0.75 TABLET ORAL
COMMUNITY

## 2021-01-01 RX ORDER — OLANZAPINE 10 MG/2ML
5 INJECTION, POWDER, FOR SOLUTION INTRAMUSCULAR 2 TIMES DAILY PRN
Status: DISCONTINUED | OUTPATIENT
Start: 2021-01-01 | End: 2021-01-01

## 2021-01-01 RX ORDER — HALOPERIDOL 5 MG/ML
2 INJECTION INTRAMUSCULAR ONCE
Status: DISCONTINUED | OUTPATIENT
Start: 2021-01-01 | End: 2021-01-01

## 2021-01-01 RX ORDER — MEPERIDINE HYDROCHLORIDE 25 MG/ML
25 INJECTION INTRAMUSCULAR; INTRAVENOUS; SUBCUTANEOUS EVERY 30 MIN PRN
Status: CANCELLED | OUTPATIENT
Start: 2021-01-01

## 2021-01-01 RX ORDER — OLANZAPINE 2.5 MG/1
2.5 TABLET, FILM COATED ORAL DAILY PRN
COMMUNITY
End: 2021-01-01

## 2021-01-01 RX ORDER — EPINEPHRINE 1 MG/ML
0.3 INJECTION, SOLUTION, CONCENTRATE INTRAVENOUS EVERY 5 MIN PRN
Status: CANCELLED | OUTPATIENT
Start: 2021-01-01

## 2021-01-01 RX ORDER — LORAZEPAM 2 MG/ML
2 INJECTION INTRAMUSCULAR
Status: COMPLETED | OUTPATIENT
Start: 2021-01-01 | End: 2021-01-01

## 2021-01-01 RX ORDER — LORAZEPAM 2 MG/ML
0.5 INJECTION INTRAMUSCULAR
Status: COMPLETED | OUTPATIENT
Start: 2021-01-01 | End: 2021-01-01

## 2021-01-01 RX ORDER — OLANZAPINE 15 MG/1
15 TABLET ORAL AT BEDTIME
Status: DISCONTINUED | OUTPATIENT
Start: 2021-01-01 | End: 2021-05-13 | Stop reason: HOSPADM

## 2021-01-01 RX ORDER — SERTRALINE HYDROCHLORIDE 100 MG/1
100 TABLET, FILM COATED ORAL AT BEDTIME
Status: DISCONTINUED | OUTPATIENT
Start: 2021-01-01 | End: 2021-01-01 | Stop reason: HOSPADM

## 2021-01-01 RX ORDER — FUROSEMIDE 10 MG/ML
20 INJECTION INTRAMUSCULAR; INTRAVENOUS 2 TIMES DAILY
Status: DISCONTINUED | OUTPATIENT
Start: 2021-01-01 | End: 2021-01-01

## 2021-01-01 RX ORDER — CLONIDINE 0.1 MG/24H
1 PATCH, EXTENDED RELEASE TRANSDERMAL WEEKLY
Status: DISCONTINUED | OUTPATIENT
Start: 2021-01-01 | End: 2021-05-13 | Stop reason: HOSPADM

## 2021-01-01 RX ORDER — MUPIROCIN 20 MG/G
OINTMENT TOPICAL 3 TIMES DAILY PRN
Status: DISCONTINUED | OUTPATIENT
Start: 2021-01-01 | End: 2021-01-01 | Stop reason: HOSPADM

## 2021-01-01 RX ORDER — ALBUTEROL SULFATE 0.83 MG/ML
2.5 SOLUTION RESPIRATORY (INHALATION)
Status: CANCELLED | OUTPATIENT
Start: 2021-01-01

## 2021-01-01 RX ORDER — OLANZAPINE 2.5 MG/1
2.5 TABLET, FILM COATED ORAL DAILY PRN
Status: DISCONTINUED | OUTPATIENT
Start: 2021-01-01 | End: 2021-01-01 | Stop reason: HOSPADM

## 2021-01-01 RX ORDER — FEXOFENADINE HCL 180 MG/1
180 TABLET ORAL DAILY
Status: DISCONTINUED | OUTPATIENT
Start: 2021-01-01 | End: 2021-01-01

## 2021-01-01 RX ORDER — OLANZAPINE 5 MG/1
5 TABLET ORAL DAILY PRN
Qty: 30 TABLET | Refills: 0 | COMMUNITY
Start: 2021-01-01 | End: 2021-01-01

## 2021-01-01 RX ORDER — AMPICILLIN AND SULBACTAM 2; 1 G/1; G/1
3 INJECTION, POWDER, FOR SOLUTION INTRAMUSCULAR; INTRAVENOUS ONCE
Status: COMPLETED | OUTPATIENT
Start: 2021-01-01 | End: 2021-01-01

## 2021-01-01 RX ORDER — SODIUM CHLORIDE 9 MG/ML
INJECTION, SOLUTION INTRAVENOUS
Status: COMPLETED
Start: 2021-01-01 | End: 2021-01-01

## 2021-01-01 RX ORDER — OLANZAPINE 10 MG/1
10 TABLET ORAL 2 TIMES DAILY
Status: DISCONTINUED | OUTPATIENT
Start: 2021-01-01 | End: 2021-01-01

## 2021-01-01 RX ORDER — TRAZODONE HYDROCHLORIDE 150 MG/1
150 TABLET ORAL
Qty: 30 TABLET | Refills: 2 | Status: SHIPPED | OUTPATIENT
Start: 2021-01-01 | End: 2021-01-01

## 2021-01-01 RX ORDER — AMPICILLIN AND SULBACTAM 2; 1 G/1; G/1
3 INJECTION, POWDER, FOR SOLUTION INTRAMUSCULAR; INTRAVENOUS EVERY 6 HOURS
Status: DISCONTINUED | OUTPATIENT
Start: 2021-01-01 | End: 2021-01-01

## 2021-01-01 RX ORDER — LORAZEPAM 2 MG/ML
2 CONCENTRATE ORAL AT BEDTIME
Status: DISCONTINUED | OUTPATIENT
Start: 2021-01-01 | End: 2021-01-01

## 2021-01-01 RX ORDER — FUROSEMIDE 10 MG/ML
20 INJECTION INTRAMUSCULAR; INTRAVENOUS EVERY 12 HOURS
Status: DISCONTINUED | OUTPATIENT
Start: 2021-01-01 | End: 2021-01-01

## 2021-01-01 RX ORDER — OLANZAPINE 10 MG/2ML
5 INJECTION, POWDER, FOR SOLUTION INTRAMUSCULAR 4 TIMES DAILY PRN
Status: DISCONTINUED | OUTPATIENT
Start: 2021-01-01 | End: 2021-05-13 | Stop reason: HOSPADM

## 2021-01-01 RX ORDER — DEXAMETHASONE 0.75 MG/1
0.75 TABLET ORAL DAILY
Status: DISCONTINUED | OUTPATIENT
Start: 2021-01-01 | End: 2021-01-01

## 2021-01-01 RX ORDER — FEXOFENADINE HCL 180 MG/1
180 TABLET ORAL AT BEDTIME
COMMUNITY

## 2021-01-01 RX ORDER — FEXOFENADINE HCL 60 MG/1
180 TABLET, FILM COATED ORAL AT BEDTIME
Status: DISCONTINUED | OUTPATIENT
Start: 2021-01-01 | End: 2021-01-01 | Stop reason: HOSPADM

## 2021-01-01 RX ORDER — MORPHINE SULFATE 2 MG/ML
1 INJECTION, SOLUTION INTRAMUSCULAR; INTRAVENOUS EVERY 8 HOURS PRN
Status: DISCONTINUED | OUTPATIENT
Start: 2021-01-01 | End: 2021-01-01

## 2021-01-01 RX ORDER — OLANZAPINE 15 MG/1
TABLET ORAL
Qty: 30 TABLET | Refills: 2 | Status: SHIPPED | OUTPATIENT
Start: 2021-01-01 | End: 2021-01-01

## 2021-01-01 RX ORDER — HYDROXYZINE HYDROCHLORIDE 25 MG/ML
0.5 INJECTION, SOLUTION INTRAMUSCULAR EVERY 6 HOURS PRN
Status: DISCONTINUED | OUTPATIENT
Start: 2021-01-01 | End: 2021-01-01

## 2021-01-01 RX ORDER — BENZTROPINE MESYLATE 1 MG/ML
1 INJECTION, SOLUTION INTRAMUSCULAR; INTRAVENOUS
Status: DISCONTINUED | OUTPATIENT
Start: 2021-01-01 | End: 2021-01-01

## 2021-01-01 RX ORDER — LAMOTRIGINE 200 MG/1
200 TABLET ORAL AT BEDTIME
Qty: 30 TABLET | Refills: 2 | Status: SHIPPED | OUTPATIENT
Start: 2021-01-01 | End: 2021-01-01

## 2021-01-01 RX ORDER — FUROSEMIDE 10 MG/ML
10 INJECTION INTRAMUSCULAR; INTRAVENOUS ONCE
Status: COMPLETED | OUTPATIENT
Start: 2021-01-01 | End: 2021-01-01

## 2021-01-01 RX ORDER — LORAZEPAM 2 MG/ML
INJECTION INTRAMUSCULAR
Status: COMPLETED
Start: 2021-01-01 | End: 2021-01-01

## 2021-01-01 RX ORDER — FEXOFENADINE HCL 180 MG/1
180 TABLET ORAL
Status: DISCONTINUED | OUTPATIENT
Start: 2021-01-01 | End: 2021-01-01

## 2021-01-01 RX ORDER — HALOPERIDOL 5 MG/ML
2 INJECTION INTRAMUSCULAR ONCE
Status: COMPLETED | OUTPATIENT
Start: 2021-01-01 | End: 2021-01-01

## 2021-01-01 RX ORDER — METHYLPREDNISOLONE SODIUM SUCCINATE 125 MG/2ML
125 INJECTION, POWDER, LYOPHILIZED, FOR SOLUTION INTRAMUSCULAR; INTRAVENOUS
Status: CANCELLED
Start: 2021-01-01

## 2021-01-01 RX ORDER — OLANZAPINE 5 MG/1
5 TABLET ORAL EVERY MORNING
Status: DISCONTINUED | OUTPATIENT
Start: 2021-01-01 | End: 2021-05-13 | Stop reason: HOSPADM

## 2021-01-01 RX ORDER — OLANZAPINE 15 MG/1
15 TABLET ORAL AT BEDTIME
Qty: 30 TABLET | Refills: 2 | Status: SHIPPED | OUTPATIENT
Start: 2021-01-01 | End: 2021-01-01

## 2021-01-01 RX ORDER — FUROSEMIDE 10 MG/ML
10 INJECTION INTRAMUSCULAR; INTRAVENOUS DAILY
Status: DISCONTINUED | OUTPATIENT
Start: 2021-01-01 | End: 2021-01-01

## 2021-01-01 RX ORDER — LORAZEPAM 2 MG/ML
1 INJECTION INTRAMUSCULAR EVERY 6 HOURS PRN
Status: DISCONTINUED | OUTPATIENT
Start: 2021-01-01 | End: 2021-01-01

## 2021-01-01 RX ORDER — LAMOTRIGINE 200 MG/1
200 TABLET ORAL AT BEDTIME
Qty: 30 TABLET | Refills: 2 | Status: SHIPPED | OUTPATIENT
Start: 2021-01-01

## 2021-01-01 RX ORDER — CHOLECALCIFEROL (VITAMIN D3) 50 MCG
50 TABLET ORAL DAILY
Status: CANCELLED | OUTPATIENT
Start: 2021-01-01

## 2021-01-01 RX ORDER — OLANZAPINE 5 MG/1
TABLET ORAL
Qty: 30 TABLET | Refills: 0 | Status: ON HOLD | OUTPATIENT
Start: 2021-01-01 | End: 2021-01-01

## 2021-01-01 RX ORDER — LACTULOSE 10 G/15ML
10 SOLUTION ORAL 2 TIMES DAILY PRN
Status: DISCONTINUED | OUTPATIENT
Start: 2021-01-01 | End: 2021-01-01

## 2021-01-01 RX ORDER — CYANOCOBALAMIN (VITAMIN B-12) 500 MCG
400 LOZENGE ORAL DAILY
COMMUNITY

## 2021-01-01 RX ORDER — OLANZAPINE 10 MG/1
10 TABLET ORAL AT BEDTIME
Qty: 30 TABLET | Refills: 1 | Status: SHIPPED | OUTPATIENT
Start: 2021-01-01

## 2021-01-01 RX ORDER — ONDANSETRON 2 MG/ML
4 INJECTION INTRAMUSCULAR; INTRAVENOUS EVERY 4 HOURS PRN
Status: DISCONTINUED | OUTPATIENT
Start: 2021-01-01 | End: 2021-01-01

## 2021-01-01 RX ORDER — LORAZEPAM 2 MG/ML
1 INJECTION INTRAMUSCULAR EVERY 8 HOURS PRN
Status: DISCONTINUED | OUTPATIENT
Start: 2021-01-01 | End: 2021-01-01

## 2021-01-01 RX ORDER — LAMOTRIGINE 200 MG/1
200 TABLET ORAL AT BEDTIME
Status: DISCONTINUED | OUTPATIENT
Start: 2021-01-01 | End: 2021-01-01

## 2021-01-01 RX ORDER — ACETAMINOPHEN 325 MG/1
650 TABLET ORAL EVERY 6 HOURS PRN
Status: DISCONTINUED | OUTPATIENT
Start: 2021-01-01 | End: 2021-05-13 | Stop reason: HOSPADM

## 2021-01-01 RX ORDER — DOCUSATE SODIUM 100 MG/1
100 CAPSULE, LIQUID FILLED ORAL 2 TIMES DAILY PRN
COMMUNITY
End: 2021-01-01

## 2021-01-01 RX ORDER — POLYETHYLENE GLYCOL 3350 17 G/17G
17 POWDER, FOR SOLUTION ORAL 3 TIMES DAILY
Status: DISCONTINUED | OUTPATIENT
Start: 2021-01-01 | End: 2021-01-01

## 2021-01-01 RX ORDER — TRAZODONE HYDROCHLORIDE 150 MG/1
150 TABLET ORAL
Qty: 30 TABLET | Refills: 2 | Status: SHIPPED | OUTPATIENT
Start: 2021-01-01

## 2021-01-01 RX ORDER — OLANZAPINE 5 MG/1
5 TABLET ORAL DAILY PRN
Qty: 30 TABLET | Refills: 0 | Status: SHIPPED | OUTPATIENT
Start: 2021-01-01 | End: 2021-01-01

## 2021-01-01 RX ORDER — TRAZODONE HYDROCHLORIDE 150 MG/1
150 TABLET ORAL AT BEDTIME
Status: DISCONTINUED | OUTPATIENT
Start: 2021-01-01 | End: 2021-05-13 | Stop reason: HOSPADM

## 2021-01-01 RX ADMIN — LAMOTRIGINE 200 MG: 200 TABLET ORAL at 23:47

## 2021-01-01 RX ADMIN — DEXMEDETOMIDINE HYDROCHLORIDE 1.4 MCG/KG/HR: 100 INJECTION, SOLUTION INTRAVENOUS at 04:19

## 2021-01-01 RX ADMIN — I.V. FAT EMULSION 240 ML: 20 EMULSION INTRAVENOUS at 19:58

## 2021-01-01 RX ADMIN — POLYETHYLENE GLYCOL 400 AND PROPYLENE GLYCOL 1 DROP: 4; 3 SOLUTION/ DROPS OPHTHALMIC at 05:43

## 2021-01-01 RX ADMIN — AMPICILLIN SODIUM AND SULBACTAM SODIUM 3 G: 2; 1 INJECTION, POWDER, FOR SOLUTION INTRAMUSCULAR; INTRAVENOUS at 06:05

## 2021-01-01 RX ADMIN — FUROSEMIDE 20 MG: 10 INJECTION, SOLUTION INTRAMUSCULAR; INTRAVENOUS at 07:46

## 2021-01-01 RX ADMIN — ALBUTEROL SULFATE 2.5 MG: 2.5 SOLUTION RESPIRATORY (INHALATION) at 08:29

## 2021-01-01 RX ADMIN — OMEPRAZOLE 40 MG: 20 CAPSULE, DELAYED RELEASE ORAL at 09:38

## 2021-01-01 RX ADMIN — DIPHENHYDRAMINE HYDROCHLORIDE 50 MG: 50 INJECTION, SOLUTION INTRAMUSCULAR; INTRAVENOUS at 00:44

## 2021-01-01 RX ADMIN — AMPICILLIN SODIUM AND SULBACTAM SODIUM 3 G: 2; 1 INJECTION, POWDER, FOR SOLUTION INTRAMUSCULAR; INTRAVENOUS at 05:45

## 2021-01-01 RX ADMIN — AMPICILLIN SODIUM AND SULBACTAM SODIUM 3 G: 2; 1 INJECTION, POWDER, FOR SOLUTION INTRAMUSCULAR; INTRAVENOUS at 18:10

## 2021-01-01 RX ADMIN — DEXMEDETOMIDINE HYDROCHLORIDE 0.5 MCG/KG/HR: 100 INJECTION, SOLUTION INTRAVENOUS at 19:42

## 2021-01-01 RX ADMIN — FEXOFENADINE HYDROCHLORIDE 180 MG: 180 TABLET, FILM COATED ORAL at 01:56

## 2021-01-01 RX ADMIN — SODIUM CHLORIDE 1000 ML: 900 INJECTION INTRAVENOUS at 20:35

## 2021-01-01 RX ADMIN — ENOXAPARIN SODIUM 40 MG: 40 INJECTION SUBCUTANEOUS at 19:36

## 2021-01-01 RX ADMIN — ENOXAPARIN SODIUM 40 MG: 40 INJECTION SUBCUTANEOUS at 12:46

## 2021-01-01 RX ADMIN — LINACLOTIDE 290 MCG: 145 CAPSULE, GELATIN COATED ORAL at 07:37

## 2021-01-01 RX ADMIN — AMPICILLIN SODIUM AND SULBACTAM SODIUM 3 G: 2; 1 INJECTION, POWDER, FOR SOLUTION INTRAMUSCULAR; INTRAVENOUS at 06:15

## 2021-01-01 RX ADMIN — POLYETHYLENE GLYCOL 400 AND PROPYLENE GLYCOL 1 DROP: 4; 3 SOLUTION/ DROPS OPHTHALMIC at 17:53

## 2021-01-01 RX ADMIN — OLANZAPINE 10 MG: 10 TABLET, FILM COATED ORAL at 21:45

## 2021-01-01 RX ADMIN — ALBUTEROL SULFATE 2.5 MG: 2.5 SOLUTION RESPIRATORY (INHALATION) at 04:57

## 2021-01-01 RX ADMIN — SULFAMETHOXAZOLE AND TRIMETHOPRIM 1 TABLET: 400; 80 TABLET ORAL at 07:43

## 2021-01-01 RX ADMIN — DEXMEDETOMIDINE HYDROCHLORIDE 1.4 MCG/KG/HR: 100 INJECTION, SOLUTION INTRAVENOUS at 08:40

## 2021-01-01 RX ADMIN — POLYETHYLENE GLYCOL 3350, SODIUM CHLORIDE, SODIUM BICARBONATE AND POTASSIUM CHLORIDE 900 ML/HR: 420; 5.72; 11.2; 1.48 POWDER, FOR SOLUTION ORAL at 03:48

## 2021-01-01 RX ADMIN — ALBUTEROL SULFATE 2.5 MG: 2.5 SOLUTION RESPIRATORY (INHALATION) at 21:16

## 2021-01-01 RX ADMIN — POLYETHYLENE GLYCOL 400 AND PROPYLENE GLYCOL 1 DROP: 4; 3 SOLUTION/ DROPS OPHTHALMIC at 08:27

## 2021-01-01 RX ADMIN — AMPICILLIN SODIUM AND SULBACTAM SODIUM 3 G: 2; 1 INJECTION, POWDER, FOR SOLUTION INTRAMUSCULAR; INTRAVENOUS at 12:41

## 2021-01-01 RX ADMIN — LAMOTRIGINE 200 MG: 200 TABLET ORAL at 22:32

## 2021-01-01 RX ADMIN — LAMOTRIGINE 200 MG: 200 TABLET ORAL at 21:40

## 2021-01-01 RX ADMIN — AMPICILLIN SODIUM AND SULBACTAM SODIUM 3 G: 2; 1 INJECTION, POWDER, FOR SOLUTION INTRAMUSCULAR; INTRAVENOUS at 00:24

## 2021-01-01 RX ADMIN — TRAZODONE HYDROCHLORIDE 150 MG: 150 TABLET ORAL at 23:47

## 2021-01-01 RX ADMIN — ALBUTEROL SULFATE 2.5 MG: 2.5 SOLUTION RESPIRATORY (INHALATION) at 08:59

## 2021-01-01 RX ADMIN — DEXMEDETOMIDINE HYDROCHLORIDE 0.6 MCG/KG/HR: 100 INJECTION, SOLUTION INTRAVENOUS at 15:31

## 2021-01-01 RX ADMIN — Medication 500 ML: at 17:28

## 2021-01-01 RX ADMIN — POLYETHYLENE GLYCOL 3350 17 G: 17 POWDER, FOR SOLUTION ORAL at 09:39

## 2021-01-01 RX ADMIN — SODIUM CHLORIDE 100 ML: 900 INJECTION INTRAVENOUS at 06:32

## 2021-01-01 RX ADMIN — SODIUM CHLORIDE: 234 INJECTION INTRAMUSCULAR; INTRAVENOUS; SUBCUTANEOUS at 19:15

## 2021-01-01 RX ADMIN — POLYETHYLENE GLYCOL 3350, SODIUM CHLORIDE, SODIUM BICARBONATE AND POTASSIUM CHLORIDE 1000 ML/HR: 420; 5.72; 11.2; 1.48 POWDER, FOR SOLUTION ORAL at 20:17

## 2021-01-01 RX ADMIN — TRAZODONE HYDROCHLORIDE 150 MG: 150 TABLET ORAL at 01:57

## 2021-01-01 RX ADMIN — GLYCERIN 1 SUPPOSITORY: 2 SUPPOSITORY RECTAL at 07:37

## 2021-01-01 RX ADMIN — ENOXAPARIN SODIUM 40 MG: 40 INJECTION SUBCUTANEOUS at 20:14

## 2021-01-01 RX ADMIN — GLYCERIN 1 SUPPOSITORY: 2 SUPPOSITORY RECTAL at 08:43

## 2021-01-01 RX ADMIN — SODIUM CHLORIDE 500 ML: 9 INJECTION, SOLUTION INTRAVENOUS at 17:28

## 2021-01-01 RX ADMIN — Medication 75 MG: at 21:30

## 2021-01-01 RX ADMIN — ALBUTEROL SULFATE 2.5 MG: 2.5 SOLUTION RESPIRATORY (INHALATION) at 12:35

## 2021-01-01 RX ADMIN — TRAZODONE HYDROCHLORIDE 150 MG: 150 TABLET ORAL at 21:40

## 2021-01-01 RX ADMIN — FUROSEMIDE 20 MG: 10 INJECTION, SOLUTION INTRAMUSCULAR; INTRAVENOUS at 20:58

## 2021-01-01 RX ADMIN — SODIUM CHLORIDE 1000 ML: 900 INJECTION INTRAVENOUS at 19:43

## 2021-01-01 RX ADMIN — POLYETHYLENE GLYCOL 400 AND PROPYLENE GLYCOL 1 DROP: 4; 3 SOLUTION/ DROPS OPHTHALMIC at 14:18

## 2021-01-01 RX ADMIN — ALBUTEROL SULFATE 2.5 MG: 2.5 SOLUTION RESPIRATORY (INHALATION) at 13:01

## 2021-01-01 RX ADMIN — OMEPRAZOLE 40 MG: 20 CAPSULE, DELAYED RELEASE ORAL at 08:32

## 2021-01-01 RX ADMIN — POLYETHYLENE GLYCOL 3350 17 G: 17 POWDER, FOR SOLUTION ORAL at 19:55

## 2021-01-01 RX ADMIN — LAMOTRIGINE 200 MG: 200 TABLET ORAL at 01:56

## 2021-01-01 RX ADMIN — POLYETHYLENE GLYCOL 3350, SODIUM CHLORIDE, SODIUM BICARBONATE AND POTASSIUM CHLORIDE 1000 ML/HR: 420; 5.72; 11.2; 1.48 POWDER, FOR SOLUTION ORAL at 23:47

## 2021-01-01 RX ADMIN — GLYCERIN 1 SUPPOSITORY: 2 SUPPOSITORY RECTAL at 08:27

## 2021-01-01 RX ADMIN — FEXOFENADINE HCL 180 MG: 60 TABLET, FILM COATED ORAL at 22:24

## 2021-01-01 RX ADMIN — DEXAMETHASONE 0.75 MG: 0.75 TABLET ORAL at 08:32

## 2021-01-01 RX ADMIN — SERTRALINE HYDROCHLORIDE 100 MG: 100 TABLET ORAL at 07:42

## 2021-01-01 RX ADMIN — AMPICILLIN SODIUM AND SULBACTAM SODIUM 3 G: 2; 1 INJECTION, POWDER, FOR SOLUTION INTRAMUSCULAR; INTRAVENOUS at 00:40

## 2021-01-01 RX ADMIN — DEXAMETHASONE 0.75 MG: 0.75 TABLET ORAL at 07:49

## 2021-01-01 RX ADMIN — AMPICILLIN SODIUM AND SULBACTAM SODIUM 3 G: 2; 1 INJECTION, POWDER, FOR SOLUTION INTRAMUSCULAR; INTRAVENOUS at 01:19

## 2021-01-01 RX ADMIN — Medication 1000 ML: at 00:34

## 2021-01-01 RX ADMIN — ALBUTEROL SULFATE 2.5 MG: 2.5 SOLUTION RESPIRATORY (INHALATION) at 20:03

## 2021-01-01 RX ADMIN — OLANZAPINE 10 MG: 10 TABLET, FILM COATED ORAL at 23:54

## 2021-01-01 RX ADMIN — DEXMEDETOMIDINE HYDROCHLORIDE 1.4 MCG/KG/HR: 100 INJECTION, SOLUTION INTRAVENOUS at 09:24

## 2021-01-01 RX ADMIN — AMPICILLIN SODIUM AND SULBACTAM SODIUM 3 G: 2; 1 INJECTION, POWDER, FOR SOLUTION INTRAMUSCULAR; INTRAVENOUS at 12:15

## 2021-01-01 RX ADMIN — AMPICILLIN SODIUM AND SULBACTAM SODIUM 3 G: 2; 1 INJECTION, POWDER, FOR SOLUTION INTRAMUSCULAR; INTRAVENOUS at 00:05

## 2021-01-01 RX ADMIN — POLYETHYLENE GLYCOL 3350, SODIUM CHLORIDE, SODIUM BICARBONATE AND POTASSIUM CHLORIDE 1000 ML/HR: 420; 5.72; 11.2; 1.48 POWDER, FOR SOLUTION ORAL at 03:38

## 2021-01-01 RX ADMIN — LAMOTRIGINE 200 MG: 200 TABLET ORAL at 21:45

## 2021-01-01 RX ADMIN — SODIUM CHLORIDE 1000 ML: 900 INJECTION INTRAVENOUS at 21:50

## 2021-01-01 RX ADMIN — OLANZAPINE 5 MG: 5 TABLET ORAL at 04:35

## 2021-01-01 RX ADMIN — Medication 75 MG: at 16:57

## 2021-01-01 RX ADMIN — SODIUM CHLORIDE: 234 INJECTION INTRAMUSCULAR; INTRAVENOUS; SUBCUTANEOUS at 19:56

## 2021-01-01 RX ADMIN — Medication 60 MG: at 21:44

## 2021-01-01 RX ADMIN — SODIUM CHLORIDE: 900 INJECTION INTRAVENOUS at 21:51

## 2021-01-01 RX ADMIN — AMPICILLIN SODIUM AND SULBACTAM SODIUM 3 G: 2; 1 INJECTION, POWDER, FOR SOLUTION INTRAMUSCULAR; INTRAVENOUS at 00:17

## 2021-01-01 RX ADMIN — LINACLOTIDE 290 MCG: 145 CAPSULE, GELATIN COATED ORAL at 07:42

## 2021-01-01 RX ADMIN — POLYETHYLENE GLYCOL 400 AND PROPYLENE GLYCOL 1 DROP: 4; 3 SOLUTION/ DROPS OPHTHALMIC at 14:57

## 2021-01-01 RX ADMIN — Medication 6 MG: at 02:47

## 2021-01-01 RX ADMIN — ALBUTEROL SULFATE 2.5 MG: 2.5 SOLUTION RESPIRATORY (INHALATION) at 08:49

## 2021-01-01 RX ADMIN — SERTRALINE HYDROCHLORIDE 100 MG: 100 TABLET ORAL at 07:49

## 2021-01-01 RX ADMIN — AMPICILLIN SODIUM AND SULBACTAM SODIUM 3 G: 2; 1 INJECTION, POWDER, FOR SOLUTION INTRAMUSCULAR; INTRAVENOUS at 17:46

## 2021-01-01 RX ADMIN — ALBUTEROL SULFATE 2.5 MG: 2.5 SOLUTION RESPIRATORY (INHALATION) at 22:07

## 2021-01-01 RX ADMIN — LAMOTRIGINE 200 MG: 200 TABLET ORAL at 22:10

## 2021-01-01 RX ADMIN — I.V. FAT EMULSION 240 ML: 20 EMULSION INTRAVENOUS at 19:56

## 2021-01-01 RX ADMIN — POLYETHYLENE GLYCOL 400 AND PROPYLENE GLYCOL 1 DROP: 4; 3 SOLUTION/ DROPS OPHTHALMIC at 16:34

## 2021-01-01 RX ADMIN — CLONIDINE 1 PATCH: 0.1 PATCH TRANSDERMAL at 00:01

## 2021-01-01 RX ADMIN — LAMOTRIGINE 200 MG: 200 TABLET ORAL at 21:33

## 2021-01-01 RX ADMIN — ALBUTEROL SULFATE 2.5 MG: 2.5 SOLUTION RESPIRATORY (INHALATION) at 01:35

## 2021-01-01 RX ADMIN — OLANZAPINE 5 MG: 5 TABLET, FILM COATED ORAL at 07:49

## 2021-01-01 RX ADMIN — LORAZEPAM 1 MG: 2 INJECTION INTRAMUSCULAR; INTRAVENOUS at 00:29

## 2021-01-01 RX ADMIN — Medication 75 MG: at 05:03

## 2021-01-01 RX ADMIN — AMPICILLIN SODIUM AND SULBACTAM SODIUM 3 G: 2; 1 INJECTION, POWDER, FOR SOLUTION INTRAMUSCULAR; INTRAVENOUS at 00:08

## 2021-01-01 RX ADMIN — AMPICILLIN SODIUM AND SULBACTAM SODIUM 3 G: 2; 1 INJECTION, POWDER, FOR SOLUTION INTRAMUSCULAR; INTRAVENOUS at 06:34

## 2021-01-01 RX ADMIN — MORPHINE SULFATE 1 MG: 2 INJECTION, SOLUTION INTRAMUSCULAR; INTRAVENOUS at 18:23

## 2021-01-01 RX ADMIN — LINACLOTIDE 290 MCG: 145 CAPSULE, GELATIN COATED ORAL at 06:39

## 2021-01-01 RX ADMIN — FEXOFENADINE HYDROCHLORIDE 180 MG: 180 TABLET, FILM COATED ORAL at 16:00

## 2021-01-01 RX ADMIN — POLYETHYLENE GLYCOL 400 AND PROPYLENE GLYCOL 1 DROP: 4; 3 SOLUTION/ DROPS OPHTHALMIC at 10:14

## 2021-01-01 RX ADMIN — ENOXAPARIN SODIUM 40 MG: 40 INJECTION SUBCUTANEOUS at 20:01

## 2021-01-01 RX ADMIN — LORAZEPAM 1 MG: 2 INJECTION INTRAMUSCULAR; INTRAVENOUS at 07:37

## 2021-01-01 RX ADMIN — POTASSIUM CHLORIDE: 2 INJECTION, SOLUTION, CONCENTRATE INTRAVENOUS at 21:16

## 2021-01-01 RX ADMIN — OLANZAPINE 10 MG: 10 TABLET, FILM COATED ORAL at 21:40

## 2021-01-01 RX ADMIN — AMPICILLIN SODIUM AND SULBACTAM SODIUM 3 G: 2; 1 INJECTION, POWDER, FOR SOLUTION INTRAMUSCULAR; INTRAVENOUS at 17:47

## 2021-01-01 RX ADMIN — Medication 6 MG: at 21:33

## 2021-01-01 RX ADMIN — Medication 60 MG: at 05:15

## 2021-01-01 RX ADMIN — OMEPRAZOLE 40 MG: 20 CAPSULE, DELAYED RELEASE ORAL at 07:48

## 2021-01-01 RX ADMIN — HALOPERIDOL LACTATE 2 MG: 5 INJECTION, SOLUTION INTRAMUSCULAR at 05:17

## 2021-01-01 RX ADMIN — FUROSEMIDE 20 MG: 10 INJECTION, SOLUTION INTRAMUSCULAR; INTRAVENOUS at 12:42

## 2021-01-01 RX ADMIN — Medication 400 UNITS: at 09:38

## 2021-01-01 RX ADMIN — I.V. FAT EMULSION 240 ML: 20 EMULSION INTRAVENOUS at 20:06

## 2021-01-01 RX ADMIN — SERTRALINE HYDROCHLORIDE 100 MG: 100 TABLET ORAL at 09:22

## 2021-01-01 RX ADMIN — OLANZAPINE 5 MG: 5 TABLET ORAL at 12:42

## 2021-01-01 RX ADMIN — SULFAMETHOXAZOLE AND TRIMETHOPRIM 1 TABLET: 400; 80 TABLET ORAL at 09:37

## 2021-01-01 RX ADMIN — SODIUM CHLORIDE: 234 INJECTION INTRAMUSCULAR; INTRAVENOUS; SUBCUTANEOUS at 20:28

## 2021-01-01 RX ADMIN — ALBUTEROL SULFATE 2.5 MG: 2.5 SOLUTION RESPIRATORY (INHALATION) at 14:40

## 2021-01-01 RX ADMIN — LORAZEPAM 2 MG: 2 INJECTION INTRAMUSCULAR; INTRAVENOUS at 06:05

## 2021-01-01 RX ADMIN — FUROSEMIDE 10 MG: 10 INJECTION, SOLUTION INTRAMUSCULAR; INTRAVENOUS at 07:56

## 2021-01-01 RX ADMIN — LINACLOTIDE 290 MCG: 145 CAPSULE, GELATIN COATED ORAL at 09:22

## 2021-01-01 RX ADMIN — FUROSEMIDE 10 MG: 10 INJECTION, SOLUTION INTRAMUSCULAR; INTRAVENOUS at 09:58

## 2021-01-01 RX ADMIN — DEXAMETHASONE 0.75 MG: 0.75 TABLET ORAL at 07:57

## 2021-01-01 RX ADMIN — ENOXAPARIN SODIUM 40 MG: 40 INJECTION SUBCUTANEOUS at 20:09

## 2021-01-01 RX ADMIN — ALBUTEROL SULFATE 2.5 MG: 2.5 SOLUTION RESPIRATORY (INHALATION) at 14:49

## 2021-01-01 RX ADMIN — POLYETHYLENE GLYCOL 400 AND PROPYLENE GLYCOL 1 DROP: 4; 3 SOLUTION/ DROPS OPHTHALMIC at 17:04

## 2021-01-01 RX ADMIN — AMPICILLIN SODIUM AND SULBACTAM SODIUM 3 G: 2; 1 INJECTION, POWDER, FOR SOLUTION INTRAMUSCULAR; INTRAVENOUS at 18:34

## 2021-01-01 RX ADMIN — LORAZEPAM 1 MG: 2 INJECTION INTRAMUSCULAR; INTRAVENOUS at 23:03

## 2021-01-01 RX ADMIN — DEXMEDETOMIDINE HYDROCHLORIDE 1.5 MCG/KG/HR: 100 INJECTION, SOLUTION INTRAVENOUS at 05:02

## 2021-01-01 RX ADMIN — GLYCERIN 1 SUPPOSITORY: 2 SUPPOSITORY RECTAL at 07:43

## 2021-01-01 RX ADMIN — ALBUTEROL SULFATE 2.5 MG: 2.5 SOLUTION RESPIRATORY (INHALATION) at 01:06

## 2021-01-01 RX ADMIN — Medication 60 MG: at 17:52

## 2021-01-01 RX ADMIN — AMPICILLIN SODIUM AND SULBACTAM SODIUM 3 G: 2; 1 INJECTION, POWDER, FOR SOLUTION INTRAMUSCULAR; INTRAVENOUS at 12:25

## 2021-01-01 RX ADMIN — FUROSEMIDE 10 MG: 10 INJECTION, SOLUTION INTRAMUSCULAR; INTRAVENOUS at 07:37

## 2021-01-01 RX ADMIN — ALBUTEROL SULFATE 2.5 MG: 2.5 SOLUTION RESPIRATORY (INHALATION) at 20:46

## 2021-01-01 RX ADMIN — ENOXAPARIN SODIUM 40 MG: 40 INJECTION SUBCUTANEOUS at 07:46

## 2021-01-01 RX ADMIN — Medication 2 MG/HR: at 16:24

## 2021-01-01 RX ADMIN — Medication 60 MG: at 04:44

## 2021-01-01 RX ADMIN — LORAZEPAM 1 MG: 2 INJECTION INTRAMUSCULAR; INTRAVENOUS at 15:23

## 2021-01-01 RX ADMIN — LINACLOTIDE 290 MCG: 145 CAPSULE, GELATIN COATED ORAL at 09:38

## 2021-01-01 RX ADMIN — GLYCERIN 1 SUPPOSITORY: 2 SUPPOSITORY RECTAL at 15:35

## 2021-01-01 RX ADMIN — FEXOFENADINE HYDROCHLORIDE 180 MG: 180 TABLET, FILM COATED ORAL at 21:45

## 2021-01-01 RX ADMIN — AMPICILLIN SODIUM AND SULBACTAM SODIUM 3 G: 2; 1 INJECTION, POWDER, FOR SOLUTION INTRAMUSCULAR; INTRAVENOUS at 12:30

## 2021-01-01 RX ADMIN — LINACLOTIDE 290 MCG: 145 CAPSULE, GELATIN COATED ORAL at 07:49

## 2021-01-01 RX ADMIN — AMPICILLIN SODIUM AND SULBACTAM SODIUM 3 G: 2; 1 INJECTION, POWDER, FOR SOLUTION INTRAMUSCULAR; INTRAVENOUS at 17:59

## 2021-01-01 RX ADMIN — AMPICILLIN SODIUM AND SULBACTAM SODIUM 3 G: 2; 1 INJECTION, POWDER, FOR SOLUTION INTRAMUSCULAR; INTRAVENOUS at 18:40

## 2021-01-01 RX ADMIN — I.V. FAT EMULSION 240 ML: 20 EMULSION INTRAVENOUS at 20:36

## 2021-01-01 RX ADMIN — AMPICILLIN SODIUM AND SULBACTAM SODIUM 3 G: 2; 1 INJECTION, POWDER, FOR SOLUTION INTRAMUSCULAR; INTRAVENOUS at 23:57

## 2021-01-01 RX ADMIN — FUROSEMIDE 10 MG: 10 INJECTION, SOLUTION INTRAMUSCULAR; INTRAVENOUS at 20:43

## 2021-01-01 RX ADMIN — Medication 6 MG: at 21:50

## 2021-01-01 RX ADMIN — ENOXAPARIN SODIUM 40 MG: 40 INJECTION SUBCUTANEOUS at 08:27

## 2021-01-01 RX ADMIN — LORAZEPAM 1 MG: 2 INJECTION INTRAMUSCULAR; INTRAVENOUS at 04:22

## 2021-01-01 RX ADMIN — POLYETHYLENE GLYCOL 400 AND PROPYLENE GLYCOL 1 DROP: 4; 3 SOLUTION/ DROPS OPHTHALMIC at 12:03

## 2021-01-01 RX ADMIN — POLYETHYLENE GLYCOL 3350, SODIUM CHLORIDE, SODIUM BICARBONATE AND POTASSIUM CHLORIDE 1000 ML/HR: 420; 5.72; 11.2; 1.48 POWDER, FOR SOLUTION ORAL at 16:00

## 2021-01-01 RX ADMIN — FUROSEMIDE 20 MG: 10 INJECTION, SOLUTION INTRAMUSCULAR; INTRAVENOUS at 19:32

## 2021-01-01 RX ADMIN — AMPICILLIN SODIUM AND SULBACTAM SODIUM 3 G: 2; 1 INJECTION, POWDER, FOR SOLUTION INTRAMUSCULAR; INTRAVENOUS at 00:23

## 2021-01-01 RX ADMIN — Medication 75 MG: at 05:58

## 2021-01-01 RX ADMIN — Medication 75 MG: at 22:45

## 2021-01-01 RX ADMIN — FEXOFENADINE HYDROCHLORIDE 180 MG: 180 TABLET, FILM COATED ORAL at 07:57

## 2021-01-01 RX ADMIN — SERTRALINE HYDROCHLORIDE 100 MG: 100 TABLET ORAL at 07:43

## 2021-01-01 RX ADMIN — Medication 75 MG: at 11:00

## 2021-01-01 RX ADMIN — Medication 40 MG: at 09:24

## 2021-01-01 RX ADMIN — OLANZAPINE 15 MG: 15 TABLET, FILM COATED ORAL at 21:50

## 2021-01-01 RX ADMIN — Medication 40 MG: at 07:47

## 2021-01-01 RX ADMIN — Medication 75 MG: at 05:02

## 2021-01-01 RX ADMIN — AMPICILLIN SODIUM AND SULBACTAM SODIUM 3 G: 2; 1 INJECTION, POWDER, FOR SOLUTION INTRAMUSCULAR; INTRAVENOUS at 23:56

## 2021-01-01 RX ADMIN — OLANZAPINE 10 MG: 10 TABLET, FILM COATED ORAL at 21:51

## 2021-01-01 RX ADMIN — Medication 40 MG: at 07:49

## 2021-01-01 RX ADMIN — Medication 75 MG: at 17:33

## 2021-01-01 RX ADMIN — POLYETHYLENE GLYCOL 3350, SODIUM CHLORIDE, SODIUM BICARBONATE AND POTASSIUM CHLORIDE 1000 ML/HR: 420; 5.72; 11.2; 1.48 POWDER, FOR SOLUTION ORAL at 06:51

## 2021-01-01 RX ADMIN — OLANZAPINE 5 MG: 10 INJECTION, POWDER, LYOPHILIZED, FOR SOLUTION INTRAMUSCULAR at 10:46

## 2021-01-01 RX ADMIN — ENOXAPARIN SODIUM 40 MG: 40 INJECTION SUBCUTANEOUS at 07:43

## 2021-01-01 RX ADMIN — OLANZAPINE 10 MG: 10 TABLET, FILM COATED ORAL at 22:37

## 2021-01-01 RX ADMIN — OLANZAPINE 5 MG: 5 TABLET ORAL at 01:55

## 2021-01-01 RX ADMIN — POLYETHYLENE GLYCOL 400 AND PROPYLENE GLYCOL 1 DROP: 4; 3 SOLUTION/ DROPS OPHTHALMIC at 21:02

## 2021-01-01 RX ADMIN — POLYETHYLENE GLYCOL 3350, SODIUM CHLORIDE, SODIUM BICARBONATE AND POTASSIUM CHLORIDE 1000 ML/HR: 420; 5.72; 11.2; 1.48 POWDER, FOR SOLUTION ORAL at 11:48

## 2021-01-01 RX ADMIN — AMPICILLIN SODIUM AND SULBACTAM SODIUM 3 G: 2; 1 INJECTION, POWDER, FOR SOLUTION INTRAMUSCULAR; INTRAVENOUS at 00:00

## 2021-01-01 RX ADMIN — Medication 60 MG: at 13:52

## 2021-01-01 RX ADMIN — FEXOFENADINE HYDROCHLORIDE 180 MG: 180 TABLET, FILM COATED ORAL at 07:49

## 2021-01-01 RX ADMIN — SODIUM CHLORIDE: 900 INJECTION INTRAVENOUS at 01:33

## 2021-01-01 RX ADMIN — ENOXAPARIN SODIUM 40 MG: 40 INJECTION SUBCUTANEOUS at 08:09

## 2021-01-01 RX ADMIN — AMPICILLIN SODIUM AND SULBACTAM SODIUM 3 G: 2; 1 INJECTION, POWDER, FOR SOLUTION INTRAMUSCULAR; INTRAVENOUS at 06:20

## 2021-01-01 RX ADMIN — FEXOFENADINE HYDROCHLORIDE 180 MG: 180 TABLET, FILM COATED ORAL at 07:37

## 2021-01-01 RX ADMIN — DORNASE ALFA 2.5 MG: 1 SOLUTION RESPIRATORY (INHALATION) at 22:09

## 2021-01-01 RX ADMIN — MORPHINE SULFATE 1 MG: 2 INJECTION, SOLUTION INTRAMUSCULAR; INTRAVENOUS at 03:43

## 2021-01-01 RX ADMIN — DEXMEDETOMIDINE HYDROCHLORIDE 0.5 MCG/KG/HR: 100 INJECTION, SOLUTION INTRAVENOUS at 08:32

## 2021-01-01 RX ADMIN — SODIUM CHLORIDE: 900 INJECTION INTRAVENOUS at 10:10

## 2021-01-01 RX ADMIN — Medication 17 MG: at 01:44

## 2021-01-01 RX ADMIN — LORAZEPAM 2 MG: 2 INJECTION INTRAMUSCULAR; INTRAVENOUS at 02:22

## 2021-01-01 RX ADMIN — LAMOTRIGINE 200 MG: 200 TABLET ORAL at 21:51

## 2021-01-01 RX ADMIN — ASCORBIC ACID, VITAMIN A PALMITATE, CHOLECALCIFEROL, THIAMINE HYDROCHLORIDE, RIBOFLAVIN-5 PHOSPHATE SODIUM, PYRIDOXINE HYDROCHLORIDE, NIACINAMIDE, DEXPANTHENOL, ALPHA-TOCOPHEROL ACETATE, VITAMIN K1, FOLIC ACID, BIOTIN, CYANOCOBALAMIN: 200; 3300; 200; 6; 3.6; 6; 40; 15; 10; 150; 600; 60; 5 INJECTION, SOLUTION INTRAVENOUS at 20:11

## 2021-01-01 RX ADMIN — SODIUM CHLORIDE: 234 INJECTION INTRAMUSCULAR; INTRAVENOUS; SUBCUTANEOUS at 19:41

## 2021-01-01 RX ADMIN — FUROSEMIDE 20 MG: 10 INJECTION, SOLUTION INTRAMUSCULAR; INTRAVENOUS at 12:31

## 2021-01-01 RX ADMIN — LORAZEPAM 1 MG: 2 INJECTION INTRAMUSCULAR; INTRAVENOUS at 07:39

## 2021-01-01 RX ADMIN — SODIUM CHLORIDE 40 ML: 9 INJECTION, SOLUTION INTRAVENOUS at 13:08

## 2021-01-01 RX ADMIN — LORAZEPAM 2 MG: 2 INJECTION INTRAMUSCULAR; INTRAVENOUS at 00:00

## 2021-01-01 RX ADMIN — ENOXAPARIN SODIUM 40 MG: 40 INJECTION SUBCUTANEOUS at 07:37

## 2021-01-01 RX ADMIN — GLYCERIN 1 SUPPOSITORY: 2 SUPPOSITORY RECTAL at 09:34

## 2021-01-01 RX ADMIN — OLANZAPINE 5 MG: 5 TABLET ORAL at 15:55

## 2021-01-01 RX ADMIN — Medication 75 MG: at 23:04

## 2021-01-01 RX ADMIN — Medication 75 MG: at 13:44

## 2021-01-01 RX ADMIN — OLANZAPINE 10 MG: 10 TABLET, FILM COATED ORAL at 21:33

## 2021-01-01 RX ADMIN — OLANZAPINE 5 MG: 10 INJECTION, POWDER, FOR SOLUTION INTRAMUSCULAR at 11:40

## 2021-01-01 RX ADMIN — Medication 40 MG: at 08:28

## 2021-01-01 RX ADMIN — SERTRALINE HYDROCHLORIDE 100 MG: 100 TABLET ORAL at 07:57

## 2021-01-01 RX ADMIN — Medication 75 MG: at 05:57

## 2021-01-01 RX ADMIN — LAMOTRIGINE 200 MG: 200 TABLET ORAL at 23:35

## 2021-01-01 RX ADMIN — LINACLOTIDE 290 MCG: 145 CAPSULE, GELATIN COATED ORAL at 06:45

## 2021-01-01 RX ADMIN — LORAZEPAM 2 MG: 2 INJECTION INTRAMUSCULAR at 00:00

## 2021-01-01 RX ADMIN — FUROSEMIDE 10 MG: 10 INJECTION, SOLUTION INTRAMUSCULAR; INTRAVENOUS at 08:29

## 2021-01-01 RX ADMIN — Medication 2 CAPSULE: at 09:39

## 2021-01-01 RX ADMIN — POLYETHYLENE GLYCOL 3350 17 G: 17 POWDER, FOR SOLUTION ORAL at 14:55

## 2021-01-01 RX ADMIN — TRAZODONE HYDROCHLORIDE 150 MG: 150 TABLET ORAL at 23:55

## 2021-01-01 RX ADMIN — GLYCERIN 1 SUPPOSITORY: 2 SUPPOSITORY RECTAL at 08:11

## 2021-01-01 RX ADMIN — PANTOPRAZOLE SODIUM 40 MG: 40 INJECTION, POWDER, FOR SOLUTION INTRAVENOUS at 08:37

## 2021-01-01 RX ADMIN — Medication: at 02:06

## 2021-01-01 RX ADMIN — DEXMEDETOMIDINE HYDROCHLORIDE 1 MCG/KG/HR: 100 INJECTION, SOLUTION INTRAVENOUS at 03:04

## 2021-01-01 RX ADMIN — SERTRALINE HYDROCHLORIDE 100 MG: 100 TABLET ORAL at 07:46

## 2021-01-01 RX ADMIN — Medication 40 MG: at 09:25

## 2021-01-01 RX ADMIN — DEXMEDETOMIDINE HYDROCHLORIDE 1.4 MCG/KG/HR: 100 INJECTION, SOLUTION INTRAVENOUS at 00:06

## 2021-01-01 RX ADMIN — POTASSIUM PHOSPHATE, MONOBASIC 500 MG: 500 TABLET, SOLUBLE ORAL at 07:57

## 2021-01-01 RX ADMIN — DEXMEDETOMIDINE HYDROCHLORIDE 0.5 MCG/KG/HR: 100 INJECTION, SOLUTION INTRAVENOUS at 06:16

## 2021-01-01 RX ADMIN — AMPICILLIN SODIUM AND SULBACTAM SODIUM 3 G: 2; 1 INJECTION, POWDER, FOR SOLUTION INTRAMUSCULAR; INTRAVENOUS at 17:57

## 2021-01-01 RX ADMIN — POLYETHYLENE GLYCOL 400 AND PROPYLENE GLYCOL 1 DROP: 4; 3 SOLUTION/ DROPS OPHTHALMIC at 12:33

## 2021-01-01 RX ADMIN — SODIUM CHLORIDE 1000 ML: 900 INJECTION INTRAVENOUS at 09:23

## 2021-01-01 RX ADMIN — FEXOFENADINE HYDROCHLORIDE 180 MG: 180 TABLET, FILM COATED ORAL at 07:43

## 2021-01-01 RX ADMIN — I.V. FAT EMULSION 240 ML: 20 EMULSION INTRAVENOUS at 20:05

## 2021-01-01 RX ADMIN — POLYETHYLENE GLYCOL 400 AND PROPYLENE GLYCOL 1 DROP: 4; 3 SOLUTION/ DROPS OPHTHALMIC at 12:44

## 2021-01-01 RX ADMIN — AMPICILLIN SODIUM AND SULBACTAM SODIUM 3 G: 2; 1 INJECTION, POWDER, FOR SOLUTION INTRAMUSCULAR; INTRAVENOUS at 11:51

## 2021-01-01 RX ADMIN — ALBUTEROL SULFATE 2.5 MG: 2.5 SOLUTION RESPIRATORY (INHALATION) at 22:09

## 2021-01-01 RX ADMIN — Medication 40 MG: at 08:40

## 2021-01-01 RX ADMIN — AMPICILLIN SODIUM AND SULBACTAM SODIUM 3 G: 2; 1 INJECTION, POWDER, FOR SOLUTION INTRAMUSCULAR; INTRAVENOUS at 11:31

## 2021-01-01 RX ADMIN — DEXMEDETOMIDINE HYDROCHLORIDE 1.7 MCG/KG/HR: 100 INJECTION, SOLUTION INTRAVENOUS at 17:39

## 2021-01-01 RX ADMIN — POLYETHYLENE GLYCOL 3350, SODIUM CHLORIDE, SODIUM BICARBONATE AND POTASSIUM CHLORIDE 500 ML/HR: 420; 5.72; 11.2; 1.48 POWDER, FOR SOLUTION ORAL at 22:23

## 2021-01-01 RX ADMIN — POTASSIUM PHOSPHATE, MONOBASIC 500 MG: 500 TABLET, SOLUBLE ORAL at 09:38

## 2021-01-01 RX ADMIN — POLYETHYLENE GLYCOL 400 AND PROPYLENE GLYCOL 1 DROP: 4; 3 SOLUTION/ DROPS OPHTHALMIC at 09:00

## 2021-01-01 RX ADMIN — GLYCERIN 1 SUPPOSITORY: 2 SUPPOSITORY RECTAL at 09:23

## 2021-01-01 RX ADMIN — AMPICILLIN SODIUM AND SULBACTAM SODIUM 3 G: 2; 1 INJECTION, POWDER, FOR SOLUTION INTRAMUSCULAR; INTRAVENOUS at 06:38

## 2021-01-01 RX ADMIN — SODIUM CHLORIDE: 234 INJECTION INTRAMUSCULAR; INTRAVENOUS; SUBCUTANEOUS at 20:05

## 2021-01-01 RX ADMIN — OLANZAPINE 10 MG: 10 TABLET, FILM COATED ORAL at 01:57

## 2021-01-01 RX ADMIN — AMPICILLIN SODIUM AND SULBACTAM SODIUM 3 G: 2; 1 INJECTION, POWDER, FOR SOLUTION INTRAMUSCULAR; INTRAVENOUS at 18:04

## 2021-01-01 RX ADMIN — DEXAMETHASONE 0.75 MG: 0.75 TABLET ORAL at 07:37

## 2021-01-01 RX ADMIN — AMPICILLIN SODIUM AND SULBACTAM SODIUM 3 G: 2; 1 INJECTION, POWDER, FOR SOLUTION INTRAMUSCULAR; INTRAVENOUS at 12:04

## 2021-01-01 RX ADMIN — HALOPERIDOL LACTATE 5 MG: 5 INJECTION, SOLUTION INTRAMUSCULAR at 06:29

## 2021-01-01 RX ADMIN — LAMOTRIGINE 200 MG: 200 TABLET ORAL at 23:55

## 2021-01-01 RX ADMIN — Medication 2000 UNITS: at 09:35

## 2021-01-01 RX ADMIN — DEXMEDETOMIDINE HYDROCHLORIDE 1 MCG/KG/HR: 100 INJECTION, SOLUTION INTRAVENOUS at 16:52

## 2021-01-01 RX ADMIN — POTASSIUM PHOSPHATE, MONOBASIC 500 MG: 500 TABLET, SOLUBLE ORAL at 19:55

## 2021-01-01 RX ADMIN — LAMOTRIGINE 200 MG: 200 TABLET ORAL at 22:37

## 2021-01-01 RX ADMIN — I.V. FAT EMULSION 240 ML: 20 EMULSION INTRAVENOUS at 20:11

## 2021-01-01 RX ADMIN — HALOPERIDOL LACTATE 5 MG: 5 INJECTION, SOLUTION INTRAMUSCULAR at 23:20

## 2021-01-01 RX ADMIN — GADOBUTROL 12.5 ML: 604.72 INJECTION INTRAVENOUS at 18:51

## 2021-01-01 RX ADMIN — AMPICILLIN SODIUM AND SULBACTAM SODIUM 3 G: 2; 1 INJECTION, POWDER, FOR SOLUTION INTRAMUSCULAR; INTRAVENOUS at 12:31

## 2021-01-01 RX ADMIN — LINACLOTIDE 290 MCG: 145 CAPSULE, GELATIN COATED ORAL at 07:57

## 2021-01-01 RX ADMIN — AMPICILLIN SODIUM AND SULBACTAM SODIUM 3 G: 2; 1 INJECTION, POWDER, FOR SOLUTION INTRAMUSCULAR; INTRAVENOUS at 06:02

## 2021-01-01 RX ADMIN — ENOXAPARIN SODIUM 40 MG: 40 INJECTION SUBCUTANEOUS at 09:23

## 2021-01-01 RX ADMIN — SERTRALINE HYDROCHLORIDE 100 MG: 100 TABLET ORAL at 08:05

## 2021-01-01 RX ADMIN — POLYETHYLENE GLYCOL 400 AND PROPYLENE GLYCOL 1 DROP: 4; 3 SOLUTION/ DROPS OPHTHALMIC at 21:08

## 2021-01-01 RX ADMIN — TRAZODONE HYDROCHLORIDE 150 MG: 150 TABLET ORAL at 21:33

## 2021-01-01 RX ADMIN — DEXAMETHASONE 0.75 MG: 0.75 TABLET ORAL at 09:36

## 2021-01-01 RX ADMIN — AMPICILLIN SODIUM AND SULBACTAM SODIUM 3 G: 2; 1 INJECTION, POWDER, FOR SOLUTION INTRAMUSCULAR; INTRAVENOUS at 18:11

## 2021-01-01 RX ADMIN — SULFAMETHOXAZOLE AND TRIMETHOPRIM 1 TABLET: 400; 80 TABLET ORAL at 07:46

## 2021-01-01 RX ADMIN — POLYETHYLENE GLYCOL 400 AND PROPYLENE GLYCOL 1 DROP: 4; 3 SOLUTION/ DROPS OPHTHALMIC at 17:29

## 2021-01-01 RX ADMIN — POLYETHYLENE GLYCOL 3350, SODIUM CHLORIDE, SODIUM BICARBONATE AND POTASSIUM CHLORIDE 1000 ML/HR: 420; 5.72; 11.2; 1.48 POWDER, FOR SOLUTION ORAL at 07:54

## 2021-01-01 RX ADMIN — ENOXAPARIN SODIUM 40 MG: 40 INJECTION SUBCUTANEOUS at 21:37

## 2021-01-01 RX ADMIN — ALBUTEROL SULFATE 2.5 MG: 2.5 SOLUTION RESPIRATORY (INHALATION) at 16:51

## 2021-01-01 RX ADMIN — POLYETHYLENE GLYCOL 400 AND PROPYLENE GLYCOL 1 DROP: 4; 3 SOLUTION/ DROPS OPHTHALMIC at 00:45

## 2021-01-01 RX ADMIN — MORPHINE SULFATE 1 MG: 2 INJECTION, SOLUTION INTRAMUSCULAR; INTRAVENOUS at 20:35

## 2021-01-01 RX ADMIN — LORAZEPAM 0.5 MG: 2 INJECTION INTRAMUSCULAR; INTRAVENOUS at 23:17

## 2021-01-01 RX ADMIN — LORAZEPAM 2 MG: 2 INJECTION INTRAMUSCULAR; INTRAVENOUS at 18:13

## 2021-01-01 RX ADMIN — LINACLOTIDE 290 MCG: 145 CAPSULE, GELATIN COATED ORAL at 08:05

## 2021-01-01 RX ADMIN — SODIUM CHLORIDE 1000 ML: 9 INJECTION, SOLUTION INTRAVENOUS at 00:34

## 2021-01-01 RX ADMIN — ALBUTEROL SULFATE 2.5 MG: 2.5 SOLUTION RESPIRATORY (INHALATION) at 13:59

## 2021-01-01 RX ADMIN — SULFAMETHOXAZOLE AND TRIMETHOPRIM 1 TABLET: 400; 80 TABLET ORAL at 19:36

## 2021-01-01 RX ADMIN — MORPHINE SULFATE 1 MG: 2 INJECTION, SOLUTION INTRAMUSCULAR; INTRAVENOUS at 05:18

## 2021-01-01 RX ADMIN — Medication 4 MG: at 22:51

## 2021-01-01 RX ADMIN — I.V. FAT EMULSION 240 ML: 20 EMULSION INTRAVENOUS at 21:16

## 2021-01-01 RX ADMIN — POLYETHYLENE GLYCOL 400 AND PROPYLENE GLYCOL 1 DROP: 4; 3 SOLUTION/ DROPS OPHTHALMIC at 20:55

## 2021-01-01 RX ADMIN — ACETAMINOPHEN 650 MG: 325 TABLET, FILM COATED ORAL at 16:14

## 2021-01-01 RX ADMIN — Medication 40 MG: at 06:46

## 2021-01-01 RX ADMIN — GLYCERIN 1 SUPPOSITORY: 2 SUPPOSITORY RECTAL at 12:41

## 2021-01-01 RX ADMIN — Medication 75 MG: at 10:59

## 2021-01-01 RX ADMIN — OLANZAPINE 10 MG: 10 TABLET, FILM COATED ORAL at 22:10

## 2021-01-01 RX ADMIN — TRAZODONE HYDROCHLORIDE 150 MG: 150 TABLET ORAL at 21:51

## 2021-01-01 RX ADMIN — AMPICILLIN SODIUM AND SULBACTAM SODIUM 3 G: 2; 1 INJECTION, POWDER, FOR SOLUTION INTRAMUSCULAR; INTRAVENOUS at 20:30

## 2021-01-01 RX ADMIN — AMPICILLIN SODIUM AND SULBACTAM SODIUM 3 G: 2; 1 INJECTION, POWDER, FOR SOLUTION INTRAMUSCULAR; INTRAVENOUS at 06:00

## 2021-01-01 RX ADMIN — Medication 60 MG: at 17:00

## 2021-01-01 RX ADMIN — ENOXAPARIN SODIUM 40 MG: 40 INJECTION SUBCUTANEOUS at 20:05

## 2021-01-01 RX ADMIN — OLANZAPINE 5 MG: 10 INJECTION, POWDER, LYOPHILIZED, FOR SOLUTION INTRAMUSCULAR at 16:15

## 2021-01-01 RX ADMIN — OLANZAPINE 5 MG: 10 INJECTION, POWDER, LYOPHILIZED, FOR SOLUTION INTRAMUSCULAR at 00:30

## 2021-01-01 RX ADMIN — ALBUTEROL SULFATE 2.5 MG: 2.5 SOLUTION RESPIRATORY (INHALATION) at 16:42

## 2021-01-01 RX ADMIN — GADOBUTROL 12 ML: 604.72 INJECTION INTRAVENOUS at 13:07

## 2021-01-01 RX ADMIN — AMPICILLIN SODIUM AND SULBACTAM SODIUM 3 G: 2; 1 INJECTION, POWDER, FOR SOLUTION INTRAMUSCULAR; INTRAVENOUS at 13:59

## 2021-01-01 RX ADMIN — FEXOFENADINE HCL 180 MG: 60 TABLET, FILM COATED ORAL at 23:35

## 2021-01-01 RX ADMIN — DEXMEDETOMIDINE HYDROCHLORIDE 0.8 MCG/KG/HR: 100 INJECTION, SOLUTION INTRAVENOUS at 18:31

## 2021-01-01 RX ADMIN — LORAZEPAM 1 MG: 2 INJECTION INTRAMUSCULAR; INTRAVENOUS at 21:55

## 2021-01-01 RX ADMIN — SERTRALINE HYDROCHLORIDE 100 MG: 100 TABLET ORAL at 07:37

## 2021-01-01 RX ADMIN — POLYETHYLENE GLYCOL 400 AND PROPYLENE GLYCOL 1 DROP: 4; 3 SOLUTION/ DROPS OPHTHALMIC at 17:24

## 2021-01-01 RX ADMIN — Medication 60 MG: at 04:36

## 2021-01-01 RX ADMIN — ALBUTEROL SULFATE 2.5 MG: 2.5 SOLUTION RESPIRATORY (INHALATION) at 09:00

## 2021-01-01 RX ADMIN — FUROSEMIDE 20 MG: 10 INJECTION, SOLUTION INTRAMUSCULAR; INTRAVENOUS at 09:23

## 2021-01-01 RX ADMIN — ALBUTEROL SULFATE 2.5 MG: 2.5 SOLUTION RESPIRATORY (INHALATION) at 09:34

## 2021-01-01 RX ADMIN — ALBUTEROL SULFATE 2.5 MG: 2.5 SOLUTION RESPIRATORY (INHALATION) at 21:19

## 2021-01-01 RX ADMIN — OLANZAPINE 10 MG: 10 TABLET, FILM COATED ORAL at 22:32

## 2021-01-01 RX ADMIN — OLANZAPINE 10 MG: 10 TABLET, FILM COATED ORAL at 00:13

## 2021-01-01 RX ADMIN — Medication 6 MG: at 21:26

## 2021-01-01 RX ADMIN — SERTRALINE HYDROCHLORIDE 100 MG: 100 TABLET ORAL at 09:36

## 2021-01-01 RX ADMIN — ALBUTEROL SULFATE 2.5 MG: 2.5 SOLUTION RESPIRATORY (INHALATION) at 01:27

## 2021-01-01 RX ADMIN — SULFAMETHOXAZOLE AND TRIMETHOPRIM 1 TABLET: 400; 80 TABLET ORAL at 19:55

## 2021-01-01 RX ADMIN — AMPICILLIN SODIUM AND SULBACTAM SODIUM 3 G: 2; 1 INJECTION, POWDER, FOR SOLUTION INTRAMUSCULAR; INTRAVENOUS at 05:51

## 2021-01-01 RX ADMIN — Medication 40 MG: at 06:39

## 2021-01-01 RX ADMIN — Medication 40 MG: at 08:06

## 2021-01-01 ASSESSMENT — ENCOUNTER SYMPTOMS
CONSTITUTIONAL NEGATIVE: 1
PSYCHIATRIC NEGATIVE: 1
COUGH: 1
FEVER: 0
HEADACHES: 1
CARDIOVASCULAR NEGATIVE: 1
MUSCULOSKELETAL NEGATIVE: 1
SEIZURES: 0
LOSS OF CONSCIOUSNESS: 0
RESPIRATORY NEGATIVE: 1
SENSORY CHANGE: 1
CONSTIPATION: 1
SHORTNESS OF BREATH: 0
ROS GI COMMENTS: SEE HPI
FOCAL WEAKNESS: 1
HEADACHES: 0
SHORTNESS OF BREATH: 1
DOUBLE VISION: 1
COUGH: 1
NAUSEA: 1
DIZZINESS: 1
LIGHT-HEADEDNESS: 1
HEARTBURN: 1

## 2021-01-01 ASSESSMENT — ACTIVITIES OF DAILY LIVING (ADL)
DIFFICULTY_COMMUNICATING: NO
DIFFICULTY_EATING/SWALLOWING: YES
DRESSING/BATHING_DIFFICULTY: YES
COMMUNICATION: DIFFICULTY SPEAKING
DOING_ERRANDS_INDEPENDENTLY_DIFFICULTY: OTHER (SEE COMMENTS)
WHICH_OF_THE_ABOVE_FUNCTIONAL_RISKS_HAD_A_RECENT_ONSET_OR_CHANGE?: EATING
WALKING_OR_CLIMBING_STAIRS_DIFFICULTY: OTHER (SEE COMMENTS)
WEAR_GLASSES_OR_BLIND: YES
CONCENTRATING,_REMEMBERING_OR_MAKING_DECISIONS_DIFFICULTY: NO
EATING/SWALLOWING: EATING;SWALLOWING LIQUIDS;SWALLOWING SOLID FOOD
TOILETING_ISSUES: NO
TOILETING_ASSISTANCE: TOILETING DIFFICULTY, DEPENDENT
DRESSING/BATHING: BATHING DIFFICULTY, DEPENDENT;DRESSING DIFFICULTY, DEPENDENT

## 2021-01-01 ASSESSMENT — PAIN SCALES - GENERAL
PAINLEVEL: NO PAIN (0)
PAINLEVEL: MODERATE PAIN (4)
PAINLEVEL: NO PAIN (0)

## 2021-01-01 ASSESSMENT — MIFFLIN-ST. JEOR
SCORE: 2197.63
SCORE: 2211.02

## 2021-01-20 NOTE — PROGRESS NOTES
"  The patient has been notified of following:     \"This video visit will be conducted via a call between you and your physician/provider. We have found that certain health care needs can be provided without the need for an in-person physical exam.  This service lets us provide the care you need with a video conversation.  If a prescription is necessary we can send it directly to your pharmacy.  If lab work is needed we can place an order for that and you can then stop by our lab to have the test done at a later time.    Video visits are billed at different rates depending on your insurance coverage.  Please reach out to your insurance provider with any questions.    If during the course of the call the physician/provider feels a video visit is not appropriate, you will not be charged for this service.\"    Patient has given verbal consent for Video visit? Yes    Video-Visit Details    Type of service:  Video Visit    Video Start Time: 1:45 PM  Video End Time: 2:02 PM    Originating Location (pt. 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      HPI: Geo reports that he had a bowel movement today a moderate amount.  Geo has a good appetite. He complains of lower abdominal pain but doesn't impact his ability to eat and he has not been evaluated again after his ER visit in July for it.  Discussion with house staff note that he is sometimes eating very fast and throwing up.  Geo had labs on 1/7/21 which looked good.  His finger has totally healed.   Hip wound has also healed.  He is living in his group care facility.  No recent illnesses. No current " fever or sign of current infection. Ongoing concerns about his constipation and he again reports that he feels I am withholding a cure for it.  He likes the food at the facility and has a good appetite but doesn't think he can eat enough. He refused calorie counts we discussed last month.  He also had some dificulty with the physical therapy person who came but has a new service starting in Feb. 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     No missed doses or problems with prior Entinostat cycle.       Labs:      Labs done on 1/7/21 and reviewed:    Labs:  A complete blood count was done today which reveals a white count of 5.2, Hgb of 11.1  and a platelet count of 153,000.  The differential reveals an ANC of 3000.      He also had a comprehensive metabolic panel which revealed a sodium of 139, potassium of 4.6, chloride of 103, CO2 of 29 and calcium of 8.9.  He had a BUN of 14, and creatinine of 1.03.  His liver function tests reveal an alkaline phosphatase of 125, AST of 21 and ALT of 17.  His total bilirubin is 0.3.  His total protein is 7.1, albumin is 3.6 and glucose is 96. His magnesium is 2 and phosphorus is 3.8.       Oncology History:    [id number]   6/26/15    Started Entinostat 1/9/17    DIAGNOSIS: EPENDYMOMA  AREAS TREATED: POST FOSSA TUMOR  DOSE: 5940 cGy   TYPE OF RADIATION GIVEN: with IMRT Tomotherapy   9/08/2015 to 10/26/15    Patient Active Problem List   Diagnosis     GERD (gastroesophageal reflux disease)     Closed fracture at the growth plate of right distal fibula      Elevated serum creatinine     Dyspepsia     Intracranial hemorrhage (H)     Hemorrhagic stroke (H)     Ependymoma (H)     Admission for antineoplastic chemotherapy     Post-operative state     S/P craniotomy     S/P biopsy     Noncomitant strabismus     Abducens neuropathy of both eyes     CANDELARIO (obstructive sleep apnea)     Health Care Home     Hypernatremia     Body  temperature low     Current chronic use of systemic steroids     Elevated TSH     Status post chemotherapy     Neoplasm of posterior cranial fossa (H)     Chronic constipation     Serum albumin decreased     Closed compression fracture of thoracic vertebra with routine healing, subsequent encounter     Depression     Constipation     Constipation by delayed colonic transit     Slow transit constipation     Tubular adenoma     Current Outpatient Medications   Medication     dexamethasone (DECADRON) 0.5 MG tablet      MG capsule     doxycycline hyclate (VIBRAMYCIN) 100 MG capsule     fexofenadine (ALLEGRA) 180 MG tablet     Lactobacillus-Inulin ([facility]) CAPS     lamoTRIgine (LAMICTAL) 200 MG tablet     linaclotide (LINZESS) 290 MCG capsule     mupirocin (BACTROBAN) 2 % external ointment     OLANZapine (ZYPREXA) 20 MG tablet     OLANZapine (ZYPREXA) 5 MG tablet     omeprazole (PRILOSEC) 20 MG DR capsule     order for DME     order for DME     polyethylene glycol (MIRALAX) 17 GM/Dose powder     potassium phosphate, monobasic, (K-PHOS) 500 MG tablet     sennosides (SENOKOT) 8.6 MG tablet     sertraline (ZOLOFT) 100 MG tablet     study - entinostat, IDS# 5050, 1 mg tablet     study - entinostat, IDS# 5050, 5 mg tablet     sulfamethoxazole-trimethoprim (BACTRIM) 400-80 MG tablet     traZODone (DESYREL) 150 MG tablet     vitamin D3 (CHOLECALCIFEROL) 2000 units (50 mcg) tablet     vitamin E (TOCOPHEROL) 400 units (360 mg) capsule     No current facility-administered medications for this visit.         Allergies   Allergen Reactions     Blood Transfusion Related (Informational Only) Swelling     Periorbital swelling post platelet transfusion     No Known Drug Allergies        Review of Systems  Skin: positive for striae, Hip wound and finger wound. See HPI.   Eyes: positive for oculoplegia  Ears/Nose/Throat: negative  Respiratory: No shortness of breath, dyspnea on exertion, cough, or  "hemoptysis  Cardiovascular: negative  Gastrointestinal: constipation.   Genitourinary: negative  Musculoskeletal: negative  Neurologic: positive for  local weakness, speech problems, incoordination and behavior changes  Psychiatric: anxiety, agitation and perseveration re: constipation.    Hematologic/Lymphatic/Immunologic: negative  Endocrine: negative    Physical Exam    Wt Readings from Last 1 Encounters:   12/07/20 104.8 kg (231 lb 1.6 oz)      Ht Readings from Last 1 Encounters:   12/07/20 1.804 m (5' 11.02\")     Wt Readings from Last 2 Encounters:   12/07/20 104.8 kg (231 lb 1.6 oz)   10/01/20 83 kg (182 lb 15.7 oz)       GENERAL: Healthy, and alert.  EYES: Eyes grossly normal to inspection.  No discharge or erythema,patch in place over right eye. HENT: Normocephalic.  External ears, nose and mouth without ulcers or lesions.    RESP: No audible wheeze, cough, or visible cyanosis.  No visible retractions or increased work of breathing.    SKIN: Scattered bruising. No significant rash, abnormal pigmentation or lesions.  Hip wound not examined today.  MSK: Right hand digit #4 healed though still feels numb to him. Hip not examined today.   ABD:  He is able to palpate his abdomen without obvious discomfort.   NEURO: prominent dysarthria,  His speech continues to be difficult to understand at times.  PSYCH:  Affect varies with the topic, and appearance well-groomed.    Impression:  Ependymoma in good control on Entinostat    Right hip and finger well healed.  Mental health issues more difficult today.  Daily bowel movements however Geo continues with concerns of constipation  Weight increase over time now with new eating rapidly and vomiting behavior      Plan:  Discussed with Geo results of his recent labs    I discussed that he should continue to exercise. We will provide in home services for Physical Therapy during COVID once new service is active.  I think it is very important for him.  Spoke with his "  and it appears it will start in Feb. Orders will be forwarded on the 21st.     Discussed his constipation.  Acknowledged again that I am doing everything I can to treat his constipation.  He is connected to specialist and is having bowel movements every day.  Support given to Geo for the difficult situation he is in.   Discussed briefly possibilities for facial reanimation which we will discuss next time when he is in a better mood.  Discussed new eating behavior with psychiatry so they are aware prior to their next visit with him.  Staff will continue to monitor how often he is doing this.     We will also hold Vitamin E for now as the formulation covered under his plan is not covered.  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 We will monitor.     His exam and labs are adequate to begin his next cycle on Entinostat - 7mg orally weekly for four doses. We will provide a new diary when mom picks up the medication.      He is due for imaging in March.  He will be scanned on March 16th and see Dr. Rousseau.       Total time spent on the following services on the date of the encounter:  Preparing to see patient, chart review, review of outside records, Referring or communicating with other healthcare professionals, Performing a medically appropriate examination , Counseling and educating the patient/family/caregiver , Documenting clinical information in the electronic or other health record , Communicating results to the patient/family/caregiver  and Total time spent: 60

## 2021-01-28 NOTE — TELEPHONE ENCOUNTER
Dr. Espinoza,     Received call requesting PT orders for pt. Pt has not been seen in office since 6/6/2014.      Orders requested include:  PT, 1 (one) x/week x 4 (four) weeks w/2 (two) PRN visits as needed.    Please call SOPHIE Cohn w/[facility] @       [phone] w/verbal orders.  He'll be available until 5:00 PM or orders may be left @ this number as a voice message.    Roseann Pisano, Registered Nurse, Patient Advocate Liaison [facility] [phone]

## 2021-02-02 NOTE — TELEPHONE ENCOUNTER
[place], [facility] calling for orders.    Skilled Nursing  1xwk/8wks  4 as needed visits    Verbal order given.  Will fax for MD signature.  Cate Morales RN

## 2021-02-11 NOTE — TELEPHONE ENCOUNTER
"Called father to discuss concerns regarding weight and possible medication effects. He notes that Geo continues to eat a significant amount of food. Will order door dash, has a significant amount of cookies, and \"a lot of carbs.\" His dad has tried to talk to him about decreasing what he's eating and was told that Geo feels \"like he's skinny\" and that \"it's just constipation\". Geo believes that he is not getting enough food and that is another reason he continues to eat. Discussed the benefit of the scheduled olanzapine, which Geo has been on since 2019, which seems to have had little benefit on his thoughts regarding his constipation or withholding of medical care. Father does believe that prn dose of medication has been helpful when Geo starts to get upset. Told father that this writer would discuss labs and medication changes with Geo at appointment tomorrow.    Jj Louis MD  "

## 2021-02-11 NOTE — PROGRESS NOTES
"Video- Visit Details  Type of service:  video visit for medication management  Time of service:    Date:  02/12/2021    Video Start Time:  1:16 PM        Video End Time:   2:00 PM    Reason for video visit:  Patient unable to travel due to Covid-19  Originating Site (patient location):  [facility]   Location- jail  Distant Site (provider location):  Remote location  Mode of Communication:  Video Conference via [email or web address]  Consent:  Patient has given verbal consent for video visit?: Yes         [facility]  Psychiatry Clinic  PSYCHIATRIC PROGRESS NOTE     CARE TEAM:    PCP- Jeffrey Espinoza     Specialty Providers- Oncology, Neuropsychology, Physical Therapy, Occupational Therapy      Therapist- most recently Manju Pink at [facility] in [facility] Team- no.      Geo Hicks is a 20 year old patient who prefers the name Geo and pronouns he, him, his.     PERTINENT BACKGROUND                [last transfer eval 07/10/20]     Psych critical item history includes suicidal ideation, psychosis [sxs include delusions] and psych hosp (<3)    INTERIM HISTORY                                 [4, 4]   History was provided by the patient and group home staff who was a good historian. Treatment adherence is good.  Since the last visit:   - Member's from Geo's care team have reached out and expressed concern that he has started to binge eat, resulting in nausea and on occasion emesis. He has reported to them that this is due to not getting enough food and emesis due to his constipation. Continues to report his weight gain is a result of his constipation.    - \"I'm good. I'm hanging in there. Not great.\"  - Not doing great because \"the doctors... I've done my part they just haven't done their part.\" \"I'm very tired\" of it. \"They are not changing anything.\"  - After discussing his constipation and medical issues he followed-up by stating, \"I guess I'm okay.\"  - Appetite, " "\"is not good. It's not good at all.\" Notes that he \"does not get any food here and I need to have food to survive.\"  - Attributes his weight gain to constipation.  - Sleep okay, \"when I'm able to sleep has enough energy.  - No nausea or vomiting recently.  - Rare muscle spasms  - Has not needed prn olanzapine.  - No anxiety recently.    - Spoke with father prior to today's visit, per telephone note:   He notes that Geo continues to eat a significant amount of food. Will order door dash, has a significant amount of cookies, and \"a lot of carbs.\" His dad has tried to talk to him about decreasing what he's eating and was told that Geo feels \"like he's skinny\" and that \"it's just constipation\". Geo believes that he is not getting enough food and that is another reason he continues to eat. Discussed the benefit of the scheduled olanzapine, which Geo has been on since 2019, which seems to have had little benefit on his thoughts regarding his constipation or withholding of medical care. Father does believe that prn dose of medication has been helpful when Geo starts to get upset. Told father that this writer would discuss labs and medication changes with Geo at appointment tomorrow.    RECENT PSYCH ROS:   Depression:  denies, as above  Elevated:  none  Psychosis:  delusions- Continues to be perseverative about doctors withholding treatment from him (though this has significantly improved in the last month). [details in Interim History]  Anxiety: denies excessive worry and nervous/overwhelmed  Panic Attack:  none  Trauma Related:  none  Dysregulation:  none  Eating Disorder: no     Adverse Effects:  denies  Pertinent Negatives:  No suicidal ideation, violent ideation, self-injury, hallucinations and aggression  Recent Substance Use:  none reported    FAMILY and SOCIAL HISTORY             [1ea, 1ea]       patient reported                    FAMILY HX- Denied    Financial/ Work- family or friend     " "  Partner/ - single  Children- no      Living situation- Group Home, previously living alternate weeks with mother and father.      Social/ Spiritual Support- mother, father, older brother, friend Rima     Legal- no  FEELS SAFE AT HOME- yes     Trauma History (self-report)- medical/life threatening illness  Early History/Education-  Grown up in [place], [place].  Parents are , and he shares time between his mother s and father s homes. Both parents are remarried.  Dad is a , and mom does  for a testing company.  Siblings include two full brothers (older brother Benitez who is a senior in college and younger brother Gamaliel), a step-brother, and a step-sister. Geo graduated from high school in Spring 2018.  Initially considered attending [employer] in Fall 2019, but reportedly lost interest due to the amount of time it would take him to complete courses and receive a degree.   Other- N/A     PSYCH and SUBSTANCE USE Critical Summary Points since July 2020         - September - increased trazodone to 150mg at bedtime prn for insomnia and depression and decreased sertraline to 100mg  - February - Decreased olanzapine 20mg-->17.5mg    MEDICAL HISTORY and ALLERGY     ALLERGIES: Blood transfusion related (informational only) and No known drug allergies     Neurologic Hx- See pertinent background, re: history of ependymoma and related chemo, radiation and tumor resection(s).  Notably has experienced neurological damage due to the above which has resulted in facial numbness, significant loss of mobility and dysarthria.  Has had neuropsychiatric evaluations 2/2016, 2/2017, 1/2019, and 10/2019.  The following is from the \"Results and Impressions\" section of his 10/29/2019 eval with Veronica Padilla, with a passage bolded that relates to noted difficulty in an area of executive functioning that facilitates the ability to see other people's " "perspectives:     \"Geo s attention was rated by his mother as well as himself as being adequate.  Executive functioning are those skills including planning, organization, emotional control, cognitive flexibility, and the ability to take another person s perspective.  Geo rating scale of these skills was basically in the average range for his age with a slight elevation in his ability to shift from one activity to another.  His mother s ratings of his executive functioning were in the average range, with the exception of a slight elevation in his ability to initiate tasks.  Direct testing of his ability to take another person s perspective indicated difficulty in this area.  He was asked to sort objects based on various aspects of the objects.  When he sorted the objects, he showed average performance.  However, when the examiner sorted the objects, Geo experienced significant difficulty with being able to determine how the objects were sorted.  This difficulty likely translates into difficulty with understanding another person s point of view.  Geo indicated that this difficulty becomes quite frustrating for him as he doesn't feel other people understand him--it is likely that he has difficulty understanding their perspective.     Emotionally Geo indicated that he continues to feel some difficulty with sadness but that it has improved over time and with medication.  Geo denied significant feelings of anxiety at this time while in the past these scores have been higher.  Parent ratings of Geo s emotional functioning indicated no areas of significant concern.  Interviews with Geo and his mother indicated continued feelings of sadness and difficulty in motivating himself to try to new activities.  While there is improvement in his mood, Geo continues to be at risk for depression.  Since he is now under the care of a psychiatrist, we did not interview around his feelings that his medical team " "was not being helpful to him.  His mother reported that these feelings continue and likely interfere with his compliance with directives.  He is participating in therapy to work on these issues and it is important that this intervention continue.\"    Patient Active Problem List   Diagnosis     GERD (gastroesophageal reflux disease)     Closed fracture at the growth plate of right distal fibula      Elevated serum creatinine     Dyspepsia     Intracranial hemorrhage (H)     Hemorrhagic stroke (H)     Ependymoma (H)     Admission for antineoplastic chemotherapy     Post-operative state     S/P craniotomy     S/P biopsy     Noncomitant strabismus     Abducens neuropathy of both eyes     CANDELARIO (obstructive sleep apnea)     Health Care Home     Hypernatremia     Body temperature low     Current chronic use of systemic steroids     Elevated TSH     Status post chemotherapy     Neoplasm of posterior cranial fossa (H)     Chronic constipation     Serum albumin decreased     Closed compression fracture of thoracic vertebra with routine healing, subsequent encounter     Depression     Constipation     Constipation by delayed colonic transit     Slow transit constipation     Tubular adenoma         MEDICAL REVIEW OF SYSTEMS         [2, 10]   Contraception- no    A comprehensive review of systems was performed and is negative other than noted in the HPI.    MEDICATIONS        Current Outpatient Medications   Medication Sig Dispense Refill     dexamethasone (DECADRON) 0.5 MG tablet TAKE ONE AND ONE-HALF TABLETS (0.75MG) BY MOUTH ONCE DAILY WITH BREAKFAST. 45 tablet 11      MG capsule        doxycycline hyclate (VIBRAMYCIN) 100 MG capsule Take 1 capsule (100 mg) by mouth 2 times daily 14 capsule 0     fexofenadine (ALLEGRA) 180 MG tablet TAKE 1 TABLET BY MOUTH EVERY EVENING. 30 tablet 11     Lactobacillus-Inulin ([facility]) CAPS Take 2 capsules by mouth daily 30 capsule 3     lamoTRIgine (LAMICTAL) 200 MG " tablet Take 1 tablet (200 mg) by mouth At Bedtime 30 tablet 2     linaclotide (LINZESS) 290 MCG capsule Take 1 capsule (290 mcg) by mouth every morning (before breakfast) 30 capsule 3     mupirocin (BACTROBAN) 2 % external ointment Use three times a day PRN with skin lesions or open sores. 22 g 3     OLANZapine (ZYPREXA) 20 MG tablet Take 1 tablet (20 mg) by mouth At Bedtime 30 tablet 2     OLANZapine (ZYPREXA) 5 MG tablet Take 1 tab (5 mg) by mouth daily as needed for agitation. May take another dose after 60 minutes if not effective. 30 tablet 0     omeprazole (PRILOSEC) 20 MG DR capsule Take 2 capsules (40 mg) by mouth every morning 60 capsule 1     order for DME Equipment being ordered: Dressing  Wound #1 lower leg, W 6 cm x, D 0.5  cm, Drainage scant, Thickness sutured     Type: non stick gauze, Brand: , Size: 4/4   Change: 1 per day    Tape/Wrap: 1 each     attach facesheet with insurance information (delete this line) 5 each 0     order for DME Equipment being ordered: short boot 1 each 0     polyethylene glycol (MIRALAX) 17 GM/Dose powder Take 17 g (1 capful) by mouth 3 times daily 289 g 3     potassium phosphate, monobasic, (K-PHOS) 500 MG tablet Take 1 tablet (500 mg) by mouth 2 times daily 90 tablet 3     sennosides (SENOKOT) 8.6 MG tablet Take 1 tablet by mouth daily 30 tablet 4     sertraline (ZOLOFT) 100 MG tablet Take 1 tablet (100 mg) by mouth daily 30 tablet 2     sulfamethoxazole-trimethoprim (BACTRIM) 400-80 MG tablet Take 1 tablet by mouth 2 times daily On Saturdays and Sundays 24 tablet 11     traZODone (DESYREL) 150 MG tablet Take 1 tablet (150 mg) by mouth nightly as needed for sleep or depression 30 tablet 2     vitamin D3 (CHOLECALCIFEROL) 2000 units (50 mcg) tablet TAKE 1 TABLET BY MOUTH ONCE DAILY WITH BREAKFAST. 30 tablet 11     vitamin E (TOCOPHEROL) 400 units (360 mg) capsule TAKE 1 CAPSULE BY MOUTH ONCE DAILY. 30 capsule 11     VITALS                                                      "           [3, 3]   There were no vitals taken for this visit.   MENTAL STATUS EXAM                       [9, 14 cog gs]     Alertness: alert  and oriented  Appearance: casually groomed  Behavior/Demeanor: cooperative and pleasant, with good  eye contact   Speech: prominent dysarthria  Language: no obvious problem and dysarthria make it difficult to understand at times  Psychomotor: Mild palsy in bilateral upper extremities, fidgeting, and facial paralysis present.  Mood: \"I'm good\"  Affect: full range; congruent to: mood- yes, content- yes  Thought Process/Associations: unremarkable  Thought Content:  Reports none;  Denies suicidal & violent ideation and delusions  Perception:  Reports none;  Denies auditory hallucinations and visual hallucinations  Insight: gaining/ maintaining insight is challenging  Judgment: good  Cognition: (6) oriented: time, person, and place  attention span: intact  concentration: intact  recent memory: intact  remote memory: intact  fund of knowledge: appropriate  Gait and Station: N/A (telehealth)    LABS and DATA     PHQ9 TODAY = Not completed due to COVID 19 video visit  PHQ 8/19/2019 9/9/2019 11/15/2019   PHQ-9 Total Score 8 9 -   Q9: Thoughts of better off dead/self-harm past 2 weeks More than half the days More than half the days (No Data)       Recent Labs   Lab Test 12/07/20  1455 10/30/20  1230 10/03/20  1423   CR 0.98 1.08 1.13   GFRESTIMATED >90 >90 >90     ANTIPSYCHOTIC LABS  [glu, A1C, lipids (rohit LDL), liver enzymes, WBC, ANEU, Hgb, plts]  q12 mo  Recent Labs   Lab Test 12/07/20  1455 10/30/20  1230 10/03/20  1423 09/29/20 02/26/19  1100 02/26/19  1100   GLC 78 132* 98 77   < > 124*   A1C  --   --   --   --   --  5.1    < > = values in this interval not displayed.     Recent Labs   Lab Test 02/26/19  1100   CHOL 158   TRIG 236*   LDL 84   HDL 27*     Recent Labs   Lab Test 12/07/20  1455 10/30/20  1230 06/16/20 03/10/20  1338 03/03/20  1314   AST Unsatisfactory specimen - " hemolyzed 24 17 21 29   ALT 31 29 <10 18 23   ALKPHOS 104 95  --  115 110     Recent Labs   Lab Test 12/07/20  1455 10/30/20  1230 10/03/20  1423 09/29/20 07/09/20  1409 07/09/20  1409   WBC 4.7 5.5 7.3 6.5   < > 5.5   ANEU 2.4 3.1 4.6  --   --  2.2   HGB 11.0* 10.8* 11.0* 10.4*   < > 10.8*   * 163 132* 120   < > 118*    < > = values in this interval not displayed.     PSYCHOTROPIC DRUG INTERACTIONS     Increased risk of QTc Prolongation: olanzapine, sertraline, trazodone  Increased risk of lamotrigine toxicity (fatigue, sedation, confusion): lamotrigine, sertraline     MANAGEMENT:  Monitoring for adverse effects    RISK STATEMENT for SAFETY     Geo Hicks previously endorsed suicidal ideation. SUICIDE RISK ASSESSMENT-  Risk factors for self-harm: hopelessness, feels trapped and new/ worsening medical issue.  Mitigating factors: no h/o suicide attempt, no plan or intent, no access to lethal means, h/o seeking help , minimal substance use , good social support  , stable housing and stable finances.  The patient does not appear to be at imminent risk for self-harm, hospitalization is not recommended which the pt does not agree to. No hospitalization will be arranged. Based on degree of symptoms close psych follow-up was/were recommended which the pt does  agree to. Additional steps to minimize risk: anxiety/ insomnia mngmt.  Safety Plan placed in Pt Instructions: Yes.  Rate SI-Patient has chronic passive suicidal ideation, which primarily appears to be associated with his chronic medical conditions, current physical limitations, and frustration with how others treat him. I do not believe that a psychiatric hospitalization would be benefical and would not change those underlying stressors primarily responsible for his chronic SI..    DIAGNOSES                                           [m2, h3]    Delusional Disorder, persecutory type, first episode  Major Depressive Disorder, single episode,  moderate    ASSESSMENT                                        [m2, h3]        Geo Hicks presents for medication management of paranoia, delusional thoughts, and depressed mood.  Reports interim symptom stability in mood since last appointment. Continues to perseverate about constipation and doctors withholding treatment which would then allow him to walk. Care team report significant increase in appetite from Geo, sometimes binging to the point of emesis. Appears to have 30-50lb weight gain over the last year, which he blames on the constipation. Given the ongoing concern about weight gain and lack of clear benefit with olanzapine on his delusional disorder will attempt to taper off medication and trial alternative, more weight neutral option. However, given that Geo has been slowly tapered up on olanzapine and has had reduced agitation, resulting in police intervention, will proceed cautiously. Will obtain yearly AP monitoring labs. Alternative AP would be ziprasidone or aripiprazole, could also consider risperidone if these were ineffective. Will leave as needed olanzapine available for Geo if symptoms worsen. Discussed plan with father, who agrees olanzapine has not had much benefit to delusional content, and thought trail of alternative AP may be helpful.  No safety issues today. Refills provided    [place] was [place] today:  Indicates no medication misuse .    PLAN                                                            [m2, h3]                                               1) Meds  - Decrease olanzapine 17.5mg at bedtime  - Continue olanzapine 5mg as needed for severe agitation/anxiety, can take second dose 60 minutes after if ineffective.  - Continue sertraline 100mg daily  - Continue lamotrigine 200mg daily  - Continue trazodone 150mg at bedtime    2) Therapy-  Patient started a new therapist recently, father to get MERRITT from therapist for our clinic.    3) Next Due   Labs- Lipids and A1c due,  "pending COVID-19.  EKG- prn  Rating scales- AIMS due with next \"in-clinic\" visit    5) Referrals  No Referrals needed today    3) RTC: 6-8 weeks    4) Crisis Numbers:   Provided routinely in AVS     After hours:  [phone]      TREATMENT RISK STATEMENT:  The risks, benefits, alternatives and potential adverse effects have been discussed and are understood by the pt. The pt understands the risks of using street drugs or alcohol. There are no medical contraindications, the pt agrees to treatment with the ability to do so. The pt knows to call the clinic for any problems or to access emergency care if needed.  Medical and substance use concerns are documented above.  Psychotropic drug interaction check was done, including changes made today.    PROVIDER: Jj Louis MD    Patient staffed with Dr. Tripathi who will review and sign note. Supervisor is Dr. Marrero.    The author of this note documented a reason for not sharing it with the patient.     TELEHEALTH ATTENDING ATTESTATION  Following the ACGME guidelines on telemedicine and direct supervision due to COVID-19, I was concurrently participating in and/or monitoring the patient care through appropriate telecommunication technology.  I discussed the key portions of the service with the resident, including the mental status examination and developing the plan of care. I reviewed key portions of the history with the resident. I agree with the findings and plan as documented in this note.   Jorge Luis Tripathi MD    "

## 2021-02-12 NOTE — Clinical Note
Michael Abebe,  I was just wanting to update you that I made some changes with Geo today. We decreased his olanzapine from 20mg to 17.5mg (15 and 2.5 tablets). Would you please fax the AVS and updated orders to his group home? We left his prn olanzapine alone.   Also, I was hoping to get some fasting labs done for him and Geo's father mentioned that he has someone who does home draws associated with [facility]. Is that something that you would be able to help me makes sure gets communicated/arranged? (I'm not quite sure who to talk to about getting the lipids and A1c)    Thanks,  Jj

## 2021-02-12 NOTE — PATIENT INSTRUCTIONS
Nice to see you again Geo, as we discussed:   1) decrease scheduled olanzapine to 17.5mg at bedtime. Remember you have 5mg as needed dose still available to you.  2) We will have some additional labs drawn for routine monitoring of olanzapine.  3)  follow-up appointment in ~4 weeks.    ------------------------------------------------------------------------    Thank you for coming to the PSYCHIATRY CLINIC.    Lab Testing:  If you had lab testing today and your results are reassuring or normal they will be mailed to you or sent through [employer] within 7 days. If the lab tests need quick action we will call you with the results. The phone number we will call with results is # [phone] (home) . If this is not the best number please call our clinic and change the number.    Medication Refills:  If you need any refills please call your pharmacy and they will contact us. Our fax number for refills is [phone]. Please allow three business for refill processing. If you need to  your refill at a new pharmacy, please contact the new pharmacy directly. The new pharmacy will help you get your medications transferred.     Scheduling:  If you have any concerns about today's visit or wish to schedule another appointment please call our office during normal business hours [phone] (8-5:00 M-F)    Contact Us:  Please call [phone] during business hours (8-5:00 M-F).  If after clinic hours, or on the weekend, please call  [phone].    Financial Assistance [phone]  [facility] Billing [phone]  [place] Billing Office, [facility]: [phone]  [place] Billing [phone]  Medical Records [phone]      MENTAL HEALTH CRISIS NUMBERS:  For a medical emergency please call  911 or go to the nearest ER.     [facility]:   [facility] -[phone]   Crisis Residence [facility] -[phone]   Walk-In Counseling Center [facility] -[phone]   COPE 24/7 [facility]  Team -[phone] (adults)/[phone] (child)  CHILD: [facility] needs assessment team - [phone]      [facility]:   [facility] - [phone]   Walk-in counseling [facility] - [phone]   Walk-in counseling [facility] - [phone]   Crisis Residence [facility] - [phone]  Urgent Care Adult Mental [facility]-[phone] mobile unit/ 24/7 crisis line    National Crisis Numbers:   National Suicide Prevention Lifeline: 5-445-723-TALK (888-639-0750)  Poison Control Center - 1-855.717.7684  [employer]/resources for a list of additional resources (SOS)  Trans Lifeline a hotline for transgender people 2-452-239-1043  The [employer] a hotline for LGBT youth 1-490.591.2255  Crisis Text Line: For any crisis 24/7   To: 398426  see www.crisistextline.org  - IF MAKING A CALL FEELS TOO HARD, send a text!         Again thank you for choosing PSYCHIATRY CLINIC and please let us know how we can best partner with you to improve you and your family's health.    You may be receiving a survey regarding this appointment. We would love to have your feedback, both positive and negative. The survey is done by an external company, so your answers are anonymous.

## 2021-02-12 NOTE — TELEPHONE ENCOUNTER
On February 12, 2021, at 12:15 PM, writer called patient at [phone] to confirm Virtual Visit. Writer unable to make contact with patient. Writer left detailed voice message for call back. [phone] left as call back number. Yissel Garzon, [facility]

## 2021-02-16 NOTE — TELEPHONE ENCOUNTER
Provider requesting lab work for patient to be drawn in home.    Encounter routed to provider for clarification on labs that provider would like drawn.

## 2021-02-16 NOTE — LETTER
"2/16/2021      RE: Geo Hicks  [id number] [facility] [address]         The patient has been notified of following:     \"This video visit will be conducted via a call between you and your physician/provider. We have found that certain health care needs can be provided without the need for an in-person physical exam.  This service lets us provide the care you need with a video conversation.  If a prescription is necessary we can send it directly to your pharmacy.  If lab work is needed we can place an order for that and you can then stop by our lab to have the test done at a later time.    Video visits are billed at different rates depending on your insurance coverage.  Please reach out to your insurance provider with any questions.    If during the course of the call the physician/provider feels a video visit is not appropriate, you will not be charged for this service.\"    Patient has given verbal consent for Video visit? Yes    Video-Visit Details    Type of service:  Video Visit    Video Start Time: 1:40 PM  Video End Time: 2:44 PM    Originating Location (pt. 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      HPI: Geo notices he coughs when he eats he gets a head rush. He thinks a recent BP was 120/70. Last night he coughed when he was sitting up and then he said he felt like he saw white and his jaw was moving after. Then he had a headache. Someone came to help him. He never lost consciousness or fell so he is sure what happened. This was discussed with the staff and they didn't note any witnessed event but said he sometimes coughs when " he is eating because he eats quickly and eats a lot at one time.  He also has food in his room.  Geo reports that he had a bowel movement today a moderate amount.  He is still very concerned about his constipation.  He thinks he can be very constipated even if he has bowel movement everyday because he has a good appetite and eats a lot. He has gained quite a bit of weight so states he gets out of breath more when doing activities and can't take as deep of breaths.  His finger has totally healed.   Hip wound has also healed.  He is living in his group care facility.  No recent illnesses. No current fever or sign of current infection. He reports he is exercising daily. He was also asking about taking Vitamin B12 to help his brain.  No missed doses or problems with prior Entinostat cycle.       Labs:      Labs done on 2/11//21 and reviewed today:  A complete blood count was done today which reveals a white count of 6.9, Hgb of 10.1  and a platelet count of 153,000.  The differential reveals an ANC of 4400.         Oncology History:    [id number]   6/26/15    Started Entinostat 1/9/17    DIAGNOSIS: EPENDYMOMA  AREAS TREATED: POST FOSSA TUMOR  DOSE: 5940 cGy   TYPE OF RADIATION GIVEN: with IMRT Tomotherapy   9/08/2015 to 10/26/15    Patient Active Problem List   Diagnosis     GERD (gastroesophageal reflux disease)     Closed fracture at the growth plate of right distal fibula      Elevated serum creatinine     Dyspepsia     Intracranial hemorrhage (H)     Hemorrhagic stroke (H)     Ependymoma (H)     Admission for antineoplastic chemotherapy     Post-operative state     S/P craniotomy     S/P biopsy     Noncomitant strabismus     Abducens neuropathy of both eyes     CANDELARIO (obstructive sleep apnea)     Health Care Home     Hypernatremia     Body temperature low     Current chronic use of systemic steroids     Elevated TSH     Status post chemotherapy     Neoplasm of posterior cranial fossa (H)     Chronic constipation      Serum albumin decreased     Closed compression fracture of thoracic vertebra with routine healing, subsequent encounter     Depression     Constipation     Constipation by delayed colonic transit     Slow transit constipation     Tubular adenoma     Current Outpatient Medications   Medication     dexamethasone (DECADRON) 0.5 MG tablet      MG capsule     doxycycline hyclate (VIBRAMYCIN) 100 MG capsule     fexofenadine (ALLEGRA) 180 MG tablet     Lactobacillus-Inulin ([facility] WELLNESS) CAPS     lamoTRIgine (LAMICTAL) 200 MG tablet     linaclotide (LINZESS) 290 MCG capsule     mupirocin (BACTROBAN) 2 % external ointment     OLANZapine (ZYPREXA) 20 MG tablet     OLANZapine (ZYPREXA) 5 MG tablet     omeprazole (PRILOSEC) 20 MG DR capsule     order for DME     order for DME     polyethylene glycol (MIRALAX) 17 GM/Dose powder     potassium phosphate, monobasic, (K-PHOS) 500 MG tablet     sennosides (SENOKOT) 8.6 MG tablet     sertraline (ZOLOFT) 100 MG tablet     study - entinostat, IDS# 5050, 1 mg tablet     study - entinostat, IDS# 5050, 5 mg tablet     sulfamethoxazole-trimethoprim (BACTRIM) 400-80 MG tablet     traZODone (DESYREL) 150 MG tablet     vitamin D3 (CHOLECALCIFEROL) 2000 units (50 mcg) tablet     vitamin E (TOCOPHEROL) 400 units (360 mg) capsule     No current facility-administered medications for this visit.         Allergies   Allergen Reactions     Blood Transfusion Related (Informational Only) Swelling     Periorbital swelling post platelet transfusion     No Known Drug Allergies        Review of Systems  Skin: positive for striae,   Eyes: positive for oculoplegia  Ears/Nose/Throat: negative  Respiratory: No shortness of breath, dyspnea on exertion, cough, or hemoptysis  Cardiovascular: negative  Gastrointestinal: constipation.   Genitourinary: negative  Musculoskeletal: negative  Neurologic: positive for  local weakness, speech problems, incoordination and behavior  "changes  Psychiatric: anxiety, agitation and perseveration re: constipation.    Hematologic/Lymphatic/Immunologic: negative  Endocrine: weight gain.    Physical Exam    Wt Readings from Last 1 Encounters:   12/07/20 104.8 kg (231 lb 1.6 oz)      Ht Readings from Last 1 Encounters:   12/07/20 1.804 m (5' 11.02\")     Wt Readings from Last 2 Encounters:   12/07/20 104.8 kg (231 lb 1.6 oz)   10/01/20 83 kg (182 lb 15.7 oz)       GENERAL: Healthy, and alert.  EYES: Eyes grossly normal to inspection.  No discharge or erythema,patch in place over right eye. HENT: Normocephalic.  External ears, nose and mouth without ulcers or lesions.  He is drooling.  RESP: No audible wheeze, cough, or visible cyanosis.  When attempting to plug in his phone and activity move about and bend to plug he did increase his work of breathing.  It normalized when he finished.   SKIN: Scattered bruising. No significant rash, abnormal pigmentation or lesions.  Hip wound not examined today.  MSK: Moves upper extremities but is ataxic.  NEURO: prominent dysarthria,  His speech continues to be difficult to understand at times.  PSYCH:  Affect varies with the topic, and appearance well-groomed.    Impression:  Ependymoma in good control on Entinostat    Right hip and finger well healed.  Mental health issues continue  Daily bowel movements however Geo continues with concerns of constipation  Weight increase over time now     Intermittent coughing episodes likely related to the way he is eating..      Plan:  Discussed with Geo results of his recent labs.  I discussed that he should continue to exercise to build endurance.    Reviewed that he has lower saturations at times (which is not new) and that he used to have CPAP machine.  I stated we could have him seen again by his sleep medicine doctor.  He may qualify for the implant instead of the mask which Geo didn't like.   Also discussed that he needs to take responsibility for his weight " because he has gain considerably since October. Geo needs to decide its important and  would have to participate and monitor eating.   We will draw endocrine labs at his next visit to ascertain if there are any factors impacting weight gain.   Per Geo's request we will also obtain an abdominal xray to review constipation.   Discussed his constipation and reminded him that he has a GI specialist and is having bowel movements every day.  If he has concerns about his medication regime, he should speak with GI about making changes.     I reviewed Vitamin B12 with his medications and there are no contraindications so I will order that to be taken daily.      His exam and labs are adequate to begin his next cycle on Entinostat - 7mg orally weekly for four doses. We will provide a new diary and new medication supply.      He is due for imaging in March.  He will be scanned on March 16th and see Dr. Rousseau.       Total time spent on the following services on the date of the encounter:  Preparing to see patient, chart review, review of outside records, Referring or communicating with other healthcare professionals, Performing a medically appropriate examination , Counseling and educating the patient/family/caregiver , Documenting clinical information in the electronic or other health record , Communicating results to the patient/family/caregiver  and Total time spent: 75 minutes        ALAN Justin CNP

## 2021-02-16 NOTE — NURSING NOTE
"Geo Hicks is a 20 year old male who is being evaluated via a billable video visit.      The patient has been notified of following:     \"This video visit will be conducted via a call between you and your physician/provider. We have found that certain health care needs can be provided without the need for an in-person physical exam.  This service lets us provide the care you need with a video conversation.  If a prescription is necessary we can send it directly to your pharmacy.  If lab work is needed we can place an order for that and you can then stop by our lab to have the test done at a later time.    If during the course of the call the physician/provider feels a video visit is not appropriate, you will not be charged for this service.\"     Patient has given verbal consent for Video visit? Yes    Patient would like the video invitation sent by: Text to cell phone: [phone]    Video Start Time: 1:12 PM    Geo Hicks complains of    Chief Complaint   Patient presents with     RECHECK     Patient following up on Ependymoma       No Pain (0)  Data Unavailable      I have reviewed and updated the patient's Past Medical History, Social History, Family History and Medication List.    ALLERGIES  Blood transfusion related (informational only) and No known drug allergies    "

## 2021-02-16 NOTE — TELEPHONE ENCOUNTER
Provider requesting the AVS and Med List be sent to [facility].  Routed to [facility] staff for faxing.

## 2021-02-16 NOTE — PROGRESS NOTES
"  The patient has been notified of following:     \"This video visit will be conducted via a call between you and your physician/provider. We have found that certain health care needs can be provided without the need for an in-person physical exam.  This service lets us provide the care you need with a video conversation.  If a prescription is necessary we can send it directly to your pharmacy.  If lab work is needed we can place an order for that and you can then stop by our lab to have the test done at a later time.    Video visits are billed at different rates depending on your insurance coverage.  Please reach out to your insurance provider with any questions.    If during the course of the call the physician/provider feels a video visit is not appropriate, you will not be charged for this service.\"    Patient has given verbal consent for Video visit? Yes    Video-Visit Details    Type of service:  Video Visit    Video Start Time: 1:40 PM  Video End Time: 2:44 PM    Originating Location (pt. 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      HPI: Geo notices he coughs when he eats he gets a head rush. He thinks a recent BP was 120/70. Last night he coughed when he was sitting up and then he said he felt like he saw white and his jaw was moving after. Then he had a headache. Someone came to help him. He never lost consciousness or fell so he is sure what happened. This was discussed with the staff and they didn't note any witnessed event but said he sometimes coughs when he is eating because he eats quickly and eats a lot at one time.  He also has " food in his room.  Geo reports that he had a bowel movement today a moderate amount.  He is still very concerned about his constipation.  He thinks he can be very constipated even if he has bowel movement everyday because he has a good appetite and eats a lot. He has gained quite a bit of weight so states he gets out of breath more when doing activities and can't take as deep of breaths.  His finger has totally healed.   Hip wound has also healed.  He is living in his group care facility.  No recent illnesses. No current fever or sign of current infection. He reports he is exercising daily. He was also asking about taking Vitamin B12 to help his brain.  No missed doses or problems with prior Entinostat cycle.       Labs:      Labs done on 2/11//21 and reviewed today:  A complete blood count was done today which reveals a white count of 6.9, Hgb of 10.1  and a platelet count of 153,000.  The differential reveals an ANC of 4400.         Oncology History:    [id number]   6/26/15    Started Entinostat 1/9/17    DIAGNOSIS: EPENDYMOMA  AREAS TREATED: POST FOSSA TUMOR  DOSE: 5940 cGy   TYPE OF RADIATION GIVEN: with IMRT Tomotherapy   9/08/2015 to 10/26/15    Patient Active Problem List   Diagnosis     GERD (gastroesophageal reflux disease)     Closed fracture at the growth plate of right distal fibula      Elevated serum creatinine     Dyspepsia     Intracranial hemorrhage (H)     Hemorrhagic stroke (H)     Ependymoma (H)     Admission for antineoplastic chemotherapy     Post-operative state     S/P craniotomy     S/P biopsy     Noncomitant strabismus     Abducens neuropathy of both eyes     CANDELARIO (obstructive sleep apnea)     Health Care Home     Hypernatremia     Body temperature low     Current chronic use of systemic steroids     Elevated TSH     Status post chemotherapy     Neoplasm of posterior cranial fossa (H)     Chronic constipation     Serum albumin decreased     Closed compression fracture of thoracic vertebra  with routine healing, subsequent encounter     Depression     Constipation     Constipation by delayed colonic transit     Slow transit constipation     Tubular adenoma     Current Outpatient Medications   Medication     dexamethasone (DECADRON) 0.5 MG tablet      MG capsule     doxycycline hyclate (VIBRAMYCIN) 100 MG capsule     fexofenadine (ALLEGRA) 180 MG tablet     Lactobacillus-Inulin ([facility]ALTH [facility] WELLNESS) CAPS     lamoTRIgine (LAMICTAL) 200 MG tablet     linaclotide (LINZESS) 290 MCG capsule     mupirocin (BACTROBAN) 2 % external ointment     OLANZapine (ZYPREXA) 20 MG tablet     OLANZapine (ZYPREXA) 5 MG tablet     omeprazole (PRILOSEC) 20 MG DR capsule     order for DME     order for DME     polyethylene glycol (MIRALAX) 17 GM/Dose powder     potassium phosphate, monobasic, (K-PHOS) 500 MG tablet     sennosides (SENOKOT) 8.6 MG tablet     sertraline (ZOLOFT) 100 MG tablet     study - entinostat, IDS# 5050, 1 mg tablet     study - entinostat, IDS# 5050, 5 mg tablet     sulfamethoxazole-trimethoprim (BACTRIM) 400-80 MG tablet     traZODone (DESYREL) 150 MG tablet     vitamin D3 (CHOLECALCIFEROL) 2000 units (50 mcg) tablet     vitamin E (TOCOPHEROL) 400 units (360 mg) capsule     No current facility-administered medications for this visit.         Allergies   Allergen Reactions     Blood Transfusion Related (Informational Only) Swelling     Periorbital swelling post platelet transfusion     No Known Drug Allergies        Review of Systems  Skin: positive for striae,   Eyes: positive for oculoplegia  Ears/Nose/Throat: negative  Respiratory: No shortness of breath, dyspnea on exertion, cough, or hemoptysis  Cardiovascular: negative  Gastrointestinal: constipation.   Genitourinary: negative  Musculoskeletal: negative  Neurologic: positive for  local weakness, speech problems, incoordination and behavior changes  Psychiatric: anxiety, agitation and perseveration re: constipation.     "Hematologic/Lymphatic/Immunologic: negative  Endocrine: weight gain.    Physical Exam    Wt Readings from Last 1 Encounters:   12/07/20 104.8 kg (231 lb 1.6 oz)      Ht Readings from Last 1 Encounters:   12/07/20 1.804 m (5' 11.02\")     Wt Readings from Last 2 Encounters:   12/07/20 104.8 kg (231 lb 1.6 oz)   10/01/20 83 kg (182 lb 15.7 oz)       GENERAL: Healthy, and alert.  EYES: Eyes grossly normal to inspection.  No discharge or erythema,patch in place over right eye. HENT: Normocephalic.  External ears, nose and mouth without ulcers or lesions.  He is drooling.  RESP: No audible wheeze, cough, or visible cyanosis.  When attempting to plug in his phone and activity move about and bend to plug he did increase his work of breathing.  It normalized when he finished.   SKIN: Scattered bruising. No significant rash, abnormal pigmentation or lesions.  Hip wound not examined today.  MSK: Moves upper extremities but is ataxic.  NEURO: prominent dysarthria,  His speech continues to be difficult to understand at times.  PSYCH:  Affect varies with the topic, and appearance well-groomed.    Impression:  Ependymoma in good control on Entinostat    Right hip and finger well healed.  Mental health issues continue  Daily bowel movements however Geo continues with concerns of constipation  Weight increase over time now     Intermittent coughing episodes likely related to the way he is eating..      Plan:  Discussed with Geo results of his recent labs.  I discussed that he should continue to exercise to build endurance.    Reviewed that he has lower saturations at times (which is not new) and that he used to have CPAP machine.  I stated we could have him seen again by his sleep medicine doctor.  He may qualify for the implant instead of the mask which Geo didn't like.   Also discussed that he needs to take responsibility for his weight because he has gain considerably since October. Geo needs to decide its important " and  would have to participate and monitor eating.   We will draw endocrine labs at his next visit to ascertain if there are any factors impacting weight gain.   Per Geo's request we will also obtain an abdominal xray to review constipation.   Discussed his constipation and reminded him that he has a GI specialist and is having bowel movements every day.  If he has concerns about his medication regime, he should speak with GI about making changes.     I reviewed Vitamin B12 with his medications and there are no contraindications so I will order that to be taken daily.      His exam and labs are adequate to begin his next cycle on Entinostat - 7mg orally weekly for four doses. We will provide a new diary and new medication supply.      He is due for imaging in March.  He will be scanned on March 16th and see Dr. Rousseau.       Total time spent on the following services on the date of the encounter:  Preparing to see patient, chart review, review of outside records, Referring or communicating with other healthcare professionals, Performing a medically appropriate examination , Counseling and educating the patient/family/caregiver , Documenting clinical information in the electronic or other health record , Communicating results to the patient/family/caregiver  and Total time spent: 75 minutes

## 2021-02-17 NOTE — TELEPHONE ENCOUNTER
Yes, just A1c and lipids, he had the other labs completed in December (CBC and CMP). Thank you so much for helping coordinate this!  Jj

## 2021-02-17 NOTE — TELEPHONE ENCOUNTER
Labs printed under the attending provider and faxed to GIANNI Espinal at [facility] at [phone].

## 2021-02-17 NOTE — TELEPHONE ENCOUNTER
[facility] has used [facility] in the past for lab draws for patient.  Phone number [phone].    Spoke to Kylie at [facility] who provided fax number of [phone].    Routed to in-clinic staff to print and send to home care to be added to next week's draw.

## 2021-02-19 NOTE — PROGRESS NOTES
SUBJECTIVE:     Chief Complaint   Patient presents with     Urgent Care     Trauma     cut on left leg- it got cut on a bedframe while he was transferring chairs last night - last tdap was 8/2019     Geo Hicks is a 20 year old male who presents to the clinic with his father.  Patient is here with a laceration on the left lateral ankle region sustained last night.  This is a non-work related and accidental injury.    Mechanism of injury: a bed frame cut the patient's left lateral ankle region last night.  .this injury occurred at the patient's residential assisted living facility.   .    Associated symptoms: Denies numbness, weakness, or loss of function  Last tetanus booster within 10 years: yes.  The last tetanus booster was received in August 2019. .        EXAM:   The patient appears today in alert,no apparent distress distress  VITALS: /70   Pulse 63   Temp 98.4  F (36.9  C) (Tympanic)   SpO2 98%       Characteristics of the laceration: V-shaped flap type.  There is no surrounding erythema/red streaks.  No discharge. No acute hemorrhage.    Tendon function intact: yes  Sensation to light touch intact: not asked  Pulses intact: not applicable  Picture included in patient's chart: no    Assessment:  Laceration on the left leg.  No obvious infection.      PLAN:    Plan:  The wound will be healed via secondary intension.      The medical assistant cleaned the wound with Hibiclens and sterile water. She next placed an antibiotic ointment onto the wound, covered the wound with a nonadherent gauze and wrapped the dressing with Coban.     Here are my wound care instructions:  Clean the wound on the left lateral ankle region with normal soap and water.  Apply a thin layer of an over-the-counter antibiotic ointment onto the wound.  Cover with nonadherent gauze/bandage.  Wrap the left ankle to hold the dressing/bandage in place.    Place warmth over the dressings for 10 minutes at a time, every 2-3 hours while  awake.    Continue all of this until a scab forms.  Once the scab forms, the wound will no longer have to be covered.      Follow up if fevers appear or if spreading redness occurs from the wound.        Mike Smith MD

## 2021-02-19 NOTE — LETTER
[facility] URGENT CARE [place]  [address]  [address]  [place] [address]  [phone]      February 19, 2021    RE:  Geo Hicks                                                                                                                                                       [address] [address]            To whom it may concern:    Geo Hicks is under my professional care at the [facility] on February 19, 2021.  He has a V-shaped flap due to a recent laceration.      Here are my wound care instructions:  Clean the wound on the left lateral ankle region with normal soap and water.  Apply a thin layer of an over-the-counter antibiotic ointment onto the wound.  Cover with nonadherent gauze/bandage.  Wrap the left ankle to hold the dressing/bandage in place.    Place warmth over the dressings for 10 minutes at a time, every 2-3 hours while awake.    Continue all of this until a scab forms.  Once the scab forms, the wound will no longer have to be covered.    Follow up if fevers appear or if spreading redness occurs from the wound.            Sincerely,        Mike Smith MD    [facility] Urgent [facility]

## 2021-02-23 NOTE — PROGRESS NOTES
RYLAND provided brief introduction with Geo's father, Eric. RYLAND assisted Eric with completing FMLA paperwork for his employer. The completed FMLA paperwork was sent back to Eric via email.     GARCIA Agarwal, [facility]    Phone: [phone]  Pager: [phone]  Email: [email or web address]  2/23/2021  *no letter*

## 2021-03-05 NOTE — Clinical Note
Could you please fax a copy of the AVS and med list to the group home?  Just decreasing olanzapine today to 15mg at bedtime (previously 17.5mg). Also, I have not heard back from them about lab results, were these drawn and we are just waiting for results back?  Thanks,  Jj

## 2021-03-05 NOTE — TELEPHONE ENCOUNTER
Labs are found in Care Everywhere drawn on 2/23/2021 at 0744.  Get in to CE at the top left corner of the chart next to the pt picture and look at lab results from [facility] and that is where they will be found.  Noy Devries, CMA

## 2021-03-05 NOTE — PATIENT INSTRUCTIONS
Nice to see you today Geo. As we discussed:   - Decrease olanzapine to 15mg at bedtime. You still have the 5mg as needed dose available.  - Make a list of things that you think we might be able to help you with, as we discussed during our appointment.        **For crisis resources, please see the information at the end of this document**     Patient Education      Thank you for coming to the [facility] [place] [facility].    Lab Testing:  If you had lab testing today and your results are reassuring or normal they will be mailed to you or sent through [facility] within 7 days. If the lab tests need quick action we will call you with the results. The phone number we will call with results is # [phone] (home) . If this is not the best number please call our clinic and change the number.    Medication Refills:  If you need any refills please call your pharmacy and they will contact us. Our fax number for refills is [phone]. Please allow three business for refill processing. If you need to  your refill at a new pharmacy, please contact the new pharmacy directly. The new pharmacy will help you get your medications transferred.     Scheduling:  If you have any concerns about today's visit or wish to schedule another appointment please call our office during normal business hours [phone] (8-5:00 M-F)    Contact Us:  Please call [phone] during business hours (8-5:00 M-F).  If after clinic hours, or on the weekend, please call  [phone].    Financial Assistance [phone]  [facility] Billing [phone]  Central Billing Office, ealth: [phone]  [facility] Billing [phone]  Medical Records [phone]  [facility] Patient Bill of Rights [email or web address]       MENTAL HEALTH CRISIS NUMBERS:  For a medical emergency please call  911 or go to the nearest ER.     [facility]:   [place]  [facility] -[phone]   Crisis Residence [facility] -[phone]   Walk-In Counseling Center [facility] -[phone]   COPE 24/7 [facility] Mobile Team -[phone] (adults)/[phone] (child)  CHILD: [facility] needs assessment team - [phone]      [facility]:   [facility] - [phone]   Walk-in counseling [facility] - [phone]   Walk-in counseling [facility] - [phone]   Crisis Residence [facility] - [phone]  Urgent Care Adult Mental [facility]-[phone] mobile unit/ 24/7 crisis line    National Crisis Numbers:   National Suicide Prevention Lifeline: 8-808-618-TALK (918-941-4686)  Poison Control Center - 1-381.995.8506  Safer Minicabs/resources for a list of additional resources (SOS)  Trans Lifeline a hotline for transgender people 1-946.457.6001  The [employer] a hotline for LGBT youth 1-596.580.8377  Crisis Text Line: For any crisis 24/7   To: 215831  see www.crisistextline.org  - IF MAKING A CALL FEELS TOO HARD, send a text!         Again thank you for choosing [facility] [place] [facility] and please let us know how we can best partner with you to improve you and your family's health.    You may be receiving a survey regarding this appointment. We would love to have your feedback, both positive and negative. The survey is done by an external company, so your answers are anonymous.

## 2021-03-05 NOTE — PROGRESS NOTES
"VIDEO VISIT  Geo Hicks is a 20 year old patient who is being evaluated via a billable video visit.      The patient has been notified of following:   \"This video visit will be conducted via a call between you and your physician/provider. We have found that certain health care needs can be provided without the need for an in-person physical exam. This service lets us provide the care you need with a video conversation. If a prescription is necessary we can send it directly to your pharmacy. If lab work is needed we can place an order for that and you can then stop by our lab to have the test done at a later time. Insurers are generally covering virtual visits as they would in-office visits so billing should not be different than normal.  If for some reason you do get billed incorrectly, you should contact the billing office to correct it and that number is in the AVS .    Video Conference to be completed via:  [email or web address]    Patient has given verbal consent for video visit?:  Yes    Patient would prefer that any video invitations be sent by: Send to e-mail at: [email or web address]      How would patient like to obtain AVS?:  Juma    AVS SmartPhrase [PsychAVS] has been placed in 'Patient Instructions':  Yes    "

## 2021-03-05 NOTE — PROGRESS NOTES
"Video- Visit Details  Type of service:  video visit for medication management  Time of service:    Date:  03/05/2021    Video Start Time:  1:45 PM        Video End Time:   2:45 PM    Reason for video visit:  Patient unable to travel due to Covid-19  Originating Site (patient location):  [facility]   Location- prison  Distant Site (provider location):  Remote location  Mode of Communication:  Video Conference via [email or web address]  Consent:  Patient has given verbal consent for video visit?: Yes         [facility]  Psychiatry Clinic  PSYCHIATRIC PROGRESS NOTE     CARE TEAM:    PCP- Jeffrey Espinoza     Specialty Providers- Oncology, Neuropsychology, Physical Therapy, Occupational Therapy      Therapist- most recently Nannette carver [facility] Team- no.      Geo Hicks is a 20 year old patient who prefers the name Geo and pronouns he, him, his.     PERTINENT BACKGROUND                [last transfer eval 07/10/20]     Psych critical item history includes suicidal ideation, psychosis [sxs include delusions] and psych hosp (<3)    INTERIM HISTORY                                 [4, 4]   History was provided by the patient and group home staff who was a good historian. Treatment adherence is good.  Since the last visit:   - \"I'm pretty scared that the doctors are going to do something to me.\" Specifically at next imaging appointment.  - Frustrated about not getting help.  - Has not always been scared of the doctors. Notes that they have caused him to be afraid because of what they have done.  - Wondered if wee got the test results back from the labs we had ordered  - No changes in agitation or sleep.  - No changes in appetite.  - Has not needed as needed olanzapine.  - \"I don't want to get rid of the fears because they are real\"  - \"I need to talk to an executive. That's the only thing that could help me.\" When offered to talk to the ombudsmen he said he'd already talked to them " "and was not interested because he had already spoken with someone.     - Father reached out expressing concern over some of Geo's symptoms. See below:   I am writing to give you an update, on some things that we have noticed and are experiencing with Geo.     1. I know we have discussed Geo's eating and weight gain. Although you are actively reducing his Olanzapine, thinking that this may be making him feel hungry.  I honestly don't believe that his excessive eating and weight gain has anything to do with \"feeling hungry\".  I have discussed this with him.  Geo believes that he needs all the calories to maintain his muscles.  He claims that because he has to push himself around in his wheelchair, he needs the extra calories.  To be clear he does very little moving around in is wheelchair. He states that his weight gain is due to the constipation, and that he is actually very skinny.  I have suggested that he talk to a dietician.  His response is, \"why would I do that, they don't know anything\".  This has gotten so bad that he is complaining of pain.  I am not a dietician, but I do think that the extra weight and the \"crap\" food is the biggest contributor to the pain.     2. Geo has an MRI scheduled on the 16th of this month.  He has on multiple occasions expressed concern that, at this appointment, his doctors are going to do something to make him forget that the doctors are causing this \"constipation\". It has gotten to a point where Geo states that he is 100% sure that the doctors are going to do this.     3. In regards to his \"constipation\"; he recently stated that he told the doctor's not to give him constipation, but they did anyway.  This is something new to both Christianne and me.     4. He started a new angle/ talking point, on getting his problem fixed.  He believes that because he has insurance they need to fix his problem.  I explained that insurance is helps pay for medical care, it doesn't " "assure positive outcome from that treatment.      RECENT PSYCH ROS:   Depression:  denies  Elevated:  none  Psychosis:  delusions- Continues to be perseverative about doctors withholding treatment from him (though this has significantly improved in the last month). [details in Interim History]  Anxiety: denies excessive worry and nervous/overwhelmed  Panic Attack:  none  Trauma Related:  none  Dysregulation:  none  Eating Disorder: no     Adverse Effects:  denies  Pertinent Negatives:  No suicidal ideation, violent ideation, self-injury, hallucinations and aggression  Recent Substance Use:  none reported    FAMILY and SOCIAL HISTORY             [1ea, 1ea]       patient reported                    FAMILY HX- Denied    Financial/ Work- family or friend       Partner/ - single  Children- no      Living situation- Group Home, previously living alternate weeks with mother and father.      Social/ Spiritual Support- mother, father, older brother, friend Rima     Legal- no     PSYCH and SUBSTANCE USE Critical Summary Points since July 2020         - September - increased trazodone to 150mg at bedtime prn for insomnia and depression and decreased sertraline to 100mg  - February - Decreased olanzapine 20mg-->17.5mg  - March - decrease olanzapine -->15mg    MEDICAL HISTORY and ALLERGY     ALLERGIES: Blood transfusion related (informational only) and No known drug allergies     Neurologic Hx- See pertinent background, re: history of ependymoma and related chemo, radiation and tumor resection(s).  Notably has experienced neurological damage due to the above which has resulted in facial numbness, significant loss of mobility and dysarthria.  Has had neuropsychiatric evaluations 2/2016, 2/2017, 1/2019, and 10/2019.  The following is from the \"Results and Impressions\" section of his 10/29/2019 eval with Veronica Padilla, with a passage bolded that relates to noted difficulty in an area of executive functioning " "that facilitates the ability to see other people's perspectives:     \"Geo s attention was rated by his mother as well as himself as being adequate.  Executive functioning are those skills including planning, organization, emotional control, cognitive flexibility, and the ability to take another person s perspective.  Geo rating scale of these skills was basically in the average range for his age with a slight elevation in his ability to shift from one activity to another.  His mother s ratings of his executive functioning were in the average range, with the exception of a slight elevation in his ability to initiate tasks.  Direct testing of his ability to take another person s perspective indicated difficulty in this area.  He was asked to sort objects based on various aspects of the objects.  When he sorted the objects, he showed average performance.  However, when the examiner sorted the objects, Geo experienced significant difficulty with being able to determine how the objects were sorted.  This difficulty likely translates into difficulty with understanding another person s point of view.  Geo indicated that this difficulty becomes quite frustrating for him as he doesn't feel other people understand him--it is likely that he has difficulty understanding their perspective.     Emotionally Geo indicated that he continues to feel some difficulty with sadness but that it has improved over time and with medication.  Geo denied significant feelings of anxiety at this time while in the past these scores have been higher.  Parent ratings of Geo s emotional functioning indicated no areas of significant concern.  Interviews with Geo and his mother indicated continued feelings of sadness and difficulty in motivating himself to try to new activities.  While there is improvement in his mood, Geo continues to be at risk for depression.  Since he is now under the care of a psychiatrist, we did not " "interview around his feelings that his medical team was not being helpful to him.  His mother reported that these feelings continue and likely interfere with his compliance with directives.  He is participating in therapy to work on these issues and it is important that this intervention continue.\"    Patient Active Problem List   Diagnosis     GERD (gastroesophageal reflux disease)     Closed fracture at the growth plate of right distal fibula      Elevated serum creatinine     Dyspepsia     Intracranial hemorrhage (H)     Hemorrhagic stroke (H)     Ependymoma (H)     Admission for antineoplastic chemotherapy     Post-operative state     S/P craniotomy     S/P biopsy     Noncomitant strabismus     Abducens neuropathy of both eyes     CANDELARIO (obstructive sleep apnea)     Health Care Home     Hypernatremia     Body temperature low     Current chronic use of systemic steroids     Elevated TSH     Status post chemotherapy     Neoplasm of posterior cranial fossa (H)     Chronic constipation     Serum albumin decreased     Closed compression fracture of thoracic vertebra with routine healing, subsequent encounter     Depression     Constipation     Constipation by delayed colonic transit     Slow transit constipation     Tubular adenoma         MEDICAL REVIEW OF SYSTEMS         [2, 10]   Contraception- no    A comprehensive review of systems was performed and is negative other than noted in the HPI.    MEDICATIONS        Current Outpatient Medications   Medication Sig Dispense Refill     dexamethasone (DECADRON) 0.5 MG tablet TAKE ONE AND ONE-HALF TABLETS (0.75MG) BY MOUTH ONCE DAILY WITH BREAKFAST. 45 tablet 11      MG capsule        fexofenadine (ALLEGRA) 180 MG tablet TAKE 1 TABLET BY MOUTH EVERY EVENING. 30 tablet 11     Lactobacillus-Inulin ([facility]) CAPS Take 2 capsules by mouth daily 30 capsule 3     lamoTRIgine (LAMICTAL) 200 MG tablet Take 1 tablet (200 mg) by mouth At Bedtime 30 tablet " 2     linaclotide (LINZESS) 290 MCG capsule Take 1 capsule (290 mcg) by mouth every morning (before breakfast) 30 capsule 3     mupirocin (BACTROBAN) 2 % external ointment Use three times a day PRN with skin lesions or open sores. 22 g 3     OLANZapine (ZYPREXA) 15 MG tablet Take one tablet (15mg) along with one 2.5mg tablet at bedtime for total dose of 17.5mg 30 tablet 2     OLANZapine (ZYPREXA) 2.5 MG tablet Take one tablet (2.5mg) along with one 15mg tablet at bedtime for total dose of 17.5mg 30 tablet 2     omeprazole (PRILOSEC) 20 MG DR capsule Take 2 capsules (40 mg) by mouth every morning 60 capsule 1     order for DME Equipment being ordered: Dressing  Wound #1 lower leg, W 6 cm x, D 0.5  cm, Drainage scant, Thickness sutured     Type: non stick gauze, Brand: , Size: 4/4   Change: 1 per day    Tape/Wrap: 1 each     attach facesheet with insurance information (delete this line) 5 each 0     order for DME Equipment being ordered: short boot 1 each 0     polyethylene glycol (MIRALAX) 17 GM/Dose powder Take 17 g (1 capful) by mouth 3 times daily 289 g 3     potassium phosphate, monobasic, (K-PHOS) 500 MG tablet Take 1 tablet (500 mg) by mouth 2 times daily 90 tablet 3     sertraline (ZOLOFT) 100 MG tablet Take 1 tablet (100 mg) by mouth daily 30 tablet 2     study - entinostat, IDS# 5050, 1 mg tablet Take 2 tablets (2 mg) by mouth every 7 days for 4 doses Take two 1mg tablet with one 5mg tablet for total dose of 7 mg weekly. Take on an empty stomach, at least 1 hour before or 2 hours after a meal.  Swallow tablet whole. 8 tablet 0     study - entinostat, IDS# [id number], 5 mg tablet Take 1 tablet (5 mg) by mouth every 7 days for 4 doses Take one 5mg tablet with two 1mg tablet for total dose of 7 mg weekly. Take on an empty stomach, at least 1 hour before or 2 hours after a meal.  Swallow tablet whole. 4 tablet 0     sulfamethoxazole-trimethoprim (BACTRIM) 400-80 MG tablet Take 1 tablet by mouth 2 times daily On  Saturdays and Sundays 24 tablet 11     traZODone (DESYREL) 150 MG tablet Take 1 tablet (150 mg) by mouth nightly as needed for sleep or depression 30 tablet 2     vitamin D3 (CHOLECALCIFEROL) 2000 units (50 mcg) tablet TAKE 1 TABLET BY MOUTH ONCE DAILY WITH BREAKFAST. 30 tablet 11     vitamin E (TOCOPHEROL) 400 units (360 mg) capsule TAKE 1 CAPSULE BY MOUTH ONCE DAILY. 30 capsule 11     doxycycline hyclate (VIBRAMYCIN) 100 MG capsule Take 1 capsule (100 mg) by mouth 2 times daily (Patient not taking: Reported on 2/16/2021) 14 capsule 0     OLANZapine (ZYPREXA) 5 MG tablet Take 1 tab (5 mg) by mouth daily as needed for agitation. May take another dose after 60 minutes if not effective. (Patient not taking: Reported on 2/16/2021) 30 tablet 0     sennosides (SENOKOT) 8.6 MG tablet Take 1 tablet by mouth daily (Patient not taking: Reported on 2/16/2021) 30 tablet 4     VITALS                                                                [3, 3]   There were no vitals taken for this visit.   MENTAL STATUS EXAM                       [9, 14 cog gs]     Alertness: alert  and oriented  Appearance: casually groomed  Behavior/Demeanor: cooperative and pleasant, with good  eye contact   Speech: prominent dysarthria  Language: no obvious problem and dysarthria make it difficult to understand at times  Psychomotor: Mild palsy in bilateral upper extremities, fidgeting, and facial paralysis present.  Mood: description consistent with euthymia  Affect: full range; congruent to: mood- yes, content- yes  Thought Process/Associations: unremarkable  Thought Content:  Reports delusions [details in history];  Denies suicidal ideation and violent ideation  Perception:  Reports none;  Denies auditory hallucinations and visual hallucinations  Insight: gaining/ maintaining insight is challenging  Judgment: good  Cognition: (6) oriented: time, person, and place  attention span: intact  concentration: intact  recent memory: intact  remote  memory: intact  fund of knowledge: appropriate  Gait and Station: N/A (telehealth)    LABS and DATA     PHQ9 TODAY = Not completed due to COVID 19 video visit  PHQ 8/19/2019 9/9/2019 11/15/2019   PHQ-9 Total Score 8 9 -   Q9: Thoughts of better off dead/self-harm past 2 weeks More than half the days More than half the days (No Data)       Recent Labs   Lab Test 12/07/20  1455 10/30/20  1230 10/03/20  1423   CR 0.98 1.08 1.13   GFRESTIMATED >90 >90 >90     ANTIPSYCHOTIC LABS  [glu, A1C, lipids (rohit LDL), liver enzymes, WBC, ANEU, Hgb, plts]  q12 mo  Recent Labs   Lab Test 12/07/20  1455 10/30/20  1230 10/03/20  1423 09/29/20 02/26/19  1100 02/26/19  1100   GLC 78 132* 98 77   < > 124*   A1C  --   --   --   --   --  5.1    < > = values in this interval not displayed.     Recent Labs   Lab Test 02/26/19  1100   CHOL 158   TRIG 236*   LDL 84   HDL 27*     Recent Labs   Lab Test 12/07/20  1455 10/30/20  1230 06/16/20 03/10/20  1338 03/03/20  1314   AST Unsatisfactory specimen - hemolyzed 24 17 21 29   ALT 31 29 <10 18 23   ALKPHOS 104 95  --  115 110     Recent Labs   Lab Test 12/07/20  1455 10/30/20  1230 10/03/20  1423 09/29/20 07/09/20  1409 07/09/20  1409   WBC 4.7 5.5 7.3 6.5   < > 5.5   ANEU 2.4 3.1 4.6  --   --  2.2   HGB 11.0* 10.8* 11.0* 10.4*   < > 10.8*   * 163 132* 120   < > 118*    < > = values in this interval not displayed.     PSYCHOTROPIC DRUG INTERACTIONS     Increased risk of QTc Prolongation: olanzapine, sertraline, trazodone  Increased risk of lamotrigine toxicity (fatigue, sedation, confusion): lamotrigine, sertraline     MANAGEMENT:  Monitoring for adverse effects    RISK STATEMENT for SAFETY     Geo L Kurt previously endorsed suicidal ideation. SUICIDE RISK ASSESSMENT-  Risk factors for self-harm: hopelessness, feels trapped and new/ worsening medical issue.  Mitigating factors: no h/o suicide attempt, no plan or intent, no access to lethal means, h/o seeking help , minimal substance use  , good social support  , stable housing and stable finances.  The patient does not appear to be at imminent risk for self-harm, hospitalization is not recommended which the pt does not agree to. No hospitalization will be arranged. Based on degree of symptoms close psych follow-up was/were recommended which the pt does  agree to. Additional steps to minimize risk: anxiety/ insomnia mngmt.  Safety Plan placed in Pt Instructions: Yes.  Rate SI-Patient has chronic passive suicidal ideation, which primarily appears to be associated with his chronic medical conditions, current physical limitations, and frustration with how others treat him. I do not believe that a psychiatric hospitalization would be benefical and would not change those underlying stressors primarily responsible for his chronic SI..    DIAGNOSES                                           [m2, h3]    Delusional Disorder, persecutory type, first episode  Major Depressive Disorder, single episode, moderate    ASSESSMENT                                        [m2, h3]        Geo Hicks presents for medication management of paranoia, delusional thoughts, and depressed mood.  Reports interim symptom stability since last appointment. Continues to have delusions regarding doctors withholding treatment - particularly the belief that he has constipation that causes his inability to walk and that his doctors could cure this but won't. Additionally, he is now scared about what might happen to him at an upcoming imaging appointment. Will plan to continue decreasing olanzapine today, due to metabolic concerns and lack of clear response to delusions; in addition, no increase in agitation, will continue to monitor. Once at 15mg could consider cross taper to alternative antipsychotic (aripiprazole). Geo continued to express distress about current medical treatment. He will consider drafting a letter of his complaints and perceived wrongs to his treatment team, but  "believes that this will likely not be helpful. Encouraged him to think of ways our team would be helpful in his care. Awaiting AP lab results, orders faxed to group home.  No safety issues today. Refills provided    [place] was [place] today:  Indicates no medication misuse .    PLAN                                                            [m2, h3]                                               1) Meds  - Decrease olanzapine to 15mg at bedtime  - Continue olanzapine 5mg as needed for severe agitation/anxiety, can take second dose 60 minutes after if ineffective.  - Continue sertraline 100mg daily  - Continue lamotrigine 200mg daily  - Continue trazodone 150mg at bedtime    2) Therapy-  Patient started a new therapist recently, father to get MERRITT from therapist for our clinic.    3) Next Due   Labs- Lipids and A1c due, pending COVID-19.  EKG- prn  Rating scales- AIMS due with next \"in-clinic\" visit    5) Referrals  No Referrals needed today    3) RTC: 4-6 weeks    4) Crisis Numbers:   Provided routinely in AVS     After hours:  [phone]      TREATMENT RISK STATEMENT:  The risks, benefits, alternatives and potential adverse effects have been discussed and are understood by the pt. The pt understands the risks of using street drugs or alcohol. There are no medical contraindications, the pt agrees to treatment with the ability to do so. The pt knows to call the clinic for any problems or to access emergency care if needed.  Medical and substance use concerns are documented above.  Psychotropic drug interaction check was done, including changes made today.    PROVIDER: Jj Louis MD    Patient staffed with Dr. Tripathi in clinic who will review and sign note. Supervisor is Dr. Marrero.    The author of this note documented a reason for not sharing it with the patient.     TELEHEALTH ATTENDING ATTESTATION  Following the ACGME guidelines on telemedicine and direct supervision due to COVID-19, I was concurrently " participating in and/or monitoring the patient care through appropriate telecommunication technology.  I discussed the key portions of the service with the resident, including the mental status examination and developing the plan of care. I reviewed key portions of the history with the resident. I agree with the findings and plan as documented in this note.   Jorge Luis Tripathi MD

## 2021-03-05 NOTE — TELEPHONE ENCOUNTER
Called [facility] to follow-up on whether provider's requested labs were drawn/resulted.  Results available and  work faxed them to our office.  Will follow up on receipt on Monday.

## 2021-03-08 NOTE — TELEPHONE ENCOUNTER
Thanks so much! I see them now, they did not appear when I last looked, thought I refreshed CareEverywhere but I could be mistaken.  Thanks,  Jj

## 2021-03-08 NOTE — TELEPHONE ENCOUNTER
On 2/23/2021 results were received for CBC W/ DIFF, Lipid panel, CMP and Hgb A1C.  These are located in [facility] Everywhere [facility] then click documents, and then lab results located at the top.  The original document was sent to scanning on 3/5/2021.  Noy Devries CMA

## 2021-03-10 NOTE — TELEPHONE ENCOUNTER
It would make sense to have patient follow-up with PCP for lipid abnormalities. We are addressing them to some extent with our plan to decrease olanzapine and trail alternative medication but PCP follow-up and need for possible additional interventions would be helpful.   Thanks,  Jj

## 2021-03-10 NOTE — LETTER
"3/10/2021      RE: Geo Hicks  [id number] [facility] [address]         The patient has been notified of following:     \"This video visit will be conducted via a call between you and your physician/provider. We have found that certain health care needs can be provided without the need for an in-person physical exam.  This service lets us provide the care you need with a video conversation.  If a prescription is necessary we can send it directly to your pharmacy.  If lab work is needed we can place an order for that and you can then stop by our lab to have the test done at a later time.    Video visits are billed at different rates depending on your insurance coverage.  Please reach out to your insurance provider with any questions.    If during the course of the call the physician/provider feels a video visit is not appropriate, you will not be charged for this service.\"    Patient has given verbal consent for Video visit? Yes    Video-Visit Details    Type of service:  Video Visit    Video Start Time: 1:50 PM  Video End Time: 2:07 PM    Originating Location (pt. 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    HPI: Geo notes the girth of his last stool last evening was very large.  The stool was painful to expel.  He doesn't wear his glasses to the bathroom so doesn't know if there was any blood in the stool.  He notes his stomach still hurts when he sits up in his chair. He has been laying on his bed a lot at home over the last week especially due to his concern of increasing constipation.    And the pain increases " when he leans forward on the chair.  He reports the abdominal discomfort is surrounding his umbilicus and in the front of his stomach but also the sides of his abdomen.      Geo still notices when he coughs he gets a head rush. His oxygen saturations are 92% today.  He has gained quite a bit of weight so states he gets out of breath more when doing activities and can't take as deep of breaths.  His finger has totally healed.   Hip wound has also healed.  He is living in his group care facility.        Imaging:    Results for orders placed or performed during the hospital encounter of 03/10/21   XR Abdomen Port 1 View     Status: None    Narrative    Exam: XR ABDOMEN PORT 1 VW, 3/10/2021 8:52 PM    Indication: NG tube placement    Comparison: 3/10/2021    Findings:   Supine AP views of the abdomen. Gastric tube with distal end and  sidehole projecting at the level of the stomach. Multiple loops of  moderately gas distended bowel in the abdomen. Air is present within  the colon. No pneumatosis. No focal consolidation in the visualized  portion of the lower lungs. No acute osseous findings.      Impression    Impression:   1. Gastric tube with the distal end and sidehole in the stomach.  2. Moderately gas distended bowel in a nonobstructive pattern.    I have personally reviewed the examination and initial interpretation  and I agree with the findings.    KYLIE CHACON, DO   Renal Panel     Status: Abnormal   Result Value Ref Range    Sodium 140 133 - 144 mmol/L    Potassium 4.0 3.4 - 5.3 mmol/L    Chloride 104 94 - 109 mmol/L    Carbon Dioxide 34 (H) 20 - 32 mmol/L    Anion Gap 2 (L) 3 - 14 mmol/L    Glucose 78 70 - 99 mg/dL    Urea Nitrogen 17 7 - 30 mg/dL    Creatinine 0.98 0.66 - 1.25 mg/dL    GFR Estimate >90 >60 mL/min/[1.73_m2]    GFR Estimate If Black >90 >60 mL/min/[1.73_m2]    Calcium 8.5 8.5 - 10.1 mg/dL    Phosphorus 3.3 2.5 - 4.5 mg/dL    Albumin 3.0 (L) 3.4 - 5.0 g/dL   Magnesium     Status: None    Result Value Ref Range    Magnesium 2.2 1.6 - 2.3 mg/dL   CBC with platelets differential     Status: Abnormal   Result Value Ref Range    WBC 4.2 4.0 - 11.0 10e9/L    RBC Count 4.84 4.4 - 5.9 10e12/L    Hemoglobin 11.1 (L) 13.3 - 17.7 g/dL    Hematocrit 37.8 (L) 40.0 - 53.0 %    MCV 78 78 - 100 fl    MCH 22.9 (L) 26.5 - 33.0 pg    MCHC 29.4 (L) 31.5 - 36.5 g/dL    RDW 19.6 (H) 10.0 - 15.0 %    Platelet Count 119 (L) 150 - 450 10e9/L    Diff Method Automated Method     % Neutrophils 48.3 %    % Lymphocytes 27.6 %    % Monocytes 15.7 %    % Eosinophils 5.5 %    % Basophils 1.0 %    % Immature Granulocytes 1.9 %    Nucleated RBCs 1 (H) 0 /100    Absolute Neutrophil 2.0 1.6 - 8.3 10e9/L    Absolute Lymphocytes 1.2 0.8 - 5.3 10e9/L    Absolute Monocytes 0.7 0.0 - 1.3 10e9/L    Absolute Eosinophils 0.2 0.0 - 0.7 10e9/L    Absolute Basophils 0.0 0.0 - 0.2 10e9/L    Abs Immature Granulocytes 0.1 0 - 0.4 10e9/L    Absolute Nucleated RBC 0.1    pH Gastric Aspirate     Status: None   Result Value Ref Range    pH Gastric Aspirate 5.3    pH Gastric Aspirate     Status: None   Result Value Ref Range    pH Gastric Aspirate Greater than or equal to 6.1    Results for orders placed or performed during the hospital encounter of 03/10/21   X-ray Abdomen 1 vw     Status: None    Narrative    Exam: XR ABDOMEN 1 VW, 3/10/2021 1:25 PM    Indication: Constipation Please evaluate stool burden; Ependymoma (H);  Weight gain    Comparison: 7/9/2020, 1/27/2020    Findings:   Supine AP views of the abdomen were obtained. Nonobstructive bowel gas  pattern. No pneumatosis or portal venous gas appreciated. Several  phleboliths in the pelvis. Moderate stool burden. No acute osseous  abnormalities.      Impression    Impression:   Moderate stool burden.     I have personally reviewed the examination and initial interpretation  and I agree with the findings.    MARIO ALBERTO AYALA MD   Results for orders placed or performed in visit on 03/10/21    Asymptomatic COVID-19 Virus (Coronavirus) by PCR     Status: None    Specimen: Nasopharyngeal   Result Value Ref Range    COVID-19 Virus PCR to [facility] - Source Anterior     COVID-19 Virus PCR to [facility] - Result       Test received-See reflex to IDDL test SARS CoV2 (COVID-19) Virus RT-PCR   SARS-CoV-2 COVID-19 Virus (Coronavirus) by PCR     Status: None    Specimen: Nasopharyngeal   Result Value Ref Range    SARS-CoV-2 Virus Specimen Source Anterior     SARS-CoV-2 PCR Result NEGATIVE     SARS-CoV-2 PCR Comment       Testing was performed using the Aptima SARS-CoV-2 Assay on the [employer] Instrument System.   Additional information about this Emergency Use Authorization (EUA) assay can be found via   the Lab Guide.            Oncology History:    [id number]   6/26/15    Started Entinostat 1/9/17    DIAGNOSIS: EPENDYMOMA  AREAS TREATED: POST FOSSA TUMOR  DOSE: 5940 cGy   TYPE OF RADIATION GIVEN: with IMRT Tomotherapy   9/08/2015 to 10/26/15    Patient Active Problem List   Diagnosis     GERD (gastroesophageal reflux disease)     Closed fracture at the growth plate of right distal fibula      Elevated serum creatinine     Dyspepsia     Intracranial hemorrhage (H)     Hemorrhagic stroke (H)     Ependymoma (H)     Admission for antineoplastic chemotherapy     Post-operative state     S/P craniotomy     S/P biopsy     Noncomitant strabismus     Abducens neuropathy of both eyes     CANDELARIO (obstructive sleep apnea)     Health Care Home     Hypernatremia     Body temperature low     Current chronic use of systemic steroids     Elevated TSH     Status post chemotherapy     Neoplasm of posterior cranial fossa (H)     Chronic constipation     Serum albumin decreased     Closed compression fracture of thoracic vertebra with routine healing, subsequent encounter     Depression     Constipation     Constipation by delayed colonic transit     Slow transit constipation     Tubular adenoma     Current Outpatient Medications   Medication      dexamethasone (DECADRON) 0.5 MG tablet      MG capsule     doxycycline hyclate (VIBRAMYCIN) 100 MG capsule     fexofenadine (ALLEGRA) 180 MG tablet     Lactobacillus-Inulin ([facility]) CAPS     lamoTRIgine (LAMICTAL) 200 MG tablet     linaclotide (LINZESS) 290 MCG capsule     mupirocin (BACTROBAN) 2 % external ointment     OLANZapine (ZYPREXA) 20 MG tablet     OLANZapine (ZYPREXA) 5 MG tablet     omeprazole (PRILOSEC) 20 MG DR capsule     order for DME     order for DME     polyethylene glycol (MIRALAX) 17 GM/Dose powder     potassium phosphate, monobasic, (K-PHOS) 500 MG tablet     sennosides (SENOKOT) 8.6 MG tablet     sertraline (ZOLOFT) 100 MG tablet     study - entinostat, IDS# 5050, 1 mg tablet     study - entinostat, IDS# 5050, 5 mg tablet     sulfamethoxazole-trimethoprim (BACTRIM) 400-80 MG tablet     traZODone (DESYREL) 150 MG tablet     vitamin D3 (CHOLECALCIFEROL) 2000 units (50 mcg) tablet     vitamin E (TOCOPHEROL) 400 units (360 mg) capsule     No current facility-administered medications for this visit.         Allergies   Allergen Reactions     Blood Transfusion Related (Informational Only) Swelling     Periorbital swelling post platelet transfusion     No Known Drug Allergies        Review of Systems  Skin: positive for striae,   Eyes: positive for oculoplegia  Ears/Nose/Throat: negative  Respiratory: No shortness of breath, dyspnea on exertion, cough, or hemoptysis  Cardiovascular: negative  Gastrointestinal: constipation.   Genitourinary: negative  Musculoskeletal: negative  Neurologic: positive for  local weakness, speech problems, incoordination and behavior changes  Psychiatric: anxiety, agitation and perseveration re: constipation.    Hematologic/Lymphatic/Immunologic: negative  Endocrine: weight gain.    Physical Exam    Wt Readings from Last 1 Encounters:   03/10/21 116.5 kg (256 lb 13.4 oz)      Ht Readings from Last 1 Encounters:   12/07/20 1.804 m (5'  "11.02\")     Wt Readings from Last 2 Encounters:   03/10/21 116.5 kg (256 lb 13.4 oz)   12/07/20 104.8 kg (231 lb 1.6 oz)     Pulse:  [107] 107  Resp:  [22] 22  BP: (121)/(75) 121/75  SpO2:  [92 %] 92 %      GENERAL: Healthy, and alert.  EYES: Eyes grossly normal to inspection.  No discharge or erythema,patch in place over right eye. HENT: Normocephalic.  External ears, nose and mouth without ulcers or lesions.  He is drooling.  RESP: No audible wheeze, cough, or visible cyanosis.  When attempting to plug in his phone and activity move about and bend to plug he did increase his work of breathing.  It normalized when he finished.   SKIN: Scattered bruising. No significant rash, abnormal pigmentation or lesions.  Hip wound not examined today.  MSK: Moves upper extremities but is ataxic.  NEURO: prominent dysarthria,  His speech continues to be difficult to understand at times.  PSYCH:  Affect varies with the topic, and appearance well-groomed.    Impression:  Ependymoma in good control on Entinostat    Right hip and finger well healed.  Mental health issues continue  Daily bowel movements however Geo continues with concerns of constipation.  Abdominal discomfort with moderate stool burden despite large stool last evening.  Weight increase over time      Plan:      Reviewed that he has lower saturations at times (which is not new) and that he used to have CPAP machine. This head rush feel may be related to these lower sats.  Discussed with dad that we have noted these symptoms for about two months.     We will admit for bowel clean out.  Discussed with the in patient team.     He will continue Entinostat - 7mg orally weekly.        He is due for imaging in March.  He will be scanned on March 16th and see Dr. Rousseau at that time.       Total time spent on the following services on the date of the encounter:  Preparing to see patient, chart review, review of outside records, Referring or communicating with other " healthcare professionals, Performing a medically appropriate examination , Counseling and educating the patient/family/caregiver , Documenting clinical information in the electronic or other health record , Communicating results to the patient/family/caregiver  and Total time spent: 20 minutes        ALAN Justin CNP

## 2021-03-10 NOTE — H&P
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         GI:  Acute on chronic constipation, AXR showing moderate stool burden.   - NG tube placement  - Bowel clean out with golytely via NGT, titrated from 500-1000 ml/hr and continue until clear stool x 3  - Hold PTA bowel regimen (linzess, miralax)  - Omeprazole 40 mg daily     FEN:  - Clear liquid diet  - Holding PTA supplements for now: Ca, vitamin D, K-phos, vitamin E  - Renal panel, Mg in AM     Heme/Onc:  Ependymoma: on study  - Entinostat 7 mg weekly--patient's father to bring this in     Endo:  - Followed by Dr. Martin  - PTA Decadron 0.75 mg QAM     Neuro/Psych:  Agitation  Facial paralysis   Ataxia   Father and patient prefer to hold these medications during cleanout, and patient is not agitated on admission.  - Olanzapine 30mg at bedtime, hold for now  - Amitriptyline 50mg at bedtime, hold for now  - Zoloft 150mg daily, hold for now  - Trazodone 150 mg PO PRN at bedtime, hold for now     Allergies:  - Allegra 180mg qPM      Pulm:  Hx of CANDELARIO: previously used CPAP. Difficult to get got oximetry reading on admission but normal exam.  - Routine vitals w/ spot pulse oximetry Q8H    MSK  Right lateral hip laceration from falling on bed this past summer. Well-healed on exam today.   - Photo documentation provided in chart (media tab in chart review)    Veronica Estrella MD  Pediatrics PGY-1    Patient discussed with Dr. Vlad Ratliff.     Physician Attestation     I, Mandeep White MD, saw this  "patient with the resident and agree with the resident s findings and plan of care as documented in the resident s note.       I personally reviewed vital signs, medications, labs and imaging (as applicable).     Mandeep White MD  Pediatric Hematology/Oncology  [facility]  Date of Service (when I saw the patient): 3/10/2021    Primary Care Physician   Jeffrey Espinoza    Chief Complaint   Abdominal pain    History is obtained from the patient and his father separately.    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    Per Geo, he has had intermittent diffuse abdominal pain for 3 years, which he describes as 5/10, dull and achy pressure, like his belly will pop. It hurts to sit up or lay down, and he spends most of his time in a wheelchair for the past year, not walking much. He is otherwise doing well and has no other current symptoms, besides vomiting 1 week ago. He had a \"huge\" large caliber hard stool yesterday, and he usually stools daily. Besides yesterday, stools are usually soft \"like silly putty.\" He only has pain with defecation when stools are large such as yesterday. He has not noted any blood in his stool but is not sure he would notice that without his glasses. He hurt his hip falling on a bed last summer, and reports that it is healed up now. He received both doses of the Covid vaccine last month. He would like to avoid taking the medicines that make him tired while he is getting his clean-out. He also reports feeling concerned that doctors are lying to him at times.     Per discussion with patient's " father outside the room, Geo has often been complaining of abdominal pain and patient believes this is due to constipation.  He often lays in bed on his back, complaining that his belly hurts otherwise. He is not sure if patient has had blood in his stool. Patient is currently living at a residential home with assistance for the past year.  In father's view, patient is concerned he has not been getting enough to eat and feels concerned about both this and constipation. Dad reports that he does have a poor diet and eats a larger amount than he should. He also notes that patient has had trouble trusting healthcare providers in the past, especially with respect to his constipation.     Per chart review, patient was evaluated in peds heme/onc clinic (virtually) on the day prior to admission and noted that while he is having bowel movements daily he still feels like he has significant constipation and pain with defecation. He was last seen by gastroenterology 8/19/20. He was evaluated at that point to have constipation well managed by linzess 290mcg daily. He was cleared to use PRN docusate or milk of Mg if 2-3 days without stool and increased abdominal symptoms.       Past Medical History    Past Medical History:   Diagnosis Date     Cranial nerve dysfunction      Dyspepsia      Ependymoma (H)      Gastro-oesophageal reflux disease      Hearing loss      Intracranial hemorrhage (H)      Migraine      Pilonidal cyst     7-2015     Reduced vision      Refractory obstruction of nasal airway     2nd to nasal valve prolapse     Sleep apnea      Strabismus     gaze palsy        Past Surgical History   Past Surgical History:   Procedure Laterality Date     COLONOSCOPY N/A 9/27/2019    Procedure: Colonoscopy With Biopsies and Polypectomy;  Surgeon: Aniya Wei MD;  [facility]: [facility]     ESOPHAGOSCOPY, GASTROSCOPY, DUODENOSCOPY (EGD), COMBINED N/A 9/27/2019    Procedure: Upper Endoscopy (EGD) With Biopsies;   Surgeon: Aniya Wei MD;  [facility]     GRAFT CARTILAGE FROM POSTERIOR AURICLE Left 10/6/2016    Procedure: GRAFT CARTILAGE FROM POSTERIOR AURICLE;  Surgeon: Tyler Richards MD;  [facility]     INCISION AND DRAINAGE PERINEAL, COMBINED Bilateral 7/18/2015    Procedure: COMBINED INCISION AND DRAINAGE PERINEAL;  Surgeon: Dequan Timmons MD;  [facility]     OPTICAL TRACKING SYSTEM CRANIOTOMY, EXCISE TUMOR, COMBINED N/A 4/13/2015    Procedure: COMBINED OPTICAL TRACKING SYSTEM CRANIOTOMY, EXCISE TUMOR;  Surgeon: Francis Velazquez MD;  [facility]     OPTICAL TRACKING SYSTEM CRANIOTOMY, EXCISE TUMOR, COMBINED N/A 4/16/2015    Procedure: COMBINED OPTICAL TRACKING SYSTEM CRANIOTOMY, EXCISE TUMOR;  Surgeon: Francis Velazquez MD;  [facility]     OPTICAL TRACKING SYSTEM CRANIOTOMY, EXCISE TUMOR, COMBINED Bilateral 5/28/2015    Procedure: COMBINED OPTICAL TRACKING SYSTEM CRANIOTOMY, EXCISE TUMOR;  Surgeon: Francis Velazquez MD;  [facility]     OPTICAL TRACKING SYSTEM CRANIOTOMY, EXCISE TUMOR, COMBINED Bilateral 1/14/2016    Procedure: COMBINED OPTICAL TRACKING SYSTEM CRANIOTOMY, EXCISE TUMOR;  Surgeon: Francis Velazquez MD;  [facility]     OPTICAL TRACKING SYSTEM VENTRICULOSTOMY  4/16/2015    Procedure: OPTICAL TRACKING SYSTEM VENTRICULOSTOMY;  Surgeon: Francis Velazquez MD;  [facility]     REMOVE PORT VASCULAR ACCESS N/A 10/6/2016    Procedure: REMOVE PORT VASCULAR ACCESS;  Surgeon: Bruno Perea MD;  [facility]     RHINOPLASTY N/A 10/6/2016    Procedure: RHINOPLASTY;  Surgeon: Tyler Richards MD;  [facility]     VASCULAR SURGERY  5-2015    single lumen power port       Immunization History   Immunization Status:  up to date and documented, delayed  -Per [facility] needs MenB, MMR, Tdap    Prior to Admission Medications   Cannot display prior to admission medications because the patient has not been  "admitted in this contact.     Allergies   Allergies   Allergen Reactions     Blood Transfusion Related (Informational Only) Swelling     Periorbital swelling post platelet transfusion     No Known Drug Allergies        Social History   Pediatric History   Patient Parents/Guardians     Jj Hicks \"Eric\" ([place] GU[place]) (Father/Guardian)     Christianne Sevilla ([place] GUARD[place]) (Mother/Guardian)     Other Topics Concern     Not on file   Social History Narrative    Grown up in [place], [place].  Parents are , and he shares time between his mother's and father's homes. Both parents are remarried.  Dad is a , and mom does  for a testing company.  Siblings include two full brothers (older brother Benitez who is a senior in college and younger brother Gamaliel), a step-brother, and a step-sister. Geo graduated from high school in Spring 2018.  Initially considered attending [employer] in Fall 2019, but reportedly lost interest due to the amount of time it would take him to complete courses and receive a degree.  Does today endorse continued interest in pursuing an advanced course of study at some point, specifically stating he is interested in Electrical Engineering.  No access to guns or weapons enjoys playing video games.  Guns are currently locked in the house.   H - lives in a residential center with assistance  E - not currently doing school or work   A - likes watching netflix and playing video games  D - no current use of drugs or alcohol, had some alcohol in [place] on his 20st birthday recently  S - never sexually active   S - feels safe in his relationships and environments  S - does have some sadness at times     Family History   Family History   Problem Relation Age of Onset     Circulatory Father         PE/DVT     Hypothyroidism Father 30     Diabetes Maternal Grandmother      Diabetes Paternal Grandmother      Diabetes Paternal Grandfather      C.A.D. Paternal " Grandfather      Hypertension Maternal Grandfather      Thyroid Disease Paternal Aunt         unknown whether hypo or hyper     Mental Illness No family hx of        Review of Systems   See HPI.    Physical Exam   Vital Signs with Ranges  Pulse:  [107] 107  Resp:  [22] 22  BP: (121)/(75) 121/75  SpO2:  [92 %] 92 %     GENERAL: Active, alert, in no acute distress, sitting comfortably in bed, conversational and cooperative  SKIN: Stretch marks over abdomen and anterior arms, bilateral shins with abrasions, right thumb with bruise over nail, right lateral hip with well-healed scar (see image in chart), no other significant rash, abnormal pigmentation or lesions  HEAD: Normocephalic  EYES: Wearing right eye patch and glasses, pupils equal, round, reactive, unable to abduct left eye, subjective diplopia with patch removed, normal conjunctivae.  EARS: Normal external canals.   NOSE: Normal without discharge.  MOUTH/THROAT: Clear. No oral lesions. Teeth without obvious abnormalities.  NECK: Supple, no masses.  No thyromegaly.  LYMPH NODES: No adenopathy  LUNGS: Clear. No rales, rhonchi, wheezing or retractions  HEART: Regular rhythm. Normal S1/S2. No murmurs. Normal peripheral pulses, extremities warm and well-perfused.  ABDOMEN: Soft, obese, non-tender, not distended, no masses or hepatosplenomegaly. Bowel sounds normal.   NEUROLOGIC: Asymmetric smile, moderate bilateral arm tremor, able to replace glasses and patch independently, unable to puff out cheeks, see eye exam, no other focal findings. Normal strength.    Data   I personally reviewed the abdominal x-ray image(s) showing moderate stool burden.

## 2021-03-10 NOTE — PROGRESS NOTES
"  The patient has been notified of following:     \"This video visit will be conducted via a call between you and your physician/provider. We have found that certain health care needs can be provided without the need for an in-person physical exam.  This service lets us provide the care you need with a video conversation.  If a prescription is necessary we can send it directly to your pharmacy.  If lab work is needed we can place an order for that and you can then stop by our lab to have the test done at a later time.    Video visits are billed at different rates depending on your insurance coverage.  Please reach out to your insurance provider with any questions.    If during the course of the call the physician/provider feels a video visit is not appropriate, you will not be charged for this service.\"    Patient has given verbal consent for Video visit? Yes    Video-Visit Details    Type of service:  Video Visit    Video Start Time: 1:50 PM  Video End Time: 2:07 PM    Originating Location (pt. 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 Dad is here and notes his wheelchair is broken.    HPI: Geo notes the girth of his last stool last evening was very large.  The stool was painful to expel.  He doesn't wear his glasses to the bathroom so doesn't know if there was any blood in the stool.  He notes his stomach still hurts when he sits up in his chair. He has been laying on his bed a lot at home over the last week especially due to his concern of increasing constipation.    And the pain increases when he leans forward on the " chair.  He reports the abdominal discomfort is surrounding his umbilicus and in the front of his stomach but also the sides of his abdomen.      Geo still notices when he coughs he gets a head rush. His oxygen saturations are 92% today.  He has gained quite a bit of weight so states he gets out of breath more when doing activities and can't take as deep of breaths.  His finger has totally healed.   Hip wound has also healed.  He is living in his group care facility.        Imaging:    Results for orders placed or performed during the hospital encounter of 03/10/21   XR Abdomen Port 1 View     Status: None    Narrative    Exam: XR ABDOMEN PORT 1 VW, 3/10/2021 8:52 PM    Indication: NG tube placement    Comparison: 3/10/2021    Findings:   Supine AP views of the abdomen. Gastric tube with distal end and  sidehole projecting at the level of the stomach. Multiple loops of  moderately gas distended bowel in the abdomen. Air is present within  the colon. No pneumatosis. No focal consolidation in the visualized  portion of the lower lungs. No acute osseous findings.      Impression    Impression:   1. Gastric tube with the distal end and sidehole in the stomach.  2. Moderately gas distended bowel in a nonobstructive pattern.    I have personally reviewed the examination and initial interpretation  and I agree with the findings.    KYLIE CHACON, DO   Renal Panel     Status: Abnormal   Result Value Ref Range    Sodium 140 133 - 144 mmol/L    Potassium 4.0 3.4 - 5.3 mmol/L    Chloride 104 94 - 109 mmol/L    Carbon Dioxide 34 (H) 20 - 32 mmol/L    Anion Gap 2 (L) 3 - 14 mmol/L    Glucose 78 70 - 99 mg/dL    Urea Nitrogen 17 7 - 30 mg/dL    Creatinine 0.98 0.66 - 1.25 mg/dL    GFR Estimate >90 >60 mL/min/[1.73_m2]    GFR Estimate If Black >90 >60 mL/min/[1.73_m2]    Calcium 8.5 8.5 - 10.1 mg/dL    Phosphorus 3.3 2.5 - 4.5 mg/dL    Albumin 3.0 (L) 3.4 - 5.0 g/dL   Magnesium     Status: None   Result Value Ref Range     Magnesium 2.2 1.6 - 2.3 mg/dL   CBC with platelets differential     Status: Abnormal   Result Value Ref Range    WBC 4.2 4.0 - 11.0 10e9/L    RBC Count 4.84 4.4 - 5.9 10e12/L    Hemoglobin 11.1 (L) 13.3 - 17.7 g/dL    Hematocrit 37.8 (L) 40.0 - 53.0 %    MCV 78 78 - 100 fl    MCH 22.9 (L) 26.5 - 33.0 pg    MCHC 29.4 (L) 31.5 - 36.5 g/dL    RDW 19.6 (H) 10.0 - 15.0 %    Platelet Count 119 (L) 150 - 450 10e9/L    Diff Method Automated Method     % Neutrophils 48.3 %    % Lymphocytes 27.6 %    % Monocytes 15.7 %    % Eosinophils 5.5 %    % Basophils 1.0 %    % Immature Granulocytes 1.9 %    Nucleated RBCs 1 (H) 0 /100    Absolute Neutrophil 2.0 1.6 - 8.3 10e9/L    Absolute Lymphocytes 1.2 0.8 - 5.3 10e9/L    Absolute Monocytes 0.7 0.0 - 1.3 10e9/L    Absolute Eosinophils 0.2 0.0 - 0.7 10e9/L    Absolute Basophils 0.0 0.0 - 0.2 10e9/L    Abs Immature Granulocytes 0.1 0 - 0.4 10e9/L    Absolute Nucleated RBC 0.1    pH Gastric Aspirate     Status: None   Result Value Ref Range    pH Gastric Aspirate 5.3    pH Gastric Aspirate     Status: None   Result Value Ref Range    pH Gastric Aspirate Greater than or equal to 6.1    Results for orders placed or performed during the hospital encounter of 03/10/21   X-ray Abdomen 1 vw     Status: None    Narrative    Exam: XR ABDOMEN 1 VW, 3/10/2021 1:25 PM    Indication: Constipation Please evaluate stool burden; Ependymoma (H);  Weight gain    Comparison: 7/9/2020, 1/27/2020    Findings:   Supine AP views of the abdomen were obtained. Nonobstructive bowel gas  pattern. No pneumatosis or portal venous gas appreciated. Several  phleboliths in the pelvis. Moderate stool burden. No acute osseous  abnormalities.      Impression    Impression:   Moderate stool burden.     I have personally reviewed the examination and initial interpretation  and I agree with the findings.    MARIO ALBERTO AYALA MD   Results for orders placed or performed in visit on 03/10/21   Asymptomatic COVID-19 Virus  (Coronavirus) by PCR     Status: None    Specimen: Nasopharyngeal   Result Value Ref Range    COVID-19 Virus PCR to [facility] - Source Anterior     COVID-19 Virus PCR to [facility] - Result       Test received-See reflex to IDDL test SARS CoV2 (COVID-19) Virus RT-PCR   SARS-CoV-2 COVID-19 Virus (Coronavirus) by PCR     Status: None    Specimen: Nasopharyngeal   Result Value Ref Range    SARS-CoV-2 Virus Specimen Source Anterior     SARS-CoV-2 PCR Result NEGATIVE     SARS-CoV-2 PCR Comment       Testing was performed using the Aptima SARS-CoV-2 Assay on the [employer] Instrument System.   Additional information about this Emergency Use Authorization (EUA) assay can be found via   the Lab Guide.            Oncology History:    [id number]   6/26/15    Started Entinostat 1/9/17    DIAGNOSIS: EPENDYMOMA  AREAS TREATED: POST FOSSA TUMOR  DOSE: 5940 cGy   TYPE OF RADIATION GIVEN: with IMRT Tomotherapy   9/08/2015 to 10/26/15    Patient Active Problem List   Diagnosis     GERD (gastroesophageal reflux disease)     Closed fracture at the growth plate of right distal fibula      Elevated serum creatinine     Dyspepsia     Intracranial hemorrhage (H)     Hemorrhagic stroke (H)     Ependymoma (H)     Admission for antineoplastic chemotherapy     Post-operative state     S/P craniotomy     S/P biopsy     Noncomitant strabismus     Abducens neuropathy of both eyes     CANDELARIO (obstructive sleep apnea)     Health Care Home     Hypernatremia     Body temperature low     Current chronic use of systemic steroids     Elevated TSH     Status post chemotherapy     Neoplasm of posterior cranial fossa (H)     Chronic constipation     Serum albumin decreased     Closed compression fracture of thoracic vertebra with routine healing, subsequent encounter     Depression     Constipation     Constipation by delayed colonic transit     Slow transit constipation     Tubular adenoma     Current Outpatient Medications   Medication     dexamethasone (DECADRON)  "0.5 MG tablet      MG capsule     doxycycline hyclate (VIBRAMYCIN) 100 MG capsule     fexofenadine (ALLEGRA) 180 MG tablet     Lactobacillus-Inulin ([facility]) CAPS     lamoTRIgine (LAMICTAL) 200 MG tablet     linaclotide (LINZESS) 290 MCG capsule     mupirocin (BACTROBAN) 2 % external ointment     OLANZapine (ZYPREXA) 20 MG tablet     OLANZapine (ZYPREXA) 5 MG tablet     omeprazole (PRILOSEC) 20 MG DR capsule     order for DME     order for DME     polyethylene glycol (MIRALAX) 17 GM/Dose powder     potassium phosphate, monobasic, (K-PHOS) 500 MG tablet     sennosides (SENOKOT) 8.6 MG tablet     sertraline (ZOLOFT) 100 MG tablet     study - entinostat, IDS# 5050, 1 mg tablet     study - entinostat, IDS# 5050, 5 mg tablet     sulfamethoxazole-trimethoprim (BACTRIM) 400-80 MG tablet     traZODone (DESYREL) 150 MG tablet     vitamin D3 (CHOLECALCIFEROL) 2000 units (50 mcg) tablet     vitamin E (TOCOPHEROL) 400 units (360 mg) capsule     No current facility-administered medications for this visit.         Allergies   Allergen Reactions     Blood Transfusion Related (Informational Only) Swelling     Periorbital swelling post platelet transfusion     No Known Drug Allergies        Review of Systems  Skin: positive for striae,   Eyes: positive for oculoplegia  Ears/Nose/Throat: negative  Respiratory: No shortness of breath, dyspnea on exertion, cough, or hemoptysis  Cardiovascular: negative  Gastrointestinal: constipation.   Genitourinary: negative  Musculoskeletal: negative  Neurologic: positive for  local weakness, speech problems, incoordination and behavior changes  Psychiatric: anxiety, agitation and perseveration re: constipation.    Hematologic/Lymphatic/Immunologic: negative  Endocrine: weight gain.    Physical Exam    Wt Readings from Last 1 Encounters:   03/10/21 116.5 kg (256 lb 13.4 oz)      Ht Readings from Last 1 Encounters:   12/07/20 1.804 m (5' 11.02\")     Wt Readings from Last 2 " Encounters:   03/10/21 116.5 kg (256 lb 13.4 oz)   12/07/20 104.8 kg (231 lb 1.6 oz)     Pulse:  [107] 107  Resp:  [22] 22  BP: (121)/(75) 121/75  SpO2:  [92 %] 92 %      GENERAL: Healthy, and alert.  EYES: Eyes grossly normal to inspection.  No discharge or erythema,patch in place over right eye. HENT: Normocephalic.  External ears, nose and mouth without ulcers or lesions.  He is drooling.  RESP: No audible wheeze, cough, or visible cyanosis.  When attempting to plug in his phone and activity move about and bend to plug he did increase his work of breathing.  It normalized when he finished.   SKIN: Scattered bruising. No significant rash, abnormal pigmentation or lesions.  Hip wound not examined today.  MSK: Moves upper extremities but is ataxic.  NEURO: prominent dysarthria,  His speech continues to be difficult to understand at times.  PSYCH:  Affect varies with the topic, and appearance well-groomed.    Impression:  Ependymoma in good control on Entinostat    Right hip and finger well healed after fall.  Mental health issues continue  Daily bowel movements however Geo continues with concerns of constipation.  Abdominal discomfort with moderate stool burden despite large stool last evening.  Weight increase over time.  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    Plan:      Reviewed that he has lower saturations at times (which is not new) and that he used to have CPAP machine. This head rush feel may be related to these lower sats.  Discussed with dad that we have noted these symptoms for about two months.     We will admit for bowel clean out.  Discussed with the in patient team.     He will continue Entinostat - 7mg orally weekly.     Due to his long term condition, changing body habitus and safety concerns related to his ataxia, mental health and ependymoma, Geo needs a custom manual wheelchair.        He is due for  imaging in March.  He will be scanned on March 16th and see Dr. Rousseau at that time.       Total time spent on the following services on the date of the encounter:  Preparing to see patient, chart review, review of outside records, Referring or communicating with other healthcare professionals, Performing a medically appropriate examination , Counseling and educating the patient/family/caregiver , Documenting clinical information in the electronic or other health record , Communicating results to the patient/family/caregiver  and Total time spent: 20 minutes

## 2021-03-11 NOTE — UTILIZATION REVIEW
"  Admission Status; Secondary Review Determination         Under the authority of the Utilization Management Committee, the utilization review process indicated a secondary review on the above patient.  The review outcome is based on review of the medical records, discussions with staff, and applying clinical experience noted on the date of the review.          (x) Observation Status Appropriate - This patient does not meet hospital inpatient criteria and is placed in observation status. If this patient's primary payer is Medicare and was admitted as an inpatient, Condition Code 44 should be used and patient status changed to \"observation\".     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          Pt is a 11y with acute on chronic constipation. Pt has a history of neuro deficits secondary to his PMH and resections as well as ASD. I discussed with the team and pt has been unable to comply with medications and instructions for home cleanout in the past as outpatient. Given need for NGT cleanout but anticipating <48 hour stay (likely discharge tomorrow) and medically stable in an adult patient, the team will change status back to observation at this time with likely discharge tomorrow if safe for outpatient followup. Can bill from original observation order placed at the time of admission.      Given the severity of illness, intensity of service provided, expected LOS and risk for adverse outcome make the care appropriate for further observation; however, doesn't meet criteria for hospital inpatient admission.     The information on this document is " developed by the utilization review team in order for the business office to ensure compliance.  This only denotes the appropriateness of proper admission status and does not reflect the quality of care rendered.         The definitions of Inpatient Status and Observation Status used in making the determination above are those provided in the CMS Coverage Manual, Chapter 1 and Chapter 6, section 70.4.      Sincerely,  Mercedes Zurita MD  Utilization Review  Physician Advisor  [facility]

## 2021-03-11 NOTE — DISCHARGE SUMMARY
[facility]  Discharge Summary - Medicine & Pediatrics       Date of Admission:  3/10/2021  Date of Discharge:  3/12/2021  Discharging Provider: Soham  Discharge Service: Pediatric Hematology/Oncology    Discharge Diagnoses   Acute on chronic constipation  Ependymoma     Follow-ups Needed After Discharge   Follow-up Appointments     Follow Up and recommended labs and tests      Follow up in heme/oncology clinic on 3/18/21 as previously scheduled with   Dr. Rousseau.             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  The following problems were addressed during his hospitalization:    Acute on Chronic Constipation  Upon admission an NG tube was placed and Geo was given golytely titrated from 500ml/hr to 1L/hr, which he tolerated well. By the second day of hospitalization he achieved goal of three clear stools. Abdominal XR was performed which confirmed clean out. He was restarted on his home bowel medications at discharge. Hospital stay was complicated by chronic delusions about constipation despite full clean out and x ray confirmation, mother aware.     Dyslipidemia  Fasting lipid panel was collected given recent history of abnormal lipid panel as outpatient, which again showed elevation of triglycerides and total cholesterol. Given ongoing weight gain, dyslipidemia, and chronic anti-psychotic use, pediatric weight management referral was placed at discharge.       Consultations This Hospital Stay    None    Code Status   Full Code       The patient was discussed with Dr. Rousseau.     Evelyn Chandler MD  Pediatric Heme/oncology Service  [facility] PEDIATRIC MEDICAL SURGICAL UNIT [address]  [facility] [address]  Phone: [phone]    Physician Attestation   I, Leoncio Rousseau, saw and evaluated this patient prior to discharge.  I discussed the patient with the resident/fellow and agree with plan of care as documented in the note.      I personally reviewed vital signs, medications and labs.    I personally spent 35 minutes on discharge activities.    Leoncio Rousseau MD  Date of Service (when I saw the patient): 3/12/21    ______________________________________________________________________    Physical Exam   Vital Signs: Temp: 96.9  F (36.1  C) Temp src: Axillary BP: (!) 141/96 Pulse: 83   Resp: 20 SpO2: 93 % O2 Device: None (Room air)    Weight: 254 lbs 0 oz  Constitutional: awake, alert, conversant, no apparent distress  Eyes: Patch on right eye, left eye sclera non-icteric, non-injected.   ENT: Normocephalic, without obvious abnormality, atraumatic, external ears without lesions.  Respiratory: No increased work of breathing, good air exchange, clear to auscultation bilaterally, no crackles or wheezing  Cardiovascular: regular rate and rhythm, normal S1 and S2, and no murmur noted  GI: Normal bowel sounds, soft, non-distended, non-tender  Skin: Widespread purple striae on abdomen and extremities; scattered scabs and scratches on lower extremities  Musculoskeletal: There is no redness, warmth, or swelling of the joints. Tone is normal.  Neurologic/Psych: Awake, alert, oriented to name, place and time. Dysarthria. Persistent delusion about constipation despite clean out, and link between constipation and weight gain.        Primary Care Physician   Jeffrey Espinoza    Discharge Orders      Reason for your hospital stay    Geo was admitted to the hospital for  constipation requiring a bowel clean out. He was given golytely via an NG tube until the constipation resolved. He tolerated the clean out well and was transitioned to his home bowel regimen at discharge.     Activity    Your activity upon discharge: activity as tolerated     Follow Up and recommended labs and tests    Follow up in heme/oncology clinic on 3/18/21 as previously scheduled with Dr. Rousseau.     Diet    Follow this diet upon discharge: Regular diet       Significant Results and Procedures   Results for orders placed or performed during the hospital encounter of 03/10/21   XR Abdomen Port 1 View    Narrative    Exam: XR ABDOMEN PORT 1 VW, 3/10/2021 8:52 PM    Indication: NG tube placement    Comparison: 3/10/2021    Findings:   Supine AP views of the abdomen. Gastric tube with distal end and  sidehole projecting at the level of the stomach. Multiple loops of  moderately gas distended bowel in the abdomen. Air is present within  the colon. No pneumatosis. No focal consolidation in the visualized  portion of the lower lungs. No acute osseous findings.      Impression    Impression:   1. Gastric tube with the distal end and sidehole in the stomach.  2. Moderately gas distended bowel in a nonobstructive pattern.    I have personally reviewed the examination and initial interpretation  and I agree with the findings.    KYLIE CHACON, DO     *Note: Due to a large number of results and/or encounters for the requested time period, some results have not been displayed. A complete set of results can be found in Results Review.       Discharge Medications   Current Discharge Medication List      CONTINUE these medications which have NOT CHANGED    Details   Cholecalciferol (VITAMIN D3) 50 MCG (2000 UT) TABS Take 2,000 Units by mouth daily (with breakfast)      dexamethasone (DECADRON) 0.5 MG tablet Take 0.75 mg by mouth daily (with breakfast)      docusate sodium (COLACE) 100 MG capsule Take 100 mg by mouth 2  times daily as needed for constipation      fexofenadine (ALLEGRA) 180 MG tablet Take 180 mg by mouth At Bedtime      Lactobacillus-Inulin ([facility] HEALTH & WELLNESS) CAPS Take 2 capsules by mouth daily  Qty: 30 capsule, Refills: 3    Associated Diagnoses: Ependymoma (H); Neoplasm of posterior cranial fossa (H)      lamoTRIgine (LAMICTAL) 200 MG tablet Take 1 tablet (200 mg) by mouth At Bedtime  Qty: 30 tablet, Refills: 2    Associated Diagnoses: Agitation; Depression, unspecified depression type      linaclotide (LINZESS) 290 MCG capsule Take 1 capsule (290 mcg) by mouth every morning (before breakfast)  Qty: 30 capsule, Refills: 3    Associated Diagnoses: Slow transit constipation      !! OLANZapine (ZYPREXA) 15 MG tablet Take 1 tablet (15 mg) by mouth At Bedtime  Qty: 30 tablet, Refills: 2    Associated Diagnoses: Paranoia (H); Agitation      !! OLANZapine (ZYPREXA) 2.5 MG tablet Take 2.5 mg by mouth daily as needed (agitation)      omeprazole (PRILOSEC) 20 MG DR capsule Take 2 capsules (40 mg) by mouth every morning  Qty: 60 capsule, Refills: 1    Associated Diagnoses: Ependymoma (H)      polyethylene glycol (MIRALAX) 17 GM/Dose powder Take 17 g (1 capful) by mouth 3 times daily  Qty: 289 g, Refills: 3    Associated Diagnoses: Constipation, unspecified constipation type      potassium phosphate, monobasic, (K-PHOS) 500 MG tablet Take 1 tablet (500 mg) by mouth 2 times daily  Qty: 90 tablet, Refills: 3    Associated Diagnoses: Ependymoma (H)      sertraline (ZOLOFT) 100 MG tablet Take 1 tablet (100 mg) by mouth daily  Qty: 30 tablet, Refills: 2    Associated Diagnoses: Depression, unspecified depression type      sulfamethoxazole-trimethoprim (BACTRIM) 400-80 MG tablet Take 1 tablet by mouth 2 times daily On Saturdays and Sundays  Qty: 24 tablet, Refills: 11    Comments: Still has home supply, transitioning pharmacies. Group home or family will call to fill.  Associated Diagnoses: Ependymoma (H)      traZODone  (DESYREL) 150 MG tablet Take 1 tablet (150 mg) by mouth nightly as needed for sleep or depression  Qty: 30 tablet, Refills: 2    Associated Diagnoses: Depression, unspecified depression type; Psychophysiological insomnia      vitamin E 400 units TABS Take 400 Units by mouth daily      mupirocin (BACTROBAN) 2 % external ointment Use three times a day PRN with skin lesions or open sores.  Qty: 22 g, Refills: 3    Associated Diagnoses: Bacterial folliculitis; Ependymoma (H)      !! order for DME Equipment being ordered: Dressing  Wound #1 lower leg, W 6 cm x, D 0.5  cm, Drainage scant, Thickness sutured     Type: non stick gauze, Brand: , Size: 4/4   Change: 1 per day    Tape/Wrap: 1 each     attach facesheet with insurance information (delete this line)  Qty: 5 each, Refills: 0    Associated Diagnoses: Laceration of left lower extremity, subsequent encounter      !! order for DME Equipment being ordered: short boot  Qty: 1 each, Refills: 0    Associated Diagnoses: Closed fracture of base of fifth metatarsal bone of left foot, initial encounter       !! - Potential duplicate medications found. Please discuss with provider.      STOP taking these medications       study - entinostat, IDS# [id number], 1 mg tablet Comments:   Reason for Stopping:         study - entinostat, IDS# [id number], 5 mg tablet Comments:   Reason for Stopping:             Allergies   Allergies   Allergen Reactions     Blood Transfusion Related (Informational Only) Swelling     Periorbital swelling post platelet transfusion     No Known Drug Allergies

## 2021-03-11 NOTE — PROGRESS NOTES
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      SW met with Geo to introduce self and provide supportive check-in. Geo talked about his blended family, including his many siblings. He shared that he is living in [place] and happy with his residence. Geo enjoys playing video games and watching TV. He reports that he is feeling \"pretty good.\"     Geo expressed appreciation for SW introduction and check-in. He denied having questions at this time but knows how to get in touch with SW during his admission.     INTERVENTION:     SW introduction, supportive check-in, engaging pt in adjustment counseling     ASSESSMENT:     Geo was polite and easily engaged with SW. Mood appears stable. He has strong family support and is adequately connected to resources. Geo appears to be coping well with treatment and diagnosis at this time.     PLAN:     Social work will continue to assess needs and provide ongoing psychosocial support and access to resources.     GARCIA Agarwal, [facility]    Phone: [phone]  Pager: [phone]  Email: [email or web address]  3/11/2021  *no letter*              "

## 2021-03-11 NOTE — TELEPHONE ENCOUNTER
"Called patient group home to notify them to start coordinating visit for patient to PCP. LVM requesting a call back.    Got soumyaold of father who stated that patient is in the hospital for \"clean-out\" and his oncology doctor is working this week on the unit where he is being seen.    Father requested that a message be routed to Dr. Rousseau regarding lab work.  "

## 2021-03-11 NOTE — PLAN OF CARE
([id number]): AVSS. C/o intermittent abdominal discomfort. No reports of nausea. Started clean out at 2230. Currently at 1000ml/hr golytely and tolerating well. Stools remain semi-formed/soft. Voiding well. Assessment stable. Remains well perfused w/o any desaturations. Otherwise fine. Will continue to monitor and notify MD of any changes.

## 2021-03-11 NOTE — DISCHARGE INSTRUCTIONS
For temperature >100.5, increased nausea, vomiting, pain or any other concerns, please call [phone] & ask to talk to the Pediatric Oncology Fellow On Call.    Tuesday, March 16  -  MRI @ 1 PM   -  [facility] @ 2:30 PM          FAIR AND EQUAL TREATMENT FOR EVERYONE  At [facility], our health team and leaders are actively working to make sure everyone is treated fairly and equally.  If you did not feel that way today then please let us or patient relations know.   Email [email or web address]  or call [phone]

## 2021-03-11 NOTE — PHARMACY-ADMISSION MEDICATION HISTORY
Admission Medication History Completed by Pharmacy    See [facility] Admission Navigator for allergy information, preferred outpatient pharmacy, prior to admission medications and immunization status.     Medication History Sources:     Patient's father, Jj ([phone])    Dispense Report    Changes made to PTA medication list (reason):    Added: None    Deleted:   o Doxycycline hyclate 100 mg caps; 1C PO BID x 7 days (therapy complete per pt father and fill hx)  o Sennosides 8.6 mg tabs; 1T PO daily (per fill hx)    Changed:   o Docusate 100 mg caps; N/A --> 1C PO BID PRN (per fill hx)  o Olanzapine 5 mg tabs --> 2.5 mg tabs; 1T PO daily PRN agitation (per fill hx)    Additional Information:    Patient's father reports that the patient resides in a group home full time. Patient's father states he believes that the patient took his morning medications today before coming to the hospital and estimated PRN last doses.     Patient's father states he will being going to the patient's group home tomorrow to  entinostat to bring in to the hospital and reports that the patient takes it on Thursdays and believes that he is on the final round of this cycle.     Patient's father suggested holding off on psych meds tonight (lamotrigine, olanzapine, sertraline, trazodone) as they make the patient drowsy and have interrupted bowel clean out in the past.     Prior to Admission medications    Medication Sig Last Dose Taking? Auth Provider   Cholecalciferol (VITAMIN D3) 50 MCG (2000 UT) TABS Take 2,000 Units by mouth daily (with breakfast) 3/10/2021 at AM Yes Unknown, Entered By History   dexamethasone (DECADRON) 0.5 MG tablet Take 0.75 mg by mouth daily (with breakfast) 3/10/2021 at AM Yes Unknown, Entered By History   docusate sodium (COLACE) 100 MG capsule Take 100 mg by mouth 2 times daily as needed for constipation Past Week Yes Unknown, Entered By History   fexofenadine (ALLEGRA) 180 MG tablet Take 180 mg by mouth  At Bedtime 3/9/2021 at HS Yes Unknown, Entered By History   Lactobacillus-Inulin ([facility] HEALTH & WELLNESS) CAPS Take 2 capsules by mouth daily 3/10/2021 at AM Yes Kristi Schuler APRN CNP   lamoTRIgine (LAMICTAL) 200 MG tablet Take 1 tablet (200 mg) by mouth At Bedtime 3/9/2021 at HS Yes Coffin, Richard Breyen, MD   linaclotide (LINZESS) 290 MCG capsule Take 1 capsule (290 mcg) by mouth every morning (before breakfast) 3/10/2021 at AM Yes Kristi Schuler APRN CNP   OLANZapine (ZYPREXA) 15 MG tablet Take 1 tablet (15 mg) by mouth At Bedtime 3/9/2021 at HS Yes Coffin, Richard Breyen, MD   OLANZapine (ZYPREXA) 2.5 MG tablet Take 2.5 mg by mouth daily as needed (agitation) Past Week Yes Unknown, Entered By History   omeprazole (PRILOSEC) 20 MG DR capsule Take 2 capsules (40 mg) by mouth every morning 3/10/2021 at AM Yes Kristi Schuler APRN CNP   polyethylene glycol (MIRALAX) 17 GM/Dose powder Take 17 g (1 capful) by mouth 3 times daily 3/10/2021 Yes Kristi Schuler APRN CNP   potassium phosphate, monobasic, (K-PHOS) 500 MG tablet Take 1 tablet (500 mg) by mouth 2 times daily 3/10/2021 at AM Yes Kristi Schuler APRN CNP   sertraline (ZOLOFT) 100 MG tablet Take 1 tablet (100 mg) by mouth daily 3/9/2021 at HS Yes Coffin, Richard Breyen, MD   study - entinostat, IDS# 5050, 1 mg tablet Take 2 tablets (2 mg) by mouth every 7 days for 4 doses Take two 1mg tablet with one 5mg tablet for total dose of 7 mg weekly. Take on an empty stomach, at least 1 hour before or 2 hours after a meal.  Swallow tablet whole. 3/4/2021 Yes Leoncio Rousseau MD   study - entinostat, IDS# 5050, 5 mg tablet Take 1 tablet (5 mg) by mouth every 7 days for 4 doses Take one 5mg tablet with two 1mg tablet for total dose of 7 mg weekly. Take on an empty stomach, at least 1 hour before or 2 hours after a meal.  Swallow tablet whole. 3/4/2021 Yes Leoncio Rousseau MD   sulfamethoxazole-trimethoprim  (BACTRIM) 400-80 MG tablet Take 1 tablet by mouth 2 times daily On Saturdays and Sundays 3/7/2021 Yes Zoraida Wilder MD   traZODone (DESYREL) 150 MG tablet Take 1 tablet (150 mg) by mouth nightly as needed for sleep or depression 3/9/2021 at HS Yes Coffin, Richard Breyen, MD   vitamin E 400 units TABS Take 400 Units by mouth daily 3/10/2021 at AM Yes Unknown, Entered By History   mupirocin (BACTROBAN) 2 % external ointment Use three times a day PRN with skin lesions or open sores. More than a month  Kristi Schuler, ALAN CNP     Date completed: 03/10/21    Medication history completed by:  Whitney Lion, Pharmacy Intern

## 2021-03-11 NOTE — PROGRESS NOTES
[facility]    Hematology/Oncology Progress Note    Date of Service (when I saw the patient): 03/11/2021     Assessment & Plan   Geo Hicks is a 20 year old male who was admitted on 3/10/2021. 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 Still with soft brown stool this am so will need another day of bowel clean out.      GI:  Acute on chronic constipation, AXR showing moderate stool burden.   - NG tube placed 3/10  - Bowel clean out with golytely via NGT, titrated from 500-1000 ml/hr and continue until clear stool x 3  - Hold PTA bowel regimen (linzess, miralax), culturelle  - Omeprazole 40 mg daily     FEN:  - Clear liquid diet  - Holding PTA supplements for now: Ca, vitamin D, K-phos, vitamin E  - Renal panel, Mg in AM     Heme/Onc:  Ependymoma: on study  - Entinostat 7 mg weekly--patient's father to bring this in     Endo:  - Followed by Dr. Martin  - PTA Decadron 0.75 mg QAM     Neuro/Psych:  Agitation  Facial paralysis   Ataxia   Father and patient prefer to hold these medications during cleanout, and patient is not agitated on admission.  - Olanzapine 15mg at bedtime, hold for now  - Olanzapine 2.5mg daily as needed for agitation  - Lamotrigine 200mg at bedtime--will encourage pt to take to avoid need to change dose on restart  - Zoloft 150mg daily, hold for now  - Trazodone 150 mg PO PRN at bedtime, hold for now     Allergies:  - Allegra 180mg qPM      Pulm:  Hx of CANDELARIO: previously used CPAP. Difficult to get got oximetry reading on admission but normal exam.  - Routine vitals w/ spot pulse oximetry Q8H     MSK  Right lateral hip laceration from falling  on bed this past summer. Well-healed on exam today.   - Photo documentation provided in chart (media tab in chart review)      Evelyn Chandler     Physician Attestation   I, Leoncio Rousseau MD, saw this patient with the resident and agree with the resident/fellow's findings and plan of care as documented in the note.      I personally reviewed vital signs and medications.    Key findings:  I agree with the assessment as noted above.    Leoncio Rousseau MD  Date of Service (when I saw the patient): 03/11/21      Interval History   Arrived to floor last evening. Endorsed intermittent abdominal discomfort. Clean out started at 2230. Tolerated uptitration of golytely to 1L/hr. Stooling soft/semiformed stool. This am Geo says he is feeling good. No abdominal pain, no nausea or vomiting.     Physical Exam   Temp: 96.8  F (36  C) Temp src: Axillary BP: (!) 126/91 Pulse: 89   Resp: 24 SpO2: 93 % O2 Device: None (Room air)    Vitals:    03/10/21 1723   Weight: 115.2 kg (254 lb)     Vital Signs with Ranges  Temp:  [96.8  F (36  C)-98.1  F (36.7  C)] 96.8  F (36  C)  Pulse:  [] 89  Resp:  [22-26] 24  BP: (121-133)/(64-91) 126/91  SpO2:  [92 %-96 %] 93 %  I/O last 3 completed shifts:  In: -   Out: 1200 [Urine:350; Other:850]    GENERAL: Active, alert, in no acute distress. Conversant.   SKIN: Diffuse pink-purple striae on abdomen, arms, and legs. Shins with abrasions.   HEAD: Normocephalic  EYES: Patch on left eye. Normal conjunctiva on right eye  NOSE: Normal without discharge.  MOUTH/THROAT: Clear. No oral lesions. Teeth without obvious abnormalities.  LUNGS: Clear. No rales, rhonchi, wheezing or retractions  HEART: Regular rhythm. Normal S1/S2. No murmurs. Normal pulses.  ABDOMEN: Soft, obese, non-tender, not distended. Bowel sounds normal.   NEUROLOGIC: Dysarthria, makes eye contact, bilateral arm tremor  BACK: Spine is straight, no scoliosis.  EXTREMITIES: Full range of motion, no  deformities     Medications     polyethylene glycol 1,000 mL/hr (03/11/21 0754)       dexamethasone  0.75 mg Oral Daily     fexofenadine  180 mg Oral At Bedtime     lamoTRIgine  200 mg Oral At Bedtime     [Held by provider] OLANZapine  15 mg Oral At Bedtime     omeprazole  40 mg Oral QAM     [Held by provider] potassium phosphate (monobasic)  500 mg Oral BID     [Held by provider] sertraline  100 mg Oral At Bedtime     study - entinostat (IDS# 5050)  2 mg Oral Q7 Days     study - entinostat (IDS# 5050)  5 mg Oral Q7 Days     [START ON 3/13/2021] sulfamethoxazole-trimethoprim  1 tablet Oral Once per day on Sun Sat       Data   Results for orders placed or performed during the hospital encounter of 03/10/21 (from the past 24 hour(s))   pH Gastric Aspirate   Result Value Ref Range    pH Gastric Aspirate 5.3    XR Abdomen Port 1 View    Narrative    Exam: XR ABDOMEN PORT 1 VW, 3/10/2021 8:52 PM    Indication: NG tube placement    Comparison: 3/10/2021    Findings:   Supine AP views of the abdomen. Gastric tube with distal end and  sidehole projecting at the level of the stomach. Multiple loops of  moderately gas distended bowel in the abdomen. Air is present within  the colon. No pneumatosis. No focal consolidation in the visualized  portion of the lower lungs. No acute osseous findings.      Impression    Impression:   1. Gastric tube with the distal end and sidehole in the stomach.  2. Moderately gas distended bowel in a nonobstructive pattern.    I have personally reviewed the examination and initial interpretation  and I agree with the findings.    KYLIE CHACON, DO   pH Gastric Aspirate   Result Value Ref Range    pH Gastric Aspirate Greater than or equal to 6.1    Renal Panel   Result Value Ref Range    Sodium 140 133 - 144 mmol/L    Potassium 4.0 3.4 - 5.3 mmol/L    Chloride 104 94 - 109 mmol/L    Carbon Dioxide 34 (H) 20 - 32 mmol/L    Anion Gap 2 (L) 3 - 14 mmol/L    Glucose 78 70 - 99 mg/dL    Urea Nitrogen  17 7 - 30 mg/dL    Creatinine 0.98 0.66 - 1.25 mg/dL    GFR Estimate >90 >60 mL/min/[1.73_m2]    GFR Estimate If Black >90 >60 mL/min/[1.73_m2]    Calcium 8.5 8.5 - 10.1 mg/dL    Phosphorus 3.3 2.5 - 4.5 mg/dL    Albumin 3.0 (L) 3.4 - 5.0 g/dL   Magnesium   Result Value Ref Range    Magnesium 2.2 1.6 - 2.3 mg/dL     *Note: Due to a large number of results and/or encounters for the requested time period, some results have not been displayed. A complete set of results can be found in Results Review.

## 2021-03-11 NOTE — PLAN OF CARE
Afebrile. Pt's oxygen saturations labile on admission. MD notified. Pt appeared well perfused with no s/s of desaturations. Pt report previous issues with circulation. Plan to monitor and do spot checks. OVSS. CV and resp stable. NG place this evening. No voids/stools yet. Plan to start clean out once tube placement is confirmed. Mom and dad left for the evening. Continue to monitor overnight.

## 2021-03-12 NOTE — PLAN OF CARE
Pt's vitals stable this morning. Had 3rd clear stool, xray completed to confirm. Pt stable to discharge. NG removed prior to discharge. RN reviewed AVS with pt's mom who is his legal guardian. Mom was aware of upcoming appointments. No medication changes. RN answered any questions that the mother had. No further questions at the time of discharge.

## 2021-03-12 NOTE — PLAN OF CARE
AVSS. Pt. Tolerating Golytely well at 1000mL/hr. No reports of discomfort or pain. Stool clear x1. Anticipated discharge today. No other concerns, will continue to monitor.

## 2021-03-12 NOTE — PLAN OF CARE
[id number]: VSS. Got PO chemo study drug this afternoon. Pt tolerating golytely with no issues. Good UOP/stools, end of shift clear yellow in color, needs 2 more stools before turning off. Needing 3 pt assist as pt is very unsteady. RN educated pt on safety and needing the extra people to make sure not only he is safe but staff safe as well. Will continue to monitor and notify MD with any changes.

## 2021-03-16 NOTE — TELEPHONE ENCOUNTER
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 No  Was patient in TCU? No

## 2021-03-16 NOTE — LETTER
3/16/2021      RE: Geo Hicks  [address] [facility] [id number]       HPI     Geo comes in with his father today for F/U of his ependymoma. He continues on therapy on the following phase 1 protocol: COG [id number] - A Phase 1 Study of Entinostat, an Oral Histone Deacetylase Inhibitor, in Pediatric Patients With Recurrent or Refractory Solid Tumors, Including CNS Tumors and Lymphoma.    Geo was recently hospitalized for severe constipation and bowel clean out which is unlikely to be related to his entinostat therapy. He is now doing well and has no new problems.    DIAGNOSIS: EPENDYMOMA   Enrolled on [id number]  6/26/15   AREAS TREATED: POST FOSSA TUMOR   DOSE: 5940 cGy   TYPE OF RADIATION GIVEN: with IMRT Tomotherapy   9/08/2015 to 10/26/15     History since presentation:   4/13/15 - Craniotomy with biopsy - initial impression glioma   4/16-15 - Decompressive craniotomy   5/15/15 - Port placement (single lumen power port)   5/28/15 - Intratumoral hemorrhage, evacuation of hematoma. 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   Required intensive inpatient and the outpatient therapies.   08/2015 Progression noted   9/8/15 -  Began radiation   10/22/15 - Completed Radiation   1/5/16 Worsening tumor noted   1/14/16 Redo suboccipital craniotomy for subtotal resection of infiltrating brain tumor   Stealth guided cerebellar tissue biopsy that showed necrotic tissue with few viable cells   6/2016 MR spectroscopy: Compared to 4/8/2016, there has been interval   increase in choline/WILIAN ratio within the lesion, most consistent with   residual tumor.   1/6/17 Enrolled in Phase I Entinostat Study.    Current Outpatient Medications   Medication     Cholecalciferol (VITAMIN D3) 50 MCG (2000 UT) TABS     dexamethasone (DECADRON) 0.5 MG tablet     docusate sodium (COLACE) 100 MG capsule     fexofenadine (ALLEGRA) 180 MG tablet     Lactobacillus-Inulin ([facility]) CAPS     lamoTRIgine (LAMICTAL) 200  MG tablet     linaclotide (LINZESS) 290 MCG capsule     mupirocin (BACTROBAN) 2 % external ointment     OLANZapine (ZYPREXA) 15 MG tablet     OLANZapine (ZYPREXA) 2.5 MG tablet     omeprazole (PRILOSEC) 20 MG DR capsule     order for DME     order for DME     polyethylene glycol (MIRALAX) 17 GM/Dose powder     potassium phosphate, monobasic, (K-PHOS) 500 MG tablet     sertraline (ZOLOFT) 100 MG tablet     sulfamethoxazole-trimethoprim (BACTRIM) 400-80 MG tablet     traZODone (DESYREL) 150 MG tablet     vitamin E 400 units TABS     study - entinostat, IDS# [id number], 1 mg tablet     study - entinostat, IDS# [id number], 5 mg tablet     No current facility-administered medications for this visit.         Allergies   Allergen Reactions     Blood Transfusion Related (Informational Only) Swelling     Periorbital swelling post platelet transfusion     No Known Drug Allergies        Active Ambulatory Problems     Diagnosis Date Noted     GERD (gastroesophageal reflux disease) 04/03/2009     Closed fracture at the growth plate of right distal fibula  02/27/2014     Elevated serum creatinine 03/19/2014     Dyspepsia 04/15/2014     Intracranial hemorrhage (H) 05/26/2015     Hemorrhagic stroke (H) 06/04/2015     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     Admission for antineoplastic chemotherapy 06/26/2015     Post-operative state 01/14/2016     S/P craniotomy 01/15/2016     S/P biopsy 01/15/2016     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     CANDELARIO (obstructive sleep apnea) 10/06/2016     Health Care Home 12/26/2016     Hypernatremia 03/15/2017     Body temperature low 09/20/2017     Current chronic use of systemic steroids 09/20/2017     Elevated TSH 09/30/2017     Status post chemotherapy 09/30/2017     Neoplasm of posterior cranial fossa (H) 10/04/2017     Chronic constipation 10/17/2018     Serum albumin decreased 11/07/2018     Closed compression fracture of thoracic vertebra with routine healing,  subsequent encounter 11/07/2018     Depression 02/14/2019     Constipation 09/26/2019     Constipation by delayed colonic transit 07/12/2018     Slow transit constipation 07/12/2018     Tubular adenoma 06/05/2020     Resolved Ambulatory Problems     Diagnosis Date Noted     Pilonidal abscess 07/27/2015     Pilonidal cyst 07/01/2016     Constipation 08/22/2018     Past Medical History:   Diagnosis Date     Cranial nerve dysfunction      Gastro-oesophageal reflux disease      Hearing loss      Migraine      Reduced vision      Refractory obstruction of nasal airway      Sleep apnea      Strabismus            Review of Systems   Constitutional: Negative.         Geo is wheelchair bound due to significant chronic and severe ataxia. 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    Cardiovascular: Negative.    Gastrointestinal:        See HPI   Genitourinary: Negative.    Musculoskeletal: Negative.    Skin:        Multiple striae.    Neurological: Negative for seizures, loss of consciousness and headaches.        See above   Endo/Heme/Allergies: Negative.    Psychiatric/Behavioral: Negative.          Physical Exam  Vitals signs reviewed. Exam conducted with a chaperone present.   Constitutional:       General: He is not in acute distress.     Appearance: He is obese. He is toxic-appearing.   HENT:      Head: Normocephalic.      Left Ear: External ear normal.      Nose: Nose normal.      Mouth/Throat:      Mouth: Mucous membranes are moist.      Pharynx: Oropharynx is clear.   Eyes:      Comments: Oculoplegia   Neck:      Musculoskeletal: Normal range of motion.   Cardiovascular:      Rate and Rhythm: Normal rate and regular rhythm.      Pulses: Normal pulses.   Pulmonary:      Effort: Pulmonary effort is normal. No respiratory distress.   Abdominal:      General: There is distension.      Palpations: There is no  mass.      Tenderness: There is no abdominal tenderness.   Musculoskeletal: Normal range of motion.   Skin:     General: Skin is warm.      Capillary Refill: Capillary refill takes less than 2 seconds.      Comments: Multiple striae   Neurological:      Mental Status: He is alert and oriented to person, place, and time. Mental status is at baseline.      Cranial Nerves: Cranial nerve deficit present.      Sensory: No sensory deficit.      Motor: Weakness present.      Coordination: Coordination abnormal.      Gait: Gait abnormal.   Psychiatric:         Mood and Affect: Mood normal.         Behavior: Behavior normal.       Results for orders placed or performed during the hospital encounter of 03/16/21   MRI Brain w & w/o contrast     Status: None    Narrative    Brain MRI without and with contrast, 3/16/2021 1:54 PM    Provided History: Ependymoma on Etinostat; Ependymoma (H).    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    Comparison: Brain MRI exam since 1/7/2020, most recent one from  12/7/2020.    Technique: Multiplanar T1-weighted, axial FLAIR, and susceptibility  images were obtained without intravenous contrast. Following  intravenous gadolinium-based contrast administration, axial  T2-weighted, diffusion, and T1-weighted images (in multiple planes)  were obtained.    Contrast: 12ml iv Gadavist     Findings:  Postsurgical changes of suboccipital craniotomy for underlying mass  resection. Hemosiderin staining at the resection cavity.  Enhancing mass within the fourth ventricle, measuring 2.0 x 1.9 x 2.5  cm, unchanged dating back to 1/7/2020. Adjacent cystic focus with  associated hemorrhage is not significantly changed in size, but more  heterogeneous compared to  12/7/2020, measuring 1.4 x 1.2 cm. It is  decreased in size compared to 1/7/2020, measuring 2.3 x 1.8 cm.  Unchanged T2 hyperintense signal within the posterior fossa.    Prior right frontal approach ventriculostomy catheter tract.  Flow-voids at the expected location for third ventriculostomy. (Series  11 image 14-16). Unchanged ventricular sizes and configuration likely  represents patent third ventriculostomy.    Resolution of left mild mastoid effusion. The paranasal sinuses  appears normal. The orbits are grossly unremarkable.      Impression    Impression: Little change in fourth ventricle ependymoma with  surrounding edema since 1/7/2020.     I have personally reviewed the examination and initial interpretation  and I agree with the findings.    STEPHY SCHMIDT MD   Results for orders placed or performed in visit on 03/16/21   CBC with platelets differential     Status: Abnormal   Result Value Ref Range    WBC 5.5 4.0 - 11.0 10e9/L    RBC Count 4.85 4.4 - 5.9 10e12/L    Hemoglobin 11.2 (L) 13.3 - 17.7 g/dL    Hematocrit 37.6 (L) 40.0 - 53.0 %    MCV 78 78 - 100 fl    MCH 23.1 (L) 26.5 - 33.0 pg    MCHC 29.8 (L) 31.5 - 36.5 g/dL    RDW 19.5 (H) 10.0 - 15.0 %    Platelet Count 133 (L) 150 - 450 10e9/L    Diff Method Automated Method     % Neutrophils 55.7 %    % Lymphocytes 19.4 %    % Monocytes 15.0 %    % Eosinophils 7.3 %    % Basophils 1.1 %    % Immature Granulocytes 1.5 %    Nucleated RBCs 1 (H) 0 /100    Absolute Neutrophil 3.0 1.6 - 8.3 10e9/L    Absolute Lymphocytes 1.1 0.8 - 5.3 10e9/L    Absolute Monocytes 0.8 0.0 - 1.3 10e9/L    Absolute Eosinophils 0.4 0.0 - 0.7 10e9/L    Absolute Basophils 0.1 0.0 - 0.2 10e9/L    Abs Immature Granulocytes 0.1 0 - 0.4 10e9/L    Absolute Nucleated RBC 0.0    Comprehensive metabolic panel     Status: Abnormal   Result Value Ref Range    Sodium 136 133 - 144 mmol/L    Potassium 5.1 3.4 - 5.3 mmol/L    Chloride 108 94 - 109 mmol/L    Carbon Dioxide 28 20 - 32 mmol/L     Anion Gap <1 (L) 3 - 14 mmol/L    Glucose 74 70 - 99 mg/dL    Urea Nitrogen 15 7 - 30 mg/dL    Creatinine 1.06 0.66 - 1.25 mg/dL    GFR Estimate >90 >60 mL/min/[1.73_m2]    GFR Estimate If Black >90 >60 mL/min/[1.73_m2]    Calcium 8.5 8.5 - 10.1 mg/dL    Bilirubin Total 0.3 0.2 - 1.3 mg/dL    Albumin 2.7 (L) 3.4 - 5.0 g/dL    Protein Total 6.9 6.8 - 8.8 g/dL    Alkaline Phosphatase 148 40 - 150 U/L    ALT 24 0 - 70 U/L    AST Unsatisfactory specimen - hemolyzed 0 - 45 U/L   Magnesium     Status: None   Result Value Ref Range    Magnesium 2.2 1.6 - 2.3 mg/dL   Phosphorus     Status: None   Result Value Ref Range    Phosphorus 2.6 2.5 - 4.5 mg/dL   Cortisol     Status: Abnormal   Result Value Ref Range    Cortisol Serum 0.6 (L) 4 - 22 ug/dL       Impression:  Ependymoma - stable after early GA on entinostat  Neurologically stable  Severe constipation has resolved following inpatient therapy    Plan:  Continue Entinostat - no dose changes  F/U per protocol  Old medication containers received, new entinostat provided.    Total time spent on the following services on the date of the encounter:  Preparing to see patient, chart review, review of outside records, Ordering medications, test, procedures, chemotherapy, Performing a medically appropriate examination , Documenting clinical information in the electronic or other health record  and Total time spent: 40 minutes      Leoncio Rousseau MD

## 2021-03-16 NOTE — NURSING NOTE
"No chief complaint on file.      /59 (BP Location: Right arm, Patient Position: Sitting, Cuff Size: Adult Large)   Pulse 90   Temp 98.4  F (36.9  C) (Axillary)   Resp 24   Ht 1.801 m (5' 10.91\")   Wt 117.2 kg (258 lb 6.1 oz)   SpO2 93%   BMI 36.13 kg/m      Aren Baumann LPN  March 16, 2021    "

## 2021-03-19 NOTE — PROGRESS NOTES
HPI     Geo comes in with his father today for F/U of his ependymoma. He continues on therapy on the following phase 1 protocol: COG [id number] - A Phase 1 Study of Entinostat, an Oral Histone Deacetylase Inhibitor, in Pediatric Patients With Recurrent or Refractory Solid Tumors, Including CNS Tumors and Lymphoma.    Geo was recently hospitalized for severe constipation and bowel clean out which is unlikely to be related to his entinostat therapy. He is now doing well and has no new problems.    DIAGNOSIS: EPENDYMOMA   Enrolled on [id number]  6/26/15   AREAS TREATED: POST FOSSA TUMOR   DOSE: 5940 cGy   TYPE OF RADIATION GIVEN: with IMRT Tomotherapy   9/08/2015 to 10/26/15     History since presentation:   4/13/15 - Craniotomy with biopsy - initial impression glioma   4/16-15 - Decompressive craniotomy   5/15/15 - Port placement (single lumen power port)   5/28/15 - Intratumoral hemorrhage, evacuation of hematoma. 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   Required intensive inpatient and the outpatient therapies.   08/2015 Progression noted   9/8/15 -  Began radiation   10/22/15 - Completed Radiation   1/5/16 Worsening tumor noted   1/14/16 Redo suboccipital craniotomy for subtotal resection of infiltrating brain tumor   Stealth guided cerebellar tissue biopsy that showed necrotic tissue with few viable cells   6/2016 MR spectroscopy: Compared to 4/8/2016, there has been interval   increase in choline/WILIAN ratio within the lesion, most consistent with   residual tumor.   1/6/17 Enrolled in Phase I Entinostat Study.    Current Outpatient Medications   Medication     Cholecalciferol (VITAMIN D3) 50 MCG (2000 UT) TABS     dexamethasone (DECADRON) 0.5 MG tablet     docusate sodium (COLACE) 100 MG capsule     fexofenadine (ALLEGRA) 180 MG tablet     Lactobacillus-Inulin ([facility]) CAPS     lamoTRIgine (LAMICTAL) 200 MG tablet     linaclotide (LINZESS) 290 MCG capsule     mupirocin (BACTROBAN) 2 %  external ointment     OLANZapine (ZYPREXA) 15 MG tablet     OLANZapine (ZYPREXA) 2.5 MG tablet     omeprazole (PRILOSEC) 20 MG DR capsule     order for DME     order for DME     polyethylene glycol (MIRALAX) 17 GM/Dose powder     potassium phosphate, monobasic, (K-PHOS) 500 MG tablet     sertraline (ZOLOFT) 100 MG tablet     sulfamethoxazole-trimethoprim (BACTRIM) 400-80 MG tablet     traZODone (DESYREL) 150 MG tablet     vitamin E 400 units TABS     study - entinostat, IDS# [id number], 1 mg tablet     study - entinostat, IDS# [id number], 5 mg tablet     No current facility-administered medications for this visit.         Allergies   Allergen Reactions     Blood Transfusion Related (Informational Only) Swelling     Periorbital swelling post platelet transfusion     No Known Drug Allergies        Active Ambulatory Problems     Diagnosis Date Noted     GERD (gastroesophageal reflux disease) 04/03/2009     Closed fracture at the growth plate of right distal fibula  02/27/2014     Elevated serum creatinine 03/19/2014     Dyspepsia 04/15/2014     Intracranial hemorrhage (H) 05/26/2015     Hemorrhagic stroke (H) 06/04/2015     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     Admission for antineoplastic chemotherapy 06/26/2015     Post-operative state 01/14/2016     S/P craniotomy 01/15/2016     S/P biopsy 01/15/2016     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     CANDELARIO (obstructive sleep apnea) 10/06/2016     Health Care Home 12/26/2016     Hypernatremia 03/15/2017     Body temperature low 09/20/2017     Current chronic use of systemic steroids 09/20/2017     Elevated TSH 09/30/2017     Status post chemotherapy 09/30/2017     Neoplasm of posterior cranial fossa (H) 10/04/2017     Chronic constipation 10/17/2018     Serum albumin decreased 11/07/2018     Closed compression fracture of thoracic vertebra with routine healing, subsequent encounter 11/07/2018     Depression 02/14/2019     Constipation 09/26/2019      Constipation by delayed colonic transit 07/12/2018     Slow transit constipation 07/12/2018     Tubular adenoma 06/05/2020     Resolved Ambulatory Problems     Diagnosis Date Noted     Pilonidal abscess 07/27/2015     Pilonidal cyst 07/01/2016     Constipation 08/22/2018     Past Medical History:   Diagnosis Date     Cranial nerve dysfunction      Gastro-oesophageal reflux disease      Hearing loss      Migraine      Reduced vision      Refractory obstruction of nasal airway      Sleep apnea      Strabismus            Review of Systems   Constitutional: Negative.         Geo is wheelchair bound due to significant chronic and severe ataxia. 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    Cardiovascular: Negative.    Gastrointestinal:        See HPI   Genitourinary: Negative.    Musculoskeletal: Negative.    Skin:        Multiple striae.    Neurological: Negative for seizures, loss of consciousness and headaches.        See above   Endo/Heme/Allergies: Negative.    Psychiatric/Behavioral: Negative.          Physical Exam  Vitals signs reviewed. Exam conducted with a chaperone present.   Constitutional:       General: He is not in acute distress.     Appearance: He is obese. He is toxic-appearing.   HENT:      Head: Normocephalic.      Left Ear: External ear normal.      Nose: Nose normal.      Mouth/Throat:      Mouth: Mucous membranes are moist.      Pharynx: Oropharynx is clear.   Eyes:      Comments: Oculoplegia   Neck:      Musculoskeletal: Normal range of motion.   Cardiovascular:      Rate and Rhythm: Normal rate and regular rhythm.      Pulses: Normal pulses.   Pulmonary:      Effort: Pulmonary effort is normal. No respiratory distress.   Abdominal:      General: There is distension.      Palpations: There is no mass.      Tenderness: There is no abdominal tenderness.   Musculoskeletal: Normal range of  motion.   Skin:     General: Skin is warm.      Capillary Refill: Capillary refill takes less than 2 seconds.      Comments: Multiple striae   Neurological:      Mental Status: He is alert and oriented to person, place, and time. Mental status is at baseline.      Cranial Nerves: Cranial nerve deficit present.      Sensory: No sensory deficit.      Motor: Weakness present.      Coordination: Coordination abnormal.      Gait: Gait abnormal.   Psychiatric:         Mood and Affect: Mood normal.         Behavior: Behavior normal.       Results for orders placed or performed during the hospital encounter of 03/16/21   MRI Brain w & w/o contrast     Status: None    Narrative    Brain MRI without and with contrast, 3/16/2021 1:54 PM    Provided History: Ependymoma on Etinostat; Ependymoma (H).    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    Comparison: Brain MRI exam since 1/7/2020, most recent one from  12/7/2020.    Technique: Multiplanar T1-weighted, axial FLAIR, and susceptibility  images were obtained without intravenous contrast. Following  intravenous gadolinium-based contrast administration, axial  T2-weighted, diffusion, and T1-weighted images (in multiple planes)  were obtained.    Contrast: 12ml iv Gadavist     Findings:  Postsurgical changes of suboccipital craniotomy for underlying mass  resection. Hemosiderin staining at the resection cavity.  Enhancing mass within the fourth ventricle, measuring 2.0 x 1.9 x 2.5  cm, unchanged dating back to 1/7/2020. Adjacent cystic focus with  associated hemorrhage is not significantly changed in size, but more  heterogeneous compared to 12/7/2020, measuring 1.4 x 1.2 cm. It is  decreased in size compared to 1/7/2020, measuring 2.3 x  1.8 cm.  Unchanged T2 hyperintense signal within the posterior fossa.    Prior right frontal approach ventriculostomy catheter tract.  Flow-voids at the expected location for third ventriculostomy. (Series  11 image 14-16). Unchanged ventricular sizes and configuration likely  represents patent third ventriculostomy.    Resolution of left mild mastoid effusion. The paranasal sinuses  appears normal. The orbits are grossly unremarkable.      Impression    Impression: Little change in fourth ventricle ependymoma with  surrounding edema since 1/7/2020.     I have personally reviewed the examination and initial interpretation  and I agree with the findings.    STEPHY SCHMIDT MD   Results for orders placed or performed in visit on 03/16/21   CBC with platelets differential     Status: Abnormal   Result Value Ref Range    WBC 5.5 4.0 - 11.0 10e9/L    RBC Count 4.85 4.4 - 5.9 10e12/L    Hemoglobin 11.2 (L) 13.3 - 17.7 g/dL    Hematocrit 37.6 (L) 40.0 - 53.0 %    MCV 78 78 - 100 fl    MCH 23.1 (L) 26.5 - 33.0 pg    MCHC 29.8 (L) 31.5 - 36.5 g/dL    RDW 19.5 (H) 10.0 - 15.0 %    Platelet Count 133 (L) 150 - 450 10e9/L    Diff Method Automated Method     % Neutrophils 55.7 %    % Lymphocytes 19.4 %    % Monocytes 15.0 %    % Eosinophils 7.3 %    % Basophils 1.1 %    % Immature Granulocytes 1.5 %    Nucleated RBCs 1 (H) 0 /100    Absolute Neutrophil 3.0 1.6 - 8.3 10e9/L    Absolute Lymphocytes 1.1 0.8 - 5.3 10e9/L    Absolute Monocytes 0.8 0.0 - 1.3 10e9/L    Absolute Eosinophils 0.4 0.0 - 0.7 10e9/L    Absolute Basophils 0.1 0.0 - 0.2 10e9/L    Abs Immature Granulocytes 0.1 0 - 0.4 10e9/L    Absolute Nucleated RBC 0.0    Comprehensive metabolic panel     Status: Abnormal   Result Value Ref Range    Sodium 136 133 - 144 mmol/L    Potassium 5.1 3.4 - 5.3 mmol/L    Chloride 108 94 - 109 mmol/L    Carbon Dioxide 28 20 - 32 mmol/L    Anion Gap <1 (L) 3 - 14 mmol/L    Glucose 74 70 - 99 mg/dL    Urea Nitrogen 15 7 - 30 mg/dL     Creatinine 1.06 0.66 - 1.25 mg/dL    GFR Estimate >90 >60 mL/min/[1.73_m2]    GFR Estimate If Black >90 >60 mL/min/[1.73_m2]    Calcium 8.5 8.5 - 10.1 mg/dL    Bilirubin Total 0.3 0.2 - 1.3 mg/dL    Albumin 2.7 (L) 3.4 - 5.0 g/dL    Protein Total 6.9 6.8 - 8.8 g/dL    Alkaline Phosphatase 148 40 - 150 U/L    ALT 24 0 - 70 U/L    AST Unsatisfactory specimen - hemolyzed 0 - 45 U/L   Magnesium     Status: None   Result Value Ref Range    Magnesium 2.2 1.6 - 2.3 mg/dL   Phosphorus     Status: None   Result Value Ref Range    Phosphorus 2.6 2.5 - 4.5 mg/dL   Cortisol     Status: Abnormal   Result Value Ref Range    Cortisol Serum 0.6 (L) 4 - 22 ug/dL       Impression:  Ependymoma - stable after early LA on entinostat  Neurologically stable  Severe constipation has resolved following inpatient therapy    Plan:  Continue Entinostat - no dose changes  F/U per protocol  Old medication containers received, new entinostat provided.    Total time spent on the following services on the date of the encounter:  Preparing to see patient, chart review, review of outside records, Ordering medications, test, procedures, chemotherapy, Performing a medically appropriate examination , Documenting clinical information in the electronic or other health record  and Total time spent: 40 minutes      Leoncio Rousseau MD

## 2021-03-30 NOTE — TELEPHONE ENCOUNTER
On March 30, 2021, at 8:27 AM, writer called patient at [phone] to confirm Virtual Visit. Writer unable to make contact with patient. Writer left detailed voice message for call back. [phone] left as call back number. Yissel Garzon, [facility]

## 2021-03-30 NOTE — TELEPHONE ENCOUNTER
On March 30, 2021 at 8:53 AM writer called patient at [phone] to confirm Virtual Visit. Writer unable to make contact with patient. No voice mail left due to call back. Writer  Yissel Garzon CMA

## 2021-03-30 NOTE — PROGRESS NOTES
"VIDEO VISIT  Geo Hicks is a 20 year old patient who is being evaluated via a billable video visit.      The patient has been notified of following:   \"We have found that certain health care needs can be provided without the need for an in-person physical exam. This service lets us provide the care you need with a video conversation. If a prescription is necessary we can send it directly to your pharmacy. If lab work is needed we can place an order for that and you can then stop by our lab to have the test done at a later time. Insurers are generally covering virtual visits as they would in-office visits so billing should not be different than normal.  If for some reason you do get billed incorrectly, you should contact the billing office to correct it and that number is in the AVS .    Patient has given verbal consent for video visit?: Yes   How would you like to obtain your AVS?: Vdopia  AVS SmartPhrase [PsychAVS] has been placed in 'Patient Instructions': Yes      Video- Visit Details  Type of service:  video visit for medication management  Time of service:    Date:  03/30/2021    Video Start Time:  9:41 AM        Video End Time:   10:40 AM    Reason for video visit:  Patient unable to travel due to Covid-19  Originating Site (patient location):  [facility]   Location- [facility]  Distant Site (provider location):  Remote location  Mode of Communication:  Video Conference via [email or web address]  Consent:  Patient has given verbal consent for video visit?: Yes         [facility]  Psychiatry Clinic  PSYCHIATRIC PROGRESS NOTE     CARE TEAM:    PCP- Jeffrey Espinoza     Specialty Providers- Oncology, Neuropsychology, Physical Therapy, Occupational Therapy      Therapist- most recently Nannette Linares [facility] Team- no.      Geo Hicks is a 20 year old patient who prefers the name Geo and pronouns he, him, his.     PERTINENT BACKGROUND                [last transfer eval " "07/10/20]     Psych critical item history includes suicidal ideation, psychosis [sxs include delusions] and psych hosp (<3)    INTERIM HISTORY                                 [4, 4]   History was provided by the patient and group home staff who was a good historian. Treatment adherence is good.  Since the last visit:   - Hospitalized for bowel clean out.  - Today reports, \"I'm really frustrated.\" \"I've started doing what they want and nothing has changed.\" He had hoped that the constipation would have changed and improved after he started following the doctor's advice. He believes he is still having constipation, typically having a bowel movement daily.  - Discussed change in olanzapine, he reports, \"I've hardly noticed it\". Is open to continue decreasing medication. Has not needed as needed olanzapine.   - Mood \"decent. It's been good\"  - No issues with sleep, agitation, or irritation (other than frustrations stated above).  - Did have a question that he had discussed with mother but wanted to talk about today. \"Why can doctors kill me but I can't kill myself?\" Clarified \"I don't want to kill myself and I'm not thinking about it but why?\" We discussed that it is not okay to be hurt by doctors and talked about the oath doctor's take to not harm patients.  - He still does not feel like he can trust doctors and that \"even honest one's\" are talking to other people and might not tell him the truth. He does report he trusts this writer.  - When it comes to weight \"I'm loosing weight when it comes to fat but due to constipation (I'm gaining weight).\"    Christianne:  - Feels like mood has improved  - No questions or concerns today. Believes Geo brought up all points she wanted to make sure were addressed today.    RECENT PSYCH ROS:   Depression:  denies  Elevated:  none  Psychosis:  delusions- Continues to be perseverative about doctors withholding treatment from him (though this has significantly improved in the last month). " "[details in Interim History]  Anxiety: denies excessive worry and nervous/overwhelmed  Panic Attack:  none  Trauma Related:  none  Dysregulation:  none  Eating Disorder: no     Adverse Effects:  denies  Pertinent Negatives:  No suicidal ideation, violent ideation, self-injury, hallucinations and aggression  Recent Substance Use:  none reported    FAMILY and SOCIAL HISTORY             [1ea, 1ea]       patient reported                    FAMILY HX- Denied    Financial/ Work- family or friend       Partner/ - single  Children- no      Living situation- Group Home, previously living alternate weeks with mother and father.      Social/ Spiritual Support- mother, father, older brother, friend Rima     Legal- no     PSYCH and SUBSTANCE USE Critical Summary Points since July 2020         - September - increased trazodone to 150mg at bedtime prn for insomnia and depression and decreased sertraline to 100mg  - February - Decreased olanzapine 20mg-->17.5mg  - March - decrease olanzapine -->15mg -->10mg    MEDICAL HISTORY and ALLERGY     ALLERGIES: Blood transfusion related (informational only) and No known drug allergies     Neurologic Hx- See pertinent background, re: history of ependymoma and related chemo, radiation and tumor resection(s).  Notably has experienced neurological damage due to the above which has resulted in facial numbness, significant loss of mobility and dysarthria.  Has had neuropsychiatric evaluations 2/2016, 2/2017, 1/2019, and 10/2019.  The following is from the \"Results and Impressions\" section of his 10/29/2019 eval with Veronica Padilla, with a passage bolded that relates to noted difficulty in an area of executive functioning that facilitates the ability to see other people's perspectives:     \"Geo s attention was rated by his mother as well as himself as being adequate.  Executive functioning are those skills including planning, organization, emotional control, cognitive " flexibility, and the ability to take another person s perspective.  Geo rating scale of these skills was basically in the average range for his age with a slight elevation in his ability to shift from one activity to another.  His mother s ratings of his executive functioning were in the average range, with the exception of a slight elevation in his ability to initiate tasks.  Direct testing of his ability to take another person s perspective indicated difficulty in this area.  He was asked to sort objects based on various aspects of the objects.  When he sorted the objects, he showed average performance.  However, when the examiner sorted the objects, Geo experienced significant difficulty with being able to determine how the objects were sorted.  This difficulty likely translates into difficulty with understanding another person s point of view.  Geo indicated that this difficulty becomes quite frustrating for him as he doesn't feel other people understand him--it is likely that he has difficulty understanding their perspective.     Emotionally Geo indicated that he continues to feel some difficulty with sadness but that it has improved over time and with medication.  Geo denied significant feelings of anxiety at this time while in the past these scores have been higher.  Parent ratings of Geo s emotional functioning indicated no areas of significant concern.  Interviews with Geo and his mother indicated continued feelings of sadness and difficulty in motivating himself to try to new activities.  While there is improvement in his mood, Geo continues to be at risk for depression.  Since he is now under the care of a psychiatrist, we did not interview around his feelings that his medical team was not being helpful to him.  His mother reported that these feelings continue and likely interfere with his compliance with directives.  He is participating in therapy to work on these issues and it is  "important that this intervention continue.\"    Patient Active Problem List   Diagnosis     GERD (gastroesophageal reflux disease)     Closed fracture at the growth plate of right distal fibula      Elevated serum creatinine     Dyspepsia     Intracranial hemorrhage (H)     Hemorrhagic stroke (H)     Ependymoma (H)     Admission for antineoplastic chemotherapy     Post-operative state     S/P craniotomy     S/P biopsy     Noncomitant strabismus     Abducens neuropathy of both eyes     CANDELARIO (obstructive sleep apnea)     Health Care Home     Hypernatremia     Body temperature low     Current chronic use of systemic steroids     Elevated TSH     Status post chemotherapy     Neoplasm of posterior cranial fossa (H)     Chronic constipation     Serum albumin decreased     Closed compression fracture of thoracic vertebra with routine healing, subsequent encounter     Depression     Constipation     Constipation by delayed colonic transit     Slow transit constipation     Tubular adenoma         MEDICAL REVIEW OF SYSTEMS         [2, 10]   Contraception- no    A comprehensive review of systems was performed and is negative other than noted in the HPI.    MEDICATIONS        Current Outpatient Medications   Medication Sig Dispense Refill     Cholecalciferol (VITAMIN D3) 50 MCG (2000 UT) TABS Take 2,000 Units by mouth daily (with breakfast)       dexamethasone (DECADRON) 0.5 MG tablet Take 0.75 mg by mouth daily (with breakfast)       docusate sodium (COLACE) 100 MG capsule Take 100 mg by mouth 2 times daily as needed for constipation       fexofenadine (ALLEGRA) 180 MG tablet Take 180 mg by mouth At Bedtime       Lactobacillus-Inulin ([facility]) CAPS Take 2 capsules by mouth daily 30 capsule 3     lamoTRIgine (LAMICTAL) 200 MG tablet Take 1 tablet (200 mg) by mouth At Bedtime 30 tablet 2     linaclotide (LINZESS) 290 MCG capsule Take 1 capsule (290 mcg) by mouth every morning (before breakfast) 30 capsule 3 "     mupirocin (BACTROBAN) 2 % external ointment Use three times a day PRN with skin lesions or open sores. 22 g 3     OLANZapine (ZYPREXA) 15 MG tablet Take 1 tablet (15 mg) by mouth At Bedtime 30 tablet 2     OLANZapine (ZYPREXA) 2.5 MG tablet Take 2.5 mg by mouth daily as needed (agitation)       omeprazole (PRILOSEC) 20 MG DR capsule Take 2 capsules (40 mg) by mouth every morning 60 capsule 1     order for DME Equipment being ordered: Dressing  Wound #1 lower leg, W 6 cm x, D 0.5  cm, Drainage scant, Thickness sutured     Type: non stick gauze, Brand: , Size: 4/4   Change: 1 per day    Tape/Wrap: 1 each     attach facesheet with insurance information (delete this line) 5 each 0     order for DME Equipment being ordered: short boot 1 each 0     polyethylene glycol (MIRALAX) 17 GM/Dose powder Take 17 g (1 capful) by mouth 3 times daily 289 g 3     potassium phosphate, monobasic, (K-PHOS) 500 MG tablet Take 1 tablet (500 mg) by mouth 2 times daily 90 tablet 3     sertraline (ZOLOFT) 100 MG tablet Take 1 tablet (100 mg) by mouth daily 30 tablet 2     study - entinostat, IDS# 5050, 1 mg tablet Take 2 tablets (2 mg) by mouth every 7 days for 4 doses Take two 1mg tablet with one 5mg tablet for total dose of 7 mg weekly. Take on an empty stomach, at least 1 hour before or 2 hours after a meal.  Swallow tablet whole. 8 tablet 0     study - entinostat, IDS# 5050, 5 mg tablet Take 1 tablet (5 mg) by mouth every 7 days for 4 doses Take one 5mg tablet with two 1mg tablet for total dose of 7 mg weekly. Take on an empty stomach, at least 1 hour before or 2 hours after a meal.  Swallow tablet whole. 4 tablet 0     sulfamethoxazole-trimethoprim (BACTRIM) 400-80 MG tablet Take 1 tablet by mouth 2 times daily On Saturdays and Sundays 24 tablet 11     traZODone (DESYREL) 150 MG tablet Take 1 tablet (150 mg) by mouth nightly as needed for sleep or depression 30 tablet 2     vitamin E 400 units TABS Take 400 Units by mouth daily        VITALS                                                                [3, 3]   There were no vitals taken for this visit.   MENTAL STATUS EXAM                       [9, 14 cog gs]     Alertness: alert  and oriented  Appearance: casually groomed  Behavior/Demeanor: cooperative and pleasant, with good  eye contact   Speech: prominent dysarthria  Language: no obvious problem and dysarthria make it difficult to understand at times  Psychomotor: Mild palsy in bilateral upper extremities, fidgeting, and facial paralysis present.  Mood: description consistent with euthymia  Affect: full range; congruent to: mood- yes, content- yes  Thought Process/Associations: unremarkable  Thought Content:  Reports delusions [details in history];  Denies suicidal ideation and violent ideation  Perception:  Reports none;  Denies auditory hallucinations and visual hallucinations  Insight: gaining/ maintaining insight is challenging  Judgment: good  Cognition: (6) oriented: time, person, and place  attention span: intact  concentration: intact  recent memory: intact  remote memory: intact  fund of knowledge: appropriate  Gait and Station: N/A (telehealth)    LABS and DATA     PHQ9 TODAY = Not completed due to COVID 19 video visit  PHQ 8/19/2019 9/9/2019 11/15/2019   PHQ-9 Total Score 8 9 -   Q9: Thoughts of better off dead/self-harm past 2 weeks More than half the days More than half the days (No Data)       Recent Labs   Lab Test 03/16/21  1439 03/12/21  0601 03/11/21  0724   CR 1.06 1.02 0.98   GFRESTIMATED >90 >90 >90     ANTIPSYCHOTIC LABS  [glu, A1C, lipids (rohit LDL), liver enzymes, WBC, ANEU, Hgb, plts]  q12 mo  Recent Labs   Lab Test 03/16/21  1439 03/12/21  0601 03/11/21  0724 12/07/20  1455 02/26/19  1100 02/26/19  1100   GLC 74 82 78 78   < > 124*   A1C  --   --   --   --   --  5.1    < > = values in this interval not displayed.     Recent Labs   Lab Test 03/12/21  0601 02/26/19  1100   CHOL 217* 158   TRIG 367* 236*   LDL  "117* 84   HDL 27* 27*     Recent Labs   Lab Test 03/16/21  1439 12/07/20  1455 10/30/20  1230 06/16/20 03/10/20  1338   AST Unsatisfactory specimen - hemolyzed Unsatisfactory specimen - hemolyzed 24 17 21   ALT 24 31 29 <10 18   ALKPHOS 148 104 95  --  115     Recent Labs   Lab Test 03/16/21  1439 03/11/21  0724 12/07/20  1455 10/30/20  1230   WBC 5.5 4.2 4.7 5.5   ANEU 3.0 2.0 2.4 3.1   HGB 11.2* 11.1* 11.0* 10.8*   * 119* 134* 163     PSYCHOTROPIC DRUG INTERACTIONS     Increased risk of QTc Prolongation: olanzapine, sertraline, trazodone  Increased risk of lamotrigine toxicity (fatigue, sedation, confusion): lamotrigine, sertraline     MANAGEMENT:  Monitoring for adverse effects    RISK STATEMENT for SAFETY     Geo Hicks did not appear to be an imminent safety risk to self or others.    DIAGNOSES                                           [m2, h3]    Delusional Disorder, persecutory type, first episode  Major Depressive Disorder, single episode, moderate    ASSESSMENT                                        [m2, h3]        Geo Hicks presents for medication management of paranoia, delusional thoughts, and depressed mood.  Reports interim symptom stability since last appointment. Continues to have delusions regarding doctors withholding treatment - particularly the belief that he has constipation that causes his inability to walk and that his doctors could cure this but won't. He also believes this constipation has resulted in his ongoing weight-gain. Placed weight management clinic referral to discuss options to help with weight loss for \"regardless of the cause\". Will also plan to continue decreasing olanzapine as he has tolerated it well without any increase in psychotic symptoms or agitation. Plan to initiate aripiprazole at future appointment. No safety issues today. Refills provided    [place] was checked today:  Indicates no medication misuse .    PLAN                                                  "           [m2, h3]                                               1) Meds  - Decrease olanzapine to 10mg at bedtime  - Continue olanzapine 5mg as needed for severe agitation/anxiety, can take second dose 60 minutes after if ineffective.  - Continue sertraline 100mg daily  - Continue lamotrigine 200mg daily  - Continue trazodone 150mg at bedtime    2) Therapy-  Patient started a new therapist recently, father to get MERRITT from therapist for our clinic.    3) Next Due   Labs- Lipids and A1c completed Feb 2021  EKG- prn  Rating scales- AIMS due with next in-clinic visit    5) Referrals  Medical- Weight management clinic    3) RTC: 4-6 weeks    4) Crisis Numbers:   Provided routinely in AVS     After hours:  [phone]      TREATMENT RISK STATEMENT:  The risks, benefits, alternatives and potential adverse effects have been discussed and are understood by the pt. The pt understands the risks of using street drugs or alcohol. There are no medical contraindications, the pt agrees to treatment with the ability to do so. The pt knows to call the clinic for any problems or to access emergency care if needed.  Medical and substance use concerns are documented above.  Psychotropic drug interaction check was done, including changes made today.    PROVIDER: Jj Louis MD    Patient staffed with Dr. Emery in clinic who will review and sign note. Supervisor is Dr. Marrero.    The author of this note documented a reason for not sharing it with the patient.

## 2021-03-30 NOTE — PATIENT INSTRUCTIONS
Nice to see you today Geo. As we discussed:   - Decrease olanzapine to 10mg at bedtime. You still have the 5mg as needed dose available.  - Weight management clinic referral placed. Can call [phone] if you do not hear from them in 1-2 weeks.      **For crisis resources, please see the information at the end of this document**     Patient Education      Thank you for coming to the [facility] [place] [facility].    Lab Testing:  If you had lab testing today and your results are reassuring or normal they will be mailed to you or sent through [employer] within 7 days. If the lab tests need quick action we will call you with the results. The phone number we will call with results is # [phone] (home) . If this is not the best number please call our clinic and change the number.    Medication Refills:  If you need any refills please call your pharmacy and they will contact us. Our fax number for refills is [phone]. Please allow three business for refill processing. If you need to  your refill at a new pharmacy, please contact the new pharmacy directly. The new pharmacy will help you get your medications transferred.     Scheduling:  If you have any concerns about today's visit or wish to schedule another appointment please call our office during normal business hours [phone] (8-5:00 M-F)    Contact Us:  Please call [phone] during business hours (8-5:00 M-F).  If after clinic hours, or on the weekend, please call  [phone].    Financial Assistance [phone]  [facility] Billing [phone]  Central Billing Office, [facility]: [phone]  [place] Billing [phone]  Medical Records [phone]  [place] Patient Bill of Rights [email or web address]       MENTAL HEALTH CRISIS NUMBERS:  For a medical emergency please call  911 or go to the nearest ER.     [facility]:   [facility]  [facility] Center -[phone]   Crisis Residence [facility] -[phone]   Walk-In Counseling Center [facility] -[phone]   COPE 24/7 [facility] Mobile Team -[phone] (adults)/[phone] (child)  CHILD: [facility] needs assessment team - [phone]      [facility]:   [facility] - [phone]   Walk-in counseling [facility] - [phone]   Walk-in counseling [facility] - [phone]   Crisis Residence [facility] Residence - [phone]  Urgent Care Adult Mental Wbnihf-[phone] mobile unit/ 24/7 crisis line    National Crisis Numbers:   National Suicide Prevention Lifeline: 9-686-400-TALK (077-810-2911)  Poison Control Center - 1-273.406.3788  [employer]/resources for a list of additional resources (SOS)  Trans Lifeline a hotline for transgender people 1-296.628.4073  [employer] a hotline for LGBT youth 1-161.188.5830  Crisis Text Line: For any crisis 24/7   To: 617849  see www.crisistextline.org  - IF MAKING A CALL FEELS TOO HARD, send a text!         Again thank you for choosing [facility] M[facility] HEALTH & ADDI[facility] and please let us know how we can best partner with you to improve you and your family's health.    You may be receiving a survey regarding this appointment. We would love to have your feedback, both positive and negative. The survey is done by an external company, so your answers are anonymous.

## 2021-03-31 NOTE — TELEPHONE ENCOUNTER
Verbal orders needed for skilled nursing  1x weekly for 9 weeks  With 4 prn visits  Plus 2 PT visits.   Verbal ok given.     Regarding 2 PT visits, fax was sent to [facility] to request these orders and response was never received. Asked facility to re-fax to [phone] to give to Dr. Espinoza team for signature and fax back.     Aren MALONE RN

## 2021-03-31 NOTE — TELEPHONE ENCOUNTER
On 3/10/2021 the patient's guardian signed an MERRITT authorizing the exchange of information between [facility] and [facility].  Writer sent the document to medical records via FAX on 3/31/2021. CORDELL Wheeler, EMT

## 2021-04-02 NOTE — TELEPHONE ENCOUNTER
"Writer clarified dosing of olanzapine with group home.    Regarding the 5mg PRN dose, provider's note states \"Continue olanzapine 5mg as needed for severe agitation/anxiety, can take second dose 60 minutes after if ineffective.\" but the signature on the Rx says for 5mg PRN once daily only.    Routed to provider for clarification.  "

## 2021-04-02 NOTE — TELEPHONE ENCOUNTER
Yes, he can take more than one prn dose. When he was hospitalized the signature for that medication was changed and I thought I had corrected it at our last appointment but I must have missed that last part.   Thank you!  Jj

## 2021-04-02 NOTE — TELEPHONE ENCOUNTER
----- Message from Mis Garcia RN sent at 4/2/2021 11:39 AM CDT -----  Regarding: FW: Missy pt- discuss med change    ----- Message -----  From: Roseann Corrigan  Sent: 4/2/2021  10:47 AM CDT  [place] [facility] [place]  Subject: Missy pt- discuss med change                    Cruz Peck, from patient's group home, is caller. She would like to discuss and get clarification on a med change for the patient.    Phone: [phone]  Cruz is available until 2pm today.

## 2021-04-13 NOTE — LETTER
"  4/13/2021      RE: Geo Hicks  [address] [facility] [address]         The patient has been notified of following:     \"This video visit will be conducted via a call between you and your physician/provider. We have found that certain health care needs can be provided without the need for an in-person physical exam.  This service lets us provide the care you need with a video conversation.  If a prescription is necessary we can send it directly to your pharmacy.  If lab work is needed we can place an order for that and you can then stop by our lab to have the test done at a later time.    Video visits are billed at different rates depending on your insurance coverage.  Please reach out to your insurance provider with any questions.    If during the course of the call the physician/provider feels a video visit is not appropriate, you will not be charged for this service.\"    Patient has given verbal consent for Video visit? Yes    Video-Visit Details    Type of service:  Video Visit    Video Start Time: 1:26 PM  Video End Time: 2:30 PM    Originating Location (pt. 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  Mom is on the video meeting as well as Cruz from the [facility].    HPI: Geo notes that girth of his last stool last evening was pretty normal.  He still feels the constipation is a difficult problem even though he is having one- two bowel movements a day.  Cruz reported that he was  complaining last night of headache and not feeling well. His vital signs at that time were /82 pulse and temp were reported as " "normal. His O2 sat was 89.  He is feeling well now.     He has previously been seen at [facility] regarding decrease in oxygen saturations during sleep post his brain tumor surgeries.  He wore a mask for a while but doesn't like the mask and has refused it for a couple of years now.      Geo is discussing the Internet and the temptations there.  He was also discussing things on twitter. He spends a lot of time on it when he gets bored.  He said \"I was worried about addiction but I think it is an instinct that will come in handy\".  Further questioning didn't reveal exactly what he was trying to say.      He continues to live in his group care facility and they have some concerns about safely transferring him with his increased weight.     Lab:  Obtained on 3/29 through home and reviewed today:  Na 143  K 3.8  Creatinine 1.1  Albumin slightly low at 3.1 with normal serum protein at 6.4  Phos 3.5    CBC and chemistries done 4/6/21 and reviewed:    WBC 4.9  Hgb   11.5    Platelets 136,000  ANC    Na 140  K 4.1  Chloride 102  CO2 29  BUN 13  Creatinine 0.92  Alkaline Phosphatase 155  AST  22  ALT  25  Bili 0.3  Protein 7.3  Albumin 3.6  Glucose 132   Magnesium 2.1      Imaging:    No results found for any visits on 04/13/21.       Oncology History:    [id number]   6/26/15    Started Entinostat 1/9/17    DIAGNOSIS: EPENDYMOMA  AREAS TREATED: POST FOSSA TUMOR  DOSE: 5940 cGy   TYPE OF RADIATION GIVEN: with IMRT Tomotherapy   9/08/2015 to 10/26/15    Patient Active Problem List   Diagnosis     GERD (gastroesophageal reflux disease)     Closed fracture at the growth plate of right distal fibula      Elevated serum creatinine     Dyspepsia     Intracranial hemorrhage (H)     Hemorrhagic stroke (H)     Ependymoma (H)     Admission for antineoplastic chemotherapy     Post-operative state     S/P craniotomy     S/P biopsy     Noncomitant strabismus     Abducens neuropathy of both eyes     CANDELARIO (obstructive sleep apnea)     Health " Care Home     Hypernatremia     Body temperature low     Current chronic use of systemic steroids     Elevated TSH     Status post chemotherapy     Neoplasm of posterior cranial fossa (H)     Chronic constipation     Serum albumin decreased     Closed compression fracture of thoracic vertebra with routine healing, subsequent encounter     Depression     Constipation     Constipation by delayed colonic transit     Slow transit constipation     Tubular adenoma     Current Outpatient Medications   Medication     dexamethasone (DECADRON) 0.5 MG tablet      MG capsule     doxycycline hyclate (VIBRAMYCIN) 100 MG capsule     fexofenadine (ALLEGRA) 180 MG tablet     Lactobacillus-Inulin ([facility] HEALTH & WELLNESS) CAPS     lamoTRIgine (LAMICTAL) 200 MG tablet     linaclotide (LINZESS) 290 MCG capsule     mupirocin (BACTROBAN) 2 % external ointment     OLANZapine (ZYPREXA) 20 MG tablet     OLANZapine (ZYPREXA) 5 MG tablet     omeprazole (PRILOSEC) 20 MG DR capsule     order for DME     order for DME     polyethylene glycol (MIRALAX) 17 GM/Dose powder     potassium phosphate, monobasic, (K-PHOS) 500 MG tablet     sennosides (SENOKOT) 8.6 MG tablet     sertraline (ZOLOFT) 100 MG tablet     study - entinostat, IDS# 5050, 1 mg tablet     study - entinostat, IDS# [id number], 5 mg tablet     sulfamethoxazole-trimethoprim (BACTRIM) 400-80 MG tablet     traZODone (DESYREL) 150 MG tablet     vitamin D3 (CHOLECALCIFEROL) 2000 units (50 mcg) tablet     vitamin E (TOCOPHEROL) 400 units (360 mg) capsule     No current facility-administered medications for this visit.         Allergies   Allergen Reactions     Blood Transfusion Related (Informational Only) Swelling     Periorbital swelling post platelet transfusion     No Known Drug Allergies        Review of Systems  Skin: positive for striae, no unusual bruising or open lesions currently  Eyes: positive for oculoplegia  Ears/Nose/Throat: negative  Respiratory: No shortness of  breath, dyspnea on exertion, cough, or hemoptysis  Cardiovascular: negative  Gastrointestinal: constipation.   Genitourinary: negative  Musculoskeletal: negative  Neurologic: positive for  local weakness, speech problems, incoordination and behavior changes  Psychiatric: anxiety, agitation and perseveration re: constipation.   His psychiatrist is wondering if steroids are contributing to difficult management of his disease. Hematologic/Lymphatic/Immunologic: negative  Endocrine: weight gain. He notes he can get very hot at night despite not using blankets.  He also say he notes other times his hands are cold.     Family history:  There is family history on dad's side of high cholesterol/triglycerides.  Physical Exam  Vital signs:  See reported vitals in the HPI.      GENERAL: Healthy, and alert.  EYES: Eyes grossly normal to inspection.  No discharge or erythema,patch in place over right eye. HENT: Normocephalic.  External ears, nose and mouth without ulcers or lesions.    RESP: No audible wheeze, cough, or visible cyanosis.   SKIN: Scattered bruising. No significant rash, abnormal pigmentation or lesions.  MSK: Moves upper extremities but is ataxic.  NEURO: prominent dysarthria,  His speech continues to be difficult to understand at times.  PSYCH:  Affect varies with the topic, and appearance well-groomed.    Impression:  Ependymoma in good control on Entinostat    Right hip and finger well healed after previous fall.  Mental health issues continue   Daily bowel movements however Geo continues with concerns of constipation.    Weight increase over time.  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  Long-term steroid use - concerning for adrenal insufficiency.    Plan:  Discussed diet and cholesterol at length.  He remains convinced that he must eat a lot.  Discussed that with his high triglycerides this means his body is  storing and he is taking in more calories than he is burning. We will arrange for an ECHO and cardiac follow-up. Reviewed with Geo that if his eating continues, I am concerned about heart disease and stroke.  Encouraged him to eat things like oatmeal when he is hungry and other fiber that can fill him but will also help.     Reviewed that he has lower saturations at times (which is not new) and that he used to have CPAP machine. Discussed again that I would love to have him consult with [place] doctors about possibly implanted CPAP since he didn't like using the mask or other options.    He will start the next cycle of Entinostat - 7mg orally weekly. New drug and diary provided     Due to his long term condition, changing body habitus and safety concerns related to his ataxia, mental health and ependymoma, Geo needs a custom manual wheelchair.  He has not yet received it.  We will follow up.    I have place a PT/OT referral related to cancer rehab and for them to do a home evaluation and make recommendations for equipment to make transfers safer at the [facility].      Discussed that he has been on a very low dose of steroids for a long time.  Review with mom and Geo that we will do a low dose ACTH stim test in early May.  If his cortisol is above 18 we will stop his steroids, if not we will switch him to hydrocortisone.   We will also evaluate other hormones to follow up on his other symptoms. The family also knows that he should not receive any steroids on the morning prior to his stim testing.     Total time spent on the following services on the date of the encounter:  Preparing to see patient, chart review, review of outside records, Ordering medications, test, procedures, chemotherapy, Referring or communicating with other healthcare professionals, Interpretation of labs, imaging and other tests, Performing a medically appropriate examination , Counseling and educating the patient/family/caregiver ,  Documenting clinical information in the electronic or other health record , Communicating results to the patient/family/caregiver , Care coordination  and Total time spent: 75 minutes      ALAN Justin CNP

## 2021-04-15 NOTE — PROGRESS NOTES
"  The patient has been notified of following:     \"This video visit will be conducted via a call between you and your physician/provider. We have found that certain health care needs can be provided without the need for an in-person physical exam.  This service lets us provide the care you need with a video conversation.  If a prescription is necessary we can send it directly to your pharmacy.  If lab work is needed we can place an order for that and you can then stop by our lab to have the test done at a later time.    Video visits are billed at different rates depending on your insurance coverage.  Please reach out to your insurance provider with any questions.    If during the course of the call the physician/provider feels a video visit is not appropriate, you will not be charged for this service.\"    Patient has given verbal consent for Video visit? Yes    Video-Visit Details    Type of service:  Video Visit    Video Start Time: 1:26 PM  Video End Time: 2:30 PM    Originating Location (pt. 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  Mom is on the video meeting as well as Cruz from the [facility].    HPI: Geo notes that girth of his last stool last evening was pretty normal.  He still feels the constipation is a difficult problem even though he is having one- two bowel movements a day.  Cruz reported that he was  complaining last night of headache and not feeling well. His vital signs at that time were /82 pulse and temp were reported as normal. His O2 sat was 89.  He is feeling well now.     He has previously been seen at " [facility] regarding decrease in oxygen saturations during sleep post his brain tumor surgeries.  He wore a mask for a while but doesn't like the mask and has refused it for a couple of years now.      Geo is discussing the Internet and the temptations there.  He was also discussing things on twitter. He spends a lot of time on it when he gets bored.  He said \"I was worried about addiction but I think it is an instinct that will come in handy\".  Further questioning didn't reveal exactly what he was trying to say.      He continues to live in his group care facility and they have some concerns about safely transferring him with his increased weight.     Lab:  Obtained on 3/29 through home and reviewed today:  Na 143  K 3.8  Creatinine 1.1  Albumin slightly low at 3.1 with normal serum protein at 6.4  Phos 3.5    CBC and chemistries done 4/6/21 and reviewed:    WBC 4.9  Hgb   11.5    Platelets 136,000  ANC    Na 140  K 4.1  Chloride 102  CO2 29  BUN 13  Creatinine 0.92  Alkaline Phosphatase 155  AST  22  ALT  25  Bili 0.3  Protein 7.3  Albumin 3.6  Glucose 132   Magnesium 2.1      Imaging:    No results found for any visits on 04/13/21.       Oncology History:    [id number]   6/26/15    Started Entinostat 1/9/17    DIAGNOSIS: EPENDYMOMA  AREAS TREATED: POST FOSSA TUMOR  DOSE: 5940 cGy   TYPE OF RADIATION GIVEN: with IMRT Tomotherapy   9/08/2015 to 10/26/15    Patient Active Problem List   Diagnosis     GERD (gastroesophageal reflux disease)     Closed fracture at the growth plate of right distal fibula      Elevated serum creatinine     Dyspepsia     Intracranial hemorrhage (H)     Hemorrhagic stroke (H)     Ependymoma (H)     Admission for antineoplastic chemotherapy     Post-operative state     S/P craniotomy     S/P biopsy     Noncomitant strabismus     Abducens neuropathy of both eyes     CANDELARIO (obstructive sleep apnea)     Health Care Home     Hypernatremia     Body temperature low     Current chronic use of " systemic steroids     Elevated TSH     Status post chemotherapy     Neoplasm of posterior cranial fossa (H)     Chronic constipation     Serum albumin decreased     Closed compression fracture of thoracic vertebra with routine healing, subsequent encounter     Depression     Constipation     Constipation by delayed colonic transit     Slow transit constipation     Tubular adenoma     Current Outpatient Medications   Medication     dexamethasone (DECADRON) 0.5 MG tablet      MG capsule     doxycycline hyclate (VIBRAMYCIN) 100 MG capsule     fexofenadine (ALLEGRA) 180 MG tablet     Lactobacillus-Inulin ([facility] HEALTH & WELLNESS) CAPS     lamoTRIgine (LAMICTAL) 200 MG tablet     linaclotide (LINZESS) 290 MCG capsule     mupirocin (BACTROBAN) 2 % external ointment     OLANZapine (ZYPREXA) 20 MG tablet     OLANZapine (ZYPREXA) 5 MG tablet     omeprazole (PRILOSEC) 20 MG DR capsule     order for DME     order for DME     polyethylene glycol (MIRALAX) 17 GM/Dose powder     potassium phosphate, monobasic, (K-PHOS) 500 MG tablet     sennosides (SENOKOT) 8.6 MG tablet     sertraline (ZOLOFT) 100 MG tablet     study - entinostat, IDS# 5050, 1 mg tablet     study - entinostat, IDS# 5050, 5 mg tablet     sulfamethoxazole-trimethoprim (BACTRIM) 400-80 MG tablet     traZODone (DESYREL) 150 MG tablet     vitamin D3 (CHOLECALCIFEROL) 2000 units (50 mcg) tablet     vitamin E (TOCOPHEROL) 400 units (360 mg) capsule     No current facility-administered medications for this visit.         Allergies   Allergen Reactions     Blood Transfusion Related (Informational Only) Swelling     Periorbital swelling post platelet transfusion     No Known Drug Allergies        Review of Systems  Skin: positive for striae, no unusual bruising or open lesions currently  Eyes: positive for oculoplegia  Ears/Nose/Throat: negative  Respiratory: No shortness of breath, dyspnea on exertion, cough, or hemoptysis  Cardiovascular:  negative  Gastrointestinal: constipation.   Genitourinary: negative  Musculoskeletal: negative  Neurologic: positive for  local weakness, speech problems, incoordination and behavior changes  Psychiatric: anxiety, agitation and perseveration re: constipation.   His psychiatrist is wondering if steroids are contributing to difficult management of his disease. Hematologic/Lymphatic/Immunologic: negative  Endocrine: weight gain. He notes he can get very hot at night despite not using blankets.  He also say he notes other times his hands are cold.     Family history:  There is family history on dad's side of high cholesterol/triglycerides.  Physical Exam  Vital signs:  See reported vitals in the HPI.      GENERAL: Healthy, and alert.  EYES: Eyes grossly normal to inspection.  No discharge or erythema,patch in place over right eye. HENT: Normocephalic.  External ears, nose and mouth without ulcers or lesions.    RESP: No audible wheeze, cough, or visible cyanosis.   SKIN: Scattered bruising. No significant rash, abnormal pigmentation or lesions.  MSK: Moves upper extremities but is ataxic.  NEURO: prominent dysarthria,  His speech continues to be difficult to understand at times.  PSYCH:  Affect varies with the topic, and appearance well-groomed.    Impression:  Ependymoma in good control on Entinostat    Right hip and finger well healed after previous fall.  Mental health issues continue   Daily bowel movements however Geo continues with concerns of constipation.    Weight increase over time.  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  Long-term steroid use - concerning for adrenal insufficiency.    Plan:  Discussed diet and cholesterol at length.  He remains convinced that he must eat a lot.  Discussed that with his high triglycerides this means his body is storing and he is taking in more calories than he is burning. We  will arrange for an ECHO and cardiac follow-up. Reviewed with Geo that if his eating continues, I am concerned about heart disease and stroke.  Encouraged him to eat things like oatmeal when he is hungry and other fiber that can fill him but will also help.     Reviewed that he has lower saturations at times (which is not new) and that he used to have CPAP machine. Discussed again that I would love to have him consult with [place] doctors about possibly implanted CPAP since he didn't like using the mask or other options.    He will start the next cycle of Entinostat - 7mg orally weekly. New drug and diary provided     Due to his long term condition, changing body habitus and safety concerns related to his ataxia, mental health and ependymoma, Geo needs a custom manual wheelchair.  He has not yet received it.  We will follow up.    I have place a PT/OT referral related to cancer rehab and for them to do a home evaluation and make recommendations for equipment to make transfers safer at the [facility].      Discussed that he has been on a very low dose of steroids for a long time.  Review with mom and Geo that we will do a low dose ACTH stim test in early May.  If his cortisol is above 18 we will stop his steroids, if not we will switch him to hydrocortisone.   We will also evaluate other hormones to follow up on his other symptoms. The family also knows that he should not receive any steroids on the morning prior to his stim testing.     Total time spent on the following services on the date of the encounter:  Preparing to see patient, chart review, review of outside records, Ordering medications, test, procedures, chemotherapy, Referring or communicating with other healthcare professionals, Interpretation of labs, imaging and other tests, Performing a medically appropriate examination , Counseling and educating the patient/family/caregiver , Documenting clinical information in the electronic or other health  record , Communicating results to the patient/family/caregiver , Care coordination  and Total time spent: 75 minutes

## 2021-04-22 NOTE — TELEPHONE ENCOUNTER
[facility]al[facility] with [facility].    PT   1xwk/4wks  3 as needed    Verbal order given.  Will fax for MD signature.  Cate Morales RN

## 2021-05-01 PROBLEM — R09.02 HYPOXIA: Status: ACTIVE | Noted: 2021-01-01

## 2021-05-01 NOTE — ED TRIAGE NOTES
Patient comes in for evaluation of cough and shortness of breath. Has a history of a brain tumor and is on palliative chemo. Possible aspiration. Decreased hearing today. Nausea. Hypoxic in triage.

## 2021-05-01 NOTE — PHARMACY-ADMISSION MEDICATION HISTORY
Admission medication history interview status for this patient is complete. See [facility] admission navigator for allergy information, prior to admission medications and immunization status.     Medication history interview done, indicate source(s): Patient's father, caretaker at [facility] [phone]  Medication history resources (including written lists, pill bottles, clinic record): MAR from [facility] transcribed over the phone  Pharmacy: [facility]    Changes made to PTA medication list:  Added: nothing  Deleted: colace, mupirocin, prn olanzapine   Changed: nothing    Actions taken by pharmacist (provider contacted, etc): called patient's father and guardian Eric Hicks, who recommended I call his group home [facility]     Additional medication history information: Med rec was conducted with a caretaker at the group home, but he was not a nurse. The caretaker first started with medications in his blisterpak, and then went through medications on his MAR; this caretaker didn't know how to use the fax machine, so I will update this note and medication list when I received the MAR to verify that everything was entered correctly. Patient received all medications yesterday and had morning doses of his medications this morning. The caretaker didn't see olanzapine prn on the MAR, and believes that this is because his scheduled dose of olanzapine was increased to 10 mg.     The patient receives entinostat (study drug) 7 mg once weekly on Thursdays at 1430; the patient's father/guardian can bring in this medication if the patient is still here on Thursday.     Medication reconciliation/reorder completed by provider prior to medication history?  N   (Y/N)     Prior to Admission medications    Medication Sig Last Dose Taking? Auth Provider   Cholecalciferol (VITAMIN D3) 50 MCG (2000 UT) TABS Take 2,000 Units by mouth daily (with breakfast) 5/1/2021 at am Yes Unknown, Entered By History   dexamethasone (DECADRON)  0.5 MG tablet Take 0.75 mg by mouth daily (with breakfast) 5/1/2021 at am Yes Unknown, Entered By History   fexofenadine (ALLEGRA) 180 MG tablet Take 180 mg by mouth At Bedtime 4/30/2021 at pm Yes Unknown, Entered By History   Lactobacillus-Inulin ([facility]) CAPS Take 2 capsules by mouth daily 4/30/2021 at pm Yes Kristi Schuler APRN CNP   lamoTRIgine (LAMICTAL) 200 MG tablet Take 1 tablet (200 mg) by mouth At Bedtime 4/30/2021 at pm Yes Coffin, Richard Breyen, MD   linaclotide (LINZESS) 290 MCG capsule Take 1 capsule (290 mcg) by mouth every morning (before breakfast) 5/1/2021 at am Yes Kristi Schuler APRN CNP   OLANZapine (ZYPREXA) 10 MG tablet Take 1 tablet (10 mg) by mouth At Bedtime 4/30/2021 at pm Yes Coffin, Richard Breyen, MD   omeprazole (PRILOSEC) 20 MG DR capsule Take 2 capsules (40 mg) by mouth every morning 5/1/2021 at am Yes Kristi Schuler APRN CNP   polyethylene glycol (MIRALAX) 17 GM/Dose powder Take 17 g (1 capful) by mouth 3 times daily 5/1/2021 at 1x Yes Kristi Schuler APRN CNP   potassium phosphate, monobasic, (K-PHOS) 500 MG tablet Take 1 tablet (500 mg) by mouth 2 times daily 5/1/2021 at 1x Yes Kristi Schuler APRN CNP   sertraline (ZOLOFT) 100 MG tablet Take 1 tablet (100 mg) by mouth daily 5/1/2021 at am Yes Coffin, Richard Breyen, MD   study - entinostat, IDS# 5050, 1 mg tablet Take 2 tablets (2 mg) by mouth every 7 days for 4 doses Take two 1mg tablet with one 5mg tablet for total dose of 7 mg weekly. Take on an empty stomach, at least 1 hour before or 2 hours after a meal.  Swallow tablet whole. 4/29/2021 at 1430 on Thursdays Yes Leoncio Rousseau MD   study - entinostat, IDS# 5050, 5 mg tablet Take 1 tablet (5 mg) by mouth every 7 days for 4 doses Take one 5mg tablet with two 1mg tablet for total dose of 7 mg weekly. Take on an empty stomach, at least 1 hour before or 2 hours after a meal.  Swallow tablet whole. 4/29/2021 at  1430 on Thursdays Yes Leoncio Rousseau MD   sulfamethoxazole-trimethoprim (BACTRIM) 400-80 MG tablet Take 1 tablet by mouth 2 times daily On Saturdays and Sundays 5/1/2021 at am Yes Zoraida Widler MD   traZODone (DESYREL) 150 MG tablet Take 1 tablet (150 mg) by mouth nightly as needed for sleep or depression 4/30/2021 at pm Yes Coffin, Richard Breyen, MD   vitamin E 400 units TABS Take 400 Units by mouth daily 5/1/2021 at am Yes Unknown, Entered By History   order for DME Equipment being ordered: Dressing  Wound #1 lower leg, W 6 cm x, D 0.5  cm, Drainage scant, Thickness sutured     Type: non stick gauze, Brand: , Size: 4/4   Change: 1 per day    Tape/Wrap: 1 each     attach facesheet with insurance information (delete this line)   William Mcrae MD   order for DME Equipment being ordered: Ramana Rushing PA-C

## 2021-05-01 NOTE — TELEPHONE ENCOUNTER
Call regarding Geo Hicks complaining of episodes of coughing spells followed by dizziness and hearing changes that are transient in nature. He also has a new subconjunctival hemorrhage. His father believes that Geo is aspirating more. Geo is known to be at risk for aspiration and does not necessarily comply with his dietary restrictions. He also has not tolerated his BiPAP and his oxygen saturations have been lower. These are issues that have been followed by his neuro-oncology team. Dad wanted to see if it was appropriate to still await his scheduled MRA on Tuesday. Given that his symptoms are most likely attributable to transient hypoxemia/hypercarbia from his coughing spells, that his recent tumor directed imaging (March 16th) and labs (platelet count in safe range) are stable, it is okay to wait for evaluations scheduled on Tuesday. Instructed to call back if symptoms are persistent rather than transient, if there is development of severe headache, or if there is altered mental status as these are signs that would warrant more expedient evaluation    Signed,  Herbie Eddy   Pediatric Hematology/Oncology Fellow

## 2021-05-01 NOTE — ED PROVIDER NOTES
"  History   Chief Complaint:  Shortness of Breath and Cough       The history is provided by the patient and a parent.      Geo Hicks is a 20 year old male on palliative chemo with history of brain tumor who presents with shortness of breath and cough. Notes ongoing spells of coughing, lightheadedness, and shortness of breath with associated hearing loss, so much that he had to turn the volume all the way up on his phone in order to hear, and mild headache described as a \"brain freeze\". Note past headache episodes , but none this severe or involving hearing loss. They thought it was from the couging spell and Hematology/Oncology fellow recommend MRI. Denies fever, vomiting, diarrhea, new numbness and tingling, and chest pain . Also, states possible aspiration today. In, triage he was hypoxic.     Review of Systems   HENT: Positive for hearing loss.    Respiratory: Positive for cough and shortness of breath.         Aspiration.    Neurological: Positive for light-headedness and headaches.   All other systems reviewed and are negative.      Allergies:  Blood Transfusion Related (Informational Only)    Medications:  dexamethasone   fexofenadine   Lactobacillus-Inulin  lamotrigine   linaclotide   Olanzapine   omeprazole   potassium phosphate, monobasic  sertraline   study - entinostat, IDS# 5050, 1 mg tablet  study - entinostat, IDS# 5050, 5 mg tablet  sulfamethoxazole-trimethoprim   trazodone     Past Medical History:    Cranial nerve dysfunction   Gastroesophageal reflux disease   Hearing loss   Intracranial hemorrhage   Migraine   Pilonidal cyst   Reduced vision   Refractory obstruction of nasal airway    Ependymoma  Sleep apnea   Strabismus   Dyspepsia   Tubular adenoma   Depression   Closed compression fracture of thoracic vertebra with routine healing   Neoplasm of posterior cranial fossa  Hypernatremia   Hemorrhagic stroke   Closed fracture at the growth plate of right distal fibula      Past Surgical " History:    Graft cartilage from posterior auricle   Incision and drainage perineal , combined   Optical tracking system craniotomy, excise tumor x4  Optical tracking sytem ventriculostomy  Remove port vascular   Rhinoplasty   vascular surgery      Family History:    Father: Pulmonary embolism/DVT, hypothyroidism     Social History:  PCP: Jeffrey Espinoza  Presents with Father.     Physical Exam     Patient Vitals for the past 24 hrs:   BP Temp Temp src Pulse Resp SpO2 Weight   05/01/21 1800 117/71 -- -- 107 -- 95 % --   05/01/21 1745 109/63 -- -- 106 -- 97 % --   05/01/21 1730 118/60 -- -- 109 -- 94 % --   05/01/21 1715 117/72 -- -- 114 -- 97 % --   05/01/21 1700 108/70 -- -- 124 -- 94 % --   05/01/21 1645 121/63 -- -- 109 -- 93 % --   05/01/21 1632 102/81 98.6  F (37  C) Temporal 109 20 (!) 85 % 126 kg (277 lb 12.5 oz)       Physical Exam  General: Resting on the bed.  Head: No obvious trauma to head.  Ears, Nose, Throat:  External ears normal.  Nose normal.  No pharyngeal erythema, swelling or exudate.  Midline uvula.    Eyes:  Conjunctivae clear.  Pupils are equal, round, and reactive.   Neck: Normal range of motion.  Neck supple.   CV: Regular rate and rhythm.  No murmurs.      Respiratory: Effort normal and breath sounds coarse.  No wheezing  Gastrointestinal: Soft.  No distension. There is no tenderness.  There is no rigidity, no rebound and no guarding.   Neuro: Alert. Moving all extremities appropriately. slurred speech secondary to facial paralysis.  Left facial paralysis appears at baseline.  Gross muscle strength intact of the proximal and distal bilateral upper and lower extremities.  Sensation intact to light touch in all 4 extremities.   Skin: Skin is warm and dry.  No rash noted.       Emergency Department Course     Imaging:  MR Brain w/o & w Contrast   Final Result   IMPRESSION:   1. No evidence of acute ischemia or acute hemorrhage.   2. Status post suboccipital craniotomy for debulking of  ependymona. Stable fairly stable residual solid cystic enhancing mass visualized.   3.  Stable surrounding vasogenic edema of the cerebellum.   4.  No new mass lesion or abnormal enhancement visualized.      MRA Brain ([facility]) wo Contrast   Final Result   IMPRESSION:   Normal MRA [facility] [facility].      XR Chest Port 1 View   Final Result   IMPRESSION: Negative chest.          Laboratory:  CBC: WBC 8.0, HGB 9.9 (L) ,  (L)    CMP: anion gap 1 (L), Glucose 164 (H), Calcium 8.0 (L) , Albumin 2.9 (L), Protein total 6.3 (L), alkphos 165 (H), Creatinine 1.12 o/w WNL  Symptomatic Influenza A/B & SARS-CoV2 (COVID19) Virus PCR Multiplex Negative   blood gas venous: pH: 7.28 (L) , PCO2: 69 (H) , PO2: 58 (H) , Bicarbonate: 32 (H) , FIO2 3 Liters/minute NC, base excess 40   Lactic acid whole blood(1748) 0.9    Procalcitonin: 0.21    Procedures    Emergency Department Course:    Reviewed:  I reviewed nursing notes, vitals, past medical history and care everywhere    Assessments:  165 I obtained history and examined the patient as noted above.    I rechecked the patient and explained findings.     Consults:   172 : I spoke with Dr. Herbie Fan of the Oncology service regarding patient's presentation, findings, and plan of care.    2013 : I spoke with Dr. Lowry of the Oncology service regarding patient's presentation, findings, and plan of care.    Interventions:  1851 Gadavist 12.5mL IV   2030 Unasyn 3g IV     Disposition:  The patient was transferred to [facility] via EMS. Dr. Lowry accepted the patient for transfer.     Impression & Plan     Medical Decision Makin-year-old male with a very complex medical history presents with headache, hearing changes, cough.  Vital signs notable for hypoxic on arrival.  Patient was tolerating 2 L well.  Broad differential was pursued including not limited to aspiration, pneumonia, COVID-19, electrolyte, metabolic, renal dysfunction, severe sepsis, bacteremia,  neutropenia, etc.  In regards to the hearing loss additional concerns about possible progression of his underlying disease, stroke, mass, tumor, bleed, etc.  I spoke with the on-call pediatric hematology oncology fellow who had referred the patient into the emergency department.  He recommended MRI brain with and without and given the patient has upcoming MRA that as well.  Both appear to be stable without any acute progression of disease, no acute vascular abnormalities, no acute bleed or other etiologies.  No clear etiology of patient's hearing loss at this time.  In regards to his hypoxia concern for possible aspiration pneumonia.  Patient is mildly anemic and thrombocytopenic.  BMP shows no acute electrolyte, metabolic or renal dysfunction.  Lactate is normal not concerning for severe sepsis or septic shock.  VBG shows retention with PCO2 of 69.  Concern given the has been hypoxia and retention of unclear etiology therefore recommending admission.  Covid and influenza swab are negative.  Patient is noncompliant with CPAP and BiPAP.  This may be part of the etiology of his retention at this time.  Regardless he is not usually hypoxic.  Unasyn given for possible aspiration pneumonia.  Discussed with the pediatric hematology oncology attending at the [facility] and they graciously accepted this admission.    Covid-19  Geo Hicks was evaluated during a global COVID-19 pandemic, which necessitated consideration that the patient might be at risk for infection with the SARS-CoV-2 virus that causes COVID-19.   Applicable protocols for evaluation were followed during the patient's care.   COVID-19 was considered as part of the patient's evaluation. The plan for testing is:  a test was obtained during this visit.    Diagnosis:    ICD-10-CM    1. Hypoxia  R09.02    2. CO2 retention  E87.2    3. Aspiration pneumonitis (H)  J69.0    4. Cough  R05        Discharge Medications:  New  Prescriptions    No medications on file       Scribe Disclosure:  I, Ivana Peck, am serving as a scribe at 4:52 PM on 5/1/2021 to document services personally performed by Jacqueline Moy MD based on my observations and the provider's statements to me.        Jacqueline Moy MD  05/01/21 1029

## 2021-05-01 NOTE — TELEPHONE ENCOUNTER
Received call back from father, as he clarified with Geo his symptomatology, and his hearing loss is actually persistent rather than transient. Father would prefer to have him evaluated sooner than Tuesday, which is reasonable given this new and persistent neurologic deficit. Spoke with [facility] intake and provided his clinical synopsis. Will be worthwhile to obtain an MRI w and w/o contrast to assess for evidence of a ischemic process given these symptoms have been ongoing for at least more than 2 weeks. Father reports that his oxygen saturation is often at 90% or lower, so it will also be worthwhile to assess for pneumonia given this piece of history and his coughing spells.    Signed,  Herbie Eddy   Pediatric Hematology/Oncology Fellow  Pager: [phone]

## 2021-05-02 PROBLEM — J96.90 RESPIRATORY FAILURE (H): Status: ACTIVE | Noted: 2021-01-01

## 2021-05-02 NOTE — PLAN OF CARE
[id number]. Arrived on unit via EMS from [facility] for concerns of hypoxemia and worsening neurological status. Dad at bedside initially for consent and admission questions. Upon arrival, patient on 4L NC and satting 88-94%. Increased to 6L NC with improved sats. RT at bedside to try alternative O2 deliveries as patient dislikes NC. Trialed oxymask and CPAP for obstructive sleep apnea; patient did not tolerate. Ended back on 6.5L NC and tolerated okay with sats 88-93% while asleep. Significant snoring with apneic episodes overnight. R PIV saline locked without issue. Good urine output before bed; patient very impulsive with movements and is a 3-person assist. No stools but patient reported he stooled on 5/1. Denies pain or nausea. Sleeping soundly overnight. Hourly rounding completed.

## 2021-05-02 NOTE — UTILIZATION REVIEW
"  Admission Status; Secondary Review Determination         Under the authority of the Utilization Management Committee, the utilization review process indicated a secondary review on the above patient.  The review outcome is based on review of the medical records, discussions with staff, and applying clinical experience noted on the date of the review.        (XXX)      Inpatient Status Appropriate - This patient's medical care is consistent with medical management for inpatient care and reasonable inpatient medical practice.      () Observation Status Appropriate - This patient does not meet hospital inpatient criteria and is placed in observation status. If this patient's primary payer is Medicare and was admitted as an inpatient, Condition Code 44 should be used and patient status changed to \"observation\".   () Admission Status NOT Appropriate - This patient's medical care is not consistent with medical management for Inpatient or Observation Status.          RATIONALE FOR DETERMINATION     Geo Hicks is a 20 year old who came to attention with cough and shortness of breath. PMH is extremely complex due to brain tumor for which underwent radiation and debulking that left him bed-bound and for which he is currently on palliative chemo.  In addition, he is has cranial nerve deficits and is on multiple psychoactive medications as well as steroids for management of other complications of the brain tumor.  He was found to be hypoxic and although the CXR is without evident infiltrate, he was admitted for treatment of presumptive aspiration pneumonia, but currently he is not being covered with antibiotics. He currently requires 4-8 LPM for support of his oxygen level for which reason pulmonary medicine has been consulted.  He is expected to remain hospitalized for the next couple of days at least.  In view of the complexity of his care, risk of decompensation without appropriate medical care, and expected LOS, " Inpatient status is considered appropriate. I have communicated with Dr. Lowry, staff for the medical team caring for Mr. Hicks.      The severity of illness, intensity of service provided, expected LOS and risk for adverse outcome make the care complex, high risk and appropriate for hospital admission.        The information on this document is developed by the utilization review team in order for the business office to ensure compliance.  This only denotes the appropriateness of proper admission status and does not reflect the quality of care rendered.         The definitions of Inpatient Status and Observation Status used in making the determination above are those provided in the CMS Coverage Manual, Chapter 1 and Chapter 6, section 70.4.      Sincerely,     Luan Mejias MD  Physician Advisor  Utilization Management   [facility]

## 2021-05-02 NOTE — PROGRESS NOTES
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 He was previously prescribed home BiPAP but has not worn it for many years due to physical discomfort. He has not had fever or other ill symptoms and his chest X-ray did not show evidence of pulmonary disease. MRI/MRV at [facility] showed stable appearance of ependymoma and post-surgical changes with no acute findings. He refuses to comply with CPAP/BiPAP and does not qualify for repeat sleep study per his insurance company due to previous evaluation that has already been completed. At this time, he remains under observation status for supplemental O2 and consultation with Pulmonology. Our suspicion for acute infectious or intracranial process is low at this time.    Hypoxia  Obstructive sleep apnea  - Continuous pulse oximetry  - Supplemental O2 by best tolerated means to maintain O2 saturations >88%.  - Declined CPAP thus far this admission  - Pulmonology/Sleep Medicine consultation in AM  - Blood cultures pending  - Received a single dose of Unasyn at outside hospital - will not continue this at this time, but will consider adding back in if having shortness of breath, a focal lung exam, has a fever, or a CXR that looks worse     Ependymoma  Patient on active COG study  - Entinostat 7 mg weekly, patient's father to bring this in if he is still here on Thursday for administration     Chronic low dose steroid use  Adrenal insufficiency  - Followed by Dr. Martin  - PTA dexamethasone 0.75 mg QAM, hold " 5/3 in preparation for ACTH stimulation test on 5/4     Agitation  Delusions  Multiple cranial nerve palsies  Ataxia  - PTA olanzapine 10mg at bedtime  - PTA sertraline 100mg at bedtime  - PTA trazodone 150mg PRN at bedtime    Dysphagia  Concerns for aspiration  - SLP consult for repeat swallow evaluation     Seasonal allergies  - Allegra 180mg qPM      Chronic constipation  Gastroesophageal reflux  - PTA Miralax daily  - PTA linaclotide every morning   - PTA omeprazole  - PTA lactobacillus       Diet: Dysphagia Diet Level 3 Advanced Thin Liquids (water, ice chips, juice, milk gelatin, ice cream, etc)    Fluids: None  Lines: PIV x1  DVT Prophylaxis: Low Risk/Ambulatory with no VTE prophylaxis indicated  Thornton Catheter: not present  Code Status:   Full Code, father would like to discuss amending this admission         Disposition Plan   Expected discharge: 2 - 3 days, recommended to previous group home once home respiratory regimen established, appropriate evaluation completed, home respiratory DME dispensed.  Entered: Rajesh Lu MD 05/02/2021, 12:20 PM       The patient's care was discussed with the Attending Physician, Dr. Eddie Lowry.    Rajesh Lu MD PGY-3  Pediatric Hematology/Oncology Service  [facility]    I saw and evaluated the patient and agree with the resident's assessment and plan.  Eddie Lowry MD, MPH, [facility]  Division of Pediatric Hematology/Oncology      ______________________________________________________________________    Interval History    Required 3-7 L O2 by [facility] to maintain saturations. Saturations normalized on CPAP, but eventually refused due to discomfort. Still reports persistent dizziness and flashes. Significant cough, sputtering with food and liquid intake, has not had swallow evaluation in some time. Parents are likely not interested in limiting his diet  "despite history of dysphagia. Reports \"abdomen is not ok\" this morning, has been having regular bowel movements. Cough remains non-productive, worse when trying to eat or drink. Good UOP without IV fluids. Requires 3 person assist for all activities. Afebrile, no other localizing symptoms.    A limited 6 point ROS was obtained and is negative except where noted above.    Physical Exam   Vital Signs: Temp: 100.2  F (37.9  C) Temp src: Axillary BP: 102/47 Pulse: 109   Resp: 20 SpO2: (!) 88 % O2 Device: Nasal cannula Oxygen Delivery: 8 LPM  Weight: 273 lbs 5.93 oz    General: Awake, alert, in no acute distress, lying flat in bed, carries significant extra weight  Head: Normocephalic, atraumatic  Eyes: Clear conjunctiva without pallor or drainage, anicteric sclera  Ears: Canals patent, normal pinnae  Nose: Nares patent, no congestion, no drainage  Mouth/Oropharynx: Moist mucous membranes  Chest: Symmetric expansion, normal respiratory effort, no retractions, no accessory muscle use  Pulmonary: Clear to auscultation bilaterally, no crackles/wheeze/rhonchi, good aeration in all lung fields but breath sounds are soft throughout due to body habitus, notable stertor while sleeping  Cardiovascular: Regular rate and rhythm, normal S1/S2, no murmurs/rubs/gallops, 2+ peripheral pulses, brisk capillary refill, no peripheral edema, no cyanosis  Abdomen: Soft, not tender, not distended, normal bowel sounds, no hepatosplenomegaly, no masses  Integument: No rashes, notable striae throughout abdomen and bilateral lower extremities  Neurologic: Alert and oriented, PERRLA, L eye patch in place, unable to fully abduct R eye, nayely ataxia, grossly normal strength, no focal deficits from baseline  Psychiatric: Speech is very difficult to understand, appears to understand providers well, mood is \"ok\"      Data    Results for orders placed or performed during the hospital encounter of 05/01/21 (from the past 24 hour(s))   CBC with platelets " differential   Result Value Ref Range    WBC 6.3 4.0 - 11.0 10e9/L    RBC Count 4.13 (L) 4.4 - 5.9 10e12/L    Hemoglobin 9.3 (L) 13.3 - 17.7 g/dL    Hematocrit 33.1 (L) 40.0 - 53.0 %    MCV 80 78 - 100 fl    MCH 22.5 (L) 26.5 - 33.0 pg    MCHC 28.1 (L) 31.5 - 36.5 g/dL    RDW 19.8 (H) 10.0 - 15.0 %    Platelet Count 113 (L) 150 - 450 10e9/L    Diff Method Automated Method     % Neutrophils 57.0 %    % Lymphocytes 19.9 %    % Monocytes 16.4 %    % Eosinophils 4.1 %    % Basophils 0.9 %    % Immature Granulocytes 1.7 %    Nucleated RBCs 1 (H) 0 /100    Absolute Neutrophil 3.6 1.6 - 8.3 10e9/L    Absolute Lymphocytes 1.3 0.8 - 5.3 10e9/L    Absolute Monocytes 1.0 0.0 - 1.3 10e9/L    Absolute Eosinophils 0.3 0.0 - 0.7 10e9/L    Absolute Basophils 0.1 0.0 - 0.2 10e9/L    Abs Immature Granulocytes 0.1 0 - 0.4 10e9/L    Absolute Nucleated RBC 0.1    CRP inflammation   Result Value Ref Range    CRP Inflammation <2.9 0.0 - 8.0 mg/L     *Note: Due to a large number of results and/or encounters for the requested time period, some results have not been displayed. A complete set of results can be found in Results Review.       Medications       [Held by provider] dexamethasone  0.75 mg Oral Daily with breakfast     fexofenadine  180 mg Oral At Bedtime     lactobacillus rhamnosus (GG)  2 capsule Oral Daily     lamoTRIgine  200 mg Oral At Bedtime     linaclotide  290 mcg Oral QAM AC     OLANZapine  10 mg Oral At Bedtime     omeprazole  40 mg Oral QAM     polyethylene glycol  17 g Oral TID     potassium phosphate (monobasic)  500 mg Oral BID     sertraline  100 mg Oral Daily     sulfamethoxazole-trimethoprim  1 tablet Oral 2 times per day on Sun Sat     Vitamin D3  2,000 Units Oral Daily with breakfast     vitamin E  400 Units Oral Daily

## 2021-05-02 NOTE — PLAN OF CARE
T-max 100.1. BPs 100's/40's. O2 sats varying from 73-94% on 4-8 LPM via nasal cannula. Sats stable around 90% on 4-6 LPM when asleep. Snoring and has short apneic periods, but recovers without intervention. Dips to 70's-80's when eating, drinking, or coughing. MD notified of inibility to maintain O2 sats while taking PO. Dysphasia level 3 diet ordered and SLP consult placed. OK'd to continue this diet as his sats recover to 88-90% within 10 minutes after eating. LS clear but diminished in bases. Oral suctioned x1 for increased drooling. No increased respiratory distress noted during times of O2 desats; this is thought to be his baseline. Up to commode with 3 person/gait belt assist and had large urine/stool out. Very unsteady on feet. Neuro status stable and baseline and behaviorally appropriate. Mom called x1 for an update. Continue close monitoring.

## 2021-05-02 NOTE — PHARMACY-ADMISSION MEDICATION HISTORY
See excellent medication history note, completed at [facility] on 5/1/21.    Laura Reza, PharmD

## 2021-05-03 NOTE — PROGRESS NOTES
[facility]    Progress Note - Pediatric ICU Service        Date of Admission:  5/1/2021    Assessment & Plan    Geo Hicks is a 20 year old male admitted on 5/1/2021. 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     FEN/Renal  - NPO  - S/p 1L bolus NS, continue mIVF 100 ml/hr  - BMP    Respiratory  Acute on chronic hypoxic hypercapnic respiratory failure  Obstructive sleep apnea  - Continuous pulse oximetry  - Pulmonary/Sleep Medicine consult, appreciate reccs  - CXR  - BiPAP 16/8, INCREASE to 20/9  - Stat VBG, then Q4H (or more frequent depending on initial gas)    Cardiovascular  Hypotension  - Continuous cardiac monitoring  - S/p 1L bolus NS on floor  - Stress dose steroids, see below  - MAP goal >65    Heme/Onc  Ependymoma  Patient on active COG study  - Entinostat 7 mg weekly  - AM CBC    ID  Sepsis  Most likely 2/2 aspiration PNA.  - Continue IV Unasyn 3g Q6H   - Low threshold to broaden if not improving/worsening  - Trend CRP, procal  - Monitor for fevers    GI  Chronic constipation  Gastroesophageal reflux  - NPO  - IV Pantoprazole 40 mg daily while on steroids  - Holding the following PTA meds for now while NPO:   - PTA Miralax daily   - PTA linaclotide every morning    - PTA omeprazole   - PTA lactobacillus    Endo  Chronic low dose steroid use  Adrenal insufficiency  - START IV hydrocortisone 100mg/m2/day divided Q6H (75mg Q6H)  - HOLD PTA dexamethasone 0.75 mg QAM    Neuro/Psych  Agitation  Delusions  Multiple cranial nerve palsies  Ataxia  - Q1H neuro checks  - Continue PTA lamotrigine  - Holding the following PTA meds for now:   - PTA  olanzapine 10mg at bedtime   - PTA sertraline 100mg at bedtime   - PTA trazodone 150mg PRN at bedtime     Diet: NPO for Medical/Clinical Reasons Except for: No Exceptions    Fluids: mIVF 100cc/hr  Lines: 2 PIVs  DVT Prophylaxis: SCDs  Thornton Catheter: not present  Code Status:  Full       Disposition Plan   Expected discharge: > 7 days, recommended to prior living arrangement once respiratory status back to baseline, tolerating PO.  Entered: Justice Claros MD 05/03/2021, 4:36 AM     The patient's care was discussed with the PICU fellow, Dr. Colvin and attending, Dr. Hume.    Justice Claros MD  Medicine-Pediatrics, PGY-2  Pediatric ICU Service  [facility]    Pediatric Critical Care Progress Note:    Geo Hicks remains critically ill with acute on chronic hypoxic and hypercarbic respiratory failure due to increased obtundation in setting of CANDELARIO and posterior fossa ependymoma.    I personally examined and evaluated the patient today. All physician orders and treatments were placed at my direction.  Formulated plan with the house staff team or resident(s) and agree with the findings and plan in this note.  I have evaluated all laboratory values and imaging studies from the past 24 hours.  Consults ongoing and ordered are Oncology  I personally managed the respiratory and hemodynamic support, metabolic abnormalities, nutritional status, antimicrobial therapy, and pain/sedation management.   Key decisions made today included continuing full face BiPAP and increasing settings as needed to improved ventilation, monitoring ventilation closely, and keeping NPO. Per heme/onc fellow's discussion with patients decision-makers (his parents), at the time of admission he is full code but the heme/onc team will pursue further discussions today.  Procedures that will happen in the ICU today are: non-invasive positive pressure ventilation  The above plans and care have been  discussed with parents and all questions and concerns were addressed.  I spent a total of 60 minutes providing critical care services at the bedside, and on the critical care unit, evaluating the patient, directing care and reviewing laboratory values and radiologic reports for Geo Hicks.    Janet Rae Hume, MD      ______________________________________________________________________    Interval History   RRT to floor for worsening respiratory status and obtundation requiring BiPAP, as well as soft pressures (see crosscover notes). Was given bolus NS, started on steroids, and antibiotics resumed. Arrived to PICU on 16/8, mental status improving. He is able to answer questions, denies any pain and say his breathing feels ok.    Data reviewed today: I reviewed all medications, new labs and imaging results over the last 24 hours.     Physical Exam   Vital Signs: Temp: 97.8  F (36.6  C) Temp src: Axillary BP: 111/48 Pulse: 93   Resp: 14 SpO2: 95 % O2 Device: BiPAP/CPAP Oxygen Delivery: Others (comment)(16/8)  Weight: 274 lbs 7.56 oz    General: Laying in bed, BiPAP in place, intermittently awake/asleep, though easily awakens when prompted, is answering questions though answers slowly, appears very tired, NAD  HEENT: NC/AT, eye patch left eye, right eye subconjunctival hemorrhage, anicteric, BiPAP mask in place  Cardiovascular: RRR, normal S1/S2, no S3/S4, no murmurs appreciated  Pulm: CTAB, good air entry bilaterally, no crackles or wheezes, BiPAP 16/8  Abdomen: Soft, obese, striae, non-tender, non-distended, +BS   Extremities: Warm, dry, no peripheral edema  Neuro: Tired, though awakens to verbal stimuli, answers questions, follows commands, moving all extremities spontaneously     Data   Recent Labs   Lab 05/03/21  0432 05/02/21  2348 05/02/21  0527 05/01/21  1654   WBC 7.7 9.5 6.3 8.0   HGB 8.5* 8.9* 9.3* 9.9*   MCV 81 81 80 83   PLT 90* 98* 113* 131*     --   --  140   POTASSIUM 4.0  --   --  4.3    CHLORIDE 106  --   --  107   CO2 32  --   --  32   BUN 16  --   --  19   CR 1.07  --   --  1.12   ANIONGAP 2*  --   --  1*   KATIE 7.4*  --   --  8.0*   *  --   --  164*   ALBUMIN  --   --   --  2.9*   PROTTOTAL  --   --   --  6.3*   BILITOTAL  --   --   --  0.2   ALKPHOS  --   --   --  165*   ALT  --   --   --  32   AST  --   --   --  34     Recent Results (from the past 24 hour(s))   XR Chest Port 1 View    Impression    RESIDENT PRELIMINARY INTERPRETATION  Impression:   1. Dense left basilar opacity, representing atelectasis, aspiration,  or infection.  2. Bilateral perihilar opacities suspected to represent atelectasis.

## 2021-05-03 NOTE — PROGRESS NOTES
Cross Cover Note    Geo's blood gases improved on BiPAP settings 16/8, but he required 60% FiO2. He had some softer pressures, so was given a normal saline bolus and maintenance fluids. We initiated stress dose steroids 100mg div Q6H for adrenal insufficiency. Blood pressures improved. He continued to have some coughing fits and with the BiPAP mask being full face coverage, he was needing much closer monitoring than could be done on the floor. Rapid response called for PICU transfer around 4am. Plan discussed with both Heme/onc fellow and PICU fellow.     Lab Results   Component Value Date    PH 7.39 01/14/2016    PH 7.47 (H) 01/14/2016    PH 7.46 (H) 01/14/2016    PO2 164 (H) 01/14/2016    PO2 159 (H) 01/14/2016    PO2 161 (H) 01/14/2016    PCO2 35 01/14/2016    PCO2 29 (L) 01/14/2016    PCO2 29 (L) 01/14/2016    HCO3 21 01/14/2016    HCO3 21 01/14/2016    HCO3 21 01/14/2016     /48   Pulse 93   Temp 97.8  F (36.6  C) (Axillary)   Resp 14   Wt 124.5 kg (274 lb 7.6 oz)   SpO2 95%   BMI 38.38 kg/m      Rima Francois MD  [facility] Pediatrics, PGY-2

## 2021-05-03 NOTE — INTERIM SUMMARY
Name:Geo Hicks  MRN: [id number]  : 2000  Room: [address] [place] [address]    One Liner:    Geo Hicks is a 20 year old male admitted on 2021. 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 He was transferred to PICU with sepsis and acute on chronic hypoxic hypercapnic respiratory failure and delirium. Family has decided to proceed with comfort care.      **DNR/DNI per discussion with family on 5/3**    Consults:   Psych, Hem/Onc    Overnight:       Interval Events:  - Care conference today  - Comfort care plan  - Stop medications/orders not contributing to comfort  - Start Morphine 2 mg/hr IV continuous infusion with 2 mg IV bolus available Q 20 mins PRN dyspnea/agitation/discomfort    - Atropine 1% ophthalmic (1-2 drops Q2 PRN) bucally or sublingually    To Do:  [  ]NTD    Situational:   - DNR/DNI  - Has PRN morphine bolus if agitated  - Can switch to versed drip if requiring multiple ativan PRNs   - If retaining urine, can put in manzo     Lines: Midline, PIV, NG     FEN:  Last 24: Intake  Output  Post MN: Intake  Output    Wt:  262 lbs 5.56 oz    Wt Change:  Weight change:     Yest Wt:    Calc Wt: Total in:  IVF:  TPN/IL:  PO:  NG/GT:  pRBC:  PLT:    TFI ml/kg/day:   __________  __________  __________  __________  __________  __________  __________    __________ Total out:  Urine:  NG/emesis:  Stool:  Drain:  Blood:  Mix:    UOP ml/kg/hr:  NET: __________  __________  __________  __________  __________  __________  __________    __________  __________  Total in:  IVF:  TPN/IL:  PO:  NG/GT:  pRBC:  PLT:   __________  __________  __________  __________  __________  __________  __________   Total out:  Urine:  NG/emesis:  Stool:  Drain:  Blood:  Mix:    UOP ml/kg/hr:  NET:  __________  __________  __________  __________  __________  __________  __________    __________  __________        VITALS/LABS/RESULTS MEDICATIONS/TREATMENTS ASSESSMENT/PLAN   FEN/  RENAL     _________/                  \  Ca: Phos:    iCa:    Alb:   Mg: T protein:                      NPO  Peripheral PN started 5/5         Straight caths q8h prn     RESP: RR:__________     SaO2:__________ on _______%O2    AVAPS: Full mask bipap, 650 cc  PEEP 10 while awake, 12 while asleep        Do not intubate       CV:   HR: SBP:   CVP: DBP:   SVO2 MAP:     Lactate:     HEME/  ONC: CBC:        INR: PTT:     Xa:   Fibr:    Ependymoma      ID:    Tmax:      ____ Culture Date Results                    CRP:  Procal: Concern for aspiration pna   Treatment Start Stop To Cover   Unasyn  5/3  PNA   Bactrim   Ppx         Plan for 10d course of unasyn, stop date 5/12   GI: T Bili:  ALT:      D Bili:    AST:      AP:              ENDO:          Neuro/Psych: H/o delusional d/o  ICU delirium  Medication induced delirium  - Continue PTA lamotrigine, olanzapine, sertraline, trazodone  - Melatonin 6 mg qhs  - prn morphine  - prn olanzapine 5mg bid prn   - prn precedex gtt        Optho   Bilateral subconjunctival hemorrhage   Optho consulted    Skin/Lines/Tubes/Drains: Access IN Out   Midline 5/5    NG 5/8          Skin Concerns:    ***

## 2021-05-03 NOTE — PLAN OF CARE
[id number]. This writer took over at 2300. Patient had rapid decline in oxygenation over period of 1821-0401 with desats in low 80s. Upon entering the room, patient was desatting to 78% on 9L nasal cannula. Patient pale and lethargic, only responsive to repeated stimulation. Patient with productive cough copious thick, white secretions, unable to clear on own due to facial paralysis. This writer was able to suction with some improvement. MD and RT urgently called to bedside to evaluate. CPAP applied with slight improvement in oxygenation up to 88%. Patient continuing to desat on 10L 60%, so he was switched to full-face BIPAP by RT with MD order. Saturations stabilized on BIPAP but patient remained tachypneic. STAT chest x-ray performed indicating left lower lobar consolidation. STAT blood gas and CBC performed. BPs low 90s/45-50. 1000L bolus given with slight improvement in pressures up to 100/50s. Unasyn restarted. Stress dosing of steroids begun for adrenal insufficiency.    Patient remained moderately stable on BIPAP. Repeat blood gases did show slight improvement overall, however, patient continued to need BIPAP. Patient continued to desat resulting in 60% 8/10 BIPAP settings. An RRT was called at 0400 to initiate transfer to PICU due to worsening respiratory status and concern for sepsis. Report was given to Octavio Jackson RN and patient transferred at 4:00am. Father called by Heme-Onc Fellow with updates throughout the night and updated regarding move to PICU.

## 2021-05-03 NOTE — PROVIDER NOTIFICATION
Results for RAFAELA GUAN (MRN [id number])   Ref. Range 5/3/2021 00:37   FIO2 Unknown 55   Ph Venous Latest Ref Range: 7.32 - 7.43 pH 7.25 (L)   PCO2 Venous Latest Ref Range: 40 - 50 mm Hg 83 (HH)   PO2 Venous Latest Ref Range: 25 - 47 mm Hg 32   Bicarbonate Venous Latest Ref Range: 21 - 28 mmol/L 37 (H)   Base Excess Venous Latest Units: mmol/L 7.6       Provider notified of critical lab values (see above) at one hour recheck. Per Fellow and Resident, will recheck at 0300 and evaluate further.

## 2021-05-03 NOTE — PLAN OF CARE
RN took this patient as a transfer at 0415. Afebrile. Patient lethargic and responsive to touch/shacking on primary assessment. Responding to voice and following commands later in shift. Pupils equal and reactive. Patient able to assist in turning. Denies pain. No PRNs given. Lung sounds coarse throughout, diminished and coarse in bases. Oral suctioned to back of throat x 1 with moderate, thick, white secretions. Pt tachypneic with shallow breathing. BiPap settings increased to 22/10. Patient warm and well perfused with good pulses. NPO. Per RN report, pt had low urine output. Pt bladder scanned and straight cath'd x 1 for 900 ml of urine. Maintenance running at 100 ml/hr. Will continue with plan of care and notify team of changes.

## 2021-05-03 NOTE — CONSULTS
[facility]  Pain and Advanced/Complex Care Team (PACCT)   Initial Consultation    Geo Hicks MRN# [id number]   Age: 20 year old YOB: 2000   Date:  05/03/2021 Admitted:  5/1/2021     Reason for consult: Decisional support and goals of care  Patient and family support  Requesting physician/service: Dr. Eddie Lowry, Heme/Onc, Dr. Rima Salinas, PICU    Recommendations, Patient/Family Counseling & Coordination:     SYMPTOM MANAGEMENT: Consider adding a PRN morphine dose (1-2 mg IV) for dyspnea  And a PRN Lorazepam dose for anxiety    GOALS OF CARE AND DECISIONAL SUPPORT/SUMMARY OF DISCUSSION WITH PATIENT AND/OR FAMILY: Introduced scope of PACCT, including our role in pain and symptom management, decision-making and support. Met with parents separately in conference room in AM.  Discussed Geo's current challenges with respiratory failure.  Parents report they were asked to think about whether a breathing tube should be placed if Geo declines.  We talked about what being intubated may mean for Geo, and the worry that he would be difficult to extubate, and it's very challenging to make a conscious decision to electively extubate your child, knowing that they will then die.  Parents talked a lot about quality of life and that they did not want Geo to suffer.  I recommended they let these priorities lead them. I acknowledged the losses they've encountered due to Geo's brain tumor, their hopes and dreams for him, and his for himself.  They agreed, wondering if he had any real quality of life even before coming to the hospital.     Care conference in PM:  Attended by Diego Jasmine Sadak, Joon Neff Amanda, RYLAND, parents Eric and Christianne, and myself.   talked about the high degree of support that Geo is currently requiring to maintain his O2 sats, and that in most patients they would be considering intubation.   They wanted to know what parents thoughts were about that.  There was the real concern that he would be difficult to extubate. I reminded parents of our discussion earlier in the day, and they both agreed that they could not electively extubate and did not want him living with a trach, so they made the difficult decision not to intubate.  They also agreed to no CPR including compressions, meds or electricity.  Providers in the room supported these decisions acknowledging the brave and compassionate action.  Both parents were totally in agreement with these decisions.      Thank you for the opportunity to participate in the care of this patient and family.   Please contact the Pain and Advanced/Complex Care Team (PACCT) with any emergent needs via text page to the PACCT general pager ([phone], answered 8-4:30 Monday to Friday). After hours and on weekends/holidays, please refer to [facility] or [place] on-call.    Attestation:  I spent a total of 3 hours on the inpatient unit today caring for Geo Hicks. Over 50% of my time on the unit was spent coordinating care and counseling regarding goals of care. See note for details. I spent a total of 160 minutes face-to-face with the patient/family.  Discussed with primary team.  Prolonged time:  2:30 - 3:30 PM    ALAN Tyler CNP [facility]  [id number]      Assessment:      Diagnoses and symptoms: Geo Hicks is a 20 year old male with:  Patient Active Problem List   Diagnosis     GERD (gastroesophageal reflux disease)     Elevated serum creatinine     Dyspepsia     Intracranial hemorrhage (H)     Hemorrhagic stroke (H)     Ependymoma (H)     S/P craniotomy     S/P biopsy     Noncomitant strabismus     Abducens neuropathy of both eyes     CANDELARIO (obstructive sleep apnea)     Status post chemotherapy     Chronic constipation     Serum albumin decreased     Closed compression fracture of thoracic vertebra with routine healing, subsequent encounter     Depression      Slow transit constipation     Hypoxia     Respiratory failure (H)     Paranoid delusions  Palliative care needs associated with the above    Psychosocial and spiritual concerns: Parents are  but work well together in prioritizing their children;     Advance care planning:   Patient/Family understanding of illness: Good  Patient/Family care goals: Do not want Geo to suffer or have longterm complications and interventions  Prognosis: Guarded  Code status: DNR/DNI      History of Present Illness/Problem:     Geo Hicks is a 20 year old male who was diagnosed with ependymoma in 2015.  He had resection and subsequent stroke which result in severe personality change and permanent disability.  He was admitted for respiratory failure and has required escalating PPV support.      Past Medical History:     Past Medical History:   Diagnosis Date     Cranial nerve dysfunction      Dyspepsia      Ependymoma (H)      Gastro-oesophageal reflux disease      Hearing loss      Intracranial hemorrhage (H)      Migraine      Pilonidal cyst     7-2015     Reduced vision      Refractory obstruction of nasal airway     2nd to nasal valve prolapse     Sleep apnea      Strabismus     gaze palsy         Past Surgical History:     Past Surgical History:   Procedure Laterality Date     COLONOSCOPY N/A 9/27/2019    Procedure: Colonoscopy With Biopsies and Polypectomy;  Surgeon: Aniya Wei MD;  [facility]     ESOPHAGOSCOPY, GASTROSCOPY, DUODENOSCOPY (EGD), COMBINED N/A 9/27/2019    Procedure: Upper Endoscopy (EGD) With Biopsies;  Surgeon: Aniya Wei MD;  [facility]     GRAFT CARTILAGE FROM POSTERIOR AURICLE Left 10/6/2016    Procedure: GRAFT CARTILAGE FROM POSTERIOR AURICLE;  Surgeon: Tyler Richards MD;  [facility]     INCISION AND DRAINAGE PERINEAL, COMBINED Bilateral 7/18/2015    Procedure: COMBINED INCISION AND DRAINAGE PERINEAL;  Surgeon: Dequan Timmons,  MD;  [facility]     OPTICAL TRACKING SYSTEM CRANIOTOMY, EXCISE TUMOR, COMBINED N/A 4/13/2015    Procedure: COMBINED OPTICAL TRACKING SYSTEM CRANIOTOMY, EXCISE TUMOR;  Surgeon: Francis Velazquez MD;  [facility]     OPTICAL TRACKING SYSTEM CRANIOTOMY, EXCISE TUMOR, COMBINED N/A 4/16/2015    Procedure: COMBINED OPTICAL TRACKING SYSTEM CRANIOTOMY, EXCISE TUMOR;  Surgeon: Francis Velazquez MD;  [facility]     OPTICAL TRACKING SYSTEM CRANIOTOMY, EXCISE TUMOR, COMBINED Bilateral 5/28/2015    Procedure: COMBINED OPTICAL TRACKING SYSTEM CRANIOTOMY, EXCISE TUMOR;  Surgeon: Francis Velazquez MD;  [facility]     OPTICAL TRACKING SYSTEM CRANIOTOMY, EXCISE TUMOR, COMBINED Bilateral 1/14/2016    Procedure: COMBINED OPTICAL TRACKING SYSTEM CRANIOTOMY, EXCISE TUMOR;  Surgeon: Francis Velazquez MD;  [facility]     OPTICAL TRACKING SYSTEM VENTRICULOSTOMY  4/16/2015    Procedure: OPTICAL TRACKING SYSTEM VENTRICULOSTOMY;  Surgeon: Francis Velazquez MD;  [facility]     REMOVE PORT VASCULAR ACCESS N/A 10/6/2016    Procedure: REMOVE PORT VASCULAR ACCESS;  Surgeon: Bruno Perea MD;  [facility]     RHINOPLASTY N/A 10/6/2016    Procedure: RHINOPLASTY;  Surgeon: Tyler Richards MD;  [facility]     VASCULAR SURGERY  5-2015    single lumen power port       Social/Spiritual History:     Did not discuss with Geo, but his parents report they are Mormon.  Christianne shared that she talks to God a lot and for awhile was pretty angry and did not understand how a loving God could let this happen to her son.  She said she has come around and would welcome a visit from the  (I reached out to Ita Boswell.)  Both parents work but have supportive employers.  Parents share 2 other sons and have both remarried.      Family History:     Family History   Problem Relation Age of Onset     Circulatory Father         PE/DVT     Hypothyroidism Father 30     Diabetes  Maternal Grandmother      Diabetes Paternal Grandmother      Diabetes Paternal Grandfather      C.A.D. Paternal Grandfather      Hypertension Maternal Grandfather      Thyroid Disease Paternal Aunt         unknown whether hypo or hyper     Mental Illness No family hx of        Allergies:     Geo Hicks is allergic to blood transfusion related (informational only) and no known drug allergies.    Medications:     I have reviewed this patient's medication profile and medications during this hospitalization.      Scheduled medications:     ampicillin-sulbactam (UNASYN) IV  3 g Intravenous Q6H     [Held by provider] dexamethasone  0.75 mg Oral Daily with breakfast     [Held by provider] fexofenadine  180 mg Oral At Bedtime     hydrocortisone sodium succinate  100 mg/m2/day Intravenous Q6H     [Held by provider] lactobacillus rhamnosus (GG)  2 capsule Oral Daily     lamoTRIgine  200 mg Oral At Bedtime     [Held by provider] linaclotide  290 mcg Oral QAM AC     [Held by provider] OLANZapine  10 mg Oral At Bedtime     [Held by provider] omeprazole  40 mg Oral QAM     pantoprazole (PROTONIX) IV  40 mg Intravenous Daily with breakfast     polyethylene glycol  17 g Oral TID     potassium phosphate (monobasic)  500 mg Oral BID     [Held by provider] sertraline  100 mg Oral Daily     sulfamethoxazole-trimethoprim  1 tablet Oral 2 times per day on Sun Sat     [Held by provider] Vitamin D3  2,000 Units Oral Daily with breakfast     [Held by provider] vitamin E  400 Units Oral Daily     Infusions:     sodium chloride 100 mL/hr at 05/03/21 0133     PRN medications: acetaminophen, traZODone    Review of Systems:     Palliative Symptom Review  The comprehensive review of systems is negative other than noted here and in the HPI. Completed by proxy by parent(s)/caretaker(s) (if applicable)    Physical Exam:     Vitals were reviewed  Temp:  [97.8  F (36.6  C)-99.9  F (37.7  C)] 98.5  F (36.9  C)  Pulse:  [] 84  Resp:  [11-41]  14  BP: ()/(43-63) 111/57  FiO2 (%):  [50 %-70 %] 60 %  SpO2:  [78 %-98 %] 94 %  Weight: 124 kg   GENERAL: PPV mask in place   LUNGS: Decreased breath sounds throughout  HEART: Regular rhythm. Normal S1/S2. No murmurs.    ABDOMEN: Soft, non-tender, not distended, no masses or hepatosplenomegaly. Bowel sounds normal.   NEUROLOGIC: Did not examine but history of being unable to walk independently    Data Reviewed:     Results for orders placed or performed during the hospital encounter of 05/01/21 (from the past 24 hour(s))   XR Chest Port 1 View    Narrative    Exam: XR CHEST PORT 1 VIEW, 5/2/2021 11:41 PM    Indication: desat, concern for asp pna    Comparison: Chest x-ray 5/1/2021 and 12/20/2019    Findings:   Semiupright AP view of the chest. Bilateral perihilar opacities and  left basilar retrocardiac opacity are increased. No pneumothorax no  discernible pleural effusion. Indistinct pulmonary vasculature.  Bilateral low lung volumes. Stable appearance of the cardiac  silhouette.      Impression    Impression:   1. Increased left basilar opacity, representing atelectasis,  aspiration, or infection.  2. Increased bilateral perihilar opacities with indistinct pulmonary  vasculature suspected to represent atelectasis, infection, and/or  pulmonary edema.    I have personally reviewed the examination and initial interpretation  and I agree with the findings.    KYLIE CHACON, DO   CBC with platelets differential   Result Value Ref Range    WBC 9.5 4.0 - 11.0 10e9/L    RBC Count 3.97 (L) 4.4 - 5.9 10e12/L    Hemoglobin 8.9 (L) 13.3 - 17.7 g/dL    Hematocrit 32.1 (L) 40.0 - 53.0 %    MCV 81 78 - 100 fl    MCH 22.4 (L) 26.5 - 33.0 pg    MCHC 27.7 (L) 31.5 - 36.5 g/dL    RDW 19.9 (H) 10.0 - 15.0 %    Platelet Count 98 (L) 150 - 450 10e9/L    Diff Method Automated Method     % Neutrophils 73.3 %    % Lymphocytes 10.3 %    % Monocytes 13.1 %    % Eosinophils 1.6 %    % Basophils 0.6 %    % Immature Granulocytes 1.1 %     Nucleated RBCs 0 0 /100    Absolute Neutrophil 7.0 1.6 - 8.3 10e9/L    Absolute Lymphocytes 1.0 0.8 - 5.3 10e9/L    Absolute Monocytes 1.3 0.0 - 1.3 10e9/L    Absolute Eosinophils 0.2 0.0 - 0.7 10e9/L    Absolute Basophils 0.1 0.0 - 0.2 10e9/L    Abs Immature Granulocytes 0.1 0 - 0.4 10e9/L    Absolute Nucleated RBC 0.0    CRP inflammation   Result Value Ref Range    CRP Inflammation 13.6 (H) 0.0 - 8.0 mg/L   Blood gas venous   Result Value Ref Range    Ph Venous 7.25 (L) 7.32 - 7.43 pH    PCO2 Venous 84 (HH) 40 - 50 mm Hg    PO2 Venous 36 25 - 47 mm Hg    Bicarbonate Venous 37 (H) 21 - 28 mmol/L    Base Excess Venous 7.6 mmol/L    FIO2 55    PEDS Pulmonology IP Consult: Patient to be seen: Routine within 24 hrs; Call back #: [phone]; obstructive sleep apnea, chronic cough, intermittent hypoxia; Consultant may enter orders: Yes; Requesting provider? 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 While on high BiPAP   settings, Geo continues to have poor ventilation and low lung   volumes as demonstrated in his chest x ray and low tidal volumes   seen on BiPAP machine. Although he has needed respiratory support   for his CANDELARIO in the past, the sudden increase in support overnight   is most concerning for an acute change such as infection. An   acute infection in addition to exsisting factors such as obesity,   deconditioning, and hypotonia are all likely contributing to his    obstructive process/infcreased ventilatory needs. A trial of   AVAPS recommended at this time prior to moving toward invasive   ventilatory methods unless he has acute clinical decompensation.   At this time, he does not have an indication for bronchoscopy or   further imaging such as CT, but will continue to follow as goals   of care are discussed with the ICU, oncology team, and family.     Recommendation 5/3:   -  AVAPS in place of BiPAP   EPAP 11   TV Goal of 600 (8-10 /kg based on his ideal body weight)    IPAP 18-35  - Consider Cough Assist or vest treatment to encourage   expectoration while clearing infection  - Continue treatment of likely aspiration pneumonia          The patient's care was discussed with the Attending Physician,   Dr. Marr.    Rufina Downs ([place]) DO   [facility] Pediatric Resident PGY-2        __________________________________________________________________  ___    Chief Complaint   Hypoxia, Hypercarbia, increased respiratory support     History is obtained from the patient's parent.    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 Geo was officially diagnosed with   sleep apenea during a sleep study at [place] in April of 2017 at   which time he as effectively treated with BiPAP level of 18/11   and was only needed support while sleeping. Since this diagnosis,   Geo has been reluctant to wearing the BiPAP machine at night   and was not using it at all prior to admission. Goe's father   also notes that he has had recent weight gain due to excessive   snacking between meals. He eats by mouth. Sometimes he coughs or   even vomits while eating.     On the  oncology floor prior to transfer, Geo was stable until   mid day on 5/2 when he started to have coughing spells and   desaturations while on 7 LPM. Desaturations to the 80s were seen   during and after eating his lunch. The cough was wet, but nor   productive. He seemed to recover throughout the afternoon   remaining on 7 LPM NC, but again had severe desaturations after   falling asleep that evening. Work up just prior to transfer to   the ICU included stat chest xray concerning for a LLL opacity,   hypercarbia, elevated CRP, lower blood pressures, and altered   mental status. He was escalated to BiPAP but ultimately was   transferred to the PICU for due to need for further respiratory   monitoring and support.      Past Medical History    I have reviewed this patient's medical history and updated it   with pertinent information if needed.   Past Medical History:   Diagnosis Date     Cranial nerve dysfunction      Dyspepsia      Ependymoma (H)      Gastro-oesophageal reflux disease      Hearing loss      Intracranial hemorrhage (H)      Migraine      Pilonidal cyst     7-2015     Reduced vision      Refractory obstruction of nasal airway     2nd to nasal valve prolapse     Sleep apnea      Strabismus     gaze palsy        Past Surgical History   I have reviewed this patient's surgical history and updated it   with pertinent information if needed.  Past Surgical History:   Procedure Laterality Date     COLONOSCOPY N/A 9/27/2019    Procedure: Colonoscopy With Biopsies and Polypectomy;  Surgeon:   Aniya Wei MD;  [facility]     ESOPHAGOSCOPY, GASTROSCOPY, DUODENOSCOPY (EGD), COMBINED N/A   9/27/2019    Procedure: Upper Endoscopy (EGD) With Biopsies;  Surgeon:   Aniya Wei MD;  [facility]     GRAFT CARTILAGE FROM POSTERIOR AURICLE Left 10/6/2016    Procedure: GRAFT CARTILAGE FROM POSTERIOR AURICLE;  Surgeon:   Tyler Richards MD;  [facility]      INCISION AND DRAINAGE PERINEAL, COMBINED Bilateral 7/18/2015    Procedure: COMBINED INCISION AND DRAINAGE PERINEAL;  Surgeon:   Dequan Timmons MD;  [facility]     OPTICAL TRACKING SYSTEM CRANIOTOMY, EXCISE TUMOR, COMBINED N/A   4/13/2015    Procedure: COMBINED OPTICAL TRACKING SYSTEM CRANIOTOMY, EXCISE   TUMOR;  Surgeon: Francis Velazquez MD;  [facility]     OPTICAL TRACKING SYSTEM CRANIOTOMY, EXCISE TUMOR, COMBINED N/A   4/16/2015    Procedure: COMBINED OPTICAL TRACKING SYSTEM CRANIOTOMY, EXCISE   TUMOR;  Surgeon: Francis Velazquez MD;  [facility]     OPTICAL TRACKING SYSTEM CRANIOTOMY, EXCISE TUMOR, COMBINED   Bilateral 5/28/2015    Procedure: COMBINED OPTICAL TRACKING SYSTEM CRANIOTOMY, EXCISE   TUMOR;  Surgeon: Francis Velazquez MD;  [facility]     OPTICAL TRACKING SYSTEM CRANIOTOMY, EXCISE TUMOR, COMBINED   Bilateral 1/14/2016    Procedure: COMBINED OPTICAL TRACKING SYSTEM CRANIOTOMY, EXCISE   TUMOR;  Surgeon: Francis Velazquez MD;  [facility]     OPTICAL TRACKING SYSTEM VENTRICULOSTOMY  4/16/2015    Procedure: OPTICAL TRACKING SYSTEM VENTRICULOSTOMY;  Surgeon:   Francis Velazquez MD;  [facility]     REMOVE PORT VASCULAR ACCESS N/A 10/6/2016    Procedure: REMOVE PORT VASCULAR ACCESS;  Surgeon: Bruno Perea MD;  [facility]     RHINOPLASTY N/A 10/6/2016    Procedure: RHINOPLASTY;  Surgeon: Tyler Richards MD;    [facility]     VASCULAR SURGERY  5-2015    single lumen power port       Social History   I have reviewed this patient's social history and updated it with   pertinent information if needed.  Social History     Tobacco Use     Smoking status: Never Smoker     Smokeless tobacco: Never Used   Substance Use Topics     Alcohol use: No     Drug use: No       Family History   I have reviewed this patient's family history and updated it with   pertinent information if needed.  Family History   Problem Relation Age of  Onset     Circulatory Father         PE/DVT     Hypothyroidism Father 30     Diabetes Maternal Grandmother      Diabetes Paternal Grandmother      Diabetes Paternal Grandfather      C.A.D. Paternal Grandfather      Hypertension Maternal Grandfather      Thyroid Disease Paternal Aunt         unknown whether hypo or hyper     Mental Illness No family hx of        Medications   I have reviewed this patient's current medications    Allergies   Allergies   Allergen Reactions     Blood Transfusion Related (Informational Only) Swelling     Periorbital swelling post platelet transfusion     No Known Drug Allergies        Physical Exam   Vital Signs: Temp: 99.2  F (37.3  C) Temp src: Axillary BP: 95/50   Pulse: 91   Resp: 20 SpO2: 94 % O2 Device: BiPAP/CPAP(22/10)   Oxygen Delivery: Others (comment)  Weight: 274 lbs 7.56 oz    Constitutional: Coming in and out of sleep, twitching multiple   times throughout the exam, opens eyes on command by patients   mother, no acute distress  Eyes: Lids and lashes normal, no periorbital edema, conjunctiva   with small hemorrhage on right, patch covering left eye  Respiratory: BiPAP mask in place over nose and mouth, lungs   sounds are limited to the upper lung fields, scattered crackles,   coughing x1 during exam (wet), no wheezing  Abdomen: Normal bowel sounds, obese abdomen, soft without   tenderness to palpation  Cardiac: RRR, no murmurs or rubs appreciated, well perfused  Musculoskeletal: No extremity edema, no deformities.     Data     Results for RAFAELA GUAN (MRN [id number]) as of 5/3/2021 11:27   Ref. Range 5/2/2021 23:48 5/3/2021 00:37 5/3/2021 03:11 5/3/2021   04:32 5/3/2021 05:30 5/3/2021 08:10 5/3/2021 09:30   FIO2 Unknown 55 55 50 60 60 60 65   Ph Venous Latest Ref Range: 7.32 - 7.43 pH 7.25 (L) 7.25 (L) 7.27   (L) 7.25 (L) 7.25 (L) 7.27 (L) 7.29 (L)   PCO2 Venous Latest Ref Range: 40 - 50 mm Hg 84 (HH) 83 (HH) 77   (HH) 79 (HH) 81 (HH) 74 (H) 75 (H)   PO2 Venous Latest Ref  Range: 25 - 47 mm Hg 36 32 36 57 (H) 63 (H)   71 (H) 64 (H)   Bicarbonate Venous Latest Ref Range: 21 - 28 mmol/L 37 (H) 37 (H)   36 (H) 34 (H) 36 (H) 34 (H) 36 (H)   Base Excess Venous Latest Units: mmol/L 7.6 7.6 7.1 5.5 6.8 6.0   7.6   Oxyhemoglobin Venous Latest Units: %      91       Blood gas venous   Result Value Ref Range    Ph Venous 7.25 (L) 7.32 - 7.43 pH    PCO2 Venous 83 (HH) 40 - 50 mm Hg    PO2 Venous 32 25 - 47 mm Hg    Bicarbonate Venous 37 (H) 21 - 28 mmol/L    Base Excess Venous 7.6 mmol/L    FIO2 55    Blood gas venous   Result Value Ref Range    Ph Venous 7.27 (L) 7.32 - 7.43 pH    PCO2 Venous 77 (HH) 40 - 50 mm Hg    PO2 Venous 36 25 - 47 mm Hg    Bicarbonate Venous 36 (H) 21 - 28 mmol/L    Base Excess Venous 7.1 mmol/L    FIO2 50    Blood gas venous   Result Value Ref Range    Ph Venous 7.25 (L) 7.32 - 7.43 pH    PCO2 Venous 79 (HH) 40 - 50 mm Hg    PO2 Venous 57 (H) 25 - 47 mm Hg    Bicarbonate Venous 34 (H) 21 - 28 mmol/L    Base Excess Venous 5.5 mmol/L    FIO2 60    CBC with platelets differential   Result Value Ref Range    WBC 7.7 4.0 - 11.0 10e9/L    RBC Count 3.71 (L) 4.4 - 5.9 10e12/L    Hemoglobin 8.5 (L) 13.3 - 17.7 g/dL    Hematocrit 30.1 (L) 40.0 - 53.0 %    MCV 81 78 - 100 fl    MCH 22.9 (L) 26.5 - 33.0 pg    MCHC 28.2 (L) 31.5 - 36.5 g/dL    RDW 19.9 (H) 10.0 - 15.0 %    Platelet Count 90 (L) 150 - 450 10e9/L    Diff Method Automated Method     % Neutrophils 72.1 %    % Lymphocytes 9.1 %    % Monocytes 15.5 %    % Eosinophils 1.8 %    % Basophils 0.5 %    % Immature Granulocytes 1.0 %    Nucleated RBCs 1 (H) 0 /100    Absolute Neutrophil 5.6 1.6 - 8.3 10e9/L    Absolute Lymphocytes 0.7 (L) 0.8 - 5.3 10e9/L    Absolute Monocytes 1.2 0.0 - 1.3 10e9/L    Absolute Eosinophils 0.1 0.0 - 0.7 10e9/L    Absolute Basophils 0.0 0.0 - 0.2 10e9/L    Abs Immature Granulocytes 0.1 0 - 0.4 10e9/L    Absolute Nucleated RBC 0.1    Basic metabolic panel   Result Value Ref Range    Sodium 140 133  - 144 mmol/L    Potassium 4.0 3.4 - 5.3 mmol/L    Chloride 106 94 - 109 mmol/L    Carbon Dioxide 32 20 - 32 mmol/L    Anion Gap 2 (L) 3 - 14 mmol/L    Glucose 114 (H) 70 - 99 mg/dL    Urea Nitrogen 16 7 - 30 mg/dL    Creatinine 1.07 0.66 - 1.25 mg/dL    GFR Estimate >90 >60 mL/min/[1.73_m2]    GFR Estimate If Black >90 >60 mL/min/[1.73_m2]    Calcium 7.4 (L) 8.5 - 10.1 mg/dL   Procalcitonin   Result Value Ref Range    Procalcitonin 0.23 ng/ml   Blood gas venous   Result Value Ref Range    Ph Venous 7.25 (L) 7.32 - 7.43 pH    PCO2 Venous 81 (HH) 40 - 50 mm Hg    PO2 Venous 63 (H) 25 - 47 mm Hg    Bicarbonate Venous 36 (H) 21 - 28 mmol/L    Base Excess Venous 6.8 mmol/L    FIO2 60    UA with Microscopic reflex to Culture    Specimen: Urine, Straight Catheter; Catheterized Urine   Result Value Ref Range    Color Urine Yellow     Appearance Urine Clear     Glucose Urine Negative NEG^Negative mg/dL    Bilirubin Urine Negative NEG^Negative    Ketones Urine Negative NEG^Negative mg/dL    Specific Gravity Urine 1.032 1.003 - 1.035    Blood Urine Negative NEG^Negative    pH Urine 5.5 5.0 - 7.0 pH    Protein Albumin Urine 20 (A) NEG^Negative mg/dL    Urobilinogen mg/dL Normal 0.0 - 2.0 mg/dL    Nitrite Urine Negative NEG^Negative    Leukocyte Esterase Urine Negative NEG^Negative    Source Catheterized Urine     WBC Urine 2 0 - 5 /HPF    RBC Urine 1 0 - 2 /HPF    Bacteria Urine None (A) NEG^Negative /HPF    Squamous Epithelial /HPF Urine <1 0 - 1 /HPF    Transitional Epi 1 0 - 1 /HPF    Mucous Urine Present (A) NEG^Negative /LPF   Blood gas venous with oxyhemoglobin   Result Value Ref Range    Ph Venous 7.27 (L) 7.32 - 7.43 pH    PCO2 Venous 74 (H) 40 - 50 mm Hg    PO2 Venous 71 (H) 25 - 47 mm Hg    Bicarbonate Venous 34 (H) 21 - 28 mmol/L    FIO2 60     Oxyhemoglobin Venous 91 %    Base Excess Venous 6.0 mmol/L   Blood gas venous   Result Value Ref Range    Ph Venous 7.29 (L) 7.32 - 7.43 pH    PCO2 Venous 75 (H) 40 - 50 mm Hg     PO2 Venous 64 (H) 25 - 47 mm Hg    Bicarbonate Venous 36 (H) 21 - 28 mmol/L    Base Excess Venous 7.6 mmol/L    FIO2 65    Blood gas venous   Result Value Ref Range    Ph Venous 7.37 7.32 - 7.43 pH    PCO2 Venous 61 (H) 40 - 50 mm Hg    PO2 Venous 58 (H) 25 - 47 mm Hg    Bicarbonate Venous 35 (H) 21 - 28 mmol/L    Base Excess Venous 7.9 mmol/L    FIO2 60      *Note: Due to a large number of results and/or encounters for the requested time period, some results have not been displayed. A complete set of results can be found in Results Review.

## 2021-05-03 NOTE — PROGRESS NOTES
Hematology / Oncology Progress note    See documentation from Dr. Francois regarding change in clinical status over the last 8-12 hours. With worsening respiratory acidosis and evolving pneumonia, Geo required escalation of medical support overnight. We maximized support on the floor, but ultimately had to transfer him to the ICU for his escalation of care. He is on unasyn, stress dose steroids, BiPAP face mask, and fluids. I confirmed with his father that he is full code and explained that his respiratory status is tenuous and may require intubation as we address this acute issue. I do worry that while this acute event will likely be effectively managed, it seems like a sentinel event in the broader context of his neurologic disabilities. Moving forward, managing his aspiration and ineffective ventilation / obstructive apnea will continue to be challenging. Our team will of course continue discussions with Geo and his family as his clinical course evolves    Signed,  Herbie Eddy   Pediatric Hematology/Oncology Fellow

## 2021-05-03 NOTE — PLAN OF CARE
AVSS. No c/o pain or nausea. Still on 7L NC. Intermittent desats as low as 81%. Has been sitting around 88-90% throughout shift. Lungs clear, diminished. Up to commode x1 with assist of 3. Voided and stooled. Great PO intake. Oral suctioned x1 for increased drooling. No contact from family this shift. Hourly rounding completed. Will continue to monitor and notify team of changes.

## 2021-05-03 NOTE — PROGRESS NOTES
05/02/21 2305 05/02/21 2308 05/02/21 2315   Oxygen Therapy   SpO2 (!) 78 % (!) 88 % 91 %   O2 Device Nasal cannula BiPAP/CPAP BiPAP/CPAP   FiO2 (%)  --  60 % 55 %   Oxygen Delivery 9 LPM 10 LPM   (16/10)     This writer noted desats to low 80s and entered the room to find patient on 9L NC with other nurse attempting to reposition. Patient lethargic and only responsive to repeated stimulation. O2 not increasing with blow-by or repositioning. Lower lungs significantly decreased. Patient with very productive cough and copious white, thick secretions. Patient unable to clear secretions due to facial paralysis and needing suctioning. MD and RT called to bedside. Attempted CPAP with slight improvement up to 88% on 10L 60%. Initiated BIPAP soon thereafter to support respiratory status. Improved sats up to 91%. STAT chest X-ray, blood gas, and CBC obtained indicating left lower lobar consolidation. Heme Onc Fellow Herbie Eddy contacted for plan. Plan for very close monitoring including restarting Unasyn and initiating steroids.

## 2021-05-03 NOTE — PROVIDER NOTIFICATION
Results for RAFAELA GUAN (MRN [id number]) as of 5/3/2021 03:18   Ref. Range 5/3/2021 03:11   FIO2 Unknown 50   Ph Venous Latest Ref Range: 7.32 - 7.43 pH 7.27 (L)   PCO2 Venous Latest Ref Range: 40 - 50 mm Hg 77 (HH)   PO2 Venous Latest Ref Range: 25 - 47 mm Hg 36   Bicarbonate Venous Latest Ref Range: 21 - 28 mmol/L 36 (H)   Base Excess Venous Latest Units: mmol/L 7.1     Provider notified of critical lab value, PCO2 = 77. Per MD, will continue to monitor closely and will recheck at 0600.

## 2021-05-03 NOTE — CONSULTS
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 While on high BiPAP settings, Geo continues to have poor ventilation and low lung volumes as demonstrated in his chest x ray and low tidal volumes seen on BiPAP machine.     Although he has needed respiratory support for his CANDELARIO in the past, the sudden increase in support overnight is most concerning for an acute change such as infection. An acute infection in addition to exsisting factors such as obesity, deconditioning, and hypotonia are all likely contributing to his obstructive process/infcreased ventilatory needs. A trial of AVAPS recommended at this time prior to moving toward invasive ventilatory methods unless he has acute clinical decompensation.     At this time, he does not have an indication for bronchoscopy or further imaging such as CT, but will continue to follow as goals of care are discussed with the ICU, oncology team, and family.     Recommendation 5/3:   -  AVAPS in place of BiPAP   EPAP 11   TV Goal of 600 (8-10 /kg based on his ideal body weight)    IPAP 18-35  - Consider Cough Assist 3-4 times a day or vest treatment to encourage expectoration while clearing infection  - Continue treatment of likely aspiration pneumonia  - when recovered from acute respiratory decline, we should discuss with patient and family about addressing aspiration risk and need for nocturnal ventilation, based on their goals of care        The patient's care was discussed with the Attending Physician, Dr. Marr.    Rufina MALONE  Yareli HYDE (Peick)   N Pediatric Resident PGY-2      Physician Attestation   I, Noah Marr MD, saw this patient with the resident and agree with the resident/fellow's findings and plan of care as documented in the note.      I personally reviewed vital signs, medications, labs and imaging.    Noah Marr MD  Date of Service (when I saw the patient): 05/03/21    _____________________________________________________________________    Chief Complaint   Hypoxia, Hypercarbia, increased respiratory support     History is obtained from the patient's parent.    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 Geo was officially diagnosed with sleep apenea during a sleep study at [facility] in April of 2017 at which time he as effectively treated with BiPAP level of 18/11 and was only needed support while sleeping. Since this diagnosis, Geo has been reluctant to wearing the BiPAP machine at night and was not using it at all prior to admission. Geo's father also notes that he has had recent weight gain due to excessive snacking between meals. He eats by mouth. Sometimes he coughs or even vomits while eating.     On the oncology floor prior to transfer, Geo was stable until mid day on 5/2 when he started to have coughing spells and desaturations while on 7 LPM. Desaturations to the 80s were seen during and after eating his lunch. The cough was wet, but nor productive. He seemed to recover throughout the afternoon remaining on 7 LPM NC, but again had severe desaturations after falling asleep that evening. Work up just prior to transfer to the ICU included stat chest xray concerning for  a LLL opacity, hypercarbia, elevated CRP, lower blood pressures, and altered mental status. He was escalated to BiPAP but ultimately was transferred to the PICU for due to need for further respiratory monitoring and support.      Past Medical History    I have reviewed this patient's medical history and updated it with pertinent information if needed.   Past Medical History:   Diagnosis Date     Cranial nerve dysfunction      Dyspepsia      Ependymoma (H)      Gastro-oesophageal reflux disease      Hearing loss      Intracranial hemorrhage (H)      Migraine      Pilonidal cyst     7-2015     Reduced vision      Refractory obstruction of nasal airway     2nd to nasal valve prolapse     Sleep apnea      Strabismus     gaze palsy        Past Surgical History   I have reviewed this patient's surgical history and updated it with pertinent information if needed.    Past Surgical History:   Procedure Laterality Date     COLONOSCOPY N/A 9/27/2019    Procedure: Colonoscopy With Biopsies and Polypectomy;  Surgeon: Aniya Wei MD;  Location: [facility]     ESOPHAGOSCOPY, GASTROSCOPY, DUODENOSCOPY (EGD), COMBINED N/A 9/27/2019    Procedure: Upper Endoscopy (EGD) With Biopsies;  Surgeon: Aniya Wei MD;  Location: [facility]     GRAFT CARTILAGE FROM POSTERIOR AURICLE Left 10/6/2016    Procedure: GRAFT CARTILAGE FROM POSTERIOR AURICLE;  Surgeon: Tyler Richards MD;  [facility]: [facility]     INCISION AND DRAINAGE PERINEAL, COMBINED Bilateral 7/18/2015    Procedure: COMBINED INCISION AND DRAINAGE PERINEAL;  Surgeon: Dequan Timmons MD;  [facility]: [facility]     OPTICAL TRACKING SYSTEM CRANIOTOMY, EXCISE TUMOR, COMBINED N/A 4/13/2015    Procedure: COMBINED OPTICAL TRACKING SYSTEM CRANIOTOMY, EXCISE TUMOR;  Surgeon: Francis Velazquez MD;  [facility]     OPTICAL TRACKING SYSTEM CRANIOTOMY, EXCISE TUMOR, COMBINED N/A 4/16/2015    Procedure: COMBINED OPTICAL TRACKING SYSTEM CRANIOTOMY,  EXCISE TUMOR;  Surgeon: Francis Velazquez MD;  [facility]     OPTICAL TRACKING SYSTEM CRANIOTOMY, EXCISE TUMOR, COMBINED Bilateral 5/28/2015    Procedure: COMBINED OPTICAL TRACKING SYSTEM CRANIOTOMY, EXCISE TUMOR;  Surgeon: Francis Velazquez MD;  [facility]     OPTICAL TRACKING SYSTEM CRANIOTOMY, EXCISE TUMOR, COMBINED Bilateral 1/14/2016    Procedure: COMBINED OPTICAL TRACKING SYSTEM CRANIOTOMY, EXCISE TUMOR;  Surgeon: Francis Velazquez MD;  [facility]     OPTICAL TRACKING SYSTEM VENTRICULOSTOMY  4/16/2015    Procedure: OPTICAL TRACKING SYSTEM VENTRICULOSTOMY;  Surgeon: Francis Velazquez MD;  [facility]     REMOVE PORT VASCULAR ACCESS N/A 10/6/2016    Procedure: REMOVE PORT VASCULAR ACCESS;  Surgeon: Bruno Perea MD;  [facility]     RHINOPLASTY N/A 10/6/2016    Procedure: RHINOPLASTY;  Surgeon: Tyler Richards MD;  [facility]     VASCULAR SURGERY  5-2015    single lumen power port       Social History   I have reviewed this patient's social history and updated it with pertinent information if needed.    Social History     Tobacco Use     Smoking status: Never Smoker     Smokeless tobacco: Never Used   Substance Use Topics     Alcohol use: No     Drug use: No       Family History   I have reviewed this patient's family history and updated it with pertinent information if needed.  Family History   Problem Relation Age of Onset     Circulatory Father         PE/DVT     Hypothyroidism Father 30     Diabetes Maternal Grandmother      Diabetes Paternal Grandmother      Diabetes Paternal Grandfather      C.A.D. Paternal Grandfather      Hypertension Maternal Grandfather      Thyroid Disease Paternal Aunt         unknown whether hypo or hyper     Mental Illness No family hx of        Medications   I have reviewed this patient's current medications    Allergies   Allergies   Allergen Reactions     Blood Transfusion Related (Informational Only) Swelling      Periorbital swelling post platelet transfusion     No Known Drug Allergies        Physical Exam   Vital Signs: Temp: 99.2  F (37.3  C) Temp src: Axillary BP: 95/50 Pulse: 91   Resp: 20 SpO2: 94 % O2 Device: BiPAP/CPAP(22/10) Oxygen Delivery: Others (comment)  Weight: 274 lbs 7.56 oz    Constitutional: Coming in and out of sleep, twitching multiple times throughout the exam, opens eyes on command by patients mother, no acute distress  Eyes: Lids and lashes normal, no periorbital edema, conjunctiva with small hemorrhage on right, patch covering left eye  Respiratory: BiPAP mask in place over nose and mouth, lungs sounds are limited to the upper lung fields, scattered crackles, coughing x1 during exam (wet), no wheezing  Abdomen: Normal bowel sounds, obese abdomen, soft without tenderness to palpation  Cardiac: RRR, no murmurs or rubs appreciated, well perfused  Musculoskeletal: No extremity edema, no deformities.     Data     Results for RAFAELA GUAN (MRN [id number]) as of 5/3/2021 11:27   Ref. Range 5/2/2021 23:48 5/3/2021 00:37 5/3/2021 03:11 5/3/2021 04:32 5/3/2021 05:30 5/3/2021 08:10 5/3/2021 09:30   FIO2 Unknown 55 55 50 60 60 60 65   Ph Venous Latest Ref Range: 7.32 - 7.43 pH 7.25 (L) 7.25 (L) 7.27 (L) 7.25 (L) 7.25 (L) 7.27 (L) 7.29 (L)   PCO2 Venous Latest Ref Range: 40 - 50 mm Hg 84 (HH) 83 (HH) 77 (HH) 79 (HH) 81 (HH) 74 (H) 75 (H)   PO2 Venous Latest Ref Range: 25 - 47 mm Hg 36 32 36 57 (H) 63 (H) 71 (H) 64 (H)   Bicarbonate Venous Latest Ref Range: 21 - 28 mmol/L 37 (H) 37 (H) 36 (H) 34 (H) 36 (H) 34 (H) 36 (H)   Base Excess Venous Latest Units: mmol/L 7.6 7.6 7.1 5.5 6.8 6.0 7.6   Oxyhemoglobin Venous Latest Units: %      91

## 2021-05-03 NOTE — PROVIDER NOTIFICATION
Results for RAFAELA GUAN (MRN [id number])   Ref. Range 5/2/2021 23:48   FIO2 Unknown 55   Ph Venous Latest Ref Range: 7.32 - 7.43 pH 7.25 (L)   PCO2 Venous Latest Ref Range: 40 - 50 mm Hg 84 (HH)   PO2 Venous Latest Ref Range: 25 - 47 mm Hg 36   Bicarbonate Venous Latest Ref Range: 21 - 28 mmol/L 37 (H)   Base Excess Venous Latest Units: mmol/L 7.6     Provider notified of critical lab values following decompensation at 2300 (see above). Plan to recheck in one hour to see if BIPAP improves things.

## 2021-05-03 NOTE — PROGRESS NOTES
SLP: Bedside swallow evaluation orders received and appreciated. Per chart review, will defer evaluation until later this week pending improvements in respiratory status.     Thank you for Geo's referral!    Danielle Noland MA, CCC-SLP  Pager: [phone]

## 2021-05-03 NOTE — PROGRESS NOTES
Called to bedside to evaluate Geo after he was desatting to high 70s and was more sleepy than usual. He was on 9L NC at the time and sats weren't going above 90%. I ordered a stat CXR and RT initiated CPAP +10, which helped slightly with sats. He continued to have altered mental status, difficulty to wake him up, and falling asleep easily with CPAP mask on (which he would not do last night). Then transitioned to BiPAP which he tolerated well and sats went into the 90s. Stat gas, CBC, CRP done, showing CO2 venous gas 84. CRP elevated to 13.6 up from <2.9. CXR concerning for left lower lobe consolidation. It is possible he has an aspiration pneumonia (in addition to difficulty with ventilation), so will re-initiate unasyn. Discussed case with heme/onc fellow. Will continue bipap and recheck gas in 30 minutes. Discussed case with PICU fellow as well.     Rima Francois MD  [facility] Pediatrics, PGY-2

## 2021-05-03 NOTE — PROGRESS NOTES
[facility]    Progress Note - Pediatric ICU Service        Date of Admission:  5/1/2021    Assessment & Plan    Geo Hicks is a 20 year old male admitted on 5/1/2021. He has history of posterior fossa ependymoma s/p radiation and multiple rounds of chemotherapy now on study COG [id number] Phase 1 study of Entinostat who was admitted to the Heme-Onc service with hypoxia thought 2/2 obstructive sleep apnea, now transferred to PICU with acute on chronic hypoxic hypercapnic respiratory failure requiring full mask BiPAP.     Changes today:  - DNR/DNI  - PACCT consult for goals of care and recommendations for symptom management  - straight caths qshift  - transition BiPAP to AVAPS  - cough assist with vest treatments q6h  - NJ tube for meds and post-pyloric feeds attempted    FEN/Renal  - NPO  - S/p 1L bolus NS, continue mIVF 100 ml/hr  - BMP daily  - NJ tube for meds and feeds attempted    Respiratory  Acute on chronic hypoxic hypercapnic respiratory failure  Obstructive sleep apnea  - Continuous pulse oximetry  - Pulmonary/Sleep Medicine consult, appreciate recs  - Transition to AVAPs   - trend VBGs q4h, consider spacing   - CXR in am    Cardiovascular  Hypotension likely 2/2 septic shock, improved  - Continuous cardiac monitoring  - S/p 1L bolus NS on floor  - Stress dose steroids, see below  - MAP goal >65    Heme/Onc  Ependymoma  Patient on active COG study  - Entinostat 7 mg weekly (due 5/6)  - CBC daily    ID  Sepsis  Most likely 2/2 aspiration PNA.  - Continue IV Unasyn 3g Q6H   - Low threshold to broaden if not improving/worsening  - Trend CRP  - Monitor for fevers  - Continue home bactrim prophylaxis    GI  Chronic constipation  Gastroesophageal reflux  - NPO  - IV Pantoprazole 40 mg daily while on steroids  - Holding the following PTA meds for now while NPO:   - PTA Miralax daily   - PTA linaclotide every morning    - PTA omeprazole   - PTA lactobacillus  -  Can consider restarting GI meds once NJ placed.     Endo  Chronic low dose steroid use  Adrenal insufficiency  - IV hydrocortisone 100mg/m2/day divided Q6H (75mg Q6H)  - HOLD PTA dexamethasone 0.75 mg QAM    Neuro/Psych  Agitation  Delusions  Multiple cranial nerve palsies  Ataxia  - Q1H neuro checks  - Continue PTA lamotrigine  - Holding the following PTA meds for now:   - PTA olanzapine 10mg at bedtime   - PTA sertraline 100mg at bedtime   - PTA trazodone 150mg PRN at bedtime  - Can restart PTA meds once NJ is in place. If NJ doesn't get placed today, can do IM zyprexa for HS anxiolytic.     Diet: NPO for Medical/Clinical Reasons Except for: No Exceptions    Fluids: mIVF 100cc/hr  Lines: 2 PIVs  DVT Prophylaxis: SCDs  Thornton Catheter: not present  Code Status: No CPR- Do NOT IntubateFull       Disposition Plan   Expected discharge: > 7 days, recommended to prior living arrangement once respiratory status back to baseline, tolerating PO.  Entered: Ita Roach MD 05/03/2021, 5:49 PM     The patient's care was discussed with the PICU attending and fellow.     Ita Roach MD  Medicine-Pediatrics, PGY-2  Pediatric ICU Service  [facility]  ______________________________________________________________________    Interval History   Nursing notes reviewed. Stable since transfer to the PICU. Respiratory status improved on AVAPS. Alert and answering questions. Afebrile. Vitals stabilizing throughout the day. Care conference this afternoon with interdisciplinary team, and family chose to make him DNR/DNI.    Data reviewed today: I reviewed all medications, new labs and imaging results over the last 24 hours.     Physical Exam   Vital Signs: Temp: 99.1  F (37.3  C) Temp src: Axillary BP: 96/65 Pulse: 79   Resp: 17 SpO2: 97 % O2 Device: BiPAP/CPAP Oxygen Delivery: Others (comment)  Weight: 274 lbs 7.56 oz    General: Laying in bed, BiPAP in place, awake  and alert, answering questions, NAD.  HEENT: NC/AT, eye patch left eye, right eye subconjunctival hemorrhage, anicteric, BiPAP mask in place  Cardiovascular: RRR, normal S1/S2, no S3/S4, no murmurs appreciated  Pulm: CTAB, poor air entry bilaterally, no crackles or wheezes  Abdomen: Soft, obese, striae, non-tender, non-distended, +BS   Extremities: Warm, dry, no peripheral edema  Neuro: Awake, alert, eye patch of left eye, moving extremities in bed.      Data   Recent Labs   Lab 05/03/21  0432 05/02/21  2348 05/02/21  0527 05/01/21  1654   WBC 7.7 9.5 6.3 8.0   HGB 8.5* 8.9* 9.3* 9.9*   MCV 81 81 80 83   PLT 90* 98* 113* 131*     --   --  140   POTASSIUM 4.0  --   --  4.3   CHLORIDE 106  --   --  107   CO2 32  --   --  32   BUN 16  --   --  19   CR 1.07  --   --  1.12   ANIONGAP 2*  --   --  1*   KATIE 7.4*  --   --  8.0*   *  --   --  164*   ALBUMIN  --   --   --  2.9*   PROTTOTAL  --   --   --  6.3*   BILITOTAL  --   --   --  0.2   ALKPHOS  --   --   --  165*   ALT  --   --   --  32   AST  --   --   --  34     Recent Results (from the past 24 hour(s))   XR Chest Port 1 View    Narrative    Exam: XR CHEST PORT 1 VIEW, 5/2/2021 11:41 PM    Indication: desat, concern for asp pna    Comparison: Chest x-ray 5/1/2021 and 12/20/2019    Findings:   Semiupright AP view of the chest. Bilateral perihilar opacities and  left basilar retrocardiac opacity are increased. No pneumothorax no  discernible pleural effusion. Indistinct pulmonary vasculature.  Bilateral low lung volumes. Stable appearance of the cardiac  silhouette.      Impression    Impression:   1. Increased left basilar opacity, representing atelectasis,  aspiration, or infection.  2. Increased bilateral perihilar opacities with indistinct pulmonary  vasculature suspected to represent atelectasis, infection, and/or  pulmonary edema.    I have personally reviewed the examination and initial interpretation  and I agree with the findings.    KYLIE VEGA  INES, DO

## 2021-05-03 NOTE — PROGRESS NOTES
05/03/21 0000 05/03/21 0021   Vitals   BP 93/47 90/46     MD notified of persistently low BPs. Plan to give 1000L NS bolus and re-evaluate. Will continue to monitor closely.

## 2021-05-04 NOTE — PROGRESS NOTES
[facility]    Progress Note - Pediatric ICU Service        Date of Admission:  5/1/2021    Assessment & Plan    Geo Hicks is a 20 year old male admitted on 5/1/2021. 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     Changes today:  - 20mg IV lasix with possible repeat dose this afternoon  - enema/suppositories for constipation management  - discontinue maintenance fluids    FEN/Renal  - NPO  - S/p 1L bolus NS  - discontinue maintenance fluids  - NG tube for meds      Volume overload  - 20mg IV lasix x1 today. Will consider an additional dose this evening pending I/O's.  - Daily BMP    Respiratory  Acute on chronic hypoxic hypercapnic respiratory failure  Obstructive sleep apnea  - Continuous pulse oximetry  - Pulmonary/Sleep Medicine consult, appreciate recs  - AVAPs   - trend VBGs q4h    Cardiovascular  Hypotension likely 2/2 septic shock, resolved  - Continuous cardiac monitoring  - Stress dose steroids, see below  - MAP goal >65    Heme/Onc  Ependymoma  Patient on active COG study  - Entinostat 7 mg weekly (due 5/6, Hem/Onc will check if it can be delayed 1 week)  - CBC daily    ID  Sepsis  Most likely 2/2 aspiration PNA.  - Continue IV Unasyn 3g Q6H   - Low threshold to broaden if not improving/worsening  - Trend CRP  - Monitor for fevers  - Bactrim ppx     GI  Chronic constipation  Gastroesophageal reflux  - NPO  - IV Pantoprazole 40 mg daily while on steroids  - restart PTA pantoprazole and linaclotide  - prn glycerin suppository v enema for constipation  - can consider lactulose instead of miralax if needed (smaller volume)    Endo  Chronic low dose  steroid use  Adrenal insufficiency  - IV hydrocortisone 100mg/m2/day divided Q6H (75mg Q6H)  - HOLD PTA dexamethasone 0.75 mg QAM    Neuro/Psych  Agitation  Delusions  Multiple cranial nerve palsies  Ataxia  - Q4H neuro checks  - Continue PTA lamotrigine  - Continue PTA olanzapine 10mg at bedtime  - Continue PTA sertraline 100mg at bedtime  - Continue PTA trazodone 150mg PRN at bedtime     Diet: NPO for Medical/Clinical Reasons Except for: No Exceptions    Fluids: none  Lines: 2 PIVs  DVT Prophylaxis: SCDs  Thornton Catheter: not present  Code Status: No CPR- Do NOT IntubateFull       Disposition Plan   Expected discharge: > 7 days, recommended to prior living arrangement once respiratory status back to baseline, tolerating PO.  Entered: Ita Roach MD 05/04/2021, 2:59 PM     The patient's care was discussed with the PICU attending and fellow.     Ita Roach MD  Medicine-Pediatrics, PGY-2  Pediatric ICU Service  [facility]  ______________________________________________________________________    Interval History   Nursing notes reviewed. NJ was placed at bedside. MN gas showed worsened acidosis, so inspiratory time was increased. Gases improved after change. About 2L up today on I/O's. A little sleepier this am, but was up to the chair this afternoon.     Data reviewed today: I reviewed all medications, new labs and imaging results over the last 24 hours.     Physical Exam   Vital Signs: Temp: 97.6  F (36.4  C) Temp src: Axillary BP: 118/71 Pulse: 73   Resp: (!) 31 SpO2: 99 % O2 Device: BiPAP/CPAP    Weight: 274 lbs 7.56 oz    General: Laying in bed, BiPAP in place, awake but tired, answering questions, NAD.  HEENT: NC/AT, eye patch left eye, right eye subconjunctival hemorrhage, anicteric, BiPAP mask in place  Cardiovascular: RRR, normal S1/S2, no S3/S4, no murmurs appreciated  Pulm: CTAB, good air entry bilaterally, no crackles or  wheezes  Abdomen: Soft, obese, striae, non-tender, non-distended, +BS   Extremities: Warm, dry, no peripheral edema  Neuro: Awake, alert, eye patch of left eye, moving extremities in bed.      Data   Recent Labs   Lab 05/04/21  0907 05/03/21  0432 05/02/21  2348 05/01/21  1654 05/01/21  1654   WBC 4.4 7.7 9.5   < > 8.0   HGB 8.1* 8.5* 8.9*   < > 9.9*   MCV 80 81 81   < > 83   * 90* 98*   < > 131*    140  --   --  140   POTASSIUM 4.4 4.0  --   --  4.3   CHLORIDE 107 106  --   --  107   CO2 32 32  --   --  32   BUN 16 16  --   --  19   CR 0.84 1.07  --   --  1.12   ANIONGAP 2* 2*  --   --  1*   KATIE 8.1* 7.4*  --   --  8.0*   * 114*  --   --  164*   ALBUMIN  --   --   --   --  2.9*   PROTTOTAL  --   --   --   --  6.3*   BILITOTAL  --   --   --   --  0.2   ALKPHOS  --   --   --   --  165*   ALT  --   --   --   --  32   AST  --   --   --   --  34    < > = values in this interval not displayed.     Recent Results (from the past 24 hour(s))   XR Chest Port 1 View    Narrative    XR CHEST PORT 1 VIEW  5/3/2021 10:36 PM      HISTORY: Feeding tube placement    COMPARISON: 5-2-21, 5/1/2021    FINDINGS: AP view of the chest. Feeding tube sidehole projects over  the distal esophagus. Stable cardiac silhouette. Stable bilateral  perihilar opacities with increased basilar opacities. Low lung  volumes. No significant pleural effusion or pneumothorax.      Impression    IMPRESSION:   1. Feeding tube sidehole is at the distal esophagus. Recommend  advancement.  2. Low lung volumes with stable perihilar and increased basilar  opacities, atelectasis versus pulmonary edema or infection.    I have personally reviewed the examination and initial interpretation  and I agree with the findings.    KYLIE CHACON, DO   XR Chest Port 1 View    Narrative    EXAMINATION: XR CHEST PORT 1 VIEW, 5/3/2021 10:47 PM    INDICATION: Advanced NG    COMPARISON: Chest radiograph 5/3/2021    FINDINGS: Single AP supine radiograph of  chest. Enteric tube with tip  projecting just below the GE junction. Stable low lung volumes and  perihilar opacities. Retrocardiac atelectasis. Trace pleural fluid  without pneumothorax. No acute osseous abnormality.         Impression    IMPRESSION:  1. Enteric tube with tip projecting just below the GE junction.   2. Stable low lung volumes and multifocal opacities.     I have personally reviewed the examination and initial interpretation  and I agree with the findings.    JE MCCOY MD   XR Chest Port 1 View    Narrative    EXAM: XR CHEST PORT 1 VIEW  5/4/2021 6:14 AM     HISTORY:  Respiratory failure       COMPARISON:  Radiographs 5/3/2021, 05/02/2021, 5/1/2012.    TECHNIQUE: Single frontal radiograph of the chest.    FINDINGS:   Feeding tube courses through the stomach and terminates beyond the  field-of-view.    Midline trachea. Low lung volumes. Right perihilar opacities.  Increased left perihilar/retrocardiac opacity. Increased left basilar  atelectasis versus effusion.. No appreciable pneumothorax.      Impression    IMPRESSION: Mixed interval changes with decreased right perihilar  opacities and increased left perihilar and retrocardiac opacities.  Differential of atelectasis, pulmonary edema, or infection.    I have personally reviewed the examination and initial interpretation  and I agree with the findings.    KARI ABDI MD

## 2021-05-04 NOTE — PROGRESS NOTES
[facility]  Pain and Advanced/Complex Care Team (PACCT)  Progress Note     Geo Hicks MRN# [id number]   Age: 20 year old YOB: 2000   Date:  05/04/2021 Admitted:  5/1/2021     Recommendations, Patient/Family Counseling & Coordination:     SYMPTOM MANAGEMENT: Consider adding a PRN morphine dose (1-2 mg IV) for dyspnea  And a PRN Lorazepam dose for anxiety     GOALS OF CARE AND DECISIONAL SUPPORT/SUMMARY OF DISCUSSION WITH PATIENT AND/OR FAMILY: Met with parents after rounds.  I asked them how they were feeling about yesterday and their decisions, etc.  Today Christianne was more vocal than Eric.  She stated that they both thought long and hard about the decisions they made, and are comfortable that they did the right thing.  They continue to talk about Geo's quality of life, and also thinking of what they would want for themselves.  They met with Ita Boswell from  and found that helpful.  They remain hopeful that Geo is able to improve.      Thank you for the opportunity to participate in the care of this patient and family.   Please contact the Pain and Advanced/Complex Care Team (PACCT) with any emergent needs via text page to the PACCT general pager ([phone], answered 8-4:30 Monday to Friday). After hours and on weekends/holidays, please refer to [facility] or [facility] on-call.    Attestation:  I spent a total of 35 minutes on the inpatient unit today caring for Geo Hicks. Over 50% of my time on the unit was spent coordinating care and counseling regarding goals of care. See note for details.   Discussed with primary team.    ALAN Tyler CNP [facility]  [phone]      Assessment:      Diagnoses and symptoms: Geo Hicks is a(n) 20 year old male with:  Patient Active Problem List   Diagnosis     GERD (gastroesophageal reflux disease)     Intracranial hemorrhage (H)     Hemorrhagic stroke (H)     Ependymoma (H)     S/P craniotomy     S/P  biopsy     Noncomitant strabismus     Abducens neuropathy of both eyes     CANDELARIO (obstructive sleep apnea)     Current chronic use of systemic steroids     Status post chemotherapy     Neoplasm of posterior cranial fossa (H)     Chronic constipation     Depression     Constipation     Slow transit constipation     Hypoxia     Respiratory failure (H)      Possible aspiration pneumonia  Palliative care needs associated with the above    Psychosocial and spiritual concerns:  parents continue to work well together; met with SH this morning.      Advance care planning:   Code status: After care conference yesterday patient is now DNR/DNI     Interval Events:     Little change overnight.  Unable to wean down much on respiratory support.        Medications:     I have reviewed this patient's medication profile and medications during this hospitalization.    Scheduled medications:     ampicillin-sulbactam (UNASYN) IV  3 g Intravenous Q6H     [Held by provider] dexamethasone  0.75 mg Oral Daily with breakfast     hydrocortisone sodium succinate  100 mg/m2/day Intravenous Q6H     [Held by provider] lactobacillus rhamnosus (GG)  2 capsule Oral Daily     lamoTRIgine  200 mg Oral At Bedtime     linaclotide  290 mcg Oral QAM AC     OLANZapine  10 mg Oral At Bedtime     omeprazole  40 mg Per Feeding Tube QAM AC     [Held by provider] omeprazole  40 mg Oral QAM     [Held by provider] potassium phosphate (monobasic)  500 mg Oral BID     sertraline  100 mg Oral Daily     sodium phosphate  1 enema Rectal Once     sulfamethoxazole-trimethoprim  1 tablet Oral 2 times per day on Sun Sat     [Held by provider] Vitamin D3  2,000 Units Oral Daily with breakfast     [Held by provider] vitamin E  400 Units Oral Daily     Infusions:     sodium chloride 100 mL/hr at 05/04/21 1010     PRN medications: acetaminophen, fexofenadine, lactulose, traZODone    Review of Systems:     Palliative Symptom Review    The comprehensive review of systems  is negative other than noted here and in the HPI. Completed by proxy by parent(s)/caretaker(s) (if applicable)    Physical Exam:       Vitals were reviewed  Temp:  [96.7  F (35.9  C)-99.1  F (37.3  C)] 97.6  F (36.4  C)  Pulse:  [67-87] 75  Resp:  [8-41] 19  BP: ()/(48-76) 115/64  FiO2 (%):  [35 %-100 %] 50 %  SpO2:  [87 %-100 %] 99 %  Weight: 124 kg   Detailed exam deferred  Patient lying in bed, scuba mask in place    Data Reviewed:     Results for orders placed or performed during the hospital encounter of 05/01/21 (from the past 24 hour(s))   Blood gas venous   Result Value Ref Range    Ph Venous 7.32 7.32 - 7.43 pH    PCO2 Venous 68 (H) 40 - 50 mm Hg    PO2 Venous 65 (H) 25 - 47 mm Hg    Bicarbonate Venous 35 (H) 21 - 28 mmol/L    Base Excess Venous 7.3 mmol/L    FIO2 55    Blood gas venous   Result Value Ref Range    Ph Venous 7.34 7.32 - 7.43 pH    PCO2 Venous 62 (H) 40 - 50 mm Hg    PO2 Venous 44 25 - 47 mm Hg    Bicarbonate Venous 34 (H) 21 - 28 mmol/L    Base Excess Venous 7.0 mmol/L    FIO2 40%    Blood gas venous   Result Value Ref Range    Ph Venous 7.44 (H) 7.32 - 7.43 pH    PCO2 Venous 49 40 - 50 mm Hg    PO2 Venous 71 (H) 25 - 47 mm Hg    Bicarbonate Venous 33 (H) 21 - 28 mmol/L    Base Excess Venous 8.3 mmol/L    FIO2 50%    XR Chest Port 1 View    Narrative    XR CHEST PORT 1 VIEW  5/3/2021 10:36 PM      HISTORY: Feeding tube placement    COMPARISON: 5-2-21, 5/1/2021    FINDINGS: AP view of the chest. Feeding tube sidehole projects over  the distal esophagus. Stable cardiac silhouette. Stable bilateral  perihilar opacities with increased basilar opacities. Low lung  volumes. No significant pleural effusion or pneumothorax.      Impression    IMPRESSION:   1. Feeding tube sidehole is at the distal esophagus. Recommend  advancement.  2. Low lung volumes with stable perihilar and increased basilar  opacities, atelectasis versus pulmonary edema or infection.    I have personally reviewed the  examination and initial interpretation  and I agree with the findings.    KYLIE CHACON, DO   XR Chest Port 1 View    Narrative    EXAMINATION: XR CHEST PORT 1 VIEW, 5/3/2021 10:47 PM    INDICATION: Advanced NG    COMPARISON: Chest radiograph 5/3/2021    FINDINGS: Single AP supine radiograph of chest. Enteric tube with tip  projecting just below the GE junction. Stable low lung volumes and  perihilar opacities. Retrocardiac atelectasis. Trace pleural fluid  without pneumothorax. No acute osseous abnormality.         Impression    IMPRESSION:  1. Enteric tube with tip projecting just below the GE junction.   2. Stable low lung volumes and multifocal opacities.     I have personally reviewed the examination and initial interpretation  and I agree with the findings.    JE MCCOY MD   Blood gas venous   Result Value Ref Range    Ph Venous 7.29 (L) 7.32 - 7.43 pH    PCO2 Venous 68 (H) 40 - 50 mm Hg    PO2 Venous 88 (H) 25 - 47 mm Hg    Bicarbonate Venous 32 (H) 21 - 28 mmol/L    Base Excess Venous 4.8 mmol/L    FIO2 55    Blood gas venous   Result Value Ref Range    Ph Venous 7.37 7.32 - 7.43 pH    PCO2 Venous 59 (H) 40 - 50 mm Hg    PO2 Venous 90 (H) 25 - 47 mm Hg    Bicarbonate Venous 34 (H) 21 - 28 mmol/L    Base Excess Venous 7.6 mmol/L    FIO2 60    Blood gas venous   Result Value Ref Range    Ph Venous 7.36 7.32 - 7.43 pH    PCO2 Venous 61 (H) 40 - 50 mm Hg    PO2 Venous 61 (H) 25 - 47 mm Hg    Bicarbonate Venous 34 (H) 21 - 28 mmol/L    Base Excess Venous 7.2 mmol/L    FIO2 55%    XR Chest Port 1 View    Narrative    EXAM: XR CHEST PORT 1 VIEW  5/4/2021 6:14 AM     HISTORY:  Respiratory failure       COMPARISON:  Radiographs 5/3/2021, 05/02/2021, 5/1/2012.    TECHNIQUE: Single frontal radiograph of the chest.    FINDINGS:   Feeding tube courses through the stomach and terminates beyond the  field-of-view.    Midline trachea. Low lung volumes. Right perihilar opacities.  Increased left perihilar/retrocardiac  opacity. Increased left basilar  atelectasis versus effusion.. No appreciable pneumothorax.      Impression    IMPRESSION: Mixed interval changes with decreased right perihilar  opacities and increased left perihilar and retrocardiac opacities.  Differential of atelectasis, pulmonary edema, or infection.    I have personally reviewed the examination and initial interpretation  and I agree with the findings.    KARI ABDI MD   Basic metabolic panel   Result Value Ref Range    Sodium 141 133 - 144 mmol/L    Potassium 4.4 3.4 - 5.3 mmol/L    Chloride 107 94 - 109 mmol/L    Carbon Dioxide 32 20 - 32 mmol/L    Anion Gap 2 (L) 3 - 14 mmol/L    Glucose 102 (H) 70 - 99 mg/dL    Urea Nitrogen 16 7 - 30 mg/dL    Creatinine 0.84 0.66 - 1.25 mg/dL    GFR Estimate >90 >60 mL/min/[1.73_m2]    GFR Estimate If Black >90 >60 mL/min/[1.73_m2]    Calcium 8.1 (L) 8.5 - 10.1 mg/dL   CBC with platelets differential   Result Value Ref Range    WBC 4.4 4.0 - 11.0 10e9/L    RBC Count 3.50 (L) 4.4 - 5.9 10e12/L    Hemoglobin 8.1 (L) 13.3 - 17.7 g/dL    Hematocrit 27.9 (L) 40.0 - 53.0 %    MCV 80 78 - 100 fl    MCH 23.1 (L) 26.5 - 33.0 pg    MCHC 29.0 (L) 31.5 - 36.5 g/dL    RDW 19.6 (H) 10.0 - 15.0 %    Platelet Count 108 (L) 150 - 450 10e9/L    Diff Method Automated Method     % Neutrophils 77.2 %    % Lymphocytes 12.0 %    % Monocytes 8.4 %    % Eosinophils 0.5 %    % Basophils 0.5 %    % Immature Granulocytes 1.4 %    Nucleated RBCs 1 (H) 0 /100    Absolute Neutrophil 3.4 1.6 - 8.3 10e9/L    Absolute Lymphocytes 0.5 (L) 0.8 - 5.3 10e9/L    Absolute Monocytes 0.4 0.0 - 1.3 10e9/L    Absolute Eosinophils 0.0 0.0 - 0.7 10e9/L    Absolute Basophils 0.0 0.0 - 0.2 10e9/L    Abs Immature Granulocytes 0.1 0 - 0.4 10e9/L    Absolute Nucleated RBC 0.0    CRP inflammation   Result Value Ref Range    CRP Inflammation 28.4 (H) 0.0 - 8.0 mg/L   Blood gas venous   Result Value Ref Range    Ph Venous 7.34 7.32 - 7.43 pH    PCO2 Venous 66 (H) 40 - 50  mm Hg    PO2 Venous 61 (H) 25 - 47 mm Hg    Bicarbonate Venous 35 (H) 21 - 28 mmol/L    Base Excess Venous 8.2 mmol/L    FIO2 60    Blood gas venous   Result Value Ref Range    Ph Venous 7.28 (L) 7.32 - 7.43 pH    PCO2 Venous 74 (H) 40 - 50 mm Hg    PO2 Venous 47 25 - 47 mm Hg    Bicarbonate Venous 35 (H) 21 - 28 mmol/L    Base Excess Venous 6.9 mmol/L    FIO2 50%      *Note: Due to a large number of results and/or encounters for the requested time period, some results have not been displayed. A complete set of results can be found in Results Review.

## 2021-05-04 NOTE — TELEPHONE ENCOUNTER
Reason for Call:  Other call back    Detailed comments: [employer] needs all 9 forms signed by the provider for office visit on 3/10.    Phone Number Patient can be reached at: Other phone number:  [phone]    Best Time: asap    Can we leave a detailed message on this number? NO    Call taken on 5/4/2021 at 11:40 AM by Elinor Hess

## 2021-05-04 NOTE — PROVIDER NOTIFICATION
Component Value Flag Ref Range Units Status Collected Lab   Ph Venous 7.26  Low   7.32 - 7.43 pH Final 05/04/2021  5:32    PCO2 Venous 84  High Panic            Resident provider notified of critical PCO2. Tidal volumes increased from 600 to 650. No further changes to POC at this time.

## 2021-05-04 NOTE — PROGRESS NOTES
"[facility]  PEDIATRIC HEMATOLOGY/ONCOLOGY   SOCIAL WORK PROGRESS NOTE      DATA:     Geo Hicks is a 20 year old male admitted on 5/1/2021. 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     Writer met with Geo's parents, Christianne and Eric, in a conference room on the PICU. The care conference from the prior day was discussed. Parents acknowledged that it was a difficult conversation to have but that they feel they are making the best decision for Geo. They shared that they continue to have hope but know that this is \"the beginning of the end.\"     Parents explored the idea of having him at home for end of life care but expressed that they didn't think they could be in their homes if he were to pass there. They expressed having a hard time managing the unknowns of whether or not Geo will get better enough to discharge from the hospital or not.     Eric expressed that he uses humor as a coping mechanisms and that it helps him deal with the difficult decisions that they have had to make. He shared that if he isn't laughing, he would be crying. Christianne and Eric continue to be supportive of one another. They are open to on-going support from .    INTERVENTION:     Supportive check-in, engaging parents in adjustment counseling, active listening and validation     ASSESSMENT:     Eric and Christianne easily engaged with writer. Eric was appropriately tearful when talking about Geo's condition. He utilizes humor as a way to cope with the difficult conversations. Parents moods appear stable and affect appropriate. Christianne appears to be a verbal processor - talking " through her emotions and the thoughts that she is experiencing while actively grieving. Parents are supportive of one another and make decisions together.     PLAN:     Social work will continue to assess needs and provide ongoing psychosocial support and access to resources.     GARCIA Agarwal, [facility]    Phone: [phone]  Pager: [phone]  Email: [email or web address]  5/5/2021  *no letter*

## 2021-05-04 NOTE — PROGRESS NOTES
[facility]  PEDIATRIC HEMATOLOGY/ONCOLOGY   SOCIAL WORK PROGRESS NOTE      DATA:     Geo Hicks is a 20 year old male admitted on 5/1/2021. 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     Writer introduced self to parents. Following the care conference, writer met with Geo and his mother at bedside. Geo shared that he was feeling okay. He acknowledged remembering writer during his previous hospitalization. Writer spoke with mom about the many SW that have worked with Geo and their family. Mom shared that this is an opportunity to work with someone new and that she appreciates the introduction.     Writer emailed parents SW contact information.     INTERVENTION:     SW Introduction, supportive visit with pt and family     ASSESSMENT:     Geo was alert and oriented during SW's bedside visit.   Parents easily engaged with writer. Dad was appropriately tearful when discussing plan of care. Mood appeared stable and affect appropriate during this time.     PLAN:     Social work will continue to assess needs and provide ongoing psychosocial support and access to resources.     GARCIA Agarwal, [facility]    Phone: [phone]  Pager: [phone]  Email: [email or web address]"  5/4/2021  *no letter*

## 2021-05-04 NOTE — PROGRESS NOTES
"SPIRITUAL HEALTH SERVICES  SPIRITUAL ASSESSMENT Progress Note  [facility] ([facility] [place]) PICU    REFERRAL SOURCE:   initiated    PRIMARY FOCUS:     Emotional/spiritual/Amish support    Support for coping     Prayer    Extended conversation with Eric and Christianne in the family room which integrated elements of family narratives, grief, loss and erum.   Geo's parents thoughtfully and emotionally discussed the difficult questions and decisions around Geo's life and care.  If Geo should decline in the hospital they would like enough time to bring in his siblings and step-parents. They may or may not want to bring him home.   I offered prayer.     ILLNESS CIRCUMSTANCES:  Geo lives in a group home and stays in contact with his family.   His parents state that he is depressed and mostly disengaged from those around him but that he occasionally calls them with new ideas.   He had an active life before he was diagnosed and continues to grieve that loss.  He distrusts his medical team and feels that they want to do him harm.   Geo has been a person of erum and in the past he has sometimes wanted to visit with a .   I offered to visit with Geo directly and his parents will ask him if he would welcome a visit.     EMOTIONAL AND SPIRITUAL COPING:  Geo's parents spoke of losing Geo twice.  They \"lost\" their son four years ago and will \"lose\"him again.  We discussed loss and grief.  They spoke of their erum in the context of Geo's illness and medical decisions that may need to be made.   We discussed the sacred space around living and dying.   Geo's parents do not want him to suffer.      Buddhist/Erum: Temple.  [employer] in  [place], [place].  Christianne is more active in the community.   Spiritual Practice(s): Prayer.     SOURCES OF SUPPORT: Geo continues to be well supported by immediate and extended family and by their Sabianist community.   He has two brothers (23yo and Gamaliel " at 14yo).      PLAN: Will plan to check in with family and/or staff daily.         Ita Boswell M.Div.  Staff   Pediatric Hematology and Oncology    Pager [phone]  [email or web address]  Cell [phone]

## 2021-05-04 NOTE — PLAN OF CARE
Pt afebrile. All VSS Weaned FiO2 from 60 to 50%. Inspiratory time increased to reach optimal tidal volumes of 600. NG tube verified and tolerating NG meds. Pt straight cathed x1 for 557ml post bladder scan. Pt had one spontaneous void. Pt appeared to sleep between cares.

## 2021-05-04 NOTE — PROGRESS NOTES
Pediatric Critical Care Night Note:    Geo Hicks remains critically ill with acute on chronic respiratory failure in the setting of posterior fossa ependymoma. Presumed aspiration pneumonia.    Interval events: Attempted NJ placement with significant desaturation. Recovered when placed back on AVAPS settings.    VITALS:  Pulse  Av.7  Min: 73  Max: 99  BP - Mean:  [59-87] 66  Systolic (24hrs), Av , Min:90 , Max:137     Diastolic (24hrs), Av, Min:43, Max:76    Resp  Av.7  Min: 11  Max: 41  SpO2  Av.4 %  Min: 92 %  Max: 100 %    I/O:  I/O last 3 completed shifts:  In: 3503.5 [I.V.:3503.5]  Out: 1520 [Urine:1520]  I/O this shift:  In: 202.5 [I.V.:202.5]  Out: -     Medications:  All medications reviewed    Ventilator Settings  Mechanical Ventilation  FiO2 (%): 55 %  Mode: (AVAPS)  Oxygen Concentration %: 50 %  Rate: 15  Tidal Volume: 461  Pressure Control: (18-35)  Peak Inspiratory Pressure (cmH2O): 23    Key physical exam findings: awake, appears mildly distressed as recovering from desaturation, mild increased work of breathing    Labs:  Recent Labs   Lab Test 21  1654    140   POTASSIUM 4.0 4.3   CHLORIDE 106 107   CO2 32 32   ANIONGAP 2* 1*   * 164*   BUN 16 19   CR 1.07 1.12   KATIE 7.4* 8.0*     Lab Results   Component Value Date    LACT 0.9 2021    LACT 0.8 10/03/2020   ,   Recent Labs   Lab Test 16  1745 16  1630   PH 7.39 7.47*   PCO2 35 29*   PO2 164* 159*   HCO3 21 21     Recent Labs   Lab Test 21  2030 21  1618   PHV 7.44* 7.34   PCO2V 49 62*   PO2V 71* 44   HCO3V 33* 34*     Recent Labs   Lab Test 21  0432 21  2348   WBC 7.7 9.5   HGB 8.5* 8.9*   HCT 30.1* 32.1*   MCV 81 81   RDW 19.9* 19.9*   PLT 90* 98*     Lab Results   Component Value Date    INR 1.05 2020   ,   Lab Results   Component Value Date    PTT 28 2020       Additions/changes to plan include:  1) Place feeding tube for enteral  administration of home psychiatric meds (olanzipine, trazodone, sertraline, lamotrigine).  2) Space gases to q12 hours given ability to wean FiO2 and overall stable respiratory status.  3) Bladder scan and repeat straight cath. Consider diuretics if remains substantially fluid positive.  4) Continue weaning FiO2 as able.    I spent a total of 40 minutes providing critical care services at the bedside, and on the critical care unit, evaluating the patient, directing care and reviewing laboratory values and radiologic reports for Geo Hicks.     Valentine Houser MD  Pediatric Critical Care

## 2021-05-04 NOTE — PLAN OF CARE
Took care of pt [id number]. Afebrile. Neuro status unchanged. Denies pain. Clear to coarse lung sounds. Switched from BiPAP to AVAPS during the day. Weaned FiO2 70% to 35%. Desat to 40s following NJ placement and to the 70s with repositioning/coughing. Team aware. Currently on 55% FiO2 following NG placement. HR & BP within parameter. NPO, plan to give meds through NG once placement is confirmed. Straight cath x1. No stool. Hypoactive bowel sounds. Care conference today. Parents at bedside and patient/family was updated on plan of care.

## 2021-05-04 NOTE — PLAN OF CARE
Pt afebrile, denies pain. FiO2 50-70% throughout shift, unable to wean below 50%. LS course-clear, diminished throughout. Tidal volumes increased due to critical PCO2. Pt has productive cough. Cardiac assessment stable. Miralax held due to large volume, glycerine supp. x1 with good result. IV lasix x1, U/O 1.5ml/kg/hr. Pt up to chair with PT. Family at bedside, questions answered, updated on POC.

## 2021-05-04 NOTE — PLAN OF CARE
SLP: Pt with continued tenuous respiratory status, not appropriate for PO trials/SLP bedside swallow assessment.  SLP cancel, will attempt 5/5/21 or as appropriate

## 2021-05-05 NOTE — PROGRESS NOTES
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 He was previously prescribed home BiPAP but has not worn it for many years due to physical discomfort. MRI[facility]V at [facility] showed stable appearance of ependymoma and post-surgical changes with no acute findings.     He was initially admitted to the floor on supplemental oxygen, however he progressed to acute on chronic respiratory status and he was transferred to the PICU on 5/3 early AM. His repeat CXR demonstrated concern for pneumonia. He is currently being supported with BiPAP. After a long discussion with both parents on 5/3/21 a decision was made to change his code status to DNR/DNI. Pulmonology, PACCT and PICU are co-managing his care at this time.     RECOMMENDATIONS:  -Weekly Entinostat, family to brought in medication to bedside, next due on Thursday 5/6/21. Will discuss with research team to make a final decision about holding vs giving dose on 5/6 and ensure that there are no indications to discontinue the study drug per the protocol.  -Per discussion prior to and at care conference family clearly expressed that if he were to worsen they would not want him to be intubated or attempt resuscitation.   -Appreciate Pulmonology, PACCT, and PICU co-management of complex medical cares.         Plan of care discussed with attending physician Dr. Eddie Lowry.   Thank you for the opportunity to see this patient; please call us with any questions or concerns.    Nati Webb MD MPH  Pediatric Hematology Oncology Fellow  Pager: [phone]    I saw and evaluated the patient and agree with the fellow's assessment and " plan.  Eddie Lowry MD, MPH, [facility]  Division of Pediatric Hematology/Oncology      Interval History:  Geo remained stable on BiPAP support overnight with some improvement in his tidal volumes. He was able to have an NJ placed at the bedside on 5/3 PM. He is more interactive and alert this morning.       Medications:  Current Facility-Administered Medications   Medication     acetaminophen (TYLENOL) tablet 650 mg     ampicillin-sulbactam (UNASYN) 3 g vial to attach to  mL bag     [Held by provider] dexamethasone (DECADRON) tablet 0.75 mg     fexofenadine (ALLEGRA) tablet 180 mg     glycerin (ADULT) Suppository 1 suppository     hydrocortisone sodium succinate (Solu-CORTEF) PEDS/NICU IV 75 mg     [Held by provider] lactobacillus rhamnosus (GG) (CULTURELL) capsule 2 capsule     lamoTRIgine (LaMICtal) tablet 200 mg     linaclotide (LINZESS) capsule 290 mcg     OLANZapine (zyPREXA) tablet 10 mg     omeprazole (FIRST-OMEPRAZOLE) suspension 40 mg     [Held by provider] potassium phosphate (monobasic) (K-PHOS) tablet 500 mg     sertraline (ZOLOFT) tablet 100 mg     sodium chloride 0.9% infusion     sodium phosphate (FLEET ENEMA) 1 enema     sulfamethoxazole-trimethoprim (BACTRIM) 400-80 MG per tablet 1 tablet     traZODone (DESYREL) tablet 150 mg     [Held by provider] Vitamin D3 (CHOLECALCIFEROL) tablet 2,000 Units     [Held by provider] vitamin E (TOCOPHEROL) 200 units (90 mg) capsule 400 Units     PHYSICAL EXAM:  Temp: 98.4  F (36.9  C) Temp src: Axillary BP: 106/59 Pulse: 79   Resp: 22 SpO2: 98 % O2 Device: BiPAP/CPAP      GENERAL: laying in bed, awake and interactive  HEENT:  moist mucus membranes  CHEST: BiPAP in place  CV: RRR, good distal perfusion  ABD: soft, obese, no apparent significant distention  EXT: no obvious deformities   NEURO: awake, responding appropriately - nodding and attempting to talk    Results for orders placed or performed during  the hospital encounter of 05/01/21 (from the past 24 hour(s))   XR Chest Port 1 View    Narrative    XR CHEST PORT 1 VIEW  5/3/2021 10:36 PM      HISTORY: Feeding tube placement    COMPARISON: 5-2-21, 5/1/2021    FINDINGS: AP view of the chest. Feeding tube sidehole projects over  the distal esophagus. Stable cardiac silhouette. Stable bilateral  perihilar opacities with increased basilar opacities. Low lung  volumes. No significant pleural effusion or pneumothorax.      Impression    IMPRESSION:   1. Feeding tube sidehole is at the distal esophagus. Recommend  advancement.  2. Low lung volumes with stable perihilar and increased basilar  opacities, atelectasis versus pulmonary edema or infection.    I have personally reviewed the examination and initial interpretation  and I agree with the findings.    KYLIE CHACON, DO   XR Chest Port 1 View    Narrative    EXAMINATION: XR CHEST PORT 1 VIEW, 5/3/2021 10:47 PM    INDICATION: Advanced NG    COMPARISON: Chest radiograph 5/3/2021    FINDINGS: Single AP supine radiograph of chest. Enteric tube with tip  projecting just below the GE junction. Stable low lung volumes and  perihilar opacities. Retrocardiac atelectasis. Trace pleural fluid  without pneumothorax. No acute osseous abnormality.         Impression    IMPRESSION:  1. Enteric tube with tip projecting just below the GE junction.   2. Stable low lung volumes and multifocal opacities.     I have personally reviewed the examination and initial interpretation  and I agree with the findings.    JE MCCOY MD   Blood gas venous   Result Value Ref Range    Ph Venous 7.29 (L) 7.32 - 7.43 pH    PCO2 Venous 68 (H) 40 - 50 mm Hg    PO2 Venous 88 (H) 25 - 47 mm Hg    Bicarbonate Venous 32 (H) 21 - 28 mmol/L    Base Excess Venous 4.8 mmol/L    FIO2 55    Blood gas venous   Result Value Ref Range    Ph Venous 7.37 7.32 - 7.43 pH    PCO2 Venous 59 (H) 40 - 50 mm Hg    PO2 Venous 90 (H) 25 - 47 mm Hg    Bicarbonate Venous  34 (H) 21 - 28 mmol/L    Base Excess Venous 7.6 mmol/L    FIO2 60    Blood gas venous   Result Value Ref Range    Ph Venous 7.36 7.32 - 7.43 pH    PCO2 Venous 61 (H) 40 - 50 mm Hg    PO2 Venous 61 (H) 25 - 47 mm Hg    Bicarbonate Venous 34 (H) 21 - 28 mmol/L    Base Excess Venous 7.2 mmol/L    FIO2 55%    XR Chest Port 1 View    Narrative    EXAM: XR CHEST PORT 1 VIEW  5/4/2021 6:14 AM     HISTORY:  Respiratory failure       COMPARISON:  Radiographs 5/3/2021, 05/02/2021, 5/1/2012.    TECHNIQUE: Single frontal radiograph of the chest.    FINDINGS:   Feeding tube courses through the stomach and terminates beyond the  field-of-view.    Midline trachea. Low lung volumes. Right perihilar opacities.  Increased left perihilar/retrocardiac opacity. Increased left basilar  atelectasis versus effusion.. No appreciable pneumothorax.      Impression    IMPRESSION: Mixed interval changes with decreased right perihilar  opacities and increased left perihilar and retrocardiac opacities.  Differential of atelectasis, pulmonary edema, or infection.    I have personally reviewed the examination and initial interpretation  and I agree with the findings.    KARI ABDI MD   Basic metabolic panel   Result Value Ref Range    Sodium 141 133 - 144 mmol/L    Potassium 4.4 3.4 - 5.3 mmol/L    Chloride 107 94 - 109 mmol/L    Carbon Dioxide 32 20 - 32 mmol/L    Anion Gap 2 (L) 3 - 14 mmol/L    Glucose 102 (H) 70 - 99 mg/dL    Urea Nitrogen 16 7 - 30 mg/dL    Creatinine 0.84 0.66 - 1.25 mg/dL    GFR Estimate >90 >60 mL/min/[1.73_m2]    GFR Estimate If Black >90 >60 mL/min/[1.73_m2]    Calcium 8.1 (L) 8.5 - 10.1 mg/dL   CBC with platelets differential   Result Value Ref Range    WBC 4.4 4.0 - 11.0 10e9/L    RBC Count 3.50 (L) 4.4 - 5.9 10e12/L    Hemoglobin 8.1 (L) 13.3 - 17.7 g/dL    Hematocrit 27.9 (L) 40.0 - 53.0 %    MCV 80 78 - 100 fl    MCH 23.1 (L) 26.5 - 33.0 pg    MCHC 29.0 (L) 31.5 - 36.5 g/dL    RDW 19.6 (H) 10.0 - 15.0 %     Platelet Count 108 (L) 150 - 450 10e9/L    Diff Method Automated Method     % Neutrophils 77.2 %    % Lymphocytes 12.0 %    % Monocytes 8.4 %    % Eosinophils 0.5 %    % Basophils 0.5 %    % Immature Granulocytes 1.4 %    Nucleated RBCs 1 (H) 0 /100    Absolute Neutrophil 3.4 1.6 - 8.3 10e9/L    Absolute Lymphocytes 0.5 (L) 0.8 - 5.3 10e9/L    Absolute Monocytes 0.4 0.0 - 1.3 10e9/L    Absolute Eosinophils 0.0 0.0 - 0.7 10e9/L    Absolute Basophils 0.0 0.0 - 0.2 10e9/L    Abs Immature Granulocytes 0.1 0 - 0.4 10e9/L    Absolute Nucleated RBC 0.0    CRP inflammation   Result Value Ref Range    CRP Inflammation 28.4 (H) 0.0 - 8.0 mg/L   Blood gas venous   Result Value Ref Range    Ph Venous 7.34 7.32 - 7.43 pH    PCO2 Venous 66 (H) 40 - 50 mm Hg    PO2 Venous 61 (H) 25 - 47 mm Hg    Bicarbonate Venous 35 (H) 21 - 28 mmol/L    Base Excess Venous 8.2 mmol/L    FIO2 60    Blood gas venous   Result Value Ref Range    Ph Venous 7.28 (L) 7.32 - 7.43 pH    PCO2 Venous 74 (H) 40 - 50 mm Hg    PO2 Venous 47 25 - 47 mm Hg    Bicarbonate Venous 35 (H) 21 - 28 mmol/L    Base Excess Venous 6.9 mmol/L    FIO2 50%    Blood gas venous   Result Value Ref Range    Ph Venous 7.26 (L) 7.32 - 7.43 pH    PCO2 Venous 84 (HH) 40 - 50 mm Hg    PO2 Venous 43 25 - 47 mm Hg    Bicarbonate Venous 38 (H) 21 - 28 mmol/L    Base Excess Venous 8.8 mmol/L    FIO2 60    Blood gas venous   Result Value Ref Range    Ph Venous 7.33 7.32 - 7.43 pH    PCO2 Venous 76 (HH) 40 - 50 mm Hg    PO2 Venous 57 (H) 25 - 47 mm Hg    Bicarbonate Venous 40 (H) 21 - 28 mmol/L    Base Excess Venous 11.7 mmol/L    FIO2 60      *Note: Due to a large number of results and/or encounters for the requested time period, some results have not been displayed. A complete set of results can be found in Results Review.

## 2021-05-05 NOTE — PROGRESS NOTES
"CLINICAL NUTRITION SERVICES - ASSESSMENT NOTE    REASON FOR ASSESSMENT  Geo Hicks is a/an 20 year old male assessed by the dietitian for MD Consult: PPN Start    ANTHROPOMETRICS  Height: 180.1 cm (5'11\")  Most Recent Weight: 124.5 kg (274 lb 7.6 oz)    IBW: 78.2 kg   BMI: Obesity Grade II BMI 35-39.9  Weight History:   Wt Readings from Last 3 Encounters:   05/02/21 124.5 kg (274 lb 7.6 oz)   05/01/21 126 kg (277 lb 12.5 oz)   03/16/21 117.2 kg (258 lb 6.1 oz)   Dosing Weight: 90 kg (adjusted for obesity)    NUTRITION HISTORY  Patient on regular diet. Per mother, weight was around 210 lb a few months ago. Over the past few months he gained about 50 lb. This is consistent with admission weight. Per H&P, patient with increased intakes attributed to concerns he is not being fed enough at group home (likely not accurate)and has been ordering take out multiple times daily. Steroid use has also played a role in weight gain in the past. Possibility of aspiration with dysphagia. Per H&P parents not interested in limiting his diet at this time. Coughing and sputtering noted with PO.     CURRENT NUTRITION ORDERS  Diet: NPO    NUTRITION SUPPORT ORDERS  No nutrition support at this time. Plan to initiate PPN this evening. Previous post-pyloric tube placement unsuccessful; patient with NG however remains on full face positive pressure.     LABS  Labs reviewed    MEDICATIONS  Medications reviewed    ASSESSED NUTRITION NEEDS  Estimated Energy Needs: 25-30 kcal/kg for EN/PO (6502-8270 kcal/day); 20-25 kcal/kg PN (9233-2092 kcal/day)  Estimated Protein Needs: 108-180 grams protein/day (1.2 - 2 grams of pro/kg)  Estimated Fluid Needs: 1 mL/kcal or per team in ICU  Justification: Maintenance pending fluid status     PHYSICAL FINDINGS  Patient with hx of posterior fossa ependymoma s/p radiation & chemo; currently on study of Entinostat; transferred from Heme/Onc service to PICU for sepsis & acute on chronic hypercapnic respiratory " failure, currently on full face BiPAP    MALNUTRITION  Patient does not meet criteria for malnutrition.     NUTRITION DIAGNOSIS  Predicted suboptimal nutrient intakes related to nutrition orders as evidenced by NPO with nutrition support not yet initiated; plan to initiate PPN this evening per rounds.       INTERVENTIONS  Implementation  Collaboration and Referral of Nutrition Care: Rounded with team and discussed nutritional plan of care including parenteral nutrition plan. See recommendations below.    Goals  1. Meet >75% assessed nutritional needs through PO/nutrition support.   2. Weight maintenance during hospitalization/critical illness.      Monitoring/Evaluation  Food and beverage intake-  Enteral and parenteral nutritional intakes-  Anthropometric measurements-    Recommendations  1. Goal PPN: GIR = 2 mg/kg/min (261 gm dex), 1.2 gm/kg amino acids (108 gm), 240 mL intralipid for 1799 kcal (20 kcal/kg), 1.2 gm/kg Pro, and 27% of kcal from fat. Dosing weight for nutritional calculations = 90 kg (adjusted body weight).     2. Enteral nutrition support if able to obtain post-pyloric access or is respiratory support weaned and patient able to receive NG feeds (vs diet advancement).     Amena Gómez RD, CSP, LD  Pager #[phone]

## 2021-05-05 NOTE — PROGRESS NOTES
[facility]    Progress Note - Pediatric ICU Service        Date of Admission:  5/1/2021    Assessment & Plan    Geo Hicks is a 20 year old male admitted on 5/1/2021. He has history of posterior fossa ependymoma s/p radiation and multiple rounds of chemotherapy now on study COG [id number] Phase 1 study of Entinostat who was admitted to the Heme-Onc service with hypoxia thought 2/2 obstructive sleep apnea, now transferred to PICU with sepsis and acute on chronic hypoxic hypercapnic respiratory failure requiring full mask BiPAP.     Changes today:  - place midline  - start pTPN  - wean FiO2  - wean hydrocortisone to q8h  - prn morphine for air hunger  - prn ativan for anxiety  - eye drops for dry eyes    FEN/Renal  - NPO  - start peripheral TPN @ 80 ml/hr    Volume overload  - s/p 20mg IV lasix x1 5/4  - will consider more lasix if respiratory status declines   - goal net -500ml   - Daily BMP    Respiratory  Acute on chronic hypoxic hypercapnic respiratory failure  Obstructive sleep apnea  - Continuous pulse oximetry  - Pulmonary/Sleep Medicine consult, appreciate recs  - AVAPS: TV of 660, PEEP=11  - trend VBGs q4h    Cardiovascular  Hypotension likely 2/2 septic shock, resolved  - Continuous cardiac monitoring  - Stress dose steroids, see below  - MAP goal >65    Heme/Onc  Ependymoma  Patient on active COG study  - Entinostat 7 mg weekly (due 5/6, but will delay at least a week per Hem/Onc)  - CBC daily    ID  Sepsis  Most likely 2/2 aspiration PNA.  - Continue IV Unasyn 3g Q6H, plan for 10-14 day course   - Low threshold to broaden if not improving/worsening  - Monitor for fevers  - Bactrim ppx     GI  Chronic constipation  Gastroesophageal reflux  - NPO  - restart PTA pantoprazole and linaclotide  - prn glycerin suppository or enema for constipation  - can consider lactulose instead of miralax if needed (smaller volume)    Endo  Chronic low dose steroid  use  Adrenal insufficiency  - IV hydrocortisone 75mg Q8H (weaned 5/5)  - HOLD PTA dexamethasone 0.75 mg QAM    Neuro/Psych  Agitation  Delusions  Multiple cranial nerve palsies  Ataxia  - Q1H neuro checks  - Continue PTA lamotrigine  - Continue PTA meds via [place]:   - PTA olanzapine 10mg at bedtime   - PTA sertraline 100mg at bedtime   - PTA trazodone 150mg PRN at bedtime    Subconjunctival hemorrhage  - eye drops prn     Goals of care  Per discussion with parents on 5/3, Geo is DNR/DNI. PACCT was involved in the discussion.   - morphine prn for air hunger  - ativan prn for anxiety        Diet: NPO for Medical/Clinical Reasons Except for: Meds  parenteral nutrition - Clinimix E    Fluids: none  Lines: Midline  DVT Prophylaxis: SCDs  Thornton Catheter: not present  Code Status: No CPR- Do NOT Intubate       Disposition Plan   Expected discharge: > 7 days, recommended to prior living arrangement once respiratory status back to baseline, tolerating PO.  Entered: Ita Roach MD 05/05/2021, 2:36 PM     The patient's care was discussed with the PICU attending.     Ita Roach MD  Medicine-Pediatrics, PGY-2  Pediatric ICU Service  [facility]  ______________________________________________________________________    Interval History   Nursing notes reviewed. Overnight, blood gas showed higher pCO2's, so Vt was increased to 650ml. No other vent changes were made. Other vitals remained stable. Had a BM yesterday. Voiding spontaneously. Subconjunctival hemorrhage of right eye worsening today.     Data reviewed today: I reviewed all medications, new labs and imaging results over the last 24 hours.     Physical Exam   Vital Signs: Temp: 97.9  F (36.6  C) Temp src: Axillary BP: 114/68 Pulse: 72   Resp: 23 SpO2: 100 % O2 Device: BiPAP/CPAP    Weight: 268 lbs 15.38 oz    General: Laying in bed, BiPAP in place, awake but tired, answering questions, NAD.  HEENT:  NC/AT, bilateral subconjunctival hemorrhages (right eye worsening today), anicteric, BiPAP mask in place  Cardiovascular: RRR, normal S1/S2, no S3/S4, no murmurs appreciated  Pulm: CTAB, diminished at the bases, no crackles or wheezes  Abdomen: Soft, obese, striae, non-tender, non-distended, +BS   Extremities: Warm, dry, no peripheral edema  Neuro: Awake, alert, moving extremities in bed.      Data   Recent Labs   Lab 05/05/21  0524 05/04/21  0907 05/03/21  0432 05/01/21  1654 05/01/21  1654   WBC 4.1 4.4 7.7   < > 8.0   HGB 8.1* 8.1* 8.5*   < > 9.9*   MCV 83 80 81   < > 83   PLT 96* 108* 90*   < > 131*    141 140  --  140   POTASSIUM 3.8 4.4 4.0  --  4.3   CHLORIDE 104 107 106  --  107   CO2 34* 32 32  --  32   BUN 19 16 16  --  19   CR 0.96 0.84 1.07  --  1.12   ANIONGAP 4 2* 2*  --  1*   KATIE 8.5 8.1* 7.4*  --  8.0*   * 102* 114*  --  164*   ALBUMIN  --   --   --   --  2.9*   PROTTOTAL  --   --   --   --  6.3*   BILITOTAL  --   --   --   --  0.2   ALKPHOS  --   --   --   --  165*   ALT  --   --   --   --  32   AST  --   --   --   --  34    < > = values in this interval not displayed.     No results found for this or any previous visit (from the past 24 hour(s)).

## 2021-05-05 NOTE — PLAN OF CARE
S/B: Continues to be on AVAPS setting mechanical support using mask then full face mask after 1300, FiO2 at 60% and weaned as able,seen by hemonc and able to talk with patient/parents, seen by PT and able to sit at edge of bed to dangle/lift and march his legs, stood up with assist of 2 X3 at bedside, extended dwell placed by vascular access team, VBG done every 6 hours per order, per order-scheduled Hydrocortisone IV decreased to every 8 hours from 6 hours, PRN eye gtts given   A: Better TV with full face mask at 500-700, 400's when asleep, brief desaturation to 86-88% when asleep but goes back up to above 92% without any intervention, current FIO2 at 50%, lung sounds remains diminished, coughs occasionally, desaturation up to 70's when off face mask, tolerates turning, tolerated standing up with assist but needs direction before and during because of impulsiveness, voided with prompt at 1800 with MD aware, parents at bedside the whole shift and aware of status/plans  P: Per rounds this AM: wean FIO2 to keep saturation above 92%, peripheral TPN to start tonight, goal fluid balance is (-) 500 ml, bladder scan every 8 hours per order, Lasix IV if needed, keep comfortable and use morphine/ativan as needed,continue Unasyn to total 10 days, to hold Entinostat medication until further discussion per hemonc.

## 2021-05-05 NOTE — PROGRESS NOTES
Music Therapy Missed Visit Note    Attempted visit with Geo Hicks. Patient unavailable and father requested follow-up tomorrow. Music therapist to attempt visit again tomorrow morning.     Manuel Galvan MA, [facility]  [email or web address]  Tuesdays and Fridays   Pager: [phone]

## 2021-05-05 NOTE — PROGRESS NOTES
Pediatric Critical Care Night Note:    Geo Hicks remains critically ill with acute hypoxic & hypercarbic respiratory failure due to aspiration pneumonia    Interval events:Increasing hypercarbia on VBGs    VITALS:  Pulse  Av.1  Min: 67  Max: 91  BP - Mean:  [62-99] 65  Systolic (24hrs), Av , Min:101 , Max:120     Diastolic (24hrs), Av, Min:48, Max:71    Resp  Av.8  Min: 8  Max: 41  SpO2  Av.6 %  Min: 78 %  Max: 100 %    I/O:  I/O last 3 completed shifts:  In: .5 [I.V.:.5; NG/GT:44]  Out: 273 [Urine:2481; Other:250]  I/O this shift:  In: 22.5 [I.V.:22.5]  Out: -     Medications:  All medications reviewed    Ventilator Settings  Mechanical Ventilation  FiO2 (%): 60 %  Mode: Other (see comments)(avaps)  Oxygen Concentration %: 60 %  Rate: 15  Tidal Volume: 702  Pressure Control: (18-35)  Peak Inspiratory Pressure (cmH2O): 17    Key physical exam findings:  Gen:  Alert, able to answer simple questions but intermittently has unintelligible speech  CV:  Nml S1S2 without murmur, pulses 2/4 throughout  Lungs:  Decreased air movement in bilateral bases, no crackles or wheezes, no increased work of breathing    Labs:  Recent Labs   Lab Test 21  0432    140   POTASSIUM 4.4 4.0   CHLORIDE 107 106   CO2 32 32   ANIONGAP 2* 2*   * 114*   BUN 16 16   CR 0.84 1.07   KATIE 8.1* 7.4*     Lab Results   Component Value Date    LACT 0.9 2021    LACT 0.8 10/03/2020   ,   Recent Labs   Lab Test 16  1745 16  1630   PH 7.39 7.47*   PCO2 35 29*   PO2 164* 159*   HCO3      Recent Labs   Lab Test 21  1732   PHV 7.33 7.26*   PCO2V 76* 84*   PO2V 57* 43   HCO3V 40* 38*     Recent Labs   Lab Test 21  0921  0432   WBC 4.4 7.7   HGB 8.1* 8.5*   HCT 27.9* 30.1*   MCV 80 81   RDW 19.6* 19.9*   * 90*     Lab Results   Component Value Date    INR 1.05 2020   ,   Lab Results   Component Value Date    PTT 28  01/14/2020       Additions/changes to plan include:  1)Increase TVol on AVAPS vent mode  2)Code status confirmed with Dad (designated decision maker)-DNR/DNI    I spent a total of 30 minutes providing critical care services at the bedside, and on the critical care unit, evaluating the patient, directing care and reviewing laboratory values and radiologic reports for Geo Hicks.    Marisel Betts MD  Pediatric Critical Care
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
## 2021-05-05 NOTE — PROGRESS NOTES
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 He was previously prescribed home BiPAP but has not worn it for many years due to physical discomfort. MRI/[facility]V at [facility] showed stable appearance of ependymoma and post-surgical changes with no acute findings.     He was initially admitted to the floor on supplemental oxygen, however he progressed to acute on chronic respiratory status and he was transferred to the PICU on 5/3 early AM. His repeat CXR demonstrated concern for pneumonia which was thought to be most likely due to known aspiration (pt refuses to thicken his liquids per prior recommendation). He is currently being supported with BiPAP. After a long discussion with both parents on 5/3/21 a decision was made to change his code status to DNR/DNI. Pulmonology, PACCT, and PICU teams are co-managing his care at this time.     RECOMMENDATIONS:  -Weekly Entinostat, family brought in medication to bedside, next due on Thursday 5/6/21. After review of the protocol and discussion with primary and research teams a final decision was made to hold dose on 5/6. If not significantly improved in timely manner he may ultimately be removed from the study.  -Per discussion prior to and at care conference family clearly expressed that if he were to worsen they would not want him to be intubated or attempt resuscitation.   -Appreciate Pulmonology, PACCT, and PICU co-management of complex medical cares.         Plan of care discussed with attending physician Dr. Eddie Lowry.   Thank you for the opportunity to see this patient; please call us with any questions or concerns.    Nati" Tracey SERNA MPH  Pediatric Hematology Oncology Fellow  Pager: [phone]    I saw and evaluated the patient and agree with the fellow's assessment and plan.  Eddie Lowry MD, MPH, [facility]  Division of Pediatric Hematology/Oncology        Interval History:  Geo remained stable on BiPAP support overnight with continued improvement in tidal volumes. Currently the PICU is working on weaning his oxygen content and steroid dose as well as starting IV nutrition. His tube was unfortunately found to be in his stomach rather than the intended jejunum. Given his difficulty and desaturation with placement of his tube there is no current plan to reposition it. He remains interactive and alert this morning.       Medications:  Current Facility-Administered Medications   Medication     acetaminophen (TYLENOL) tablet 650 mg     ampicillin-sulbactam (UNASYN) 3 g vial to attach to  mL bag     [Held by provider] dexamethasone (DECADRON) tablet 0.75 mg     fexofenadine (ALLEGRA) tablet 180 mg     glycerin (ADULT) Suppository 1 suppository     hydrocortisone sodium succinate (Solu-CORTEF) PEDS/NICU IV 75 mg     [Held by provider] lactobacillus rhamnosus (GG) (CULTURELL) capsule 2 capsule     lamoTRIgine (LaMICtal) tablet 200 mg     linaclotide (LINZESS) capsule 290 mcg     lipids (INTRALIPID) 20 % infusion 240 mL     LORazepam (ATIVAN) injection 1 mg     morphine (PF) injection 1 mg     naloxone (NARCAN) injection 0.4 mg     OLANZapine (zyPREXA) tablet 10 mg     omeprazole (FIRST-OMEPRAZOLE) suspension 40 mg     parenteral nutrition - Clinimix E     polyethylene glycol-propylene glycol PF (SYSTANE ULTRA PF) opthalmic solution 1 drop     [Held by provider] potassium phosphate (monobasic) (K-PHOS) tablet 500 mg     sertraline (ZOLOFT) tablet 100 mg     sodium chloride 0.9% infusion     sodium phosphate (FLEET ENEMA) 1 enema     sulfamethoxazole-trimethoprim (BACTRIM)  400-80 MG per tablet 1 tablet     traZODone (DESYREL) tablet 150 mg     [Held by provider] Vitamin D3 (CHOLECALCIFEROL) tablet 2,000 Units     [Held by provider] vitamin E (TOCOPHEROL) 200 units (90 mg) capsule 400 Units     PHYSICAL EXAM:  Temp: 97.5  F (36.4  C) Temp src: Axillary BP: 109/64 Pulse: 83   Resp: 17 SpO2: 97 % O2 Device: BiPAP/CPAP      GENERAL: laying in bed, awake and interactive  HEENT:  moist mucus membranes  CHEST: BiPAP in place  CV: RRR, good distal perfusion  ABD: soft, obese, no apparent significant distention  EXT: no obvious deformities   NEURO: awake, responding appropriately - nodding and speaking    Results for orders placed or performed during the hospital encounter of 05/01/21 (from the past 24 hour(s))   Blood gas venous   Result Value Ref Range    Ph Venous 7.26 (L) 7.32 - 7.43 pH    PCO2 Venous 84 (HH) 40 - 50 mm Hg    PO2 Venous 43 25 - 47 mm Hg    Bicarbonate Venous 38 (H) 21 - 28 mmol/L    Base Excess Venous 8.8 mmol/L    FIO2 60    Blood gas venous   Result Value Ref Range    Ph Venous 7.33 7.32 - 7.43 pH    PCO2 Venous 76 (HH) 40 - 50 mm Hg    PO2 Venous 57 (H) 25 - 47 mm Hg    Bicarbonate Venous 40 (H) 21 - 28 mmol/L    Base Excess Venous 11.7 mmol/L    FIO2 60    Blood gas venous   Result Value Ref Range    Ph Venous 7.32 7.32 - 7.43 pH    PCO2 Venous 75 (H) 40 - 50 mm Hg    PO2 Venous 50 (H) 25 - 47 mm Hg    Bicarbonate Venous 39 (H) 21 - 28 mmol/L    Base Excess Venous 10.9 mmol/L    FIO2 60    CRP inflammation   Result Value Ref Range    CRP Inflammation 16.7 (H) 0.0 - 8.0 mg/L   CBC with platelets differential   Result Value Ref Range    WBC 4.1 4.0 - 11.0 10e9/L    RBC Count 3.52 (L) 4.4 - 5.9 10e12/L    Hemoglobin 8.1 (L) 13.3 - 17.7 g/dL    Hematocrit 29.3 (L) 40.0 - 53.0 %    MCV 83 78 - 100 fl    MCH 23.0 (L) 26.5 - 33.0 pg    MCHC 27.6 (L) 31.5 - 36.5 g/dL    RDW 19.4 (H) 10.0 - 15.0 %    Platelet Count 96 (L) 150 - 450 10e9/L    Diff Method Automated Method     %  Neutrophils 73.9 %    % Lymphocytes 13.9 %    % Monocytes 9.0 %    % Eosinophils 0.7 %    % Basophils 0.5 %    % Immature Granulocytes 2.0 %    Nucleated RBCs 1 (H) 0 /100    Absolute Neutrophil 3.0 1.6 - 8.3 10e9/L    Absolute Lymphocytes 0.6 (L) 0.8 - 5.3 10e9/L    Absolute Monocytes 0.4 0.0 - 1.3 10e9/L    Absolute Eosinophils 0.0 0.0 - 0.7 10e9/L    Absolute Basophils 0.0 0.0 - 0.2 10e9/L    Abs Immature Granulocytes 0.1 0 - 0.4 10e9/L    Absolute Nucleated RBC 0.0    Blood gas venous   Result Value Ref Range    Ph Venous 7.30 (L) 7.32 - 7.43 pH    PCO2 Venous 79 (HH) 40 - 50 mm Hg    PO2 Venous 55 (H) 25 - 47 mm Hg    Bicarbonate Venous 39 (H) 21 - 28 mmol/L    Base Excess Venous 10.8 mmol/L    FIO2 55    Basic metabolic panel   Result Value Ref Range    Sodium 142 133 - 144 mmol/L    Potassium 3.8 3.4 - 5.3 mmol/L    Chloride 104 94 - 109 mmol/L    Carbon Dioxide 34 (H) 20 - 32 mmol/L    Anion Gap 4 3 - 14 mmol/L    Glucose 100 (H) 70 - 99 mg/dL    Urea Nitrogen 19 7 - 30 mg/dL    Creatinine 0.96 0.66 - 1.25 mg/dL    GFR Estimate >90 >60 mL/min/[1.73_m2]    GFR Estimate If Black >90 >60 mL/min/[1.73_m2]    Calcium 8.5 8.5 - 10.1 mg/dL   Blood gas venous   Result Value Ref Range    Ph Venous 7.29 (L) 7.32 - 7.43 pH    PCO2 Venous 85 (HH) 40 - 50 mm Hg    PO2 Venous 33 25 - 47 mm Hg    Bicarbonate Venous 41 (H) 21 - 28 mmol/L    Base Excess Venous 11.8 mmol/L    FIO2 50    Blood gas venous   Result Value Ref Range    Ph Venous 7.35 7.32 - 7.43 pH    PCO2 Venous 73 (H) 40 - 50 mm Hg    PO2 Venous 19 (L) 25 - 47 mm Hg    Bicarbonate Venous 41 (H) 21 - 28 mmol/L    Base Excess Venous 13.2 mmol/L    FIO2 50      *Note: Due to a large number of results and/or encounters for the requested time period, some results have not been displayed. A complete set of results can be found in Results Review.

## 2021-05-05 NOTE — PROGRESS NOTES
[facility]  Pain and Advanced/Complex Care Team (PACCT)  Progress Note     Geo Hicks MRN# [id number]   Age: 20 year old YOB: 2000   Date:  05/05/2021 Admitted:  5/1/2021     Recommendations, Patient/Family Counseling & Coordination:     SYMPTOM MANAGEMENT: Consider adding a PRN morphine dose (1-2 mg IV) for dyspnea  And a PRN Lorazepam dose for anxiety     GOALS OF CARE AND DECISIONAL SUPPORT/SUMMARY OF DISCUSSION WITH PATIENT AND/OR FAMILY:  Talked with Geo today as he was awake during my visit. He reports that he overall has no pain and is comfortable, except for the mask.  Eric was in the back of the room.  He reports that he's doing fine today.  Will continue to follow along.     Thank you for the opportunity to participate in the care of this patient and family.   Please contact the Pain and Advanced/Complex Care Team (PACCT) with any emergent needs via text page to the PACCT general pager ([phone], answered 8-4:30 Monday to Friday). After hours and on weekends/holidays, please refer to [facility] or [place] on-call.    Attestation:  I spent a total of 15 minutes on the inpatient unit today caring for Geo Hicks. Over 50% of my time on the unit was spent coordinating care and counseling regarding goals of care. See note for details.   Discussed with primary team.    ALAN Tyler CNP [facility]  [phone]      Assessment:      Diagnoses and symptoms: Geo Hicks is a(n) 20 year old male with:  Patient Active Problem List   Diagnosis     GERD (gastroesophageal reflux disease)     Intracranial hemorrhage (H)     Hemorrhagic stroke (H)     Ependymoma (H)     S/P craniotomy     S/P biopsy     Noncomitant strabismus     Abducens neuropathy of both eyes     CANDELARIO (obstructive sleep apnea)     Current chronic use of systemic steroids     Status post chemotherapy     Neoplasm of posterior cranial fossa (H)     Chronic constipation      Depression     Constipation     Slow transit constipation     Hypoxia     Respiratory failure (H)      Possible aspiration pneumonia  Palliative care needs associated with the above    Psychosocial and spiritual concerns:  parents continue to work well together;      Advance care planning:   Code status: After care conference yesterday patient is now DNR/DNI     Interval Events:     No acute events.        Medications:     I have reviewed this patient's medication profile and medications during this hospitalization.    Scheduled medications:     ampicillin-sulbactam (UNASYN) IV  3 g Intravenous Q6H     [Held by provider] dexamethasone  0.75 mg Oral Daily with breakfast     glycerin  1 suppository Rectal Daily     hydrocortisone sodium succinate  100 mg/m2/day Intravenous Q6H     [Held by provider] lactobacillus rhamnosus (GG)  2 capsule Oral Daily     lamoTRIgine  200 mg Oral At Bedtime     linaclotide  290 mcg Oral QAM AC     OLANZapine  10 mg Oral At Bedtime     omeprazole  40 mg Per Feeding Tube QAM AC     [Held by provider] potassium phosphate (monobasic)  500 mg Oral BID     sertraline  100 mg Oral Daily     sodium phosphate  1 enema Rectal Once     sulfamethoxazole-trimethoprim  1 tablet Oral 2 times per day on Sun Sat     [Held by provider] Vitamin D3  2,000 Units Oral Daily with breakfast     [Held by provider] vitamin E  400 Units Oral Daily     Infusions:     sodium chloride 10 mL/hr at 05/04/21 1529     PRN medications: acetaminophen, fexofenadine, traZODone    Review of Systems:     Palliative Symptom Review    The comprehensive review of systems is negative other than noted here and in the HPI. Completed by proxy by parent(s)/caretaker(s) (if applicable)    Physical Exam:       Vitals were reviewed  Temp:  [97.9  F (36.6  C)-99.6  F (37.6  C)] 97.9  F (36.6  C)  Pulse:  [] 75  Resp:  [16-31] 19  BP: ()/(47-76) 106/64  FiO2 (%):  [50 %-70 %] 60 %  SpO2:  [78 %-100 %] 99 %  Weight: 124  kg   Detailed exam deferred  Patient lying in bed, scuba mask in place    Data Reviewed:     Results for orders placed or performed during the hospital encounter of 05/01/21 (from the past 24 hour(s))   Blood gas venous   Result Value Ref Range    Ph Venous 7.28 (L) 7.32 - 7.43 pH    PCO2 Venous 74 (H) 40 - 50 mm Hg    PO2 Venous 47 25 - 47 mm Hg    Bicarbonate Venous 35 (H) 21 - 28 mmol/L    Base Excess Venous 6.9 mmol/L    FIO2 50%    Blood gas venous   Result Value Ref Range    Ph Venous 7.26 (L) 7.32 - 7.43 pH    PCO2 Venous 84 (HH) 40 - 50 mm Hg    PO2 Venous 43 25 - 47 mm Hg    Bicarbonate Venous 38 (H) 21 - 28 mmol/L    Base Excess Venous 8.8 mmol/L    FIO2 60    Blood gas venous   Result Value Ref Range    Ph Venous 7.33 7.32 - 7.43 pH    PCO2 Venous 76 (HH) 40 - 50 mm Hg    PO2 Venous 57 (H) 25 - 47 mm Hg    Bicarbonate Venous 40 (H) 21 - 28 mmol/L    Base Excess Venous 11.7 mmol/L    FIO2 60    Blood gas venous   Result Value Ref Range    Ph Venous 7.32 7.32 - 7.43 pH    PCO2 Venous 75 (H) 40 - 50 mm Hg    PO2 Venous 50 (H) 25 - 47 mm Hg    Bicarbonate Venous 39 (H) 21 - 28 mmol/L    Base Excess Venous 10.9 mmol/L    FIO2 60    CRP inflammation   Result Value Ref Range    CRP Inflammation 16.7 (H) 0.0 - 8.0 mg/L   CBC with platelets differential   Result Value Ref Range    WBC 4.1 4.0 - 11.0 10e9/L    RBC Count 3.52 (L) 4.4 - 5.9 10e12/L    Hemoglobin 8.1 (L) 13.3 - 17.7 g/dL    Hematocrit 29.3 (L) 40.0 - 53.0 %    MCV 83 78 - 100 fl    MCH 23.0 (L) 26.5 - 33.0 pg    MCHC 27.6 (L) 31.5 - 36.5 g/dL    RDW 19.4 (H) 10.0 - 15.0 %    Platelet Count 96 (L) 150 - 450 10e9/L    Diff Method Automated Method     % Neutrophils 73.9 %    % Lymphocytes 13.9 %    % Monocytes 9.0 %    % Eosinophils 0.7 %    % Basophils 0.5 %    % Immature Granulocytes 2.0 %    Nucleated RBCs 1 (H) 0 /100    Absolute Neutrophil 3.0 1.6 - 8.3 10e9/L    Absolute Lymphocytes 0.6 (L) 0.8 - 5.3 10e9/L    Absolute Monocytes 0.4 0.0 - 1.3  10e9/L    Absolute Eosinophils 0.0 0.0 - 0.7 10e9/L    Absolute Basophils 0.0 0.0 - 0.2 10e9/L    Abs Immature Granulocytes 0.1 0 - 0.4 10e9/L    Absolute Nucleated RBC 0.0    Blood gas venous   Result Value Ref Range    Ph Venous 7.30 (L) 7.32 - 7.43 pH    PCO2 Venous 79 (HH) 40 - 50 mm Hg    PO2 Venous 55 (H) 25 - 47 mm Hg    Bicarbonate Venous 39 (H) 21 - 28 mmol/L    Base Excess Venous 10.8 mmol/L    FIO2 55    Basic metabolic panel   Result Value Ref Range    Sodium 142 133 - 144 mmol/L    Potassium 3.8 3.4 - 5.3 mmol/L    Chloride 104 94 - 109 mmol/L    Carbon Dioxide 34 (H) 20 - 32 mmol/L    Anion Gap 4 3 - 14 mmol/L    Glucose 100 (H) 70 - 99 mg/dL    Urea Nitrogen 19 7 - 30 mg/dL    Creatinine 0.96 0.66 - 1.25 mg/dL    GFR Estimate >90 >60 mL/min/[1.73_m2]    GFR Estimate If Black >90 >60 mL/min/[1.73_m2]    Calcium 8.5 8.5 - 10.1 mg/dL   Blood gas venous   Result Value Ref Range    Ph Venous 7.29 (L) 7.32 - 7.43 pH    PCO2 Venous 85 (HH) 40 - 50 mm Hg    PO2 Venous 33 25 - 47 mm Hg    Bicarbonate Venous 41 (H) 21 - 28 mmol/L    Base Excess Venous 11.8 mmol/L    FIO2 50      *Note: Due to a large number of results and/or encounters for the requested time period, some results have not been displayed. A complete set of results can be found in Results Review.

## 2021-05-05 NOTE — PROGRESS NOTES
05/04/21 1600   Quick Adds   Type of Visit Initial PT Evaluation   Living Environment   People in home facility resident   Current Living Arrangements group home   Home Accessibility wheelchair accessible   Transportation Anticipated agency;family or friend will provide   Self-Care   Usual Activity Tolerance fair   Current Activity Tolerance poor   Regular Exercise No   Equipment Currently Used at Home wheelchair, manual   Disability/Function   Hearing Difficulty or Deaf yes   Patient's preferred means of communication verbal   Describe hearing loss bilateral hearing loss   Use of hearing assistive devices none   Were auxiliary aids offered? no   Difficulty Communicating yes   Communication   (Difficulty with speaking clearly)   General Information   Onset of Illness/Injury or Date of Surgery 05/03/21   Patient/Family Therapy Goals Statement (PT) Increase IND and safety with transfers   Pertinent History of Current Problem (include personal factors and/or comorbidities that impact the POC) Geo Hicks is a 20 year old male admitted on 5/1/2021. 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   Existing Precautions/Restrictions oxygen therapy device and L/min  (full mask Bipap)   General Observations Pt awake in bed, attempting to communicate verbally, but very difficult to understand.   Pain Assessment   Patient Currently in Pain No   Posture    Posture Forward head position;Protracted shoulders   Strength   Manual Muscle Testing Quick Adds Able to perform R SLR;Able to perform L SLR;Deficits observed during functional mobility   Strength Comments Difficulty with supine>sit transition.    Bed  Mobility   Bed Mobility supine-sit   Supine-Sit Lyon (Bed Mobility) set up;verbal cues;minimum assist (75% patient effort)   Impairments Contributing to Impaired Bed Mobility impaired balance;impaired coordination;impaired postural control;decreased strength   Assistive Device (Bed Mobility) bed rails   Transfers   Transfers bed-chair transfer   Transfer Safety Concerns Noted decreased sequencing ability;decreased weight-shifting ability   Impairments Contributing to Impaired Transfers impaired balance;impaired coordination;impaired motor control;impaired postural control   Transfer Safety Comments Decreased ability to step with R LE.   Bed-Chair Transfer   Bed-Chair Lyon (Transfers) moderate assist (50% patient effort);2 person assist   Clinical Impression   Criteria for Skilled Therapeutic Intervention yes, treatment indicated   PT Diagnosis (PT) Decreased safety and IND with functional mobility   Influenced by the following impairments decreased strength, decreased balance, impaired motor control and coordination, impulsive   Functional limitations due to impairments ataxia, impaired safety awareness   Clinical Presentation Evolving/Changing   Clinical Presentation Rationale evolving medical status   Clinical Decision Making (Complexity) moderate complexity   Therapy Frequency (PT) Daily   Predicted Duration of Therapy Intervention (days/wks) 1-2 weeks   Planned Therapy Interventions (PT) patient/family education;strengthening;balance training;home exercise program;neuromuscular re-education;motor coordination training;postural re-education;transfer training   Risk & Benefits of therapy have been explained evaluation/treatment results reviewed;care plan/treatment goals reviewed;risks/benefits reviewed;participants voiced agreement with care plan;participants included;patient;mother;father;guardian   PT Discharge Planning    PT Discharge Recommendation (DC Rec)   (Return to group home)   Total  Evaluation Time   Total Evaluation Time (Minutes) 5   Skin WDL   Skin WDL X   Skin Color pale   Skin Temperature warm   Skin Elasticity quick return to original state   Skin Integrity/Characteristics abrasion;bruised;scab

## 2021-05-05 NOTE — PLAN OF CARE
[id number]. Afebrile. Denies pain or discomfort. Neuros intact. Patient intermittently agitated, but able to calm and is cooperative. Lung sounds diminished through out, with some coarseness. Patient has productive cough. Desat x 2 to 80s post neb and repositioning. FiO2 raised to 70% but weaned to 60%, other vent settings unchanged. Patient warm and well perfused with good pulses. Remains NPO except meds. Small BM x 1, smear x 1. Low urine output. PIV running TKO. Bed bath done, linens changed. Up to bedside commode x1. Mother and father at bedside. Will continue with plan of care and notify team of changes.

## 2021-05-06 NOTE — PROGRESS NOTES
Music Therapy Missed Visit Note    Attempted visit with Geo Hicks. Patient declined this AM and this writer unable to check back in PM. Music therapist to attempt visit again tomorrow.     Manuel Galvan MA, MT-BC  [email or web address]  Tuesdays and Fridays   Pager: [phone]

## 2021-05-06 NOTE — PROGRESS NOTES
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 He was previously prescribed home BiPAP but has not worn it for many years due to physical discomfort. MRI[facility]V at [facility] showed stable appearance of ependymoma and post-surgical changes with no acute findings.     He was initially admitted to the floor on supplemental oxygen, however he progressed to acute on chronic respiratory status and he was transferred to the PICU on 5/3 early AM. His repeat CXR demonstrated concern for pneumonia which was thought to be most likely due to known aspiration (pt refuses to thicken his liquids per prior recommendation). He is currently requiring BiPAP for respiratory support. After a long discussion with both parents on 5/3/21 a decision was made to change his code status to DNR/DNI. Pulmonology, PACCT, and PICU teams are co-managing his care at this time.     RECOMMENDATIONS:  -Weekly Entinostat, family brought in medication to bedside, due on Thursday 5/6/21. After review of the protocol and discussion with primary and research teams a final decision was made to hold dose on 5/6 per investigator discretion and current illness. If not significantly improved in timely manner he may ultimately be removed from the study.  -Per discussion prior to and at care conference family clearly expressed that if he were to worsen they would not want him to be intubated or attempt resuscitation.   -Appreciate Pulmonology, PACCT, and PICU co-management of complex medical cares.         Plan of care discussed with attending physician Dr. Tono Rousseau.  Thank you for the opportunity to see this patient; " please call us with any questions or concerns.    Nati Webb MD MPH  Pediatric Hematology Oncology Fellow  Pager: [phone]    Physician Attestation   I, Leoncio Rousseau MD, saw this patient with the resident and agree with the resident/fellow's findings and plan of care as documented in the note.      I personally reviewed vital signs, medications and labs.    Key findings: I agree with the assessment as noted.    Leoncio Rousseau MD  Date of Service (when I saw the patient): 05/06/21        Interval History:  Geo remained stable on BiPAP support overnight. He remains on a full facemask as he did not find the smaller mask to be comfortable.  He did have some issues with agitation overnight that improved with PRN doses of Ativan. He was started on IV nutrition.     Medications:  Current Facility-Administered Medications   Medication     acetaminophen (TYLENOL) tablet 650 mg     ampicillin-sulbactam (UNASYN) 3 g vial to attach to  mL bag     [Held by provider] dexamethasone (DECADRON) tablet 0.75 mg     dexmedetomidine (PRECEDEX) 200 mcg in sodium chloride 0.9 % 50 mL infusion     fexofenadine (ALLEGRA) tablet 180 mg     glycerin (ADULT) Suppository 1 suppository     hydrocortisone sodium succinate (Solu-CORTEF) PEDS/NICU IV 75 mg     [Held by provider] lactobacillus rhamnosus (GG) (CULTURELL) capsule 2 capsule     lamoTRIgine (LaMICtal) tablet 200 mg     linaclotide (LINZESS) capsule 290 mcg     lipids (INTRALIPID) 20 % infusion 240 mL     LORazepam (ATIVAN) injection 1 mg     morphine (PF) injection 1 mg     naloxone (NARCAN) injection 0.4 mg     OLANZapine (zyPREXA) tablet 10 mg     omeprazole (FIRST-OMEPRAZOLE) suspension 40 mg     parenteral nutrition - Clinimix E     polyethylene glycol-propylene glycol PF (SYSTANE ULTRA PF) opthalmic solution 1 drop     [Held by provider] potassium phosphate (monobasic) (K-PHOS) tablet 500 mg     sertraline (ZOLOFT) tablet 100 mg      sodium chloride 0.9% infusion     sodium phosphate (FLEET ENEMA) 1 enema     sulfamethoxazole-trimethoprim (BACTRIM) 400-80 MG per tablet 1 tablet     traZODone (DESYREL) tablet 150 mg     [Held by provider] Vitamin D3 (CHOLECALCIFEROL) tablet 2,000 Units     [Held by provider] vitamin E (TOCOPHEROL) 200 units (90 mg) capsule 400 Units     PHYSICAL EXAM:  Temp: 97.2  F (36.2  C) Temp src: Axillary BP: 106/54 Pulse: 77   Resp: 18 SpO2: 98 % O2 Device: BiPAP/CPAP      GENERAL: laying in bed, resting  HEENT:  moist mucus membranes  CHEST: full face BiPAP mask in place  CV: RRR, good distal perfusion  ABD: soft, obese, no apparent significant distention  EXT: no obvious deformities   NEURO: resting    Results for orders placed or performed during the hospital encounter of 05/01/21 (from the past 24 hour(s))   Blood gas venous   Result Value Ref Range    Ph Venous 7.29 (L) 7.32 - 7.43 pH    PCO2 Venous 85 (HH) 40 - 50 mm Hg    PO2 Venous 33 25 - 47 mm Hg    Bicarbonate Venous 41 (H) 21 - 28 mmol/L    Base Excess Venous 11.8 mmol/L    FIO2 50    Blood gas venous   Result Value Ref Range    Ph Venous 7.35 7.32 - 7.43 pH    PCO2 Venous 73 (H) 40 - 50 mm Hg    PO2 Venous 19 (L) 25 - 47 mm Hg    Bicarbonate Venous 41 (H) 21 - 28 mmol/L    Base Excess Venous 13.2 mmol/L    FIO2 50    Blood gas venous   Result Value Ref Range    Ph Venous 7.35 7.32 - 7.43 pH    PCO2 Venous 72 (H) 40 - 50 mm Hg    PO2 Venous 47 25 - 47 mm Hg    Bicarbonate Venous 40 (H) 21 - 28 mmol/L    Base Excess Venous 12.7 mmol/L    FIO2 50    Blood gas venous   Result Value Ref Range    Ph Venous 7.33 7.32 - 7.43 pH    PCO2 Venous 76 (HH) 40 - 50 mm Hg    PO2 Venous 47 25 - 47 mm Hg    Bicarbonate Venous 40 (H) 21 - 28 mmol/L    Base Excess Venous 12.4 mmol/L    FIO2 45    HIV Exposure Draw and Hold   Result Value Ref Range    HIV Exposure Draw and Hold       Specimen received and will be held in the Infectious Diseases Diagnostic Lab for 30 days.   HIV  Rapid Antibody Screen   Result Value Ref Range    HIV Rapid Antibody Screen Nonreactive NR^Nonreactive   Comprehensive metabolic panel   Result Value Ref Range    Sodium 141 133 - 144 mmol/L    Potassium 3.5 3.4 - 5.3 mmol/L    Chloride 103 94 - 109 mmol/L    Carbon Dioxide 36 (H) 20 - 32 mmol/L    Anion Gap 2 (L) 3 - 14 mmol/L    Glucose 84 70 - 99 mg/dL    Urea Nitrogen 18 7 - 30 mg/dL    Creatinine 0.89 0.66 - 1.25 mg/dL    GFR Estimate >90 >60 mL/min/[1.73_m2]    GFR Estimate If Black >90 >60 mL/min/[1.73_m2]    Calcium 8.1 (L) 8.5 - 10.1 mg/dL    Bilirubin Total 0.2 0.2 - 1.3 mg/dL    Albumin 2.5 (L) 3.4 - 5.0 g/dL    Protein Total 6.0 (L) 6.8 - 8.8 g/dL    Alkaline Phosphatase 94 40 - 150 U/L    ALT 20 0 - 70 U/L    AST 13 0 - 45 U/L   Magnesium   Result Value Ref Range    Magnesium 2.4 (H) 1.6 - 2.3 mg/dL   Phosphorus   Result Value Ref Range    Phosphorus 3.8 2.5 - 4.5 mg/dL   INR   Result Value Ref Range    INR 1.05 0.86 - 1.14   Prealbumin   Result Value Ref Range    Prealbumin 25 15 - 45 mg/dL   Bilirubin direct   Result Value Ref Range    Bilirubin Direct <0.1 0.0 - 0.2 mg/dL   CBC with platelets differential   Result Value Ref Range    WBC 3.8 (L) 4.0 - 11.0 10e9/L    RBC Count 3.75 (L) 4.4 - 5.9 10e12/L    Hemoglobin 8.6 (L) 13.3 - 17.7 g/dL    Hematocrit 30.5 (L) 40.0 - 53.0 %    MCV 81 78 - 100 fl    MCH 22.9 (L) 26.5 - 33.0 pg    MCHC 28.2 (L) 31.5 - 36.5 g/dL    RDW 19.2 (H) 10.0 - 15.0 %    Platelet Count 118 (L) 150 - 450 10e9/L    Diff Method Automated Method     % Neutrophils 74.8 %    % Lymphocytes 11.1 %    % Monocytes 11.4 %    % Eosinophils 0.8 %    % Basophils 0.3 %    % Immature Granulocytes 1.6 %    Nucleated RBCs 1 (H) 0 /100    Absolute Neutrophil 2.8 1.6 - 8.3 10e9/L    Absolute Lymphocytes 0.4 (L) 0.8 - 5.3 10e9/L    Absolute Monocytes 0.4 0.0 - 1.3 10e9/L    Absolute Eosinophils 0.0 0.0 - 0.7 10e9/L    Absolute Basophils 0.0 0.0 - 0.2 10e9/L    Abs Immature Granulocytes 0.1 0 - 0.4  10e9/L    Absolute Nucleated RBC 0.0    Blood gas venous   Result Value Ref Range    Ph Venous 7.32 7.32 - 7.43 pH    PCO2 Venous 77 (HH) 40 - 50 mm Hg    PO2 Venous 66 (H) 25 - 47 mm Hg    Bicarbonate Venous 40 (H) 21 - 28 mmol/L    Base Excess Venous 12.0 mmol/L    FIO2 60      *Note: Due to a large number of results and/or encounters for the requested time period, some results have not been displayed. A complete set of results can be found in Results Review.

## 2021-05-06 NOTE — PLAN OF CARE
SLP: Pt remains on BiPAP and not appropriate for swallow evaluation. Will reschedule to next week if appropriate. Please reach out to SLP team should pt's respiratory status improve prior.    ASCOM: [id number]

## 2021-05-06 NOTE — PROVIDER NOTIFICATION
Provider notified of critical lab result, no new orders.  Results for ROGE RAFAELA L (MRN [id number]) as of 5/5/2021 21:16   Ref. Range 5/5/2021 21:07   PCO2 Venous Latest Ref Range: 40 - 50 mm Hg 76 (HH)

## 2021-05-06 NOTE — PROVIDER NOTIFICATION
Provider notified of critical PCO2, no new orders.   Results for ROGE RAFAELA LURDES (MRN [id number]) as of 5/6/2021 06:11   Ref. Range 5/6/2021 05:20   PCO2 Venous Latest Ref Range: 40 - 50 mm Hg 77 (HH)

## 2021-05-06 NOTE — CONSULTS
OPHTHALMOLOGY CONSULT NOTE  05/06/21    Patient: Geo Hicks      ASSESSMENT/PLAN:     Geo Hicks is a 20 year old male with posterior fossa ependymoma (dx 2015, s/p partial excision, radiation therapy, platinum based chemo) on Entinostat admitted for acute on chronic hypoxic hypercapnic respiratory failure.  Ophthalmology consulted for worsening subconjunctival hemorrhage each eye and subacute blurry vision left eye.    #Subconjunctival Hemorrhage each eye  - In setting of positive airway pressure use  - Observe, will resolve on its own    #Blurry vision left eye  #Dry eye each eye   - 1 month history of blurred vision left eye  - Near vision 20/200 each eye, however patient without glasses today (family left at home). 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  Ophthalmology consulted for worsening subconjunctival hemorrhage each eye and subacute blurry vision left eye.    History limited due to patient on BiPAP, speech motor difficulty. Per patient and mother, patient has been have  cloudier vision in his left eye, possibly for the past month. However, family believes this is the first time they have hear of him reporting that. Reporting some occasional irritation left eye> right eye with mucous on lashes.   Patient has constant diplopia due to multiple nerve palsies after tumor resection, seen by Jero in 2018.      10+ review of systems were otherwise negative except for that which has been stated above.      OCULAR/MEDICAL/SURGICAL HISTORIES:     Past Ocular History:    Final rx 2015   Sphere Cylinder Axis   Right -7.00 +0.50 105   Left -6.50 +0.50 170         Past Medical History:   Diagnosis Date     Cranial nerve dysfunction      Dyspepsia      Ependymoma (H)      Gastro-oesophageal reflux disease      Hearing loss      Intracranial hemorrhage (H)      Migraine      Pilonidal cyst     7-2015     Reduced vision      Refractory obstruction of nasal airway     2nd to nasal valve prolapse     Sleep apnea      Strabismus     gaze palsy        Past Surgical History:   Procedure Laterality Date     COLONOSCOPY N/A 9/27/2019    Procedure: Colonoscopy With Biopsies and Polypectomy;  Surgeon: Aniya Wei MD;  [facility]     ESOPHAGOSCOPY, GASTROSCOPY, DUODENOSCOPY (EGD), COMBINED N/A 9/27/2019    Procedure: Upper Endoscopy (EGD) With Biopsies;  Surgeon: Aniya Wei MD;  [facility]     GRAFT CARTILAGE FROM POSTERIOR AURICLE Left 10/6/2016    Procedure: GRAFT CARTILAGE FROM POSTERIOR AURICLE;  Surgeon: Tyler Richards MD;  [facility]     INCISION AND DRAINAGE PERINEAL, COMBINED Bilateral 7/18/2015    Procedure: COMBINED INCISION AND DRAINAGE PERINEAL;  Surgeon: Dequan Timmons MD;  [facility]     OPTICAL TRACKING SYSTEM CRANIOTOMY, EXCISE TUMOR, COMBINED N/A 4/13/2015    Procedure: COMBINED OPTICAL TRACKING SYSTEM CRANIOTOMY, EXCISE TUMOR;  Surgeon: Francis Velazquez MD;  [facility]     OPTICAL TRACKING SYSTEM  CRANIOTOMY, EXCISE TUMOR, COMBINED N/A 4/16/2015    Procedure: COMBINED OPTICAL TRACKING SYSTEM CRANIOTOMY, EXCISE TUMOR;  Surgeon: Francis Velazquez MD;  [facility]     OPTICAL TRACKING SYSTEM CRANIOTOMY, EXCISE TUMOR, COMBINED Bilateral 5/28/2015    Procedure: COMBINED OPTICAL TRACKING SYSTEM CRANIOTOMY, EXCISE TUMOR;  Surgeon: Francis Velazquez MD;  [facility]     OPTICAL TRACKING SYSTEM CRANIOTOMY, EXCISE TUMOR, COMBINED Bilateral 1/14/2016    Procedure: COMBINED OPTICAL TRACKING SYSTEM CRANIOTOMY, EXCISE TUMOR;  Surgeon: Francis Velazquez MD;  [facility]     OPTICAL TRACKING SYSTEM VENTRICULOSTOMY  4/16/2015    Procedure: OPTICAL TRACKING SYSTEM VENTRICULOSTOMY;  Surgeon: Francis Velazquez MD;  [facility]     REMOVE PORT VASCULAR ACCESS N/A 10/6/2016    Procedure: REMOVE PORT VASCULAR ACCESS;  Surgeon: Bruno Perea MD;  [facility]     RHINOPLASTY N/A 10/6/2016    Procedure: RHINOPLASTY;  Surgeon: Tyler Richards MD;  [facility]     VASCULAR SURGERY  5-2015    single lumen power port       EXAMINATION:     Base Eye Exam     Neuro/Psych     Oriented x3: Yes    Mood/Affect: Normal            Strabismus Exam     Fixing Eye: Right    Correction: cc    Distance Near Near +3DS N Bifocals                    - - -1 - -   - - -1 - -                       -4  -4  4 -4  -4                       - - -1  +1  4 +2 -1 - -                 R Tilt L Tilt                  AHP: Right head tilt       Slit Lamp and Fundus Exam     External Exam       Right Left    External Normal, on BIPAP  Normal,on BIPAP           Slit Lamp Exam       Right Left    Lids/Lashes Normal, poor and incomplete blink Normal,  poor and incomplete blink    Conjunctiva/Sclera SATYA inferiorl, adriana negative White and quiet    Cornea Debris, PEE  Clear    Anterior Chamber Deep and quiet, no hyphema Deep and quiet, no hyphema    Iris Round and reactive Round and reactive    Lens Clear Clear     Vitreous Normal Normal          Fundus Exam       Right Left    Disc Normal Normal    C/D Ratio 0.3 0.3    Macula Normal Normal    Vessels Normal Normal    Periphery Normal Normal                  Maria C Gallego MD  Ophthalmology PGY3  [facility]    It is our pleasure to participate in this patient's care and treatment. Please contact us with any further questions or concerns.

## 2021-05-06 NOTE — PLAN OF CARE
Pt slept intermittently between cares, awakening often agitated by lines and devices.   CNS: Afebrile, PRN Morphine and Ativan for agitation/pulling of lines and devices. AX4. Neuros intact. Newly noted redness/brusing of left eyelid.   RR: Maintained on scuba AVAPS, FiO2 45-60%, diminished coarse breath sounds, minimal secretions.  CV: Sinus rhythm, normotensive.   GI: NG clamped, no stool output, TPN and lipids started, glucoses stable.  : Pt peed in urinal by command, next bladder scan to be at 0800.  New PIV placed in left arm. No contact with family overnight

## 2021-05-06 NOTE — PROGRESS NOTES
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  He was started on AVAPS Bilevel PAP with airway clearance as well as antibiotic treatment with Unasyn with no significant improvement on hypercapnic/hypoxemic respiratory failure with worsening atelectasis on LLL    Airway clearance:  Vest QID  Nebulized albuterol QID  Pulmozyme BID  Cough assist +30/-30 QID    If possible engage him on coughing to improve his secretions management  Continue AVAPS on same settings  Pulmonology will continue to follow    Brenda Marr MD    Pediatric Department  Division of Pediatric Pulmonology and Sleep Medicine  Pager # [phone]  Email: [email or web address]             Interval History:   Agitated, with difficulty to tolerate bipap mask, was switched to scuba today, FiO2 needs are between 45-60%, nutrition by TPN            Review of Systems:   The 10 point Review of Systems is negative other than noted in the HPI            Medications:     Current Facility-Administered Medications   Medication     acetaminophen (TYLENOL) tablet 650 mg     ampicillin-sulbactam (UNASYN) 3 g vial to attach to  mL bag     [Held by provider] dexamethasone (DECADRON) tablet 0.75 mg     dexmedetomidine (PRECEDEX) 200 mcg in sodium chloride 0.9 % 50 mL infusion     fexofenadine (ALLEGRA) tablet 180 mg     glycerin (ADULT) Suppository 1 suppository     hydrocortisone sodium succinate (Solu-CORTEF) PEDS/NICU IV 75 mg     [Held by provider] lactobacillus rhamnosus (GG) (CULTURELL) capsule 2 capsule     lamoTRIgine (LaMICtal) tablet 200 mg     linaclotide (LINZESS) capsule 290 mcg     lipids (INTRALIPID) 20  % infusion 240 mL     LORazepam (ATIVAN) injection 1 mg     morphine (PF) injection 1 mg     naloxone (NARCAN) injection 0.4 mg     OLANZapine (zyPREXA) tablet 10 mg     omeprazole (FIRST-OMEPRAZOLE) suspension 40 mg     parenteral nutrition - Clinimix E     polyethylene glycol-propylene glycol PF (SYSTANE ULTRA PF) opthalmic solution 1 drop     [Held by provider] potassium phosphate (monobasic) (K-PHOS) tablet 500 mg     sertraline (ZOLOFT) tablet 100 mg     sodium chloride 0.9% infusion     sodium phosphate (FLEET ENEMA) 1 enema     sulfamethoxazole-trimethoprim (BACTRIM) 400-80 MG per tablet 1 tablet     traZODone (DESYREL) tablet 150 mg     [Held by provider] Vitamin D3 (CHOLECALCIFEROL) tablet 2,000 Units     [Held by provider] vitamin E (TOCOPHEROL) 200 units (90 mg) capsule 400 Units             Physical Exam:     Vitals were reviewed  Patient Vitals for the past 24 hrs:   BP Temp Temp src Pulse Resp SpO2   05/06/21 1200 117/66 97.6  F (36.4  C) Axillary 71 15 99 %   05/06/21 1100 107/69 -- -- 80 26 94 %   05/06/21 1000 110/55 -- -- 80 11 97 %   05/06/21 0900 112/61 -- -- 78 -- 96 %   05/06/21 0800 96/57 97.1  F (36.2  C) Axillary 80 (!) 44 94 %   05/06/21 0700 106/54 -- -- 77 18 98 %   05/06/21 0600 105/52 -- -- 78 17 92 %   05/06/21 0500 120/57 -- -- 92 16 94 %   05/06/21 0400 113/49 97.2  F (36.2  C) -- 99 15 97 %   05/06/21 0300 105/48 -- -- 80 19 92 %   05/06/21 0200 106/48 -- -- 79 16 97 %   05/06/21 0100 116/46 -- -- 90 13 97 %   05/06/21 0000 117/56 97.5  F (36.4  C) Axillary 78 15 93 %   05/05/21 2300 102/65 -- -- 77 15 96 %   05/05/21 2200 123/60 -- -- 84 -- 97 %   05/05/21 2100 124/66 -- -- 86 20 98 %   05/05/21 2000 118/60 98.1  F (36.7  C) Axillary 81 15 96 %   05/05/21 1900 132/63 -- -- 88 15 97 %   05/05/21 1800 129/65 -- -- 81 19 96 %   05/05/21 1700 123/62 -- -- 74 18 90 %   05/05/21 1600 93/61 -- -- 77 17 96 %   05/05/21 1500 109/64 97.5  F (36.4  C) Axillary 83 17 97 %   05/05/21 1400  114/68 -- -- 72 23 100 %     Constitutional:   Alert, interactive     Eyes:   eyrthematous     ENT:   normocepalic, without obvious abnormality     Neck:   supple, symmetrical, trachea midline     Hematologic / Lymphatic:   no cervical lymphadenopathy     Back:   symmetric     Lungs:   Decreased breath sounds on bases     Cardiovascular:   Normal apical impulse, regular rate and rhythm, normal S1 and S2, no S3 or S4, and no murmur noted     Abdomen:   No scars, normal bowel sounds, soft, non-distended, non-tender, no masses palpated, no hepatosplenomegally     Musculoskeletal:   Laying in bed, able to sit up and move all extremities     Neurologic:   Mental Status Exam:  Level of Alertness:   awake     Skin:   no rashes            Data:     Results for orders placed or performed during the hospital encounter of 05/01/21 (from the past 24 hour(s))   Blood gas venous   Result Value Ref Range    Ph Venous 7.35 7.32 - 7.43 pH    PCO2 Venous 73 (H) 40 - 50 mm Hg    PO2 Venous 19 (L) 25 - 47 mm Hg    Bicarbonate Venous 41 (H) 21 - 28 mmol/L    Base Excess Venous 13.2 mmol/L    FIO2 50    Blood gas venous   Result Value Ref Range    Ph Venous 7.35 7.32 - 7.43 pH    PCO2 Venous 72 (H) 40 - 50 mm Hg    PO2 Venous 47 25 - 47 mm Hg    Bicarbonate Venous 40 (H) 21 - 28 mmol/L    Base Excess Venous 12.7 mmol/L    FIO2 50    Blood gas venous   Result Value Ref Range    Ph Venous 7.33 7.32 - 7.43 pH    PCO2 Venous 76 (HH) 40 - 50 mm Hg    PO2 Venous 47 25 - 47 mm Hg    Bicarbonate Venous 40 (H) 21 - 28 mmol/L    Base Excess Venous 12.4 mmol/L    FIO2 45    HIV Exposure Draw and Hold   Result Value Ref Range    HIV Exposure Draw and Hold       Specimen received and will be held in the Infectious Diseases Diagnostic Lab for 30 days.   HIV Rapid Antibody Screen   Result Value Ref Range    HIV Rapid Antibody Screen Nonreactive NR^Nonreactive   Comprehensive metabolic panel   Result Value Ref Range    Sodium 141 133 - 144 mmol/L     Potassium 3.5 3.4 - 5.3 mmol/L    Chloride 103 94 - 109 mmol/L    Carbon Dioxide 36 (H) 20 - 32 mmol/L    Anion Gap 2 (L) 3 - 14 mmol/L    Glucose 84 70 - 99 mg/dL    Urea Nitrogen 18 7 - 30 mg/dL    Creatinine 0.89 0.66 - 1.25 mg/dL    GFR Estimate >90 >60 mL/min/[1.73_m2]    GFR Estimate If Black >90 >60 mL/min/[1.73_m2]    Calcium 8.1 (L) 8.5 - 10.1 mg/dL    Bilirubin Total 0.2 0.2 - 1.3 mg/dL    Albumin 2.5 (L) 3.4 - 5.0 g/dL    Protein Total 6.0 (L) 6.8 - 8.8 g/dL    Alkaline Phosphatase 94 40 - 150 U/L    ALT 20 0 - 70 U/L    AST 13 0 - 45 U/L   Magnesium   Result Value Ref Range    Magnesium 2.4 (H) 1.6 - 2.3 mg/dL   Phosphorus   Result Value Ref Range    Phosphorus 3.8 2.5 - 4.5 mg/dL   INR   Result Value Ref Range    INR 1.05 0.86 - 1.14   Prealbumin   Result Value Ref Range    Prealbumin 25 15 - 45 mg/dL   Bilirubin direct   Result Value Ref Range    Bilirubin Direct <0.1 0.0 - 0.2 mg/dL   CBC with platelets differential   Result Value Ref Range    WBC 3.8 (L) 4.0 - 11.0 10e9/L    RBC Count 3.75 (L) 4.4 - 5.9 10e12/L    Hemoglobin 8.6 (L) 13.3 - 17.7 g/dL    Hematocrit 30.5 (L) 40.0 - 53.0 %    MCV 81 78 - 100 fl    MCH 22.9 (L) 26.5 - 33.0 pg    MCHC 28.2 (L) 31.5 - 36.5 g/dL    RDW 19.2 (H) 10.0 - 15.0 %    Platelet Count 118 (L) 150 - 450 10e9/L    Diff Method Automated Method     % Neutrophils 74.8 %    % Lymphocytes 11.1 %    % Monocytes 11.4 %    % Eosinophils 0.8 %    % Basophils 0.3 %    % Immature Granulocytes 1.6 %    Nucleated RBCs 1 (H) 0 /100    Absolute Neutrophil 2.8 1.6 - 8.3 10e9/L    Absolute Lymphocytes 0.4 (L) 0.8 - 5.3 10e9/L    Absolute Monocytes 0.4 0.0 - 1.3 10e9/L    Absolute Eosinophils 0.0 0.0 - 0.7 10e9/L    Absolute Basophils 0.0 0.0 - 0.2 10e9/L    Abs Immature Granulocytes 0.1 0 - 0.4 10e9/L    Absolute Nucleated RBC 0.0    Blood gas venous   Result Value Ref Range    Ph Venous 7.32 7.32 - 7.43 pH    PCO2 Venous 77 (HH) 40 - 50 mm Hg    PO2 Venous 66 (H) 25 - 47 mm Hg     Bicarbonate Venous 40 (H) 21 - 28 mmol/L    Base Excess Venous 12.0 mmol/L    FIO2 60      *Note: Due to a large number of results and/or encounters for the requested time period, some results have not been displayed. A complete set of results can be found in Results Review.

## 2021-05-06 NOTE — PROGRESS NOTES
[facility]  Pain and Advanced/Complex Care Team (PACCT)  Progress Note     Geo Hicks MRN# [id number]   Age: 20 year old YOB: 2000   Date:  05/06/2021 Admitted:  5/1/2021     Recommendations, Patient/Family Counseling & Coordination:     SYMPTOM MANAGEMENT:  Morphine 1 mg IV Q 8 hours for dyspnea   - Lorazepam dose for anxiety/aggression    GOALS OF CARE AND DECISIONAL SUPPORT/SUMMARY OF DISCUSSION WITH PATIENT AND/OR FAMILY:   Geo was asleep during visit.  Met with Lele in anteroom after rounds.  Parents were told and understand that Geo is not worse today but also, he is not better.  If it was only pneumonia being treated, we would hope that he would have improved by this time.  His CO2 continues to be elevated.  Dr. Cortez plans to meet with primary oncology team to discuss possible trajectories for Geo, as he may not be able to return to his prior to admission assisted living home.  They will then meet with parents.  Parents and I discussed whether they had thought about placement for Geo - including TCUs (nursing home in their vernacular) or residential hospice.  They were open to discussing these options and I just wanted them to start thinking about things.  They were appreciative of the ability to start to process such information.  We also talked about their other sons, and how much they knew about what was going on.  It seems they have shared basic age-appropriate information.  I also acknowledged how well they have handled this very difficult situation - both for Geo and with each other.  All families are not like this.      Thank you for the opportunity to participate in the care of this patient and family.   Please contact the Pain and Advanced/Complex Care Team (PACCT) with any emergent needs via text page to the PACCT general pager ([phone], answered 8-4:30 Monday to Friday). After hours and on  weekends/holidays, please refer to [facility] or [place] on-call.    Attestation:  I spent a total of 45 minutes on the inpatient unit today caring for Geo Hicks. Over 50% of my time on the unit was spent coordinating care and counseling regarding goals of care. See note for details.   Discussed with primary team.    Nasir Carrillo, ALAN CNP [facility]PPN  [phone]      Assessment:      Diagnoses and symptoms: Geo Hicks is a(n) 20 year old male with:  Patient Active Problem List   Diagnosis     GERD (gastroesophageal reflux disease)     Intracranial hemorrhage (H)     Hemorrhagic stroke (H)     Ependymoma (H)     S/P craniotomy     S/P biopsy     Noncomitant strabismus     Abducens neuropathy of both eyes     CANDELARIO (obstructive sleep apnea)     Current chronic use of systemic steroids     Status post chemotherapy     Neoplasm of posterior cranial fossa (H)     Chronic constipation     Depression     Constipation     Slow transit constipation     Hypoxia     Respiratory failure (H)      Possible aspiration pneumonia  Respiratory failure requiring bipap  Palliative care needs associated with the above    Psychosocial and spiritual concerns:  parents continue to work well together;      Advance care planning:   Code status: After care conference yesterday patient is now DNR/DNI     Interval Events:     No improvement in respiratory status      Medications:     I have reviewed this patient's medication profile and medications during this hospitalization.    Scheduled medications:     ampicillin-sulbactam (UNASYN) IV  3 g Intravenous Q6H     [Held by provider] dexamethasone  0.75 mg Oral Daily with breakfast     enoxaparin ANTICOAGULANT  40 mg Subcutaneous BID     furosemide  20 mg Intravenous BID     glycerin  1 suppository Rectal Daily     hydrocortisone sodium succinate  60 mg Intravenous Q8H     [Held by provider] lactobacillus rhamnosus (GG)  2 capsule Oral Daily     lamoTRIgine  200 mg Oral At Bedtime      linaclotide  290 mcg Oral QAM AC     lipids  240 mL Intravenous Q24H     OLANZapine  10 mg Oral At Bedtime     omeprazole  40 mg Per Feeding Tube QAM AC     [Held by provider] potassium phosphate (monobasic)  500 mg Oral BID     sertraline  100 mg Oral Daily     sodium phosphate  1 enema Rectal Once     sulfamethoxazole-trimethoprim  1 tablet Oral 2 times per day on Sun Sat     [Held by provider] Vitamin D3  2,000 Units Oral Daily with breakfast     [Held by provider] vitamin E  400 Units Oral Daily     Infusions:     parenteral nutrition - ADULT compounded formula       parenteral nutrition - Clinimix E 80 mL/hr at 05/05/21 2011     sodium chloride 5 mL/hr at 05/06/21 0748     PRN medications: acetaminophen, fexofenadine, LORazepam, morphine, naloxone, polyethylene glycol-propylene glycol PF, traZODone    Review of Systems:     Palliative Symptom Review    The comprehensive review of systems is negative other than noted here and in the HPI. Completed by proxy by parent(s)/caretaker(s) (if applicable)    Physical Exam:       Vitals were reviewed  Temp:  [97.1  F (36.2  C)-98.1  F (36.7  C)] 97.1  F (36.2  C)  Pulse:  [65-99] 80  Resp:  [11-44] 11  BP: ()/(46-68) 110/55  FiO2 (%):  [45 %-60 %] 55 %  SpO2:  [90 %-100 %] 97 %  Weight: 121 kg   Detailed exam deferred  Patient lying in bed, scuba mask in place    Data Reviewed:     Results for orders placed or performed during the hospital encounter of 05/01/21 (from the past 24 hour(s))   Blood gas venous   Result Value Ref Range    Ph Venous 7.35 7.32 - 7.43 pH    PCO2 Venous 73 (H) 40 - 50 mm Hg    PO2 Venous 19 (L) 25 - 47 mm Hg    Bicarbonate Venous 41 (H) 21 - 28 mmol/L    Base Excess Venous 13.2 mmol/L    FIO2 50    Blood gas venous   Result Value Ref Range    Ph Venous 7.35 7.32 - 7.43 pH    PCO2 Venous 72 (H) 40 - 50 mm Hg    PO2 Venous 47 25 - 47 mm Hg    Bicarbonate Venous 40 (H) 21 - 28 mmol/L    Base Excess Venous 12.7 mmol/L    FIO2 50    Blood gas  venous   Result Value Ref Range    Ph Venous 7.33 7.32 - 7.43 pH    PCO2 Venous 76 (HH) 40 - 50 mm Hg    PO2 Venous 47 25 - 47 mm Hg    Bicarbonate Venous 40 (H) 21 - 28 mmol/L    Base Excess Venous 12.4 mmol/L    FIO2 45    HIV Exposure Draw and Hold   Result Value Ref Range    HIV Exposure Draw and Hold       Specimen received and will be held in the Infectious Diseases Diagnostic Lab for 30 days.   HIV Rapid Antibody Screen   Result Value Ref Range    HIV Rapid Antibody Screen Nonreactive NR^Nonreactive   Comprehensive metabolic panel   Result Value Ref Range    Sodium 141 133 - 144 mmol/L    Potassium 3.5 3.4 - 5.3 mmol/L    Chloride 103 94 - 109 mmol/L    Carbon Dioxide 36 (H) 20 - 32 mmol/L    Anion Gap 2 (L) 3 - 14 mmol/L    Glucose 84 70 - 99 mg/dL    Urea Nitrogen 18 7 - 30 mg/dL    Creatinine 0.89 0.66 - 1.25 mg/dL    GFR Estimate >90 >60 mL/min/[1.73_m2]    GFR Estimate If Black >90 >60 mL/min/[1.73_m2]    Calcium 8.1 (L) 8.5 - 10.1 mg/dL    Bilirubin Total 0.2 0.2 - 1.3 mg/dL    Albumin 2.5 (L) 3.4 - 5.0 g/dL    Protein Total 6.0 (L) 6.8 - 8.8 g/dL    Alkaline Phosphatase 94 40 - 150 U/L    ALT 20 0 - 70 U/L    AST 13 0 - 45 U/L   Magnesium   Result Value Ref Range    Magnesium 2.4 (H) 1.6 - 2.3 mg/dL   Phosphorus   Result Value Ref Range    Phosphorus 3.8 2.5 - 4.5 mg/dL   INR   Result Value Ref Range    INR 1.05 0.86 - 1.14   Prealbumin   Result Value Ref Range    Prealbumin 25 15 - 45 mg/dL   Bilirubin direct   Result Value Ref Range    Bilirubin Direct <0.1 0.0 - 0.2 mg/dL   CBC with platelets differential   Result Value Ref Range    WBC 3.8 (L) 4.0 - 11.0 10e9/L    RBC Count 3.75 (L) 4.4 - 5.9 10e12/L    Hemoglobin 8.6 (L) 13.3 - 17.7 g/dL    Hematocrit 30.5 (L) 40.0 - 53.0 %    MCV 81 78 - 100 fl    MCH 22.9 (L) 26.5 - 33.0 pg    MCHC 28.2 (L) 31.5 - 36.5 g/dL    RDW 19.2 (H) 10.0 - 15.0 %    Platelet Count 118 (L) 150 - 450 10e9/L    Diff Method Automated Method     % Neutrophils 74.8 %    %  Lymphocytes 11.1 %    % Monocytes 11.4 %    % Eosinophils 0.8 %    % Basophils 0.3 %    % Immature Granulocytes 1.6 %    Nucleated RBCs 1 (H) 0 /100    Absolute Neutrophil 2.8 1.6 - 8.3 10e9/L    Absolute Lymphocytes 0.4 (L) 0.8 - 5.3 10e9/L    Absolute Monocytes 0.4 0.0 - 1.3 10e9/L    Absolute Eosinophils 0.0 0.0 - 0.7 10e9/L    Absolute Basophils 0.0 0.0 - 0.2 10e9/L    Abs Immature Granulocytes 0.1 0 - 0.4 10e9/L    Absolute Nucleated RBC 0.0    Blood gas venous   Result Value Ref Range    Ph Venous 7.32 7.32 - 7.43 pH    PCO2 Venous 77 (HH) 40 - 50 mm Hg    PO2 Venous 66 (H) 25 - 47 mm Hg    Bicarbonate Venous 40 (H) 21 - 28 mmol/L    Base Excess Venous 12.0 mmol/L    FIO2 60      *Note: Due to a large number of results and/or encounters for the requested time period, some results have not been displayed. A complete set of results can be found in Results Review.

## 2021-05-06 NOTE — PROGRESS NOTES
Pt awoken during midnight nurse assessment agitated and confused. He started pulled lines and ripping off BiPAP. As nurse attempted to redirect patient, he became more aggressive. Bedside nurse called staff assist and gave his PRN ativan IV push. After PRN ativan patient was redirectable, orientated X4, and following commands.

## 2021-05-06 NOTE — PROGRESS NOTES
[facility]    Progress Note - Pediatric ICU Service        Date of Admission:  5/1/2021    Assessment & Plan    Geo Hicks is a 20 year old male admitted on 5/1/2021. 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     Changes today:  -lasix 20mg BID scheduled  -attempt to wean FiO2 today  -Hydrocortisone weaned to 60mg q8hr from 75mg q8hr  -started lovenox 40mg BID for DVT ppx   -CBC spaced to M/Th  -VBGs spaced to daily     FEN/Renal  - NPO  - peripheral TPN @ 80 ml/hr    Volume overload  -  20mg IV lasix BID  - goal net negative 500cc   - Daily BMP    Respiratory  Acute on chronic hypoxic hypercapnic respiratory failure  Obstructive sleep apnea  - Continuous pulse oximetry  - Pulmonary/Sleep Medicine consult, appreciate recs  - AVAPs   - trend VBGs daily     Cardiovascular  Hypotension likely 2/2 septic shock, resolved  - Continuous cardiac monitoring  - Stress dose steroids weaning, see below  - MAP goal >65    Heme/Onc  Ependymoma  Patient on active COG study  - Entinostat 7 mg weekly (due 5/6, but will delay at least a week per Hem/Onc)  - CBC M/Th  - lovenox 40mg BID for DVT ppx     ID  Sepsis  Most likely 2/2 aspiration PNA.  - Continue IV Unasyn 3g Q6H, plan for 10 day course, stop date: 5/12/21   - Low threshold to broaden if not improving/worsening  - Monitor for fevers  - Bactrim ppx     GI  Chronic constipation  Gastroesophageal reflux  - NPO  -  PTA pantoprazole and linaclotide  - prn glycerin suppository or enema for constipation  - can consider lactulose instead of miralax if needed (smaller volume)    Endo  Chronic low dose steroid  use  Adrenal insufficiency  - IV hydrocortisone weaned to 60mg q8hr from 75mg q8hr on 5/6/21 (20%wean)  - HOLD PTA dexamethasone 0.75 mg QAM    Neuro/Psych  Agitation  Delusions  Multiple cranial nerve palsies  Ataxia  - Q4H neuro checks  - Continue PTA lamotrigine  -  PTA meds via [place]:   - PTA olanzapine 10mg at bedtime   - PTA sertraline 100mg at bedtime   - PTA trazodone 150mg PRN at bedtime    Subconjunctival hemorrhage  - eye drops prn   - appears worse today per parents and reporting hazy vision over right eye, obtain ophthalmology consult. Appreciate their recommendations.    Goals of care  Per discussion with parents on 5/3, Geo is DNR/DNI. PACCT was involved in the discussion.   - morphine prn for air hunger  - ativan prn for anxiety        Diet: NPO for Medical/Clinical Reasons Except for: Meds  parenteral nutrition - Clinimix E    Fluids: none  Lines: Midline, PIV, NG  DVT Prophylaxis: lovenox   Thornton Catheter: not present  Code Status: No CPR- Do NOT Intubate       Disposition Plan   Expected discharge: > 7 days, recommended to prior living arrangement once respiratory status back to baseline, tolerating PO.  Entered: Aminata Mart MD 05/06/2021, 9:36 AM     The patient's care was discussed with the PICU attending.        Aminata Mart MD  Medicine-Pediatrics PGY4  Pager # [phone]    Pediatric Critical Care Progress Note:    Geo Hicks is a 19yo male with hx of posterior ependymoma on palliative chemotherapy who was admitted for acute hypoxic on chronic hypercapnic respiratory failure secondary to suspected aspiration pneumonia vs progression of CNS disease. He remains critically ill and continues to require PICU admission for NIPPV.    I personally examined and evaluated the patient today. All physician orders and treatments were placed at my direction.  Formulated plan with the house staff team or resident(s) and agree with the findings and plan in this note.  I have  evaluated all laboratory values and imaging studies from the past 24 hours.  Consults ongoing and ordered are Oncology, PACCT and Pulmonology  I personally managed the respiratory and hemodynamic support, metabolic abnormalities, nutritional status, antimicrobial therapy, and pain/sedation management.   Key decisions made today include: Continue BiPAP support, wean FiO2 as oxygen saturations tolerate. Will consider starting to wean PPV in the next 1-2 days depending on respiratory support needs. Continue pulmonary toilet with percussive therapies. Continue peripheral nutrition for now. Further discussions with oncology regarding disease progression and utility of placing long term feeding access given chronic aspiration concerns. Continue ampicillin/sulbactam for aspiration pneumonia. Weaning stress hydrocortisone. Continue diuresis with scheduled furosemide, goal fluid balance of -500ml for the day. Continue oral psychiatric meds. Consult ophthalmology for persistent conjunctival hemorrhage and new complaints of blurred vision. Appreciate input from various subspecialties  Procedures that will happen in the ICU today are: non-invasive positive pressure ventilation  The above plans and care have been discussed with parents and all questions and concerns were addressed.    Gerardo Ling MD  ______________________________________________________________________    Interval History   Nursing notes reviewed. Became agitated overnight requiring some prns. Startled and scratched a nurse so blood work (HIV) was obtained (neg). Overall not improving or worsening today. Right conjunctival hemorrhage appears worse per parents. Discussed need for conversation re goals of care in the near future if continues to not show signs of improvement.       Data reviewed today: I reviewed all medications, new labs and imaging results over the last 24 hours.     Physical Exam   Vital Signs: Temp: 97.1  F (36.2  C) Temp src: Axillary BP:  112/61 Pulse: 78   Resp: (!) 44 SpO2: 96 % O2 Device: BiPAP/CPAP    Weight: 268 lbs 15.38 oz    General: Laying in bed, BiPAP in place, awake, answering questions, NAD.  HEENT: NC/AT, bilateral subconjunctival hemorrhages (R>L)-right is deep red,  BiPAP mask in place, NG in place   Cardiovascular: RRR, normal S1/S2, no S3/S4, no murmurs appreciated  Pulm: CTAB, diminished at the bases, no crackles or wheezes  Abdomen: Soft, obese, striae, non-tender, non-distended, +BS   Extremities: Warm, dry, no peripheral edema  Neuro: Awake, alert, moving extremities in bed.      Data   Recent Labs   Lab 05/06/21  0520 05/05/21  0524 05/04/21  0907 05/01/21  1654 05/01/21  1654   WBC 3.8* 4.1 4.4   < > 8.0   HGB 8.6* 8.1* 8.1*   < > 9.9*   MCV 81 83 80   < > 83   * 96* 108*   < > 131*   INR 1.05  --   --   --   --     142 141   < > 140   POTASSIUM 3.5 3.8 4.4   < > 4.3   CHLORIDE 103 104 107   < > 107   CO2 36* 34* 32   < > 32   BUN 18 19 16   < > 19   CR 0.89 0.96 0.84   < > 1.12   ANIONGAP 2* 4 2*   < > 1*   KATIE 8.1* 8.5 8.1*   < > 8.0*   GLC 84 100* 102*   < > 164*   ALBUMIN 2.5*  --   --   --  2.9*   PROTTOTAL 6.0*  --   --   --  6.3*   BILITOTAL 0.2  --   --   --  0.2   ALKPHOS 94  --   --   --  165*   ALT 20  --   --   --  32   AST 13  --   --   --  34    < > = values in this interval not displayed.     No results found for this or any previous visit (from the past 24 hour(s)).

## 2021-05-06 NOTE — PLAN OF CARE
Afebrile. VSS. Neuro intact per baseline. NO PRNs given. Ophthalmologist to assess eyes this afternoon. No concerns noted. Fi02 titrated to keep goal sats. No vent changes made. No stool. Good UOP. Lasix started with good response. No new skin concerns. Mom and dad at bedside most of day and updated on POC. All questions and concerns answered.

## 2021-05-07 NOTE — PLAN OF CARE
AVSS, denied pain, lungs continue to be diminished in the bases, clear in upper lobes with deep breaths.  Was able to wean FiO2 down to 40% this evening, has been tolerating well.  Desatted shortly after cough assist to low 80's but was able to recover quickly after.  Had a small bowel movement this morning with suppository. Will continue plan of care and notify MD of any changes.

## 2021-05-07 NOTE — PROGRESS NOTES
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 He was previously prescribed home BiPAP but has not worn it for many years due to physical discomfort. MRI/MRV at [facility] showed stable appearance of ependymoma and post-surgical changes with no acute findings.     He was initially admitted to the floor on supplemental oxygen, however he progressed to acute on chronic respiratory status and he was transferred to the PICU on 5/3 early AM. His repeat CXR demonstrated concern for pneumonia which was thought to be most likely due to known aspiration (pt refuses to thicken his liquids per prior recommendation). After a long discussion with both parents on 5/3/21 a decision was made to change his code status to DNR/DNI. Pulmonology, PACCT, and PICU teams are co-managing his care at this time.     He is currently requiring full facemask BiPAP for respiratory support. Unfortunately relatively minimal weaning of his respiratory support has been able to be done by the PICU. This is concerning that much of the issue with his lungs is more chronic in nature as treating his acute aspiration pneumonia has not significantly improved his status.     RECOMMENDATIONS:  -Weekly Entinostat, family brought in medication to bedside, due on Thursday 5/6/21. After review of the protocol and discussion with primary and research teams a final decision was made to hold dose on 5/6 per investigator discretion and current illness. If not significantly improved in timely manner he may ultimately be removed from the study.  -Per discussion prior to and at care conference family clearly expressed that if he " were to worsen they would not want him to be intubated or attempt resuscitation.   -Planning for another care conference next Thursday to discuss goals of care and consider placement options.   -Appreciate Pulmonology, PACCT, and PICU co-management of complex medical cares.         Plan of care discussed with attending physician Dr. Tono Rousseau.  Thank you for the opportunity to see this patient; please call us with any questions or concerns.    Nati Webb MD MPH  Pediatric Hematology Oncology Fellow  Pager: [phone]    Physician Attestation   I, Leoncio Rousseau MD, saw this patient with the resident and agree with the resident/fellow's findings and plan of care as documented in the note.      I personally reviewed vital signs, medications and labs.    Key findings:  I agree with the assessment as noted    Leoncio Rousseau MD  Date of Service (when I saw the patient): 5/7/21        Interval History:  Geo remained stable on BiPAP support overnight with occasional desaturations. When his facemask was removed x 30 seconds this morning he desatted into the 70s per mom's report. He remains intermittently combative and overnight refused to perform turns. He remains on IV nutrition.     Medications:  Current Facility-Administered Medications   Medication     acetaminophen (TYLENOL) tablet 650 mg     albuterol (PROVENTIL) neb solution 2.5 mg     ampicillin-sulbactam (UNASYN) 3 g vial to attach to  mL bag     [Held by provider] dexamethasone (DECADRON) tablet 0.75 mg     enoxaparin ANTICOAGULANT (LOVENOX) injection 40 mg     fexofenadine (ALLEGRA) tablet 180 mg     furosemide (LASIX) injection 20 mg     glycerin (ADULT) Suppository 1 suppository     hydrocortisone sodium succinate (Solu-CORTEF) PEDS/NICU IV 60 mg     [Held by provider] lactobacillus rhamnosus (GG) (CULTURELL) capsule 2 capsule     lamoTRIgine (LaMICtal) tablet 200 mg     linaclotide (LINZESS) capsule 290 mcg      lipids (INTRALIPID) 20 % infusion 240 mL     LORazepam (ATIVAN) injection 1 mg     morphine (PF) injection 1 mg     naloxone (NARCAN) injection 0.4 mg     OLANZapine (zyPREXA) tablet 10 mg     omeprazole (FIRST-OMEPRAZOLE) suspension 40 mg     parenteral nutrition - ADULT compounded formula     parenteral nutrition - ADULT compounded formula     polyethylene glycol-propylene glycol PF (SYSTANE ULTRA PF) opthalmic solution 1 drop     [Held by provider] potassium phosphate (monobasic) (K-PHOS) tablet 500 mg     sertraline (ZOLOFT) tablet 100 mg     sodium chloride 0.9% infusion     sulfamethoxazole-trimethoprim (BACTRIM) 400-80 MG per tablet 1 tablet     traZODone (DESYREL) tablet 150 mg     [Held by provider] Vitamin D3 (CHOLECALCIFEROL) tablet 2,000 Units     [Held by provider] vitamin E (TOCOPHEROL) 200 units (90 mg) capsule 400 Units     PHYSICAL EXAM:  Temp: 98.9  F (37.2  C) Temp src: Axillary BP: 118/79 Pulse: 101   Resp: 16 SpO2: 100 % O2 Device: BiPAP/CPAP      GENERAL: laying in bed, awake and interactive  HEENT:  moist mucus membranes, bilateral subconjunctival hemorrhage  CHEST: full face BiPAP mask in place, course breath sounds, diminished at bases  CV: RRR, good distal perfusion  ABD: soft, obese, no apparent significant distention  EXT: no obvious deformities   NEURO: awake, responsive to questions - attempts to talk, also waved and gave thumbs up    Results for orders placed or performed during the hospital encounter of 05/01/21 (from the past 24 hour(s))   Magnesium   Result Value Ref Range    Magnesium 2.2 1.6 - 2.3 mg/dL   Phosphorus   Result Value Ref Range    Phosphorus 3.7 2.5 - 4.5 mg/dL   Basic metabolic panel   Result Value Ref Range    Sodium 140 133 - 144 mmol/L    Potassium 3.2 (L) 3.4 - 5.3 mmol/L    Chloride 100 94 - 109 mmol/L    Carbon Dioxide 39 (H) 20 - 32 mmol/L    Anion Gap 2 (L) 3 - 14 mmol/L    Glucose 112 (H) 70 - 99 mg/dL    Urea Nitrogen 17 7 - 30 mg/dL    Creatinine 0.86 0.66  - 1.25 mg/dL    GFR Estimate >90 >60 mL/min/[1.73_m2]    GFR Estimate If Black >90 >60 mL/min/[1.73_m2]    Calcium 7.9 (L) 8.5 - 10.1 mg/dL   Glucose by meter   Result Value Ref Range    Glucose 102 (H) 70 - 99 mg/dL     *Note: Due to a large number of results and/or encounters for the requested time period, some results have not been displayed. A complete set of results can be found in Results Review.

## 2021-05-07 NOTE — PROGRESS NOTES
[facility]    Progress Note - Pediatric ICU Service        Date of Admission:  5/1/2021    Assessment & Plan    Geo Hicks is a 20 year old male admitted on 5/1/2021. 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     Changes today:  -change bladder scan/straight cath to prn  -fluid goal of -500ml  - wean EPAP (can increase at night)  -wean hydrocortisone to 60mg q12h    FEN/Renal  - NPO  - peripheral TPN @ 80 ml/hr    Volume overload  - 20mg IV lasix BID  - goal net negative 500cc  - Daily BMP    Respiratory  Acute on chronic hypoxic hypercapnic respiratory failure  Obstructive sleep apnea  - Continuous pulse oximetry  - Pulmonary/Sleep Medicine consult, appreciate recs  - AVAPs, wean EPAP during the daytime.   - will likely need to be increased while asleep  - trend VBGs daily     Cardiovascular  Hypotension likely 2/2 septic shock, resolved  - Continuous cardiac monitoring  - Stress dose steroids weaning, see below  - MAP goal >65    Heme/Onc  Ependymoma  Patient on active COG study  - Entinostat 7 mg weekly (due 5/6, but will delay at least a week per Hem/Onc)  - CBC M/Th  - lovenox 40mg BID for DVT ppx     ID  Sepsis  Most likely 2/2 aspiration PNA.  - Continue IV Unasyn 3g Q6H, plan for 10 day course, stop date: 5/12/21   - Low threshold to broaden if not improving/worsening  - Monitor for fevers  - Bactrim ppx     GI  Chronic constipation  Gastroesophageal reflux  - NPO  -  PTA pantoprazole and linaclotide  - prn glycerin suppository for constipation  - can consider lactulose instead of miralax if needed (smaller volume)    Endo  Chronic low dose  steroid use  Adrenal insufficiency  - IV hydrocortisone weaned to 60mg q12hr from 60mg q8hr on 5/7/21   - HOLD PTA dexamethasone 0.75 mg QAM    Neuro/Psych  Agitation  Delusions  Multiple cranial nerve palsies  Ataxia  - Q4H neuro checks  - Continue PTA lamotrigine  -  PTA meds via [place]:   - PTA olanzapine 10mg at bedtime   - PTA sertraline 100mg at bedtime   - PTA trazodone 150mg PRN at bedtime    Subconjunctival hemorrhage  - eye drops prn   - Ophtho consult, continue to monitor and apply drops    Goals of care  Per discussion with parents on 5/3, Geo is DNR/DNI. PACCT was involved in the discussion.   - morphine prn for air hunger  - ativan prn for anxiety        Diet: NPO for Medical/Clinical Reasons Except for: Meds  parenteral nutrition - ADULT compounded formula  parenteral nutrition - ADULT compounded formula    Fluids: none  Lines: Midline, PIV, NG  DVT Prophylaxis: lovenox   Thornton Catheter: not present  Code Status: No CPR- Do NOT Intubate       Disposition Plan   Expected discharge: > 7 days, recommended to prior living arrangement once respiratory status back to baseline, tolerating PO.  Entered: Ita Roach MD 05/07/2021, 9:25 AM     The patient's care was discussed with the PICU attending and fellow.         Iat Roach MD  Medicine-Pediatrics, PGY-2  ______________________________________________________________________    Interval History   Nursing notes reviewed. No acute events overnight. Stable on current vent settings, but continues to be on 55% FiO2. No BM yesterday. Adequate UOP. Afebrile and otherwise hemodynamically stable.       Data reviewed today: I reviewed all medications, new labs and imaging results over the last 24 hours.     Physical Exam   Vital Signs: Temp: 97.8  F (36.6  C) Temp src: Axillary BP: 109/70 Pulse: 65   Resp: 16 SpO2: 95 % O2 Device: BiPAP/CPAP    Weight: 268 lbs 15.38 oz    General: Laying in bed, BiPAP in place, awake, answering  questions, NAD.  HEENT: NC/AT, bilateral subconjunctival hemorrhages (R>L)-right is deep red,  BiPAP mask in place, NG in place   Cardiovascular: RRR, normal S1/S2, no S3/S4, no murmurs appreciated  Pulm: CTAB, diminished at the bases, no crackles or wheezes  Abdomen: Soft, obese, striae, non-tender, non-distended, +BS   Extremities: Warm, dry, no peripheral edema  Neuro: Awake, alert, moving extremities in bed.      Data   Recent Labs   Lab 05/07/21  0435 05/06/21  0520 05/05/21  0524 05/04/21  0907 05/01/21  1654 05/01/21  1654   WBC  --  3.8* 4.1 4.4   < > 8.0   HGB  --  8.6* 8.1* 8.1*   < > 9.9*   MCV  --  81 83 80   < > 83   PLT  --  118* 96* 108*   < > 131*   INR  --  1.05  --   --   --   --     141 142 141   < > 140   POTASSIUM 3.2* 3.5 3.8 4.4   < > 4.3   CHLORIDE 100 103 104 107   < > 107   CO2 39* 36* 34* 32   < > 32   BUN 17 18 19 16   < > 19   CR 0.86 0.89 0.96 0.84   < > 1.12   ANIONGAP 2* 2* 4 2*   < > 1*   KATIE 7.9* 8.1* 8.5 8.1*   < > 8.0*   * 84 100* 102*   < > 164*   ALBUMIN  --  2.5*  --   --   --  2.9*   PROTTOTAL  --  6.0*  --   --   --  6.3*   BILITOTAL  --  0.2  --   --   --  0.2   ALKPHOS  --  94  --   --   --  165*   ALT  --  20  --   --   --  32   AST  --  13  --   --   --  34    < > = values in this interval not displayed.     No results found for this or any previous visit (from the past 24 hour(s)).

## 2021-05-07 NOTE — PLAN OF CARE
Afebrile. No complaints of pain or discomfort. PRN morphine x 1 for agitation and combativeness. MD notified. Pupils unequal due to dilation from opthamology appointment yesterday, but MD notified. Lung sounds coarse throughout, but diminished in bases. FiO2 weaned to 55%. Desat x 2 overnight, one to mid 80s, one to 46% but recovered with 100% oxygen breath. Pt warm and well perfused with good pulses. Pt still has good cough. NPO but on TPN/lipids. No BM. Low urine output, but good response to lasix. Pt unwilling to perform turns around 0400 due to combativeness. No new skin concerns. Potassium at 3.2, MD notified. Will continue plan of care and notify team of changes.

## 2021-05-07 NOTE — PROGRESS NOTES
[facility]  PEDIATRIC HEMATOLOGY/ONCOLOGY   SOCIAL WORK PROGRESS NOTE      DATA:     Writer and RNCC Sofie Montanez met with Geo's parents, Christianne and Eric, to discuss potential SNF options. [place] It was stressed to parents that we do not know what Geo's exact needs will be at this time but that we could initiate the process of seeing what facilities might be appropriate for him, if needed.     Writer also discussed FMLA needs with the parents. Christianne was able to get her time extended through her employer and Eric remains actively on FMLA without any issues.     Family continues to express appreciation for SW support and is open to on-going supportive check-ins.     Collaboration with RNCC, PICU Fellow, PACCT, and Hem/Onc Fellow.     INTERVENTION:     Supportive visit, engaging pt and family in adjustment counseling, initiate discussion around SNF, collaboration with medical teams    ASSESSMENT:     Eric and Christianne continue to easily engage with SW. Mood appeared stable and affect appropriate. They are committed to wanting what is best for Geo and keeping him comfortable. Strong family support.     PLAN:     Social work will continue to assess needs and provide ongoing psychosocial support and access to resources.     GARCIA Agarwal, [facility]    Phone: [phone]  Pager: [phone]  Email: [email or web address]  5/10/2021  *no letter*

## 2021-05-07 NOTE — PROGRESS NOTES
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 He is currently on AVAPS Bilevel PAP with airway clearance in additional to Unasyn for antimicrobial coverage. He is stable, but has only tolerated minimal weans in his respiratory support settings and has not had much improvement since starting the Unasyn. A large part of his hypoxemic hypercarbic respiratory failure is also likely secondary to atelectasis and inability to effectively clear secretions.    Recommendations 5/7:    - agree with weaning as tolerated today per PICU team, may need more support while asleep  - Can consider switching to a mask that only covers his nose and mouth to give his eyes some relief  - Continue to encourage him to sit up as much as tolerated  - Continue to encourage coughing and continue cough assist      The patient's care was discussed with the Attending Physician, Dr. Marr.      Rufina Downs ([place]) DO   [facility] Pediatric Resident PGY-2      _________________________________________________________________    Interval History   No acute events overnight, afebrile, continues on full face mask, AVAP. Tolerated bed up position this morning.    Data reviewed today: I reviewed all medications, new labs and imaging results over the last 24 hours. I personally reviewed no images or EKG's today.    Physical Exam   Vital Signs: Temp: 97.8  F (36.6  C) Temp src: Axillary BP: 109/70 Pulse: 65   Resp: 16 SpO2: 95 % O2 Device: BiPAP/CPAP    Weight: 268 lbs 15.38 oz        General: No acute distress, face mask  in place, responding to questions, bed positioned up  Eyes: Lids and lashes normal, no periorbital edema, conjunctiva dramatically injected  Respiratory: Limited aeration in lower lung fields, mild course crackles bilaterally, no wheezing, BiPAP mask covering full face, symmetrical chest movement  Abdomen:  obese abdomen  Cardiac: RRR, no murmurs or rubs appreciated, well perfused  Musculoskeletal: No extremity edema, no deformities.         Data   Recent Labs   Lab 05/07/21  0435 05/06/21  0520 05/05/21  0524 05/04/21  0907 05/01/21  1654 05/01/21  1654   WBC  --  3.8* 4.1 4.4   < > 8.0   HGB  --  8.6* 8.1* 8.1*   < > 9.9*   MCV  --  81 83 80   < > 83   PLT  --  118* 96* 108*   < > 131*   INR  --  1.05  --   --   --   --     141 142 141   < > 140   POTASSIUM 3.2* 3.5 3.8 4.4   < > 4.3   CHLORIDE 100 103 104 107   < > 107   CO2 39* 36* 34* 32   < > 32   BUN 17 18 19 16   < > 19   CR 0.86 0.89 0.96 0.84   < > 1.12   ANIONGAP 2* 2* 4 2*   < > 1*   KATIE 7.9* 8.1* 8.5 8.1*   < > 8.0*   * 84 100* 102*   < > 164*   ALBUMIN  --  2.5*  --   --   --  2.9*   PROTTOTAL  --  6.0*  --   --   --  6.3*   BILITOTAL  --  0.2  --   --   --  0.2   ALKPHOS  --  94  --   --   --  165*   ALT  --  20  --   --   --  32   AST  --  13  --   --   --  34    < > = values in this interval not displayed.

## 2021-05-07 NOTE — TELEPHONE ENCOUNTER
Received 1 page fax for Standard Written Order for Dr Espinoza to complete. Form in the in-basket at 's desk.

## 2021-05-07 NOTE — PROGRESS NOTES
"Music Therapy Assessment and Determination of Services     A music therapy consult has been received for Geo Hicks.  The consult was placed by Gerardo Ling MD for emotional/psychosocial support.     Geo Hicks is a 20 year old male presenting with:   Patient Active Problem List   Diagnosis     GERD (gastroesophageal reflux disease)     Closed fracture at the growth plate of right distal fibula      Elevated serum creatinine     Dyspepsia     Intracranial hemorrhage (H)     Hemorrhagic stroke (H)     Ependymoma (H)     Admission for antineoplastic chemotherapy     Post-operative state     S/P craniotomy     S/P biopsy     Noncomitant strabismus     Abducens neuropathy of both eyes     CANDELARIO (obstructive sleep apnea)     Health Care Home     Hypernatremia     Body temperature low     Current chronic use of systemic steroids     Elevated TSH     Status post chemotherapy     Neoplasm of posterior cranial fossa (H)     Chronic constipation     Serum albumin decreased     Closed compression fracture of thoracic vertebra with routine healing, subsequent encounter     Depression     Constipation     Constipation by delayed colonic transit     Slow transit constipation     Tubular adenoma     Hypoxia     Respiratory failure (H)       At assessment, patient was lying supine in bed with full face BiPaP mask, appeared to be in positive mood. Patient was appropriate for assessment.  Mother was present for assessment.    The assessment has been gathered through chart review, patient and family interview, and music therapist's observations.     PATIENT/FAMILY PREFERENCES AND BACKGROUND:   Previous Music Therapy experience: Patient previously participated in music therapy in a hospital setting while at [facility]    Patient and/or family reporting musical interests include: Pt likes \"anything but country.\" Classic rock, pop     Specific artists/bands of interest include:Imagine dragons (demons, radioactive), cold play (viva la " tiffany), thinking out loud (Douglas Champion), we will rock you, Don't stop       Previous music experiences include: Listening to music, playing percussion in school for a little bit when younger    Additional Patient/Family Interests: music, movies, dogs    Gender/Identify Preference: Patient identifies as male    Confucianism Preferences: Restorationist    Additional Therapies/Supportive Services Patient Receiving: , Child Family Life, PACCT, Physical Therapy and Speech Therapy    ACCOMODATIONS/SUPPORT  Does Patient/Family Require an ?: no    Auditory/Hearing Support: impaired /adaptations. Per chart review, pt with bilateral hearing loss. Pt able to understand this writer's questions and respond appropriately throughout session.     Communication Supports: Currently difficult to understand speech d/t mask, pt using thumbs up/down and spelling out words    Vision Support: glasses. Currently experiencing blurred vision and subconjunctival hemorrhage    Identified Safety Concerns: Fall Risk    PATIENT RESPONSES TO ASSESSMENT:  Examples of Active Participation Identified as: Engaging in conversation and Making choices     Responses to Music Interventions: Improvement in Vital Signs, Smiling/Laughing and Family reported enjoyment    Participation Limited By: full face BiPaP    ASSESSMENT DOMAINS:  Physical Responses   Fine/Gross Motor Skills: not observed, pt lying in bed with covers over hands  Physical Abilities Observed: not oberseved, pt lying in bed. Pt did appear to lift head slightly a couple of times    Cognitive/Intellectual Responses: Geo answered questions appropriately. Pt initiated making song choices during session. Geo also spelled out the name of a song accurately when this writer could not understand his speech. Pt difficult to understand, likely exacerbated by full face BiPaP.        Psychological/Emotional Responses: Pt appeared calm throughout the session today. Easily engaged in  conversation with this writer and did not become frustrated when having to repeat himself. Geo also advocated for himself by asking to take a break to use the bathroom.      Physiological Responses: Decrease in Heart Rate and Maintains homeostasis     Spiritual/Existential Responses: none observed    SUMMARY/GOALS:  Narrative Note: Geo easily engaged with this writer today. Pt made song choices, participated in conversation with this writer, and advocated for his needs. Pt did not become frustrated when it was difficult for this writer to understand his speech and was able to communicate through thumbs up/down and spelling out songs. Geo smiled a couple of times during the session. Mom was engaged in session and helped Geo communicate with this writer; mom also able to step out of the room for a few minutes during the session. Geo and mom expressed appreciation and agreeable to another session next week.       Overall/Summary Impressions: Geo was very engaged with music and per mom is often listening to music. Geo will benefit from music therapy services while hospitalized in order to promote autonomy and self-expression.       Given the consult, diagnostic review, music therapy assessment, and recognition of benefit, the following plan of care has been produced:    Goals: Provide opportunities for choice and control, increase relaxation, Promote self-expression     Frequency: 2 times/week    Duration of Assessment: 45 Minutes    Manuel Galvan MA, MT-BC  [email or web address]  Tuesdays and Fridays   Pager: [phone]

## 2021-05-07 NOTE — PLAN OF CARE
PT Unit 3:  Pt work on overall controlled movement, which worked well in isolation, but very difficult with functional movement.  Decreased A needed for Bed mobility and transfers despite limited graded movement.  Pt will continue to work on trunk extension, chest opening and graded control with isolated movement and functional movement.

## 2021-05-07 NOTE — TELEPHONE ENCOUNTER
Agent from [employer] calling office stating they did not receive all of the notes from pt's visit w/Dr. Espinoza on 3/10/2021 for pt's w/c request.      She requests they be faxed to [phone].    CLEVE RN printed notes and faxed to number as requested.    Roseann Pisano, Registered Nurse, PAL (Patient Advocate Liaison)  [facility]     [phone]

## 2021-05-07 NOTE — TELEPHONE ENCOUNTER
Received 2 page fax for Hospitalization Notice for Dr Espinoza to complete. Form in the in-basket at 's desk.

## 2021-05-07 NOTE — PROVIDER NOTIFICATION
Patient desated to 46% post nebulizer treatment. Given 100% O2 breath and recovered saturations after a few minutes. MD notified.

## 2021-05-08 NOTE — PROGRESS NOTES
"  PEDIATRIC HEMATOLOGY ONCOLOGY      ASSESSMENT:   Geo Hicks is a 20 year old male with a history of posterior fossa ependymoma s/p radiation and multiple rounds of chemotherapy now on study COG [id number] Phase 1 study of Entinostat who was admitted for cough, hypoxia, and neurologic changes (dizziness, \"flashes\") likely related to chronic hypoxia from his obstructive sleep apnea. He was previously prescribed home BiPAP but has not worn it for many years due to physical discomfort. MRI/MRV at [facility] showed stable appearance of ependymoma and post-surgical changes with no acute findings.     He was initially admitted to the floor on supplemental oxygen, however he progressed to acute on chronic respiratory status and he was transferred to the PICU on 5/3 early AM. His repeat CXR demonstrated concern for pneumonia which was thought to be most likely due to known aspiration (pt refuses to thicken his liquids per prior recommendation). After a long discussion with both parents on 5/3/21 a decision was made to change his code status to DNR/DNI. Pulmonology, PACCT, and PICU teams are co-managing his care at this time.     He is currently requiring full facemask BiPAP for respiratory support. Unfortunately relatively minimal weaning of his respiratory support has been able to be done by the PICU. This is concerning that much of the issue with his lungs is more chronic in nature as treating his acute aspiration pneumonia has not significantly improved his clinical status.     RECOMMENDATIONS:  -Weekly Entinostat, family brought in medication to bedside, due on Thursday 5/6/21. After review of the protocol and discussion with primary and research teams a final decision was made to hold dose on 5/6 per investigator discretion and current illness. If not significantly improved in timely manner he may ultimately be removed from the study.  -Per discussion prior to and at care conference family clearly expressed " that if he were to worsen they would not want him to be intubated or attempt resuscitation.   -Planning for another care conference next Thursday 5/13 to discuss goals of care and consider placement options.   -Appreciate Pulmonology, PACCT, and PICU co-management of complex medical cares.         Plan of care discussed with attending physician Dr. Mirela Diaz.  Thank you for the opportunity to see this patient; please call us with any questions or concerns.    Nati Webb MD MPH  Pediatric Hematology Oncology Fellow  Pager: [phone]      Interval History:  Geo remained stable on BiPAP support overnight with occasional desaturations. He continues to desaturate quickly when his facemask is removed. He remains intermittently combative/agitated requiring IV Ativan which helps to calm him somewhat. He also pulled out one of his PIVs today during an episode of agitation. He remains on IV nutrition.     Medications:  Current Facility-Administered Medications   Medication     acetaminophen (TYLENOL) tablet 650 mg     albuterol (PROVENTIL) neb solution 2.5 mg     ampicillin-sulbactam (UNASYN) 3 g vial to attach to  mL bag     [Held by provider] dexamethasone (DECADRON) tablet 0.75 mg     dexmedetomidine (PRECEDEX) 800 mcg in sodium chloride 0.9 % 100 mL infusion     enoxaparin ANTICOAGULANT (LOVENOX) injection 40 mg     fexofenadine (ALLEGRA) tablet 180 mg     furosemide (LASIX) injection 20 mg     glycerin (ADULT) Suppository 1 suppository     hydrocortisone sodium succinate (Solu-CORTEF) PEDS/NICU IV 60 mg     [Held by provider] lactobacillus rhamnosus (GG) (CULTURELL) capsule 2 capsule     lamoTRIgine (LaMICtal) tablet 200 mg     linaclotide (LINZESS) capsule 290 mcg     lipids (INTRALIPID) 20 % infusion 240 mL     LORazepam (ATIVAN) injection 1 mg     morphine (PF) injection 1 mg     naloxone (NARCAN) injection 0.4 mg     OLANZapine (zyPREXA) tablet 10 mg     omeprazole (FIRST-OMEPRAZOLE) suspension  40 mg     parenteral nutrition - ADULT compounded formula     polyethylene glycol-propylene glycol PF (SYSTANE ULTRA PF) opthalmic solution 1 drop     [Held by provider] potassium phosphate (monobasic) (K-PHOS) tablet 500 mg     sertraline (ZOLOFT) tablet 100 mg     sodium chloride 0.9% infusion     sulfamethoxazole-trimethoprim (BACTRIM) 400-80 MG per tablet 1 tablet     traZODone (DESYREL) tablet 150 mg     [Held by provider] Vitamin D3 (CHOLECALCIFEROL) tablet 2,000 Units     [Held by provider] vitamin E (TOCOPHEROL) 200 units (90 mg) capsule 400 Units     PHYSICAL EXAM:  Temp: 97.2  F (36.2  C) Temp src: Axillary BP: 120/70 Pulse: 80   Resp: (!) 32 SpO2: 91 % O2 Device: BiPAP/CPAP      GENERAL: laying in bed, sleepy, but will awake and answer questions  HEENT:  moist mucus membranes, bilateral subconjunctival hemorrhage  CHEST: full face BiPAP mask in place, course breath sounds, diminished at bases  CV: RRR, good distal perfusion  ABD: soft, obese, no apparent significant distention  EXT: no obvious deformities   NEURO: awake, responsive to questions - attempts to talk    Results for orders placed or performed during the hospital encounter of 05/01/21 (from the past 24 hour(s))   Glucose by meter   Result Value Ref Range    Glucose 104 (H) 70 - 99 mg/dL   Glucose by meter   Result Value Ref Range    Glucose 140 (H) 70 - 99 mg/dL   Magnesium   Result Value Ref Range    Magnesium 2.2 1.6 - 2.3 mg/dL   Phosphorus   Result Value Ref Range    Phosphorus 4.6 (H) 2.5 - 4.5 mg/dL   Basic metabolic panel   Result Value Ref Range    Sodium 141 133 - 144 mmol/L    Potassium 3.8 3.4 - 5.3 mmol/L    Chloride 100 94 - 109 mmol/L    Carbon Dioxide 33 (H) 20 - 32 mmol/L    Anion Gap 8 3 - 14 mmol/L    Glucose 97 70 - 99 mg/dL    Urea Nitrogen 22 7 - 30 mg/dL    Creatinine 1.03 0.66 - 1.25 mg/dL    GFR Estimate >90 >60 mL/min/[1.73_m2]    GFR Estimate If Black >90 >60 mL/min/[1.73_m2]    Calcium 8.1 (L) 8.5 - 10.1 mg/dL   Blood  gas venous   Result Value Ref Range    Ph Venous 7.39 7.32 - 7.43 pH    PCO2 Venous 68 (H) 40 - 50 mm Hg    PO2 Venous 99 (H) 25 - 47 mm Hg    Bicarbonate Venous 41 (H) 21 - 28 mmol/L    Base Excess Venous 14.0 mmol/L    FIO2 40    Glucose by meter   Result Value Ref Range    Glucose 93 70 - 99 mg/dL     *Note: Due to a large number of results and/or encounters for the requested time period, some results have not been displayed. A complete set of results can be found in Results Review.     I personally saw and examined the patient with the fellow, Dr. Webb as above.  I personally reviewed the laboratory and imaging results as above. I agree with the assessment and plan as above.  Mirela Diaz, MSc., MD  Pediatric Oncology

## 2021-05-08 NOTE — PLAN OF CARE
Afebrile. Intermittent desat episodes to low 80s. Improvement with increased O2 and EPAP increased to 11. Three episodes of pt becoming disorientated, agitated and taking mask off. Ativan given x2, which was helpful. Resident notified. No contact with family this shift. Will continue to monitor.

## 2021-05-08 NOTE — PROGRESS NOTES
SLP: Evaluation orders received and appreciated. SLP spoke with the PICU team and RN this date. Plan to defer bedside swallow evaluation at this time given continued need for full facemask BiPAP respiratory support with immediate desaturation to the 60's with removal. Please re-consult if and when his respiratory status improves and the team is ready to reinitiate PO feeds.    Thank you for Geo's referral!    Danielle Noland MA, CCC-SLP  Pager: [phone]

## 2021-05-09 NOTE — CONSULTS
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     Pediatric [facility] psychiatry team was consulted for new onset behavioral dysregulation which is going on for the past 2 days. His clinical presentation is suggestive of delirium (acute onset fluctuation in level of consciousness along with disorientation to time place and person).  The past psychiatric history is significant for Delusional disorder (irrational belief that he is constipated and gaining weight due to constipation, doctors can help but won't help) and Depression.  Patient gets his psychiatric care at the [facility], during the last visit on March 30, 2021; olanzapine was decreased to 10 mg with plan to cross titrate to aripiprazole to mitigate metabolic side effects associated with olanzapine.  His other medications include sertraline 100 mg daily, lamotrigine 200 mg daily, trazodone 150 mg at night.   Discussed management of delirium with the primary team.         Diagnoses and Plan:   Delirium   Per history:  Delusional Disorder, persecutory type, first episode  Major Depressive Disorder, single episode, moderate    Pharmacological management:   The rationale for treatment of delirium with antipsychotic is that delirium may be associated with increased dopaminergic activity and decreased cholinergic activity.  Avoid certain class of medications: Benzodiazepines should be avoided " as much as possible.  Opioids, antihistamines and medications which have anticholinergic properties should be used with caution.    None of the medication are FDA approved to treat delirium but Haldol has the most evidence. In this patient, we will continue zyprexa which is his prior to admission medication.    Antipsychotic medication:  - Continue Zyprexa 10 mg at bedtime.  - Start Zyprexa Zydis 5mg BID PRN for moderate agitation.  Zyprexa 5 mg IM once PRN for severe agitation.  In 24 hours up to 30 mg of Zyprexa can be administered. Monitor QTc if patient receives high dose of zyprexa.   -- Consider Clonidine transdermal patch if agitation continues for couple of days.     Continue Prior to admission medications  - Continue sertraline 100mg daily  - Continue lamotrigine 200mg daily  - Continue trazodone 150mg at bedtime  - Melatonin 6 mg at bedtime    Nonpharmacological management of delirium:  Frequent reassurance, touch and verbal orientation can lessen disruptive behaviors.  Delusions and hallucinations should neither be endorsed or challenged.  Orientation protocols: Provision of clocks, calendars, windows with outside views and verbally reorienting patients may mitigate confusion.  Cognitive stimulation: Patient may benefit from regular visits from family and friends.  Facilitation of physiological sleep: Nursing and medical procedures, including the administration of medications, should be avoided during sleeping hours when possible.  Visual and hearing aid for patients with these impairments.       History of Present Illness:        Geo Hicks is a 20 year old male with history of with ependyoma (enrolled on COG [id number] Phase 1 Study of Entinostat), mental health issues, recent weight gain, severe ataxia s/p surgery and treatments who presents for admission following worsening neurological changes and low oxygen saturations. Over the past few weeks to months, he has had worsening episodes of coughing  "fits and dizziness (\"weird flashes\"). His MRI/MRV at [facility] was reassuring at the outside hospital. At this time, the etiology of these spells are unknown, and we will continue to investigate      He has also had a history of lower oxygen saturations, at his group home and again today at the outside hospital. He was diagnosed with sleep apnea at [place] in 2016 and wore BiPAP for a while, but has not worn it for years due to discomfort. 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 He has also had rhinoplasty in the past, so mostly breathes using his mouth, and has significant snoring. He likely has untreated obstructive apnea secondary to cranial nerve injury, weight gain, history of rhinoplasty, which is contributing to his hypoxemia. He is hemodynamically stable, and will be admitted for oxygen to keep saturations above 90%, consult with RT and pulmonology, and further evaluation of his dizzy and coughing spells.    Per dad: Patient was brought to the ER last weekend (5/01/2021). For the past couple of days, he appears to be confused. He does not know where he is. Last night, he woke up and trying to pull IV lines.     Geo has a history of delusional disorder.He has delusions regarding doctors withholding treatment - particularly the belief that he has constipation that causes his inability to walk and that his doctors could cure this but won't. He also believes this constipation has resulted in his ongoing weight-gain.  During the last appointment with Dr. Louis at the [facility], the plan was to reduce Zyprexa to 10 mg due to weight gain.          Psychiatric Review of Systems:   Psychiatric review of systems could not be done as patient was sedated.       Medical Review of Systems:   Psychiatric review of systems could not be done as patient was sedated.       Psychiatric History:   Psychiatric diagnosis: Delusional " Disorder, persecutory type, first episode; Major Depressive Disorder, single episode, moderate.  Current medications: Sertraline 100 mg daily, trazodone 150 mg at bedtime, Olanzapine 10 mg at bedtime, melatonin 6 mg at bedtime, Lamotrigine 200 mg daily.  Current medication provider: Dr. Jj Louis at the [facility].       Substance Use History:   None.          Past Medical/Surgical History:     Past Medical History:   Diagnosis Date     Cranial nerve dysfunction      Dyspepsia      Ependymoma (H)      Gastro-oesophageal reflux disease      Hearing loss      Intracranial hemorrhage (H)      Migraine      Pilonidal cyst     7-2015     Reduced vision      Refractory obstruction of nasal airway     2nd to nasal valve prolapse     Sleep apnea      Strabismus     gaze palsy        Past Surgical History:   Procedure Laterality Date     COLONOSCOPY N/A 9/27/2019    Procedure: Colonoscopy With Biopsies and Polypectomy;  Surgeon: Aniya Wei MD;  Location: [facility]     ESOPHAGOSCOPY, GASTROSCOPY, DUODENOSCOPY (EGD), COMBINED N/A 9/27/2019    Procedure: Upper Endoscopy (EGD) With Biopsies;  Surgeon: Aniya Wei MD;  [facility]: [facility] OR     GRAFT CARTILAGE FROM POSTERIOR AURICLE Left 10/6/2016    Procedure: GRAFT CARTILAGE FROM POSTERIOR AURICLE;  Surgeon: Tyler Richards MD;  [facility]: [facility] OR     INCISION AND DRAINAGE PERINEAL, COMBINED Bilateral 7/18/2015    Procedure: COMBINED INCISION AND DRAINAGE PERINEAL;  Surgeon: Dequan Timmons MD;  [facility]: [facility]     OPTICAL TRACKING SYSTEM CRANIOTOMY, EXCISE TUMOR, COMBINED N/A 4/13/2015    Procedure: COMBINED OPTICAL TRACKING SYSTEM CRANIOTOMY, EXCISE TUMOR;  Surgeon: Francis Velazquez MD;  [facility]     OPTICAL TRACKING SYSTEM CRANIOTOMY, EXCISE TUMOR, COMBINED N/A 4/16/2015    Procedure: COMBINED OPTICAL TRACKING SYSTEM CRANIOTOMY, EXCISE TUMOR;  Surgeon: Francis Velazquez MD;  [facility]      OPTICAL TRACKING SYSTEM CRANIOTOMY, EXCISE TUMOR, COMBINED Bilateral 5/28/2015    Procedure: COMBINED OPTICAL TRACKING SYSTEM CRANIOTOMY, EXCISE TUMOR;  Surgeon: Francis Velazquez MD;  [facility]     OPTICAL TRACKING SYSTEM CRANIOTOMY, EXCISE TUMOR, COMBINED Bilateral 1/14/2016    Procedure: COMBINED OPTICAL TRACKING SYSTEM CRANIOTOMY, EXCISE TUMOR;  Surgeon: Francis Velazquez MD;  [facility]     OPTICAL TRACKING SYSTEM VENTRICULOSTOMY  4/16/2015    Procedure: OPTICAL TRACKING SYSTEM VENTRICULOSTOMY;  Surgeon: Francis Velazquez MD;  [facility]     REMOVE PORT VASCULAR ACCESS N/A 10/6/2016    Procedure: REMOVE PORT VASCULAR ACCESS;  Surgeon: Bruno Perea MD;  [facility]     RHINOPLASTY N/A 10/6/2016    Procedure: RHINOPLASTY;  Surgeon: Tyler Richards MD;  [facility]     VASCULAR SURGERY  5-2015    single lumen power port            Allergies:     Allergies   Allergen Reactions     Blood Transfusion Related (Informational Only) Swelling     Periorbital swelling post platelet transfusion     No Known Drug Allergies           Medications:     Medications Prior to Admission   Medication Sig Dispense Refill Last Dose     Cholecalciferol (VITAMIN D3) 50 MCG (2000 UT) TABS Take 2,000 Units by mouth daily (with breakfast)   5/2/2021 at Unknown time     dexamethasone (DECADRON) 0.5 MG tablet Take 0.75 mg by mouth daily (with breakfast)   5/2/2021 at Unknown time     fexofenadine (ALLEGRA) 180 MG tablet Take 180 mg by mouth At Bedtime   5/1/2021 at Unknown time     Lactobacillus-Inulin ([facility]) CAPS Take 2 capsules by mouth daily 30 capsule 3 5/2/2021 at Unknown time     lamoTRIgine (LAMICTAL) 200 MG tablet Take 1 tablet (200 mg) by mouth At Bedtime 30 tablet 2 5/1/2021 at Unknown time     linaclotide (LINZESS) 290 MCG capsule Take 1 capsule (290 mcg) by mouth every morning (before breakfast) 30 capsule 3 5/2/2021 at Unknown time      OLANZapine (ZYPREXA) 10 MG tablet Take 1 tablet (10 mg) by mouth At Bedtime 30 tablet 1 2021 at Unknown time     omeprazole (PRILOSEC) 20 MG DR capsule Take 2 capsules (40 mg) by mouth every morning 60 capsule 1 2021 at Unknown time     order for DME Equipment being ordered: Dressing  Wound #1 lower leg, W 6 cm x, D 0.5  cm, Drainage scant, Thickness sutured     Type: non stick gauze, Brand: , Size: 4/4   Change: 1 per day    Tape/Wrap: 1 each     attach facesheet with insurance information (delete this line) 5 each 0 2021 at Unknown time     order for DME Equipment being ordered: short boot 1 each 0 2021 at Unknown time     polyethylene glycol (MIRALAX) 17 GM/Dose powder Take 17 g (1 capful) by mouth 3 times daily 289 g 3 2021 at Unknown time     potassium phosphate, monobasic, (K-PHOS) 500 MG tablet Take 1 tablet (500 mg) by mouth 2 times daily 90 tablet 3 2021 at Unknown time     sertraline (ZOLOFT) 100 MG tablet Take 1 tablet (100 mg) by mouth daily 30 tablet 2 2021 at Unknown time     sulfamethoxazole-trimethoprim (BACTRIM) 400-80 MG tablet Take 1 tablet by mouth 2 times daily On  and Sundays 24 tablet 11 2021 at Unknown time     traZODone (DESYREL) 150 MG tablet Take 1 tablet (150 mg) by mouth nightly as needed for sleep or depression 30 tablet 2 2021 at Unknown time     vitamin E 400 units TABS Take 400 Units by mouth daily   2021 at Unknown time     [] study - entinostat, IDS# 5050, 1 mg tablet Take 2 tablets (2 mg) by mouth every 7 days for 4 doses Take two 1mg tablet with one 5mg tablet for total dose of 7 mg weekly. Take on an empty stomach, at least 1 hour before or 2 hours after a meal.  Swallow tablet whole. 8 tablet 0 2021     [] study - entinostat, IDS# 5050, 5 mg tablet Take 1 tablet (5 mg) by mouth every 7 days for 4 doses Take one 5mg tablet with two 1mg tablet for total dose of 7 mg weekly. Take on an empty stomach, at least 1  hour before or 2 hours after a meal.  Swallow tablet whole. 4 tablet 0 4/29/2021          Social History:   Patient lives in assisted living.        Family History:   No known history of psychiatric illness in the family.        Labs:     Recent Results (from the past 24 hour(s))   Glucose by meter    Collection Time: 05/08/21 12:34 PM   Result Value Ref Range    Glucose 129 (H) 70 - 99 mg/dL   Glucose by meter    Collection Time: 05/08/21  5:12 PM   Result Value Ref Range    Glucose 114 (H) 70 - 99 mg/dL   Blood gas venous    Collection Time: 05/08/21  5:38 PM   Result Value Ref Range    Ph Venous 7.35 7.32 - 7.43 pH    PCO2 Venous 71 (H) 40 - 50 mm Hg    PO2 Venous 62 (H) 25 - 47 mm Hg    Bicarbonate Venous 39 (H) 21 - 28 mmol/L    Base Excess Venous 11.3 mmol/L    FIO2 55    Basic metabolic panel    Collection Time: 05/09/21  6:00 AM   Result Value Ref Range    Sodium 140 133 - 144 mmol/L    Potassium 3.3 (L) 3.4 - 5.3 mmol/L    Chloride 101 94 - 109 mmol/L    Carbon Dioxide 38 (H) 20 - 32 mmol/L    Anion Gap 1 (L) 3 - 14 mmol/L    Glucose 128 (H) 70 - 99 mg/dL    Urea Nitrogen 23 7 - 30 mg/dL    Creatinine 0.83 0.66 - 1.25 mg/dL    GFR Estimate >90 >60 mL/min/[1.73_m2]    GFR Estimate If Black >90 >60 mL/min/[1.73_m2]    Calcium 8.2 (L) 8.5 - 10.1 mg/dL   Magnesium    Collection Time: 05/09/21  6:00 AM   Result Value Ref Range    Magnesium 2.2 1.6 - 2.3 mg/dL   Phosphorus    Collection Time: 05/09/21  6:00 AM   Result Value Ref Range    Phosphorus 3.0 2.5 - 4.5 mg/dL   Glucose by meter    Collection Time: 05/09/21  6:03 AM   Result Value Ref Range    Glucose 121 (H) 70 - 99 mg/dL   Blood gas venous    Collection Time: 05/09/21  7:00 AM   Result Value Ref Range    Ph Venous 7.33 7.32 - 7.43 pH    PCO2 Venous 78 (HH) 40 - 50 mm Hg    PO2 Venous 56 (H) 25 - 47 mm Hg    Bicarbonate Venous 41 (H) 21 - 28 mmol/L    Base Excess Venous 13.1 mmol/L    FIO2 40      /69   Pulse 68   Temp 96.9  F (36.1  C)  (Axillary)   Resp 15   Wt 119 kg (262 lb 5.6 oz)   SpO2 96%   BMI 36.69 kg/m    Weight is 262 lbs 5.56 oz  Body mass index is 36.69 kg/m .       Psychiatric Examination:   Patient is lying in bed, sedated. He has an oxygen mask receiving PEEP. No evidence of extra pyramidal side effects. No abnormal movements. He does not appear to be in distress. Mood, speech, thought, perception, cognitive functioning assessment could not be performed.          Physical Exam:   I have reviewed the physical done by  dEdie Lowry on 5/2/2021, and I agree with their original findings.

## 2021-05-09 NOTE — PROGRESS NOTES
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 He was previously prescribed home BiPAP but has not worn it for many years due to physical discomfort. MRI/MRV at [facility] showed stable appearance of ependymoma and post-surgical changes with no acute findings.     He was initially admitted to the floor on supplemental oxygen, however he progressed to acute on chronic respiratory status and he was transferred to the PICU on 5/3 early AM. His repeat CXR demonstrated concern for pneumonia which was thought to be most likely due to known aspiration (pt refuses to thicken his liquids per prior recommendation). After a long discussion with both parents on 5/3/21 a decision was made to change his code status to DNR/DNI. Pulmonology, PACCT, and PICU teams are co-managing his care at this time.     He is currently requiring full facemask BiPAP for respiratory support. Unfortunately relatively minimal weaning of his respiratory support has been able to be done by the PICU. This is concerning that much of the issue with his lungs is more chronic in nature as treating his acute aspiration pneumonia has not significantly improved his clinical status. He continues to struggle with confusion and agitation that is likely due in large part to being in the ICU for a prolonged period of time.    RECOMMENDATIONS:  -Weekly Entinostat, family brought in medication to bedside, due on Thursday 5/6/21. After review of the protocol and discussion with primary and research teams a final decision was made to hold dose on 5/6 per investigator discretion and current illness. If not significantly improved " in timely manner he may ultimately have to be removed from the study.  -Per discussion prior to and at care conference family clearly expressed that if he were to worsen they would not want him to be intubated or attempt resuscitation.   -Planning for another care conference next Thursday 5/13 to discuss goals of care and consider placement options.   -Appreciate Pulmonology, PACCT, and PICU co-management of complex medical cares.         Plan of care discussed with attending physician Dr. Mirela Diaz.  Thank you for the opportunity to see this patient; please call us with any questions or concerns.    Nati Webb MD MPH  Pediatric Hematology Oncology Fellow  Pager: [phone]      Interval History:  Geo remains on BiPAP support which was able to be weaned some during the day before increasing it again overnight. He continues to desaturate quickly when his facemask is removed. He has continued to struggle with becoming combative/agitated for which he is now on a Precedex drip. He pulled out his NG on 5/8 which is now replaced. He is also having difficult with sleep and finally was able to get a few hours after a dose of Melatonin overnight.     Medications:  Current Facility-Administered Medications   Medication     acetaminophen (TYLENOL) tablet 650 mg     albuterol (PROVENTIL) neb solution 2.5 mg     ampicillin-sulbactam (UNASYN) 3 g vial to attach to  mL bag     [START ON 5/10/2021] dexamethasone (DECADRON) tablet 0.75 mg     dexmedetomidine (PRECEDEX) 800 mcg in sodium chloride 0.9 % 100 mL infusion     diphenhydrAMINE (BENADRYL) injection 50 mg     enoxaparin ANTICOAGULANT (LOVENOX) injection 40 mg     fexofenadine (ALLEGRA) tablet 180 mg     furosemide (LASIX) injection 10 mg     glycerin (ADULT) Suppository 1 suppository     [Held by provider] lactobacillus rhamnosus (GG) (CULTURELL) capsule 2 capsule     lamoTRIgine (LaMICtal) tablet 200 mg     linaclotide (LINZESS) capsule 290 mcg      lipids (INTRALIPID) 20 % infusion 240 mL     [Held by provider] LORazepam (ATIVAN) injection 1 mg     melatonin liquid 6 mg     morphine (PF) injection 1 mg     naloxone (NARCAN) injection 0.4 mg     OLANZapine (zyPREXA) suspension 5 mg     OLANZapine (zyPREXA) tablet 10 mg     omeprazole (FIRST-OMEPRAZOLE) suspension 40 mg     parenteral nutrition - ADULT compounded formula     polyethylene glycol-propylene glycol PF (SYSTANE ULTRA PF) opthalmic solution 1 drop     [Held by provider] potassium phosphate (monobasic) (K-PHOS) tablet 500 mg     sertraline (ZOLOFT) tablet 100 mg     sodium chloride 0.9% infusion     sulfamethoxazole-trimethoprim (BACTRIM) 400-80 MG per tablet 1 tablet     traZODone (DESYREL) tablet 150 mg     [Held by provider] Vitamin D3 (CHOLECALCIFEROL) tablet 2,000 Units     [Held by provider] vitamin E (TOCOPHEROL) 200 units (90 mg) capsule 400 Units     PHYSICAL EXAM:  Temp: 96.9  F (36.1  C) Temp src: Axillary BP: 128/72 Pulse: 69   Resp: 27 SpO2: 90 % O2 Device: BiPAP/CPAP      GENERAL: laying in bed, sleepy, pulled off his soft mitt and attempting to pull at his support devices  HEENT:  moist mucus membranes, bilateral subconjunctival hemorrhage  CHEST: full face BiPAP mask in place, course breath sounds, diminished at bases  CV: RRR, good distal perfusion  ABD: soft, obese, no apparent significant distention  EXT: no obvious deformities   NEURO: awake, agitated, attempting to pull on support devices, redirectable by dad    Results for orders placed or performed during the hospital encounter of 05/01/21 (from the past 24 hour(s))   Glucose by meter   Result Value Ref Range    Glucose 129 (H) 70 - 99 mg/dL   Glucose by meter   Result Value Ref Range    Glucose 114 (H) 70 - 99 mg/dL   Blood gas venous   Result Value Ref Range    Ph Venous 7.35 7.32 - 7.43 pH    PCO2 Venous 71 (H) 40 - 50 mm Hg    PO2 Venous 62 (H) 25 - 47 mm Hg    Bicarbonate Venous 39 (H) 21 - 28 mmol/L    Base Excess Venous  11.3 mmol/L    FIO2 55    XR Chest Port 1 View    Narrative    Exam: XR CHEST PORT 1 VIEW, 5/9/2021 7:38 AM    Indication: NG tube placement    Comparison: 5/4/2021, 5/3/2021    Findings:   Portable supine AP view of the upper abdomen was obtained. The gastric  tube tip projects over the stomach. Low lung volumes. Cardiac  silhouette is unchanged. No pneumothorax. Question small left pleural  effusion. Mildly improved retrocardiac opacities. No bowel gas  distention or pneumatosis in the upper abdomen. No acute osseous  abnormalities.      Impression    Impression:   1. The gastric tube tip projects over the stomach.  2. Decreased lung volumes and stable to mildly improved retrocardiac  atelectasis/consolidation.    I have personally reviewed the examination and initial interpretation  and I agree with the findings.    MARIO ALBERTO AYALA MD   Basic metabolic panel   Result Value Ref Range    Sodium 140 133 - 144 mmol/L    Potassium 3.3 (L) 3.4 - 5.3 mmol/L    Chloride 101 94 - 109 mmol/L    Carbon Dioxide 38 (H) 20 - 32 mmol/L    Anion Gap 1 (L) 3 - 14 mmol/L    Glucose 128 (H) 70 - 99 mg/dL    Urea Nitrogen 23 7 - 30 mg/dL    Creatinine 0.83 0.66 - 1.25 mg/dL    GFR Estimate >90 >60 mL/min/[1.73_m2]    GFR Estimate If Black >90 >60 mL/min/[1.73_m2]    Calcium 8.2 (L) 8.5 - 10.1 mg/dL   Magnesium   Result Value Ref Range    Magnesium 2.2 1.6 - 2.3 mg/dL   Phosphorus   Result Value Ref Range    Phosphorus 3.0 2.5 - 4.5 mg/dL   Glucose by meter   Result Value Ref Range    Glucose 121 (H) 70 - 99 mg/dL   Blood gas venous   Result Value Ref Range    Ph Venous 7.33 7.32 - 7.43 pH    PCO2 Venous 78 (HH) 40 - 50 mm Hg    PO2 Venous 56 (H) 25 - 47 mm Hg    Bicarbonate Venous 41 (H) 21 - 28 mmol/L    Base Excess Venous 13.1 mmol/L    FIO2 40      *Note: Due to a large number of results and/or encounters for the requested time period, some results have not been displayed. A complete set of results can be found in Results Review.      I personally saw and examined the patient with the fellow, Dr. Webb as above.  I personally reviewed the laboratory and imaging results as above. I agree with the assessment and plan as above.  Mirela Diaz, MSc., MD  Pediatric Oncology

## 2021-05-09 NOTE — PROVIDER NOTIFICATION
Notified Dr. Bob España of temperature of 96.4 and 96.5 at 1630, informed that patient seems to be unchanged clinically at this time.  Told to recheck in an hour and inform MD.

## 2021-05-09 NOTE — PLAN OF CARE
Afebrile. Intermittent desats to mid to low 80s, manageable with increased Os. Denies pain. Pt remains on precedex gtt. HR mid 50s-60s. PT continues to be agitated, confused and somewhat combative. Frequent reorientation needed. Ativan PRN x2, benadryl x1. Pt ripped out NG at beginning of shift, new NG placed successfully. Pt finally fell asleep at 0345 after receiving melatonin. Resident okayed skipping 0400 assessment to allow pt to sleep and avoid agitation. No contact with family overnight. Will continue to monitor.

## 2021-05-09 NOTE — PLAN OF CARE
"AVSS, denied pain, lungs continue to be diminished intermittently clear in upper lobes.  Patient was more agitated and confused and seemed to get worse throughout the day, resident JACE was notified and assessed.  Started precedex gtt which seemed to help with agitation, also received PRN zyprexa X1 with little change in status.  Had 2 episodes of combativeness - 1 at change of shift and one this afternoon, received PRN ativan X2 throughout the day and responded well with both of those administrations.  This afternoon and into the evening he asked multiple times \"where am I?\" and \"why am I here\" to dad.  Resident was again notified about increased confusion as well as more bradycardic HR into the high 50's.  Srikanth blood gases per her request.  Was able to wean FiO2 down to 55% from 75%  this morning which is markedly different than yesterday, team aware.  PEEP weaned to 10 and tolerated well.  Will have increased settings overnight.  Mom and Dad were at bedside during the day.  Will continue plan of care and notify MD of any changes.  "

## 2021-05-09 NOTE — PROGRESS NOTES
[facility]    Progress Note - Pediatric ICU Service   Date of Admission:  5/1/2021    Assessment & Plan    Geo Hicks is a 20 year old male admitted on 5/1/2021. 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     Changes today: 05/10/2021  - Optimize sleep wake cycle:    Pulmonary regimen QID   - VBG in AM   - Re-time labs closer to 0700 to limit early morning labs   - Vitals Q4H overnight `   - Precedex to 1.4 overnight   - Psych consult for delirium and assistance with meds    FEN/Renal  - NPO  - PPN @ 100 ml/hr  - lasix 10mg IV daily  - goal net even  - BMP W/Fr    Respiratory  Acute on chronic hypoxic hypercapnic respiratory failure  Obstructive sleep apnea  - Continuous pulse oximetry  - Pulmonary/Sleep Medicine consult, appreciate recs  -Optimize sleep wake cycle:    Pulmonary regimen QID   - AVAPs  - PEEP 10 while awake, and 12 while asleep   - VBG in AM      Cardiovascular  Hypotension likely 2/2 septic shock, resolved  - Continuous cardiac monitoring  - Stress dose steroids weaning, see below  - MAP goal >65    Heme/Onc  Ependymoma  Patient on active COG study  - Entinostat 7 mg weekly (due 5/6, but will delay at least a week per Hem/Onc)  - CBC M/Th  - lovenox 40mg BID for DVT ppx     ID  Sepsis  Most likely 2/2 aspiration PNA.  - Continue IV Unasyn 3g Q6H, plan for 10 day course, stop date: 5/12/21   - Low threshold to broaden if not improving/worsening  - Monitor for fevers  - Bactrim ppx     GI  Chronic constipation  Gastroesophageal reflux  - NPO  -  PTA pantoprazole and linaclotide  - prn glycerin suppository for constipation  - can  consider lactulose instead of miralax if needed (smaller volume)    Endo  Chronic low dose steroid use  Adrenal insufficiency  - last dose of IV hydrocortisone 5/9  - PTA dexamethasone 0.75 mg QAM 5/10    Neuro/Psych  Agitation  Delusions  Multiple cranial nerve palsies  Ataxia  - Q4H neuro checks  - Delirium precautions   - Psychiatry consult:   - continue PTA lamotrigine qHS, olanzapine at bedtime, trazodone qHS, and sertraline   - olanzapine 5mg (enteral or IM) bid prn; max dose 20-25mg/day   - continue melatonin at bedtime   - continue precedex gtt with goal to wean off   - avoid benzos and anticholinergics (benadryl, atarax, etc)    Subconjunctival hemorrhage  - eye drops prn   - Ophtho consult, continue to monitor and apply drops    Goals of care  Per discussion with parents on 5/3, Geo is DNR/DNI. PACCT was involved in the discussion. Repeat conversation 5/10 to discuss comfort care options vs moving to another facility vs continuing with current plan. PACCT, heme/onc involved with discussion with plan to talk to meet again on 5/13  - morphine prn for air hunger  - avoid benzos     Diet: NPO for Medical/Clinical Reasons Except for: Meds  parenteral nutrition - ADULT compounded formula  parenteral nutrition - ADULT compounded formula    Fluids: none  Lines: Midline, PIV, NG  DVT Prophylaxis: lovenox   Thornton Catheter: not present  Code Status: No CPR- Do NOT Intubate       Disposition Plan   Expected discharge: > 7 days, recommended to prior living arrangement once respiratory status back to baseline, tolerating PO.  Entered: Herbie Patiño III, MD 05/10/2021, 4:43 PM       Patient seen and discussed with fellow provider Dr. Nicki Patiño III, MD  PGY-2      Pediatric Critical Care Fellow Attestation Note:    Geo Hicks is a 19 yo M w/ ependymoma (receiving palliative chemotherapy) and acute on chronic respiratory failure (aspiration pneumonia +/- disease progression) who remains critically ill  with worsening CNS symptoms and continued NIPPV requirement. Pt is DNR/DNI and working to determine goals of care.    I personally examined and evaluated the patient today. All physician orders and treatments were placed at my direction.  Formulated plan with the house staff team or resident(s) and agree with the findings and plan in this note.  I have evaluated all laboratory values and imaging studies from the past 24 hours.  Consults ongoing and ordered are: oncology, pulmonology, PACCT, psychiatry  I personally managed the respiratory and hemodynamic support, metabolic abnormalities, nutritional status, antimicrobial therapy, and pain/sedation management.   Key decisions made today included: continue PPN, continue current BiPAP settings, continue current antibiotic course, decrease dexmedetomidine during day w/ plans to empirically increase overnight, continue scheduled zyprexa w/ PRN haldol for acute agitation overnight, optimize day/night schedule (lights on/off appropriately, no early blood draws, ok to skip overnight BP check, stop q1h neurochecks), minimize benzodiazepine use, discuss w/ psychiatry  Procedures that will happen in the ICU today are: none  The above plans and care have been discussed with father and all questions and concerns were addressed.    Pt discussed w/ attending physician Dr. Cuevas.    Ramana Caceres, PGY6  Critical Care Fellow    Pediatric Critical Care Attestation:     Patient is critically ill with acute on chronic respiratory failure in the setting of late stage ependymoma on palliative chemo therapy. Also with altered mental status thought to be related to tumor and radiation which is worsening. Significant delirium and aggression overnight and decreased wakefulness during the day.  I personally examined and evaluated the patient today, and have discussed plans with the resident and nurse. All physician orders and treatments were placed at my direction.   Today's treatment plans  are: consulting psych, pacct involved, trying to find a good regimen to keep romain comfortable overnight. Continuing on bipap for hypoxia.   Patient's weight today is: 262 lbs 5.56 oz  The above plans and care have been discussed with parents, pulmonary, psych, pacct, heme/onc.  I spent a total of  40  minutes providing critical care services at the bedside and on the critical care unit, evaluating the patient, directing care and reviewing laboratory values and radiologic reports for this patient. I agree with the findings and plan of care as documented in the note.    Joey Cuevas MD  PICU Attending      ______________________________________________________________________    Interval History   Agitated overnight. Patient restless/agitated overnight. Difficult to redirect. Multiple combative outbursts. Got 2 mg and 5 mg haldol as well as precedex increased to 1.4 mcg. Precedex weaned to 0.6 this AM but needed to go back up to 0.8 this Afternoon.      Data reviewed today: I reviewed all medications, new labs and imaging results over the last 24 hours.     Physical Exam   Vital Signs: Temp: 97.5  F (36.4  C) Temp src: Axillary BP: 108/56 Pulse: 68   Resp: 18 SpO2: 96 % O2 Device: BiPAP/CPAP    Weight: 262 lbs 5.56 oz    General: Laying in bed, BiPAP in place, awake, NAD. Mitts on bilateral hands to prevent pulling at lines.   HEENT: NC/AT, bilateral subconjunctival hemorrhages (R>L)-right is deep red,  BiPAP mask in place   Cardiovascular: RRR, normal S1/S2, no S3/S4, no murmurs appreciated  Pulm: CTAB, diminished at the bases, no crackles or wheezes  Abdomen: Soft, obese, striae, non-tender, non-distended, +BS   Extremities: Warm, dry, no peripheral edema  Neuro: Awake, moving extremities in bed.      Data   Recent Labs   Lab 05/10/21  0600 05/09/21  0600 05/08/21  0500 05/06/21 0520 05/06/21 0520 05/05/21 0524   WBC 5.4  --   --   --  3.8* 4.1   HGB 9.8*  --   --   --  8.6* 8.1*   MCV 80  --   --   --  81  83     --   --   --  118* 96*   INR 1.03  --   --   --  1.05  --     140 141   < > 141 142   POTASSIUM 3.6 3.3* 3.8   < > 3.5 3.8   CHLORIDE 104 101 100   < > 103 104   CO2 34* 38* 33*   < > 36* 34*   BUN 21 23 22   < > 18 19   CR 0.90 0.83 1.03   < > 0.89 0.96   ANIONGAP 2* 1* 8   < > 2* 4   KATIE 8.3* 8.2* 8.1*   < > 8.1* 8.5   * 128* 97   < > 84 100*   ALBUMIN 2.7*  --   --   --  2.5*  --    PROTTOTAL 6.2*  --   --   --  6.0*  --    BILITOTAL 0.3  --   --   --  0.2  --    ALKPHOS 94  --   --   --  94  --    ALT 22  --   --   --  20  --    AST 17  --   --   --  13  --     < > = values in this interval not displayed.     No results found for this or any previous visit (from the past 24 hour(s)).

## 2021-05-09 NOTE — PROGRESS NOTES
[facility]    Progress Note - Pediatric ICU Service        Date of Admission:  5/1/2021    Assessment & Plan    Geo Hicks is a 20 year old male admitted on 5/1/2021. 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     Changes today:  - increase PPN rate to 100 ml/hr  - discontinue lasix  - restart Allegra  - wean EPAP to 10 while awake; keep at 12 while asleep   - wean hydrocortisone to daily, then transition to PTA decadron  - prn olanzapine for agitation  - precedex gtt for agitation, will attempt to wean off    FEN/Renal  - NPO  - peripheral TPN @ 100 ml/hr  - discontinue lasix  - goal net even  - Daily BMP    Respiratory  Acute on chronic hypoxic hypercapnic respiratory failure  Obstructive sleep apnea  - Continuous pulse oximetry  - Pulmonary/Sleep Medicine consult, appreciate recs  - AVAPs  - PEEP 10 while awake, and 12 while asleep   - trend VBGs daily     Cardiovascular  Hypotension likely 2/2 septic shock, resolved  - Continuous cardiac monitoring  - Stress dose steroids weaning, see below  - MAP goal >65    Heme/Onc  Ependymoma  Patient on active COG study  - Entinostat 7 mg weekly (due 5/6, but will delay at least a week per Hem/Onc)  - CBC M/Th  - lovenox 40mg BID for DVT ppx     ID  Sepsis  Most likely 2/2 aspiration PNA.  - Continue IV Unasyn 3g Q6H, plan for 10 day course, stop date: 5/12/21   - Low threshold to broaden if not improving/worsening  - Monitor for fevers  - Bactrim ppx     GI  Chronic constipation  Gastroesophageal reflux  - NPO  -  PTA pantoprazole and linaclotide  - prn glycerin suppository for constipation  - can consider  lactulose instead of miralax if needed (smaller volume)    Endo  Chronic low dose steroid use  Adrenal insufficiency  - IV hydrocortisone weaned to 60mg q24hr, then will transition back to PTA decadron on 5/10  - HOLD PTA dexamethasone 0.75 mg QAM    Neuro/Psych  Agitation  Delusions  Multiple cranial nerve palsies  Ataxia  - Q4H neuro checks  - Continue PTA lamotrigine  -  PTA meds via [place]:   - PTA olanzapine 10mg at bedtime   - PTA sertraline 100mg at bedtime   - PTA trazodone 150mg PRN at bedtime  - precedex gtt for agitation  - prn olanzapine for agitation- do not give within 6 hours of scheduled olanzapine    Subconjunctival hemorrhage  - eye drops prn   - Ophtho consult, continue to monitor and apply drops    Goals of care  Per discussion with parents on 5/3, Geo is DNR/DNI. PACCT was involved in the discussion.   - morphine prn for air hunger  - ativan prn for anxiety        Diet: NPO for Medical/Clinical Reasons Except for: Meds  parenteral nutrition - ADULT compounded formula  parenteral nutrition - ADULT compounded formula    Fluids: none  Lines: Midline, PIV, NG  DVT Prophylaxis: lovenox   Thornton Catheter: not present  Code Status: No CPR- Do NOT Intubate       Disposition Plan   Expected discharge: > 7 days, recommended to prior living arrangement once respiratory status back to baseline, tolerating PO.  Entered: Ita Roach MD 05/08/2021, 7:12 PM     The patient's care was discussed with the PICU attending and fellow.         Ita Roach MD  Medicine-Pediatrics, PGY-2  ______________________________________________________________________    Interval History   Nursing notes reviewed. Overnight, PEEP was increased. Becoming more confused and agitated throughout the day. Needs constant redirection to keep BiPAP mask on his face. Desats down to the 60%'s while mask is off.     Data reviewed today: I reviewed all medications, new labs and imaging results over the last 24 hours.      Physical Exam   Vital Signs: Temp: 97.3  F (36.3  C) Temp src: Axillary BP: 129/75 Pulse: 63   Resp: 29 SpO2: 99 % O2 Device: BiPAP/CPAP    Weight: 262 lbs 5.56 oz    General: Laying in bed, BiPAP in place, awake, answering questions, NAD.  HEENT: NC/AT, bilateral subconjunctival hemorrhages (R>L)-right is deep red,  BiPAP mask in place, NG in place   Cardiovascular: RRR, normal S1/S2, no S3/S4, no murmurs appreciated  Pulm: CTAB, diminished at the bases, no crackles or wheezes  Abdomen: Soft, obese, striae, non-tender, non-distended, +BS   Extremities: Warm, dry, no peripheral edema  Neuro: Awake, alert, moving extremities in bed.      Data   Recent Labs   Lab 05/08/21  0500 05/07/21  0435 05/06/21  0520 05/05/21  0524 05/04/21  0907   WBC  --   --  3.8* 4.1 4.4   HGB  --   --  8.6* 8.1* 8.1*   MCV  --   --  81 83 80   PLT  --   --  118* 96* 108*   INR  --   --  1.05  --   --     140 141 142 141   POTASSIUM 3.8 3.2* 3.5 3.8 4.4   CHLORIDE 100 100 103 104 107   CO2 33* 39* 36* 34* 32   BUN 22 17 18 19 16   CR 1.03 0.86 0.89 0.96 0.84   ANIONGAP 8 2* 2* 4 2*   KATIE 8.1* 7.9* 8.1* 8.5 8.1*   GLC 97 112* 84 100* 102*   ALBUMIN  --   --  2.5*  --   --    PROTTOTAL  --   --  6.0*  --   --    BILITOTAL  --   --  0.2  --   --    ALKPHOS  --   --  94  --   --    ALT  --   --  20  --   --    AST  --   --  13  --   --      No results found for this or any previous visit (from the past 24 hour(s)).

## 2021-05-09 NOTE — PLAN OF CARE
AVSS, denied pain, lungs continue to be diminished but unchanged from yesterday.  Patient woke up agitated and was given PRN ativan X1 this morning.  Also applied mitts to hands to keep from pulling mask or tubes, decided patient was able to keep one mitt off at a time and has been compliant for the most part. During rounds team agreed it would be best to go up on precedex gtt and patient has been calm and directable since then.  Zyprexa was added as a PRN for increased agitation but have not had to use it.  Pt had a lower temperature this evening (96.4) rechecked an hour later and increased to 97.5 without intervention.  Dad at the bedside this morning, mom here this afternoon.  Will continue plan of care and notify MD of any changes.

## 2021-05-10 NOTE — PROVIDER NOTIFICATION
PICU resident and fellow notified again of increased agitation and inability to get BiPAP mask back on, despite four staff members in room. Unable to redirect. Patient agitated, pulling at lines, hitting staff, swinging arms, and not letting go of BiPAP piece. Order for IM haldol and increased precedex gtt to 1.4 while awaiting haldol dose. EKG obtained prior to haldol administration.

## 2021-05-10 NOTE — PLAN OF CARE
Afebrile, denied pain, BPs were low this afternoon between 1300 and 1430, provider notified and checked on patient, no change in plan or orders at this time, other VSS.  Lungs continue to be diminished, but satting well on EPAP of 9 and FiO2 of 45%.  Will continue to wean settings as able.  Discussed minimizing interventions over night to encourage a healthy sleep/wake cycle, team receptive to idea.  No PRN's required during day shift, patient was redirectable on 0.6 mcg/kg/hr precedex settings.  WIll continue plan of care and notify MD of any changes.

## 2021-05-10 NOTE — PROGRESS NOTES
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 He was previously prescribed home BiPAP but has not worn it for many years due to physical discomfort. MRI/MRV at [facility] showed stable appearance of ependymoma and post-surgical changes with no acute findings.     He was initially admitted to the floor on supplemental oxygen, however he progressed to acute on chronic respiratory status and he was transferred to the PICU on 5/3 early AM. His repeat CXR demonstrated concern for pneumonia which was thought to be most likely due to known aspiration (pt refuses to thicken his liquids per prior recommendation). After a long discussion with both parents on 5/3/21 a decision was made to change his code status to DNR/DNI. Pulmonology, PACCT, and PICU teams are co-managing his care at this time.     He is currently requiring full facemask BiPAP for respiratory support. Unfortunately relatively minimal weaning of his respiratory support has been able to be done by the PICU. This is concerning that much of the issue with his lungs is more chronic in nature (pulmonary as well as 2/2 brain tumor) as treating his acute aspiration pneumonia has not significantly improved his clinical status. He continues to struggle with confusion and agitation that is likely due at least in part to ICU delirium.     RECOMMENDATIONS:  -Weekly Entinostat, family brought in medication to bedside, due on Thursday 5/6/21. After review of the protocol and discussion with primary and research teams a final decision was made to hold dose on 5/6 per investigator discretion and current illness. If not significantly " improved in timely manner he may ultimately have to be removed from the study.  -Per discussion prior to and at care conference family clearly expressed that if he were to worsen they would not want him to be intubated or attempt resuscitation.   -Planning for another care conference next Thursday 5/13 to discuss goals of care and consider placement options if aggressive support is still the goal vs de-escalation of support if comfort is the goal.   -Appreciate Pulmonology, PACCT, and PICU co-management of complex medical cares.         Plan of care discussed with attending physician Dr. Tono Rousseau.   Thank you for the opportunity to see this patient; please call us with any questions or concerns.    Nati Webb MD MPH  Pediatric Hematology Oncology Fellow  Pager: [phone]    Physician Attestation   I, Leoncio Rousseau MD, saw this patient with the resident and agree with the resident/fellow's findings and plan of care as documented in the note.      I personally reviewed vital signs, medications, labs and imaging.    Key findings: I agree with the assessment as noted    Leoncio Rousseau MD  Date of Service (when I saw the patient): 05/10/21      Interval History:  Geo remains on BiPAP support which is higher overnight and lower during the day. He continues to desaturate quickly when his facemask is removed. He has continued to struggle with becoming combative/agitated and had his Precedex drip increased overnight. He is also continues to have difficulty with sleep.     Medications:  Current Facility-Administered Medications   Medication     acetaminophen (TYLENOL) tablet 650 mg     albuterol (PROVENTIL) neb solution 2.5 mg     ampicillin-sulbactam (UNASYN) 3 g vial to attach to  mL bag     dexamethasone (DECADRON) tablet 0.75 mg     dexmedetomidine (PRECEDEX) 800 mcg in sodium chloride 0.9 % 100 mL infusion     enoxaparin ANTICOAGULANT (LOVENOX) injection 40 mg      fexofenadine (ALLEGRA) tablet 180 mg     furosemide (LASIX) injection 10 mg     glycerin (ADULT) Suppository 1 suppository     [Held by provider] lactobacillus rhamnosus (GG) (CULTURELL) capsule 2 capsule     lamoTRIgine (LaMICtal) tablet 200 mg     linaclotide (LINZESS) capsule 290 mcg     lipids (INTRALIPID) 20 % infusion 240 mL     melatonin liquid 6 mg     morphine (PF) injection 1 mg     naloxone (NARCAN) injection 0.4 mg     OLANZapine (zyPREXA) suspension 5 mg    Or     OLANZapine (zyPREXA) injection 5 mg     OLANZapine (zyPREXA) tablet 10 mg     omeprazole (FIRST-OMEPRAZOLE) suspension 40 mg     parenteral nutrition - ADULT compounded formula     parenteral nutrition - ADULT compounded formula     polyethylene glycol-propylene glycol PF (SYSTANE ULTRA PF) opthalmic solution 1 drop     [Held by provider] potassium phosphate (monobasic) (K-PHOS) tablet 500 mg     sertraline (ZOLOFT) tablet 100 mg     sodium chloride 0.9% infusion     sulfamethoxazole-trimethoprim (BACTRIM) 400-80 MG per tablet 1 tablet     traZODone (DESYREL) tablet 150 mg     [Held by provider] Vitamin D3 (CHOLECALCIFEROL) tablet 2,000 Units     [Held by provider] vitamin E (TOCOPHEROL) 200 units (90 mg) capsule 400 Units     PHYSICAL EXAM:  Temp: 97.7  F (36.5  C) Temp src: Axillary BP: 132/78 Pulse: 64   Resp: 13 SpO2: 99 % O2 Device: BiPAP/CPAP      GENERAL: laying in bed, attempting to pull at his support devices with free hand  HEENT:  moist mucus membranes, bilateral subconjunctival hemorrhage  CHEST: full face BiPAP mask in place, course breath sounds, diminished at bases  CV: RRR, good distal perfusion  ABD: soft, obese, no apparent significant distention  EXT: no obvious deformities   SKIN: striae over bilateral lower extremities   NEURO: awake, agitated with minimal attempt to verbally communicate, attempting to pull on support devices, mom and nursing staff having difficulty with redirecting    Results for orders placed or  performed during the hospital encounter of 05/01/21 (from the past 24 hour(s))   EKG 12 lead - pediatric   Result Value Ref Range    Interpretation ECG Click View Image link to view waveform and result    Comprehensive metabolic panel   Result Value Ref Range    Sodium 140 133 - 144 mmol/L    Potassium 3.6 3.4 - 5.3 mmol/L    Chloride 104 94 - 109 mmol/L    Carbon Dioxide 34 (H) 20 - 32 mmol/L    Anion Gap 2 (L) 3 - 14 mmol/L    Glucose 113 (H) 70 - 99 mg/dL    Urea Nitrogen 21 7 - 30 mg/dL    Creatinine 0.90 0.66 - 1.25 mg/dL    GFR Estimate >90 >60 mL/min/[1.73_m2]    GFR Estimate If Black >90 >60 mL/min/[1.73_m2]    Calcium 8.3 (L) 8.5 - 10.1 mg/dL    Bilirubin Total 0.3 0.2 - 1.3 mg/dL    Albumin 2.7 (L) 3.4 - 5.0 g/dL    Protein Total 6.2 (L) 6.8 - 8.8 g/dL    Alkaline Phosphatase 94 40 - 150 U/L    ALT 22 0 - 70 U/L    AST 17 0 - 45 U/L   Magnesium   Result Value Ref Range    Magnesium 2.2 1.6 - 2.3 mg/dL   Phosphorus   Result Value Ref Range    Phosphorus 2.7 2.5 - 4.5 mg/dL   INR   Result Value Ref Range    INR 1.03 0.86 - 1.14   Prealbumin   Result Value Ref Range    Prealbumin 50 (H) 15 - 45 mg/dL   CBC with platelets   Result Value Ref Range    WBC 5.4 4.0 - 11.0 10e9/L    RBC Count 4.18 (L) 4.4 - 5.9 10e12/L    Hemoglobin 9.8 (L) 13.3 - 17.7 g/dL    Hematocrit 33.3 (L) 40.0 - 53.0 %    MCV 80 78 - 100 fl    MCH 23.4 (L) 26.5 - 33.0 pg    MCHC 29.4 (L) 31.5 - 36.5 g/dL    RDW 19.1 (H) 10.0 - 15.0 %    Platelet Count 171 150 - 450 10e9/L     *Note: Due to a large number of results and/or encounters for the requested time period, some results have not been displayed. A complete set of results can be found in Results Review.

## 2021-05-10 NOTE — PROGRESS NOTES
"Music Therapy Progress Note    Pre-Session Assessment  Pt agitated during dressing change; RN agreeable to session to support procedure.     Goals  Provide alternate engagement   Decrease agitation   Provide opportunities for choice and control     Interventions  Patient Preferred Live Music and Therapeutic Conversation    Outcomes  Geo initially moving arms and legs in agitated manner during dressing change but calmed as staff talked to him about what was happening. This writer then played Geo a preferred song and he was able to tolerate the rest of the dressing change with minimal distress. Geo agreeable to another song after dress change, giving a slight thumbs up. When this writer asked if he'd like to hear another song, he shook his head \"no.\" When this writer asked if he wanted to be done with music for today, pt shook head \"yes.\" Pt also shook head \"yes\" that this writer could check back again later in the week. Pt appeared to say goodbye to this writer at the end of the session.      Plan for Follow Up  Music therapist will continue to follow with a goal of 2 times/week.    Session Duration: 15 minutes    Manuel Galvan MA, MT-BC  [email or web address]  Tuesdays and Fridays   Pager: [phone]     "

## 2021-05-10 NOTE — PROGRESS NOTES
[facility]  Pain and Advanced/Complex Care Team (PACCT)  Progress Note     Geo Hicks MRN# [id number]   Age: 20 year old YOB: 2000   Date:  05/10/2021 Admitted:  5/1/2021     Recommendations, Patient/Family Counseling & Coordination:     SYMPTOM MANAGEMENT: I will defer to PICU and psychiatry for management     GOALS OF CARE AND DECISIONAL SUPPORT/SUMMARY OF DISCUSSION WITH PATIENT AND/OR FAMILY:   Discussed week-end with Christianne after rounds.  She states she has a few questions and is going to meet with Dr. Rousseau.  She stated she just doesn't know what to think or how to feel.  She is worried by Geo's behavior, but knows that it's not that different from baseline in some respects.  She is hoping for some direction.  I told her that in the end, we may be looking for direction from them.     Conference with Mike Rousseau, Joon, myself, and Christianne.  Christianne's main question was to what degree the delirium and associated meds were contributing to Geo's O2 needs, or was that the brain tumor, still.  Dr. Rousseau talked about how people who have such tumors eventually wear out, that their brains are under constant stress and that one additional stressor, such as the pneumonia, is a darell they are unable to get over.  I tried to explain that the delirium was a separate problem from the O2 need, and that even if that resolved, he would still be on the full face bipap. Christianne again talked about Geo's suffering - she does not see this as a good quality of life for him, but is unsure of next steps.  She was sure she wanted to hear all the options today.  We talked about removing the bipap - and that Geo would pass away.  We would give him medicine to keep him comfortable, but he would gradually go into a coma.  Before removing support, there would be time for family to visit and memory making, if they so choose.  We acknowledged that this is a  difficult thing to think about, but it's really based on a series of decisions that Geo has made, and their goal was to end his suffering.  We also talked about the other option of trying to move him to another facility with this much respiratory support, which would require a decision about feeding him, and I also think he would need a high level of sedation as he can be uncooperative at times, and would not have a sitter.  Christianne did not think that was a good option for Geo.  We decided to meet again with PICU and Oncology staff on Thursday, along with Geo's father, Eric, to further discuss options moving forward.  Christianne was appreciative of the honesty and time spent to discuss Geo's care.       Thank you for the opportunity to participate in the care of this patient and family.   Please contact the Pain and Advanced/Complex Care Team (PACCT) with any emergent needs via text page to the PACCT general pager ([phone], answered 8-4:30 Monday to Friday). After hours and on weekends/holidays, please refer to [facility] or [facility] on-call.    Attestation:  I spent a total of 90 minutes on the inpatient unit today caring for Geo Hicks. Over 50% of my time on the unit was spent coordinating care and counseling regarding goals of care. See note for details.   Discussed with primary team.    ALAN Tyler CNP [facility]  [id number]      Assessment:      Diagnoses and symptoms: Geo Hicks is a(n) 20 year old male with:  Patient Active Problem List   Diagnosis     GERD (gastroesophageal reflux disease)     Intracranial hemorrhage (H)     Hemorrhagic stroke (H)     Ependymoma (H)     S/P craniotomy     S/P biopsy     Noncomitant strabismus     Abducens neuropathy of both eyes     CANDELARIO (obstructive sleep apnea)     Current chronic use of systemic steroids     Status post chemotherapy     Neoplasm of posterior cranial fossa (H)     Chronic constipation     Depression     Constipation     Slow transit  constipation     Hypoxia     Respiratory failure (H)      Possible aspiration pneumonia  Respiratory failure requiring bipap  Delirium/delusional  Palliative care needs associated with the above    Psychosocial and spiritual concerns:  parents continue to work well together;  Worried about what comes next for Geo    Advance care planning:   Code status: After care conference last week patient is DNR/DNI     Interval Events:     Increased combativeness over the week-end    Medications:     I have reviewed this patient's medication profile and medications during this hospitalization.    Scheduled medications:     albuterol  2.5 mg Nebulization Q6H     ampicillin-sulbactam (UNASYN) IV  3 g Intravenous Q6H     dexamethasone  0.75 mg Oral Daily with breakfast     enoxaparin ANTICOAGULANT  40 mg Subcutaneous BID     fexofenadine  180 mg Oral Daily     furosemide  10 mg Intravenous Daily     glycerin  1 suppository Rectal Daily     [Held by provider] lactobacillus rhamnosus (GG)  2 capsule Oral Daily     lamoTRIgine  200 mg Oral At Bedtime     linaclotide  290 mcg Oral QAM AC     lipids  240 mL Intravenous Q24H     melatonin  6 mg Oral At Bedtime     OLANZapine  10 mg Oral At Bedtime     omeprazole  40 mg Per Feeding Tube QAM AC     [Held by provider] potassium phosphate (monobasic)  500 mg Oral BID     sertraline  100 mg Oral Daily     sulfamethoxazole-trimethoprim  1 tablet Oral 2 times per day on Sun Sat     traZODone  150 mg Oral At Bedtime     [Held by provider] Vitamin D3  2,000 Units Oral Daily with breakfast     [Held by provider] vitamin E  400 Units Oral Daily     Infusions:     dexmedetomidine (PRECEDEX) 8 mcg/mL drip-ADULT and PEDS >/= 45 kg (MAX CONC) 0.6 mcg/kg/hr (05/10/21 1003)     parenteral nutrition - ADULT compounded formula 100 mL/hr at 05/09/21 2028     sodium chloride 1,000 mL (05/08/21 1943)     PRN medications: acetaminophen, morphine, naloxone, OLANZapine **OR** OLANZapine, polyethylene  glycol-propylene glycol PF    Review of Systems:     Palliative Symptom Review    The comprehensive review of systems is negative other than noted here and in the HPI. Completed by proxy by parent(s)/caretaker(s) (if applicable)    Physical Exam:       Vitals were reviewed  Temp:  [96.1  F (35.6  C)-98.1  F (36.7  C)] 97.5  F (36.4  C)  Pulse:  [53-84] 77  Resp:  [10-27] 18  BP: ()/(50-87) 108/61  FiO2 (%):  [35 %-50 %] 45 %  SpO2:  [90 %-100 %] 90 %  Weight: 118 kg   Detailed exam deferred  Patient lying in bed, scuba mask in place    Data Reviewed:     Results for orders placed or performed during the hospital encounter of 05/01/21 (from the past 24 hour(s))   EKG 12 lead - pediatric   Result Value Ref Range    Interpretation ECG Click View Image link to view waveform and result    Comprehensive metabolic panel   Result Value Ref Range    Sodium 140 133 - 144 mmol/L    Potassium 3.6 3.4 - 5.3 mmol/L    Chloride 104 94 - 109 mmol/L    Carbon Dioxide 34 (H) 20 - 32 mmol/L    Anion Gap 2 (L) 3 - 14 mmol/L    Glucose 113 (H) 70 - 99 mg/dL    Urea Nitrogen 21 7 - 30 mg/dL    Creatinine 0.90 0.66 - 1.25 mg/dL    GFR Estimate >90 >60 mL/min/[1.73_m2]    GFR Estimate If Black >90 >60 mL/min/[1.73_m2]    Calcium 8.3 (L) 8.5 - 10.1 mg/dL    Bilirubin Total 0.3 0.2 - 1.3 mg/dL    Albumin 2.7 (L) 3.4 - 5.0 g/dL    Protein Total 6.2 (L) 6.8 - 8.8 g/dL    Alkaline Phosphatase 94 40 - 150 U/L    ALT 22 0 - 70 U/L    AST 17 0 - 45 U/L   Magnesium   Result Value Ref Range    Magnesium 2.2 1.6 - 2.3 mg/dL   Phosphorus   Result Value Ref Range    Phosphorus 2.7 2.5 - 4.5 mg/dL   INR   Result Value Ref Range    INR 1.03 0.86 - 1.14   Prealbumin   Result Value Ref Range    Prealbumin 50 (H) 15 - 45 mg/dL   CBC with platelets   Result Value Ref Range    WBC 5.4 4.0 - 11.0 10e9/L    RBC Count 4.18 (L) 4.4 - 5.9 10e12/L    Hemoglobin 9.8 (L) 13.3 - 17.7 g/dL    Hematocrit 33.3 (L) 40.0 - 53.0 %    MCV 80 78 - 100 fl    MCH 23.4 (L)  26.5 - 33.0 pg    MCHC 29.4 (L) 31.5 - 36.5 g/dL    RDW 19.1 (H) 10.0 - 15.0 %    Platelet Count 171 150 - 450 10e9/L     *Note: Due to a large number of results and/or encounters for the requested time period, some results have not been displayed. A complete set of results can be found in Results Review.

## 2021-05-10 NOTE — PROGRESS NOTES
Conference with Geo's mom Christianne, attending Dr. Rousseau, and Dodie Carrillo from the PACCT team was held this afternoon. Christianne was given the opportunity to ask her questions. Many of them focused on wanting to know whether we thought his current status, particularly his agitation and respiratory problems, were attributable to his ICU delirium and medications vs his underlying brain tumor.     Dodie explained that the delirium and respiratory issues were separate and that he would need bipap even if his delirium was resolved. Dr. Rousseau discussed that frequently patients under constant stress from their brain tumors are unable to deal with additional stresses such as pneumonia. He also discussed that eventually such patients wear out and that we are concerned that Geo may be following that pattern at this time.    Christianne asked some further questions about what it might look like to de-escalate care and remove the bipap. Dodie talked about how he would pass away and that we would aggressively support his comfort during that time. We all acknowledged that the timeline can vary as to how fast he would pass away. Christianne expressed that she is having a difficult time watching him suffer right now. We let her know that we will continue to support Geo and his whole family to the best of our abilities whatever decision that they make going forward.     She plans to talk with his father Eric about our conversation today and he will be around to ask his own questions tomorrow. We will also have a care conference with the PICU as well on Thursday.     Nati Webb MD MPH  Pediatric Hematology Oncology Fellow  Pager: [phone]    Time Spent on this Encounter   I spent 60 minutes on the unit/floor managing the care of Geo Hicks. Over 50% of my time was spent on the following:   - Counseling the patient and/or family regarding: prognosis and risks and benefits of treatment options  - Coordination of care with the:  coordination of care not performed today    This was in addition to E&M - see separate note    Leoncio Rousseau MD

## 2021-05-10 NOTE — PROVIDER NOTIFICATION
PICU resident notified by GIANNI Chu of increased patient agitation/combativeness. Resident to the bedside to assess. Unable to get BiPAP mask back on patient with three staff members present. Patient swinging arms, grabbing, shaking head back and forth, etc. Precedex gtt increased to 1, prn zyprexa given. Order for soft wrist restraint placed but patient increasingly agitated with restraint, yelling to take it off. Restraint removed. Will continue to monitor and assess.

## 2021-05-10 NOTE — PLAN OF CARE
T min 96.1. MD aware. Patient restless/agitated overnight. Difficult to redirect. Multiple combative outbursts, see notes. Patient slept in short increments for the first part of night. Awoke for neb treatment at 0130 and did not sleep much following. PRN zyprexa x1, melatonin x1, ativan x1 and haldol x2. Precedex gtt increased throughout the night from 0.6 to 1.4. Soft mitt on one hand at a time to prevent pulling. FiO2 50% for most of the night. Desats to 80s with BiPAP mask removed. Lung sounds diminished. HR 60s-70s. MAPs above goal 65. Bowel sounds hypoactive. Voided x2 into brief. Mother at the bedside last evening and updated on plan of care.

## 2021-05-10 NOTE — PROGRESS NOTES
Pt agitated for majority of 4 hour shift.  Increase in precedex, PRN zyprexa and morphine provided some relief.  Suggest increasing precedex as needed.  No urine noted for 6 hours, following RN notified to monitor past 8 hrs.  Mom at bedside for short time at beginning of shift, aware of POC.

## 2021-05-10 NOTE — PROVIDER NOTIFICATION
Notified provider, Dr. Herbie Patiño, of lower blood pressures than he has been having historically - BPs since 1300 have been 90's/40's

## 2021-05-11 NOTE — PROGRESS NOTES
Pt began getting agitated just prior to 1700. He was combative (swinging, and kicking with intent to harm staff) and sitting up in bed independently and was then placed on 4 point restraints. RN increased Precedex to 1.5. within 7 minutes, Pt began calming. He continues to occasionally attempt to wriggle out of restraints, but appears to sleep in between.

## 2021-05-11 NOTE — PROGRESS NOTES
[facility]  Pain and Advanced/Complex Care Team (PACCT)  Progress Note     Geo Hicks MRN# [id number]   Age: 20 year old YOB: 2000   Date:  05/11/2021 Admitted:  5/1/2021     Recommendations, Patient/Family Counseling & Coordination:     SYMPTOM MANAGEMENT: I will defer to PICU and psychiatry for management     GOALS OF CARE AND DECISIONAL SUPPORT/SUMMARY OF DISCUSSION WITH PATIENT AND/OR FAMILY:   Rounded with team and checked on Eric and  Geo after.  Eric acknowledged that he and Christianne spoke after our meeting yesterday, and his goals are in line with Christianne's.  He notes that this is very difficult, that he wishes he could just disappear, but also knows that he owes it to Geo to be here for him. He wants to speak with Dr. Rousseau, and is prepared to attend care conference on Thursday.  They have questions about when to bring in visitors as to not alarm Geo, and what the trajectory will be once bipap is removed.  He, like Christianne, just want peace for Geo.      Thank you for the opportunity to participate in the care of this patient and family.   Please contact the Pain and Advanced/Complex Care Team (PACCT) with any emergent needs via text page to the PACCT general pager ([phone], answered 8-4:30 Monday to Friday). After hours and on weekends/holidays, please refer to [facility] or [facility] on-call.    Attestation:  I spent a total of 35 minutes on the inpatient unit today caring for Geo Hicks. Over 50% of my time on the unit was spent coordinating care and counseling regarding goals of care. See note for details.   Discussed with primary team.    ALAN Tyler CNP CHPPN  [phone]      Assessment:      Diagnoses and symptoms: Geo Hicks is a(n) 20 year old male with:  Patient Active Problem List   Diagnosis     GERD (gastroesophageal reflux disease)     Intracranial hemorrhage (H)     Hemorrhagic stroke (H)     Ependymoma  (H)     S/P craniotomy     S/P biopsy     Noncomitant strabismus     Abducens neuropathy of both eyes     CANDELARIO (obstructive sleep apnea)     Current chronic use of systemic steroids     Status post chemotherapy     Neoplasm of posterior cranial fossa (H)     Chronic constipation     Depression     Constipation     Slow transit constipation     Hypoxia     Respiratory failure (H)      Possible aspiration pneumonia  Respiratory failure requiring bipap  Delirium/delusional  Palliative care needs associated with the above    Psychosocial and spiritual concerns:  parents continue to work well together;  Worried about what comes next for Geo    Advance care planning:   Code status: After care conference last week patient is DNR/DNI     Interval Events:     Increased combativeness continues    Medications:     I have reviewed this patient's medication profile and medications during this hospitalization.    Scheduled medications:     ampicillin-sulbactam (UNASYN) IV  3 g Intravenous Q6H     cloNIDine   Transdermal Q8H     cloNIDine  1 patch Transdermal Weekly     dexamethasone  0.75 mg Oral Daily with breakfast     enoxaparin ANTICOAGULANT  40 mg Subcutaneous BID     fexofenadine  180 mg Oral Daily     furosemide  10 mg Intravenous Daily     glycerin  1 suppository Rectal Daily     [Held by provider] lactobacillus rhamnosus (GG)  2 capsule Oral Daily     lamoTRIgine  200 mg Oral At Bedtime     linaclotide  290 mcg Oral QAM AC     lipids  240 mL Intravenous Q24H     melatonin  6 mg Oral At Bedtime     OLANZapine  10 mg Oral At Bedtime     omeprazole  40 mg Per Feeding Tube QAM AC     [Held by provider] potassium phosphate (monobasic)  500 mg Oral BID     sertraline  100 mg Oral Daily     sulfamethoxazole-trimethoprim  1 tablet Oral 2 times per day on Sun Sat     traZODone  150 mg Oral At Bedtime     [Held by provider] Vitamin D3  2,000 Units Oral Daily with breakfast     [Held by provider] vitamin E  400 Units Oral  Daily     Infusions:     dexmedetomidine (PRECEDEX) 8 mcg/mL drip-ADULT and PEDS >/= 45 kg (MAX CONC)       parenteral nutrition - ADULT compounded formula       parenteral nutrition - ADULT compounded formula 100 mL/hr at 05/10/21 1956     sodium chloride 1,000 mL (05/08/21 1943)     PRN medications: acetaminophen, albuterol, morphine, naloxone, OLANZapine **OR** OLANZapine, polyethylene glycol-propylene glycol PF    Review of Systems:     Palliative Symptom Review    The comprehensive review of systems is negative other than noted here and in the HPI. Completed by proxy by parent(s)/caretaker(s) (if applicable)    Physical Exam:       Vitals were reviewed  Temp:  [97.1  F (36.2  C)-98.5  F (36.9  C)] 97.1  F (36.2  C)  Pulse:  [59-79] 62  Resp:  [9-54] 9  BP: ()/(42-84) 102/57  FiO2 (%):  [45 %] 45 %  SpO2:  [54 %-100 %] 98 %  Weight: 118 kg   Detailed exam deferred  Patient lying in bed, scuba mask in place    Data Reviewed:     Results for orders placed or performed during the hospital encounter of 05/01/21 (from the past 24 hour(s))   Blood gas venous   Result Value Ref Range    Ph Venous 7.31 (L) 7.32 - 7.43 pH    PCO2 Venous 62 (H) 40 - 50 mm Hg    PO2 Venous 51 (H) 25 - 47 mm Hg    Bicarbonate Venous 31 (H) 21 - 28 mmol/L    Base Excess Venous 3.7 mmol/L    FIO2 45      *Note: Due to a large number of results and/or encounters for the requested time period, some results have not been displayed. A complete set of results can be found in Results Review.

## 2021-05-11 NOTE — PROGRESS NOTES
SPIRITUAL HEALTH SERVICES  SPIRITUAL ASSESSMENT Progress Note  [facility] ([facility] PICU     REFERRAL SOURCE:  On-Going  Support    PRIMARY FOCUS:     Emotional/spiritual/Druze support    Support for coping     Supportive conversation shared with Eric in the hallway while Geo received nursing cares. Eric shared his grief and spoke about the emotional experience of the past week.  He shared his erum history and his hope that Geo continue on to new life where he will no longer suffer.  He understands that there will be difficult decisions to make and wonders how much should be told to Geo.  He and Christianne hope to bring Geo's brothers into the hospital at a time that would not be alarming to Geo.   They will make that decision after the upcoming care conference.     Eric and Christianne are both in agreement that they do not want Geo to suffer. They are taking daily turns at the hospital but will both be here for the care conference on Thursday.       Confucianist/Erum: Orthodoxy. Church. Their  is aware of Geo's situation.   Spiritual Practice(s): Prayer.      PLAN: Will plan to visit with Christianne when she is at the hospital tomorrow.       Ita Boswell M.Div.  Staff   Pediatric Hematology and Oncology    Pager [phone]  [email or web address]  Cell [phone]

## 2021-05-11 NOTE — PLAN OF CARE
PT Unit 3: Following discussion with interdisciplinary team, will place pt on hold with physical therapy services at this time. He is continuing to assist with repositioning himself in bed, but is not safe for sitting EOB or OOB activity at this time due to impaired safety awareness. PT will continue to follow behind the scenes and provide intervention as needed.  Katelyn Yuan, PT, DPT *[phone]

## 2021-05-11 NOTE — PLAN OF CARE
Multiple PRNs given for continued agitation (see notes). Increased O2 needs noted, no increased WOB or distress noted. Small stool noted. Peeing well. X1 5/kg bolus given for low BPs, with improvement.

## 2021-05-11 NOTE — PROGRESS NOTES
Care Coordinator - Discharge Planning    Admission Date/Time:  5/1/2021  Attending MD:  Eddie Lowry MD       Chart reviewed, discussed with interdisciplinary team.      Assessment   Received request for care conference on 5/13.     Coordination of Care and Referrals:    A care conference is planned for 2:00pm on Thursday, May 13th in the Unit 3 Conference Room.   An email invite was sent to:  Dr. Soham Webb,  [address] Dr. [facility]      Plan  Anticipated Discharge Date: TBD  Anticipated Discharge Plan:  TBD      Sofie Bains RN

## 2021-05-11 NOTE — PROVIDER NOTIFICATION
05/11/21 1205 05/11/21 1210 05/11/21 1215   Vitals   BP (!) 75/50 (!) 73/40 (!) 70/39   BP - Mean 61 51 54   Notified Resident KAREEM Stoner. Request to turn onto back. No new orders.

## 2021-05-11 NOTE — PLAN OF CARE
Afebrile. Pt intermittently agitated and combative. PRN haldol x 1, PRN morphine x 1, PRN ativan x 1, PRN zyprexa x 1 used. Clonidine patch started. Precedex dose up to 1.4 mcg/kg/hr overnight. Pt intermittently able to respond to commands. Pupils equal and reactive. Lung sounds diminished bilaterally. No vent setting changes. Patient warm and well perfused with good pulses. Small BM x 1. Straight cath x 1 due to retention. Will continue with plan of care and notify team of changes.

## 2021-05-11 NOTE — PROGRESS NOTES
[facility]    Progress Note - Pediatric ICU Service   Date of Admission:  5/1/2021    Assessment & Plan    Geo Hicks is a 20 year old male admitted on 5/1/2021. 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 He was transferred to PICU with sepsis and acute on chronic hypoxic hypercapnic respiratory failure and delirium. He requires ICU level care for full mask BiPAP and management of delirium.     Changes today: 05/11/2021  - Zyprexa 10 mg BID   - Precedex 0.5 mcg/kg Q4H PRN. Do not give if HR <75  - Care conference 5/13  - Do not obtain labs earlier than 0800   - For short term treatment, will have PRN ativan available    FEN/Renal  - NPO  - PPN @ 100 ml/hr  - lasix 10mg IV daily  - goal net even  - BMP W/Fr    Respiratory  Acute on chronic hypoxic hypercapnic respiratory failure  Obstructive sleep apnea  - Continuous pulse oximetry  - Pulmonary/Sleep Medicine consult, appreciate recs  -Optimize sleep wake cycle:    Pulmonary regimen QID   - AVAPs  - PEEP 10 while awake, and 12 while asleep   - VBG in AM      Cardiovascular  Hypotension likely 2/2 septic shock, resolved  - Continuous cardiac monitoring  - S/p stress dose steroid, see below  - MAP goal >65    Heme/Onc  Ependymoma  Patient on active COG study  - Entinostat 7 mg weekly (due 5/6, but will delay at least a week per Hem/Onc)  - CBC M/Th  - lovenox 40mg BID for DVT ppx     ID  Sepsis  Most likely 2/2 aspiration PNA.  - Continue IV Unasyn 3g Q6H, plan for 10 day course, stop date: 5/12/21   - Low threshold to broaden if not improving/worsening  - Monitor for fevers  - Bactrim ppx     GI  Chronic constipation  Gastroesophageal reflux  - NPO  -  PTA pantoprazole and linaclotide  - prn glycerin suppository for  constipation  - can consider lactulose instead of miralax if needed (smaller volume)    Endo  Chronic low dose steroid use  Adrenal insufficiency  - last dose of IV hydrocortisone 5/9  - PTA dexamethasone 0.75 mg QAM 5/10    Neuro/Psych  Agitation  Delusions  Multiple cranial nerve palsies  Ataxia  ICU delirium  - Q4H neuro checks  - Delirium precautions   - Psychiatry consult: Discussed goals of care  - continue PTA lamotrigine QHS, olanzapine at bedtime, trazodone qHS, and sertraline  - olanzapine 5mg (enteral or IM) bid prn; max dose 30mg/day  - continue melatonin at bedtime  - continue precedex gtt titrate as needed. 1.4 bedtime  - Precedex 0.5 mcg/kg Q4H PRN. Do not give if HR <75  - Try avoid benzos and anticholinergics (benadryl, atarax, etc)   - For short term treatment, will have PRN ativan available     Subconjunctival hemorrhage  - eye drops prn   - Ophtho consult, continue to monitor and apply drops    Goals of care  Per discussion with parents on 5/3, Geo is DNR/DNI. PACCT was involved in the discussion. Repeat conversation 5/10 to discuss comfort care options vs moving to another facility vs continuing with current plan. PACCT, heme/onc involved with discussion with plan to talk to meet again on 5/13  - morphine prn for air hunger  - avoid benzos     Diet: NPO for Medical/Clinical Reasons Except for: Meds  parenteral nutrition - ADULT compounded formula    Fluids: none  Lines: Midline, PIV, NG  DVT Prophylaxis: lovenox   Thornton Catheter: not present  Code Status: No CPR- Do NOT Intubate       Disposition Plan   Expected discharge: > 7 days, recommended to prior living arrangement once respiratory status back to baseline, tolerating PO.  Entered: Herbie Patiño III, MD 05/11/2021, 7:20 AM       Patient seen and discussed with fellow provider Dr. Nicki Patiño III, MD  PGY-2        Pediatric Critical Care Fellow Attestation Note:     Geo Hicks is a 19 yo M w/ ependymoma (receiving  palliative chemotherapy) and acute on chronic respiratory failure (aspiration pneumonia +/- disease progression) who remains critically ill with worsening CNS symptoms and continued NIPPV requirement. Pt is DNR/DNI and working to determine goals of care.     I personally examined and evaluated the patient today. All physician orders and treatments were placed at my direction.  Formulated plan with the house staff team or resident(s) and agree with the findings and plan in this note.  I have evaluated all laboratory values and imaging studies from the past 24 hours.  Consults ongoing and ordered are: oncology, pulmonology, PACCT, psychiatry  I personally managed the respiratory and hemodynamic support, metabolic abnormalities, nutritional status, antimicrobial therapy, and pain/sedation management.   Key decisions made today included: Pediatric Critical Care Fellow Attestation Note:     Geo Hicks is a 19 yo M w/ ependymoma (receiving palliative chemotherapy) and acute on chronic respiratory failure (aspiration pneumonia +/- disease progression) who remains critically ill with worsening CNS symptoms and continued NIPPV requirement. Pt is DNR/DNI and working to determine goals of care.     I personally examined and evaluated the patient today. All physician orders and treatments were placed at my direction.  Formulated plan with the house staff team or resident(s) and agree with the findings and plan in this note.  I have evaluated all laboratory values and imaging studies from the past 24 hours.  Consults ongoing and ordered are: oncology, pulmonology, PACCT, psychiatry  I personally managed the respiratory and hemodynamic support, metabolic abnormalities, nutritional status, antimicrobial therapy, and pain/sedation management.   Key decisions made today included: continue PPN, continue current BiPAP settings, complete current antibiotic course, decrease dexmedetomidine during day w/ plans to empirically increase  overnight, increase scheduled zyprexa w/ PRN haldol for acute agitation overnight, optimize day/night schedule (lights on/off appropriately, no early blood draws, ok to skip overnight BP check, stop q1h neurochecks), ok for nighttime lorazepam dose to help prevent acute agitation events, discuss w/ psychiatry  Procedures that will happen in the ICU today are: none  The above plans and care have been discussed with father and all questions and concerns were addressed.     Pt discussed w/ attending physician Dr. Cuevas.     Ramana Caceres, PGY6  Critical Care Fellow  Procedures that will happen in the ICU today are: none  The above plans and care have been discussed with father and all questions and concerns were addressed.     Pt discussed w/ attending physician Dr. Cuevas.     Ramana Caceres, PGY6  Critical Care Fellow      Pediatric Critical Care Attestation:     Patient is critically ill with acute on chronic respiratory failure in the setting of late stage ependymoma on palliative chemo therapy. Also with altered mental status thought to be related to tumor and radiation which is worsening. Significant delirium and aggression overnight and decreased wakefulness during the day.  I personally examined and evaluated the patient today, and have discussed plans with the resident and nurse. All physician orders and treatments were placed at my direction.   Today's treatment plans are: increasing xyprexa, using haldol as needed, dnr/dni and discussing care goals with family  Patient's weight today is: 262 lbs 5.56 oz  The above plans and care have been discussed with parents, pulmonary, psych, pacct, heme/onc.  I spent a total of  40  minutes providing critical care services at the bedside and on the critical care unit, evaluating the patient, directing care and reviewing laboratory values and radiologic reports for this patient. I agree with the findings and plan of care as documented in the note.    Joey Cuevas  MD  PICU Attending      ______________________________________________________________________    Interval History   Very agitated overnight.  He received morphine, haldol, ativan, and clonidine patch. Bladder scan showed 1 L urine. He was straight cathed with good UOP. Discussed this AM about goals of care and plan is to have a larger discussion on 5/13.      Data reviewed today: I reviewed all medications, new labs and imaging results over the last 24 hours.     Physical Exam   Vital Signs: Temp: 98.5  F (36.9  C) Temp src: Axillary BP: 123/65 Pulse: 67   Resp: 17 SpO2: 99 % O2 Device: BiPAP/CPAP    Weight: 262 lbs 5.56 oz    General: Laying in bed, BiPAP in place, intermittently awake, NAD. Mitts on bilateral hands to prevent pulling at lines.   HEENT: NC/AT, bilateral subconjunctival hemorrhages (R>L)-right is deep red  Cardiovascular: RRR, normal S1/S2, no S3/S4, no murmurs appreciated  Pulm: CTAB, diminished at the bases, no crackles or wheezes  Abdomen: Soft, obese, striae, non-tender, non-distended, +BS   Extremities: Warm, dry, no peripheral edema  Neuro: When awake moving extremities in bed, intermittently jerking of legs when touched.     Data   Recent Labs   Lab 05/10/21  0600 05/09/21  0600 05/08/21  0500 05/06/21  0520 05/06/21  0520 05/05/21  0524   WBC 5.4  --   --   --  3.8* 4.1   HGB 9.8*  --   --   --  8.6* 8.1*   MCV 80  --   --   --  81 83     --   --   --  118* 96*   INR 1.03  --   --   --  1.05  --     140 141   < > 141 142   POTASSIUM 3.6 3.3* 3.8   < > 3.5 3.8   CHLORIDE 104 101 100   < > 103 104   CO2 34* 38* 33*   < > 36* 34*   BUN 21 23 22   < > 18 19   CR 0.90 0.83 1.03   < > 0.89 0.96   ANIONGAP 2* 1* 8   < > 2* 4   KATIE 8.3* 8.2* 8.1*   < > 8.1* 8.5   * 128* 97   < > 84 100*   ALBUMIN 2.7*  --   --   --  2.5*  --    PROTTOTAL 6.2*  --   --   --  6.0*  --    BILITOTAL 0.3  --   --   --  0.2  --    ALKPHOS 94  --   --   --  94  --    ALT 22  --   --   --  20  --    AST  17  --   --   --  13  --     < > = values in this interval not displayed.     No results found for this or any previous visit (from the past 24 hour(s)).

## 2021-05-11 NOTE — PROGRESS NOTES
05/11/21 1509   Child Life   Location PICU   Intervention Therapeutic Intervention   Outcomes/Follow Up Continue to Follow/Support     Child life specialist and Marium, Facility Dog, provided supportive visit to promote comfort and relaxation. Pt knows Rockgraciela from previous visit as well as past admissions. Pt observed to nod in communication to want Rocket to visit in bed with him, shuffled body over independently so Rocket could fit better at his side. Pt observed to pet Rocket, intermittently, resting between petting sessions.     Pt became restless/agitated at one point, able to easily be redirected/oriented through focusing on petting Rocket, and was able to get his body back into a comfortable position and continue visit.     Dad engaged at start of visit. Once Geo was comfortable and Rocket situated next to him, dad went to back of room to rest.    Will continue to follow to provide support and comfort.

## 2021-05-12 NOTE — PROGRESS NOTES
[facility]  PEDIATRIC HEMATOLOGY/ONCOLOGY   SOCIAL WORK PROGRESS NOTE      DATA:     Geo Hicks is a 20 year old male admitted on 5/1/2021. 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 He was transferred to PICU with sepsis and acute on chronic hypoxic hypercapnic respiratory failure and delirium. He requires ICU level care for full mask BiPAP and management of delirium.    Writer met with Geo's mother, Christianne, at bedside. Geo was asleep during SW visit. Christianne and writer went over [place] paperwork and discussed the care conference being rescheduled for today at 2PM. Christianne acknowledged that moving this care conference up is important. She stated that she does not want to see Geo suffering anymore - sharing that he has been agitated and uncooperative with his BiPAP.   Christianne noted that she could tell Geo was declining just by the way he looked this morning when she arrived at bedside. The family continues to want peace and comfort for Geo.     INTERVENTION:     Supportive check-in, engaging family in adjustment counseling, FMLA paperwork     ASSESSMENT:     Christianne easily engaged with writer. Mood appeared stable and affect appropriate. Family has a strong support system. Christianne is a strong advocate for not wanting Geo to suffer and making him as comfortable as possible.      PLAN:     Social work will continue to assess needs and provide ongoing psychosocial support and access to resources.     GARCIA Agarwal, [facility]    Phone: [phone]  Pager: [phone]  Email: [email or web address]  5/12/2021  *no letter*

## 2021-05-12 NOTE — PLAN OF CARE
PT VSS. Increasingly more agitated into evening, requiring wrist restraints. Intermittently attempting to climb out of bed, kicking, thrashing, yelling. Behaviors improved temporarily with PRN zyprexa and ativan. Pt very agitated by mask and full bladder. Due to ongoing behaviors, ankle restraints were also used for patient and staff safety. Staff continues to provide the least restraint possible and redirects and calms as able. On BIPAP scuba mask for duration of night, able to wean FiO2 from 60% to 45%. Voiding adequate amounts but holds urine for a long time. Will continue to monitor.

## 2021-05-12 NOTE — PROGRESS NOTES
If you have an active [facility] account, please look for your well child questionnaire to come to your [facility] account before your next well child visit.    Well-Baby Checkup: Up to 1 Month     Its fine to take the baby out. Avoid prolonged sun exposure and crowds where germs can spread.     After your first  visit, your baby will likely have a checkup within his or her first month of life. At this checkup, the healthcare provider will examine the baby and ask how things are going at home. This sheet describes some of what you can expect.  Development and milestones  The healthcare provider will ask questions about your baby. He or she will observe the baby to get an idea of the infants development. By this visit, your baby is likely doing some of the following:  · Smiling for no apparent reason (called a spontaneous smile)  · Making eye contact, especially during feeding  · Making random sounds (also called vocalizing)  · Trying to lift his or her head  · Wiggling and squirming. Each arm and leg should move about the same amount. If not, tell the healthcare provider.  · Becoming startled when hearing a loud noise  Feeding tips  At around 2 weeks of age, your baby should be back to his or her birth weight. Continue to feed your baby either breastmilk or formula. To help your baby eat well:  · During the day, feed at least every 2 to 3 hours. You may need to wake the baby for daytime feedings.  · At night, feed when the baby wakes, often every 3 to 4 hours. You may choose not to wake the baby for nighttime feedings. Discuss this with the healthcare provider.  · Breastfeeding sessions should last around 15 to 20 minutes. With a bottle, lowly increase the amount of formula or breastmilk you give your baby. By 1 month of age, most babies eat about 4 ounces per feeding, but this can vary.  · If youre concerned about how much or how often your baby eats, discuss this with the healthcare provider.  · Ask  Pediatric Critical Care Progress Note:  Date of Service (when I saw the patient): 5/9/21    Geo Hicks remains critically ill with acute on chronic respiratory failure likely secondary to aspiration pneumonia, chronic CANDELARIO (noncompliant with home BiPAP) and progression of underlying illness. Now developing worsening delerium and intermittently removing his mask- requiring precedex for sedation.    I personally examined and evaluated the patient today. All physician orders and treatments were placed at my direction.  Formulated plan with the house staff team or resident(s) and agree with the findings and plan in this note.  I have evaluated all laboratory values and imaging studies from the past 24 hours.  Consults ongoing and ordered are Oncology, PACCT and Pulmonology  I personally managed the respiratory and hemodynamic support, metabolic abnormalities, nutritional status, antimicrobial therapy, and pain/sedation management.   Key decisions made today included : continue AVAPS- wean daytime EPAP, continue cough assist (+/- metaneb) and vest every 6 hours, wean stress hydrocortisone- to transition back to home decadron tomorrow, continue home lamictal, olanzapine and sertraline, NPO (unable to place post-pyloric tube so will only administer small volume meds via NG), continue peripheral PN, lasix 10 mg daily to keep fluid balance even, hold palliative chemotherapy per oncology, continue unasyn and prophylactic bactrim, continue precedex, consult psych for assistance in management of psych meds/delerium/agitation.  Procedures that will happen in the ICU today are: non-invasive positive pressure ventilation  The above plans and care have been discussed with mother and father and all questions and concerns were addressed.  I spent a total of 40 minutes providing critical care services at the bedside, and on the critical care unit, evaluating the patient, directing care and reviewing laboratory values and radiologic  reports for Geo Hicks.    Rima Cortez MD  Pediatric Critical Care  [phone]      [facility]     Progress Note - Pediatric ICU Service        Date of Admission:  5/1/2021        Assessment & Plan      Geo Hicks is a 20 year old male admitted on 5/1/2021. 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      Changes today:  - schedule lasix 10mg IV daily  - wean PEEP back to 10 during the day  - restart PTA decadron tomorrow  - Psych consult for delirium and assistance with meds     FEN/Renal  - NPO  - peripheral TPN @ 100 ml/hr  - lasix 10mg IV daily  - goal net even  - Daily BMP     Respiratory  Acute on chronic hypoxic hypercapnic respiratory failure  Obstructive sleep apnea  - Continuous pulse oximetry  - Pulmonary/Sleep Medicine consult, appreciate recs  - AVAPs  - PEEP 10 while awake, and 12 while asleep   - trend VBGs daily      Cardiovascular  Hypotension likely 2/2 septic shock, resolved  - Continuous cardiac monitoring  - Stress dose steroids weaning, see below  - MAP goal >65     Heme/Onc  Ependymoma  Patient on active COG study  - Entinostat 7 mg weekly (due 5/6, but will delay at least a week per Hem/Onc)  - CBC M/Th  - lovenox 40mg BID for DVT ppx      ID  Sepsis  Most likely 2/2 aspiration PNA.  - Continue IV Unasyn 3g Q6H, plan for 10 day course, stop date: 5/12/21              - Low threshold to broaden if not improving/worsening  - Monitor for fevers  - Bactrim ppx      GI  Chronic constipation  Gastroesophageal reflux  - NPO  -  PTA pantoprazole and linaclotide  - prn glycerin suppository for constipation  - can consider  the healthcare provider if your baby should take vitamin D.  · Don't give the baby anything to eat besides breastmilk or formula. Your baby is too young for solid foods (solids) or other liquids. An infant this age does not need to be given water.  · Be aware that many babies begin to spit up around 1 month of age. In most cases, this is normal. Call the healthcare provider right away if the baby spits up often and forcefully, or spits up anything besides milk or formula.  Hygiene tips  · Some babies poop (have a bowel movement) a few times a day. Others poop as little as once every 2 to 3 days. Anything in this range is normal. Change the babys diaper when it becomes wet or dirty.  · Its fine if your baby poops even less often than every 2 to 3 days if the baby is otherwise healthy. But if the baby also becomes fussy, spits up more than normal, eats less than normal, or has very hard stool, tell the healthcare provider. The baby may be constipated (unable to have a bowel movement).  · Stool may range in color from mustard yellow to brown to green. If the stools are another color, tell the healthcare provider.  · Bathe your baby a few times per week. You may give baths more often if the baby enjoys it. But because youre cleaning the baby during diaper changes, a daily bath often isnt needed.  · Its OK to use mild (hypoallergenic) creams or lotions on the babys skin. Avoid putting lotion on the babys hands.  Sleeping tips  At this age, your baby may sleep up to 18 to 20 hours each day. Its common for babies to sleep for short spurts throughout the day, rather than for hours at a time. The baby may be fussy before going to bed for the night (around 6 p.m. to 9 p.m.). This is normal. To help your baby sleep safely and soundly:  · Put your baby on his or her back for naps and sleeping until your child is 1 year old. This can lower the risk for SIDS, aspiration, and choking. Never put your baby on his or her  lactulose instead of miralax if needed (smaller volume)     Endo  Chronic low dose steroid use  Adrenal insufficiency  - last dose of IV hydrocortisone today  - restart PTA dexamethasone 0.75 mg QAM 5/10     Neuro/Psych  Agitation  Delusions  Multiple cranial nerve palsies  Ataxia  - Q4H neuro checks  - Delirium precautions   - Psychiatry consult:              - continue PTA lamotrigine qHS, olanzapine at bedtime, trazodone qHS, and sertraline              - olanzapine 5mg (enteral or IM) bid prn; max dose 20-25mg/day              - continue melatonin at bedtime              - continue precedex gtt with goal to wean off during the daytime              - avoid benzos and anticholinergics (benadryl, atarax, etc)     Subconjunctival hemorrhage  - eye drops prn   - Ophtho consult, continue to monitor and apply drops     Goals of care  Per discussion with parents on 5/3, Geo is DNR/DNI. PACCT was involved in the discussion.   - morphine prn for air hunger  - avoid benzos        Diet: NPO for Medical/Clinical Reasons Except for: Meds  parenteral nutrition - ADULT compounded formula  parenteral nutrition - ADULT compounded formula    Fluids: none  Lines: Midline, PIV, NG  DVT Prophylaxis: lovenox   Thornton Catheter: not present  Code Status: No CPR- Do NOT Intubate          Disposition Plan     Expected discharge: > 7 days, recommended to prior living arrangement once respiratory status back to baseline, tolerating PO.  Entered: Ita Roach MD 05/09/2021, 1:05 PM        The patient's care was discussed with the PICU attending and fellow.          Ita Roach MD  Medicine-Pediatrics, PGY-2  ______________________________________________________________________        Interval History     Nursing notes reviewed. Overnight, Geo pulled out his NG and it was replaced with a smaller one. Received 10mg IV lasix for limited urine output yesterday. Received ativan, benadryl, and melatonin prns for  side or stomach for sleep or naps. When your baby is awake, let your child spend time on his or her tummy as long as you are watching your child. This helps your child build strong tummy and neck muscles. This will also help keep your baby's head from flattening. This problem can happen when babies spend so much time on their back.  · Ask the healthcare provider if you should let your baby sleep with a pacifier. Sleeping with a pacifier has been shown to decrease the risk for SIDS. But it should not be offered until after breastfeeding has been established. If your baby doesn't want the pacifier, don't try to force him or her to take one.  · Don't put a crib bumper, pillow, loose blankets, or stuffed animals in the crib. These could suffocate the baby.  · Don't put your baby on a couch or armchair for sleep. Sleeping on a couch or armchair puts the baby at a much higher risk for death, including SIDS.  · Don't use infant seats, car seats, strollers, infant carriers, or infant swings for routine sleep and daily naps. These may cause a baby's airway to become blocked or the baby to suffocate.  · Swaddling (wrapping the baby in a blanket) can help the baby feel safe and fall asleep. Make sure your baby can easily move his or her legs.  · Its OK to put the baby to bed awake. Its also OK to let the baby cry in bed, but only for a few minutes. At this age, babies arent ready to cry themselves to sleep.  · If you have trouble getting your baby to sleep, ask the health care provider for tips.  · Don't share a bed (co-sleep) with your baby. Bed-sharing has been shown to increase the risk for SIDS. The American Academy of Pediatrics says that babies should sleep in the same room as their parents. They should be close to their parents' bed, but in a separate bed or crib. This sleeping setup should be done for the baby's first year, if possible. But you should do it for at least the first 6 months.  · Always put cribs,  agitation and confusion. Remained on precedex gtt. Tolerated PEEP wean to 10 yesterday. PEEP was increased back to 12 for sleeping.      Data reviewed today: I reviewed all medications, new labs and imaging results over the last 24 hours.            Physical Exam     Vital Signs: Temp: 97  F (36.1  C) Temp src: Axillary BP: 118/69 Pulse: 68   Resp: 15 SpO2: 96 % O2 Device: BiPAP/CPAP    Weight: 262 lbs 5.56 oz     General: Laying in bed, BiPAP in place, awake, NAD. Mitts on bilateral hands to prevent pulling at lines.   HEENT: NC/AT, bilateral subconjunctival hemorrhages (R>L)-right is deep red,  BiPAP mask in place, NG in place   Cardiovascular: RRR, normal S1/S2, no S3/S4, no murmurs appreciated  Pulm: CTAB, diminished at the bases, no crackles or wheezes  Abdomen: Soft, obese, striae, non-tender, non-distended, +BS   Extremities: Warm, dry, no peripheral edema  Neuro: Awake, moving extremities in bed.    bassinets, and play yards in areas with no hazards. This means no dangling cords, wires, or window coverings. This will lower the risk for strangulation.  · Don't use baby heart rate and monitors or special devices to help lower the risk for SIDS. These devices include wedges, positioners, and special mattresses. These devices have not been shown to prevent SIDS. In rare cases, they have caused the death of a baby.  · Talk with your baby's healthcare provider about these and other health and safety issues.  Safety tips  · To avoid burns, dont carry or drink hot liquids, such as coffee, near the baby. Turn the water heater down to a temperature of 120°F (49°C) or below.  · Dont smoke or allow others to smoke near the baby. If you or other family members smoke, do so outdoors while wearing a jacket, and then remove the jacket before holding the baby. Never smoke around the baby  · Its usually fine to take a  out of the house. But stay away from confined, crowded places where germs can spread.  · When you take the baby outside, don't stay too long in direct sunlight. Keep the baby covered, or seek out the shade.   · In the car, always put the baby in a rear-facing car seat. This should be secured in the back seat according to the car seats directions. Never leave the baby alone in the car.  · Don't leave the baby on a high surface such as a table, bed, or couch. He or she could fall and get hurt.  · Older siblings will likely want to hold, play with, and get to know the baby. This is fine as long as an adult supervises.  · Call the healthcare provider right away if the baby has a fever (see Fever and children, below).  Vaccines  Based on recommendations from the CDC, your baby may get the hepatitis B vaccine if he or she did not already get it in the hospital after birth. Having your baby fully vaccinated will also help lower your baby's risk for SIDS.        Fever and children  Always use a digital  thermometer to check your childs temperature. Never use a mercury thermometer.  For infants and toddlers, be sure to use a rectal thermometer correctly. A rectal thermometer may accidentally poke a hole in (perforate) the rectum. It may also pass on germs from the stool. Always follow the product makers directions for proper use. If you dont feel comfortable taking a rectal temperature, use another method. When you talk to your childs healthcare provider, tell him or her which method you used to take your childs temperature.  Here are guidelines for fever temperature. Ear temperatures arent accurate before 6 months of age. Dont take an oral temperature until your child is at least 4 years old.  Infant under 3 months old:  · Ask your childs healthcare provider how you should take the temperature.  · Rectal or forehead (temporal artery) temperature of 100.4°F (38°C) or higher, or as directed by the provider  · Armpit temperature of 99°F (37.2°C) or higher, or as directed by the provider      Signs of postpartum depression  Its normal to be weepy and tired right after having a baby. These feelings should go away in about a week. If youre still feeling this way, it may be a sign of postpartum depression, a more serious problem. Symptoms may include:  · Feelings of deep sadness  · Gaining or losing a lot of weight  · Sleeping too much or too little  · Feeling tired all the time  · Feeling restless  · Feeling worthless or guilty  · Fearing that your baby will be harmed  · Worrying that youre a bad parent  · Having trouble thinking clearly or making decisions  · Thinking about death or suicide  If you have any of these symptoms, talk to your OB/GYN or another healthcare provider. Treatment can help you feel better.     Next checkup at: _______________________________     PARENT NOTES:           Date Last Reviewed: 11/1/2016 © 2000-2017 [employer]. [address] [place] [address], [place] [address]. All  rights reserved. This information is not intended as a substitute for professional medical care. Always follow your healthcare professional's instructions.          If you have an active [facility] account, please look for your well child questionnaire to come to your [employer] account before your next well child visit.    Well-Child Checkup: 11 to 13 Years     Physical activity is key to lifelong good health. Encourage your child to find activities that he or she enjoys.     Between ages 11 and 13, your child will grow and change a lot. Its important to keep having yearly checkups so the healthcare provider can track this progress. As your child enters puberty, he or she may become more embarrassed about having a checkup. Reassure your child that the exam is normal and necessary. Be aware that the healthcare provider may ask to talk with the child without you in the exam room.  School and social issues  Here are some topics you, your child, and the healthcare provider may want to discuss during this visit:  · School performance. How is your child doing in school? Is homework finished on time? Does your child stay organized? These are skills you can help with. Keep in mind that a drop in school performance can be a sign of other problems.  · Friendships. Do you like your childs friends? Do the friendships seem healthy? Make sure to talk to your child about who his or her friends are and how they spend time together. This is the age when peer pressure can start to be a problem.  · Life at home. How is your childs behavior? Does he or she get along with others in the family? Is he or she respectful of you, other adults, and authority? Does your child participate in family events, or does he or she withdraw from other family members?  · Risky behaviors. Its not too early to start talking to your child about drugs, alcohol, smoking, and sex. Make sure your child understands that these are not activities he or she  should do, even if friends are. Answer your childs questions, and dont be afraid to ask questions of your own. Make sure your child knows he or she can always come to you for help. If youre not sure how to approach these topics, talk to the healthcare provider for advice.  Entering puberty  Puberty is the stage when a child begins to develop sexually into an adult. It usually starts between 9 and 14 for girls, and between 12 and 16 for boys. Here is some of what you can expect when puberty begins:  · Acne and body odor. Hormones that increase during puberty can cause acne (pimples) on the face and body. Hormones can also increase sweating and cause a stronger body odor. At this age, your child should begin to shower or bathe daily. Encourage your child to use deodorant and acne products as needed.  · Body changes in girls. Early in puberty, breasts begin to develop. One breast often starts to grow before the other. This is normal. Hair begins to grow in the pubic area, under the arms, and on the legs. Around 2 years after breasts begin to grow, a girl will start having monthly periods (menstruation). To help prepare your daughter for this change, talk to her about periods, what to expect, and how to use feminine products.  · Body changes in boys. At the start of puberty, the testicles drop lower and the scrotum darkens and becomes looser. Hair begins to grow in the pubic area, under the arms, and on the legs, chest, and face. The voice changes, becoming lower and deeper. As the penis grows and matures, erections and wet dreams begin to happen. Reassure your son that this is normal.  · Emotional changes. Along with these physical changes, youll likely notice changes in your childs personality. You may notice your child developing an interest in dating and becoming more than friends with others. Also, many kids become saunders and develop an attitude around puberty. This can be frustrating, but it is very  normal. Try to be patient and consistent. Encourage conversations, even when your child doesnt seem to want to talk. No matter how your child acts, he or she still needs a parent.  Nutrition and exercise tips  Today, kids are less active and eat more junk food than ever before. Your child is starting to make choices about what to eat and how active to be. You cant always have the final say, but you can help your child develop healthy habits. Here are some tips:  · Help your child get at least 30 to 60 minutes of activity every day. The time can be broken up throughout the day. If the weathers bad or youre worried about safety, find supervised indoor activities.   · Limit screen time to 1 hour each day. This includes time spent watching TV, playing video games, using the computer, and texting. If your child has a TV, computer, or video game console in the bedroom, consider replacing it with a music player. For many kids, dancing and singing are fun ways to get moving.  · Limit sugary drinks. Soda, juice, and sports drinks lead to unhealthy weight gain and tooth decay. Water and low-fat or nonfat milk are best to drink. In moderation (no more than 8 to 12 ounces daily), 100% fruit juice is OK. Save soda and other sugary drinks for special occasions.  · Have at least one family meal together each day. Busy schedules often limit time for sitting and talking. Sitting and eating together allows for family time. It also lets you see what and how your child eats.  · Pay attention to portions. Serve portions that make sense for your kids. Let them stop eating when theyre full--dont make them clean their plates. Be aware that many kids appetites increase during puberty. If your child is still hungry after a meal, offer seconds of vegetables or fruit.  · Serve and encourage healthy foods. Your child is making more food decisions on his or her own. All foods have a place in a balanced diet. Fruits, vegetables, lean  "meats, and whole grains should be eaten every day. Save less healthy foods--like french fries, candy, and chips--for a special occasion. When your child does choose to eat junk food, consider making the child buy it with his or her own money. Ask your child to tell you when he or she buys junk food or swaps food with friends.  · Bring your child to the dentist at least twice a year for teeth cleaning and a checkup.  Sleeping tips  At this age, your child needs about 10 hours of sleep each night. Here are some tips:  · Set a bedtime and make sure your child follows it each night.  · TV, computer, and video games can agitate a child and make it hard to calm down for the night. Turn them off the at least an hour before bed. Instead, encourage your child to read before bed.  · If your child has a cell phone, make sure its turned off at night.  · Dont let your child go to sleep very late or sleep in on weekends. This can disrupt sleep patterns and make it harder to sleep on school nights.  · Remind your child to brush and floss his or her teeth before bed. Briefly supervise your child's dental self-care once a week to make sure of proper technique.  Safety tips  Recommendations for keeping your child safe include the following:   · When riding a bike, roller-skating, or using a scooter or skateboard, your child should wear a helmet with the strap fastened. When using roller skates, a scooter, or a skateboard, it is also a good idea for your child to wear wrist guards, elbow pads, and knee pads.  · In the car, all children younger than 13 should sit in the back seat. Children shorter than 4'9" (57 inches) should continue to use a booster seat to properly position the seat belt.  · If your child has a cell phone or portable music player, make sure these are used safely and responsibly. Do not allow your child to talk on the phone, text, or listen to music with headphones while he or she is riding a bike or walking " outdoors. Remind your child to pay special attention when crossing the street.  · Constant loud music can cause hearing damage, so monitor the volume on your childs music player. Many players let you set a limit for how loud the volume can be turned up. Check the directions for details.  · At this age, kids may start taking risks that could be dangerous to their health or well-being. Sometimes bad decisions stem from peer pressure. Other times, kids just dont think ahead about what could happen. Teach your child the importance of making good decisions. Talk about how to recognize peer pressure and come up with strategies for coping with it.  · Sudden changes in your childs mood, behavior, friendships, or activities can be warning signs of problems at school or in other aspects of your childs life. If you notice signs like these, talk to your child and to the staff at your childs school. The healthcare provider may also be able to offer advice.  Vaccines  Based on recommendations from the American Association of Pediatrics, at this visit your child may receive the following vaccines:  · Human papillomavirus (HPV) (ages 11 to 12)  · Influenza (flu), annually  · Meningococcal (ages 11 to 12)  · Tetanus, diphtheria, and pertussis (ages 11 to 12)  Stay on top of social media  In this wired age, kids are much more connected with friends--possibly some theyve never met in person. To teach your child how to use social media responsibly:  · Set limits for the use of cell phones, the computer, and the Internet. Remind your child that you can check the web browser history and cell phone logs to know how these devices are being used. Use parental controls and passwords to block access to inappropriate websites. Use privacy settings on websites so only your childs friends can view his or her profile.  · Explain to your child the dangers of giving out personal information online. Teach your child not to share his or her  phone number, address, picture, or other personal details with online friends without your permission.  · Make sure your child understands that things he or she says on the Internet are never private. Posts made on websites like Facebook, [employer], and [employer] can be seen by people they werent intended for. Posts can easily be misunderstood and can even cause trouble for you or your child. Supervise your childs use of social networks, chat rooms, and email.      Next checkup at: _______________________________     PARENT NOTES:  Date Last Reviewed: 12/1/2016  © 2427-2934 [employer]. [address] [place] [address], [place] [address]. All rights reserved. This information is not intended as a substitute for professional medical care. Always follow your healthcare professional's instructions.

## 2021-05-12 NOTE — PROGRESS NOTES
SPIRITUAL HEALTH SERVICES  SPIRITUAL ASSESSMENT Progress Note  [facility] PICU    REFERRAL SOURCE:  On-Going  Support    PRIMARY FOCUS:     Emotional/spiritual/Presybeterian support    Support for coping     Multiple conversations with Geo's family today outside of his room including at the care conference with Christianne and Eric this afternoon.  Reflective conversation shared with Christianne as she shared her grief and thoughts about Geo's care.  She expressed mostly peace around their decision to remove aggressive life support for Geo.  She believes that Geo is making his wishes known.  We also discussed the time and space in the room before and after his death.  Ideally she would like Geo's brother's present.  We discussed storytelling and prayer.     Caodaism/Erum: Restorationist.  Mosque.  Their [employer] is aware of Geo's situation.  Their  is out of town for the next four days.   Spiritual Practice(s): Prayer    PLAN: Will plan to check in with Geo's family tomorrow.       Ita Boswell M.Div.  Staff   Pediatric Hematology and Oncology    Pager [phone]  [email or web address]  Cell [phone]

## 2021-05-12 NOTE — PROGRESS NOTES
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  Pediatric CL psychiatry team was consulted for new onset behavioral dysregulation which is going on for the past 2 days. His clinical presentation is suggestive of delirium (acute onset fluctuation in level of consciousness along with disorientation to time place and person).  The past psychiatric history is significant for Delusional disorder (irrational belief that he is constipated and gaining weight due to constipation, doctors can help but won't help) and Depression.  Patient gets his psychiatric care at the [facility], during the last visit on 2021; olanzapine was decreased to 10 mg with plan to cross titrate to aripiprazole to mitigate metabolic side effects associated with olanzapine.  His other medications include sertraline 100 mg daily, lamotrigine 200 mg daily, trazodone 150 mg at night.     Continuing to have significant agitation with breakthrough even when Precedex up to 1.4/1.5.  Best to maximize olanzapine and consider other agents to add on.  Need to assess risk/benefit of ongoing agitation/potential impact on vitals/cardiac rhythm.         Recommendations:     Medications:  -Minimize use of benzodiazepines; will quell agitation initially but can contribute to rebound delirium and agitation  -Maximize use of Precedex as much as tolerated, given  "risk/benefit with lowering blood pressure  -Increase olanzapine to 15 mg nightly/5 mg every morning; this had previously been decreased by outpatient provider but at this time higher dose scheduled olanzapine is warranted for agitation control  -Olanzapine 5 mg PO ODT or IM q4h PRN, NTE 40mg all sources   -Best not to mix antipsychotics for best safety profile, but can use Haldol 2 to 5 mg PO/IM for breakthrough agitation  -Check QTc please 5/12    Continue Prior to admission medications  - Continue sertraline 100mg daily  - Continue lamotrigine 200mg daily  - Continue trazodone 150mg at bedtime  - Melatonin 6 mg at bedtime    Nonpharmacological management of delirium:  Frequent reassurance, touch and verbal orientation can lessen disruptive behaviors.  Delusions and hallucinations should neither be endorsed or challenged.  Orientation protocols: Provision of clocks, calendars, windows with outside views and verbally reorienting patients may mitigate confusion.  Cognitive stimulation: Patient may benefit from regular visits from family and friends.  Facilitation of physiological sleep: Nursing and medical procedures, including the administration of medications, should be avoided during sleeping hours when possible.  Visual and hearing aid for patients with these impairments.            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     Continuing to have significant periods of agitation. Overnight per RN note \"Pt intermittently agitated and combative. PRN haldol x 1, PRN morphine x 1, PRN ativan x 1, PRN zyprexa x 1 used. Clonidine " "patch started. Precedex dose up to 1.4 mcg/kg/hr overnight.\" This afternoon, agitated again and pulling at lines, restrained, Precedex was increased and effective initially and then Ativan given when was still agitated. In restraints. Nearly hit staff when flailing.     Care conference Thursday.    When seen was resting after getting PRN's but frequently twitching all 4 limbs and sometimes abdominal muscles. Did not respond to voice or touch.            Past Medical History:     Primary Care Physician: Jeffrey Espinoza    Problem list reviewed as below.     Current medical problems:  Patient Active Problem List   Diagnosis     GERD (gastroesophageal reflux disease)     Closed fracture at the growth plate of right distal fibula      Elevated serum creatinine     Dyspepsia     Intracranial hemorrhage (H)     Hemorrhagic stroke (H)     Ependymoma (H)     Admission for antineoplastic chemotherapy     Post-operative state     S/P craniotomy     S/P biopsy     Noncomitant strabismus     Abducens neuropathy of both eyes     CANDELARIO (obstructive sleep apnea)     Health Care Home     Hypernatremia     Body temperature low     Current chronic use of systemic steroids     Elevated TSH     Status post chemotherapy     Neoplasm of posterior cranial fossa (H)     Chronic constipation     Serum albumin decreased     Closed compression fracture of thoracic vertebra with routine healing, subsequent encounter     Depression     Constipation     Constipation by delayed colonic transit     Slow transit constipation     Tubular adenoma     Hypoxia     Respiratory failure (H)               Past Surgical History:     Past Surgical History:   Procedure Laterality Date     COLONOSCOPY N/A 9/27/2019    Procedure: Colonoscopy With Biopsies and Polypectomy;  Surgeon: Aniya Wei MD;  Location: [facility]     ESOPHAGOSCOPY, GASTROSCOPY, DUODENOSCOPY (EGD), COMBINED N/A 9/27/2019    Procedure: Upper Endoscopy (EGD) With Biopsies;  " Surgeon: Aniya Wei MD;  [facility]: [facility]     GRAFT CARTILAGE FROM POSTERIOR AURICLE Left 10/6/2016    Procedure: GRAFT CARTILAGE FROM POSTERIOR AURICLE;  Surgeon: Tyler Richards MD;  [facility]: [facility]     INCISION AND DRAINAGE PERINEAL, COMBINED Bilateral 7/18/2015    Procedure: COMBINED INCISION AND DRAINAGE PERINEAL;  Surgeon: Dequan Timmons MD;  [facility]: [facility]     OPTICAL TRACKING SYSTEM CRANIOTOMY, EXCISE TUMOR, COMBINED N/A 4/13/2015    Procedure: COMBINED OPTICAL TRACKING SYSTEM CRANIOTOMY, EXCISE TUMOR;  Surgeon: Francis Velazquez MD;  [facility]     OPTICAL TRACKING SYSTEM CRANIOTOMY, EXCISE TUMOR, COMBINED N/A 4/16/2015    Procedure: COMBINED OPTICAL TRACKING SYSTEM CRANIOTOMY, EXCISE TUMOR;  Surgeon: Francis Velazquez MD;  [facility]     OPTICAL TRACKING SYSTEM CRANIOTOMY, EXCISE TUMOR, COMBINED Bilateral 5/28/2015    Procedure: COMBINED OPTICAL TRACKING SYSTEM CRANIOTOMY, EXCISE TUMOR;  Surgeon: Francis Velazquez MD;  [facility]     OPTICAL TRACKING SYSTEM CRANIOTOMY, EXCISE TUMOR, COMBINED Bilateral 1/14/2016    Procedure: COMBINED OPTICAL TRACKING SYSTEM CRANIOTOMY, EXCISE TUMOR;  Surgeon: Francis Velazquez MD;  [facility]     OPTICAL TRACKING SYSTEM VENTRICULOSTOMY  4/16/2015    Procedure: OPTICAL TRACKING SYSTEM VENTRICULOSTOMY;  Surgeon: Francis Velazquez MD;  [facility]     REMOVE PORT VASCULAR ACCESS N/A 10/6/2016    Procedure: REMOVE PORT VASCULAR ACCESS;  Surgeon: Bruno Perea MD;  [facility]     RHINOPLASTY N/A 10/6/2016    Procedure: RHINOPLASTY;  Surgeon: Tyler Richards MD;  [facility]     VASCULAR SURGERY  5-2015    single lumen power port        Review of Systems   As in HPI. No recent changes to opioids. Reported to have current myoclonic jerks at baseline.    Allergies      Allergies   Allergen Reactions     Blood Transfusion Related (Informational Only) Swelling      Periorbital swelling post platelet transfusion     No Known Drug Allergies         Current Medications                                                                                               Current Facility-Administered Medications   Medication     acetaminophen (TYLENOL) tablet 650 mg     albuterol (PROVENTIL) neb solution 2.5 mg     ampicillin-sulbactam (UNASYN) 3 g vial to attach to  mL bag     cloNIDine (CATAPRES-TTS) Patch in Place     cloNIDine (CATAPRES-TTS1) 0.1 MG/24HR WK patch 1 patch     dexamethasone (DECADRON) tablet 0.75 mg     dexmedetomidine (PRECEDEX) 2,000 mcg in sodium chloride 0.9 % 250 mL infusion     enoxaparin ANTICOAGULANT (LOVENOX) injection 40 mg     fexofenadine (ALLEGRA) tablet 180 mg     furosemide (LASIX) injection 10 mg     glycerin (ADULT) Suppository 1 suppository     hydrOXYzine 50 mg in D5W injection PEDS/NICU     [Held by provider] lactobacillus rhamnosus (GG) (CULTURELL) capsule 2 capsule     lamoTRIgine (LaMICtal) tablet 200 mg     linaclotide (LINZESS) capsule 290 mcg     lipids (INTRALIPID) 20 % infusion 240 mL     [Held by provider] LORazepam (ATIVAN) 2 MG/ML (HIGH CONC) solution 2 mg     melatonin liquid 6 mg     morphine (PF) injection 1 mg     naloxone (NARCAN) injection 0.4 mg     OLANZapine (zyPREXA) suspension 5 mg    Or     OLANZapine (zyPREXA) injection 5 mg     OLANZapine (zyPREXA) tablet 15 mg     [START ON 5/12/2021] OLANZapine (zyPREXA) tablet 5 mg     omeprazole (FIRST-OMEPRAZOLE) suspension 40 mg     parenteral nutrition - ADULT compounded formula     polyethylene glycol-propylene glycol PF (SYSTANE ULTRA PF) opthalmic solution 1 drop     [Held by provider] potassium phosphate (monobasic) (K-PHOS) tablet 500 mg     sertraline (ZOLOFT) tablet 100 mg     sodium chloride 0.9% infusion     sulfamethoxazole-trimethoprim (BACTRIM) 400-80 MG per tablet 1 tablet     traZODone (DESYREL) tablet 150 mg     [Held by provider] Vitamin D3 (CHOLECALCIFEROL) tablet  2,000 Units     [Held by provider] vitamin E (TOCOPHEROL) 200 units (90 mg) capsule 400 Units         Mental Status Exam:                                                                         Young adult male who appears stated age, lying flat, under BiPAP scuba mask, in limb restraints, occasionally jerking 4 limbs and occasionally with jerking movements of abdominal muscles.  Sometimes higher-amplitude jerks, sometimes small-amplitude jerking of toes and fingers.  Not responsive to voice or touch.

## 2021-05-12 NOTE — PROGRESS NOTES
[facility]    Progress Note - Pediatric ICU Service   Date of Admission:  5/1/2021    Assessment & Plan    Geo Hicks is a 20 year old male admitted on 5/1/2021. 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 He was transferred to PICU with sepsis and acute on chronic hypoxic hypercapnic respiratory failure and delirium. Family has decided to transition to comfort care.     Changes today: 05/12/2021  - Care conference today  - Comfort care plan  - Stop medications/orders not contributing to comfort  - Start Morphine 2 mg/hr IV continuous infusion with 2 mg IV bolus available Q 20 mins PRN dyspnea/agitation/discomfort    - Atropine 1% ophthalmic (1-2 drops Q2 PRN) bucally or sublingually    FEN/Renal  - NPO  - Discontinue  PPN   - Discontinue lasix   - Discontinue labs    Respiratory  Acute on chronic hypoxic hypercapnic respiratory failure  Obstructive sleep apnea  - Discontinue in room monitors  - Discontinue pulmonary regimen QID   - Discontinue BiPAP    Cardiovascular  Hypotension likely 2/2 septic shock, resolved  - Discontinue in room monitors    Heme/Onc  Ependymoma  Patient on active COG study  - Discontinue lovenox      ID  Sepsis  Most likely 2/2 aspiration PNA.  - Discontinue antibiotics     GI  Chronic constipation  Gastroesophageal reflux  - Discontinue PTA pantoprazole and linaclotid, glycerin suppository for constipation    Endo  Chronic low dose steroid use  Adrenal insufficiency  - last dose of IV hydrocortisone 5/9  - PTA dexamethasone 0.75 mg QAM 5/10    Neuro/Psych  Agitation  Delusions  Multiple cranial nerve palsies  Ataxia  ICU delirium  Per PACCT: Morphine 2 mg/hr IV continuous infusion with 2 mg IV bolus available Q 20 mins PRN  dyspnea/agitation/discomfort     - increase rate of infusion and bolus by 50% every thirty minutes until comfortable as evaluated by:               - respiratory rate 20 or less               - no head bobbing               - no retractions    - when death is near, increase boluses to Q 5 minutes PRN  Anxiety/agitation:  Continue Dexmedetomidine    - If needing to use Lorazepam for agitation, start Midazolam drip at 2 mg/hr and increase by 50% as needed for comfort and stop PRN lorazepam     - Psychiatry consult: Discussed goals of care  - continue PTA lamotrigine QHS, olanzapine at bedtime, trazodone qHS, and sertraline  - olanzapine 5mg (enteral or IM) bid prn; max dose 30mg/day  - continue melatonin at bedtime  - continue precedex gtt titrate as needed.    Subconjunctival hemorrhage  - eye drops prn   - Ophtho consult, continue to monitor and apply drops as needed    Goals of care  Per discussion with parents on 5/3, Geo is DNR/DNI. PACCT was involved in the discussion. Repeat conversation 5/12 family in agreement to transition to comfort cares.   - morphine prn for air hunger     Diet: NPO for Medical/Clinical Reasons Except for: Meds  parenteral nutrition - ADULT compounded formula    Fluids: none  Lines: Midline, PIV, NG  DVT Prophylaxis: lovenox   Thornton Catheter: not present  Code Status: No CPR- Do NOT Intubate       Disposition Plan   Expected discharge: > 7 days, recommended to prior living arrangement once respiratory status back to baseline, tolerating PO.  Entered: Herbie Patiño III, MD 05/12/2021, 6:59 AM       Patient seen and discussed with fellow provider Dr. Nicki Patiño III, MD  PGY-2    Pediatric Critical Care Fellow Attestation Note:     Geo Hicks is a 19 yo M w/ ependymoma (receiving palliative chemotherapy) and acute on chronic respiratory failure (aspiration pneumonia +/- disease progression) who remains critically ill with worsening CNS symptoms in setting of vasogenic edema  and continued NIPPV requirement. Based on discussions today, decisions made to withdraw Ventilatory support this evening and to optimize comfort at end of life.    I personally examined and evaluated the patient today. All physician orders and treatments were placed at my direction.  Formulated plan with the house staff team or resident(s) and agree with the findings and plan in this note.  I have evaluated all laboratory values and imaging studies from the past 24 hours.  Consults ongoing and ordered are: oncology, pulmonology, PACCT, psychiatry  I personally managed the respiratory and hemodynamic support, metabolic abnormalities, nutritional status, antimicrobial therapy, and pain/sedation management.   Key decisions made today included: increase analgesia and sedation regimen to prioritize comfort and patient/staff safety, advance care conference w/ family to this afternoon (decision made for comfort care)  Procedures that will happen in the ICU today are: none  The above plans and care have been discussed with mother and father and all questions and concerns were addressed.    Pt discussed w/ attending physician Dr. Cuevas.    Ramana Caceres, PGY6  Critical Care Fellow      Pediatric Critical Care Attestation:     Patient is critically ill with acute on chronic respiratory failure in the setting of late stage ependymoma on palliative chemo therapy. Also with altered mental status thought to be related to tumor and radiation which is worsening. Significant delirium and aggression overnight and decreased wakefulness during the day.  I personally examined and evaluated the patient today, and have discussed plans with the resident and nurse. All physician orders and treatments were placed at my direction.   Today's treatment plans are: transitioning to comfort care later today. Continue on bipap until then. Continuing xyprexa and other neuro-active drugs for comfort. Will provide morphine when family is ready to  discontinue bipap.    Patient's weight today is: 262 lbs 5.56 oz  The above plans and care have been discussed with parents, pulmonary, psych, pacct, heme/onc.  I spent a total of  40  minutes providing critical care services at the bedside and on the critical care unit, evaluating the patient, directing care and reviewing laboratory values and radiologic reports for this patient. I agree with the findings and plan of care as documented in the note.    Joey Cuevas MD  PICU Attending        ______________________________________________________________________    Interval History   Very agitated overnight.  Combative, swinging, kicking, and sitting up in bed. He was placed in 4 pt restraints and occasionally tried to wiggle out. Prior to getting 4 pt restraints he kick nurse. Precedex was increased to 1.5. Ativan given and he calmed w/in 10 mins. Care conference scheduled for 2 pm and parents in agreement to transition to comfort cares.       Data reviewed today: I reviewed all medications, new labs and imaging results over the last 24 hours.     Physical Exam   Vital Signs: Temp: 97.3  F (36.3  C) Temp src: Temporal BP: 128/65 Pulse: 65   Resp: 25 SpO2: 96 % O2 Device: BiPAP/CPAP    Weight: 262 lbs 5.56 oz    General: Laying in bed, BiPAP in place, intermittently awake, NAD. Mitts on bilateral hands to prevent pulling at lines.   HEENT: NC/AT, bilateral subconjunctival hemorrhages (R>L)-right is deep red  Cardiovascular: RRR, normal S1/S2, no S3/S4, no murmurs appreciated  Pulm: CTAB, diminished at the bases, no crackles or wheezes  Abdomen: Soft, obese, striae, non-tender, non-distended, +BS   Extremities: Warm, dry, no peripheral edema  Neuro: When awake moving extremities in bed, intermittently jerking of legs when touched.     Data   Recent Labs   Lab 05/10/21  0600 05/09/21  0600 05/08/21  0500 05/06/21 0520 05/06/21 0520   WBC 5.4  --   --   --  3.8*   HGB 9.8*  --   --   --  8.6*   MCV 80  --   --   --   81     --   --   --  118*   INR 1.03  --   --   --  1.05    140 141   < > 141   POTASSIUM 3.6 3.3* 3.8   < > 3.5   CHLORIDE 104 101 100   < > 103   CO2 34* 38* 33*   < > 36*   BUN 21 23 22   < > 18   CR 0.90 0.83 1.03   < > 0.89   ANIONGAP 2* 1* 8   < > 2*   KATIE 8.3* 8.2* 8.1*   < > 8.1*   * 128* 97   < > 84   ALBUMIN 2.7*  --   --   --  2.5*   PROTTOTAL 6.2*  --   --   --  6.0*   BILITOTAL 0.3  --   --   --  0.2   ALKPHOS 94  --   --   --  94   ALT 22  --   --   --  20   AST 17  --   --   --  13    < > = values in this interval not displayed.     No results found for this or any previous visit (from the past 24 hour(s)).

## 2021-05-12 NOTE — PROGRESS NOTES
[facility]  Pain and Advanced/Complex Care Team (PACCT)  Progress Note     Geo Hicks MRN# [id number]   Age: 20 year old YOB: 2000   Date:  05/12/2021 Admitted:  5/1/2021     Recommendations, Patient/Family Counseling & Coordination:     Recommendations for planned removal of bipap and for symptom management at end of life     In advance of extubation  1) Discontinue all medications not contributing to comfort  2) If not done already, connect with Child Family life regarding memory making, photos (done by nursing)  3) Discontinue all monitors inside the room (or at least turn off alarms), after discussing with parents (parents agree to this/EDD)  4) Symptom recommendation orders below  5) Discontinue or decrease fluids as much as possible to limit secretions (ideally limit fluids to just those needed to run comfort drips)      Orders to put in for symptom management  Pain and Dyspnea:  Start Morphine 2 mg/hr IV continuous infusion with 2 mg IV bolus available Q 20 mins PRN dyspnea/agitation/discomfort     - increase rate of infusion and bolus by 50% every thirty minutes until comfortable as evaluated by:    - respiratory rate 20 or less    - no head bobbing    - no retractions    - when death is near, increase boluses to Q 5 minutes PRN  Anxiety/agitation:  Continue Dexmedetomidine to be titrated per primary team   - If needing to use Lorazepam for agitation, start Midazolam drip at 2 mg/hr and increase by 50% as needed for comfort and stop PRN lorazepam (which may contribute to rebound agitation)  Secretions: Atropine 1% ophthalmic (1-2 drops Q2 PRN) bucally or sublingually  -If needing secretion management for a longer time, begin glycopyrrolate at 40 mcg/kg Q6 hours PRN   - If urinary retention continues to be an issue, consider manzo catheter  Per discussion with psychiatry, continue with scheduled olanzapine    Please remember that the  "purpose of these medications is to manage comfort aggressively at end of life and to prevent suffering. Sedation, however, might be a side effect, but it is ethically permissible, according to the Doctrine of Double Effect, when the ultimate goal of the intervention is for comfort and relief of suffering. (According to the Doctrine of Double effect, when the ultimate purpose of an action if for good [e.g. comfort, relieving suffering at end of life] a negative side-effect (e.g. oversedation) is ethically permissible)    GOALS OF CARE AND DECISIONAL SUPPORT/SUMMARY OF DISCUSSION WITH PATIENT AND/OR FAMILY:   Attended care conference with PICU fellow, Marisel, Dr. Rousseau, Jaspreet, RN, OTF Jean Baptiste, RYLAND Beckwith, Radha NP (via phone), and anamika Ward RN.  Parents, Eric and Christianne were both present.  Dr. Rousseau talked about Geo's course since admission, and how at this point, he is \"stuck.\"  We are unable to wean his O2 support, and he is on too much support to be able to function (eat, etc) and that his body is shutting down.  Marisel talked about the efforts made to optimize Geo's care, but despite his pneumonia likely being treated, he has been unable to come off any support.  Parents were reassured that none of the current medications were exacerbating this.  In order to fully support him, and try to decrease his CO2, he would need to be intubated, which would likely lead to a trach, g-tube, and all the things that he does not want for himself.  With agreement from parents, his legal guardians, the plan is to remove the scuba mask and transition to comfort cares.  He will start an opioid infusion and it will be escalated for comfort.  His brothers and other close family members will come to visit.  New goals for Geo are to preserve his memory and to facilitate a dignified death.  I discussed these plans with psychiatry.  She recommended to keep him on the olanzapine, and to rotate lorazepam to midazolam drip, " if needed.  Not recommending escalating to risperidone, etc.      After conference, spent extended time with family, older brother.        Thank you for the opportunity to participate in the care of this patient and family.   Please contact the Pain and Advanced/Complex Care Team (PACCT) with any emergent needs via text page to the PACCT general pager ([phone], answered 8-4:30 Monday to Friday). After hours and on weekends/holidays, please refer to [facility] or [place] on-call.    Attestation:  I spent a total of 180 minutes on the inpatient unit today caring for Geo Hicks. Over 50% of my time on the unit was spent coordinating care and counseling regarding goals of care. See note for details.   Discussed with primary team.  Extended time:  3-5 pm    ALAN Tyler CNP [facility]  [phone]      Assessment:      Diagnoses and symptoms: Geo Hicks is a(n) 20 year old male with:  Patient Active Problem List   Diagnosis     GERD (gastroesophageal reflux disease)     Intracranial hemorrhage (H)     Hemorrhagic stroke (H)     Ependymoma (H)     S/P craniotomy     S/P biopsy     Noncomitant strabismus     Abducens neuropathy of both eyes     CANDELARIO (obstructive sleep apnea)     Current chronic use of systemic steroids     Status post chemotherapy     Neoplasm of posterior cranial fossa (H)     Chronic constipation     Depression     Constipation     Slow transit constipation     Hypoxia     Respiratory failure (H)      Possible aspiration pneumonia  Respiratory failure requiring bipap - transitioning to comfort care  Delirium/delusional  Palliative care needs associated with the above    Psychosocial and spiritual concerns:  parents continue to work well together;  Worried about what comes next for Geo    Advance care planning:   Code status: After care conference last week patient is DNR/DNI     Interval Events:     Patient continues to decline with delusions and agitation; unable to wean respiratory  support  Transitioning to comfort care    Medications:     I have reviewed this patient's medication profile and medications during this hospitalization.    Scheduled medications:     ampicillin-sulbactam (UNASYN) IV  3 g Intravenous Q6H     cloNIDine   Transdermal Q8H     cloNIDine  1 patch Transdermal Weekly     dexamethasone  0.75 mg Oral Daily with breakfast     enoxaparin ANTICOAGULANT  40 mg Subcutaneous BID     fexofenadine  180 mg Oral Daily     furosemide  10 mg Intravenous Daily     glycerin  1 suppository Rectal Daily     [Held by provider] lactobacillus rhamnosus (GG)  2 capsule Oral Daily     lamoTRIgine  200 mg Oral At Bedtime     linaclotide  290 mcg Oral QAM AC     lipids  240 mL Intravenous Q24H     [Held by provider] LORazepam  2 mg Oral At Bedtime     melatonin  6 mg Oral At Bedtime     OLANZapine  15 mg Oral At Bedtime     OLANZapine  5 mg Oral QAM     omeprazole  40 mg Per Feeding Tube QAM AC     [Held by provider] potassium phosphate (monobasic)  500 mg Oral BID     sertraline  100 mg Oral Daily     sulfamethoxazole-trimethoprim  1 tablet Oral 2 times per day on Sun Sat     traZODone  150 mg Oral At Bedtime     [Held by provider] Vitamin D3  2,000 Units Oral Daily with breakfast     [Held by provider] vitamin E  400 Units Oral Daily     Infusions:     dexmedetomidine (PRECEDEX) 8 mcg/mL drip-ADULT and PEDS >/= 45 kg (MAX CONC) 1.5 mcg/kg/hr (05/12/21 0720)     parenteral nutrition - ADULT compounded formula       parenteral nutrition - ADULT compounded formula 100 mL/hr at 05/11/21 2005     sodium chloride 5 mL/hr at 05/12/21 1300     PRN medications: acetaminophen, albuterol, hydrOXYzine, morphine, naloxone, OLANZapine **OR** OLANZapine, polyethylene glycol-propylene glycol PF    Review of Systems:     Palliative Symptom Review    The comprehensive review of systems is negative other than noted here and in the HPI. Completed by proxy by parent(s)/caretaker(s) (if applicable)    Physical Exam:        Vitals were reviewed  Temp:  [96.8  F (36  C)-98.2  F (36.8  C)] 98  F (36.7  C)  Pulse:  [63-73] 69  Resp:  [16-57] 27  BP: ()/(33-95) 111/62  FiO2 (%):  [45 %-60 %] 60 %  SpO2:  [82 %-100 %] 93 %  Weight: 118 kg   Detailed exam deferred  Patient lying in bed, scuba mask in place  In 4 pont restraints    Data Reviewed:     Results for orders placed or performed during the hospital encounter of 05/01/21 (from the past 24 hour(s))   Blood gas venous   Result Value Ref Range    Ph Venous 7.28 (L) 7.32 - 7.43 pH    PCO2 Venous 68 (H) 40 - 50 mm Hg    PO2 Venous 47 25 - 47 mm Hg    Bicarbonate Venous 32 (H) 21 - 28 mmol/L    Base Excess Venous 3.5 mmol/L    FIO2 45    Magnesium   Result Value Ref Range    Magnesium 2.1 1.6 - 2.3 mg/dL   Phosphorus   Result Value Ref Range    Phosphorus 3.2 2.5 - 4.5 mg/dL   Basic metabolic panel   Result Value Ref Range    Sodium 137 133 - 144 mmol/L    Potassium 4.5 3.4 - 5.3 mmol/L    Chloride 104 94 - 109 mmol/L    Carbon Dioxide 33 (H) 20 - 32 mmol/L    Anion Gap <1 (L) 3 - 14 mmol/L    Glucose 105 (H) 70 - 99 mg/dL    Urea Nitrogen 19 7 - 30 mg/dL    Creatinine 1.04 0.66 - 1.25 mg/dL    GFR Estimate >90 >60 mL/min/[1.73_m2]    GFR Estimate If Black >90 >60 mL/min/[1.73_m2]    Calcium 8.3 (L) 8.5 - 10.1 mg/dL     *Note: Due to a large number of results and/or encounters for the requested time period, some results have not been displayed. A complete set of results can be found in Results Review.

## 2021-05-12 NOTE — PROGRESS NOTES
Pt became reagitated, fighting mask, kicking feet and swinging arms, and placed back into x4 restraints. Zyprexa IM PRN  was given and pt calmed within 1 minute. Restraints were removed within 20 minutes

## 2021-05-12 NOTE — PROVIDER NOTIFICATION
Notified resident MD of significant increase in agitation and violent behaviors, requesting additional PRN medication. Resident and Fellow MD came to bedside to assess patient, one time ativan PRN ordered. Short term, minimal improvement in behaviors.

## 2021-05-12 NOTE — PROGRESS NOTES
"CLINICAL NUTRITION SERVICES - REASSESSMENT NOTE    Nutrition Prescription    RECOMMENDATIONS FOR MDs/PROVIDERS TO ORDER:  1. Goal PN: GIR = 2 mg/kg/min (261 gm dex), 1.2 gm/kg amino acids (108 gm), 240 mL intralipid for 1799 kcal (20 kcal/kg), 1.2 gm/kg Pro, and 27% of kcal from fat. Dosing weight for nutritional calculations = 90 kg (adjusted body weight). Patient currently meeting 65% assessed energy and 80% assessed protein needs with PN with peripheral access limitations.      2. Enteral nutrition support if able to obtain post-pyloric access or is respiratory support weaned and patient able to receive NG feeds (vs diet advancement).     Malnutrition Status:    Patient does not meet criteria for malnutrition.       ANTHROPOMETRICS  Height: 180.1 cm (5'11\")  Most Recent Weight: 119 kg (262 lb 5.6 oz)  Admit Weight: 124.5 kg (274 lb 7.6 oz)    IBW: 78.2 kg   BMI: Obesity Grade II BMI 35-39.9  Weight History:       Wt Readings from Last 3 Encounters:   05/02/21 124.5 kg (274 lb 7.6 oz)   05/01/21 126 kg (277 lb 12.5 oz)   03/16/21 117.2 kg (258 lb 6.1 oz)     Dosing Weight: 90 kg (adjusted for obesity)    ASSESSED NUTRITION NEEDS  Estimated Energy Needs: 25-30 kcal/kg for EN/PO (4364-0183 kcal/day); 20-25 kcal/kg PN (3684-1396 kcal/day)  Estimated Protein Needs: 108-180 grams protein/day (1.2 - 2 grams of pro/kg)  Estimated Fluid Needs: 1 mL/kcal or per team in ICU  Justification: Maintenance pending fluid status     EVALUATION OF THE PROGRESS TOWARD GOALS   Diet: NPO    Nutrition Support: Peripheral Parenteral Nutrition: 2400 mL, 175 gm dextrose (GIR = 1.35 mg/kg/min), 89 gm amino acids (1 gm/kg), 240 mL intralipid daily for 1431 kcal (16 kcal/kg) and 89 gm Pro (1 gm/kg) for 60% assessed energy and 80% assessed protein needs.     Intake: Limitations to nutrition support include NPO with current respiratory support/medical status & limited access (no central access) restricting what can be provided via PN at this " time.      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 Meet >75% assessed nutritional needs through PO/nutrition support.   Goal partially met via PPN.   2. Weight maintenance during hospitalization/critical illness.   Goal not met with most recent weight.    Previous Nutrition Diagnosis  Predicted suboptimal nutrient intakes related to nutrition orders as evidenced by NPO with nutrition support not yet initiated; plan to initiate PPN this evening per rounds.     Evaluation: Improving    CURRENT NUTRITION DIAGNOSIS  Predicted suboptimal nutrient intakes related to nutrition orders as evidenced by NPO with nutrition support not yet initiated; plan to initiate PPN this evening per rounds.peripheral parenteral nutrition meeting 65% assessed energy and 100% assessed protien needs.     INTERVENTIONS  Implementation  Collaboration and Referral of Nutrition Care: Rounded with team and discussed nutritional plan of care. See recommendations below.    Goals  1. Meet >75% assessed nutritional needs through PO/nutrition support.   2. Weight maintenance during hospitalization/critical illness.     Monitoring/Evaluation  Food and beverage intake-  Enteral and parenteral nutritional intakes-  Anthropometric measurements-    Amena Gómez RD, CSP, LD  Pager #[phone]

## 2021-05-13 RX ORDER — OLANZAPINE 10 MG/1
10 TABLET ORAL AT BEDTIME
Qty: 30 TABLET | Refills: 1 | OUTPATIENT
Start: 2021-05-13

## 2021-05-13 NOTE — PROGRESS NOTES
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 He was previously prescribed home BiPAP but has not worn it for many years due to physical discomfort. MRI/MRV at [facility] showed stable appearance of ependymoma and post-surgical changes with no acute findings.     He was initially admitted to the floor on supplemental oxygen, however he progressed to acute on chronic respiratory status and he was transferred to the PICU on 5/3 early AM. His repeat CXR demonstrated concern for pneumonia which was thought to be most likely due to known aspiration (pt refuses to thicken his liquids per prior recommendation). After a long discussion with both parents on 5/3/21 a decision was made to change his code status to DNR/DNI. Pulmonology, PACCT, and PICU teams are co-managing his care at this time.     He has been requiring full facemask BiPAP for respiratory support. Unfortunately relatively minimal weaning of his respiratory support has been able to be done by the PICU. This is concerning that much of the issue with his lungs is more chronic in nature (pulmonary as well as 2/2 brain tumor) as treating his acute aspiration pneumonia has not significantly improved his clinical status. He continues to struggle with confusion and agitation that is likely due at least in part to ICU delirium.     At his care conference on 5/12 (see social work and PACCT note from today) a decision was made to transition to comfort care. Present at the care conference were: Ayana Carrillo representing the PACCT team, Dr. Rousseau, both biological parents, the PICU fellow, nurse, and Oncology MSW. I believe any " further life saving therapies would be futile at this point and the parents are clear, as Geo's guardians, that extraordinary measures should not be taken.      RECOMMENDATIONS:  -Weekly Entinostat, on hold per investigator discretion and current illness.  -Comfort care measures per PACCT and PICU teams.   -Will continue to support the family and Geo through this difficult process. The entire care team has a deep connection to Geo and his family. It is a sad day.    Plan of care discussed with attending physician Dr. Tono Rousseau.   Thank you for the opportunity to see this patient; please call us with any questions or concerns.    Nati Webb MD MPH  Pediatric Hematology Oncology Fellow  Pager: [phone]    Physician Attestation   I, Leoncio Rousseau MD, saw this patient with the resident and agree with the resident/fellow's findings and plan of care as documented in the note.      I personally reviewed vital signs, medications and labs.    Key findings: I agree with the assessment as noted.    Leoncio Rousseau MD  Date of Service (when I saw the patient): 5/12/21    Time Spent on this Encounter   I spent 50 minutes on the unit/floor managing the care of Geo Hicks in addition to E&M. Over 50% of my time was spent on the following:   - Counseling the patient and/or family regarding: prognosis and risks and benefits of treatment options  - Coordination of care with the: consultant(s), care coordinator/ and nurse    Care conference as noted above.    Leoncio Rousseau MD          Interval History:  Geo remained on BiPAP support overnight. He continues to desaturate quickly when his facemask is removed. He remained very agitated and combative overnight requiring the use of 4 point restraints.     Medications:  Current Facility-Administered Medications   Medication     acetaminophen (TYLENOL) tablet 650 mg     atropine 1 % ophthalmic solution 2 drop      cloNIDine (CATAPRES-TTS) Patch in Place     cloNIDine (CATAPRES-TTS1) 0.1 MG/24HR WK patch 1 patch     dexmedetomidine (PRECEDEX) 2,000 mcg in sodium chloride 0.9 % 250 mL infusion     hydrOXYzine 50 mg in D5W injection PEDS/NICU     LORazepam (ATIVAN) injection 2 mg     melatonin liquid 6 mg     morphine 1 mg/ml bolus from infusion pump 4 mg     morphine 1 mg/mL infusion (std conc) ADULT/PEDS GREATER than 20 kg     naloxone (NARCAN) injection 0.4 mg     OLANZapine (zyPREXA) suspension 5 mg    Or     OLANZapine (zyPREXA) injection 5 mg     OLANZapine (zyPREXA) tablet 15 mg     OLANZapine (zyPREXA) tablet 5 mg     parenteral nutrition - ADULT compounded formula     polyethylene glycol-propylene glycol PF (SYSTANE ULTRA PF) opthalmic solution 1 drop     sertraline (ZOLOFT) tablet 100 mg     sodium chloride 0.9% infusion     traZODone (DESYREL) tablet 150 mg     PHYSICAL EXAM:  Temp: 98.1  F (36.7  C) Temp src: Axillary BP: (!) 83/50 Pulse: 95   Resp: 18 SpO2: (!) 30 % O2 Device: BiPAP/CPAP      Deferred in order to not interrupt family time with patient.     Results for orders placed or performed during the hospital encounter of 05/01/21 (from the past 24 hour(s))   Blood gas venous   Result Value Ref Range    Ph Venous 7.28 (L) 7.32 - 7.43 pH    PCO2 Venous 68 (H) 40 - 50 mm Hg    PO2 Venous 47 25 - 47 mm Hg    Bicarbonate Venous 32 (H) 21 - 28 mmol/L    Base Excess Venous 3.5 mmol/L    FIO2 45    Magnesium   Result Value Ref Range    Magnesium 2.1 1.6 - 2.3 mg/dL   Phosphorus   Result Value Ref Range    Phosphorus 3.2 2.5 - 4.5 mg/dL   Basic metabolic panel   Result Value Ref Range    Sodium 137 133 - 144 mmol/L    Potassium 4.5 3.4 - 5.3 mmol/L    Chloride 104 94 - 109 mmol/L    Carbon Dioxide 33 (H) 20 - 32 mmol/L    Anion Gap <1 (L) 3 - 14 mmol/L    Glucose 105 (H) 70 - 99 mg/dL    Urea Nitrogen 19 7 - 30 mg/dL    Creatinine 1.04 0.66 - 1.25 mg/dL    GFR Estimate >90 >60 mL/min/[1.73_m2]    GFR Estimate If Black  >90 >60 mL/min/[1.73_m2]    Calcium 8.3 (L) 8.5 - 10.1 mg/dL     *Note: Due to a large number of results and/or encounters for the requested time period, some results have not been displayed. A complete set of results can be found in Results Review.

## 2021-05-13 NOTE — DEATH PRONOUNCEMENT
MD DEATH PRONOUNCEMENT    Called to pronounce Geo valencia.    Physical Exam: Unresponsive to noxious stimuli, Spontaneous respirations absent, Breath sounds absent, Carotid pulse absent and Heart sounds absent    Patient was pronounced dead at 8:16 PM, May 12, 2021.    Preliminary Cause of Death: respiratory failure     Active Problems:    Ependymoma (H)    Hypoxia    Respiratory failure (H)       Infectious disease present?: NO    Communicable disease present? (examples: HIV, chicken pox, TB, Ebola, CJD) :  NO    Multi-drug resistant organism present? (example: MRSA): NO    Please consider an autopsy if any of the following exist: parents declined   NO Unexpected or unexplained death during or following any dental, medical, or surgical diagnostic treatment procedures.   NO Death of mother at or up to seven days after delivery.     NO All  and pediatric deaths.     NO Death where the cause is sufficiently obscure to delay completion of the death certificate.   NO Deaths in which autopsy would confirm a suspected illness/condition that would affect surviving family members or recipients of transplanted organs.     The following deaths must be reported to the 's Office:  NO A death that may be due entirely or in part to any factors other than natural disease (recent surgery, recent trauma, suspected abuse/neglect).   NO A death that may be an accident, suicide, or homicide.     NO Any sudden, unexpected death in which there is no prior history of significant heart disease or any other condition associated with sudden death.   NO A death under suspicious, unusual, or unexpected circumstances.    NO Any death which is apparently due to natural causes but in which the  does not have a personal physician familiar with the patient s medical history, social, or environmental situation or the circumstances of the terminal event.   NO Any death apparently due to Sudden Infant Death Syndrome.      NO Deaths that occur during, in association with, or as consequences of a diagnostic, therapeutic, or anesthetic procedure.   NO Any death in which a fracture of a major bone has occurred within the past (6) six months.   NO A death of persons note seen by their physician within 120 days of demise.     NO Any death in which the  was an inmate of a public institution or was in the custody of Law Enforcement personnel.   NO  All unexpected deaths of children   NO Solid organ donors   NO Unidentified bodies   NO Deaths of persons whose bodies are to be cremated or otherwise disposed of so that the bodies will later be unavailable for examination;   NO Deaths unattended by a physician outside of a licensed healthcare facility or licensed residential hospice program   NO Deaths occurring within 24 hours of arrival to a health care facility if death is unexpected.    NO Deaths associated with the decedent s employment.   NO Deaths attributed to acts of terrorism.   NO Any death in which there is uncertainty as to whether it is a medical examiner s care should be discussed with the medical investigator.        Body disposition: Autopsy was discussed with family member:  Parents in person.  Permission for autopsy was declined.

## 2021-05-13 NOTE — PROGRESS NOTES
Pediatric Critical Care Night Note:    Geo Hicks was critically ill with acute on chronic hypoxic and hypercarbic respiratory failure in the setting of progressive ependymoma.    Interval events: Patient made DNR/DNI today. Family to bedside this afternoon; BiPAP mask removed around 1700 after morphine infusion started. Patient given frequent boluses of morphine and Ativan. Death pronounced at 2016.    VITALS:  Pulse  Av.7  Min: 34  Max: 106  BP - Mean:  [] 65  Systolic (24hrs), Av , Min:83 , Max:128     Diastolic (24hrs), Av, Min:37, Max:95    Resp  Av.7  Min: 17  Max: 53  SpO2  Av.8 %  Min: 14 %  Max: 100 %    I/O:  I/O last 3 completed shifts:  In: 4288.38 [I.V.:1062.05; NG/GT:83; IV Piggyback:500]  Out: 2988 [Urine:2921; Stool:67]  I/O this shift:  In: 546.02 [I.V.:201.02]  Out: -     Medications:  All medications reviewed    Ventilator Settings  Mechanical Ventilation  FiO2 (%): 60 %  Mode: (AVAPS)  Oxygen Concentration %: 60 %  Rate: 15  Tidal Volume: 650  Pressure Control: (18-35)  PEEP: 12  Peak Inspiratory Pressure (cmH2O): 17    Key physical exam findings: No respiratory effort or heart sounds    Labs:  Recent Labs   Lab Test 21  0848 05/10/21  0600    140   POTASSIUM 4.5 3.6   CHLORIDE 104 104   CO2 33* 34*   ANIONGAP <1* 2*   * 113*   BUN 19 21   CR 1.04 0.90   KATIE 8.3* 8.3*     Lab Results   Component Value Date    LACT 0.9 2021    LACT 0.8 10/03/2020   ,   Recent Labs   Lab Test 16  1745 16  1630   PH 7.39 7.47*   PCO2 35 29*   PO2 164* 159*   HCO3      Recent Labs   Lab Test 21  0848 21  1158   PHV 7.28* 7.27*   PCO2V 68* 79*   PO2V 47 30   HCO3V 32* 37*     Recent Labs   Lab Test 05/10/21  0600 21  0520   WBC 5.4 3.8*   HGB 9.8* 8.6*   HCT 33.3* 30.5*   MCV 80 81   RDW 19.1* 19.2*    118*     Lab Results   Component Value Date    INR 1.03 05/10/2021   ,   Lab Results   Component Value Date    PTT 28  01/14/2020       Additions/changes to plan include:  Post-mortem care. See death pronouncement and discharge summary for more details.     I spent a total of 45 minutes providing critical care services at the bedside, and on the critical care unit, evaluating the patient, directing care and reviewing laboratory values and radiologic reports for Geo Hicks.    Janet Rae Hume, MD

## 2021-05-13 NOTE — DISCHARGE SUMMARY
[facility]  Discharge Summary - Medicine & Pediatrics       Date of Admission:  2021  Date of Discharge:  2021  Discharging Provider: Dr. Janet Hume  Discharge Service: Pediatric ICU    Discharge Diagnoses   Posterior fossa ependymoma  Acute on chronic hypoxic, hypercapnic respiratory failure  Sepsis likely due to aspiration pneumonia  Adrenal insufficiency  Delusional disorder  ICU delirium    Discharge Disposition   Patient  during this admission  Condition at discharge:     Hospital Course   Geo Hicks was admitted on 2021 for worsening neurological changes and hypoxia. On hospital day 3, he was transferred to the ICU for respiratory failure requiring BiPAP. He was unable to be weaned from any respiratory support, so his family ultimately decided to transition to comfort focused care on . Geo  a few hours after transitioning to comfort care. The following problems were addressed during his hospitalization:    Posterior fossa ependymoma  Geo was initially diagnosed in  and underwent surgery and radiation. Prior to admission his tumor was being managed with Etinostat via the [facility] [id number] Phase 1 Study of the drug. He had been experiencing worsening neurological symptoms prior to admission, including dizziness and waves of nausea. At the [facility] ED, an MRI/MRA was performed, which was stable. The Etinostat was held during the admission due to his respiratory failure. The Hem/Onc team was consulted during the admission and was closely following along.     Acute on chronic hypoxic, hypercapnic respiratory failure  Sepsis likely due to aspiration pneumonia  Geo was found to be hypoxic on presentation to the hospital. He has a history of sleep apnea and was not using his CPAP at home. He was treated with supplemental O2 via nasal cannula on the floor. A rapid response was called on 5/3 due to worsening respiratory  status, and he was transferred to the PICU. He was transitioned to AVAPS via full facemask with adequate improvement in respiratory status. Despite an aggressive pulmonary toilet, antibiotics to cover for aspiration pneumonia, and aggressive control of his fluid status, Geo was not able to be weaned off of aggressive respiratory support. His parents updated his code status to DNR/DNI according to his wishes prior to his decompensation. Due to his inability to wean off of respiratory support and worsening delirium, the decision was made to transition to comfort focused care on 5/12 and the BiPAP mask was removed. He passed away shortly after.     Adrenal insufficiency  Geo was on steroids chronically for his cancer treatment. He was started on stress dose steroids on admission to the PICU. These were tapered off and he was transitioned back to his home dose decadron on 5/10.    Delusional disorder  Depression  ICU vs. terminal delirium  Geo has a history of delusional disorder and depression for which he was being followed by Psychiatry in the outpatient setting. He was on multiple medications for his psychiatric symptoms. Throughout the admission, Geo became more delirious, which led to more aggressive behaviors. For his safety and the safety of staff, he was managed with a precedex drip, multiple prn medications, and soft restraints. Psychiatry was consulted for assistance with medication management.     Consultations This Hospital Stay   RESPIRATORY CARE IP CONSULT  SPEECH LANGUAGE PATH PEDS IP CONSULT  PEDS PULMONOLOGY IP CONSULT  PEDS PACCT (PAIN AND ADVANCED/COMPLEX CARE TEAM) IP CONSULT  RADIOLOGY IP CONSULT  MUSIC THERAPY PEDS IP CONSULT   CHILD FAMILY LIFE IP CONSULT  PHYSICAL THERAPY PEDS IP CONSULT  PHARMACY/NUTRITION TO START AND MANAGE TPN  OPHTHALMOLOGY IP CONSULT  PHARMACY IP CONSULT  PEDIATRIC PSYCHIATRY IP CONSULT  RESPIRATORY CARE IP CONSULT  PHYSICAL THERAPY PEDS IP CONSULT    Code  Status   No CPR- Do NOT Intubate     The patient was discussed with the PICU fellow, Dr. Colvin, and PICU attending, Dr. Hume.     Ita Roach MD  Pediatric ICU Service  [facility] PEDIATRIC ICU  [address]  [place] [address]  Phone: [phone]    Physician Attestation   I, Janet Rae Hume, MD, saw and evaluated this patient prior to discharge.  I discussed the patient with the resident/fellow and agree with plan of care as documented in the note.      I personally reviewed vital signs, medications and labs.    I personally spent 45 minutes on discharge activities (please see progress note for billing time).    Janet Rae Hume, MD, MD  Date of Service (when I saw the patient): 5/12/21    ______________________________________________________________________    Physical Exam   Vital Signs: Temp: 98.1  F (36.7  C) Temp src: Axillary BP: (!) 83/50 Pulse: (!) 34   Resp: 18 SpO2: (!) 31 % O2 Device: BiPAP/CPAP    Weight: 262 lbs 5.56 oz      Death exam performed by Dr. Marisel Colvin.       Primary Care Physician   Jeffrey Espinoza    Discharge Orders   No discharge procedures on file.    Significant Results and Procedures   None    Discharge Medications   Current Discharge Medication List      CONTINUE these medications which have NOT CHANGED    Details   Cholecalciferol (VITAMIN D3) 50 MCG (2000 UT) TABS Take 2,000 Units by mouth daily (with breakfast)      dexamethasone (DECADRON) 0.5 MG tablet Take 0.75 mg by mouth daily (with breakfast)      fexofenadine (ALLEGRA) 180 MG tablet Take 180 mg by mouth At Bedtime      Lactobacillus-Inulin ([facility]) CAPS Take 2 capsules by mouth daily  Qty: 30 capsule, Refills: 3    Associated Diagnoses: Ependymoma (H); Neoplasm of posterior cranial fossa (H)      lamoTRIgine (LAMICTAL) 200 MG tablet Take 1 tablet (200 mg) by mouth At Bedtime  Qty: 30 tablet, Refills: 2    Associated Diagnoses: Agitation; Depression, unspecified  depression type      linaclotide (LINZESS) 290 MCG capsule Take 1 capsule (290 mcg) by mouth every morning (before breakfast)  Qty: 30 capsule, Refills: 3    Associated Diagnoses: Slow transit constipation      OLANZapine (ZYPREXA) 10 MG tablet Take 1 tablet (10 mg) by mouth At Bedtime  Qty: 30 tablet, Refills: 1    Associated Diagnoses: Paranoia (H); Agitation      omeprazole (PRILOSEC) 20 MG DR capsule Take 2 capsules (40 mg) by mouth every morning  Qty: 60 capsule, Refills: 1    Associated Diagnoses: Ependymoma (H)      !! order for DME Equipment being ordered: Dressing  Wound #1 lower leg, W 6 cm x, D 0.5  cm, Drainage scant, Thickness sutured     Type: non stick gauze, Brand: , Size: 4/4   Change: 1 per day    Tape/Wrap: 1 each     attach facesheet with insurance information (delete this line)  Qty: 5 each, Refills: 0    Associated Diagnoses: Laceration of left lower extremity, subsequent encounter      !! order for DME Equipment being ordered: short boot  Qty: 1 each, Refills: 0    Associated Diagnoses: Closed fracture of base of fifth metatarsal bone of left foot, initial encounter      polyethylene glycol (MIRALAX) 17 GM/Dose powder Take 17 g (1 capful) by mouth 3 times daily  Qty: 289 g, Refills: 3    Associated Diagnoses: Constipation, unspecified constipation type      potassium phosphate, monobasic, (K-PHOS) 500 MG tablet Take 1 tablet (500 mg) by mouth 2 times daily  Qty: 90 tablet, Refills: 3    Associated Diagnoses: Ependymoma (H)      sertraline (ZOLOFT) 100 MG tablet Take 1 tablet (100 mg) by mouth daily  Qty: 30 tablet, Refills: 2    Associated Diagnoses: Depression, unspecified depression type      sulfamethoxazole-trimethoprim (BACTRIM) 400-80 MG tablet Take 1 tablet by mouth 2 times daily On Saturdays and Sundays  Qty: 24 tablet, Refills: 11    Comments: Still has home supply, transitioning pharmacies. Group home or family will call to fill.  Associated Diagnoses: Ependymoma (H)      traZODone  (DESYREL) 150 MG tablet Take 1 tablet (150 mg) by mouth nightly as needed for sleep or depression  Qty: 30 tablet, Refills: 2    Associated Diagnoses: Depression, unspecified depression type; Psychophysiological insomnia      vitamin E 400 units TABS Take 400 Units by mouth daily       !! - Potential duplicate medications found. Please discuss with provider.      STOP taking these medications       study - entinostat, IDS# [id number], 1 mg tablet Comments:   Reason for Stopping:         study - entinostat, IDS# [id number], 5 mg tablet Comments:   Reason for Stopping:             Allergies   Allergies   Allergen Reactions     Blood Transfusion Related (Informational Only) Swelling     Periorbital swelling post platelet transfusion     No Known Drug Allergies

## 2021-05-13 NOTE — PROGRESS NOTES
Pt already on comfort cares and off bipap mask when writer assumed care. Family at bedside and supportive of patient. Asking appropriate questions about dying process. Family remaining at bedside currently after passing. Will continue to support family.

## 2021-05-19 NOTE — NURSING NOTE
"Chief Complaint   Patient presents with     RECHECK     Patient here today for follow up on Ependymoma (H)     /87 mmHg  Pulse 92  Temp(Src) 98.2  F (36.8  C) (Axillary)  Resp 18  Ht 1.79 m (5' 10.47\")  Wt 93.8 kg (206 lb 12.7 oz)  BMI 29.27 kg/m2  SpO2 96%  Yvonne Heller MA    " Unknown if ever smoked

## 2021-05-25 NOTE — NURSING NOTE
Chief Complaint   Patient presents with     RECHECK     Patient here today for Ependymoma     /79 (BP Location: Left arm, Patient Position: Sitting, Cuff Size: Adult Regular)   Pulse 99   Temp 97.1  F (36.2  C) (Axillary)   Wt 96.9 kg (213 lb 10 oz)   SpO2 97%   BMI 29.25 kg/m    Ana Cristina Ravi CMA   November 5, 2019   None

## 2021-06-01 NOTE — ED AVS SNAPSHOT
[facility] Emergency Department    [address]    [place] [address]    Phone:  [phone]                                       Geo Hicks   MRN: [id number]    Department:  [facility] Emergency Department   Date of Visit:  12/24/2017           Patient Information     Date Of Birth          2000        Your diagnoses for this visit were:     Viral gastroenteritis     Neoplasm of posterior cranial fossa (H)     Ependymoma (H)        You were seen by Marco Antonio Cesar MD.        Discharge Instructions       Discharge Information: Emergency Department     Geo saw Dr. Tello and Dr. Cesar for fever, vomiting and diarrhea.  It s likely these symptoms were due to a virus. His influenza testing was negative.    Home care    Make sure he gets plenty to drink, and if able to eat, has mild foods (not too fatty).     If he starts vomiting again, have him take a small sip (about a spoonful) of water or other clear liquid every 5 to 10 minutes for a few hours. Gradually increase the amount.     Medicines  For nausea and vomiting, also try the ondansetron (Zofran) 1 tab. It will dissolve in the mouth. Give every 8 hours as needed.     For fever or pain, Geo may have    Acetaminophen (Tylenol) every 4 to 6 hours as needed (up to 5 doses in 24 hours). His dose is: 2 regular strength tabs (650 mg)                                     (43.2+ kg/96+ lb)  Or    Ibuprofen (Advil, Motrin) every 6 hours as needed. His dose is:    3 regular strength tabs (600 mg)                                                                         (60-80 kg/132-176 lb)    If necessary, it is safe to give both Tylenol and ibuprofen, as long as you are careful not to give Tylenol more than every 4 hours or ibuprofen more than every 6 hours.    Note: If your Tylenol came with a dropper marked with 0.4 and 0.8 ml, call us ([phone]) or check with your doctor about the correct dose.     These doses are based on your child s weight. If  your doctor prescribed these medicines, the dose may be a little different. Either dose is safe. If you have questions, ask a doctor or pharmacist.    When to get help    **Please return to the emergency department if he has another fever after 24 hours after leaving the emergency department (call the heme/onc clinic if he has a fever in the next 24 hours). **  Please return to the Emergency Department or contact his regular doctor if he     feels much worse.     has trouble breathing.     won t drink or can t keep down liquids.     goes more than 8 hours without peeing, has a dry mouth or cries without tears.    has severe pain.    is much more crabby or sleepier than usual.     Call if you have any other concerns.   If he is not better in 3 days, please make an appointment to follow up with Pediatric Heme/Onc Clinic ([phone] - this number works for most pediatric specialties).    They will call you tomorrow or the following day to see how he is doing.        Medication side effect information:  All medicines may cause side effects. However, most people have no side effects or only have minor side effects.     People can be allergic to any medicine. Signs of an allergic reaction include rash, difficulty breathing or swallowing, wheezing, or unexplained swelling. If he has difficulty breathing or swallowing, call 911 or go right to the Emergency Department. For rash or other concerns, call his doctor.     If you have questions about side effects, please ask our staff. If you have questions about side effects or allergic reactions after you go home, ask your doctor or a pharmacist.     Some possible side effects of the medicines we are recommending for Geo are:     Acetaminophen (Tylenol, for fever or pain)  - Upset stomach or vomiting  - Talk to your doctor if you have liver disease      Antibiotics  (medicines to fight infection from bacteria)  - White patches in mouth or throat (called thrush- see his  doctor if it is bothering him)  - Diaper rash (in diapered children)  - Upset stomach or vomiting  - Loose stools (diarrhea). This may happen while he is taking the drug or within a few months after he stops taking it. Call his doctor right away if he has stomach pain or cramps, or very loose, watery, or bloody stools. Do not give him medicine for loose stool without first checking with his doctor.       Ibuprofen  (Motrin, Advil. For fever or pain.)  - Upset stomach or vomiting  - Long term use may cause bleeding in the stomach or intestines. See his doctor if he has black or bloody vomit or stool (poop).            Future Appointments        Provider Department Dept Phone Center    12/27/2017 2:15 PM ALAN Justin [facility] Hematology Oncology [phone] [facility]    1/3/2018 11:00 AM ALAN [facility] Oncology [phone] [facility]    1/8/2018 2:00 PM Imani Landers PT [facility] Physical Therapy [phone] [place] [facility]    1/10/2018 11:00 AM Leoncio Rousseau MD [facility] Hematology Oncology [phone] [facility]    1/15/2018 2:00 PM Imani Landers PT [facility] Physical Therapy [phone] [place] [facility]    1/15/2018 3:15 PM ANASTASIA [place] [facility] Occupational Therapy [phone] [place] [facility]    1/17/2018 11:00 AM ALAN Justin [facility] Hematology Oncology [phone] [facility]    1/22/2018 2:00 PM Imani Landers PT [facility] Physical Therapy [phone] [place] [facility]    1/22/2018 3:15 PM [place]NA[place]AS[place] [facility] Occupational Therapy [phone] [place] [facility]    1/24/2018 11:00 AM ALAN Justin [facility] Hematology Oncology [phone] [facility]    1/29/2018 2:00 PM Imani Landers PT [facility] Physical Therapy [phone] [place] [facility]    1/29/2018 3:15 PM Elyse Costello OTR [facility] Occupational Therapy [phone] [place] [facility]    1/31/2018 11:00 AM Kristi MCCARTHY  Harini, APRN CNP Peds Hematology Oncology [phone] [facility]    2/5/2018 1:15 PM Noemy Caldwell, SLP [facility] Speech Therapy [phone] [place] [facility]    2/5/2018 2:00 PM Imani Landers, PT [facility] Physical Therapy [phone] [place] [facility]    2/5/2018 3:15 PM Elyse Costello, OTR [facility] Occupational Therapy [phone] [place] [facility]    2/7/2018 1:30 PM Kristi Schuler APRN CNP Peds Hematology Oncology [phone] [facility]    2/12/2018 2:00 PM Imani Landers, PT [facility] Physical Therapy [phone] [place] [facility]    2/12/2018 3:15 PM Elyse Costello, OTR [facility] Occupational Therapy [phone]    2/13/2018 9:00 AM [facility] MRI ROOM 1 [facility], [place], [phone] [place]    2/13/2018 9:45 AM [facility] MRI ROOM 1 [facility], [place], [phone] [place]    2/13/2018 10:30 AM [facility] MRI ROOM 1 [facility], [place], [phone] [place]    2/13/2018 11:15 AM [facility] MRI ROOM 1 [facility], [place], [phone] [place]    2/13/2018 12:00 PM Leoncio Rousseau MD Peds Hematology Oncology [phone] [facility]    2/19/2018 1:15 PM Noemy Caldwell, SLP [facility] Speech Therapy [phone] [place] [facility]    2/19/2018 2:00 PM Imani Landers, PT [facility] Physical Therapy [phone] [place] [facility]    2/19/2018 3:15 PM Elyse Costello, OTR [facility] Occupational Therapy [phone] [place] [facility]    2/26/2018 2:00 PM Imani Landers, PT [facility] Physical Therapy [phone] [place] [facility]    2/26/2018 3:15 PM Elyse Costello, OTR [facility] Occupational Therapy [phone] [place] [facility]    3/5/2018 1:15 PM Noemy Caldwell, SLP [facility] Speech Therapy [phone] [place] [facility]    3/5/2018 2:00 PM Imani Landers, PT [facility] Physical Therapy [phone] [place] [facility]    3/5/2018 3:15 PM Elyse Costello, OTR [facility] Occupational Therapy  [phone] [place] [facility]    3/12/2018 2:00 PM Imani Anshul, PT [facility] Physical Therapy [phone] [place] [facility]    3/12/2018 3:15 PM Elyse Cotsello, OTR [facility] Occupational Therapy [phone] [place] [facility]    3/19/2018 1:15 PM Noemy Caldwlel, SLP [facility] Speech Therapy [phone] [place] [facility]    3/19/2018 2:00 PM Imani Anshul, PT [facility] Physical Therapy [phone] [place] [facility]    3/19/2018 3:15 PM Elyse Pravin, OTR [facility] Occupational Therapy [phone] [place] [facility]    3/21/2018 1:00 PM Perico Holley MD Peds BMT Neurology [phone] [facility]    3/26/2018 2:00 PM Imani Anshul, PT [facility] Physical Therapy [phone] [place] [facility]    3/26/2018 3:15 PM Elyse Pravin, OTR [facility] Occupational Therapy [phone] [place] [facility]    4/2/2018 1:15 PM Noemy Caldwell, SLP [facility] Speech Therapy [phone] [facility]    4/2/2018 2:00 PM Imani Anshul, PT [facility] Physical Therapy [phone] [place] [facility]    4/2/2018 3:15 PM Elyse Pravin, OTR [facility] Occupational Therapy [phone] [place] [facility]    4/9/2018 2:00 PM Imani Keohm, PT [facility] Physical Therapy [phone] [place] [facility]    4/9/2018 3:15 PM Elyse Pravin, OTR [facility] Occupational Therapy [phone] [place] [facility]    4/16/2018 2:00 PM Imani Anshul, PT [facility] Physical Therapy [phone] [facility] [facility]    4/16/2018 3:15 PM Elyse Pravin, OTR [facility] Occupational Therapy [phone] [place] [facility]    4/23/2018 2:00 PM Imani Anshul, PT [facility] Physical Therapy [phone] [place] [facility]    4/23/2018 3:15 PM Elyse Costello, OTR [facility] Occupational Therapy [phone] [place] [facility]    4/30/2018 2:00 PM Imani Landers, PT [facility] Physical Therapy [phone] [place] [facility]    4/30/2018 3:15 PM Elyse Costello, OTR [facility] Occupational Therapy [phone] [place] [facility]    5/7/2018 2:00 PM  Imani Landers, PT [facility] Physical Therapy [phone] [place] [facility]    5/7/2018 3:15 PM Elyse Nenicholas, OTR [facility] Occupational Therapy [phone] [place] [facility]    5/14/2018 2:00 PM Imani Anshul, PT [facility] Physical Therapy [phone] [place] [facility]    5/14/2018 3:15 PM Elyse Nenicholas, OTR [facility] Occupational Therapy [phone] [place] [facility]    5/21/2018 2:00 PM Imani Anshul, PT [facility] Physical Therapy [phone] [place] [facility]    5/21/2018 3:15 PM Elyse Nenicholas, OTR [facility] Occupational Therapy [phone] [place] [facility]    5/28/2018 3:15 PM Elyse Nenicholas, OTR [facility] Occupational Therapy [phone] [facility] [facility]    6/4/2018 2:00 PM Imani Landers, PT [facility] Physical Therapy [phone] [place] [facility]    6/4/2018 3:15 PM Elyse Nenicholas, OTR [facility] Occupational Therapy [phone] [facility] [facility]    6/11/2018 2:00 PM Imani Anshul, PT [facility] Physical Therapy [phone] [place] [facility]    6/11/2018 3:15 PM Elyse Nenicholas, OTR [facility] Occupational Therapy [phone] [facility] [facility]    6/18/2018 2:00 PM Imani Landers, PT [facility] Physical Therapy [phone] [place] [facility]    6/18/2018 3:15 PM Elyse Nenicholas, OTR [facility] Occupational Therapy [phone] [facility] [facility]    6/25/2018 2:00 PM Imani Landers, PT [facility] Physical Therapy [phone] [facility] [facility]    6/25/2018 3:15 PM Elyse Nenicholas, OTR [facility] Occupational Therapy [phone] [facility] [facility]      24 Hour Appointment Hotline       To make an appointment at any [facility] clinic, call [phone] ([phone]). If you don't have a family doctor or clinic, we will help you find one. [facility] clinics are conveniently located to serve the needs of you and your family.             Review of your medicines      START taking        Dose / Directions Last dose taken    ondansetron 4 MG ODT tab   Commonly known as:  ZOFRAN-ODT   Dose:  4 mg    Quantity:  5 tablet        Take 1 tablet (4 mg) by mouth every 8 hours as needed for nausea   Refills:  0          Our records show that you are taking the medicines listed below. If these are incorrect, please call your family doctor or clinic.        Dose / Directions Last dose taken    azithromycin 250 MG tablet   Commonly known as:  ZITHROMAX   Quantity:  6 tablet        Take 500mg on Day 1 and 250mg daily Days 2-5.   Refills:  1        calcium carbonate-vitamin D 600-400 MG-UNIT Chew   Quantity:  90 tablet        Take 2 tablets in the morning and 1 tablet in the evening.   Refills:  3        Cholecalciferol 400 UNITS Chew   Dose:  1 chew tab   Quantity:  60 tablet        Take 1 tablet (400 Units) by mouth every morning   Refills:  2        * dexamethasone 0.75 MG tablet   Commonly known as:  DECADRON   Dose:  0.75 mg        Take 1 tablet (0.75 mg) by mouth daily (with breakfast)   Refills:  0        * dexamethasone 0.5 MG tablet   Commonly known as:  DECADRON   Quantity:  130 tablet        TAKE 1.5 TABLETS (0.75 MG) BY MOUTH DAILY (WITH BREAKFAST)   Refills:  3        fexofenadine 180 MG tablet   Commonly known as:  ALLEGRA   Dose:  180 mg        Take 180 mg by mouth daily   Refills:  0        fluticasone 50 MCG/ACT spray   Commonly known as:  FLONASE   Dose:  1-2 spray   Quantity:  1 Bottle        Spray 1-2 sprays into both nostrils daily   Refills:  11        melatonin 3 MG tablet   Dose:  3 mg        Take 3 mg by mouth At Bedtime   Refills:  0        * methylphenidate 10 MG CR capsule   Commonly known as:  METADATE CD   Dose:  20 mg   Quantity:  60 capsule        Take 2 capsules (20 mg) by mouth daily   Refills:  0        * RITALIN 20 MG tablet   Dose:  20 mg   Quantity:  60 tablet   Generic drug:  methylphenidate        Take 1 tablet (20 mg) by mouth daily   Refills:  0        mupirocin 2 % ointment   Commonly known as:  BACTROBAN   Quantity:  22 g        Use 2 times a day to the buttock with flare    Refills:  3        omeprazole 20 MG CR capsule   Commonly known as:  priLOSEC   Dose:  20 mg   Quantity:  90 capsule        Take 1 capsule (20 mg) by mouth daily   Refills:  2        pentoxifylline 400 MG CR tablet   Commonly known as:  TRENtal   Dose:  400 mg   Quantity:  270 tablet        Take 1 tablet (400 mg) by mouth 3 times daily (with meals)   Refills:  2        polyethylene glycol Packet   Commonly known as:  MIRALAX/GLYCOLAX   Dose:  17 g        Take 17 g by mouth daily as needed for constipation   Refills:  0        potassium phosphate (monobasic) 500 MG tablet   Commonly known as:  K-PHOS   Dose:  500 mg   Quantity:  90 tablet        Take 1 tablet (500 mg) by mouth 3 times daily   Refills:  3        study - entinostat 1 mg tablet   Commonly known as:  IDS# 5050   Dose:  1 mg   Quantity:  4 tablet        Take 1 tablet (1 mg) by mouth every 7 days for 4 doses Take one 1mg tablet with one 5mg tablet for total dose of 6mg weekly. Take on an empty stomach, at least 1 hour before or 2 hours after a meal.  Swallow tablet whole.   Refills:  0        study - entinostat 5 mg tablet   Commonly known as:  IDS# 5050   Dose:  5 mg   Quantity:  4 tablet        Take 1 tablet (5 mg) by mouth every 7 days for 4 doses Take one 5mg tablet with one 1mg tablet for total dose of 6mg weekly. Take on an empty stomach, at least 1 hour before or 2 hours after a meal.  Swallow tablet whole.   Refills:  0        sulfamethoxazole-trimethoprim 400-80 MG per tablet   Commonly known as:  BACTRIM/SEPTRA   Dose:  1 tablet   Quantity:  24 tablet        Take 1 tablet by mouth 2 times daily On Saturdays and Sundays   Refills:  11        vitamin E 400 UNIT capsule   Commonly known as:  GNP VITAMIN E   Dose:  400 Units   Quantity:  30 capsule        Take 1 capsule (400 Units) by mouth daily   Refills:  11        * Notice:  This list has 4 medication(s) that are the same as other medications prescribed for you. Read the directions carefully,  and ask your doctor or other care provider to review them with you.            Prescriptions were sent or printed at these locations (1 Prescription)                   Other Prescriptions                Printed at Department/Unit printer (1 of 1)         ondansetron (ZOFRAN-ODT) 4 MG ODT tab                Procedures and tests performed during your visit     Blood culture    CBC with platelets differential    Influenza A/B antigen    Peripheral IV catheter      Orders Needing Specimen Collection     None      Pending Results     Date and Time Order Name Status Description    12/24/2017 1229 Blood culture Preliminary             Pending Culture Results     Date and Time Order Name Status Description    12/24/2017 1229 Blood culture Preliminary             Thank you for choosing [facility]       Thank you for choosing [facility] for your care. Our goal is always to provide you with excellent care. Hearing back from our patients is one way we can continue to improve our services. Please take a few minutes to complete the written survey that you may receive in the mail after you visit with us. Thank you!        [facility] Information     [employer] gives you secure access to your electronic health record. If you see a primary care provider, you can also send messages to your care team and make appointments. If you have questions, please call your primary care clinic.  If you do not have a primary care provider, please call [phone] and they will assist you.        Care EveryWhere ID     This is your Care EveryWhere ID. This could be used by other organizations to access your [facility] medical records  [id number]        Equal Access to Services     DARYL HANDY : Hadii devin Chao, wadanitzada luqadaha, qaybta kaalciera brito. [facility] [phone].    ATENCIÓN: Si habla español, tiene a antonio disposición servicios gratuitos de asistencia lingüística. Llame al  [phone].    We comply with applicable federal civil rights laws and [place] laws. We do not discriminate on the basis of race, color, national origin, age, disability, sex, sexual orientation, or gender identity.            After Visit Summary       This is your record. Keep this with you and show to your community pharmacist(s) and doctor(s) at your next visit.                   done

## 2021-09-20 NOTE — LETTER
"1/19/2021      RE: Geo Hicks  [id number] [facility] [address]         The patient has been notified of following:     \"This video visit will be conducted via a call between you and your physician/provider. We have found that certain health care needs can be provided without the need for an in-person physical exam.  This service lets us provide the care you need with a video conversation.  If a prescription is necessary we can send it directly to your pharmacy.  If lab work is needed we can place an order for that and you can then stop by our lab to have the test done at a later time.    Video visits are billed at different rates depending on your insurance coverage.  Please reach out to your insurance provider with any questions.    If during the course of the call the physician/provider feels a video visit is not appropriate, you will not be charged for this service.\"    Patient has given verbal consent for Video visit? Yes    Video-Visit Details    Type of service:  Video Visit    Video Start Time: 1:45 PM  Video End Time: 2:02 PM    Originating Location (pt. 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      HPI: Geo reports that he had a bowel movement today a moderate amount.  Geo has a good appetite. He complains of lower abdominal pain but doesn't impact his ability to eat and he has not been evaluated again after his ER visit in July for it.  Discussion with house staff note that he is sometimes eating very fast and throwing up.  Geo had labs on 1/7/21 which looked good.  His finger has totally healed.   Hip wound has also " I talked with Chantel today.  She said that she is doing well overall.  She said that her scan at  didn't show any cancer.  She did say that Dr Mccray did have some concerns about her bile duct, and she will be getting an MRI on Oct 1.      She was concerned about when she will be able to see Dr Burdick.  I let her know that I will contact Feliciano, and she will review and/or contact Dr Alicea for a plan moving forward.      Of note, she said that she isn't taking her Lasix anymore.  She said that her ears plugged up on this medication.  She noted improvement as soon as she stopped the medication.  She did go to ENT as well who noted no problems with her ears per Chantel.      She does state that her ankles get swollen as the day goes on now that she isn't taking her water pills.  She puts her feet up at night, and the swelling is resolved by the morning.  She denies any worsening shortness of breath.      I advised that she take her Lasix.  Prescription was only for 20 MWF.  She is to continue to monitor her weight and symptoms.  She was instructed to carefully adhere to her fluid and sodium restrictions.  She was advised to be especially careful of hidden salt in the foods she eats and to look at nutrition labels and also look at restaurant nutrition lists if she is dining out.  Even though she was adamant about not taking the Lasix, she was advised that she may need to if her ankle swelling, weight, or breathing problems increase.  She expressed understanding and agreed.      I let her know that I will call her back when there is a plan in place for when she will need to see Dr Burdick.      She was very appreciative of the follow up phone call.    healed.  He is living in his group care facility.  No recent illnesses. No current fever or sign of current infection. Ongoing concerns about his constipation and he again reports that he feels I am withholding a cure for it.  He likes the food at the facility and has a good appetite but doesn't think he can eat enough. He refused calorie counts we discussed last month.  He also had some dificulty with the physical therapy person who came but has a new service starting in Feb. 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     No missed doses or problems with prior Entinostat cycle.       Labs:      Labs done on 1/7/21 and reviewed:    Labs:  A complete blood count was done today which reveals a white count of 5.2, Hgb of 11.1  and a platelet count of 153,000.  The differential reveals an ANC of 3000.      He also had a comprehensive metabolic panel which revealed a sodium of 139, potassium of 4.6, chloride of 103, CO2 of 29 and calcium of 8.9.  He had a BUN of 14, and creatinine of 1.03.  His liver function tests reveal an alkaline phosphatase of 125, AST of 21 and ALT of 17.  His total bilirubin is 0.3.  His total protein is 7.1, albumin is 3.6 and glucose is 96. His magnesium is 2 and phosphorus is 3.8.       Oncology History:    [id number]   6/26/15    Started Entinostat 1/9/17    DIAGNOSIS: EPENDYMOMA  AREAS TREATED: POST FOSSA TUMOR  DOSE: 5940 cGy   TYPE OF RADIATION GIVEN: with IMRT Tomotherapy   9/08/2015 to 10/26/15    Patient Active Problem List   Diagnosis     GERD (gastroesophageal reflux disease)     Closed fracture at the growth plate of right distal fibula      Elevated serum creatinine     Dyspepsia     Intracranial hemorrhage (H)     Hemorrhagic stroke (H)     Ependymoma (H)     Admission for antineoplastic chemotherapy     Post-operative state     S/P craniotomy     S/P biopsy     Noncomitant strabismus     Abducens neuropathy of both eyes      CANDELARIO (obstructive sleep apnea)     Health Care Home     Hypernatremia     Body temperature low     Current chronic use of systemic steroids     Elevated TSH     Status post chemotherapy     Neoplasm of posterior cranial fossa (H)     Chronic constipation     Serum albumin decreased     Closed compression fracture of thoracic vertebra with routine healing, subsequent encounter     Depression     Constipation     Constipation by delayed colonic transit     Slow transit constipation     Tubular adenoma     Current Outpatient Medications   Medication     dexamethasone (DECADRON) 0.5 MG tablet      MG capsule     doxycycline hyclate (VIBRAMYCIN) 100 MG capsule     fexofenadine (ALLEGRA) 180 MG tablet     Lactobacillus-Inulin ([facility]) CAPS     lamoTRIgine (LAMICTAL) 200 MG tablet     linaclotide (LINZESS) 290 MCG capsule     mupirocin (BACTROBAN) 2 % external ointment     OLANZapine (ZYPREXA) 20 MG tablet     OLANZapine (ZYPREXA) 5 MG tablet     omeprazole (PRILOSEC) 20 MG DR capsule     order for DME     order for DME     polyethylene glycol (MIRALAX) 17 GM/Dose powder     potassium phosphate, monobasic, (K-PHOS) 500 MG tablet     sennosides (SENOKOT) 8.6 MG tablet     sertraline (ZOLOFT) 100 MG tablet     study - entinostat, IDS# 5050, 1 mg tablet     study - entinostat, IDS# 5050, 5 mg tablet     sulfamethoxazole-trimethoprim (BACTRIM) 400-80 MG tablet     traZODone (DESYREL) 150 MG tablet     vitamin D3 (CHOLECALCIFEROL) 2000 units (50 mcg) tablet     vitamin E (TOCOPHEROL) 400 units (360 mg) capsule     No current facility-administered medications for this visit.         Allergies   Allergen Reactions     Blood Transfusion Related (Informational Only) Swelling     Periorbital swelling post platelet transfusion     No Known Drug Allergies        Review of Systems  Skin: positive for striae, Hip wound and finger wound. See HPI.   Eyes: positive for oculoplegia  Ears/Nose/Throat:  "negative  Respiratory: No shortness of breath, dyspnea on exertion, cough, or hemoptysis  Cardiovascular: negative  Gastrointestinal: constipation.   Genitourinary: negative  Musculoskeletal: negative  Neurologic: positive for  local weakness, speech problems, incoordination and behavior changes  Psychiatric: anxiety, agitation and perseveration re: constipation.    Hematologic/Lymphatic/Immunologic: negative  Endocrine: negative    Physical Exam    Wt Readings from Last 1 Encounters:   12/07/20 104.8 kg (231 lb 1.6 oz)      Ht Readings from Last 1 Encounters:   12/07/20 1.804 m (5' 11.02\")     Wt Readings from Last 2 Encounters:   12/07/20 104.8 kg (231 lb 1.6 oz)   10/01/20 83 kg (182 lb 15.7 oz)       GENERAL: Healthy, and alert.  EYES: Eyes grossly normal to inspection.  No discharge or erythema,patch in place over right eye. HENT: Normocephalic.  External ears, nose and mouth without ulcers or lesions.    RESP: No audible wheeze, cough, or visible cyanosis.  No visible retractions or increased work of breathing.    SKIN: Scattered bruising. No significant rash, abnormal pigmentation or lesions.  Hip wound not examined today.  MSK: Right hand digit #4 healed though still feels numb to him. Hip not examined today.   ABD:  He is able to palpate his abdomen without obvious discomfort.   NEURO: prominent dysarthria,  His speech continues to be difficult to understand at times.  PSYCH:  Affect varies with the topic, and appearance well-groomed.    Impression:  Ependymoma in good control on Entinostat    Right hip and finger well healed.  Mental health issues more difficult today.  Daily bowel movements however Geo continues with concerns of constipation  Weight increase over time now with new eating rapidly and vomiting behavior      Plan:  Discussed with Geo results of his recent labs    I discussed that he should continue to exercise. We will provide in home services for Physical Therapy during COVID once new " service is active.  I think it is very important for him.  Spoke with his  and it appears it will start in Feb. Orders will be forwarded on the 21st.     Discussed his constipation.  Acknowledged again that I am doing everything I can to treat his constipation.  He is connected to specialist and is having bowel movements every day.  Support given to Geo for the difficult situation he is in.   Discussed briefly possibilities for facial reanimation which we will discuss next time when he is in a better mood.  Discussed new eating behavior with psychiatry so they are aware prior to their next visit with him.  Staff will continue to monitor how often he is doing this.     We will also hold Vitamin E for now as the formulation covered under his plan is not covered.  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 We will monitor.     His exam and labs are adequate to begin his next cycle on Entinostat - 7mg orally weekly for four doses. We will provide a new diary when mom picks up the medication.      He is due for imaging in March.  He will be scanned on March 16th and see Dr. Rousseau.       Total time spent on the following services on the date of the encounter:  Preparing to see patient, chart review, review of outside records, Referring or communicating with other healthcare professionals, Performing a medically appropriate examination , Counseling and educating the patient/family/caregiver , Documenting clinical information in the electronic or other health record , Communicating results to the patient/family/caregiver  and Total time spent: 60            ALAN Justin CNP

## 2021-10-04 NOTE — NURSING NOTE
Chief Complaint   Patient presents with     Consult     rash on bottom and acne      /47 (BP Location: Left arm, Patient Position: Chair, Cuff Size: Adult Large)  Pulse 108  Katelyn Taveras LPN    
Pt meeting sepsis criteria with fever 102.4 and wbc of 16.14 and possible pulmonary source. Lactate wnl at 1.6  - s/p ceftriaxone and azithro in ED, 1 L NS, and tylenol  - c/w ceft and azithro  - f/u U/A  - f/u bcx  - f/u urine legionella

## 2021-10-29 NOTE — PROGRESS NOTES
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 He was previously prescribed home BiPAP but has not worn it for many years due to physical discomfort. MRI/MRV at [facility] showed stable appearance of ependymoma and post-surgical changes with no acute findings.     He was initially admitted to the floor on supplemental oxygen, however he progressed to acute on chronic respiratory status and he was transferred to the PICU on 5/3 early AM. His repeat CXR demonstrated concern for pneumonia. He is currently being supported with BiPAP with some improvement in his blood gases. After a long discussion with both parents on 5/3/21 a decision was made to change his code status to DNR/DNI. Pulmonology, PACCT and PICU are co-managing his care at this time.     RECOMMENDATIONS:  -Continue weekly Entinostat, family to bring in medication, next due on Thursday 5/6/21.  -Per discussion prior to and at care conference family clearly expressed that if he were to worsen they would not want him to be intubated or attempt resuscitation.   -Appreciate Pulmonology, PACCT, and PICU co-management of complex medical cares.         Plan of care discussed with attending physician Dr. Eddie Lowry.   Thank you for the opportunity to see this patient; please call us with any questions or concerns.    Nati Webb MD MPH  Pediatric Hematology Oncology Fellow  Pager: [phone]    I saw and evaluated the patient and agree with the fellow's assessment and plan. My total time today for this patient was at least 120 minutes and greater than 50% of which was counseling and coordination of care. Total time " See medication and pharmacy listed.   spent on the following services on the date of the encounter:  Preparing to see patient, chart review, review of outside records, Referring or communicating with other healthcare professionals, Interpretation of labs, imaging and other tests, Performing a medically appropriate examination , Counseling and educating the patient/family/caregiver , Documenting clinical information in the electronic or other health record , Communicating results to the patient/family/caregiver , Care coordination  and Total time spent: 120 mins  Eddie Lowry MD, MPH, [facility]  Division of Pediatric Hematology/Oncology    Medications:  Current Facility-Administered Medications   Medication     acetaminophen (TYLENOL) tablet 650 mg     ampicillin-sulbactam (UNASYN) 3 g vial to attach to  mL bag     [Held by provider] dexamethasone (DECADRON) tablet 0.75 mg     [Held by provider] fexofenadine (ALLEGRA) tablet 180 mg     hydrocortisone sodium succinate (Solu-CORTEF) PEDS/NICU IV 75 mg     [Held by provider] lactobacillus rhamnosus (GG) (CULTURELL) capsule 2 capsule     lamoTRIgine (LaMICtal) tablet 200 mg     [Held by provider] linaclotide (LINZESS) capsule 290 mcg     [Held by provider] OLANZapine (zyPREXA) tablet 10 mg     [Held by provider] omeprazole (priLOSEC) CR capsule 40 mg     pantoprazole (PROTONIX) IV push injection 40 mg     polyethylene glycol (MIRALAX) Packet 17 g     potassium phosphate (monobasic) (K-PHOS) tablet 500 mg     [Held by provider] sertraline (ZOLOFT) tablet 100 mg     sodium chloride 0.9% infusion     sulfamethoxazole-trimethoprim (BACTRIM) 400-80 MG per tablet 1 tablet     traZODone (DESYREL) tablet 150 mg     [Held by provider] Vitamin D3 (CHOLECALCIFEROL) tablet 2,000 Units     [Held by provider] vitamin E (TOCOPHEROL) 200 units (90 mg) capsule 400 Units     PHYSICAL EXAM:  Temp: 99.1  F (37.3  C) Temp src: Axillary BP: 96/65 Pulse: 79   Resp: 17  SpO2: 97 % O2 Device: BiPAP/CPAP Oxygen Delivery: Others (comment)    GENERAL: laying in bed, intermittently awakens   HEENT:  moist mucus membranes  CHEST: BiPAP in place, decreased throughout   CV: RRR, good distal perfusion  ABD: soft, obese, no apparent significant distention  EXT: no obvious deformities   NEURO: awakens intermittently, responds to yes/no questions    Results for orders placed or performed during the hospital encounter of 05/01/21 (from the past 24 hour(s))   XR Chest Port 1 View    Narrative    Exam: XR CHEST PORT 1 VIEW, 5/2/2021 11:41 PM    Indication: desat, concern for asp pna    Comparison: Chest x-ray 5/1/2021 and 12/20/2019    Findings:   Semiupright AP view of the chest. Bilateral perihilar opacities and  left basilar retrocardiac opacity are increased. No pneumothorax no  discernible pleural effusion. Indistinct pulmonary vasculature.  Bilateral low lung volumes. Stable appearance of the cardiac  silhouette.      Impression    Impression:   1. Increased left basilar opacity, representing atelectasis,  aspiration, or infection.  2. Increased bilateral perihilar opacities with indistinct pulmonary  vasculature suspected to represent atelectasis, infection, and/or  pulmonary edema.    I have personally reviewed the examination and initial interpretation  and I agree with the findings.    KYLIE CHACON, DO   CBC with platelets differential   Result Value Ref Range    WBC 9.5 4.0 - 11.0 10e9/L    RBC Count 3.97 (L) 4.4 - 5.9 10e12/L    Hemoglobin 8.9 (L) 13.3 - 17.7 g/dL    Hematocrit 32.1 (L) 40.0 - 53.0 %    MCV 81 78 - 100 fl    MCH 22.4 (L) 26.5 - 33.0 pg    MCHC 27.7 (L) 31.5 - 36.5 g/dL    RDW 19.9 (H) 10.0 - 15.0 %    Platelet Count 98 (L) 150 - 450 10e9/L    Diff Method Automated Method     % Neutrophils 73.3 %    % Lymphocytes 10.3 %    % Monocytes 13.1 %    % Eosinophils 1.6 %    % Basophils 0.6 %    % Immature Granulocytes 1.1 %    Nucleated RBCs 0 0 /100    Absolute Neutrophil  7.0 1.6 - 8.3 10e9/L    Absolute Lymphocytes 1.0 0.8 - 5.3 10e9/L    Absolute Monocytes 1.3 0.0 - 1.3 10e9/L    Absolute Eosinophils 0.2 0.0 - 0.7 10e9/L    Absolute Basophils 0.1 0.0 - 0.2 10e9/L    Abs Immature Granulocytes 0.1 0 - 0.4 10e9/L    Absolute Nucleated RBC 0.0    CRP inflammation   Result Value Ref Range    CRP Inflammation 13.6 (H) 0.0 - 8.0 mg/L   Blood gas venous   Result Value Ref Range    Ph Venous 7.25 (L) 7.32 - 7.43 pH    PCO2 Venous 84 (HH) 40 - 50 mm Hg    PO2 Venous 36 25 - 47 mm Hg    Bicarbonate Venous 37 (H) 21 - 28 mmol/L    Base Excess Venous 7.6 mmol/L    FIO2 55    PEDS Pulmonology IP Consult: Patient to be seen: Routine within 24 hrs; Call back #: [phone]; obstructive sleep apnea, chronic cough, intermittent hypoxia; Consultant may enter orders: Yes; Requesting provider? Attending physician    Brenda Pearl MD     5/3/2021  3:41 PM  [facility]  Consult Note - Hospitalist Service     Date of Admission:  5/1/2021  Consult Requested by: PICU Team    Reason for Consult:     Assessment & Plan   Geo Hicks is a 20 year old male with history of posterior   fossa ependymoma s/p radiation and chemotherapy who was admitted   to the PICU with acute on chronic hypoxic and hypercarbic   respiratory failure in the setting of known obstructive sleep   apnea as well as possible acute infection. While on high BiPAP   settings, Geo continues to have poor ventilation and low lung   volumes as demonstrated in his chest x ray and low tidal volumes   seen on BiPAP machine.     Although he has needed respiratory support for his CANDELARIO in the   past, the sudden increase in support overnight is most concerning   for an acute change such as infection. An acute infection in   addition to exsisting factors such as obesity, deconditioning,   and hypotonia are all likely contributing to his obstructive   process/infcreased  ventilatory needs. A trial of AVAPS   recommended at this time prior to moving toward invasive   ventilatory methods unless he has acute clinical decompensation.     At this time, he does not have an indication for bronchoscopy or   further imaging such as CT, but will continue to follow as goals   of care are discussed with the ICU, oncology team, and family.     Recommendation 5/3:   -  AVAPS in place of BiPAP   EPAP 11   TV Goal of 600 (8-10 /kg based on his ideal body weight)    IPAP 18-35  - Consider Cough Assist 3-4 times a day or vest treatment to   encourage expectoration while clearing infection  - Continue treatment of likely aspiration pneumonia  - when recovered from acute respiratory decline, we should   discuss with patient and family about addressing aspiration risk   and need for nocturnal ventilation, based on their goals of care        The patient's care was discussed with the Attending Physician,   Dr. Marr.    Rufina Downs ([facility]) DO   [facility] Pediatric Resident PGY-2      Physician Attestation   I, Noah Marr MD, saw this patient with the resident   and agree with the resident/fellow's findings and plan of care as   documented in the note.      I personally reviewed vital signs, medications, labs and imaging.    Noah Marr MD  Date of Service (when I saw the patient): 05/03/21    __________________________________________________________________  ___    Chief Complaint   Hypoxia, Hypercarbia, increased respiratory support     History is obtained from the patient's parent.    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 Geo was officially  diagnosed with   sleep apenea during a sleep study at [facility] in April of 2017 at   which time he as effectively treated with BiPAP level of 18/11   and was only needed support while sleeping. Since this diagnosis,   Geo has been reluctant to wearing the BiPAP machine at night   and was not using it at all prior to admission. Geo's father   also notes that he has had recent weight gain due to excessive   snacking between meals. He eats by mouth. Sometimes he coughs or   even vomits while eating.     On the oncology floor prior to transfer, Geo was stable until   mid day on 5/2 when he started to have coughing spells and   desaturations while on 7 LPM. Desaturations to the 80s were seen   during and after eating his lunch. The cough was wet, but nor   productive. He seemed to recover throughout the afternoon   remaining on 7 LPM NC, but again had severe desaturations after   falling asleep that evening. Work up just prior to transfer to   the ICU included stat chest xray concerning for a LLL opacity,   hypercarbia, elevated CRP, lower blood pressures, and altered   mental status. He was escalated to BiPAP but ultimately was   transferred to the PICU for due to need for further respiratory   monitoring and support.      Past Medical History    I have reviewed this patient's medical history and updated it   with pertinent information if needed.   Past Medical History:   Diagnosis Date     Cranial nerve dysfunction      Dyspepsia      Ependymoma (H)      Gastro-oesophageal reflux disease      Hearing loss      Intracranial hemorrhage (H)      Migraine      Pilonidal cyst     7-2015     Reduced vision      Refractory obstruction of nasal airway     2nd to nasal valve prolapse     Sleep apnea      Strabismus     gaze palsy        Past Surgical History   I have reviewed this patient's surgical history and updated it   with pertinent information if needed.    Past Surgical History:   Procedure Laterality Date      COLONOSCOPY N/A 9/27/2019    Procedure: Colonoscopy With Biopsies and Polypectomy;  Surgeon:   Aniya Wei MD;  [facility]: [facility]     ESOPHAGOSCOPY, GASTROSCOPY, DUODENOSCOPY (EGD), COMBINED N/A   9/27/2019    Procedure: Upper Endoscopy (EGD) With Biopsies;  Surgeon:   Aniya Wei MD;  [facility]     GRAFT CARTILAGE FROM POSTERIOR AURICLE Left 10/6/2016    Procedure: GRAFT CARTILAGE FROM POSTERIOR AURICLE;  Surgeon:   Tyler Richards MD;  [facility]     INCISION AND DRAINAGE PERINEAL, COMBINED Bilateral 7/18/2015    Procedure: COMBINED INCISION AND DRAINAGE PERINEAL;  Surgeon:   Dequan Timmons MD;  [facility]     OPTICAL TRACKING SYSTEM CRANIOTOMY, EXCISE TUMOR, COMBINED N/A   4/13/2015    Procedure: COMBINED OPTICAL TRACKING SYSTEM CRANIOTOMY, EXCISE   TUMOR;  Surgeon: Francis Velazquez MD;  [facility]     OPTICAL TRACKING SYSTEM CRANIOTOMY, EXCISE TUMOR, COMBINED N/A   4/16/2015    Procedure: COMBINED OPTICAL TRACKING SYSTEM CRANIOTOMY, EXCISE   TUMOR;  Surgeon: Francis Velazquez MD;  [facility]     OPTICAL TRACKING SYSTEM CRANIOTOMY, EXCISE TUMOR, COMBINED   Bilateral 5/28/2015    Procedure: COMBINED OPTICAL TRACKING SYSTEM CRANIOTOMY, EXCISE   TUMOR;  Surgeon: Francis Velazquez MD;  [facility]     OPTICAL TRACKING SYSTEM CRANIOTOMY, EXCISE TUMOR, COMBINED   Bilateral 1/14/2016    Procedure: COMBINED OPTICAL TRACKING SYSTEM CRANIOTOMY, EXCISE   TUMOR;  Surgeon: Francis Velazquez MD;  [facility]     OPTICAL TRACKING SYSTEM VENTRICULOSTOMY  4/16/2015    Procedure: OPTICAL TRACKING SYSTEM VENTRICULOSTOMY;  Surgeon:   Francis Velazquez MD;  [facility]     REMOVE PORT VASCULAR ACCESS N/A 10/6/2016    Procedure: REMOVE PORT VASCULAR ACCESS;  Surgeon: Bruno Perea MD;  [facility]     RHINOPLASTY N/A 10/6/2016    Procedure: RHINOPLASTY;  Surgeon: Tyler Richards MD;     [facility]     VASCULAR SURGERY  5-2015    single lumen power port       Social History   I have reviewed this patient's social history and updated it with   pertinent information if needed.    Social History     Tobacco Use     Smoking status: Never Smoker     Smokeless tobacco: Never Used   Substance Use Topics     Alcohol use: No     Drug use: No       Family History   I have reviewed this patient's family history and updated it with   pertinent information if needed.  Family History   Problem Relation Age of Onset     Circulatory Father         PE/DVT     Hypothyroidism Father 30     Diabetes Maternal Grandmother      Diabetes Paternal Grandmother      Diabetes Paternal Grandfather      C.A.D. Paternal Grandfather      Hypertension Maternal Grandfather      Thyroid Disease Paternal Aunt         unknown whether hypo or hyper     Mental Illness No family hx of        Medications   I have reviewed this patient's current medications    Allergies   Allergies   Allergen Reactions     Blood Transfusion Related (Informational Only) Swelling     Periorbital swelling post platelet transfusion     No Known Drug Allergies        Physical Exam   Vital Signs: Temp: 99.2  F (37.3  C) Temp src: Axillary BP: 95/50   Pulse: 91   Resp: 20 SpO2: 94 % O2 Device: BiPAP/CPAP(22/10)   Oxygen Delivery: Others (comment)  Weight: 274 lbs 7.56 oz    Constitutional: Coming in and out of sleep, twitching multiple   times throughout the exam, opens eyes on command by patients   mother, no acute distress  Eyes: Lids and lashes normal, no periorbital edema, conjunctiva   with small hemorrhage on right, patch covering left eye  Respiratory: BiPAP mask in place over nose and mouth, lungs   sounds are limited to the upper lung fields, scattered crackles,   coughing x1 during exam (wet), no wheezing  Abdomen: Normal bowel sounds, obese abdomen, soft without   tenderness to palpation  Cardiac: RRR, no murmurs or rubs appreciated, well  perfused  Musculoskeletal: No extremity edema, no deformities.     Data     Results for RAFAELA GUAN (MRN [id number]) as of 5/3/2021 11:27   Ref. Range 5/2/2021 23:48 5/3/2021 00:37 5/3/2021 03:11 5/3/2021   04:32 5/3/2021 05:30 5/3/2021 08:10 5/3/2021 09:30   FIO2 Unknown 55 55 50 60 60 60 65   Ph Venous Latest Ref Range: 7.32 - 7.43 pH 7.25 (L) 7.25 (L) 7.27   (L) 7.25 (L) 7.25 (L) 7.27 (L) 7.29 (L)   PCO2 Venous Latest Ref Range: 40 - 50 mm Hg 84 (HH) 83 (HH) 77   (HH) 79 (HH) 81 (HH) 74 (H) 75 (H)   PO2 Venous Latest Ref Range: 25 - 47 mm Hg 36 32 36 57 (H) 63 (H)   71 (H) 64 (H)   Bicarbonate Venous Latest Ref Range: 21 - 28 mmol/L 37 (H) 37 (H)   36 (H) 34 (H) 36 (H) 34 (H) 36 (H)   Base Excess Venous Latest Units: mmol/L 7.6 7.6 7.1 5.5 6.8 6.0   7.6   Oxyhemoglobin Venous Latest Units: %      91       Blood gas venous   Result Value Ref Range    Ph Venous 7.25 (L) 7.32 - 7.43 pH    PCO2 Venous 83 (HH) 40 - 50 mm Hg    PO2 Venous 32 25 - 47 mm Hg    Bicarbonate Venous 37 (H) 21 - 28 mmol/L    Base Excess Venous 7.6 mmol/L    FIO2 55    Blood gas venous   Result Value Ref Range    Ph Venous 7.27 (L) 7.32 - 7.43 pH    PCO2 Venous 77 (HH) 40 - 50 mm Hg    PO2 Venous 36 25 - 47 mm Hg    Bicarbonate Venous 36 (H) 21 - 28 mmol/L    Base Excess Venous 7.1 mmol/L    FIO2 50    Methicillin Resistant Staph Aureus PCR    Specimen: Nares   Result Value Ref Range    Specimen Description Nares     Methicillin Resist/Sens S. aureus PCR Negative NEG^Negative   Blood gas venous   Result Value Ref Range    Ph Venous 7.25 (L) 7.32 - 7.43 pH    PCO2 Venous 79 (HH) 40 - 50 mm Hg    PO2 Venous 57 (H) 25 - 47 mm Hg    Bicarbonate Venous 34 (H) 21 - 28 mmol/L    Base Excess Venous 5.5 mmol/L    FIO2 60    CBC with platelets differential   Result Value Ref Range    WBC 7.7 4.0 - 11.0 10e9/L    RBC Count 3.71 (L) 4.4 - 5.9 10e12/L    Hemoglobin 8.5 (L) 13.3 - 17.7 g/dL    Hematocrit 30.1 (L) 40.0 - 53.0 %    MCV 81 78 - 100 fl     MCH 22.9 (L) 26.5 - 33.0 pg    MCHC 28.2 (L) 31.5 - 36.5 g/dL    RDW 19.9 (H) 10.0 - 15.0 %    Platelet Count 90 (L) 150 - 450 10e9/L    Diff Method Automated Method     % Neutrophils 72.1 %    % Lymphocytes 9.1 %    % Monocytes 15.5 %    % Eosinophils 1.8 %    % Basophils 0.5 %    % Immature Granulocytes 1.0 %    Nucleated RBCs 1 (H) 0 /100    Absolute Neutrophil 5.6 1.6 - 8.3 10e9/L    Absolute Lymphocytes 0.7 (L) 0.8 - 5.3 10e9/L    Absolute Monocytes 1.2 0.0 - 1.3 10e9/L    Absolute Eosinophils 0.1 0.0 - 0.7 10e9/L    Absolute Basophils 0.0 0.0 - 0.2 10e9/L    Abs Immature Granulocytes 0.1 0 - 0.4 10e9/L    Absolute Nucleated RBC 0.1    Basic metabolic panel   Result Value Ref Range    Sodium 140 133 - 144 mmol/L    Potassium 4.0 3.4 - 5.3 mmol/L    Chloride 106 94 - 109 mmol/L    Carbon Dioxide 32 20 - 32 mmol/L    Anion Gap 2 (L) 3 - 14 mmol/L    Glucose 114 (H) 70 - 99 mg/dL    Urea Nitrogen 16 7 - 30 mg/dL    Creatinine 1.07 0.66 - 1.25 mg/dL    GFR Estimate >90 >60 mL/min/[1.73_m2]    GFR Estimate If Black >90 >60 mL/min/[1.73_m2]    Calcium 7.4 (L) 8.5 - 10.1 mg/dL   Procalcitonin   Result Value Ref Range    Procalcitonin 0.23 ng/ml   Blood gas venous   Result Value Ref Range    Ph Venous 7.25 (L) 7.32 - 7.43 pH    PCO2 Venous 81 (HH) 40 - 50 mm Hg    PO2 Venous 63 (H) 25 - 47 mm Hg    Bicarbonate Venous 36 (H) 21 - 28 mmol/L    Base Excess Venous 6.8 mmol/L    FIO2 60    UA with Microscopic reflex to Culture    Specimen: Urine, Straight Catheter; Catheterized Urine   Result Value Ref Range    Color Urine Yellow     Appearance Urine Clear     Glucose Urine Negative NEG^Negative mg/dL    Bilirubin Urine Negative NEG^Negative    Ketones Urine Negative NEG^Negative mg/dL    Specific Gravity Urine 1.032 1.003 - 1.035    Blood Urine Negative NEG^Negative    pH Urine 5.5 5.0 - 7.0 pH    Protein Albumin Urine 20 (A) NEG^Negative mg/dL    Urobilinogen mg/dL Normal 0.0 - 2.0 mg/dL    Nitrite Urine Negative  NEG^Negative    Leukocyte Esterase Urine Negative NEG^Negative    Source Catheterized Urine     WBC Urine 2 0 - 5 /HPF    RBC Urine 1 0 - 2 /HPF    Bacteria Urine None (A) NEG^Negative /HPF    Squamous Epithelial /HPF Urine <1 0 - 1 /HPF    Transitional Epi 1 0 - 1 /HPF    Mucous Urine Present (A) NEG^Negative /LPF   Blood gas venous with oxyhemoglobin   Result Value Ref Range    Ph Venous 7.27 (L) 7.32 - 7.43 pH    PCO2 Venous 74 (H) 40 - 50 mm Hg    PO2 Venous 71 (H) 25 - 47 mm Hg    Bicarbonate Venous 34 (H) 21 - 28 mmol/L    FIO2 60     Oxyhemoglobin Venous 91 %    Base Excess Venous 6.0 mmol/L   Blood gas venous   Result Value Ref Range    Ph Venous 7.29 (L) 7.32 - 7.43 pH    PCO2 Venous 75 (H) 40 - 50 mm Hg    PO2 Venous 64 (H) 25 - 47 mm Hg    Bicarbonate Venous 36 (H) 21 - 28 mmol/L    Base Excess Venous 7.6 mmol/L    FIO2 65    PEDS PACCT (Pain and Advanced/Complex Care Team) IP Consult: Patient to be seen: Routine within 24 hrs; Call back #: [phone]; Assistance with end of life discussion; Consultant may enter orders: Yes; Requesting provider? Attending physician    Narrative    Nasir Carrillo, ALAN CNP     5/3/2021  4:37 PM    [facility]  Pain and Advanced/Complex Care Team (PACCT)   Initial Consultation    Geo Hicks MRN# [id number]   Age: 20 year old YOB: 2000   Date:  05/03/2021 Admitted:  5/1/2021     Reason for consult: Decisional support and goals of care  Patient and family support  Requesting physician/service: Dr. Eddie Lowry, Heme/Onc, Dr. Rima Salinas, PICU    Recommendations, Patient/Family Counseling & Coordination:     SYMPTOM MANAGEMENT: Consider adding a PRN morphine dose (1-2 mg   IV) for dyspnea  And a PRN Lorazepam dose for anxiety    GOALS OF CARE AND DECISIONAL SUPPORT/SUMMARY OF DISCUSSION WITH   PATIENT AND/OR FAMILY: Introduced scope of PACCT, including our   role in pain and symptom management, decision-making and  support.   Met with parents separately in conference room in AM.  Discussed   Geo's current challenges with respiratory failure.  Parents   report they were asked to think about whether a breathing tube   should be placed if Geo declines.  We talked about what being   intubated may mean for Geo, and the worry that he would be   difficult to extubate, and it's very challenging to make a   conscious decision to electively extubate your child, knowing   that they will then die.  Parents talked a lot about quality of   life and that they did not want Geo to suffer.  I recommended   they let these priorities lead them. I acknowledged the losses   they've encountered due to Geo's brain tumor, their hopes and   dreams for him, and his for himself.  They agreed, wondering if   he had any real quality of life even before coming to the   hospital.     Care conference in PM:  Attended by Rita Jasmine,   Alen, Aishwarya, Amena Dupree, RYLAND, parents Eric and   Christianne, and myself.   talked about the high degree of   support that Geo is currently requiring to maintain his O2   sats, and that in most patients they would be considering   intubation.  They wanted to know what parents thoughts were about   that.  There was the real concern that he would be difficult to   extubate. I reminded parents of our discussion earlier in the   day, and they both agreed that they could not electively extubate   and did not want him living with a trach, so they made the   difficult decision not to intubate.  They also agreed to no CPR   including compressions, meds or electricity.  Providers in the   room supported these decisions acknowledging the brave and   compassionate action.  Both parents were totally in agreement   with these decisions.      Thank you for the opportunity to participate in the care of this   patient and family.   Please contact the Pain and Advanced/Complex Care Team (PACCT)    with any emergent needs via text page to the PACCT general pager   ([phone], answered 8-4:30 Monday to Friday). After hours and   on weekends/holidays, please refer to [facility] or [place] on-call.    Attestation:  I spent a total of 3 hours on the inpatient unit today caring for   Geo Hicks. Over 50% of my time on the unit was spent   coordinating care and counseling regarding goals of care. See   note for details. I spent a total of 160 minutes face-to-face   with the patient/family.  Discussed with primary team.  Prolonged time:  2:30 - 3:30 PM    ALAN Tyler CNP [facility]  [id number]      Assessment:      Diagnoses and symptoms: Geo Hicks is a 20 year old male with:  Patient Active Problem List   Diagnosis     GERD (gastroesophageal reflux disease)     Elevated serum creatinine     Dyspepsia     Intracranial hemorrhage (H)     Hemorrhagic stroke (H)     Ependymoma (H)     S/P craniotomy     S/P biopsy     Noncomitant strabismus     Abducens neuropathy of both eyes     CANDELARIO (obstructive sleep apnea)     Status post chemotherapy     Chronic constipation     Serum albumin decreased     Closed compression fracture of thoracic vertebra with routine   healing, subsequent encounter     Depression     Slow transit constipation     Hypoxia     Respiratory failure (H)     Paranoid delusions  Palliative care needs associated with the above    Psychosocial and spiritual concerns: Parents are  but   work well together in prioritizing their children;     Advance care planning:   Patient/Family understanding of illness: Good  Patient/Family care goals: Do not want Geo to suffer or have   longterm complications and interventions  Prognosis: Guarded  Code status: DNR/DNI      History of Present Illness/Problem:     Geo Hicks is a 20 year old male who was 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  He   was admitted for respiratory failure and has required escalating   PPV support.      Past Medical History:     Past Medical History:   Diagnosis Date     Cranial nerve dysfunction      Dyspepsia      Ependymoma (H)      Gastro-oesophageal reflux disease      Hearing loss      Intracranial hemorrhage (H)      Migraine      Pilonidal cyst     7-2015     Reduced vision      Refractory obstruction of nasal airway     2nd to nasal valve prolapse     Sleep apnea      Strabismus     gaze palsy         Past Surgical History:     Past Surgical History:   Procedure Laterality Date     COLONOSCOPY N/A 9/27/2019    Procedure: Colonoscopy With Biopsies and Polypectomy;  Surgeon:   Aniya Wei MD;  [facility]     ESOPHAGOSCOPY, GASTROSCOPY, DUODENOSCOPY (EGD), COMBINED N/A   9/27/2019    Procedure: Upper Endoscopy (EGD) With Biopsies;  Surgeon:   Aniya Wei MD;  [facility]     GRAFT CARTILAGE FROM POSTERIOR AURICLE Left 10/6/2016    Procedure: GRAFT CARTILAGE FROM POSTERIOR AURICLE;  Surgeon:   Tyler Richards MD;  [facility]     INCISION AND DRAINAGE PERINEAL, COMBINED Bilateral 7/18/2015    Procedure: COMBINED INCISION AND DRAINAGE PERINEAL;  Surgeon:   Dequan Timmons MD;  [facility]     OPTICAL TRACKING SYSTEM CRANIOTOMY, EXCISE TUMOR, COMBINED N/A   4/13/2015    Procedure: COMBINED OPTICAL TRACKING SYSTEM CRANIOTOMY, EXCISE   TUMOR;  Surgeon: Francis Velazquez MD;  [facility]     OPTICAL TRACKING SYSTEM CRANIOTOMY, EXCISE TUMOR, COMBINED N/A   4/16/2015    Procedure: COMBINED OPTICAL TRACKING SYSTEM CRANIOTOMY, EXCISE   TUMOR;  Surgeon: Francis Velazquez MD;  [facility]     OPTICAL TRACKING SYSTEM CRANIOTOMY, EXCISE TUMOR, COMBINED   Bilateral 5/28/2015    Procedure: COMBINED OPTICAL TRACKING SYSTEM CRANIOTOMY, EXCISE   TUMOR;  Surgeon: Francis Velazquez MD;  [facility]     OPTICAL TRACKING SYSTEM  CRANIOTOMY, EXCISE TUMOR, COMBINED   Bilateral 1/14/2016    Procedure: COMBINED OPTICAL TRACKING SYSTEM CRANIOTOMY, EXCISE   TUMOR;  Surgeon: Francis Velazquez MD;  [facility]     OPTICAL TRACKING SYSTEM VENTRICULOSTOMY  4/16/2015    Procedure: OPTICAL TRACKING SYSTEM VENTRICULOSTOMY;  Surgeon:   Francis Velazquez MD;  [facility]     REMOVE PORT VASCULAR ACCESS N/A 10/6/2016    Procedure: REMOVE PORT VASCULAR ACCESS;  Surgeon: Bruno Perea MD;  [facility]     RHINOPLASTY N/A 10/6/2016    Procedure: RHINOPLASTY;  Surgeon: Tyler Richards MD;    [facility]     VASCULAR SURGERY  5-2015    single lumen power port       Social/Spiritual History:     Did not discuss with Geo, but his parents report they are   [place].  Christianne shared that she talks to God a lot and for   awhile was pretty angry and did not understand how a loving God   could let this happen to her son.  She said she has come around   and would welcome a visit from the  (I reached out to   Ita Boswell.)  Both parents work but have supportive employers.    Parents share 2 other sons and have both remarried.      Family History:     Family History   Problem Relation Age of Onset     Circulatory Father         PE/DVT     Hypothyroidism Father 30     Diabetes Maternal Grandmother      Diabetes Paternal Grandmother      Diabetes Paternal Grandfather      C.A.D. Paternal Grandfather      Hypertension Maternal Grandfather      Thyroid Disease Paternal Aunt         unknown whether hypo or hyper     Mental Illness No family hx of        Allergies:     Geo Hicks is allergic to blood transfusion related   (informational only) and no known drug allergies.    Medications:     I have reviewed this patient's medication profile and medications   during this hospitalization.      Scheduled medications:     ampicillin-sulbactam (UNASYN) IV  3 g Intravenous Q6H     [Held by provider] dexamethasone  0.75 mg Oral  Daily with   breakfast     [Held by provider] fexofenadine  180 mg Oral At Bedtime     hydrocortisone sodium succinate  100 mg/m2/day Intravenous Q6H     [Held by provider] lactobacillus rhamnosus (GG)  2 capsule Oral   Daily     lamoTRIgine  200 mg Oral At Bedtime     [Held by provider] linaclotide  290 mcg Oral QAM AC     [Held by provider] OLANZapine  10 mg Oral At Bedtime     [Held by provider] omeprazole  40 mg Oral QAM     pantoprazole (PROTONIX) IV  40 mg Intravenous Daily with   breakfast     polyethylene glycol  17 g Oral TID     potassium phosphate (monobasic)  500 mg Oral BID     [Held by provider] sertraline  100 mg Oral Daily     sulfamethoxazole-trimethoprim  1 tablet Oral 2 times per day on   Sun Sat     [Held by provider] Vitamin D3  2,000 Units Oral Daily with   breakfast     [Held by provider] vitamin E  400 Units Oral Daily     Infusions:     sodium chloride 100 mL/hr at 05/03/21 0133     PRN medications: acetaminophen, traZODone    Review of Systems:     Palliative Symptom Review  The comprehensive review of systems is negative other than noted   here and in the HPI. Completed by proxy by parent(s)/caretaker(s)   (if applicable)    Physical Exam:     Vitals were reviewed  Temp:  [97.8  F (36.6  C)-99.9  F (37.7  C)] 98.5  F (36.9  C)  Pulse:  [] 84  Resp:  [11-41] 14  BP: ()/(43-63) 111/57  FiO2 (%):  [50 %-70 %] 60 %  SpO2:  [78 %-98 %] 94 %  Weight: 124 kg   GENERAL: PPV mask in place   LUNGS: Decreased breath sounds throughout  HEART: Regular rhythm. Normal S1/S2. No murmurs.    ABDOMEN: Soft, non-tender, not distended, no masses or   hepatosplenomegaly. Bowel sounds normal.   NEUROLOGIC: Did not examine but history of being unable to walk   independently    Data Reviewed:     Results for orders placed or performed during the hospital   encounter of 05/01/21 (from the past 24 hour(s))   XR Chest Port 1 View    Narrative    Exam: XR CHEST PORT 1 VIEW, 5/2/2021 11:41  PM    Indication: desat, concern for asp pna    Comparison: Chest x-ray 5/1/2021 and 12/20/2019    Findings:   Semiupright AP view of the chest. Bilateral perihilar opacities   and  left basilar retrocardiac opacity are increased. No pneumothorax   no  discernible pleural effusion. Indistinct pulmonary vasculature.  Bilateral low lung volumes. Stable appearance of the cardiac  silhouette.      Impression    Impression:   1. Increased left basilar opacity, representing atelectasis,  aspiration, or infection.  2. Increased bilateral perihilar opacities with indistinct   pulmonary  vasculature suspected to represent atelectasis, infection, and/or  pulmonary edema.    I have personally reviewed the examination and initial   interpretation  and I agree with the findings.    KYLIE CHACON, DO   CBC with platelets differential   Result Value Ref Range    WBC 9.5 4.0 - 11.0 10e9/L    RBC Count 3.97 (L) 4.4 - 5.9 10e12/L    Hemoglobin 8.9 (L) 13.3 - 17.7 g/dL    Hematocrit 32.1 (L) 40.0 - 53.0 %    MCV 81 78 - 100 fl    MCH 22.4 (L) 26.5 - 33.0 pg    MCHC 27.7 (L) 31.5 - 36.5 g/dL    RDW 19.9 (H) 10.0 - 15.0 %    Platelet Count 98 (L) 150 - 450 10e9/L    Diff Method Automated Method     % Neutrophils 73.3 %    % Lymphocytes 10.3 %    % Monocytes 13.1 %    % Eosinophils 1.6 %    % Basophils 0.6 %    % Immature Granulocytes 1.1 %    Nucleated RBCs 0 0 /100    Absolute Neutrophil 7.0 1.6 - 8.3 10e9/L    Absolute Lymphocytes 1.0 0.8 - 5.3 10e9/L    Absolute Monocytes 1.3 0.0 - 1.3 10e9/L    Absolute Eosinophils 0.2 0.0 - 0.7 10e9/L    Absolute Basophils 0.1 0.0 - 0.2 10e9/L    Abs Immature Granulocytes 0.1 0 - 0.4 10e9/L    Absolute Nucleated RBC 0.0    CRP inflammation   Result Value Ref Range    CRP Inflammation 13.6 (H) 0.0 - 8.0 mg/L   Blood gas venous   Result Value Ref Range    Ph Venous 7.25 (L) 7.32 - 7.43 pH    PCO2 Venous 84 (HH) 40 - 50 mm Hg    PO2 Venous 36 25 - 47 mm Hg    Bicarbonate Venous 37 (H) 21 - 28 mmol/L     Base Excess Venous 7.6 mmol/L    FIO2 55    PEDS Pulmonology IP Consult: Patient to be seen: Routine within   24 hrs; Call back #: [phone]; obstructive sleep   apnea, chronic cough, intermittent hypoxia; Consultant may enter   orders: Yes; Requesting provider? 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 While on high BiPAP   settings, Geo continues to have poor ventilation and low lung   volumes as demonstrated in his chest x ray and low tidal volumes   seen on BiPAP machine. Although he has needed respiratory support     for his CANDELARIO in the past, the sudden increase in support overnight     is most concerning for an acute change such as infection. An   acute infection in addition to exsisting factors such as obesity,     deconditioning, and hypotonia are all likely contributing to his   obstructive process/infcreased ventilatory needs. A trial of   AVAPS recommended at this time prior to moving toward invasive   ventilatory methods unless he has acute clinical decompensation.   At this time, he does not have an indication for bronchoscopy or   further imaging such as CT, but will continue to follow as goals   of care are discussed with the ICU, oncology team, and family.     Recommendation 5/3:   -  AVAPS in place of BiPAP   EPAP 11   TV Goal of 600 (8-10 /kg based on his ideal body weight)    IPAP 18-35  - Consider Cough Assist or vest treatment to encourage    expectoration while clearing infection  - Continue treatment of likely aspiration pneumonia          The patient's care was discussed with the Attending Physician,   Dr. Marr.    Rufina Olsen) DO   [facility] Pediatric Resident PGY-2        __________________________________________________________________    ___    Chief Complaint   Hypoxia, Hypercarbia, increased respiratory support     History is obtained from the patient's parent.    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 Geo was officially diagnosed with   sleep apenea during a sleep study at [place] in April of 2017 at     which time he as effectively treated with BiPAP level of 18/11   and was only needed support while sleeping. Since this diagnosis,     Geo has been reluctant to wearing the BiPAP machine at night   and was not using it at all prior to admission. Geo's father   also notes that he has had recent weight gain due to excessive   snacking between meals. He eats by mouth. Sometimes he coughs or   even vomits while eating.     On the oncology floor prior to transfer, Geo was stable until   mid day on 5/2 when he started to have coughing spells and   desaturations while on 7 LPM. Desaturations to the 80s were seen   during and after eating his lunch. The cough was wet, but nor   productive. He seemed to recover throughout the afternoon   remaining on 7 LPM NC, but again had severe desaturations after   falling asleep that evening. Work up just prior to transfer to   the ICU included stat chest xray concerning for a LLL opacity,   hypercarbia, elevated CRP, lower blood pressures, and altered   mental  status. He was escalated to BiPAP but ultimately was   transferred to the PICU for due to need for further respiratory   monitoring and support.      Past Medical History    I have reviewed this patient's medical history and updated it   with pertinent information if needed.   Past Medical History:   Diagnosis Date     Cranial nerve dysfunction      Dyspepsia      Ependymoma (H)      Gastro-oesophageal reflux disease      Hearing loss      Intracranial hemorrhage (H)      Migraine      Pilonidal cyst     7-2015     Reduced vision      Refractory obstruction of nasal airway     2nd to nasal valve prolapse     Sleep apnea      Strabismus     gaze palsy        Past Surgical History   I have reviewed this patient's surgical history and updated it   with pertinent information if needed.  Past Surgical History:   Procedure Laterality Date     COLONOSCOPY N/A 9/27/2019    Procedure: Colonoscopy With Biopsies and Polypectomy;  Surgeon:   Aniya Wei MD;  [facility]     ESOPHAGOSCOPY, GASTROSCOPY, DUODENOSCOPY (EGD), COMBINED N/A   9/27/2019    Procedure: Upper Endoscopy (EGD) With Biopsies;  Surgeon:   Aniya Wei MD;  [facility]     GRAFT CARTILAGE FROM POSTERIOR AURICLE Left 10/6/2016    Procedure: GRAFT CARTILAGE FROM POSTERIOR AURICLE;  Surgeon:   Tyler Richards MD;  [facility]     INCISION AND DRAINAGE PERINEAL, COMBINED Bilateral 7/18/2015    Procedure: COMBINED INCISION AND DRAINAGE PERINEAL;  Surgeon:   Dequan Timmons MD;  [facility]     OPTICAL TRACKING SYSTEM CRANIOTOMY, EXCISE TUMOR, COMBINED N/A   4/13/2015    Procedure: COMBINED OPTICAL TRACKING SYSTEM CRANIOTOMY, EXCISE   TUMOR;  Surgeon: Francis Velazquez MD;  [facility]     OPTICAL TRACKING SYSTEM CRANIOTOMY, EXCISE TUMOR, COMBINED N/A   4/16/2015    Procedure: COMBINED OPTICAL TRACKING SYSTEM CRANIOTOMY, EXCISE   TUMOR;  Surgeon: Francis Velazquez MD;  [facility]     OPTICAL TRACKING SYSTEM CRANIOTOMY, EXCISE TUMOR, COMBINED   Bilateral 5/28/2015    Procedure: COMBINED OPTICAL TRACKING SYSTEM CRANIOTOMY, EXCISE   TUMOR;  Surgeon: Francis Velazquez MD;  [facility]     OPTICAL TRACKING SYSTEM CRANIOTOMY, EXCISE TUMOR, COMBINED   Bilateral 1/14/2016    Procedure: COMBINED OPTICAL TRACKING SYSTEM CRANIOTOMY, EXCISE   TUMOR;  Surgeon: Francis Velazquez MD;  [facility]     OPTICAL TRACKING SYSTEM VENTRICULOSTOMY  4/16/2015    Procedure: OPTICAL TRACKING SYSTEM VENTRICULOSTOMY;  Surgeon:   Francis Velazquez MD;  [facility]     REMOVE PORT VASCULAR ACCESS N/A 10/6/2016    Procedure: REMOVE PORT VASCULAR ACCESS;  Surgeon: Bruno Perea MD;  [facility]     RHINOPLASTY N/A 10/6/2016    Procedure: RHINOPLASTY;  Surgeon: Tyler Richards MD;    [facility]     VASCULAR SURGERY  5-2015    single lumen power port       Social History   I have reviewed this patient's social history and updated it with     pertinent information if needed.  Social History     Tobacco Use     Smoking status: Never Smoker     Smokeless tobacco: Never Used   Substance Use Topics     Alcohol use: No     Drug use: No       Family History   I have reviewed this patient's family history and updated it with     pertinent information if needed.  Family History   Problem Relation Age of Onset     Circulatory Father         PE/DVT     Hypothyroidism Father 30     Diabetes Maternal Grandmother      Diabetes Paternal Grandmother      Diabetes Paternal Grandfather      C.A.D. Paternal Grandfather      Hypertension Maternal Grandfather      Thyroid Disease Paternal Aunt         unknown whether hypo or hyper     Mental Illness No family hx of        Medications   I have reviewed this patient's current medications    Allergies   Allergies   Allergen Reactions     Blood Transfusion Related (Informational Only) Swelling     Periorbital swelling post platelet  transfusion     No Known Drug Allergies        Physical Exam   Vital Signs: Temp: 99.2  F (37.3  C) Temp src: Axillary BP: 95/50     Pulse: 91   Resp: 20 SpO2: 94 % O2 Device: BiPAP/CPAP(22/10)   Oxygen Delivery: Others (comment)  Weight: 274 lbs 7.56 oz    Constitutional: Coming in and out of sleep, twitching multiple   times throughout the exam, opens eyes on command by patients   mother, no acute distress  Eyes: Lids and lashes normal, no periorbital edema, conjunctiva   with small hemorrhage on right, patch covering left eye  Respiratory: BiPAP mask in place over nose and mouth, lungs   sounds are limited to the upper lung fields, scattered crackles,   coughing x1 during exam (wet), no wheezing  Abdomen: Normal bowel sounds, obese abdomen, soft without   tenderness to palpation  Cardiac: RRR, no murmurs or rubs appreciated, well perfused  Musculoskeletal: No extremity edema, no deformities.     Data     Results for RAFAELA GUAN (MRN [id number]) as of 5/3/2021 11:27   Ref. Range 5/2/2021 23:48 5/3/2021 00:37 5/3/2021 03:11 5/3/2021     04:32 5/3/2021 05:30 5/3/2021 08:10 5/3/2021 09:30   FIO2 Unknown 55 55 50 60 60 60 65   Ph Venous Latest Ref Range: 7.32 - 7.43 pH 7.25 (L) 7.25 (L) 7.27     (L) 7.25 (L) 7.25 (L) 7.27 (L) 7.29 (L)   PCO2 Venous Latest Ref Range: 40 - 50 mm Hg 84 (HH) 83 (HH) 77   (HH) 79 (HH) 81 (HH) 74 (H) 75 (H)   PO2 Venous Latest Ref Range: 25 - 47 mm Hg 36 32 36 57 (H) 63 (H)     71 (H) 64 (H)   Bicarbonate Venous Latest Ref Range: 21 - 28 mmol/L 37 (H) 37 (H)     36 (H) 34 (H) 36 (H) 34 (H) 36 (H)   Base Excess Venous Latest Units: mmol/L 7.6 7.6 7.1 5.5 6.8 6.0   7.6   Oxyhemoglobin Venous Latest Units: %      91       Blood gas venous   Result Value Ref Range    Ph Venous 7.25 (L) 7.32 - 7.43 pH    PCO2 Venous 83 (HH) 40 - 50 mm Hg    PO2 Venous 32 25 - 47 mm Hg    Bicarbonate Venous 37 (H) 21 - 28 mmol/L    Base Excess Venous 7.6 mmol/L    FIO2 55    Blood gas venous   Result Value Ref  Range    Ph Venous 7.27 (L) 7.32 - 7.43 pH    PCO2 Venous 77 (HH) 40 - 50 mm Hg    PO2 Venous 36 25 - 47 mm Hg    Bicarbonate Venous 36 (H) 21 - 28 mmol/L    Base Excess Venous 7.1 mmol/L    FIO2 50    Blood gas venous   Result Value Ref Range    Ph Venous 7.25 (L) 7.32 - 7.43 pH    PCO2 Venous 79 (HH) 40 - 50 mm Hg    PO2 Venous 57 (H) 25 - 47 mm Hg    Bicarbonate Venous 34 (H) 21 - 28 mmol/L    Base Excess Venous 5.5 mmol/L    FIO2 60    CBC with platelets differential   Result Value Ref Range    WBC 7.7 4.0 - 11.0 10e9/L    RBC Count 3.71 (L) 4.4 - 5.9 10e12/L    Hemoglobin 8.5 (L) 13.3 - 17.7 g/dL    Hematocrit 30.1 (L) 40.0 - 53.0 %    MCV 81 78 - 100 fl    MCH 22.9 (L) 26.5 - 33.0 pg    MCHC 28.2 (L) 31.5 - 36.5 g/dL    RDW 19.9 (H) 10.0 - 15.0 %    Platelet Count 90 (L) 150 - 450 10e9/L    Diff Method Automated Method     % Neutrophils 72.1 %    % Lymphocytes 9.1 %    % Monocytes 15.5 %    % Eosinophils 1.8 %    % Basophils 0.5 %    % Immature Granulocytes 1.0 %    Nucleated RBCs 1 (H) 0 /100    Absolute Neutrophil 5.6 1.6 - 8.3 10e9/L    Absolute Lymphocytes 0.7 (L) 0.8 - 5.3 10e9/L    Absolute Monocytes 1.2 0.0 - 1.3 10e9/L    Absolute Eosinophils 0.1 0.0 - 0.7 10e9/L    Absolute Basophils 0.0 0.0 - 0.2 10e9/L    Abs Immature Granulocytes 0.1 0 - 0.4 10e9/L    Absolute Nucleated RBC 0.1    Basic metabolic panel   Result Value Ref Range    Sodium 140 133 - 144 mmol/L    Potassium 4.0 3.4 - 5.3 mmol/L    Chloride 106 94 - 109 mmol/L    Carbon Dioxide 32 20 - 32 mmol/L    Anion Gap 2 (L) 3 - 14 mmol/L    Glucose 114 (H) 70 - 99 mg/dL    Urea Nitrogen 16 7 - 30 mg/dL    Creatinine 1.07 0.66 - 1.25 mg/dL    GFR Estimate >90 >60 mL/min/[1.73_m2]    GFR Estimate If Black >90 >60 mL/min/[1.73_m2]    Calcium 7.4 (L) 8.5 - 10.1 mg/dL   [Narrative was truncated due to length]   Blood gas venous   Result Value Ref Range    Ph Venous 7.37 7.32 - 7.43 pH    PCO2 Venous 61 (H) 40 - 50 mm Hg    PO2 Venous 58 (H) 25 - 47 mm  Hg    Bicarbonate Venous 35 (H) 21 - 28 mmol/L    Base Excess Venous 7.9 mmol/L    FIO2 60    Blood gas venous   Result Value Ref Range    Ph Venous 7.32 7.32 - 7.43 pH    PCO2 Venous 68 (H) 40 - 50 mm Hg    PO2 Venous 65 (H) 25 - 47 mm Hg    Bicarbonate Venous 35 (H) 21 - 28 mmol/L    Base Excess Venous 7.3 mmol/L    FIO2 55    Blood gas venous   Result Value Ref Range    Ph Venous 7.34 7.32 - 7.43 pH    PCO2 Venous 62 (H) 40 - 50 mm Hg    PO2 Venous 44 25 - 47 mm Hg    Bicarbonate Venous 34 (H) 21 - 28 mmol/L    Base Excess Venous 7.0 mmol/L    FIO2 40%      *Note: Due to a large number of results and/or encounters for the requested time period, some results have not been displayed. A complete set of results can be found in Results Review.

## 2021-11-21 NOTE — TELEPHONE ENCOUNTER
1/16/2020    Didn't catch her name but a lady called on behalf of pt. Pt got stitches on 1/13/2020 at an ER, The ER saw that Dr. Espinoza was pts PCP and wanted him to do a f/u to look at pts leg. Dr. Espinoza schedule was very full and was wonder if you could find a place to fit him in. If not, they will bring him back to the ER for the f/u. Call at [phone]  Message okay to leave    Addie Cobian Patient Representive     MOLECULAR PCR

## 2022-02-22 NOTE — PLAN OF CARE
AVSS. LS clear on RA. No c/o pain. No n/v. Good PO intake. Good UOP; stooling. Remains awaiting for placement. Dad visited x1 this evening and dropped off his walker. Geo verbalized and asked this writer if PT would be stopping by and texted dad about bringing his walker for PT. Dad had asked if PT would be available to work with him here as he awaits placement. This writer contacted the Red Team and they verbalized they would pass this on to the day team to discuss setting up PT with Geo for his remaining time being here. Pt updated on POC for evening. Hourly rounding completed. Will continue to monitor and reassess.    [facility] [place]  [address] [place] [address]  Phone: [phone]        2022    Don Samson  [address]  [address]  [facility] [address] [phone] (home)     :     1953      To Whom it May Concern:    This patient was seen in clinic today with acute injury. Please excuse medical related absences. He may return to work tomorrow  without restrictions.    Please contact me for questions or concerns.    Sincerely,    Josette Ramos, NP

## 2022-10-07 NOTE — PROGRESS NOTES
AQUATIC PHYSICAL THERAPY EXERCISE LOG (TRUNK)    *Jogger belt and gait belt donned throughout session    Date  6/13/19  Mary Beth Malin, DPT 6/20/19  Ita Araiza, DPT 6/27/19  Ita Araiza, DPT 7/11/19  Ita Araiza, DPT Ita Araiza, DPT  7/18/19   Warm Up Ambulation (Forward/Side/Back/March/All) Wheelchair used to enter/exit pool due to instability and ataxic gait. Fwd ambulation in the water with 1 UE support on wall and 1 UE support on therapist's arm, min-mod assist for forward gait, 20'x3, attempted fwd gait with no right ear and B UE support on therapist arms but unable to due ataxia and instability. Sidestepping with  BUE support on wall and therapist min assist for balance 20' ea direction. Marches in place with B UE support on wall and therapist min-mod assist for balance, 15x ea side. Maximal cues for upright posture and naming things he sees throughout - improved duration of upright posture with environmental obersvation W/c for exit/entry into pool due to  instability and ataxic gait. pT cued for improved posture with forward ambulation with manual cues, 1 UE on wall and 1 UE on therapist. X 4 laps, side stepping x 20 feet as well, cues for postural control.  W/C exit and entry into pool. Pt cued for side steps to enter chest deep water once in therapy area. Pt cued for forward ambulation with 1 UE support from therapist, wall support and min/mod A at gait belt. X 8 laps, cues for posture, cervical extension, close support of wall, slow speed for improved control. Improved with cues for looking at pictures on walls.  W/C exit and entry into pool. Pt cued for side steps to enter chest deep water once in therapy area. Pt cued for forward ambulation with 1 UE support from therapist, wall support and min/mod A at gait belt. X 8 laps, cues for posture, cervical extension, close support of wall, slow speed for improved control. Improved with cues for looking at pictures on walls.  W/C exit and entry into pool. Pt  cued for side steps to enter chest deep water once in therapy area. Pt cued for forward ambulation with 1 UE support from therapist, wall support and min/mod A at gait belt. X 8 laps, cues for posture, cervical extension, close support of wall, slow speed for improved control. Improved with cues for looking at pictures on walls. Improving tolerance with decreased LOB today with ambulation laps                     Stretching/ROM Chin Tucks         CROM (Flex/Ext/SB/Rot/All)         Shoulder (Shrugs/Rolls)         Scapular (Retraction/Depression)         Upper Trunk (Levator/Scalene/Upper Trapezius)         Pec Stretch (Unilateral/Bilateral)         Posterior Shoulder         Gluteal         Hamstring (On Step/Cuff)         Calf (In Hole/At Wall)         Quad         Hip Flexor (Kneel)         Piriformis (Seated/Stand)         Trunk ROM (Flex/Ext/SB/Rot/All)         BAD RAGAZ                          Strengthening Abdominal Sets/ Pelvic Tilt         Shoulder (Flex/Ext, Abd/Add, IR/ER, Circles, Alternation flex/ext for core)  Without equipment with back against the wall to work on wt shifting and postural control, educated to shift slow x 10 flex/ext, abd/add, attempted with back against wall and paddles but too difficult for pt to retain his balance No equipment, back against wall for wt shift and postural control, flex/ext, ad/abd x 10 With back against wall and paddles closed cues for trunk extension with manual cues, verbal cues for cervical extension. X 10 each, intermittent balance support min A With back against wall no paddles today cues for cervical extension, no assist needed today with balance but manual cues for posture x 10    Horizon (Abd/Add, Diagonals)   As above As above As above    Rowing Arms         UE PNF (D1/D2)         Abdominal Sets (Push/Pull, side to side, push down with board)     With ball instead x 10, therapist assisting with knees blocking and ball in wall sit positioning    Squat With  back to the wall and B UE support on therapist arms in 4' water, mini squats with cues for upright posture and keeping his chin out of the water, 15x B UE support on wall, cues for upright posture, in 3 feet of water. X 20 With UE support on wall manual cue for glute taps x 20 cues for cervical positioning With UE support, manual cues for full trunk extension and proper posture, needing A for balance at times x 20  With B UE support, cues for increased ROM into full squat, manual cues for smoothness/control of squat with therapist hand on chest and low back x 20, did have to change to 3 feet of water as pt tolerating well.    Lunge Squat         Hip (Flex/Ext, Abd/Add, single leg bike, circles, figure 8, All)  In corner flex/ext, abd/add, circles x 10 cues for upright posture,  In corner with B support, flex/ext and abd/add x 10  In corner with B support x 10 each, manual assist for proper trunk control and extension In corner B UE support- improving tolerance today with manual assist but improving cervical extension today x 10     Heel/Toe Raises In open water with B UE support on therapist arms, 15x heel raises with cues for posture and posterior weight shift to prevent anterior LOB  X 10 with wall support X 10 with wall support X 10 with wall support    Step Ups (Forward, Lateral) Fwd step ups 10x ea side on medium step with B UE support on therapist arms mod-max assist for balance and postural control Forward step ups with small step with wall support x 20, taps with 1 UE support  Tap ups with 1 UE support and therapist HHA x 10 , step ups with B UE support on wall, cues for posture throughtout Step up with B UE support on wall, therapist assisting with control and manual cues for extension x 10 Step up with B UE support, cues for slow speed, therapist assisting manually for proper trunk alignment with med step x 10     Noodle Push Downs with UE(B UE/1 UE) for core strengthening         Noodle Push Downs with LE    X 10 with assistance from therapist on noodle, colored noodle X 10 With therapist assist with noodle With therapist assist with noodle    Stir the Pot/Punches with Dumbells         Sit to Stand at Bench From wheelchair in 3' water, CGA-min assist from therapist with B UE support on wall, 15x        Prone Plank on step         Side Plank on step                          Aerobic Fast Walking         Bike/Ski/Hussein/All                          Balance Narrow Base of Support In open water with B UE support on therapist arms, attempted to limit assist unless LOB upon which he required mod-max assist to correct, 2 min Standing with therapist support attempting to limit support, needing mod A at times to decrease LOB. 2 mins 1 min with noodle support with therapist assisting at noodle  1 min with noodle support 1 min x 2 with B therapist support improving control today with decreased LOB     Tandem Stand         Single Leg Stance         Heel/Toe Walking         3 Step and Stop         Braiding         Transfers Patient with good control of initial transfer with UE support but unable to maintain balance in standing independently without UE support. Patient impulsive with sitting in wheelchair too early at end of session, therapist assist to prevent missing wheelchair and falling. Reviewed transfer technique and repeated transfer with max cues for turning fully away from chair and backing all the wall up before sitting, significantly improved safety on second repetition                         Cool Down Ambulation         Deep Water Dangle         Whirlpool             balance training/bed mobility training/gait training/strengthening/transfer training

## 2022-10-29 NOTE — PROGRESS NOTES
05/04/21 1333   Child Life   Location PICU  (Ependymoma)   Intervention Initial Assessment;Supportive Check In;Preparation   Preparation Comment Provided supportive check-in to pt while parents rounded with team. Per chart, pt has brain tumor and previous stroke, resulting in some developmental delays. Pt had full face mask on. Pt very talkative during visit, difficult to fully understand. Engaged in rapport building conversation. Pt shared he is hopeful he won't need mask for too much longer. Pt shared he has two brothers at home. Offered activities for admission, declined during this time. Showed pt communciation board, which pt open to trying. Pt also using thumbs up/down. Pt appeared in good spirits, smiley and laughing. This CCLS transitioned out as NST came into room. Will continue to follow/support   Anxiety Low Anxiety   Major Change/Loss/Stressor/Fears medical condition, self   Techniques to [place] with Loss/Stress/Change diversional activity;family presence   Outcomes/Follow Up Continue to Follow/Support;Provided Materials      decreased mk/increased time in double stance/decreased step length/decreased stride length/decreased weight-shifting ability

## 2023-06-11 NOTE — Clinical Note
1/4/2017      RE: Geo Hicks  [id number] [facility] [address]                                                      Pediatric Cardiology Clinic Note    Patient:  Geo Hicks MRN:  [id number]   YOB: 2000 Age:  16  year old 2  month old   Date of Visit:  Jan 4, 2017 PCP:  Jeffrey Espinoza MD     Dear Jeffrey Blackwood MD:    I had the pleasure of seeing your patient Geo Hicks at the [facility] for a consultation on Jan 4, 2017 for evaluation/follow up of tachycardia.       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  He also had persistent nasal obstruction and sleep apnea for which he underwent a rhinoplasty procedure in October of 2016. In addition, he also has a history of migraines, hypertension, and hyperlipidemia. Geo's hypertension is under good control on on lisinopril and hydrochlorothiazide.     While admitted at [place] in April of 2016, Geo has a few episodes of tachycardia. His baseline heart rate was noted to be , but would increase to 140-170 while walking or with feeding. He was seen by myself at that time. He denied any palpitations, chest pain, dizziness, or shortness of breath. An EKG at that time showed normal sinus rhythm with a rate of 111 bpm and a normal QTc of 550 msec. His tachycardia was most likely sinus tachycardia as it was associated with activity. A echols monitor was ordered and was normal. A repeat echo was recommended, but is not seen in our medical records.     Geo Hicks is otherwise doing well. Denies chest pain, dizziness, fainting, palpitations, shortness of  · Blood pressure stable  · Continue home clonidine and metoprolol  · We will continue to hold hydrochlorothiazide and lisinopril due to mild acute kidney injury,  · Discontinue hydrochlorothiazide due to pancreatitis  breath, exertional dyspnea or cyanosis. There have been no recent infections or hospitalizations. He does have an infected toe nail that has not yet been addressed, but he is being seen for it today.     He is currently feeding with no symptoms of diaphoresis, cyanosis, tachypnea, or agitation.     Mom/Parents/He reports no symptoms of chest pain/pressure, palpitations, shortness of breath, wheezing, syncope, dizziness, fatigue, edema, or cyanosis while at rest or with exercise.     Mom has noticed what appears to be rapid breathing, but seems to be associated with his tremor manifesting diffusely.     Mom/Parents/He/She states that he/she does not have normal exercise tolerance due to his underlying conditions and treatments, but he does not feel limited by cardiac/respiratory symptoms.     Past Medical History:     PMH/Birth Hx:  The past medical history was reviewed with the patient and family today and updated  Past Medical History   Diagnosis Date     Migraine      Ependymoma (H)      Strabismus      gaze palsy      Intracranial hemorrhage (H)      Dyspepsia      Gastro-oesophageal reflux disease      Cranial nerve dysfunction      Pilonidal cyst      7-2015     Refractory obstruction of nasal airway      2nd to nasal valve prolapse     Reduced vision      Hearing loss      Sleep apnea      Past surgical Hx: As above  Past Surgical History   Procedure Laterality Date     Optical tracking system craniotomy, excise tumor, combined N/A 4/13/2015     Procedure: COMBINED OPTICAL TRACKING SYSTEM CRANIOTOMY, EXCISE TUMOR;  Surgeon: Francis Velazquez MD;  [facility]     Optical tracking system craniotomy, excise tumor, combined N/A 4/16/2015     Procedure: COMBINED OPTICAL TRACKING SYSTEM CRANIOTOMY, EXCISE TUMOR;  Surgeon: Francis Velazquez MD;  [facility]     Optical tracking system ventriculostomy  4/16/2015     Procedure: OPTICAL TRACKING SYSTEM VENTRICULOSTOMY;  Surgeon: Francis Velazquez  MD Richie;  [facility]     Optical tracking system craniotomy, excise tumor, combined Bilateral 5/28/2015     Procedure: COMBINED OPTICAL TRACKING SYSTEM CRANIOTOMY, EXCISE TUMOR;  Surgeon: Francis Velazquez MD;  [facility]     Incision and drainage perineal, combined Bilateral 7/18/2015     Procedure: COMBINED INCISION AND DRAINAGE PERINEAL;  Surgeon: Dequan Timmons MD;  [facility]     Vascular surgery  5-2015     single lumen power port     Optical tracking system craniotomy, excise tumor, combined Bilateral 1/14/2016     Procedure: COMBINED OPTICAL TRACKING SYSTEM CRANIOTOMY, EXCISE TUMOR;  Surgeon: Francis Velazquez MD;  [facility]     Remove port vascular access N/A 10/6/2016     Procedure: REMOVE PORT VASCULAR ACCESS;  Surgeon: Bruno Perea MD;  [facility]     Rhinoplasty N/A 10/6/2016     Procedure: RHINOPLASTY;  Surgeon: Tyler Richards MD;  [facility]     Graft cartilage from posterior auricle Left 10/6/2016     Procedure: GRAFT CARTILAGE FROM POSTERIOR AURICLE;  Surgeon: Tyler Richards MD;  [facility]     No recent ER visits or hospitalizations. No history of asthma.   Immunizations UTD per parents.   He has a current medication list which includes the following prescription(s): lisinopril, clindamycin phos-benzoyl perox, clindamycin, dexamethasone, hydrochlorothiazide, vitamin e, bacitracin, sodium chloride, dexamethasone, omeprazole, calcium carbonate-vitamin d, docusate sodium, cholecalciferol, glycerin (laxative), polyethylene glycol, acetaminophen, pentoxifylline, and acetaminophen. Heis allergic to blood transfusion related (informational only) and no known drug allergies.      Family and Social History:     The family history was reviewed and updated today. No significant changes were noted.   Parents report that there is no family history of congenital heart disease, early/unexplained sudden deaths, persons needing  "pacemakers/defibrillators at a young age. Mom/Parents report that there is no family history of WPW syndrome, Brugada syndrome, or long QT syndrome.      Lives 50/50 with mom and dad. Younger brother also goes 50/50. Older brother in college. Step brother and step dad at mom's house. Step mom and step daughter at dad's house. No smoke exposure in either home.     Review of Systems: A comprehensive review of systems was performed and is negative, except as noted in the HPI and PMH    Physical exam:  His height is 6' 1\" (185.4 cm) and weight is 214 lb 4.6 oz (97.2 kg). His blood pressure is 110/54 and his pulse is 110. His respiration is 24 and oxygen saturation is 97%.   His body mass index is 28.28 kg/(m^2).  His body surface area is 2.24 meters squared. Obese with moon facies. There is no central or peripheral cyanosis. Pupils are reactive and sclera are not jaundiced. There is no conjunctival injection or discharge. Dysconjugate gaze, left eye patched. Mucous membranes are moist and pink. Lungs are clear to ausculation bilaterally with no wheezes, rales or rhonchi. There is no increased work of breathing, retractions or nasal flaring. Cardiac exam limited by large body habitus. On auscultation, heart sounds are very soft, regular with normal S1 and S2. There are no murmurs, rubs or gallops appreciated.  Abdomen is soft and non-tender without masses or hepatomegaly. Diffuse striae on his abdomen, arms, and legs. Femoral pulses are normal with no brachial femoral delay.Skin is without rashes. Extremities are warm and well-perfused with no cyanosis, clubbing or edema. Peripheral pulses are normal and there is < 2 sec capillary refill. Patient is alert and oriented. Decreased strength of core and extremities. Diffuse tremor noted of arms, head, and abdomen.    Extended Vitals not filed for this encounter.  92%ile based on CDC 2-20 Years stature-for-age data using vitals from 1/4/2017.  98%ile based on CDC 2-20 Years " weight-for-age data using vitals from 2017.  95%ile based on CDC 2-20 Years BMI-for-age data using vitals from 2017.  No head circumference on file for this encounter.  Blood pressure percentiles are 12% systolic and 8% diastolic based on 2000 NHANES data. Blood pressure percentile targets: 90: 136/85, 95: 140/89, 99 + 5 mmH/102.           Investigations and lab work:     12 Lead EKG performed today  shows normal sinus rhythm at a rate of 100 bpm with normal intervals and no chamber enlargement or hypertrophy. EKG does have some artifact, likely due to underlying tremor.     An echocardiogram performed today is notable for normal right and left ventricular size and systolic function. The calculated  biplane left ventricular ejection fraction is 67 %. No pericardial effusion.           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     Tachycardia most likely sinus tachycardia as it was associated with activity. With a normal EKG, normal echo, and no cardiac complaints or symptoms, I am not concerned for an underlying cardiac problem.     I did not recommend any activity restrictions or endocarditis prophylaxis. I do not need to see Geo back in clinic unless he has any new issues or concerns.     (1) Should abstain from smoking for overall cardiovascular health.  (2) Should obtain fasting lipid panel in the next 1-2 years per PMD for routine evaluation.  (3) Should continue regular exercise and healthy eating habits.  No activity restrictions at this time.   (4) No need for  SBE prophylaxis.   (5) Should receive annual influenza immunization as part of routine health.    (6) Follow-up only as needed.     Thank you for the opportunity to participate in the care of Geo Hicks . Please do not hesitate to call with questions or concerns.    Sincerely,    Savita Begum MD  Pediatrics PGY-3  Pager [phone]    Physician Attestation:    I, Hari Greenfield, saw this patient with the resident and agree with the resident s findings and plan of care as documented in the resident s note.      I have reviewed this patient's history, examined the patient and reviewed the vital signs, lab results, imaging, echocardiogram and other diagnostic testing. I have discussed the plan of care with the patients family/parents and agree with the findings and recommendations outlined above.    Thank you for referring this wonderful patient for a consultation. Please feel free to reach us in case of questions or concerns.     I, Hari Greenfield, spent a total of 30 minutes face-to-face with the patient, Geo Hicks. Over 50% of my time was spent counseling the patient and/or coordinating care regarding his diagnosis and its management.       Hari Greenfield MD, [facility]   of Pediatrics.  Pediatric cardiologist.   [facility].   Date of Service (when I saw the patient): 01/04/2017    CC  Patient Care Team:  Jeffrey Espinoza MD as PCP - General (Family Practice)  Dequan Timmons MD as MD (Surgery)  Leoncio Rousseau MD as MD (Pediatric Hematology/Oncology)  Kristi Schuler APRN CNP as Nurse Practitioner (Nurse Practitioner - Pediatrics)  Higinio Walters MD (Ophthalmology)  Karina Hodgson MSW as

## 2023-06-13 NOTE — PROGRESS NOTES
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 He is enrolled on COG [id number] - A Phase 1 Study of Entinostat, an Oral Histone Deacetylase Inhibitor, in Pediatric Patients With Recurrent or Refractory Solid Tumors, Including CNS Tumors and Lymphoma.     HPI:  Geo has had no fevers. No nausea, or vomiting. He had no missed doses of medication with his last course.  Geo wants to be able to walk and doesn't want his constipation. He blames up for his constipation. Geo had a normal stool two days ago per his report. He noted last evening he had some loose stuff incontinence in incontinence in the evening.  It was a \"fart he didn't trust\".  Review with his home staff reveals it was a very large incontinence requiring a shower.  He will get his stitches out tomorrow LLE yesterday.    He bumped his elbow and has some bruising.     Fam/Soc: Lives at a transitional home. His parents are  but have been awarded guardianship of Geo. The families work well together - both parents have remarried. His dad has a clotting disorder, requiring him to take Warfarin daily.     History was obtained from Geo and his mother.       Allergies   Allergen Reactions     Blood Transfusion Related (Informational Only) Swelling     Periorbital swelling post platelet transfusion     No Known Drug Allergies        Current Outpatient Medications   Medication     amitriptyline (ELAVIL) 25 MG tablet     Calcium-Vitamin D-Vitamin K (VIACTIV CALCIUM PLUS D) 650-12.5-40 MG-MCG-MCG CHEW     dexamethasone (DECADRON) 0.5 MG tablet     fexofenadine (ALLEGRA) 180 MG tablet     linaclotide (LINZESS) 290 MCG capsule     OLANZapine (ZYPREXA) 10 MG tablet     OLANZapine (ZYPREXA) 20 MG tablet     omeprazole (PRILOSEC) 20 MG DR capsule     order for DME     order for DME     polyethylene glycol (MIRALAX/GLYCOLAX) powder     " potassium phosphate, monobasic, (K-PHOS) 500 MG tablet     senna-docusate (SENOKOT-S/PERICOLACE) 8.6-50 MG tablet     sertraline (ZOLOFT) 100 MG tablet     sulfamethoxazole-trimethoprim (BACTRIM/SEPTRA) 400-80 MG tablet     vitamin D3 (CHOLECALCIFEROL) 2000 units (50 mcg) tablet     vitamin E (TOCOPHEROL) 400 units (360 mg) capsule     mupirocin (BACTROBAN) 2 % external ointment     OLANZapine (ZYPREXA) 20 MG tablet     OLANZapine (ZYPREXA) 5 MG tablet     No current facility-administered medications for this visit.        Past Medical History:   Diagnosis Date     Cranial nerve dysfunction      Dyspepsia      Ependymoma (H)      Gastro-oesophageal reflux disease      Hearing loss      Intracranial hemorrhage (H)      Migraine      Pilonidal cyst     7-2015     Reduced vision      Refractory obstruction of nasal airway     2nd to nasal valve prolapse     Sleep apnea      Strabismus     gaze palsy        Past Surgical History:   Procedure Laterality Date     COLONOSCOPY N/A 9/27/2019    Procedure: Colonoscopy With Biopsies and Polypectomy;  Surgeon: Aniya Wei MD;  [facility]     ESOPHAGOSCOPY, GASTROSCOPY, DUODENOSCOPY (EGD), COMBINED N/A 9/27/2019    Procedure: Upper Endoscopy (EGD) With Biopsies;  Surgeon: Aniya Wei MD;  [facility]     GRAFT CARTILAGE FROM POSTERIOR AURICLE Left 10/6/2016    Procedure: GRAFT CARTILAGE FROM POSTERIOR AURICLE;  Surgeon: Tyler Richards MD;  [facility]     INCISION AND DRAINAGE PERINEAL, COMBINED Bilateral 7/18/2015    Procedure: COMBINED INCISION AND DRAINAGE PERINEAL;  Surgeon: Dequan Timmons MD;  [facility]     OPTICAL TRACKING SYSTEM CRANIOTOMY, EXCISE TUMOR, COMBINED N/A 4/13/2015    Procedure: COMBINED OPTICAL TRACKING SYSTEM CRANIOTOMY, EXCISE TUMOR;  Surgeon: Francis Velazquez MD;  [facility]     OPTICAL TRACKING SYSTEM CRANIOTOMY, EXCISE TUMOR, COMBINED N/A 4/16/2015    Procedure:  "COMBINED OPTICAL TRACKING SYSTEM CRANIOTOMY, EXCISE TUMOR;  Surgeon: Francis Velazquez MD;  [facility]     OPTICAL TRACKING SYSTEM CRANIOTOMY, EXCISE TUMOR, COMBINED Bilateral 5/28/2015    Procedure: COMBINED OPTICAL TRACKING SYSTEM CRANIOTOMY, EXCISE TUMOR;  Surgeon: Francis Velazquez MD;  [facility]     OPTICAL TRACKING SYSTEM CRANIOTOMY, EXCISE TUMOR, COMBINED Bilateral 1/14/2016    Procedure: COMBINED OPTICAL TRACKING SYSTEM CRANIOTOMY, EXCISE TUMOR;  Surgeon: Francis Velazquez MD;  [facility]     OPTICAL TRACKING SYSTEM VENTRICULOSTOMY  4/16/2015    Procedure: OPTICAL TRACKING SYSTEM VENTRICULOSTOMY;  Surgeon: Francis Velazquez MD;  [facility]     REMOVE PORT VASCULAR ACCESS N/A 10/6/2016    Procedure: REMOVE PORT VASCULAR ACCESS;  Surgeon: Bruno Perea MD;  [facility]     RHINOPLASTY N/A 10/6/2016    Procedure: RHINOPLASTY;  Surgeon: Tyler Richards MD;  [facility]     VASCULAR SURGERY  5-2015    single lumen power port       Family History   Problem Relation Age of Onset     Circulatory Father         PE/DVT     Hypothyroidism Father 30     Diabetes Maternal Grandmother      Diabetes Paternal Grandmother      Diabetes Paternal Grandfather      C.A.D. Paternal Grandfather      Hypertension Maternal Grandfather      Thyroid Disease Paternal Aunt         unknown whether hypo or hyper     Mental Illness No family hx of        Review of Systems   Constitutional:        In wheelchair   HENT: Positive for drooling. Negative for nosebleeds.    Eyes:        Patching for diplopia   Gastrointestinal: Positive for constipation.   Neurological: Positive for tremors, speech difficulty and weakness.     Vital signs:  /83 (BP Location: Right arm, Patient Position: Fowlers, Cuff Size: Adult Large)   Pulse 101   Temp 96.7  F (35.9  C) (Axillary)   Resp 20   Ht 1.8 m (5' 10.87\")   Wt 89.8 kg (198 lb)   SpO2 100%   BMI 27.72 kg/m       Physical " Exam  HENT:      Ears:      Comments: Small excoriation inside each canal with minimal bleeding.   Cardiovascular:      Rate and Rhythm: Regular rhythm.      Heart sounds: No murmur.   Pulmonary:      Effort: Pulmonary effort is normal.      Breath sounds: Normal breath sounds.   Abdominal:      Comments: Full   Musculoskeletal:      Comments: Stitches dry and intact.  No tenderness or drainage.  Wound edges well approximated.    Skin:     Findings: Bruising (Lower legs and elbow) present.   Neurological:      Cranial Nerves: Cranial nerve deficit present.      Motor: Weakness present.      Coordination: Coordination abnormal.      Gait: Gait abnormal.         Labs:   Results for orders placed or performed in visit on 01/21/20   Phosphorus     Status: None   Result Value Ref Range    Phosphorus 4.4 2.5 - 4.5 mg/dL   Magnesium     Status: None   Result Value Ref Range    Magnesium 2.2 1.6 - 2.3 mg/dL   Comprehensive metabolic panel     Status: Abnormal   Result Value Ref Range    Sodium 141 133 - 144 mmol/L    Potassium 4.8 3.4 - 5.3 mmol/L    Chloride 107 94 - 109 mmol/L    Carbon Dioxide 33 (H) 20 - 32 mmol/L    Anion Gap 1 (L) 3 - 14 mmol/L    Glucose 124 (H) 70 - 99 mg/dL    Urea Nitrogen 20 7 - 30 mg/dL    Creatinine 1.27 (H) 0.66 - 1.25 mg/dL    GFR Estimate 81 >60 mL/min/[1.73_m2]    GFR Estimate If Black >90 >60 mL/min/[1.73_m2]    Calcium 9.2 8.5 - 10.1 mg/dL    Bilirubin Total 0.5 0.2 - 1.3 mg/dL    Albumin 3.3 (L) 3.4 - 5.0 g/dL    Protein Total 6.6 (L) 6.8 - 8.8 g/dL    Alkaline Phosphatase 115 40 - 150 U/L    ALT 20 0 - 70 U/L    AST 25 0 - 45 U/L   CBC with platelets differential     Status: Abnormal   Result Value Ref Range    WBC 3.5 (L) 4.0 - 11.0 10e9/L    RBC Count 4.76 4.4 - 5.9 10e12/L    Hemoglobin 13.2 (L) 13.3 - 17.7 g/dL    Hematocrit 41.2 40.0 - 53.0 %    MCV 87 78 - 100 fl    MCH 27.7 26.5 - 33.0 pg    MCHC 32.0 31.5 - 36.5 g/dL    RDW 15.0 10.0 - 15.0 %    Platelet Count 119 (L) 150 - 450  10e9/L    Diff Method Manual Differential     % Neutrophils 49.1 %    % Lymphocytes 31.6 %    % Monocytes 14.0 %    % Eosinophils 3.5 %    % Basophils 1.8 %    Nucleated RBCs 1 (H) 0 /100    Absolute Neutrophil 1.7 1.6 - 8.3 10e9/L    Absolute Lymphocytes 1.1 0.8 - 5.3 10e9/L    Absolute Monocytes 0.5 0.0 - 1.3 10e9/L    Absolute Eosinophils 0.1 0.0 - 0.7 10e9/L    Absolute Basophils 0.1 0.0 - 0.2 10e9/L    Absolute Nucleated RBC 0.0     RBC Morphology Normal     Platelet Estimate Confirming automated cell count      Impression:  1. Ependymoma.  2. MRI 1/7/2020: No metastases, primary tumor stable compared to recent studies.  3. Thrombocytopenia improved.  Counts are adequate to proceed with his next cycle.    Plan:  1. He will begin Etinostat 7mg weekly. He has lost some weight today but it is not clear if weight is accurate due to extremely large recent incontinence and he hasn't eaten much today.  We will deliver same dosing and recheck his weight at a future visit and make changes at that time if his weight loss is real.   2. Constipation discussed at length. He is very concerned that he needs another clean-out.  Since we are starting his drug today we will arrange for admission on Thursday afternoon.  Geo will notify us if he continues to stool regularly like he did today and we will cancel admission.   3. He should have his wound stitches removed Friday..  4. RTC in 1 week to recheck blood work or sooner PRN.  5. Encourage continued good hydration today. His kidney function is improved form last week and was likely related to contrast with his MRI on 1/7/20.  6. Paper work filled out and plan of care discussed with his facility.    40 minutes of face to face time spent with patient and >50% visit involved counseling and/or coordination of care.                 [As Noted in HPI] : as noted in HPI [Negative] : Heme/Lymph

## 2023-06-15 NOTE — LETTER
Date:December 22, 2017      Provider requested that no letter be sent. Do not send.       [facility] Health Information       Simple / Intermediate / Complex Repair - Final Wound Length In Cm: 3

## 2023-09-14 NOTE — MR AVS SNAPSHOT
After Visit Summary   3/7/2018    Geo Hicks    MRN: [id number]           Patient Information     Date Of Birth          2000        Visit Information        Provider Department      3/7/2018 2:15 PM Kristi Schuler APRN CNP Peds Hematology Oncology        Today's Diagnoses     Neoplasm of posterior cranial fossa (H)    -  1    Ependymoma (H)        Lung infection              [facility], 9th floor  [address] [place] [address]  Phone: [phone]  Clinic Hours:   Monday-Friday:   7 am to 5:00 pm   closed weekends and major  holidays     If your fever is 100.5  or greater,   call the clinic during business hours.   After hours call [phone] and ask for the pediatric hematology / oncology physician to be paged for you.               Follow-ups after your visit        Your next 10 appointments already scheduled     Mar 12, 2018  2:00 PM CDT   PEDS TREATMENT with Imani Landers, PT   [facility] Physical Therapy ([facility])    [address] [address] [phone]            Mar 12, 2018  3:15 PM CDT   Treatment 45 with Elyse Costello OTR   [facility] Occupational Therapy ([facility])    [address] [address] [phone]            Mar 14, 2018  1:30 PM CDT   Return Visit with ALAN Aguilar CNP   Peds Hematology Oncology ([facility])    [facility]  9th Floor  [address] [id number] [phone]            Mar 19, 2018  1:15 PM CDT   PEDS TREATMENT with Noemy Caldwell, SLP   [facility] Speech Therapy ([facility])    [address] [address] [phone]            Mar 19, 2018  2:00 PM CDT   PEDS TREATMENT with Imani Landers, LASHAE   [facility] ([facility])    [address]  [address]   [phone]            Mar 19, 2018  3:15 PM CDT   Treatment 45 with ANASTASIA [place]   [facility] Occupational Therapy ([facility])    [address] [facility] [address]   [phone]            Mar 21, 2018  1:00 PM CDT   Return Visit with Perico Holley MD   Pediatric Neurology ([facility])    [place] [id number] - 3rd Floor  [facility] [id number] [phone]            Mar 21, 2018  1:30 PM CDT   Return Visit with ALAN Aguilar CNP   Peds Hematology Oncology ([facility])    [facility]  9th Floor  [address] [facility] [id number] [phone]            Mar 26, 2018  2:00 PM CDT   PEDS TREATMENT with Imani Landers, LASHAE   [facility] ([facility])    [address] [facility] [id number] [phone]            Mar 28, 2018 11:00 AM CDT   Return Visit with ALAN Aguilar CNP   Peds Hematology Oncology ([facility])    [facility]  9th Floor  [address] [facility] [address] [phone]              Who to contact     Please call your clinic at [phone] to:    Ask questions about your health    Make or cancel appointments    Discuss your medicines    Learn about your test results    Speak to your doctor            Additional Information About Your Visit        [employer] Information     [employer] gives you secure access to your electronic health record. If you see a primary care provider, you can also send messages to your care team and make appointments. If you have questions, please call your primary care clinic.  If you do not have a primary care provider, please call [phone] and they will assist you.      [employer] is an electronic gateway that provides easy, online access to your medical records. With [employer], you can request a clinic appointment, read your test results, renew a  "prescription or communicate with your care team.     To access your existing account, please contact your [facility] or call [phone] for assistance.        Care EveryWhere ID     This is your Care EveryWhere ID. This could be used by other organizations to access your [facility] medical records  [id number]        Your Vitals Were     Pulse Temperature Respirations Height Pulse Oximetry BMI (Body Mass Index)    70 96.5  F (35.8  C) (Axillary) 21 1.79 m (5' 10.47\") 97% 22.13 kg/m2       Blood Pressure from Last 3 Encounters:   03/07/18 128/69   02/28/18 111/73   02/21/18 105/70    Weight from Last 3 Encounters:   03/07/18 70.9 kg (156 lb 4.9 oz) (60 %)*   02/28/18 72.4 kg (159 lb 9.8 oz) (65 %)*   02/21/18 72.1 kg (158 lb 15.2 oz) (64 %)*     * Growth percentiles are based on [facility] 2-20 Years data.              We Performed the Following     CBC with platelets differential     Comprehensive metabolic panel     Magnesium     Phosphorus          Today's Medication Changes          These changes are accurate as of 3/7/18  5:49 PM.  If you have any questions, ask your nurse or doctor.               Start taking these medicines.        Dose/Directions    study - entinostat 1 mg tablet   Commonly known as:  IDS# 5050   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), Ependymoma (H)   Started by:  Kristi Schuler APRN CNP        Dose:  1 mg   Take 1 tablet (1 mg) by mouth every 7 days for 4 doses Take one 1mg tablet with one 5mg tablet for total dose of 6mg weekly. Take on an empty stomach, at least 1 hour before or 2 hours after a meal.  Swallow tablet whole.   Quantity:  4 tablet   Refills:  0       study - entinostat 5 mg tablet   Commonly known as:  IDS# 5050   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), Ependymoma (H)   Started by:  Kristi Schuler APRN CNP        Dose:  5 mg   Take 1 tablet (5 mg) by mouth every 7 days for 4 doses Take one 5mg tablet with one 1mg " tablet for total dose of 6mg weekly. Take on an empty stomach, at least 1 hour before or 2 hours after a meal.  Swallow tablet whole.   Quantity:  4 tablet   Refills:  0         These medicines have changed or have updated prescriptions.        Dose/Directions    dexamethasone 0.5 MG tablet   Commonly known as:  DECADRON   This may have changed:  additional instructions   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), Ependymoma (H), Lung infection   Changed by:  Kristi Schuler, ALAN CNP        TAKE 1.5 TABLETS (0.75 MG) BY MOUTH 5 days out of 7.   Quantity:  130 tablet   Refills:  3            Where to get your medicines      Some of these will need a paper prescription and others can be bought over the counter.  Ask your nurse if you have questions.     Bring a paper prescription for each of these medications     study - entinostat 1 mg tablet    study - entinostat 5 mg tablet                Primary Care Provider Office Phone # Fax #    Jeffrey Espinoza [phone] [address] [facility] [address]        Equal Access to Services     [facility]: Hadii devin burns hadasho Soomaali, waaxda luqadaha, qaybta kaalmada adeegyada, waxay sheri ferreira . So [facility] [phone].    ATENCIÓN: Si habla español, tiene a antonio disposición servicios gratuitos de asistencia lingüística. [facility] [phone].    We comply with applicable federal civil rights laws and [place] laws. We do not discriminate on the basis of race, color, national origin, age, disability, sex, sexual orientation, or gender identity.            Thank you!     Thank you for choosing PEDS HEMATOLOGY ONCOLOGY  for your care. Our goal is always to provide you with excellent care. Hearing back from our patients is one way we can continue to improve our services. Please take a few minutes to complete the written survey that you may receive in the mail after your visit with us. Thank you!             Your  Updated Medication List - Protect others around you: Learn how to safely use, store and throw away your medicines at www.disposemymeds.org.          This list is accurate as of 3/7/18  5:49 PM.  Always use your most recent med list.                   Brand Name Dispense Instructions for use Diagnosis    calcium carbonate-vitamin D 600-400 MG-UNIT Chew     90 tablet    Take 2 tablets in the morning and 1 tablet in the evening.    Ependymoma (H)       Cholecalciferol 400 UNITS Chew     60 tablet    Take 1 tablet (400 Units) by mouth every morning    Ependymoma (H)       dexamethasone 0.5 MG tablet    DECADRON    130 tablet    TAKE 1.5 TABLETS (0.75 MG) BY MOUTH 5 days out of 7.    Neoplasm of posterior cranial fossa (H), Ependymoma (H), Lung infection       docusate sodium 100 MG capsule    COLACE    60 capsule    Take 1 capsule (100 mg) by mouth 2 times daily as needed for constipation    Constipation, unspecified constipation type       fexofenadine 180 MG tablet    ALLEGRA     Take 180 mg by mouth daily        fluticasone 50 MCG/ACT spray    FLONASE    1 Bottle    Spray 1-2 sprays into both nostrils daily    Ependymoma (H), Chronic seasonal allergic rhinitis, unspecified trigger       melatonin 3 MG tablet      Take 3 mg by mouth At Bedtime        * methylphenidate 20 MG tablet    RITALIN    30 tablet    Take 1 tablet (20 mg) by mouth daily    Neoplasm of posterior cranial fossa (H), Ependymoma (H), Executive function deficit       * methylphenidate 30 MG CR capsule    METADATE CD    28 capsule    Take 1 capsule (30 mg) by mouth every morning    Attention and concentration deficit       * methylphenidate 10 MG tablet    RITALIN    30 tablet    Take 1 tablet (10 mg) by mouth daily    Neoplasm of posterior cranial fossa (H), Ependymoma (H), Attention and concentration deficit       * methylphenidate 10 MG tablet   Start taking on:  4/3/2018    RITALIN    30 tablet    Take 1 tablet (10 mg) by mouth daily    Neoplasm  of posterior cranial fossa (H), Ependymoma (H), Attention and concentration deficit       mupirocin 2 % ointment    BACTROBAN    22 g    Use 2 times a day to the buttock with flare    Bacterial folliculitis, Ependymoma (H)       omeprazole 20 MG CR capsule    priLOSEC    90 capsule    Take 1 capsule (20 mg) by mouth daily    Gastroesophageal reflux disease, esophagitis presence not specified       ondansetron 4 MG ODT tab    ZOFRAN-ODT    5 tablet    Take 1 tablet (4 mg) by mouth every 8 hours as needed for nausea        pentoxifylline 400 MG CR tablet    TRENtal    270 tablet    Take 1 tablet (400 mg) by mouth 3 times daily (with meals)    Ependymoma (H), 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 Take on an empty stomach, at least 1 hour before or 2 hours after a meal.  Swallow tablet whole.    Neoplasm of posterior cranial fossa (H), Ependymoma (H)       study - entinostat 5 mg tablet    IDS# 5050    4 tablet    Take 1 tablet (5 mg) by mouth every 7 days for 4 doses Take one 5mg tablet with one 1mg tablet for total dose of 6mg weekly. Take on an empty stomach, at least 1 hour before or 2 hours after a meal.  Swallow tablet whole.    Neoplasm of posterior cranial fossa (H), Ependymoma (H)       sulfamethoxazole-trimethoprim 400-80 MG per tablet    BACTRIM/SEPTRA    24 tablet    Take 1 tablet by mouth 2 times daily On Saturdays and Sundays    Ependymoma (H)       vitamin E 400 UNIT capsule    GNP VITAMIN E    30 capsule    Take 1  capsule (400 Units) by mouth daily    Ependymoma (H)       * Notice:  This list has 4 medication(s) that are the same as other medications prescribed for you. Read the directions carefully, and ask your doctor or other care provider to review them with you.       Nasalis-Muscle-Based Myocutaneous Island Pedicle Flap Text: Using a #15 blade, an incision was made around the donor flap to the level of the nasalis muscle. Wide lateral undermining was then performed in both the subcutaneous plane above the nasalis muscle, and in a submuscular plane just above periosteum. This allowed the formation of a free nasalis muscle axial pedicle (based on the angular artery) which was still attached to the actual cutaneous flap, increasing its mobility and vascular viability. Hemostasis was obtained with pinpoint electrocoagulation. The flap was mobilized into position and the pivotal anchor points positioned and stabilized with buried interrupted sutures. Subcutaneous and dermal tissues were closed in a multilayered fashion with sutures. Tissue redundancies were excised, and the epidermal edges were apposed without significant tension and sutured with sutures.

## 2023-12-01 NOTE — LETTER
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 He is on COG study [id number] with Entinostat and is presently Cycle 19 Day 1.      HPI:  Geo is doing okay.  No fevers. No known ill exposures. He remains on Prilosec. He is presently taking: miralax BID -TID, pericolace in the morning, and linzess 290 mcg daily.  His abdominal pain is under control.  He gave me his \"poop log\" 12/11: Medium and small 3/4 on [facility] scale.  12/12: 5 stool small to medium [facility] scale 4/5.  12/13: 5 stools large and medium  12/14: 3 stool small, medium and large - all 4/5 on [facility] scale 12/15: 3 stools small and medium 5 on the scale.  12/16: three stool Scale 4/5.   He had 2 stool yesterday - large and medium [facility] scale averaging 5/6. He had large 5 stool this morning.  He still convinced his small intestine is full of stool regular times of bowel movement.  He is speaking about his theories that his body feels warm because of the stool that he has inside and that I am withholding a solution. He can't tell me why I would do that.  He speech may be slightly more difficult to understand.  Geo denies dizziness, vision or hearing changes, chills, cough, shortness of breath, nausea, vomiting, and polyuria.  He has no muscular aches or complaints of bone pain.       Fam/Soc: Lives between his  parents (one week at each home). They have been awarded guardianship of Geo. The families work well together - both parents have remarried. His dad has a clotting disorder, requiring him to take Warfarin daily.       Allergies   Allergen Reactions     Blood Transfusion Related (Informational Only) Swelling     Periorbital swelling post platelet transfusion     No Known Drug Allergies        Current meds:  Current Outpatient " You will be called with lab results and further advise as needed.  Safe sex practice, STD precautions as discussed.  If your results for STDs are positive please inform your partner and have them evaluated and treated as needed.  Also encouraged you to follow with primary care physician or health department to get screened for other STDs including but not limited to HIV/syphilis.   Medications   Medication Sig Dispense Refill     dexamethasone (DECADRON) 0.5 MG tablet Take 0.75 mg by mouth daily (with breakfast). 85 tablet 3     bisacodyl (BISACODYL LAXATIVE) 5 MG EC tablet Take 2 tablets (10 mg) by mouth At Bedtime 60 tablet 3     calcium carbonate-vitamin D 600-400 MG-UNIT CHEW Take 2 tablets in the morning and 1 tablet in the evening. 90 tablet 3     Cholecalciferol 400 UNITS CHEW Take 1 tablet (400 Units) by mouth every morning 60 tablet 2     fexofenadine (ALLEGRA) 180 MG tablet Take 180 mg by mouth daily       linaclotide (LINZESS) 290 MCG capsule Take 1 capsule (290 mcg) by mouth daily       melatonin 3 MG tablet Take 3 mg by mouth At Bedtime       mupirocin (BACTROBAN) 2 % ointment Use 2 times a day to the buttock with flare 22 g 3     omeprazole (PRILOSEC) 20 MG CR capsule Take 2 capsules (40 mg) by mouth daily 60 capsule 3     pentoxifylline (TRENTAL) 400 MG CR tablet Take 1 tablet (400 mg) by mouth 3 times daily (with meals) 270 tablet 2     polyethylene glycol (MIRALAX/GLYCOLAX) Packet Take 34 g by mouth 2 times daily 100 packet 3     potassium phosphate, monobasic, (K-PHOS) 500 MG tablet Take 1 tablet (500 mg) by mouth 3 times daily 90 tablet 3     study - entinostat (IDS# 5050) 1 mg tablet Take 1 tablet (1 mg) by mouth every 7 days for 4 doses Take one 1mg tablet with one 5mg tablet for total dose of 6mg weekly. Take on an empty stomach, at least 1 hour before or 2 hours after a meal.  Swallow tablet whole. 4 tablet 0     study - entinostat (IDS# 5050) 5 mg tablet Take 1 tablet (5 mg) by mouth every 7 days for 4 doses Take one 5mg tablet with one 1mg tablet for total dose of 6mg weekly. Take on an empty stomach, at least 1 hour before or 2 hours after a meal.  Swallow tablet whole. 4 tablet 0     sulfamethoxazole-trimethoprim (BACTRIM/SEPTRA) 400-80 MG per tablet Take 1 tablet by mouth 2 times daily On Saturdays and Sundays 24 tablet 11     vitamin E (GNP VITAMIN E) 400 UNIT  capsule Take 1 capsule (400 Units) by mouth daily 30 capsule 11   Above meds reviewed. Has missed some steroid doses over the last two months. No missed chemo doses.   Has received flu shot for [id number]    Past Medical History:   Diagnosis Date     Cranial nerve dysfunction      Dyspepsia      Ependymoma (H)      Gastro-oesophageal reflux disease      Hearing loss      Intracranial hemorrhage (H)      Migraine      Pilonidal cyst     7-2015     Reduced vision      Refractory obstruction of nasal airway     2nd to nasal valve prolapse     Sleep apnea      Strabismus     gaze palsy        Past Surgical History:   Procedure Laterality Date     GRAFT CARTILAGE FROM POSTERIOR AURICLE Left 10/6/2016    Procedure: GRAFT CARTILAGE FROM POSTERIOR AURICLE;  Surgeon: Tyler Richards MD;  Location: [facility]     INCISION AND DRAINAGE PERINEAL, COMBINED Bilateral 7/18/2015    Procedure: COMBINED INCISION AND DRAINAGE PERINEAL;  Surgeon: Dequan Timmons MD;  Location: [facility] OR     OPTICAL TRACKING SYSTEM CRANIOTOMY, EXCISE TUMOR, COMBINED N/A 4/13/2015    Procedure: COMBINED OPTICAL TRACKING SYSTEM CRANIOTOMY, EXCISE TUMOR;  Surgeon: Francis Velazquez MD;  Location: [facility]     OPTICAL TRACKING SYSTEM CRANIOTOMY, EXCISE TUMOR, COMBINED N/A 4/16/2015    Procedure: COMBINED OPTICAL TRACKING SYSTEM CRANIOTOMY, EXCISE TUMOR;  Surgeon: Francis Velazquez MD;  [facility]     OPTICAL TRACKING SYSTEM CRANIOTOMY, EXCISE TUMOR, COMBINED Bilateral 5/28/2015    Procedure: COMBINED OPTICAL TRACKING SYSTEM CRANIOTOMY, EXCISE TUMOR;  Surgeon: Francis Velazquez MD;  [facility]     OPTICAL TRACKING SYSTEM CRANIOTOMY, EXCISE TUMOR, COMBINED Bilateral 1/14/2016    Procedure: COMBINED OPTICAL TRACKING SYSTEM CRANIOTOMY, EXCISE TUMOR;  Surgeon: Francis Velazquez MD;  [facility]     OPTICAL TRACKING SYSTEM VENTRICULOSTOMY  4/16/2015    Procedure: OPTICAL TRACKING SYSTEM VENTRICULOSTOMY;  Surgeon:  Francis Velazquez MD;  Location: [facility]     REMOVE PORT VASCULAR ACCESS N/A 10/6/2016    Procedure: REMOVE PORT VASCULAR ACCESS;  Surgeon: Bruno Perea MD;  Location: [facility]     RHINOPLASTY N/A 10/6/2016    Procedure: RHINOPLASTY;  Surgeon: Tyler Richards MD;  Location: [facility]     VASCULAR SURGERY  5-2015    single lumen power port       Family History   Problem Relation Age of Onset     Circulatory Father         PE/DVT     Hypothyroidism Father 30     Diabetes Maternal Grandmother      Diabetes Paternal Grandmother      Diabetes Paternal Grandfather      C.A.D. Paternal Grandfather      Hypertension Maternal Grandfather      Thyroid Disease Paternal Aunt         unknown whether hypo or hyper       Review of Systems   Constitutional: Negative for chills and fever.        In a wheelchair   HENT: Positive for hearing loss (Pre-existing hearing loss from prior therapy). Negative for congestion, ear pain, mouth sores, nosebleeds and tinnitus.    Eyes: Positive for visual disturbance (Wears a patch over one eye to minimize diplopia). Negative for pain.        Right eye patched   Respiratory: Negative.  Negative for cough and shortness of breath.    Cardiovascular: Negative.    Gastrointestinal: Positive for constipation (Chronic, see HPI). Negative for nausea and vomiting.   Endocrine: Negative for polydipsia and polyuria.        Follows with Dr. Martin   Musculoskeletal: Negative.  Negative for arthralgias and myalgias.   Skin: Negative.    Neurological: Positive for facial asymmetry and speech difficulty. Negative for dizziness.   Psychiatric/Behavioral: Sleep disturbance: Not using his BiPAP.   All other systems reviewed and are negative.    Vitals:    weight is 88.4 kg (194 lb 14.2 oz). His axillary temperature is 97.5  F (36.4  C). His blood pressure is 121/76 and his pulse is 89. His respiration is 28 and oxygen saturation is 99%.     Wt Readings from Last 4 Encounters:   12/19/18 88.4 kg (194 lb  14.2 oz) (91 %)*   12/12/18 87.1 kg (192 lb 0.3 oz) (90 %)*   12/05/18 87 kg (191 lb 12.8 oz) (90 %)*   11/28/18 86.7 kg (191 lb 2.2 oz) (89 %)*     * Growth percentiles are based on [facility] (Boys, 2-20 Years) data.       Physical Exam   Constitutional: He is oriented to person, place, and time and well-developed, well-nourished, and in no distress.   Stands with assist   HENT:   Head: Normocephalic and atraumatic.   Mouth/Throat: Oropharynx is clear and moist.   Saliva accumulated in mouth with occasional drooling  Left TM retracted, no erythema   Eyes: Conjunctivae are normal. Pupils are equal, round, and reactive to light.   Can track to right, but not to left.   Nystagmus noted     Neck: Neck supple.   Cardiovascular: Normal rate, regular rhythm and normal heart sounds.   Pulmonary/Chest: Effort normal and breath sounds normal. He has no wheezes.   Abdominal: Soft. Bowel sounds are normal. He exhibits no distension and no mass. There is no tenderness.   Musculoskeletal:   Positive pulses bilaterally.   Lymphadenopathy:     He has no cervical adenopathy.   Neurological: He is alert and oriented to person, place, and time. A cranial nerve deficit is present. Coordination (Ataxic- dysmetria especially in upper extremities when accomplishing tasks.) abnormal. GCS score is 15.   Skin: Skin is warm and dry.   Striae scattered He has a few petechia mid abdomen on one stretch sofya.    Psychiatric: Mood and affect normal.       Labs:  Results for orders placed or performed in visit on 12/19/18   CBC with platelets differential   Result Value Ref Range    WBC 4.2 4.0 - 11.0 10e9/L    RBC Count 4.19 (L) 4.4 - 5.9 10e12/L    Hemoglobin 13.1 (L) 13.3 - 17.7 g/dL    Hematocrit 38.9 (L) 40.0 - 53.0 %    MCV 93 78 - 100 fl    MCH 31.3 26.5 - 33.0 pg    MCHC 33.7 31.5 - 36.5 g/dL    RDW 12.1 10.0 - 15.0 %    Platelet Count 113 (L) 150 - 450 10e9/L    Diff Method Automated Method     % Neutrophils 53.0 %    % Lymphocytes 16.1 %    %  Monocytes 20.1 %    % Eosinophils 10.1 %    % Basophils 0.5 %    % Immature Granulocytes 0.2 %    Nucleated RBCs 0 0 /100    Absolute Neutrophil 2.2 1.6 - 8.3 10e9/L    Absolute Lymphocytes 0.7 (L) 0.8 - 5.3 10e9/L    Absolute Monocytes 0.8 0.0 - 1.3 10e9/L    Absolute Eosinophils 0.4 0.0 - 0.7 10e9/L    Absolute Basophils 0.0 0.0 - 0.2 10e9/L    Abs Immature Granulocytes 0.0 0 - 0.4 10e9/L    Absolute Nucleated RBC 0.0     RBC Morphology Normal     Platelet Estimate Confirming automated cell count    Comprehensive metabolic panel   Result Value Ref Range    Sodium 143 133 - 144 mmol/L    Potassium 4.7 3.4 - 5.3 mmol/L    Chloride 108 98 - 110 mmol/L    Carbon Dioxide 33 (H) 20 - 32 mmol/L    Anion Gap 2 (L) 3 - 14 mmol/L    Glucose 78 70 - 99 mg/dL    Urea Nitrogen 17 7 - 30 mg/dL    Creatinine 1.03 (H) 0.50 - 1.00 mg/dL    GFR Estimate >90 >60 mL/min/[1.73_m2]    GFR Estimate If Black >90 >60 mL/min/[1.73_m2]    Calcium 8.5 8.5 - 10.1 mg/dL    Bilirubin Total 0.2 0.2 - 1.3 mg/dL    Albumin 3.2 (L) 3.4 - 5.0 g/dL    Protein Total 6.4 (L) 6.8 - 8.8 g/dL    Alkaline Phosphatase 139 65 - 260 U/L    ALT 21 0 - 50 U/L    AST 31 0 - 35 U/L   Magnesium   Result Value Ref Range    Magnesium 2.0 1.6 - 2.3 mg/dL   Phosphorus   Result Value Ref Range    Phosphorus 4.0 2.5 - 4.5 mg/dL     *Note: Due to a large number of results and/or encounters for the requested time period, some results have not been displayed. A complete set of results can be found in Results Review.       Impression:  1. Ependymoma.  2. Entinostat, continues to tolerate fairly well.  3. Labs appropriate at this time - Kidney function and sodium stable.    4. Chronic constipation well managed with current regime  5. He has some concerning behaviors - he is obsessed about constipation.    Plan:  1.  He will continue Entinostat treatment - He will  6mg dose for this cycle - he was given a new diary.   2.  Discussed my concerns regarding his mental health with  Geo and his mother.  I have encouraged evaluation at the [facility] where he goes for counseling.  It may be as he is out of high school without a real schedule he is more depressed about his situation and focusing on this current problem but it would be good to move forward with an evaluation. Reviewed with Geo that we are doing a great number of things to manage his constipation and that I would not with hold any treatments.  Encouraged him to record his intake for three days so that we can assess is average daily fiber intake.    3.  RTC in next week for follow up and labs.  4.  Geo has daily steroids ordered and although a very small dose (0.75mg daily), he is sometimes not taking the dose - this may also be effecting his mood, and speech changes. I have encouraged the family to discuss with Geo about taking his medication daily. He is very compliant with all other medications.   5.  Imaging is scheduled Jan 16th.  6. Follow-up Neuropsychology testing in January.                ALAN Justin CNP

## (undated) DEVICE — WIPE PREMOIST CLEANSING WASHCLOTHS 7988

## (undated) DEVICE — PAD CHUX UNDERPAD 30X36" P3036C

## (undated) DEVICE — GLOVE PROTEXIS W/NEU-THERA 6.5  2D73TE65

## (undated) DEVICE — TUBING SUCTION MEDI-VAC 1/4"X20' N620A

## (undated) DEVICE — DEVICE RETRIEVAL ROTH NET PLATINUM UNIV 2.5MMX230CM 00715050

## (undated) DEVICE — SPECIMEN TRAP MUCOUS 40ML LUKI C30200A

## (undated) DEVICE — ESU GROUND PAD UNIVERSAL W/O CORD

## (undated) DEVICE — ENDO BITE BLOCK PEDS BATRIK LATEX FREE B1

## (undated) DEVICE — SPECIMEN CONTAINER 60MLW/10% FORMALIN 59601

## (undated) DEVICE — ENDO FORCEP ENDOJAW BIOPSY 2.0MMX155CM FB-221K

## (undated) DEVICE — SUCTION MANIFOLD DORNOCH ULTRA CART UL-CL500

## (undated) DEVICE — KIT CONNECTOR FOR OLYMPUS ENDOSCOPES DEFENDO 100310

## (undated) DEVICE — KIT ENDO TURNOVER/PROCEDURE CARRY-ON 101822

## (undated) DEVICE — ENDO SNARE POLYPECTOMY OVAL 25MM LOOP SD-240U-25

## (undated) DEVICE — ENDO TUBING W/CAP AUXILARY WATER INLET 100609 EGA-500

## (undated) DEVICE — SOL WATER IRRIG 1000ML BOTTLE 2F7114

## (undated) DEVICE — ENDO FORCEP ENDOJAW BIOPSY 2.8MMX230CM FB-220U

## (undated) RX ORDER — DIPHENHYDRAMINE HYDROCHLORIDE 50 MG/ML
INJECTION INTRAMUSCULAR; INTRAVENOUS
Status: DISPENSED
Start: 2019-09-27